# Patient Record
Sex: MALE | Race: WHITE | NOT HISPANIC OR LATINO | Employment: OTHER | URBAN - METROPOLITAN AREA
[De-identification: names, ages, dates, MRNs, and addresses within clinical notes are randomized per-mention and may not be internally consistent; named-entity substitution may affect disease eponyms.]

---

## 2018-06-19 LAB
ALBUMIN SERPL BCP-MCNC: 3.9 G/DL (ref 3.5–5.7)
ALP SERPL-CCNC: 72 IU/L (ref 55–165)
ALT SERPL W P-5'-P-CCNC: 22 IU/L (ref 10–35)
ANION GAP SERPL CALCULATED.3IONS-SCNC: 12.6 MM/L
AST SERPL W P-5'-P-CCNC: 21 U/L (ref 8–27)
BASOPHILS # BLD AUTO: 0.1 X3/UL (ref 0–0.3)
BASOPHILS # BLD AUTO: 0.8 % (ref 0–2)
BILIRUB SERPL-MCNC: 0.7 MG/DL (ref 0.3–1)
BUN SERPL-MCNC: 16 MG/DL (ref 7–25)
CALCIUM SERPL-MCNC: 9.4 MG/DL (ref 8.6–10.5)
CHLORIDE SERPL-SCNC: 101 MM/L (ref 98–107)
CHOLEST SERPL-MCNC: 199 MG/DL (ref 0–200)
CO2 SERPL-SCNC: 27 MM/L (ref 21–31)
CREAT SERPL-MCNC: 0.81 MG/DL (ref 0.7–1.3)
DEPRECATED RDW RBC AUTO: 14.8 % (ref 11.5–14.5)
EGFR (HISTORICAL): > 60 GFR
EGFR AFRICAN AMERICAN (HISTORICAL): > 60 GFR
EOSINOPHIL # BLD AUTO: 0.2 X3/UL (ref 0–0.5)
EOSINOPHIL NFR BLD AUTO: 2.2 % (ref 0–5)
EST. AVERAGE GLUCOSE BLD GHB EST-MCNC: 123 MG/DL
GLUCOSE (HISTORICAL): 112 MG/DL (ref 65–99)
HBA1C MFR BLD HPLC: 5.9 % (ref 4–6.2)
HCT VFR BLD AUTO: 43.6 % (ref 42–52)
HDLC SERPL-MCNC: 41 MG/DL (ref 40–60)
HGB BLD-MCNC: 14.7 G/DL (ref 14–18)
LDLC SERPL CALC-MCNC: 125.1 MG/DL (ref 75–193)
LYMPHOCYTES # BLD AUTO: 2 X3/UL (ref 1.2–4.2)
LYMPHOCYTES NFR BLD AUTO: 20.7 % (ref 20.5–51.1)
MCH RBC QN AUTO: 30.1 PG (ref 26–34)
MCHC RBC AUTO-ENTMCNC: 33.8 G/DL (ref 31–36)
MCV RBC AUTO: 89.1 FL (ref 81–99)
MONOCYTES # BLD AUTO: 0.5 X3/UL (ref 0–1)
MONOCYTES NFR BLD AUTO: 5.1 % (ref 1.7–12)
NEUTROPHILS # BLD AUTO: 6.7 X3/UL (ref 1.4–6.5)
NEUTS SEG NFR BLD AUTO: 71.2 % (ref 42.2–75.2)
OSMOLALITY, SERUM (HISTORICAL): 274 MOSM (ref 262–291)
PLATELET # BLD AUTO: 284 X3/UL (ref 130–400)
PMV BLD AUTO: 8.2 FL (ref 8.6–11.7)
POTASSIUM SERPL-SCNC: 4.6 MM/L (ref 3.5–5.5)
PSA (HISTORICAL): 1.41 NG/ML (ref 0–4)
RBC # BLD AUTO: 4.9 X6/UL (ref 4.3–5.9)
SODIUM SERPL-SCNC: 136 MM/L (ref 134–143)
TOTAL PROTEIN (HISTORICAL): 6.6 G/DL (ref 6.4–8.9)
TRIGL SERPL-MCNC: 166 MG/DL (ref 44–166)
TSH SERPL DL<=0.05 MIU/L-ACNC: 1.98 UIU/M (ref 0.45–5.33)
URIC ACID (HISTORICAL): 5.4 MG/DL (ref 2.3–7.6)
VLDL CHOLESTEROL (HISTORICAL): 33 MG/DL (ref 5–51)
WBC # BLD AUTO: 9.5 X3/UL (ref 4.8–10.8)

## 2018-12-13 ENCOUNTER — TRANSCRIBE ORDERS (OUTPATIENT)
Dept: LAB | Facility: MEDICAL CENTER | Age: 68
End: 2018-12-13

## 2018-12-13 ENCOUNTER — APPOINTMENT (OUTPATIENT)
Dept: LAB | Facility: MEDICAL CENTER | Age: 68
End: 2018-12-13
Payer: MEDICARE

## 2018-12-13 DIAGNOSIS — R73.03 PREDIABETES: Primary | ICD-10-CM

## 2018-12-13 DIAGNOSIS — E78.5 HYPERLIPIDEMIA, UNSPECIFIED HYPERLIPIDEMIA TYPE: ICD-10-CM

## 2018-12-13 DIAGNOSIS — R73.03 PREDIABETES: ICD-10-CM

## 2018-12-13 LAB
CHOLEST SERPL-MCNC: 181 MG/DL (ref 50–200)
EST. AVERAGE GLUCOSE BLD GHB EST-MCNC: 131 MG/DL
HBA1C MFR BLD: 6.2 % (ref 4.2–6.3)
HDLC SERPL-MCNC: 42 MG/DL (ref 40–60)
LDLC SERPL CALC-MCNC: 110 MG/DL (ref 0–100)
NONHDLC SERPL-MCNC: 139 MG/DL
TRIGL SERPL-MCNC: 143 MG/DL

## 2018-12-13 PROCEDURE — 83036 HEMOGLOBIN GLYCOSYLATED A1C: CPT

## 2018-12-13 PROCEDURE — 80061 LIPID PANEL: CPT

## 2018-12-13 PROCEDURE — 36415 COLL VENOUS BLD VENIPUNCTURE: CPT

## 2019-04-17 ENCOUNTER — TELEPHONE (OUTPATIENT)
Dept: NEUROLOGY | Facility: CLINIC | Age: 69
End: 2019-04-17

## 2019-06-12 ENCOUNTER — APPOINTMENT (EMERGENCY)
Dept: RADIOLOGY | Facility: HOSPITAL | Age: 69
End: 2019-06-12
Payer: MEDICARE

## 2019-06-12 ENCOUNTER — HOSPITAL ENCOUNTER (EMERGENCY)
Facility: HOSPITAL | Age: 69
Discharge: HOME | End: 2019-06-12
Attending: EMERGENCY MEDICINE | Admitting: EMERGENCY MEDICINE
Payer: MEDICARE

## 2019-06-12 VITALS
HEIGHT: 73 IN | BODY MASS INDEX: 36.45 KG/M2 | TEMPERATURE: 99.2 F | SYSTOLIC BLOOD PRESSURE: 132 MMHG | HEART RATE: 84 BPM | DIASTOLIC BLOOD PRESSURE: 70 MMHG | OXYGEN SATURATION: 96 % | RESPIRATION RATE: 18 BRPM | WEIGHT: 275 LBS

## 2019-06-12 DIAGNOSIS — J06.9 UPPER RESPIRATORY INFECTION, ACUTE: ICD-10-CM

## 2019-06-12 DIAGNOSIS — R31.9 HEMATURIA, UNSPECIFIED TYPE: Primary | ICD-10-CM

## 2019-06-12 LAB
BACTERIA UR QL AUTO: ABNORMAL /HPF
BILIRUB UR QL STRIP: NEGATIVE
CLARITY UR: ABNORMAL
COLOR UR: YELLOW
GLUCOSE UR STRIP-MCNC: NEGATIVE MG/DL
HGB UR QL STRIP.AUTO: ABNORMAL
KETONES UR STRIP-MCNC: ABNORMAL MG/DL
LEUKOCYTE ESTERASE UR QL STRIP: NEGATIVE
NITRITE UR QL STRIP: NEGATIVE
NON-SQ EPI CELLS URNS QL MICRO: ABNORMAL /HPF
PH UR STRIP.AUTO: 6 [PH]
PROT UR STRIP-MCNC: NEGATIVE MG/DL
RBC #/AREA URNS AUTO: ABNORMAL /HPF
S PYO AG THROAT QL: NEGATIVE
SP GR UR STRIP.AUTO: 1.02 (ref 1–1.03)
UROBILINOGEN UR QL STRIP.AUTO: 0.2 E.U./DL
WBC #/AREA URNS AUTO: ABNORMAL /HPF

## 2019-06-12 PROCEDURE — 81003 URINALYSIS AUTO W/O SCOPE: CPT | Performed by: EMERGENCY MEDICINE

## 2019-06-12 PROCEDURE — 99283 EMERGENCY DEPT VISIT LOW MDM: CPT

## 2019-06-12 PROCEDURE — 81001 URINALYSIS AUTO W/SCOPE: CPT | Performed by: EMERGENCY MEDICINE

## 2019-06-12 PROCEDURE — 87430 STREP A AG IA: CPT | Performed by: EMERGENCY MEDICINE

## 2019-06-12 PROCEDURE — 87070 CULTURE OTHR SPECIMN AEROBIC: CPT | Performed by: EMERGENCY MEDICINE

## 2019-06-12 PROCEDURE — 71046 X-RAY EXAM CHEST 2 VIEWS: CPT

## 2019-06-12 RX ORDER — FLUOXETINE 20 MG/1
20 TABLET, FILM COATED ORAL DAILY
COMMUNITY
End: 2021-09-20 | Stop reason: SDUPTHER

## 2019-06-12 RX ORDER — MELOXICAM 15 MG/1
15 TABLET ORAL DAILY
COMMUNITY

## 2019-06-12 RX ORDER — CIPROFLOXACIN 2 MG/ML
400 INJECTION, SOLUTION INTRAVENOUS ONCE
Status: DISCONTINUED | OUTPATIENT
Start: 2019-06-12 | End: 2019-06-12

## 2019-06-12 RX ORDER — CIPROFLOXACIN 250 MG/1
250 TABLET, FILM COATED ORAL EVERY 12 HOURS
Qty: 6 TABLET | Refills: 0 | Status: SHIPPED | OUTPATIENT
Start: 2019-06-12 | End: 2019-06-15

## 2019-06-12 RX ORDER — CIPROFLOXACIN 500 MG/1
500 TABLET, FILM COATED ORAL ONCE
Status: COMPLETED | OUTPATIENT
Start: 2019-06-12 | End: 2019-06-12

## 2019-06-12 RX ORDER — ALLOPURINOL 100 MG/1
100 TABLET ORAL 2 TIMES DAILY
COMMUNITY

## 2019-06-12 RX ADMIN — CIPROFLOXACIN HYDROCHLORIDE 500 MG: 500 TABLET, FILM COATED ORAL at 21:15

## 2019-06-15 LAB — BACTERIA THROAT CULT: NORMAL

## 2019-07-31 ENCOUNTER — TRANSCRIBE ORDERS (OUTPATIENT)
Dept: LAB | Facility: MEDICAL CENTER | Age: 69
End: 2019-07-31

## 2019-07-31 ENCOUNTER — APPOINTMENT (OUTPATIENT)
Dept: LAB | Facility: MEDICAL CENTER | Age: 69
End: 2019-07-31
Payer: MEDICARE

## 2019-07-31 DIAGNOSIS — R31.9 HEMATURIA, UNSPECIFIED TYPE: ICD-10-CM

## 2019-07-31 DIAGNOSIS — M10.9 GOUT, UNSPECIFIED CAUSE, UNSPECIFIED CHRONICITY, UNSPECIFIED SITE: ICD-10-CM

## 2019-07-31 DIAGNOSIS — E78.5 HYPERLIPIDEMIA, UNSPECIFIED HYPERLIPIDEMIA TYPE: ICD-10-CM

## 2019-07-31 DIAGNOSIS — R73.03 PREDIABETES: ICD-10-CM

## 2019-07-31 DIAGNOSIS — R31.9 HEMATURIA, UNSPECIFIED TYPE: Primary | ICD-10-CM

## 2019-07-31 LAB
ALBUMIN SERPL BCP-MCNC: 3.5 G/DL (ref 3.5–5)
ALP SERPL-CCNC: 92 U/L (ref 46–116)
ALT SERPL W P-5'-P-CCNC: 17 U/L (ref 12–78)
ANION GAP SERPL CALCULATED.3IONS-SCNC: 7 MMOL/L (ref 4–13)
AST SERPL W P-5'-P-CCNC: 16 U/L (ref 5–45)
BILIRUB SERPL-MCNC: 0.68 MG/DL (ref 0.2–1)
BILIRUB UR QL STRIP: NEGATIVE
BUN SERPL-MCNC: 16 MG/DL (ref 5–25)
CALCIUM SERPL-MCNC: 9.5 MG/DL (ref 8.3–10.1)
CHLORIDE SERPL-SCNC: 107 MMOL/L (ref 100–108)
CHOLEST SERPL-MCNC: 186 MG/DL (ref 50–200)
CLARITY UR: CLEAR
CO2 SERPL-SCNC: 25 MMOL/L (ref 21–32)
COLOR UR: YELLOW
CREAT SERPL-MCNC: 0.71 MG/DL (ref 0.6–1.3)
ERYTHROCYTE [DISTWIDTH] IN BLOOD BY AUTOMATED COUNT: 16.4 % (ref 11.6–15.1)
EST. AVERAGE GLUCOSE BLD GHB EST-MCNC: 120 MG/DL
GFR SERPL CREATININE-BSD FRML MDRD: 95 ML/MIN/1.73SQ M
GLUCOSE P FAST SERPL-MCNC: 109 MG/DL (ref 65–99)
GLUCOSE UR STRIP-MCNC: NEGATIVE MG/DL
HBA1C MFR BLD: 5.8 % (ref 4.2–6.3)
HCT VFR BLD AUTO: 41.8 % (ref 36.5–49.3)
HDLC SERPL-MCNC: 40 MG/DL (ref 40–60)
HGB BLD-MCNC: 13.2 G/DL (ref 12–17)
HGB UR QL STRIP.AUTO: NEGATIVE
KETONES UR STRIP-MCNC: NEGATIVE MG/DL
LDLC SERPL CALC-MCNC: 122 MG/DL (ref 0–100)
LEUKOCYTE ESTERASE UR QL STRIP: NEGATIVE
MCH RBC QN AUTO: 27.6 PG (ref 26.8–34.3)
MCHC RBC AUTO-ENTMCNC: 31.6 G/DL (ref 31.4–37.4)
MCV RBC AUTO: 87 FL (ref 82–98)
NITRITE UR QL STRIP: NEGATIVE
NONHDLC SERPL-MCNC: 146 MG/DL
PH UR STRIP.AUTO: 5.5 [PH]
PLATELET # BLD AUTO: 293 THOUSANDS/UL (ref 149–390)
PMV BLD AUTO: 10.1 FL (ref 8.9–12.7)
POTASSIUM SERPL-SCNC: 3.9 MMOL/L (ref 3.5–5.3)
PROT SERPL-MCNC: 7.4 G/DL (ref 6.4–8.2)
PROT UR STRIP-MCNC: NEGATIVE MG/DL
PSA SERPL-MCNC: 1.6 NG/ML (ref 0–4)
RBC # BLD AUTO: 4.79 MILLION/UL (ref 3.88–5.62)
SODIUM SERPL-SCNC: 139 MMOL/L (ref 136–145)
SP GR UR STRIP.AUTO: 1.02 (ref 1–1.03)
T4 FREE SERPL-MCNC: 1.07 NG/DL (ref 0.76–1.46)
TRIGL SERPL-MCNC: 120 MG/DL
TSH SERPL DL<=0.05 MIU/L-ACNC: 2.33 UIU/ML (ref 0.36–3.74)
URATE SERPL-MCNC: 5.3 MG/DL (ref 4.2–8)
UROBILINOGEN UR QL STRIP.AUTO: 0.2 E.U./DL
WBC # BLD AUTO: 12.1 THOUSAND/UL (ref 4.31–10.16)

## 2019-07-31 PROCEDURE — 84439 ASSAY OF FREE THYROXINE: CPT

## 2019-07-31 PROCEDURE — 36415 COLL VENOUS BLD VENIPUNCTURE: CPT

## 2019-07-31 PROCEDURE — 85027 COMPLETE CBC AUTOMATED: CPT

## 2019-07-31 PROCEDURE — 80061 LIPID PANEL: CPT

## 2019-07-31 PROCEDURE — 80053 COMPREHEN METABOLIC PANEL: CPT

## 2019-07-31 PROCEDURE — 84153 ASSAY OF PSA TOTAL: CPT

## 2019-07-31 PROCEDURE — 84550 ASSAY OF BLOOD/URIC ACID: CPT

## 2019-07-31 PROCEDURE — 83036 HEMOGLOBIN GLYCOSYLATED A1C: CPT

## 2019-07-31 PROCEDURE — 84443 ASSAY THYROID STIM HORMONE: CPT

## 2019-07-31 PROCEDURE — 81003 URINALYSIS AUTO W/O SCOPE: CPT

## 2019-07-31 PROCEDURE — 87086 URINE CULTURE/COLONY COUNT: CPT

## 2019-08-01 LAB — BACTERIA UR CULT: NORMAL

## 2019-08-05 ENCOUNTER — TRANSCRIBE ORDERS (OUTPATIENT)
Dept: ADMINISTRATIVE | Facility: HOSPITAL | Age: 69
End: 2019-08-05

## 2019-08-05 DIAGNOSIS — M54.5 LOW BACK PAIN, UNSPECIFIED BACK PAIN LATERALITY, UNSPECIFIED CHRONICITY, WITH SCIATICA PRESENCE UNSPECIFIED: Primary | ICD-10-CM

## 2019-08-05 DIAGNOSIS — M48.061 SPINAL STENOSIS OF LUMBAR REGION, UNSPECIFIED WHETHER NEUROGENIC CLAUDICATION PRESENT: ICD-10-CM

## 2019-08-14 ENCOUNTER — HOSPITAL ENCOUNTER (OUTPATIENT)
Dept: MRI IMAGING | Facility: HOSPITAL | Age: 69
Discharge: HOME | End: 2019-08-14
Attending: FAMILY MEDICINE
Payer: MEDICARE

## 2019-08-14 DIAGNOSIS — M54.5 LOW BACK PAIN, UNSPECIFIED BACK PAIN LATERALITY, UNSPECIFIED CHRONICITY, WITH SCIATICA PRESENCE UNSPECIFIED: ICD-10-CM

## 2019-08-14 DIAGNOSIS — M48.061 SPINAL STENOSIS OF LUMBAR REGION, UNSPECIFIED WHETHER NEUROGENIC CLAUDICATION PRESENT: ICD-10-CM

## 2019-08-14 PROCEDURE — A9585 GADOBUTROL INJECTION: HCPCS | Performed by: FAMILY MEDICINE

## 2019-08-14 PROCEDURE — 72158 MRI LUMBAR SPINE W/O & W/DYE: CPT

## 2019-08-14 RX ADMIN — GADOBUTROL 12 ML: 604.72 INJECTION INTRAVENOUS at 13:00

## 2019-08-19 ENCOUNTER — TRANSCRIBE ORDERS (OUTPATIENT)
Dept: ADMINISTRATIVE | Facility: HOSPITAL | Age: 69
End: 2019-08-19

## 2019-08-19 DIAGNOSIS — G89.29 CHRONIC THORACIC BACK PAIN, UNSPECIFIED BACK PAIN LATERALITY: Primary | ICD-10-CM

## 2019-08-19 DIAGNOSIS — M54.6 CHRONIC THORACIC BACK PAIN, UNSPECIFIED BACK PAIN LATERALITY: Primary | ICD-10-CM

## 2019-08-27 ENCOUNTER — HOSPITAL ENCOUNTER (OUTPATIENT)
Dept: MRI IMAGING | Facility: HOSPITAL | Age: 69
Discharge: HOME | End: 2019-08-27
Attending: FAMILY MEDICINE
Payer: MEDICARE

## 2019-08-27 DIAGNOSIS — M54.6 CHRONIC THORACIC BACK PAIN, UNSPECIFIED BACK PAIN LATERALITY: ICD-10-CM

## 2019-08-27 DIAGNOSIS — G89.29 CHRONIC THORACIC BACK PAIN, UNSPECIFIED BACK PAIN LATERALITY: ICD-10-CM

## 2019-08-27 PROCEDURE — 72157 MRI CHEST SPINE W/O & W/DYE: CPT

## 2019-08-27 PROCEDURE — A9585 GADOBUTROL INJECTION: HCPCS | Performed by: FAMILY MEDICINE

## 2019-08-27 RX ADMIN — GADOBUTROL 12 ML: 604.72 INJECTION INTRAVENOUS at 14:42

## 2019-08-28 ENCOUNTER — CONSULT (OUTPATIENT)
Dept: NEUROLOGY | Facility: CLINIC | Age: 69
End: 2019-08-28
Payer: MEDICARE

## 2019-08-28 VITALS — HEART RATE: 78 BPM | RESPIRATION RATE: 16 BRPM | DIASTOLIC BLOOD PRESSURE: 70 MMHG | SYSTOLIC BLOOD PRESSURE: 120 MMHG

## 2019-08-28 DIAGNOSIS — R26.9 UNSPECIFIED ABNORMALITIES OF GAIT AND MOBILITY: Primary | ICD-10-CM

## 2019-08-28 DIAGNOSIS — G62.9 POLYNEUROPATHY: ICD-10-CM

## 2019-08-28 DIAGNOSIS — R26.9 GAIT ABNORMALITY: ICD-10-CM

## 2019-08-28 DIAGNOSIS — M48.061 SPINAL STENOSIS OF LUMBAR REGION WITHOUT NEUROGENIC CLAUDICATION: ICD-10-CM

## 2019-08-28 DIAGNOSIS — G60.9 HEREDITARY AND IDIOPATHIC NEUROPATHY: ICD-10-CM

## 2019-08-28 PROCEDURE — 99205 OFFICE O/P NEW HI 60 MIN: CPT | Performed by: PSYCHIATRY & NEUROLOGY

## 2019-08-28 NOTE — ASSESSMENT & PLAN NOTE
This is a 17-year-old patient history one stenosis  He did undergo two prior lumbar fusion surgery but does describe persistent weakness of the right lower extremity  Physical therapy has provided some benefit however continues to lower extremity weakness right greater than the leg proximal and distal in the right leg  The examination also demonstrated diminution of modalities sensory modalities in the feet  He could have combination of both lumbar disc disease versus a underlying polyneuropathy  The there is asymmetric weakness right greater left which could also indicate a potential lumbar sacral plexopathy  Plan he is to continue with the exercises physical therapy has taught him 2  I have ordered a repeat EMG of the lower extremity specifically the right leg determine if has a right plexopathy versus determine the extent of his neuropathy  I have asked him to obtain a B12 folate and B6 to determine his any underlying causes of neuropathy  3   We did discuss his MRI findings in detail today  It did show chronic myelomalacia  changes in the thoracic spine which is the residual of his prior lumbar surgeries  However I did suggest a 2nd opinion by Neurosurgery determine if there is any other options  Today I have reviewed his images via PACS in detail at [facility] visit     He was agreeable to this  All however he is quite reluctant to pursue frequent doctors appointments

## 2019-08-28 NOTE — PROGRESS NOTES
Patient ID: Cassandra Braxton is a 77 y o  male  Assessment/Plan:    Gait abnormality  This is a 77year-old patient history one stenosis  He did undergo two prior lumbar fusion surgery but does describe persistent weakness of the right lower extremity  Physical therapy has provided some benefit however continues to lower extremity weakness right greater than the leg proximal and distal in the right leg  The examination also demonstrated diminution of modalities sensory modalities in the feet  He could have combination of both lumbar disc disease versus a underlying polyneuropathy  The there is asymmetric weakness right greater left which could also indicate a potential lumbar sacral plexopathy  Plan he is to continue with the exercises physical therapy has taught him 2  I have ordered a repeat EMG of the lower extremity specifically the right leg determine if has a right plexopathy versus determine the extent of his neuropathy  I have asked him to obtain a B12 folate and B6 to determine his any underlying causes of neuropathy  3   We did discuss his MRI findings in detail today  It did show chronic myelomalacia  changes in the thoracic spine which is the residual of his prior lumbar surgeries  However I did suggest a 2nd opinion by Neurosurgery determine if there is any other options  He was agreeable to this  All however he is quite reluctant to pursue frequent doctors appointments  Diagnoses and all orders for this visit:    Unspecified abnormalities of gait and mobility  -     EMG 1 Upper/1 Lower Neuropathy; Future  -     Ambulatory referral to Neurosurgery; Future    Polyneuropathy  -     Vitamin B12; Future  -     Folate; Future  -     Vitamin B6; Future  -     EMG 1 Upper/1 Lower Neuropathy; Future    Hereditary and idiopathic neuropathy   -     Vitamin B12; Future  -     Folate;  Future  -     Vitamin B6; Future    Gait abnormality    Spinal stenosis of lumbar region without neurogenic claudication    Other orders  -     Misc Natural Products (OSTEO BI-FLEX/5-LOXIN ADVANCED PO); Take 2 tablets by mouth daily  -     Discontinue: Multiple Vitamins-Minerals (CENTRUM SILVER 50+MEN PO); Take 1 tablet by mouth daily         Subjective:    76 y/o rh male presents for evaluation of weakness and numbness in his legs  He presents with his wife Beryl Polk   In 2003 while leaving work he had acute onset of numbness and fell to the ground  He was evaluated and was thought to an infectious etiology or a demyelinating polyneuropathy  He was placed on steroids and eventually was diagnosed with a lumbar sacral stenosis   He underwent initial surgery in 2003 fusion of the lumbar sacral spine by Dr Charles Tripathi at [facility] he did not have full recovery of his weakness he was initially using a cane but now utilizes a wheelchair  In 2015 his family was not and noted that he was experiencing more weakness and numbness in his legs this is more predominant on the right than the left  There is no symptoms in his arms he subsequently underwent a another surgery in 2015 by Dr Siri Jeff  He developed urinary incontinence he was eventually transferred to a SNF rehab and was diet was noted to have a sacral decubitus ulcer  This eventually was treated and he then underwent physical therapy in bed  Eventually he was transferred to home   He was evaluated by   [facility] graft in January of 2015 at [facility]  Prior EMG studies were reviewed from 2014 and it did demonstrate abnormalities consistent with a peripheral neuropathy  However he did not return for further workup  He has been following up at [facility] by Dr Lora Scot   He was referred to a also a for potential knee replacement surgery then eventually wound has been following up with Dr Kiel Lechuga for years   He did undergo extensive physical therapy by [facility] rehab in [place]  He is able to transfer independently     but he does not drive  For longer distances he will utilize a wheelchair  A referral to [facility] was suggested  However due to a lack of access a sooner appointment was made to our offices  In the last few months he does report continued weakness of the right leg  He also describes bilateral foot drops  He does have bilateral  AFO l braces which he does not utilize  He denies any problem with his bladder or bowels  He denies any numbness in his perineum  He underwent a MRI of the lumbar spine   on 08/14/19   Extensive myelomalacia at T11-12 level  Linear enhancement of the anterior aspect of the lower thoracic cord which may extend above the scope of this examination  Recommend dedicated imaging of the thoracic spine with and without contrast      Fusion from L2 to L5 involving the L2-3, L3-4 and L4-5 disc spaces  These disc spaces demonstrate no significant canal stenosis  Mild persistent foraminal narrowing at L4-5 due to grade 2 anterior spinal     At L1-2 there is a central disc protrusion superimposed upon an old or canal stenosis  He then underwent a MRI of the thoracic spine on 08/27/ 19     Thoracic degenerative change is seen most pronounced from T8-9 through T12-L1 where there is annular bulging, disc protrusions and endplate/facet hypertrophic change resulting in multilevel moderate canal stenosis and foraminal narrowing  There is mild   chronic myelomalacia of the lower thoracic cord at the level of T11-12      At the T3-4 level there is moderate bilateral foraminal narrowing as a result of endplate and posterior element hypertrophic change  SHX  he is   Has three children seven grandchildren  He retired from [address]  He denies any current use of tobacco he drinks coffee and caffeine on a regular basis he previously coached baseball and football  Medical played football as well as wrestled  history was reviewed today     He has gout for which he uses Allopurinal  Surgical history includes lumbar fusion in 2003 and 2015      Records from [facility] were reviewed today                                                                                                                                       The following portions of the patient's history were reviewed and updated as appropriate: He  has a past medical history of Depression and Gout  He  has a past surgical history that includes Back surgery; TONSILECTOMY AND ADNOIDECTOMY; and Cataract extraction, bilateral   His family history includes Diabetes in his brother and father  He  reports that he has quit smoking  He has never used smokeless tobacco  He reports that he drinks alcohol  He reports that he does not use drugs  Current Outpatient Medications   Medication Sig Dispense Refill    allopurinol (ZYLOPRIM) 100 mg tablet Take 100 mg by mouth 2 (two) times a day      FLUoxetine (PROzac) 20 MG tablet Take 20 mg by mouth daily      Magnesium 100 MG CAPS Take 1 capsule by mouth daily      meloxicam (MOBIC) 15 mg tablet Take 15 mg by mouth daily      Misc Natural Products (OSTEO BI-FLEX/5-LOXIN ADVANCED PO) Take 2 tablets by mouth daily      Multiple Vitamins-Minerals (CENTRUM SILVER 50+MEN PO) Take 1 tablet by mouth daily      SUPER B COMPLEX/C PO Take 1 tablet by mouth daily       No current facility-administered medications for this visit  He is allergic to penicillins            Objective:    Blood pressure 120/70, pulse 78, resp  rate 16  Physical Exam   Constitutional: He appears well-developed  HENT:   Head: Normocephalic  Eyes: Pupils are equal, round, and reactive to light  Lids are normal    Cardiovascular: Normal rate  Pulmonary/Chest: Effort normal    Neurological:   Reflex Scores:       Tricep reflexes are 1+ on the right side and 1+ on the left side  Bicep reflexes are 2+ on the right side and 2+ on the left side    Psychiatric: His speech is normal     He had no carotid bruit    Neurological Exam  Mental Status   Oriented to person, place, time and situation  Speech is normal  Language is fluent with no aphasia  Cranial Nerves  CN II: Visual acuity is normal  Visual fields full to confrontation  CN III, IV, VI: Extraocular movements intact bilaterally  Normal lids and orbits bilaterally  Pupils equal round and reactive to light bilaterally  CN V: Facial sensation is normal   CN VII: Full and symmetric facial movement  CN VIII: Hearing is normal   CN IX, X: Palate elevates symmetrically  Normal gag reflex  CN XI: Shoulder shrug strength is normal   CN XII: Tongue midline without atrophy or fasciculations  Motor    He had normal strength in the upper extremities without any evidence of a pronator drift    Df 0/5, pf 2, , kf/ ke 4/5 hp 5/5 on left, on right hip 3/5, kf/ ke 3/5 , df0,  p5 1/5   Sensory  Vibraiton absent in toes , knees  10 sec , left 3sec in knee, jps intact in ue ,  jps , intact in ankle , decrease  pp in the right medial  c/w lateral    He had decrease in temperature in the feet  , reflexes trace  kj, aj , otes dwogoing       Reflexes                                           Right                      Left  Biceps                                 2+                         2+  Triceps                                1+                         1+    Right pathological reflexes: Joseph's absent  Left pathological reflexes: Joseph's absent  , reflexes trace  kj, aj , toes  dwogoing       Coordination  Right: Finger-to-nose normal   Left: Finger-to-nose normal     Gait Unable to rise from chair without using arms  He is unable to ambulate independently he is unable to rise from a chair without using his arms       Review of systems obtained by the medical assistant as below was reviewed with the patient at today's visit    ROS:    Review of Systems   Constitutional: Negative  Negative for appetite change and fever  HENT: Negative    Negative for hearing loss, tinnitus, trouble swallowing and voice change  Eyes: Negative  Negative for photophobia and pain  Respiratory: Negative  Negative for shortness of breath  Cardiovascular: Negative  Negative for palpitations  Gastrointestinal: Negative  Negative for nausea and vomiting  Endocrine: Negative  Negative for cold intolerance and heat intolerance  Genitourinary: Negative  Negative for dysuria, frequency and urgency  Musculoskeletal: Negative  Negative for myalgias and neck pain  Immobility or loss of function     Skin: Negative  Negative for rash  Neurological: Negative  Negative for dizziness, tremors, seizures, syncope, facial asymmetry, speech difficulty, weakness, light-headedness, numbness and headaches  Hematological: Negative  Does not bruise/bleed easily  Psychiatric/Behavioral: Negative  Negative for confusion, hallucinations and sleep disturbance  Today I spent 65 minutes with the patient and his wife  Greater than 50% of the time was spent in reviewing his records obtaining history and formulating a plan  I was able to answer all the questions to their satisfaction

## 2019-09-30 ENCOUNTER — CONSULT (OUTPATIENT)
Dept: NEUROSURGERY | Facility: CLINIC | Age: 69
End: 2019-09-30
Payer: MEDICARE

## 2019-09-30 VITALS
RESPIRATION RATE: 16 BRPM | DIASTOLIC BLOOD PRESSURE: 79 MMHG | BODY MASS INDEX: 37.11 KG/M2 | SYSTOLIC BLOOD PRESSURE: 139 MMHG | HEART RATE: 60 BPM | TEMPERATURE: 97.4 F | WEIGHT: 280 LBS | HEIGHT: 73 IN

## 2019-09-30 DIAGNOSIS — R26.9 UNSPECIFIED ABNORMALITIES OF GAIT AND MOBILITY: ICD-10-CM

## 2019-09-30 DIAGNOSIS — G62.9 POLYNEUROPATHY: ICD-10-CM

## 2019-09-30 DIAGNOSIS — R29.898 WEAKNESS OF BOTH LOWER EXTREMITIES: Primary | ICD-10-CM

## 2019-09-30 DIAGNOSIS — G54.1 LUMBOSACRAL PLEXOPATHY: ICD-10-CM

## 2019-09-30 PROCEDURE — 99204 OFFICE O/P NEW MOD 45 MIN: CPT | Performed by: NEUROLOGICAL SURGERY

## 2019-09-30 RX ORDER — LORATADINE 10 MG/1
10 TABLET ORAL AS NEEDED
COMMUNITY

## 2019-09-30 NOTE — PROGRESS NOTES
Neurosurgery Office Note  Devin Blair 68 y o  male MRN: [id number]      Assessment/Plan      Diagnoses and all orders for this visit:    Weakness of both lower extremities  -     Ambulatory referral to Physical Therapy; Future    Unspecified abnormalities of gait and mobility      Lumbosacral plexopathy    Polyneuropathy      Discussion:     43-year-old male referred by Dr Savi Pena  For Thoracic and lumbar spine evaluation  patient underwent posterior lumbar surgery with Dr Serina Smith  In 2004 and again in 2015  Unclear the extent of the 2004 surgery, except that it involved a posterior approach  2015 surgery appeared to be a right approach XLIF for L2-3, L3-4,  An ultimately leaving posterior fixation fusion L2-5  Patient and his wife state that his decline has been stepwise in around the time of those 2 surgeries  He had surgery initially for when he stated was lower extremity numbness  However, with the 1st surgery went from using a cane to a walker, and since the 2nd surgery has been residing in a wheelchair, unable to walk  His perioperative course around the 2nd surgery involved an 11 day hospitalization, rather than the 3 days they were expecting, a sacral decubitus, a 50 lb weight loss, difficulty with urinary retention etc  He was discharged to nursing home, went to his 1st postoperative visit in a gurney, but ultimately returned home  He denies back pain or leg pain presently  He states he has some right lower extremity numbness, but specifically his main complaint is lower extremity weakness R>L, and his inability to walk  On physical exam, iliopsoas strength 2/5 on the right, 4/5 on the left, quadriceps 2/5 on the right, 4/5 on the left, hamstring strength 4+/ 5 on the left, dorsiflexion 1-2/5 bilaterally  He is essentially areflexic bilateral patella, and no clonus  MRI scans of the thoracic an  Lumbar spine were reviewed    Lumbar spine studies done 8/14/ 19  demonstrates his extensive post surgical history,   With XLIF  Implants L2-3, L3-4, likely interbody device posteriorly delivered between L4-5 with grade 1-2 spondylolisthesis, and posterior pedicle instrumentation L2-5  There is considerable adjacent level disease at L1-2, with disc desiccation, loss of height, and canal stenosis  Also mentions potential myelomalacia at  T11-12  MRI scan thoracic spine done 8/27/19 shows multilevel degenerative disease with spondylosis, worst at T11-12, mild myelomalacia  I reviewed the patient and his family his imaging studies  Clinically, his picture is not one of clear myelopathy  He has no hyperreflexia to suggest myelopathy alone, although this may be obscured by peripheral neuropathy  The history  provided suggests he may well have a right lumbosacral plexopathy status post lateral approach, a well described complication/ side effect to this procedure  His bilateral foot drop may be related to his earlier surgery at L4-5, causing difficulty with those nerve roots  The patient is scheduled for an EMG/NCS with Dr Lani Zavala in 12/2019  I will be curious to review that result  She is also checking B6 & B12 vitamin levels  However, I am not confident that surgery offers the patient a chance at improvement  He did not describe a  Progression or worsening, that would be suggestive of low thoracic degenerative disease causing progressive myelopathy  On the contrary, the history provided speaks more to stepwise postoperative decline failure to improve since  I have suggested he reconnect with physical therapy, and the patient is willing, particularly if it is close to home  We will connect him with physical therapy in the [facility] area      Furthermore, his wife and daughter asked about more sophisticated physical therapy/ physiatry opportunities such as exoskeleton, etc  I will look into opportunities/means by which to access such, and refer as appropriate  Otherwise, I feel his follow-up with us can be on an as needed basis  CHIEF COMPLAINT    Chief Complaint   Patient presents with    Consult     NP Consult for Thoracic and Lumbar spine evaluation; referred by Dr Ken Mehta    History of Present Illness     68y o  year old male     HPI    See Discussion    REVIEW OF SYSTEMS    Review of Systems   Constitutional: Negative  HENT: Positive for postnasal drip  Eyes: Negative  Respiratory: Negative  Cardiovascular: Negative  Gastrointestinal: Negative  Endocrine: Negative  Genitourinary: Negative  Musculoskeletal: Positive for gait problem (patient unable to walk, presents in wheelchair) and neck stiffness (in the morning)  Negative for back pain and neck pain  Skin: Positive for rash  Allergic/Immunologic: Positive for environmental allergies (seasonal)  Neurological: Positive for weakness (right leg )  Hematological: Negative  Psychiatric/Behavioral: Negative  Meds/Allergies     Current Outpatient Medications   Medication Sig Dispense Refill    allopurinol (ZYLOPRIM) 100 mg tablet Take 100 mg by mouth 2 (two) times a day      FLUoxetine (PROzac) 20 MG tablet Take 20 mg by mouth daily      loratadine (CLARITIN) 10 mg tablet Take 10 mg by mouth as needed for allergies      Magnesium 100 MG CAPS Take 1 capsule by mouth daily      meloxicam (MOBIC) 15 mg tablet Take 15 mg by mouth daily      Misc Natural Products (OSTEO BI-FLEX/5-LOXIN ADVANCED PO) Take 2 tablets by mouth daily      Multiple Vitamins-Minerals (CENTRUM SILVER 50+MEN PO) Take 1 tablet by mouth daily      SUPER B COMPLEX/C PO Take 1 tablet by mouth daily       No current facility-administered medications for this visit          Allergies   Allergen Reactions    Other      IVORY SOAP    Penicillins        PAST HISTORY    Past Medical History:   Diagnosis Date    Depression     Gout        Past Surgical History: Procedure Laterality Date    BACK SURGERY      CATARACT EXTRACTION, BILATERAL      TONSILECTOMY AND ADNOIDECTOMY         Social History     Tobacco Use    Smoking status: Former Smoker     Last attempt to quit:      Years since quittin 7    Smokeless tobacco: Never Used   Substance Use Topics    Alcohol use: Yes     Comment: occasional    Drug use: Never       Family History   Problem Relation Age of Onset    Diabetes Father     Diabetes Brother          Above history personally reviewed  EXAM    Vitals:Blood pressure 139/79, pulse 60, temperature (!) 97 4 °F (36 3 °C), temperature source Tympanic, resp  rate 16, height 6' 1" (1 854 m), weight 127 kg (280 lb)  ,Body mass index is 36 94 kg/m²  Physical Exam   Constitutional: He appears well-developed  HENT:   Head: Normocephalic and atraumatic  Eyes: No scleral icterus  Neck: Neck supple  Cardiovascular: Normal rate  Pulmonary/Chest: Effort normal    Abdominal: Normal appearance  Neurological: He is alert  No sensory deficit  Skin: Skin is warm and dry  Psychiatric: He has a normal mood and affect  His behavior is normal    Vitals reviewed  Neurologic Exam    See discussion     MEDICAL DECISION MAKING    Imaging Studies:     See discussion  I have personally reviewed pertinent reports     and I have personally reviewed pertinent films in PACS

## 2019-10-02 NOTE — PROGRESS NOTES
Neurosurgery Office Note  Valeriano Pastor 68 y o  male MRN: [id number]      Assessment/Plan      Diagnoses and all orders for this visit:    Weakness of both lower extremities  -     Ambulatory referral to Physical Therapy; Future    Unspecified abnormalities of gait and mobility      Lumbosacral plexopathy    Polyneuropathy      Discussion:     64-year-old male referred by Dr Chaz Gr  For Thoracic and lumbar spine evaluation  patient underwent posterior lumbar surgery with Dr Nba Conner  In 2004 and again in 2015  Unclear the extent of the 2004 surgery, except that it involved a posterior approach  2015 surgery appeared to be a right approach XLIF for L2-3, L3-4,  An ultimately leaving posterior fixation fusion L2-5  Patient and his wife state that his decline has been stepwise in around the time of those 2 surgeries  He had surgery initially for when he stated was lower extremity numbness  However, with the 1st surgery went from using a cane to a walker, and since the 2nd surgery has been residing in a wheelchair, unable to walk  His perioperative course around the 2nd surgery involved an 11 day hospitalization, rather than the 3 days they were expecting, a sacral decubitus, a 50 lb weight loss, difficulty with urinary retention etc  He was discharged to nursing home, went to his 1st postoperative visit in a gurney, but ultimately returned home  He denies back pain or leg pain presently  He states he has some right lower extremity numbness, but specifically his main complaint is lower extremity weakness R>L, and his inability to walk  On physical exam, iliopsoas strength 2/5 on the right, 4/5 on the left, quadriceps 2/5 on the right, 4/5 on the left, hamstring strength 4+/ 5 on the left, dorsiflexion 1-2/5 bilaterally  He is essentially areflexic bilateral patella, and no clonus  MRI scans of the thoracic an  Lumbar spine were reviewed    Lumbar spine studies done 8/14/ 19  demonstrates his extensive post surgical history,   With XLIF  Implants L2-3, L3-4, likely interbody device posteriorly delivered between L4-5 with grade 1-2 spondylolisthesis, and posterior pedicle instrumentation L2-5  There is considerable adjacent level disease at L1-2, with disc desiccation, loss of height, and canal stenosis  Also mentions potential myelomalacia at  T11-12  MRI scan thoracic spine done 8/27/19 shows multilevel degenerative disease with spondylosis, worst at T11-12, mild myelomalacia  I reviewed the patient and his family his imaging studies  Clinically, his picture is not one of clear myelopathy  He has no hyperreflexia to suggest myelopathy alone, although this may be obscured by peripheral neuropathy  The history  provided suggests he may well have a right lumbosacral plexopathy status post lateral approach, a well described complication/ side effect to this procedure  His bilateral foot drop may be related to his earlier surgery at L4-5, causing difficulty with those nerve roots  The patient is scheduled for an EMG/NCS with Dr Merlyn Eng in 12/2019  I will be curious to review that result  She is also checking B6 & B12 vitamin levels  However, I am not confident that surgery offers the patient a chance at improvement  He did not describe a  Progression or worsening, that would be suggestive of low thoracic degenerative disease causing progressive myelopathy  On the contrary, the history provided speaks more to stepwise postoperative decline failure to improve since  I have suggested he reconnect with physical therapy, and the patient is willing, particularly if it is close to home  We will connect him with physical therapy in the [facility] area      Furthermore, his wife and daughter asked about more sophisticated physical therapy/ physiatry opportunities such as exoskeleton, etc  I will look into opportunities/means by which to access such, and refer as appropriate  Otherwise, I feel his follow-up with us can be on an as needed basis  CHIEF COMPLAINT    Chief Complaint   Patient presents with    Consult     NP Consult for Thoracic and Lumbar spine evaluation; referred by Dr Emily Leal    History of Present Illness     68y o  year old male     HPI    See Discussion    REVIEW OF SYSTEMS    Review of Systems   Constitutional: Negative  HENT: Positive for postnasal drip  Eyes: Negative  Respiratory: Negative  Cardiovascular: Negative  Gastrointestinal: Negative  Endocrine: Negative  Genitourinary: Negative  Musculoskeletal: Positive for gait problem (patient unable to walk, presents in wheelchair) and neck stiffness (in the morning)  Negative for back pain and neck pain  Skin: Positive for rash  Allergic/Immunologic: Positive for environmental allergies (seasonal)  Neurological: Positive for weakness (right leg )  Hematological: Negative  Psychiatric/Behavioral: Negative  Meds/Allergies     Current Outpatient Medications   Medication Sig Dispense Refill    allopurinol (ZYLOPRIM) 100 mg tablet Take 100 mg by mouth 2 (two) times a day      FLUoxetine (PROzac) 20 MG tablet Take 20 mg by mouth daily      loratadine (CLARITIN) 10 mg tablet Take 10 mg by mouth as needed for allergies      Magnesium 100 MG CAPS Take 1 capsule by mouth daily      meloxicam (MOBIC) 15 mg tablet Take 15 mg by mouth daily      Misc Natural Products (OSTEO BI-FLEX/5-LOXIN ADVANCED PO) Take 2 tablets by mouth daily      Multiple Vitamins-Minerals (CENTRUM SILVER 50+MEN PO) Take 1 tablet by mouth daily      SUPER B COMPLEX/C PO Take 1 tablet by mouth daily       No current facility-administered medications for this visit          Allergies   Allergen Reactions    Other      IVORY SOAP    Penicillins        PAST HISTORY    Past Medical History:   Diagnosis Date    Depression     Gout        Past Surgical History: Procedure Laterality Date    BACK SURGERY      CATARACT EXTRACTION, BILATERAL      TONSILECTOMY AND ADNOIDECTOMY         Social History     Tobacco Use    Smoking status: Former Smoker     Last attempt to quit:      Years since quittin 7    Smokeless tobacco: Never Used   Substance Use Topics    Alcohol use: Yes     Comment: occasional    Drug use: Never       Family History   Problem Relation Age of Onset    Diabetes Father     Diabetes Brother          Above history personally reviewed  EXAM    Vitals:Blood pressure 139/79, pulse 60, temperature (!) 97 4 °F (36 3 °C), temperature source Tympanic, resp  rate 16, height 6' 1" (1 854 m), weight 127 kg (280 lb)  ,Body mass index is 36 94 kg/m²  Physical Exam   Constitutional: He appears well-developed  HENT:   Head: Normocephalic and atraumatic  Eyes: No scleral icterus  Neck: Neck supple  Cardiovascular: Normal rate  Pulmonary/Chest: Effort normal    Abdominal: Normal appearance  Neurological: He is alert  No sensory deficit  Skin: Skin is warm and dry  Psychiatric: He has a normal mood and affect  His behavior is normal    Vitals reviewed  Neurologic Exam    See discussion     MEDICAL DECISION MAKING    Imaging Studies:     See discussion  I have personally reviewed pertinent reports     and I have personally reviewed pertinent films in PACS

## 2019-10-04 ENCOUNTER — TELEPHONE (OUTPATIENT)
Dept: NEUROLOGY | Facility: CLINIC | Age: 69
End: 2019-10-04

## 2019-10-04 NOTE — TELEPHONE ENCOUNTER
Received call from pt's wife asking us to [place] her labs [place] [facility]  I called them and was told to fax the labs to [phone] Attn Dayanara  This was done

## 2019-10-07 ENCOUNTER — APPOINTMENT (OUTPATIENT)
Dept: LAB | Facility: MEDICAL CENTER | Age: 69
End: 2019-10-07
Payer: MEDICARE

## 2019-10-07 DIAGNOSIS — G62.9 POLYNEUROPATHY: ICD-10-CM

## 2019-10-07 DIAGNOSIS — G60.9 HEREDITARY AND IDIOPATHIC NEUROPATHY: ICD-10-CM

## 2019-10-07 LAB
FOLATE SERPL-MCNC: >20 NG/ML (ref 3.1–17.5)
VIT B12 SERPL-MCNC: 641 PG/ML (ref 100–900)

## 2019-10-07 PROCEDURE — 84207 ASSAY OF VITAMIN B-6: CPT

## 2019-10-07 PROCEDURE — 82607 VITAMIN B-12: CPT

## 2019-10-07 PROCEDURE — 36415 COLL VENOUS BLD VENIPUNCTURE: CPT

## 2019-10-07 PROCEDURE — 82746 ASSAY OF FOLIC ACID SERUM: CPT

## 2019-10-11 ENCOUNTER — HOSPITAL ENCOUNTER (OUTPATIENT)
Dept: NEUROLOGY | Facility: CLINIC | Age: 69
Discharge: HOME | End: 2019-10-11
Payer: MEDICARE

## 2019-10-11 ENCOUNTER — DOCUMENTATION (OUTPATIENT)
Dept: OTHER | Facility: HOSPITAL | Age: 69
End: 2019-10-11

## 2019-10-11 DIAGNOSIS — G62.9 POLYNEUROPATHY: ICD-10-CM

## 2019-10-11 DIAGNOSIS — R26.9 UNSPECIFIED ABNORMALITIES OF GAIT AND MOBILITY: ICD-10-CM

## 2019-10-11 DIAGNOSIS — G54.1 LUMBOSACRAL PLEXOPATHY: Primary | ICD-10-CM

## 2019-10-11 LAB — VIT B6 SERPL-MCNC: 30.8 UG/L (ref 5.3–46.7)

## 2019-10-11 PROCEDURE — 95886 MUSC TEST DONE W/N TEST COMP: CPT | Performed by: PSYCHIATRY & NEUROLOGY

## 2019-10-11 PROCEDURE — 95911 NRV CNDJ TEST 9-10 STUDIES: CPT | Performed by: PSYCHIATRY & NEUROLOGY

## 2019-10-11 NOTE — PROGRESS NOTES
69 y/o male presented for EMG     C/o numbness and tingling in feet  And difficulty walking  , right worse then left     B12/ folate normal , b6 pending     Exam weakness in right hip flexion  , df 1/5 on right  , left 2/5 decrease in sensation on the feet     emg showed     1  Severe mixed axonal / demyelinating neuropathy le worse then ue     2  chronic radiculopathy  At L5 /s1 bilaterally    3  A right chronic upper trunk Lumbar Sacral  plexopathy      his gait dysfunction is multi factorial but problems with transferring and walking more consistent with radiculopathy and plexopathy    No pain     Plan     1  Await B6 level  2  Pt has noted improvement with standing bike   3  Aggressive pt ,   Will place another order   His wife will check with facilities in [place]   4   Please make a f/u with me in march at [facility]

## 2019-10-25 ENCOUNTER — EVALUATION (OUTPATIENT)
Dept: PHYSICAL THERAPY | Facility: CLINIC | Age: 69
End: 2019-10-25
Payer: MEDICARE

## 2019-10-25 DIAGNOSIS — G54.1 LUMBOSACRAL PLEXOPATHY: Primary | ICD-10-CM

## 2019-10-25 PROCEDURE — 97163 PT EVAL HIGH COMPLEX 45 MIN: CPT

## 2019-10-25 NOTE — PROGRESS NOTES
PT Evaluation     Today's date: 10/25/2019  Patient name: Hernando Barrett  : 1950  MRN: [id number]  Referring provider: No ref  provider found  Dx:   Encounter Diagnosis     ICD-10-CM    1  Lumbosacral plexopathy G54 1                   Assessment  Assessment details: Hernando Barrett is a 68 y o  male presenting to outpatient physical therapy with diagnosis of Lumbosacral plexopathy  (primary encounter diagnosis)  Patient's current impairments include diff with transfers, inability to amb, reduced active  range of motion of BLEs , reduced strength of all extrem, reduced postural awareness, and reduced activity tolerance  Patient's present functional limitations include difficulty with ADLs with increased need for assistance and   poor tolerance for functional mobility and activity,   Patient to benefit from skilled outpatient physical therapy 2x/week for8 weeks in order to , maximize active  range of motion, increase strength and stability, and improve functional mobility/functional activity in order to maximize return to prior level of function with reduced limitations  Thank you for your referral       Impairments: abnormal gait, abnormal or restricted ROM, abnormal movement, activity intolerance, impaired balance, impaired physical strength and poor posture   Barriers to therapy: Nonambulatory, chronicity of impairments  Understanding of Dx/Px/POC: good   Prognosis: fair    Goals  STGs to be achieved in 4 weeks:    1  Pt to demonstrate increased AROM of BLEs in order to allow for greater ease and independence with ADLs and functional mobility  2  Pt to demonstrate full PROM of BLEs in order to maximize joint mobility and function and allow for progression of exercise program and achievement of goals  3  Pt to demonstrate increased MMT of BLEs and B shldrs  by at least 1/2-1 grade in order to improve safety and stability with ADLs and functional mobility     4  Pt will take 5-6 steps in parallel bars with mod asst of 1-2  LTGs to be achieved in 6-8 weeks:  1  Pt will be I with HEP in order to continue to improve quality of life and independence  2  Pt to demonstrate return to amb 25 ft with RW with min asst of 1    3  Pt to demonstrate improved function as noted by achieving or exceeding predicted score on FOTO outcomes assessment tool  Plan  Patient would benefit from: skilled physical therapy  Planned therapy interventions: manual therapy, stretching, strengthening, therapeutic exercise, therapeutic activities, home exercise program and balance  Frequency: 2x week  Duration in weeks: 8  Plan of Care beginning date: 10/25/2019  Plan of Care expiration date: 12/20/2019  Treatment plan discussed with: family and patient        Subjective Evaluation    History of Present Illness  Date of surgery: 2004,2015  Mechanism of injury: surgery  Mechanism of injury: Pt states leaving work one night his legs went numb and he fell to the ground  Pt underwent testing and end result was surgery with rods and screws and cleaned out arthritis  Over time pt had to use walker and legs got progressively weaker  Had a second surgery in 2015 and was in [facility] for rehab with a stage 4 pressure ulcer as a result of his hospitalization  Pt states he has been nonambulatory for 3 years, since the second surgery  States when he would attempt to stand his R shld would give out and he could not support himself  Pt has lift recliner and is able to perform indep transfers to tub, recliner and stationary bike  Had MAFOs after initial surgery  Stands at dining room table with wife stabilizing table  Wife states pt had PT from Oct 2018 to April 2019 at another facility  Wife states since DC from PT pt has lost what he gained at that time            Recurrent probem    Quality of life: good    Pain  No pain reported    Social Support  Lives with: spouse    Employment status: not working    Diagnostic Tests  MRI studies: abnormal (back)  Treatments  Previous treatment: physical therapy and occupational therapy  Current treatment: physical therapy  Patient Goals  Patient goals for therapy: improved balance, increased motion, increased strength, independence with ADLs/IADLs and return to sport/leisure activities  Patient goal: get up and walk, resume hunting, attend grandchildren's sporting events        Objective     Concurrent Complaints  Negative for night pain and disturbed sleep    Postural Observations  Seated posture: good  Standing posture: poor    Additional Postural Observation Details  Stands in parallel bars with forward lean and LEs rigid in extension  Unable to take any steps  Transfers sit to stand indep by pulling up on parallel bars and locking knees in ext      Neurological Testing     Sensation     Lumbar   Left   Diminished: light touch, sharp/dull discrimination and proprioception    Right   Diminished: light touch, sharp/dull discrimination and proprioception    Comments   Left light touch: lower leg  Right light touch: lower leg  Right sharp/dull discrimination: very little sensation    Reflexes   Left   Patellar (L4): absent (0)    Right   Patellar (L4): trace (1+)    Strength/Myotome Testing     Left Shoulder     Planes of Motion   Flexion: 4-   Abduction: 4+   External rotation at 0°: 4   Internal rotation at 0°: 4     Right Shoulder     Planes of Motion   Flexion: 4-   Abduction: 4+   External rotation at 0°: 4   Internal rotation at 0°: 4     Left Elbow   Flexion: 4+  Extension: 5    Right Elbow   Flexion: 4+  Extension: 5    Left Hip   Planes of Motion   Flexion: 3+  Abduction: 3  Adduction: 3+    Right Hip   Planes of Motion   Flexion: 3  Abduction: 3+  Adduction: 4-    Left Knee   Flexion: 3-  Extension: 3+    Right Knee   Flexion: 2+  Extension: 2-    Left Ankle/Foot   Dorsiflexion: 2-    Right Ankle/Foot   Dorsiflexion: 1    General Comments:      Hip Comments   Weakness R>L    Knee Comments  Weakness R>L    Ankle/Foot Comments   Weakness R>L             Precautions: nonambulatory, BLE weakness R>L    Re-eval Date:     Date 10/25/19       Visit Count 1       FOTO completed       Pain In 0       Pain Out 0               Manual  10/25            Ham stretch             ITB stretch                                                      Date 10/25           Nustep vs [facility]            Hip flexor/quad stretch            LTR            Heel slides            SAQs            AH            AH with hip abd/add            AH with Alt UE/LEs            Add squeezes            Bridges             hooklying abd            Evan            Tband Anti Trunk rotation              SLRs            Clamshells                                                 Standing in parallel bars            90/90 hip abd SL

## 2019-10-29 ENCOUNTER — OFFICE VISIT (OUTPATIENT)
Dept: PHYSICAL THERAPY | Facility: CLINIC | Age: 69
End: 2019-10-29
Payer: MEDICARE

## 2019-10-29 DIAGNOSIS — G54.1 LUMBOSACRAL PLEXOPATHY: Primary | ICD-10-CM

## 2019-10-29 PROCEDURE — 97530 THERAPEUTIC ACTIVITIES: CPT

## 2019-10-29 PROCEDURE — 97110 THERAPEUTIC EXERCISES: CPT

## 2019-10-29 NOTE — PROGRESS NOTES
Daily Note     Today's date: 10/29/2019  Patient name: Shemar Zamora  : 1950  MRN: [id number]  Referring provider: Izaiah Gomez DO  Dx:   Encounter Diagnosis     ICD-10-CM    1  Lumbosacral plexopathy G54 1                   Subjective: Pt reports no pain and is able to verbalize transfer technique and standing technique  Objective: See treatment diary below      Assessment: Tolerated treatment well  Patient demonstrated fatigue post treatment and would benefit from continued PT  Requires asst on RLE  for several ex including SAQs and ankle ex  Pt tends to hold his breath during ex even with verbal and visual cues  Plan: Continue per plan of care        Precautions: nonambulatory, BLE weakness R>L    Re-eval Date:     Date 10/25/19 10/29      Visit Count 1 2      FOTO completed       Pain In 0 0      Pain Out 0               Manual  10/25 10/29           Ham stretch             ITB stretch                                                      Date 10/25 10/29           UBE   100 rpm 6' alt           [place]  MTP/LTP  15#  20ea           LTR  10x5"           Heel slides B  20            SAQs  L 20  R 20w/ asst           AH             AH with hip abd/add             Ankle DF  20  R w/asst           Add squeezes  20x5"           Bridges   20           hooklying abd  Grn  30x                        Tband Anti Trunk rotation               SLRs             Clamshells   20            parallel bars  5x30"                                     Transfer w/c to mat  CG to min A           90/90 hip abd SL

## 2019-11-04 ENCOUNTER — OFFICE VISIT (OUTPATIENT)
Dept: PHYSICAL THERAPY | Facility: CLINIC | Age: 69
End: 2019-11-04
Payer: MEDICARE

## 2019-11-04 DIAGNOSIS — G54.1 LUMBOSACRAL PLEXOPATHY: Primary | ICD-10-CM

## 2019-11-04 PROCEDURE — 97110 THERAPEUTIC EXERCISES: CPT | Performed by: PHYSICAL THERAPIST

## 2019-11-04 NOTE — PROGRESS NOTES
Daily Note     Today's date: 2019  Patient name: Norris Ruiz  : 1950  MRN: [id number]  Referring provider: David Kerns DO  Dx:   Encounter Diagnosis     ICD-10-CM    1  Lumbosacral plexopathy G54 1                   Subjective: Patient reports he is doing okay with things  He reports that he finds himself hurting his shoulders to stand and walk  Objective: See treatment diary below      Assessment: Tolerated treatment fair  Patient demonstrated fatigue post treatment and would benefit from continued PT  Very poor LE strength  Poor dynamic core stability  Patient was unable to grade movements and often used momentum when transferring in an all or nothing manner to compensate for weakness as described above  Patient had demonstrated decreased safety awareness during transfers  Plan: Continue per plan of care        Precautions: nonambulatory, BLE weakness R>L    Re-eval Date: 19    Date 10/25/19 10/29 11/4     Visit Count 1 2 3     FOTO completed       Pain In 0 0 0     Pain Out 0  0             Manual  10/25 10/29 11/4     Ham stretch        ITB stretch        Quad stretch   2 mins total     Hip Flexor stretch   2 mins total               Date 10/25 10/29      UBE   100 rpm 6' alt      [place]  MTP/LTP  15#  20ea      LTR  10x5"      Heel slides B  20       SAQs  L 20  R 20w/ asst      AH   Quad with verbal cues 2x10     AH with hip abd/add        Ankle DF  20  R w/asst Prone with cues  2x10     Add squeezes  20x5" Prone with cues  2x10     Bridges   20      hooklying abd  Grn  30x Grn  30x             Tband Anti Trunk rotation          SLRs        Clamshells   20       parallel bars  5x30" 2x60"                     Transfer w/c to mat  CG to min A CG to min A     90/90 hip abd SL         Quad hip extn   2x10, AAROM,   min A     Quad UE elevations   2x10  Min A over yellow ball     Knee flexion   Prone   2 x 10 AAROM     Hip extension   Prone  2x10  AAROM     UE Elevations   Prone 2x10  AAROM     Quad -> Tall kneel   Attempted x 2 with mod-max assist

## 2019-11-08 ENCOUNTER — OFFICE VISIT (OUTPATIENT)
Dept: PHYSICAL THERAPY | Facility: CLINIC | Age: 69
End: 2019-11-08
Payer: MEDICARE

## 2019-11-08 DIAGNOSIS — G54.1 LUMBOSACRAL PLEXOPATHY: Primary | ICD-10-CM

## 2019-11-08 PROCEDURE — 97110 THERAPEUTIC EXERCISES: CPT | Performed by: PHYSICAL THERAPIST

## 2019-11-08 NOTE — PROGRESS NOTES
Daily Note     Today's date: 2019  Patient name: Edy Fitzpatrick  : 1950  MRN: [id number]  Referring provider: Dilip Cox DO  Dx:   Encounter Diagnosis     ICD-10-CM    1  Lumbosacral plexopathy G54 1                   Subjective: I was sore after the last sessoin  Objective: See treatment diary below      Assessment: Tolerated treatment well  Patient demonstrated fatigue post treatment and would benefit from continued PT  Ongoing decrease in core stability  Continues to use momentum for transfers, poor trunk and LE control  Patient will likely benefit from ongoing neuromuscular stimulation for reduced muscle atrophy  Patient has noteable quad, and TA muscle atrophy  Cont per POC  Plan: Continue per plan of care  Precautions: nonambulatory, BLE weakness R>L    Re-eval Date: 19    Date 10/25/19 10/29 11/4 11/8    Visit Count 1 2 3 4    FOTO completed       Pain In 0 0 0 0    Pain Out 0  0 0            Manual  10/25 10/29 11/4     Ham stretch        ITB stretch        Quad stretch   2 mins total     Hip Flexor stretch   2 mins total               Date 10/25 10/29      UBE   100 rpm 6' alt      [place]  MTP/LTP  15#  20ea      LTR  10x5"      Heel slides B  20       SAQs  L 20  R 20w/ asst  LAQ seated on red disc 2x10    AH   Quad with verbal cues 2x10 Seated red disc 2 mins, 5" holds    Close supervision -> CGA    AH with hip abd/add        Ankle DF  20  R w/asst Prone with cues  2x10 Seated on red disc  Red Tband PF  AAROM DF  2x10  Close supervision -> CGA    Add squeezes  20x5" Prone with cues  2x10 Seated on red disc  2x10  Close supervision -> CGA    Bridges   20      hooklying abd  Grn  30x Grn  30x             Tband Anti Trunk rotation          SLRs        Clamshells   20       parallel bars  5x30" 2x60"                     Transfer w/c to mat  CG to min A CG to min A CG to min A    90/90 hip abd SL         Quad hip extn   2x10, AAROM,   min A 2x10, AAROM,   min A    Quad UE elevations   2x10  Min A over yellow ball 2x10, AAROM,   min A    Knee flexion   Prone   2 x 10 AAROM Seated red disc  2x10  Close supervision -> CGA    Hip extension   Prone  2x10  AAROM Prone  2x10  AAROM    UE Elevations   Prone 2x10  AAROM     Quad -> Tall kneel   Attempted x 2 with mod-max assist             [place] Stim trial    10 mins total time for quad/knee extn and TA for DF 5 mins total ea 10on/20 off

## 2019-11-11 ENCOUNTER — OFFICE VISIT (OUTPATIENT)
Dept: PHYSICAL THERAPY | Facility: CLINIC | Age: 69
End: 2019-11-11
Payer: MEDICARE

## 2019-11-11 DIAGNOSIS — G54.1 LUMBOSACRAL PLEXOPATHY: Primary | ICD-10-CM

## 2019-11-11 PROCEDURE — 97110 THERAPEUTIC EXERCISES: CPT | Performed by: PHYSICAL THERAPIST

## 2019-11-11 NOTE — PROGRESS NOTES
Daily Note     Today's date: 2019  Patient name: Edy Fitzpatrick  : 1950  MRN: [id number]  Referring provider: Dilip Cox DO  Dx:   Encounter Diagnosis     ICD-10-CM    1  Lumbosacral plexopathy G54 1                   Subjective: Patient reports soreness after last session, but good sore, muscle sore  Objective: See treatment diary below      Assessment: Tolerated treatment well  Patient demonstrated fatigue post treatment and would benefit from continued PT  Patient progressing with core stability  Poor recovery from LOB fwd or to right > posteriorly  Plan: Continue per plan of care  Precautions: nonambulatory, BLE weakness R>L    Re-eval Date: 19    Date 10/25/19 10/29 11/4 11/8 11/11   Visit Count 1 2 3 4 5   FOTO completed       Pain In 0 0 0 0 0   Pain Out 0  0 0 0           Manual  10/25 10/29 11/4     Ham stretch        ITB stretch        Quad stretch   2 mins total  2 mins total   Hip Flexor stretch   2 mins total  2 mins total             Date 10/25 10/29      UBE   100 rpm 6' alt      [place]  MTP/LTP  15#  20ea      LTR  10x5"      Heel slides B  20       SAQs  L 20  R 20w/ asst  LAQ seated on red disc 2x10 LAQ seated on red disc 2x10   AH   Quad with verbal cues 2x10 Seated red disc 2 mins, 5" holds  Close supervision -> CGA Seated red disc 2 mins, 5" holds    Close supervision -> CGA   AH with hip abd/add        Ankle DF  20  R w/asst Prone with cues  2x10 Seated on red disc  Red Tband PF  AAROM DF  2x10  Close supervision -> CGA Seated on red disc  Red Tband PF  AAROM DF  2x10  Close supervision -> CGA   Add squeezes  20x5" Prone with cues  2x10 Seated on red disc  2x10  Close supervision -> CGA Seated on red disc  2x10  Close supervision -> CGA   Bridges   20      hooklying abd  Grn  30x Grn  30x             Tband Anti Trunk rotation          SLRs        Clamshells   20       parallel bars  5x30" 2x60"                     Transfer w/c to mat  CG to min A CG to min A CG to min A CG to min A   90/90 hip abd SL         Quad hip extn   2x10, AAROM,   min A 2x10, AAROM,   min A 2x10, AAROM,   min A   Quad UE elevations   2x10  Min A over yellow ball 2x10, AAROM,   min A 2x10, AAROM,   min A   Knee flexion   Prone   2 x 10 AAROM Seated red disc  2x10  Close supervision -> CGA Seated red disc  2x10  Close supervision -> CGA   Hip extension   Prone  2x10  AAROM Prone  2x10  AAROM Prone  2x10  AAROM   UE Elevations   Prone 2x10  AAROM  Quad  2x10  AAROM   Quad -> Tall kneel   Attempted x 2 with mod-max assist  Attempted with chair in front, unable to come to neutral hip alignment in tall kneel, heavily relies on UEs           [place] Stim trial    10 mins total time for quad/knee extn and TA for DF 5 mins total ea 10on/20 off 10 mins total time for quad/knee extn and TA for DF 5 mins total ea 10on/20 off

## 2019-11-15 ENCOUNTER — OFFICE VISIT (OUTPATIENT)
Dept: PHYSICAL THERAPY | Facility: CLINIC | Age: 69
End: 2019-11-15
Payer: MEDICARE

## 2019-11-15 DIAGNOSIS — G54.1 LUMBOSACRAL PLEXOPATHY: Primary | ICD-10-CM

## 2019-11-15 PROCEDURE — 97110 THERAPEUTIC EXERCISES: CPT | Performed by: PHYSICAL THERAPIST

## 2019-11-15 NOTE — PROGRESS NOTES
Daily Note     Today's date: 11/15/2019  Patient name: Sue Guerrero  : 1950  MRN: [id number]  Referring provider: Paul Wheeler DO  Dx:   Encounter Diagnosis     ICD-10-CM    1  Lumbosacral plexopathy G54 1                   Subjective: My right thigh seems to be tightening up  Objective: See treatment diary below      Assessment: Tolerated treatment fair  Patient demonstrated fatigue post treatment and would benefit from continued PT  Patient with increased fatigue this date, right > left quad fatigue this date  Poor core stabilization in sitting on disc this date, fatigues easily  Plan: Continue per plan of care  Precautions: nonambulatory, BLE weakness R>L    Re-eval Date: 19    Date 11/15       Visit Count 6       FOTO        Pain In 0       Pain Out 0               Manual         Ham stretch        ITB stretch        Quad stretch        Hip Flexor stretch                  Date        UBE         Evan  MTP/LTP        LTR        Heel slides B        SAQs LAQ seated on 2x10       AH Seated red disc 2 mins, 5" holds    Close supervision -> CGA       AH with hip abd/add        Ankle DF Seated on red disc  Red Tband PF  AAROM DF  2x10  Close supervision -> CGA       Add squeezes Seated on red disc  2x10  Close supervision -> CGA       Bridges  1x10 with AAROM       hooklying abd ABd with green T-band  2x10               Tband Anti Trunk rotation   Green tband, seated red disc 2x10 B/L       SLRs        Clamshells          parallel bars                        Transfer w/c to mat CG to min A       90/90 hip abd SL         Quad hip extn        Quad UE elevations        Knee flexion Seated red disc  2x10  Close supervision -> CGA       Hip extension Prone  2x10  AAROM       UE Elevations        Quad -> Tall kneel                [place] Stim trial 10 mins total time for quad/knee extn and TA for DF 5 mins total ea 10on/20 off

## 2019-11-18 ENCOUNTER — OFFICE VISIT (OUTPATIENT)
Dept: PHYSICAL THERAPY | Facility: CLINIC | Age: 69
End: 2019-11-18
Payer: MEDICARE

## 2019-11-18 DIAGNOSIS — G54.1 LUMBOSACRAL PLEXOPATHY: Primary | ICD-10-CM

## 2019-11-18 PROCEDURE — 97110 THERAPEUTIC EXERCISES: CPT | Performed by: PHYSICAL THERAPIST

## 2019-11-18 NOTE — PROGRESS NOTES
Daily Note     Today's date: 2019  Patient name: Patrick Howell  : 1950  MRN: [id number]  Referring provider: Charmaine Blair DO  Dx:   Encounter Diagnosis     ICD-10-CM    1  Lumbosacral plexopathy G54 1                   Subjective: Patient reports he was sore after last session  Objective: See treatment diary below      Assessment: Tolerated treatment well  Patient demonstrated fatigue post treatment and would benefit from continued PT  Patient continues to struggle with quad activity with both UEs and LE fatigue  Patient with ongoing HS and hip extension weakness  Beginning with improved knee extension strength on right, difficulty control IR/ER bilaterally  Plan: Continue per plan of care  Precautions: nonambulatory, BLE weakness R>L    Re-eval Date: 19    Date 11/15 11/18      Visit Count 6 7      FOTO        Pain In 0 0      Pain Out 0 0              Manual         Ham stretch        ITB stretch        Quad stretch        Hip Flexor stretch                  Date        UBE         Evan  MTP/LTP        LTR        Heel slides B        SAQs LAQ seated on 2x10 SAQ with [place] Stim 10" on, 20" off       AH Seated red disc 2 mins, 5" holds    Close supervision -> CGA       AH with hip abd/add  Kevin hip ADd with seated UE activity black disc, feet on red disc        Ankle DF Seated on red disc  Red Tband PF  AAROM DF  2x10  Close supervision -> CGA Seated on red disc  Red Tband PF  AAROM DF  2x10  Close supervision -> CGA      Add squeezes Seated on red disc  2x10  Close supervision -> CGA Seated on red disc  2x10  Close supervision -> CGA      Bridges  1x10 with AAROM 1x10 with AAROM      hooklying abd ABd with green T-band  2x10 ABd with green T-band  2x10              Tband Anti Trunk rotation   Green tband, seated red disc 2x10 B/L Black tband, seated red disc 2x10 B/L      SLRs        Clamshells          parallel bars                        Transfer w/c to mat CG to min A CG      90/90 hip abd SL         Quad hip extn  Quad  2x10  AAROM      Quad UE elevations  Quad  2x5  AAROM      Knee flexion Seated red disc  2x10  Close supervision -> CGA Seated red disc  2x10  Close supervision -> CGA      Hip extension Prone  2x10  AAROM       UE Elevations        Quad -> Tall kneel                [place] Stim trial 10 mins total time for quad/knee extn and TA for DF 5 mins total ea 10on/20 off

## 2019-11-22 ENCOUNTER — OFFICE VISIT (OUTPATIENT)
Dept: PHYSICAL THERAPY | Facility: CLINIC | Age: 69
End: 2019-11-22
Payer: MEDICARE

## 2019-11-22 DIAGNOSIS — G54.1 LUMBOSACRAL PLEXOPATHY: Primary | ICD-10-CM

## 2019-11-22 PROCEDURE — 97110 THERAPEUTIC EXERCISES: CPT

## 2019-11-22 NOTE — PROGRESS NOTES
Daily Note     Today's date: 2019  Patient name: Hernando Barrett  : 1950  MRN: [id number]  Referring provider: Miky Gregg DO  Dx:   Encounter Diagnosis     ICD-10-CM    1  Lumbosacral plexopathy G54 1                   Subjective: Pt voices no C/O  Objective: See treatment diary below      Assessment: Tolerated treatment well  Patient exhibited good technique with therapeutic exercises and would benefit from continued PT  Good motivation while in PT  Plan: Continue per plan of care  Precautions: nonambulatory, BLE weakness R>L    Re-eval Date: 19    Date 11/15 11/18 11/22     Visit Count 6 7 8     FOTO        Pain In 0 0 0     Pain Out 0 0 0             Manual         Ham stretch        ITB stretch        Quad stretch        Hip Flexor stretch                  Date        UBE         Evan  MTP/LTP        LTR        Heel slides B        SAQs LAQ seated on 2x10 SAQ with [place] Stim 10" on, 20" off  Active L   AA R  20x     AH Seated red disc 2 mins, 5" holds  Close supervision -> CGA  Seated red disc 2 mins, 5" holds    Close supervision     AH with hip abd/add  Kevin hip ADd with seated UE activity black disc, feet on red disc   Kevin hip ADd with seated UE activity black disc, feet on red disc     Ankle DF Seated on red disc  Red Tband PF  AAROM DF  2x10  Close supervision -> CGA Seated on red disc  Red Tband PF  AAROM DF  2x10  Close supervision -> CGA Supine  20 A/AA     Add squeezes Seated on red disc  2x10  Close supervision -> CGA Seated on red disc  2x10  Close supervision -> CGA Seated on red disc  2x10  Close supervision -> CGA     Bridges  1x10 with AAROM 1x10 with AAROM 1x10 with AAROM     hooklying abd ABd with green T-band  2x10 ABd with green T-band  2x10 ABd with green T-band  2x10             Tband Anti Trunk rotation   Green tband, seated red disc 2x10 B/L Black tband, seated red disc 2x10 B/L Black tband, seated red disc 2x10 B/L     SLRs        Clamshells 20 B      parallel bars   4x20-30 sec  CG                     Transfer w/c to mat CG to min A CG CG     90/90 hip abd SL         Quad hip extn  Quad  2x10  AAROM Quad  2x10  AAROM     Quad UE elevations        Knee flexion        Hip extension        UE Elevations        Quad -> Tall kneel                [place] Stim trial 10 mins total time for quad/knee extn and TA for DF 5 mins total ea 10on/20 off

## 2019-11-25 ENCOUNTER — APPOINTMENT (OUTPATIENT)
Dept: PHYSICAL THERAPY | Facility: CLINIC | Age: 69
End: 2019-11-25
Payer: MEDICARE

## 2019-12-02 ENCOUNTER — APPOINTMENT (OUTPATIENT)
Dept: PHYSICAL THERAPY | Facility: CLINIC | Age: 69
End: 2019-12-02
Payer: MEDICARE

## 2019-12-06 ENCOUNTER — OFFICE VISIT (OUTPATIENT)
Dept: PHYSICAL THERAPY | Facility: CLINIC | Age: 69
End: 2019-12-06
Payer: MEDICARE

## 2019-12-06 DIAGNOSIS — G54.1 LUMBOSACRAL PLEXOPATHY: Primary | ICD-10-CM

## 2019-12-06 PROCEDURE — 97112 NEUROMUSCULAR REEDUCATION: CPT | Performed by: PHYSICAL THERAPIST

## 2019-12-06 PROCEDURE — 97110 THERAPEUTIC EXERCISES: CPT | Performed by: PHYSICAL THERAPIST

## 2019-12-06 NOTE — LETTER
2019    Violet Ardon DO  [address] [place] [address], [address] [place] [address],[address]    Patient: Yoan Ellis   YOB: 1950   Date of Visit: 2019     Encounter Diagnosis     ICD-10-CM    1  Lumbosacral plexopathy G54 1        Dear Dr Kamran Amin:    Thank you for your recent referral of Yoan Ellis  Please review the attached evaluation summary from Ramos's recent visit  Please verify that you agree with the plan of care by signing the attached order  If you have any questions or concerns, please do not hesitate to call  I sincerely appreciate the opportunity to share in the care of one of your patients and hope to have another opportunity to work with you in the near future  Sincerely,    Deneen Simeon      Referring Provider:      I certify that I have read the below Plan of Care and certify the need for these services furnished under this plan of treatment while under my care  Violet Ardon DO  [address] [place] [address], [address] [place] [address],[address] [facility] [place] [address]  [address] [facility] [place]          PT Evaluation     Today's date: 2019  Patient name: Yoan Ellis  : 1950  MRN: [id number]  Referring provider: Mona Lott DO  Dx:   Encounter Diagnosis     ICD-10-CM    1  Lumbosacral plexopathy G54 1                   Assessment  Assessment details: Yoan Ellis is a 68 y o  male presenting to outpatient physical therapy with diagnosis of lumbosacral plexopathy  Patient's current impairments include diff with transfers, inability to amb, reduced active  range of motion of BLEs , reduced strength of all extrem, reduced postural awareness, and reduced activity tolerance  Patient has been making progress in all of these areas  He has significant hip extn weakness, poor proximal hip strength limiting standing and ambulation  Ongoing ankle weakness with decreased knee control right > left    Patient now able to sit at EOB on unstable surface without UE support, with dynamic reaching  Patient's present functional limitations include difficulty with ADLs with increased need for assistance and   poor tolerance for functional mobility and activity,   Patient to benefit from skilled outpatient physical therapy 2x/week for 4 weeks in order to , maximize active  range of motion, increase strength and stability, and improve functional mobility/functional activity in order to maximize return to prior level of function with reduced limitations  Thank you for your referral       Impairments: abnormal gait, abnormal or restricted ROM, abnormal movement, activity intolerance, impaired balance, impaired physical strength and poor posture   Barriers to therapy: Nonambulatory, chronicity of impairments  Understanding of Dx/Px/POC: good   Prognosis: fair    Goals  STGs to be achieved in 4 weeks: - ONGOING    1  Pt to demonstrate increased AROM of BLEs in order to allow for greater ease and independence with ADLs and functional mobility  2  Pt to demonstrate full PROM of BLEs in order to maximize joint mobility and function and allow for progression of exercise program and achievement of goals  3  Pt to demonstrate increased MMT of BLEs and B shldrs  by at least 1/2-1 grade in order to improve safety and stability with ADLs and functional mobility  4  Pt will take 5-6 steps in parallel bars with mod asst of 1-2    LTGs to be achieved in 6-8 weeks: - ONGOINGo   1  Pt will be I with HEP in order to continue to improve quality of life and independence  2  Pt to demonstrate return to amb 25 ft with RW with min asst of 1    3  Pt to demonstrate improved function as noted by achieving or exceeding predicted score on FOTO outcomes assessment tool         Plan  Patient would benefit from: skilled physical therapy  Planned therapy interventions: manual therapy, stretching, strengthening, therapeutic exercise, therapeutic activities, home exercise program and balance  Frequency: 2x week  Duration in visits: 8  Duration in weeks: 4  Plan of Care beginning date: 12/6/2019  Plan of Care expiration date: 1/5/2020  Treatment plan discussed with: family and patient        Subjective Evaluation    History of Present Illness  Date of surgery: 2004,2015  Mechanism of injury: surgery  Mechanism of injury: UPDATE:    Patient reports he has been trying to do more, has been getting to floor to play with grand kids, has been doing what he can  Standing is still difficult  Uses his arms a lot  Feels legs getting stronger  Pt states leaving work one night his legs went numb and he fell to the ground  Pt underwent testing and end result was surgery with rods and screws and cleaned out arthritis  Over time pt had to use walker and legs got progressively weaker  Had a second surgery in 2015 and was in [facility] for rehab with a stage 4 pressure ulcer as a result of his hospitalization  Pt states he has been nonambulatory for 3 years, since the second surgery  States when he would attempt to stand his R shld would give out and he could not support himself  Pt has lift recliner and is able to perform indep transfers to tub, recliner and stationary bike  Had MAFOs after initial surgery  Stands at dining room table with wife stabilizing table  Wife states pt had PT from Oct 2018 to April 2019 at another facility  Wife states since DC from PT pt has lost what he gained at that time            Recurrent probem    Quality of life: good    Pain  No pain reported    Social Support  Lives with: spouse    Employment status: not working    Diagnostic Tests  MRI studies: abnormal (back)  Treatments  Previous treatment: physical therapy and occupational therapy  Current treatment: physical therapy  Patient Goals  Patient goals for therapy: improved balance, increased motion, increased strength, independence with ADLs/IADLs and return to sport/leisure activities  Patient goal: get up and walk, resume hunting, attend grandchildren's sporting [employer]        Objective     Concurrent Complaints  Negative for night pain and disturbed sleep    Postural Observations  Seated posture: good  Standing posture: poor    Additional Postural Observation Details  At eval:  Stands in parallel bars with forward lean and LEs rigid in extension  Unable to take any steps  Transfers sit to stand indep by pulling up on parallel bars and locking knees in ext  At re-eval:  Sit to stand with significant forward weight shift, depends heavily on UEs for support  Poor knee extension on right and bilateral glutes  Demonstrating improved left knee extension, remains with bilateral ankle weakness into DF      Neurological Testing     Sensation     Lumbar   Left   Diminished: light touch, sharp/dull discrimination and proprioception    Right   Diminished: light touch, sharp/dull discrimination and proprioception    Comments   Left light touch: lower leg  Right light touch: lower leg  Right sharp/dull discrimination: very little sensation    Reflexes   Left   Patellar (L4): absent (0)    Right   Patellar (L4): trace (1+)    Strength/Myotome Testing     Left Shoulder     Planes of Motion   Flexion: 4-   Abduction: 4+   External rotation at 0°:  4   Internal rotation at 0°:  4     Right Shoulder     Planes of Motion   Flexion: 4-   Abduction: 4+   External rotation at 0°:  4   Internal rotation at 0°:  4     Left Elbow   Flexion: 4+  Extension: 5    Right Elbow   Flexion: 4+  Extension: 5    Left Hip   Planes of Motion   Flexion: 3+  Extension: 2+  Abduction: 3  Adduction: 3+    Right Hip   Planes of Motion   Flexion: 3  Extension: 3  Abduction: 3+  Adduction: 4-    Left Knee   Flexion: 3  Extension: 3+    Right Knee   Flexion: 2+  Extension: 2-    Left Ankle/Foot   Dorsiflexion: 2-    Right Ankle/Foot   Dorsiflexion: 1    General Comments:      Hip Comments   Weakness R>L    Knee Comments  Weakness R>L    Ankle/Foot Comments   Weakness R>L  Neuro Exam:     Transfers Sit to stand: maximum assist Wheelchair to mat: independent Mat to wheelchair: independent Sit to supine: independent Supine to sit: independent   Roll: minimum assist (Supine to/from prone)             Precautions: nonambulatory, BLE weakness R>L  Re-eval Date: 1/5/20    Date 11/15 11/18 11/22 12/6    Visit Count 6 7 8 9    FOTO        Pain In 0 0 0 0    Pain Out 0 0 0 0            Manual         Ham stretch        ITB stretch        Quad stretch        Hip Flexor stretch                  Date        UBE         Sit to stand trials from w/c    No // bars, attempt to stand with ACs  4 times with mod-max cues    [place]  MTP/LTP        LTR        Heel slides B        SAQs LAQ seated on 2x10 SAQ with [place] Stim 10" on, 20" off  Active L   AA R  20x     AH Seated red disc 2 mins, 5" holds  Close supervision -> CGA  Seated red disc 2 mins, 5" holds    Close supervision Seated orange ball  5 mins  Alt UE reaching  2x10 ea  Trunk rotation reaching on seated orange ball  10x B/L    AH with hip abd/add  Kevin hip ADd with seated UE activity black disc, feet on red disc   Kevin hip ADd with seated UE activity black disc, feet on red disc Seated orange ball  2x10 ea    Ankle DF Seated on red disc  Red Tband PF  AAROM DF  2x10  Close supervision -> CGA Seated on red disc  Red Tband PF  AAROM DF  2x10  Close supervision -> CGA Supine  20 A/AA     Add squeezes Seated on red disc  2x10  Close supervision -> CGA Seated on red disc  2x10  Close supervision -> CGA Seated on red disc  2x10  Close supervision -> CGA     Bridges  1x10 with AAROM 1x10 with AAROM 1x10 with AAROM     hooklying abd ABd with green T-band  2x10 ABd with green T-band  2x10 ABd with green T-band  2x10             Tband Anti Trunk rotation   Green tband, seated red disc 2x10 B/L Black tband, seated red disc 2x10 B/L Black tband, seated red disc 2x10 B/L Seated orange ball  1x10 B/L    SLRs        Clamshells    20 B      parallel bars   4x20-30 sec  CG                     Transfer w/c to mat CG to min A CG CG     90/90 hip abd SL         Quad hip extn  Quad  2x10  AAROM Quad  2x10  AAROM     Quad UE elevations        Knee flexion        Hip extension        UE Elevations    Seated orange ball  2x10 B/L    Quad -> Tall kneel                [place] Stim trial 10 mins total time for quad/knee extn and TA for DF 5 mins total ea 10on/20 off   5 min total time for right quad/knee extn as prior

## 2019-12-09 ENCOUNTER — OFFICE VISIT (OUTPATIENT)
Dept: PHYSICAL THERAPY | Facility: CLINIC | Age: 69
End: 2019-12-09
Payer: MEDICARE

## 2019-12-09 ENCOUNTER — TRANSCRIBE ORDERS (OUTPATIENT)
Dept: PHYSICAL THERAPY | Facility: CLINIC | Age: 69
End: 2019-12-09

## 2019-12-09 DIAGNOSIS — G54.1 LUMBOSACRAL PLEXOPATHY: Primary | ICD-10-CM

## 2019-12-09 PROCEDURE — 97110 THERAPEUTIC EXERCISES: CPT | Performed by: PHYSICAL THERAPIST

## 2019-12-09 NOTE — PROGRESS NOTES
Daily Note     Today's date: 2019  Patient name: Tanya Washington  : 1950  MRN: [id number]  Referring provider: Rickie Holden DO  Dx:   Encounter Diagnosis     ICD-10-CM    1  Lumbosacral plexopathy G54 1                   Subjective: Patient reports he is doing okay, was sore after PT last session, Saturday, quads were sore  Objective: See treatment diary below      Assessment: Tolerated treatment well  Patient demonstrated fatigue post treatment and would benefit from continued PT  Struggles for hip control and to activate glutes for hip extension in standing despite having knees blocked/immobilized  Depends heavily on UEs for standing tolerance and endurance  He demonstrates improved endurance for standing and able to attain 1 minute static standing  Plan: Continue per plan of care  Precautions: nonambulatory, BLE weakness R>L  Re-eval Date: 20    Date 11/15 11/18 11/22 12/6 12/9   Visit Count 6 7 8 9 10   FOTO        Pain In 0 0 0 0    Pain Out 0 0 0 0            Manual         Ham stretch        ITB stretch        Quad stretch        Hip Flexor stretch                  Date        UBE         Sit to stand trials from w/c    No // bars, attempt to stand with ACs  4 times with mod-max cues // Bars  Sit to stand   8 trials with max standing tolerance of one minute with min A   Evan  MTP/LTP        LTR        Heel slides B        SAQs LAQ seated on 2x10 SAQ with [place] Stim 10" on, 20" off  Active L   AA R  20x     AH Seated red disc 2 mins, 5" holds  Close supervision -> CGA  Seated red disc 2 mins, 5" holds    Close supervision Seated orange ball  5 mins  Alt UE reaching  2x10 ea  Trunk rotation reaching on seated orange ball  10x B/L    AH with hip abd/add  Kevin hip ADd with seated UE activity black disc, feet on red disc   Kevin hip ADd with seated UE activity black disc, feet on red disc Seated orange ball  2x10 ea    Ankle DF Seated on red disc  Red Tband PF  AAROM DF  2x10  Close supervision -> CGA Seated on red disc  Red Tband PF  AAROM DF  2x10  Close supervision -> CGA Supine  20 A/AA     Add squeezes Seated on red disc  2x10  Close supervision -> CGA Seated on red disc  2x10  Close supervision -> CGA Seated on red disc  2x10  Close supervision -> CGA  Seated on red disc  2x10  Close   Bridges  1x10 with AAROM 1x10 with AAROM 1x10 with AAROM     hooklying abd ABd with green T-band  2x10 ABd with green T-band  2x10 ABd with green T-band  2x10  ABd with green T-band  2x10  seated           Tband Anti Trunk rotation   Green tband, seated red disc 2x10 B/L Black tband, seated red disc 2x10 B/L Black tband, seated red disc 2x10 B/L Seated orange ball  1x10 B/L    SLRs        Clamshells    20 B      parallel bars   4x20-30 sec  CG                     Transfer w/c to mat CG to min A CG CG     90/90 hip abd SL         Quad hip extn  Quad  2x10  AAROM Quad  2x10  AAROM     Quad UE elevations        Knee flexion     Standing HS curls   AAROM-> AROM  2x5   Hip extension     2 x 5 reps bilaterally with WBing limb knee blocked   UE Elevations    Seated orange ball  2x10 B/L Attempted with LEs blocked, mod-max assist x 2 trials   Quad -> Tall kneel                [place] Stim trial 10 mins total time for quad/knee extn and TA for DF 5 mins total ea 10on/20 off   5 min total time for right quad/knee extn as prior

## 2019-12-13 ENCOUNTER — APPOINTMENT (OUTPATIENT)
Dept: PHYSICAL THERAPY | Facility: CLINIC | Age: 69
End: 2019-12-13
Payer: MEDICARE

## 2019-12-16 ENCOUNTER — APPOINTMENT (OUTPATIENT)
Dept: PHYSICAL THERAPY | Facility: CLINIC | Age: 69
End: 2019-12-16
Payer: MEDICARE

## 2019-12-18 ENCOUNTER — TRANSCRIBE ORDERS (OUTPATIENT)
Dept: PHYSICAL THERAPY | Facility: CLINIC | Age: 69
End: 2019-12-18

## 2019-12-18 DIAGNOSIS — G54.1 LUMBOSACRAL PLEXUS LESION: ICD-10-CM

## 2019-12-18 DIAGNOSIS — G54.1 LUMBOSACRAL PLEXOPATHY: Primary | ICD-10-CM

## 2019-12-20 ENCOUNTER — OFFICE VISIT (OUTPATIENT)
Dept: PHYSICAL THERAPY | Facility: CLINIC | Age: 69
End: 2019-12-20
Payer: MEDICARE

## 2019-12-20 DIAGNOSIS — G54.1 LUMBOSACRAL PLEXOPATHY: Primary | ICD-10-CM

## 2019-12-20 PROCEDURE — 97110 THERAPEUTIC EXERCISES: CPT | Performed by: PHYSICAL THERAPIST

## 2019-12-20 PROCEDURE — 97112 NEUROMUSCULAR REEDUCATION: CPT | Performed by: PHYSICAL THERAPIST

## 2019-12-20 NOTE — PROGRESS NOTES
Daily Note     Today's date: 2019  Patient name: Myra Aranda  : 1950  MRN: [id number]  Referring provider: Natasha Neff DO  Dx:   Encounter Diagnosis     ICD-10-CM    1  Lumbosacral plexopathy G54 1                   Subjective: I'm doing okay, no falls  Just getting over a cold  Objective: See treatment diary below      Assessment: Tolerated treatment well  Patient demonstrated fatigue post treatment and would benefit from continued PT  Continues hip weakness, significant ongoing substitution noted  He struggles with LE endurance, depends heavily on UEs/UB  Plan: Continue per plan of care  Precautions: nonambulatory, BLE weakness R>L  Re-eval Date: 20    Date        Visit Count 3       FOTO        Pain In 0       Pain Out 0               Manual         Ham stretch        ITB stretch        Quad stretch        Hip Flexor stretch                  Date        UBE         Sit to stand trials from w/c        [place]  MTP/LTP        LTR        Heel slides B        SAQs        AH        AH with hip abd/add        Ankle DF        Add squeezes        Bridges         hooklying abd                Tband Anti Trunk rotation          SLRs Prone extn  1x10 AAROM       Clamshells  AAROM x10  3x eccentric with cues and assist        parallel bars // Bars  Sit to stand    3 trials with max standing tolerance of one minute with min A                       Transfer w/c to mat supervision       90/90 hip abd SL  AAROM x10  3x eccentric with cues and assist       Quad hip extn Prone extn  1x10 AAROM B/L       Quad UE elevations Prone extn  1x10 AAROM B/L       Knee flexion        Hip extension Prone extn  1x10 AAROM B/L       UE Elevations        Quad -> Tall kneel                [place] Stim trial

## 2019-12-23 ENCOUNTER — OFFICE VISIT (OUTPATIENT)
Dept: PHYSICAL THERAPY | Facility: CLINIC | Age: 69
End: 2019-12-23
Payer: MEDICARE

## 2019-12-23 DIAGNOSIS — G54.1 LUMBOSACRAL PLEXOPATHY: Primary | ICD-10-CM

## 2019-12-23 PROCEDURE — 97112 NEUROMUSCULAR REEDUCATION: CPT

## 2019-12-23 PROCEDURE — 97110 THERAPEUTIC EXERCISES: CPT

## 2019-12-23 NOTE — PROGRESS NOTES
Daily Note     Today's date: 2019  Patient name: Janna Corbett  : 1950  MRN: [id number]  Referring provider: Ayse Montalvo DO  Dx:   Encounter Diagnosis     ICD-10-CM    1  Lumbosacral plexopathy G54 1                   Subjective: I feel I am starting to use my legs more as oppose to using my arms  Objective: See treatment diary below      Assessment: Tolerated treatment fairly well  Denied any increase pain/sx's with TE completed this date  Pt with BL hip weakness L>R  Pt requires AAROM with TE  Cues prn for proper form/mm facilitation   Pt provided CS with transfer WC to mat table   Patient would benefit from continued PT to improve overall core/lumbar / hip strength/stability      Plan: Continue per plan of care  Precautions: nonambulatory, BLE weakness R>L  Re-eval Date: 20    Date       Visit Count 3 4/10      FOTO        Pain In 0 0      Pain Out 0 0              Manual         Ham stretch        ITB stretch        Quad stretch        Hip Flexor stretch                  Date        UBE         Sit to stand trials from w/c        [place]  MTP/LTP        LTR        Heel slides B        SAQs        AH        AH with hip abd/add        Ankle DF        Add squeezes        Bridges         hooklying abd                Tband Anti Trunk rotation          SLRs Prone extn  1x10 AAROM       Clamshells  AAROM x10  3x eccentric with cues and assist hooklying   Blue  2x 15       parallel bars // Bars  Sit to stand    3 trials with max standing tolerance of one minute with min A                       Transfer w/c to mat supervision supervision      90/90 hip abd SL  AAROM x10  3x eccentric with cues and assist 2x10   AAROM R  AROM L          Quad hip extn Prone extn  1x10 AAROM B/L       Quad UE elevations Prone extn  1x10 AAROM B/L Prone extn  1x10 AAROM B/L      Knee flexion  Prone  R 5x AAROM  L 10x AAROM    Supine/iso/roll  L 10x 5"    Hip ADD/with HS iso supine  10x 5" Hip extension Prone extn  1x10 AAROM B/L Prone extn  2x10 AAROM B/L      UE Elevations        Quad -> Tall kneel  2x5   Alt R/L UE to wall with step progression   1 rest between reps 2* increase SOB/fatigue              [place] Stim trial

## 2019-12-27 ENCOUNTER — OFFICE VISIT (OUTPATIENT)
Dept: PHYSICAL THERAPY | Facility: CLINIC | Age: 69
End: 2019-12-27
Payer: MEDICARE

## 2019-12-27 DIAGNOSIS — G54.1 LUMBOSACRAL PLEXOPATHY: Primary | ICD-10-CM

## 2019-12-27 PROCEDURE — 97112 NEUROMUSCULAR REEDUCATION: CPT | Performed by: PHYSICAL THERAPIST

## 2019-12-27 PROCEDURE — 97110 THERAPEUTIC EXERCISES: CPT | Performed by: PHYSICAL THERAPIST

## 2019-12-27 NOTE — PROGRESS NOTES
Daily Note     Today's date: 2019  Patient name: Matt Bullard  : 1950  MRN: [id number]  Referring provider: Kaylin Bonner DO  Dx:   Encounter Diagnosis     ICD-10-CM    1  Lumbosacral plexopathy G54 1                   Subjective: Patient reports he is doing okay, no falls  Objective: See treatment diary below      Assessment: Tolerated treatment well  Patient demonstrated fatigue post treatment and would benefit from continued PT  Demonstrated activation of bilateral HS in prone  Unable to hold against gravity, but overall doing well  Plan: Continue per plan of care  Precautions: nonambulatory, BLE weakness R>L  Re-eval Date: 20    Date      Visit Count 3 10 5/10     FOTO        Pain In 0 0 0     Pain Out 0 0 0             Manual         Ham stretch        ITB stretch        Quad stretch        Hip Flexor stretch                  Date        UBE         Sit to stand trials from w/c        [place]  MTP/LTP        LTR   1x10     Heel slides B        SAQs        AH        AH with hip abd/add   Seated on red disc  Isometric hip ADd 2x10  Red t-band  ABd 2x10     Ankle DF   AAROM   2x10     Bridges    Attempted, unable to perform     Hip ABd   SL  AAROM  2x10 B/L             Tband Anti Trunk rotation          SLRs Prone extn  1x10 AAROM       Clamshells  AAROM x10  3x eccentric with cues and assist hooklying   Blue  2x 15 SL  AAROM->AROM  2x10 B/L      parallel bars // Bars  Sit to stand    3 trials with max standing tolerance of one minute with min A                       Transfer w/c to mat supervision supervision supervision     90/90 hip abd SL  AAROM x10  3x eccentric with cues and assist 2x10   AAROM R  AROM L     2x10   AAROM R  AROM L     Quad hip extn Prone extn  1x10 AAROM B/L  Quad  1x10  AAROM B/L     Quad UE elevations Prone extn  1x10 AAROM B/L Prone extn  1x10 AAROM B/L Quad  1 x 10  AROM B/L     Knee flexion  Prone  R 5x AAROM  L 10x AAROM    Supine/iso/roll  L 10x 5"    Hip ADD/with HS iso supine  10x 5" Seated  Red disc  Green t-band  2x10      Hip extension Prone extn  1x10 AAROM B/L Prone extn  2x10 AAROM B/L Prone extn  2x10 AAROM B/L        UE Elevations        Quad -> Tall kneel  2x5   Alt R/L UE to wall with step progression   1 rest between reps 2* increase SOB/fatigue      HS curls   Prone  2x10 B/L  AAROM with deep tendon tapping     [place] Stim trial

## 2019-12-30 ENCOUNTER — OFFICE VISIT (OUTPATIENT)
Dept: PHYSICAL THERAPY | Facility: CLINIC | Age: 69
End: 2019-12-30
Payer: MEDICARE

## 2019-12-30 DIAGNOSIS — G54.1 LUMBOSACRAL PLEXOPATHY: Primary | ICD-10-CM

## 2019-12-30 PROCEDURE — 97110 THERAPEUTIC EXERCISES: CPT

## 2019-12-30 PROCEDURE — 97112 NEUROMUSCULAR REEDUCATION: CPT

## 2019-12-30 NOTE — PROGRESS NOTES
Daily Note     Today's date: 2019  Patient name: Janna Corbett  : 1950  MRN: [id number]  Referring provider: Ayse Montalvo DO  Dx:   Encounter Diagnosis     ICD-10-CM    1  Lumbosacral plexopathy G54 1                   Subjective: I doing ok today  No LBP  Still a lot of weakness in my legs but getting better with my chair to chair transfers      Objective: See treatment diary below      Assessment: Tolerated treatment fairly well  Pt fatigues quickly with tall kneel activities  Required brief quad static rests between reps  Pt requires CS with chair to mat table transfers 2* LOB / WC safety   Patient would benefit from continued PT      Plan: Continue per plan of care  Precautions: nonambulatory, BLE weakness R>L  Re-eval Date: 20    Date     Visit Count 3 4/10 5/10 6/10    FOTO        Pain In 0 0 0 0    Pain Out 0 0 0 0            Manual         Ham stretch        ITB stretch        Quad stretch        Hip Flexor stretch                  Date        UBE     60 RPM  10 min  retro    Sit to stand trials from w/c        [place]  MTP/LTP        LTR   1x10 10x 5"  Ea dir    Heel slides B        SAQs        AH        AH with hip abd/add   Seated on red disc  Isometric hip ADd 2x10  Red t-band  ABd 2x10     Ankle DF   AAROM   2x10     Bridges    Attempted, unable to perform GS/AH  15x 5"    Hip ABd   SL  AAROM  2x10 B/L             Tband Anti Trunk rotation      Blue  10x 5" ea dir    SLRs Prone extn  1x10 AAROM       Clamshells  AAROM x10  3x eccentric with cues and assist hooklying   Blue  2x 15 SL  AAROM->AROM  2x10 B/L SL  AAROM->AROM  2x10 B/L     parallel bars // Bars  Sit to stand    3 trials with max standing tolerance of one minute with min A                       Transfer w/c to mat supervision supervision supervision supervision    90/90 hip abd SL  AAROM x10  3x eccentric with cues and assist 2x10   AAROM R  AROM L     2x10   AAROM R  AROM L     Quad hip extn Prone extn  1x10 AAROM B/L  Quad  1x10  AAROM B/L     Quad UE elevations Prone extn  1x10 AAROM B/L Prone extn  1x10 AAROM B/L Quad  1 x 10  AROM B/L Quad  1 x 10  AROM B/L    Knee flexion  Prone  R 5x AAROM  L 10x AAROM    Supine/iso/roll  L 10x 5"    Hip ADD/with HS iso supine  10x 5" Seated  Red disc  Green t-band  2x10  resume    Hip extension Prone extn  1x10 AAROM B/L Prone extn  2x10 AAROM B/L Prone extn  2x10 AAROM B/L    Prone extn  2x10 AAROM B/L       UE Elevations        Quad -> Tall kneel  2x5   Alt R/L UE to wall with step progression   1 rest between reps 2* increase SOB/fatigue  Tall kneel  3x to renee    F/b   Pushups 2x5 against wall    HS curls   Prone  2x10 B/L  AAROM with deep tendon tapping     [place] Stim trial

## 2020-01-03 ENCOUNTER — OFFICE VISIT (OUTPATIENT)
Dept: PHYSICAL THERAPY | Facility: CLINIC | Age: 70
End: 2020-01-03
Payer: MEDICARE

## 2020-01-03 DIAGNOSIS — G54.1 LUMBOSACRAL PLEXOPATHY: Primary | ICD-10-CM

## 2020-01-03 PROCEDURE — 97110 THERAPEUTIC EXERCISES: CPT | Performed by: PHYSICAL THERAPIST

## 2020-01-03 PROCEDURE — 97112 NEUROMUSCULAR REEDUCATION: CPT | Performed by: PHYSICAL THERAPIST

## 2020-01-03 NOTE — PROGRESS NOTES
Daily Note     Today's date: 1/3/2020  Patient name: Makenna Wright  : 1950  MRN: [id number]  Referring provider: Marcelina Mayers DO  Dx:   Encounter Diagnosis     ICD-10-CM    1  Lumbosacral plexopathy G54 1                   Subjective: Patient denies any falls or near misses since last appointment  Objective: See treatment diary below      Assessment: Tolerated treatment well  Patient demonstrated fatigue post treatment and would benefit from continued PT to continue with facilitation of appropriate muscle activation to strengthen BLEs for weightbearing  Patient needed maximum tactile and verbal cues for activation of targeted muscle groups during isolated LE exercises  Patient demonstrated increased voluntary contraction in R quad and was able to independently maintain isometric holds against light resistance  Plan: Continue per plan of care  Patient treated by SHRUTI Robles under direct PT supervision  Precautions: nonambulatory, BLE weakness R>L  Re-eval Date: 20    Date 12/20 12/23 12/27 12/30 1/3   Visit Count 3 4/10 5/10 6/10 7/10   FOTO        Pain In 0 0 0 0 0   Pain Out 0 0 0 0 0           Manual         Ham stretch        ITB stretch        Quad stretch        Hip Flexor stretch                  Date        UBE     60 RPM  10 min  retro    Sit to stand trials from w/c        [place]  MTP/LTP        LTR   1x10 10x 5"  Ea dir    Heel slides B        SAQs        AH        AH with hip abd/add   Seated on red disc  Isometric hip ADd 2x10  Red t-band  ABd 2x10     Ankle DF   AAROM   2x10     Bridges    Attempted, unable to perform GS/AH  15x 5"    Hip ABd   SL  AAROM  2x10 B/L             Tband Anti Trunk rotation      Blue  10x 5" ea dir    SLRs Prone extn  1x10 AAROM       Clamshells  AAROM x10  3x eccentric with cues and assist hooklying   Blue  2x 15 SL  AAROM->AROM  2x10 B/L SL  AAROM->AROM  2x10 B/L     parallel bars // Bars  Sit to stand    3 trials with max standing tolerance of one minute with min A                       Transfer w/c to mat supervision supervision supervision supervision supervision   90/90 hip abd SL  AAROM x10  3x eccentric with cues and assist 2x10   AAROM R  AROM L     2x10   AAROM R  AROM L     Quad hip extn Prone extn  1x10 AAROM B/L  Quad  1x10  AAROM B/L     Quad UE elevations Prone extn  1x10 AAROM B/L Prone extn  1x10 AAROM B/L Quad  1 x 10  AROM B/L Quad  1 x 10  AROM B/L    Knee flexion  Prone  R 5x AAROM  L 10x AAROM    Supine/iso/roll  L 10x 5"    Hip ADD/with HS iso supine  10x 5" Seated  Red disc  Green t-band  2x10  resume Prone AAROM  2x10 ea    Iso hold 3" AAROM   Hip extension Prone extn  1x10 AAROM B/L Prone extn  2x10 AAROM B/L Prone extn  2x10 AAROM B/L    Prone extn  2x10 AAROM B/L       UE Elevations        Quad -> Tall kneel  2x5   Alt R/L UE to wall with step progression   1 rest between reps 2* increase SOB/fatigue  Tall kneel  3x to renee    F/b   Pushups 2x5 against wall    HS curls   Prone  2x10 B/L  AAROM with deep tendon tapping  Prone  2x10 B/L  AAROM with deep tendon tapping   [place] Stim trial        Knee extension     Seated EOM  Deep tendon tapping   1x10 ea  AAROM L    Iso hold 5" R, 3" AAROM L   Dynamic seated balance     Seated   Red disc, feet on foam  TB and DB UE exercises overhead    Reaching outside ELIS to target    30 mins

## 2020-01-06 ENCOUNTER — OFFICE VISIT (OUTPATIENT)
Dept: PHYSICAL THERAPY | Facility: CLINIC | Age: 70
End: 2020-01-06
Payer: MEDICARE

## 2020-01-06 DIAGNOSIS — G54.1 LUMBOSACRAL PLEXOPATHY: Primary | ICD-10-CM

## 2020-01-06 PROCEDURE — 97110 THERAPEUTIC EXERCISES: CPT

## 2020-01-06 PROCEDURE — 97112 NEUROMUSCULAR REEDUCATION: CPT

## 2020-01-10 ENCOUNTER — EVALUATION (OUTPATIENT)
Dept: PHYSICAL THERAPY | Facility: CLINIC | Age: 70
End: 2020-01-10
Payer: MEDICARE

## 2020-01-10 DIAGNOSIS — G54.1 LUMBOSACRAL PLEXOPATHY: Primary | ICD-10-CM

## 2020-01-10 PROCEDURE — 97110 THERAPEUTIC EXERCISES: CPT

## 2020-01-10 PROCEDURE — 97112 NEUROMUSCULAR REEDUCATION: CPT

## 2020-01-10 NOTE — PROGRESS NOTES
Daily Note     Today's date: 1/10/2020  Patient name: Yaritza Mead  : 1950  MRN: [id number]  Referring provider: River Riggins DO  Dx:   Encounter Diagnosis     ICD-10-CM    1  Lumbosacral plexopathy G54 1                   Subjective: Since I have been coming to PT   My overall feeling has been improving  Endurance on UBE has improved      Objective: See treatment diary below      Assessment: Tolerated treatment well  Pt demonstrates L>R LE strength today with prone activities  Denies any increase pain but indicated increase mm fatigue BL LE 1* quad mm  Patient would benefit from continued PT      Plan: Continue per plan of care     See RA for additional findings     Precautions: nonambulatory, BLE weakness R>L  Re-eval Date: 20    Date 1/6 1/10      Visit Count 8/10 9/10      FOTO  RA      Pain In 0 0      Pain Out 0 0        Manual         Ham stretch        ITB stretch        Quad stretch        Hip Flexor stretch                  Date        UBE  60 RPM  10 min alt 60 RPM  10 min alt      Sit to stand trials from w/c        [place]  MTP/LTP        LTR        Heel slides B        SAQs        AH        AH with hip abd/add        Ankle DF        Bridges  GS 20x, 5" GS 20x, 5" w/ add      Hip ABd                Tband Anti Trunk rotation          SLRs        Clamshells  Blue  3x10,5" Red  2x10       parallel bars   35"x1  55"x1  45"x1 //  55" x1  45" x1  45" x1                      Transfer w/c to mat supervision Supervision      90/90 hip abd SL         Quad hip extn        Quad UE elevations  10x alt      Knee flexion Prone AAROM  2x10 ea    Iso hold 3" AAROM Prone AAROM  2x10 ea  Flex    Iso 5"   Knee ext prone  2x10 ea      Hip extension        UE Elevations        Quad -> Tall kneel 3 trials  5-15" to pt tolerance 3 trials  15-45" to pt tolerance      HS curls Prone  1x10 B/L  AAROM with deep tendon tapping       [place] Stim trial        Knee extension Seated EOM  Deep tendon tapping 1x10 ea  AAROM L    Iso hold 5" R, 3" AAROM L    Resume upcoming       Dynamic seated balance Seated   Red disc, feet on foam  TB and DB UE exercises overhead    Reaching outside ELIS to target    Resume Upcoming

## 2020-01-10 NOTE — PROGRESS NOTES
PT Re-Evaluation     Today's date: 2020  Patient name: Roselyn Quesada  : 1950  MRN: [id number]  Referring provider: Kaela Turcios DO  Dx:   Encounter Diagnosis     ICD-10-CM    1  Lumbosacral plexopathy G54 1                   Assessment  Assessment details: Roselyn Quesada is a 68 y o  male presenting to outpatient physical therapy with diagnosis of lumbosacral plexopathy  Patient is demonstrating improving proximal hip strength, does have ongoing hip extensor strength deficits  Struggles to maintain hip extension in standing, depends heavily on UEs and forward trunk flexion  Patient overall progressing steadily with activity  In need of ongoing interventions to maximize return to PLOF  Impairments: abnormal gait, abnormal or restricted ROM, abnormal movement, activity intolerance, impaired balance, impaired physical strength and poor posture   Barriers to therapy: Nonambulatory, chronicity of impairments  Understanding of Dx/Px/POC: good   Prognosis: fair    Goals  STGs to be achieved in 4 weeks:     1  Pt to demonstrate increased AROM of BLEs in order to allow for greater ease and independence with ADLs and functional mobility - MET  2  Pt to demonstrate full PROM of BLEs in order to maximize joint mobility and function and allow for progression of exercise program and achievement of goals - MET  3  Pt to demonstrate increased MMT of BLEs and B shldrs  by at least 1/2-1 grade in order to improve safety and stability with ADLs and functional mobility - MET  4  Pt will take 5-6 steps in parallel bars with mod asst of 1-2 - MET    LTGs to be achieved in 6-8 weeks: - ONGOING  1  Pt will be I with HEP in order to continue to improve quality of life and independence  2  Pt to demonstrate return to amb 25 ft with RW with min asst of 1    3  Pt to demonstrate improved function as noted by achieving or exceeding predicted score on FOTO outcomes assessment tool         Plan  Patient would benefit from: skilled physical therapy  Planned therapy interventions: manual therapy, stretching, strengthening, therapeutic exercise, therapeutic activities, home exercise program and balance  Frequency: 2x week  Duration in visits: 8  Duration in weeks: 4  Plan of Care beginning date: 1/6/2020  Plan of Care expiration date: 2/5/2020  Treatment plan discussed with: family and patient        Subjective Evaluation    History of Present Illness  Date of surgery: 2004,2015  Mechanism of injury: surgery  Mechanism of injury: UPDATE:    Overall patient reports he is feeling stronger, feeling like he can do more  Feeling like he is getting stronger  Feels like his legs are getting stronger  Better standing posture, not throwing himself for transfers as much  Pt states leaving work one night his legs went numb and he fell to the ground  Pt underwent testing and end result was surgery with rods and screws and cleaned out arthritis  Over time pt had to use walker and legs got progressively weaker  Had a second surgery in 2015 and was in [facility] for rehab with a stage 4 pressure ulcer as a result of his hospitalization  Pt states he has been nonambulatory for 3 years, since the second surgery  States when he would attempt to stand his R shld would give out and he could not support himself  Pt has lift recliner and is able to perform indep transfers to tub, recliner and stationary bike  Had MAFOs after initial surgery  Stands at dining room table with wife stabilizing table  Wife states pt had PT from Oct 2018 to April 2019 at another facility  Wife states since DC from PT pt has lost what he gained at that time            Recurrent probem    Quality of life: good    Pain  No pain reported    Social Support  Lives with: spouse    Employment status: not working    Diagnostic Tests  MRI studies: abnormal (back)  Treatments  Previous treatment: physical therapy and occupational therapy  Current treatment: physical therapy  Patient Goals  Patient goals for therapy: improved balance, increased motion, increased strength, independence with ADLs/IADLs and return to sport/leisure activities  Patient goal: get up and walk, resume hunting, attend grandchildren's sporting events        Objective     Concurrent Complaints  Negative for night pain and disturbed sleep    Postural Observations  Seated posture: good  Standing posture: poor    Additional Postural Observation Details  At eval:  Stands in parallel bars with forward lean and LEs rigid in extension  Unable to take any steps  Transfers sit to stand indep by pulling up on parallel bars and locking knees in ext  At re-eval:  Sit to stand from w/c with heavy use of UEs  Improved WBIng onto LEs with bilateral knee extension improved in static stand  Does require cues for anterior tibial translation over forefoot in standing  Cues for increased hip and trunk extension      Neurological Testing     Sensation     Lumbar   Left   Diminished: light touch, sharp/dull discrimination and proprioception    Right   Diminished: light touch, sharp/dull discrimination and proprioception    Comments   Left light touch: lower leg  Right light touch: lower leg  Right sharp/dull discrimination: very little sensation    Reflexes   Left   Patellar (L4): absent (0)    Right   Patellar (L4): trace (1+)    Strength/Myotome Testing     Left Shoulder     Planes of Motion   Flexion: 4   Abduction: 4+   External rotation at 0°: 4   Internal rotation at 0°: 4     Right Shoulder     Planes of Motion   Flexion: 4   Abduction: 4+   External rotation at 0°: 4   Internal rotation at 0°: 4     Left Elbow   Flexion: 4+  Extension: 5    Right Elbow   Flexion: 4+  Extension: 5    Left Hip   Planes of Motion   Flexion: 3+  Extension: 3-  Abduction: 3  Adduction: 3+    Right Hip   Planes of Motion   Flexion: 3  Extension: 3  Abduction: 3+  Adduction: 4-    Left Knee   Flexion: 3  Extension: 3+    Right Knee   Flexion: 3-  Extension: 3-    Left Ankle/Foot   Dorsiflexion: 2-    Right Ankle/Foot   Dorsiflexion: 1    Ambulation     Comments   Demonstrates ability to ambulate 6 feet in // bars with mod A and max verbal cues    General Comments:      Hip Comments   Weakness R>L    Knee Comments  Weakness R>L    Ankle/Foot Comments   Weakness R>L  Neuro Exam:     Transfers Sit to stand: moderate assist Wheelchair to mat: independent Mat to wheelchair: independent Sit to supine: independent Supine to sit: independent   Roll/turn: supervision

## 2020-01-13 ENCOUNTER — APPOINTMENT (OUTPATIENT)
Dept: PHYSICAL THERAPY | Facility: CLINIC | Age: 70
End: 2020-01-13
Payer: MEDICARE

## 2020-01-17 ENCOUNTER — OFFICE VISIT (OUTPATIENT)
Dept: PHYSICAL THERAPY | Facility: CLINIC | Age: 70
End: 2020-01-17
Payer: MEDICARE

## 2020-01-17 DIAGNOSIS — G54.1 LUMBOSACRAL PLEXOPATHY: Primary | ICD-10-CM

## 2020-01-17 PROCEDURE — 97110 THERAPEUTIC EXERCISES: CPT

## 2020-01-17 PROCEDURE — 97112 NEUROMUSCULAR REEDUCATION: CPT

## 2020-01-17 NOTE — PROGRESS NOTES
Daily Note     Today's date: 2020  Patient name: Yaritza Mead  : 1950  MRN: [id number]  Referring provider: River Riggins DO  Dx:   Encounter Diagnosis     ICD-10-CM    1  Lumbosacral plexopathy G54 1                   Subjective: I have been practicing standing at home      Objective: See treatment diary below      Assessment: Tolerated treatment well  Denied any increase pain/sx's  Patient demonstrated fatigue post treatment  Noted improved standing/kneel endurance/tolerance      Plan: Continue per plan of care        Precautions: nonambulatory, BLE weakness R>L  Re-eval Date: 20    Date 1/6 1/10 1/17     Visit Count 1/10 2/10 3/10     FOTO RA       Pain In 0 0 0     Pain Out 0 0 0       Manual         Ham stretch        ITB stretch        Quad stretch        Hip Flexor stretch                  Date        UBE  60 RPM  10 min alt 60 RPM  10 min alt 50 PRM  10 min alt     Sit to stand trials from w/c        [place]  MTP/LTP        LTR        Heel slides B        SAQs        AH        AH with hip abd/add        Ankle DF        Bridges  GS 20x, 5" GS 20x, 5" w/ add @ home     Hip ABd                Tband Anti Trunk rotation          SLRs        Clamshells  Blue  3x10,5" Red  2x10 W/o TB  2x10      parallel bars   35"x1  55"x1  45"x1 //  55" x1  45" x1  45" x1 //  58" x1  1 min x1  40" to pt fatigue  Supervision                     Transfer w/c to mat supervision Supervision      90/90 hip abd SL         Quad hip extn   R 10x  L 2x5     Quad UE elevations  10x alt 10x 5" w/pball     Knee flexion Prone AAROM  2x10 ea    Iso hold 3" AAROM Prone AAROM  2x10 ea  Flex    Iso 5"   Knee ext prone  2x10 ea      Hip extension        UE Elevations        Quad -> Tall kneel 3 trials  5-15" to pt tolerance 3 trials  15-45" to pt tolerance 3 trials  15-35" to pt tolerance     HS curls Prone  1x10 B/L  AAROM with deep tendon tapping       [place] Stim trial        Knee extension Seated EOM  Deep tendon tapping   1x10 ea  AAROM L    Iso hold 5" R, 3" AAROM L    Resume upcoming       Dynamic seated balance Seated   Red disc, feet on foam  TB and DB UE exercises overhead    Reaching outside ELIS to target    Resume Upcoming

## 2020-01-20 ENCOUNTER — OFFICE VISIT (OUTPATIENT)
Dept: PHYSICAL THERAPY | Facility: CLINIC | Age: 70
End: 2020-01-20
Payer: MEDICARE

## 2020-01-20 DIAGNOSIS — G54.1 LUMBOSACRAL PLEXOPATHY: Primary | ICD-10-CM

## 2020-01-20 PROCEDURE — 97112 NEUROMUSCULAR REEDUCATION: CPT

## 2020-01-20 PROCEDURE — 97110 THERAPEUTIC EXERCISES: CPT

## 2020-01-20 NOTE — PROGRESS NOTES
Daily Note     Today's date: 2020  Patient name: Anjum Wall  : 1950  MRN: [id number]  Referring provider: Dm Kumar DO  Dx:   Encounter Diagnosis     ICD-10-CM    1  Lumbosacral plexopathy G54 1                   Subjective: Pt states he has a cold and " it seems to be taking a lot out of me "      Objective: See treatment diary below      Assessment: Tolerated treatment fair  Patient demonstrated fatigue post treatment and would benefit from continued PT  Pt perspiring and having less tolerance to activity today  Fatiguing rapidly  Plan: Continue per plan of care        Precautions: nonambulatory, BLE weakness R>L  Re-eval Date: 20    Date 1/6 1/10 1/17 1/20    Visit Count 1/10 2/10 3/10 4/10    FOTO RA       Pain In 0 0 0     Pain Out 0 0 0       Manual         Ham stretch        ITB stretch        Quad stretch        Hip Flexor stretch                  Date        UBE  60 RPM  10 min alt 60 RPM  10 min alt 50 PRM  10 min alt 50 rpm  10 min    Sit to stand trials from w/c        [place]  MTP/LTP        LTR        Heel slides B        SAQs        AH        AH with hip abd/add        Ankle DF        [place]  GS 20x, 5" GS 20x, 5" w/ add @ home     Hip ABd                Tband Anti Trunk rotation          SLRs        Clamshells  Blue  3x10,5" Red  2x10 W/o TB  2x10 W/o TB   20 ea side     parallel bars   35"x1  55"x1  45"x1 //  55" x1  45" x1  45" x1 //  58" x1  1 min x1  40" to pt fatigue  Supervision //  50" x 1  38" x 1  39" x 1  To pt fatigue  Sup of 1                    Transfer w/c to mat supervision Supervision  Sup of 1    90/90 hip abd SL         Quad hip extn   R 10x  L 2x5 10x B    Quad UE elevations  10x alt 10x 5" w/pball 15 x5"  w/pball    Knee flexion Prone AAROM  2x10 ea    Iso hold 3" AAROM Prone AAROM  2x10 ea  Flex    Iso 5"   Knee ext prone  2x10 ea  Prone AAROM  2x10 ea  Flex    Iso 5"   Knee ext prone  2x10 ea    Hip extension        UE Elevations Quad -> Tall kneel 3 trials  5-15" to pt tolerance 3 trials  15-45" to pt tolerance 3 trials  15-35" to pt tolerance 4 trials  15-30"  To renee    HS curls Prone  1x10 B/L  AAROM with deep tendon tapping       [place] Stim trial        Knee extension Seated EOM  Deep tendon tapping   1x10 ea  AAROM L    Iso hold 5" R, 3" AAROM L    Resume upcoming       Dynamic seated balance Seated   Red disc, feet on foam  TB and DB UE exercises overhead    Reaching outside ELIS to target    Resume Upcoming

## 2020-01-24 ENCOUNTER — OFFICE VISIT (OUTPATIENT)
Dept: PHYSICAL THERAPY | Facility: CLINIC | Age: 70
End: 2020-01-24
Payer: MEDICARE

## 2020-01-24 DIAGNOSIS — G54.1 LUMBOSACRAL PLEXOPATHY: Primary | ICD-10-CM

## 2020-01-24 PROCEDURE — 97112 NEUROMUSCULAR REEDUCATION: CPT

## 2020-01-24 PROCEDURE — 97110 THERAPEUTIC EXERCISES: CPT

## 2020-01-27 ENCOUNTER — APPOINTMENT (OUTPATIENT)
Dept: PHYSICAL THERAPY | Facility: CLINIC | Age: 70
End: 2020-01-27
Payer: MEDICARE

## 2020-01-31 ENCOUNTER — OFFICE VISIT (OUTPATIENT)
Dept: PHYSICAL THERAPY | Facility: CLINIC | Age: 70
End: 2020-01-31
Payer: MEDICARE

## 2020-01-31 DIAGNOSIS — G54.1 LUMBOSACRAL PLEXOPATHY: Primary | ICD-10-CM

## 2020-01-31 PROCEDURE — 97112 NEUROMUSCULAR REEDUCATION: CPT

## 2020-01-31 PROCEDURE — 97110 THERAPEUTIC EXERCISES: CPT

## 2020-01-31 NOTE — PROGRESS NOTES
Daily Note     Today's date: 2020  Patient name: Waldo Summers  : 1950  MRN: [id number]  Referring provider: Jerzy Mendez DO  Dx:   Encounter Diagnosis     ICD-10-CM    1  Lumbosacral plexopathy G54 1        Start Time: 1100  Stop Time: 1200  Total time in clinic (min): 60 minutes    Subjective: "I am trying to get to a full minute standing  I fell in the bathroom on Monday  I had a hard time getting up"      Objective: See treatment diary below      Assessment: Tolerated treatment well  Pt with appropriate fatigue completing program   Pt continues to need assistance with LE exercises  Patient demonstrated fatigue post treatment and would benefit from continued PT      Plan: Continue per plan of care  Progress treatment as tolerated         Precautions: nonambulatory, BLE weakness R>L  Re-eval Date: 20    Date        Visit Count 6/10       FOTO        Pain In 0       Pain Out 0         Manual         Ham stretch        ITB stretch        Quad stretch        Hip Flexor stretch                  Date        UBE  60 RPM  10 min alt       Sit to stand trials from w/c        [place]  MTP/LTP        LTR        Heel slides B        SAQs        AH        AH with hip abd/add        Ankle DF        Bridges         Hip ABd                Tband Anti Trunk rotation          SLRs        Clamshells  W/o TB   20 ea side        parallel bars //  52"x  51"x1  42" x 1  To pt fatigue  Sup of 1                       Transfer w/c to mat supervision       90/90 hip abd SL         Quad hip extn 2x10 B       Quad UE elevations 2x10  w/pball       Knee flexion Prone AAROM  2x10 ea  Flex    Iso 5"   Knee ext prone  2x10 ea       Hip extension        UE Elevations        Quad -> Tall kneel 4 trials  15-30"  To to       HS curls Prone  1x10 B/L  AAROM with deep tendon tapping       [place] Stim trial        Knee extension Seated EOM  Deep tendon tapping   1x10 ea  AAROM L    Iso hold 5" R, 3" AAROM L    Resume upcoming       Dynamic seated balance Seated   Red disc, feet on foam  TB and DB UE exercises overhead    Reaching outside ELIS to target    Resume Upcoming

## 2020-02-03 ENCOUNTER — OFFICE VISIT (OUTPATIENT)
Dept: PHYSICAL THERAPY | Facility: CLINIC | Age: 70
End: 2020-02-03
Payer: MEDICARE

## 2020-02-03 DIAGNOSIS — G54.1 LUMBOSACRAL PLEXOPATHY: Primary | ICD-10-CM

## 2020-02-03 PROCEDURE — 97110 THERAPEUTIC EXERCISES: CPT

## 2020-02-03 PROCEDURE — 97112 NEUROMUSCULAR REEDUCATION: CPT

## 2020-02-03 NOTE — PROGRESS NOTES
Daily Note     Today's date: 2/3/2020  Patient name: Koffi Haddad  : 1950  MRN: [id number]  Referring provider: Sesar Abebe DO  Dx:   Encounter Diagnosis     ICD-10-CM    1  Lumbosacral plexopathy G54 1        Start Time:   Stop Time:   Total time in clinic (min): 60 minutes    Subjective: Pt offered no concerns at start of session  Objective: See treatment diary below      Assessment: Tolerated treatment well  Pt able to achieve 1 minute standing this session  LE strength deficits continue  Pt appropriately challenged with current program   Patient demonstrated fatigue post treatment and would benefit from continued PT      Plan: Continue per plan of care  Progress treatment as tolerated         Precautions: nonambulatory, BLE weakness R>L  Re-eval Date: 20    Date 1/31 2/3      Visit Count 6/10 7/10      FOTO        Pain In 0       Pain Out 0         Manual         Ham stretch        ITB stretch        Quad stretch        Hip Flexor stretch                  Date 1/31 2/3      UBE  60 RPM  10 min alt 60 RPM  10 min alt      Sit to stand trials from w/c        [place]  MTP/LTP        LTR        Heel slides B        SAQs        AH        AH with hip abd/add        Ankle DF        Bridges         Hip ABd                Tband Anti Trunk rotation          SLRs        Clamshells  W/o TB   20 ea side W/o TB   20 ea side       parallel bars //  52"x  51"x1  42" x 1  To pt fatigue  Sup of   53"x 2  1'03" x 1    To pt fatigue  Sup of 1                      Transfer w/c to mat supervision       90/90 hip abd SL         Quad hip extn 2x10 B 2x10 B      Quad UE elevations 2x10  w/pball 2x10  w/pball      Knee flexion Prone AAROM  2x10 ea  Flex    Iso 5"   Knee ext prone  2x10 ea Prone AAROM  2x10 ea  Flex    Iso 5"   Knee ext prone  2x10 ea      Hip extension        UE Elevations        Quad -> Tall kneel 4 trials  15-30"  To to 4 trials  15-30"  To to      HS curls Prone  1x10 B/L  AAROM with deep tendon tapping Prone  2x10 B/L  AAROM with deep tendon tapping      [place] Stim trial        Knee extension Seated EOM  Deep tendon tapping   1x10 ea  AAROM L    Iso hold 5" R, 3" AAROM L    Resume upcoming Seated EOM  Deep tendon tapping   1x10 ea  AAROM L    Iso hold 5" R, 3" AAROM L      Dynamic seated balance Seated   Red disc, feet on foam  TB and DB UE exercises overhead    Reaching outside ELIS to target    Resume Upcoming

## 2020-02-07 ENCOUNTER — OFFICE VISIT (OUTPATIENT)
Dept: PHYSICAL THERAPY | Facility: CLINIC | Age: 70
End: 2020-02-07
Payer: MEDICARE

## 2020-02-07 DIAGNOSIS — G54.1 LUMBOSACRAL PLEXOPATHY: Primary | ICD-10-CM

## 2020-02-07 PROCEDURE — 97112 NEUROMUSCULAR REEDUCATION: CPT | Performed by: PHYSICAL THERAPIST

## 2020-02-07 PROCEDURE — 97110 THERAPEUTIC EXERCISES: CPT | Performed by: PHYSICAL THERAPIST

## 2020-02-07 NOTE — PROGRESS NOTES
PT Re-Evaluation     Today's date: 2020  Patient name: Shemar Zamora  : 1950  MRN: [id number]  Referring provider: Izaiah Gomez DO  Dx:   Encounter Diagnosis     ICD-10-CM    1  Lumbosacral plexopathy G54 1                   Assessment  Assessment details: Shemar Zamora is a 68 y o  male presenting to outpatient physical therapy with diagnosis of lumbosacral plexopathy  He is demonstrating improving left > right quad control  Unable to fully extend right knee completely  Patient is demonstrating improved standing tolerance, has been able to progress to taking 3-5 steps with RW and assist of 2  Patient continues to depend heavily on bilateral UEs for all standing activities  Does report on episode of fall during this re-assessment period  He continues to make progress  Sensation noted to knee bilaterally  He continues to be motivated to ambulate and progress with mobility  Cont per POC and progress as able  Impairments: abnormal gait, abnormal or restricted ROM, abnormal movement, activity intolerance, impaired balance, impaired physical strength and poor posture   Barriers to therapy: Nonambulatory, chronicity of impairments  Understanding of Dx/Px/POC: good   Prognosis: fair    Goals  STGs to be achieved in 4 weeks:     1  Pt to demonstrate increased AROM of BLEs in order to allow for greater ease and independence with ADLs and functional mobility - MET  2  Pt to demonstrate full PROM of BLEs in order to maximize joint mobility and function and allow for progression of exercise program and achievement of goals - MET  3  Pt to demonstrate increased MMT of BLEs and B shldrs  by at least 1/2-1 grade in order to improve safety and stability with ADLs and functional mobility - MET  4  Pt will take 5-6 steps in parallel bars with mod asst of 1-2 - MET    LTGs to be achieved in 6-8 weeks:  1  Pt will be I with HEP in order to continue to improve quality of life and independence - MET  2   Pt to demonstrate return to amb 25 ft with RW with min asst of 1 - ONGOING  3  Pt to demonstrate improved function as noted by achieving or exceeding predicted score on FOTO outcomes assessment tool  Plan  Patient would benefit from: skilled physical therapy  Other planned modality interventions: Modalities prn for symptom management  Planned therapy interventions: manual therapy, therapeutic exercise, home exercise program and neuromuscular re-education  Frequency: 2x week  Duration in visits: 8  Duration in weeks: 4  Plan of Care beginning date: 2/7/2020  Plan of Care expiration date: 3/8/2020  Treatment plan discussed with: family and patient        Subjective Evaluation    History of Present Illness  Date of surgery: 2004,2015  Mechanism of injury: surgery  Mechanism of injury: UPDATE:    Patient reports one fall recently  Patient does note he is able to stand statically up to one minute  Patient does report ongoing numbness/tingling in his feet  Excited about being able to stand and walk a bit  Pt states leaving work one night his legs went numb and he fell to the ground  Pt underwent testing and end result was surgery with rods and screws and cleaned out arthritis  Over time pt had to use walker and legs got progressively weaker  Had a second surgery in 2015 and was in [facility] for rehab with a stage 4 pressure ulcer as a result of his hospitalization  Pt states he has been nonambulatory for 3 years, since the second surgery  States when he would attempt to stand his R shld would give out and he could not support himself  Pt has lift recliner and is able to perform indep transfers to tub, recliner and stationary bike  Had MAFOs after initial surgery  Stands at dining room table with wife stabilizing table  Wife states pt had PT from Oct 2018 to April 2019 at another facility  Wife states since DC from PT pt has lost what he gained at that time            Recurrent probem    Quality of life: good    Pain  No pain reported    Social Support  Lives with: spouse    Employment status: not working    Diagnostic Tests  MRI studies: abnormal (back)  Treatments  Previous treatment: physical therapy and occupational therapy  Current treatment: physical therapy  Patient Goals  Patient goals for therapy: improved balance, increased motion, increased strength, independence with ADLs/IADLs and return to sport/leisure activities  Patient goal: get up and walk, resume hunting, attend grandchildren's sporting events        Objective     Concurrent Complaints  Negative for night pain and disturbed sleep    Postural Observations  Seated posture: good  Standing posture: poor    Additional Postural Observation Details  At eval:  Stands in parallel bars with forward lean and LEs rigid in extension  Unable to take any steps  Transfers sit to stand indep by pulling up on parallel bars and locking knees in ext  At re-eval:  Sit to stand from w/c with heavy use of UEs  Improved WBIng onto LEs with bilateral knee extension improved in static stand  Does require cues for anterior tibial translation over forefoot in standing  Cues for increased hip and trunk extension      Tenderness     Additional Tenderness Details  Denies TTP    Neurological Testing     Sensation     Lumbar   Left   Diminished: light touch, sharp/dull discrimination and proprioception    Right   Diminished: light touch, sharp/dull discrimination and proprioception    Comments   Left light touch: lower leg  Right light touch: lower leg  Right sharp/dull discrimination: very little sensation    Reflexes   Left   Patellar (L4): absent (0)    Right   Patellar (L4): trace (1+)    Active Range of Motion     Additional Active Range of Motion Details  Bilateral LEs WFL PROM    Strength/Myotome Testing     Left Shoulder     Planes of Motion   Flexion: 4   Abduction: 4+   External rotation at 0°: 4   Internal rotation at 0°: 4     Right Shoulder     Planes of Motion Flexion: 4   Abduction: 4+   External rotation at 0°: 4   Internal rotation at 0°: 4     Left Elbow   Flexion: 4+  Extension: 5    Right Elbow   Flexion: 4+  Extension: 5    Muscle Activation     Additional Muscle Activation Details  Over-activation of abdominal muscles in an attempt to increase hip extension    General Comments:      Hip Comments   Hip flexion right 3-/5  Hip flexion left 3/5  Hip extension 1/5 right - prone  Hip extension 2+/5 left - prone  Hip ABd right 3-/5 - sidelying  Hip ABd left 2/5 - sideling    Knee Comments  Knee extension right 2/5  Knee extension left 3+/5  Neuro Exam:     Transfers Sit to stand: moderate assist Wheelchair to mat: independent Mat to wheelchair: independent Sit to supine: independent Supine to sit: independent   Roll/turn: supervision           Precautions: nonambulatory, BLE weakness R>L  Re-eval Date: 2/5/20    Date 1/31 2/3 2/7     Visit Count 6/10 7/10 1/10     FOTO        Pain In 0  0     Pain Out 0  0       Manual         Ham stretch        ITB stretch        Quad stretch        Hip Flexor stretch           NP       Date 1/31 2/3      UBE  60 RPM  10 min alt 60 RPM  10 min alt -150/80-82     Sit to stand trials from w/c   4 times in // bars with cl supervision     [place]  MTP/LTP        LTR        Heel slides B        SAQs        AH        AH with hip abd/add        Ankle DF        Bridges         Hip ABd                Tband Anti Trunk rotation          SLRs        Clamshells  W/o TB   20 ea side W/o TB   20 ea side       parallel bars //  52"x  51"x1  42" x 1  To pt fatigue  Sup of 1 //  53"x 2  1'03" x 1    To pt fatigue  Sup of 1 Max standing time  55 seconds  47 seconds  30 seconds  35 seconds                     Transfer w/c to mat supervision  supervision     90/90 hip abd SL         Quad hip extn 2x10 B 2x10 B      Quad UE elevations 2x10  w/pball 2x10  w/pball      Knee flexion Prone AAROM  2x10 ea  Flex    Iso 5"   Knee ext prone  2x10 ea Prone AAROM  2x10 ea  Flex    Iso 5"   Knee ext prone  2x10 ea      Hip extension   Attempted in standing with hip and trunk extension - poor quality, requires mod-max assist     UE Elevations        Quad -> Tall kneel 4 trials  15-30"  To to 4 trials  15-30"  To to      HS curls Prone  1x10 B/L  AAROM with deep tendon tapping Prone  2x10 B/L  AAROM with deep tendon tapping      [place] Stim trial        Knee extension Seated EOM  Deep tendon tapping   1x10 ea  AAROM L    Iso hold 5" R, 3" AAROM L    Resume upcoming Seated EOM  Deep tendon tapping   1x10 ea  AAROM L    Iso hold 5" R, 3" AAROM L      Dynamic seated balance Seated   Red disc, feet on foam  TB and DB UE exercises overhead    Reaching outside ELIS to target           Tilt table   30 mins  Stand to 80 degrees - 3 straps  Static stand  Bicep curls  Cross body punch   press  3 semi-reclined rest periods required for recovery of EDGAR  Vitals stable throughout as noted

## 2020-02-07 NOTE — LETTER
2020    Dione Cranker, DO  [address] [facility],[address]    Patient: Makenna Wright   YOB: 1950   Date of Visit: 2020     Encounter Diagnosis     ICD-10-CM    1  Lumbosacral plexopathy G54 1        Dear Dr Daniels Earing:    Thank you for your recent referral of Makenna Wright  Please review the attached evaluation summary from Ramos's recent visit  Please verify that you agree with the plan of care by signing the attached order  If you have any questions or concerns, please do not hesitate to call  I sincerely appreciate the opportunity to share in the care of one of your patients and hope to have another opportunity to work with you in the near future  Sincerely,    Vanna Brothers      Referring Provider:      I certify that I have read the below Plan of Care and certify the need for these services furnished under this plan of treatment while under my care  Dione Cranker, DO  [address] [facility],[address] [place] [address]  [facility] Re-Evaluation     Today's date: 2020  Patient name: Makenna Wright  : 1950  MRN: [id number]  Referring provider: Marcelina Mayers DO  Dx:   Encounter Diagnosis     ICD-10-CM    1  Lumbosacral plexopathy G54 1                   Assessment  Assessment details: Makenna Wright is a 68 y o  male presenting to outpatient physical therapy with diagnosis of lumbosacral plexopathy  He is demonstrating improving left > right quad control  Unable to fully extend right knee completely  Patient is demonstrating improved standing tolerance, has been able to progress to taking 3-5 steps with RW and assist of 2  Patient continues to depend heavily on bilateral UEs for all standing activities  Does report on episode of fall during this re-assessment period  He continues to make progress  Sensation noted to knee bilaterally    He continues to be motivated to ambulate and progress with mobility  Cont per POC and progress as able  Impairments: abnormal gait, abnormal or restricted ROM, abnormal movement, activity intolerance, impaired balance, impaired physical strength and poor posture   Barriers to therapy: Nonambulatory, chronicity of impairments  Understanding of Dx/Px/POC: good   Prognosis: fair    Goals  STGs to be achieved in 4 weeks:     1  Pt to demonstrate increased AROM of BLEs in order to allow for greater ease and independence with ADLs and functional mobility - MET  2  Pt to demonstrate full PROM of BLEs in order to maximize joint mobility and function and allow for progression of exercise program and achievement of goals - MET  3  Pt to demonstrate increased MMT of BLEs and B shldrs  by at least 1/2-1 grade in order to improve safety and stability with ADLs and functional mobility - MET  4  Pt will take 5-6 steps in parallel bars with mod asst of 1-2 - MET    LTGs to be achieved in 6-8 weeks:  1  Pt will be I with HEP in order to continue to improve quality of life and independence - MET  2  Pt to demonstrate return to amb 25 ft with RW with min asst of 1 - ONGOING  3  Pt to demonstrate improved function as noted by achieving or exceeding predicted score on FOTO outcomes assessment tool  Plan  Patient would benefit from: skilled physical therapy  Other planned modality interventions: Modalities prn for symptom management  Planned therapy interventions: manual therapy, therapeutic exercise, home exercise program and neuromuscular re-education  Frequency: 2x week  Duration in visits: 8  Duration in weeks: 4  Plan of Care beginning date: 2/7/2020  Plan of Care expiration date: 3/8/2020  Treatment plan discussed with: family and patient        Subjective Evaluation    History of Present Illness  Date of surgery: 2004,2015  Mechanism of injury: surgery  Mechanism of injury: UPDATE:    Patient reports one fall recently    Patient does note he is able to stand statically up to one minute  Patient does report ongoing numbness/tingling in his feet  Excited about being able to stand and walk a bit  Pt states leaving work one night his legs went numb and he fell to the ground  Pt underwent testing and end result was surgery with rods and screws and cleaned out arthritis  Over time pt had to use walker and legs got progressively weaker  Had a second surgery in 2015 and was in [facility] for rehab with a stage 4 pressure ulcer as a result of his hospitalization  Pt states he has been nonambulatory for 3 years, since the second surgery  States when he would attempt to stand his R shld would give out and he could not support himself  Pt has lift recliner and is able to perform indep transfers to tub, recliner and stationary bike  Had MAFOs after initial surgery  Stands at dining room table with wife stabilizing table  Wife states pt had PT from Oct 2018 to April 2019 at another facility  Wife states since DC from PT pt has lost what he gained at that time  Recurrent probem    Quality of life: good    Pain  No pain reported    Social Support  Lives with: spouse    Employment status: not working    Diagnostic Tests  MRI studies: abnormal (back)  Treatments  Previous treatment: physical therapy and occupational therapy  Current treatment: physical therapy  Patient Goals  Patient goals for therapy: improved balance, increased motion, increased strength, independence with ADLs/IADLs and return to sport/leisure activities  Patient goal: get up and walk, resume hunting, attend grandchildren's sporting events        Objective     Concurrent Complaints  Negative for night pain and disturbed sleep    Postural Observations  Seated posture: good  Standing posture: poor    Additional Postural Observation Details  At eval:  Stands in parallel bars with forward lean and LEs rigid in extension  Unable to take any steps   Transfers sit to stand indep by pulling up on parallel bars and locking knees in ext  At re-eval:  Sit to stand from w/c with heavy use of UEs  Improved WBIng onto LEs with bilateral knee extension improved in static stand  Does require cues for anterior tibial translation over forefoot in standing  Cues for increased hip and trunk extension  Tenderness     Additional Tenderness Details  Denies TTP    Neurological Testing     Sensation     Lumbar   Left   Diminished: light touch, sharp/dull discrimination and proprioception    Right   Diminished: light touch, sharp/dull discrimination and proprioception    Comments   Left light touch: lower leg  Right light touch: lower leg  Right sharp/dull discrimination: very little sensation    Reflexes   Left   Patellar (L4): absent (0)    Right   Patellar (L4): trace (1+)    Active Range of Motion     Additional Active Range of Motion Details  Bilateral LEs WFL PROM    Strength/Myotome Testing     Left Shoulder     Planes of Motion   Flexion: 4   Abduction: 4+   External rotation at 0°:  4   Internal rotation at 0°:  4     Right Shoulder     Planes of Motion   Flexion: 4   Abduction: 4+   External rotation at 0°:  4   Internal rotation at 0°:  4     Left Elbow   Flexion: 4+  Extension: 5    Right Elbow   Flexion: 4+  Extension: 5    Muscle Activation     Additional Muscle Activation Details  Over-activation of abdominal muscles in an attempt to increase hip extension    General Comments:      Hip Comments   Hip flexion right 3-/5  Hip flexion left 3/5  Hip extension 1/5 right - prone  Hip extension 2+/5 left - prone  Hip ABd right 3-/5 - sidelying  Hip ABd left 2/5 - sideling    Knee Comments  Knee extension right 2/5  Knee extension left 3+/5  Neuro Exam:     Transfers Sit to stand: moderate assist Wheelchair to mat: independent Mat to wheelchair: independent Sit to supine: independent Supine to sit: independent   Roll/turn: supervision           Precautions: nonambulatory, BLE weakness R>L  Re-eval Date: 2/5/20    Date 1/31 2/3 2/7     Visit Count 6/10 7/10 1/10     FOTO        Pain In 0  0     Pain Out 0  0       Manual         Ham stretch        ITB stretch        Quad stretch        Hip Flexor stretch           NP       Date 1/31 2/3      UBE  60 RPM  10 min alt 60 RPM  10 min alt -150/80-82     Sit to stand trials from w/c   4 times in // bars with cl supervision     [place]  MTP/LTP        LTR        Heel slides B        SAQs        AH        AH with hip abd/add        Ankle DF        Bridges         Hip ABd                Tband Anti Trunk rotation          SLRs        Clamshells  W/o TB   20 ea side W/o TB   20 ea side       parallel bars //  52"x  51"x1  42" x 1  To pt fatigue  Sup of 1 //  53"x 2  1'03" x 1    To pt fatigue  Sup of 1 Max standing time  55 seconds  47 seconds  30 seconds  35 seconds                     Transfer w/c to mat supervision  supervision     90/90 hip abd SL         Quad hip extn 2x10 B 2x10 B      Quad UE elevations 2x10  w/pball 2x10  w/pball      Knee flexion Prone AAROM  2x10 ea  Flex    Iso 5"   Knee ext prone  2x10 ea Prone AAROM  2x10 ea  Flex    Iso 5"   Knee ext prone  2x10 ea      Hip extension   Attempted in standing with hip and trunk extension - poor quality, requires mod-max assist     UE Elevations        Quad -> Tall kneel 4 trials  15-30"  To to 4 trials  15-30"  To to      HS curls Prone  1x10 B/L  AAROM with deep tendon tapping Prone  2x10 B/L  AAROM with deep tendon tapping      [place] Stim trial        Knee extension Seated EOM  Deep tendon tapping   1x10 ea  AAROM L    Iso hold 5" R, 3" AAROM L    Resume upcoming Seated EOM  Deep tendon tapping   1x10 ea  AAROM L    Iso hold 5" R, 3" AAROM L      Dynamic seated balance Seated   Red disc, feet on foam  TB and DB UE exercises overhead    Reaching outside ELIS to target           Tilt table   30 mins  Stand to 80 degrees - 3 straps  Static stand  Bicep curls  Cross body punch   press  3 semi-reclined rest periods required for recovery of EDGAR  Vitals stable throughout as noted

## 2020-02-10 ENCOUNTER — OFFICE VISIT (OUTPATIENT)
Dept: PHYSICAL THERAPY | Facility: CLINIC | Age: 70
End: 2020-02-10
Payer: MEDICARE

## 2020-02-10 ENCOUNTER — TRANSCRIBE ORDERS (OUTPATIENT)
Dept: PHYSICAL THERAPY | Facility: CLINIC | Age: 70
End: 2020-02-10

## 2020-02-10 DIAGNOSIS — G54.1 LUMBOSACRAL PLEXOPATHY: Primary | ICD-10-CM

## 2020-02-10 PROCEDURE — 97112 NEUROMUSCULAR REEDUCATION: CPT

## 2020-02-10 PROCEDURE — 97110 THERAPEUTIC EXERCISES: CPT

## 2020-02-10 NOTE — PROGRESS NOTES
Daily Note     Today's date: 2/10/2020  Patient name: Katlin Figueroa  : 1950  MRN: [id number]  Referring provider: Becki Morales DO  Dx:   Encounter Diagnosis     ICD-10-CM    1  Lumbosacral plexopathy G54 1                   Subjective: I felt good after last PT session with standing in tilt table  I feel I had more sensation in my legs just the feet were still feeling like they were numb/tingling      Objective: See treatment diary below      Assessment: Tolerated treatment well  Denied any increase LBP, monitor L shoulder with pt hx of pain   Adjusted wts to pt tolerance with UE strengthening  Improved standing tolerance on tilt table this date with 30 min without requesting recline rest   Patient would benefit from continued PT improve BL LE strength/stability/mobility, core/lumbar stability, and  endurance to max overall QOL         Plan: Continue per plan of care        Precautions: nonambulatory, BLE weakness R>L  Re-eval Date: 20    Date 1/31 2/3 2/7 2/10    Visit Count 6/10 7/10 1/10 2/10    FOTO        Pain In 0  0 0    Pain Out 0  0 0      Manual         Ham stretch        ITB stretch        Quad stretch        Hip Flexor stretch           NP       Date 1/31 2/3  -124/ 80-84    UBE  60 RPM  10 min alt 60 RPM  10 min alt -150/80-82 50 RPM  10 min    Sit to stand trials from w/c   4 times in // bars with cl supervision     Evan  MTP/LTP        LTR        Heel slides B        SAQs        AH        AH with hip abd/add        Ankle DF        Bridges         Hip ABd                Tband Anti Trunk rotation          SLRs        Clamshells  W/o TB   20 ea side W/o TB   20 ea side       parallel bars //  52"x  51"x1  42" x 1  To pt fatigue  Sup of   53"x 2  1'03" x 1    To pt fatigue  Sup of 1 Max standing time  55 seconds  47 seconds  30 seconds  35 seconds                     Transfer w/c to mat supervision  supervision     90/90 hip abd SL         Quad hip extn 2x10 B 2x10 B      Quad UE elevations 2x10  w/pball 2x10  w/pball      Knee flexion Prone AAROM  2x10 ea  Flex    Iso 5"   Knee ext prone  2x10 ea Prone AAROM  2x10 ea  Flex    Iso 5"   Knee ext prone  2x10 ea      Hip extension   Attempted in standing with hip and trunk extension - poor quality, requires mod-max assist     UE Elevations        Quad -> Tall kneel 4 trials  15-30"  To to 4 trials  15-30"  To to      HS curls Prone  1x10 B/L  AAROM with deep tendon tapping Prone  2x10 B/L  AAROM with deep tendon tapping      [place] Stim trial        Knee extension Seated EOM  Deep tendon tapping   1x10 ea  AAROM L    Iso hold 5" R, 3" AAROM L    Resume upcoming Seated EOM  Deep tendon tapping   1x10 ea  AAROM L    Iso hold 5" R, 3" AAROM L      Dynamic seated balance Seated   Red disc, feet on foam  TB and DB UE exercises overhead    Reaching outside ELIS to target           Tilt table semi recline    50 degrees  Chest press  7# 10x ea  R/L, BL, alt    pec stretch Bl UE 80*  x1 min    Alt Fwd raises  3x10 2#    Tilt table   30 mins  Stand to 80 degrees - 3 straps  Static stand  Bicep curls  Cross body punch   press  3 semi-reclined rest periods required for recovery of EDGAR  Vitals stable throughout as noted 30 mins  Stand to 85 degrees - 3 straps  Static stand  Bicep curls 5# 3x10  Cross body punch 5# 3x10   press  5# 2x10  LTP  Blue 20x  Horiz ABD blue 2x10  5# cross punch, upper cut 2x10     30 min total w/o reclined rest    Vitals stable throughout as noted

## 2020-02-14 ENCOUNTER — OFFICE VISIT (OUTPATIENT)
Dept: PHYSICAL THERAPY | Facility: CLINIC | Age: 70
End: 2020-02-14
Payer: MEDICARE

## 2020-02-14 DIAGNOSIS — G54.1 LUMBOSACRAL PLEXOPATHY: Primary | ICD-10-CM

## 2020-02-14 PROCEDURE — 97112 NEUROMUSCULAR REEDUCATION: CPT | Performed by: PHYSICAL THERAPIST

## 2020-02-14 PROCEDURE — 97110 THERAPEUTIC EXERCISES: CPT | Performed by: PHYSICAL THERAPIST

## 2020-02-14 NOTE — PROGRESS NOTES
Daily Note     Today's date: 2020  Patient name: Anjum Wall  : 1950  MRN: [id number]  Referring provider: Dm Kumar DO  Dx:   Encounter Diagnosis     ICD-10-CM    1  Lumbosacral plexopathy G54 1                   Subjective: Patient reports he is doing okay, denies recent falls  Objective: See treatment diary below      Assessment: Tolerated treatment well  Patient demonstrated fatigue post treatment and would benefit from continued PT  Ongoing quad weakness, improving right quad contraction in partial standing/reclined standing on tilt table  Plan: Continue per plan of care        Precautions: nonambulatory, BLE weakness R>L  Re-eval Date: 20    Date 1/31 2/3 2/7 2/10 2/14   Visit Count 6/10 7/10 1/10 2/10 3/10   FOTO        Pain In 0  0 0    Pain Out 0  0 0      Manual         Ham stretch        ITB stretch        Quad stretch        Hip Flexor stretch           NP       Date 1/31 2/3  -124/ 80-84    UBE  60 RPM  10 min alt 60 RPM  10 min alt -150/80-82 50 RPM  10 min    Sit to stand trials from w/c   4 times in // bars with cl supervision     Evan  MTP/LTP        LTR        Heel slides B        SAQs        AH        AH with hip abd/add        Ankle DF        Bridges         Hip ABd                Tband Anti Trunk rotation          SLRs        Clamshells  W/o TB   20 ea side W/o TB   20 ea side       parallel bars //  52"x  51"x1  42" x 1  To pt fatigue  Sup of /  53"x 2  1'03" x 1    To pt fatigue  Sup of 1 Max standing time  55 seconds  47 seconds  30 seconds  35 seconds                     Transfer w/c to mat supervision  supervision  supervision   90/90 hip abd SL         Quad hip extn 2x10 B 2x10 B      Quad UE elevations 2x10  w/pball 2x10  w/pball      Knee flexion Prone AAROM  2x10 ea  Flex    Iso 5"   Knee ext prone  2x10 ea Prone AAROM  2x10 ea  Flex    Iso 5"   Knee ext prone  2x10 ea      Hip extension   Attempted in standing with hip and trunk extension - poor quality, requires mod-max assist     UE Elevations        Quad -> Tall kneel 4 trials  15-30"  To to 4 trials  15-30"  To to      HS curls Prone  1x10 B/L  AAROM with deep tendon tapping Prone  2x10 B/L  AAROM with deep tendon tapping      [place] Stim trial        Knee extension Seated EOM  Deep tendon tapping   1x10 ea  AAROM L    Iso hold 5" R, 3" AAROM L    Resume upcoming Seated EOM  Deep tendon tapping   1x10 ea  AAROM L    Iso hold 5" R, 3" AAROM L      Dynamic seated balance Seated   Red disc, feet on foam  TB and DB UE exercises overhead    Reaching outside ELIS to target           Tilt table semi recline    50 degrees  Chest press  7# 10x ea  R/L, BL, alt    pec stretch Bl UE 80*  x1 min    Alt Fwd raises  3x10 2# 70 degrees  MTP/LTP  2x10 each  15 5#   [place]  Triceps  15#  Biceps  15#  Alt UE raises-cross body  2#  2x10  Partially reclined march with max cues and MCs for knee extension  2x10 b/l   Tilt table   30 mins  Stand to 80 degrees - 3 straps  Static stand  Bicep curls  Cross body punch   press  3 semi-reclined rest periods required for recovery of EDGAR  Vitals stable throughout as noted 30 mins  Stand to 85 degrees - 3 straps  Static stand  Bicep curls 5# 3x10  Cross body punch 5# 3x10   press  5# 2x10  LTP  Blue 20x  Horiz ABD blue 2x10  5# cross punch, upper cut 2x10     30 min total w/o reclined rest    Vitals stable throughout as noted 30 mins  Stand to 85 degrees - 3 straps below hip level   press  5# 2x10  PNF D1  Green t-band  2x10  Horiz ABD blue 2x10    30 min total w/o reclined rest    Vitals stable throughout

## 2020-02-17 ENCOUNTER — OFFICE VISIT (OUTPATIENT)
Dept: PHYSICAL THERAPY | Facility: CLINIC | Age: 70
End: 2020-02-17
Payer: MEDICARE

## 2020-02-17 DIAGNOSIS — G54.1 LUMBOSACRAL PLEXOPATHY: Primary | ICD-10-CM

## 2020-02-17 PROCEDURE — 97112 NEUROMUSCULAR REEDUCATION: CPT

## 2020-02-17 PROCEDURE — 97110 THERAPEUTIC EXERCISES: CPT

## 2020-02-17 NOTE — PROGRESS NOTES
Daily Note     Today's date: 2020  Patient name: Patricia Zuñiga  : 1950  MRN: [id number]  Referring provider: Godfrey Elam DO  Dx:   Encounter Diagnosis     ICD-10-CM    1  Lumbosacral plexopathy G54 1                   Subjective: I feel good  My legs are starting to feel more, a lot better than it was before doing tilt table activity    Objective: See treatment diary below    Assessment: Tolerated treatment well  Denied any increase pain but indicated increase mm fatigue LB region with tilt table  Cues for R quad contraction, conts with quad weakness   Patient would benefit from continued PT to improve BL LE / core /lumbar strength/stability to max overall QOL    Plan: Continue per plan of care        Precautions: nonambulatory, BLE weakness R>L  Re-eval Date: 20    Date        Visit Count 4/10       FOTO        Pain In 0       Pain Out 0         Manual         Ham stretch        ITB stretch        Quad stretch        Hip Flexor stretch                  Date        UBE  resume       Sit to stand trials from w/c        [place]  MTP/LTP        LTR        Heel slides B        SAQs        AH        AH with hip abd/add        Ankle DF        Bridges         Hip ABd                Tband Anti Trunk rotation          SLRs        Clamshells          parallel bars                        Transfer w/c to mat supervision       90/90 hip abd SL         Quad hip extn        Quad UE elevations        Knee flexion        Hip extension        UE Elevations        Quad -> Tall kneel        HS curls        [place] Stim trial        Knee extension        Dynamic seated balance        Tilt table semi recline 70-80 degrees    MTP/LTP  3x10 each  15 5# LTP  20# MTP    Evan  Triceps  20# 30x    Biceps  7#DB 30x    Alt UE raises-cross body  2#  2x10    Partially reclined 30 degrees  - ericka  [facility] for for knee extension  - hip ABD L LE with R WB 3x5  AAROM LE  - knee extension  2x10, 5" b/l       Tilt table 30 mins  Stand to 80-85 degrees - 3 straps below hip level   press  7# R /5# L 2x10    PNF D1  Blue  t-band  2x10 R  15x L    Horiz ABD blue 3x10,5"    30 min total w/o reclined rest    Vitals stable throughout

## 2020-02-19 ENCOUNTER — APPOINTMENT (OUTPATIENT)
Dept: PHYSICAL THERAPY | Facility: CLINIC | Age: 70
End: 2020-02-19
Payer: MEDICARE

## 2020-02-21 ENCOUNTER — OFFICE VISIT (OUTPATIENT)
Dept: PHYSICAL THERAPY | Facility: CLINIC | Age: 70
End: 2020-02-21
Payer: MEDICARE

## 2020-02-21 DIAGNOSIS — G54.1 LUMBOSACRAL PLEXOPATHY: Primary | ICD-10-CM

## 2020-02-21 PROCEDURE — 97112 NEUROMUSCULAR REEDUCATION: CPT

## 2020-02-21 PROCEDURE — 97110 THERAPEUTIC EXERCISES: CPT

## 2020-02-21 NOTE — PROGRESS NOTES
Daily Note     Today's date: 2020  Patient name: Tan Hinton  : 1950  MRN: [id number]  Referring provider: Kristen Canada DO  Dx:   Encounter Diagnosis     ICD-10-CM    1  Lumbosacral plexopathy G54 1                   Subjective: I saw massage therapist she worked a lot on my legs/ankles   Gave me exercise for ankle ROM    I have less tingling in my feet since massage session    Objective: See treatment diary below      Assessment: Tolerated treatment well  Denied any increase pain or sx's of tingling BL LE  Pt requested decline with tilt table today for improved core control with UE strengthening  Patient would benefit from continued PT      Plan: Continue per plan of care        Precautions: nonambulatory, BLE weakness R>L  Re-eval Date: 20    Date       Visit Count 4/10 5/10      FOTO  completed Retake     Pain In 0       Pain Out 0         Manual         Ham stretch        ITB stretch        Quad stretch        Hip Flexor stretch                  Date        UBE  resume       Sit to stand trials from w/c        [place]  MTP/LTP        LTR        Heel slides B        SAQs        AH        AH with hip abd/add        Ankle DF        Bridges         Hip ABd                Tband Anti Trunk rotation          SLRs        Clamshells          parallel bars                        Transfer w/c to mat supervision       90/90 hip abd SL         Quad hip extn        Quad UE elevations        Knee flexion        Hip extension        UE Elevations        Quad -> Tall kneel        HS curls        [place] Stim trial        Knee extension        Dynamic seated balance        Tilt table semi recline 70-80 degrees    MTP/LTP  3x10 each  15 5# LTP  20# MTP    Evan  Triceps  20# 30x    Biceps  7#DB 30x    Alt UE raises-cross body  2#  2x10    Partially reclined 30 degrees  - ericka  [facility] for for knee extension  - hip ABD L LE with R WB 3x5  AAROM LE  - knee extension  2x10, 5" b/l LTP  16# 30x  80 *    70-75*  Fwd raise /MTP   3# DB 2x10,5"  72*    7#   10x, R/L,Alt,BL ea    Tricep  20#  3x10    Horiz ABD  Blue TB  3x10    "X" w/ TB  Red TB  3x10    Alt R/L fwd raises w/ AH  R 3# L 2#  3x10 to pt fatigue        Tilt table 30 mins  Stand to 80-85 degrees - 3 straps below hip level   press  7# R /5# L 2x10    PNF D1  Blue  t-band  2x10 R  15x L    Horiz ABD blue 3x10,5"    30 min total w/o reclined rest    Vitals stable throughout

## 2020-02-24 ENCOUNTER — OFFICE VISIT (OUTPATIENT)
Dept: PHYSICAL THERAPY | Facility: CLINIC | Age: 70
End: 2020-02-24
Payer: MEDICARE

## 2020-02-24 DIAGNOSIS — G54.1 LUMBOSACRAL PLEXOPATHY: Primary | ICD-10-CM

## 2020-02-24 PROCEDURE — 97112 NEUROMUSCULAR REEDUCATION: CPT

## 2020-02-24 PROCEDURE — 97110 THERAPEUTIC EXERCISES: CPT

## 2020-02-24 NOTE — PROGRESS NOTES
Daily Note     Today's date: 2020  Patient name: Jacy Amaro  : 1950  MRN: [id number]  Referring provider: Omar Casanova DO  Dx:   Encounter Diagnosis     ICD-10-CM    1  Lumbosacral plexopathy G54 1                   Subjective: I am doing good today  Back and legs are feeling ok  I see neuro MD 2nd week sometime in March      Objective: See treatment diary below      Assessment: Tolerated treatment well  Pt indicated increase LB mm fatigue  Denied increase LBP  Pt able to progress with UE strengthening on Tilt table with decrease rest between reps  Patient would benefit from continued PT      Plan: Continue per plan of care        Precautions: nonambulatory, BLE weakness R>L  Re-eval Date: 20    Date      Visit Count 4/10 5/10 6/10     FOTO  completed Retake     Pain In 0       Pain Out 0         Manual         Ham stretch        ITB stretch        Quad stretch        Hip Flexor stretch                  Date        UBE  resume  50 RPM  10 min alt     Sit to stand trials from w/c        Evan  MTP/LTP        LTR        Heel slides B        SAQs        AH        AH with hip abd/add        Ankle DF        Bridges         Hip ABd                Tband Anti Trunk rotation          SLRs        Clamshells          parallel bars                        Transfer w/c to mat supervision       90/90 hip abd SL         Quad hip extn        Quad UE elevations        Knee flexion        Hip extension        UE Elevations        Quad -> Tall kneel        HS curls        [place] Stim trial        Knee extension        Dynamic seated balance        Tilt table semi recline 70-80 degrees    MTP/LTP  3x10 each  15 5# LTP  20# MTP    Evan  Triceps  20# 30x    Biceps  7#DB 30x    Alt UE raises-cross body  2#  2x10    Partially reclined 30 degrees  - ericka  [facility] for for knee extension  - hip ABD L LE with R WB 3x5  AAROM LE  - knee extension  2x10, 5" b/l LTP  16# 30x  80 *    70-75*  Fwd raise /MTP   3# DB 2x10,5"  72*    7#   10x, R/L,Alt,BL ea    Tricep  20#  3x10    Horiz ABD  Blue TB  3x10    "X" w/ TB  Red TB  3x10    Alt R/L fwd raises w/ AH  R 3# L 2#  3x10 to pt fatigue   LTP  16# 30x  80 *    70-75*  Fwd raise    3# DB 2x10,5"  65*    Bicep  10#   3x10 BL ea    Tricep  20#  3x10    Horiz ABD  Blue TB  15x  Grey TB  15x    "X" w/ TB  Green TB  3x10    Alt R/L fwd raises w/ AH  R 3# L 2#  3x10 to pt fatigue     Shoulder press  3# R/ 2# L  3 x 10    Chest Press w/bar 4#  3x10    BL ER   Green  3x10, 5"     Tilt table 30 mins  Stand to 80-85 degrees - 3 straps below hip level   press  7# R /5# L 2x10    PNF D1  Blue  t-band  2x10 R  15x L    Horiz ABD blue 3x10,5"    30 min total w/o reclined rest    Vitals stable throughout

## 2020-02-28 ENCOUNTER — APPOINTMENT (OUTPATIENT)
Dept: PHYSICAL THERAPY | Facility: CLINIC | Age: 70
End: 2020-02-28
Payer: MEDICARE

## 2020-03-02 ENCOUNTER — OFFICE VISIT (OUTPATIENT)
Dept: PHYSICAL THERAPY | Facility: CLINIC | Age: 70
End: 2020-03-02
Payer: MEDICARE

## 2020-03-02 DIAGNOSIS — G54.1 LUMBOSACRAL PLEXOPATHY: Primary | ICD-10-CM

## 2020-03-02 PROCEDURE — 97112 NEUROMUSCULAR REEDUCATION: CPT

## 2020-03-02 PROCEDURE — 97110 THERAPEUTIC EXERCISES: CPT

## 2020-03-02 NOTE — PROGRESS NOTES
Daily Note     Today's date: 3/2/2020  Patient name: Yoan Ellis  : 1950  MRN: [id number]  Referring provider: Mona Lott DO  Dx:   Encounter Diagnosis     ICD-10-CM    1  Lumbosacral plexopathy G54 1                   Subjective: I saw massage therapist again really dug out some in my LB/hip area  Standing 3x 1 min at home with the wife      Objective: See treatment diary below      Assessment: Tolerated treatment well  Denied any increase LBP but indicated increase  Mm fatigue  Progressed to standing frame with cues for GS 2x to pt fatigue   Patient would benefit from continued PT to improve BL LE strength/core/lumbar stability/mobility, and cardio endurance to max overall QOL         Plan: Continue per plan of care        Precautions: nonambulatory, BLE weakness R>L  Re-eval Date: 20    Date 2/17 2/21 2/24 3/2    Visit Count 4/10 5/10 6/10 7/10    FOTO  completed completed     Pain In 0       Pain Out 0         Manual         Ham stretch        ITB stretch        Quad stretch        Hip Flexor stretch                  Date        UBE  resume  50 RPM  10 min alt 50 RPM  10 min alt    Sit to stand trials from w/c        [place]  MTP/LTP        LTR        Heel slides B        SAQs        AH        AH with hip abd/add        Ankle DF        Bridges         Hip ABd                Tband Anti Trunk rotation          SLRs        Clamshells          parallel bars                        Transfer w/c to mat supervision       90/90 hip abd SL         Quad hip extn        Quad UE elevations        Knee flexion        Hip extension        UE Elevations        Quad -> Tall kneel        HS curls        [place] Stim trial        Knee extension        Dynamic seated balance        Tilt table semi recline 70-80 degrees    MTP/LTP  3x10 each  15 5# LTP  20# MTP    [place]  Triceps  20# 30x    Biceps  7#DB 30x    Alt UE raises-cross body  2#  2x10    Partially reclined 30 degrees  - ericka  [facility] for for knee extension  - hip ABD L LE with R WB 3x5  AAROM LE  - knee extension  2x10, 5" b/l LTP  16# 30x  80 *    70-75*  Fwd raise /MTP   3# DB 2x10,5"  72*    7#   10x, R/L,Alt,BL ea    Tricep  20#  3x10    Horiz ABD  Blue TB  3x10    "X" w/ TB  Red TB  3x10    Alt R/L fwd raises w/ AH  R 3# L 2#  3x10 to pt fatigue   LTP  16# 30x  80 *    70-75*  Fwd raise    3# DB 2x10,5"  65*    Bicep  10#   3x10 BL ea    Tricep  20#  3x10    Horiz ABD  Blue TB  15x  Grey TB  15x    "X" w/ TB  Green TB  3x10    Alt R/L fwd raises w/ AH  R 3# L 2#  3x10 to pt fatigue     Shoulder press  3# R/ 2# L  3 x 10    Chest Press w/bar 4#  3x10    BL ER   Green  3x10, 5" LTP  16# 30x  80 *    70-75*  Fwd raise    3# DB 2x10,5"  65*    Bicep  10#   3x10 BL ea    Tricep  20#  3x10    Horiz ABD  Grey TB  3x10    "X" w/ TB  Grey TB  3x10    Shoulder press  7#   3 x 10    Punches  R/L/Alt  7#  2x10 ea        Tilt table 30 mins  Stand to 80-85 degrees - 3 straps below hip level   press  7# R /5# L 2x10    PNF D1  Blue  t-band  2x10 R  15x L    Horiz ABD blue 3x10,5"    30 min total w/o reclined rest    Vitals stable throughout       Standing Frame    2x to pt fatigue  1x 2'  1x 1' 5"

## 2020-03-06 ENCOUNTER — OFFICE VISIT (OUTPATIENT)
Dept: PHYSICAL THERAPY | Facility: CLINIC | Age: 70
End: 2020-03-06
Payer: MEDICARE

## 2020-03-06 DIAGNOSIS — G54.1 LUMBOSACRAL PLEXOPATHY: Primary | ICD-10-CM

## 2020-03-06 PROCEDURE — 97110 THERAPEUTIC EXERCISES: CPT

## 2020-03-06 PROCEDURE — 97112 NEUROMUSCULAR REEDUCATION: CPT

## 2020-03-06 NOTE — PROGRESS NOTES
Daily Note     Today's date: 3/6/2020  Patient name: Patricia Zuñiga  : 1950  MRN: [id number]  Referring provider: Godfrey Elam DO  Dx:   Encounter Diagnosis     ICD-10-CM    1  Lumbosacral plexopathy G54 1        Start Time: 1100  Stop Time: 1155  Total time in clinic (min): 55 minutes    Subjective: "I'm here  I go to the neurologist on Monday "      Objective: See treatment diary below      Assessment: Tolerated treatment well  Pt appropriately challenged with current program   Able to increase degree of incline throughout session to encourage WB through LE  Patient demonstrated fatigue post treatment and would benefit from continued PT      Plan: Continue per plan of care  Progress treatment as tolerated         Precautions: nonambulatory, BLE weakness R>L  Re-eval Date: 20    Date 2/17 2/21 2/24 3/2 3/6   Visit Count 4/10 5/10 6/10 7/10 8/10   FOTO  completed completed     Pain In 0       Pain Out 0         Manual         Ham stretch        ITB stretch        Quad stretch        Hip Flexor stretch                  Date        UBE  resume  50 RPM  10 min alt 50 RPM  10 min alt 50 RPM  10 min alt   Sit to stand trials from w/c        [place]  MTP/LTP        LTR        Heel slides B        SAQs        AH        AH with hip abd/add        Ankle DF        Bridges         Hip ABd                Tband Anti Trunk rotation          SLRs        Clamshells          parallel bars                        Transfer w/c to mat supervision       90/90 hip abd SL         Quad hip extn        Quad UE elevations        Knee flexion        Hip extension        UE Elevations        Quad -> Tall kneel        HS curls        [place] Stim trial        Knee extension        Dynamic seated balance        Tilt table semi recline 70-80 degrees    MTP/LTP  3x10 each  15 5# LTP  20# MTP    Evan  Triceps  20# 30x    Biceps  7#DB 30x    Alt UE raises-cross body  2#  2x10    Partially reclined 30 degrees  - ericka  [facility] for for knee extension  - hip ABD L LE with R WB 3x5  AAROM LE  - knee extension  2x10, 5" b/l LTP  16# 30x  80 *    70-75*  Fwd raise /MTP   3# DB 2x10,5"  72*    7#   10x, R/L,Alt,BL ea    Tricep  20#  3x10    Horiz ABD  Blue TB  3x10    "X" w/ TB  Red TB  3x10    Alt R/L fwd raises w/ AH  R 3# L 2#  3x10 to pt fatigue   LTP  16# 30x  80 *    70-75*  Fwd raise    3# DB 2x10,5"  65*    Bicep  10#   3x10 BL ea    Tricep  20#  3x10    Horiz ABD  Blue TB  15x  Grey TB  15x    "X" w/ TB  Green TB  3x10    Alt R/L fwd raises w/ AH  R 3# L 2#  3x10 to pt fatigue     Shoulder press  3# R/ 2# L  3 x 10    Chest Press w/bar 4#  3x10    BL ER   Green  3x10, 5" LTP  16# 30x  80 *    70-75*  Fwd raise    3# DB 2x10,5"  65*    Bicep  10#   3x10 BL ea    Tricep  20#  3x10    Horiz ABD  Grey TB  3x10    "X" w/ TB  Grey TB  3x10    Shoulder press  7#   3 x 10    Punches  R/L/Alt  7#  2x10 ea     LTP  16# 30x  80 *    70-75*  Fwd raise    3# DB 2x10,5"  65*    Bicep  10#   3x10 BL ea    Tricep  20#  3x10    Horiz ABD  Grey TB  3x10    "X" w/ TB  Grey TB  3x10    Shoulder press  7#   3 x 10    Punches  R/L/Alt  7#  2x10 ea    Chest Press w/bar 4#  3x10    BL ER   Green  3x10, 5"     Tilt table 30 mins  Stand to 80-85 degrees - 3 straps below hip level   press  7# R /5# L 2x10    PNF D1  Blue  t-band  2x10 R  15x L    Horiz ABD blue 3x10,5"    30 min total w/o reclined rest    Vitals stable throughout       Standing Frame    2x to pt fatigue  1x 2'  1x 1' 5"

## 2020-03-09 ENCOUNTER — OFFICE VISIT (OUTPATIENT)
Dept: NEUROLOGY | Facility: CLINIC | Age: 70
End: 2020-03-09
Payer: MEDICARE

## 2020-03-09 VITALS
SYSTOLIC BLOOD PRESSURE: 144 MMHG | BODY MASS INDEX: 36.94 KG/M2 | HEART RATE: 61 BPM | RESPIRATION RATE: 14 BRPM | DIASTOLIC BLOOD PRESSURE: 80 MMHG | HEIGHT: 73 IN

## 2020-03-09 DIAGNOSIS — G62.9 POLYNEUROPATHY: Primary | ICD-10-CM

## 2020-03-09 PROCEDURE — 99214 OFFICE O/P EST MOD 30 MIN: CPT | Performed by: PSYCHIATRY & NEUROLOGY

## 2020-03-09 NOTE — ASSESSMENT & PLAN NOTE
This is a 7-year-old patient history with a longstanding chronic gait dysfunction  He did undergo two prior lumbar fusion surgery but does describe persistent weakness of the right lower extremity  his EMG study did demonstrate a neuropathy, bilateral radiculopathy and a right chronic plexopathy  He is undergoing physical therapy which has proven some benefit  Today's examination demonstrated improvement strength in the muscles compared to the last visit  He does have also have a diminution of sensory modalities   He could have combination of both lumbar disc disease versus a underlying polyneuropathy  The there is asymmetric weakness right greater left which could also indicate a potential lumbar sacral plexopathy         Plan 1   he is to continue with the exercises physical therapy   He should have testing for TT are amyloidosis  Given his lack of mobility he would prefer serum testing      I have also ordered an SPEP and a UPEP

## 2020-03-09 NOTE — PROGRESS NOTES
Please contact him and schedule him for serum test for TT r amyloidosis  He difficulty ambulating and is wheelchair-bound    Would like to pursue the serum testing rather than saliva testing    He is in history of a neuropathy with unknown etiology and a radiculopathy with spinal stenosis

## 2020-03-09 NOTE — PROGRESS NOTES
This is a 7-year-old patient history with a longstanding chronic gait dysfunction  He did undergo two prior lumbar fusion surgery but does describe persistent weakness of the right lower extremity  his EMG study did demonstrate a neuropathy, bilateral radiculopathy and a right chronic plexopathy  He is undergoing physical therapy which has proven some benefit        Plan he is to continue with the exercises physical therapy has taught him 2  I have emphasized the need him to continue to exercise  3   He should have an SPEP and a UPEP   3   I have ordered laboratory testing for TTr amyloidosis   He prefer serum testing                    HPI     78 y/o rh male who presents in a follow-up visit  He initially presented several months ago for weakness and numbness in his legs  He presents with his wife Ron Valdivia   In 2003 while leaving work he had acute onset of numbness and fell to the ground  He was evaluated and was thought to an infectious etiology or a demyelinating polyneuropathy  He was placed on steroids and eventually was diagnosed with a lumbar sacral stenosis   He underwent initial surgery in 2003 fusion of the lumbar sacral spine by Dr Radha Carrillo at [facility] he did not have full recovery of his weakness he was initially using a cane but now utilizes a wheelchair  In 2015 his family was not and noted that he was experiencing more weakness and numbness in his legs this is more predominant on the right than the left  There is no symptoms in his arms he subsequently underwent a another surgery in 2015 by Dr Hawa Roche  He developed urinary incontinence he was eventually transferred to a SNF rehab and was diet was noted to have a sacral decubitus ulcer  This eventually was treated and he then underwent physical therapy in bed  Eventually he was transferred to home   He was evaluated by Dr Emmanuel michaud in January of 2015 at [facility]     Prior EMG studies were reviewed from 2014 and it did demonstrate abnormalities consistent with a peripheral neuropathy  However he did not return for further workup      He has been following up at [facility] by Dr Juan Pablo Gupta   He was referred to a also a for potential knee replacement surgery then eventually wound has been following up with Dr Ayaan Pugh for years   He did undergo extensive physical therapy by [facility] in [facility] [facility]  He is able to transfer independently     but he does not drive  For longer distances he will utilize a wheelchair  he does report continued weakness of the right leg  He also describes bilateral foot drops  He does have bilateral  AFO l braces which he does not utilize  He denies any problem with his bladder or bowels  He denies any numbness in his perineum  After the initial visit he was evaluated by Neurosurgery felt that there is no need further surgery  He did undergo a repeat EMG study which demonstrated a polyneuropathy,1  Severe mixed axonal / demyelinating neuropathy le worse then ue , a chronic radiculopathy at L5-S1 and a  chronic right upper trunk lumbar sacral plexopathy    He had undergone laboratory studies including a B6, folate B12 levels that were normal     He has now undergone it aggressive physical therapy  Since starting physical therapy he does report some improvement in the strength he is now able to stand and hold on to the side of kitchen counter  In physical therapy he is to therapy with a harness while standing  He denies any change in numbness  He is now able to perform some of his own ADLs such as putting his socks and shoes on while in bed and transferring with minimal assist        He underwent a MRI of the lumbar spine   on 08/14/19   Extensive myelomalacia at T11-12 level  Linear enhancement of the anterior aspect of the lower thoracic cord which may extend above the scope of this examination    Recommend dedicated imaging of the thoracic spine with and without contrast      Fusion from L2 to L5 involving the L2-3, L3-4 and L4-5 disc spaces  These disc spaces demonstrate no significant canal stenosis  Mild persistent foraminal narrowing at L4-5 due to grade 2 anterior spinal     At L1-2 there is a central disc protrusion superimposed upon an old or canal stenosis      He then underwent a MRI of the thoracic spine on 08/27/ 19      Thoracic degenerative change is seen most pronounced from T8-9 through T12-L1 where there is annular bulging, disc protrusions and endplate/facet hypertrophic change resulting in multilevel moderate canal stenosis and foraminal narrowing  There is mild   chronic myelomalacia of the lower thoracic cord at the level of T11-12      At the T3-4 level there is moderate bilateral foraminal narrowing as a result of endplate and posterior element hypertrophic change         SHX  he is   Has three children seven grandchildren  He retired from [address]  He denies any current use of tobacco he drinks coffee and caffeine on a regular basis he previously coached baseball and football  Medical played football as well as wrestled            history was reviewed today  He has gout for which he uses Allopurinal  Surgical  history includes lumbar fusion in 2003 and 2015      Allergies include IV soap and penicillins, his medications were reviewed  Past surgical history and medical history reviewed      On physical exam he was alert and pleasant  He was able to provide an accurate history  Cranial nerves were normal   On motor examination      He had normal strength in the upper extremities without any evidence of a pronator drift     Df 0/5, pf 2, , kf/ ke 4/5  On left , rigth 3/5  hp 5/5 on left, on right hip 4/5, kf/ ke 4+/5 , df0,  p5 1/5    Toe movment 1/45, left ahb 2/5 , ahb 1/5      Sensory  Vibraiton absent in toes , knees  10 sec , ankle left 3sec in knee, jps intact in ue ,  jps , intact in ankle , decrease  pp in the right medial  c/w lateral          His reflexes were 0 at ankle jerks, trace at the knee jerks trace in the upper extremities there is no evidence Babinski or Joseph sign  He presented in a wheelchair    He was unable to ambulate independently

## 2020-03-10 ENCOUNTER — TELEPHONE (OUTPATIENT)
Dept: NEUROLOGY | Facility: CLINIC | Age: 70
End: 2020-03-10

## 2020-03-10 NOTE — TELEPHONE ENCOUNTER
[employer] forms filled out and awaiting Dr Rachael Mistry signature  Once signed will e-mail the [employer] rep to coordinate a home blood draw for the patient per Dr Rachael Mistry request       Peter Godinez,    Forms have been scanned to you to obtain Dr Bhargav Lentz  Once signed please send them back my way      Thank you,    Toro Carter

## 2020-03-10 NOTE — TELEPHONE ENCOUNTER
Ayla Sauceda DO at 3/9/2020  4:56 PM     Status: Signed      Please contact him and schedule him for serum test for TT r amyloidosis  He difficulty ambulating and is wheelchair-bound    Would like to pursue the serum testing rather than saliva testing     He is in history of a neuropathy with unknown etiology and a radiculopathy with spinal stenosis

## 2020-03-12 NOTE — TELEPHONE ENCOUNTER
Called and spoke with the patient- he is aware that I will be reaching out to our [facility] rep to coordinate a home blood draw  Patient is aware that the company they work with will contact him to set up an appointment to come to his home to do the blood draw  Patient is aware that I will obtain the name of the company from our rep and call him back with that information  An e-mail has been sent to Blanca Licea to coordinate home blood draw  [employer] has been attached in a secure e-mail and contact information was also provided  Will await a response back

## 2020-03-13 ENCOUNTER — OFFICE VISIT (OUTPATIENT)
Dept: PHYSICAL THERAPY | Facility: CLINIC | Age: 70
End: 2020-03-13
Payer: MEDICARE

## 2020-03-13 DIAGNOSIS — G54.1 LUMBOSACRAL PLEXOPATHY: Primary | ICD-10-CM

## 2020-03-13 PROCEDURE — 97110 THERAPEUTIC EXERCISES: CPT

## 2020-03-13 PROCEDURE — 97112 NEUROMUSCULAR REEDUCATION: CPT

## 2020-03-13 NOTE — PROGRESS NOTES
Daily Note     Today's date: 3/13/2020  Patient name: Lizz Brooks  : 1950  MRN: [id number]  Referring provider: Christoph Pnedleton DO  Dx:   Encounter Diagnosis     ICD-10-CM    1  Lumbosacral plexopathy G54 1                   Subjective: "I saw the Dr last week and she was pleased with my progress "      Objective: See treatment diary below      Assessment: Tolerated treatment well  Pt able to increase weight with various exercises without incident  Pt able to sit-stand with slow controled movements especially with lowering  Increased stand tolerance noted  Will benefit from continued skilled therapy to address deficits  Patient demonstrated fatigue post treatment and would benefit from continued PT      Plan: Continue per plan of care  Progress treatment as tolerated         Precautions: nonambulatory, BLE weakness R>L  Re-eval Date: 20    Date 3/13       Visit Count 9/10       FOTO        Pain In 0       Pain Out 0         Manual         Ham stretch        ITB stretch        Quad stretch        Hip Flexor stretch                  Date        UBE  50 RPM  10 min alt       Sit to stand trials from w/c 3x in //       Evan  MTP/LTP        LTR        Heel slides B        SAQs        AH        AH with hip abd/add        Ankle DF        Bridges         Hip ABd                Tband Anti Trunk rotation          SLRs        Clamshells          parallel bars                        Transfer w/c to mat supervision       90/90 hip abd SL         Quad hip extn        Quad UE elevations        Knee flexion        Hip extension        UE Elevations        Quad -> Tall kneel        HS curls        [place] Stim trial        Knee extension        Dynamic seated balance        Tilt table semi recline 70-80 degrees  16# 30x    [place]  Triceps  20# 30x    Biceps  10#DB 30x    70-75*  Fwd raise    4# DB 2x10,5"  65*    Shoulder press  7#   3 x 10    Punches  R/L/Alt  7#  2x10 ea    Horiz ABD  2 5#  3x10    BL ER   2 5#  3x10, 5"    "X" w/ TB  Grey TB  3x10    Chest press  4# 3x10                                             Tilt table        Standing Frame

## 2020-03-13 NOTE — TELEPHONE ENCOUNTER
I received the following e-mail from [employer]: Thank you for submitting an order for [id number] (GONZALES COBURN , 1950 ) as a part of [address] [place] [address] no-charge genetic testing program for hereditary ATTR amyloidosis  Mobile phlebotomy has been ordered  [employer], our third party phlebotomy vendor will be reaching out directly to the patient to schedule a convenient lab draw  The patient is also welcome to contact [employer] at [phone] if he has a blood kit on hand  Otherwise, we would be happy to ship a blood kit directly to the patient's address  Called and spoke with the patient he is aware that [employer] will be reaching out to him to schedule a home draw  I also provided the patient with the phone number to contact them in case he does not hear from them as well

## 2020-03-16 ENCOUNTER — OFFICE VISIT (OUTPATIENT)
Dept: PHYSICAL THERAPY | Facility: CLINIC | Age: 70
End: 2020-03-16
Payer: MEDICARE

## 2020-03-16 DIAGNOSIS — G54.1 LUMBOSACRAL PLEXOPATHY: Primary | ICD-10-CM

## 2020-03-16 PROCEDURE — 97110 THERAPEUTIC EXERCISES: CPT | Performed by: PHYSICAL THERAPIST

## 2020-03-16 PROCEDURE — 97112 NEUROMUSCULAR REEDUCATION: CPT | Performed by: PHYSICAL THERAPIST

## 2020-03-16 NOTE — PROGRESS NOTES
Daily Note     Today's date: 3/16/2020  Patient name: Waldo Summers  : 1950  MRN: [id number]  Referring provider: Jerzy Mendez DO  Dx:   Encounter Diagnosis     ICD-10-CM    1  Lumbosacral plexopathy G54 1                   Subjective: Patient reports good reports from the physician  LE strength improving  Objective: See treatment diary below      Assessment: Tolerated treatment well  Patient demonstrated fatigue post treatment and would benefit from continued PT  Patient with ongoing LE weakness, improved standing and stepping with knees blocked  Cont per POC  Plan: Continue per plan of care        Precautions: nonambulatory, BLE weakness R>L  Re-eval Date: 2020    Date 3/13 3/16      Visit Count 9/10 3/10      FOTO        Pain In 0 0      Pain Out 0 0        Manual         Ham stretch        ITB stretch        Quad stretch        Hip Flexor stretch                  Date        UBE  50 RPM  10 min alt 50 RPM  10 min alt      Sit to stand trials from w/c 3x in // 6 times in // bars      [employer]  MTP/LTP        LTR        Heel slides B        SAQs        AH        AH with hip abd/add        Ankle DF        Bridges         Hip ABd                Tband Anti Trunk rotation          SLRs        Clamshells          parallel bars  Static stand  40 secs                      Transfer w/c to mat supervision       90/90 hip abd SL         Quad hip extn        Quad UE elevations        Knee flexion        Hip extension        UE Elevations        Quad -> Tall kneel        HS curls        [place] Stim trial        Knee extension        Dynamic seated balance        Tilt table semi recline 70-80 degrees  16# 30x    Evan  Triceps  20# 30x    Biceps  10#DB 30x    70-75*  Fwd raise    4# DB 2x10,5"  65*    Shoulder press  7#   3 x 10    Punches  R/L/Alt  7#  2x10 ea    Horiz ABD  2 5#  3x10    BL ER   2 5#  3x10, 5"    "X" w/ TB  Grey TB  3x10    Chest press  4# 3x10 Gait training  // bars  6x bilateral UE support, mod A  3-8 steps per trial with right knee blocked      Standing Frame

## 2020-03-16 NOTE — PROGRESS NOTES
PT Re-Evaluation     Today's date: 3/6/2020  Patient name: Saintclair Hussar  : 1950  MRN: [id number]  Referring provider: Karma Erazo DO  Dx:   Encounter Diagnosis     ICD-10-CM    1  Lumbosacral plexopathy G54 1        Start Time: 1100  Stop Time: 1155  Total time in clinic (min): 55 minutes    Assessment  Assessment details: Saintclair Hussar is a 68 y o  male presenting to outpatient physical therapy with diagnosis of lumbosacral plexopathy  He has been progressing steadily with OPPT  Increased tone and control of right quad  Left remains stronger than right  He continues with increased forward flexion of trunk in standing, heavily relies on UEs, however, this is also improving  Remains motivated to improve  Cont per POC  Impairments: abnormal gait, abnormal or restricted ROM, abnormal movement, activity intolerance, impaired balance, impaired physical strength and poor posture   Barriers to therapy: Nonambulatory, chronicity of impairments  Understanding of Dx/Px/POC: good   Prognosis: fair    Goals  STGs to be achieved in 4 weeks:     1  Pt to demonstrate increased AROM of BLEs in order to allow for greater ease and independence with ADLs and functional mobility - MET  2  Pt to demonstrate full PROM of BLEs in order to maximize joint mobility and function and allow for progression of exercise program and achievement of goals - MET  3  Pt to demonstrate increased MMT of BLEs and B shldrs  by at least 1/2-1 grade in order to improve safety and stability with ADLs and functional mobility - MET  4  Pt will take 5-6 steps in parallel bars with mod asst of 1-2 - MET    LTGs to be achieved in 6-8 weeks:  1  Pt will be I with HEP in order to continue to improve quality of life and independence - MET  2  Pt to demonstrate return to amb 25 ft with RW with min asst of 1 - ONGOING, met to 6 feet  3   Pt to demonstrate improved function as noted by achieving or exceeding predicted score on FOTO outcomes assessment tool  Plan  Patient would benefit from: skilled physical therapy  Other planned modality interventions: Modalities prn for symptom management  Planned therapy interventions: manual therapy, therapeutic exercise, home exercise program and neuromuscular re-education  Frequency: 2x week  Duration in visits: 8  Duration in weeks: 4  Plan of Care beginning date: 3/6/2020  Plan of Care expiration date: 4/5/2020  Treatment plan discussed with: family and patient        Subjective Evaluation    History of Present Illness  Date of surgery: 2004,2015  Mechanism of injury: surgery  Mechanism of injury: UPDATE:    Patient reports another recent fall because his wife moved his chair and he didn't realize it  Notes he was not injured  Has an upcoming physician appointment  Has started seeing a massage therapist and feels this is helping "loosen up"  Pt states leaving work one night his legs went numb and he fell to the ground  Pt underwent testing and end result was surgery with rods and screws and cleaned out arthritis  Over time pt had to use walker and legs got progressively weaker  Had a second surgery in 2015 and was in [facility] for rehab with a stage 4 pressure ulcer as a result of his hospitalization  Pt states he has been nonambulatory for 3 years, since the second surgery  States when he would attempt to stand his R shld would give out and he could not support himself  Pt has lift recliner and is able to perform indep transfers to tub, recliner and stationary bike  Had MAFOs after initial surgery  Stands at dining room table with wife stabilizing table  Wife states pt had PT from Oct 2018 to April 2019 at another facility  Wife states since DC from PT pt has lost what he gained at that time            Recurrent probem    Quality of life: good    Pain  No pain reported    Social Support  Lives with: spouse    Employment status: not working    Diagnostic Tests  MRI studies: abnormal (back)  Treatments  Previous treatment: physical therapy  Current treatment: massage and physical therapy  Patient Goals  Patient goals for therapy: improved balance, increased motion, increased strength, independence with ADLs/IADLs and return to sport/leisure activities  Patient goal: get up and walk, resume hunting, attend grandchildren's sporting events        Objective     Concurrent Complaints  Negative for night pain and disturbed sleep    Postural Observations  Seated posture: good  Standing posture: poor    Additional Postural Observation Details  At eval:  Stands in parallel bars with forward lean and LEs rigid in extension  Unable to take any steps  Transfers sit to stand indep by pulling up on parallel bars and locking knees in ext  At re-eval:  Sit to stand from w/c with heavy use of UEs  Improved WBIng onto LEs with bilateral knee extension improved in static stand  Does require cues for anterior tibial translation over forefoot in standing  Cues for increased hip and trunk extension      Tenderness     Additional Tenderness Details  Denies TTP    Neurological Testing     Sensation     Lumbar   Left   Diminished: light touch, sharp/dull discrimination and proprioception    Right   Diminished: light touch, sharp/dull discrimination and proprioception    Comments   Left light touch: lower leg  Right light touch: lower leg  Right sharp/dull discrimination: very little sensation    Reflexes   Left   Patellar (L4): absent (0)    Right   Patellar (L4): trace (1+)    Additional Neurological Details  Intermittently "feel normal"    Active Range of Motion     Additional Active Range of Motion Details  Bilateral LEs WFL PROM    Strength/Myotome Testing     Left Shoulder     Planes of Motion   Flexion: 4+   Abduction: 4+   External rotation at 0°: 4   Internal rotation at 0°: 4     Right Shoulder     Planes of Motion   Flexion: 4+   Abduction: 4+   External rotation at 0°: 4   Internal rotation at 0°: 4 Left Elbow   Flexion: 4+  Extension: 5    Right Elbow   Flexion: 4+  Extension: 5    Left Hip   Planes of Motion   Flexion: 4-  Extension: 3-  Abduction: 3-    Right Hip   Planes of Motion   Flexion: 4-  Extension: 3-  Abduction: 3-    Left Knee   Flexion: 3+  Extension: 3+    Right Knee   Flexion: 3-  Extension: 2+    Left Ankle/Foot   Dorsiflexion: 3-  Plantar flexion: 3    Right Ankle/Foot   Dorsiflexion: 2  Plantar flexion: 3    Muscle Activation     Additional Muscle Activation Details  Over-activation of abdominal muscles in an attempt to increase hip extension    General Comments:      Hip Comments   Hip flexion right 3-/5  Hip flexion left 3/5  Hip extension 1/5 right - prone  Hip extension 2+/5 left - prone  Hip ABd right 3-/5 - sidelying  Hip ABd left 2/5 - sideling    Knee Comments  Knee extension right 2/5  Knee extension left 3+/5  Neuro Exam:     Sensation   Light touch LE: left impaired and right impaired  Proprioception LE: left impaired and right impaired    Coordination   Heel to shin: left dysmetric and right dysmetric  Rapid alternating movements: LE impaired    Transfers Sit to stand: minimum assist Wheelchair to mat: independent Mat to wheelchair: independent Sit to supine: independent Supine to sit: independent   Roll: independent    Functional outcomes   Functional outcome gait comment: Step to pattern with PT blocking right > left knee  Increased UE reliance to off weight LE to allow for step  Able to continue 6-8 steps without rest in //bars

## 2020-03-20 ENCOUNTER — OFFICE VISIT (OUTPATIENT)
Dept: PHYSICAL THERAPY | Facility: CLINIC | Age: 70
End: 2020-03-20
Payer: MEDICARE

## 2020-03-20 DIAGNOSIS — G54.1 LUMBOSACRAL PLEXOPATHY: Primary | ICD-10-CM

## 2020-03-20 PROCEDURE — 97110 THERAPEUTIC EXERCISES: CPT

## 2020-03-20 PROCEDURE — 97112 NEUROMUSCULAR REEDUCATION: CPT

## 2020-03-20 NOTE — PROGRESS NOTES
Daily Note     Today's date: 3/20/2020  Patient name: Clayton Anguiano  : 1950  MRN: [id number]  Referring provider: Radha Sarah DO  Dx:   Encounter Diagnosis     ICD-10-CM    1  Lumbosacral plexopathy G54 1                   Subjective: Legs are feeling a little bit more, leg tingling  Feeling pain in the R knee which I didn't have before      Objective: See treatment diary below      Assessment: Tolerated treatment well  Patient demonstrated fatigue post treatment with noted improve with additional steps in // bars, increase reps to pt tolerance  Cues prn for proper form/technique with TE  Pt to benefit with oppt to improve BL LE strength/core/lumbar stability and endurance to maximize overall QOL      Plan: Continue per plan of care        Precautions: nonambulatory, BLE weakness R>L  Re-eval Date: 2020    Date 3/13 3/16 3/20     Visit Count 9/10 3/10 4/10     FOTO        Pain In 0 0 0     Pain Out 0 0 0       Manual         Ham stretch        ITB stretch        Quad stretch        Hip Flexor stretch                  Date        UBE  50 RPM  10 min alt 50 RPM  10 min alt 50 RPM  10 min alt     Sit to stand trials from w/c 3x in // 6 times in // bars 3xx in // bars     [employer]  MTP/LTP        LTR        Heel slides B        SAQs        AH        AH with hip abd/add        Ankle DF        Bridges         Hip ABd                Tband Anti Trunk rotation          SLRs        Clamshells          parallel bars  Static stand  40 secs @ home                     Transfer w/c to mat supervision       90/90 hip abd SL         Quad hip extn        Quad UE elevations        Knee flexion        Hip extension        UE Elevations        Quad -> Tall kneel        HS curls        [place] Stim trial        Knee extension        Dynamic seated balance        Tilt table semi recline 70-80 degrees  16# 30x    [place]  Triceps  20# 30x    Biceps  10#DB 30x    70-75*  Fwd raise    4# DB 2x10,5"  65*    Shoulder press  7#   3 x 10    Punches  R/L/Alt  7#  2x10 ea    Horiz ABD  2 5#  3x10    BL ER   2 5#  3x10, 5"    "X" w/ TB  Grey TB  3x10    Chest press  4# 3x10        70-80 degrees  16# 30x    Evan    LTP  20# 30x    Triceps  20# 30x    Biceps  10#DB 30x    Fwd raise   90* BL   4# DB   2x15    Shoulder press  7#   2x15  Punches  R/L/Alt  7#  2x15 ea    Horiz ABD  2 5#  2x15    BL ER   2 5#  2x15, 5"                                           Gait training  // bars  6x bilateral UE support, mod A  3-8 steps per trial with right knee blocked // bars  3x bilateral UE support, CG/[place]mon  3-8 steps per trial without right knee blocked     Standing Frame

## 2020-03-23 ENCOUNTER — APPOINTMENT (OUTPATIENT)
Dept: PHYSICAL THERAPY | Facility: CLINIC | Age: 70
End: 2020-03-23
Payer: MEDICARE

## 2020-03-24 NOTE — PROGRESS NOTES
PT Discharge 2/2 covid-19    Today's date: 3/6/2020  Patient name: Patrick Howell  : 1950  MRN: [id number]  Referring provider: Charmaine Blair DO  Dx:   Encounter Diagnosis     ICD-10-CM    1  Lumbosacral plexopathy G54 1        Start Time: 1100  Stop Time: 1200  Total time in clinic (min): 60 minutes    Assessment  Assessment details: Patrick Howell is a 68 y o  male presenting to outpatient physical therapy with diagnosis of lumbosacral plexopathy  Self discharge related to social distancing for covid-19  Last re-eval completed two sessions prior  Please refer to this for full details regarding goals  Barriers to therapy: Nonambulatory, chronicity of impairments    Goals  STGs to be achieved in 4 weeks:     1  Pt to demonstrate increased AROM of BLEs in order to allow for greater ease and independence with ADLs and functional mobility - MET  2  Pt to demonstrate full PROM of BLEs in order to maximize joint mobility and function and allow for progression of exercise program and achievement of goals - MET  3  Pt to demonstrate increased MMT of BLEs and B shldrs  by at least 1/2-1 grade in order to improve safety and stability with ADLs and functional mobility - MET  4  Pt will take 5-6 steps in parallel bars with mod asst of 1-2 - MET    LTGs to be achieved in 6-8 weeks:  1  Pt will be I with HEP in order to continue to improve quality of life and independence - MET  2  Pt to demonstrate return to amb 25 ft with RW with min asst of 1 - ONGOING, met to 6 feet  3  Pt to demonstrate improved function as noted by achieving or exceeding predicted score on FOTO outcomes assessment tool  Discharge without formal assessment  Subjective Evaluation    History of Present Illness  Date of surgery: ,  Mechanism of injury: surgery  Mechanism of injury: UPDATE:    Patient reports another recent fall because his wife moved his chair and he didn't realize it  Notes he was not injured    Has an upcoming physician appointment  Has started seeing a massage therapist and feels this is helping "loosen up"  Pt states leaving work one night his legs went numb and he fell to the ground  Pt underwent testing and end result was surgery with rods and screws and cleaned out arthritis  Over time pt had to use walker and legs got progressively weaker  Had a second surgery in 2015 and was in [facility] for rehab with a stage 4 pressure ulcer as a result of his hospitalization  Pt states he has been nonambulatory for 3 years, since the second surgery  States when he would attempt to stand his R shld would give out and he could not support himself  Pt has lift recliner and is able to perform indep transfers to tub, recliner and stationary bike  Had MAFOs after initial surgery  Stands at dining room table with wife stabilizing table  Wife states pt had PT from Oct 2018 to April 2019 at another facility  Wife states since DC from PT pt has lost what he gained at that time  Recurrent probem    Quality of life: good    Pain  No pain reported    Social Support  Lives with: spouse    Employment status: not working    Diagnostic Tests  MRI studies: abnormal (back)  Treatments  Previous treatment: physical therapy  Current treatment: massage and physical therapy  Patient Goals  Patient goals for therapy: improved balance, increased motion, increased strength, independence with ADLs/IADLs and return to sport/leisure activities  Patient goal: get up and walk, resume hunting, attend grandchildren's sporting events        Objective     Concurrent Complaints  Negative for night pain and disturbed sleep    Postural Observations  Seated posture: good  Standing posture: poor    Additional Postural Observation Details  At eval:  Stands in parallel bars with forward lean and LEs rigid in extension  Unable to take any steps  Transfers sit to stand indep by pulling up on parallel bars and locking knees in ext      At re-eval: Sit to stand from w/c with heavy use of UEs  Improved WBIng onto LEs with bilateral knee extension improved in static stand  Does require cues for anterior tibial translation over forefoot in standing  Cues for increased hip and trunk extension      Tenderness     Additional Tenderness Details  Denies TTP    Neurological Testing     Sensation     Lumbar   Left   Diminished: light touch, sharp/dull discrimination and proprioception    Right   Diminished: light touch, sharp/dull discrimination and proprioception    Comments   Left light touch: lower leg  Right light touch: lower leg  Right sharp/dull discrimination: very little sensation    Reflexes   Left   Patellar (L4): absent (0)    Right   Patellar (L4): trace (1+)    Additional Neurological Details  Intermittently "feel normal"    Active Range of Motion     Additional Active Range of Motion Details  Bilateral LEs WFL PROM    Strength/Myotome Testing     Left Shoulder     Planes of Motion   Flexion: 4+   Abduction: 4+   External rotation at 0°: 4   Internal rotation at 0°: 4     Right Shoulder     Planes of Motion   Flexion: 4+   Abduction: 4+   External rotation at 0°: 4   Internal rotation at 0°: 4     Left Elbow   Flexion: 4+  Extension: 5    Right Elbow   Flexion: 4+  Extension: 5    Left Hip   Planes of Motion   Flexion: 4-  Extension: 3-  Abduction: 3-    Right Hip   Planes of Motion   Flexion: 4-  Extension: 3-  Abduction: 3-    Left Knee   Flexion: 3+  Extension: 3+    Right Knee   Flexion: 3-  Extension: 2+    Left Ankle/Foot   Dorsiflexion: 3-  Plantar flexion: 3    Right Ankle/Foot   Dorsiflexion: 2  Plantar flexion: 3    Muscle Activation     Additional Muscle Activation Details  Over-activation of abdominal muscles in an attempt to increase hip extension    General Comments:      Hip Comments   Hip flexion right 3-/5  Hip flexion left 3/5  Hip extension 1/5 right - prone  Hip extension 2+/5 left - prone  Hip ABd right 3-/5 - sidelying  Hip ABd left 2/5 - sideling    Knee Comments  Knee extension right 2/5  Knee extension left 3+/5  Neuro Exam:     Sensation   Light touch LE: left impaired and right impaired  Proprioception LE: left impaired and right impaired    Coordination   Heel to shin: left dysmetric and right dysmetric  Rapid alternating movements: LE impaired    Transfers Sit to stand: minimum assist Wheelchair to mat: independent Mat to wheelchair: independent Sit to supine: independent Supine to sit: independent   Roll: independent    Functional outcomes   Functional outcome gait comment: Step to pattern with PT blocking right > left knee  Increased UE reliance to off weight LE to allow for step  Able to continue 6-8 steps without rest in //bars

## 2020-03-26 ENCOUNTER — APPOINTMENT (OUTPATIENT)
Dept: PHYSICAL THERAPY | Facility: CLINIC | Age: 70
End: 2020-03-26
Payer: MEDICARE

## 2020-03-27 ENCOUNTER — APPOINTMENT (OUTPATIENT)
Dept: PHYSICAL THERAPY | Facility: CLINIC | Age: 70
End: 2020-03-27
Payer: MEDICARE

## 2020-03-30 ENCOUNTER — APPOINTMENT (OUTPATIENT)
Dept: PHYSICAL THERAPY | Facility: CLINIC | Age: 70
End: 2020-03-30
Payer: MEDICARE

## 2020-04-13 NOTE — TELEPHONE ENCOUNTER
Dr Vidya Morales-    I have received the patient's results for his TTR [place] collection from [facility]  Results will be scanned into the patient's chart under "media" for you to review      Please let me know if you need anything additional       Thank you,    Loulou Tejada

## 2020-05-01 ENCOUNTER — APPOINTMENT (OUTPATIENT)
Dept: PHYSICAL THERAPY | Facility: CLINIC | Age: 70
End: 2020-05-01
Payer: MEDICARE

## 2020-05-04 ENCOUNTER — EVALUATION (OUTPATIENT)
Dept: PHYSICAL THERAPY | Facility: CLINIC | Age: 70
End: 2020-05-04
Payer: MEDICARE

## 2020-05-04 DIAGNOSIS — G54.1 LUMBOSACRAL PLEXOPATHY: Primary | ICD-10-CM

## 2020-05-04 PROCEDURE — 97112 NEUROMUSCULAR REEDUCATION: CPT | Performed by: PHYSICAL THERAPIST

## 2020-05-04 PROCEDURE — 97164 PT RE-EVAL EST PLAN CARE: CPT | Performed by: PHYSICAL THERAPIST

## 2020-05-04 PROCEDURE — 97110 THERAPEUTIC EXERCISES: CPT | Performed by: PHYSICAL THERAPIST

## 2020-05-05 DIAGNOSIS — R29.898 LOWER EXTREMITY WEAKNESS: ICD-10-CM

## 2020-05-05 DIAGNOSIS — G54.1 LUMBOSACRAL PLEXOPATHY: Primary | ICD-10-CM

## 2020-05-05 DIAGNOSIS — R26.9 GAIT ABNORMALITY: ICD-10-CM

## 2020-05-07 ENCOUNTER — OFFICE VISIT (OUTPATIENT)
Dept: PHYSICAL THERAPY | Facility: CLINIC | Age: 70
End: 2020-05-07
Payer: MEDICARE

## 2020-05-07 DIAGNOSIS — G54.1 LUMBOSACRAL PLEXOPATHY: Primary | ICD-10-CM

## 2020-05-07 PROCEDURE — 97110 THERAPEUTIC EXERCISES: CPT | Performed by: PHYSICAL THERAPIST

## 2020-05-07 PROCEDURE — 97530 THERAPEUTIC ACTIVITIES: CPT | Performed by: PHYSICAL THERAPIST

## 2020-05-07 PROCEDURE — 97112 NEUROMUSCULAR REEDUCATION: CPT | Performed by: PHYSICAL THERAPIST

## 2020-05-11 ENCOUNTER — OFFICE VISIT (OUTPATIENT)
Dept: PHYSICAL THERAPY | Facility: CLINIC | Age: 70
End: 2020-05-11
Payer: MEDICARE

## 2020-05-11 DIAGNOSIS — G54.1 LUMBOSACRAL PLEXOPATHY: Primary | ICD-10-CM

## 2020-05-11 PROCEDURE — 97116 GAIT TRAINING THERAPY: CPT | Performed by: PHYSICAL THERAPIST

## 2020-05-11 PROCEDURE — 97112 NEUROMUSCULAR REEDUCATION: CPT | Performed by: PHYSICAL THERAPIST

## 2020-05-11 PROCEDURE — 97110 THERAPEUTIC EXERCISES: CPT | Performed by: PHYSICAL THERAPIST

## 2020-05-14 ENCOUNTER — OFFICE VISIT (OUTPATIENT)
Dept: PHYSICAL THERAPY | Facility: CLINIC | Age: 70
End: 2020-05-14
Payer: MEDICARE

## 2020-05-14 DIAGNOSIS — G54.1 LUMBOSACRAL PLEXOPATHY: Primary | ICD-10-CM

## 2020-05-14 PROCEDURE — 97116 GAIT TRAINING THERAPY: CPT

## 2020-05-14 PROCEDURE — 97110 THERAPEUTIC EXERCISES: CPT

## 2020-05-18 ENCOUNTER — OFFICE VISIT (OUTPATIENT)
Dept: PHYSICAL THERAPY | Facility: CLINIC | Age: 70
End: 2020-05-18
Payer: MEDICARE

## 2020-05-18 DIAGNOSIS — G54.1 LUMBOSACRAL PLEXOPATHY: Primary | ICD-10-CM

## 2020-05-18 PROCEDURE — 97110 THERAPEUTIC EXERCISES: CPT | Performed by: PHYSICAL THERAPIST

## 2020-05-18 PROCEDURE — 97112 NEUROMUSCULAR REEDUCATION: CPT | Performed by: PHYSICAL THERAPIST

## 2020-05-18 PROCEDURE — 97116 GAIT TRAINING THERAPY: CPT | Performed by: PHYSICAL THERAPIST

## 2020-05-21 ENCOUNTER — OFFICE VISIT (OUTPATIENT)
Dept: PHYSICAL THERAPY | Facility: CLINIC | Age: 70
End: 2020-05-21
Payer: MEDICARE

## 2020-05-21 DIAGNOSIS — G54.1 LUMBOSACRAL PLEXOPATHY: Primary | ICD-10-CM

## 2020-05-21 PROCEDURE — 97110 THERAPEUTIC EXERCISES: CPT | Performed by: PHYSICAL THERAPIST

## 2020-05-21 PROCEDURE — 97112 NEUROMUSCULAR REEDUCATION: CPT | Performed by: PHYSICAL THERAPIST

## 2020-05-25 ENCOUNTER — APPOINTMENT (OUTPATIENT)
Dept: PHYSICAL THERAPY | Facility: CLINIC | Age: 70
End: 2020-05-25
Payer: MEDICARE

## 2020-05-26 ENCOUNTER — OFFICE VISIT (OUTPATIENT)
Dept: PHYSICAL THERAPY | Facility: CLINIC | Age: 70
End: 2020-05-26
Payer: MEDICARE

## 2020-05-26 DIAGNOSIS — G54.1 LUMBOSACRAL PLEXOPATHY: Primary | ICD-10-CM

## 2020-05-26 PROCEDURE — 97116 GAIT TRAINING THERAPY: CPT

## 2020-05-26 PROCEDURE — 97110 THERAPEUTIC EXERCISES: CPT

## 2020-05-28 ENCOUNTER — OFFICE VISIT (OUTPATIENT)
Dept: PHYSICAL THERAPY | Facility: CLINIC | Age: 70
End: 2020-05-28
Payer: MEDICARE

## 2020-05-28 DIAGNOSIS — G54.1 LUMBOSACRAL PLEXOPATHY: Primary | ICD-10-CM

## 2020-05-28 PROCEDURE — 97116 GAIT TRAINING THERAPY: CPT

## 2020-05-28 PROCEDURE — 97110 THERAPEUTIC EXERCISES: CPT

## 2020-06-01 ENCOUNTER — APPOINTMENT (OUTPATIENT)
Dept: PHYSICAL THERAPY | Facility: CLINIC | Age: 70
End: 2020-06-01
Payer: MEDICARE

## 2020-06-01 NOTE — PROGRESS NOTES
Daily Note     Today's date: 2020  Patient name: Maxine Rosas  : 1950  MRN: [id number]  Referring provider: Mayra Sevilla DO  Dx: No diagnosis found  Subjective: ***      Objective: See treatment diary below      Assessment: Tolerated treatment {Tolerated treatment :}   Patient {assessment:[id number]}      Plan: {PLAN:}     Precautions: nonambulatory, BLE weakness R>L  Re-eval Date: 6/3/2020    Date      Visit Count 6 7 8     FOTO        Pain In 0       Pain Out 0         Manual         Ham stretch        ITB stretch        Quad stretch        Hip Flexor stretch                  Date        Aerobic 10 min NS  L4 10 min NS L4  10' NS L4 10'     UBE         Sit to stand trials from w/c Standing in solo step  30-60" per trial x 5 trials  Gait trials with 2 steps x 1, 3 steps x 1 min-mod A in // bars with solo step Standing in //  Bars 30-60"  3 trials    GT in // bars  8 trials  2-3 steps  Min-mod asst of 1 in // bars Standing in //  Bars 30-60"  3 trials    GT in // bars  6 trials  2-3 steps  Min-mod asst of 1 in // bars     [place]  MTP/LTP 15#  2x10 B/L 15#   2x15 15#   2x15     Triceps 15#  2x10 B/L 15#  2x15 B 15#  2x15 B     Upward rows 10#  2x10 B/L 10#   2x15 10#   2x15     AH with hip abd/add        Ankle DF 2x10       Tband Anti Trunk rotation   10#  2x10  Evan 15#  2x15  [place] 15#  2x15  Evan     SLRs        Clamshells                         Transfer w/c to mat        90/90 hip abd SL         Quad UE elevations        Hip extension        Quad -> Tall kneel        HS curls        Tilt table semi recline        Knee extension        Dynamic seated balance        Gait training Attempted x 2  3 steps each leg in // bars       Standing Frame

## 2020-06-04 ENCOUNTER — APPOINTMENT (OUTPATIENT)
Dept: PHYSICAL THERAPY | Facility: CLINIC | Age: 70
End: 2020-06-04
Payer: MEDICARE

## 2020-06-05 ENCOUNTER — OFFICE VISIT (OUTPATIENT)
Dept: PHYSICAL THERAPY | Facility: CLINIC | Age: 70
End: 2020-06-05
Payer: MEDICARE

## 2020-06-05 DIAGNOSIS — G54.1 LUMBOSACRAL PLEXOPATHY: Primary | ICD-10-CM

## 2020-06-05 PROCEDURE — 97112 NEUROMUSCULAR REEDUCATION: CPT | Performed by: PHYSICAL THERAPIST

## 2020-06-05 PROCEDURE — 97110 THERAPEUTIC EXERCISES: CPT | Performed by: PHYSICAL THERAPIST

## 2020-06-08 ENCOUNTER — OFFICE VISIT (OUTPATIENT)
Dept: PHYSICAL THERAPY | Facility: CLINIC | Age: 70
End: 2020-06-08
Payer: MEDICARE

## 2020-06-08 DIAGNOSIS — G54.1 LUMBOSACRAL PLEXOPATHY: Primary | ICD-10-CM

## 2020-06-08 PROCEDURE — 97110 THERAPEUTIC EXERCISES: CPT

## 2020-06-08 PROCEDURE — 97112 NEUROMUSCULAR REEDUCATION: CPT

## 2020-06-11 ENCOUNTER — APPOINTMENT (OUTPATIENT)
Dept: PHYSICAL THERAPY | Facility: CLINIC | Age: 70
End: 2020-06-11
Payer: MEDICARE

## 2020-06-12 ENCOUNTER — OFFICE VISIT (OUTPATIENT)
Dept: PHYSICAL THERAPY | Facility: CLINIC | Age: 70
End: 2020-06-12
Payer: MEDICARE

## 2020-06-12 DIAGNOSIS — G54.1 LUMBOSACRAL PLEXOPATHY: Primary | ICD-10-CM

## 2020-06-12 PROCEDURE — 97110 THERAPEUTIC EXERCISES: CPT

## 2020-06-12 PROCEDURE — 97112 NEUROMUSCULAR REEDUCATION: CPT

## 2020-06-15 ENCOUNTER — OFFICE VISIT (OUTPATIENT)
Dept: PHYSICAL THERAPY | Facility: CLINIC | Age: 70
End: 2020-06-15
Payer: MEDICARE

## 2020-06-15 DIAGNOSIS — G54.1 LUMBOSACRAL PLEXOPATHY: Primary | ICD-10-CM

## 2020-06-15 PROCEDURE — 97112 NEUROMUSCULAR REEDUCATION: CPT | Performed by: PHYSICAL THERAPIST

## 2020-06-15 PROCEDURE — 97110 THERAPEUTIC EXERCISES: CPT | Performed by: PHYSICAL THERAPIST

## 2020-06-18 ENCOUNTER — APPOINTMENT (OUTPATIENT)
Dept: PHYSICAL THERAPY | Facility: CLINIC | Age: 70
End: 2020-06-18
Payer: MEDICARE

## 2020-06-19 ENCOUNTER — OFFICE VISIT (OUTPATIENT)
Dept: PHYSICAL THERAPY | Facility: CLINIC | Age: 70
End: 2020-06-19
Payer: MEDICARE

## 2020-06-19 DIAGNOSIS — G54.1 LUMBOSACRAL PLEXOPATHY: Primary | ICD-10-CM

## 2020-06-19 PROCEDURE — 97112 NEUROMUSCULAR REEDUCATION: CPT | Performed by: PHYSICAL THERAPIST

## 2020-06-19 PROCEDURE — 97110 THERAPEUTIC EXERCISES: CPT | Performed by: PHYSICAL THERAPIST

## 2020-06-22 ENCOUNTER — OFFICE VISIT (OUTPATIENT)
Dept: PHYSICAL THERAPY | Facility: CLINIC | Age: 70
End: 2020-06-22
Payer: MEDICARE

## 2020-06-22 DIAGNOSIS — G54.1 LUMBOSACRAL PLEXOPATHY: Primary | ICD-10-CM

## 2020-06-22 PROCEDURE — 97110 THERAPEUTIC EXERCISES: CPT

## 2020-06-22 PROCEDURE — 97112 NEUROMUSCULAR REEDUCATION: CPT

## 2020-06-25 ENCOUNTER — APPOINTMENT (OUTPATIENT)
Dept: PHYSICAL THERAPY | Facility: CLINIC | Age: 70
End: 2020-06-25
Payer: MEDICARE

## 2020-06-26 ENCOUNTER — OFFICE VISIT (OUTPATIENT)
Dept: PHYSICAL THERAPY | Facility: CLINIC | Age: 70
End: 2020-06-26
Payer: MEDICARE

## 2020-06-26 DIAGNOSIS — G54.1 LUMBOSACRAL PLEXOPATHY: Primary | ICD-10-CM

## 2020-06-26 PROCEDURE — 97110 THERAPEUTIC EXERCISES: CPT | Performed by: PHYSICAL THERAPIST

## 2020-06-26 PROCEDURE — 97116 GAIT TRAINING THERAPY: CPT | Performed by: PHYSICAL THERAPIST

## 2020-06-26 PROCEDURE — 97112 NEUROMUSCULAR REEDUCATION: CPT | Performed by: PHYSICAL THERAPIST

## 2020-06-29 ENCOUNTER — OFFICE VISIT (OUTPATIENT)
Dept: PHYSICAL THERAPY | Facility: CLINIC | Age: 70
End: 2020-06-29
Payer: MEDICARE

## 2020-06-29 DIAGNOSIS — G54.1 LUMBOSACRAL PLEXOPATHY: Primary | ICD-10-CM

## 2020-06-29 PROCEDURE — 97110 THERAPEUTIC EXERCISES: CPT

## 2020-06-29 PROCEDURE — 97112 NEUROMUSCULAR REEDUCATION: CPT

## 2020-07-06 ENCOUNTER — EVALUATION (OUTPATIENT)
Dept: PHYSICAL THERAPY | Facility: CLINIC | Age: 70
End: 2020-07-06
Payer: MEDICARE

## 2020-07-06 DIAGNOSIS — G54.1 LUMBOSACRAL PLEXOPATHY: Primary | ICD-10-CM

## 2020-07-06 PROCEDURE — 97110 THERAPEUTIC EXERCISES: CPT | Performed by: PHYSICAL THERAPIST

## 2020-07-06 PROCEDURE — 97112 NEUROMUSCULAR REEDUCATION: CPT | Performed by: PHYSICAL THERAPIST

## 2020-07-06 PROCEDURE — 97530 THERAPEUTIC ACTIVITIES: CPT | Performed by: PHYSICAL THERAPIST

## 2020-07-06 NOTE — PROGRESS NOTES
PT Re-Evaluation    Today's date: 2020  Patient name: Fabio Ramachandran  : 1950  MRN: [id number]  Referring provider: Deep Underwood DO  Dx:   Encounter Diagnosis     ICD-10-CM    1  Lumbosacral plexopathy G54 1                   Assessment  Assessment details: Fabio Ramachandran is a 68 y o  male presenting to outpatient physical therapy with diagnosis of lumbosacral plexopathy  He continues to make slow progress with LE control  Struggles with hip and trunk extension  Difficulty transitioning from // bars to RW at this point, but will continue to attempt  Decreased LE control with MAFOs in place due to decreased quad control, will continue to address as able  Increasing step length of right LE, decreasing (slightly) use of UEs for ambulation  Pt estimates 80% vs  99% last month  Will continue to progress as able  Cont per POC  Impairments: abnormal coordination, abnormal gait, abnormal muscle firing, abnormal muscle tone, activity intolerance, impaired balance and impaired physical strength  Barriers to therapy: Nonambulatory, chronicity of impairments  Understanding of Dx/Px/POC: good   Prognosis: good    Goals  STGs to be achieved in 4 weeks:     1  Pt to demonstrate increased AROM of BLEs in order to allow for greater ease and independence with ADLs and functional mobility - MET  2  Pt to demonstrate full PROM of BLEs in order to maximize joint mobility and function and allow for progression of exercise program and achievement of goals - MET  3  Pt to demonstrate increased MMT of BLEs and B shldrs  by at least 1/2-1 grade in order to improve safety and stability with ADLs and functional mobility - ONGOING  4  Pt will take 5-6 steps in parallel bars with min A of 1 -  MET    LTGs to be achieved in 6-8 weeks:  1  Pt will be I with HEP in order to continue to improve quality of life and independence   2  Pt to demonstrate return to amb 25 ft with RW with min asst of 1 -  3   Pt to demonstrate improved function as noted by achieving or exceeding predicted score on FOTO outcomes assessment tool  Plan  Patient would benefit from: skilled physical therapy  Other planned modality interventions: Modalties prn for symptom management  Planned therapy interventions: neuromuscular re-education, manual therapy, therapeutic exercise and home exercise program  Frequency: 2x week  Duration in visits: 8  Duration in weeks: 4  Plan of Care beginning date: 7/6/2020  Plan of Care expiration date: 8/5/2020  Treatment plan discussed with: patient        Subjective Evaluation    History of Present Illness  Date of surgery: 2004,2015  Mechanism of injury: surgery  Mechanism of injury: UPDATE:    Patient reports no falls  Trying to get down from floor  Most recently had to get down to the floor to fix his lift chair  Getting back up from the floor was a little easier now that he is able to push with his left leg a little more  Noticing improved right quad contraction, but bilateral glute contraction remains limited  Pt states leaving work one night his legs went numb and he fell to the ground  Pt underwent testing and end result was surgery with rods and screws and cleaned out arthritis  Over time pt had to use walker and legs got progressively weaker  Had a second surgery in 2015 and was in [facility] for rehab with a stage 4 pressure ulcer as a result of his hospitalization  Pt states he has been nonambulatory for 3 years, since the second surgery  States when he would attempt to stand his R shld would give out and he could not support himself  Pt has lift recliner and is able to perform indep transfers to tub, recliner and stationary bike  Had MAFOs after initial surgery  Stands at dining room table with wife stabilizing table  Wife states pt had PT from Oct 2018 to April 2019 at another facility  Wife states since DC from PT pt has lost what he gained at that time            Recurrent probem    Quality of life: good    Pain  No pain reported    Social Support  Lives with: spouse    Employment status: not working    Diagnostic Tests  MRI studies: abnormal (back)  Treatments  Previous treatment: physical therapy  Current treatment: massage and physical therapy  Patient Goals  Patient goals for therapy: improved balance, increased motion, increased strength, independence with ADLs/IADLs and return to sport/leisure activities  Patient goal: get up and walk, resume hunting, attend grandchildren's sporting events        Objective     Concurrent Complaints  Negative for night pain and disturbed sleep    Postural Observations  Seated posture: good  Standing posture: poor    Additional Postural Observation Details  At eval:  Stands in parallel bars with forward lean and LEs rigid in extension  Unable to take any steps  Transfers sit to stand indep by pulling up on parallel bars and locking knees in ext  At re-eval:  Sit to stand from w/c with heavy use of UEs  Improved WBIng onto LEs with bilateral knee extension improved in static stand  Does require cues for anterior tibial translation over forefoot in standing  Cues for increased hip and trunk extension      Tenderness     Additional Tenderness Details  Denies TTP    Neurological Testing     Sensation     Lumbar   Left   Diminished: light touch, sharp/dull discrimination and proprioception    Right   Diminished: light touch, sharp/dull discrimination and proprioception    Comments   Left light touch: lower leg  Right light touch: lower leg  Right sharp/dull discrimination: very little sensation    Reflexes   Left   Patellar (L4): absent (0)    Right   Patellar (L4): trace (1+)    Additional Neurological Details  Intermittently "feel normal"    Active Range of Motion     Additional Active Range of Motion Details  Bilateral LEs WFL PROM    Strength/Myotome Testing     Left Shoulder     Planes of Motion   Flexion: 4+   Abduction: 4+   External rotation at 0°: 4   Internal rotation at 0°: 4     Right Shoulder     Planes of Motion   Flexion: 4+   Abduction: 4+   External rotation at 0°: 4   Internal rotation at 0°: 4     Left Elbow   Flexion: 4+  Extension: 5    Right Elbow   Flexion: 4+  Extension: 5    Left Hip   Planes of Motion   Flexion: 3+  Extension: 3-  Abduction: 3    Right Hip   Planes of Motion   Flexion: 3  Extension: 3-  Abduction: 3    Left Knee   Flexion: 3  Extension: 3+    Right Knee   Flexion: 2+  Extension: 3-    Left Ankle/Foot   Dorsiflexion: 3-  Plantar flexion: 3    Right Ankle/Foot   Dorsiflexion: 2  Plantar flexion: 3    Muscle Activation     Additional Muscle Activation Details  Over-activation of abdominal muscles in an attempt to increase hip extension    General Comments:      Hip Comments   Hip flexion right 3-/5  Hip flexion left 3/5  Hip extension 1/5 right - prone  Hip extension 2+/5 left - prone  Hip ABd right 3-/5 - sidelying  Hip ABd left 2/5 - sideling    Knee Comments  Knee extension right 2/5  Knee extension left 3+/5  Neuro Exam:     Sensation   Light touch LE: left impaired and right impaired  Proprioception LE: left impaired and right impaired    Coordination   Heel to shin: left dysmetric and right dysmetric  Rapid alternating movements: LE impaired    Transfers Sit to stand: independent Wheelchair to mat: independent Mat to wheelchair: independent Sit to supine: independent Supine to sit: independent   Roll: independent    Functional outcomes   Functional outcome gait comment: At eval:  Step to pattern with PT blocking right > left knee  Increased UE reliance to off weight LE to allow for step  Able to continue 4-5 steps in // bars  At re-eval:  Step to pattern with UEs WBing approx 80%, able to progress 6-8 steps  Attempted transition to RW, difficulty           Precautions: nonambulatory, BLE weakness R>L  Re-eval Date: 8/5/2020    Date 6/29 7/6      Visit Count 16 17      FOTO        Pain In 0 0      Pain Out 0 0        Manual Ham stretch        ITB stretch        Quad stretch        Hip Flexor stretch  2 mins at EOB                Date        Aerobic        UBE  UBE 10 mins @ 50       Sit to stand trials from w/c Standing in //  Bars 30-60"  3 trials with assist to maintain right knee extension attempt to decreased UE support    GT in // bars  trials  5 steps  CG-mod asst of 1 in // bars and WC follow Standing in //  Bars 30-60"  3 trials with assist to maintain right knee extension attempt to decreased UE support, 5 reps each of single UE support    GT in // bars  trials    Min A of 1 in // bars and WC follow      [place]  MTP/LTP LTP 25#  MTP 25#  5" 2x15 LTP 25#  MTP 25#  5" 2x15      Triceps 25#  5" 2x15 25#  5" 2x15      Upward rows Resume  Fwd/Lat raise  R 6# 20x  L 4# 20x Fwd/Lat raise  R 6# 20x  L 4# 20x      Biceps 25#  2x15 25#  2x15      AH with hip abd/add        Ankle DF        Tband Anti Trunk rotation   Resume  15#   2x15 15#   2x15      SLRs        Clamshells         Mini Squats        Trunk rotation        Transfer w/c to mat  2x with cl S from level surfaces      Dynamic seated balance        Gait training        Tilt table Tilt table 70* incline with [place] activities 80* incline with [place] activities - quad sets in standing, 5" hold x 10

## 2020-07-10 ENCOUNTER — OFFICE VISIT (OUTPATIENT)
Dept: PHYSICAL THERAPY | Facility: CLINIC | Age: 70
End: 2020-07-10
Payer: MEDICARE

## 2020-07-10 DIAGNOSIS — G54.1 LUMBOSACRAL PLEXOPATHY: Primary | ICD-10-CM

## 2020-07-10 PROCEDURE — 97116 GAIT TRAINING THERAPY: CPT

## 2020-07-10 PROCEDURE — 97110 THERAPEUTIC EXERCISES: CPT

## 2020-07-10 PROCEDURE — 97112 NEUROMUSCULAR REEDUCATION: CPT

## 2020-07-10 NOTE — PROGRESS NOTES
Daily Note     Today's date: 7/10/2020  Patient name: Mehul Turcios  : 1950  MRN: [id number]  Referring provider: Digna Nelson DO  Dx:   Encounter Diagnosis     ICD-10-CM    1  Lumbosacral plexopathy G54 1                   Subjective: I feel I am getting stronger   Getting up/down off the floor and play with grand kids      Objective: See treatment diary below      Assessment: Tolerated treatment well  Denied any increase pain/sx's this date  Pt conts with BL LE weakness and utilizes ~ 80% BL UE with ambulation in //bars  Cues for QS/GS and postural awareness with standing/gait   Patient would benefit from continued PT      Plan: Continue per plan of care        Precautions: nonambulatory, BLE weakness R>L  Re-eval Date: 2020    Date 6/29 7/6 7/10     Visit Count 16 17 18     FOTO        Pain In 0 0 0     Pain Out 0 0 0       Manual         Ham stretch        ITB stretch        Quad stretch        Hip Flexor stretch  2 mins at EOB                Date        Aerobic        UBE  UBE 10 mins @ 50  UBE 10 mins @ 50     Sit to stand trials from w/c Standing in //  Bars 30-60"  3 trials with assist to maintain right knee extension attempt to decreased UE support    GT in // bars  trials  5 steps  CG-mod asst of 1 in // bars and WC follow Standing in //  Bars 30-60"  3 trials with assist to maintain right knee extension attempt to decreased UE support, 5 reps each of single UE support    GT in // bars  trials    Min A of 1 in // bars and WC follow Standing in //  Bars 30-60"  3 trials with assist to maintain right knee extension attempt to decreased UE support, 5 reps each of single UE support    GT in // bars  trials    Min A of 1 in // bars and WC follow     [place]  MTP/LTP LTP 25#  MTP 25#  5" 2x15 LTP 25#  MTP 25#  5" 2x15 LTP 25#  MTP 25#  5" 2x15     Triceps 25#  5" 2x15 25#  5" 2x15 25#  5" 2x15     Upward rows Resume  Fwd/Lat raise  R 6# 20x  L 4# 20x Fwd/Lat raise  R 6# 20x  L 4# 20x Fwd/Lat raise  R 6# 20x  L 4# 20x     Biceps 25#  2x15 25#  2x15 25#  2x15     AH with hip abd/add        Ankle DF        Tband Anti Trunk rotation   Resume  15#   2x15 15#   2x15 18#   2x15 ea dir     SLRs        Clamshells         Mini Squats        Trunk rotation        Transfer w/c to mat  2x with cl S from level surfaces 2x with cl S from level surfaces     Dynamic seated balance        Gait training        Tilt table Tilt table 70* incline with [place] activities 80* incline with [place] activities - quad sets in standing, 5" hold x 10 80* incline with Evan activities - quad sets in standing, 5" hold x 10

## 2020-07-17 ENCOUNTER — OFFICE VISIT (OUTPATIENT)
Dept: PHYSICAL THERAPY | Facility: CLINIC | Age: 70
End: 2020-07-17
Payer: MEDICARE

## 2020-07-17 DIAGNOSIS — G54.1 LUMBOSACRAL PLEXOPATHY: Primary | ICD-10-CM

## 2020-07-17 PROCEDURE — 97116 GAIT TRAINING THERAPY: CPT | Performed by: PHYSICAL THERAPIST

## 2020-07-17 PROCEDURE — 97112 NEUROMUSCULAR REEDUCATION: CPT | Performed by: PHYSICAL THERAPIST

## 2020-07-17 PROCEDURE — 97110 THERAPEUTIC EXERCISES: CPT | Performed by: PHYSICAL THERAPIST

## 2020-07-17 NOTE — PROGRESS NOTES
Daily Note     Today's date: 2020  Patient name: Jay Canales  : 1950  MRN: [id number]  Referring provider: Dylon Posada DO  Dx:   Encounter Diagnosis     ICD-10-CM    1  Lumbosacral plexopathy G54 1                   Subjective: Patient reports he was able to lift/march knee up  Objective: See treatment diary below      Assessment: Tolerated treatment well  Patient demonstrated fatigue post treatment and would benefit from continued PT  Patient continues to require assist with hip extension in standing  Struggles to decrease dependence on UEs  Plan: Continue per plan of care        Precautions: nonambulatory, BLE weakness R>L  Re-eval Date: 2020    Date 6/29 7/6 7/10 7/17    Visit Count 16 17 18 19    FOTO        Pain In 0 0 0 0    Pain Out 0 0 0 0      Manual         Ham stretch        ITB stretch        Quad stretch        Hip Flexor stretch  2 mins at EOB                Date        Aerobic        UBE  UBE 10 mins @ 50  UBE 10 mins @ 50 UBE 10 mins at 50    Sit to stand trials from w/c Standing in //  Bars 30-60"  3 trials with assist to maintain right knee extension attempt to decreased UE support    GT in // bars  trials  5 steps  CG-mod asst of 1 in // bars and WC follow Standing in //  Bars 30-60"  3 trials with assist to maintain right knee extension attempt to decreased UE support, 5 reps each of single UE support    GT in // bars  trials    Min A of 1 in // bars and WC follow Standing in //  Bars 30-60"  3 trials with assist to maintain right knee extension attempt to decreased UE support, 5 reps each of single UE support    GT in // bars  trials    Min A of 1 in // bars and WC follow Seated on disc - black  MTP, LTP both 2x10 with 20#  Biceps, triceps 2x10 both with 10# unilaterally    Total time 20 mins    [place]  MTP/LTP LTP 25#  MTP 25#  5" 2x15 LTP 25#  MTP 25#  5" 2x15 LTP 25#  MTP 25#  5" 2x15     Triceps 25#  5" 2x15 25#  5" 2x15 25#  5" 2x15     Upward rows Resume  Fwd/Lat raise  R 6# 20x  L 4# 20x Fwd/Lat raise  R 6# 20x  L 4# 20x Fwd/Lat raise  R 6# 20x  L 4# 20x     Biceps 25#  2x15 25#  2x15 25#  2x15     AH with hip abd/add        Ankle DF        Tband Anti Trunk rotation   Resume  15#   2x15 15#   2x15 18#   2x15 ea dir     SLRs        Clamshells         Mini Squats        Trunk rotation        Transfer w/c to mat  2x with cl S from level surfaces 2x with cl S from level surfaces     Dynamic seated balance        Gait training    Gait training in // bars mod A to maintain knee extension and hip extension  Pre-gait training: static stand alt UE lifts with max A for knee and hip extn  25 mins total time    Tilt table Tilt table 70* incline with [place] activities 80* incline with [place] activities - quad sets in standing, 5" hold x 10 80* incline with [place] activities - quad sets in standing, 5" hold x 10

## 2020-07-20 ENCOUNTER — OFFICE VISIT (OUTPATIENT)
Dept: PHYSICAL THERAPY | Facility: CLINIC | Age: 70
End: 2020-07-20
Payer: MEDICARE

## 2020-07-20 DIAGNOSIS — G54.1 LUMBOSACRAL PLEXOPATHY: Primary | ICD-10-CM

## 2020-07-20 PROCEDURE — 97110 THERAPEUTIC EXERCISES: CPT | Performed by: PHYSICAL THERAPIST

## 2020-07-20 PROCEDURE — 97112 NEUROMUSCULAR REEDUCATION: CPT | Performed by: PHYSICAL THERAPIST

## 2020-07-20 PROCEDURE — 97116 GAIT TRAINING THERAPY: CPT | Performed by: PHYSICAL THERAPIST

## 2020-07-20 NOTE — PROGRESS NOTES
Daily Note     Today's date: 2020  Patient name: Simon Keller  : 1950  MRN: [id number]  Referring provider: Benja Schwab DO  Dx:   Encounter Diagnosis     ICD-10-CM    1  Lumbosacral plexopathy G54 1                   Subjective: Patient reports he stood for a minute on Saturday  Standing at sink with knees against the cabinets  I could feel the hamstrings working  Objective: See treatment diary below      Assessment: Tolerated treatment well  Patient demonstrated fatigue post treatment and would benefit from continued PT  Patient continues with decreased glute control in standing,  Decreased knee extension on right with increased fatigue  Improving dynamic sitting balance and recovery in unsupported, dynamic sitting on black disc  Plan: Continue per plan of care        Precautions: nonambulatory, BLE weakness R>L  Re-eval Date: 2020    Date 6/29 7/6 7/10 7/17 7/20   Visit Count 16 17 18 19 20   FOTO        Pain In 0 0 0 0 0   Pain Out 0 0 0 0 0     Manual         Ham stretch        ITB stretch        Quad stretch        Hip Flexor stretch  2 mins at EOB                Date        Aerobic        UBE  UBE 10 mins @ 50  UBE 10 mins @ 50 UBE 10 mins at 50 UBE 10 mins at 50   Sit to stand trials from w/c Standing in //  Bars 30-60"  3 trials with assist to maintain right knee extension attempt to decreased UE support    GT in // bars  trials  5 steps  CG-mod asst of 1 in // bars and WC follow Standing in //  Bars 30-60"  3 trials with assist to maintain right knee extension attempt to decreased UE support, 5 reps each of single UE support    GT in // bars  trials    Min A of 1 in // bars and WC follow Standing in //  Bars 30-60"  3 trials with assist to maintain right knee extension attempt to decreased UE support, 5 reps each of single UE support    GT in // bars  trials    Min A of 1 in // bars and WC follow Seated on disc - black  MTP, LTP both 2x10 with 20#  Biceps, triceps 2x10 both with 10# unilaterally    Total time 20 mins Seated on disc - black, unsupported  MTP, LTP both 2x10 with 20#  Biceps, triceps, upward rows - with 10# unilaterally anti-trunk and PNF with blue T-band   2x10     Total time 30 mins   Evan  MTP/LTP LTP 25#  MTP 25#  5" 2x15 LTP 25#  MTP 25#  5" 2x15 LTP 25#  MTP 25#  5" 2x15     Triceps 25#  5" 2x15 25#  5" 2x15 25#  5" 2x15     Upward rows Resume  Fwd/Lat raise  R 6# 20x  L 4# 20x Fwd/Lat raise  R 6# 20x  L 4# 20x Fwd/Lat raise  R 6# 20x  L 4# 20x     Biceps 25#  2x15 25#  2x15 25#  2x15     AH with hip abd/add        Ankle DF        Tband Anti Trunk rotation   Resume  15#   2x15 15#   2x15 18#   2x15 ea dir     SLRs        Clamshells         Mini Squats        Trunk rotation        Transfer w/c to mat  2x with cl S from level surfaces 2x with cl S from level surfaces  2x with cl S from level surfaces   Dynamic seated balance        Gait training    Gait training in // bars mod A to maintain knee extension and hip extension  Pre-gait training: static stand alt UE lifts with max A for knee and hip extn  25 mins total time Gait training in // bars mod A to maintain knee extension and hip extension  Pre-gait training: static stand alt UE lifts with max A for knee and hip extn  25 mins total time   Tilt table Tilt table 70* incline with [place] activities 80* incline with [place] activities - quad sets in standing, 5" hold x 10 80* incline with Evan activities - quad sets in standing, 5" hold x 10

## 2020-07-24 ENCOUNTER — OFFICE VISIT (OUTPATIENT)
Dept: PHYSICAL THERAPY | Facility: CLINIC | Age: 70
End: 2020-07-24
Payer: MEDICARE

## 2020-07-24 DIAGNOSIS — G54.1 LUMBOSACRAL PLEXOPATHY: Primary | ICD-10-CM

## 2020-07-24 PROCEDURE — 97116 GAIT TRAINING THERAPY: CPT

## 2020-07-24 PROCEDURE — 97110 THERAPEUTIC EXERCISES: CPT

## 2020-07-24 NOTE — PROGRESS NOTES
Daily Note     Today's date: 2020  Patient name: Ayaka Guerrero  : 1950  MRN: [id number]  Referring provider: Gato Colón DO  Dx:   Encounter Diagnosis     ICD-10-CM    1  Lumbosacral plexopathy G54 1                   Subjective: Pt states his LLE tends to buckle during amb  Objective: See treatment diary below      Assessment: Tolerated treatment well  Patient demonstrated fatigue post treatment, exhibited good technique with therapeutic exercises and would benefit from continued PT  Good effort during tx  Pt has diff lifting arms during static stand moreso on R>L  Pt able to take 4-5 steps in // bars without blocking knees  Plan: Continue per plan of care        Precautions: nonambulatory, BLE weakness R>L  Re-eval Date: 2020    Date        Visit Count 21       FOTO        Pain In 0       Pain Out 0         Manual         Ham stretch        ITB stretch        Quad stretch        Hip Flexor stretch  2 mins at EOB                Date        Aerobic        UBE  UBE 10 mins @ 50  UBE 10 mins @ 50 UBE 10 mins at 50 UBE 10 mins at 50   Sit to stand trials from w/c Seated on disc - black, unsupported  MTP, LTP both 3x10 with 20#  Biceps, triceps, upward rows - with 10# unilaterally anti-trunk and PNF with blue T-band   3x10     Total time 30 mins Standing in //  Bars 30-60"  3 trials with assist to maintain right knee extension attempt to decreased UE support, 5 reps each of single UE support    GT in // bars  trials    Min A of 1 in // bars and WC follow Standing in //  Bars 30-60"  3 trials with assist to maintain right knee extension attempt to decreased UE support, 5 reps each of single UE support    GT in // bars  trials    Min A of 1 in // bars and WC follow Seated on disc - black  MTP, LTP both 2x10 with 20#  Biceps, triceps 2x10 both with 10# unilaterally    Total time 20 mins Seated on disc - black, unsupported  MTP, LTP both 2x10 with 20#  Biceps, triceps, upward rows - with 10# unilaterally anti-trunk and PNF with blue T-band   2x10     Total time 30 mins   Evan  MTP/LTP  LTP 25#  MTP 25#  5" 2x15 LTP 25#  MTP 25#  5" 2x15     Triceps  25#  5" 2x15 25#  5" 2x15     Upward rows  Fwd/Lat raise  R 6# 20x  L 4# 20x Fwd/Lat raise  R 6# 20x  L 4# 20x     Biceps  25#  2x15 25#  2x15     AH with hip abd/add        Ankle DF        Tband Anti Trunk rotation    15#   2x15 18#   2x15 ea dir     SLRs        Clamshells         Mini Squats        Trunk rotation        Transfer w/c to mat 2x with cl S from level surfaces 2x with cl S from level surfaces 2x with cl S from level surfaces  2x with cl S from level surfaces   Dynamic seated balance        Gait training Gait training in // bars no  A to maintain knee extension and hip extension  Pre-gait training: static stand alt UE lifts with max A for knee and hip ext  20 min   Gait training in // bars mod A to maintain knee extension and hip extension  Pre-gait training: static stand alt UE lifts with max A for knee and hip extn  25 mins total time Gait training in // bars mod A to maintain knee extension and hip extension  Pre-gait training: static stand alt UE lifts with max A for knee and hip extn  25 mins total time   Tilt table Tilt table 70* incline with [place] activities 80* incline with [place] activities - quad sets in standing, 5" hold x 10 80* incline with Evan activities - quad sets in standing, 5" hold x 10

## 2020-07-27 ENCOUNTER — OFFICE VISIT (OUTPATIENT)
Dept: PHYSICAL THERAPY | Facility: CLINIC | Age: 70
End: 2020-07-27
Payer: MEDICARE

## 2020-07-27 DIAGNOSIS — G54.1 LUMBOSACRAL PLEXOPATHY: Primary | ICD-10-CM

## 2020-07-27 PROCEDURE — 97110 THERAPEUTIC EXERCISES: CPT | Performed by: PHYSICAL THERAPIST

## 2020-07-27 PROCEDURE — 97112 NEUROMUSCULAR REEDUCATION: CPT | Performed by: PHYSICAL THERAPIST

## 2020-07-27 PROCEDURE — 97116 GAIT TRAINING THERAPY: CPT | Performed by: PHYSICAL THERAPIST

## 2020-07-27 NOTE — PROGRESS NOTES
Daily Note     Today's date: 2020  Patient name: Mario Maloclm  : 1950  MRN: [id number]  Referring provider: Marilee Viera DO  Dx:   Encounter Diagnosis     ICD-10-CM    1  Lumbosacral plexopathy G54 1                   Subjective: Patient reports he was sore and tired after last session  Notes he is having improved quad control on right, gaining strength  Objective: See treatment diary below      Assessment: Tolerated treatment well  Patient demonstrated fatigue post treatment and would benefit from continued PT  Patient requires mod A to block right knee in stance  Assist to correct pelvic rotation  LOB in sitting with black disc, min A to recover  Plan: Continue per plan of care        Precautions: nonambulatory, BLE weakness R>L  Re-eval Date: 2020    Date       Visit Count 21 22      FOTO        Pain In 0 0      Pain Out 0 0        Manual         Ham stretch        ITB stretch        Quad stretch        Hip Flexor stretch                  Date        Aerobic        UBE  UBE 10 mins @ 50 UBE 10 mins at 50      Sit to stand trials from w/c Seated on disc - black, unsupported  MTP, LTP both 3x10 with 20#  Biceps, triceps, upward rows - with 10# unilaterally anti-trunk and PNF with blue T-band   3x10     Total time 30 mins Seated on disc - black, unsupported with feet on foam  MTP, LTP both 2x10 with 20#  Biceps, triceps, upward rows - with 10# unilaterally anti-trunk and PNF with blue T-band   2x10     Total time 30 mins      Evan  MTP/LTP        Triceps        Upward rows        Biceps        AH with hip abd/add        Ankle DF        Tband Anti Trunk rotation          SLRs        Clamshells         Mini Squats        Trunk rotation        Transfer w/c to mat 2x with cl S from level surfaces 2x with cl S from level surfaces      Dynamic seated balance        Gait training Gait training in // bars no  A to maintain knee extension and hip extension  Pre-gait training: static stand alt UE lifts with max A for knee and hip ext  20 min Pre-gait  Static stand, quad control, alt UE elevation    Mod-max assist   Max VCs for increased pelvic control to decrease posterior rotation in // bars      Tilt table Tilt table 70* incline with [place] activities

## 2020-07-31 ENCOUNTER — OFFICE VISIT (OUTPATIENT)
Dept: PHYSICAL THERAPY | Facility: CLINIC | Age: 70
End: 2020-07-31
Payer: MEDICARE

## 2020-07-31 DIAGNOSIS — G54.1 LUMBOSACRAL PLEXOPATHY: Primary | ICD-10-CM

## 2020-07-31 PROCEDURE — 97112 NEUROMUSCULAR REEDUCATION: CPT

## 2020-07-31 PROCEDURE — 97116 GAIT TRAINING THERAPY: CPT

## 2020-07-31 PROCEDURE — 97110 THERAPEUTIC EXERCISES: CPT

## 2020-07-31 NOTE — PROGRESS NOTES
Daily Note     Today's date: 2020  Patient name: Cruz Cameron  : 1950  MRN: [id number]  Referring provider: Yon Rodriguez DO  Dx:   Encounter Diagnosis     ICD-10-CM    1  Lumbosacral plexopathy G54 1                   Subjective: I was sore after last PT session good mm soreness  I am trialing static standing at home getting about 1 min a piece      Objective: See treatment diary below      Assessment: Tolerated treatment well  Pt requires mod A for blocking right knee with step up activity  Provide tactile cues for R Quad mm facilitation   Patient would benefit from continued PT      Plan: Continue per plan of care        Precautions: nonambulatory, BLE weakness R>L  Re-eval Date: 2020    Date      Visit Count 21 22 23     FOTO        Pain In 0 0 0     Pain Out 0 0 0       Manual         Ham stretch        ITB stretch        Quad stretch        Hip Flexor stretch                  Date        Aerobic        UBE  UBE 10 mins @ 50 UBE 10 mins at 50 UBE 5 min  50 RPM     Sit to stand trials from w/c Seated on disc - black, unsupported  MTP, LTP both 3x10 with 20#  Biceps, triceps, upward rows - with 10# unilaterally anti-trunk and PNF with blue T-band   3x10     Total time 30 mins Seated on disc - black, unsupported with feet on foam  MTP, LTP both 2x10 with 20#  Biceps, triceps, upward rows - with 10# unilaterally anti-trunk and PNF with blue T-band   2x10     Total time 30 mins Seated on disc - black, unsupported with feet on foam   Anti trunk rotation 10 5#  20x ea dir  PNF  R 7#, L 5#  2x10 ea    Total time 8 mins     Evan  MTP/LTP        Triceps        Upward rows        Biceps        AH with hip abd/add        Ankle DF        Tband Anti Trunk rotation          SLRs        Clamshells         Mini Squats        Trunk rotation        Transfer w/c to mat 2x with cl S from level surfaces 2x with cl S from level surfaces      Toe Tap   L LE  2" 5x  2" 6x  4# 6x   Mod-max assist  Max VCs for increased pelvic control to decrease posterior rotation in // bars       Dynamic seated balance        Gait training Gait training in // bars no  A to maintain knee extension and hip extension  Pre-gait training: static stand alt UE lifts with max A for knee and hip ext  20 min Pre-gait  Static stand, quad control, alt UE elevation  Mod-max assist   Max VCs for increased pelvic control to decrease posterior rotation in // bars Pre-gait  Static stand, quad control, alt UE elevation    Mod-max assist   Max VCs for increased pelvic control to decrease posterior rotation in // bars  2 trials <50"    Gait training in // bars no  A to maintain knee extension and hip extension  2 trials     Tilt table Tilt table 70* incline with [place] activities

## 2020-08-03 ENCOUNTER — OFFICE VISIT (OUTPATIENT)
Dept: PHYSICAL THERAPY | Facility: CLINIC | Age: 70
End: 2020-08-03
Payer: MEDICARE

## 2020-08-03 DIAGNOSIS — G54.1 LUMBOSACRAL PLEXOPATHY: Primary | ICD-10-CM

## 2020-08-03 PROCEDURE — 97112 NEUROMUSCULAR REEDUCATION: CPT | Performed by: PHYSICAL THERAPIST

## 2020-08-03 PROCEDURE — 97110 THERAPEUTIC EXERCISES: CPT | Performed by: PHYSICAL THERAPIST

## 2020-08-03 PROCEDURE — 97116 GAIT TRAINING THERAPY: CPT | Performed by: PHYSICAL THERAPIST

## 2020-08-03 NOTE — PROGRESS NOTES
Daily Note     Today's date: 8/3/2020  Patient name: Tiago Burnett  : 1950  MRN: [id number]  Referring provider: Gila Hanson DO  Dx:   Encounter Diagnosis     ICD-10-CM    1  Lumbosacral plexopathy  G54 1                   Subjective: Patient with ongoing stiffness, LBP  Objective: See treatment diary below      Assessment: Tolerated treatment well  Patient demonstrated fatigue post treatment and would benefit from continued PT  Stiffness in LB, near belt line  Responded well to weight shift assistance this date  Demonstrates improved trunk extension with cues for "belly up" with right step  Ongoing knee buckle noted on right > left  Plan: Continue per plan of care        Precautions: nonambulatory, BLE weakness R>L  Re-eval Date: 2020    Date 7/24 7/27 7/31 8/3    Visit Count 21 22 23 24    FOTO        Pain In 0 0 0 0    Pain Out 0 0 0 0      Manual         Ham stretch        ITB stretch        Quad stretch        Hip Flexor stretch                  Date        Aerobic        UBE  UBE 10 mins @ 50 UBE 10 mins at 50 UBE 5 min  50 RPM UBE 5 min  50 RPM    Sit to stand trials from w/c Seated on disc - black, unsupported  MTP, LTP both 3x10 with 20#  Biceps, triceps, upward rows - with 10# unilaterally anti-trunk and PNF with blue T-band   3x10     Total time 30 mins Seated on disc - black, unsupported with feet on foam  MTP, LTP both 2x10 with 20#  Biceps, triceps, upward rows - with 10# unilaterally anti-trunk and PNF with blue T-band   2x10     Total time 30 mins Seated on disc - black, unsupported with feet on foam   Anti trunk rotation 10 5#  20x ea dir  PNF  R 7#, L 5#  2x10 ea    Total time 8 mins     [place]  MTP/LTP        Triceps        Upward rows        Biceps        AH with hip abd/add        Ankle DF        Tband Anti Trunk rotation          SLRs        Clamshells         Mini Squats        Trunk rotation        Transfer w/c to mat 2x with cl S from level surfaces 2x with cl S from level surfaces      Toe Tap   L LE  2" 5x  2" 6x  4# 6x   Mod-max assist   Max VCs for increased pelvic control to decrease posterior rotation in // bars       Dynamic seated balance        Gait training Gait training in // bars no  A to maintain knee extension and hip extension  Pre-gait training: static stand alt UE lifts with max A for knee and hip ext  20 min Pre-gait  Static stand, quad control, alt UE elevation  Mod-max assist   Max VCs for increased pelvic control to decrease posterior rotation in // bars Pre-gait  Static stand, quad control, alt UE elevation  Mod-max assist   Max VCs for increased pelvic control to decrease posterior rotation in // bars  2 trials <50"    Gait training in // bars no  A to maintain knee extension and hip extension  2 trials Pre-gait  Static stand, quad control, alt UE elevation  Mod-max assist   Max VCs for increased pelvic control to decrease posterior rotation in // bars      Static stand:  29 sec   40 sec  42 sec  45 sec  31 sec    Gait training in // bars, knee blocked on right, MCs for hip extension, cues for increased trunk extension  3-4 steps x 3 trials        2-3 steps per 2 trials with RW and min A x 1, mod A x 1 and w/c follow    Tilt table Tilt table 70* incline with [place] activities

## 2020-08-07 ENCOUNTER — OFFICE VISIT (OUTPATIENT)
Dept: PHYSICAL THERAPY | Facility: CLINIC | Age: 70
End: 2020-08-07
Payer: MEDICARE

## 2020-08-07 DIAGNOSIS — G54.1 LUMBOSACRAL PLEXOPATHY: Primary | ICD-10-CM

## 2020-08-07 PROCEDURE — 97110 THERAPEUTIC EXERCISES: CPT | Performed by: PHYSICAL THERAPIST

## 2020-08-07 PROCEDURE — 97112 NEUROMUSCULAR REEDUCATION: CPT | Performed by: PHYSICAL THERAPIST

## 2020-08-07 PROCEDURE — 97116 GAIT TRAINING THERAPY: CPT | Performed by: PHYSICAL THERAPIST

## 2020-08-07 NOTE — PROGRESS NOTES
Daily Note     Today's date: 2020  Patient name: Tyrone Chung  : 1950  MRN: [id number]  Referring provider: Boston Brunson DO  Dx:   Encounter Diagnosis     ICD-10-CM    1  Lumbosacral plexopathy  G54 1                   Subjective: Reports having a bad start to the day  Objective: See treatment diary below      Assessment: Tolerated treatment fair  Patient demonstrated fatigue post treatment and would benefit from continued PT  He was able to progress with step count this date using RW  Did require mod-max A for right LE knee and hip extension, min A for left knee extn, w/c follow and assist to manage RW  Step count increased from 3-4 steps to 9-12 steps per trial       Plan: Continue per plan of care        Precautions: nonambulatory, BLE weakness R>L  Re-eval Date: 2020    Date 7/24 7/27 7/31 8/3 8/7   Visit Count 21 22 23 24 25   FOTO        Pain In 0 0 0 0 0   Pain Out 0 0 0 0 0     Manual         Ham stretch        ITB stretch        Quad stretch        Hip Flexor stretch                  Date        Aerobic        UBE  UBE 10 mins @ 50 UBE 10 mins at 50 UBE 5 min  50 RPM UBE 5 min  50 RPM UBE 10 min  50 RPM   Sit to stand trials from w/c Seated on disc - black, unsupported  MTP, LTP both 3x10 with 20#  Biceps, triceps, upward rows - with 10# unilaterally anti-trunk and PNF with blue T-band   3x10     Total time 30 mins Seated on disc - black, unsupported with feet on foam  MTP, LTP both 2x10 with 20#  Biceps, triceps, upward rows - with 10# unilaterally anti-trunk and PNF with blue T-band   2x10     Total time 30 mins Seated on disc - black, unsupported with feet on foam   Anti trunk rotation 10 5#  20x ea dir  PNF  R 7#, L 5#  2x10 ea    Total time 8 mins     Evan  MTP/LTP        Triceps        Upward rows        Biceps        AH with hip abd/add        Ankle DF        Tband Anti Trunk rotation          SLRs        Clamshells         Mini Squats        Trunk rotation Transfer w/c to mat 2x with cl S from level surfaces 2x with cl S from level surfaces      Toe Tap   L LE  2" 5x  2" 6x  4# 6x   Mod-max assist   Max VCs for increased pelvic control to decrease posterior rotation in // bars       Dynamic seated balance        Gait training Gait training in // bars no  A to maintain knee extension and hip extension  Pre-gait training: static stand alt UE lifts with max A for knee and hip ext  20 min Pre-gait  Static stand, quad control, alt UE elevation  Mod-max assist   Max VCs for increased pelvic control to decrease posterior rotation in // bars Pre-gait  Static stand, quad control, alt UE elevation  Mod-max assist   Max VCs for increased pelvic control to decrease posterior rotation in // bars  2 trials <50"    Gait training in // bars no  A to maintain knee extension and hip extension  2 trials Pre-gait  Static stand, quad control, alt UE elevation  Mod-max assist   Max VCs for increased pelvic control to decrease posterior rotation in // bars      Static stand:  29 sec   40 sec  42 sec  45 sec  31 sec    Gait training in // bars, knee blocked on right, MCs for hip extension, cues for increased trunk extension  3-4 steps x 3 trials  2-3 steps per 2 trials with RW and min A x 1, mod A x 1 and w/c follow Sit to stand  Focus on quad control bilaterally, right hip extension     // bars with transition to RW in standing/walking  Cues and assist to maintain knee extension  Assist x 2 to transition from // bars to RW   mod-max A for right LE knee and hip extension, min A for left knee extn, w/c follow and assist to manage RW    Step count increased from 3-4 steps to 9-12 steps per trial     Tilt table Tilt table 70* incline with [place] activities

## 2020-08-10 ENCOUNTER — OFFICE VISIT (OUTPATIENT)
Dept: NEUROLOGY | Facility: CLINIC | Age: 70
End: 2020-08-10
Payer: MEDICARE

## 2020-08-10 ENCOUNTER — APPOINTMENT (OUTPATIENT)
Dept: PHYSICAL THERAPY | Facility: CLINIC | Age: 70
End: 2020-08-10
Payer: MEDICARE

## 2020-08-10 VITALS — DIASTOLIC BLOOD PRESSURE: 69 MMHG | TEMPERATURE: 97.7 F | HEART RATE: 82 BPM | SYSTOLIC BLOOD PRESSURE: 138 MMHG

## 2020-08-10 DIAGNOSIS — G62.9 POLYNEUROPATHY: Primary | ICD-10-CM

## 2020-08-10 DIAGNOSIS — G54.1 LUMBOSACRAL PLEXOPATHY: ICD-10-CM

## 2020-08-10 PROCEDURE — 99213 OFFICE O/P EST LOW 20 MIN: CPT | Performed by: PSYCHIATRY & NEUROLOGY

## 2020-08-10 RX ORDER — FLUOXETINE HYDROCHLORIDE 20 MG/1
CAPSULE ORAL
COMMUNITY
Start: 2020-07-13 | End: 2020-08-10 | Stop reason: SDUPTHER

## 2020-08-10 NOTE — PROGRESS NOTES
Patient ID: Delia Borrego is a 68 y o  male  Assessment/Plan:    Polyneuropathy  This is a 7-year-old patient history with a longstanding chronic gait dysfunction    He did undergo two prior lumbar fusion surgery but does describe persistent weakness of the right lower extremity    his EMG study did demonstrate a neuropathy, bilateral radiculopathy and a right chronic plexopathy  physical therapy has proved to be beneficial and now he is able to walk few steps on his own        Plan 1   he is to continue with the exercises physical therapy has taught him 2  I have emphasized the need him to continue to exercise  I did reorder the SPEP and UPEP  We did discuss the saliva genetic testing  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  This is not the cause of the plexopathy or the radiculopathy  This may or  may not occur in his children          Lumbosacral plexopathy  He has a known right side  lumbar sacral plexopathy  This is slowly improving  I have informed him that this recovery may take years  Physical therapy is the best treatment at this he informs me that if he stops physical therapy he does noted note deterioration of his status  if this does occur he should inform us a    He is to return to our offices in several months but call us if any other new questions or problems     Diagnoses and all orders for this visit:    Polyneuropathy  -     Protein electrophoresis, serum; Future  -     Protein electrophoresis, urine    Lumbosacral plexopathy    Other orders  -     Discontinue: FLUoxetine (PROzac) 20 mg capsule         Subjective:    67 y/o rh male who presents in a follow-up visit    He initially presented ago for difficulty with ambulation      He presents with his wife Shawn Núñez Jon 2003 while leaving work he had acute onset of numbness and fell to the ground  Osvaldo Clarke was evaluated and was thought to an infectious etiology or a demyelinating polyneuropathy  Osvaldo Clarke was placed on steroids and eventually was diagnosed with a lumbar sacral stenosis   He underwent initial surgery in 2003 fusion of the lumbar sacral spine by Dr Matt Rodriguez at [place] he did not have full recovery of his weakness he was initially using a cane but now utilizes a wheelchair   In 2015 his family was not and noted that he was experiencing more weakness and numbness in his legs this is more predominant on the right than the left  Lowella Boeck is no symptoms in his arms he subsequently underwent a another surgery in 2015 by Dr Deirdre Ruiz developed urinary incontinence he was eventually transferred to a SNF rehab and was diet was noted to have a sacral decubitus ulcer   This eventually was treated and he then underwent physical therapy in bed   Eventually he was transferred to home   [facility] was evaluated by Dr Shani michaud in January of 2015 at [facility]  [facility] EMG studies were reviewed from 2014 and it did demonstrate abnormalities consistent with a peripheral neuropathy  Razia Carole he did not return for further workup      He has been following up at [facility] by Dr Romina Kumarr was referred to a also a for potential knee replacement surgery then eventually wound has been following up with Dr Slade Jain for years   Poncho Jo did undergo extensive physical therapy by [facility] rehab in [place] is able to transfer independently     but he does not drive   For longer distances he will utilize a wheelchair    he does report continued weakness of the right leg   He also describes bilateral foot drops  [facility] does have bilateral  AFO l braces which he does not utilize  Matthewjoy Jo denies any problem with his bladder or bowels   He denies any numbness in his perineum      After the initial visit he was evaluated by Neurosurgery felt that there is no need further surgery    He did undergo a repeat EMG study which demonstrated a polyneuropathy,1  Severe mixed axonal / demyelinating neuropathy le worse then ue , a chronic radiculopathy at L5-S1 and a  chronic right upper trunk lumbar sacral plexopathy     He had undergone laboratory studies including a B6, folate B12 levels that were normal   He also under went saliva testing for TT Ramyloidosis  It failed reveal any evidence at amyloidosis but 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     He has now undergone aggressive physical therapy  Since starting physical therapy he does report some improvement in the strength he is now able to stand and hold on to the side of kitchen counter  He is now able to transfer in the bathroom as well as in the kitchen  He was able to walk several steps with the physical therapy    His wife is worried about physical therapy coverage  [place]   He is now able to perform some of his own ADLs such as putting his socks and shoes on while in bed and transferring with minimal assist         He underwent a MRI of the lumbar spine   on 08/14/19   Extensive myelomalacia at T11-12 level   Linear enhancement of the anterior aspect of the lower thoracic cord which may extend above the scope of this examination   Recommend dedicated imaging of the thoracic spine with and without contrast      Fusion from L2 to L5 involving the L2-3, L3-4 and L4-5 disc spaces   These disc spaces demonstrate no significant canal stenosis   Mild persistent foraminal narrowing at L4-5 due to grade 2 anterior spinal     At L1-2 there is a central disc protrusion superimposed upon an old or canal stenosis      He then underwent a MRI of the thoracic spine on 08/27/ 19      Thoracic degenerative change is seen most pronounced from T8-9 through T12-L1 where there is annular bulging, disc protrusions and endplate/facet hypertrophic change resulting in multilevel moderate canal stenosis and foraminal narrowing   There is mild   chronic myelomalacia of the lower thoracic cord at the level of T11-12      At the T3-4 level there is moderate bilateral foraminal narrowing as a result of endplate and posterior element hypertrophic jeannine Pearson is    Has three children seven grandchildren  Linda Agent retired from [employer] denies any current use of tobacco he drinks coffee and caffeine on a regular basis he previously coached baseball and football  Medical played football as well as wrestled                    The following portions of the patient's history were reviewed and updated as appropriate:   He  has a past medical history of Depression and Gout  He  has a past surgical history that includes Back surgery; TONSILECTOMY AND ADNOIDECTOMY; Cataract extraction, bilateral; and Knee arthroscopy (Right)  His family history includes Diabetes in his brother, father, and sister  He  reports that he quit smoking about 35 years ago  He has never used smokeless tobacco  He reports current alcohol use  He reports that he does not use drugs  Current Outpatient Medications   Medication Sig Dispense Refill    allopurinol (ZYLOPRIM) 100 mg tablet Take 100 mg by mouth 2 (two) times a day      FLUoxetine (PROzac) 20 MG tablet Take 20 mg by mouth daily      loratadine (CLARITIN) 10 mg tablet Take 10 mg by mouth as needed for allergies      Magnesium 100 MG CAPS Take 1 capsule by mouth daily      meloxicam (MOBIC) 15 mg tablet Take 15 mg by mouth daily      Misc Natural Products (OSTEO BI-FLEX/5-LOXIN ADVANCED PO) Take 2 tablets by mouth daily      Multiple Vitamins-Minerals (CENTRUM SILVER 50+MEN PO) Take 1 tablet by mouth daily      SUPER B COMPLEX/C PO Take 1 tablet by mouth daily       No current facility-administered medications for this visit  He is allergic to other and penicillins            Objective:    Blood pressure 138/69, pulse 82, temperature 97 7 °F (36 5 °C)  Physical Exam  Eyes:      General: Lids are normal       Extraocular Movements: Extraocular movements intact  Pupils: Pupils are equal, round, and reactive to light     Neurological:      Deep Tendon Reflexes:      Reflex Scores:       Tricep reflexes are 1+ on the right side and 1+ on the left side  Bicep reflexes are 1+ on the right side and 1+ on the left side  Patellar reflexes are 1+ on the right side and 1+ on the left side  Achilles reflexes are 0 on the right side and 0 on the left side  Psychiatric:         Speech: Speech normal          Neurological Exam  Mental Status  Awake, alert and oriented to person, place and time  Speech is normal  Language is fluent with no aphasia  Cranial Nerves  CN II: Visual acuity is normal  Visual fields full to confrontation  CN III, IV, VI: Extraocular movements intact bilaterally  Normal lids and orbits bilaterally  Pupils equal round and reactive to light bilaterally  CN V: Facial sensation is normal   CN VII: Full and symmetric facial movement  CN VIII: Hearing is normal   CN IX, X: Palate elevates symmetrically  Normal gag reflex  CN XI: Shoulder shrug strength is normal   CN XII: Tongue midline without atrophy or fasciculations  Motor    Df 0/5, pf 2, , kf/ ke 4/5  On left , rigth  3+5  hp 5/5 on left, on right hip 4/5, kf/ ke 4+/5 , df0,  p5 1/5   Toe movment 1/45, left ahb 2/5 , ahb 1/5     Sensory  Vibraiton absent in toes , knees  10 sec , ankle left 3sec in knee, jps intact in ue ,               Reflexes                                           Right                      Left  Biceps                                 1+                         1+  Triceps                                1+                         1+  Patellar                                1+                         1+  Achilles                                0                         0  Plantar                           Downgoing                Downgoing  Trace reflexes   Coordination  Right: Finger-to-nose normal   Left: Finger-to-nose normal     Gait Unable to rise from chair without using arms  Presents in a wheelchair  He is able to transfer independently        Review of systems obtained from the medical assistant as below was reviewed with the patient at today's  visit    ROS:    Review of Systems   Constitutional: Negative  Negative for appetite change and fever  HENT: Negative  Negative for hearing loss, tinnitus, trouble swallowing and voice change  Eyes: Negative  Negative for photophobia and pain  Respiratory: Negative  Negative for shortness of breath  Cardiovascular: Negative  Negative for palpitations  Gastrointestinal: Negative  Negative for nausea and vomiting  Endocrine: Negative  Negative for cold intolerance  Genitourinary: Negative  Negative for dysuria, frequency and urgency  Musculoskeletal: Negative  Negative for myalgias and neck pain  Skin: Negative  Negative for rash  Neurological: Negative  Negative for dizziness, tremors, seizures, syncope, facial asymmetry, speech difficulty, weakness, light-headedness, numbness and headaches  Hematological: Negative  Does not bruise/bleed easily  Psychiatric/Behavioral: Negative  Negative for confusion, hallucinations and sleep disturbance

## 2020-08-10 NOTE — ASSESSMENT & PLAN NOTE
He has a known right side  lumbar sacral plexopathy  This is slowly improving  I have informed him that this recovery may take years  Physical therapy is the best treatment at this he informs me that if he stops physical therapy he does noted note deterioration of his status     if this does occur he should inform us a

## 2020-08-10 NOTE — ASSESSMENT & PLAN NOTE
This is a 7-year-old patient history with a longstanding chronic gait dysfunction    He did undergo two prior lumbar fusion surgery but does describe persistent weakness of the right lower extremity    his EMG study did demonstrate a neuropathy, bilateral radiculopathy and a right chronic plexopathy  physical therapy has proved to be beneficial and now he is able to walk few steps on his own        Plan 1   he is to continue with the exercises physical therapy has taught him 2  I have emphasized the need him to continue to exercise  I did reorder the SPEP and UPEP  We did discuss the saliva genetic testing  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

## 2020-08-14 ENCOUNTER — OFFICE VISIT (OUTPATIENT)
Dept: PHYSICAL THERAPY | Facility: CLINIC | Age: 70
End: 2020-08-14
Payer: MEDICARE

## 2020-08-14 DIAGNOSIS — G54.1 LUMBOSACRAL PLEXOPATHY: Primary | ICD-10-CM

## 2020-08-14 PROCEDURE — 97110 THERAPEUTIC EXERCISES: CPT

## 2020-08-14 PROCEDURE — 97116 GAIT TRAINING THERAPY: CPT

## 2020-08-14 PROCEDURE — 97112 NEUROMUSCULAR REEDUCATION: CPT

## 2020-08-14 NOTE — PROGRESS NOTES
PT Re-Evaluation    Today's date: 2020  Patient name: Alexandra Samson  : 1950  MRN: [id number]  Referring provider: Romy Gonzalez DO  Dx:   Encounter Diagnosis     ICD-10-CM    1  Lumbosacral plexopathy  G54 1        Start Time: 1100  Stop Time: 1200  Total time in clinic (min): 60 minutes    Assessment  Assessment details: Alexandra Samson is a 68 y o  male presenting to outpatient physical therapy with diagnosis of lumbosacral plexopathy  He continues to make slow progress with LE control  Struggles with hip and trunk extension, but this is also improving  Decreased LE control with MAFOs in place due to decreased quad control, will continue to address  Patient with improved quad control of left quad  Requires VCs and MCs for right knee and hip extension with ambulation  Patient remains motivated to progress  Pending upcoming physician appointment  Pt has improved with quad control in standing tolerance  Will continue to progress as able  Cont per POC  Impairments: abnormal coordination, abnormal gait, abnormal muscle firing, abnormal muscle tone, activity intolerance, impaired balance and impaired physical strength  Barriers to therapy: Nonambulatory, chronicity of impairments  Understanding of Dx/Px/POC: good   Prognosis: good    Goals  STGs to be achieved in 4 weeks:     1  Pt to demonstrate increased AROM of BLEs in order to allow for greater ease and independence with ADLs and functional mobility - MET  2  Pt to demonstrate full PROM of BLEs in order to maximize joint mobility and function and allow for progression of exercise program and achievement of goals - MET  3  Pt to demonstrate increased MMT of BLEs and B shldrs  by at least 1/2-1 grade in order to improve safety and stability with ADLs and functional mobility - ONGOING  4  Pt will take 5-6 steps in parallel bars with min A of 1 -  MET    LTGs to be achieved in 6-8 weeks: - ONGOING  1   Pt will be I with HEP in order to continue to improve quality of life and independence   2  Pt to demonstrate return to amb 25 ft with RW with min asst of 1 -  3  Pt to demonstrate improved function as noted by achieving or exceeding predicted score on FOTO outcomes assessment tool  Plan  Patient would benefit from: skilled physical therapy  Other planned modality interventions: Modalties prn for symptom management  Planned therapy interventions: neuromuscular re-education, manual therapy, therapeutic exercise and home exercise program  Frequency: 2x week  Duration in visits: 8  Duration in weeks: 4  Plan of Care beginning date: 8/7/2020  Plan of Care expiration date: 9/6/2020  Treatment plan discussed with: patient        Subjective Evaluation    History of Present Illness  Date of surgery: 2004,2015  Mechanism of injury: surgery  Mechanism of injury: UPDATE:    Patient reports doing well  Standing and transferring better at home  Still not able to walk as his wife is unable to help him  Does note he feels like he is stronger  Has an upcoming appointment to see if his condition is "reversible"  Pt states leaving work one night his legs went numb and he fell to the ground  Pt underwent testing and end result was surgery with rods and screws and cleaned out arthritis  Over time pt had to use walker and legs got progressively weaker  Had a second surgery in 2015 and was in [facility] for rehab with a stage 4 pressure ulcer as a result of his hospitalization  Pt states he has been nonambulatory for 3 years, since the second surgery  States when he would attempt to stand his R shld would give out and he could not support himself  Pt has lift recliner and is able to perform indep transfers to tub, recliner and stationary bike  Had MAFOs after initial surgery  Stands at dining room table with wife stabilizing table  Wife states pt had PT from Oct 2018 to April 2019 at another facility   Wife states since DC from PT pt has lost what he gained at that time           Recurrent probem    Quality of life: good    Pain  No pain reported    Social Support  Lives with: spouse    Employment status: not working    Diagnostic Tests  MRI studies: abnormal (back)  Treatments  Previous treatment: physical therapy  Current treatment: massage and physical therapy  Patient Goals  Patient goals for therapy: improved balance, increased motion, increased strength, independence with ADLs/IADLs and return to sport/leisure activities  Patient goal: get up and walk, resume hunting, attend grandchildren's sporting events        Objective     Concurrent Complaints  Negative for night pain and disturbed sleep    Postural Observations  Seated posture: good  Standing posture: poor    Additional Postural Observation Details  At eval:  Stands in parallel bars with forward lean and LEs rigid in extension  Unable to take any steps  Transfers sit to stand indep by pulling up on parallel bars and locking knees in ext  At re-eval:  Sit to stand from w/c with heavy use of UEs  Improved WBIng onto LEs with bilateral knee extension improved in static stand  Does require cues for anterior tibial translation over forefoot in standing  Cues for increased hip and trunk extension      Tenderness     Additional Tenderness Details  Denies TTP    Neurological Testing     Sensation     Lumbar   Left   Diminished: light touch, sharp/dull discrimination and proprioception    Right   Diminished: light touch, sharp/dull discrimination and proprioception    Comments   Left light touch: lower leg  Right light touch: lower leg  Right sharp/dull discrimination: very little sensation    Reflexes   Left   Patellar (L4): absent (0)    Right   Patellar (L4): trace (1+)    Additional Neurological Details  Intermittently "feel normal" - this has improved and continues improving by his subjective reports    Active Range of Motion     Additional Active Range of Motion Details  Bilateral LEs WFL PROM    Strength/Myotome Testing     Left Shoulder     Planes of Motion   Flexion: 4+   Abduction: 4+   External rotation at 0°: 4   Internal rotation at 0°: 4     Right Shoulder     Planes of Motion   Flexion: 4+   Abduction: 4+   External rotation at 0°: 4   Internal rotation at 0°: 4     Left Elbow   Flexion: 4+  Extension: 5    Right Elbow   Flexion: 4+  Extension: 5    Left Hip   Planes of Motion   Flexion: 3+  Extension: 3-  Abduction: 3    Right Hip   Planes of Motion   Flexion: 3  Extension: 3-  Abduction: 3    Left Knee   Flexion: 3  Extension: 3+    Right Knee   Flexion: 2+  Extension: 3-    Left Ankle/Foot   Dorsiflexion: 3-  Plantar flexion: 3    Right Ankle/Foot   Dorsiflexion: 2  Plantar flexion: 3    Muscle Activation     Additional Muscle Activation Details  Over-activation of abdominal muscles in an attempt to increase hip extension    General Comments:      Hip Comments   Hip flexion right 3-/5  Hip flexion left 3/5  Hip extension 1/5 right - prone  Hip extension 2+/5 left - prone  Hip ABd right 3-/5 - sidelying  Hip ABd left 2/5 - sideling    Knee Comments  Knee extension right 2/5  Knee extension left 3+/5  Neuro Exam:     Sensation   Light touch LE: left impaired and right impaired  Proprioception LE: left impaired and right impaired    Coordination   Heel to shin: left dysmetric and right dysmetric  Rapid alternating movements: LE impaired    Transfers Sit to stand: independent Wheelchair to mat: independent Mat to wheelchair: independent Sit to supine: independent Supine to sit: independent   Roll: independent    Functional outcomes   Functional outcome gait comment: At eval:  Step to pattern with PT blocking right > left knee  Increased UE reliance to off weight LE to allow for step  Able to continue 4-5 steps in // bars  At re-eval:  Step to pattern with UEs WBing approx 60%, able to progress 8-10 steps  Attempted transition to RW, difficulty from parallel bars    Able to advance bilateral LEs, improved knee control of left

## 2020-08-14 NOTE — PROGRESS NOTES
Daily Note     Today's date: 2020  Patient name: Baljit Do  : 1950  MRN: [id number]  Referring provider: Brien Wu DO  Dx:   Encounter Diagnosis     ICD-10-CM    1  Lumbosacral plexopathy  G54 1                   Subjective:  Saw Dr Franklyn Day said I had a rare gene of some sort that came from mother and father  1* focusing on the nerves No med changes  Did say to cont with PT   I feel overall improvement with strength being more independent with things around the house      Objective: See treatment diary below      Assessment: Tolerated treatment well  Pt reported good mm / endurance fatigue end rx session  Step increase today with trial ambulation with RW/Solo step   Patient would benefit from continued PT      Plan: Continue per plan of care  Precautions: nonambulatory, BLE weakness R>L  Re-eval Date: 2020    Date        Visit Count 26       FOTO        Pain In        Pain Out          Manual         Ham stretch        ITB stretch        Quad stretch        Hip Flexor stretch                  Date        Aerobic        UBE  UBE 10 min  50 RPM  NA       Sit to stand trials from w/c        [place]  MTP/LTP Seated/ disc  25#  30x, ea         Triceps        Upward rows        Biceps Seated/Disc  Tricep  18#  30x  Bicep  18#  30x       AH with hip abd/add        Ankle DF        Tband Anti Trunk rotation   resume       SLRs        Clamshells         Mini Squats        Trunk rotation        Transfer w/c to mat        Toe Tap        Dynamic seated balance        Gait training Sit to stand  Focus on quad control bilaterally, right hip extension     // bars with transition to RW in standing/walking  Cues and assist to maintain knee extension  Assist x 2 to transition from // bars to RW in SOLO step  mod-max A for right LE knee and hip extension, min A for left knee extn, w/c follow and assist to manage RW    Step count increased from 3-4 steps to 6-8 steps per trial         Tilt table

## 2020-08-17 ENCOUNTER — OFFICE VISIT (OUTPATIENT)
Dept: PHYSICAL THERAPY | Facility: CLINIC | Age: 70
End: 2020-08-17
Payer: MEDICARE

## 2020-08-17 DIAGNOSIS — G54.1 LUMBOSACRAL PLEXOPATHY: Primary | ICD-10-CM

## 2020-08-17 PROCEDURE — 97116 GAIT TRAINING THERAPY: CPT | Performed by: PHYSICAL THERAPIST

## 2020-08-17 PROCEDURE — 97110 THERAPEUTIC EXERCISES: CPT | Performed by: PHYSICAL THERAPIST

## 2020-08-17 PROCEDURE — 97112 NEUROMUSCULAR REEDUCATION: CPT | Performed by: PHYSICAL THERAPIST

## 2020-08-18 NOTE — PROGRESS NOTES
Daily Note     Today's date: 2020  Patient name: Harmony Vargas  : 1950  MRN: [id number]  Referring provider: Mani Mae DO  Dx:   Encounter Diagnosis     ICD-10-CM    1  Lumbosacral plexopathy  G54 1                   Subjective: Patient reports he stood well at his doctor appointment last week  Feels like he's getting stronger  Objective: See treatment diary below      Assessment: Tolerated treatment well  Patient demonstrated fatigue post treatment and would benefit from continued PT  Ongoing regression to depending on UEs for gait due to decreased confidence with knee and hip extension and control, noted particularly with increased posterior/heel WBing  Plan: Continue per plan of care  Precautions: nonambulatory, BLE weakness R>L  Re-eval Date: 2020    Date       Visit Count 26 27      FOTO        Pain In  0      Pain Out  0        Manual         Ham stretch        ITB stretch        Quad stretch        Hip Flexor stretch                  Date        Aerobic        UBE  UBE 10 min  50 RPM  NA UBE 10 min  50 RPM      Sit to stand trials from w/c        [place]  MTP/LTP Seated/ disc  25#  30x, ea         Triceps        Upward rows        Biceps Seated/Disc  Tricep  18#  30x  Bicep  18#  30x       AH with hip abd/add        Ankle DF        Tband Anti Trunk rotation   resume       SLRs        Clamshells         Mini Squats        Trunk rotation        Transfer w/c to mat        Toe Tap        Dynamic seated balance        Gait training Sit to stand  Focus on quad control bilaterally, right hip extension     // bars with transition to RW in standing/walking  Cues and assist to maintain knee extension  Assist x 2 to transition from // bars to RW in SOLO step  mod-max A for right LE knee and hip extension, min A for left knee extn, w/c follow and assist to manage RW    Step count increased from 3-4 steps to 6-8 steps per trial   Sit to stand  Focus on quad control bilaterally, right hip extension     // bars with transition to RW in standing/walking  Cues and assist to maintain knee extension  Assist x 2 to transition from // bars to RW   mod-max A for right LE knee and hip extension, min A for left knee extn, w/c follow and VCs to decrease WBing on RW with UEs    Step count 6-8 per trial       Tilt table

## 2020-08-21 ENCOUNTER — OFFICE VISIT (OUTPATIENT)
Dept: PHYSICAL THERAPY | Facility: CLINIC | Age: 70
End: 2020-08-21
Payer: MEDICARE

## 2020-08-21 DIAGNOSIS — G54.1 LUMBOSACRAL PLEXOPATHY: Primary | ICD-10-CM

## 2020-08-21 PROCEDURE — 97110 THERAPEUTIC EXERCISES: CPT | Performed by: PHYSICAL THERAPIST

## 2020-08-21 PROCEDURE — 97116 GAIT TRAINING THERAPY: CPT | Performed by: PHYSICAL THERAPIST

## 2020-08-21 NOTE — PROGRESS NOTES
Daily Note     Today's date: 2020  Patient name: Fabio Ramachandran  : 1950  MRN: [id number]  Referring provider: Deep Underwood DO  Dx:   Encounter Diagnosis     ICD-10-CM    1  Lumbosacral plexopathy  G54 1                   Subjective: Reports he is starting to feel his knees again  Were totally numb, now with increased feeling  Objective: See treatment diary below      Assessment: Tolerated treatment well  Patient demonstrated fatigue post treatment and would benefit from continued PT  Patient with increased ambulation endurance this date  Trial of carbon fiber MAFO with sneaker vs  Plastic MAFO with boot  Improved foot clearance and knee extension on left  Ongoing struggles with hip extension control  Plan: Continue per plan of care  Precautions: nonambulatory, BLE weakness R>L  Re-eval Date: 2020  /  Date      Visit Count 26 27 28     FOTO        Pain In  0 0     Pain Out  0 0       Manual         Ham stretch        ITB stretch        Quad stretch        Hip Flexor stretch                  Date        Aerobic        UBE  UBE 10 min  50 RPM  NA UBE 10 min  50 RPM Nustep  L4  10 mins     Sit to stand trials from w/c        [place]  MTP/LTP Seated/ disc  25#  30x, ea         Triceps        Upward rows        Biceps Seated/Disc  Tricep  18#  30x  Bicep  18#  30x       AH with hip abd/add        Ankle DF        Tband Anti Trunk rotation   resume       SLRs        Clamshells         Mini Squats        Trunk rotation        Transfer w/c to mat        Toe Tap        Dynamic seated balance        Gait training Sit to stand  Focus on quad control bilaterally, right hip extension     // bars with transition to RW in standing/walking  Cues and assist to maintain knee extension  Assist x 2 to transition from // bars to RW in SOLO step  mod-max A for right LE knee and hip extension, min A for left knee extn, w/c follow and assist to manage RW    Step count increased from 3-4 steps to 6-8 steps per trial   Sit to stand  Focus on quad control bilaterally, right hip extension     // bars with transition to RW in standing/walking  Cues and assist to maintain knee extension  Assist x 2 to transition from // bars to RW   mod-max A for right LE knee and hip extension, min A for left knee extn, w/c follow and VCs to decrease WBing on RW with UEs  Step count 6-8 per trial  Sit to stand, carbon fiber MAFO right LE  Sit to stand to RW with min A x 3 trials, mod assist x 2 trials, CGA x 2 trials  Gait with RW for 6 feet x 4, seated rest periods required  Max distance 6 feet x 2 trials, 3-4 feet for other trials  Requires mod A for right knee extension and mod A for right hip extension    Ongoing difficulty maintaining hip extension and decreasing UE WBing     Tilt table

## 2020-08-24 ENCOUNTER — OFFICE VISIT (OUTPATIENT)
Dept: PHYSICAL THERAPY | Facility: CLINIC | Age: 70
End: 2020-08-24
Payer: MEDICARE

## 2020-08-24 DIAGNOSIS — G54.1 LUMBOSACRAL PLEXOPATHY: Primary | ICD-10-CM

## 2020-08-24 PROCEDURE — 97116 GAIT TRAINING THERAPY: CPT | Performed by: PHYSICAL THERAPIST

## 2020-08-24 PROCEDURE — 97110 THERAPEUTIC EXERCISES: CPT | Performed by: PHYSICAL THERAPIST

## 2020-08-24 NOTE — PROGRESS NOTES
Daily Note     Today's date: 2020  Patient name: Mehul Turcios  : 1950  MRN: [id number]  Referring provider: Digna Nelson DO  Dx:   Encounter Diagnosis     ICD-10-CM    1  Lumbosacral plexopathy  G54 1                   Subjective: Patient continues with improved standing at home  More and more each day  Objective: See treatment diary below      Assessment: Tolerated treatment well  Patient demonstrated fatigue post treatment and would benefit from continued PT  He struggles with increased right knee control as fatigue increases  He continues to progress this date with ambulation endurance  Increased to 9 feet  Continues to require assist to support for knee extension, improving hip extension  Increased rest periods required between sets of gait and stairs / LE fatigue  Also trial of left ankle brace to decreased lateral foot WBing  Plan: Continue per plan of care        Precautions: nonambulatory, BLE weakness R>L  Re-eval Date: 2020  /  Date     Visit Count 26 27 28 29    FOTO        Pain In  0 0 0    Pain Out  0 0 0      Manual         Ham stretch        ITB stretch        Quad stretch        Hip Flexor stretch                  Date        Aerobic        UBE  UBE 10 min  50 RPM  NA UBE 10 min  50 RPM Nustep  L4  10 mins     Sit to stand trials from w/c        [place]  MTP/LTP Seated/ disc  25#  30x, ea         Triceps        Upward rows        Biceps Seated/Disc  Tricep  18#  30x  Bicep  18#  30x       AH with hip abd/add        Ankle DF        Tband Anti Trunk rotation   resume       SLRs        Clamshells         Mini Squats        Trunk rotation        Transfer w/c to mat        Toe Tap    Alt toe taps  4 reps with bilateral UEs, max assist to maintain right knee extension with single leg stance on right LE 4 trials    Dynamic seated balance        Gait training Sit to stand  Focus on quad control bilaterally, right hip extension     // bars with transition to RW in standing/walking  Cues and assist to maintain knee extension  Assist x 2 to transition from // bars to RW in SOLO step  mod-max A for right LE knee and hip extension, min A for left knee extn, w/c follow and assist to manage RW  Step count increased from 3-4 steps to 6-8 steps per trial   Sit to stand  Focus on quad control bilaterally, right hip extension     // bars with transition to RW in standing/walking  Cues and assist to maintain knee extension  Assist x 2 to transition from // bars to RW   mod-max A for right LE knee and hip extension, min A for left knee extn, w/c follow and VCs to decrease WBing on RW with UEs  Step count 6-8 per trial  Sit to stand, carbon fiber MAFO right LE  Sit to stand to RW with min A x 3 trials, mod assist x 2 trials, CGA x 2 trials  Gait with RW for 6 feet x 4, seated rest periods required  Max distance 6 feet x 2 trials, 3-4 feet for other trials  Requires mod A for right knee extension and mod A for right hip extension  Ongoing difficulty maintaining hip extension and decreasing UE WBing Sit to stand, carbon fiber MAFO right LE  Sit to stand to RW with min A x 3 trials, mod assist x 2 trials, CGA x 2 trials  Gait with RW for 6 feet x 4, seated rest periods required  Max distance 9 feet x 4 trials  Requires mod A for right knee extension and mod A for right hip extension  Ongoing difficulty maintaining hip extension and decreasing UE WBing      Tilt table

## 2020-08-28 ENCOUNTER — OFFICE VISIT (OUTPATIENT)
Dept: PHYSICAL THERAPY | Facility: CLINIC | Age: 70
End: 2020-08-28
Payer: MEDICARE

## 2020-08-28 DIAGNOSIS — G54.1 LUMBOSACRAL PLEXOPATHY: Primary | ICD-10-CM

## 2020-08-28 PROCEDURE — 97116 GAIT TRAINING THERAPY: CPT

## 2020-08-28 PROCEDURE — 97112 NEUROMUSCULAR REEDUCATION: CPT

## 2020-08-28 NOTE — PROGRESS NOTES
Daily Note     Today's date: 2020  Patient name: Saul England  : 1950  MRN: [id number]  Referring provider: Janis Whitaker DO  Dx:   Encounter Diagnosis     ICD-10-CM    1  Lumbosacral plexopathy  G54 1        Start Time: 1100  Stop Time: 1200  Total time in clinic (min): 60 minutes    Subjective: Pt offered no concerns  Objective: See treatment diary below      Assessment: Tolerated treatment well  Pt continued with weak quads and gluts, cueing to activate  Patient demonstrated fatigue post treatment and would benefit from continued PT      Plan: Continue per plan of care  Progress treatment as tolerated  Precautions: nonambulatory, BLE weakness R>L  Re-eval Date: 2020  /  Date    Visit Count 26 27 28 29 30   FOTO        Pain In  0 0 0    Pain Out  0 0 0      Manual         Ham stretch        ITB stretch        Quad stretch        Hip Flexor stretch                  Date        Aerobic        UBE  UBE 10 min  50 RPM  NA UBE 10 min  50 RPM Nustep  L4  10 mins     Sit to stand trials from w/c        [place]  MTP/LTP Seated/ disc  25#  30x, ea         Triceps        Upward rows        Biceps Seated/Disc  Tricep  18#  30x  Bicep  18#  30x       AH with hip abd/add        Ankle DF        Tband Anti Trunk rotation   resume       SLRs        Clamshells         Mini Squats        Trunk rotation        Transfer w/c to mat        Toe Tap    Alt toe taps  4 reps with bilateral UEs, max assist to maintain right knee extension with single leg stance on right LE 4 trials Alt toe taps  4 reps with bilateral UEs, max assist to maintain right knee extension with single leg stance on right LE 4 trials   Dynamic seated balance        Gait training Sit to stand  Focus on quad control bilaterally, right hip extension     // bars with transition to RW in standing/walking  Cues and assist to maintain knee extension    Assist x 2 to transition from // bars to RW in SOLO step mod-max A for right LE knee and hip extension, min A for left knee extn, w/c follow and assist to manage RW  Step count increased from 3-4 steps to 6-8 steps per trial   Sit to stand  Focus on quad control bilaterally, right hip extension     // bars with transition to RW in standing/walking  Cues and assist to maintain knee extension  Assist x 2 to transition from // bars to RW   mod-max A for right LE knee and hip extension, min A for left knee extn, w/c follow and VCs to decrease WBing on RW with UEs  Step count 6-8 per trial  Sit to stand, carbon fiber MAFO right LE  Sit to stand to RW with min A x 3 trials, mod assist x 2 trials, CGA x 2 trials  Gait with RW for 6 feet x 4, seated rest periods required  Max distance 6 feet x 2 trials, 3-4 feet for other trials  Requires mod A for right knee extension and mod A for right hip extension  Ongoing difficulty maintaining hip extension and decreasing UE WBing Sit to stand, carbon fiber MAFO right LE  Sit to stand to RW with min A x 3 trials, mod assist x 2 trials, CGA x 2 trials  Gait with RW for 6 feet x 4, seated rest periods required  Max distance 9 feet x 4 trials  Requires mod A for right knee extension and mod A for right hip extension  Ongoing difficulty maintaining hip extension and decreasing UE WBing  Sit to stand, carbon fiber MAFO right LE  Sit to stand to RW with min A x 3 trials, mod assist x 2 trials, CGA x 2 trials  Gait with RW for 6 feet x 4, seated rest periods required  Max distance 9 feet x 4 trials  Requires mod A for right knee extension and mod A for right hip extension  Ongoing difficulty maintaining hip extension and decreasing UE WBing     Tilt table

## 2020-08-31 ENCOUNTER — OFFICE VISIT (OUTPATIENT)
Dept: PHYSICAL THERAPY | Facility: CLINIC | Age: 70
End: 2020-08-31
Payer: MEDICARE

## 2020-08-31 DIAGNOSIS — G54.1 LUMBOSACRAL PLEXOPATHY: Primary | ICD-10-CM

## 2020-08-31 PROCEDURE — 97110 THERAPEUTIC EXERCISES: CPT

## 2020-08-31 PROCEDURE — 97112 NEUROMUSCULAR REEDUCATION: CPT

## 2020-08-31 PROCEDURE — 97116 GAIT TRAINING THERAPY: CPT

## 2020-08-31 NOTE — PROGRESS NOTES
Daily Note     Today's date: 2020  Patient name: Cruz Cameron  : 1950  MRN: [id number]  Referring provider: Yon Rodriguez DO  Dx:   Encounter Diagnosis     ICD-10-CM    1  Lumbosacral plexopathy  G54 1        Start Time: 1100  Stop Time: 1200  Total time in clinic (min): 60 minutes    Subjective: "I have more confidence when I am walking and I think I am taking better steps "      Objective: See treatment diary below      Assessment: Tolerated treatment well  Improved quad control during amb, blocking continued to be needed  Decreased steps, however better control noted  Better sit-stand with less rocking and attempts  Patient demonstrated fatigue post treatment and would benefit from continued PT      Plan: Continue per plan of care  Progress treatment as tolerated  Precautions: nonambulatory, BLE weakness R>L  Re-eval Date: 2020  /  Date        Visit Count 31       FOTO        Pain In        Pain Out          Manual         Ham stretch        ITB stretch        Quad stretch        Hip Flexor stretch                  Date        Aerobic        UBE         Sit to stand trials from w/c        [place]  MTP/LTP        Triceps        Upward rows        Biceps        AH with hip abd/add        Ankle DF        Tband Anti Trunk rotation          SLRs        Clamshells         Mini Squats        Trunk rotation        Transfer w/c to mat        Toe Tap Alt toe taps  4 reps with bilateral UEs, max assist to maintain right knee extension with single leg stance on right LE 4 trials       Dynamic seated balance        Gait training Sit to stand, carbon fiber MAFO right LE  Sit to stand to RW with min A x 3 trials, mod assist x 2 trials, CGA x 2 trials  Gait with RW for 6 feet x 4, seated rest periods required  Max distance 9 feet x 4 trials  Requires mod A for right knee extension and mod A for right hip extension  Ongoing difficulty maintaining hip extension and decreasing UE WBing  Tilt table

## 2020-09-04 ENCOUNTER — OFFICE VISIT (OUTPATIENT)
Dept: PHYSICAL THERAPY | Facility: CLINIC | Age: 70
End: 2020-09-04
Payer: MEDICARE

## 2020-09-04 DIAGNOSIS — G54.1 LUMBOSACRAL PLEXOPATHY: Primary | ICD-10-CM

## 2020-09-04 PROCEDURE — 97110 THERAPEUTIC EXERCISES: CPT | Performed by: PHYSICAL THERAPIST

## 2020-09-04 PROCEDURE — 97116 GAIT TRAINING THERAPY: CPT | Performed by: PHYSICAL THERAPIST

## 2020-09-04 NOTE — PROGRESS NOTES
Daily Note     Today's date: 2020  Patient name: Mario Malcolm  : 1950  MRN: [id number]  Referring provider: Marilee Viera DO  Dx:   Encounter Diagnosis     ICD-10-CM    1  Lumbosacral plexopathy  G54 1                   Subjective: Patient presents ongoing motivation  He voices ongoing improvements in standing at home  Objective: See treatment diary below      Assessment: Tolerated treatment well  Patient demonstrated fatigue post treatment and would benefit from continued PT  Improved step length bilaterally  Improved left knee extension control  Right and left hip control continue to be problematic  Patient with increased [address] on UEs with RW  Improved right hip IR  Plan: Continue per plan of care  Precautions: nonambulatory, BLE weakness R>L  Re-eval Date: 2020  /  Date       Visit Count 31 32      FOTO        Pain In  0      Pain Out  0        Manual         Ham stretch        ITB stretch        Quad stretch        Hip Flexor stretch                  Date        Aerobic        UBE   10 mins alt UE/LEs      Sit to stand trials from w/c        [place]  MTP/LTP        Triceps        Upward rows        Biceps        AH with hip abd/add        Ankle DF        Tband Anti Trunk rotation          SLRs        Clamshells         Mini Squats        Trunk rotation        Transfer w/c to mat        Toe Tap Alt toe taps  4 reps with bilateral UEs, max assist to maintain right knee extension with single leg stance on right LE 4 trials       Dynamic seated balance        Gait training Sit to stand, carbon fiber MAFO right LE  Sit to stand to RW with min A x 3 trials, mod assist x 2 trials, CGA x 2 trials  Gait with RW for 6 feet x 4, seated rest periods required  Max distance 9 feet x 4 trials  Requires mod A for right knee extension and mod A for right hip extension  Ongoing difficulty maintaining hip extension and decreasing UE WBing   Sit to stand, carbon fiber MAFO right LE   Sit to stand to RW with min A x 4 trials, mod A x 1, min A x 1 ea  Gait with RW for 6 x 2, 10 feet x 2, 4 feet x 1 seated rest periods required  Requires mod A for right knee extension and min A for right hip extension  Ongoing difficulty maintaining hip extension and decreasing UE WBing        Tilt table

## 2020-09-08 ENCOUNTER — APPOINTMENT (OUTPATIENT)
Dept: PHYSICAL THERAPY | Facility: CLINIC | Age: 70
End: 2020-09-08
Payer: MEDICARE

## 2020-09-11 ENCOUNTER — EVALUATION (OUTPATIENT)
Dept: PHYSICAL THERAPY | Facility: CLINIC | Age: 70
End: 2020-09-11
Payer: MEDICARE

## 2020-09-11 DIAGNOSIS — G54.1 LUMBOSACRAL PLEXOPATHY: Primary | ICD-10-CM

## 2020-09-11 PROCEDURE — 97110 THERAPEUTIC EXERCISES: CPT | Performed by: PHYSICAL THERAPIST

## 2020-09-11 PROCEDURE — 97116 GAIT TRAINING THERAPY: CPT | Performed by: PHYSICAL THERAPIST

## 2020-09-11 NOTE — PROGRESS NOTES
PT Re-Evaluation    Today's date: 2020  Patient name: Benoit Monteiro  : 1950  MRN: [id number]  Referring provider: Rachael Rosa DO  Dx:   Encounter Diagnosis     ICD-10-CM    1  Lumbosacral plexopathy  G54 1                   Assessment  Assessment details: Benoit Monteiro is a 68 y o  male presenting to outpatient physical therapy with diagnosis of lumbosacral plexopathy  He continues to make slow progress with LE control  Struggles with hip and trunk extension, but this is also improving  Decreased LE control with MAFOs in place due to decreased quad control, will continue to address  Patient with improved quad control of left quad  Requires VCs and MCs for right knee and hip extension with ambulation  Patient remains motivated to progress  Pending upcoming physician appointment  Pt has improved with quad control in standing tolerance  Will continue to progress as able  Cont per POC  Impairments: abnormal coordination, abnormal gait, abnormal muscle firing, abnormal muscle tone, activity intolerance, impaired balance and impaired physical strength  Barriers to therapy: Nonambulatory, chronicity of impairments  Understanding of Dx/Px/POC: good   Prognosis: good    Goals  STGs to be achieved in 4 weeks:     1  Pt to demonstrate increased AROM of BLEs in order to allow for greater ease and independence with ADLs and functional mobility - MET  2  Pt to demonstrate full PROM of BLEs in order to maximize joint mobility and function and allow for progression of exercise program and achievement of goals - MET  3  Pt to demonstrate increased MMT of BLEs and B shldrs  by at least 1/2-1 grade in order to improve safety and stability with ADLs and functional mobility - ONGOING  4  Pt will take 5-6 steps in parallel bars with min A of 1 -  MET    LTGs to be achieved in 6-8 weeks: - ONGOING  1  Pt will be I with HEP in order to continue to improve quality of life and independence   2   Pt to demonstrate return to amb 25 ft with RW with min asst of 1 -  3  Pt to demonstrate improved function as noted by achieving or exceeding predicted score on FOTO outcomes assessment tool  Plan  Patient would benefit from: skilled physical therapy  Other planned modality interventions: Modalties prn for symptom management  Planned therapy interventions: neuromuscular re-education, manual therapy, therapeutic exercise and home exercise program  Frequency: 2x week  Duration in visits: 8  Duration in weeks: 4  Plan of Care beginning date: 8/7/2020  Plan of Care expiration date: 9/6/2020  Treatment plan discussed with: patient        Subjective Evaluation    History of Present Illness  Date of surgery: 2004,2015  Mechanism of injury: surgery  Mechanism of injury: UPDATE:    Patient reports he is slowly feeling like he is making progress and getting feeling back in his legs  Notes that his legs are feeling more and getting "sore" at times  He has been trying to ride his stationary bike at home  Attempting to stand for longer periods of time at home  Has not been walking yet as he does not have the physical support at this time  Pt states leaving work one night his legs went numb and he fell to the ground  Pt underwent testing and end result was surgery with rods and screws and cleaned out arthritis  Over time pt had to use walker and legs got progressively weaker  Had a second surgery in 2015 and was in [facility] for rehab with a stage 4 pressure ulcer as a result of his hospitalization  Pt states he has been nonambulatory for 3 years, since the second surgery  States when he would attempt to stand his R shld would give out and he could not support himself  Pt has lift recliner and is able to perform indep transfers to tub, recliner and stationary bike  Had MAFOs after initial surgery  Stands at dining room table with wife stabilizing table  Wife states pt had PT from Oct 2018 to April 2019 at another facility  Wife states since DC from PT pt has lost what he gained at that time  Recurrent probem    Quality of life: good    Pain  No pain reported    Social Support  Lives with: spouse    Employment status: not working    Diagnostic Tests  MRI studies: abnormal (back)  Treatments  Previous treatment: physical therapy  Current treatment: massage and physical therapy  Patient Goals  Patient goals for therapy: improved balance, increased motion, increased strength, independence with ADLs/IADLs and return to sport/leisure activities  Patient goal: get up and walk, resume hunting, attend grandchildren's sporting events        Objective     Concurrent Complaints  Negative for night pain and disturbed sleep    Postural Observations  Seated posture: good  Standing posture: poor    Additional Postural Observation Details  At eval:  Stands in parallel bars with forward lean and LEs rigid in extension  Unable to take any steps  Transfers sit to stand indep by pulling up on parallel bars and locking knees in ext  At re-eval:  Sit to stand from w/c with heavy use of UEs  Improved hip extension and knee extension on left  80% left LE advancement without physical assist   Mod assist right hip 100%, mod assist to block right knee with MAFO in place  100% right LE advancement      Tenderness     Additional Tenderness Details  Denies TTP    Neurological Testing     Sensation     Lumbar   Left   Diminished: light touch, sharp/dull discrimination and proprioception    Right   Diminished: light touch, sharp/dull discrimination and proprioception    Comments   Left light touch: lower leg  Right light touch: lower leg  Right sharp/dull discrimination: very little sensation    Reflexes   Left   Patellar (L4): absent (0)    Right   Patellar (L4): trace (1+)    Additional Neurological Details  Intermittently "feel normal" - this has improved and continues improving by his subjective reports    Active Range of Motion     Additional Active Range of Motion Details  Bilateral LEs WFL PROM    Strength/Myotome Testing     Left Shoulder     Planes of Motion   Flexion: 4+   Abduction: 4+   External rotation at 0°: 4   Internal rotation at 0°: 4     Right Shoulder     Planes of Motion   Flexion: 4+   Abduction: 4+   External rotation at 0°: 4   Internal rotation at 0°: 4     Left Elbow   Flexion: 4+  Extension: 5    Right Elbow   Flexion: 4+  Extension: 5    Muscle Activation     Additional Muscle Activation Details  Over-activation of abdominal muscles in an attempt to increase hip extension    Ambulates 8 feet with min A on left; mod assist of right  RW for UE support  General Comments:      Hip Comments   Hip flexion right 3-/5  Hip flexion left 3+/5  Hip extension 2/5 right - prone  Hip extension 2+/5 left - prone  Hip ABd right 3-/5 - sidelying  Hip ABd left 3-/5 - sideling    Knee Comments  Knee extension right 2/5  Knee extension left 3+/5  Neuro Exam:     Sensation   Light touch LE: left impaired and right impaired  Proprioception LE: left impaired and right impaired    Coordination   Heel to shin: left dysmetric and right dysmetric  Rapid alternating movements: LE impaired    Transfers Sit to stand: independent Wheelchair to mat: independent Mat to wheelchair: independent Sit to supine: independent Supine to sit: independent   Roll: independent    Functional outcomes   Functional outcome gait comment: At eval:  Step to pattern with PT blocking right > left knee  Increased UE reliance to off weight LE to allow for step  Able to continue 4-5 steps in // bars  At re-eval:  Step to pattern with UEs WBing approx 60%, able to progress 8-10 steps  Attempted transition to RW, difficulty from parallel bars  Able to advance bilateral LEs, improved knee control of left           Precautions: nonambulatory, BLE weakness R>L  Re-eval Date: 10/10/2020    Date 8/31 9/4 9/11     Visit Count 31 32 33     FOTO        Pain In  0 See RE     Pain Out  0 See RE       Manual         Ham stretch        ITB stretch        Quad stretch        Hip Flexor stretch                  Date        Aerobic        UBE   10 mins alt UE/LEs      Sit to stand trials from w/c        Evan  MTP/LTP        Triceps        Upward rows        Biceps        AH with hip abd/add        Ankle DF        Tband Anti Trunk rotation          SLRs        Clamshells         Mini Squats        Trunk rotation        Transfer w/c to mat        Toe Tap Alt toe taps  4 reps with bilateral UEs, max assist to maintain right knee extension with single leg stance on right LE 4 trials  Alt toe taps 6" step  1 x 8 left LE with right MAFO in place, UE support  Max VCs  2 trials     Dynamic seated balance        Gait training Sit to stand, carbon fiber MAFO right LE  Sit to stand to RW with min A x 3 trials, mod assist x 2 trials, CGA x 2 trials  Gait with RW for 6 feet x 4, seated rest periods required  Max distance 9 feet x 4 trials  Requires mod A for right knee extension and mod A for right hip extension  Ongoing difficulty maintaining hip extension and decreasing UE WBing  Sit to stand, carbon fiber MAFO right LE  Sit to stand to RW with min A x 4 trials, mod A x 1, min A x 1 ea  Gait with RW for 6 x 2, 10 feet x 2, 4 feet x 1 seated rest periods required  Requires mod A for right knee extension and min A for right hip extension  Ongoing difficulty maintaining hip extension and decreasing UE WBing  Sit to stand, carbon fiber MAFO right LE  Left ankle splint  Gait training with RW, min A on left for RW advancement; mod A on right for right knee extension, intermittent right hip extension  Max distance 8-9 feet      Trials x 6     Tilt table

## 2020-09-14 ENCOUNTER — OFFICE VISIT (OUTPATIENT)
Dept: PHYSICAL THERAPY | Facility: CLINIC | Age: 70
End: 2020-09-14
Payer: MEDICARE

## 2020-09-14 DIAGNOSIS — G54.1 LUMBOSACRAL PLEXOPATHY: Primary | ICD-10-CM

## 2020-09-14 PROCEDURE — 97112 NEUROMUSCULAR REEDUCATION: CPT

## 2020-09-14 PROCEDURE — 97116 GAIT TRAINING THERAPY: CPT

## 2020-09-14 PROCEDURE — 97110 THERAPEUTIC EXERCISES: CPT

## 2020-09-14 NOTE — PROGRESS NOTES
Daily Note     Today's date: 2020  Patient name: Tony Sellers  : 1950  MRN: [id number]  Referring provider: Chinyere Stephenson DO  Dx:   Encounter Diagnosis     ICD-10-CM    1  Lumbosacral plexopathy  G54 1                   Subjective: Overall I am feeling stronger  Working on standing longer some days are better than others      Objective: See treatment diary below      Assessment: Tolerated treatment well  Denied any increase pain/discomfor  Pt benefits with L ankle support and R MAFO with ambulation  Noted improve step stride and upright posture with gait  Patient would benefit from continued PT      Plan: Continue per plan of care  Precautions: nonambulatory, BLE weakness R>L  Re-eval Date: 10/10/2020    Date     Visit Count 31 32 33 34    FOTO        Pain In  0 See RE     Pain Out  0 See RE       Manual         Ham stretch        ITB stretch        Quad stretch        Hip Flexor stretch                  Date        Aerobic        UBE   10 mins alt UE/LEs  UBE  50 RPM  10 min    Sit to stand trials from w/c        Evan  MTP/LTP        Triceps        Upward rows        Biceps        AH with hip abd/add        Ankle DF        Tband Anti Trunk rotation          SLRs        Clamshells         Mini Squats        Trunk rotation        Transfer w/c to mat        Toe Tap Alt toe taps  4 reps with bilateral UEs, max assist to maintain right knee extension with single leg stance on right LE 4 trials  Alt toe taps 6" step  1 x 8 left LE with right MAFO in place, UE support  Max VCs  2 trials Toe taps 6" step  2 trials  R 2x10  L 8x and 11x with right MAFO, L ankle brace  in place, UE support  Max VCs      Dynamic seated balance        Gait training Sit to stand, carbon fiber MAFO right LE  Sit to stand to RW with min A x 3 trials, mod assist x 2 trials, CGA x 2 trials  Gait with RW for 6 feet x 4, seated rest periods required  Max distance 9 feet x 4 trials   Requires mod A for right knee extension and mod A for right hip extension  Ongoing difficulty maintaining hip extension and decreasing UE WBing  Sit to stand, carbon fiber MAFO right LE  Sit to stand to RW with min A x 4 trials, mod A x 1, min A x 1 ea  Gait with RW for 6 x 2, 10 feet x 2, 4 feet x 1 seated rest periods required  Requires mod A for right knee extension and min A for right hip extension  Ongoing difficulty maintaining hip extension and decreasing UE WBing  Sit to stand, carbon fiber MAFO right LE  Left ankle splint  Gait training with RW, min A on left for RW advancement; mod A on right for right knee extension, intermittent right hip extension  Max distance 8-9 feet  Trials x 6 Sit to stand, carbon fiber MAFO right LE  Left ankle splint  Gait training with RW, min A on left for RW advancement; mod A on right for right knee extension, intermittent right hip extension  Max distance 8-9 feet      Trials x 5    Tilt table

## 2020-09-18 ENCOUNTER — OFFICE VISIT (OUTPATIENT)
Dept: PHYSICAL THERAPY | Facility: CLINIC | Age: 70
End: 2020-09-18
Payer: MEDICARE

## 2020-09-18 DIAGNOSIS — G54.1 LUMBOSACRAL PLEXOPATHY: Primary | ICD-10-CM

## 2020-09-18 PROCEDURE — 97116 GAIT TRAINING THERAPY: CPT | Performed by: PHYSICAL THERAPIST

## 2020-09-18 PROCEDURE — 97530 THERAPEUTIC ACTIVITIES: CPT | Performed by: PHYSICAL THERAPIST

## 2020-09-18 NOTE — PROGRESS NOTES
Daily Note     Today's date: 2020  Patient name: Shanae Reyes  : 1950  MRN: [id number]  Referring provider: Lilian Terrazas DO  Dx:   Encounter Diagnosis     ICD-10-CM    1  Lumbosacral plexopathy  G54 1                   Subjective: Patient reports with ongoing weakness, improving feeling in LEs  Objective: See treatment diary below      Assessment: Tolerated treatment well  Patient demonstrated fatigue post treatment and would benefit from continued PT  Improving gait endurance and standing this date  Requires min-mod A of 1 for sit to stand without knees blocked  Significant difficulty with turns, stuttered movements of RW  Unable to regain trunk extension in standing  Plan: Continue per plan of care  Precautions: nonambulatory, BLE weakness R>L  Re-eval Date: 10/10/2020    Date    Visit Count 31 32 33 34 35   FOTO        Pain In  0 See RE  0   Pain Out  0 See RE  0     Manual         Ham stretch        ITB stretch        Quad stretch        Hip Flexor stretch                  Date        Aerobic        UBE   10 mins alt UE/LEs  UBE  50 RPM  10 min    Sit to stand trials from w/c        Evan  MTP/LTP        Triceps        Upward rows        Biceps        AH with hip abd/add        Ankle DF        Tband Anti Trunk rotation          SLRs        Clamshells         Mini Squats        Trunk rotation        Transfer w/c to mat        Toe Tap Alt toe taps  4 reps with bilateral UEs, max assist to maintain right knee extension with single leg stance on right LE 4 trials  Alt toe taps 6" step  1 x 8 left LE with right MAFO in place, UE support  Max VCs  2 trials Toe taps 6" step  2 trials  R 2x10  L 8x and 11x with right MAFO, L ankle brace  in place, UE support  Max VCs      Dynamic seated balance        Gait training Sit to stand, carbon fiber MAFO right LE  Sit to stand to RW with min A x 3 trials, mod assist x 2 trials, CGA x 2 trials    Gait with RW for 6 feet x 4, seated rest periods required  Max distance 9 feet x 4 trials  Requires mod A for right knee extension and mod A for right hip extension  Ongoing difficulty maintaining hip extension and decreasing UE WBing  Sit to stand, carbon fiber MAFO right LE  Sit to stand to RW with min A x 4 trials, mod A x 1, min A x 1 ea  Gait with RW for 6 x 2, 10 feet x 2, 4 feet x 1 seated rest periods required  Requires mod A for right knee extension and min A for right hip extension  Ongoing difficulty maintaining hip extension and decreasing UE WBing  Sit to stand, carbon fiber MAFO right LE  Left ankle splint  Gait training with RW, min A on left for RW advancement; mod A on right for right knee extension, intermittent right hip extension  Max distance 8-9 feet  Trials x 6 Sit to stand, carbon fiber MAFO right LE  Left ankle splint  Gait training with RW, min A on left for RW advancement; mod A on right for right knee extension, intermittent right hip extension  Max distance 8-9 feet  Trials x 5 Sit to stand, carbon fiber MAFO right LE  Left ankle splint  12 feet x 1  8 feet x 3  6 feet x 1    Gait training with RW, CGA on left for RW advancement; mod A on right for right knee extension, intermittent right hip extension      Trial of turn to left x 1 and right x 1     Tilt table

## 2020-09-21 ENCOUNTER — APPOINTMENT (OUTPATIENT)
Dept: PHYSICAL THERAPY | Facility: CLINIC | Age: 70
End: 2020-09-21
Payer: MEDICARE

## 2020-09-25 ENCOUNTER — OFFICE VISIT (OUTPATIENT)
Dept: PHYSICAL THERAPY | Facility: CLINIC | Age: 70
End: 2020-09-25
Payer: MEDICARE

## 2020-09-25 DIAGNOSIS — G54.1 LUMBOSACRAL PLEXOPATHY: Primary | ICD-10-CM

## 2020-09-25 PROCEDURE — 97116 GAIT TRAINING THERAPY: CPT | Performed by: PHYSICAL THERAPIST

## 2020-09-25 PROCEDURE — 97530 THERAPEUTIC ACTIVITIES: CPT | Performed by: PHYSICAL THERAPIST

## 2020-09-25 NOTE — PROGRESS NOTES
Daily Note     Today's date: 2020  Patient name: Baljit Do  : 1950  MRN: [id number]  Referring provider: Brien Wu DO  Dx:   Encounter Diagnosis     ICD-10-CM    1  Lumbosacral plexopathy  G54 1                   Subjective: I'm doing okay, walking better here, I think  Still standing more at home and riding bike  Objective: See treatment diary below      Assessment: Tolerated treatment well  Patient demonstrated fatigue post treatment and would benefit from continued PT  Patient with ongoing deficits with right > left knee extension in standing  He was able to activate quad in long leg brace x 4 steps without physically blocking knee by PT  Continues with increased forward trunk flexion, difficulty with glute and lumbar extension activation  Plan: Continue per plan of care  Precautions: nonambulatory, BLE weakness R>L  Re-eval Date: 10/10/2020    Date        Visit Count 36       FOTO        Pain In        Pain Out          Manual         Ham stretch        ITB stretch        Quad stretch        Hip Flexor stretch                  Date        Aerobic        UBE         Sit to stand trials from w/c        Evan  MTP/LTP        Triceps        Upward rows        Biceps        AH with hip abd/add        Ankle DF        Tband Anti Trunk rotation          SLRs        Clamshells         Mini Squats        Trunk rotation        Transfer w/c to mat        Toe Tap        Dynamic seated balance        Gait training Sit to stand, carbon fiber MAFO right LE  Left ankle splint    Right long leg immobilizer    9 feet x 1  8 feet x 3  6 feet x 1    Gait training with RW, CGA on left for RW advancement; min A on right to assist iwht sit to stand  Intermittent assist to increase trunk extension and glute activation, unable to complete further distance with decreased assist provided    Trial of turn to right x 1       Tilt table

## 2020-09-28 ENCOUNTER — OFFICE VISIT (OUTPATIENT)
Dept: PHYSICAL THERAPY | Facility: CLINIC | Age: 70
End: 2020-09-28
Payer: MEDICARE

## 2020-09-28 DIAGNOSIS — G54.1 LUMBOSACRAL PLEXOPATHY: Primary | ICD-10-CM

## 2020-09-28 PROCEDURE — 97110 THERAPEUTIC EXERCISES: CPT

## 2020-09-28 PROCEDURE — 97116 GAIT TRAINING THERAPY: CPT

## 2020-09-28 NOTE — PROGRESS NOTES
Daily Note     Today's date: 2020  Patient name: Jay Canales  : 1950  MRN: [id number]  Referring provider: Dylon Posada DO  Dx:   Encounter Diagnosis     ICD-10-CM    1  Lumbosacral plexopathy  G54 1                   Subjective: I have been working on my standing at home  Felt good after PT  Legs feeling stronger      Objective: See treatment diary below      Assessment: Tolerated treatment well  Denied any increase pain/discomfort  Pt benefits with knee immobilizer RLE for long quad activation with MAFO R Ankle for improved ground clearance and L ankle brace for improved heel strike with ambulation Patient would benefit from continued PT  To improve BL LE /UE strengthening, gait quality/endurance    Plan: Continue per plan of care  Precautions: nonambulatory, BLE weakness R>L  Re-eval Date: 10/10/2020    Date        Visit Count 36       FOTO        Pain In        Pain Out          Manual         Ham stretch        ITB stretch        Quad stretch        Hip Flexor stretch                  Treatment Diary        Aerobic        UBE  50 RPM  10 min alt       Sit to stand trials from w/c        [place]  MTP/LTP        Triceps        Upward rows        Biceps        AH with hip abd/add        Ankle DF        Tband Anti Trunk rotation          SLRs        Clamshells         Mini Squats        Trunk rotation        Transfer w/c to mat        Toe Tap        Dynamic seated balance        Gait training Sit to stand, carbon fiber MAFO right LE  Left ankle splint    Right long leg immobilizer    10 feet x 1  12 feet x 2  16 feet x 1    Gait training with RW, CGA on left for RW advancement; min to mod A on right to assist with sit to stand  Intermittent assist to increase trunk extension and glute activation,     Trial of turn to right x 3       Tilt table

## 2020-10-02 ENCOUNTER — OFFICE VISIT (OUTPATIENT)
Dept: PHYSICAL THERAPY | Facility: CLINIC | Age: 70
End: 2020-10-02
Payer: MEDICARE

## 2020-10-02 DIAGNOSIS — G54.1 LUMBOSACRAL PLEXOPATHY: Primary | ICD-10-CM

## 2020-10-02 PROCEDURE — 97116 GAIT TRAINING THERAPY: CPT | Performed by: PHYSICAL THERAPIST

## 2020-10-02 PROCEDURE — 97530 THERAPEUTIC ACTIVITIES: CPT | Performed by: PHYSICAL THERAPIST

## 2020-10-05 ENCOUNTER — OFFICE VISIT (OUTPATIENT)
Dept: PHYSICAL THERAPY | Facility: CLINIC | Age: 70
End: 2020-10-05
Payer: MEDICARE

## 2020-10-05 DIAGNOSIS — G54.1 LUMBOSACRAL PLEXOPATHY: Primary | ICD-10-CM

## 2020-10-05 PROCEDURE — 97112 NEUROMUSCULAR REEDUCATION: CPT | Performed by: PHYSICAL THERAPIST

## 2020-10-05 PROCEDURE — 97116 GAIT TRAINING THERAPY: CPT | Performed by: PHYSICAL THERAPIST

## 2020-10-09 ENCOUNTER — OFFICE VISIT (OUTPATIENT)
Dept: PHYSICAL THERAPY | Facility: CLINIC | Age: 70
End: 2020-10-09
Payer: MEDICARE

## 2020-10-09 DIAGNOSIS — G54.1 LUMBOSACRAL PLEXOPATHY: Primary | ICD-10-CM

## 2020-10-09 PROCEDURE — 97110 THERAPEUTIC EXERCISES: CPT | Performed by: PHYSICAL THERAPIST

## 2020-10-09 PROCEDURE — 97116 GAIT TRAINING THERAPY: CPT | Performed by: PHYSICAL THERAPIST

## 2020-10-09 PROCEDURE — 97112 NEUROMUSCULAR REEDUCATION: CPT | Performed by: PHYSICAL THERAPIST

## 2020-10-12 ENCOUNTER — EVALUATION (OUTPATIENT)
Dept: PHYSICAL THERAPY | Facility: CLINIC | Age: 70
End: 2020-10-12
Payer: MEDICARE

## 2020-10-12 DIAGNOSIS — G54.1 LUMBOSACRAL PLEXOPATHY: Primary | ICD-10-CM

## 2020-10-12 PROCEDURE — 97116 GAIT TRAINING THERAPY: CPT

## 2020-10-12 PROCEDURE — 97112 NEUROMUSCULAR REEDUCATION: CPT

## 2020-10-16 ENCOUNTER — OFFICE VISIT (OUTPATIENT)
Dept: PHYSICAL THERAPY | Facility: CLINIC | Age: 70
End: 2020-10-16
Payer: MEDICARE

## 2020-10-16 DIAGNOSIS — G54.1 LUMBOSACRAL PLEXOPATHY: Primary | ICD-10-CM

## 2020-10-16 PROCEDURE — 97116 GAIT TRAINING THERAPY: CPT | Performed by: PHYSICAL THERAPIST

## 2020-10-16 PROCEDURE — 97112 NEUROMUSCULAR REEDUCATION: CPT | Performed by: PHYSICAL THERAPIST

## 2020-10-19 ENCOUNTER — OFFICE VISIT (OUTPATIENT)
Dept: PHYSICAL THERAPY | Facility: CLINIC | Age: 70
End: 2020-10-19
Payer: MEDICARE

## 2020-10-19 DIAGNOSIS — G54.1 LUMBOSACRAL PLEXOPATHY: Primary | ICD-10-CM

## 2020-10-19 PROCEDURE — 97112 NEUROMUSCULAR REEDUCATION: CPT | Performed by: PHYSICAL THERAPIST

## 2020-10-19 PROCEDURE — 97116 GAIT TRAINING THERAPY: CPT | Performed by: PHYSICAL THERAPIST

## 2020-10-23 ENCOUNTER — OFFICE VISIT (OUTPATIENT)
Dept: PHYSICAL THERAPY | Facility: CLINIC | Age: 70
End: 2020-10-23
Payer: MEDICARE

## 2020-10-23 DIAGNOSIS — G54.1 LUMBOSACRAL PLEXOPATHY: Primary | ICD-10-CM

## 2020-10-23 PROCEDURE — 97116 GAIT TRAINING THERAPY: CPT | Performed by: PHYSICAL THERAPIST

## 2020-10-26 ENCOUNTER — OFFICE VISIT (OUTPATIENT)
Dept: PHYSICAL THERAPY | Facility: CLINIC | Age: 70
End: 2020-10-26
Payer: MEDICARE

## 2020-10-26 DIAGNOSIS — G54.1 LUMBOSACRAL PLEXOPATHY: Primary | ICD-10-CM

## 2020-10-26 PROCEDURE — 97110 THERAPEUTIC EXERCISES: CPT | Performed by: PHYSICAL THERAPIST

## 2020-10-26 PROCEDURE — 97116 GAIT TRAINING THERAPY: CPT | Performed by: PHYSICAL THERAPIST

## 2020-10-30 ENCOUNTER — OFFICE VISIT (OUTPATIENT)
Dept: PHYSICAL THERAPY | Facility: CLINIC | Age: 70
End: 2020-10-30
Payer: MEDICARE

## 2020-10-30 DIAGNOSIS — G54.1 LUMBOSACRAL PLEXOPATHY: Primary | ICD-10-CM

## 2020-10-30 PROCEDURE — 97116 GAIT TRAINING THERAPY: CPT

## 2020-11-02 ENCOUNTER — OFFICE VISIT (OUTPATIENT)
Dept: PHYSICAL THERAPY | Facility: CLINIC | Age: 70
End: 2020-11-02
Payer: MEDICARE

## 2020-11-02 DIAGNOSIS — G54.1 LUMBOSACRAL PLEXOPATHY: Primary | ICD-10-CM

## 2020-11-02 PROCEDURE — 97112 NEUROMUSCULAR REEDUCATION: CPT | Performed by: PHYSICAL THERAPIST

## 2020-11-02 PROCEDURE — 97116 GAIT TRAINING THERAPY: CPT | Performed by: PHYSICAL THERAPIST

## 2020-11-06 ENCOUNTER — OFFICE VISIT (OUTPATIENT)
Dept: PHYSICAL THERAPY | Facility: CLINIC | Age: 70
End: 2020-11-06
Payer: MEDICARE

## 2020-11-06 DIAGNOSIS — G54.1 LUMBOSACRAL PLEXOPATHY: Primary | ICD-10-CM

## 2020-11-06 PROCEDURE — 97116 GAIT TRAINING THERAPY: CPT | Performed by: PHYSICAL THERAPIST

## 2020-11-06 PROCEDURE — 97530 THERAPEUTIC ACTIVITIES: CPT | Performed by: PHYSICAL THERAPIST

## 2020-11-09 ENCOUNTER — APPOINTMENT (OUTPATIENT)
Dept: PHYSICAL THERAPY | Facility: CLINIC | Age: 70
End: 2020-11-09
Payer: MEDICARE

## 2020-11-13 ENCOUNTER — OFFICE VISIT (OUTPATIENT)
Dept: PHYSICAL THERAPY | Facility: CLINIC | Age: 70
End: 2020-11-13
Payer: MEDICARE

## 2020-11-13 DIAGNOSIS — G54.1 LUMBOSACRAL PLEXOPATHY: Primary | ICD-10-CM

## 2020-11-13 PROCEDURE — 97112 NEUROMUSCULAR REEDUCATION: CPT | Performed by: PHYSICAL THERAPIST

## 2020-11-13 PROCEDURE — 97116 GAIT TRAINING THERAPY: CPT | Performed by: PHYSICAL THERAPIST

## 2020-11-16 ENCOUNTER — APPOINTMENT (OUTPATIENT)
Dept: PHYSICAL THERAPY | Facility: CLINIC | Age: 70
End: 2020-11-16
Payer: MEDICARE

## 2020-11-20 ENCOUNTER — OFFICE VISIT (OUTPATIENT)
Dept: PHYSICAL THERAPY | Facility: CLINIC | Age: 70
End: 2020-11-20
Payer: MEDICARE

## 2020-11-20 DIAGNOSIS — G54.1 LUMBOSACRAL PLEXOPATHY: Primary | ICD-10-CM

## 2020-11-20 PROCEDURE — 97116 GAIT TRAINING THERAPY: CPT | Performed by: PHYSICAL THERAPIST

## 2020-11-20 PROCEDURE — 97112 NEUROMUSCULAR REEDUCATION: CPT | Performed by: PHYSICAL THERAPIST

## 2020-11-23 ENCOUNTER — OFFICE VISIT (OUTPATIENT)
Dept: PHYSICAL THERAPY | Facility: CLINIC | Age: 70
End: 2020-11-23
Payer: MEDICARE

## 2020-11-23 DIAGNOSIS — G54.1 LUMBOSACRAL PLEXOPATHY: Primary | ICD-10-CM

## 2020-11-23 PROCEDURE — 97116 GAIT TRAINING THERAPY: CPT | Performed by: PHYSICAL THERAPIST

## 2020-11-23 PROCEDURE — 97112 NEUROMUSCULAR REEDUCATION: CPT | Performed by: PHYSICAL THERAPIST

## 2020-11-27 ENCOUNTER — APPOINTMENT (OUTPATIENT)
Dept: PHYSICAL THERAPY | Facility: CLINIC | Age: 70
End: 2020-11-27
Payer: MEDICARE

## 2020-11-30 ENCOUNTER — APPOINTMENT (OUTPATIENT)
Dept: PHYSICAL THERAPY | Facility: CLINIC | Age: 70
End: 2020-11-30
Payer: MEDICARE

## 2020-12-04 ENCOUNTER — OFFICE VISIT (OUTPATIENT)
Dept: PHYSICAL THERAPY | Facility: CLINIC | Age: 70
End: 2020-12-04
Payer: MEDICARE

## 2020-12-04 DIAGNOSIS — G54.1 LUMBOSACRAL PLEXOPATHY: Primary | ICD-10-CM

## 2020-12-04 PROCEDURE — 97116 GAIT TRAINING THERAPY: CPT | Performed by: PHYSICAL THERAPIST

## 2020-12-07 ENCOUNTER — OFFICE VISIT (OUTPATIENT)
Dept: PHYSICAL THERAPY | Facility: CLINIC | Age: 70
End: 2020-12-07
Payer: MEDICARE

## 2020-12-07 DIAGNOSIS — G54.1 LUMBOSACRAL PLEXOPATHY: Primary | ICD-10-CM

## 2020-12-07 PROCEDURE — 97116 GAIT TRAINING THERAPY: CPT | Performed by: PHYSICAL THERAPIST

## 2020-12-11 ENCOUNTER — OFFICE VISIT (OUTPATIENT)
Dept: PHYSICAL THERAPY | Facility: CLINIC | Age: 70
End: 2020-12-11
Payer: MEDICARE

## 2020-12-11 DIAGNOSIS — G54.1 LUMBOSACRAL PLEXOPATHY: Primary | ICD-10-CM

## 2020-12-11 PROCEDURE — 97112 NEUROMUSCULAR REEDUCATION: CPT

## 2020-12-14 ENCOUNTER — APPOINTMENT (OUTPATIENT)
Dept: PHYSICAL THERAPY | Facility: CLINIC | Age: 70
End: 2020-12-14
Payer: MEDICARE

## 2020-12-14 NOTE — PROGRESS NOTES
PT Re-Evaluation    Today's date: 10/12/2020  Patient name: Patricia Zuñiga  : 1950  MRN: [id number]  Referring provider: Godfrey Elam DO  Dx:   No diagnosis found  Assessment  Assessment details: Patricia Zuñiga is a 68 y o  male presenting to outpatient physical therapy with diagnosis of lumbosacral plexopathy  He continues to make progress with knee control  Able to ambulate without left knee blocking, does require assist on right knee  Presently wearing KAFO on right ankle, ankle air splint on left for support  Patient struggles with hip and trunk extension, depends heavily on UEs for support, however has shown recent improvements with upright posture control  Progressing with distance/endurance  Requiring less assist for LE advancement  He is making slow and steady progress given chronicity of symptoms  Improving left quad strength, right remains limited, but overall slowly improving control  He continues to be motivated to increase his standing/marching tolerance at home and has been improving in clinic  Noted with increased ambulation distances supported by PT for right knee extension and use of SoloStep along with RW  Able to ambulate with assist of 1 in SoloStep at this time  Cont per POC  Impairments: abnormal coordination, abnormal gait, abnormal muscle firing, abnormal muscle tone, activity intolerance, impaired balance and impaired physical strength  Barriers to therapy: Nonambulatory, chronicity of impairments  Understanding of Dx/Px/POC: good   Prognosis: good    Goals  STGs to be achieved in 4 weeks:     1  Pt to demonstrate increased AROM of BLEs in order to allow for greater ease and independence with ADLs and functional mobility - MET  2  Pt to demonstrate full PROM of BLEs in order to maximize joint mobility and function and allow for progression of exercise program and achievement of goals - MET  3   Pt to demonstrate increased MMT of BLEs and B shldrs  by at least 1/2-1 grade in order to improve safety and stability with ADLs and functional mobility - ONGOING  4  Pt will take 5-6 steps in parallel bars with min A of 1 -  MET    LTGs to be achieved in 6-8 weeks: - ONGOING  1  Pt will be I with HEP in order to continue to improve quality of life and independence   2  Pt to demonstrate return to amb 25 ft with RW with min asst of 1 - Partially MET for 18 feet x2 with assist of 1  3  Pt to demonstrate improved function as noted by achieving or exceeding predicted score on FOTO outcomes assessment tool  New Goal:   1  Pt will be able to stand with RW, KAFO and ankle brace for 2 minutes without seated rest break required  Plan  Patient would benefit from: skilled physical therapy  Other planned modality interventions: Modalties prn for symptom management  Planned therapy interventions: neuromuscular re-education, manual therapy, therapeutic exercise, home exercise program and therapeutic activities  Frequency: 2x week  Duration in visits: 8  Duration in weeks: 4  Plan of Care beginning date: 12/14/2020  Plan of Care expiration date: 1/11/2021  Treatment plan discussed with: patient        Subjective Evaluation    History of Present Illness  Date of surgery: 2004,2015  Mechanism of injury: surgery  Mechanism of injury: UPDATE:   Patient reports he has been standing for longer distances at home for ~1-2 minutes in duration  Pt has been using KAFO and continues to like it  UPDATE:    Patient reports he is doing better, standing and marching at home when his kids come to visit on the weekend  Still not walking, somewhat limited as his wife hurt her back recently  Pending KAFOs, DME rep contacted  Pt states leaving work one night his legs went numb and he fell to the ground  Pt underwent testing and end result was surgery with rods and screws and cleaned out arthritis  Over time pt had to use walker and legs got progressively weaker   Had a second surgery in 2015 and was in [facility] for rehab with a stage 4 pressure ulcer as a result of his hospitalization  Pt states he has been nonambulatory for 3 years, since the second surgery  States when he would attempt to stand his R shld would give out and he could not support himself  Pt has lift recliner and is able to perform indep transfers to tub, recliner and stationary bike  Had MAFOs after initial surgery  Stands at dining room table with wife stabilizing table  Wife states pt had PT from Oct 2018 to April 2019 at another facility  Wife states since DC from PT pt has lost what he gained at that time  Recurrent probem    Quality of life: good    Pain  No pain reported    Social Support  Lives with: spouse    Employment status: not working    Diagnostic Tests  MRI studies: abnormal (back)  Treatments  Previous treatment: physical therapy  Current treatment: massage and physical therapy  Patient Goals  Patient goals for therapy: improved balance, increased motion, increased strength, independence with ADLs/IADLs and return to sport/leisure activities  Patient goal: get up and walk, resume hunting, attend grandchildren's sporting events        Objective     Concurrent Complaints  Negative for night pain and disturbed sleep    Postural Observations  Seated posture: good  Standing posture: poor    Additional Postural Observation Details  At eval:  Stands in parallel bars with forward lean and LEs rigid in extension  Unable to take any steps  Transfers sit to stand indep by pulling up on parallel bars and locking knees in ext  At re-eval:  Sit to stand from w/c with heavy use of UEs  Improved hip extension and knee extension on left  80% left LE advancement without physical assist   Mod assist right hip 100%, mod assist to block right knee with MAFO in place  100% right LE advancement      Tenderness     Additional Tenderness Details  Denies TTP    Neurological Testing     Sensation     Lumbar   Left   Diminished: light touch, sharp/dull discrimination and proprioception    Right   Diminished: light touch, sharp/dull discrimination and proprioception    Comments   Left light touch: lower leg  Right light touch: lower leg  Right sharp/dull discrimination: very little sensation    Reflexes   Left   Patellar (L4): absent (0)    Right   Patellar (L4): trace (1+)    Additional Neurological Details  Intermittently "feel normal" - this has improved and continues improving by his subjective reports    Active Range of Motion     Additional Active Range of Motion Details  Bilateral LEs WFL PROM    Strength/Myotome Testing     Left Shoulder     Planes of Motion   Flexion: 4+   Abduction: 4+   External rotation at 0°: 4   Internal rotation at 0°: 4     Right Shoulder     Planes of Motion   Flexion: 4+   Abduction: 4+   External rotation at 0°: 4   Internal rotation at 0°: 4     Left Elbow   Flexion: 4+  Extension: 5    Right Elbow   Flexion: 4+  Extension: 5    Left Hip   Planes of Motion   Flexion: 3    Right Hip   Planes of Motion   Flexion: 3    Left Knee   Extension: 3+    Right Knee   Extension: 3-    Muscle Activation     Additional Muscle Activation Details  Over-activation of abdominal muscles in an attempt to increase hip extension    Ambulates 8 feet with min A on left; mod assist of right  RW for UE support  Neuro Exam:     Sensation   Light touch LE: left impaired and right impaired  Proprioception LE: left impaired and right impaired    Coordination   Heel to shin: left dysmetric and right dysmetric  Rapid alternating movements: LE impaired    Transfers   Sit to stand: independent with prosthesis: yesWheelchair to mat: independent Mat to wheelchair: independent Sit to supine: independent Supine to sit: independent   Roll: independent    Functional outcomes   Functional outcome gait comment: At eval:  Step to pattern with PT blocking right > left knee  Increased UE reliance to off weight LE to allow for step    Able to continue 4-5 steps in // bars  At re-eval:  Step through pattern with UEs WBing approx 40%, able to progress 18 feet x 1-2  Transitioned to RW  Able to advance bilateral LEs, improved knee control of left, requires assist to maintain right knee extension, max assist to increase trunk and hip extension  Solo step  Demonstrates ability to perform turns x 1-2  At [facility] 2: step through pattern with UEs WB through RW 40-80%, independent advancement of bilateral LE, requires less assistance to maintain right knee extension, max assist to increase trunk and hip extension  Independent with turns, solo step required for independent ambulation

## 2020-12-15 ENCOUNTER — EVALUATION (OUTPATIENT)
Dept: PHYSICAL THERAPY | Facility: CLINIC | Age: 70
End: 2020-12-15
Payer: MEDICARE

## 2020-12-15 DIAGNOSIS — G54.1 LUMBOSACRAL PLEXOPATHY: Primary | ICD-10-CM

## 2020-12-15 PROCEDURE — 97112 NEUROMUSCULAR REEDUCATION: CPT | Performed by: PHYSICAL THERAPIST

## 2020-12-18 ENCOUNTER — APPOINTMENT (OUTPATIENT)
Dept: PHYSICAL THERAPY | Facility: CLINIC | Age: 70
End: 2020-12-18
Payer: MEDICARE

## 2020-12-21 ENCOUNTER — OFFICE VISIT (OUTPATIENT)
Dept: PHYSICAL THERAPY | Facility: CLINIC | Age: 70
End: 2020-12-21
Payer: MEDICARE

## 2020-12-21 DIAGNOSIS — G54.1 LUMBOSACRAL PLEXOPATHY: Primary | ICD-10-CM

## 2020-12-21 PROCEDURE — 97112 NEUROMUSCULAR REEDUCATION: CPT | Performed by: PHYSICAL THERAPIST

## 2020-12-24 ENCOUNTER — APPOINTMENT (OUTPATIENT)
Dept: PHYSICAL THERAPY | Facility: CLINIC | Age: 70
End: 2020-12-24
Payer: MEDICARE

## 2020-12-28 ENCOUNTER — OFFICE VISIT (OUTPATIENT)
Dept: PHYSICAL THERAPY | Facility: CLINIC | Age: 70
End: 2020-12-28
Payer: MEDICARE

## 2020-12-28 DIAGNOSIS — G54.1 LUMBOSACRAL PLEXOPATHY: Primary | ICD-10-CM

## 2020-12-28 PROCEDURE — 97112 NEUROMUSCULAR REEDUCATION: CPT | Performed by: PHYSICAL THERAPIST

## 2020-12-28 PROCEDURE — 97116 GAIT TRAINING THERAPY: CPT | Performed by: PHYSICAL THERAPIST

## 2021-01-04 ENCOUNTER — OFFICE VISIT (OUTPATIENT)
Dept: PHYSICAL THERAPY | Facility: CLINIC | Age: 71
End: 2021-01-04
Payer: MEDICARE

## 2021-01-04 DIAGNOSIS — G54.1 LUMBOSACRAL PLEXOPATHY: Primary | ICD-10-CM

## 2021-01-04 PROCEDURE — 97116 GAIT TRAINING THERAPY: CPT | Performed by: PHYSICAL THERAPIST

## 2021-01-04 NOTE — PROGRESS NOTES
Daily Note     Today's date: 2021  Patient name: Malia Abdi  : 1950  MRN: [id number]  Referring provider: Miki Berger DO  Dx:   Encounter Diagnosis     ICD-10-CM    1  Lumbosacral plexopathy  G54 1                   Subjective: "I feel like this is going better today, I am trying to stand more upright at home too"       Objective: See treatment diary below      Assessment: Tolerated treatment well  Pt demonstrated increased step length bilaterally when ambulating  Pt required less assistance with retropulsion movements  Patient demonstrated fatigue post treatment and would benefit from continued PT      Plan: Continue per plan of care  Precautions: nonambulatory, BLE weakness R>L  Re-eval Date: 2021    Date       Visit Count 56 57      FOTO        Pain In 0  0      Pain Out 0  0        Manual         prn          Treatment Diary        Aerobic        UBE         Sit to stand trials from w/c        Toe Tap R and L onto 2" step  1x10 B/L  R forced use with left on step  Left UE lift x 7  L forced use with right on step x 1 min       Glute squeeze, hip ext         Long kneel on mat        Quadruped        Dynamic seated balance        Static standing endurance        Gait training Sit to stand, carbon fiber MAFO right LE  Left ankle splint   Gait training with RW, CGA on left for RW advancement; min to mod A on Right to assist with sit to stand   Intermittent assist to increase trunk extension and glute activation  Max assist for right hip and knee extension with retro stepping   Sit to stand, carbon fiber MAFO right LE  L ankle splint     RW (dec height closer) Solo step - about face     Static standing   5x1 min     Cues for glut activation, forward hip motion, WB through heels    1x 36ft solo step   RW     Mod A x1 on R for knee extension, hip extension    2x20ft w/ retropulsion     Mod A on R for knee extension/hip ext      Tilt table

## 2021-01-07 ENCOUNTER — OFFICE VISIT (OUTPATIENT)
Dept: PHYSICAL THERAPY | Facility: CLINIC | Age: 71
End: 2021-01-07
Payer: MEDICARE

## 2021-01-07 DIAGNOSIS — G54.1 LUMBOSACRAL PLEXOPATHY: Primary | ICD-10-CM

## 2021-01-07 PROCEDURE — 97116 GAIT TRAINING THERAPY: CPT | Performed by: PHYSICAL THERAPIST

## 2021-01-07 NOTE — PROGRESS NOTES
Daily Note     Today's date: 2021  Patient name: Gallo Martin  : 1950  MRN: [id number]  Referring provider: Shauna Cunningham DO  Dx:   Encounter Diagnosis     ICD-10-CM    1  Lumbosacral plexopathy  G54 1        Start Time: 1100  Stop Time: 1200  Total time in clinic (min): 60 minutes    Subjective: "I am riding the bike at home and am up to 3 miles  Next week I will up to 3 5 miles"       Objective: See treatment diary below      Assessment: Tolerated treatment well  Pt demonstrated increased ability to stand with neutral UE weight bearing, pt required mod Ax1 to maintain posture for short durations  Patient demonstrated fatigue post treatment and would benefit from continued PT      Plan: Continue per plan of care  Precautions: nonambulatory, BLE weakness R>L  Re-eval Date: 2021    Date      Visit Count 56 57 58     FOTO        Pain In 0  0      Pain Out 0  0        Manual         prn          Treatment Diary        Aerobic        UBE         Sit to stand trials from w/c        Toe Tap R and L onto 2" step  1x10 B/L  R forced use with left on step  Left UE lift x 7  L forced use with right on step x 1 min       Glute squeeze, hip ext         Long kneel on mat        Quadruped        Dynamic seated balance        Static standing endurance        Gait training Sit to stand, carbon fiber MAFO right LE  Left ankle splint   Gait training with RW, CGA on left for RW advancement; min to mod A on Right to assist with sit to stand   Intermittent assist to increase trunk extension and glute activation  Max assist for right hip and knee extension with retro stepping   Sit to stand, carbon fiber MAFO right LE  L ankle splint     RW (dec height closer) Solo step - about face     Static standing   5x1 min     Cues for glut activation, forward hip motion, WB through heels    1x 36ft solo step   RW     Mod A x1 on R for knee extension, hip extension    2x20ft w/ retropulsion     Mod A on R for knee extension/hip ext Sit to stand, carbon fiber MAFO right LE  L ankle splint     RW, solo step     static stance 20s x5     paralell bars   W/ neutral UE support     10x 20-40s     2x stepping forward R/L     Mod Ax1 R knee & hip extension    Cues for weight shifting hips and knee forward, dec trunk forward motion   Trial R knee immobilizer     Tilt table

## 2021-01-11 ENCOUNTER — OFFICE VISIT (OUTPATIENT)
Dept: PHYSICAL THERAPY | Facility: CLINIC | Age: 71
End: 2021-01-11
Payer: MEDICARE

## 2021-01-11 DIAGNOSIS — G54.1 LUMBOSACRAL PLEXOPATHY: Primary | ICD-10-CM

## 2021-01-11 PROCEDURE — 97116 GAIT TRAINING THERAPY: CPT | Performed by: PHYSICAL THERAPIST

## 2021-01-11 PROCEDURE — 97112 NEUROMUSCULAR REEDUCATION: CPT | Performed by: PHYSICAL THERAPIST

## 2021-01-11 NOTE — PROGRESS NOTES
Daily Note     Today's date: 2021  Patient name: Casie Nelson  : 1950  MRN: [id number]  Referring provider: Azucena Romero DO  Dx:   Encounter Diagnosis     ICD-10-CM    1  Lumbosacral plexopathy  G54 1                   Subjective: "I am continuing to work on standing at home"     Objective: See treatment diary below      Assessment: Patient tolerated session well, progressing with program   Re-evaluate next session  DME rep contacted regarding bracing for LEs  This has been a struggle with poor contact/follow up with O[employer] rep  Ongoing contact continues  Plan: Cont per POC, RE next date  Precautions: nonambulatory, BLE weakness R>L  Re-eval Date: 2021    Date     Visit Count 56 57 58 59    FOTO        Pain In 0  0  0    Pain Out 0  0        Manual         prn          Treatment Diary        Aerobic        UBE         Sit to stand trials from w/c        Toe Tap R and L onto 2" step  1x10 B/L  R forced use with left on step  Left UE lift x 7  L forced use with right on step x 1 min   L onto 2" step     R forced use   W/ sustained hold on step up     2x10 seated rest breaks throughout     Cues for glut cont and quad cont     R knee immobilizer     Glute squeeze, hip ext         Long kneel on mat        Quadruped        Dynamic seated balance    Solo Step   Blue Tball   Seated weight shifts Lateral     Static standing endurance        Gait training Sit to stand, carbon fiber MAFO right LE  Left ankle splint   Gait training with RW, CGA on left for RW advancement; min to mod A on Right to assist with sit to stand   Intermittent assist to increase trunk extension and glute activation  Max assist for right hip and knee extension with retro stepping   Sit to stand, carbon fiber MAFO right LE  L ankle splint     RW (dec height closer) Solo step - about face     Static standing   5x1 min     Cues for glut activation, forward hip motion, WB through heels    1x 36ft solo step   RW     Mod A x1 on R for knee extension, hip extension    2x20ft w/ retropulsion     Mod A on R for knee extension/hip ext Sit to stand, carbon fiber MAFO right LE  L ankle splint     RW, solo step     static stance 20s x5     paralell bars   W/ neutral UE support     10x 20-40s     2x stepping forward R/L     Mod Ax1 R knee & hip extension    Cues for weight shifting hips and knee forward, dec trunk forward motion   Sit to stand, carbon fiber MAFO right LE  L ankle splint     R knee immobilizer     Parallel bars     Forward/backward stepping x2     Step w/ weight shifts R/L     Tilt table

## 2021-01-14 ENCOUNTER — APPOINTMENT (OUTPATIENT)
Dept: PHYSICAL THERAPY | Facility: CLINIC | Age: 71
End: 2021-01-14
Payer: MEDICARE

## 2021-01-15 ENCOUNTER — EVALUATION (OUTPATIENT)
Dept: PHYSICAL THERAPY | Facility: CLINIC | Age: 71
End: 2021-01-15
Payer: MEDICARE

## 2021-01-15 DIAGNOSIS — G54.1 LUMBOSACRAL PLEXOPATHY: Primary | ICD-10-CM

## 2021-01-15 PROCEDURE — 97112 NEUROMUSCULAR REEDUCATION: CPT | Performed by: PHYSICAL THERAPIST

## 2021-01-15 PROCEDURE — 97110 THERAPEUTIC EXERCISES: CPT | Performed by: PHYSICAL THERAPIST

## 2021-01-15 NOTE — PROGRESS NOTES
Daily Note     Today's date: 1/15/2021  Patient name: Tan Hinton  : 1950  MRN: [id number]  Referring provider: Kristen Canada DO  Dx:   Encounter Diagnosis     ICD-10-CM    1  Lumbosacral plexopathy  G54 1                   Subjective: See RE      Objective: See treatment diary below      Assessment: See RE      Plan: See RE     Precautions: nonambulatory, BLE weakness R>L  Re-eval Date: 2021    Date 12/28 1/4 1/7 1/11 1/15   Visit Count 56 57 58 59 60   FOTO        Pain In 0  0  0 0   Pain Out 0  0   0     Manual         prn          Treatment Diary        Aerobic        UBE         Sit to stand trials from w/c        Toe Tap R and L onto 2" step  1x10 B/L  R forced use with left on step  Left UE lift x 7  L forced use with right on step x 1 min   L onto 2" step     R forced use   W/ sustained hold on step up     2x10 seated rest breaks throughout     Cues for glut cont and quad cont     R knee immobilizer  L onto 4" step  10x  B/L UEs    R onto 4" step  5x3  B/L UEs and mod A for knee extension    Alt tapping R/L with right knee extn  B/L UEs and mod A for left step ups, block right knee     Cues for glue contraction, right knee extn   Glute squeeze, hip ext         Long kneel on mat        Quadruped     AAROM with MCs for quad contraction right seated  3 sec x 5 with tendon tapping   Dynamic seated balance    Solo Step   Blue Tball   Seated weight shifts Lateral     Static standing endurance        Gait training Sit to stand, carbon fiber MAFO right LE  Left ankle splint   Gait training with RW, CGA on left for RW advancement; min to mod A on Right to assist with sit to stand   Intermittent assist to increase trunk extension and glute activation  Max assist for right hip and knee extension with retro stepping   Sit to stand, carbon fiber MAFO right LE  L ankle splint     RW (dec height closer) Solo step - about face     Static standing   5x1 min     Cues for glut activation, forward hip motion, WB through heels    1x 36ft solo step   RW     Mod A x1 on R for knee extension, hip extension    2x20ft w/ retropulsion     Mod A on R for knee extension/hip ext Sit to stand, carbon fiber MAFO right LE  L ankle splint     RW, solo step     static stance 20s x5     paralell bars   W/ neutral UE support     10x 20-40s     2x stepping forward R/L     Mod Ax1 R knee & hip extension    Cues for weight shifting hips and knee forward, dec trunk forward motion   Sit to stand, carbon fiber MAFO right LE  L ankle splint     R knee immobilizer     Parallel bars     Forward/backward stepping x2     Step w/ weight shifts R/L  Def, no 2nd assist for gait trials to be performed safely     Tilt table

## 2021-01-15 NOTE — PROGRESS NOTES
PT Re-Evaluation    Today's date: 1/15/2021  Patient name: Sharad Reyes  : 1950  MRN: [id number]  Referring provider: Klever Payne DO  Dx:   Encounter Diagnosis     ICD-10-CM    1  Lumbosacral plexopathy  G54 1                   Assessment  Assessment details: Sharad Reyes is a 68 y o  male presenting to outpatient physical therapy with diagnosis of lumbosacral plexopathy  He continues to make progress with knee control bilaterally with left > right  Able to ambulate without left knee blocking, does require assist on right knee, however, this continues to progress as well  Presently awaiting KAFO on right, continues with ankle air splint on left for support  Patient struggles with hip and trunk extension, depends heavily on UEs for support, however has shown recent improvements with upright posture control  Progressing with distance/endurance  Requiring no assist for LE advancement  He is making slow and steady progress given chronicity of symptoms  Improving left quad strength, right remains limited, but overall slowly improving control  He continues to be motivated to increase his standing/marching tolerance at home and has been improving in clinic  Noted with increased ambulation distances supported by PT for right knee extension and use of SoloStep along with RW  Able to ambulate with assist of 1 in SoloStep at this time  Cont per POC  Impairments: abnormal coordination, abnormal gait, abnormal muscle firing, abnormal muscle tone, activity intolerance, impaired balance and impaired physical strength  Barriers to therapy: Nonambulatory, chronicity of impairments  Understanding of Dx/Px/POC: good   Prognosis: good    Goals  STGs to be achieved in 4 weeks:     1  Pt to demonstrate increased AROM of BLEs in order to allow for greater ease and independence with ADLs and functional mobility - MET  2   Pt to demonstrate full PROM of BLEs in order to maximize joint mobility and function and allow for progression of exercise program and achievement of goals - MET  3  Pt to demonstrate increased MMT of BLEs and B shldrs  by at least 1/2-1 grade in order to improve safety and stability with ADLs and functional mobility - ONGOING  4  Pt will take 5-6 steps in parallel bars with min A of 1 -  MET    LTGs to be achieved in 6-8 weeks: - ONGOING  1  Pt will be I with HEP in order to continue to improve quality of life and independence - MET  2  Pt to demonstrate return to amb 25 ft with RW with min asst of 1 - Partially MET for 18 feet x2 with assist of 1  3  Pt to demonstrate improved function as noted by achieving or exceeding predicted score on FOTO outcomes assessment tool  4  Pt will be able to stand with RW, KAFO and ankle brace for 2 minutes without seated rest break required - MET    New Goal:  Demonstrate independent donning/doffing KAFO  Demonstrate ability to ambulate with RW min-mod A of 1 at least 50% right knee control without blocking    Plan  Patient would benefit from: skilled physical therapy  Other planned modality interventions: Modalties prn for symptom management  Planned therapy interventions: neuromuscular re-education, manual therapy, therapeutic exercise, home exercise program and therapeutic activities  Frequency: 2x week  Duration in visits: 8  Duration in weeks: 4  Plan of Care beginning date: 1/15/2020  Plan of Care expiration date: 2/14/2021  Treatment plan discussed with: patient        Subjective Evaluation    History of Present Illness  Date of surgery: 2004,2015  Mechanism of injury: surgery  Mechanism of injury: UPDATE:   Patient continues to demonstrate increased standing, left > right quad control  Continue to await rep for delivery of KAFO  Patient now pending appointment in Feb for follow up in clinic for delivery  Patient demonstrates ability to step up leading with left LE, bilateral UE support, mod right knee blocking        Pt states leaving work one night his legs went numb and he fell to the ground  Pt underwent testing and end result was surgery with rods and screws and cleaned out arthritis  Over time pt had to use walker and legs got progressively weaker  Had a second surgery in 2015 and was in [facility] for rehab with a stage 4 pressure ulcer as a result of his hospitalization  Pt states he has been nonambulatory for 3 years, since the second surgery  States when he would attempt to stand his R shld would give out and he could not support himself  Pt has lift recliner and is able to perform indep transfers to tub, recliner and stationary bike  Had MAFOs after initial surgery  Stands at dining room table with wife stabilizing table  Wife states pt had PT from Oct 2018 to April 2019 at another facility  Wife states since DC from PT pt has lost what he gained at that time  Recurrent probem    Quality of life: good    Pain  No pain reported    Social Support  Lives with: spouse    Employment status: not working    Diagnostic Tests  MRI studies: abnormal (back)  Treatments  Previous treatment: physical therapy  Current treatment: physical therapy  Patient Goals  Patient goals for therapy: improved balance, increased motion, increased strength, independence with ADLs/IADLs and return to sport/leisure activities  Patient goal: get up and walk, resume hunting, attend grandchildren's sporting events        Objective     Concurrent Complaints  Negative for night pain and disturbed sleep    Postural Observations  Seated posture: good  Standing posture: poor    Additional Postural Observation Details  At eval:  Stands in parallel bars with forward lean and LEs rigid in extension  Unable to take any steps  Transfers sit to stand indep by pulling up on parallel bars and locking knees in ext  At re-eval:  Sit to stand from w/c with heavy use of UEs  Improved hip extension and knee extension on left    80% left LE advancement without physical assist   Mod assist right hip 100%, mod assist to block right knee with MAFO in place  100% right LE advancement  Tenderness     Additional Tenderness Details  Denies TTP    Neurological Testing     Sensation     Lumbar   Left   Diminished: light touch, sharp/dull discrimination and proprioception    Right   Diminished: light touch, sharp/dull discrimination and proprioception    Comments   Left light touch: lower leg  Right light touch: lower leg  Right sharp/dull discrimination: very little sensation    Reflexes   Left   Patellar (L4): absent (0)    Right   Patellar (L4): trace (1+)    Additional Neurological Details  Intermittently "feel normal" - this has improved and continues improving by his subjective reports    Active Range of Motion     Additional Active Range of Motion Details  Bilateral LEs WFL PROM    Strength/Myotome Testing     Left Shoulder     Planes of Motion   Flexion: 4+   Abduction: 4+   External rotation at 0°: 4   Internal rotation at 0°: 4     Right Shoulder     Planes of Motion   Flexion: 4+   Abduction: 4+   External rotation at 0°: 4   Internal rotation at 0°: 4     Left Elbow   Flexion: 4+  Extension: 5    Right Elbow   Flexion: 4+  Extension: 5    Left Hip   Planes of Motion   Flexion: 3    Right Hip   Planes of Motion   Flexion: 3    Left Knee   Flexion: 3+  Extension: 3+    Right Knee   Extension: 3-    Left Ankle/Foot   Dorsiflexion: 4  Plantar flexion: 4    Muscle Activation     Additional Muscle Activation Details  Over-activation of abdominal muscles in an attempt to increase hip extension    Ambulates 8 feet with min A on left; mod assist of right  RW for UE support    Neuro Exam:     Sensation   Light touch LE: left impaired and right impaired  Proprioception LE: left impaired and right impaired    Coordination   Heel to shin: left dysmetric and right dysmetric  Rapid alternating movements: LE impaired    Transfers   Sit to stand: independent with prosthesis: yesWheelchair to mat: independent Mat to wheelchair: independent Sit to supine: independent Supine to sit: independent   Roll: independent    Functional outcomes   Functional outcome gait comment: At Re-eval: step through pattern with UEs WB through RW, independent advancement of bilateral LE, requires less assistance to maintain right knee extension, mod assist to increase trunk and hip extension  Independent with turns, solo step required for independent ambulation   Able to complete upright posture for ~30 seconds before fatigue

## 2021-01-18 ENCOUNTER — OFFICE VISIT (OUTPATIENT)
Dept: PHYSICAL THERAPY | Facility: CLINIC | Age: 71
End: 2021-01-18
Payer: MEDICARE

## 2021-01-18 DIAGNOSIS — G54.1 LUMBOSACRAL PLEXOPATHY: Primary | ICD-10-CM

## 2021-01-18 PROCEDURE — 97112 NEUROMUSCULAR REEDUCATION: CPT

## 2021-01-18 NOTE — PROGRESS NOTES
Daily Note     Today's date: 2021  Patient name: Sue Guerrero  : 1950  MRN: [id number]  Referring provider: Paul Wheeler DO  Dx:   Encounter Diagnosis     ICD-10-CM    1  Lumbosacral plexopathy  G54 1                   Subjective:   I am getting around a lot better      Objective: See treatment diary below      Assessment: Tolerated treatment well  Denied any increase LBP/ but indicated increase mm soreness  Pt with noted increase endurance fatigue bL LE  Provided prolong seated rest between standing TE/Neuro activities   Patient would benefit from continued PT      Plan: Continue per plan of care        Precautions: nonambulatory, BLE weakness R>L  Re-eval Date: 2021    Date        Visit Count 61       FOTO        Pain In 0       Pain Out          Manual         prn          Treatment Diary        Aerobic        UBE         Sit to stand trials from w/c        Toe Tap Alt R/L tapping  5 trials  Reps to pt tolerance/fatigue  w/B/L UEs and mod A for knee extension    Cues for glute contraction, right knee extn       Glute squeeze, hip ext         Long kneel on mat        Quadruped AAROM with MCs for quad contraction right seated  3 sec x 5 with tendon tapping       Dynamic seated balance Solo Step   Blue Tball   Seated weight shifts Lateral    Alt UE, BL UE reach  10x          Static standing endurance        Stand/Rocker Board 2 trials  To pt tolerance  BL UE support  Block R knee         Gait training Sit to stand, carbon fiber MAFO right LE  L ankle splint     Static stand to pt renee ~ 1 min  Parallel bars          Tilt table

## 2021-01-20 ENCOUNTER — TRANSCRIBE ORDERS (OUTPATIENT)
Dept: LAB | Facility: MEDICAL CENTER | Age: 71
End: 2021-01-20

## 2021-01-20 ENCOUNTER — LAB (OUTPATIENT)
Dept: LAB | Facility: MEDICAL CENTER | Age: 71
End: 2021-01-20
Payer: MEDICARE

## 2021-01-20 DIAGNOSIS — G62.9 NEUROPATHY: ICD-10-CM

## 2021-01-20 DIAGNOSIS — M10.9 GOUT, UNSPECIFIED CAUSE, UNSPECIFIED CHRONICITY, UNSPECIFIED SITE: Primary | ICD-10-CM

## 2021-01-20 DIAGNOSIS — E78.5 HYPERLIPIDEMIA, UNSPECIFIED HYPERLIPIDEMIA TYPE: ICD-10-CM

## 2021-01-20 DIAGNOSIS — Z12.5 SPECIAL SCREENING, PROSTATE CANCER: ICD-10-CM

## 2021-01-20 DIAGNOSIS — M10.9 GOUT, UNSPECIFIED CAUSE, UNSPECIFIED CHRONICITY, UNSPECIFIED SITE: ICD-10-CM

## 2021-01-20 DIAGNOSIS — G62.9 POLYNEUROPATHY: ICD-10-CM

## 2021-01-20 LAB
ALBUMIN SERPL BCP-MCNC: 3.8 G/DL (ref 3.5–5)
ALP SERPL-CCNC: 104 U/L (ref 46–116)
ALT SERPL W P-5'-P-CCNC: 26 U/L (ref 12–78)
ANION GAP SERPL CALCULATED.3IONS-SCNC: 5 MMOL/L (ref 4–13)
AST SERPL W P-5'-P-CCNC: 24 U/L (ref 5–45)
BILIRUB SERPL-MCNC: 0.65 MG/DL (ref 0.2–1)
BUN SERPL-MCNC: 16 MG/DL (ref 5–25)
CALCIUM SERPL-MCNC: 9.3 MG/DL (ref 8.3–10.1)
CHLORIDE SERPL-SCNC: 104 MMOL/L (ref 100–108)
CHOLEST SERPL-MCNC: 208 MG/DL (ref 50–200)
CO2 SERPL-SCNC: 30 MMOL/L (ref 21–32)
CREAT SERPL-MCNC: 0.81 MG/DL (ref 0.6–1.3)
ERYTHROCYTE [DISTWIDTH] IN BLOOD BY AUTOMATED COUNT: 13.7 % (ref 11.6–15.1)
FOLATE SERPL-MCNC: >20 NG/ML (ref 3.1–17.5)
GFR SERPL CREATININE-BSD FRML MDRD: 89 ML/MIN/1.73SQ M
GLUCOSE SERPL-MCNC: 107 MG/DL (ref 65–140)
HCT VFR BLD AUTO: 44.3 % (ref 36.5–49.3)
HDLC SERPL-MCNC: 49 MG/DL
HGB BLD-MCNC: 14.5 G/DL (ref 12–17)
LDLC SERPL CALC-MCNC: 135 MG/DL (ref 0–100)
MCH RBC QN AUTO: 30.1 PG (ref 26.8–34.3)
MCHC RBC AUTO-ENTMCNC: 32.7 G/DL (ref 31.4–37.4)
MCV RBC AUTO: 92 FL (ref 82–98)
NONHDLC SERPL-MCNC: 159 MG/DL
PLATELET # BLD AUTO: 265 THOUSANDS/UL (ref 149–390)
PMV BLD AUTO: 9.8 FL (ref 8.9–12.7)
POTASSIUM SERPL-SCNC: 4.1 MMOL/L (ref 3.5–5.3)
PROT SERPL-MCNC: 7.4 G/DL (ref 6.4–8.2)
PSA SERPL-MCNC: 1.2 NG/ML (ref 0–4)
RBC # BLD AUTO: 4.82 MILLION/UL (ref 3.88–5.62)
SODIUM SERPL-SCNC: 139 MMOL/L (ref 136–145)
T4 FREE SERPL-MCNC: 0.91 NG/DL (ref 0.76–1.46)
TRIGL SERPL-MCNC: 120 MG/DL
TSH SERPL DL<=0.05 MIU/L-ACNC: 2.34 UIU/ML (ref 0.36–3.74)
URATE SERPL-MCNC: 5.4 MG/DL (ref 4.2–8)
VIT B12 SERPL-MCNC: 833 PG/ML (ref 100–900)
WBC # BLD AUTO: 9.76 THOUSAND/UL (ref 4.31–10.16)

## 2021-01-20 PROCEDURE — 84550 ASSAY OF BLOOD/URIC ACID: CPT

## 2021-01-20 PROCEDURE — 36415 COLL VENOUS BLD VENIPUNCTURE: CPT

## 2021-01-20 PROCEDURE — 84165 PROTEIN E-PHORESIS SERUM: CPT | Performed by: PATHOLOGY

## 2021-01-20 PROCEDURE — 84207 ASSAY OF VITAMIN B-6: CPT

## 2021-01-20 PROCEDURE — 84165 PROTEIN E-PHORESIS SERUM: CPT

## 2021-01-20 PROCEDURE — 86334 IMMUNOFIX E-PHORESIS SERUM: CPT | Performed by: PATHOLOGY

## 2021-01-20 PROCEDURE — G0103 PSA SCREENING: HCPCS

## 2021-01-20 PROCEDURE — 80053 COMPREHEN METABOLIC PANEL: CPT

## 2021-01-20 PROCEDURE — 84439 ASSAY OF FREE THYROXINE: CPT

## 2021-01-20 PROCEDURE — 80061 LIPID PANEL: CPT

## 2021-01-20 PROCEDURE — 85027 COMPLETE CBC AUTOMATED: CPT

## 2021-01-20 PROCEDURE — 86334 IMMUNOFIX E-PHORESIS SERUM: CPT

## 2021-01-20 PROCEDURE — 84443 ASSAY THYROID STIM HORMONE: CPT

## 2021-01-20 PROCEDURE — 82607 VITAMIN B-12: CPT

## 2021-01-20 PROCEDURE — 82746 ASSAY OF FOLIC ACID SERUM: CPT

## 2021-01-21 ENCOUNTER — APPOINTMENT (OUTPATIENT)
Dept: PHYSICAL THERAPY | Facility: CLINIC | Age: 71
End: 2021-01-21
Payer: MEDICARE

## 2021-01-21 LAB
ALBUMIN SERPL ELPH-MCNC: 3.82 G/DL (ref 3.5–5)
ALBUMIN SERPL ELPH-MCNC: 56.2 % (ref 52–65)
ALPHA1 GLOB SERPL ELPH-MCNC: 0.36 G/DL (ref 0.1–0.4)
ALPHA1 GLOB SERPL ELPH-MCNC: 5.3 % (ref 2.5–5)
ALPHA2 GLOB SERPL ELPH-MCNC: 0.86 G/DL (ref 0.4–1.2)
ALPHA2 GLOB SERPL ELPH-MCNC: 12.7 % (ref 7–13)
BETA GLOB ABNORMAL SERPL ELPH-MCNC: 0.48 G/DL (ref 0.4–0.8)
BETA1 GLOB SERPL ELPH-MCNC: 7 % (ref 5–13)
BETA2 GLOB SERPL ELPH-MCNC: 5.9 % (ref 2–8)
BETA2+GAMMA GLOB SERPL ELPH-MCNC: 0.4 G/DL (ref 0.2–0.5)
GAMMA GLOB ABNORMAL SERPL ELPH-MCNC: 0.88 G/DL (ref 0.5–1.6)
GAMMA GLOB SERPL ELPH-MCNC: 12.9 % (ref 12–22)
IGG/ALB SER: 1.28 {RATIO} (ref 1.1–1.8)
INTERPRETATION UR IFE-IMP: NORMAL
PROT PATTERN SERPL ELPH-IMP: ABNORMAL
PROT SERPL-MCNC: 6.8 G/DL (ref 6.4–8.2)

## 2021-01-22 ENCOUNTER — OFFICE VISIT (OUTPATIENT)
Dept: PHYSICAL THERAPY | Facility: CLINIC | Age: 71
End: 2021-01-22
Payer: MEDICARE

## 2021-01-22 DIAGNOSIS — G54.1 LUMBOSACRAL PLEXOPATHY: Primary | ICD-10-CM

## 2021-01-22 PROCEDURE — 97112 NEUROMUSCULAR REEDUCATION: CPT

## 2021-01-22 PROCEDURE — 97110 THERAPEUTIC EXERCISES: CPT

## 2021-01-22 NOTE — PROGRESS NOTES
Daily Note     Today's date: 2021  Patient name: Stella Gómez  : 1950  MRN: [id number]  Referring provider: Vangie Ortiz DO  Dx:   Encounter Diagnosis     ICD-10-CM    1  Lumbosacral plexopathy  G54 1                   Subjective: I went to 's office and able to stand onto scale 1st time in 3 year      Objective: See treatment diary below      Assessment: Tolerated treatment well  Denied any increase pain  Cues 1* WS with ALT toe tap  Patient would benefit from continued PT      Plan: Continue per plan of care        Precautions: nonambulatory, BLE weakness R>L  Re-eval Date: 2021    Date       Visit Count 61 62      FOTO        Pain In 0 0      Pain Out          Manual         prn          Treatment Diary        Aerobic        UBE         Sit to stand trials from w/c        Toe Tap Alt R/L tapping  5 trials  Reps to pt tolerance/fatigue  w/B/L UEs and mod A for knee extension    Cues for glute contraction, right knee extn Alt R/L tapping  5 trials  Reps to pt tolerance/fatigue  w/B/L UEs and mod A for knee extension    Cues for glute contraction, right knee extn      Glute squeeze, hip ext         Long kneel on mat        Quadruped AAROM with MCs for quad contraction right seated  3 sec x 5 with tendon tapping       Dynamic seated balance Solo Step   Blue Tball   Seated weight shifts Lateral    Alt UE, BL UE reach  10x          Static standing endurance        Stand/Rocker Board 2 trials  To pt tolerance  BL UE support  Block R knee         Gait training Sit to stand, carbon fiber MAFO right LE  L ankle splint     Static stand to pt renee ~ 1 min  Parallel bars    Sit to stand, carbon fiber MAFO right LE  L ankle splint     Static stand to pt renee ~ 1 min x2  Parallel bars    Stand WS to pt tolerance  Cues prn      Tilt table

## 2021-01-23 LAB — VIT B6 SERPL-MCNC: 25.7 UG/L (ref 5.3–46.7)

## 2021-01-29 ENCOUNTER — OFFICE VISIT (OUTPATIENT)
Dept: PHYSICAL THERAPY | Facility: CLINIC | Age: 71
End: 2021-01-29
Payer: MEDICARE

## 2021-01-29 DIAGNOSIS — G54.1 LUMBOSACRAL PLEXOPATHY: Primary | ICD-10-CM

## 2021-01-29 PROCEDURE — 97112 NEUROMUSCULAR REEDUCATION: CPT | Performed by: PHYSICAL THERAPIST

## 2021-01-29 PROCEDURE — 97116 GAIT TRAINING THERAPY: CPT | Performed by: PHYSICAL THERAPIST

## 2021-01-29 NOTE — PROGRESS NOTES
Daily Note     Today's date: 2021  Patient name: Scott Newsome  : 1950  MRN: [id number]  Referring provider: Claude Vázquez DO  Dx:   Encounter Diagnosis     ICD-10-CM    1  Lumbosacral plexopathy  G54 1                   Subjective: Patient reports he is doing well  Some soreness, but doing well  Objective: See treatment diary below      Assessment: Tolerated treatment well  Patient demonstrated fatigue post treatment and would benefit from continued PT  Continues to progress  Plan: Continue per plan of care        Precautions: nonambulatory, BLE weakness R>L  Re-eval Date: 2021    Date      Visit Count 61 62 63     FOTO        Pain In 0 0      Pain Out          Manual         prn          Treatment Diary        Aerobic        UBE         Sit to stand trials from w/c        Toe Tap Alt R/L tapping  5 trials  Reps to pt tolerance/fatigue  w/B/L UEs and mod A for knee extension    Cues for glute contraction, right knee extn Alt R/L tapping  5 trials  Reps to pt tolerance/fatigue  w/B/L UEs and mod A for knee extension    Cues for glute contraction, right knee extn Alt R/L tapping  5 trials  Reps to pt tolerance/fatigue ~10reps  w/B/L UEs and mod A for knee extension    Cues for glute contraction, right knee extension          Glute squeeze, hip ext         Long kneel on mat        Quadruped AAROM with MCs for quad contraction right seated  3 sec x 5 with tendon tapping       Dynamic seated balance Solo Step   Blue Tball   Seated weight shifts Lateral    Alt UE, BL UE reach  10x          Static standing endurance        Stand/Rocker Board 2 trials  To pt tolerance  BL UE support  Block R knee         Gait training Sit to stand, carbon fiber MAFO right LE  L ankle splint     Static stand to pt renee ~ 1 min  Parallel bars    Sit to stand, carbon fiber MAFO right LE  L ankle splint     Static stand to pt renee ~ 1 min x2  Parallel bars    Stand WS to pt tolerance  Cues prn Sit to stand, carbon fiber MAFO right LE  L ankle splint     Static stand to pt renee ~ 1 min x4   Parallel bars    SoloStep   x13 ft x2  x36 ft x1   RW   W/ ModA R LE    Cues for hip ext, knee ext, quad contract, upright posture      Tilt table

## 2021-02-01 ENCOUNTER — APPOINTMENT (OUTPATIENT)
Dept: PHYSICAL THERAPY | Facility: CLINIC | Age: 71
End: 2021-02-01
Payer: MEDICARE

## 2021-02-05 ENCOUNTER — OFFICE VISIT (OUTPATIENT)
Dept: PHYSICAL THERAPY | Facility: CLINIC | Age: 71
End: 2021-02-05
Payer: MEDICARE

## 2021-02-05 DIAGNOSIS — G54.1 LUMBOSACRAL PLEXOPATHY: Primary | ICD-10-CM

## 2021-02-05 PROCEDURE — 97112 NEUROMUSCULAR REEDUCATION: CPT | Performed by: PHYSICAL THERAPIST

## 2021-02-05 PROCEDURE — 97110 THERAPEUTIC EXERCISES: CPT | Performed by: PHYSICAL THERAPIST

## 2021-02-05 PROCEDURE — 97116 GAIT TRAINING THERAPY: CPT | Performed by: PHYSICAL THERAPIST

## 2021-02-05 NOTE — PROGRESS NOTES
Daily Note     Today's date: 2021  Patient name: Anjum Wall  : 1950  MRN: [id number]  Referring provider: Dm Kumar DO  Dx:   Encounter Diagnosis     ICD-10-CM    1  Lumbosacral plexopathy  G54 1                   Subjective: Legs feeling a lot stronger Doing my exercises daily except Wednesday, took a day off      Objective: See treatment diary below      Assessment: Tolerated treatment well  Patient would benefit from continued PT  Progressing with quad control with anterior gait, poor quad control retro walking  Plan: Continue per plan of care        Precautions: nonambulatory, BLE weakness R>L  Re-eval Date: 2021    Date     Visit Count 61 62 63 64    FOTO        Pain In 0 0  0    Pain Out          Manual         prn          Treatment Diary        Aerobic        UBE         Sit to stand trials from w/c        Toe Tap Alt R/L tapping  5 trials  Reps to pt tolerance/fatigue  w/B/L UEs and mod A for knee extension    Cues for glute contraction, right knee extn Alt R/L tapping  5 trials  Reps to pt tolerance/fatigue  w/B/L UEs and mod A for knee extension    Cues for glute contraction, right knee extn Alt R/L tapping  5 trials  Reps to pt tolerance/fatigue ~10reps  w/B/L UEs and mod A for knee extension    Cues for glute contraction, right knee extension          Glute squeeze, hip ext         Long kneel on mat        Quadruped AAROM with MCs for quad contraction right seated  3 sec x 5 with tendon tapping       Dynamic seated balance Solo Step   Blue Tball   Seated weight shifts Lateral    Alt UE, BL UE reach  10x          Static standing endurance        Stand/Rocker Board 2 trials  To pt tolerance  BL UE support  Block R knee         Gait training Sit to stand, carbon fiber MAFO right LE  L ankle splint     Static stand to pt renee ~ 1 min  Parallel bars    Sit to stand, carbon fiber MAFO right LE  L ankle splint     Static stand to pt renee ~ 1 min x2  Parallel bars    Stand WS to pt tolerance  Cues prn Sit to stand, carbon fiber MAFO right LE  L ankle splint     Static stand to pt renee ~ 1 min x4   Parallel bars    SoloStep   x13 ft x2  x36 ft x1   RW   W/ ModA R LE    Cues for hip ext, knee ext, quad contract, upright posture  Sit to stand, carbon fiber MAFO right LE  L ankle splint     SoloStep   x13 ft x2  x36 ft x1   RW   W/ ModA R LE    Fwd and retro walking x 8 feet x 4    Cues for hip ext, knee ext, quad contract, upright posture     Tilt table

## 2021-02-06 NOTE — PROGRESS NOTES
Daily Note     Today's date: 2021  Patient name: Hernando Barrett  : 1950  MRN: [id number]  Referring provider: Miky Gregg DO  Dx:   Encounter Diagnosis     ICD-10-CM    1  Lumbosacral plexopathy  G54 1                   Subjective: No falls  I have no new co's        Objective: See treatment diary below      Assessment: Tolerated treatment well  Cues prn for postural awareness, GS with gait  Pt demonstrated improved QS with gait required decrease assistance to maintain  R  QS during advance phase L LE  Patient would benefit from continued PT      Plan: Continue per plan of care        Precautions: nonambulatory, BLE weakness R>L  Re-eval Date: 2021    Date    Visit Count 61 62 63 64 65   FOTO        Pain In 0 0  0 0   Pain Out          Manual         prn          Treatment Diary        Aerobic        UBE         Sit to stand trials from w/c        Toe Tap Alt R/L tapping  5 trials  Reps to pt tolerance/fatigue  w/B/L UEs and mod A for knee extension    Cues for glute contraction, right knee extn Alt R/L tapping  5 trials  Reps to pt tolerance/fatigue  w/B/L UEs and mod A for knee extension    Cues for glute contraction, right knee extn Alt R/L tapping  5 trials  Reps to pt tolerance/fatigue ~10reps  w/B/L UEs and mod A for knee extension    Cues for glute contraction, right knee extension          Glute squeeze, hip ext         Long kneel on mat        Quadruped AAROM with MCs for quad contraction right seated  3 sec x 5 with tendon tapping       Dynamic seated balance Solo Step   Blue Tball   Seated weight shifts Lateral    Alt UE, BL UE reach  10x          Static standing endurance        Stand/Rocker Board 2 trials  To pt tolerance  BL UE support  Block R knee         Gait training Sit to stand, carbon fiber MAFO right LE  L ankle splint     Static stand to pt renee ~ 1 min  Parallel bars    Sit to stand, carbon fiber MAFO right LE  L ankle splint     Static stand to pt renee ~ 1 min x2  Parallel bars    Stand WS to pt tolerance  Cues prn Sit to stand, carbon fiber MAFO right LE  L ankle splint     Static stand to pt renee ~ 1 min x4   Parallel bars    SoloStep   x13 ft x2  x36 ft x1   RW   W/ ModA R LE    Cues for hip ext, knee ext, quad contract, upright posture  Sit to stand, carbon fiber MAFO right LE  L ankle splint     SoloStep   x13 ft x2  x36 ft x1   RW   W/ ModA R LE    Fwd and retro walking x 8 feet x 4    Cues for hip ext, knee ext, quad contract, upright posture  Sit to stand, carbon fiber MAFO right LE  L ankle splint     SoloStep   x13 ft x2  x36 ft x1   RW   W/ Ammon R LE    W/o Solo step  28 ft x3  28 ft x 3      Cues for hip ext, knee ext, quad contract, upright posture    Tilt table

## 2021-02-08 ENCOUNTER — OFFICE VISIT (OUTPATIENT)
Dept: PHYSICAL THERAPY | Facility: CLINIC | Age: 71
End: 2021-02-08
Payer: MEDICARE

## 2021-02-08 DIAGNOSIS — G54.1 LUMBOSACRAL PLEXOPATHY: Primary | ICD-10-CM

## 2021-02-08 PROCEDURE — 97112 NEUROMUSCULAR REEDUCATION: CPT

## 2021-02-08 PROCEDURE — 97116 GAIT TRAINING THERAPY: CPT

## 2021-02-12 ENCOUNTER — OFFICE VISIT (OUTPATIENT)
Dept: PHYSICAL THERAPY | Facility: CLINIC | Age: 71
End: 2021-02-12
Payer: MEDICARE

## 2021-02-12 DIAGNOSIS — G54.1 LUMBOSACRAL PLEXOPATHY: Primary | ICD-10-CM

## 2021-02-12 PROCEDURE — 97110 THERAPEUTIC EXERCISES: CPT | Performed by: PHYSICAL THERAPIST

## 2021-02-12 PROCEDURE — 97116 GAIT TRAINING THERAPY: CPT | Performed by: PHYSICAL THERAPIST

## 2021-02-12 NOTE — PROGRESS NOTES
Daily Note     Today's date: 2021  Patient name: Sabrina Brownlee  : 1950  MRN: [id number]  Referring provider: Opal Carbone DO  Dx:   Encounter Diagnosis     ICD-10-CM    1  Lumbosacral plexopathy  G54 1                   Subjective: Reports he is standing more at home  Objective: See treatment diary below      Assessment: Tolerated treatment well  Patient demonstrated fatigue post treatment and would benefit from continued PT  All ambulation trials outside of solo step today, returned to assist of 2 with w/c follow for safety  Requires assist to maintain right knee extension 80%, able to control 20%  Plan: Continue per plan of care        Precautions: nonambulatory, BLE weakness R>L  Re-eval Date: 2021    Date        Visit Count 66       FOTO        Pain In 0       Pain Out 0         Manual         prn          Treatment Diary        Aerobic        UBE         Sit to stand trials from w/c        Toe Tap        Glute squeeze, hip ext  Standing with MCs to fatigue  x6       Long kneel on mat        Quadruped        Dynamic seated balance        Static standing endurance        Stand/Rocker Board        Gait training RW without Solo step  Mod A x 1, min A x 1 with w/c follow  34 feet x 6  Assist to maintain knee extension on right  Therapeutic rest between sessions 2/2 EDGAR       Tilt table

## 2021-02-19 ENCOUNTER — APPOINTMENT (OUTPATIENT)
Dept: PHYSICAL THERAPY | Facility: CLINIC | Age: 71
End: 2021-02-19
Payer: MEDICARE

## 2021-02-22 ENCOUNTER — APPOINTMENT (OUTPATIENT)
Dept: PHYSICAL THERAPY | Facility: CLINIC | Age: 71
End: 2021-02-22
Payer: MEDICARE

## 2021-02-23 ENCOUNTER — EVALUATION (OUTPATIENT)
Dept: PHYSICAL THERAPY | Facility: CLINIC | Age: 71
End: 2021-02-23
Payer: MEDICARE

## 2021-02-23 DIAGNOSIS — G54.1 LUMBOSACRAL PLEXOPATHY: Primary | ICD-10-CM

## 2021-02-23 PROCEDURE — 97116 GAIT TRAINING THERAPY: CPT | Performed by: PHYSICAL THERAPIST

## 2021-02-23 PROCEDURE — 97112 NEUROMUSCULAR REEDUCATION: CPT | Performed by: PHYSICAL THERAPIST

## 2021-02-23 NOTE — PROGRESS NOTES
PT Re-Evaluation    Today's date: 1/15/2021  Patient name: Tan Hinton  : 1950  MRN: [id number]  Referring provider: Kristen Canada DO  Dx:   Encounter Diagnosis     ICD-10-CM    1  Lumbosacral plexopathy  G54 1                   Assessment  Assessment details: Tan Hinton is a 68 y o  male presenting to outpatient physical therapy with diagnosis of lumbosacral plexopathy  He continues to make progress with knee control bilaterally with left > right  Able to ambulate without left knee blocking, does require assist on right knee, however, this continues to progress as well  Improving hip extension in standing and with ambulation, however, limited endurance  Remains motivated  MAFO delivered this date with good fit  Ongoing gait training required with new MAFO  Patient verbalizes improved confidence with ambulation and reports will trial at home  Cont per POC  Impairments: abnormal coordination, abnormal gait, abnormal muscle firing, abnormal muscle tone, activity intolerance, impaired balance and impaired physical strength  Barriers to therapy:     Understanding of Dx/Px/POC: good   Prognosis: good  Prognosis details: Positive prognostic indicators include positive attitude toward recovery and good understanding of diagnosis and treatment plan options  Negative prognostic indicators include chronicity of symptoms and co-morbidities  Goals  STGs to be achieved in 4 weeks:     1  Pt to demonstrate increased AROM of BLEs in order to allow for greater ease and independence with ADLs and functional mobility - MET  2  Pt to demonstrate full PROM of BLEs in order to maximize joint mobility and function and allow for progression of exercise program and achievement of goals - MET  3  Pt to demonstrate increased MMT of BLEs and B shldrs  by at least 1/2-1 grade in order to improve safety and stability with ADLs and functional mobility - ONGOING  4   Pt will take 5-6 steps in parallel bars with min A of 1 -  MET    LTGs to be achieved in 6-8 weeks: - ONGOING  1  Pt will be I with HEP in order to continue to improve quality of life and independence - MET  2  Pt to demonstrate return to amb 25 ft with RW with min asst of 1 - Partially MET for 24 feet, no Solo step, RW, min A of 1 on right with SBA x 1 for safety  3  Pt to demonstrate improved function as noted by achieving or exceeding predicted score on FOTO outcomes assessment tool  4  Pt will be able to stand with RW, KAFO and ankle brace for 2 minutes without seated rest break required - MET    New Goal:  Demonstrate independent donning/doffing KAFO - modified for MAFO - MET  Demonstrate ability to ambulate with RW min-mod A of 1 at least 50% right knee control without blocking - ONGOING    Plan  Patient would benefit from: skilled physical therapy  Other planned modality interventions: Modalties prn for symptom management  Planned therapy interventions: neuromuscular re-education, manual therapy, therapeutic exercise, home exercise program, therapeutic activities and gait training  Frequency: 2x week  Duration in visits: 8  Duration in weeks: 4  Plan of Care beginning date: 2/23/2021  Plan of Care expiration date: 3/25/2021  Treatment plan discussed with: patient        Subjective Evaluation    History of Present Illness  Date of surgery: 2004,2015  Mechanism of injury: surgery  Mechanism of injury: UPDATE:   Patient received MAFO this date  Plans to trial ambulation at home with wife's assist   Notes improved standing at home  Pt states leaving work one night his legs went numb and he fell to the ground  Pt underwent testing and end result was surgery with rods and screws and cleaned out arthritis  Over time pt had to use walker and legs got progressively weaker  Had a second surgery in 2015 and was in [facility] for rehab with a stage 4 pressure ulcer as a result of his hospitalization   Pt states he has been nonambulatory for 3 years, since the second surgery  States when he would attempt to stand his R shld would give out and he could not support himself  Pt has lift recliner and is able to perform indep transfers to tub, recliner and stationary bike  Had MAFOs after initial surgery  Stands at dining room table with wife stabilizing table  Wife states pt had PT from Oct 2018 to April 2019 at another facility  Wife states since DC from PT pt has lost what he gained at that time  Recurrent probem    Quality of life: good    Pain  No pain reported    Social Support  Lives with: spouse    Employment status: not working    Diagnostic Tests  MRI studies: abnormal (back)  Treatments  Previous treatment: physical therapy  Current treatment: physical therapy  Patient Goals  Patient goals for therapy: improved balance, increased motion, increased strength, independence with ADLs/IADLs and return to sport/leisure activities  Patient goal: get up and walk, resume hunting, attend grandchildren's sporting events        Objective     Concurrent Complaints  Negative for night pain and disturbed sleep    Postural Observations  Seated posture: good  Standing posture: poor    Additional Postural Observation Details  At eval:  Stands in parallel bars with forward lean and LEs rigid in extension  Unable to take any steps  Transfers sit to stand indep by pulling up on parallel bars and locking knees in ext  At re-eval:  Sit to stand from w/c with heavy use of UEs  Improved hip extension and knee extension on left  80% left LE advancement without physical assist   Mod assist right hip 100%, mod assist to block right knee with MAFO in place  100% right LE advancement      Tenderness     Additional Tenderness Details  Denies TTP    Neurological Testing     Sensation     Lumbar   Left   Diminished: light touch, sharp/dull discrimination and proprioception    Right   Diminished: light touch, sharp/dull discrimination and proprioception    Comments   Left light touch: lower leg  Right light touch: lower leg  Right sharp/dull discrimination: very little sensation    Reflexes   Left   Patellar (L4): absent (0)    Right   Patellar (L4): trace (1+)    Additional Neurological Details  Intermittently "feel normal" - this has improved and continues improving by his subjective reports    Active Range of Motion     Additional Active Range of Motion Details  Bilateral LEs WFL PROM    Strength/Myotome Testing     Left Shoulder     Planes of Motion   Flexion: 4+   Abduction: 4+   External rotation at 0°: 4   Internal rotation at 0°: 4     Right Shoulder     Planes of Motion   Flexion: 4+   Abduction: 4+   External rotation at 0°: 4   Internal rotation at 0°: 4     Left Elbow   Flexion: 4+  Extension: 5    Right Elbow   Flexion: 4+  Extension: 5    Left Hip   Planes of Motion   Flexion: 3    Right Hip   Planes of Motion   Flexion: 3    Left Knee   Flexion: 3+  Extension: 3+    Right Knee   Flexion: 3-  Extension: 3-    Left Ankle/Foot   Dorsiflexion: 4  Plantar flexion: 4    Muscle Activation     Additional Muscle Activation Details  Over-activation of abdominal muscles in an attempt to increase hip extension    Ambulates 8 feet with min A on left; mod assist of right  RW for UE support  Neuro Exam:     Sensation   Light touch LE: left impaired and right impaired  Proprioception LE: left impaired and right impaired    Coordination   Heel to shin: left dysmetric and right dysmetric  Rapid alternating movements: LE impaired    Transfers   Sit to stand: independent with prosthesis: yesWheelchair to mat: independent Mat to wheelchair: independent Sit to supine: independent Supine to sit: independent   Roll: independentInto the car: minimum assist Out of the car: minimum assist     Functional outcomes   Functional outcome gait comment: At Re-eval: Step through pattern with RW for US support, min A x 1 at right knee with MAFO in place  Left ankle brace in place    Improving hip/trunk extension endurance  Able to complete 5-6 steps in full erect posture at this time

## 2021-02-23 NOTE — PROGRESS NOTES
Daily Note     Today's date: 2021  Patient name: Helen Strauss  : 1950  MRN: [id number]  Referring provider: Tanesha Reeder DO  Dx:   Encounter Diagnosis     ICD-10-CM    1  Lumbosacral plexopathy  G54 1                   Subjective: See RE      Objective: See treatment diary below      Assessment: See RE      Plan: See RE     Precautions: nonambulatory, BLE weakness R>L  Re-eval Date: 3/22/2021    Date       Visit Count 66 67      FOTO        Pain In 0 0      Pain Out 0 0        Manual         prn          Treatment Diary        Aerobic        UBE         Sit to stand trials from w/c        Toe Tap        Glute squeeze, hip ext  Standing with MCs to fatigue  x6       Long kneel on mat        Quadruped        Dynamic seated balance        Static standing endurance        Stand/Rocker Board        Gait training RW without Solo step  Mod A x 1, min A x 1 with w/c follow  34 feet x 6  Assist to maintain knee extension on right  Therapeutic rest between sessions 2/2 EDGAR RW without Solo step  Min A x 1, SBA x 1 with w/c follow  24-34 feet x 6  Assist to maintain knee extension on right  Therapeutic rest between sessions 2/2 EDGAR  **MAFO delivered this date      Tilt table

## 2021-02-26 ENCOUNTER — APPOINTMENT (OUTPATIENT)
Dept: PHYSICAL THERAPY | Facility: CLINIC | Age: 71
End: 2021-02-26
Payer: MEDICARE

## 2021-02-26 ENCOUNTER — OFFICE VISIT (OUTPATIENT)
Dept: PHYSICAL THERAPY | Facility: CLINIC | Age: 71
End: 2021-02-26
Payer: MEDICARE

## 2021-02-26 DIAGNOSIS — G54.1 LUMBOSACRAL PLEXOPATHY: Primary | ICD-10-CM

## 2021-02-26 PROCEDURE — 97110 THERAPEUTIC EXERCISES: CPT

## 2021-02-26 PROCEDURE — 97116 GAIT TRAINING THERAPY: CPT

## 2021-02-26 PROCEDURE — 97112 NEUROMUSCULAR REEDUCATION: CPT

## 2021-02-26 NOTE — PROGRESS NOTES
Daily Note     Today's date: 2021  Patient name: Sue Guerrero  : 1950  MRN: [id number]  Referring provider: Paul Wheeler DO  Dx:   Encounter Diagnosis     ICD-10-CM    1  Lumbosacral plexopathy  G54 1                   Subjective: R Knee has been bugging me past few days  Wearing copper fit sleeve      Objective: See treatment diary below      Assessment: Tolerated treatment well  Trial Side step w/ WS / Hip ABD @ // bars  Pt with decrease static standing tolerance with pt indicating 2* R knee discomfort    Patient would benefit from continued PT      Plan: Continue per plan of care        Precautions: nonambulatory, BLE weakness R>L  Re-eval Date: 3/22/2021    Date      Visit Count 66 67 68     FOTO (NC)        Pain In 0 0 0 R knee     Pain Out 0 0        Manual         prn          Treatment Diary        Aerobic        UBE         Sit to stand trials from w/c        Toe Tap        Glute squeeze, hip ext  Standing with MCs to fatigue  x6       Long kneel on mat        Quadruped        Dynamic seated balance        Static standing endurance   2 trials to pt fatique  30"/ 42"  //bars  CGA     Hip ABD   Solo  3 x to pt fatigue  ABD 6x ea dir    Side step   With R Quad Asst x1 //             Stand/Rocker Board        Gait training RW without Solo step  Mod A x 1, min A x 1 with w/c follow  34 feet x 6  Assist to maintain knee extension on right  Therapeutic rest between sessions 2/2 EDGAR RW without Solo step  Min A x 1, SBA x 1 with w/c follow  24-34 feet x 6  Assist to maintain knee extension on right  Therapeutic rest between sessions 2/2 EDGAR  **MAFO delivered this date RW without Solo step  Min A x 1, SBA x 1 with w/c follow  24-34 feet x 2  W/ 1 turn negotiation to Right  Assist to maintain knee extension on right       Tilt table

## 2021-03-01 ENCOUNTER — APPOINTMENT (OUTPATIENT)
Dept: PHYSICAL THERAPY | Facility: CLINIC | Age: 71
End: 2021-03-01
Payer: MEDICARE

## 2021-03-01 ENCOUNTER — TELEPHONE (OUTPATIENT)
Dept: NEUROLOGY | Facility: CLINIC | Age: 71
End: 2021-03-01

## 2021-03-01 ENCOUNTER — OFFICE VISIT (OUTPATIENT)
Dept: NEUROLOGY | Facility: CLINIC | Age: 71
End: 2021-03-01
Payer: MEDICARE

## 2021-03-01 VITALS
HEIGHT: 73 IN | SYSTOLIC BLOOD PRESSURE: 142 MMHG | DIASTOLIC BLOOD PRESSURE: 84 MMHG | HEART RATE: 68 BPM | BODY MASS INDEX: 36.94 KG/M2

## 2021-03-01 DIAGNOSIS — G54.1 LUMBOSACRAL PLEXOPATHY: Primary | ICD-10-CM

## 2021-03-01 DIAGNOSIS — G62.9 POLYNEUROPATHY: ICD-10-CM

## 2021-03-01 PROCEDURE — 99214 OFFICE O/P EST MOD 30 MIN: CPT | Performed by: PSYCHIATRY & NEUROLOGY

## 2021-03-01 RX ORDER — FLUOXETINE HYDROCHLORIDE 20 MG/1
20 CAPSULE ORAL DAILY
COMMUNITY
Start: 2021-02-08

## 2021-03-01 NOTE — PROGRESS NOTES
Patient ID: Malia Abdi is a 65 y o  male  Assessment/Plan:    Polyneuropathy   This 26-year-old patient with the longstanding chronic gait disorder  He does have a known neuropathy bilateral radiculopathy and lumbar sacral plexopathy  Physical therapy has significantly helped he also has bilateral MAFO braces which is helping his gait  We did obtain genetic testing for TT are amyloidosis  Other laboratory test is have also been negative  Would like to proceed with Gene DX testing  We will check the cost for the patient and informed him accordingly  Lumbosacral plexopathy    He has a known right-sided lumbar sacral plexopathy  This has slowly improved but the recovery may take years  He does report a deterioration of his exam if he does not participate in physical therapy  he does report some improvement since the last visit  He is able to transfer more independently  He does have slight increase in weakness proximally but none distally    He is to return to our offices in six months     There are no diagnoses linked to this encounter  Subjective:    78 y/o rh male who presents in a follow-up visit  He initially presented several months ago for weakness and numbness in his legs  He presents with his wife Prakash Rad     his last visit he is doing well  He has undergone extensive physical therapy twice a week  He now reports he is able to ambulate with a walker for 50 ft at a time  Able to transfer independently    He recently received bilateral AFO  braces which has helped his balance        In 2003 while leaving work he had acute onset of numbness and fell to the ground  Janet Adkins was evaluated and was thought to an infectious etiology or a demyelinating polyneuropathy  Janet Adkins was placed on steroids and eventually was diagnosed with a lumbar sacral stenosis   He underwent initial surgery in 2003 fusion of the lumbar sacral spine by Dr Callie Hood at [place] he did not have full recovery of his weakness he was initially using a cane but now utilizes a wheelchair   In 2015 his family was not and noted that he was experiencing more weakness and numbness in his legs this is more predominant on the right than the left  Devere Lorraine is no symptoms in his arms he subsequently underwent a another surgery in 2015 by Dr Marilia Severino developed urinary incontinence he was eventually transferred to a SNF rehab and was diet was noted to have a sacral decubitus ulcer   This eventually was treated and he then underwent physical therapy in bed   Eventually he was transferred to home   [facility] was evaluated by Dr Julio Hoover graft in January of 2015 at [facility]  Meche Marks EMG studies were reviewed from 2014 and it did demonstrate abnormalities consistent with a peripheral neuropathy  [facility] he did not return for further workup      He has been following up at [place] [id number] by Dr Guerita Ramirez was referred to a also a for potential knee replacement surgery then eventually wound has been following up with Dr Hannah Contreras for years   Early Lela did undergo extensive physical therapy by [facility] rehab in [place] is able to transfer independently     but he does not drive   For longer distances he will utilize a wheelchair    he does report continued weakness of the right leg   He also describes bilateral foot drops  [facility] does have bilateral  AFO l braces which he does not utilize  Early Lela denies any problem with his bladder or bowels   He denies any numbness in his perineum      After the initial visit he was evaluated by Neurosurgery felt that there is no need further surgery  He did undergo a repeat EMG study which demonstrated a polyneuropathy,1  Severe mixed axonal / demyelinating neuropathy le worse then ue , a chronic radiculopathy at L5-S1 and a  chronic right upper trunk lumbar sacral plexopathy  He did undergo saliva testing for TT are amyloidosis which was negative but 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  The SPEP was slightly abnormal but the IPEP was normal      He had undergone laboratory studies including a B6, folate B12 levels that were normal      He has now undergone it aggressive physical therapy         He underwent a MRI of the lumbar spine   on 08/14/19   Extensive myelomalacia at T11-12 level   Linear enhancement of the anterior aspect of the lower thoracic cord which may extend above the scope of this examination   Recommend dedicated imaging of the thoracic spine with and without contrast      Fusion from L2 to L5 involving the L2-3, L3-4 and L4-5 disc spaces   These disc spaces demonstrate no significant canal stenosis   Mild persistent foraminal narrowing at L4-5 due to grade 2 anterior spinal     At L1-2 there is a central disc protrusion superimposed upon an old or canal stenosis      He then underwent a MRI of the thoracic spine on 08/27/ 19      Thoracic degenerative change is seen most pronounced from T8-9 through T12-L1 where there is annular bulging, disc protrusions and endplate/facet hypertrophic change resulting in multilevel moderate canal stenosis and foraminal narrowing   There is mild   chronic myelomalacia of the lower thoracic cord at the level of T11-12      At the T3-4 level there is moderate bilateral foraminal narrowing as a result of endplate and posterior element hypertrophic change         Narcisobraxton Antony is    Has three children seven grandchildren  Valentina Winters retired from [address] denies any current use of tobacco he drinks coffee and caffeine on a regular basis he previously coached baseball and football  Medical played football as well as wrestled            history was reviewed today  Lady Napoles has gout for which he uses Allopurinal  Surgical  history includes lumbar fusion in 2003 and 2015      Allergies include IV soap and penicillins, his medications were reviewed    Past surgical history and medical history reviewed                  The following portions of the patient's history were reviewed and updated as appropriate:   He  has a past medical history of Depression and Gout  He  has a past surgical history that includes Back surgery; TONSILECTOMY AND ADNOIDECTOMY; Cataract extraction, bilateral; and Knee arthroscopy (Right)  His family history includes Diabetes in his brother, father, and sister  He  reports that he quit smoking about 36 years ago  He has never used smokeless tobacco  He reports current alcohol use  He reports that he does not use drugs  Current Outpatient Medications   Medication Sig Dispense Refill    allopurinol (ZYLOPRIM) 100 mg tablet Take 100 mg by mouth 2 (two) times a day      FLUoxetine (PROzac) 20 mg capsule       loratadine (CLARITIN) 10 mg tablet Take 10 mg by mouth as needed for allergies      Magnesium 100 MG CAPS Take 1 capsule by mouth daily      meloxicam (MOBIC) 15 mg tablet Take 15 mg by mouth daily      Misc Natural Products (OSTEO BI-FLEX/5-LOXIN ADVANCED PO) Take 2 tablets by mouth daily      Multiple Vitamins-Minerals (CENTRUM SILVER 50+MEN PO) Take 1 tablet by mouth daily      SUPER B COMPLEX/C PO Take 1 tablet by mouth daily      FLUoxetine (PROzac) 20 MG tablet Take 20 mg by mouth daily       No current facility-administered medications for this visit  He is allergic to other and penicillins            Objective:    Blood pressure 142/84, pulse 68, height 6' 1" (1 854 m)  Physical Exam  Neurological:      Deep Tendon Reflexes:      Reflex Scores:       Tricep reflexes are 1+ on the right side and 1+ on the left side  Bicep reflexes are 1+ on the right side and 1+ on the left side  Patellar reflexes are 1+ on the right side and 1+ on the left side  Achilles reflexes are 0 on the right side and 0 on the left side  Neurological Exam    Motor    Df 0/5, pf 2, , kf/ ke  5/   On left , rigth  3+5  hp 5/5 on left, on right hip  5-/5 5, kf/ ke 4+/5 , df0,  p5 1/5    Toe movment 1/45, left ahb 2/5 , ahb 1/5     he had normal strength in the upper extremities without any evidence of a pronator drift               Sensory  Vibraiton absent in toes , knees  10 sec , ankle left 3sec in knee, jps intact in ue ,  15 sec     he had normal strength in the upper extremities without any evidence of a pronator drift     Df 0/5, pf 2, , kf/ ke 4/5 , 4=/5  On left , rigth 3 /5  hp 5/5 on left, on right hip 4/5, kf/ ke 4+/5 , df0,  p5 1/5   Toe movment 1/45, left ahb 2/5 , ahb 1/5          15         Reflexes                                           Right                      Left  Biceps                                 1+                         1+  Triceps                                1+                         1+  Patellar                                1+                         1+  Achilles                                0                         0  Plantar                           Downgoing                Downgoing    Coordination  Right: Finger-to-nose normal   Left: Finger-to-nose normal     Gait Unable to rise from chair without using arms  A he presented in a wheelchair  He was able to stand after leaning on the side table  He was unable to tandem gait  review of systems obtained from the medical assistant as below was reviewed with the patient at today's visit  ROS:    Review of Systems   Constitutional: Negative  Negative for appetite change and fever  HENT: Negative  Negative for hearing loss, tinnitus, trouble swallowing and voice change  Eyes: Negative  Negative for photophobia and pain  Respiratory: Negative  Negative for shortness of breath  Cardiovascular: Negative  Negative for palpitations  Gastrointestinal: Negative  Negative for nausea and vomiting  Endocrine: Negative  Negative for cold intolerance  Genitourinary: Negative  Negative for dysuria, frequency and urgency  Musculoskeletal: Negative  Negative for myalgias and neck pain  Skin: Negative  Negative for rash  Neurological: Negative  Negative for dizziness, tremors, seizures, syncope, facial asymmetry, speech difficulty, weakness, light-headedness, numbness and headaches  Hematological: Negative  Does not bruise/bleed easily  Psychiatric/Behavioral: Negative  Negative for confusion, hallucinations and sleep disturbance

## 2021-03-01 NOTE — ASSESSMENT & PLAN NOTE
He has a known right-sided lumbar sacral plexopathy  This has slowly improved but the recovery may take years  He does report a deterioration of his exam if he does not participate in physical therapy

## 2021-03-01 NOTE — ASSESSMENT & PLAN NOTE
This 64-year-old patient with the longstanding chronic gait disorder  He does have a known neuropathy bilateral radiculopathy and lumbar sacral plexopathy  Physical therapy has significantly helped he also has bilateral MAFO braces which is helping his gait  We did obtain genetic testing for TT are amyloidosis  Other laboratory test is have also been negative  Would like to proceed with Gene DX testing  We will check the cost for the patient and informed him accordingly

## 2021-03-03 NOTE — TELEPHONE ENCOUNTER
Gene Dx forms sent to [employer] for Dr Juan Manuel Valdez to fill out  Will await forms to be received back

## 2021-03-04 NOTE — PROGRESS NOTES
Daily Note     Today's date: 3/5/2021  Patient name: Makenna Wright  : 1950  MRN: [id number]  Referring provider: Marcelina Mayers DO  Dx:   Encounter Diagnosis     ICD-10-CM    1  Lumbosacral plexopathy  G54 1                   Subjective: Patient reports saw physician, progressing  Pending blood work, possible  Unsure if he will have it done  Objective: See treatment diary below      Assessment: Tolerated treatment well  Patient demonstrated fatigue post treatment and would benefit from continued PT  Cues for hip and trunk extension, cues for lower and mid trunk extension instead of abdominal activation  Plan: Continue per plan of care        Precautions: nonambulatory, BLE weakness R>L  Re-eval Date: 3/22/2021    Date 2/12 2/22 2/26 3/5    Visit Count 66 67 68 69    FOTO (NC)        Pain In 0 0 0 R knee 0    Pain Out 0 0  0      Manual         prn          Treatment Diary        Aerobic        UBE         Sit to stand trials from w/c        Toe Tap        Glute squeeze, hip ext  Standing with MCs to fatigue  x6   Standing with MCs to fatigue    Long kneel on mat        Quadruped        Dynamic seated balance        Static standing endurance   2 trials to pt fatique  30"/ 42"  //bars  CGA Static stand  RW  Focus on knee extn and hip extn on right  Feet hip width  R fwd  L fwd  2 UEs with decreased UE support as tolerated to fatigue x 2 each position    Hip ABD   Solo  3 x to pt fatigue  ABD 6x ea dir    Side step   With R Quad Asst x1 //             Stand/Rocker Board        Gait training RW without Solo step  Mod A x 1, min A x 1 with w/c follow  34 feet x 6  Assist to maintain knee extension on right  Therapeutic rest between sessions 2/2 EDGAR RW without Solo step  Min A x 1, SBA x 1 with w/c follow  24-34 feet x 6  Assist to maintain knee extension on right  Therapeutic rest between sessions 2/2 EDGAR  **MAFO delivered this date RW without Solo step  Min A x 1, SBA x 1 with w/c follow  24-34 feet x 2  W/ 1 turn negotiation to Right  Assist to maintain knee extension on right   RW with solo step, fwd/retro stepping min A x 1 for right hip extn    9 feet x 8    Tilt table

## 2021-03-05 ENCOUNTER — OFFICE VISIT (OUTPATIENT)
Dept: PHYSICAL THERAPY | Facility: CLINIC | Age: 71
End: 2021-03-05
Payer: MEDICARE

## 2021-03-05 DIAGNOSIS — G54.1 LUMBOSACRAL PLEXOPATHY: Primary | ICD-10-CM

## 2021-03-05 PROCEDURE — 97112 NEUROMUSCULAR REEDUCATION: CPT | Performed by: PHYSICAL THERAPIST

## 2021-03-05 PROCEDURE — 97116 GAIT TRAINING THERAPY: CPT | Performed by: PHYSICAL THERAPIST

## 2021-03-07 NOTE — PROGRESS NOTES
Daily Note     Today's date: 3/8/2021  Patient name: Ro Mcfarlane  : 1950  MRN: [id number]  Referring provider: Jayne Mcclure DO  Dx:   Encounter Diagnosis     ICD-10-CM    1  Lumbosacral plexopathy  G54 1                   Subjective: Patient reports this weekend went okay  A little tired today  Objective: See treatment diary below      Assessment: Tolerated treatment well  Patient demonstrated fatigue post treatment and would benefit from continued PT  Difficulty with maintaining knee and hip extension on right in single leg stance with step upon left  Plan: Continue per plan of care        Precautions: nonambulatory, BLE weakness R>L  Re-eval Date: 3/22/2021    Date 2/12 2/22 2/26 3/5 3/8   Visit Count 66 67 68 69 70   FOTO (NC)        Pain In 0 0 0 R knee 0 0   Pain Out 0 0  0 0     Manual         prn          Treatment Diary        Aerobic        UBE         Sit to stand trials from w/c        Toe Tap     Step ups onto 3" step  L step up 2x5  R step up 1x5  Step up and step down x 1     Glute squeeze, hip ext  Standing with MCs to fatigue  x6   Standing with MCs to fatigue    Long kneel on mat        Quadruped        Dynamic seated balance        Static standing endurance   2 trials to pt fatique  30"/ 42"  //bars  CGA Static stand  RW  Focus on knee extn and hip extn on right  Feet hip width  R fwd  L fwd  2 UEs with decreased UE support as tolerated to fatigue x 2 each position Static stand  RW  Focus on knee extn and hip extn on right  Feet hip width  R fwd  L fwd  2 UEs with decreased UE support as tolerated to fatigue x 2 each position   Hip ABD   Solo  3 x to pt fatigue  ABD 6x ea dir    Side step   With R Quad Asst x1 //             Stand/Rocker Board        Gait training RW without Solo step  Mod A x 1, min A x 1 with w/c follow  34 feet x 6  Assist to maintain knee extension on right  Therapeutic rest between sessions 2/2 EDGAR RW without Solo step  Min A x 1, SBA x 1 with w/c follow  24-34 feet x 6  Assist to maintain knee extension on right  Therapeutic rest between sessions 2/2 EDGAR  **MAFO delivered this date RW without Solo step  Min A x 1, SBA x 1 with w/c follow  24-34 feet x 2  W/ 1 turn negotiation to Right  Assist to maintain knee extension on right   RW with solo step, fwd/retro stepping min A x 1 for right hip extn  9 feet x 8 RW with solo step, fwd/retro stepping min A x 1 for right hip extn    9 feet x 6   Tilt table

## 2021-03-08 ENCOUNTER — OFFICE VISIT (OUTPATIENT)
Dept: PHYSICAL THERAPY | Facility: CLINIC | Age: 71
End: 2021-03-08
Payer: MEDICARE

## 2021-03-08 DIAGNOSIS — G54.1 LUMBOSACRAL PLEXOPATHY: Primary | ICD-10-CM

## 2021-03-08 PROCEDURE — 97110 THERAPEUTIC EXERCISES: CPT | Performed by: PHYSICAL THERAPIST

## 2021-03-08 PROCEDURE — 97116 GAIT TRAINING THERAPY: CPT | Performed by: PHYSICAL THERAPIST

## 2021-03-12 ENCOUNTER — OFFICE VISIT (OUTPATIENT)
Dept: PHYSICAL THERAPY | Facility: CLINIC | Age: 71
End: 2021-03-12
Payer: MEDICARE

## 2021-03-12 DIAGNOSIS — G54.1 LUMBOSACRAL PLEXOPATHY: Primary | ICD-10-CM

## 2021-03-12 PROCEDURE — 97116 GAIT TRAINING THERAPY: CPT | Performed by: PHYSICAL THERAPIST

## 2021-03-12 PROCEDURE — 97110 THERAPEUTIC EXERCISES: CPT | Performed by: PHYSICAL THERAPIST

## 2021-03-12 NOTE — PROGRESS NOTES
Daily Note     Today's date: 3/12/2021  Patient name: Yaritza Mead  : 1950  MRN: [id number]  Referring provider: River Riggins DO  Dx:   Encounter Diagnosis     ICD-10-CM    1  Lumbosacral plexopathy  G54 1                   Subjective: Patient with good response to last session  Motivated to walk  Objective: See treatment diary below      Assessment: Tolerated treatment well  Patient demonstrated fatigue post treatment and would benefit from continued PT  Ambulated outside of Solo step this date, assist of 1 with SBA of 2nd for safety  Blocking right knee  Plan: Continue per plan of care  Precautions: nonambulatory, BLE weakness R>L  Re-eval Date: 3/22/2021    Date 3/12       Visit Count 71       FOTO (NC)        Pain In 0       Pain Out 0         Manual         prn          Treatment Diary        Aerobic Nustep  L6  12 mins       UBE         Sit to stand trials from w/c        Toe Tap          Glute squeeze, hip ext         Long kneel on mat        Quadruped        Dynamic seated balance        Static standing endurance        Hip ABD        Stand/Rocker Board        Gait training RW no Solo Step assist of 1 for knee blocking, assist of 2nd  35 feet x 1; 37 feet x 1  24 feet x 1    Rest between trials       Tilt table

## 2021-03-14 NOTE — PROGRESS NOTES
Daily Note     Today's date: 3/15/2021  Patient name: Clover Montalvo  : 1950  MRN: [id number]  Referring provider: Drea Antony DO  Dx:   Encounter Diagnosis     ICD-10-CM    1  Lumbosacral plexopathy  G54 1                   Subjective: Patient reports he is doing okay, sore in his muscles  Objective: See treatment diary below      Assessment: Tolerated treatment well  Patient demonstrated fatigue post treatment and would benefit from continued PT  Patient with ongoing difficulty with right hip extension  Improving right knee extn control with verbal cues  Buckle x 2 requiring assist to regain  Trial of L/S support  Plan: Continue per plan of care  Precautions: nonambulatory, BLE weakness R>L  Re-eval Date: 3/22/2021    Date 3/12 3/15      Visit Count 71 72      FOTO (NC)        Pain In 0 0      Pain Out 0 0        Manual         prn          Treatment Diary        Aerobic Nustep  L6  12 mins       UBE         Sit to stand trials from w/c        Toe Tap          Glute squeeze, hip ext         Long kneel on mat        Quadruped        Dynamic seated balance        Static standing endurance        Hip ABD        Stand/Rocker Board        Gait training RW no Solo Step assist of 1 for knee blocking, assist of 2nd  35 feet x 1; 37 feet x 1  24 feet x 1    Rest between trials RW no Solo Step assist of 1 for knee blocking and w/c follow  30 feet x 1    Solo Step  8 feet per trial, CGA to min A of 1      Tilt table

## 2021-03-15 ENCOUNTER — OFFICE VISIT (OUTPATIENT)
Dept: PHYSICAL THERAPY | Facility: CLINIC | Age: 71
End: 2021-03-15
Payer: MEDICARE

## 2021-03-15 DIAGNOSIS — G54.1 LUMBOSACRAL PLEXOPATHY: Primary | ICD-10-CM

## 2021-03-15 PROCEDURE — 97116 GAIT TRAINING THERAPY: CPT | Performed by: PHYSICAL THERAPIST

## 2021-03-15 NOTE — TELEPHONE ENCOUNTER
Per John Leo patient will have to be self-pay since he has primary as medicare and will have to sign an ABN Form  All [facility] order information has been e-mailed to John Leo on 3/15/21 to reach out to the patient and go over benefit information  Will await an update from John Leo in regards to if the patient would like to proceed or not  Per  John Leo I do not need to order all the listed tests above- she advised me that I can order testing #: 100 and it will include all the panels listed above she is looking for  Will cancel current order and resubmit correctly  Orders placed earlier were canceled and new orders were entered on the [facility] online portal  New packet has been sent to John Leo on 3/15/2021  Will await an update

## 2021-03-15 NOTE — TELEPHONE ENCOUNTER
Forms received back from Dr Sammi Gong  Information entered on [facility] online portal  An e-mail was sent to Guerita Ptael to verify benefits since patient has Medicare as primary  Will await an update from her in regards to benefit information      The following tests were ordered:    -737- Hereditary Neuropathy Panel, 884- Core CMT Panel, 885- Axonal CMT Panel, 886- Demyelinating CMT Panel, J778- CMT Panel, T399 Hereditary Sensory and Autonomic Neuropathy Panel, 887- Rest of Hereditary Neuropathy Panel-

## 2021-03-17 NOTE — TELEPHONE ENCOUNTER
I received the following e-mail from Juan Chan in regards to the patient's genetic testing order:    Tyrone Kern is not going to proceed with testing  He does not want to pay anything and unfortunately the best option for him would be the $250 self pay since he has Medicare  He told me he will not proceed with testing  Dr Adela Mazariegos- JUANITA patient does not wish to proceed with testing due to cost  Please let me know if anything additional is needed      Thank you,    Nik Carbajal

## 2021-03-19 ENCOUNTER — OFFICE VISIT (OUTPATIENT)
Dept: PHYSICAL THERAPY | Facility: CLINIC | Age: 71
End: 2021-03-19
Payer: MEDICARE

## 2021-03-19 DIAGNOSIS — G54.1 LUMBOSACRAL PLEXOPATHY: Primary | ICD-10-CM

## 2021-03-19 PROCEDURE — 97116 GAIT TRAINING THERAPY: CPT | Performed by: PHYSICAL THERAPIST

## 2021-03-19 NOTE — PROGRESS NOTES
Daily Note     Today's date: 3/19/2021  Patient name: Shemar Zamora  : 1950  MRN: [id number]  Referring provider: Izaiah Gomez DO  Dx:   Encounter Diagnosis     ICD-10-CM    1  Lumbosacral plexopathy  G54 1                   Subjective: Patient reports feeling okay with brace  Objective: See treatment diary below      Assessment: Tolerated treatment well  Patient demonstrated fatigue post treatment and would benefit from continued PT   Patient progressing steadily with tolerance for activity  Decreased right knee extension, struggles with maintaining knee and hip extension with moving belt of TM  Plan: Continue per plan of care  Precautions: nonambulatory, BLE weakness R>L  Re-eval Date: 3/22/2021    Date 3/12 3/15 3/19     Visit Count 71 72 73     FOTO (NC)        Pain In 0 0 0     Pain Out 0 0 0       Manual         prn          Treatment Diary        Aerobic Nustep  L6  12 mins       UBE         Sit to stand trials from w/c        Toe Tap          Glute squeeze, hip ext         Long kneel on mat        Quadruped        Dynamic seated balance        Static standing endurance        Hip ABD        Stand/Rocker Board        Gait training RW no Solo Step assist of 1 for knee blocking, assist of 2nd  35 feet x 1; 37 feet x 1  24 feet x 1    Rest between trials RW no Solo Step assist of 1 for knee blocking and w/c follow  30 feet x 1    Solo Step  8 feet per trial, CGA to min A of 1 TM  Solo Step  Mod A x 1  Mod A of 2nd for knee and hip extension/blocking  15 sec per trial       Tilt table

## 2021-03-20 NOTE — PROGRESS NOTES
PT Re-Evaluation    Today's date: 3/22/2021  Patient name: Malia Abdi  : 1950  MRN: [id number]  Referring provider: Miki Berger DO  Dx:   Encounter Diagnosis     ICD-10-CM    1  Lumbosacral plexopathy  G54 1                   Assessment  Assessment details: Patient continues to make slow, but steady gains with ambulation  MAFO in place, left ankle brace in place  Patient demonstrated ability to trial supported ambulation on TM this assessment period  Patient remains motivated, struggles with hip extension bilaterally leading to forward flexed posture  Patient with improved knee control on level ground  Progressing out of Solo Step  Struggles significantly and requires assist to maintain right knee extension with dynamic conditions of TM  Overall in need of ongoing interventions to progress as tolerated and able with overground ambulation  Impairments: abnormal coordination, abnormal gait, abnormal muscle firing, abnormal muscle tone, activity intolerance, impaired balance and impaired physical strength  Barriers to therapy:     Understanding of Dx/Px/POC: good   Prognosis: good  Prognosis details: Positive prognostic indicators include positive attitude toward recovery and good understanding of diagnosis and treatment plan options  Negative prognostic indicators include chronicity of symptoms and co-morbidities  Goals  STGs to be achieved in 4 weeks:     1  Pt to demonstrate increased AROM of BLEs in order to allow for greater ease and independence with ADLs and functional mobility - MET  2  Pt to demonstrate full PROM of BLEs in order to maximize joint mobility and function and allow for progression of exercise program and achievement of goals - MET  3  Pt to demonstrate increased MMT of BLEs and B shldrs  by at least 1/2-1 grade in order to improve safety and stability with ADLs and functional mobility - ONGOING  4   Pt will take 5-6 steps in parallel bars with min A of 1 - MET    LTGs to be achieved in 6-8 weeks: - ONGOING  1  Pt will be I with HEP in order to continue to improve quality of life and independence - MET  2  Pt to demonstrate return to amb 25 ft with RW with min asst of 1 - Partially MET for 24 feet, no Solo step, RW, min A of 1 on right with SBA x 1 for safety  3  Pt to demonstrate improved function as noted by achieving or exceeding predicted score on FOTO outcomes assessment tool  4  Pt will be able to stand with RW, KAFO and ankle brace for 2 minutes without seated rest break required - MET    New Goal:  Demonstrate independent donning/doffing KAFO - modified for MAFO - MET  Demonstrate ability to ambulate with RW min-mod A of 1 at least 50% right knee control without blocking - ONGOING  Demonstrate ability to ambulate on TM 1-2 minutes with bilateral UE support and min A on right for knee blocking    Plan  Patient would benefit from: skilled physical therapy  Other planned modality interventions: Modalties prn for symptom management  Planned therapy interventions: neuromuscular re-education, manual therapy, therapeutic exercise, home exercise program, therapeutic activities and gait training  Frequency: 2x week  Duration in visits: 8  Duration in weeks: 4  Plan of Care beginning date: 3/22/2021  Plan of Care expiration date: 4/21/2021  Treatment plan discussed with: patient        Subjective Evaluation    History of Present Illness  Date of surgery: 2004,2015  Mechanism of injury: surgery  Mechanism of injury: UPDATE:   Patient voices increased standing time at home, marching  LEs continue to feel stronger by his report  More confidence in knees/quads  Pt states leaving work one night his legs went numb and he fell to the ground  Pt underwent testing and end result was surgery with rods and screws and cleaned out arthritis  Over time pt had to use walker and legs got progressively weaker   Had a second surgery in 2015 and was in [facility] for rehab with a stage 4 pressure ulcer as a result of his hospitalization  Pt states he has been nonambulatory for 3 years, since the second surgery  States when he would attempt to stand his R shld would give out and he could not support himself  Pt has lift recliner and is able to perform indep transfers to tub, recliner and stationary bike  Had MAFOs after initial surgery  Stands at dining room table with wife stabilizing table  Wife states pt had PT from Oct 2018 to April 2019 at another facility  Wife states since DC from PT pt has lost what he gained at that time  Recurrent probem    Quality of life: good    Pain  No pain reported    Social Support  Lives with: spouse    Employment status: not working    Diagnostic Tests  MRI studies: abnormal (back)  Treatments  Previous treatment: physical therapy  Current treatment: physical therapy  Patient Goals  Patient goals for therapy: improved balance, increased motion, increased strength, independence with ADLs/IADLs and return to sport/leisure activities  Patient goal: get up and walk, resume hunting, attend grandchildren's sporting events        Objective     Concurrent Complaints  Negative for night pain and disturbed sleep    Postural Observations  Seated posture: good  Standing posture: poor    Additional Postural Observation Details  At eval:  Stands in parallel bars with forward lean and LEs rigid in extension  Unable to take any steps  Transfers sit to stand indep by pulling up on parallel bars and locking knees in ext  At re-eval:  Sit to stand from w/c with heavy use of UEs  Improved hip extension and knee extension on left  80% left LE advancement without physical assist   Mod assist right hip 100%, mod assist to block right knee with MAFO in place  100% right LE advancement      Tenderness     Additional Tenderness Details  Denies TTP    Neurological Testing     Sensation     Lumbar   Left   Diminished: light touch, sharp/dull discrimination and proprioception    Right   Diminished: light touch, sharp/dull discrimination and proprioception    Comments   Left light touch: lower leg  Right light touch: lower leg  Right sharp/dull discrimination: very little sensation    Reflexes   Left   Patellar (L4): absent (0)    Right   Patellar (L4): trace (1+)    Additional Neurological Details  Intermittently "feel normal" - this has improved and continues improving by his subjective reports    Active Range of Motion     Additional Active Range of Motion Details  Bilateral LEs WFL PROM    Strength/Myotome Testing     Left Shoulder     Planes of Motion   Flexion: 4+   Abduction: 4+   External rotation at 0°: 4   Internal rotation at 0°: 4     Right Shoulder     Planes of Motion   Flexion: 4+   Abduction: 4+   External rotation at 0°: 4   Internal rotation at 0°: 4     Left Elbow   Flexion: 4+  Extension: 5    Right Elbow   Flexion: 4+  Extension: 5    Left Hip   Planes of Motion   Flexion: 3    Right Hip   Planes of Motion   Flexion: 3    Left Knee   Flexion: 3+  Extension: 3+    Right Knee   Flexion: 3-  Extension: 3-    Left Ankle/Foot   Dorsiflexion: 4  Plantar flexion: 4    Muscle Activation     Additional Muscle Activation Details  Over-activation of abdominal muscles in an attempt to increase hip extension    Ambulates 8 feet with min A on left; mod assist of right  RW for UE support    Neuro Exam:     Sensation   Light touch LE: left impaired and right impaired  Proprioception LE: left impaired and right impaired    Coordination   Heel to shin: left dysmetric and right dysmetric  Rapid alternating movements: LE impaired    Transfers   Sit to stand: independent with prosthesis: yesWheelchair to mat: independent Mat to wheelchair: independent Sit to supine: independent Supine to sit: independent   Roll: independentInto the car: minimum assist Out of the car: minimum assist     Functional outcomes   Functional outcome gait comment: At Re-eval: Step through pattern with RW for UB support, min A x 1 at right knee with MAFO in place  Left ankle brace in place  Improving hip/trunk extension endurance  Successful trial of lumbar support for stand/walking  Able to complete 8-10 steps in full erect posture at this time  Trial of TM gait training with assist of 2

## 2021-03-22 ENCOUNTER — EVALUATION (OUTPATIENT)
Dept: PHYSICAL THERAPY | Facility: CLINIC | Age: 71
End: 2021-03-22
Payer: MEDICARE

## 2021-03-22 DIAGNOSIS — G54.1 LUMBOSACRAL PLEXOPATHY: Primary | ICD-10-CM

## 2021-03-22 PROCEDURE — 97116 GAIT TRAINING THERAPY: CPT | Performed by: PHYSICAL THERAPIST

## 2021-03-23 NOTE — PROGRESS NOTES
Daily Note     Today's date: 3/22/2021  Patient name: Lizz Brooks  : 1950  MRN: [id number]  Referring provider: Christoph Pendleton DO  Dx:   Encounter Diagnosis     ICD-10-CM    1  Lumbosacral plexopathy  G54 1                   Subjective: See RE    Objective: See treatment diary below      Assessment: See RE      Plan: See RE     Precautions: nonambulatory, BLE weakness R>L  Re-eval Date: 2021    Date 3/12 3/15 3/19 3/22    Visit Count 62 55 84 34    FOTO (NC)        Pain In 0 0 0 0    Pain Out 0 0 0 0      Manual         prn          Treatment Diary        Aerobic Nustep  L6  12 mins       UBE         Sit to stand trials from w/c        Toe Tap          Glute squeeze, hip ext         Long kneel on mat        Quadruped        Dynamic seated balance        Static standing endurance        Hip ABD        Stand/Rocker Board        Gait training RW no Solo Step assist of 1 for knee blocking, assist of 2nd  35 feet x 1; 37 feet x 1  24 feet x 1    Rest between trials RW no Solo Step assist of 1 for knee blocking and w/c follow  30 feet x 1    Solo Step  8 feet per trial, CGA to min A of 1 TM  Solo Step  Mod A x 1  Mod A of 2nd for knee and hip extension/blocking  15 sec per trial   RW, no Solo Step  7 trials of 28-32 feet per trial overground, min A for knee blocking on R approx 50% with MAFO in place, LSO in place  Single Step up onto 6" step, min A ascending, max assist of 2 descending, B/L HRs    Tilt table

## 2021-03-25 NOTE — PROGRESS NOTES
Daily Note     Today's date: 3/26/2021  Patient name: Roselyn Quesada  : 1950  MRN: [id number]  Referring provider: Kaela Turcios DO  Dx:   Encounter Diagnosis     ICD-10-CM    1  Lumbosacral plexopathy  G54 1                   Subjective: Feeling good today  Want to be walking at home getting RW out of the basement      Objective: See treatment diary below      Assessment: Tolerated treatment well  Denied any increase pain /sx's   Noted increase R quad control with gait  Cues 1* focus glute squeezes with ambulation  Requires Min/ModA 1-2 with gait   Patient would benefit from continued PT      Plan: Continue per plan of care  Precautions: nonambulatory, BLE weakness R>L  Re-eval Date: 2021    Date 3/12 3/15 3/19 3/22 3/26   Visit Count 71 72 73 74 75   FOTO (NC)        Pain In 0 0 0 0 0   Pain Out 0 0 0 0 0     Manual         prn          Treatment Diary        Aerobic Nustep  L6  12 mins       UBE         Sit to stand trials from w/c        Toe Tap          Glute squeeze, hip ext         Long kneel on mat        Quadruped        Dynamic seated balance        Static standing endurance        Hip ABD        Stand/Rocker Board        Gait training RW no Solo Step assist of 1 for knee blocking, assist of 2nd  35 feet x 1; 37 feet x 1  24 feet x 1    Rest between trials RW no Solo Step assist of 1 for knee blocking and w/c follow  30 feet x 1    Solo Step  8 feet per trial, CGA to min A of 1 TM  Solo Step  Mod A x 1  Mod A of 2nd for knee and hip extension/blocking  15 sec per trial   RW, no Solo Step  7 trials of 28-32 feet per trial overground, min A for knee blocking on R approx 50% with MAFO in place, LSO in place  Single Step up onto 6" step, min A ascending, max assist of 2 descending, B/L HRs RW, solo and no Solo Step  6 trials of 12-34 feet per trial overground, min A for knee blocking on R approx 50% with MAFO in place, LSO in place    Single Step up onto 6" step, 2 trials to pt fatigue 4x and 6x   min A ascending, max assist of 2 descending, B/L HRs   Tilt table

## 2021-03-26 ENCOUNTER — OFFICE VISIT (OUTPATIENT)
Dept: PHYSICAL THERAPY | Facility: CLINIC | Age: 71
End: 2021-03-26
Payer: MEDICARE

## 2021-03-26 DIAGNOSIS — G54.1 LUMBOSACRAL PLEXOPATHY: Primary | ICD-10-CM

## 2021-03-26 PROCEDURE — 97116 GAIT TRAINING THERAPY: CPT

## 2021-03-26 PROCEDURE — 97110 THERAPEUTIC EXERCISES: CPT

## 2021-03-26 NOTE — PROGRESS NOTES
Daily Note     Today's date: 3/26/2021  Patient name: Francisco Roebrto  : 1950  MRN: [id number]  Referring provider: Benigno Sauceda DO  Dx: No diagnosis found  Subjective: ***      Objective: See treatment diary below      Assessment: Tolerated treatment {Tolerated treatment :}  Patient {assessment:[id number]}      Plan: {PLAN:}     Precautions: nonambulatory, BLE weakness R>L  Re-eval Date: 2021    Date 3/12 3/15 3/19 3/22 3/26   Visit Count 71 72 73 74 75   FOTO (NC)        Pain In 0 0 0 0 0   Pain Out 0 0 0 0 0     Manual         prn          Treatment Diary        Aerobic Nustep  L6  12 mins       UBE         Sit to stand trials from w/c        Toe Tap          Glute squeeze, hip ext         Long kneel on mat        Quadruped        Dynamic seated balance        Static standing endurance        Hip ABD        Stand/Rocker Board        Gait training RW no Solo Step assist of 1 for knee blocking, assist of 2nd  35 feet x 1; 37 feet x 1  24 feet x 1    Rest between trials RW no Solo Step assist of 1 for knee blocking and w/c follow  30 feet x 1    Solo Step  8 feet per trial, CGA to min A of 1 TM  Solo Step  Mod A x 1  Mod A of 2nd for knee and hip extension/blocking  15 sec per trial   RW, no Solo Step  7 trials of 28-32 feet per trial overground, min A for knee blocking on R approx 50% with MAFO in place, LSO in place  Single Step up onto 6" step, min A ascending, max assist of 2 descending, B/L HRs RW, solo and no Solo Step  6 trials of 12-34 feet per trial overground, min A for knee blocking on R approx 50% with MAFO in place, LSO in place    Single Step up onto 6" step, 2 trials to pt fatigue 4x and 6x   min A ascending, max assist of 2 descending, B/L HRs   Tilt table

## 2021-03-29 ENCOUNTER — APPOINTMENT (OUTPATIENT)
Dept: PHYSICAL THERAPY | Facility: CLINIC | Age: 71
End: 2021-03-29
Payer: MEDICARE

## 2021-04-01 NOTE — PROGRESS NOTES
Daily Note     Today's date: 2021  Patient name: Ayaka Guerrero  : 1950  MRN: [id number]  Referring provider: Gato Colón DO  Dx:   Encounter Diagnosis     ICD-10-CM    1  Lumbosacral plexopathy  G54 1                   Subjective: patient reports he is doing okay from his back strain over the weekend  Objective: See treatment diary below      Assessment: Tolerated treatment well  Patient demonstrated fatigue post treatment and would benefit from continued PT  Able to stand with right knee and bilateral hips blocked with unilateral UE support for 15 seconds this date  Plan: Continue per plan of care        Precautions: nonambulatory, BLE weakness R>L  Re-eval Date: 2021    Date        Visit Count 76       FOTO (NC)        Pain In 0       Pain Out 0         Manual         prn          Treatment Diary        Aerobic        UBE         Sit to stand trials from w/c        Toe Tap          Glute squeeze, hip ext         Long kneel on mat        Quadruped        Dynamic seated balance        Static standing endurance Standing 0-1 UE support on L with right knee and venu hips blocked into extn  15-18 seconds per trial with full unilateral standing support attained x 3        Hip ABD        Stand/Rocker Board        Gait training RW  Min A x 1 on right hip  49 feet  Cues for increased trunk extn       Tilt table

## 2021-04-02 ENCOUNTER — OFFICE VISIT (OUTPATIENT)
Dept: PHYSICAL THERAPY | Facility: CLINIC | Age: 71
End: 2021-04-02
Payer: MEDICARE

## 2021-04-02 DIAGNOSIS — G54.1 LUMBOSACRAL PLEXOPATHY: Primary | ICD-10-CM

## 2021-04-02 PROCEDURE — 97116 GAIT TRAINING THERAPY: CPT | Performed by: PHYSICAL THERAPIST

## 2021-04-02 PROCEDURE — 97110 THERAPEUTIC EXERCISES: CPT | Performed by: PHYSICAL THERAPIST

## 2021-04-03 NOTE — PROGRESS NOTES
Daily Note     Today's date: 2021  Patient name: Shanae Reyes  : 1950  MRN: [id number]  Referring provider: Lilian Terrazas DO  Dx:   Encounter Diagnosis     ICD-10-CM    1  Lumbosacral plexopathy  G54 1                   Subjective:  Feeling good today  LBP not too bad today        Objective: See treatment diary below      Assessment: Tolerated treatment well  Denied any increase LBP but reported increase NT BL LE with prolong static standing  Pt fatigue end rx session  Fair standing tolerance this date with gait performed prior to standing activ  Patient would benefit from continued PT      Plan: Continue per plan of care        Precautions: nonambulatory, BLE weakness R>L  Re-eval Date: 2021    Date       Visit Count 76 77      FOTO (NC)        Pain In 0 0      Pain Out 0 0        Manual         prn          Treatment Diary        Aerobic        UBE         Sit to stand trials from w/c        Toe Tap          Glute squeeze, hip ext         Long kneel on mat        Quadruped        Dynamic seated balance        Static standing endurance Standing 0-1 UE support on L with right knee and venu hips blocked into extn  15-18 seconds per trial with full unilateral standing support attained x 3  Standing 0-1 UE support on L with right knee and venu hips blocked into extn  15-20 seconds per trial with full unilateral standing support   3 trials to pt fatigue      Hip ABD        Stand/Rocker Board        Gait training RW  Min A x 1 on right hip  49 feet  Cues for increased trunk extn RW  Min A x 1 on right hip  W/ WC follow    3 x 44 feet    Cues for increased trunk extn      Tilt table

## 2021-04-05 ENCOUNTER — OFFICE VISIT (OUTPATIENT)
Dept: PHYSICAL THERAPY | Facility: CLINIC | Age: 71
End: 2021-04-05
Payer: MEDICARE

## 2021-04-05 DIAGNOSIS — G54.1 LUMBOSACRAL PLEXOPATHY: Primary | ICD-10-CM

## 2021-04-05 PROCEDURE — 97116 GAIT TRAINING THERAPY: CPT

## 2021-04-05 PROCEDURE — 97110 THERAPEUTIC EXERCISES: CPT

## 2021-04-08 NOTE — PROGRESS NOTES
Daily Note     Today's date: 2021  Patient name: Simon Keller  : 1950  MRN: [id number]  Referring provider: Benja Schwab DO  Dx:   Encounter Diagnosis     ICD-10-CM    1  Lumbosacral plexopathy  G54 1                   Subjective: LB feeling good today  Standing at home 60/40 60 to the good as far as I how I am doing      Objective: See treatment diary below      Assessment: Tolerated treatment well  Denied any increase LBP or radicular sx's BL LE  Increase mm fatigue end rx session  Patient would benefit from continued PT      Plan: Continue per plan of care        Precautions: nonambulatory, BLE weakness R>L  Re-eval Date: 2021    Date      Visit Count 76 77 78     FOTO (NC)        Pain In 0 0 0     Pain Out 0 0 0       Manual         prn          Treatment Diary        Aerobic        UBE         Sit to stand trials from w/c        Toe Tap          Glute squeeze, hip ext         Long kneel on mat        Quadruped        Dynamic seated balance        Static standing endurance Standing 0-1 UE support on L with right knee and venu hips blocked into extn  15-18 seconds per trial with full unilateral standing support attained x 3  Standing 0-1 UE support on L with right knee and venu hips blocked into extn  15-20 seconds per trial with full unilateral standing support   3 trials to pt fatigue Standing 0-1 UE support on L with right knee and venu hips blocked into extn  15-30 seconds per trial with full unilateral standing support   5 trials to pt fatigue     Step ups   R knee block  X 1  Mod A x 2  W/ WC block    2 trials to pt fatigue  2-4 x     Hip ABD        Stand/Rocker Board        Gait training RW  Min A x 1 on right hip  49 feet  Cues for increased trunk extn RW  Min A x 1 on right hip  W/ WC follow    3 x 44 feet    Cues for increased trunk extn RW  Min A x 1 on right hip  W/ WC follow    2 x 44 max feet    Cues for increased trunk extn     Tilt table

## 2021-04-09 ENCOUNTER — OFFICE VISIT (OUTPATIENT)
Dept: PHYSICAL THERAPY | Facility: CLINIC | Age: 71
End: 2021-04-09
Payer: MEDICARE

## 2021-04-09 DIAGNOSIS — G54.1 LUMBOSACRAL PLEXOPATHY: Primary | ICD-10-CM

## 2021-04-09 PROCEDURE — 97112 NEUROMUSCULAR REEDUCATION: CPT

## 2021-04-09 PROCEDURE — 97116 GAIT TRAINING THERAPY: CPT

## 2021-04-15 NOTE — PROGRESS NOTES
Daily Note     Today's date: 4/15/2021  Patient name: Mario Malcolm  : 1950  MRN: [id number]  Referring provider: Marilee Viera DO  Dx: No diagnosis found  Subjective: ***      Objective: See treatment diary below      Assessment: Tolerated treatment {Tolerated treatment :}   Patient {assessment:}      Plan: {PLAN:}     Precautions: nonambulatory, BLE weakness R>L  Re-eval Date: 2021    Date       Visit Count 76 77      FOTO (NC)        Pain In 0 0      Pain Out 0 0        Manual         prn          Treatment Diary        Aerobic        UBE         Sit to stand trials from w/c        Toe Tap          Glute squeeze, hip ext         Long kneel on mat        Quadruped        Dynamic seated balance        Static standing endurance Standing 0-1 UE support on L with right knee and venu hips blocked into extn  15-18 seconds per trial with full unilateral standing support attained x 3  Standing 0-1 UE support on L with right knee and venu hips blocked into extn  15-20 seconds per trial with full unilateral standing support   3 trials to pt fatigue      Hip ABD        Stand/Rocker Board        Gait training RW  Min A x 1 on right hip  49 feet  Cues for increased trunk extn RW  Min A x 1 on right hip  W/ WC follow    3 x 44 feet    Cues for increased trunk extn      Tilt table

## 2021-04-16 ENCOUNTER — APPOINTMENT (OUTPATIENT)
Dept: PHYSICAL THERAPY | Facility: CLINIC | Age: 71
End: 2021-04-16
Payer: MEDICARE

## 2021-04-16 NOTE — PROGRESS NOTES
Daily Note     Today's date: 2021  Patient name: Tony Sellers  : 1950  MRN: [id number]  Referring provider: Chinyere Stephenson DO  Dx:   Encounter Diagnosis     ICD-10-CM    1  Lumbosacral plexopathy  G54 1                   Subjective: Patient reports he didn't feel well last week  Sinus infection, HA  Objective: See treatment diary below      Assessment: Tolerated treatment well  Patient demonstrated fatigue post treatment and would benefit from continued PT  Patient with decreased endurance compared to last session  Plan: Continue per plan of care        Precautions: nonambulatory, BLE weakness R>L  Re-eval Date: 2021    Date     Visit Count 76 77 78 79    FOTO (NC)        Pain In 0 0 0 0    Pain Out 0 0 0 0      Manual         prn          Treatment Diary        Aerobic        UBE         Sit to stand trials from w/c        Toe Tap          Glute squeeze, hip ext         Long kneel on mat        Quadruped        Dynamic seated balance     **   Static standing endurance Standing 0-1 UE support on L with right knee and venu hips blocked into extn  15-18 seconds per trial with full unilateral standing support attained x 3  Standing 0-1 UE support on L with right knee and venu hips blocked into extn  15-20 seconds per trial with full unilateral standing support   3 trials to pt fatigue Standing 0-1 UE support on L with right knee and venu hips blocked into extn  15-30 seconds per trial with full unilateral standing support   5 trials to pt fatigue     Step ups   R knee block  X 1  Mod A x 2  W/ WC block    2 trials to pt fatigue  2-4 x Fatigued with additional walking distance    Hip ABD        Stand/Rocker Board        Gait training RW  Min A x 1 on right hip  49 feet  Cues for increased trunk extn RW  Min A x 1 on right hip  W/ WC follow    3 x 44 feet    Cues for increased trunk extn RW  Min A x 1 on right hip  W/ WC follow    2 x 44 max feet    Cues for increased trunk extn RW  Min A x 1 on right hip  W/ WC follow    3 x 44 max feet  2 x 25 feet, one right turn; one left turn    Cues for increased trunk extn    Tilt table

## 2021-04-19 ENCOUNTER — OFFICE VISIT (OUTPATIENT)
Dept: PHYSICAL THERAPY | Facility: CLINIC | Age: 71
End: 2021-04-19
Payer: MEDICARE

## 2021-04-19 DIAGNOSIS — G54.1 LUMBOSACRAL PLEXOPATHY: Primary | ICD-10-CM

## 2021-04-19 PROCEDURE — 97116 GAIT TRAINING THERAPY: CPT | Performed by: PHYSICAL THERAPIST

## 2021-04-23 ENCOUNTER — OFFICE VISIT (OUTPATIENT)
Dept: PHYSICAL THERAPY | Facility: CLINIC | Age: 71
End: 2021-04-23
Payer: MEDICARE

## 2021-04-23 DIAGNOSIS — G54.1 LUMBOSACRAL PLEXOPATHY: Primary | ICD-10-CM

## 2021-04-23 PROCEDURE — 97116 GAIT TRAINING THERAPY: CPT

## 2021-04-23 PROCEDURE — 97112 NEUROMUSCULAR REEDUCATION: CPT

## 2021-04-23 NOTE — PROGRESS NOTES
Daily Note     Today's date: 2021  Patient name: Saul England  : 1950  MRN: [id number]  Referring provider: Janis Whitaker DO  Dx:   Encounter Diagnosis     ICD-10-CM    1  Lumbosacral plexopathy  G54 1                   Subjective: Feeling good today no co's of any LBP  Numbness in my feet seems to be going away        Objective: See treatment diary below      Assessment: Tolerated treatment well  Patient demonstrated fatigue post treatment  Pt with good walking tolerances this date      Plan: Continue per plan of care        Precautions: nonambulatory, BLE weakness R>L  Re-eval Date: 2021    Date    Visit Count 76 77 78 79 80   FOTO (NC)        Pain In 0 0 0 0 0   Pain Out 0 0 0 0 0     Manual         prn          Treatment Diary        Aerobic        UBE         Sit to stand trials from w/c        Toe Tap          Glute squeeze, hip ext         Long kneel on mat        Quadruped        Dynamic seated balance     Evan  Sit/Disc  MTP/LTP  20# 30x ea    Biceps  15#DB       Triceps  20# 30x    Fwd Raises  5# 3x10    Lat Raises  5# 3x10       Static standing endurance Standing 0-1 UE support on L with right knee and evnu hips blocked into extn  15-18 seconds per trial with full unilateral standing support attained x 3  Standing 0-1 UE support on L with right knee and venu hips blocked into extn  15-20 seconds per trial with full unilateral standing support   3 trials to pt fatigue Standing 0-1 UE support on L with right knee and venu hips blocked into extn  15-30 seconds per trial with full unilateral standing support   5 trials to pt fatigue     Step ups   R knee block  X 1  Mod A x 2  W/ WC block    2 trials to pt fatigue  2-4 x Fatigued with additional walking distance    Hip ABD        Stand/Rocker Board        Gait training RW  Min A x 1 on right hip  49 feet  Cues for increased trunk extn RW  Min A x 1 on right hip  W/ WC follow    3 x 44 feet    Cues for increased trunk extn RW  Min A x 1 on right hip  W/ WC follow    2 x 44 max feet    Cues for increased trunk extn RW  Min A x 1 on right hip  W/ WC follow    3 x 44 max feet  2 x 25 feet, one right turn; one left turn    Cues for increased trunk extn RW  Min A x 1 on right hip  W/ WC follow    3 x 44 max feet    1x 24 feet  Cues for increased trunk extn   Tilt table

## 2021-04-23 NOTE — PROGRESS NOTES
Daily Note     Today's date: 2021  Patient name: Simon Keller  : 1950  MRN: [id number]  Referring provider: Benja Schwab DO  Dx:   Encounter Diagnosis     ICD-10-CM    1  Lumbosacral plexopathy  G54 1                   Subjective: When I went to go into the shower my right leg felt goofy and looked more swollen      Objective: See treatment diary below      Assessment: Tolerated treatment well  Demonstrates BL anterior tibia region pitting edema  BL LE bracing intact  Less distance with gait this date with pt reporting Legs feeling heavier   Patient demonstrated fatigue post treatment      Plan: Continue per plan of care        Precautions: nonambulatory, BLE weakness R>L  Re-eval Date: 2021    Date        Visit Count 81       FOTO (NC)        Pain In 0       Pain Out 0         Manual         prn          Treatment Diary        Aerobic        UBE         Sit to stand trials from w/c        Toe Tap        Glute squeeze, hip ext         Long kneel on mat        Quadruped        Dynamic seated balance Evan  Sit/Disc  MTP/LTP  20# 30x ea    Biceps  20#DB       Triceps  20# 30x    Fwd Raises  5# 3x10    Lat Raises  5# 3x10           Static standing endurance        Step ups        Hip ABD        Stand/Rocker Board        Gait training RW  Min A x 1 on right hip  W/ WC follow    4 x 34-40 max feet    1x 24 feet  Cues for increased trunk extn       Tilt table

## 2021-04-26 ENCOUNTER — OFFICE VISIT (OUTPATIENT)
Dept: PHYSICAL THERAPY | Facility: CLINIC | Age: 71
End: 2021-04-26
Payer: MEDICARE

## 2021-04-26 DIAGNOSIS — G54.1 LUMBOSACRAL PLEXOPATHY: Primary | ICD-10-CM

## 2021-04-26 PROCEDURE — 97110 THERAPEUTIC EXERCISES: CPT

## 2021-04-26 PROCEDURE — 97116 GAIT TRAINING THERAPY: CPT

## 2021-04-29 NOTE — PROGRESS NOTES
PT Re-Evaluation    Today's date: 2021  Patient name: Fabio Ramachandran  : 1950  MRN: [id number]  Referring provider: eDep Underwood DO  Dx:   Encounter Diagnosis     ICD-10-CM    1  Lumbosacral plexopathy  G54 1        Start Time: 1100  Stop Time: 1200  Total time in clinic (min): 60 minutes    Assessment  Assessment details: Patient continues to make gains with standing and ambulation  Ongoing slow improvements in right knee control  Does fatigue quickly, limited intermittently with LBP  Denies recent falls  Remains motivated to improve  Demonstrating ability to step up onto step with mod A of 2, close SBA of 3rd  Significant increase in knee hyperextension with step up onto steps  Cont per POC and progress as able  Impairments: abnormal coordination, abnormal gait, abnormal muscle firing, abnormal muscle tone, activity intolerance, impaired balance and impaired physical strength  Barriers to therapy:     Understanding of Dx/Px/POC: good   Prognosis: good  Prognosis details: Positive prognostic indicators include positive attitude toward recovery and good understanding of diagnosis and treatment plan options  Negative prognostic indicators include chronicity of symptoms and co-morbidities  Goals  STGs to be achieved in 4 weeks:     1  Pt to demonstrate increased AROM of BLEs in order to allow for greater ease and independence with ADLs and functional mobility - MET  2  Pt to demonstrate full PROM of BLEs in order to maximize joint mobility and function and allow for progression of exercise program and achievement of goals - MET  3  Pt to demonstrate increased MMT of BLEs and B shldrs  by at least 1/2-1 grade in order to improve safety and stability with ADLs and functional mobility - ONGOING  4  Pt will take 5-6 steps in parallel bars with min A of 1 -  MET    LTGs to be achieved in 6-8 weeks: - ONGOING  1   Pt will be I with HEP in order to continue to improve quality of life and independence - MET  2  Pt to demonstrate return to amb 25 ft with RW with min asst of 1 - MET for 40+ feet, no Solo step, RW, min A of 1 on right with SBA x 1 for safety  3  Pt to demonstrate improved function as noted by achieving or exceeding predicted score on FOTO outcomes assessment tool  4  Pt will be able to stand with RW, KAFO and ankle brace for 2 minutes without seated rest break required - MET    New Goal:  Demonstrate independent donning/doffing KAFO - modified for MAFO - MET  Demonstrate ability to ambulate with RW min-mod A of 1 at least 50% right knee control without blocking - ONGOING  Demonstrate ability to ambulate on TM 1-2 minutes with bilateral UE support and min A on right for knee blocking  Demonstrate ability to step up onto 6" step with no more than min A x 1 and SBA of 2nd for 4 steps    Plan  Patient would benefit from: skilled physical therapy  Other planned modality interventions: Modalties prn for symptom management  Planned therapy interventions: neuromuscular re-education, manual therapy, therapeutic exercise, home exercise program, therapeutic activities and gait training  Frequency: 2x week  Duration in visits: 8  Duration in weeks: 4  Plan of Care beginning date: 4/23/2021  Plan of Care expiration date: 5/22/2021  Treatment plan discussed with: patient        Subjective Evaluation    History of Present Illness  Date of surgery: 2004,2015  Mechanism of injury: surgery  Mechanism of injury: UPDATE:   Patient voices increased standing time at home, marching  LEs continue to feel stronger by his report  More confidence in knees/quads, still not confident enough to walk at home  Pt states leaving work one night his legs went numb and he fell to the ground  Pt underwent testing and end result was surgery with rods and screws and cleaned out arthritis  Over time pt had to use walker and legs got progressively weaker   Had a second surgery in 2015 and was in [facility] for rehab with a stage 4 pressure ulcer as a result of his hospitalization  Pt states he has been nonambulatory for 3 years, since the second surgery  States when he would attempt to stand his R shld would give out and he could not support himself  Pt has lift recliner and is able to perform indep transfers to tub, recliner and stationary bike  Had MAFOs after initial surgery  Stands at dining room table with wife stabilizing table  Wife states pt had PT from Oct 2018 to April 2019 at another facility  Wife states since DC from PT pt has lost what he gained at that time  Recurrent probem    Quality of life: good    Pain  No pain reported    Social Support  Lives with: spouse    Employment status: not working    Diagnostic Tests  MRI studies: abnormal (back)  Treatments  Previous treatment: physical therapy  Current treatment: physical therapy  Patient Goals  Patient goals for therapy: improved balance, increased motion, increased strength, independence with ADLs/IADLs and return to sport/leisure activities  Patient goal: get up and walk, resume hunting, attend grandchildren's sporting events        Objective     Concurrent Complaints  Negative for night pain and disturbed sleep    Postural Observations  Seated posture: good  Standing posture: poor    Additional Postural Observation Details  At eval:  Stands in parallel bars with forward lean and LEs rigid in extension  Unable to take any steps  Transfers sit to stand indep by pulling up on parallel bars and locking knees in ext  At re-eval:  Sit to stand from w/c with heavy use of UEs  Improved hip extension and knee extension on left  80% left LE advancement without physical assist   Mod assist right hip 100%, mod assist to block right knee with MAFO in place  100% right LE advancement      Tenderness     Additional Tenderness Details  Denies TTP    Neurological Testing     Sensation     Lumbar   Left   Diminished: light touch, sharp/dull discrimination and proprioception    Right   Diminished: light touch, sharp/dull discrimination and proprioception    Comments   Left light touch: lower leg  Right light touch: lower leg  Right sharp/dull discrimination: very little sensation    Reflexes   Left   Patellar (L4): absent (0)    Right   Patellar (L4): trace (1+)    Additional Neurological Details  Intermittently "feel normal" - this has improved and continues improving by his subjective reports    Active Range of Motion     Additional Active Range of Motion Details  Bilateral LEs WFL PROM    Strength/Myotome Testing     Left Shoulder     Planes of Motion   Flexion: 4+   Abduction: 4+   External rotation at 0°: 4   Internal rotation at 0°: 4     Right Shoulder     Planes of Motion   Flexion: 4+   Abduction: 4+   External rotation at 0°: 4   Internal rotation at 0°: 4     Left Elbow   Flexion: 4+  Extension: 5    Right Elbow   Flexion: 4+  Extension: 5    Left Hip   Planes of Motion   Flexion: 3    Right Hip   Planes of Motion   Flexion: 3    Left Knee   Flexion: 3+  Extension: 3+    Right Knee   Flexion: 3-  Extension: 3-    Left Ankle/Foot   Dorsiflexion: 4  Plantar flexion: 4    Muscle Activation     Additional Muscle Activation Details  Over-activation of abdominal muscles in an attempt to increase hip extension    Ambulates 8 feet with min A on left; mod assist of right  RW for UE support    Neuro Exam:     Sensation   Light touch LE: left impaired and right impaired  Proprioception LE: left impaired and right impaired    Coordination   Heel to shin: left dysmetric and right dysmetric  Rapid alternating movements: LE impaired    Transfers   Sit to stand: independent with prosthesis: yesWheelchair to mat: independent Mat to wheelchair: independent Sit to supine: independent Supine to sit: independent   Roll: independentInto the car: minimum assist Out of the car: minimum assist     Functional outcomes   Functional outcome gait comment: At Re-eval: Step through pattern with RW for UB support, min A x 1 at right knee with MAFO in place  Left ankle brace in place  Improving hip/trunk extension endurance  Successful trial of lumbar support for stand/walking  Able to complete 8-10 steps in full erect posture at this time  Trial of TM gait training with assist of 2

## 2021-04-29 NOTE — PROGRESS NOTES
Daily Note     Today's date: 2021  Patient name: Ghazala Oliva  : 1950  MRN: [id number]  Referring provider: Daphnie Amaya DO  Dx:   Encounter Diagnosis     ICD-10-CM    1  Lumbosacral plexopathy  G54 1                   Subjective: patient reports he was doing okay at home  Still not confident with walking at home  Objective: See treatment diary below      Assessment: Tolerated treatment well  Patient demonstrated fatigue post treatment and would benefit from continued PT  Requires cues for step length/quad and glute control on right, increased lateral weight shift noted this date  Plan: Continue per plan of care        Precautions: nonambulatory, BLE weakness R>L  Re-eval Date: 2021    Date       Visit Count 81 82      FOTO (NC)        Pain In 0 0      Pain Out 0 0        Manual         prn          Treatment Diary        Aerobic        UBE         Sit to stand trials from w/c        Toe Tap        Glute squeeze, hip ext         Long kneel on mat        Quadruped        Dynamic seated balance Evan  Sit/Disc  MTP/LTP  20# 30x ea    Biceps  20#DB       Triceps  20# 30x    Fwd Raises  5# 3x10    Lat Raises  5# 3x10           Static standing endurance        Step ups        Hip ABD        Stand/Rocker Board        Gait training RW  Min A x 1 on right hip  W/ WC follow    4 x 34-40 max feet    1x 24 feet  Cues for increased trunk extn RW  Min A x 1 on right hip  W/ WC follow    6 x 34-43 max feet    Cues for increased trunk extn      Tilt table

## 2021-04-30 ENCOUNTER — OFFICE VISIT (OUTPATIENT)
Dept: PHYSICAL THERAPY | Facility: CLINIC | Age: 71
End: 2021-04-30
Payer: MEDICARE

## 2021-04-30 DIAGNOSIS — G54.1 LUMBOSACRAL PLEXOPATHY: Primary | ICD-10-CM

## 2021-04-30 PROCEDURE — 97116 GAIT TRAINING THERAPY: CPT | Performed by: PHYSICAL THERAPIST

## 2021-05-06 NOTE — PROGRESS NOTES
Daily Note     Today's date: 2021  Patient name: Simon Keller  : 1950  MRN: [id number]  Referring provider: Benja Schwab DO  Dx:   Encounter Diagnosis     ICD-10-CM    1  Lumbosacral plexopathy  G54 1                   Subjective: Scraping top of shoes getting in and out of the vehicle  Objective: See treatment diary below      Assessment: Tolerated treatment well  Patient demonstrated fatigue post treatment and would benefit from continued PT  Cues for decreased right step and increased step left  Cues for right quad control  Plan: Continue per plan of care        Precautions: nonambulatory, BLE weakness R>L  Re-eval Date: 2021    Date       Visit Count 81 82      FOTO (NC)        Pain In 0 0      Pain Out 0 0        Manual         prn          Treatment Diary        Aerobic        UBE         Sit to stand trials from w/c        Toe Tap        Glute squeeze, hip ext         Long kneel on mat        Quadruped        Dynamic seated balance [place]  Sit/Disc  MTP/LTP  20# 30x ea    Biceps  20#DB       Triceps  20# 30x    Fwd Raises  5# 3x10    Lat Raises  5# 3x10     [place]  Sit/Disc    MTP/LTP  20# 30x ea    Biceps  20#      Triceps  20# 30x    Fwd Raises  5# 3x10    Lat Raises  5# 3x10      Static standing endurance        Step ups        Hip ABD        Stand/Rocker Board        Gait training RW  Min A x 1 on right hip  W/ WC follow    4 x 34-40 max feet    1x 24 feet  Cues for increased trunk extn RW  Min A x 1 on right hip  W/ WC follow    4 x 45 max feet          Tilt table

## 2021-05-07 ENCOUNTER — OFFICE VISIT (OUTPATIENT)
Dept: PHYSICAL THERAPY | Facility: CLINIC | Age: 71
End: 2021-05-07
Payer: MEDICARE

## 2021-05-07 DIAGNOSIS — G54.1 LUMBOSACRAL PLEXOPATHY: Primary | ICD-10-CM

## 2021-05-07 PROCEDURE — 97110 THERAPEUTIC EXERCISES: CPT | Performed by: PHYSICAL THERAPIST

## 2021-05-07 PROCEDURE — 97116 GAIT TRAINING THERAPY: CPT | Performed by: PHYSICAL THERAPIST

## 2021-05-07 NOTE — PROGRESS NOTES
Daily Note     Today's date: 2021  Patient name: Aniyah Martin  : 1950  MRN: [id number]  Referring provider: Drew Chavez DO  Dx: No diagnosis found  Subjective: ***      Objective: See treatment diary below      Assessment: Tolerated treatment {Tolerated treatment :}   Patient {assessment:}      Plan: {PLAN:}     Precautions: nonambulatory, BLE weakness R>L  Re-eval Date: 2021    Date        Visit Count 81       FOTO (NC)        Pain In 0       Pain Out 0         Manual         prn          Treatment Diary        Aerobic        UBE         Sit to stand trials from w/c        Toe Tap        Glute squeeze, hip ext         Long kneel on mat        Quadruped        Dynamic seated balance [place]  Sit/Disc  MTP/LTP  20# 30x ea    Biceps  20#DB       Triceps  20# 30x    Fwd Raises  5# 3x10    Lat Raises  5# 3x10           Static standing endurance        Step ups        Hip ABD        Stand/Rocker Board        Gait training RW  Min A x 1 on right hip  W/ WC follow    4 x 34-40 max feet    1x 24 feet  Cues for increased trunk extn       Tilt table

## 2021-05-10 ENCOUNTER — APPOINTMENT (OUTPATIENT)
Dept: PHYSICAL THERAPY | Facility: CLINIC | Age: 71
End: 2021-05-10
Payer: MEDICARE

## 2021-05-13 NOTE — PROGRESS NOTES
Daily Note     Today's date: 2021  Patient name: Raul Berger  : 1950  MRN: [id number]  Referring provider: Celeste Epley, DO  Dx:   Encounter Diagnosis     ICD-10-CM    1  Lumbosacral plexopathy  G54 1                   Subjective: Tried walking at home  Able to take 7-8 steps for three times  3YEX  Objective: See treatment diary below      Assessment: Tolerated treatment well  Patient demonstrated fatigue post treatment and would benefit from continued PT  Patient demonstrates increased ambulation endurance, demonstrates improved tolerance to turns, max assist for knee blocking  Plan: Continue per plan of care        Precautions: nonambulatory, BLE weakness R>L  Re-eval Date: 2021    Date      Visit Count 81 82 83     FOTO (NC)        Pain In 0 0 0     Pain Out 0 0 0       Manual         prn          Treatment Diary        Aerobic        UBE         Sit to stand trials from w/c        Toe Tap        Glute squeeze, hip ext         Long kneel on mat        Quadruped        Dynamic seated balance [place]  Sit/Disc  MTP/LTP  20# 30x ea    Biceps  20#DB       Triceps  20# 30x    Fwd Raises  5# 3x10    Lat Raises  5# 3x10     [place]  Sit/Disc    MTP/LTP  20# 30x ea    Biceps  20#      Triceps  20# 30x    Fwd Raises  5# 3x10    Lat Raises  5# 3x10 Resume NV     Static standing endurance        Step ups        Hip ABD        Stand/Rocker Board        Gait training RW  Min A x 1 on right hip  W/ WC follow    4 x 34-40 max feet    1x 24 feet  Cues for increased trunk extn RW  Min A x 1 on right hip  W/ WC follow    4 x 45 max feet     RW  Min A x 1 on right hip  W/ WC follow    2 x 45 max feet    48 feet x1 with two turns    30 feet x 1 with two turns    40 feet x 1 with one turn    Step taps x 9 with bilateral UEs         Tilt table

## 2021-05-14 ENCOUNTER — OFFICE VISIT (OUTPATIENT)
Dept: PHYSICAL THERAPY | Facility: CLINIC | Age: 71
End: 2021-05-14
Payer: MEDICARE

## 2021-05-14 DIAGNOSIS — G54.1 LUMBOSACRAL PLEXOPATHY: Primary | ICD-10-CM

## 2021-05-14 PROCEDURE — 97110 THERAPEUTIC EXERCISES: CPT | Performed by: PHYSICAL THERAPIST

## 2021-05-14 PROCEDURE — 97116 GAIT TRAINING THERAPY: CPT | Performed by: PHYSICAL THERAPIST

## 2021-05-14 NOTE — PROGRESS NOTES
Daily Note     Today's date: 2021  Patient name: Raul Berger  : 1950  MRN: [id number]  Referring provider: Celeste Epley, DO  Dx: No diagnosis found  Subjective: ***      Objective: See treatment diary below      Assessment: Tolerated treatment {Tolerated treatment :}   Patient {assessment:3069437782}      Plan: {PLAN:3085439209}     Precautions: nonambulatory, BLE weakness R>L  Re-eval Date: 2021    Date      Visit Count 81 82 83     FOTO (NC)        Pain In 0 0 0     Pain Out 0 0 0       Manual         prn          Treatment Diary        Aerobic        UBE         Sit to stand trials from w/c        Toe Tap        Glute squeeze, hip ext         Long kneel on mat        Quadruped        Dynamic seated balance [place]  Sit/Disc  MTP/LTP  20# 30x ea    Biceps  20#DB       Triceps  20# 30x    Fwd Raises  5# 3x10    Lat Raises  5# 3x10     [place]  Sit/Disc    MTP/LTP  20# 30x ea    Biceps  20#      Triceps  20# 30x    Fwd Raises  5# 3x10    Lat Raises  5# 3x10 Resume NV     Static standing endurance        Step ups        Hip ABD        Stand/Rocker Board        Gait training RW  Min A x 1 on right hip  W/ WC follow    4 x 34-40 max feet    1x 24 feet  Cues for increased trunk extn RW  Min A x 1 on right hip  W/ WC follow    4 x 45 max feet     RW  Min A x 1 on right hip  W/ WC follow    2 x 45 max feet    48 feet x1 with two turns    30 feet x 1 with two turns    40 feet x 1 with one turn    Step taps x 9 with bilateral UEs         Tilt table

## 2021-05-17 ENCOUNTER — APPOINTMENT (OUTPATIENT)
Dept: PHYSICAL THERAPY | Facility: CLINIC | Age: 71
End: 2021-05-17
Payer: MEDICARE

## 2021-05-20 NOTE — PROGRESS NOTES
Daily Note     Today's date: 2021  Patient name: Delia Borrego  : 1950  MRN: [id number]  Referring provider: Aisha Adkins DO  Dx:   Encounter Diagnosis     ICD-10-CM    1  Lumbosacral plexopathy  G54 1                   Subjective: Patient reports fell getting out of car at home, w/c moved, scraped knee  No other injuries at this point  Objective: See treatment diary below      Assessment: Tolerated treatment well  Patient demonstrated fatigue post treatment and would benefit from continued PT  Demonstrated ability to complete 4 turns during this treatment  Episode of knee buckling requiring assist to recover to chair  Ongoing struggles with hip extension and hip ABd control with fatigue  Plan: Continue per plan of care        Precautions: nonambulatory, BLE weakness R>L  Re-eval Date: 2021    Date     Visit Count 81 82 83 84    FOTO (NC)        Pain In 0 0 0 0    Pain Out 0 0 0 0      Manual         prn          Treatment Diary        Aerobic        UBE         Sit to stand trials from w/c    [employer]  Strapping for hip extension  RW for UE support  15 mins  Mod assist for 2 with single UE support    Toe Tap        Glute squeeze, hip ext         Long kneel on mat        Quadruped        Dynamic seated balance [place]  Sit/Disc  MTP/LTP  20# 30x ea    Biceps  20#DB       Triceps  20# 30x    Fwd Raises  5# 3x10    Lat Raises  5# 3x10     [place]  Sit/Disc    MTP/LTP  20# 30x ea    Biceps  20#      Triceps  20# 30x    Fwd Raises  5# 3x10    Lat Raises  5# 3x10 Resume NV     Static standing endurance        Step ups        Hip ABD        Stand/Rocker Board        Gait training RW  Min A x 1 on right hip  W/ WC follow    4 x 34-40 max feet    1x 24 feet  Cues for increased trunk extn RW  Min A x 1 on right hip  W/ WC follow    4 x 45 max feet     RW  Min A x 1 on right hip  W/ WC follow    2 x 45 max feet    48 feet x1 with two turns    30 feet x 1 with two turns    40 feet x 1 with one turn    Step taps x 9 with bilateral UEs     RW  Min A x 1 on right hip  W/ WC follow    4 x 45 max feet    35 feet x 1 with four turns and max VCs            Tilt table

## 2021-05-21 ENCOUNTER — OFFICE VISIT (OUTPATIENT)
Dept: PHYSICAL THERAPY | Facility: CLINIC | Age: 71
End: 2021-05-21
Payer: MEDICARE

## 2021-05-21 DIAGNOSIS — G54.1 LUMBOSACRAL PLEXOPATHY: Primary | ICD-10-CM

## 2021-05-21 PROCEDURE — 97110 THERAPEUTIC EXERCISES: CPT | Performed by: PHYSICAL THERAPIST

## 2021-05-21 PROCEDURE — 97116 GAIT TRAINING THERAPY: CPT | Performed by: PHYSICAL THERAPIST

## 2021-05-23 NOTE — PROGRESS NOTES
PT Re-Evaluation    Today's date: 2021  Patient name: Viki Starkey  : 1950  MRN: [id number]  Referring provider: Jim Kohli DO  Dx:   Encounter Diagnosis     ICD-10-CM    1  Lumbosacral plexopathy  G54 1                   Assessment  Assessment details: Patient continuing to make slow, but steady progress  He struggles with slow return of proximal hip control  Improving trunk extension tolerance, however, this remains inadequate as well  Improving symmetry of step length and slowly improving right knee control requiring less assist to maintain right knee extension in stance and with swing through of left LE  Has trialed ambulation with short distance at home, however, using 4WRW has difficulty controlling multi-directional movement of AD  Will continue to benefit from  PT interventions  Impairments: abnormal coordination, abnormal gait, abnormal muscle firing, abnormal muscle tone, activity intolerance, impaired balance and impaired physical strength  Barriers to therapy:     Understanding of Dx/Px/POC: good   Prognosis: good  Prognosis details: Positive prognostic indicators include positive attitude toward recovery and good understanding of diagnosis and treatment plan options  Negative prognostic indicators include chronicity of symptoms and co-morbidities  Goals  STGs to be achieved in 4 weeks:     1  Pt to demonstrate increased AROM of BLEs in order to allow for greater ease and independence with ADLs and functional mobility - MET  2  Pt to demonstrate full PROM of BLEs in order to maximize joint mobility and function and allow for progression of exercise program and achievement of goals - MET  3  Pt to demonstrate increased MMT of BLEs and B shldrs  by at least 1/2-1 grade in order to improve safety and stability with ADLs and functional mobility - ONGOING  4  Pt will take 5-6 steps in parallel bars with min A of 1 -  MET    LTGs to be achieved in 6-8 weeks: - ONGOING  1   Pt will be I with HEP in order to continue to improve quality of life and independence - MET  2  Pt to demonstrate return to amb 25 ft with RW with min asst of 1 - MET for 40+ feet, no Solo step, RW, min A of 1 on right with SBA x 1 for safety  3  Pt to demonstrate improved function as noted by achieving or exceeding predicted score on FOTO outcomes assessment tool  4  Pt will be able to stand with RW, KAFO and ankle brace for 2 minutes without seated rest break required - MET    New Goal:  Demonstrate independent donning/doffing KAFO - modified for MAFO - MET  Demonstrate ability to ambulate with RW min-mod A of 1 at least 50% right knee control without blocking - ONGOING  Demonstrate ability to ambulate on TM 1-2 minutes with bilateral UE support and min A on right for knee blocking  Demonstrate ability to step up onto 6" step with no more than min A x 1 and SBA of 2nd for 4 steps    Plan  Patient would benefit from: skilled physical therapy  Other planned modality interventions: Modalties prn for symptom management  Planned therapy interventions: neuromuscular re-education, manual therapy, therapeutic exercise, home exercise program, therapeutic activities and gait training  Frequency: 2x week  Duration in visits: 8  Duration in weeks: 4  Plan of Care beginning date: 5/24/2021  Plan of Care expiration date: 6/23/2021  Treatment plan discussed with: patient        Subjective Evaluation    History of Present Illness  Date of surgery: 2004,2015  Mechanism of injury: surgery  Mechanism of injury: UPDATE:   Patient voices increased standing time at home, marching  Did have one fall this assessment period, at home from car/wheelchair  Did trial walking, however, difficulty with managing 4WRW due to degrees of freedom  Pt states leaving work one night his legs went numb and he fell to the ground  Pt underwent testing and end result was surgery with rods and screws and cleaned out arthritis  Over time pt had to use walker and legs got progressively weaker  Had a second surgery in 2015 and was in [facility] for rehab with a stage 4 pressure ulcer as a result of his hospitalization  Pt states he has been nonambulatory for 3 years, since the second surgery  States when he would attempt to stand his R shld would give out and he could not support himself  Pt has lift recliner and is able to perform indep transfers to tub, recliner and stationary bike  Had MAFOs after initial surgery  Stands at dining room table with wife stabilizing table  Wife states pt had PT from Oct 2018 to April 2019 at another facility  Wife states since DC from PT pt has lost what he gained at that time  Recurrent probem    Quality of life: good    Pain  No pain reported    Social Support  Lives with: spouse    Employment status: not working    Diagnostic Tests  MRI studies: abnormal (back)  Treatments  Previous treatment: physical therapy  Current treatment: physical therapy  Patient Goals  Patient goals for therapy: improved balance, increased motion, increased strength, independence with ADLs/IADLs and return to sport/leisure activities  Patient goal: get up and walk, resume hunting, attend grandchildren's sporting events        Objective     Concurrent Complaints  Negative for night pain and disturbed sleep    Postural Observations  Seated posture: good  Standing posture: poor    Additional Postural Observation Details  At eval:  Stands in parallel bars with forward lean and LEs rigid in extension  Unable to take any steps  Transfers sit to stand indep by pulling up on parallel bars and locking knees in ext  At re-eval:  Sit to stand from w/c with heavy use of UEs  Improved hip extension and knee extension on left  80% left LE advancement without physical assist   Mod assist right hip 100%, mod assist to block right knee with MAFO in place  100% right LE advancement      Tenderness     Additional Tenderness Details  Denies TTP    Neurological Testing     Sensation     Lumbar   Left   Diminished: light touch, sharp/dull discrimination and proprioception    Right   Diminished: light touch, sharp/dull discrimination and proprioception    Comments   Left light touch: lower leg  Right light touch: lower leg  Right sharp/dull discrimination: very little sensation    Reflexes   Left   Patellar (L4): absent (0)    Right   Patellar (L4): trace (1+)    Additional Neurological Details  Intermittently "feel normal" - this has improved and continues improving by his subjective reports    Right LE with a "vibration" feeling    Active Range of Motion     Additional Active Range of Motion Details  Bilateral LEs WFL PROM    Strength/Myotome Testing     Left Shoulder     Planes of Motion   Flexion: 4+   Abduction: 4+   External rotation at 0°: 4   Internal rotation at 0°: 4     Right Shoulder     Planes of Motion   Flexion: 4+   Abduction: 4+   External rotation at 0°: 4   Internal rotation at 0°: 4     Left Elbow   Flexion: 4+  Extension: 5    Right Elbow   Flexion: 4+  Extension: 5    Left Hip   Planes of Motion   Flexion: 3    Right Hip   Planes of Motion   Flexion: 3    Left Knee   Flexion: 3+  Extension: 3+    Right Knee   Flexion: 3-  Extension: 3-    Left Ankle/Foot   Dorsiflexion: 4  Plantar flexion: 4    Muscle Activation     Additional Muscle Activation Details  Over-activation of abdominal muscles in an attempt to increase hip extension    Ambulates 8 feet with min A on left; mod assist of right  RW for UE support    Neuro Exam:     Sensation   Light touch LE: left impaired and right impaired  Proprioception LE: left impaired and right impaired    Coordination   Heel to shin: left dysmetric and right dysmetric  Rapid alternating movements: LE impaired    Transfers   Sit to stand: independent with prosthesis: yesWheelchair to mat: independent Mat to wheelchair: independent Sit to supine: independent Supine to sit: independent   Roll: independentInto the car: minimum assist Out of the car: minimum assist     Functional outcomes   Functional outcome gait comment: At Re-eval: Step through pattern with RW for UB support, min A x 1 at right knee with MAFO in place  Left ankle brace in place  Improving hip/trunk extension endurance  Able to maintain more erect posture for up to 8 feet at this poin  Successful trial of lumbar support for stand/walking  Trial of TM gait training with assist of 2

## 2021-05-23 NOTE — PROGRESS NOTES
Daily Note     Today's date: 2021  Patient name: Carley Nava  : 1950  MRN: [id number]  Referring provider: Janie Reynolds DO  Dx:   Encounter Diagnosis     ICD-10-CM    1  Lumbosacral plexopathy  G54 1                   Subjective: See RE      Objective: See treatment diary below      Assessment: See RE      Plan: See RE     Precautions: nonambulatory, BLE weakness R>L  Re-eval Date: 2021    Date    Visit Count 81 82 83 84 85   FOTO (NC)        Pain In 0 0 0 0    Pain Out 0 0 0 0      Manual         prn          Treatment Diary        Aerobic        UBE         Sit to stand trials from w/c    Dyn[employer]ision  Strapping for hip extension  RW for UE support  15 mins  Mod assist for 2 with single UE support    Toe Tap        Glute squeeze, hip ext         Long kneel on mat        Quadruped        Dynamic seated balance [place]  Sit/Disc  MTP/LTP  20# 30x ea    Biceps  20#DB       Triceps  20# 30x    Fwd Raises  5# 3x10    Lat Raises  5# 3x10     [place]  Sit/Disc    MTP/LTP  20# 30x ea    Biceps  20#      Triceps  20# 30x    Fwd Raises  5# 3x10    Lat Raises  5# 3x10 Resume NV  [place]  Sit on chair, no arm rests  Disc    MTP/LTP  20# 30x ea    Biceps  20#      Triceps  20# 30x    Fwd Raises  5# 3x10    Lat Raises  5# 3x10   Static standing endurance        Step ups        Hip ABD        Stand/Rocker Board        Gait training RW  Min A x 1 on right hip  W/ WC follow    4 x 34-40 max feet    1x 24 feet  Cues for increased trunk extn RW  Min A x 1 on right hip  W/ WC follow    4 x 45 max feet     RW  Min A x 1 on right hip  W/ WC follow    2 x 45 max feet    48 feet x1 with two turns    30 feet x 1 with two turns    40 feet x 1 with one turn    Step taps x 9 with bilateral UEs     RW  Min A x 1 on right hip  W/ WC follow    4 x 45 max feet    35 feet x 1 with four turns and max VCs         RW  Min A x 1 on right hip  W/ WC follow    4 x 45 max feet    35 feet x 2 with four turns and max VCs   Tilt table

## 2021-05-24 ENCOUNTER — EVALUATION (OUTPATIENT)
Dept: PHYSICAL THERAPY | Facility: CLINIC | Age: 71
End: 2021-05-24
Payer: MEDICARE

## 2021-05-24 DIAGNOSIS — G54.1 LUMBOSACRAL PLEXOPATHY: Primary | ICD-10-CM

## 2021-05-24 PROCEDURE — 97110 THERAPEUTIC EXERCISES: CPT | Performed by: PHYSICAL THERAPIST

## 2021-05-24 PROCEDURE — 97116 GAIT TRAINING THERAPY: CPT | Performed by: PHYSICAL THERAPIST

## 2021-05-27 NOTE — PROGRESS NOTES
Daily Note     Today's date: 2021  Patient name: Fabio Ramachandran  : 1950  MRN: [id number]  Referring provider: Deep Underwood DO  Dx:   Encounter Diagnosis     ICD-10-CM    1  Lumbosacral plexopathy  G54 1                   Subjective: patient reports he is doing okay, walked a little bit at home this week  Objective: See treatment diary below      Assessment: Tolerated treatment well  Patient demonstrated fatigue post treatment and would benefit from continued PT  Patient with improved hip control on left this date compared to last sessoin  Plan: Continue per plan of care        Precautions: nonambulatory, BLE weakness R>L  Re-eval Date: 2021    Date        Visit Count 86       FOTO (NC)        Pain In 0       Pain Out 0         Manual         prn          Treatment Diary        Aerobic        UBE         Sit to stand trials from w/c        Toe Tap        Glute squeeze, hip ext         Long kneel on mat        Quadruped        Dynamic seated balance   [place]  Sit on chair, no arm rests  Disc    MTP/LTP  20# 30x ea    Biceps  20#      Triceps  20# 30x    Fwd Raises  5# 3x10    Lat Raises  5# 3x10     Static standing endurance        Step ups 10x alt R/L onto 6" step  4 x full step ups onto 4" step with RW for UE support       Hip ABD        Stand/Rocker Board        Gait training RW  Min A x 1 on right hip  W/ WC follow    45 feet x 4    10 feet x 1 with RW and mod assist of 2 over red foam mat, difficulty with RW advancement       Tilt table

## 2021-05-28 ENCOUNTER — OFFICE VISIT (OUTPATIENT)
Dept: PHYSICAL THERAPY | Facility: CLINIC | Age: 71
End: 2021-05-28
Payer: MEDICARE

## 2021-05-28 DIAGNOSIS — G54.1 LUMBOSACRAL PLEXOPATHY: Primary | ICD-10-CM

## 2021-05-28 PROCEDURE — 97110 THERAPEUTIC EXERCISES: CPT | Performed by: PHYSICAL THERAPIST

## 2021-05-28 PROCEDURE — 97116 GAIT TRAINING THERAPY: CPT | Performed by: PHYSICAL THERAPIST

## 2021-06-03 NOTE — PROGRESS NOTES
Daily Note     Today's date: 6/3/2021  Patient name: Aakash Jo  : 1950  MRN: [id number]  Referring provider: Alexa Bailon DO  Dx: No diagnosis found  Subjective: ***      Objective: See treatment diary below      Assessment: Tolerated treatment {Tolerated treatment :}   Patient {assessment:6113321317}      Plan: {PLAN:}     Precautions: nonambulatory, BLE weakness R>L  Re-eval Date: 2021    Date        Visit Count 86       FOTO (NC)        Pain In 0       Pain Out 0         Manual         prn          Treatment Diary        Aerobic        UBE         Sit to stand trials from w/c        Toe Tap        Glute squeeze, hip ext         Long kneel on mat        Quadruped        Dynamic seated balance   [place]  Sit on chair, no arm rests  Disc    MTP/LTP  20# 30x ea    Biceps  20#      Triceps  20# 30x    Fwd Raises  5# 3x10    Lat Raises  5# 3x10     Static standing endurance        Step ups 10x alt R/L onto 6" step  4 x full step ups onto 4" step with RW for UE support       Hip ABD        Stand/Rocker Board        Gait training RW  Min A x 1 on right hip  W/ WC follow    45 feet x 4    10 feet x 1 with RW and mod assist of 2 over red foam mat, difficulty with RW advancement       Tilt table

## 2021-06-04 ENCOUNTER — APPOINTMENT (OUTPATIENT)
Dept: PHYSICAL THERAPY | Facility: CLINIC | Age: 71
End: 2021-06-04
Payer: MEDICARE

## 2021-06-07 ENCOUNTER — OFFICE VISIT (OUTPATIENT)
Dept: PHYSICAL THERAPY | Facility: CLINIC | Age: 71
End: 2021-06-07
Payer: MEDICARE

## 2021-06-07 DIAGNOSIS — G54.1 LUMBOSACRAL PLEXOPATHY: Primary | ICD-10-CM

## 2021-06-07 PROCEDURE — 97116 GAIT TRAINING THERAPY: CPT | Performed by: PHYSICAL THERAPIST

## 2021-06-07 PROCEDURE — 97110 THERAPEUTIC EXERCISES: CPT | Performed by: PHYSICAL THERAPIST

## 2021-06-07 NOTE — PROGRESS NOTES
Daily Note     Today's date: 2021  Patient name: Gloria Kwong  : 1950  MRN: [id number]  Referring provider: Demi Mullins DO  Dx:   Encounter Diagnosis     ICD-10-CM    1  Lumbosacral plexopathy  G54 1        Start Time: 1100  Stop Time: 1200  Total time in clinic (min): 60 minutes    Subjective: "same old"      Objective: See treatment diary below      Assessment: Tolerated treatment well  Difficulty with glute activation during gait training  Blocked at R proximal tibia during loading response and mid stance  Required seated rests after bouts of walking  Patient demonstrated fatigue post treatment and would benefit from continued PT      Plan: Continue per plan of care        Precautions: nonambulatory, BLE weakness R>L  Re-eval Date: 2021    Date       Visit Count 86 87      FOTO (NC)        Pain In 0 0      Pain Out 0 0        Manual         prn          Treatment Diary        Aerobic        UBE         Sit to stand trials from w/c        Toe Tap        Glute squeeze, hip ext         Long kneel on mat        Quadruped        Dynamic seated balance  [place]  Sitting on chair, disc    Low rows& Rows - 21#, 20x   [place]  Sit on chair, no arm rests  Disc    MTP/LTP  20# 30x ea    Biceps  20#      Triceps  20# 30x    Fwd Raises  5# 3x10    Lat Raises  5# 3x10     Static standing endurance        Step ups 10x alt R/L onto 6" step  4 x full step ups onto 4" step with RW for UE support       Hip ABD        Stand/Rocker Board        Gait training RW  Min A x 1 on right hip  W/ WC follow    45 feet x 4    10 feet x 1 with RW and mod assist of 2 over red foam mat, difficulty with RW advancement RW min a x1 on R hip and block at prox tibia    45ftx6      Tilt table

## 2021-06-11 ENCOUNTER — OFFICE VISIT (OUTPATIENT)
Dept: PHYSICAL THERAPY | Facility: CLINIC | Age: 71
End: 2021-06-11
Payer: MEDICARE

## 2021-06-11 DIAGNOSIS — G54.1 LUMBOSACRAL PLEXOPATHY: Primary | ICD-10-CM

## 2021-06-11 PROCEDURE — 97116 GAIT TRAINING THERAPY: CPT

## 2021-06-11 PROCEDURE — 97110 THERAPEUTIC EXERCISES: CPT

## 2021-06-11 NOTE — PROGRESS NOTES
Daily Note     Today's date: 2021  Patient name: Viki Starkey  : 1950  MRN: [id number]  Referring provider: Jim Kohli DO  Dx:   Encounter Diagnosis     ICD-10-CM    1  Lumbosacral plexopathy  G54 1                   Subjective: Hips sore, muscle sore after last session  Objective: See treatment diary below      Assessment: Tolerated treatment well  Patient demonstrated fatigue post treatment and would benefit from continued PT  Good family support, progressing well with activity overall  Plan: Continue per plan of care        Precautions: nonambulatory, BLE weakness R>L  Re-eval Date: 2021    Date      Visit Count 86 87 88     FOTO (NC)        Pain In 0 0 1     Pain Out 0 0 1       Manual         prn          Treatment Diary        Aerobic        UBE         Sit to stand trials from w/c        Toe Tap        Glute squeeze, hip ext         Long kneel on mat        Quadruped        Dynamic seated balance  [place]  Sitting on chair, disc    Low rows& Rows - 21#, 20x        Static standing endurance        Step ups 10x alt R/L onto 6" step  4 x full step ups onto 4" step with RW for UE support       Hip ABD        Stand/Rocker Board        Gait training RW  Min A x 1 on right hip  W/ WC follow    45 feet x 4    10 feet x 1 with RW and mod assist of 2 over red foam mat, difficulty with RW advancement RW min a x1 on R hip and block at prox tibia    45ftx6 RW min A on right with w/c follow  45 feet x 6    Step ups  4" step  Step tap x 10 with min A on right  STep up x 4 with min A on right  Bilateral UE support     Tilt table

## 2021-06-11 NOTE — PROGRESS NOTES
Daily Note     Today's date: 2021  Patient name: Elbert Alicia  : 1950  MRN: [id number]  Referring provider: Dolores Carter DO  Dx:   Encounter Diagnosis     ICD-10-CM    1  Lumbosacral plexopathy  G54 1                   Subjective: I have been walking at home a couple of times with my RW but not totally comfortable with just having wife for safety        Objective: See treatment diary below      Assessment: Tolerated treatment well  Patient denied any increase pain/discomfort with gait/TE Trial hip ext with gravity eliminated per discussion with PT/MW  Added standing UE strengthening on tilt table   Patient would benefit from continued PT      Plan: Continue per plan of care        Precautions: nonambulatory, BLE weakness R>L  Re-eval Date: 2021    Date      Visit Count 86 87 88     FOTO (NC)        Pain In 0 0 0     Pain Out 0 0 0       Manual         prn          Treatment Diary        Aerobic        UBE         Sit to stand trials from w/c        Toe Tap        Glute squeeze, hip ext         Long kneel on mat        Quadruped        Dynamic seated balance  [place]  Sitting on chair, disc    Low rows& Rows - 21#, 20x   Tilt table  Bicep  15# BL UE  3x10 alt  Cues core stability    Shoulder press  5#  2x10    Fwd/Lat Raises  4# 10 ea to pt fatigue     SL Hip ext / Tilt table Gravity eliminated   R/L LE  With LE support  2x10 ea     Static standing endurance        Step ups 10x alt R/L onto 6" step  4 x full step ups onto 4" step with RW for UE support       Hip ABD        Stand/Rocker Board        Gait training RW  Min A x 1 on right hip  W/ WC follow    45 feet x 4    10 feet x 1 with RW and mod assist of 2 over red foam mat, difficulty with RW advancement RW min a x1 on R hip and block at prox tibia    45ftx6 RW min a x1 on R hip and block at prox tibia  W/ WC follow  45ftx3       Tilt table

## 2021-06-14 ENCOUNTER — OFFICE VISIT (OUTPATIENT)
Dept: PHYSICAL THERAPY | Facility: CLINIC | Age: 71
End: 2021-06-14
Payer: MEDICARE

## 2021-06-14 DIAGNOSIS — G54.1 LUMBOSACRAL PLEXOPATHY: Primary | ICD-10-CM

## 2021-06-14 PROCEDURE — 97116 GAIT TRAINING THERAPY: CPT | Performed by: PHYSICAL THERAPIST

## 2021-06-17 NOTE — PROGRESS NOTES
Daily Note     Today's date: 2021  Patient name: Pravin Kaur  : 1950  MRN: [id number]  Referring provider: Francis Mccarthy DO  Dx: No diagnosis found  Subjective: ***      Objective: See treatment diary below      Assessment: Tolerated treatment {Tolerated treatment :}   Patient {assessment:1022990929}      Plan: {PLAN:}     Precautions: nonambulatory, BLE weakness R>L  Re-eval Date: 2021    Date      Visit Count 86 87 88     FOTO (NC)        Pain In 0 0 1     Pain Out 0 0 1       Manual         prn          Treatment Diary        Aerobic        UBE         Sit to stand trials from w/c        Toe Tap        Glute squeeze, hip ext         Long kneel on mat        Quadruped        Dynamic seated balance  [place]  Sitting on chair, disc    Low rows& Rows - 21#, 20x        Static standing endurance        Step ups 10x alt R/L onto 6" step  4 x full step ups onto 4" step with RW for UE support       Hip ABD        Stand/Rocker Board        Gait training RW  Min A x 1 on right hip  W/ WC follow    45 feet x 4    10 feet x 1 with RW and mod assist of 2 over red foam mat, difficulty with RW advancement RW min a x1 on R hip and block at prox tibia    45ftx6 RW min A on right with w/c follow  45 feet x 6    Step ups  4" step  Step tap x 10 with min A on right  STep up x 4 with min A on right  Bilateral UE support     Tilt table

## 2021-06-18 ENCOUNTER — APPOINTMENT (OUTPATIENT)
Dept: PHYSICAL THERAPY | Facility: CLINIC | Age: 71
End: 2021-06-18
Payer: MEDICARE

## 2021-06-18 NOTE — PROGRESS NOTES
Daily Note     Today's date: 2021  Patient name: Winnie Euceda  : 1950  MRN: [id number]  Referring provider: Savanah Tran DO  Dx: No diagnosis found  Subjective: ***      Objective: See treatment diary below      Assessment: Tolerated treatment {Tolerated treatment :}   Patient {assessment:2203880299}      Plan: {PLAN:8979582567}     Precautions: nonambulatory, BLE weakness R>L  Re-eval Date: 2021    Date      Visit Count 86 87 88     FOTO (NC)        Pain In 0 0 1     Pain Out 0 0 1       Manual         prn          Treatment Diary        Aerobic        UBE         Sit to stand trials from w/c        Toe Tap        Glute squeeze, hip ext         Long kneel on mat        Quadruped        Dynamic seated balance  [place]  Sitting on chair, disc    Low rows& Rows - 21#, 20x        Static standing endurance        Step ups 10x alt R/L onto 6" step  4 x full step ups onto 4" step with RW for UE support       Hip ABD        Stand/Rocker Board        Gait training RW  Min A x 1 on right hip  W/ WC follow    45 feet x 4    10 feet x 1 with RW and mod assist of 2 over red foam mat, difficulty with RW advancement RW min a x1 on R hip and block at prox tibia    45ftx6 RW min A on right with w/c follow  45 feet x 6    Step ups  4" step  Step tap x 10 with min A on right  STep up x 4 with min A on right  Bilateral UE support     Tilt table

## 2021-06-21 ENCOUNTER — APPOINTMENT (OUTPATIENT)
Dept: PHYSICAL THERAPY | Facility: CLINIC | Age: 71
End: 2021-06-21
Payer: MEDICARE

## 2021-06-25 ENCOUNTER — APPOINTMENT (OUTPATIENT)
Dept: PHYSICAL THERAPY | Facility: CLINIC | Age: 71
End: 2021-06-25
Payer: MEDICARE

## 2021-06-28 ENCOUNTER — APPOINTMENT (OUTPATIENT)
Dept: PHYSICAL THERAPY | Facility: CLINIC | Age: 71
End: 2021-06-28
Payer: MEDICARE

## 2021-07-16 NOTE — PROGRESS NOTES
Daily Note     Today's date: 2021  Patient name: Aakash Jo  : 1950  MRN: [id number]  Referring provider: Alexa Bailon DO  Dx: No diagnosis found  Subjective: ***      Objective: See treatment diary below      Assessment: Tolerated treatment {Tolerated treatment :}   Patient {assessment:[id number]}      Plan: {PLAN:}     Precautions: nonambulatory, BLE weakness R>L  Re-eval Date: 2021    Date      Visit Count 86 87 88     FOTO (NC)        Pain In 0 0 1     Pain Out 0 0 1       Manual         prn          Treatment Diary        Aerobic        UBE         Sit to stand trials from w/c        Toe Tap        Glute squeeze, hip ext         Long kneel on mat        Quadruped        Dynamic seated balance  [place]  Sitting on chair, disc    Low rows& Rows - 21#, 20x        Static standing endurance        Step ups 10x alt R/L onto 6" step  4 x full step ups onto 4" step with RW for UE support       Hip ABD        Stand/Rocker Board        Gait training RW  Min A x 1 on right hip  W/ WC follow    45 feet x 4    10 feet x 1 with RW and mod assist of 2 over red foam mat, difficulty with RW advancement RW min a x1 on R hip and block at prox tibia    45ftx6 RW min A on right with w/c follow  45 feet x 6    Step ups  4" step  Step tap x 10 with min A on right  STep up x 4 with min A on right  Bilateral UE support     Tilt table
External [place]/External [facility]

## 2021-07-19 ENCOUNTER — APPOINTMENT (OUTPATIENT)
Dept: PHYSICAL THERAPY | Facility: CLINIC | Age: 71
End: 2021-07-19
Payer: MEDICARE

## 2021-07-23 ENCOUNTER — APPOINTMENT (OUTPATIENT)
Dept: PHYSICAL THERAPY | Facility: CLINIC | Age: 71
End: 2021-07-23
Payer: MEDICARE

## 2021-07-23 NOTE — PROGRESS NOTES
PT Re-Evaluation    Today's date: 2021  Patient name: Damian Dewey  : 1950  MRN: [id number]  Referring provider: Paul Frank DO  Dx:   No diagnosis found  Assessment  Assessment details: Patient continuing to make slow, but steady progress  He struggles with slow return of proximal hip control  Improving trunk extension tolerance, however, this remains inadequate as well  Improving symmetry of step length and slowly improving right knee control requiring less assist to maintain right knee extension in stance and with swing through of left LE  Has trialed ambulation with short distance at home, however, using 4WRW has difficulty controlling multi-directional movement of AD  Will continue to benefit from  PT interventions  Impairments: abnormal coordination, abnormal gait, abnormal muscle firing, abnormal muscle tone, activity intolerance, impaired balance and impaired physical strength  Barriers to therapy:     Understanding of Dx/Px/POC: good   Prognosis: good  Prognosis details: Positive prognostic indicators include positive attitude toward recovery and good understanding of diagnosis and treatment plan options  Negative prognostic indicators include chronicity of symptoms and co-morbidities  Goals  STGs to be achieved in 4 weeks:     1  Pt to demonstrate increased AROM of BLEs in order to allow for greater ease and independence with ADLs and functional mobility - MET  2  Pt to demonstrate full PROM of BLEs in order to maximize joint mobility and function and allow for progression of exercise program and achievement of goals - MET  3  Pt to demonstrate increased MMT of BLEs and B shldrs  by at least 1/2-1 grade in order to improve safety and stability with ADLs and functional mobility - ONGOING  4  Pt will take 5-6 steps in parallel bars with min A of 1 -  MET    LTGs to be achieved in 6-8 weeks: - ONGOING  1   Pt will be I with HEP in order to continue to improve quality of life and independence - MET  2  Pt to demonstrate return to amb 25 ft with RW with min asst of 1 - MET for 40+ feet, no Solo step, RW, min A of 1 on right with SBA x 1 for safety  3  Pt to demonstrate improved function as noted by achieving or exceeding predicted score on FOTO outcomes assessment tool  4  Pt will be able to stand with RW, KAFO and ankle brace for 2 minutes without seated rest break required - MET    New Goal:  Demonstrate independent donning/doffing KAFO - modified for MAFO - MET  Demonstrate ability to ambulate with RW min-mod A of 1 at least 50% right knee control without blocking - ONGOING  Demonstrate ability to ambulate on TM 1-2 minutes with bilateral UE support and min A on right for knee blocking  Demonstrate ability to step up onto 6" step with no more than min A x 1 and SBA of 2nd for 4 steps    Plan  Patient would benefit from: skilled physical therapy  Other planned modality interventions: Modalties prn for symptom management  Planned therapy interventions: neuromuscular re-education, manual therapy, therapeutic exercise, home exercise program, therapeutic activities and gait training  Frequency: 2x week  Duration in visits: 8  Duration in weeks: 4  Plan of Care beginning date: 5/24/2021  Plan of Care expiration date: 6/23/2021  Treatment plan discussed with: patient        Subjective Evaluation    History of Present Illness  Date of surgery: 2004,2015  Mechanism of injury: surgery  Mechanism of injury: UPDATE:   Patient voices increased standing time at home, marching  Did have one fall this assessment period, at home from car/wheelchair  Did trial walking, however, difficulty with managing 4WRW due to degrees of freedom  Pt states leaving work one night his legs went numb and he fell to the ground  Pt underwent testing and end result was surgery with rods and screws and cleaned out arthritis  Over time pt had to use walker and legs got progressively weaker   Had a second surgery in 2015 and was in [facility] for rehab with a stage 4 pressure ulcer as a result of his hospitalization  Pt states he has been nonambulatory for 3 years, since the second surgery  States when he would attempt to stand his R shld would give out and he could not support himself  Pt has lift recliner and is able to perform indep transfers to tub, recliner and stationary bike  Had MAFOs after initial surgery  Stands at dining room table with wife stabilizing table  Wife states pt had PT from Oct 2018 to April 2019 at another facility  Wife states since DC from PT pt has lost what he gained at that time  Recurrent probem    Quality of life: good    Pain  No pain reported    Social Support  Lives with: spouse    Employment status: not working    Diagnostic Tests  MRI studies: abnormal (back)  Treatments  Previous treatment: physical therapy  Current treatment: physical therapy  Patient Goals  Patient goals for therapy: improved balance, increased motion, increased strength, independence with ADLs/IADLs and return to sport/leisure activities  Patient goal: get up and walk, resume hunting, attend grandchildren's sporting events        Objective     Concurrent Complaints  Negative for night pain and disturbed sleep    Postural Observations  Seated posture: good  Standing posture: poor    Additional Postural Observation Details  At eval:  Stands in parallel bars with forward lean and LEs rigid in extension  Unable to take any steps  Transfers sit to stand indep by pulling up on parallel bars and locking knees in ext  At re-eval:  Sit to stand from w/c with heavy use of UEs  Improved hip extension and knee extension on left  80% left LE advancement without physical assist   Mod assist right hip 100%, mod assist to block right knee with MAFO in place  100% right LE advancement      Tenderness     Additional Tenderness Details  Denies TTP    Neurological Testing     Sensation     Lumbar   Left Diminished: light touch, sharp/dull discrimination and proprioception    Right   Diminished: light touch, sharp/dull discrimination and proprioception    Comments   Left light touch: lower leg  Right light touch: lower leg  Right sharp/dull discrimination: very little sensation    Reflexes   Left   Patellar (L4): absent (0)    Right   Patellar (L4): trace (1+)    Additional Neurological Details  Intermittently "feel normal" - this has improved and continues improving by his subjective reports    Right LE with a "vibration" feeling    Active Range of Motion     Additional Active Range of Motion Details  Bilateral LEs WFL PROM    Strength/Myotome Testing     Left Shoulder     Planes of Motion   Flexion: 4+   Abduction: 4+   External rotation at 0°: 4   Internal rotation at 0°: 4     Right Shoulder     Planes of Motion   Flexion: 4+   Abduction: 4+   External rotation at 0°: 4   Internal rotation at 0°: 4     Left Elbow   Flexion: 4+  Extension: 5    Right Elbow   Flexion: 4+  Extension: 5    Left Hip   Planes of Motion   Flexion: 3    Right Hip   Planes of Motion   Flexion: 3    Left Knee   Flexion: 3+  Extension: 3+    Right Knee   Flexion: 3-  Extension: 3-    Left Ankle/Foot   Dorsiflexion: 4  Plantar flexion: 4    Muscle Activation     Additional Muscle Activation Details  Over-activation of abdominal muscles in an attempt to increase hip extension    Ambulates 8 feet with min A on left; mod assist of right  RW for UE support    Neuro Exam:     Sensation   Light touch LE: left impaired and right impaired  Proprioception LE: left impaired and right impaired    Coordination   Heel to shin: left dysmetric and right dysmetric  Rapid alternating movements: LE impaired    Transfers   Sit to stand: independent with prosthesis: yesWheelchair to mat: independent Mat to wheelchair: independent Sit to supine: independent Supine to sit: independent   Roll: independentInto the car: minimum assist Out of the car: minimum assist     Functional outcomes   Functional outcome gait comment: At Re-eval: Step through pattern with RW for UB support, min A x 1 at right knee with MAFO in place  Left ankle brace in place  Improving hip/trunk extension endurance  Able to maintain more erect posture for up to 8 feet at this poin  Successful trial of lumbar support for stand/walking  Trial of TM gait training with assist of 2

## 2021-07-23 NOTE — PROGRESS NOTES
Daily Note     Today's date: 2021  Patient name: Oli Watkins  : 1950  MRN: [id number]  Referring provider: Carlitos Castillo DO  Dx: No diagnosis found  Subjective: ***      Objective: See treatment diary below      Assessment: Tolerated treatment {Tolerated treatment :}   Patient {assessment:[id number]}      Plan: {PLAN:1663289967}     Precautions: nonambulatory, BLE weakness R>L  Re-eval Date: 2021    Date      Visit Count 86 87 88     FOTO (NC)        Pain In 0 0 1     Pain Out 0 0 1       Manual         prn          Treatment Diary        Aerobic        UBE         Sit to stand trials from w/c        Toe Tap        Glute squeeze, hip ext         Long kneel on mat        Quadruped        Dynamic seated balance  [place]  Sitting on chair, disc    Low rows& Rows - 21#, 20x        Static standing endurance        Step ups 10x alt R/L onto 6" step  4 x full step ups onto 4" step with RW for UE support       Hip ABD        Stand/Rocker Board        Gait training RW  Min A x 1 on right hip  W/ WC follow    45 feet x 4    10 feet x 1 with RW and mod assist of 2 over red foam mat, difficulty with RW advancement RW min a x1 on R hip and block at prox tibia    45ftx6 RW min A on right with w/c follow  45 feet x 6    Step ups  4" step  Step tap x 10 with min A on right  STep up x 4 with min A on right  Bilateral UE support     Tilt table

## 2021-07-26 ENCOUNTER — APPOINTMENT (OUTPATIENT)
Dept: PHYSICAL THERAPY | Facility: CLINIC | Age: 71
End: 2021-07-26
Payer: MEDICARE

## 2021-07-30 ENCOUNTER — EVALUATION (OUTPATIENT)
Dept: PHYSICAL THERAPY | Facility: CLINIC | Age: 71
End: 2021-07-30
Payer: MEDICARE

## 2021-07-30 DIAGNOSIS — G54.1 LUMBOSACRAL PLEXOPATHY: Primary | ICD-10-CM

## 2021-07-30 DIAGNOSIS — R26.9 GAIT ABNORMALITY: ICD-10-CM

## 2021-07-30 DIAGNOSIS — R29.898 WEAKNESS OF BOTH LOWER EXTREMITIES: ICD-10-CM

## 2021-07-30 PROCEDURE — 97164 PT RE-EVAL EST PLAN CARE: CPT | Performed by: PHYSICAL THERAPIST

## 2021-07-30 PROCEDURE — 97116 GAIT TRAINING THERAPY: CPT | Performed by: PHYSICAL THERAPIST

## 2021-07-30 NOTE — PROGRESS NOTES
PT Re-Evaluation    Today's date: 2021  Patient name: Diane Santiago  : 1950  MRN: [id number]  Referring provider: Rusty Curry DO  Dx:   Encounter Diagnosis     ICD-10-CM    1  Lumbosacral plexopathy  G54 1    2  Weakness of both lower extremities  R29 898    3  Gait abnormality  R26 9        Start Time: 1100  Stop Time: 1200  Total time in clinic (min): 60 minutes    Assessment  Assessment details: Patient has demonstrated a significant decline in status from last session  Decreased standing tolerance, decreased ambulation endurance, requires more assist for ambulation  Moved from assist of 1 with w/c follow to assist of 2 with w/c follow  Distance reduced by 50%, additionally, patient with decreased ability to perform turns 2/2 decreased stability with gait  He will benefit from return to PT at 2x per week  Cont per POC  Impairments: abnormal coordination, abnormal gait, abnormal muscle firing, abnormal muscle tone, abnormal movement, activity intolerance, impaired balance, impaired physical strength and safety issue  Barriers to therapy:     Understanding of Dx/Px/POC: good   Prognosis: good  Prognosis details: Positive prognostic indicators include positive attitude toward recovery and good understanding of diagnosis and treatment plan options  Negative prognostic indicators include chronicity of symptoms and co-morbidities  Goals  STGs to be achieved in 4 weeks:     1  Pt to demonstrate increased AROM of BLEs in order to allow for greater ease and independence with ADLs and functional mobility - MET  2  Pt to demonstrate full PROM of BLEs in order to maximize joint mobility and function and allow for progression of exercise program and achievement of goals - MET  3  Pt to demonstrate increased MMT of BLEs and B shldrs  by at least 1/2-1 grade in order to improve safety and stability with ADLs and functional mobility - ONGOING  4   Pt will take 5-6 steps in parallel bars with min A of 1 -  MET    LTGs to be achieved in 6-8 weeks: - ONGOING  1  Pt will be I with HEP in order to continue to improve quality of life and independence - MET  2  Pt to demonstrate return to amb 25 ft with RW with min asst of 1 RESUME  3  Pt to demonstrate improved function as noted by achieving or exceeding predicted score on FOTO outcomes assessment tool  4  Pt will be able to stand with RW, KAFO and ankle brace for 2 minutes without seated rest break required -RESUME    New Goal:  Demonstrate independent donning/doffing KAFO - modified for MAFO - MET  Demonstrate ability to ambulate with RW min-mod A of 1 at least 50% right knee control without blocking   Demonstrate ability to ambulate on TM 1-2 minutes with bilateral UE support and min A on right for knee blocking  Demonstrate ability to step up onto 6" step with no more than min A x 1 and SBA of 2nd for 4 steps    Plan  Patient would benefit from: skilled physical therapy  Other planned modality interventions: Modalties prn for symptom management  Planned therapy interventions: neuromuscular re-education, manual therapy, therapeutic exercise, home exercise program, therapeutic activities and gait training  Frequency: 2x week  Duration in visits: 8  Duration in weeks: 4  Plan of Care beginning date: 7/30/2021  Plan of Care expiration date: 8/30/2021  Treatment plan discussed with: patient and PTA        Subjective Evaluation    History of Present Illness  Date of surgery: 2004,2015  Mechanism of injury: surgery  Mechanism of injury: UPDATE:   Patient voices decreased standing, biking and walking time at home since he burned his foot in the shower  Then also endorses a fall getting out of the shower  Pt states leaving work one night his legs went numb and he fell to the ground  Pt underwent testing and end result was surgery with rods and screws and cleaned out arthritis  Over time pt had to use walker and legs got progressively weaker   Had a second surgery in 2015 and was in [facility] for rehab with a stage 4 pressure ulcer as a result of his hospitalization  Pt states he has been nonambulatory for 3 years, since the second surgery  States when he would attempt to stand his R shld would give out and he could not support himself  Pt has lift recliner and is able to perform indep transfers to tub, recliner and stationary bike  Had MAFOs after initial surgery  Stands at dining room table with wife stabilizing table  Wife states pt had PT from Oct 2018 to April 2019 at another facility  Wife states since DC from PT pt has lost what he gained at that time  Recurrent probem    Quality of life: good    Pain  No pain reported    Social Support  Lives with: spouse    Employment status: not working    Diagnostic Tests  MRI studies: abnormal (back)  Treatments  Previous treatment: physical therapy  Current treatment: physical therapy  Patient Goals  Patient goals for therapy: improved balance, increased motion, increased strength, independence with ADLs/IADLs and return to sport/leisure activities  Patient goal: get up and walk, resume hunting, attend grandchildren's sporting events        Objective     Concurrent Complaints  Negative for night pain and disturbed sleep    Postural Observations  Seated posture: good  Standing posture: poor    Additional Postural Observation Details  At eval:  Stands in parallel bars with forward lean and LEs rigid in extension  Unable to take any steps  Transfers sit to stand indep by pulling up on parallel bars and locking knees in ext  At re-eval:  Sit to stand from w/c with heavy use of UEs  Improved hip extension and knee extension on left  80% left LE advancement without physical assist   Mod assist right hip 100%, mod assist to block right knee with MAFO in place  100% right LE advancement      Tenderness     Additional Tenderness Details  Denies TTP    Neurological Testing     Sensation     Lumbar   Left   Diminished: light touch, sharp/dull discrimination and proprioception    Right   Diminished: light touch, sharp/dull discrimination and proprioception    Comments   Left light touch: lower leg  Right light touch: lower leg  Right sharp/dull discrimination: very little sensation    Reflexes   Left   Patellar (L4): absent (0)    Right   Patellar (L4): trace (1+)    Additional Neurological Details  Intermittently "feel normal" - this has improved and continues improving by his subjective reports, but legs feel heavier since he stopped walking as much with PT while foot was burned    Right LE with a "vibration" feeling    Active Range of Motion     Additional Active Range of Motion Details  Bilateral LEs WFL PROM    Strength/Myotome Testing     Left Shoulder     Planes of Motion   Flexion: 4+   Abduction: 4+   External rotation at 0°: 4   Internal rotation at 0°: 4     Right Shoulder     Planes of Motion   Flexion: 4+   Abduction: 4+   External rotation at 0°: 4   Internal rotation at 0°: 4     Left Elbow   Flexion: 4+  Extension: 5    Right Elbow   Flexion: 4+  Extension: 5    Left Hip   Planes of Motion   Flexion: 3    Right Hip   Planes of Motion   Flexion: 3    Left Knee   Flexion: 3  Extension: 3    Right Knee   Flexion: 3-  Extension: 3-    Left Ankle/Foot   Dorsiflexion: 4  Plantar flexion: 4    Muscle Activation     Additional Muscle Activation Details  Over-activation of abdominal muscles in an attempt to increase hip extension    Ambulates 8 feet with min A on left; mod assist of right  RW for UE support  Neuro Exam:     Sensation   Light touch LE: left impaired and right impaired  Proprioception LE: left impaired and right impaired    Coordination   Heel to shin: left dysmetric and right dysmetric  Rapid alternating movements: LE impaired    Transfers   Sit to stand: minimum assist with prosthesis: yes  Wheelchair to mat transfer: supervision  Mat to wheelchair transfer: supervision  Sit to supine: independent Supine to sit: independent   Roll: independentInto the car: moderate assist Out of the car: moderate assist     Functional outcomes   Functional outcome gait comment: At Re-eval: Step through pattern with RW for UB support, min A x 1 at right knee with MAFO in place  Left ankle brace in place  Improving hip/trunk extension endurance  Able to maintain more erect posture for up to 8 feet at this poin  Successful trial of lumbar support for stand/walking  Trial of TM gait training with assist of 2  At this re-eval:  Step to pattern with RW for UB support, mod assist of 1; min A of 1 and w/c follow with MAFO in place  Left ankle brace in place  Able to take 1-2 steps without hip and trunk flexion  Postural correction is more limited this date  Continues with lumbar support in place  Regression of gait

## 2021-08-02 ENCOUNTER — EVALUATION (OUTPATIENT)
Dept: PHYSICAL THERAPY | Facility: CLINIC | Age: 71
End: 2021-08-02
Payer: MEDICARE

## 2021-08-02 DIAGNOSIS — R29.898 WEAKNESS OF BOTH LOWER EXTREMITIES: ICD-10-CM

## 2021-08-02 DIAGNOSIS — G54.1 LUMBOSACRAL PLEXOPATHY: Primary | ICD-10-CM

## 2021-08-02 DIAGNOSIS — R26.9 GAIT ABNORMALITY: ICD-10-CM

## 2021-08-02 PROCEDURE — 97116 GAIT TRAINING THERAPY: CPT

## 2021-08-02 PROCEDURE — 97110 THERAPEUTIC EXERCISES: CPT

## 2021-08-02 NOTE — PROGRESS NOTES
Daily Note     Today's date: 2021  Patient name: Marilin Soler  : 1950  MRN: [id number]  Referring provider: Vipul Mcallister DO  Dx:   Encounter Diagnosis     ICD-10-CM    1  Lumbosacral plexopathy  G54 1    2  Weakness of both lower extremities  R29 898    3  Gait abnormality  R26 9                   Subjective:  I am ok  Foot is healing      Objective: See treatment diary below      Assessment: Tolerated treatment well  Patient denied any increase pain/discomfort  Pt encourage verbal feedback with any change sx's  Pt with improved gait quality /endurance  Patient would benefit from continued PT      Plan: Continue per plan of care        Precautions: nonambulatory, BLE weakness R>L  Re-eval Date: 2021    Date    Visit Count 86 87 88 89 90   FOTO (NC)        Pain In 0 0 1  0   Pain Out 0 0 1  0     Manual         prn          Treatment Diary        Aerobic        UBE         Sit to stand trials from w/c        Toe Tap        Glute squeeze, hip ext         Long kneel on mat        Quadruped        Dynamic seated balance  [place]  Sitting on chair, disc    Low rows& Rows - 21#, 20x     [place]    Seated w/o back support    MTP  20# 30x    LTP    20# 30x    Tricep  20# 30x    Bicep  15#  2x15   Static standing endurance        Step ups 10x alt R/L onto 6" step  4 x full step ups onto 4" step with RW for UE support   Unable    Hip ABD        Stand/Rocker Board        Gait training RW  Min A x 1 on right hip  W/ WC follow    45 feet x 4    10 feet x 1 with RW and mod assist of 2 over red foam mat, difficulty with RW advancement RW min a x1 on R hip and block at prox tibia    45ftx6 RW min A on right with w/c follow  45 feet x 6    Step ups  4" step  Step tap x 10 with min A on right  STep up x 4 with min A on right  Bilateral UE support RW  Min A on left to assist with decreased hip ABd  Mod A on right for knee blocking  10 ft x 1  20 ft x 1  25 ft x 1  10 ft x 1 RW  Min A on left to assist with decreased hip ABd  Mod A on right for knee blocking  31 ft x 1  27 ft x 1  24 ft x 1  20 ft x 1   Tilt table

## 2021-08-02 NOTE — PROGRESS NOTES
Daily Note     Today's date: 2021  Patient name: Damian Dewey  : 1950  MRN: [id number]  Referring provider: Paul Frank DO  Dx:   Encounter Diagnosis     ICD-10-CM    1  Lumbosacral plexopathy  G54 1    2  Weakness of both lower extremities  R29 898    3   Gait abnormality  R26 9        Start Time: 1100  Stop Time: 1200  Total time in clinic (min): 60 minutes    Subjective: SEe RE      Objective: See treatment diary below      Assessment: See RE      Plan: See RE     Precautions: nonambulatory, BLE weakness R>L  Re-eval Date: 2021    Date     Visit Count 86 87 88 89    FOTO (NC)        Pain In 0 0 1     Pain Out 0 0 1       Manual         prn          Treatment Diary        Aerobic        UBE         Sit to stand trials from w/c        Toe Tap        Glute squeeze, hip ext         Long kneel on mat        Quadruped        Dynamic seated balance  [place]  Sitting on chair, disc    Low rows& Rows - 21#, 20x        Static standing endurance        Step ups 10x alt R/L onto 6" step  4 x full step ups onto 4" step with RW for UE support   Unable    Hip ABD        Stand/Rocker Board        Gait training RW  Min A x 1 on right hip  W/ WC follow    45 feet x 4    10 feet x 1 with RW and mod assist of 2 over red foam mat, difficulty with RW advancement RW min a x1 on R hip and block at prox tibia    45ftx6 RW min A on right with w/c follow  45 feet x 6    Step ups  4" step  Step tap x 10 with min A on right  STep up x 4 with min A on right  Bilateral UE support RW  Min A on left to assist with decreased hip ABd  Mod A on right for knee blocking  10 ft x 1  20 ft x 1  25 ft x 1  10 ft x 1    Tilt table

## 2021-08-05 NOTE — PROGRESS NOTES
Daily Note     Today's date: 2021  Patient name: Radha Alvarado  : 1950  MRN: [id number]  Referring provider: Wendi Guido DO  Dx:   Encounter Diagnosis     ICD-10-CM    1  Lumbosacral plexopathy  G54 1    2  Weakness of both lower extremities  R29 898    3  Gait abnormality  R26 9                   Subjective: Pt with no  New sx/complaints  Objective: See treatment diary below      Assessment: Tolerated treatment well  Progressing fairly with gait training  Achieving longer distances more consistently than previous sessions with reduced fatigue  Continues to require assist on R for blocking of  R knee as well as cueing for step/stride length correction  Requires cues/feedback for glute and trunk extensor mm engagement  Continues to be limited by fatigue/reudced endurance  No complaints after tx  Patient demonstrated fatigue post treatment and would benefit from continued PT      Plan: Continue per plan of care  Progress treatment as tolerated         Precautions: nonambulatory, BLE weakness R>L  Re-eval Date: 2021    Date        Visit Count 91       FOTO (NC)        Pain In 0       Pain Out 0         Manual         prn          Treatment Diary        Aerobic        UBE         Sit to stand trials from w/c        Toe Tap        Glute squeeze, hip ext         Long kneel on mat        Quadruped        Dynamic seated balance [place]    Seated w/o back support    MTP  20# 2x20    LTP    20# 2x20     Tricep  20# 2x20     Bicep  15#  2x20       Static standing endurance        Step ups Resume        Hip ABD        Stand/Rocker Board        Gait training RW  Min A on left to assist with decreased hip ABd  Mod A on right for knee blocking  Cues for step/stride length    20 ft x 1  36 ft x 2  32 ft x 1         Tilt table

## 2021-08-06 ENCOUNTER — OFFICE VISIT (OUTPATIENT)
Dept: PHYSICAL THERAPY | Facility: CLINIC | Age: 71
End: 2021-08-06
Payer: MEDICARE

## 2021-08-06 DIAGNOSIS — G54.1 LUMBOSACRAL PLEXOPATHY: Primary | ICD-10-CM

## 2021-08-06 DIAGNOSIS — R26.9 GAIT ABNORMALITY: ICD-10-CM

## 2021-08-06 DIAGNOSIS — R29.898 WEAKNESS OF BOTH LOWER EXTREMITIES: ICD-10-CM

## 2021-08-06 PROCEDURE — 97116 GAIT TRAINING THERAPY: CPT | Performed by: PHYSICAL MEDICINE & REHABILITATION

## 2021-08-06 PROCEDURE — 97110 THERAPEUTIC EXERCISES: CPT | Performed by: PHYSICAL MEDICINE & REHABILITATION

## 2021-08-09 ENCOUNTER — OFFICE VISIT (OUTPATIENT)
Dept: PHYSICAL THERAPY | Facility: CLINIC | Age: 71
End: 2021-08-09
Payer: MEDICARE

## 2021-08-09 DIAGNOSIS — R29.898 WEAKNESS OF BOTH LOWER EXTREMITIES: ICD-10-CM

## 2021-08-09 DIAGNOSIS — R26.9 GAIT ABNORMALITY: ICD-10-CM

## 2021-08-09 DIAGNOSIS — G54.1 LUMBOSACRAL PLEXOPATHY: Primary | ICD-10-CM

## 2021-08-09 PROCEDURE — 97116 GAIT TRAINING THERAPY: CPT | Performed by: PHYSICAL THERAPIST

## 2021-08-09 PROCEDURE — 97112 NEUROMUSCULAR REEDUCATION: CPT | Performed by: PHYSICAL THERAPIST

## 2021-08-09 NOTE — PROGRESS NOTES
Daily Note     Today's date: 2021  Patient name: Shan Rajput  : 1950  MRN: [id number]  Referring provider: Paulie Valdes DO  Dx:   Encounter Diagnosis     ICD-10-CM    1  Lumbosacral plexopathy  G54 1    2  Weakness of both lower extremities  R29 898    3  Gait abnormality  R26 9        Start Time: 1100  Stop Time: 1200  Total time in clinic (min): 60 minutes    Subjective: "Still working on things"      Objective: See treatment diary below      Assessment: Tolerated treatment well  Focus on working on more upright posturing this session  Physical and verbal cues used in parallel bars  Standing tolerance in parallel bars maximum 30seconds at a time using 2UE assist  Physical cues for hip muscle contraction and R knee blocking  As session progressed improved hip/knee control doing step ups using 2UE  Patient would benefit from continued PT      Plan: Continue per plan of care        Precautions: nonambulatory, BLE weakness R>L  Re-eval Date: 2021    Date       Visit Count 91 92      FOTO (NC)        Pain In 0       Pain Out 0         Manual         prn          Treatment Diary        Aerobic        UBE         Sit to stand trials from w/c        Toe Tap  L stance 3x4  R stance 2x2, 1x3 //bars physical cues for R knee block      Glute squeeze, hip ext         Long kneel on mat        Quadruped        Dynamic seated balance [place]    Seated w/o back support    MTP  20# 2x20    LTP    20# 2x20     Tricep  20# 2x20     Bicep  15#  2x20 [place]    Seated w/o back support    MTP  20# 2x20    LTP    20# 2x20     Tricep  20# 2x20     Bicep  15#  2x20      Static standing endurance  2 UE //bars   [id number]  [id number]  Cues for upright posture      Step ups Resume        Hip ABD        Stand/Rocker Board        Gait training RW  Min A on left to assist with decreased hip ABd  Mod A on right for knee blocking  Cues for step/stride length    20 ft x 1  36 ft x 2  32 ft x 1   RW Min A on L for increased hip ABd, Mod A on R knee blocking cues for step/stride length    25ft x 1  30ft x 1      Tilt table

## 2021-08-13 ENCOUNTER — OFFICE VISIT (OUTPATIENT)
Dept: PHYSICAL THERAPY | Facility: CLINIC | Age: 71
End: 2021-08-13
Payer: MEDICARE

## 2021-08-13 DIAGNOSIS — R29.898 WEAKNESS OF BOTH LOWER EXTREMITIES: ICD-10-CM

## 2021-08-13 DIAGNOSIS — R26.9 GAIT ABNORMALITY: ICD-10-CM

## 2021-08-13 DIAGNOSIS — G54.1 LUMBOSACRAL PLEXOPATHY: Primary | ICD-10-CM

## 2021-08-13 PROCEDURE — 97116 GAIT TRAINING THERAPY: CPT | Performed by: PHYSICAL THERAPIST

## 2021-08-13 PROCEDURE — 97110 THERAPEUTIC EXERCISES: CPT | Performed by: PHYSICAL THERAPIST

## 2021-08-13 NOTE — PROGRESS NOTES
Daily Note     Today's date: 2021  Patient name: Liz Mead  : 1950  MRN: [id number]  Referring provider: Mitchel Chase DO  Dx:   Encounter Diagnosis     ICD-10-CM    1  Lumbosacral plexopathy  G54 1    2  Weakness of both lower extremities  R29 898    3  Gait abnormality  R26 9                   Subjective: Patient reports he has been having some stomach issues, no new falls  Not back to doing the walking at home  Objective: See treatment diary below      Assessment: Tolerated treatment well  Patient demonstrated fatigue post treatment and would benefit from continued PT   Cues for shortened right step, longer left step  Able to maintain more erect posture for 5-6 steps this date  Plan: Continue per plan of care        Precautions: nonambulatory, BLE weakness R>L  Re-eval Date: 2021    Date      Visit Count 91 92 93     FOTO (NC)        Pain In 0  0     Pain Out 0  0       Manual         prn          Treatment Diary        Aerobic        UBE         Sit to stand trials from w/c        Toe Tap  L stance 3x4  R stance 2x2, 1x3 //bars physical cues for R knee block      Glute squeeze, hip ext         Long kneel on mat        Quadruped        Dynamic seated balance [place]    Seated w/o back support    MTP  20# 2x20    LTP    20# 2x20     Tricep  20# 2x20     Bicep  15#  2x20 [place]    Seated w/o back support    MTP  20# 2x20    LTP    20# 2x20     Tricep  20# 2x20     Bicep  15#  2x20      Static standing endurance  2 UE //bars   [id number]  [id number]  Cues for upright posture 2 UE in // bars  Static standing  Focus on right quad contraction, trial of moving hips forward over ELIS, however, continues with decreased hip extn     Step ups Resume        Hip ABD        Stand/Rocker Board        Gait training RW  Min A on left to assist with decreased hip ABd  Mod A on right for knee blocking  Cues for step/stride length    20 ft x 1  36 ft x 2  32 ft x 1   RW Min A on L for increased hip ABd, Mod A on R knee blocking cues for step/stride length    25ft x 1  30ft x 1 RW Min A on L for increased hip ABd, Mod A on R knee blocking cues for step/stride length    25ft x 1  30ft x 1  12 ft x 1     Tilt table

## 2021-08-15 NOTE — PROGRESS NOTES
Daily Note     Today's date: 2021  Patient name: Martell Love  : 1950  MRN: [id number]  Referring provider: Fara Councilman, DO  Dx:   Encounter Diagnosis     ICD-10-CM    1  Lumbosacral plexopathy  G54 1    2  Weakness of both lower extremities  R29 898    3  Gait abnormality  R26 9                   Subjective: Reports tired after last session  Muscle soreness the following day  Objective: See treatment diary below      Assessment: Tolerated treatment well  Patient demonstrated fatigue post treatment and would benefit from continued PT   Continues to struggle with right hip and knee extn, left hip extn  Plan: Continue per plan of care        Precautions: nonambulatory, BLE weakness R>L  Re-eval Date: 2021    Date     Visit Count 91 92 93 94    FOTO (NC)        Pain In 0  0 0    Pain Out 0  0 0      Manual         prn          Treatment Diary        Aerobic        UBE         Sit to stand trials from w/c        Toe Tap  L stance 3x4  R stance 2x2, 1x3 //bars physical cues for R knee block      Glute squeeze, hip ext         Long kneel on mat        Quadruped        Dynamic seated balance [place]    Seated w/o back support    MTP  20# 2x20    LTP    20# 2x20     Tricep  20# 2x20     Bicep  15#  2x20 [place]    Seated w/o back support    MTP  20# 2x20    LTP    20# 2x20     Tricep  20# 2x20     Bicep  15#  2x20      Static standing endurance  2 UE //bars   [id number]  [id number]  Cues for upright posture 2 UE in // bars  Static standing  Focus on right quad contraction, trial of moving hips forward over ELIS, however, continues with decreased hip extn 2 UE in // bars  Static stand  Focus on right quad control   Hip extn position improved with trochanter in line with malleoli  15 mins    Step ups Resume        Hip ABD        Stand/Rocker Board        Gait training RW  Min A on left to assist with decreased hip ABd  Mod A on right for knee blocking  Cues for step/stride length    20 ft x 1  36 ft x 2  32 ft x 1   RW Min A on L for increased hip ABd, Mod A on R knee blocking cues for step/stride length    25ft x 1  30ft x 1 RW Min A on L for increased hip ABd, Mod A on R knee blocking cues for step/stride length    25ft x 1  30ft x 1  12 ft x 1 RW Min A on L for increased hip ABd, Mod A on R knee blocking cues for step/stride length    34 ft x 2  25 ft x 1  12 ft x 1    Step to whole practice with right LE fwd, right quad     Tilt table

## 2021-08-16 ENCOUNTER — OFFICE VISIT (OUTPATIENT)
Dept: PHYSICAL THERAPY | Facility: CLINIC | Age: 71
End: 2021-08-16
Payer: MEDICARE

## 2021-08-16 DIAGNOSIS — R29.898 WEAKNESS OF BOTH LOWER EXTREMITIES: ICD-10-CM

## 2021-08-16 DIAGNOSIS — R26.9 GAIT ABNORMALITY: ICD-10-CM

## 2021-08-16 DIAGNOSIS — G54.1 LUMBOSACRAL PLEXOPATHY: Primary | ICD-10-CM

## 2021-08-16 PROCEDURE — 97112 NEUROMUSCULAR REEDUCATION: CPT | Performed by: PHYSICAL THERAPIST

## 2021-08-16 PROCEDURE — 97116 GAIT TRAINING THERAPY: CPT | Performed by: PHYSICAL THERAPIST

## 2021-08-16 PROCEDURE — 97110 THERAPEUTIC EXERCISES: CPT | Performed by: PHYSICAL THERAPIST

## 2021-08-19 NOTE — PROGRESS NOTES
Daily Note     Today's date: 2021  Patient name: Robyn Figueroa  : 1950  MRN: [id number]  Referring provider: Jelly Gibson DO  Dx:   Encounter Diagnosis     ICD-10-CM    1  Lumbosacral plexopathy  G54 1    2  Weakness of both lower extremities  R29 898    3  Gait abnormality  R26 9                   Subjective: Reports doing okay, soreness in LEs, but okay, no falls  Objective: See treatment diary below      Assessment: Tolerated treatment well  Patient demonstrated fatigue post treatment and would benefit from continued PT  Ongoing difficulty with HS and glute activation  VCs and deep tendon tapping  Plan: Continue per plan of care        Precautions: nonambulatory, BLE weakness R>L  Re-eval Date: 2021  /  Date    Visit Count 91 92 93 94 95   FOTO (NC)        Pain In 0  0 0 0   Pain Out 0  0 0 0     Manual         prn          Treatment Diary        Aerobic        UBE         Sit to stand trials from w/c        Toe Tap  L stance 3x4  R stance 2x2, 1x3 //bars physical cues for R knee block      Glute squeeze, hip ext      Prone  AAROM  HS curls  Antigravity hip extn  SL AROM   Clamshells  Prone AAROM hip extn  2x10 ea B/L   Long kneel on mat        Quadruped        Dynamic seated balance [place]    Seated w/o back support    MTP  20# 2x20    LTP    20# 2x20     Tricep  20# 2x20     Bicep  15#  2x20 [place]    Seated w/o back support    MTP  20# 2x20    LTP    20# 2x20     Tricep  20# 2x20     Bicep  15#  2x20      Static standing endurance  2 UE //bars   [id number]  [id number]  Cues for upright posture 2 UE in // bars  Static standing  Focus on right quad contraction, trial of moving hips forward over ELIS, however, continues with decreased hip extn 2 UE in // bars  Static stand  Focus on right quad control   Hip extn position improved with trochanter in line with malleoli  15 mins    Step ups Resume        Hip ABD        Stand/Rocker Board        Gait training RW  Min A on left to assist with decreased hip ABd  Mod A on right for knee blocking  Cues for step/stride length    20 ft x 1  36 ft x 2  32 ft x 1   RW Min A on L for increased hip ABd, Mod A on R knee blocking cues for step/stride length    25ft x 1  30ft x 1 RW Min A on L for increased hip ABd, Mod A on R knee blocking cues for step/stride length    25ft x 1  30ft x 1  12 ft x 1 RW Min A on L for increased hip ABd, Mod A on R knee blocking cues for step/stride length    34 ft x 2  25 ft x 1  12 ft x 1    Step to whole practice with right LE fwd, right quad  RW Min A on L for increased hip ABd, Mod A on R knee blocking cues for step/stride length    34 ft x 2    Step to whole practice with right LE fwd, right quad    Tilt table

## 2021-08-20 ENCOUNTER — OFFICE VISIT (OUTPATIENT)
Dept: PHYSICAL THERAPY | Facility: CLINIC | Age: 71
End: 2021-08-20
Payer: MEDICARE

## 2021-08-20 DIAGNOSIS — R26.9 GAIT ABNORMALITY: ICD-10-CM

## 2021-08-20 DIAGNOSIS — R29.898 WEAKNESS OF BOTH LOWER EXTREMITIES: ICD-10-CM

## 2021-08-20 DIAGNOSIS — G54.1 LUMBOSACRAL PLEXOPATHY: Primary | ICD-10-CM

## 2021-08-20 PROCEDURE — 97116 GAIT TRAINING THERAPY: CPT | Performed by: PHYSICAL THERAPIST

## 2021-08-20 PROCEDURE — 97110 THERAPEUTIC EXERCISES: CPT | Performed by: PHYSICAL THERAPIST

## 2021-08-22 NOTE — PROGRESS NOTES
Daily Note     Today's date: 2021  Patient name: Sridevi Mitchell  : 1950  MRN: [id number]  Referring provider: Lane Guerrero DO  Dx:   Encounter Diagnosis     ICD-10-CM    1  Lumbosacral plexopathy  G54 1    2  Weakness of both lower extremities  R29 898    3  Gait abnormality  R26 9                   Subjective: Patient reports doing well after last session  Working on trying to shift weight forward  Objective: See treatment diary below      Assessment: Tolerated treatment well  Patient demonstrated fatigue post treatment and would benefit from continued PT  Decreased assist with first ambulation trial this date  Does have ongoing difficulty with forward movement of COG over ELIS with decreased UE support  Plan: Continue per plan of care        Precautions: nonambulatory, BLE weakness R>L  Re-eval Date: 2021  /  Date        Visit Count 96       FOTO (NC)        Pain In 0       Pain Out 0         Manual         prn          Treatment Diary        Aerobic        UBE         Sit to stand trials from w/c        Toe Tap        Glute squeeze, hip ext         Long kneel on mat        Quadruped        Dynamic seated balance        Static standing endurance Static  // bars with forward weight shift focus min-mod A  10 mins       Step ups        Hip ABD        Stand/Rocker Board        Gait training RW  Min A on left to assist with decreased hip ABd  Mod A on right for knee blocking  Cues for step/stride length    35 ft x 2    19 ft x 1 with one turn L    18 feet x 2 with one turn R         Tilt table

## 2021-08-23 ENCOUNTER — OFFICE VISIT (OUTPATIENT)
Dept: PHYSICAL THERAPY | Facility: CLINIC | Age: 71
End: 2021-08-23
Payer: MEDICARE

## 2021-08-23 DIAGNOSIS — G54.1 LUMBOSACRAL PLEXOPATHY: Primary | ICD-10-CM

## 2021-08-23 DIAGNOSIS — R26.9 GAIT ABNORMALITY: ICD-10-CM

## 2021-08-23 DIAGNOSIS — R29.898 WEAKNESS OF BOTH LOWER EXTREMITIES: ICD-10-CM

## 2021-08-23 PROCEDURE — 97110 THERAPEUTIC EXERCISES: CPT | Performed by: PHYSICAL THERAPIST

## 2021-08-23 PROCEDURE — 97116 GAIT TRAINING THERAPY: CPT | Performed by: PHYSICAL THERAPIST

## 2021-08-26 NOTE — PROGRESS NOTES
Daily Note     Today's date: 2021  Patient name: Nava Hernandez  : 1950  MRN: [id number]  Referring provider: Carla Ruggiero DO  Dx:   Encounter Diagnosis     ICD-10-CM    1  Lumbosacral plexopathy  G54 1    2  Weakness of both lower extremities  R29 898    3  Gait abnormality  R26 9                   Subjective: Patient reports some muscle soreness in his legs/quads after session  Objective: See treatment diary below      Assessment: Tolerated treatment well  Patient demonstrated fatigue post treatment and would benefit from continued PT  Improved LE knee extn on right in static stance  Plan: Continue per plan of care        Precautions: nonambulatory, BLE weakness R>L  Re-eval Date: 2021  /  Date       Visit Count 96 97      FOTO (NC)        Pain In 0 0      Pain Out 0 0        Manual         prn          Treatment Diary        Aerobic        UBE         Sit to stand trials from w/c        Toe Tap        Glute squeeze, hip ext         Long kneel on mat        Quadruped        Dynamic seated balance        Static standing endurance Static  // bars with forward weight shift focus min-mod A  10 mins Static  // bars with forward weight shift focus min-mod A  10 mins      Step ups        Hip ABD        Stand/Rocker Board        Gait training RW  Min A on left to assist with decreased hip ABd  Mod A on right for knee blocking  Cues for step/stride length    35 ft x 2    19 ft x 1 with one turn L    18 feet x 2 with one turn R   RW  Min A on left to assist with decreased hip ABd  Mod A on right for knee blocking  Cues for step/stride length    35 ft x 2    19 ft x 1 with one turn L    18 feet x 2 with two turn R        Tilt table

## 2021-08-27 ENCOUNTER — OFFICE VISIT (OUTPATIENT)
Dept: PHYSICAL THERAPY | Facility: CLINIC | Age: 71
End: 2021-08-27
Payer: MEDICARE

## 2021-08-27 DIAGNOSIS — G54.1 LUMBOSACRAL PLEXOPATHY: Primary | ICD-10-CM

## 2021-08-27 DIAGNOSIS — R29.898 WEAKNESS OF BOTH LOWER EXTREMITIES: ICD-10-CM

## 2021-08-27 DIAGNOSIS — R26.9 GAIT ABNORMALITY: ICD-10-CM

## 2021-08-27 PROCEDURE — 97112 NEUROMUSCULAR REEDUCATION: CPT | Performed by: PHYSICAL THERAPIST

## 2021-08-27 PROCEDURE — 97116 GAIT TRAINING THERAPY: CPT | Performed by: PHYSICAL THERAPIST

## 2021-08-27 PROCEDURE — 97110 THERAPEUTIC EXERCISES: CPT | Performed by: PHYSICAL THERAPIST

## 2021-08-27 NOTE — PROGRESS NOTES
PT Re-Evaluation    Today's date: 2021  Patient name: Marilin Soler  : 1950  MRN: [id number]  Referring provider: Vipul Mcallister DO  Dx:   Encounter Diagnosis     ICD-10-CM    1  Lumbosacral plexopathy  G54 1    2  Weakness of both lower extremities  R29 898    3  Gait abnormality  R26 9        Start Time: 1100  Stop Time: 1200  Total time in clinic (min): 60 minutes    Assessment  Assessment details: Patient has been making slow and steady progress from restarting therapy last month  He notes increased sensation of strength in standing  Has not returned to trials of standing or walking at home, but has returned to biking, moves outside in w/c  Patient continues to demonstrate significantly forward flexed posture with ambulation  He will benefit from ongoing interventions to maximize mobility and independence  Cont per POC  Impairments: abnormal coordination, abnormal gait, abnormal muscle firing, abnormal muscle tone, abnormal movement, activity intolerance, impaired balance, impaired physical strength and safety issue  Barriers to therapy:     Understanding of Dx/Px/POC: good   Prognosis: good  Prognosis details: Positive prognostic indicators include positive attitude toward recovery and good understanding of diagnosis and treatment plan options  Negative prognostic indicators include chronicity of symptoms and co-morbidities  Goals  STGs to be achieved in 4 weeks:     1  Pt to demonstrate increased AROM of BLEs in order to allow for greater ease and independence with ADLs and functional mobility - ONGOING  2  Pt to demonstrate full PROM of BLEs in order to maximize joint mobility and function and allow for progression of exercise program and achievement of goals - MET  3  Pt to demonstrate increased MMT of BLEs and B shldrs  by at least 1/2-1 grade in order to improve safety and stability with ADLs and functional mobility - ONGOING  4   Pt will take 5-6 steps in parallel bars with min A of 1 -  MET     LTGs to be achieved in 6-8 weeks: - ONGOING  1  Pt will be I with HEP in order to continue to improve quality of life and independence - MET  2  Pt to demonstrate return to amb 25 ft with RW with min asst of 1 partially met at mod A x 1, min A 1 x  3  Pt to demonstrate improved function as noted by achieving or exceeding predicted score on FOTO outcomes assessment tool  4  Pt will be able to stand with RW, KAFO and ankle brace for 2 minutes without seated rest break required - Partially MET at 1 min    New Goal: - ONGOING  Demonstrate independent donning/doffing KAFO - modified for MAFO - MET  Demonstrate ability to ambulate with RW min-mod A of 1 at least 50% right knee control without blocking   Demonstrate ability to ambulate on TM 1-2 minutes with bilateral UE support and min A on right for knee blocking  Demonstrate ability to step up onto 6" step with no more than min A x 1 and SBA of 2nd for 4 steps    Plan  Patient would benefit from: skilled physical therapy  Other planned modality interventions: Modalties prn for symptom management  Planned therapy interventions: neuromuscular re-education, manual therapy, therapeutic exercise, home exercise program, therapeutic activities and gait training  Frequency: 2x week  Duration in visits: 8  Duration in weeks: 4  Plan of Care beginning date: 8/27/2021  Plan of Care expiration date: 9/26/2021  Treatment plan discussed with: patient and PTA        Subjective Evaluation    History of Present Illness  Date of surgery: 2004,2015  Mechanism of injury: surgery  Mechanism of injury: UPDATE:   Patient voices decreased standing endurance  Has returned to biking, has not returned to walking at home  Decreased standing at home with insecurity/unsure of self  Better than before restarting therapy  Pt states leaving work one night his legs went numb and he fell to the ground   Pt underwent testing and end result was surgery with rods and screws and cleaned out arthritis  Over time pt had to use walker and legs got progressively weaker  Had a second surgery in 2015 and was in [facility] for rehab with a stage 4 pressure ulcer as a result of his hospitalization  Pt states he has been nonambulatory for 3 years, since the second surgery  States when he would attempt to stand his R shld would give out and he could not support himself  Pt has lift recliner and is able to perform indep transfers to tub, recliner and stationary bike  Had MAFOs after initial surgery  Stands at dining room table with wife stabilizing table  Wife states pt had PT from Oct 2018 to April 2019 at another facility  Wife states since DC from PT pt has lost what he gained at that time  Recurrent probem    Quality of life: good    Pain  No pain reported    Social Support  Lives with: spouse    Employment status: not working    Diagnostic Tests  MRI studies: abnormal (back)  Treatments  Previous treatment: physical therapy  Current treatment: physical therapy  Patient Goals  Patient goals for therapy: improved balance, increased motion, increased strength, independence with ADLs/IADLs and return to sport/leisure activities  Patient goal: get up and walk, resume hunting, attend grandchildren's sporting events        Objective     Concurrent Complaints  Negative for night pain and disturbed sleep    Postural Observations  Seated posture: good  Standing posture: poor    Additional Postural Observation Details  At eval:  Stands in parallel bars with forward lean and LEs rigid in extension  Unable to take any steps  Transfers sit to stand indep by pulling up on parallel bars and locking knees in ext  At re-eval:  Sit to stand from w/c with heavy use of UEs  Improved hip extension and knee extension on left  80% left LE advancement without physical assist   Mod assist right hip 100%, mod assist to block right knee with MAFO in place  100% right LE advancement      Tenderness     Additional Tenderness Details  Denies TTP    Neurological Testing     Sensation     Lumbar   Left   Diminished: light touch, sharp/dull discrimination and proprioception    Right   Diminished: light touch, sharp/dull discrimination and proprioception    Comments   Left light touch: lower leg  Right light touch: lower leg  Right sharp/dull discrimination: very little sensation    Reflexes   Left   Patellar (L4): absent (0)    Right   Patellar (L4): trace (1+)    Additional Neurological Details  Intermittently "feel normal" - this has improved and continues improving by his subjective reports, but legs feel heavier since he stopped walking as much with PT while foot was burned    Right LE with a "vibration" feeling    Active Range of Motion     Additional Active Range of Motion Details  Bilateral LEs WFL PROM    Strength/Myotome Testing     Left Shoulder     Planes of Motion   Flexion: 4+   Abduction: 4+   External rotation at 0°: 4   Internal rotation at 0°: 4     Right Shoulder     Planes of Motion   Flexion: 4+   Abduction: 4+   External rotation at 0°: 4   Internal rotation at 0°: 4     Left Elbow   Flexion: 4+  Extension: 5    Right Elbow   Flexion: 4+  Extension: 5    Left Hip   Planes of Motion   Flexion: 3    Right Hip   Planes of Motion   Flexion: 3    Left Knee   Flexion: 3  Extension: 3+    Right Knee   Flexion: 3-  Extension: 3-    Left Ankle/Foot   Dorsiflexion: 4  Plantar flexion: 4    Muscle Activation     Additional Muscle Activation Details  Over-activation of abdominal muscles in an attempt to increase hip extension    Ambulates 8 feet with min A on left; mod assist of right  RW for UE support    Neuro Exam:     Sensation   Light touch LE: left impaired and right impaired  Proprioception LE: left impaired and right impaired    Coordination   Heel to shin: left dysmetric and right dysmetric  Rapid alternating movements: LE impaired    Transfers   Sit to stand: with prosthesis: yesSit to stand: supervision  Wheelchair to mat transfer: supervision  Mat to wheelchair transfer: supervision  Sit to supine: independent Supine to sit: independent   Roll: independentInto the car: minimum assist Out of the car: minimum assist     Functional outcomes   Functional outcome gait comment: At this re-eval:  Step to pattern with RW for UB support, mod assist of 1; min A of 1 and w/c follow with MAFO in place  Left ankle brace in place  Able to take 1-2 steps without hip and trunk flexion  Postural correction is more limited this date  Continues with lumbar support in place  Regression of gait  At re-eval:  Patient progressing with ambulation endurance, mod A x 1; min A x 1  MAFO in place, left ankle brace in place  Able to take 4-5 steps without hip and trunk flexion

## 2021-08-30 ENCOUNTER — OFFICE VISIT (OUTPATIENT)
Dept: PHYSICAL THERAPY | Facility: CLINIC | Age: 71
End: 2021-08-30
Payer: MEDICARE

## 2021-08-30 DIAGNOSIS — R26.9 GAIT ABNORMALITY: ICD-10-CM

## 2021-08-30 DIAGNOSIS — G54.1 LUMBOSACRAL PLEXOPATHY: Primary | ICD-10-CM

## 2021-08-30 DIAGNOSIS — R29.898 WEAKNESS OF BOTH LOWER EXTREMITIES: ICD-10-CM

## 2021-08-30 PROCEDURE — 97112 NEUROMUSCULAR REEDUCATION: CPT | Performed by: PHYSICAL THERAPIST

## 2021-08-30 PROCEDURE — 97116 GAIT TRAINING THERAPY: CPT | Performed by: PHYSICAL THERAPIST

## 2021-08-30 PROCEDURE — 97110 THERAPEUTIC EXERCISES: CPT | Performed by: PHYSICAL THERAPIST

## 2021-08-30 NOTE — PROGRESS NOTES
Daily Note     Today's date: 2021  Patient name: Sridevi Mitchell  : 1950  MRN: [id number]  Referring provider: Lane Guerrero DO  Dx:   Encounter Diagnosis     ICD-10-CM    1  Lumbosacral plexopathy  G54 1    2  Weakness of both lower extremities  R29 898    3  Gait abnormality  R26 9                   Subjective: Patient reports he's feeling a little better and stronger  Objective: See treatment diary below      Assessment: Tolerated treatment well  Patient demonstrated fatigue post treatment and would benefit from continued PT  Patient demonstrates improved right knee control and trunk extension for 5-6 steps  Heavily depends on UEs on RW  Plan: Continue per plan of care        Precautions: nonambulatory, BLE weakness R>L  Re-eval Date: 2021  /  Date      Visit Count 96 97 98     FOTO (NC)        Pain In 0 0 0     Pain Out 0 0 0       Manual         prn          Treatment Diary        Aerobic        UBE         Sit to stand trials from w/c        Toe Tap        Glute squeeze, hip ext         Long kneel on mat        Quadruped        Dynamic seated balance        Static standing endurance Static  // bars with forward weight shift focus min-mod A  10 mins Static  // bars with forward weight shift focus min-mod A  10 mins Static  // bars with forward weight shift focus min-mod A  15 mins     Step ups        Hip ABD        Stand/Rocker Board        Gait training RW  Min A on left to assist with decreased hip ABd  Mod A on right for knee blocking  Cues for step/stride length    35 ft x 2    19 ft x 1 with one turn L    18 feet x 2 with one turn R   RW  Min A on left to assist with decreased hip ABd  Mod A on right for knee blocking  Cues for step/stride length    35 ft x 2    19 ft x 1 with one turn L    18 feet x 2 with two turn R   RW  Min A on left to assist with decreased hip ABd  Mod A on right for knee blocking  Cues for step/stride length    35 ft x 4     Tilt table

## 2021-09-02 NOTE — PROGRESS NOTES
Daily Note     Today's date: 9/3/2021  Patient name: Sundar Adamson  : 1950  MRN: [id number]  Referring provider: Agnes Carnes DO  Dx:   Encounter Diagnosis     ICD-10-CM    1  Lumbosacral plexopathy  G54 1    2  Weakness of both lower extremities  R29 898    3  Gait abnormality  R26 9                   Subjective: Patient reports trying to shift weight forward in hips, keep shoulders back  Objective: See treatment diary below      Assessment: Tolerated treatment well  Patient demonstrated fatigue post treatment and would benefit from continued PT  Patient progressing steadily with consistency of step, continues with forward flexed posture  Able to hold for 5-6 steps per trial this date  Plan: Continue per plan of care        Precautions: nonambulatory, BLE weakness R>L  Re-eval Date: 2021  /  Date 8/23 8/27 8/30 9/3    Visit Count 96 97 98 99    FOTO (NC)        Pain In 0 0 0 0    Pain Out 0 0 0 0      Manual         prn          Treatment Diary        Aerobic        UBE         Sit to stand trials from w/c        Toe Tap        Glute squeeze, hip ext         Long kneel on mat        Quadruped        Dynamic seated balance        Static standing endurance Static  // bars with forward weight shift focus min-mod A  10 mins Static  // bars with forward weight shift focus min-mod A  10 mins Static  // bars with forward weight shift focus min-mod A  15 mins Static  // bars with forward weight shift focus min-mod A  10 mins    Step ups        Hip ABD        Stand/Rocker Board        Gait training RW  Min A on left to assist with decreased hip ABd  Mod A on right for knee blocking  Cues for step/stride length    35 ft x 2    19 ft x 1 with one turn L    18 feet x 2 with one turn R   RW  Min A on left to assist with decreased hip ABd  Mod A on right for knee blocking  Cues for step/stride length    35 ft x 2    19 ft x 1 with one turn L    18 feet x 2 with two turn R   RW  Min A on left to assist with decreased hip ABd  Mod A on right for knee blocking  Cues for step/stride length    35 ft x 4 RW  Min A on left to assist with decreased hip ABd  Mod A on right for knee blocking  Cues for step/stride length    35 ft x 4  38 feet x 1    Tilt table

## 2021-09-03 ENCOUNTER — OFFICE VISIT (OUTPATIENT)
Dept: PHYSICAL THERAPY | Facility: CLINIC | Age: 71
End: 2021-09-03
Payer: MEDICARE

## 2021-09-03 DIAGNOSIS — R29.898 WEAKNESS OF BOTH LOWER EXTREMITIES: ICD-10-CM

## 2021-09-03 DIAGNOSIS — R26.9 GAIT ABNORMALITY: ICD-10-CM

## 2021-09-03 DIAGNOSIS — G54.1 LUMBOSACRAL PLEXOPATHY: Primary | ICD-10-CM

## 2021-09-03 PROCEDURE — 97110 THERAPEUTIC EXERCISES: CPT | Performed by: PHYSICAL THERAPIST

## 2021-09-03 PROCEDURE — 97116 GAIT TRAINING THERAPY: CPT | Performed by: PHYSICAL THERAPIST

## 2021-09-09 NOTE — PROGRESS NOTES
Daily Note     Today's date: 9/10/2021  Patient name: Jose Rafael Goldberg  : 1950  MRN: [id number]  Referring provider: Michaela Reyes DO  Dx:   Encounter Diagnosis     ICD-10-CM    1  Lumbosacral plexopathy  G54 1    2  Weakness of both lower extremities  R29 898    3  Gait abnormality  R26 9                   Subjective: Patient reports he hs been trying to stand more often at home in the kitchen at the counter  Objective: See treatment diary below      Assessment: Tolerated treatment well  Patient demonstrated fatigue post treatment and would benefit from continued PT  Patient able to contract right hamstring with max assist and max cues  Able to take two steps with no more than CGA for right knee extension, with fatigue returns to max assist       Plan: Continue per plan of care        Precautions: nonambulatory, BLE weakness R>L  Re-eval Date: 2021  /  Date 8/23 8/27 8/30 9/3 9/10   Visit Count 96 97 98 99 100   FOTO (NC)        Pain In 0 0 0 0 0   Pain Out 0 0 0 0 0     Manual         prn          Treatment Diary        Aerobic        UBE         Sit to stand trials from w/c        Toe Tap        Glute squeeze, hip ext      Prone AAROM for HS curls, hip extn, IR and ER  1x10, 5" holds   Long kneel on mat        Quadruped        Dynamic seated balance        Static standing endurance Static  // bars with forward weight shift focus min-mod A  10 mins Static  // bars with forward weight shift focus min-mod A  10 mins Static  // bars with forward weight shift focus min-mod A  15 mins Static  // bars with forward weight shift focus min-mod A  10 mins Static  // bars with forward weight shift focus min-mod A  10 mins   Step ups        Hip ABD        Stand/Rocker Board        Gait training RW  Min A on left to assist with decreased hip ABd  Mod A on right for knee blocking  Cues for step/stride length    35 ft x 2    19 ft x 1 with one turn L    18 feet x 2 with one turn R   RW  Min A on left to assist with decreased hip ABd  Mod A on right for knee blocking  Cues for step/stride length    35 ft x 2    19 ft x 1 with one turn L    18 feet x 2 with two turn R   RW  Min A on left to assist with decreased hip ABd  Mod A on right for knee blocking  Cues for step/stride length    35 ft x 4 RW  Min A on left to assist with decreased hip ABd  Mod A on right for knee blocking  Cues for step/stride length    35 ft x 4  38 feet x 1 RW  Min A on left to assist with decreased hip ABd  Mod A on right for knee blocking  Cues for step/stride length    35 ft x 3     Tilt table

## 2021-09-10 ENCOUNTER — OFFICE VISIT (OUTPATIENT)
Dept: PHYSICAL THERAPY | Facility: CLINIC | Age: 71
End: 2021-09-10
Payer: MEDICARE

## 2021-09-10 DIAGNOSIS — R26.9 GAIT ABNORMALITY: ICD-10-CM

## 2021-09-10 DIAGNOSIS — R29.898 WEAKNESS OF BOTH LOWER EXTREMITIES: ICD-10-CM

## 2021-09-10 DIAGNOSIS — G54.1 LUMBOSACRAL PLEXOPATHY: Primary | ICD-10-CM

## 2021-09-10 PROCEDURE — 97110 THERAPEUTIC EXERCISES: CPT | Performed by: PHYSICAL THERAPIST

## 2021-09-10 PROCEDURE — 97116 GAIT TRAINING THERAPY: CPT | Performed by: PHYSICAL THERAPIST

## 2021-09-10 NOTE — PROGRESS NOTES
Daily Note     Today's date: 2021  Patient name: Eli Vasques  : 1950  MRN: [id number]  Referring provider: Dania Martin DO  Dx:   Encounter Diagnosis     ICD-10-CM    1  Lumbosacral plexopathy  G54 1    2  Weakness of both lower extremities  R29 898    3  Gait abnormality  R26 9                   Subjective: standing, trying to practice sit to stands  Objective: See treatment diary below      Assessment: Tolerated treatment well  Patient demonstrated fatigue post treatment and would benefit from continued PT  Patient able to maintain upright gait for max of 7 steps using RW  HS curls with fair quality and max VCs (3-/5 on left)  Plan: Continue per plan of care        Precautions: nonambulatory, BLE weakness R>L  Re-eval Date: 2021  /  Date        Visit Count 101       FOTO (NC)        Pain In 0       Pain Out 0         Manual         prn          Treatment Diary        Aerobic        UBE         Sit to stand trials from w/c        Toe Tap        Glute squeeze, hip ext  Standing mini squats with right knee blocked, standing HS curls R and L, x10 reps, completed x 2  Mod-max assist  At RW       Long kneel on mat        Quadruped        Dynamic seated balance        Static standing endurance        Step ups        Hip ABD        Stand/Rocker Board        Gait training RW  Min A on left to assist with decreased hip ABd  Mod A on right for knee blocking  Cues for step/stride length    35 ft x 2  44 ft x 1         Tilt table

## 2021-09-13 ENCOUNTER — OFFICE VISIT (OUTPATIENT)
Dept: PHYSICAL THERAPY | Facility: CLINIC | Age: 71
End: 2021-09-13
Payer: MEDICARE

## 2021-09-13 ENCOUNTER — TELEPHONE (OUTPATIENT)
Dept: NEUROLOGY | Facility: CLINIC | Age: 71
End: 2021-09-13

## 2021-09-13 DIAGNOSIS — R26.9 GAIT ABNORMALITY: ICD-10-CM

## 2021-09-13 DIAGNOSIS — R29.898 WEAKNESS OF BOTH LOWER EXTREMITIES: ICD-10-CM

## 2021-09-13 DIAGNOSIS — G54.1 LUMBOSACRAL PLEXOPATHY: Primary | ICD-10-CM

## 2021-09-13 PROCEDURE — 97110 THERAPEUTIC EXERCISES: CPT | Performed by: PHYSICAL THERAPIST

## 2021-09-13 PROCEDURE — 97116 GAIT TRAINING THERAPY: CPT | Performed by: PHYSICAL THERAPIST

## 2021-09-13 PROCEDURE — 97530 THERAPEUTIC ACTIVITIES: CPT | Performed by: PHYSICAL THERAPIST

## 2021-09-13 NOTE — TELEPHONE ENCOUNTER
Called and spoke to patient  Patient confirmed upcoming apt with Dr Sherry Madden on 9/20/21 @ 11:30 am   Patient has no issues or concerns at this time

## 2021-09-16 NOTE — PROGRESS NOTES
Daily Note     Today's date: 2021  Patient name: Huong Streeter  : 1950  MRN: [id number]  Referring provider: Montrell Love DO  Dx:   Encounter Diagnosis     ICD-10-CM    1  Lumbosacral plexopathy  G54 1    2  Weakness of both lower extremities  R29 898    3  Gait abnormality  R26 9                   Subjective: working standing tolerance at home ~2-3x/week  ~ 1min or less  Left leg seems to be getting stronger but right leg is definitely weaker I have to go head first getting into car, getting out of car I am able to turn and stand up with spouse holding WC only      Objective: See treatment diary below      Assessment: Tolerated treatment well  Pt reported benefit with prone exercise with release of hip flex mm  Standing howie < 1 min with trial of divided attention   HS curls with fair quality and max VCs (3-/5 on left)    To benefit with OPPT to improve standing tolerance, BL LE strengthening to max over QOL    Plan: Cont with POC     Precautions: nonambulatory, BLE weakness R>L  Re-eval Date: 2021  /  Date       Visit Count 101 102      FOTO (NC)        Pain In 0 0      Pain Out 0 0        Manual         prn          Treatment Diary        Aerobic        UBE         Sit to stand trials from w/c  5x to renee  < 1 min  W/ divided attention/memory recall with       Toe Tap        Glute squeeze, hip ext  Standing mini squats with right knee blocked, standing HS curls R and L, x10 reps, completed x 2  Mod-max assist  At RW Standing mini squats with right knee blocked, standing HS curls R and L, x10 reps, completed x 2  Mod-max assist  At RW      Long kneel on mat        Quadruped Prone AAROM for HS curls, hip extn, IR and ER  1x10, 5" holds Prone 2 min  VAISHNAVI 1 min    Prone AAROM for HS curls, hip extn, IR and ER  1x10, 5" holds          Dynamic seated balance        Static standing endurance        Step ups        Hip ABD        Stand/Rocker Board        Gait training RW  Min A on left to assist with decreased hip ABd  Mod A on right for knee blocking  Cues for step/stride length    35 ft x 2  44 ft x 1   RW  Min A on left to assist with decreased hip ABd  Mod A on right for knee blocking  Cues for step/stride length    44 ft x 1      Tilt table

## 2021-09-17 ENCOUNTER — OFFICE VISIT (OUTPATIENT)
Dept: PHYSICAL THERAPY | Facility: CLINIC | Age: 71
End: 2021-09-17
Payer: MEDICARE

## 2021-09-17 DIAGNOSIS — G54.1 LUMBOSACRAL PLEXOPATHY: Primary | ICD-10-CM

## 2021-09-17 DIAGNOSIS — R26.9 GAIT ABNORMALITY: ICD-10-CM

## 2021-09-17 DIAGNOSIS — R29.898 WEAKNESS OF BOTH LOWER EXTREMITIES: ICD-10-CM

## 2021-09-17 PROCEDURE — 97116 GAIT TRAINING THERAPY: CPT

## 2021-09-17 PROCEDURE — 97110 THERAPEUTIC EXERCISES: CPT

## 2021-09-17 PROCEDURE — 97112 NEUROMUSCULAR REEDUCATION: CPT

## 2021-09-20 ENCOUNTER — OFFICE VISIT (OUTPATIENT)
Dept: NEUROLOGY | Facility: CLINIC | Age: 71
End: 2021-09-20
Payer: MEDICARE

## 2021-09-20 VITALS
HEART RATE: 82 BPM | TEMPERATURE: 97.3 F | DIASTOLIC BLOOD PRESSURE: 72 MMHG | SYSTOLIC BLOOD PRESSURE: 134 MMHG | BODY MASS INDEX: 36.45 KG/M2 | RESPIRATION RATE: 18 BRPM | WEIGHT: 275 LBS | HEIGHT: 73 IN

## 2021-09-20 DIAGNOSIS — R26.9 GAIT ABNORMALITY: ICD-10-CM

## 2021-09-20 DIAGNOSIS — G62.9 POLYNEUROPATHY: Primary | ICD-10-CM

## 2021-09-20 PROCEDURE — 99214 OFFICE O/P EST MOD 30 MIN: CPT | Performed by: PSYCHIATRY & NEUROLOGY
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
## 2021-09-20 NOTE — ASSESSMENT & PLAN NOTE
This is a 24-year-old patient with a multifactorial gait disturbance  He has had two prior lumbar fusion surgeries, bilateral radiculopathy , a chronic lumbar sacral plexopathy and polyneuropathy  he has had some improvement and is now able to transfer independently   When the bed and chair are next to each other  Ottoniel Diaz He is still unable to walk but able to stand  For brief period of time  He does have some increase in strength in the proximal muscle groups but he continues to have significant foot drops as well as reduced reflexes  I have informed him that with time and with exercise he the radicular symptoms do improve however the numbness in his feet from   Is probably irreversible  He does understand this         He has no dysesthesias and therefore no medications such as gabapentin or required  He does utilize a manual wheelchair at home  He does require this for his ADLs in his home  He is able to push the manual wheelchair  Previously he did work from [address] [place] [address]  Therefore I have ordered a heavy metal screening  he is to continue with aggressive physical therapy twice a week    This has helped his symptomatology

## 2021-09-24 ENCOUNTER — OFFICE VISIT (OUTPATIENT)
Dept: PHYSICAL THERAPY | Facility: CLINIC | Age: 71
End: 2021-09-24
Payer: MEDICARE

## 2021-09-24 DIAGNOSIS — R29.898 WEAKNESS OF BOTH LOWER EXTREMITIES: ICD-10-CM

## 2021-09-24 DIAGNOSIS — G54.1 LUMBOSACRAL PLEXOPATHY: Primary | ICD-10-CM

## 2021-09-24 DIAGNOSIS — R26.9 GAIT ABNORMALITY: ICD-10-CM

## 2021-09-24 PROCEDURE — 97110 THERAPEUTIC EXERCISES: CPT | Performed by: PHYSICAL THERAPIST

## 2021-09-24 PROCEDURE — 97112 NEUROMUSCULAR REEDUCATION: CPT | Performed by: PHYSICAL THERAPIST

## 2021-09-24 PROCEDURE — 97116 GAIT TRAINING THERAPY: CPT | Performed by: PHYSICAL THERAPIST

## 2021-09-24 NOTE — PROGRESS NOTES
Daily Note     Today's date: 2021  Patient name: Diane Santiago  : 1950  MRN: [id number]  Referring provider: Rusty Curry DO  Dx:   Encounter Diagnosis     ICD-10-CM    1  Lumbosacral plexopathy  G54 1    2  Weakness of both lower extremities  R29 898    3  Gait abnormality  R26 9                   Subjective: Patient offers no new complaints at start of session today  Objective: See treatment diary below      Assessment: Tolerated treatment fair  Patient demonstrated fatigue post treatment and would benefit from continued PT  He demonstrated fair quality with prone hamstring curls and max vc's for completion  Fatigue is noted during session and rest breaks needed between walks  Continued PT would be beneficial to improve function  Plan: Continue per plan of care  Progress as able in upcoming visits         Precautions: nonambulatory, BLE weakness R>L  Re-eval Date: 2021  /  Date      Visit Count 101 102 103     FOTO (NC)        Pain In 0 0 0     Pain Out 0 0 0       Manual         prn          Treatment Diary        Aerobic        UBE         Sit to stand trials from w/c  5x to renee  < 1 min  W/ divided attention/memory recall with       Toe Tap        Glute squeeze, hip ext  Standing mini squats with right knee blocked, standing HS curls R and L, x10 reps, completed x 2  Mod-max assist  At RW Standing mini squats with right knee blocked, standing HS curls R and L, x10 reps, completed x 2  Mod-max assist  At RW Standing mini squats with R knee blocked, standing HS curls R and L, 10x reps completed x 2 Mod-Max assist at RW     Long kneel on mat        Quadruped Prone AAROM for HS curls, hip extn, IR and ER  1x10, 5" holds Prone 2 min  VAISHNAVI 1 min    Prone AAROM for HS curls, hip extn, IR and ER  1x10, 5" holds     Prone 2 mins  VAISHNAVI 1 min    Prone AAROM for HS curls, hip ext, IR and ER   5" x 10 each     Dynamic seated balance        Static standing endurance Step ups        Hip ABD        Stand/Rocker Board        Gait training RW  Min A on left to assist with decreased hip ABd  Mod A on right for knee blocking  Cues for step/stride length    35 ft x 2  44 ft x 1   RW  Min A on left to assist with decreased hip ABd  Mod A on right for knee blocking  Cues for step/stride length    44 ft x 1 RW   Min A on L to assist with decreased hip Abd   Mod A on right for knee blocking   Cues for step/stride   Length    40 feet x 1  35 feet x 1      Tilt table

## 2021-09-25 NOTE — PROGRESS NOTES
PT Re-Evaluation    Today's date: 2021  Patient name: Howell Dubin  : 1950  MRN: [id number]  Referring provider: Matthew Sanchez DO  Dx:   Encounter Diagnosis     ICD-10-CM    1  Lumbosacral plexopathy  G54 1    2  Weakness of both lower extremities  R29 898    3  Gait abnormality  R26 9        Start Time: 1100  Stop Time: 1200  Total time in clinic (min): 60 minutes    Assessment  Assessment details: Patient has been making slow and steady progress from restarting therapy  He notes increased sensation of strength in standing  Does note that he feels like he is getting back to being able to maintain upright with his initial steps during gait  Has not returned to trials of walking at home, but has returned to biking, moves outside in w/c  Has follow up with physiatry upcoming  Patient continues to demonstrate significantly forward flexed posture with ambulation, dificulty with right knee extension  He will benefit from ongoing interventions to maximize mobility and independence  Cont per POC  Impairments: abnormal coordination, abnormal gait, abnormal muscle firing, abnormal muscle tone, abnormal movement, activity intolerance, impaired balance, impaired physical strength and safety issue  Barriers to therapy:     Understanding of Dx/Px/POC: good   Prognosis: good  Prognosis details: Positive prognostic indicators include positive attitude toward recovery and good understanding of diagnosis and treatment plan options  Negative prognostic indicators include chronicity of symptoms and co-morbidities  Goals  STGs to be achieved in 4 weeks:     1  Pt to demonstrate increased AROM of BLEs in order to allow for greater ease and independence with ADLs and functional mobility - ONGOING  2  Pt to demonstrate full PROM of BLEs in order to maximize joint mobility and function and allow for progression of exercise program and achievement of goals - MET  3   Pt to demonstrate increased MMT of BLEs and B shldrs  by at least 1/2-1 grade in order to improve safety and stability with ADLs and functional mobility - ONGOING  4  Pt will take 5-6 steps in parallel bars with min A of 1 -  MET     LTGs to be achieved in 6-8 weeks: - ONGOING  1  Pt will be I with HEP in order to continue to improve quality of life and independence - MET  2  Pt to demonstrate return to amb 25 ft with RW with min asst of 1 - MET  3  Pt to demonstrate improved function as noted by achieving or exceeding predicted score on FOTO outcomes assessment tool  4  Pt will be able to stand with RW, KAFO and ankle brace for 2 minutes without seated rest break required - Partially MET at 1 min    New Goal: - ONGOING  Demonstrate independent donning/doffing KAFO - modified for MAFO - MET  Demonstrate ability to ambulate with RW min-mod A of 1 at least 50% right knee control without blocking - PARTIALLY  MET  Demonstrate ability to ambulate on TM 1-2 minutes with bilateral UE support and min A on right for knee blocking  Demonstrate ability to step up onto 6" step with no more than min A x 1 and SBA of 2nd for 4 steps    Plan  Patient would benefit from: skilled physical therapy  Other planned modality interventions: Modalties prn for symptom management  Planned therapy interventions: neuromuscular re-education, manual therapy, therapeutic exercise, home exercise program, therapeutic activities and gait training  Frequency: 2x week  Duration in visits: 8  Duration in weeks: 4  Plan of Care beginning date: 9/27/2021  Plan of Care expiration date: 10/26/2021  Treatment plan discussed with: patient and PTA        Subjective Evaluation    History of Present Illness  Date of surgery: 2004,2015  Mechanism of injury: surgery  Mechanism of injury: UPDATE:   Patient voices slowly increasing activity at home  Has not been able to return to walking, but has started some standing in kitchen again with assist from family        Pt states leaving work one night his legs went numb and he fell to the ground  Pt underwent testing and end result was surgery with rods and screws and cleaned out arthritis  Over time pt had to use walker and legs got progressively weaker  Had a second surgery in 2015 and was in [facility] for rehab with a stage 4 pressure ulcer as a result of his hospitalization  Pt states he has been nonambulatory for 3 years, since the second surgery  States when he would attempt to stand his R shld would give out and he could not support himself  Pt has lift recliner and is able to perform indep transfers to tub, recliner and stationary bike  Had MAFOs after initial surgery  Stands at dining room table with wife stabilizing table  Wife states pt had PT from Oct 2018 to April 2019 at another facility  Wife states since DC from PT pt has lost what he gained at that time  Recurrent probem    Quality of life: good    Pain  No pain reported    Social Support  Lives with: spouse    Employment status: not working    Diagnostic Tests  MRI studies: abnormal (back)  Treatments  Previous treatment: physical therapy  Current treatment: physical therapy  Patient Goals  Patient goals for therapy: improved balance, increased motion, increased strength, independence with ADLs/IADLs and return to sport/leisure activities  Patient goal: get up and walk, resume hunting, attend grandchildren's sporting events        Objective     Concurrent Complaints  Negative for night pain and disturbed sleep    Postural Observations  Seated posture: good  Standing posture: poor    Additional Postural Observation Details  At eval:  Stands in parallel bars with forward lean and LEs rigid in extension  Unable to take any steps  Transfers sit to stand indep by pulling up on parallel bars and locking knees in ext  At re-eval:  Sit to stand from w/c with heavy use of UEs  Improved hip extension and knee extension on left    80% left LE advancement without physical assist   Mod assist right hip 100%, mod assist to block right knee with MAFO in place  100% right LE advancement  Tenderness     Additional Tenderness Details  Denies TTP    Neurological Testing     Sensation     Lumbar   Left   Diminished: light touch, sharp/dull discrimination and proprioception    Right   Diminished: light touch, sharp/dull discrimination and proprioception    Comments   Left light touch: lower leg  Right light touch: lower leg  Right sharp/dull discrimination: very little sensation    Reflexes   Left   Patellar (L4): absent (0)    Right   Patellar (L4): trace (1+)    Additional Neurological Details  Intermittently "feel normal" - this has improved and continues improving by his subjective reports, but legs feel heavier since he stopped walking as much with PT while foot was burned    Right LE with a "vibration" feeling    Active Range of Motion     Additional Active Range of Motion Details  Bilateral LEs WFL PROM    Strength/Myotome Testing     Left Shoulder     Planes of Motion   Flexion: 4+   Abduction: 4+   External rotation at 0°: 4   Internal rotation at 0°: 4     Right Shoulder     Planes of Motion   Flexion: 4+   Abduction: 4+   External rotation at 0°: 4   Internal rotation at 0°: 4     Left Elbow   Flexion: 4+  Extension: 5    Right Elbow   Flexion: 4+  Extension: 5    Left Hip   Planes of Motion   Flexion: 3    Right Hip   Planes of Motion   Flexion: 3    Left Knee   Flexion: 3  Extension: 3+    Right Knee   Flexion: 3-  Extension: 3-    Left Ankle/Foot   Dorsiflexion: 4  Plantar flexion: 4    Muscle Activation     Additional Muscle Activation Details  Over-activation of abdominal muscles in an attempt to increase hip extension    Ambulates 8 feet with min A on left; mod assist of right  RW for UE support    Neuro Exam:     Sensation   Light touch LE: left impaired and right impaired  Proprioception LE: left impaired and right impaired    Coordination   Heel to shin: left dysmetric and right dysmetric  Rapid alternating movements: LE impaired    Transfers   Sit to stand: with prosthesis: yesSit to stand: supervision  Wheelchair to mat transfer: supervision  Mat to wheelchair transfer: supervision  Sit to supine: independent Supine to sit: independent   Roll: independentInto the car: minimum assist Out of the car: minimum assist     Functional outcomes   Functional outcome gait comment: At this re-eval:  Step to pattern with RW for UB support, mod assist of 1; min A of 1 and w/c follow with MAFO in place  Left ankle brace in place  Able to take 1-2 steps without hip and trunk flexion  Postural correction is more limited this date  Continues with lumbar support in place  Regression of gait  At re-eval:  Patient progressing with ambulation endurance, mod A x 1  MAFO in place, left ankle brace in place  Able to take 4-5 steps without hip and trunk flexion

## 2021-09-27 ENCOUNTER — EVALUATION (OUTPATIENT)
Dept: PHYSICAL THERAPY | Facility: CLINIC | Age: 71
End: 2021-09-27
Payer: MEDICARE

## 2021-09-27 ENCOUNTER — APPOINTMENT (OUTPATIENT)
Dept: LAB | Facility: MEDICAL CENTER | Age: 71
End: 2021-09-27
Payer: MEDICARE

## 2021-09-27 DIAGNOSIS — R29.898 WEAKNESS OF BOTH LOWER EXTREMITIES: ICD-10-CM

## 2021-09-27 DIAGNOSIS — E55.9 VITAMIN D DEFICIENCY: ICD-10-CM

## 2021-09-27 DIAGNOSIS — G62.9 POLYNEUROPATHY: ICD-10-CM

## 2021-09-27 DIAGNOSIS — R26.9 GAIT ABNORMALITY: ICD-10-CM

## 2021-09-27 DIAGNOSIS — G54.1 LUMBOSACRAL PLEXOPATHY: Primary | ICD-10-CM

## 2021-09-27 LAB — 25(OH)D3 SERPL-MCNC: 45.6 NG/ML (ref 30–100)

## 2021-09-27 PROCEDURE — 97112 NEUROMUSCULAR REEDUCATION: CPT

## 2021-09-27 PROCEDURE — 82306 VITAMIN D 25 HYDROXY: CPT

## 2021-09-27 PROCEDURE — 83655 ASSAY OF LEAD: CPT

## 2021-09-27 PROCEDURE — 97116 GAIT TRAINING THERAPY: CPT

## 2021-09-27 PROCEDURE — 82175 ASSAY OF ARSENIC: CPT

## 2021-09-27 PROCEDURE — 83825 ASSAY OF MERCURY: CPT

## 2021-09-27 PROCEDURE — 82300 ASSAY OF CADMIUM: CPT

## 2021-09-27 PROCEDURE — 36415 COLL VENOUS BLD VENIPUNCTURE: CPT

## 2021-09-27 NOTE — PROGRESS NOTES
Daily Note     Today's date: 2021  Patient name: Ilene Knight  : 1950  MRN: [id number]  Referring provider: Tamara Estes DO  Dx:   Encounter Diagnosis     ICD-10-CM    1  Lumbosacral plexopathy  G54 1    2  Weakness of both lower extremities  R29 898    3  Gait abnormality  R26 9                   Subjective: Having more LB discomfort I feel the exercises on low mat table are helping      Objective: See treatment diary below      Assessment: Tolerated treatment well  Patient demonstrated fatigue post treatment      Plan: Continue per plan of care     See RA for details     Precautions: nonambulatory, BLE weakness R>L  Re-eval Date: 2021  /  Date     Visit Count 101 102 103 104    FOTO (NC)        Pain In 0 0 0 0    Pain Out 0 0 0 0      Manual         prn          Treatment Diary        Aerobic        UBE         Sit to stand trials from w/c  5x to renee  < 1 min  W/ divided attention/memory recall with   5x to  15"  27"  35"  41"  46"    Toe Tap        Glute squeeze, hip ext  Standing mini squats with right knee blocked, standing HS curls R and L, x10 reps, completed x 2  Mod-max assist  At RW Standing mini squats with right knee blocked, standing HS curls R and L, x10 reps, completed x 2  Mod-max assist  At RW Standing mini squats with R knee blocked, standing HS curls R and L, 10x reps completed x 2 Mod-Max assist at RW // bars  Standing mini squats with R knee blocked, standing HS curls R and L, 2x10  completed     Long kneel on mat        Quadruped Prone AAROM for HS curls, hip extn, IR and ER  1x10, 5" holds Prone 2 min  VAISHNAVI 1 min    Prone AAROM for HS curls, hip extn, IR and ER  1x10, 5" holds     Prone 2 mins  VAISHNAVI 1 min    Prone AAROM for HS curls, hip ext, IR and ER   5" x 10 each resume    Dynamic seated balance        Static standing endurance        Step ups        Hip ABD        Stand/Rocker Board        Gait training RW  Min A on left to assist with decreased hip ABd  Mod A on right for knee blocking  Cues for step/stride length    35 ft x 2  44 ft x 1   RW  Min A on left to assist with decreased hip ABd  Mod A on right for knee blocking  Cues for step/stride length    44 ft x 1 RW   Min A on L to assist with decreased hip Abd   Mod A on right for knee blocking   Cues for step/stride   Length    40 feet x 1  35 feet x 1  RW   Min A on L to assist with decreased hip Abd   Mod A on right for knee blocking   Cues for step/stride   Length    40 feet x 1  35 feet x 1     Tilt table

## 2021-09-30 LAB
ARSENIC BLD-MCNC: <1 UG/L (ref 2–23)
CADMIUM BLD-MCNC: <0.5 UG/L (ref 0–1.2)
LEAD BLD-MCNC: 2 UG/DL (ref 0–4)
MERCURY BLD-MCNC: <1 UG/L (ref 0–14.9)

## 2021-10-01 ENCOUNTER — APPOINTMENT (OUTPATIENT)
Dept: PHYSICAL THERAPY | Facility: CLINIC | Age: 71
End: 2021-10-01
Payer: MEDICARE

## 2021-10-04 ENCOUNTER — APPOINTMENT (OUTPATIENT)
Dept: PHYSICAL THERAPY | Facility: CLINIC | Age: 71
End: 2021-10-04
Payer: MEDICARE

## 2021-10-08 ENCOUNTER — OFFICE VISIT (OUTPATIENT)
Dept: PHYSICAL THERAPY | Facility: CLINIC | Age: 71
End: 2021-10-08
Payer: MEDICARE

## 2021-10-08 DIAGNOSIS — R29.898 WEAKNESS OF BOTH LOWER EXTREMITIES: ICD-10-CM

## 2021-10-08 DIAGNOSIS — R26.9 GAIT ABNORMALITY: ICD-10-CM

## 2021-10-08 DIAGNOSIS — G54.1 LUMBOSACRAL PLEXOPATHY: Primary | ICD-10-CM

## 2021-10-08 PROCEDURE — 97116 GAIT TRAINING THERAPY: CPT | Performed by: PHYSICAL THERAPIST

## 2021-10-08 PROCEDURE — 97110 THERAPEUTIC EXERCISES: CPT | Performed by: PHYSICAL THERAPIST

## 2021-10-11 ENCOUNTER — OFFICE VISIT (OUTPATIENT)
Dept: PHYSICAL THERAPY | Facility: CLINIC | Age: 71
End: 2021-10-11
Payer: MEDICARE

## 2021-10-11 DIAGNOSIS — R29.898 WEAKNESS OF BOTH LOWER EXTREMITIES: ICD-10-CM

## 2021-10-11 DIAGNOSIS — R26.9 GAIT ABNORMALITY: ICD-10-CM

## 2021-10-11 DIAGNOSIS — G54.1 LUMBOSACRAL PLEXOPATHY: Primary | ICD-10-CM

## 2021-10-11 PROCEDURE — 97530 THERAPEUTIC ACTIVITIES: CPT | Performed by: PHYSICAL THERAPIST

## 2021-10-11 PROCEDURE — 97116 GAIT TRAINING THERAPY: CPT | Performed by: PHYSICAL THERAPIST

## 2021-10-15 ENCOUNTER — APPOINTMENT (OUTPATIENT)
Dept: PHYSICAL THERAPY | Facility: CLINIC | Age: 71
End: 2021-10-15
Payer: MEDICARE

## 2021-10-15 ENCOUNTER — OFFICE VISIT (OUTPATIENT)
Dept: PHYSICAL THERAPY | Facility: CLINIC | Age: 71
End: 2021-10-15
Payer: MEDICARE

## 2021-10-15 DIAGNOSIS — R29.898 WEAKNESS OF BOTH LOWER EXTREMITIES: ICD-10-CM

## 2021-10-15 DIAGNOSIS — G54.1 LUMBOSACRAL PLEXOPATHY: Primary | ICD-10-CM

## 2021-10-15 DIAGNOSIS — R26.9 GAIT ABNORMALITY: ICD-10-CM

## 2021-10-15 PROCEDURE — 97116 GAIT TRAINING THERAPY: CPT

## 2021-10-18 ENCOUNTER — OFFICE VISIT (OUTPATIENT)
Dept: PHYSICAL THERAPY | Facility: CLINIC | Age: 71
End: 2021-10-18
Payer: MEDICARE

## 2021-10-18 DIAGNOSIS — G54.1 LUMBOSACRAL PLEXOPATHY: ICD-10-CM

## 2021-10-18 DIAGNOSIS — R26.9 GAIT ABNORMALITY: ICD-10-CM

## 2021-10-18 DIAGNOSIS — R29.898 WEAKNESS OF BOTH LOWER EXTREMITIES: Primary | ICD-10-CM

## 2021-10-18 PROCEDURE — 97530 THERAPEUTIC ACTIVITIES: CPT | Performed by: PHYSICAL THERAPIST

## 2021-10-18 PROCEDURE — 97116 GAIT TRAINING THERAPY: CPT | Performed by: PHYSICAL THERAPIST

## 2021-10-22 ENCOUNTER — OFFICE VISIT (OUTPATIENT)
Dept: PHYSICAL THERAPY | Facility: CLINIC | Age: 71
End: 2021-10-22
Payer: MEDICARE

## 2021-10-22 DIAGNOSIS — G54.1 LUMBOSACRAL PLEXOPATHY: ICD-10-CM

## 2021-10-22 DIAGNOSIS — R26.9 GAIT ABNORMALITY: ICD-10-CM

## 2021-10-22 DIAGNOSIS — R29.898 WEAKNESS OF BOTH LOWER EXTREMITIES: Primary | ICD-10-CM

## 2021-10-22 PROCEDURE — 97116 GAIT TRAINING THERAPY: CPT | Performed by: PHYSICAL THERAPIST

## 2021-10-25 ENCOUNTER — APPOINTMENT (OUTPATIENT)
Dept: PHYSICAL THERAPY | Facility: CLINIC | Age: 71
End: 2021-10-25
Payer: MEDICARE

## 2021-10-29 ENCOUNTER — APPOINTMENT (OUTPATIENT)
Dept: PHYSICAL THERAPY | Facility: CLINIC | Age: 71
End: 2021-10-29
Payer: MEDICARE

## 2021-11-01 ENCOUNTER — EVALUATION (OUTPATIENT)
Dept: PHYSICAL THERAPY | Facility: CLINIC | Age: 71
End: 2021-11-01
Payer: MEDICARE

## 2021-11-01 DIAGNOSIS — G54.1 LUMBOSACRAL PLEXOPATHY: ICD-10-CM

## 2021-11-01 DIAGNOSIS — R26.9 GAIT ABNORMALITY: ICD-10-CM

## 2021-11-01 DIAGNOSIS — R29.898 WEAKNESS OF BOTH LOWER EXTREMITIES: Primary | ICD-10-CM

## 2021-11-01 PROCEDURE — 97116 GAIT TRAINING THERAPY: CPT

## 2021-11-05 ENCOUNTER — OFFICE VISIT (OUTPATIENT)
Dept: PHYSICAL THERAPY | Facility: CLINIC | Age: 71
End: 2021-11-05
Payer: MEDICARE

## 2021-11-05 DIAGNOSIS — G54.1 LUMBOSACRAL PLEXOPATHY: ICD-10-CM

## 2021-11-05 DIAGNOSIS — R26.9 GAIT ABNORMALITY: ICD-10-CM

## 2021-11-05 DIAGNOSIS — R29.898 WEAKNESS OF BOTH LOWER EXTREMITIES: Primary | ICD-10-CM

## 2021-11-05 PROCEDURE — 97116 GAIT TRAINING THERAPY: CPT | Performed by: PHYSICAL THERAPIST

## 2021-11-05 PROCEDURE — 97530 THERAPEUTIC ACTIVITIES: CPT | Performed by: PHYSICAL THERAPIST

## 2021-11-08 ENCOUNTER — OFFICE VISIT (OUTPATIENT)
Dept: PHYSICAL THERAPY | Facility: CLINIC | Age: 71
End: 2021-11-08
Payer: MEDICARE

## 2021-11-08 DIAGNOSIS — G54.1 LUMBOSACRAL PLEXOPATHY: ICD-10-CM

## 2021-11-08 DIAGNOSIS — R29.898 WEAKNESS OF BOTH LOWER EXTREMITIES: Primary | ICD-10-CM

## 2021-11-08 DIAGNOSIS — R26.9 GAIT ABNORMALITY: ICD-10-CM

## 2021-11-08 PROCEDURE — 97116 GAIT TRAINING THERAPY: CPT | Performed by: PHYSICAL THERAPIST

## 2021-11-12 ENCOUNTER — OFFICE VISIT (OUTPATIENT)
Dept: PHYSICAL THERAPY | Facility: CLINIC | Age: 71
End: 2021-11-12
Payer: MEDICARE

## 2021-11-12 DIAGNOSIS — R29.898 WEAKNESS OF BOTH LOWER EXTREMITIES: Primary | ICD-10-CM

## 2021-11-12 DIAGNOSIS — R26.9 GAIT ABNORMALITY: ICD-10-CM

## 2021-11-12 DIAGNOSIS — G54.1 LUMBOSACRAL PLEXOPATHY: ICD-10-CM

## 2021-11-12 PROCEDURE — 97116 GAIT TRAINING THERAPY: CPT

## 2021-11-15 ENCOUNTER — OFFICE VISIT (OUTPATIENT)
Dept: PHYSICAL THERAPY | Facility: CLINIC | Age: 71
End: 2021-11-15
Payer: MEDICARE

## 2021-11-15 DIAGNOSIS — G54.1 LUMBOSACRAL PLEXOPATHY: Primary | ICD-10-CM

## 2021-11-15 DIAGNOSIS — R29.898 WEAKNESS OF BOTH LOWER EXTREMITIES: ICD-10-CM

## 2021-11-15 DIAGNOSIS — R26.9 GAIT ABNORMALITY: ICD-10-CM

## 2021-11-15 PROCEDURE — 97116 GAIT TRAINING THERAPY: CPT | Performed by: PHYSICAL THERAPIST

## 2021-11-19 ENCOUNTER — OFFICE VISIT (OUTPATIENT)
Dept: PHYSICAL THERAPY | Facility: CLINIC | Age: 71
End: 2021-11-19
Payer: MEDICARE

## 2021-11-19 DIAGNOSIS — G54.1 LUMBOSACRAL PLEXOPATHY: Primary | ICD-10-CM

## 2021-11-19 DIAGNOSIS — R29.898 WEAKNESS OF BOTH LOWER EXTREMITIES: ICD-10-CM

## 2021-11-19 DIAGNOSIS — R26.9 GAIT ABNORMALITY: ICD-10-CM

## 2021-11-19 PROCEDURE — 97116 GAIT TRAINING THERAPY: CPT

## 2021-11-22 ENCOUNTER — OFFICE VISIT (OUTPATIENT)
Dept: PHYSICAL THERAPY | Facility: CLINIC | Age: 71
End: 2021-11-22
Payer: MEDICARE

## 2021-11-22 DIAGNOSIS — R26.9 GAIT ABNORMALITY: ICD-10-CM

## 2021-11-22 DIAGNOSIS — G54.1 LUMBOSACRAL PLEXOPATHY: Primary | ICD-10-CM

## 2021-11-22 DIAGNOSIS — R29.898 WEAKNESS OF BOTH LOWER EXTREMITIES: ICD-10-CM

## 2021-11-22 PROCEDURE — 97116 GAIT TRAINING THERAPY: CPT | Performed by: PHYSICAL THERAPIST

## 2021-11-26 ENCOUNTER — APPOINTMENT (OUTPATIENT)
Dept: PHYSICAL THERAPY | Facility: CLINIC | Age: 71
End: 2021-11-26
Payer: MEDICARE

## 2021-11-29 ENCOUNTER — OFFICE VISIT (OUTPATIENT)
Dept: PHYSICAL THERAPY | Facility: CLINIC | Age: 71
End: 2021-11-29
Payer: MEDICARE

## 2021-11-29 DIAGNOSIS — G54.1 LUMBOSACRAL PLEXOPATHY: Primary | ICD-10-CM

## 2021-11-29 DIAGNOSIS — R29.898 WEAKNESS OF BOTH LOWER EXTREMITIES: ICD-10-CM

## 2021-11-29 DIAGNOSIS — R26.9 GAIT ABNORMALITY: ICD-10-CM

## 2021-11-29 PROCEDURE — 97116 GAIT TRAINING THERAPY: CPT | Performed by: PHYSICAL THERAPIST

## 2021-12-03 ENCOUNTER — APPOINTMENT (OUTPATIENT)
Dept: PHYSICAL THERAPY | Facility: CLINIC | Age: 71
End: 2021-12-03
Payer: MEDICARE

## 2021-12-06 ENCOUNTER — EVALUATION (OUTPATIENT)
Dept: PHYSICAL THERAPY | Facility: CLINIC | Age: 71
End: 2021-12-06
Payer: MEDICARE

## 2021-12-06 DIAGNOSIS — R26.9 GAIT ABNORMALITY: ICD-10-CM

## 2021-12-06 DIAGNOSIS — G54.1 LUMBOSACRAL PLEXOPATHY: Primary | ICD-10-CM

## 2021-12-06 DIAGNOSIS — R29.898 WEAKNESS OF BOTH LOWER EXTREMITIES: ICD-10-CM

## 2021-12-06 PROCEDURE — 97116 GAIT TRAINING THERAPY: CPT | Performed by: PHYSICAL THERAPIST

## 2021-12-10 ENCOUNTER — OFFICE VISIT (OUTPATIENT)
Dept: PHYSICAL THERAPY | Facility: CLINIC | Age: 71
End: 2021-12-10
Payer: MEDICARE

## 2021-12-10 DIAGNOSIS — G54.1 LUMBOSACRAL PLEXOPATHY: Primary | ICD-10-CM

## 2021-12-10 DIAGNOSIS — R29.898 WEAKNESS OF BOTH LOWER EXTREMITIES: ICD-10-CM

## 2021-12-10 DIAGNOSIS — R26.9 GAIT ABNORMALITY: ICD-10-CM

## 2021-12-10 PROCEDURE — 97116 GAIT TRAINING THERAPY: CPT | Performed by: PHYSICAL THERAPIST

## 2021-12-10 NOTE — PROGRESS NOTES
Daily Note     Today's date: 12/10/2021  Patient name: Josie Ornelas  : 1950  MRN: [id number]  Referring provider: Deep Underwood DO  Dx: No diagnosis found  Subjective: ***      Objective: See treatment diary below      Assessment: Tolerated treatment {Tolerated treatment :}   Patient {assessment:5192081093}      Plan: {PLAN:}     Precautions: nonambulatory, BLE weakness R>L  Re-eval Date: 2021    Date       Visit Count 116 117      FOTO (NC)        Pain In 0 0      Pain Out 0 0        Manual         prn          Treatment Diary        Aerobic        UBE         Sit to stand trials from w/c        Toe Tap        Glute squeeze, hip ext         Long kneel on mat        Quadruped        Dynamic seated balance        Static standing endurance Standing transfer to scale for weight with mod A       Step ups        Hip ABD        Stand/Rocker Board        Gait training TM  Solo Step in place    Gait training  4 trials  with assist of 2 (mod x 1; min x 1 for safety)    Solo Step in place    0:49"  1:03  1:05"  0:38"  0:52"  0:44"    right knee blocked    TM  Solo Step in place    Gait training  4 trials  with assist of 2 (mod x 1; min x 1 for safety)    Solo Step in place    1:05  0:42  1:03  0:37  1:05    right knee blocked      Tilt table

## 2021-12-13 ENCOUNTER — APPOINTMENT (OUTPATIENT)
Dept: PHYSICAL THERAPY | Facility: CLINIC | Age: 71
End: 2021-12-13
Payer: MEDICARE

## 2021-12-16 NOTE — PROGRESS NOTES
Daily Note     Today's date: 2021  Patient name: Luba Rojo  : 1950  MRN: [id number]  Referring provider: James Contreras DO  Dx: No diagnosis found  Subjective: ***      Objective: See treatment diary below      Assessment: Tolerated treatment {Tolerated treatment :}   Patient {assessment:9530530295}      Plan: {PLAN:}     Precautions: nonambulatory, BLE weakness R>L  Re-eval Date: 2021    Date       Visit Count 116 117      FOTO (NC)        Pain In 0 0      Pain Out 0 0        Manual         prn          Treatment Diary        Aerobic        UBE         Sit to stand trials from w/c        Toe Tap        Glute squeeze, hip ext         Long kneel on mat        Quadruped        Dynamic seated balance        Static standing endurance Standing transfer to scale for weight with mod A       Step ups        Hip ABD        Stand/Rocker Board        Gait training TM  Solo Step in place    Gait training  4 trials  with assist of 2 (mod x 1; min x 1 for safety)    Solo Step in place    0:49"  1:03  1:05"  0:38"  0:52"  0:44"    right knee blocked    TM  Solo Step in place    Gait training  4 trials  with assist of 2 (mod x 1; min x 1 for safety)    Solo Step in place    1:05  0:42  1:03  0:37  1:05    right knee blocked      Tilt table

## 2021-12-17 ENCOUNTER — APPOINTMENT (OUTPATIENT)
Dept: PHYSICAL THERAPY | Facility: CLINIC | Age: 71
End: 2021-12-17
Payer: MEDICARE

## 2021-12-20 ENCOUNTER — OFFICE VISIT (OUTPATIENT)
Dept: PHYSICAL THERAPY | Facility: CLINIC | Age: 71
End: 2021-12-20
Payer: MEDICARE

## 2021-12-20 DIAGNOSIS — R29.898 WEAKNESS OF BOTH LOWER EXTREMITIES: ICD-10-CM

## 2021-12-20 DIAGNOSIS — R26.9 GAIT ABNORMALITY: ICD-10-CM

## 2021-12-20 DIAGNOSIS — G54.1 LUMBOSACRAL PLEXOPATHY: Primary | ICD-10-CM

## 2021-12-20 PROCEDURE — 97116 GAIT TRAINING THERAPY: CPT | Performed by: PHYSICAL THERAPIST

## 2021-12-26 NOTE — PROGRESS NOTES
Daily Note     Today's date: 2021  Patient name: Lamberto Fregoso  : 1950  MRN: [id number]  Referring provider: Dolores Carter DO  Dx: No diagnosis found  Subjective: ***      Objective: See treatment diary below      Assessment: Tolerated treatment {Tolerated treatment :}   Patient {assessment:[id number]}      Plan: {PLAN:[id number]}     Precautions: nonambulatory, BLE weakness R>L  Re-eval Date: 2021    Date 11/22 11/29 12/6 12/10    Visit Count 116 117 118 119    FOTO (NC)        Pain In 0 0 0 0    Pain Out 0 0 0 0      Manual         prn          Treatment Diary        Aerobic        UBE         Sit to stand trials from w/c        Toe Tap        Glute squeeze, hip ext         Long kneel on mat        Quadruped        Dynamic seated balance        Static standing endurance Standing transfer to scale for weight with mod A       Step ups        Hip ABD        Stand/Rocker Board        Gait training TM  Solo Step in place    Gait training  4 trials  with assist of 2 (mod x 1; min x 1 for safety)    Solo Step in place    0:49"  1:03  1:05"  0:38"  0:52"  0:44"    right knee blocked    TM  Solo Step in place    Gait training  4 trials  with assist of 2 (mod x 1; min x 1 for safety)    Solo Step in place    1:05  0:42  1:03  0:37  1:05    right knee blocked Overground with RW  47 feet  43 feet  57 feet  54 feet    Min A    Right knee blocked Overground with RW  45 feet x 2  54 feet x 2  Min A x 1 with w/c follow    Right knee blocked      Step tap  5x - 8" step  5x R  5x L Overground with RW  51'11"  61'9"  48'  53'6"    Min A x 1 with w/c follow    Right knee blocked     Tilt table

## 2021-12-27 ENCOUNTER — APPOINTMENT (OUTPATIENT)
Dept: PHYSICAL THERAPY | Facility: CLINIC | Age: 71
End: 2021-12-27
Payer: MEDICARE

## 2022-03-31 ENCOUNTER — TELEPHONE (OUTPATIENT)
Dept: NEUROLOGY | Facility: CLINIC | Age: 72
End: 2022-03-31

## 2022-03-31 NOTE — TELEPHONE ENCOUNTER
Telephone call to patients home to confirm patients appointment and spoke with patients wife Vinny Saba  Vinny Camposradha stated that patient wants to stop therapy and he wants to cancel his appointment with Dr Donis Patten  I explained to Vinny Saba the next appointment would probably be in September or October, Vinny Saba stated it probably won't matter because the patient don't care he just doesn't care he isn't doing therapy and he is just giving up  I informed Vinny Saba that I would inform Dr Donis Patten of the situation

## 2022-08-19 ENCOUNTER — APPOINTMENT (OUTPATIENT)
Dept: RADIOLOGY | Facility: CLINIC | Age: 72
End: 2022-08-19
Payer: MEDICARE

## 2022-08-19 ENCOUNTER — OFFICE VISIT (OUTPATIENT)
Dept: URGENT CARE | Facility: CLINIC | Age: 72
End: 2022-08-19
Payer: MEDICARE

## 2022-08-19 VITALS
BODY MASS INDEX: 38.92 KG/M2 | TEMPERATURE: 98.1 F | SYSTOLIC BLOOD PRESSURE: 112 MMHG | DIASTOLIC BLOOD PRESSURE: 62 MMHG | OXYGEN SATURATION: 99 % | WEIGHT: 295 LBS | HEART RATE: 101 BPM | RESPIRATION RATE: 18 BRPM

## 2022-08-19 DIAGNOSIS — R05.1 ACUTE COUGH: ICD-10-CM

## 2022-08-19 DIAGNOSIS — R31.9 HEMATURIA, UNSPECIFIED TYPE: ICD-10-CM

## 2022-08-19 DIAGNOSIS — N30.01 ACUTE CYSTITIS WITH HEMATURIA: Primary | ICD-10-CM

## 2022-08-19 DIAGNOSIS — J06.9 ACUTE UPPER RESPIRATORY INFECTION: ICD-10-CM

## 2022-08-19 LAB
SL AMB  POCT GLUCOSE, UA: ABNORMAL
SL AMB LEUKOCYTE ESTERASE,UA: ABNORMAL
SL AMB POCT BILIRUBIN,UA: ABNORMAL
SL AMB POCT BLOOD,UA: ABNORMAL
SL AMB POCT CLARITY,UA: ABNORMAL
SL AMB POCT COLOR,UA: ABNORMAL
SL AMB POCT KETONES,UA: ABNORMAL
SL AMB POCT NITRITE,UA: ABNORMAL
SL AMB POCT PH,UA: 5
SL AMB POCT SPECIFIC GRAVITY,UA: 1010
SL AMB POCT URINE PROTEIN: ABNORMAL
SL AMB POCT UROBILINOGEN: 0.2

## 2022-08-19 PROCEDURE — 99213 OFFICE O/P EST LOW 20 MIN: CPT | Performed by: NURSE PRACTITIONER

## 2022-08-19 PROCEDURE — 71046 X-RAY EXAM CHEST 2 VIEWS: CPT

## 2022-08-19 PROCEDURE — 81002 URINALYSIS NONAUTO W/O SCOPE: CPT | Performed by: NURSE PRACTITIONER

## 2022-08-19 PROCEDURE — G0463 HOSPITAL OUTPT CLINIC VISIT: HCPCS | Performed by: NURSE PRACTITIONER

## 2022-08-19 PROCEDURE — 87086 URINE CULTURE/COLONY COUNT: CPT | Performed by: NURSE PRACTITIONER

## 2022-08-19 RX ORDER — CIPROFLOXACIN 500 MG/1
500 TABLET, FILM COATED ORAL EVERY 12 HOURS SCHEDULED
Qty: 10 TABLET | Refills: 0 | Status: SHIPPED | OUTPATIENT
Start: 2022-08-19 | End: 2022-08-24

## 2022-08-19 NOTE — PATIENT INSTRUCTIONS
Your xray was preliminarily read by your provider  A radiologist will read the xray and you will be notified if it is abnormal     You may continue the tylenol cold, but should take an expectorant such as robitussin or plain mucinex  Increase water intake with cipro  Do NOT take any steroids while taking cipro    If you develop pain in your legs or wrists - stop the cipro and call your doctor   Do not take your vitamins and cipro with in 2 hours of each other   You are to follow up with urology for the blood in your urine  Call your PCP for follow up next week  Go to the ED if symptoms worsen  Avoid caffeine and alcohol - they are bladder irritants

## 2022-08-19 NOTE — PROGRESS NOTES
[facility]        NAME: Mandy Mayfield is a 66 y o  male  : 1950    MRN: [id number]  DATE: 2022  TIME: 12:22 PM    Assessment and Plan   Acute cystitis with hematuria [N30 01]  1  Acute cystitis with hematuria  ciprofloxacin (CIPRO) 500 mg tablet   2  Hematuria, unspecified type  POCT urine dip    Urine culture    ciprofloxacin (CIPRO) 500 mg tablet   3  Acute upper respiratory infection     4  Acute cough  XR chest pa & lateral         Patient Instructions       Follow up with PCP in 3-5 days  Proceed to  ER if symptoms worsen  Your xray was preliminarily read by your provider  A radiologist will read the xray and you will be notified if it is abnormal     You may continue the tylenol cold, but should take an expectorant such as robitussin or plain mucinex  Increase water intake with cipro  Do NOT take any steroids while taking cipro  If you develop pain in your legs or wrists - stop the cipro and call your doctor   Do not take your vitamins and cipro with in 2 hours of each other   You are to follow up with urology for the blood in your urine  Call your PCP for follow up next week  Go to the ED if symptoms worsen  Avoid caffeine and alcohol - they are bladder irritants             Chief Complaint     Chief Complaint   Patient presents with    Blood in Urine     This am          History of Present Illness       This is a 66year old male who states developed a cough and nasal congestion on Monday and has been taking tylenol cold and sinus  He states that he has a dry cough  Denies fevers, chills, n/v/d  He states he is covid vaccinated and has not been anywhere that he would have been exposed  He states that this morning he noted burning on urination and saw blood  He states the blood is gross red  He denies prostate problems, bladder or kidney cancer    He states that last time he had this "I had a urinary infection"  He does not walk as he had back surgery and has neuro problems  He denies neurogenic bladder and states that he does void spontaneously in a urinal         Review of Systems   Review of Systems   Constitutional: Negative  HENT: Positive for congestion  Eyes: Negative  Respiratory: Positive for cough  Cardiovascular: Negative  Gastrointestinal: Negative  Endocrine: Negative  Genitourinary: Positive for dysuria and hematuria  Musculoskeletal: Negative  Skin: Negative  Allergic/Immunologic: Negative  Neurological: Negative  Hematological: Negative  Psychiatric/Behavioral: Negative            Current Medications       Current Outpatient Medications:     ciprofloxacin (CIPRO) 500 mg tablet, Take 1 tablet (500 mg total) by mouth every 12 (twelve) hours for 5 days, Disp: 10 tablet, Rfl: 0    allopurinol (ZYLOPRIM) 100 mg tablet, Take 100 mg by mouth 2 (two) times a day, Disp: , Rfl:     FLUoxetine (PROzac) 20 mg capsule, Take 20 mg by mouth daily , Disp: , Rfl:     loratadine (CLARITIN) 10 mg tablet, Take 10 mg by mouth as needed for allergies, Disp: , Rfl:     Magnesium 100 MG CAPS, Take 1 capsule by mouth daily, Disp: , Rfl:     meloxicam (MOBIC) 15 mg tablet, Take 15 mg by mouth daily, Disp: , Rfl:     Misc Natural Products (OSTEO BI-FLEX/5-LOXIN ADVANCED PO), Take 1 tablet by mouth daily , Disp: , Rfl:     Multiple Vitamins-Minerals (CENTRUM SILVER 50+MEN PO), Take 1 tablet by mouth daily, Disp: , Rfl:     SUPER B COMPLEX/C PO, Take 1 tablet by mouth daily, Disp: , Rfl:     Current Allergies     Allergies as of 08/19/2022 - Reviewed 08/19/2022   Allergen Reaction Noted    Other  09/30/2019    Penicillins Rash 06/12/2019            The following portions of the patient's history were reviewed and updated as appropriate: allergies, current medications, past family history, past medical history, past social history, past surgical history and problem list      Past Medical History:   Diagnosis Date    Depression     Gout Past Surgical History:   Procedure Laterality Date    BACK SURGERY      X2 LUMBAR INCLUDING FUSION  2004, 2015 WITH [facility], [facility] DR Silvestre Samano    CATARACT EXTRACTION, BILATERAL      KNEE ARTHROSCOPY Right     x2 , meniscus repair    TONSILECTOMY AND ADNOIDECTOMY         Family History   Problem Relation Age of Onset    Diabetes Father     Diabetes Brother     Diabetes Sister          Medications have been verified  Objective   /62   Pulse 101   Temp 98 1 °F (36 7 °C)   Resp 18   Wt 134 kg (295 lb)   SpO2 99%   BMI 38 92 kg/m²   No LMP for male patient  Physical Exam     Physical Exam  Vitals and nursing note reviewed  Constitutional:       General: He is not in acute distress  Appearance: Normal appearance  He is obese  He is not ill-appearing, toxic-appearing or diaphoretic  Comments: Wheel chair bound    HENT:      Head: Normocephalic and atraumatic  Right Ear: Tympanic membrane and ear canal normal       Left Ear: Tympanic membrane and ear canal normal       Nose: Congestion present  No rhinorrhea  Mouth/Throat:      Mouth: Mucous membranes are moist       Pharynx: Oropharynx is clear  No oropharyngeal exudate or posterior oropharyngeal erythema  Eyes:      Extraocular Movements: Extraocular movements intact  Cardiovascular:      Rate and Rhythm: Normal rate and regular rhythm  Pulses: Normal pulses  Heart sounds: Normal heart sounds  Pulmonary:      Effort: Pulmonary effort is normal       Breath sounds: Examination of the right-upper field reveals rhonchi  Examination of the left-upper field reveals rhonchi  Examination of the right-middle field reveals rhonchi  Examination of the left-middle field reveals rhonchi  Examination of the right-lower field reveals rhonchi  Examination of the left-lower field reveals rhonchi  Rhonchi present  No decreased breath sounds, wheezing or rales  Abdominal:      General: There is no distension  Palpations: Abdomen is soft  Tenderness: There is no abdominal tenderness  Musculoskeletal:         General: Normal range of motion  Cervical back: Normal range of motion and neck supple  Skin:     General: Skin is warm and dry  Capillary Refill: Capillary refill takes less than 2 seconds  Neurological:      General: No focal deficit present  Mental Status: He is alert and oriented to person, place, and time  Psychiatric:         Mood and Affect: Mood normal          Behavior: Behavior normal          Thought Content: Thought content normal          Judgment: Judgment normal              Pt unable to void when he arrived at [facility] as he states he had gone prior to coming to ED  Waiting on urine      Preliminary reading CXR  Pt unable to stand - images taken while in wheel chair  No acute process seen  Waiting on rad read         poct urine - cherry koolaid in color   + leuks  Neg nitrates    Will send for urine culture

## 2022-08-20 LAB — BACTERIA UR CULT: NORMAL

## 2022-09-13 ENCOUNTER — APPOINTMENT (OUTPATIENT)
Dept: LAB | Facility: MEDICAL CENTER | Age: 72
End: 2022-09-13
Payer: MEDICARE

## 2022-09-13 DIAGNOSIS — D69.2 PURPURA (HCC): ICD-10-CM

## 2022-09-13 DIAGNOSIS — E78.5 HYPERLIPIDEMIA, UNSPECIFIED HYPERLIPIDEMIA TYPE: ICD-10-CM

## 2022-09-13 DIAGNOSIS — N40.0 BENIGN PROSTATIC HYPERPLASIA, UNSPECIFIED WHETHER LOWER URINARY TRACT SYMPTOMS PRESENT: ICD-10-CM

## 2022-09-13 DIAGNOSIS — E55.9 VITAMIN D DEFICIENCY: ICD-10-CM

## 2022-09-13 DIAGNOSIS — M19.90 ARTHRITIS: ICD-10-CM

## 2022-09-13 DIAGNOSIS — R35.1 NOCTURIA: ICD-10-CM

## 2022-09-13 DIAGNOSIS — M25.50 ARTHRALGIA, UNSPECIFIED JOINT: ICD-10-CM

## 2022-09-13 DIAGNOSIS — M10.9 GOUT, UNSPECIFIED CAUSE, UNSPECIFIED CHRONICITY, UNSPECIFIED SITE: ICD-10-CM

## 2022-09-13 DIAGNOSIS — R73.9 ELEVATED BLOOD SUGAR: ICD-10-CM

## 2022-09-13 LAB
25(OH)D3 SERPL-MCNC: 50.4 NG/ML (ref 30–100)
ALBUMIN SERPL BCP-MCNC: 3.3 G/DL (ref 3.5–5)
ALP SERPL-CCNC: 76 U/L (ref 46–116)
ALT SERPL W P-5'-P-CCNC: 31 U/L (ref 12–78)
ANION GAP SERPL CALCULATED.3IONS-SCNC: 7 MMOL/L (ref 4–13)
APTT PPP: 27 SECONDS (ref 23–37)
AST SERPL W P-5'-P-CCNC: 20 U/L (ref 5–45)
BASOPHILS # BLD AUTO: 0.02 THOUSANDS/ΜL (ref 0–0.1)
BASOPHILS NFR BLD AUTO: 0 % (ref 0–1)
BILIRUB SERPL-MCNC: 0.57 MG/DL (ref 0.2–1)
BUN SERPL-MCNC: 23 MG/DL (ref 5–25)
CALCIUM ALBUM COR SERPL-MCNC: 9.7 MG/DL (ref 8.3–10.1)
CALCIUM SERPL-MCNC: 9.1 MG/DL (ref 8.3–10.1)
CHLORIDE SERPL-SCNC: 104 MMOL/L (ref 96–108)
CHOLEST SERPL-MCNC: 150 MG/DL
CO2 SERPL-SCNC: 27 MMOL/L (ref 21–32)
CREAT SERPL-MCNC: 0.96 MG/DL (ref 0.6–1.3)
CRP SERPL QL: <3 MG/L
EOSINOPHIL # BLD AUTO: 0.01 THOUSAND/ΜL (ref 0–0.61)
EOSINOPHIL NFR BLD AUTO: 0 % (ref 0–6)
ERYTHROCYTE [DISTWIDTH] IN BLOOD BY AUTOMATED COUNT: 14 % (ref 11.6–15.1)
EST. AVERAGE GLUCOSE BLD GHB EST-MCNC: 114 MG/DL
GFR SERPL CREATININE-BSD FRML MDRD: 77 ML/MIN/1.73SQ M
GLUCOSE P FAST SERPL-MCNC: 97 MG/DL (ref 65–99)
HBA1C MFR BLD: 5.6 %
HCT VFR BLD AUTO: 43.8 % (ref 36.5–49.3)
HDLC SERPL-MCNC: 54 MG/DL
HGB BLD-MCNC: 14.3 G/DL (ref 12–17)
IMM GRANULOCYTES # BLD AUTO: 0.05 THOUSAND/UL (ref 0–0.2)
IMM GRANULOCYTES NFR BLD AUTO: 0 % (ref 0–2)
INR PPP: 0.97 (ref 0.84–1.19)
LDLC SERPL CALC-MCNC: 75 MG/DL (ref 0–100)
LYMPHOCYTES # BLD AUTO: 2.05 THOUSANDS/ΜL (ref 0.6–4.47)
LYMPHOCYTES NFR BLD AUTO: 18 % (ref 14–44)
MCH RBC QN AUTO: 30.3 PG (ref 26.8–34.3)
MCHC RBC AUTO-ENTMCNC: 32.6 G/DL (ref 31.4–37.4)
MCV RBC AUTO: 93 FL (ref 82–98)
MONOCYTES # BLD AUTO: 0.73 THOUSAND/ΜL (ref 0.17–1.22)
MONOCYTES NFR BLD AUTO: 6 % (ref 4–12)
NEUTROPHILS # BLD AUTO: 8.8 THOUSANDS/ΜL (ref 1.85–7.62)
NEUTS SEG NFR BLD AUTO: 76 % (ref 43–75)
NONHDLC SERPL-MCNC: 96 MG/DL
NRBC BLD AUTO-RTO: 0 /100 WBCS
PLATELET # BLD AUTO: 294 THOUSANDS/UL (ref 149–390)
PMV BLD AUTO: 10.1 FL (ref 8.9–12.7)
POTASSIUM SERPL-SCNC: 4.3 MMOL/L (ref 3.5–5.3)
PROT SERPL-MCNC: 7 G/DL (ref 6.4–8.4)
PROTHROMBIN TIME: 13.1 SECONDS (ref 11.6–14.5)
PSA SERPL-MCNC: 1.1 NG/ML (ref 0–4)
RBC # BLD AUTO: 4.72 MILLION/UL (ref 3.88–5.62)
SODIUM SERPL-SCNC: 138 MMOL/L (ref 135–147)
TRIGL SERPL-MCNC: 107 MG/DL
URATE SERPL-MCNC: 5.5 MG/DL (ref 3.5–8.5)
WBC # BLD AUTO: 11.66 THOUSAND/UL (ref 4.31–10.16)

## 2022-09-13 PROCEDURE — 83036 HEMOGLOBIN GLYCOSYLATED A1C: CPT

## 2022-09-13 PROCEDURE — 85610 PROTHROMBIN TIME: CPT

## 2022-09-13 PROCEDURE — 80053 COMPREHEN METABOLIC PANEL: CPT

## 2022-09-13 PROCEDURE — 85025 COMPLETE CBC W/AUTO DIFF WBC: CPT

## 2022-09-13 PROCEDURE — 80061 LIPID PANEL: CPT

## 2022-09-13 PROCEDURE — 86140 C-REACTIVE PROTEIN: CPT

## 2022-09-13 PROCEDURE — 36415 COLL VENOUS BLD VENIPUNCTURE: CPT

## 2022-09-13 PROCEDURE — 85730 THROMBOPLASTIN TIME PARTIAL: CPT

## 2022-09-13 PROCEDURE — G0103 PSA SCREENING: HCPCS

## 2022-09-13 PROCEDURE — 84550 ASSAY OF BLOOD/URIC ACID: CPT

## 2022-09-13 PROCEDURE — 82306 VITAMIN D 25 HYDROXY: CPT

## 2022-09-24 ENCOUNTER — APPOINTMENT (OUTPATIENT)
Dept: RADIOLOGY | Facility: HOSPITAL | Age: 72
End: 2022-09-24
Payer: MEDICARE

## 2022-09-24 ENCOUNTER — HOSPITAL ENCOUNTER (EMERGENCY)
Facility: HOSPITAL | Age: 72
Discharge: HOME | End: 2022-09-24
Attending: FAMILY MEDICINE
Payer: MEDICARE

## 2022-09-24 VITALS
OXYGEN SATURATION: 95 % | DIASTOLIC BLOOD PRESSURE: 70 MMHG | BODY MASS INDEX: 38.92 KG/M2 | WEIGHT: 295 LBS | SYSTOLIC BLOOD PRESSURE: 130 MMHG | HEART RATE: 93 BPM | RESPIRATION RATE: 16 BRPM | TEMPERATURE: 97 F

## 2022-09-24 DIAGNOSIS — Z51.89 ENCOUNTER FOR WOUND RE-CHECK: Primary | ICD-10-CM

## 2022-09-24 DIAGNOSIS — L95.9 VASCULITIS OF SKIN: ICD-10-CM

## 2022-09-24 LAB
ALBUMIN SERPL BCP-MCNC: 3.3 G/DL (ref 3.5–5)
ALP SERPL-CCNC: 50 U/L (ref 34–104)
ALT SERPL W P-5'-P-CCNC: 23 U/L (ref 7–52)
ANION GAP SERPL CALCULATED.3IONS-SCNC: 9 MMOL/L (ref 4–13)
AST SERPL W P-5'-P-CCNC: 28 U/L (ref 13–39)
BACTERIA UR QL AUTO: ABNORMAL /HPF
BASOPHILS # BLD AUTO: 0.06 THOUSANDS/ΜL (ref 0–0.1)
BASOPHILS NFR BLD AUTO: 0 % (ref 0–1)
BILIRUB DIRECT SERPL-MCNC: 0.2 MG/DL (ref 0–0.2)
BILIRUB SERPL-MCNC: 0.81 MG/DL (ref 0.2–1)
BILIRUB UR QL STRIP: NEGATIVE
BUN SERPL-MCNC: 31 MG/DL (ref 5–25)
CALCIUM SERPL-MCNC: 8.7 MG/DL (ref 8.4–10.2)
CHLORIDE SERPL-SCNC: 102 MMOL/L (ref 96–108)
CLARITY UR: CLEAR
CO2 SERPL-SCNC: 24 MMOL/L (ref 21–32)
COLOR UR: YELLOW
CREAT SERPL-MCNC: 0.99 MG/DL (ref 0.6–1.3)
CRP SERPL QL: 78.5 MG/L
EOSINOPHIL # BLD AUTO: 0.14 THOUSAND/ΜL (ref 0–0.61)
EOSINOPHIL NFR BLD AUTO: 1 % (ref 0–6)
ERYTHROCYTE [DISTWIDTH] IN BLOOD BY AUTOMATED COUNT: 13.8 % (ref 11.6–15.1)
ERYTHROCYTE [SEDIMENTATION RATE] IN BLOOD: 55 MM/HOUR (ref 0–19)
GFR SERPL CREATININE-BSD FRML MDRD: 74 ML/MIN/1.73SQ M
GLUCOSE SERPL-MCNC: 191 MG/DL (ref 65–140)
GLUCOSE UR STRIP-MCNC: NEGATIVE MG/DL
HCT VFR BLD AUTO: 40.6 % (ref 36.5–49.3)
HGB BLD-MCNC: 13.4 G/DL (ref 12–17)
HGB UR QL STRIP.AUTO: ABNORMAL
IMM GRANULOCYTES # BLD AUTO: 0.06 THOUSAND/UL (ref 0–0.2)
IMM GRANULOCYTES NFR BLD AUTO: 0 % (ref 0–2)
KETONES UR STRIP-MCNC: NEGATIVE MG/DL
LEUKOCYTE ESTERASE UR QL STRIP: NEGATIVE
LYMPHOCYTES # BLD AUTO: 1.41 THOUSANDS/ΜL (ref 0.6–4.47)
LYMPHOCYTES NFR BLD AUTO: 9 % (ref 14–44)
MCH RBC QN AUTO: 31.2 PG (ref 26.8–34.3)
MCHC RBC AUTO-ENTMCNC: 33 G/DL (ref 31.4–37.4)
MCV RBC AUTO: 94 FL (ref 82–98)
MONOCYTES # BLD AUTO: 0.86 THOUSAND/ΜL (ref 0.17–1.22)
MONOCYTES NFR BLD AUTO: 6 % (ref 4–12)
NEUTROPHILS # BLD AUTO: 12.63 THOUSANDS/ΜL (ref 1.85–7.62)
NEUTS SEG NFR BLD AUTO: 84 % (ref 43–75)
NITRITE UR QL STRIP: NEGATIVE
NON-SQ EPI CELLS URNS QL MICRO: ABNORMAL /HPF
NRBC BLD AUTO-RTO: 0 /100 WBCS
PH UR STRIP.AUTO: 5.5 [PH]
PLATELET # BLD AUTO: 311 THOUSANDS/UL (ref 149–390)
PMV BLD AUTO: 8.4 FL (ref 8.9–12.7)
POTASSIUM SERPL-SCNC: 4.2 MMOL/L (ref 3.5–5.3)
PROT SERPL-MCNC: 6.1 G/DL (ref 6.4–8.4)
PROT UR STRIP-MCNC: ABNORMAL MG/DL
RBC # BLD AUTO: 4.3 MILLION/UL (ref 3.88–5.62)
RBC #/AREA URNS AUTO: ABNORMAL /HPF
SODIUM SERPL-SCNC: 135 MMOL/L (ref 135–147)
SP GR UR STRIP.AUTO: 1.02 (ref 1–1.03)
UROBILINOGEN UR QL STRIP.AUTO: 0.2 E.U./DL
WBC # BLD AUTO: 15.16 THOUSAND/UL (ref 4.31–10.16)
WBC #/AREA URNS AUTO: ABNORMAL /HPF

## 2022-09-24 PROCEDURE — 86162 COMPLEMENT TOTAL (CH50): CPT | Performed by: FAMILY MEDICINE

## 2022-09-24 PROCEDURE — 87186 SC STD MICRODIL/AGAR DIL: CPT | Performed by: FAMILY MEDICINE

## 2022-09-24 PROCEDURE — 81001 URINALYSIS AUTO W/SCOPE: CPT | Performed by: FAMILY MEDICINE

## 2022-09-24 PROCEDURE — 80048 BASIC METABOLIC PNL TOTAL CA: CPT | Performed by: FAMILY MEDICINE

## 2022-09-24 PROCEDURE — 87077 CULTURE AEROBIC IDENTIFY: CPT | Performed by: FAMILY MEDICINE

## 2022-09-24 PROCEDURE — 86430 RHEUMATOID FACTOR TEST QUAL: CPT | Performed by: FAMILY MEDICINE

## 2022-09-24 PROCEDURE — 86038 ANTINUCLEAR ANTIBODIES: CPT | Performed by: FAMILY MEDICINE

## 2022-09-24 PROCEDURE — 84432 ASSAY OF THYROGLOBULIN: CPT | Performed by: FAMILY MEDICINE

## 2022-09-24 PROCEDURE — 36415 COLL VENOUS BLD VENIPUNCTURE: CPT | Performed by: FAMILY MEDICINE

## 2022-09-24 PROCEDURE — 80076 HEPATIC FUNCTION PANEL: CPT | Performed by: FAMILY MEDICINE

## 2022-09-24 PROCEDURE — 71045 X-RAY EXAM CHEST 1 VIEW: CPT

## 2022-09-24 PROCEDURE — 87205 SMEAR GRAM STAIN: CPT | Performed by: FAMILY MEDICINE

## 2022-09-24 PROCEDURE — 99284 EMERGENCY DEPT VISIT MOD MDM: CPT

## 2022-09-24 PROCEDURE — 83520 IMMUNOASSAY QUANT NOS NONAB: CPT | Performed by: FAMILY MEDICINE

## 2022-09-24 PROCEDURE — 87070 CULTURE OTHR SPECIMN AEROBIC: CPT | Performed by: FAMILY MEDICINE

## 2022-09-24 PROCEDURE — 86140 C-REACTIVE PROTEIN: CPT | Performed by: FAMILY MEDICINE

## 2022-09-24 PROCEDURE — 99285 EMERGENCY DEPT VISIT HI MDM: CPT | Performed by: FAMILY MEDICINE

## 2022-09-24 PROCEDURE — 86800 THYROGLOBULIN ANTIBODY: CPT | Performed by: FAMILY MEDICINE

## 2022-09-24 PROCEDURE — 86037 ANCA TITER EACH ANTIBODY: CPT | Performed by: FAMILY MEDICINE

## 2022-09-24 PROCEDURE — 85025 COMPLETE CBC W/AUTO DIFF WBC: CPT | Performed by: FAMILY MEDICINE

## 2022-09-24 PROCEDURE — 85652 RBC SED RATE AUTOMATED: CPT | Performed by: FAMILY MEDICINE

## 2022-09-24 PROCEDURE — 80074 ACUTE HEPATITIS PANEL: CPT | Performed by: FAMILY MEDICINE

## 2022-09-24 PROCEDURE — 86160 COMPLEMENT ANTIGEN: CPT | Performed by: FAMILY MEDICINE

## 2022-09-24 NOTE — CONSULTS
E-Consult [facility]    HPI  59-year-old male with a history of gout presented to ED with the complain of chronic wound/sore on both of his legs and now extending to his thigh and upper extremity  Patient states the on August 19 he was diagnosed with viral URI and cystitis and was started on Cipro for 5 day course which he completed  Around Sep 5th, he was cleaning his backyard when noticed a blister on his leg  He states he went to sees PCP who is stated that he had a bug bite and rx'd a Medrol dose pack  States he finished Medrol dose pack however noticed the blister ruptured and redness spread up his leg  He states that he saw his PCP again on Wednesday 9/21 who diagnosed him with cellulitis and started him on Keflex, mupirocin, and prednisone 10 mg tablet twice a day for 5 days then 1 tablet for next 5 days  He did not note significant improvement  Rash spread to his arm as well  Patient states that his family noticed that it has been getting worse which prompted an ED visit today  He denies any fever, chills, nausea, vomiting, abdominal pain chest pain, shortness of breath, or any other systemic symptoms  No sick contacts or recent travel  He states he has been taking his medications as prescribed  Medications  Ciprofloxacin 8/19 5 day 500 mg BID (completed)  Meloxicam 15 mg tablet daily  Allopurinol 100 mg BID  Magnesium 100 mg daily  Vitamins and Super B complex  Loratidine 10 mg daily                      Labs: reviewed as below  Results Reviewed     Procedure Component Value Units Date/Time    Hepatitis panel, acute [[id number]] Collected: 09/24/22 1411    Lab Status:  In process Specimen: Blood from Arm, Left Updated: 09/24/22 1413    Hepatic function panel [[id number]]  (Abnormal) Collected: 09/24/22 1041    Lab Status: Final result Specimen: Blood from Arm, Right Updated: 09/24/22 1332     Total Bilirubin 0 81 mg/dL      Bilirubin, Direct 0 20 mg/dL      Alkaline Phosphatase 50 U/L AST 28 U/L      ALT 23 U/L      Total Protein 6 1 g/dL      Albumin 3 3 g/dL     Urine Microscopic [[id number]]  (Abnormal) Collected: 09/24/22 1313    Lab Status: Final result Specimen: Urine, Straight Cath Updated: 09/24/22 1332     RBC, UA 10-20 /hpf      WBC, UA 0-1 /hpf      Epithelial Cells Occasional /hpf      Bacteria, UA Occasional /hpf     UA w Reflex to Microscopic w Reflex to Culture [[id number]]  (Abnormal) Collected: 09/24/22 1313    Lab Status: Final result Specimen: Urine, Straight Cath Updated: 09/24/22 1319     Color, UA Yellow     Clarity, UA Clear     Specific Gravity, UA 1 025     pH, UA 5 5     Leukocytes, UA Negative     Nitrite, UA Negative     Protein, UA Trace mg/dl      Glucose, UA Negative mg/dl      Ketones, UA Negative mg/dl      Urobilinogen, UA 0 2 E U /dl      Bilirubin, UA Negative     Occult Blood, UA 2+    Thyroglobulin [[id number]] Collected: 09/24/22 1156    Lab Status: In process Specimen: Arm, Right Updated: 09/24/22 1200    MARIO ALBERTO Screen w/ Reflex to Titer/Pattern [[id number]] Collected: 09/24/22 1156    Lab Status: In process Specimen: Blood from Arm, Right Updated: 09/24/22 1200    Rheumatoid factor screen [[id number]] Collected: 09/24/22 1156    Lab Status: In process Specimen: Blood from Arm, Right Updated: 09/24/22 1200    C3 complement [[id number]] Collected: 09/24/22 1156    Lab Status: In process Specimen: Blood from Arm, Right Updated: 09/24/22 1159    C4 complement [[id number]] Collected: 09/24/22 1156    Lab Status: In process Specimen: Blood from Arm, Right Updated: 09/24/22 1159    Complement, total [[id number]] Collected: 09/24/22 1156    Lab Status: In process Specimen: Blood from Arm, Right Updated: 09/24/22 1159    Anti-neutrophilic cytoplasmic antibody [[id number]] Collected: 09/24/22 1156    Lab Status:  In process Specimen: Blood from Arm, Right Updated: 09/24/22 1159    Sedimentation rate, automated [[id number]]  (Abnormal) Collected: 09/24/22 1041    Lab Status: Final result Specimen: Blood from Arm, Right Updated: 09/24/22 1118     Sed Rate 55 mm/hour     Basic metabolic panel [[id number]]  (Abnormal) Collected: 09/24/22 1041    Lab Status: Final result Specimen: Blood from Arm, Right Updated: 09/24/22 1111     Sodium 135 mmol/L      Potassium 4 2 mmol/L      Chloride 102 mmol/L      CO2 24 mmol/L      ANION GAP 9 mmol/L      BUN 31 mg/dL      Creatinine 0 99 mg/dL      Glucose 191 mg/dL      Calcium 8 7 mg/dL      eGFR 74 ml/min/1 73sq m     Narrative:      [facility] guidelines for Chronic Kidney Disease (CKD):     Stage 1 with normal or high GFR (GFR > 90 mL/min/1 73 square meters)    Stage 2 Mild CKD (GFR = 60-89 mL/min/1 73 square meters)    Stage 3A Moderate CKD (GFR = 45-59 mL/min/1 73 square meters)    Stage 3B Moderate CKD (GFR = 30-44 mL/min/1 73 square meters)    Stage 4 Severe CKD (GFR = 15-29 mL/min/1 73 square meters)    Stage 5 End Stage CKD (GFR <15 mL/min/1 73 square meters)  Note: GFR calculation is accurate only with a steady state creatinine    C-reactive protein [[id number]]  (Abnormal) Collected: 09/24/22 1041    Lab Status: Final result Specimen: Blood from Arm, Right Updated: 09/24/22 1111     CRP 78 5 mg/L     CBC and differential [[id number]]  (Abnormal) Collected: 09/24/22 1041    Lab Status: Final result Specimen: Blood from Arm, Right Updated: 09/24/22 1045     WBC 15 16 Thousand/uL      RBC 4 30 Million/uL      Hemoglobin 13 4 g/dL      Hematocrit 40 6 %      MCV 94 fL      MCH 31 2 pg      MCHC 33 0 g/dL      RDW 13 8 %      MPV 8 4 fL      Platelets 114 Thousands/uL      nRBC 0 /100 WBCs      Neutrophils Relative 84 %      Immat GRANS % 0 %      Lymphocytes Relative 9 %      Monocytes Relative 6 %      Eosinophils Relative 1 %      Basophils Relative 0 %      Neutrophils Absolute 12 63 Thousands/µL      Immature Grans Absolute 0 06 Thousand/uL      Lymphocytes Absolute 1 41 Thousands/µL      Monocytes Absolute 0 86 Thousand/µL      Eosinophils Absolute 0 14 Thousand/µL      Basophils Absolute 0 06 Thousands/µL     Wound culture and Gram stain [[id number]] Collected: 09/24/22 1041    Lab Status: In process Specimen: Wound from Leg, Left Updated: 09/24/22 1043        Imaging  XR chest 1 view portable   Final Result by Perfecto Gaffney MD (09/24 1218)      No acute cardiopulmonary disease  Workstation performed: [id number]             Recommendations:  Morphology and disease course strongly suggests vasculitis  Suspect small to medium vessel involvement  Possible that the vasculitis is driven by drug (cipro 8/19 500 mg BID for 5 day course) or infection (viral URI and cystitis on 8/19) given time course of rash progression  Differential also includes a hypersensitivity reaction to arthropod bite although much less likely  Will work up as vasculitis and ensure close follow up for biopsy next week  - Please obtain CBC, CMP, hep panel, ANCAs, MARIO ALBERTO, RF, cryoglobulins, complement levels, CXR and UA  Please do not discharge without confirming CXR and UA and liver numbers are looking okay  - May require increased dose of prednisone if kidney involvement  Otherwise safe to discharge on keflex, prednisone, and mupirocin  Do not use neosporin     - We will have him follow up in Dermatology clinic at [facility] on Tuesday morning for biopsy  Our  will reach out Monday morning to arrange this  - If stable appearing and no red flags on labs, stable for discharge with close follow up with [facility]  If unstable or symptoms worsening, return to ER immediately  Recommendations as above are not finalized until signed by attending, Dr Betty Billingsley  PGY2 Dermatology Resident

## 2022-09-24 NOTE — ED NOTES
Patient unable to provide urine sample after glass of water was given  Dr Dolly Shultz made aware and at bedside at this time        Tasha Bull RN  09/24/22 [id number]

## 2022-09-24 NOTE — ED PROVIDER NOTES
History  Chief Complaint   Patient presents with    Wound Check     Patient reports sores on bilateral legs  Patient was being treat with antibiotics, steroids, and topical cream  Lesions are worsening     HPI  This 57-year-old male with a history of gout presented to ED with the complain of chronic wound/sore on both of his legs and now extending to his thigh and upper extremity  Patient states the on August 19 he was diagnosed with UTI started on Cipro and a week later he was cleaning his backyard when noticed these blister  He states he went to sees PCP who is stated that he had a bug bite in started him on prednisone at that time  States he has finished Medrol Dosepak however noticed the blister ruptured entered into chronic wound  Which is now spreading  He states that he saw his PCP again on Wednesday start him on Keflex mupirocin and prednisone 10 mg tablet twice a day for 5 days then 1 tablet for next 5 days  Patient states that his family noticed that it has been getting worse which prompted this ED visit  He denies any fever chills nausea vomiting denies any abdominal pain chest pain or shortness of breath  Sitting comfortably in bed answer questions appropriately  A denies any sick contact or recent travel  He states he has been taking his medication as prescribed  Prior to Admission Medications   Prescriptions Last Dose Informant Patient Reported? Taking?    FLUoxetine (PROzac) 20 mg capsule   Yes No   Sig: Take 20 mg by mouth daily    Magnesium 100 MG CAPS  Self Yes No   Sig: Take 1 capsule by mouth daily   Misc Natural Products (OSTEO BI-FLEX/5-LOXIN ADVANCED PO)   Yes No   Sig: Take 1 tablet by mouth daily    Multiple Vitamins-Minerals (CENTRUM SILVER 50+MEN PO)  Self Yes No   Sig: Take 1 tablet by mouth daily   SUPER B COMPLEX/C PO  Self Yes No   Sig: Take 1 tablet by mouth daily   allopurinol (ZYLOPRIM) 100 mg tablet  Self Yes No   Sig: Take 100 mg by mouth 2 (two) times a day loratadine (CLARITIN) 10 mg tablet   Yes No   Sig: Take 10 mg by mouth as needed for allergies   meloxicam (MOBIC) 15 mg tablet  Self Yes No   Sig: Take 15 mg by mouth daily      Facility-Administered Medications: None       Past Medical History:   Diagnosis Date    Depression     Gout        Past Surgical History:   Procedure Laterality Date    BACK SURGERY      X2 LUMBAR INCLUDING FUSION  ,  WITH OAA, LV[facility] DR Janes Patel    CATARACT EXTRACTION, BILATERAL      KNEE ARTHROSCOPY Right     x2 , meniscus repair    TONSILECTOMY AND ADNOIDECTOMY         Family History   Problem Relation Age of Onset    Diabetes Father     Diabetes Brother     Diabetes Sister      I have reviewed and agree with the history as documented  E-Cigarette/Vaping    E-Cigarette Use Never User      E-Cigarette/Vaping Substances    Nicotine No     THC No     CBD No     Flavoring No     Other No     Unknown No      Social History     Tobacco Use    Smoking status: Former Smoker     Quit date:      Years since quittin 7    Smokeless tobacco: Never Used   Vaping Use    Vaping Use: Never used   Substance Use Topics    Alcohol use: Yes     Comment: occasional    Drug use: Never       Review of Systems   Constitutional: Negative  HENT: Negative  Eyes: Negative  Respiratory: Negative  Cardiovascular: Negative  Gastrointestinal: Negative  Endocrine: Negative  Genitourinary: Negative  Musculoskeletal: Negative  Skin: Positive for wound  Allergic/Immunologic: Negative  Neurological: Negative  Hematological: Negative  Psychiatric/Behavioral: Negative  Physical Exam  Physical Exam  Vitals and nursing note reviewed  Constitutional:       Appearance: Normal appearance  He is not ill-appearing  HENT:      Head: Normocephalic and atraumatic        Right Ear: External ear normal       Left Ear: External ear normal       Nose: Nose normal       Mouth/Throat:      Mouth: Mucous membranes are moist    Eyes:      Extraocular Movements: Extraocular movements intact  Conjunctiva/sclera: Conjunctivae normal       Pupils: Pupils are equal, round, and reactive to light  Cardiovascular:      Rate and Rhythm: Normal rate and regular rhythm  Pulmonary:      Effort: Pulmonary effort is normal       Breath sounds: Normal breath sounds  Abdominal:      General: Bowel sounds are normal       Palpations: Abdomen is soft  Musculoskeletal:         General: No swelling  Normal range of motion  Cervical back: Normal range of motion and neck supple  Right lower leg: Edema present  Left lower leg: Edema present  Skin:     General: Skin is warm  Findings: Erythema and lesion present  Neurological:      General: No focal deficit present  Mental Status: He is alert and oriented to person, place, and time                        Vital Signs  ED Triage Vitals [09/24/22 1027]   Temperature Pulse Respirations Blood Pressure SpO2   (!) 97 °F (36 1 °C) 93 16 130/70 95 %      Temp Source Heart Rate Source Patient Position - Orthostatic VS BP Location FiO2 (%)   Oral Monitor Sitting Left arm --      Pain Score       No Pain           Vitals:    09/24/22 1027   BP: 130/70   Pulse: 93   Patient Position - Orthostatic VS: Sitting         Visual Acuity      ED Medications  Medications - No data to display    Diagnostic Studies  Results Reviewed     Procedure Component Value Units Date/Time    Hepatitis panel, acute [[id number]]     Lab Status: No result Specimen: Blood     Hepatic function panel [[id number]]  (Abnormal) Collected: 09/24/22 1041    Lab Status: Final result Specimen: Blood from Arm, Right Updated: 09/24/22 1332     Total Bilirubin 0 81 mg/dL      Bilirubin, Direct 0 20 mg/dL      Alkaline Phosphatase 50 U/L      AST 28 U/L      ALT 23 U/L      Total Protein 6 1 g/dL      Albumin 3 3 g/dL     Urine Microscopic [[id number]]  (Abnormal) Collected: 09/24/22 1313    Lab Status: Final result Specimen: Urine, Straight Cath Updated: 09/24/22 1332     RBC, UA 10-20 /hpf      WBC, UA 0-1 /hpf      Epithelial Cells Occasional /hpf      Bacteria, UA Occasional /hpf     UA w Reflex to Microscopic w Reflex to Culture [[id number]]  (Abnormal) Collected: 09/24/22 1313    Lab Status: Final result Specimen: Urine, Straight Cath Updated: 09/24/22 1319     Color, UA Yellow     Clarity, UA Clear     Specific Gravity, UA 1 025     pH, UA 5 5     Leukocytes, UA Negative     Nitrite, UA Negative     Protein, UA Trace mg/dl      Glucose, UA Negative mg/dl      Ketones, UA Negative mg/dl      Urobilinogen, UA 0 2 E U /dl      Bilirubin, UA Negative     Occult Blood, UA 2+    Thyroglobulin [[id number]] Collected: 09/24/22 1156    Lab Status: In process Specimen: Arm, Right Updated: 09/24/22 1200    MARIO ALBERTO Screen w/ Reflex to Titer/Pattern [[id number]] Collected: 09/24/22 1156    Lab Status: In process Specimen: Blood from Arm, Right Updated: 09/24/22 1200    Rheumatoid factor screen [[id number]] Collected: 09/24/22 1156    Lab Status: In process Specimen: Blood from Arm, Right Updated: 09/24/22 1200    C3 complement [[id number]] Collected: 09/24/22 1156    Lab Status: In process Specimen: Blood from Arm, Right Updated: 09/24/22 1159    C4 complement [[id number]] Collected: 09/24/22 1156    Lab Status: In process Specimen: Blood from Arm, Right Updated: 09/24/22 1159    Complement, total [[id number]] Collected: 09/24/22 1156    Lab Status: In process Specimen: Blood from Arm, Right Updated: 09/24/22 1159    Anti-neutrophilic cytoplasmic antibody [[id number]] Collected: 09/24/22 1156    Lab Status:  In process Specimen: Blood from Arm, Right Updated: 09/24/22 1159    Sedimentation rate, automated [[id number]]  (Abnormal) Collected: 09/24/22 1041    Lab Status: Final result Specimen: Blood from Arm, Right Updated: 09/24/22 1118     Sed Rate 55 mm/hour     Basic metabolic panel [[id number]]  (Abnormal) Collected: 09/24/22 1041 Lab Status: Final result Specimen: Blood from Arm, Right Updated: 09/24/22 1111     Sodium 135 mmol/L      Potassium 4 2 mmol/L      Chloride 102 mmol/L      CO2 24 mmol/L      ANION GAP 9 mmol/L      BUN 31 mg/dL      Creatinine 0 99 mg/dL      Glucose 191 mg/dL      Calcium 8 7 mg/dL      eGFR 74 ml/min/1 73sq m     Narrative:      [place] guidelines for Chronic Kidney Disease (CKD):     Stage 1 with normal or high GFR (GFR > 90 mL/min/1 73 square meters)    Stage 2 Mild CKD (GFR = 60-89 mL/min/1 73 square meters)    Stage 3A Moderate CKD (GFR = 45-59 mL/min/1 73 square meters)    Stage 3B Moderate CKD (GFR = 30-44 mL/min/1 73 square meters)    Stage 4 Severe CKD (GFR = 15-29 mL/min/1 73 square meters)    Stage 5 End Stage CKD (GFR <15 mL/min/1 73 square meters)  Note: GFR calculation is accurate only with a steady state creatinine    C-reactive protein [[id number]]  (Abnormal) Collected: 09/24/22 1041    Lab Status: Final result Specimen: Blood from Arm, Right Updated: 09/24/22 1111     CRP 78 5 mg/L     CBC and differential [[id number]]  (Abnormal) Collected: 09/24/22 1041    Lab Status: Final result Specimen: Blood from Arm, Right Updated: 09/24/22 1045     WBC 15 16 Thousand/uL      RBC 4 30 Million/uL      Hemoglobin 13 4 g/dL      Hematocrit 40 6 %      MCV 94 fL      MCH 31 2 pg      MCHC 33 0 g/dL      RDW 13 8 %      MPV 8 4 fL      Platelets 341 Thousands/uL      nRBC 0 /100 WBCs      Neutrophils Relative 84 %      Immat GRANS % 0 %      Lymphocytes Relative 9 %      Monocytes Relative 6 %      Eosinophils Relative 1 %      Basophils Relative 0 %      Neutrophils Absolute 12 63 Thousands/µL      Immature Grans Absolute 0 06 Thousand/uL      Lymphocytes Absolute 1 41 Thousands/µL      Monocytes Absolute 0 86 Thousand/µL      Eosinophils Absolute 0 14 Thousand/µL      Basophils Absolute 0 06 Thousands/µL     Wound culture and Gram stain [[id number]] Collected: 09/24/22 1041 Lab Status: In process Specimen: Wound from Leg, Left Updated: 09/24/22 1043                 XR chest 1 view portable   Final Result by Ofelia Lozada MD (09/24 1218)      No acute cardiopulmonary disease  Workstation performed: [id number]                    Procedures  Procedures         ED Course  ED Course as of 09/24/22 1401   Sat Sep 24, 2022   1100 [facility] will discuss with her attending and call me back    [phone] Discussed with dermatology resident Dr Reinoso states that she discussed the case with her attending who is recommending to obtain further testing including LFT hepatic panel Anca MARIO ALBERTO rheumatoid factor chest x-ray UA complement level and thyroglobulin  States that his LFT chest x-ray UA within normal limits patient can be safely discharged home and follow-up in their clinic on Tuesday for biopsy  1235 Sed Rate(!): 55   1236 C-REACTIVE PROTEIN(!): 78 5   1236 WBC(!): 15 16     WBC could be from using steroids  [facility] ([facility])-Consult/Call (Rosanna Reinoso [facility]))  Toni Pennington ran it past My attending she thinks hes safe to go home with return precautions  We will get him in first thing Tuesday for biopsy and his currrnt medications and dosages are fine  He can follow the prednisone dosing his PCP gave  Keflex and mupirocin good too  No neosporin as discussed  Let me know if any other questions! 1:59 PM       case discussed in detail with patient and family who is by bedside recommending continue taking medication at home precaution provided regarding return recommending if start having any fever chills nausea vomiting worsening wound infection return back to the ED  Patient verbalized understand the plan discharge home                SBIRT 22yo+    Flowsheet Row Most Recent Value   SBIRT (23 yo +)    In order to provide better care to our patients, we are screening all of our patients for alcohol and drug use  Would it be okay to ask you these screening questions? Yes Filed at: 2022 1046   Initial Alcohol Screen: US AUDIT-C     1  How often do you have a drink containing alcohol? 0 Filed at: 2022 1046   2  How many drinks containing alcohol do you have on a typical day you are drinking? 0 Filed at: 2022 1046   3a  Male UNDER 65: How often do you have five or more drinks on one occasion? 0 Filed at: 2022 1046   3b  FEMALE Any Age, or MALE 65+: How often do you have 4 or more drinks on one occassion? 0 Filed at: 2022 1046   Audit-C Score 0 Filed at: 2022 1046   ELSY: How many times in the past year have you    Used an illegal drug or used a prescription medication for non-medical reasons? Never Filed at: 2022 1046                    MDM    Disposition  Final diagnoses:   Encounter for wound re-check   Vasculitis of skin     Time reflects when diagnosis was documented in both MDM as applicable and the Disposition within this note     Time User Action Codes Description Comment    2022  1:57 PM Chloe Cordova 47 [Z51 89] Encounter for wound re-check     2022  1:58 PM Jayne Austin Add [L95 9] Vasculitis of skin       ED Disposition     ED Disposition   Discharge    Condition   Stable    Date/Time   Sat Sep 24, 2022  1:57 PM    Comment   Layla Enriquez discharge to home/self care                 Follow-up Information     Follow up With Specialties Details Why Contact Info Additional Information    Emily Rollins MD Family Medicine Schedule an appointment as soon as possible for a visit in 2 days If symptoms worsen [address] [facility] 2022 [facility]  [place] [address] [address] [phone] [facility]  [address], [place], [place], [address] [phone]          Patient's Medications Discharge Prescriptions    No medications on file           PDMP Review     None          ED Provider  Electronically Signed by           Scott Camargo MD  09/24/22 3770

## 2022-09-25 LAB
C3 SERPL-MCNC: 136 MG/DL (ref 90–180)
C4 SERPL-MCNC: 30 MG/DL (ref 10–40)
HAV IGM SER QL: NORMAL
HBV CORE IGM SER QL: NORMAL
HBV SURFACE AG SER QL: NORMAL
HCV AB SER QL: NORMAL
RHEUMATOID FACT SER QL LA: NEGATIVE

## 2022-09-26 ENCOUNTER — TELEPHONE (OUTPATIENT)
Dept: DERMATOLOGY | Facility: CLINIC | Age: 72
End: 2022-09-26

## 2022-09-26 LAB — RYE IGE QN: NEGATIVE

## 2022-09-26 RX ORDER — LEVOFLOXACIN 750 MG/1
750 TABLET ORAL DAILY
Qty: 7 TABLET | Refills: 0 | Status: SHIPPED | OUTPATIENT
Start: 2022-09-26 | End: 2022-10-05

## 2022-09-26 NOTE — TELEPHONE ENCOUNTER
Per Dr Jailene Zhu pt is to be added to [facility] schedule for tomorrow for a possible vasculitis follow up and biopsy so I scheduled him for tomorrow 9/27 at 11 as 9am was too early

## 2022-09-27 LAB
BACTERIA WND AEROBE CULT: ABNORMAL
CH50 SERPL-ACNC: >60 U/ML
GRAM STN SPEC: ABNORMAL
THYROGLOB AB SERPL-ACNC: <1 IU/ML (ref 0–0.9)
THYROGLOB SERPL-MCNC: 9.9 NG/ML (ref 1.4–29.2)

## 2022-09-28 LAB
C-ANCA TITR SER IF: NORMAL TITER
MYELOPEROXIDASE AB SER IA-ACNC: <0.2 UNITS (ref 0–0.9)
P-ANCA ATYPICAL TITR SER IF: NORMAL TITER
P-ANCA TITR SER IF: NORMAL TITER
PROTEINASE3 AB SER IA-ACNC: <0.2 UNITS (ref 0–0.9)

## 2022-09-29 PROCEDURE — 99285 EMERGENCY DEPT VISIT HI MDM: CPT

## 2022-09-30 ENCOUNTER — APPOINTMENT (OUTPATIENT)
Dept: RADIOLOGY | Facility: HOSPITAL | Age: 72
End: 2022-09-30
Payer: MEDICARE

## 2022-09-30 ENCOUNTER — HOSPITAL ENCOUNTER (EMERGENCY)
Facility: HOSPITAL | Age: 72
End: 2022-09-30
Attending: EMERGENCY MEDICINE
Payer: MEDICARE

## 2022-09-30 ENCOUNTER — APPOINTMENT (EMERGENCY)
Dept: NON INVASIVE DIAGNOSTICS | Facility: HOSPITAL | Age: 72
End: 2022-09-30
Payer: MEDICARE

## 2022-09-30 ENCOUNTER — HOSPITAL ENCOUNTER (INPATIENT)
Facility: HOSPITAL | Age: 72
LOS: 5 days | Discharge: HOME HEALTH | DRG: 300 | End: 2022-10-05
Attending: INTERNAL MEDICINE | Admitting: INTERNAL MEDICINE
Payer: MEDICARE

## 2022-09-30 VITALS
RESPIRATION RATE: 19 BRPM | TEMPERATURE: 98.6 F | SYSTOLIC BLOOD PRESSURE: 141 MMHG | BODY MASS INDEX: 39.1 KG/M2 | WEIGHT: 295 LBS | DIASTOLIC BLOOD PRESSURE: 67 MMHG | OXYGEN SATURATION: 100 % | HEIGHT: 73 IN | HEART RATE: 66 BPM

## 2022-09-30 DIAGNOSIS — R21 RASH: Primary | ICD-10-CM

## 2022-09-30 DIAGNOSIS — R19.7 DIARRHEA: ICD-10-CM

## 2022-09-30 DIAGNOSIS — R21 RASH: ICD-10-CM

## 2022-09-30 DIAGNOSIS — L03.90 CELLULITIS: ICD-10-CM

## 2022-09-30 DIAGNOSIS — I77.6 VASCULITIS (HCC): ICD-10-CM

## 2022-09-30 DIAGNOSIS — R74.8 ABNORMAL TRANSAMINASES: ICD-10-CM

## 2022-09-30 DIAGNOSIS — N17.9 AKI (ACUTE KIDNEY INJURY) (HCC): ICD-10-CM

## 2022-09-30 DIAGNOSIS — S81.809A MULTIPLE OPEN WOUNDS OF LOWER LEG, UNSPECIFIED LATERALITY, INITIAL ENCOUNTER: ICD-10-CM

## 2022-09-30 DIAGNOSIS — R77.8 ELEVATED TROPONIN: ICD-10-CM

## 2022-09-30 DIAGNOSIS — N17.9 ARF (ACUTE RENAL FAILURE) (HCC): ICD-10-CM

## 2022-09-30 DIAGNOSIS — R14.0 BLOATING: ICD-10-CM

## 2022-09-30 PROBLEM — R06.09 DOE (DYSPNEA ON EXERTION): Status: ACTIVE | Noted: 2022-09-30

## 2022-09-30 PROBLEM — R79.89 ELEVATED TROPONIN: Status: ACTIVE | Noted: 2022-09-30

## 2022-09-30 PROBLEM — F32.A DEPRESSION: Status: ACTIVE | Noted: 2022-09-30

## 2022-09-30 LAB
2HR DELTA HS TROPONIN: 69 NG/L
4HR DELTA HS TROPONIN: 262 NG/L
ALBUMIN SERPL BCP-MCNC: 3.1 G/DL (ref 3.5–5)
ALP SERPL-CCNC: 64 U/L (ref 34–104)
ALT SERPL W P-5'-P-CCNC: 73 U/L (ref 7–52)
ANION GAP SERPL CALCULATED.3IONS-SCNC: 10 MMOL/L (ref 4–13)
ANION GAP SERPL CALCULATED.3IONS-SCNC: 12 MMOL/L (ref 4–13)
AORTIC ROOT: 3.8 CM
AORTIC VALVE MEAN VELOCITY: 9.4 M/S
APTT PPP: 36 SECONDS (ref 23–37)
APTT PPP: 57 SECONDS (ref 23–37)
APTT PPP: 69 SECONDS (ref 23–37)
AST SERPL W P-5'-P-CCNC: 59 U/L (ref 13–39)
ATRIAL RATE: 68 BPM
ATRIAL RATE: 71 BPM
ATRIAL RATE: 86 BPM
AV AREA BY CONTINUOUS VTI: 2.6 CM2
AV AREA PEAK VELOCITY: 2.3 CM2
AV LVOT MEAN GRADIENT: 2 MMHG
AV LVOT PEAK GRADIENT: 3 MMHG
AV MEAN GRADIENT: 4 MMHG
AV PEAK GRADIENT: 7 MMHG
AV VALVE AREA: 2.59 CM2
AV VELOCITY RATIO: 0.61
BACTERIA UR QL AUTO: ABNORMAL /HPF
BASOPHILS # BLD AUTO: 0.08 THOUSANDS/ΜL (ref 0–0.1)
BASOPHILS NFR BLD AUTO: 1 % (ref 0–1)
BILIRUB SERPL-MCNC: 0.84 MG/DL (ref 0.2–1)
BILIRUB UR QL STRIP: NEGATIVE
BNP SERPL-MCNC: 96 PG/ML (ref 0–100)
BUN SERPL-MCNC: 45 MG/DL (ref 5–25)
BUN SERPL-MCNC: 47 MG/DL (ref 5–25)
CALCIUM ALBUM COR SERPL-MCNC: 10 MG/DL (ref 8.3–10.1)
CALCIUM SERPL-MCNC: 8.6 MG/DL (ref 8.4–10.2)
CALCIUM SERPL-MCNC: 9.3 MG/DL (ref 8.4–10.2)
CARDIAC TROPONIN I PNL SERPL HS: 199 NG/L
CARDIAC TROPONIN I PNL SERPL HS: 268 NG/L
CARDIAC TROPONIN I PNL SERPL HS: 461 NG/L
CHLORIDE SERPL-SCNC: 97 MMOL/L (ref 96–108)
CHLORIDE SERPL-SCNC: 98 MMOL/L (ref 96–108)
CLARITY UR: CLEAR
CO2 SERPL-SCNC: 21 MMOL/L (ref 21–32)
CO2 SERPL-SCNC: 23 MMOL/L (ref 21–32)
COLOR UR: YELLOW
CREAT SERPL-MCNC: 1.57 MG/DL (ref 0.6–1.3)
CREAT SERPL-MCNC: 1.87 MG/DL (ref 0.6–1.3)
CREAT UR-MCNC: 134 MG/DL
CRP SERPL QL: 156.5 MG/L
DOP CALC AO PEAK VEL: 1.31 M/S
DOP CALC AO VTI: 25.03 CM
DOP CALC LVOT AREA: 3.8 CM2
DOP CALC LVOT DIAMETER: 2.2 CM
DOP CALC LVOT PEAK VEL VTI: 17.04 CM
DOP CALC LVOT PEAK VEL: 0.8 M/S
DOP CALC LVOT STROKE INDEX: 25.2 ML/M2
DOP CALC LVOT STROKE VOLUME: 64.74
E WAVE DECELERATION TIME: 302 MS
EOSINOPHIL # BLD AUTO: 0.29 THOUSAND/ΜL (ref 0–0.61)
EOSINOPHIL NFR BLD AUTO: 2 % (ref 0–6)
ERYTHROCYTE [DISTWIDTH] IN BLOOD BY AUTOMATED COUNT: 13.5 % (ref 11.6–15.1)
ERYTHROCYTE [DISTWIDTH] IN BLOOD BY AUTOMATED COUNT: 13.5 % (ref 11.6–15.1)
ERYTHROCYTE [SEDIMENTATION RATE] IN BLOOD: 24 MM/HOUR (ref 0–19)
FINE GRAN CASTS URNS QL MICRO: ABNORMAL /LPF
FLUAV RNA RESP QL NAA+PROBE: NEGATIVE
FLUBV RNA RESP QL NAA+PROBE: NEGATIVE
FRACTIONAL SHORTENING: 32 (ref 28–44)
GFR SERPL CREATININE-BSD FRML MDRD: 34 ML/MIN/1.73SQ M
GFR SERPL CREATININE-BSD FRML MDRD: 42 ML/MIN/1.73SQ M
GLUCOSE SERPL-MCNC: 153 MG/DL (ref 65–140)
GLUCOSE SERPL-MCNC: 153 MG/DL (ref 65–140)
GLUCOSE UR STRIP-MCNC: NEGATIVE MG/DL
HCT VFR BLD AUTO: 37.1 % (ref 36.5–49.3)
HCT VFR BLD AUTO: 41.3 % (ref 36.5–49.3)
HGB BLD-MCNC: 12.2 G/DL (ref 12–17)
HGB BLD-MCNC: 13.8 G/DL (ref 12–17)
HGB UR QL STRIP.AUTO: ABNORMAL
HYALINE CASTS #/AREA URNS LPF: ABNORMAL /LPF
IMM GRANULOCYTES # BLD AUTO: 0.11 THOUSAND/UL (ref 0–0.2)
IMM GRANULOCYTES NFR BLD AUTO: 1 % (ref 0–2)
INR PPP: 1.29 (ref 0.84–1.19)
INTERVENTRICULAR SEPTUM IN DIASTOLE (PARASTERNAL SHORT AXIS VIEW): 1.1 CM
INTERVENTRICULAR SEPTUM: 1.1 CM (ref 0.6–1.1)
KETONES UR STRIP-MCNC: NEGATIVE MG/DL
LACTATE SERPL-SCNC: 1.6 MMOL/L (ref 0.5–2)
LEFT ATRIUM SIZE: 3.6 CM
LEFT INTERNAL DIMENSION IN SYSTOLE: 3.4 CM (ref 2.1–4)
LEFT VENTRICULAR INTERNAL DIMENSION IN DIASTOLE: 5 CM (ref 3.5–6)
LEFT VENTRICULAR POSTERIOR WALL IN END DIASTOLE: 1.1 CM
LEFT VENTRICULAR STROKE VOLUME: 71 ML
LEUKOCYTE ESTERASE UR QL STRIP: NEGATIVE
LVSV (TEICH): 71 ML
LYMPHOCYTES # BLD AUTO: 1.75 THOUSANDS/ΜL (ref 0.6–4.47)
LYMPHOCYTES NFR BLD AUTO: 10 % (ref 14–44)
MCH RBC QN AUTO: 30 PG (ref 26.8–34.3)
MCH RBC QN AUTO: 31.1 PG (ref 26.8–34.3)
MCHC RBC AUTO-ENTMCNC: 32.9 G/DL (ref 31.4–37.4)
MCHC RBC AUTO-ENTMCNC: 33.4 G/DL (ref 31.4–37.4)
MCV RBC AUTO: 91 FL (ref 82–98)
MCV RBC AUTO: 93 FL (ref 82–98)
MONOCYTES # BLD AUTO: 0.83 THOUSAND/ΜL (ref 0.17–1.22)
MONOCYTES NFR BLD AUTO: 5 % (ref 4–12)
MUCOUS THREADS UR QL AUTO: ABNORMAL
MV E'TISSUE VEL-SEP: 7 CM/S
MV PEAK A VEL: 0.71 M/S
MV PEAK E VEL: 53 CM/S
MV STENOSIS PRESSURE HALF TIME: 88 MS
MV VALVE AREA P 1/2 METHOD: 2.5
NEUTROPHILS # BLD AUTO: 13.77 THOUSANDS/ΜL (ref 1.85–7.62)
NEUTS SEG NFR BLD AUTO: 81 % (ref 43–75)
NITRITE UR QL STRIP: NEGATIVE
NON-SQ EPI CELLS URNS QL MICRO: ABNORMAL /HPF
NRBC BLD AUTO-RTO: 0 /100 WBCS
P AXIS: 27 DEGREES
P AXIS: 32 DEGREES
P AXIS: 47 DEGREES
PH UR STRIP.AUTO: 5.5 [PH]
PLATELET # BLD AUTO: 440 THOUSANDS/UL (ref 149–390)
PLATELET # BLD AUTO: 454 THOUSANDS/UL (ref 149–390)
PMV BLD AUTO: 8.4 FL (ref 8.9–12.7)
PMV BLD AUTO: 8.6 FL (ref 8.9–12.7)
POTASSIUM SERPL-SCNC: 4.7 MMOL/L (ref 3.5–5.3)
POTASSIUM SERPL-SCNC: 4.8 MMOL/L (ref 3.5–5.3)
PR INTERVAL: 186 MS
PR INTERVAL: 186 MS
PR INTERVAL: 190 MS
PROT SERPL-MCNC: 6.1 G/DL (ref 6.4–8.4)
PROT UR STRIP-MCNC: ABNORMAL MG/DL
PROT UR-MCNC: 54 MG/DL
PROT/CREAT UR: 0.4 MG/G{CREAT} (ref 0–0.1)
PROTHROMBIN TIME: 16.3 SECONDS (ref 11.6–14.5)
QRS AXIS: -43 DEGREES
QRS AXIS: -45 DEGREES
QRS AXIS: -48 DEGREES
QRSD INTERVAL: 104 MS
QRSD INTERVAL: 94 MS
QRSD INTERVAL: 98 MS
QT INTERVAL: 376 MS
QT INTERVAL: 402 MS
QT INTERVAL: 414 MS
QTC INTERVAL: 436 MS
QTC INTERVAL: 440 MS
QTC INTERVAL: 449 MS
RBC # BLD AUTO: 4.07 MILLION/UL (ref 3.88–5.62)
RBC # BLD AUTO: 4.44 MILLION/UL (ref 3.88–5.62)
RBC #/AREA URNS AUTO: ABNORMAL /HPF
RSV RNA RESP QL NAA+PROBE: NEGATIVE
SARS-COV-2 RNA RESP QL NAA+PROBE: NEGATIVE
SL CV LV EF: 55
SL CV PED ECHO LEFT VENTRICLE DIASTOLIC VOLUME (MOD BIPLANE) 2D: 119 ML
SL CV PED ECHO LEFT VENTRICLE SYSTOLIC VOLUME (MOD BIPLANE) 2D: 48 ML
SODIUM SERPL-SCNC: 130 MMOL/L (ref 135–147)
SODIUM SERPL-SCNC: 131 MMOL/L (ref 135–147)
SP GR UR STRIP.AUTO: 1.02 (ref 1–1.03)
T WAVE AXIS: 65 DEGREES
T WAVE AXIS: 67 DEGREES
T WAVE AXIS: 83 DEGREES
UROBILINOGEN UR QL STRIP.AUTO: 0.2 E.U./DL
VENTRICULAR RATE: 68 BPM
VENTRICULAR RATE: 71 BPM
VENTRICULAR RATE: 86 BPM
WBC # BLD AUTO: 16.42 THOUSAND/UL (ref 4.31–10.16)
WBC # BLD AUTO: 16.83 THOUSAND/UL (ref 4.31–10.16)
WBC #/AREA URNS AUTO: ABNORMAL /HPF

## 2022-09-30 PROCEDURE — 93306 TTE W/DOPPLER COMPLETE: CPT | Performed by: INTERNAL MEDICINE

## 2022-09-30 PROCEDURE — 85027 COMPLETE CBC AUTOMATED: CPT | Performed by: PHYSICIAN ASSISTANT

## 2022-09-30 PROCEDURE — 93010 ELECTROCARDIOGRAM REPORT: CPT | Performed by: INTERNAL MEDICINE

## 2022-09-30 PROCEDURE — 80048 BASIC METABOLIC PNL TOTAL CA: CPT | Performed by: NURSE PRACTITIONER

## 2022-09-30 PROCEDURE — 85730 THROMBOPLASTIN TIME PARTIAL: CPT | Performed by: EMERGENCY MEDICINE

## 2022-09-30 PROCEDURE — 83520 IMMUNOASSAY QUANT NOS NONAB: CPT | Performed by: NURSE PRACTITIONER

## 2022-09-30 PROCEDURE — 99284 EMERGENCY DEPT VISIT MOD MDM: CPT | Performed by: NURSE PRACTITIONER

## 2022-09-30 PROCEDURE — 99221 1ST HOSP IP/OBS SF/LOW 40: CPT | Performed by: INTERNAL MEDICINE

## 2022-09-30 PROCEDURE — 82595 ASSAY OF CRYOGLOBULIN: CPT | Performed by: EMERGENCY MEDICINE

## 2022-09-30 PROCEDURE — 85652 RBC SED RATE AUTOMATED: CPT | Performed by: EMERGENCY MEDICINE

## 2022-09-30 PROCEDURE — 87040 BLOOD CULTURE FOR BACTERIA: CPT | Performed by: EMERGENCY MEDICINE

## 2022-09-30 PROCEDURE — 99284 EMERGENCY DEPT VISIT MOD MDM: CPT | Performed by: INTERNAL MEDICINE

## 2022-09-30 PROCEDURE — 0241U HB NFCT DS VIR RESP RNA 4 TRGT: CPT | Performed by: EMERGENCY MEDICINE

## 2022-09-30 PROCEDURE — 82570 ASSAY OF URINE CREATININE: CPT | Performed by: NURSE PRACTITIONER

## 2022-09-30 PROCEDURE — 80053 COMPREHEN METABOLIC PANEL: CPT | Performed by: EMERGENCY MEDICINE

## 2022-09-30 PROCEDURE — 71045 X-RAY EXAM CHEST 1 VIEW: CPT

## 2022-09-30 PROCEDURE — 93005 ELECTROCARDIOGRAM TRACING: CPT

## 2022-09-30 PROCEDURE — 84484 ASSAY OF TROPONIN QUANT: CPT | Performed by: EMERGENCY MEDICINE

## 2022-09-30 PROCEDURE — 85730 THROMBOPLASTIN TIME PARTIAL: CPT | Performed by: PHYSICIAN ASSISTANT

## 2022-09-30 PROCEDURE — C8929 TTE W OR WO FOL WCON,DOPPLER: HCPCS

## 2022-09-30 PROCEDURE — 36415 COLL VENOUS BLD VENIPUNCTURE: CPT | Performed by: EMERGENCY MEDICINE

## 2022-09-30 PROCEDURE — 86140 C-REACTIVE PROTEIN: CPT | Performed by: EMERGENCY MEDICINE

## 2022-09-30 PROCEDURE — 96366 THER/PROPH/DIAG IV INF ADDON: CPT

## 2022-09-30 PROCEDURE — 85025 COMPLETE CBC W/AUTO DIFF WBC: CPT | Performed by: EMERGENCY MEDICINE

## 2022-09-30 PROCEDURE — 99291 CRITICAL CARE FIRST HOUR: CPT | Performed by: EMERGENCY MEDICINE

## 2022-09-30 PROCEDURE — 83605 ASSAY OF LACTIC ACID: CPT | Performed by: EMERGENCY MEDICINE

## 2022-09-30 PROCEDURE — 96374 THER/PROPH/DIAG INJ IV PUSH: CPT

## 2022-09-30 PROCEDURE — 96365 THER/PROPH/DIAG IV INF INIT: CPT

## 2022-09-30 PROCEDURE — 85610 PROTHROMBIN TIME: CPT | Performed by: PHYSICIAN ASSISTANT

## 2022-09-30 PROCEDURE — 96361 HYDRATE IV INFUSION ADD-ON: CPT

## 2022-09-30 PROCEDURE — 84156 ASSAY OF PROTEIN URINE: CPT | Performed by: NURSE PRACTITIONER

## 2022-09-30 PROCEDURE — 83880 ASSAY OF NATRIURETIC PEPTIDE: CPT | Performed by: EMERGENCY MEDICINE

## 2022-09-30 PROCEDURE — 81001 URINALYSIS AUTO W/SCOPE: CPT | Performed by: EMERGENCY MEDICINE

## 2022-09-30 PROCEDURE — 99223 1ST HOSP IP/OBS HIGH 75: CPT | Performed by: PHYSICIAN ASSISTANT

## 2022-09-30 PROCEDURE — 96367 TX/PROPH/DG ADDL SEQ IV INF: CPT

## 2022-09-30 PROCEDURE — 85730 THROMBOPLASTIN TIME PARTIAL: CPT | Performed by: INTERNAL MEDICINE

## 2022-09-30 RX ORDER — HEPARIN SODIUM 1000 [USP'U]/ML
4000 INJECTION, SOLUTION INTRAVENOUS; SUBCUTANEOUS ONCE
Status: COMPLETED | OUTPATIENT
Start: 2022-09-30 | End: 2022-09-30

## 2022-09-30 RX ORDER — CEFEPIME HYDROCHLORIDE 2 G/50ML
2000 INJECTION, SOLUTION INTRAVENOUS ONCE
Status: COMPLETED | OUTPATIENT
Start: 2022-09-30 | End: 2022-09-30

## 2022-09-30 RX ORDER — ASPIRIN 81 MG/1
324 TABLET, CHEWABLE ORAL ONCE
Status: COMPLETED | OUTPATIENT
Start: 2022-09-30 | End: 2022-09-30

## 2022-09-30 RX ORDER — HEPARIN SODIUM 10000 [USP'U]/100ML
3-20 INJECTION, SOLUTION INTRAVENOUS
Status: DISCONTINUED | OUTPATIENT
Start: 2022-09-30 | End: 2022-09-30 | Stop reason: HOSPADM

## 2022-09-30 RX ORDER — PREDNISONE 20 MG/1
60 TABLET ORAL DAILY
Status: DISCONTINUED | OUTPATIENT
Start: 2022-10-01 | End: 2022-09-30 | Stop reason: HOSPADM

## 2022-09-30 RX ORDER — PREDNISONE 20 MG/1
60 TABLET ORAL ONCE
Status: COMPLETED | OUTPATIENT
Start: 2022-09-30 | End: 2022-09-30

## 2022-09-30 RX ORDER — HEPARIN SODIUM 10000 [USP'U]/100ML
3-20 INJECTION, SOLUTION INTRAVENOUS
Status: DISCONTINUED | OUTPATIENT
Start: 2022-09-30 | End: 2022-10-01

## 2022-09-30 RX ORDER — PREDNISONE 10 MG/1
TABLET ORAL
COMMUNITY
Start: 2022-09-21 | End: 2022-10-05

## 2022-09-30 RX ORDER — MELATONIN
2000 DAILY
COMMUNITY

## 2022-09-30 RX ORDER — PREDNISONE 20 MG/1
60 TABLET ORAL DAILY
Status: DISCONTINUED | OUTPATIENT
Start: 2022-10-01 | End: 2022-10-05 | Stop reason: HOSPADM

## 2022-09-30 RX ADMIN — HEPARIN SODIUM 4000 UNITS: 1000 INJECTION INTRAVENOUS; SUBCUTANEOUS at 05:10

## 2022-09-30 RX ADMIN — PERFLUTREN 1 ML/MIN: 6.52 INJECTION, SUSPENSION INTRAVENOUS at 09:38

## 2022-09-30 RX ADMIN — HEPARIN SODIUM 17.1 UNITS/KG/HR: 10000 INJECTION, SOLUTION INTRAVENOUS at 19:43

## 2022-09-30 RX ADMIN — SODIUM CHLORIDE 1000 ML: 0.9 INJECTION, SOLUTION INTRAVENOUS at 01:17

## 2022-09-30 RX ADMIN — HEPARIN SODIUM 11.1 UNITS/KG/HR: 10000 INJECTION, SOLUTION INTRAVENOUS at 05:34

## 2022-09-30 RX ADMIN — ASPIRIN 81 MG CHEWABLE TABLET 324 MG: 81 TABLET CHEWABLE at 05:09

## 2022-09-30 RX ADMIN — CEFEPIME HYDROCHLORIDE 2000 MG: 2 INJECTION, SOLUTION INTRAVENOUS at 01:40

## 2022-09-30 RX ADMIN — PREDNISONE 60 MG: 20 TABLET ORAL at 05:08

## 2022-09-30 RX ADMIN — TRIAMCINOLONE ACETONIDE: 1 OINTMENT TOPICAL at 19:08

## 2022-09-30 NOTE — ED CARE HANDOFF
Emergency Department Sign Out Note        Sign out and transfer of care from Dr Andrey Pelletier  See Separate Emergency Department note  The patient, Swapna Ortiz, was evaluated by the previous provider for Vasculitis vs SJS along with endo organ dysfunction  Workup Completed:  Labs and initial evaluation    ED Course / Workup Pending (followup):  Further discussion with derm and other consultants and transfer to [facility]                                  ED Course as of 09/30/22 1505   Fri Sep 30, 2022   9709 Concern for SJS, with lyly and trop bump  Transfer to [facility]  5539 Discussed case with Dermatology attending  Will consult Cardiology, GI, Nephrology for LYLY, persistent diarrhea which may be secondary to vasculitis along with elevating troponin which also can be secondary to vasculitis  Unknown transfer time at this time will move for with consultations at this time       Procedures  MDM  Number of Diagnoses or Management Options          Disposition  Final diagnoses:   Rash   Cellulitis   ARF (acute renal failure) ([facility])   Elevated troponin   Abnormal transaminases   Vasculitis ([facility] )   Diarrhea     Time reflects when diagnosis was documented in both MDM as applicable and the Disposition within this note     Time User Action Codes Description Comment    9/30/2022  4:47 AM Hermelinda Hews Add [R21] Rash     9/30/2022  4:47 AM Hermelinda Hews Add [L03 90] Cellulitis     9/30/2022  4:47 AM Hermelinda Hews Add [N17 9] ARF (acute renal failure) ([facility] [address] )     9/30/2022  4:47 AM Hermelinda Hews Add [R77 8] Elevated troponin     9/30/2022  4:47 AM Hermelinda Hews Add [R74 8] Abnormal transaminases     9/30/2022  8:12 AM Dane Rife Add [I77 6] Vasculitis ([facility] [address] )     9/30/2022  8:20 AM Dane Rife Add [R19 7] Diarrhea       ED Disposition     ED Disposition   Transfer to Another Facility    Condition   --    Date/Time   Fri Sep 30, 2022  4:47 AM    Comment   Swapna Ortiz should be transferred out to [address] [place] [address] [facility]            MD Documentation    Flowsheet Row Most Recent Value   Patient Condition The patient has been stabilized such that within reasonable medical probability, no material deterioration of the patient condition or the condition of the unborn child(catrachito) is likely to result from the transfer   Reason for Transfer Level of Care needed not available at this facility   Benefits of Transfer Specialized equipment and/or services available at the receiving facility (Include comment)________________________   Risks of Transfer Potential for delay in receiving treatment, Potential deterioration of medical condition, Loss of IV   Accepting Physician Mercedes Montero   Sending MD Chito Saver   Provider Certification General risk, such as traffic hazards, adverse weather conditions, rough terrain or turbulence, possible failure of equipment (including vehicle or aircraft), or consequences of actions of persons outside the control of the transport personnel, Unanticipated needs of medical equipment and personnel during transport, Risk of worsening condition, The possibility of a transport vehicle being unavailable      Follow-up Information    None       Patient's Medications   Discharge Prescriptions    No medications on file     No discharge procedures on file         ED Provider  Electronically Signed by     Aydin Bullock DO  09/30/22 1526

## 2022-09-30 NOTE — CONSULTS
Consultation - 126 [facility]  Mauricio Ivette 74 y o  male MRN: [id number]  Unit/Bed#: ED 26 Encounter: [id number]    Inpatient consult to gastroenterology  Consult performed by: Sabra Velasquez PA-C  Consult ordered by: Mary Aldana DO        Reason for Consult / Principal Problem:     "vasculitis, diarrhea"     ASSESSMENT AND PLAN:      73 y/o M w/ pmhx sig for gout, polyneuropathy, and obesity, presented to ER for worsening rash on b/l UE and LE  His recent history is for antibiotic usage including ciprofloxacin, cephalosporin, and most recently levaquin  He developed 1 day of profuse watery diarrhea after antibiotic therapy, though this has since resolved  Admission labs are notable for negative hepatitis panel  At baseline, patient tends toward firm stool, denies any history of longstanding abdominal pain or chronic diarrhea  Patient found to have a leukocytosis on admission, acute kidney injury, modestly elevated transaminases in a hepatocellular pattern, and elevated inflammatory markers  Acute diarrhea, resolved   Rash of unknown etiology     · Patient had 1 day of self-limited watery diarrhea after initiation of several rounds of antibiotics  · He has since been passing flatus though has not yet had another bowel movement  · Etiology is most suspicious for an antibiotic associated diarrhea  However, if diarrhea recurs, would recommend checking infectious stool panel to include C difficile given recent antibiotic usage  · Given acute onset and no chronic GI issues, would have a low index of suspicion for new onset of IBD  · While possible to have GI manifestations as part of vasculitis presentation, patient's acuity and resolution of diarrhea is not typically in line with the clinical course  If diarrhea persists and infectious workup is negative, may be an indication for endoscopic evaluation in the future    · At this time, agree with prompt transfer to higher level of care with dermatology services available  Agree with checking cryoglobulins  · We did discuss outpatient followup with the patient for discussion of colon cancer screening options for the patient, as it appears he is due  Elevated transaminases     · A new modest elevation, which is in hepatocellular pattern  · Acute hepatitis panel is negative  · Differential diagnosis includes hepatic injury in the setting of vasculitis, DILI, other etiology  · Continue to trend at this time, and recommend avoidance of hepatotoxins  · Additional workup will be recommended if elevation does not resolve  Given patient is being prepared for transfer, GI service at [place] will sign off at this time  We will arrange for outpatient follow-up   ______________________________________________________________________    HPI: Patient is a 74 y o  male with pmhx sig for gout, polyneuropathy, who presented to ER for progressively worsening rash  Pt has had several course of antibiotics in the past couple of weeks, initially cipro for UTI on 08/2022  He started to developed a rash on his lower extremities around Labor Day, and saw his PCP who thought they may have been bug bites  He was subsequently started on keflex and steroids by his PCP  The rash continued to worsen and he presented to the ER on 09/24/22 during which time leg wound was cultured and was positive for pseudomonas, pt was discaharged on levaquin  Patient's wounds are progressively worsening, and he has plans to see Dermatology  He developed acute onset of diarrhea earlier this week and missed his dermatology appointment  Describes approximately 12-15 loose to liquid stools during the day  He denies any BRBPR or melenic stool  He denies any nocturnal BMs  He did not have any fevers, nausea, or emesis associated with this  He states that the diarrhea has since abated, and he has not had a bowel movement for several days    He is however passing flatus  He states this is not abnormal for him, as he does frequently go days between having bowel movements  At baseline, he denies any significant issues with chronic constipation or diarrhea  He denies any abdominal pain related to defecation  He denies any chronic heartburn  He has never had a colonoscopy, sugars he had a stool test for colon cancer screening purposes within the past 2 years which was negative  He denies any first-degree relatives with colon cancer or advanced adenomas  He has not lost any weight unintentionally over the past 6-12 months  Upon admission to hospital, pt had a leukocytosis at 16 83, Hb 13 8, Plt 440, BUN 47, Cr 1 87, AST 59, ALT 73, ALP 64, t bili 0 84,  5, ESR 24, lactic acid 1 6 a  Dermatology is concern for development of asthma colitis  Patient has been started on prednisone  He will ultimately be transferred to another campus with dermatology services available  Endoscopic history:   EGD: none  Colon: none     Review of Systems   Constitutional: Negative for appetite change, fever and unexpected weight change  Gastrointestinal: Negative for constipation, diarrhea, nausea and vomiting  Musculoskeletal: Positive for back pain and myalgias  Skin: Positive for rash  Neurological: Positive for weakness     Otherwise Per HPI    Historical Information   Past Medical History:   Diagnosis Date    Depression     Gout      Past Surgical History:   Procedure Laterality Date    BACK SURGERY      X2 LUMBAR INCLUDING FUSION  2004, 2015 WITH OAA, LVH DR Marrero Pat    CATARACT EXTRACTION, BILATERAL      KNEE ARTHROSCOPY Right     x2 , meniscus repair    TONSILECTOMY AND ADNOIDECTOMY       Social History   Social History     Substance and Sexual Activity   Alcohol Use Not Currently    Comment: occasional     Social History     Substance and Sexual Activity   Drug Use Never     Social History     Tobacco Use   Smoking Status Former Smoker    Quit date: 5    Years since quittin 7   Smokeless Tobacco Never Used     Family History   Problem Relation Age of Onset    Diabetes Father     Diabetes Brother     Diabetes Sister        Meds/Allergies     (Not in a hospital admission)    Current Facility-Administered Medications   Medication Dose Route Frequency    heparin (porcine) 25,000 units in 0 45% NaCl 250 mL infusion (premix)  3-20 Units/kg/hr (Order-Specific) Intravenous Titrated     Allergies   Allergen Reactions    Other      IVORY SOAP    Penicillins Rash     Objective     Blood pressure 162/71, pulse 66, temperature 98 6 °F (37 °C), resp  rate 17, weight 134 kg (295 lb), SpO2 98 %  Body mass index is 38 92 kg/m²  No intake or output data in the 24 hours ending 22 1061      Physical Exam  Vitals and nursing note reviewed  Constitutional:       Appearance: Normal appearance  He is obese  HENT:      Head: Normocephalic and atraumatic  Cardiovascular:      Rate and Rhythm: Normal rate  Pulmonary:      Effort: Pulmonary effort is normal    Abdominal:      General: Abdomen is protuberant  Bowel sounds are normal  There is no distension  Palpations: There is no mass  Tenderness: There is no abdominal tenderness  There is no guarding or rebound  Skin:     General: Skin is warm  Findings: Rash present  Comments: Diffuse petechial rash on bilateral upper and lower extremities; ulcerating and scabbing rash on bilateral anterior shins with eschar    Neurological:      General: No focal deficit present  Mental Status: He is alert and oriented to person, place, and time     Psychiatric:         Mood and Affect: Mood normal          Behavior: Behavior normal         Lab Results:   Admission on 2022   Component Date Value    WBC 2022 16 83 (A)    RBC 2022 4 44     Hemoglobin 2022 13 8     Hematocrit 2022 41 3     MCV 2022 93     MCH 2022 31 1     MCHC 2022 33 4     RDW 09/30/2022 13 5     MPV 09/30/2022 8 4 (A)    Platelets 71/91/7895 440 (A)    nRBC 09/30/2022 0     Neutrophils Relative 09/30/2022 81 (A)    Immat GRANS % 09/30/2022 1     Lymphocytes Relative 09/30/2022 10 (A)    Monocytes Relative 09/30/2022 5     Eosinophils Relative 09/30/2022 2     Basophils Relative 09/30/2022 1     Neutrophils Absolute 09/30/2022 13 77 (A)    Immature Grans Absolute 09/30/2022 0 11     Lymphocytes Absolute 09/30/2022 1 75     Monocytes Absolute 09/30/2022 0 83     Eosinophils Absolute 09/30/2022 0 29     Basophils Absolute 09/30/2022 0 08     Sodium 09/30/2022 130 (A)    Potassium 09/30/2022 4 7     Chloride 09/30/2022 97     CO2 09/30/2022 23     ANION GAP 09/30/2022 10     BUN 09/30/2022 47 (A)    Creatinine 09/30/2022 1 87 (A)    Glucose 09/30/2022 153 (A)    Calcium 09/30/2022 9 3     Corrected Calcium 09/30/2022 10 0     AST 09/30/2022 59 (A)    ALT 09/30/2022 73 (A)    Alkaline Phosphatase 09/30/2022 64     Total Protein 09/30/2022 6 1 (A)    Albumin 09/30/2022 3 1 (A)    Total Bilirubin 09/30/2022 0 84     eGFR 09/30/2022 34     Sed Rate 09/30/2022 24 (A)    CRP 09/30/2022 156 5 (A)    LACTIC ACID 09/30/2022 1 6     BNP 09/30/2022 96     hs TnI 0hr 09/30/2022 199 (A)    hs TnI 2hr 09/30/2022 268 (A)    Delta 2hr hsTnI 09/30/2022 69 (A)    hs TnI 4hr 09/30/2022 461 (A)    Delta 4hr hsTnI 09/30/2022 262 (A)    SARS-CoV-2 09/30/2022 Negative     INFLUENZA A PCR 09/30/2022 Negative     INFLUENZA B PCR 09/30/2022 Negative     RSV PCR 09/30/2022 Negative     Ventricular Rate 09/30/2022 86     Atrial Rate 09/30/2022 86     OH Interval 09/30/2022 186     QRSD Interval 09/30/2022 98     QT Interval 09/30/2022 376     QTC Interval 09/30/2022 449     P Axis 09/30/2022 47     QRS Axis 09/30/2022 -43     T Wave Axis 09/30/2022 83     Ventricular Rate 09/30/2022 71     Atrial Rate 09/30/2022 71     OH Interval 09/30/2022 190     QRSD Interval 09/30/2022 94     QT Interval 09/30/2022 402     QTC Interval 09/30/2022 436     P Axis 09/30/2022 27     QRS Axis 09/30/2022 -48     T Wave Axis 09/30/2022 67     Ventricular Rate 09/30/2022 68     Atrial Rate 09/30/2022 68     KS Interval 09/30/2022 186     QRSD Interval 09/30/2022 104     QT Interval 09/30/2022 414     QTC Interval 09/30/2022 440     P Axis 09/30/2022 32     QRS Axis 09/30/2022 -39     T Wave Axis 09/30/2022 65      Imaging Studies: I have personally reviewed pertinent imaging studies  Rene Martines    **Please note:  Dictation voice to text software may have been used in the creation of this record  Occasional wrong word or sound alike substitutions may have occurred due to the inherent limitations of voice recognition software  Read the chart carefully and recognize, using context, where substitutions have occurred  **

## 2022-09-30 NOTE — Clinical Note
Tiara Iniguez should be transferred out to [facility] AND [facility]

## 2022-09-30 NOTE — CONSULTS
Miriam MAYERS Ruma Cassidy 1950, 74 y o  male MRN: [id number]  Unit/Bed#: ED 26 Encounter: [id number]  Primary Care Provider: Enrique Hodgson MD   Date and time admitted to hospital: 9/30/2022 12:09 AM    Inpatient consult to Cardiology  Consult performed by: BRETT Pruitt  Consult ordered by: Zackary Rush DO          Rash  Assessment & Plan  Primary reason for presentation, suspected to be vasculitis/SJS  Awaiting transfer to [facility] for derm eval/management     EDGAR (dyspnea on exertion)  Assessment & Plan  Worsening EDGAR over the last month  Consider anginal equivalent given trop elevation; echo pending  BNP, CXR, CBC unremarkable; flu/covid/rsv negative    Elevated troponin  Assessment & Plan  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  EKG without ischemic changes  Echo pending to assess regional wall motion  Heparin gtt for now        Other summary comments: In the ER, troponin level was drawn due to the reported exertional dyspnea  Troponin levels noted to be elevated  EKG is without ischemic changes  An echo was pending to assess regional wall motion  Consider this an anginal equivalent as other testing has been unremarkable  A heparin drip has been initiated  Plan is for patient to transfer to the [address] [place] [address] for further evaluation by dermatology given his 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  Further recommendations pending review of echocardiogram     Outpatient Cardiologist: None    HPI: Donald Acevedo is a 74y o  year old male who presented with a worsening rash to his bilat arms and legs  Recent history notable for antibiotic treatment of UTI 8/19/2022  Since that time, has been on multiple antibiotics, steroids, and mupirocin cream for worsening rash  Evaluated in the ER at [facility] 9/25/2022 and cultures of the wound grew Pseudomonas  He was started on Levaquin   He unfortunately missed his dermatology follow up appt due to severe diarrhea from the antibiotics  He returned to the ER today for worsening rash  In the ER, he noted progressive EDGAR over the last month  A trop level was drawn and was noted to be elevated  It is for this reason that cardiology was consulted  Tanya Quijano denies any prior cardiac history  He has been primarily wheelchair bound for at least the last 5 years as a result of neuropathy and loss of sensation from the knee down in his bilat LE  This is resultant from prior back surgery  Up until the beginning of Sept when the rash was noted, he had been riding a stationary bike for several miles on a daily basis  He has not done so in the last month due to the rash and "things going downhill" since  He is able to stand and transfer and walk very short distances within his home  He notes that he is getting short of breath with minimal exertion in the last month  He denies any chest pain, pressure, tightness, burning  No palpitations  EKG: NSR, LAFB, NSSTW changes      MOST  RECENT CARDIAC IMAGING:   Echo 2022 pending      Review of Systems: a 10 point review of systems was conducted and is negative except for as mentioned in the HPI or as below          Historical Information   Past Medical History:   Diagnosis Date    Depression     Gout      Past Surgical History:   Procedure Laterality Date    BACK SURGERY      X2 LUMBAR INCLUDING FUSION  ,  WITH OAA, LVH DR Brianda Cota    CATARACT EXTRACTION, BILATERAL      KNEE ARTHROSCOPY Right     x2 , meniscus repair    TONSILECTOMY AND ADNOIDECTOMY       Social History     Substance and Sexual Activity   Alcohol Use Not Currently    Comment: occasional     Social History     Substance and Sexual Activity   Drug Use Never     Social History     Tobacco Use   Smoking Status Former Smoker    Quit date: 5    Years since quittin 7   Smokeless Tobacco Never Used       Family History: no family history of heart disease      Meds/Allergies   all current active meds have been reviewed  (Not in a hospital admission)      Allergies   Allergen Reactions    Other      IVORY SOAP    Penicillins Rash       Objective   Vitals: Blood pressure 162/71, pulse 66, temperature 98 6 °F (37 °C), resp  rate 17, weight 134 kg (295 lb), SpO2 98 %  , Body mass index is 38 92 kg/m² ,   Orthostatic Blood Pressures    Flowsheet Row Most Recent Value   Blood Pressure 162/71 filed at 09/30/2022 0800   Patient Position - Orthostatic VS Sitting filed at 09/30/2022 0756          Systolic (04SXD), OUU:586 , Min:107 , ITD:758     Diastolic (83RBS), ICK:17, Min:60, Max:71      Physical Exam:    General:  Normal appearance in no distress  Eyes:  Anicteric  Oral mucosa:  Moist   Neck:  No JVD  Carotid upstrokes are brisk without bruits  No masses  Chest:  Clear to auscultation  Cardiac:  No palpable PMI  Normal S1 and S2  No murmur gallop or rub  Abdomen:  Soft and nontender  No palpable organomegaly or aortic enlargement  Extremities:  +2-+3 bilat LE edema  Musculoskeletal:  rash noted to bilat arms, legs, low back  Vascular: Pedal pulses are intact  Neuro:  Grossly symmetric  Psych:  Alert and oriented x3          Lab Results:     Troponins:    Results from last 7 days   Lab Units 09/30/22  0719 09/30/22  0255 09/30/22  0113   HS TNI 0HR ng/L  --   --  199*   HS TNI 2HR ng/L  --  268*  --    HSTNI D2 ng/L  --  69*  --    HS TNI 4HR ng/L 461*  --   --    HSTNI D4 ng/L 262*  --   --      BNP:   Results from last 6 Months   Lab Units 09/30/22  0113   BNP pg/mL 96       CBC :   Results from last 7 days   Lab Units 09/30/22  0113 09/24/22  1041   WBC Thousand/uL 16 83* 15 16*   HEMOGLOBIN g/dL 13 8 13 4   HEMATOCRIT % 41 3 40 6   MCV fL 93 94   PLATELETS Thousands/uL 440* 311     TSH:     CMP:   Results from last 7 days   Lab Units 09/30/22  0113 09/24/22  1041   POTASSIUM mmol/L 4 7 4 2   CHLORIDE mmol/L 97 102   CO2 mmol/L 23 24 BUN mg/dL 47* 31*   CREATININE mg/dL 1 87* 0 99   AST U/L 59* 28   ALT U/L 73* 23   EGFR ml/min/1 73sq m 34 74     Lipid Profile:     Coags:

## 2022-09-30 NOTE — ASSESSMENT & PLAN NOTE
Worsening EDGAR over the last month  Consider anginal equivalent given trop elevation; echo pending  BNP, CXR, CBC unremarkable; flu/covid/rsv negative

## 2022-09-30 NOTE — PROGRESS NOTES
[facility] Service Attending Physician Supervision Note - Admission Note    I have seen and examined Swapna Ortiz personally and have reviewed the medical record independently  I have reviewed all components of the note performed and documented by the resident physician  I personally performed all the required components for patient evaluation and examined the patient  I was the supervising / collaborating physician on 09/30/2022 in the {timeofday:46137}   I agree with the resident's findings and plan of care with the following additions / exceptions:    Patient is a 75 yo male received as a transfer from the Emergency Department at [facility] today  He had presented there in the early morning hours of 9/30/2022 with complaint of a blister containing rash on his arms and legs which he first noticed when cleaning his backyard around August 26th  The lesions were first noted on the legs and it was felt that they might have been insect bites and was somewhat papular in appearance  He was placed on some steroids with a tapering dose  However, despite this, rash continued to worsen  He contacted his PCP once again and decision was made to trial keflex and mupirocin ointment and he was given additional prednisone dosing using 10 mg bid for 5 days and then once daily for the following 5 days  However, even with those treatments, rash continued to worsen and took on more of a blister appearance and he also developed a petechial rash on both arms and the lower back  He went to the emergency department at [facility] on 9/25/2022 and dermatology was contacted  The patient was scheduled for follow up with dermatology as outpatient for punch biopsy but he missed that office visit due to severe diarrhea after taking levaquin for positive wound cultures taken from leg wounds (present since Labor day) that grew Pseudomonas species    His dermatology appointment was rescheduled for next Tuesday but he presented to the [address] [place] [address] ER once again in the early morning hours of 9/30/2022 as the rash continued to spread  Request was made for transfer to our campus at [address] [place] [address] for inpatient dermatologist evaluation especially as there was some mention of a concern for potential Lawerance Gianotti Syndrome  In the meantime, the patient had been also complaining of some exertional dyspnea without any chest pain  When EKG was done it showed some nonspecific ST/T wave abnormalities of uncertain significance as there were no prior EKGs for comparison  Decision was made to check troponins and these came back elevated at 199, 268, and 461 sequentially with a 4 hour delta troponin of 262  BNP was only 96  Cardiology was consulted at the [address] [place] [address] and recommendation was made for patient to be placed on ACS protocol heparin drip and an echocardiogram was requested which has shown normal systolic function with a LVEF of 55% with no evident regional wall motion abnormalities but it was noted that the quality of the study prevented from complete exclusion of wall motion abnormalities  Grade 1 diastolic dysfunction with mild mitral and tricuspid regurgitation was also mentioned  *** At the time of this documentation no decision for whether cardiac catheterization is to be done has yet been made, but patient's creatinine on arrival to [address] [place] [address] was 1 87 and at 10 am today is down to 1 57 with a GFR of 42 compared to his baseline creatinine of 0 8 - 0 9  This would increase his risk of contrast nephropathy if a cardiac catheterization were to be done  Nephrology was consulted at [address] [place] [address] and was concerned for potential vasculitic etiology for this LYLY, but did also note patient's prehospital use of Mobic as well    Urinalysis showed microscopic hematuria and in combination with the rash and other systemic symptoms, decision made to initiate a rheumatologic / vasculitis assessment  So far it is known that ANCA, MARIO ALBERTO, RF, and complements are normal but cryoglobulin and anti-GBM antibodies are pending still  ESR and CRP was elevated and WBC is also elevated on labs done so far (but patient has been on steroid too)  He is afebrile with stable vitals and does not meet sepsis criteria  Nephrology at [facility] recommends Prednisone 60 mg PO daily  The suggest the possibility of a renal biopsy if pending serologies do not yield a clear cause  The patient has been noted to have peripheral edema and is felt to be intravascularly depleted in the setting of hypoalbuminemia and longer term inflammation from underlying condition which increase risk of third spacing  For the several episodes of diarrhea that have occurred over the past few weeks, [facility]  They recommended ***  Currently, on arrival to our facility, ***  On exam, ***    In terms of assessment and plan:  · Systemic Rash Illness -   · There is currently concern for vasculitis etiology and a need to rule out SJS  Not behaving like a bacterial rash / cellulitis  However, could still question other systemic infectious disease illnesses as potential factor as well  · Continue Prednisone 60 mg PO daily (increased today from 10-20 mg daily)  · Continue topical triamcinolone bid (just added today)  · Dermatology evaluation inpatient for punch biopsy  · Follow up cryoglobulins / pending serologies  · Could consider infectious disease evaluation after dermatology evaluation  · Consider also curbside consultation with rheumatology if dermatology evaluation unrevealing / inconclusive  · Elevated Troponin -   · Anticoagulation - Heparin drip  · Antiplatelet - Add low dose aspirin 81 mg PO daily  · Statin - None currently    · Beta Blocker - None  · Evaluation -   · Continued evaluation by cardiology team   Determine if any further testing such as cardiac catheterization *** needed  · Check lipid panel  · Exertional Dyspnea -   · Differential - anginal equivalent vs  Anxiety vs  Mild pulmonary edema (although BNP not significantly elevated and CXR negative, does have peripheral edema)  · If cardiac workup is negative, consider outpatient PFTs  · Monitor oxygen saturations  · Acute Kidney Injury -   · Present on Admission? - Yes  · Baseline Creatinine - 0 8 - 0 9  · Suspected Causes / Differential - intravascular depletion vs  unspecified Vasculitis vs  NSAID related  · IVF - ***  · Urinalysis with 1+ occult blood and trace protein with 0-1 Hyaline casts  Negative nitrites and leukocytes  Occasional bacteria ans squamous cells  · Monitor I/O   · BMP in am   · Avoid Hypotension -   · BP management -   · Not on any antihypertensives at baseline  · Use IV fluids judiciously (peripheral edema present) as needed for hydration of any intravascular depletion and for pressure support  · Monitor blood pressures  · Avoid Nephrotoxins and Adjust renally Excreted Medications -   · Hold meloxicam  · Nephrology Consultation - Yes   · Diarrheal Illness -   · C-diff toxin and enteric panel PCR have been requested  Follow up results  Has had recent antibiotic use  · ***  · Leg Wounds -   · ***  · Depression -   · Normally on fluoxetine  Will hold this medication for now until we can definitively rule out Green-nate syndrome  Although very rare (< 1%) incidence, it remains a possibility and risk outweighs benefit currently  · Monitor for any signs / symptoms of depression  Education and Discussions with Family / Patient: ***    Time Spent for Care: 1 hour  More than 50% of total time spent on counseling and coordination of care as described above  I attest that this information is true, accurate, and complete to the best of my knowledge      Adrianne Weeks

## 2022-09-30 NOTE — EMTALA/ACUTE CARE TRANSFER
[address] [facility] [address]  Dept: [phone]      EMTALA TRANSFER CONSENT    NAME Donald Acevedo                                         1950                              MRN [id number]    I have been informed of my rights regarding examination, treatment, and transfer   by Dr Alfredito Quinones MD    Benefits: Specialized equipment and/or services available at the receiving facility (Include comment)________________________    Risks: Potential for delay in receiving treatment, Potential deterioration of medical condition, Loss of IV      Consent for Transfer:  I acknowledge that my medical condition has been evaluated and explained to me by the emergency department physician or other qualified medical person and/or my attending physician, who has recommended that I be transferred to the service of  Accepting Physician: Luana Luevano at    The above potential benefits of such transfer, the potential risks associated with such transfer, and the probable risks of not being transferred have been explained to me, and I fully understand them  The doctor has explained that, in my case, the benefits of transfer outweigh the risks  I agree to be transferred  I authorize the performance of emergency medical procedures and treatments upon me in both transit and upon arrival at the receiving facility  Additionally, I authorize the release of any and all medical records to the receiving facility and request they be transported with me, if possible  I understand that the safest mode of transportation during a medical emergency is an ambulance and that the Hospital advocates the use of this mode of transport  Risks of traveling to the receiving facility by car, including absence of medical control, life sustaining equipment, such as oxygen, and medical personnel has been explained to me and I fully understand them      ([address] [place])  [  ]  I consent to the stated transfer and to be transported by ambulance/helicopter  [  ]  I consent to the stated transfer, but refuse transportation by ambulance and accept full responsibility for my transportation by car  I understand the risks of non-ambulance transfers and I exonerate the Hospital and its staff from any deterioration in my condition that results from this refusal     X___________________________________________    DATE  22  TIME________  Signature of patient or legally responsible individual signing on patient behalf           RELATIONSHIP TO PATIENT_________________________          Provider Certification    NAME Lexx Preciado                                         1950                              MRN [id number]    A medical screening exam was performed on the above named patient  Based on the examination:    Condition Necessitating Transfer The primary encounter diagnosis was Rash  Diagnoses of Cellulitis, ARF (acute renal failure) ([facility] [address] ), Elevated troponin, and Abnormal transaminases were also pertinent to this visit      Patient Condition: The patient has been stabilized such that within reasonable medical probability, no material deterioration of the patient condition or the condition of the unborn child(catrachito) is likely to result from the transfer    Reason for Transfer: Level of Care needed not available at this facility    Transfer Requirements: Facility     · Space available and qualified personnel available for treatment as acknowledged by    · Agreed to accept transfer and to provide appropriate medical treatment as acknowledged by       Sharad Sy  · Appropriate medical records of the examination and treatment of the patient are provided at the time of transfer   [address], [address] _______  · Transfer will be performed by qualified personnel from    and appropriate transfer equipment as required, including the use of necessary and appropriate life support measures  Provider Certification: I have examined the patient and explained the following risks and benefits of being transferred/refusing transfer to the patient/family:  General risk, such as traffic hazards, adverse weather conditions, rough terrain or turbulence, possible failure of equipment (including vehicle or aircraft), or consequences of actions of persons outside the control of the transport personnel, Unanticipated needs of medical equipment and personnel during transport, Risk of worsening condition, The possibility of a transport vehicle being unavailable      Based on these reasonable risks and benefits to the patient and/or the unborn child(catrachito), and based upon the information available at the time of the patients examination, I certify that the medical benefits reasonably to be expected from the provision of appropriate medical treatments at another medical facility outweigh the increasing risks, if any, to the individuals medical condition, and in the case of labor to the unborn child, from effecting the transfer      X____________________________________________ DATE 09/30/22        TIME_______      ORIGINAL - SEND TO MEDICAL RECORDS   COPY - SEND WITH PATIENT DURING TRANSFER

## 2022-09-30 NOTE — ED NOTES
Patient wife called requesting update  Update given  Per wife, she would like to be contacted at [phone] if he is transferred        Francesco Lobo RN  09/30/22 [id number]

## 2022-09-30 NOTE — ED NOTES
Patient continues to rest comfortably  Call bell within reach and all needs met  Will continue to monitor        Maki Scott RN  09/30/22 [id number]

## 2022-09-30 NOTE — ED NOTES
Patient is resting comfortably  Patient requesting glass of water  Call bell within reach  Will continue to monitor        Willie Sparks RN  09/30/22 7028

## 2022-09-30 NOTE — ED PROCEDURE NOTE
PROCEDURE  CriticalCare Time  Performed by: Donal Shannon MD  Authorized by: Donal Shannon MD     Critical care provider statement:     Critical care time (minutes):  65    Critical care start time:  9/30/2022 12:22 AM    Critical care end time:  9/30/2022 6:34 AM    Critical care was necessary to treat or prevent imminent or life-threatening deterioration of the following conditions:  Dehydration    Critical care was time spent personally by me on the following activities:  Examination of patient, evaluation of patient's response to treatment, discussions with primary provider, development of treatment plan with patient or surrogate, obtaining history from patient or surrogate, review of old charts, re-evaluation of patient's condition, ordering and review of radiographic studies and ordering and review of laboratory studies         Donal Shannon MD  09/30/22 8896

## 2022-09-30 NOTE — ED PROCEDURE NOTE
PROCEDURE  ECG 12 Lead Documentation Only    Date/Time: 9/30/2022 1:41 AM  Performed by: Jania Braun MD  Authorized by: Jania Braun MD     Indications / Diagnosis:  Sob/negro   ECG reviewed by me, the ED Provider: yes    Patient location:  ED  Interpretation:     Interpretation: non-specific    Rate:     ECG rate:  86    ECG rate assessment: normal    Rhythm:     Rhythm: sinus rhythm    Ectopy:     Ectopy: none    QRS:     QRS axis:  Left    QRS intervals:  Normal  Conduction:     Conduction: normal    ST segments:     ST segments:  Non-specific  T waves:     T waves: non-specific           Jania Braun MD  09/30/22 [id number]

## 2022-09-30 NOTE — ED PROVIDER NOTES
History  No chief complaint on file  74YEAR-OLD MALE      PMH:   Spinal Stenosis  Gout  Polyneuropathy      Chief Complaint:  Rash arms and legs, getting worse       MODE OF TRANSPORT  AMBULATORY, PATIENT WAS BROUGHT BY PRIVATE CAR  Brought by Wife Erasto Hammond and Daughter Sarah Lester       Pt presents to ED with the complaints of rash to bilateral arms and legs  Was seen here 6 days ago for same, now worse    Per prior chart "chronic wound/sore on both of his legs"  Per pt and family, wounds are chronic, but rather acute since Labor day, September 6th      Patient states the on August 19 he was diagnosed with UTI started on Cipro  A week later he was cleaning his backyard when noticed these blisters on the LEs, the lesions on the lower legs were small at that time and he was evaluated by PCP, who thought at that time the lesions could have been bug bites  He got a shot of Steroids and started oral steroid taper at that time  Rash continued to worsen, call made that Wednesday, PCP at that time started him on Keflex and mupirocin cream, and prednisone 10 mg tablet twice a day for 5 days then 1 tablet for next 5 days      Rash continued to worsen, Blisters began to form on lower legs and a petechial rash on both arms, lower back   Pt came here at that time, 9/25/22   A comprehensive evaluation done, including blood work and consult with Dermatology at [facility] and plan was to discharge at time and f/u in office for punch biopsy  Pt missed this visit, due to severe diarrhea, bc his wound cultures grew out Pseudomonas and pt was placed Levaquin    Rash continues to spread, which is the reason for visit tonight, bc he does not feel he can make it to the rescheduled dermatology appointment for Tuesday, next week, and the rash continues to look much worse         Pt has no oral lesions  No dysuria , but it did on his last ER visit and needed a catheter for UA (he has Never needed catheter to provide a urine sample before) INFECTIOUS SYMPTOMS: None  PATIENT DENIES FEVERS, REPORTS CHILLS  Occasional HEADACHE  Denies NECK PAIN or Stiffness, NO DIZZINESS    He reports generalized weakness and headache  Also states that "it takes a while for me to catch my breath"      OTHER ASSOCIATED SYMPTOMS:  NO ABDOMINAL PAIN  NO ACUTE BACK PAIN  REPORTS NAUSEA, DENIES VOMITING  No CP or pressure  Denies throat tightness or voice change or diffuicutly swallowing       NO SOURCES OF BLEEDING  NO BLACK OR BLOODY STOOLS  NO STOOL CHANGES  History provided by:  Patient  Rash  Location:  Leg, pelvis, torso, shoulder/arm and foot  Shoulder/arm rash location:  L arm and R arm  Pelvic rash location:  L buttock and R buttock  Leg rash location:  L upper leg, R upper leg, L leg, R leg, L lower leg, R lower leg, L ankle and R ankle  Foot rash location:  L foot and R foot  Quality: blistering, burning, redness and swelling    Severity:  Severe  Progression:  Worsening  Chronicity:  New  Relieved by:  Nothing  Worsened by:  Nothing  Ineffective treatments:  None tried  Associated symptoms: diarrhea    Associated symptoms: no abdominal pain, no fatigue, no fever, no headaches, no hoarse voice, no joint pain, no myalgias, no nausea, no periorbital edema, no shortness of breath, no sore throat, no throat swelling, no tongue swelling, no URI, not vomiting and not wheezing        Prior to Admission Medications   Prescriptions Last Dose Informant Patient Reported? Taking?    FLUoxetine (PROzac) 20 mg capsule   Yes Yes   Sig: Take 20 mg by mouth daily    Magnesium 100 MG CAPS  Self Yes Yes   Sig: Take 1 capsule by mouth daily   Misc Natural Products (OSTEO BI-FLEX/5-LOXIN ADVANCED PO)   Yes Yes   Sig: Take 1 tablet by mouth daily    Multiple Vitamins-Minerals (CENTRUM SILVER 50+MEN PO)  Self Yes Yes   Sig: Take 1 tablet by mouth daily   SUPER B COMPLEX/C PO  Self Yes Yes   Sig: Take 1 tablet by mouth daily   allopurinol (ZYLOPRIM) 100 mg tablet  Self Yes Yes   Sig: Take 100 mg by mouth 2 (two) times a day   cholecalciferol (VITAMIN D3) 1,000 units tablet   Yes Yes   Sig: Take 2,000 Units by mouth daily   levofloxacin (LEVAQUIN) 750 mg tablet   No Yes   Sig: Take 1 tablet (750 mg total) by mouth daily for 7 days   loratadine (CLARITIN) 10 mg tablet   Yes No   Sig: Take 10 mg by mouth as needed for allergies   meloxicam (MOBIC) 15 mg tablet  Self Yes Yes   Sig: Take 15 mg by mouth daily   mupirocin (BACTROBAN) 2 % ointment   Yes Yes   Sig: apply by topical route2- 3 times every day a small amount to the affected area   predniSONE 10 mg tablet   Yes Yes   Sig: take 2 tablets  by oral route  every day for 5 days     then 1 tab for 5 days then stop  Facility-Administered Medications: None       Past Medical History:   Diagnosis Date    Depression     Gout        Past Surgical History:   Procedure Laterality Date    BACK SURGERY      X2 LUMBAR INCLUDING FUSION  ,  WITH OAA, LVH DR Cintia Jim    CATARACT EXTRACTION, BILATERAL      KNEE ARTHROSCOPY Right     x2 , meniscus repair    TONSILECTOMY AND ADNOIDECTOMY         Family History   Problem Relation Age of Onset    Diabetes Father     Diabetes Brother     Diabetes Sister      I have reviewed and agree with the history as documented  E-Cigarette/Vaping    E-Cigarette Use Never User      E-Cigarette/Vaping Substances    Nicotine No     THC No     CBD No     Flavoring No     Other No     Unknown No      Social History     Tobacco Use    Smoking status: Former Smoker     Quit date:      Years since quittin 7    Smokeless tobacco: Never Used   Vaping Use    Vaping Use: Never used   Substance Use Topics    Alcohol use: Not Currently     Comment: occasional    Drug use: Never       Review of Systems   Constitutional: Negative for chills, diaphoresis, fatigue and fever     HENT: Negative for hoarse voice, postnasal drip, rhinorrhea, sinus pressure, sinus pain, sore throat, trouble swallowing and voice change  Respiratory: Negative for cough, shortness of breath, wheezing and stridor  Cardiovascular: Negative for chest pain, palpitations and leg swelling  Gastrointestinal: Positive for diarrhea  Negative for abdominal pain, blood in stool, nausea and vomiting  Genitourinary: Negative for difficulty urinating, dysuria, flank pain, frequency, hematuria, penile swelling, scrotal swelling, testicular pain and urgency  Musculoskeletal: Negative for arthralgias, back pain, gait problem, joint swelling, myalgias, neck pain and neck stiffness  Skin: Positive for rash  Negative for wound  Neurological: Negative for dizziness, light-headedness and headaches  All other systems reviewed and are negative  Physical Exam  Physical Exam  Constitutional:       General: He is not in acute distress  Appearance: He is well-developed  He is not ill-appearing, toxic-appearing or diaphoretic  HENT:      Head: Normocephalic and atraumatic  Right Ear: External ear normal       Left Ear: External ear normal       Nose: Nose normal       Mouth/Throat:      Mouth: Mucous membranes are moist       Pharynx: No oropharyngeal exudate or posterior oropharyngeal erythema  Eyes:      General: No scleral icterus  Right eye: No discharge  Left eye: No discharge  Extraocular Movements: Extraocular movements intact  Conjunctiva/sclera: Conjunctivae normal       Pupils: Pupils are equal, round, and reactive to light  Neck:      Vascular: No JVD  Trachea: No tracheal deviation  Cardiovascular:      Rate and Rhythm: Normal rate and regular rhythm  Pulses: Normal pulses  Heart sounds: Normal heart sounds  No murmur heard  No friction rub  No gallop  Pulmonary:      Effort: Pulmonary effort is normal  No respiratory distress  Breath sounds: Normal breath sounds  No stridor  No wheezing, rhonchi or rales     Chest:      Chest wall: No tenderness  Abdominal:      General: Bowel sounds are normal  There is no distension  Palpations: Abdomen is soft  There is no mass  Tenderness: There is no abdominal tenderness  There is no right CVA tenderness, left CVA tenderness, guarding or rebound  Hernia: No hernia is present  Musculoskeletal:         General: No swelling, tenderness, deformity or signs of injury  Normal range of motion  Cervical back: Normal range of motion and neck supple  No rigidity or tenderness  Right lower leg: No edema  Left lower leg: No edema  Lymphadenopathy:      Cervical: No cervical adenopathy  Skin:     General: Skin is warm  Capillary Refill: Capillary refill takes less than 2 seconds  Coloration: Skin is not jaundiced or pale  Findings: No bruising, erythema, lesion or rash  Neurological:      General: No focal deficit present  Mental Status: He is alert and oriented to person, place, and time  Mental status is at baseline  Cranial Nerves: No cranial nerve deficit  Sensory: No sensory deficit  Motor: No weakness or abnormal muscle tone  Coordination: Coordination normal    Psychiatric:         Mood and Affect: Mood normal          Behavior: Behavior normal          Thought Content:  Thought content normal          Judgment: Judgment normal          Vital Signs  ED Triage Vitals [09/30/22 0014]   Temperature Pulse Respirations Blood Pressure SpO2   98 6 °F (37 °C) (!) 107 18 112/66 98 %      Temp src Heart Rate Source Patient Position - Orthostatic VS BP Location FiO2 (%)   -- -- -- -- --      Pain Score       5           Vitals:    09/30/22 0014   BP: 112/66   Pulse: (!) 107         Visual Acuity      ED Medications  Medications   sodium chloride 0 9 % bolus 1,000 mL (has no administration in time range)       Diagnostic Studies  Results Reviewed     Procedure Component Value Units Date/Time    Blood culture #1 [[id number]]     Lab Status: No result Specimen: Blood     Blood culture #2 [[id number]]     Lab Status: No result Specimen: Blood     B-Type Natriuretic Peptide(BNP) AN, CA, EA Campuses Only [[id number]]     Lab Status: No result Specimen: Blood     HS Troponin 0hr (reflex protocol) [[id number]]     Lab Status: No result Specimen: Blood     CBC and differential [[id number]]     Lab Status: No result Specimen: Blood     CMP [[id number]]     Lab Status: No result Specimen: Blood     UA w Reflex to Microscopic w Reflex to Culture [[id number]]     Lab Status: No result Specimen: Urine     Sedimentation rate, automated [[id number]]     Lab Status: No result Specimen: Blood     C-reactive protein [[id number]]     Lab Status: No result Specimen: Blood     Lactic acid, plasma [[id number]]     Lab Status: No result Specimen: Blood                  No orders to display              Procedures  Procedures         ED Course  ED Course as of 09/30/22 0632   Fri Sep 30, 2022   0054 Pt seen and evaluated   Pt and family very concerned  They do not want pt sent home and want comprehensive evaluation   I agree, based on the exam and the worsening nature that SJ is a strong consideration, and with the comorbid issues, continued out-patient management does not at all appear adequate or safe   Also the patient's left leg does appear to be much more red and in light of the patient's known infection, IV antibiotics is indicated   0138 CBC and differential(!)   0139 CBC stable from last ED visit   0236 hs TnI 0hr(!): 199   0236 Sed Rate(!): 24   0237 LACTIC ACID: 1 6   0237 C-REACTIVE PROTEIN(!): 156 5   0237 Elevated troponin   Non ischemic ekg    Will await delta troponin    0339 Delta 2hr hsTnI(!): 69  Pt stable  I d/w pt and family the results of work up  Pt understands results, including troponin elevation    [address] Aster Day, Cardiology  Reviewed the case   He feels that tertiary care center would be wise in any case due to his dermatologic condition and he can be seen by cardiology where he transfers to  He will at least need an Echo and continued troponins   Dr Joel Enamorado recommends asa and if Derm ok, heparin drip    0409 Call out to Dr Rosa Jimenez  No answer  Jeff Baker texted   [address] in [facility]   [address] Ligia Sow w/ Derm again  She has reviewed her attending  Danay Singletary w/ admission   They feel this is not SJS and is likely worsening vasculitis  Triamcinalone bid   60 mg Prednisone daily   They did not see any contraindications from derm their standpoint for heparin use    [address] Penelope Baptise w/ [facility]  Accepts pt  Wants COVID screen for cohorting  Attending is Dr Racheal Colvin                                             MDM    Disposition  Final diagnoses:   None     ED Disposition     None      Follow-up Information    None         Patient's Medications   Discharge Prescriptions    No medications on file       No discharge procedures on file      PDMP Review     None          ED Provider  Electronically Signed by           Nicole Boss MD  09/30/22 8713

## 2022-09-30 NOTE — QUICK NOTE
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 with worsening renal function elevated white count concern for infectious etiology as well  EKGs reviewed showed sinus rhythm with nonspecific ST/T-wave abnormalities however there were no prior available for comparison  I was informed by emergency room physician that they were planning to transfer for dermatologic evaluation however patient also complained of some dyspnea on exertion without chest pain and troponins were checked which were elevated at 199->268  I was informed that the patient was chest pain-free  In the setting of above I recommended cardiac workup with transthoracic echocardiogram for further evaluation and if dermatology was agreeable and there were no significant contraindications to initiate ACS low heparin protocol patient  On review of laboratory studies it appeared that patient did meet sepsis criteria as he was tachycardic on admission with elevated white count  Patient also had significant worsening of renal dysfunction with creatinine in doubling from last laboratory studies  Will follow-up and continue to trend troponins to peak, counseled emergency room team to that me know if there are any EKG changes or patient had any worsening of symptoms or evidence of chest discomfort  Patient should undergo transthoracic echocardiogram will need cardiac evaluation

## 2022-09-30 NOTE — ASSESSMENT & PLAN NOTE
Primary reason for presentation, suspected to be vasculitis/SJS  Awaiting transfer to [facility] for derm eval/management

## 2022-09-30 NOTE — ED PROCEDURE NOTE
PROCEDURE  ECG 12 Lead Documentation Only    Date/Time: 9/30/2022 3:46 AM  Performed by: Donal Shannon MD  Authorized by: Donal Shannon MD     Indications / Diagnosis:  Elevated troponin   Patient location:  ED  Interpretation:     Interpretation: non-specific    Rate:     ECG rate:  71    ECG rate assessment: normal    Rhythm:     Rhythm: sinus rhythm    Ectopy:     Ectopy: none    QRS:     QRS axis:  Normal    QRS intervals:  Normal  Conduction:     Conduction: normal    ST segments:     ST segments:  Non-specific  T waves:     T waves: non-specific           Donal Shannon MD  09/30/22 0529

## 2022-09-30 NOTE — CONSULTS
Fabian Gr 74 y o  male MRN: [id number]  Unit/Bed#: ED 26 Encounter: [id number]        Assessment and Plan:    1  Acute kidney injury present on admission  · Baseline creatinine 0 8-0 9 mg/dL  Concern for vasculitides as mentioned the rest of note however LYLY possibly on the basis of volume depletion due to profound diarrhea earlier this week, poor appetite, oozing open wounds  · No BMP since volume expanded-repeat now  · See below regarding hematuria, rule out vasculitis  · Hold Mobic and other potential nephrotoxins  · Avoid hypotension, maintain mean arterial pressure greater than 65 mmHg  2  Possible stage 2 chronic kidney disease with baseline creatinine around 0 8-0 9 mg/dL  · No urine protein studies to review  Risk factors include obese and long-term NSAID use  3  Microscopic hematuria and cutaneous lesions  · Petechial rash on feet and hands with open wounds on legs concerning for SJS versus vasculitis  Was supposed to see Dermatology as an outpatient however missed appointment due to diarrhea  Recently treated with multiple antibiotics including ciprofloxacin, cephalexin, Levaquin  · Fortunately no pulmonary involvement-denies hemoptysis, respiratory symptoms  Chest x-ray clear  · Workup:  ANCA normal, MARIO ALBERTO normal, RF normal, complements normal, sed rate and CRP elevated, cryoglobulin and GBM antibodies pending  10-20 rbc's by UA 9/25-repeat UAM pending this a m  and alerted lab to look for casts  · Agree with 60 mg prednisone  Receiving nephrology may consider renal biopsy to determine further treatment- discussed with patient  · Pending D[facility] evaluation at [facility]  4  Edema  · Peripheral edema noted in all extremities  Received volume expansion overnight  Await repeat BMP  May be intravascularly volume depleted with peripheral edema on the basis of hypoalbuminemia, inflammation from underlying condition  5  Elevated troponin  · Evaluated by Cardiology    On heparin infusion      HPI:    Donald Steiner is a 74 y o  male with wounds/sores on both legs for about a month previously treated with prednisone and Keflex in the setting of recent ciprofloxacin use in August for UTI  He was evaluated in the ER on 09/25 for these complaints, some serological workup done and referred to dermatology for punch biopsy  Unfortunately he did not make appointment due to diarrhea  Wound cultures grew Pseudomonas and patient was treated with Levaquin  Rash worsened and he presented to the ER 9/30  Evaluated by Cardiology this morning due to elevated troponin  Nephrology consulted for assistance with management of possible vasculitis with possible renal involvement, LYLY, microscopic hematuria  Other pertinent medical problems include long-standing chronic gait disorder and polyneuropathy followed by Neurology, right-sided lumbar sacral plexopathy, recent UTI treated with ciprofloxacin, gout, chronic wounds initially treated with Keflex and prednisone    Upon discussion with the patient relates HPI as above in addition:  Patient states he had 12-13 episodes of diarrhea on Monday  He had episodes of nausea more recently  Not really eating or drinking well  Does endorse swelling and oozing of rash on legs and arms  From a Nephrology perspective, baseline creatinine around 0 8-0 9 mg/dL dating back to 2018  Does follow nephrologist   On Mobic  No other nephrotoxins noted  Other risk factors include obesity  Reason for Consult:  LYLY, possible vasculitis    Review of Systems:  A complete 10-point review of systems was performed  Aside from what was mentioned in the HPI, it is otherwise negative        Historical Information   Past Medical History:   Diagnosis Date    Depression     Gout      Past Surgical History:   Procedure Laterality Date    BACK SURGERY      X2 LUMBAR INCLUDING FUSION  2004, 2015 WITH OAA, LV[facility] DR Oscar Lerma    CATARACT EXTRACTION, BILATERAL      KNEE ARTHROSCOPY Right     x2 , meniscus repair    TONSILECTOMY AND ADNOIDECTOMY       Social History   Social History     Substance and Sexual Activity   Alcohol Use Not Currently    Comment: occasional     Social History     Substance and Sexual Activity   Drug Use Never     Social History     Tobacco Use   Smoking Status Former Smoker    Quit date: 5    Years since quittin 7   Smokeless Tobacco Never Used       Family History:   Family History   Problem Relation Age of Onset    Diabetes Father     Diabetes Brother     Diabetes Sister        Medications:  Pertinent medications were reviewed  Current Facility-Administered Medications   Medication Dose Route Frequency Provider Last Rate    heparin (porcine)  3-20 Units/kg/hr (Order-Specific) Intravenous Titrated Ebb MD Alfonso 11 1 Units/kg/hr (22 0534)         Allergies   Allergen Reactions    Other      IVORY SOAP    Penicillins Rash         Vitals:   /72   Pulse 70   Temp 98 6 °F (37 °C)   Resp 19   Wt 134 kg (295 lb)   SpO2 100%   BMI 38 92 kg/m²   Body mass index is 38 92 kg/m²  SpO2: 100 %,   SpO2 Activity: At Rest,   O2 Device: None (Room air)    No intake or output data in the 24 hours ending 22 0932  Invasive Devices  Report    Peripheral Intravenous Line  Duration           Peripheral IV 22 Left Hand <1 day    Peripheral IV 22 Right Antecubital <1 day                Physical Exam:  General: conscious, cooperative, in no acute distress  Eyes: conjunctivae pink, anicteric sclerae  ENT: lips and mucous membranes moist  Neck: supple, no JVD, no masses  Chest: clear breath sounds bilateral, no crackles, ronchus or wheezings  CVS: distinct S1 & S2, normal rate, regular rhythm  Abdomen: soft, non-tender, non-distended, normoactive bowel sounds  Extremities: no edema of both legs  Skin: no rash  Neuro: awake, alert, oriented      Diagnostic Data:  Lab: I have personally reviewed pertinent lab results  , CBC:  Results from last 7 days   Lab Units 09/30/22  0113   WBC Thousand/uL 16 83*   HEMOGLOBIN g/dL 13 8   HEMATOCRIT % 41 3   PLATELETS Thousands/uL 440*      CMP:   Lab Results   Component Value Date    SODIUM 130 (L) 09/30/2022    K 4 7 09/30/2022    CL 97 09/30/2022    CO2 23 09/30/2022    BUN 47 (H) 09/30/2022    CREATININE 1 87 (H) 09/30/2022    CALCIUM 9 3 09/30/2022    AST 59 (H) 09/30/2022    ALT 73 (H) 09/30/2022    ALKPHOS 64 09/30/2022    EGFR 34 09/30/2022   ,   PT/INR: No results found for: PT, INR,   Magnesium: No components found for: MAG,  Phosphorous: No results found for: PHOS    Microbiology:  @LABRCNT,(urinecx:7)@        Ary Schneider    Portions of the record may have been created with voice recognition software  Occasional wrong word or "sound a like" substitutions may have occurred due to the inherent limitations of voice recognition software  Read the chart carefully and recognize, using context, where substitutions have occurred

## 2022-09-30 NOTE — ED NOTES
RN went to check on patient and draw repeat aPTT  Patient reports to RN that hands are swelling however are not painful  Bilateral swelling of the hands noted  Dr Harsha Velázquez at bedside and aware        Arun Gutierrez RN  09/30/22 6020

## 2022-09-30 NOTE — ASSESSMENT & PLAN NOTE
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  EKG without ischemic changes  Echo pending to assess regional wall motion  Heparin gtt for now

## 2022-09-30 NOTE — PROGRESS NOTES
DERMATOLOGY:  CONSULTATION   Annalisa Dias 74 y o  male MRN: [id number]  Unit/Bed#: ED 26 Encounter: [id number]      Consults        Assessment/Recommendations   Based on a thorough discussion of this condition and the management approach to it (including a comprehensive discussion of the known risks, side effects and potential benefits of treatment), the patient (family) agrees to implement the following specific plan:    Feel this is likely a progression of his vasculitis  He has been taking 10-20 mg prednisone daily since discharge from the ED 9/24  Labs significant for new LYLY, elevated liver enzymes, troponin elevation, and cardiomegaly on CXR that is unchanged  LYLY could be related to dehydration from his diarrhea versus systemic involvement of his vasculitis in the kidneys  Would get GI, Nephrology, and ID on board to rule out systemic involvement of his vasculitis and to determine if infection (elevated inflammatory markers, pseudomonas on wound culture, mild leukocytosis while on pred but no fevers) may be driving some of his rash  Will follow up blood cultures  Requires close follow up for biopsy  In the interim, treat with prednisone and topical steroid while working up cause for worsening cutaneous symptoms  Recommendations:   -Would increase prednisone to 60 mg daily    -Topical triamcinolone BID  -Nephrology consult given c/f kidney involvement of his vasculitis  -GI consult to rule out vasculitis involvement of his GI tract vs levaquin side effect  -Stop levaquin  -ID consult given concern for infection driving cutaneous symptoms  Follow up blood cultures  Follow up ECHO to rule out endocarditis   -Recommend add on cryoglobiulins  Plan for patient to be admitted to [facility]  Will see him there today and plan for biopsy  Please set up discharge follow up by calling our office: [phone])     Thank you for involving me in the care of your patient   Please call with questions, change in clinical status or if tests recommended above are abnormal      Discussed with the primary service  History:     HISTORY OF PRESENT ILLNESS:    Reason for Consult: worsening rash, new systemic symptoms   HPI: Lashay Sánchez is a 74y o  year old male Called to consult on patient urgently due to concern for SJS    Familiar to this patient and was consulted on his prior visit to the ED on 9/24  [place] his symptoms at that time were consistent with 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 (RBC on UA was felt to be due to traumatic in and out cath) and patient was discharged home with close follow up on  9/27 in derm clinic  He had no systemic symptoms at that time  Wound culture grew 1+ pseudomonas and pt transitioned onto levaquin which caused him severe diarrhea and he was unable to make his derm follow up appt, rescheduled for Tuesday 10/4  However, this AM pt re presented to the ED due to worsening nausea and chills and worsening of rash  Rash s/f ulcerating purpura with worsening petechiae on bilateral legs arms and abdomen  No eye or oral involvement  No fevers  Today, labs show new LYLY with creatinine doubling since prior ED visit but no hematuria  Elevated liver enzymes and troponin elevation without ekg changes  No CP however endorses EDGAR  CxR without abnormalities  He received IV fluids and was given IV cefepime in the ED  Feel this is likely a progression of his vasculitis  He has been taking 10-20 mg prednisone daily since discharge from the ED 9/24         PAST MEDICAL HISTORY:  Past Medical History:   Diagnosis Date    Depression     Gout      Past Surgical History:   Procedure Laterality Date    BACK SURGERY      X2 LUMBAR INCLUDING FUSION  2004, 2015 WITH OAA, LV[facility] DR Biswas Degree    CATARACT EXTRACTION, BILATERAL      KNEE ARTHROSCOPY Right     x2 , meniscus repair    TONSILECTOMY AND ADNOIDECTOMY         FAMILY HISTORY:  Non-contributory    SOCIAL HISTORY:  Social History   /Civil Union  Social History     Substance and Sexual Activity   Alcohol Use Not Currently    Comment: occasional     Social History     Substance and Sexual Activity   Drug Use Never     Social History     Tobacco Use   Smoking Status Former Smoker    Quit date:     Years since quittin 7   Smokeless Tobacco Never Used       ALLERGIES:  Allergies   Allergen Reactions    Other      IVORY SOAP    Penicillins Rash       MEDICATIONS:  All current active medications have been reviewed  Physical exam:     Temp:  [98 6 °F (37 °C)] 98 6 °F (37 °C)  HR:  [] 59  Resp:  [16-21] 20  BP: (106-164)/(51-72) 114/55  SpO2:  [97 %-100 %] 98 %  Temp (24hrs), Av 6 °F (37 °C), Min:98 6 °F (37 °C), Max:98 6 °F (37 °C)  Current: Temperature: 98 6 °F (37 °C)                                          Labs, Imaging, & Other Studies     Lab Results:  I have personally reviewed pertinent labs  Results from last 7 days   Lab Units 22  0113 22  1041   WBC Thousand/uL 16 83* 15 16*   HEMOGLOBIN g/dL 13 8 13 4   PLATELETS Thousands/uL 440* 311     Results from last 7 days   Lab Units 22  1019 22  0113 22  1041   POTASSIUM mmol/L 4 8 4 7 4 2   CHLORIDE mmol/L 98 97 102   CO2 mmol/L 21 23 24   BUN mg/dL 45* 47* 31*   CREATININE mg/dL 1 57* 1 87* 0 99   EGFR ml/min/1 73sq m 42 34 74   CALCIUM mg/dL 8 6 9 3 8 7   AST U/L  --  59* 28   ALT U/L  --  73* 23   ALK PHOS U/L  --  64 50     Results from last 7 days   Lab Units 22  0113 22  1041   BLOOD CULTURE  Received in Microbiology Lab  Culture in Progress  Received in Microbiology Lab  Culture in Progress  --    GRAM STAIN RESULT   --  No Polys or Bacteria seen   WOUND CULTURE   --  1+ Growth of Pseudomonas aeruginosa*           Pathology:   I have personally reviewed pertinent reports       Recommendations are not finalized until signed by attending physician Honey Peaks  PGY2 Dermatology Resident

## 2022-10-01 PROBLEM — R26.2 AMBULATORY DYSFUNCTION: Status: ACTIVE | Noted: 2022-10-01

## 2022-10-01 PROBLEM — D72.829 LEUCOCYTOSIS: Status: ACTIVE | Noted: 2022-10-01

## 2022-10-01 LAB
ANION GAP SERPL CALCULATED.3IONS-SCNC: 9 MMOL/L (ref 4–13)
APTT PPP: 85 SECONDS (ref 23–37)
BUN SERPL-MCNC: 47 MG/DL (ref 5–25)
CALCIUM SERPL-MCNC: 8.6 MG/DL (ref 8.4–10.2)
CHLORIDE SERPL-SCNC: 103 MMOL/L (ref 96–108)
CO2 SERPL-SCNC: 22 MMOL/L (ref 21–32)
CREAT SERPL-MCNC: 1.46 MG/DL (ref 0.6–1.3)
ERYTHROCYTE [DISTWIDTH] IN BLOOD BY AUTOMATED COUNT: 13.6 % (ref 11.6–15.1)
GFR SERPL CREATININE-BSD FRML MDRD: 46 ML/MIN/1.73SQ M
GLUCOSE SERPL-MCNC: 127 MG/DL (ref 65–140)
HCT VFR BLD AUTO: 36.7 % (ref 36.5–49.3)
HGB BLD-MCNC: 12.2 G/DL (ref 12–17)
MCH RBC QN AUTO: 30 PG (ref 26.8–34.3)
MCHC RBC AUTO-ENTMCNC: 33.2 G/DL (ref 31.4–37.4)
MCV RBC AUTO: 90 FL (ref 82–98)
PLATELET # BLD AUTO: 433 THOUSANDS/UL (ref 149–390)
PMV BLD AUTO: 8.4 FL (ref 8.9–12.7)
POTASSIUM SERPL-SCNC: 4.6 MMOL/L (ref 3.5–5.3)
RBC # BLD AUTO: 4.06 MILLION/UL (ref 3.88–5.62)
SODIUM SERPL-SCNC: 134 MMOL/L (ref 135–147)
WBC # BLD AUTO: 16.81 THOUSAND/UL (ref 4.31–10.16)

## 2022-10-01 PROCEDURE — 85027 COMPLETE CBC AUTOMATED: CPT | Performed by: INTERNAL MEDICINE

## 2022-10-01 PROCEDURE — NC001 PR NO CHARGE: Performed by: STUDENT IN AN ORGANIZED HEALTH CARE EDUCATION/TRAINING PROGRAM

## 2022-10-01 PROCEDURE — 88312 SPECIAL STAINS GROUP 1: CPT | Performed by: PATHOLOGY

## 2022-10-01 PROCEDURE — 99222 1ST HOSP IP/OBS MODERATE 55: CPT | Performed by: INTERNAL MEDICINE

## 2022-10-01 PROCEDURE — 0HBEXZX EXCISION OF LEFT LOWER ARM SKIN, EXTERNAL APPROACH, DIAGNOSTIC: ICD-10-PCS | Performed by: DERMATOLOGY

## 2022-10-01 PROCEDURE — 99232 SBSQ HOSP IP/OBS MODERATE 35: CPT | Performed by: INTERNAL MEDICINE

## 2022-10-01 PROCEDURE — 88305 TISSUE EXAM BY PATHOLOGIST: CPT | Performed by: PATHOLOGY

## 2022-10-01 PROCEDURE — 11105 PUNCH BX SKIN EA SEP/ADDL: CPT | Performed by: DERMATOLOGY

## 2022-10-01 PROCEDURE — 11104 PUNCH BX SKIN SINGLE LESION: CPT | Performed by: DERMATOLOGY

## 2022-10-01 PROCEDURE — 85730 THROMBOPLASTIN TIME PARTIAL: CPT | Performed by: INTERNAL MEDICINE

## 2022-10-01 PROCEDURE — 99232 SBSQ HOSP IP/OBS MODERATE 35: CPT | Performed by: STUDENT IN AN ORGANIZED HEALTH CARE EDUCATION/TRAINING PROGRAM

## 2022-10-01 PROCEDURE — 80048 BASIC METABOLIC PNL TOTAL CA: CPT | Performed by: INTERNAL MEDICINE

## 2022-10-01 RX ORDER — TORSEMIDE 10 MG/1
10 TABLET ORAL DAILY
Status: DISCONTINUED | OUTPATIENT
Start: 2022-10-01 | End: 2022-10-05 | Stop reason: HOSPADM

## 2022-10-01 RX ORDER — FAMOTIDINE 20 MG/1
20 TABLET, FILM COATED ORAL DAILY
Status: DISCONTINUED | OUTPATIENT
Start: 2022-10-01 | End: 2022-10-05 | Stop reason: HOSPADM

## 2022-10-01 RX ORDER — HEPARIN SODIUM 5000 [USP'U]/ML
5000 INJECTION, SOLUTION INTRAVENOUS; SUBCUTANEOUS EVERY 8 HOURS SCHEDULED
Status: DISCONTINUED | OUTPATIENT
Start: 2022-10-01 | End: 2022-10-05 | Stop reason: HOSPADM

## 2022-10-01 RX ORDER — FAMOTIDINE 20 MG/1
20 TABLET, FILM COATED ORAL 2 TIMES DAILY
Status: DISCONTINUED | OUTPATIENT
Start: 2022-10-01 | End: 2022-10-01

## 2022-10-01 RX ADMIN — TRIAMCINOLONE ACETONIDE 1 APPLICATION: 1 OINTMENT TOPICAL at 08:19

## 2022-10-01 RX ADMIN — PREDNISONE 60 MG: 20 TABLET ORAL at 08:19

## 2022-10-01 RX ADMIN — HEPARIN SODIUM 5000 UNITS: 5000 INJECTION INTRAVENOUS; SUBCUTANEOUS at 21:30

## 2022-10-01 RX ADMIN — TORSEMIDE 10 MG: 10 TABLET ORAL at 11:47

## 2022-10-01 RX ADMIN — FAMOTIDINE 20 MG: 20 TABLET ORAL at 18:32

## 2022-10-01 RX ADMIN — TRIAMCINOLONE ACETONIDE 1 APPLICATION: 1 OINTMENT TOPICAL at 17:20

## 2022-10-01 RX ADMIN — HEPARIN SODIUM 17.1 UNITS/KG/HR: 10000 INJECTION, SOLUTION INTRAVENOUS at 14:50

## 2022-10-01 NOTE — PLAN OF CARE
Problem: SKIN/TISSUE INTEGRITY - ADULT  Goal: Skin Integrity remains intact(Skin Breakdown Prevention)  Description: Assess:  -Perform Devyn assessment every shift  -Clean and moisturize skin every 2 hours  -Inspect skin when repositioning, toileting, and assisting with ADLS  -Assess extremities for adequate circulation and sensation     Bed Management:  -Have minimal linens on bed & keep smooth, unwrinkled  -Change linens as needed when moist or perspiring  -Avoid sitting or lying in one position for more than 2 hours while in bed  -Keep HOB at 30 degrees     Toileting:  -Offer bedside commode  -Assess for incontinence every 2  -Use incontinent care products after each incontinent episode such as wipes and incontinent foam     Activity:  -Mobilize patient 3 times a day  -Encourage activity and walks on unit  -Encourage or provide ROM exercises   -Turn and reposition patient every 2 Hours  -Use appropriate equipment to lift or move patient in bed  -Instruct/ Assist with weight shifting every 2 hours when out of bed in chair  -Consider limitation of chair time 2 hour intervals    Skin Care:  -Avoid use of baby powder, tape, friction and shearing, hot water or constrictive clothing  -Relieve pressure over bony prominences using waffle cushion  -Do not massage red bony areas      Outcome: Progressing  Goal: Incision(s), wounds(s) or drain site(s) healing without S/S of infection  Description: INTERVENTIONS  - Assess and document dressing, incision, wound bed, drain sites and surrounding tissue  - Provide patient and family education  - Perform skin care/dressing changes every day  Outcome: Progressing

## 2022-10-01 NOTE — ASSESSMENT & PLAN NOTE
· Multifactorial gait dysfunction 2/2  multiple back surgeries,  Chronic lumbar sacral  plexopathy,  Polyneuropathy  ·  He uses manual  Wheelchairs   Able to pivot from bed to wheelchair

## 2022-10-01 NOTE — ASSESSMENT & PLAN NOTE
· [place] non MI trop  Echo EF of 55% with overall no wall motion abnormalities  No additional inpt cardiac workup per cardiology

## 2022-10-01 NOTE — CONSULTS
Patient is a transfer from [facility], not a new consult    Please see progress note enter at [phone]

## 2022-10-01 NOTE — PROGRESS NOTES
NEPHROLOGY PROGRESS NOTE   Curvin Labs 74 y o  male MRN: [id number]  Unit/Bed#: S -01 Encounter: [id number]  Reason for Consult: LYLY    ASSESSMENT AND PLAN:  73 yo man with PMH of obesity, UTI, depression, gout  Patient was admitted at [facility] with ulcerative rash, patient was transferred to [facility] for dermatology evaluation  On admission he  was found to have elevated creatinine  Nephrology is consulted for management of LYLY baki    #Non-Oliguric KDIGO LYLY stage 1    • Etiology:  Unknown etiology possible multifactorial secondary to systemic inflammatory response, prerenal secondary to poor fluid intake  Proliferative GN still on differential however patient is now getting better with only prednisone no other immunosuppressive therapy  • Baseline creatinine 0 9 mg/dL  • Current creatinine:  1 4 mg/dL, slowly trending down  • Peak creatinine:  1 89 mg/dL  • UA:  Hematuria resolved  • U PCR 0 4 grams/gram  • Renal imaging :  Ultrasound  • GN workup:   o MARIO ALBERTO negative  o Anca negative  o Anti GBM pending  o Normal complement  o Will repeat SPEP  • Treatment:  • No indication of urgent dialysis at this  • Will hold kidney biopsy for now creatinine is improving  • Maintain MAP:  Over 65 mmHg if possible/avoid hypoperfusion:  Hold parameters on blood pressure medications  • Avoid nephrotoxic agents such as NSAIDs, and IV contrast if possible   Avoid opioids   • Adjust medications to GFR    #Acid-base Disorder  • serum HCO3 22mmol/L  • At goal    #Volume status/hypertension:  • Volume:  Hypervolemic  • Blood pressure:  Normotensive /67  • Recommend:  • Low-sodium diet  • Start torsemide 10 mg      #Anemia:  • Current hemoglobin:  12 2 mg/dL  • Treatment:  • Transfuse for hemoglobin less than 7 0 per primary service      # Purpuric/necrotic rash  · In the settings of antibiotics  · With systemic responds  · Patient transfers to [facility] for dermatology evaluation  · Concern of Arsenio Suggs versus vasculitis  · On prednisone 60        SUBJECTIVE:  Patient seen and examined at bedside  No chest pain, shortness of breath, nausea, vomiting, abdominal pain or diarrhea       OBJECTIVE:  Current Weight:    Vitals:    10/01/22 0731   BP: 126/67   Pulse: 78   Resp: 18   Temp: (!) 97 4 °F (36 3 °C)   SpO2: 99%       Intake/Output Summary (Last 24 hours) at 10/1/2022 1055  Last data filed at 10/1/2022 0801  Gross per 24 hour   Intake --   Output 1150 ml   Net -1150 ml     Wt Readings from Last 3 Encounters:   09/30/22 134 kg (295 lb)   09/24/22 134 kg (295 lb)   08/19/22 134 kg (295 lb)     Temp Readings from Last 3 Encounters:   10/01/22 (!) 97 4 °F (36 3 °C) (Oral)   09/30/22 98 6 °F (37 °C)   09/24/22 (!) 97 °F (36 1 °C) (Oral)     BP Readings from Last 3 Encounters:   10/01/22 126/67   09/30/22 141/67   09/24/22 130/70     Pulse Readings from Last 3 Encounters:   10/01/22 78   09/30/22 66   09/24/22 93        General:  Obese, no acute distress at this time  Skin:  Purpuric, necrotic rash and 4 extremities  Eyes:  No scleral icterus and noninjected  ENT:  mucous membranes moist  Neck:  no carotid bruits  Chest:  Clear to auscultation percussion, good respiratory effort, no use of accessory respiratory muscles  CVS:  Regular rate and rhythm without a murmur rub  Abdomen:  soft and nontender   Extremities:  Lower extremity edema  Neuro:  No gross focality  Psych:  Alert , cooperative       Medications:    Current Facility-Administered Medications:   •  heparin (porcine) 25,000 units in 0 45% NaCl 250 mL infusion (premix), 3-20 Units/kg/hr (Order-Specific), Intravenous, Titrated, Maxine Freeman PA-C  •  predniSONE tablet 60 mg, 60 mg, Oral, Daily, Mary Freeman PA-C, 60 mg at 10/01/22 9112  •  torsemide (DEMADEX) tablet 10 mg, 10 mg, Oral, Daily, Leeroy Tate MD  •  triamcinolone (KENALOG) 0 1 % ointment, , Topical, BID, Mary Freeman PA-C, 1 application at 85/01/23 6446    Laboratory Results:  Results from last 7 days   Lab Units 10/01/22  0404 09/30/22  2200 09/30/22  1019 09/30/22  0113   WBC Thousand/uL 16 81* 16 42*  --  16 83*   HEMOGLOBIN g/dL 12 2 12 2  --  13 8   HEMATOCRIT % 36 7 37 1  --  41 3   PLATELETS Thousands/uL 433* 454*  --  440*   SODIUM mmol/L 134*  --  131* 130*   POTASSIUM mmol/L 4 6  --  4 8 4 7   CHLORIDE mmol/L 103  --  98 97   CO2 mmol/L 22  --  21 23   BUN mg/dL 47*  --  45* 47*   CREATININE mg/dL 1 46*  --  1 57* 1 87*   CALCIUM mg/dL 8 6  --  8 6 9 3       No orders to display       Portions of the record may have been created with voice recognition software  Occasional wrong word or "sound a like" substitutions may have occurred due to the inherent limitations of voice recognition software  Read the chart carefully and recognize, using context, where substitutions have occurred

## 2022-10-01 NOTE — H&P
[facility]  H&P- Beverly Zimmerman 1950, 74 y o  male MRN: [id number]  Unit/Bed#: S -01 Encounter: [id number]  Primary Care Provider: Andres Barraza MD   Date and time admitted to hospital: 9/30/2022  9:13 PM    * Rash  Assessment & Plan  · There is currently concern for vasculitis etiology and a need to rule out SJS  Not behaving like a bacterial rash / cellulitis  However, could still question other systemic infectious disease illnesses as potential factor as well  · Continue Prednisone 60 mg PO daily (increased today from 10-20 mg daily)  · Continue topical triamcinolone bid (just added today)  · Dermatology evaluation inpatient for punch biopsy  · Follow up cryoglobulins / pending serologies  · Could consider infectious disease evaluation after dermatology evaluation  · Consider also curbside consultation with rheumatology if dermatology evaluation unrevealing / inconclusive  Depression  Assessment & Plan  · Normally on fluoxetine  Will hold this medication for now until we can definitively rule out Green-nate syndrome  Although very rare (< 1%) incidence, it remains a possibility and risk outweighs benefit currently  · Monitor for any signs / symptoms of depression  Diarrhea  Assessment & Plan  · C-diff toxin and enteric panel PCR have been requested  Follow up results  Has had recent antibiotic use      LYLY (acute kidney injury) [facility])  Assessment & Plan  · Present on Admission? -  Yes  · Baseline Creatinine - 0 8 - 0 9  · Suspected Causes / Differential - intravascular depletion vs  unspecified Vasculitis vs  NSAID related  · IVF -  · Urinalysis with 1+ occult blood and trace protein with 0-1 Hyaline casts  Negative nitrites and leukocytes  Occasional bacteria ans squamous cells  · Monitor I/O   · BMP in am   · Avoid Hypotension -   · BP management -   · Not on any antihypertensives at baseline    · Use IV fluids judiciously (peripheral edema present) as needed for hydration of any intravascular depletion and for pressure support  · Monitor blood pressures  · Avoid Nephrotoxins and Adjust renally Excreted Medications -   · Hold meloxicam  · Nephrology Consultation -  Yes       EDGAR (dyspnea on exertion)  Assessment & Plan  · Differential - anginal equivalent vs  Anxiety vs  Mild pulmonary edema (although BNP not significantly elevated and CXR negative, does have peripheral edema)  · If cardiac workup is negative, consider outpatient PFTs  · Monitor oxygen saturations  Elevated troponin  Assessment & Plan  · Anticoagulation - Heparin drip  · Antiplatelet - Add low dose aspirin 81 mg PO daily  · Statin - None currently  · Beta Blocker - None  · Evaluation -   · Continued evaluation by cardiology team   At this time current recommendation is to continue w medical therapy and defer cardiac cath until work up for vasculitis/SJS is complete   · Check lipid panel  VTE Pharmacologic Prophylaxis: VTE Score: 4 Moderate Risk (Score 3-4) - Pharmacological DVT Prophylaxis Ordered: heparin drip  Code Status: Level 1 - Full Code   Discussion with family: Patient declined call to   Anticipated Length of Stay: Patient will be admitted on an inpatient basis with an anticipated length of stay of greater than 2 midnights secondary to continued evalaution of: petechia/blistering rash, LYLY  Total Time for Visit, including Counseling / Coordination of Care: 30 minutes Greater than 50% of this total time spent on direct patient counseling and coordination of care  Chief Complaint: rash    History of Present Illness:  Ana Chavez is a 74 y o  male patient received as a transfer from the Emergency Department at [facility] today    He had presented there in the early morning hours of 9/30/2022 with complaint of a blister containing rash on his arms and legs which he first noticed when cleaning his backyard around August 26th  The lesions were first noted on the legs and it was felt that they might have been insect bites and was somewhat papular in appearance  He was placed on some steroids with a tapering dose  However, despite this, rash continued to worsen  He contacted his PCP once again and decision was made to trial keflex and mupirocin ointment and he was given additional prednisone dosing using 10 mg bid for 5 days and then once daily for the following 5 days  However, even with those treatments, rash continued to worsen and took on more of a blister appearance and he also developed a petechial rash on both arms and the lower back  He went to the emergency department at [facility] [place] on 9/25/2022 and dermatology was contacted  The patient was scheduled for follow up with dermatology as outpatient for punch biopsy but he missed that office visit due to severe diarrhea after taking levaquin for positive wound cultures taken from leg wounds (present since Labor day) that grew Pseudomonas species  His dermatology appointment was rescheduled for next Tuesday but he presented to the [facility] [place] ER once again in the early morning hours of 9/30/2022 as the rash continued to spread  Request was made for transfer to our campus at [address] [place] [address] for inpatient dermatologist evaluation especially as there was some mention of a concern for potential Shameka Josseline Syndrome  In the meantime, the patient had been also complaining of some exertional dyspnea without any chest pain  Patient uses wheelchair for ambulation, and noted that EDGAR started after he experienced significant diarrhea earlier in the week  When EKG was done it showed some nonspecific ST/T wave abnormalities of uncertain significance as there were no prior EKGs for comparison  Decision was made to check troponins and these came back elevated at 199, 268, and 461 sequentially with a 4 hour delta troponin of 262  BNP was only 96  Cardiology was consulted at the [address] [place] [address] and recommendation was made for patient to be placed on ACS protocol heparin drip and an echocardiogram was requested which has shown normal systolic function with a LVEF of 55% with no evident regional wall motion abnormalities but it was noted that the quality of the study prevented from complete exclusion of wall motion abnormalities  Grade 1 diastolic dysfunction with mild mitral and tricuspid regurgitation was also mentioned  At time of transfer, plan is to continue medical therapy and pursue OP ischemic work up once vasculitis/SJS is further evaluated  Nephrology was consulted at [address] [place] [address] as patient was noted to have LYLY  There was concerned for potential vasculitic etiology for this LYLY, but did also note patient's prehospital use of Mobic as well  Urinalysis showed microscopic hematuria and in combination with the rash and other systemic symptoms, decision made to initiate a rheumatologic / vasculitis assessment  So far it is known that ANCA, MARIO ALBERTO, RF, and complements are normal but cryoglobulin and anti-GBM antibodies are pending still  ESR and CRP was elevated and WBC is also elevated on labs done so far (but patient has been on steroid too)  He is afebrile with stable vitals and does not meet sepsis criteria  Nephrology at [address] [place] [address] recommends Prednisone 60 mg PO daily  The suggest the possibility of a renal biopsy if pending serologies do not yield a clear cause  The patient has been noted to have peripheral edema and is felt to be intravascularly depleted in the setting of hypoalbuminemia and longer term inflammation from underlying condition which increase risk of third spacing  For the several episodes of diarrhea that have occurred over the past few weeks, gastroenterology consulted at [address] [place] [address]  He had negative C diff testing   His diarrhea has since resolved, and no further intervention is planned    Upon [facility] to [facility], patient offers no new complaints  He does not feel there has been much change in his rash over the last 24 hours  He notes that he no longer feels EDGAR, citing that he did not feel SOB when transferring from stretcher to hospital bed  He continues to deny chest pain  Review of Systems:  Review of Systems   Constitutional: Negative  HENT: Negative  Eyes: Negative  Respiratory: Negative  Cardiovascular: Negative  Gastrointestinal: Negative  Genitourinary: Negative  Musculoskeletal: Negative  Skin: Positive for color change, rash and wound  Neurological: Negative  Hematological: Negative  Psychiatric/Behavioral: Negative  Past Medical and Surgical History:   Past Medical History:   Diagnosis Date   • Depression    • Gout        Past Surgical History:   Procedure Laterality Date   • BACK SURGERY      X2 LUMBAR INCLUDING FUSION  2004, 2015 WITH [facility], [facility] DR Jarred Sneed   • CATARACT EXTRACTION, BILATERAL     • KNEE ARTHROSCOPY Right     x2 , meniscus repair   • TONSILECTOMY AND ADNOIDECTOMY         Meds/Allergies:  Prior to Admission medications    Medication Sig Start Date End Date Taking?  Authorizing Provider   allopurinol (ZYLOPRIM) 100 mg tablet Take 100 mg by mouth 2 (two) times a day    Historical Provider, MD   cholecalciferol (VITAMIN D3) 1,000 units tablet Take 2,000 Units by mouth daily    Historical Provider, MD   FLUoxetine (PROzac) 20 mg capsule Take 20 mg by mouth daily  2/8/21   Historical Provider, MD   levofloxacin (LEVAQUIN) 750 mg tablet Take 1 tablet (750 mg total) by mouth daily for 7 days 9/26/22 10/3/22  BRETT Grajeda   loratadine (CLARITIN) 10 mg tablet Take 10 mg by mouth as needed for allergies    Historical Provider, MD   Magnesium 100 MG CAPS Take 1 capsule by mouth daily    Historical Provider, MD   meloxicam (MOBIC) 15 mg tablet Take 15 mg by mouth daily    Historical Provider, MD Weller N Tanna Corbett (OSTEO BI-FLEX/5-LOXIN ADVANCED PO) Take 1 tablet by mouth daily     Historical Provider, MD   Multiple Vitamins-Minerals (CENTRUM SILVER 50+MEN PO) Take 1 tablet by mouth daily    Historical Provider, MD   mupirocin (BACTROBAN) 2 % ointment apply by topical route2- 3 times every day a small amount to the affected area 22   Historical Provider, MD   predniSONE 10 mg tablet take 2 tablets  by oral route  every day for 5 days     then 1 tab for 5 days then stop  22   Historical Provider, MD   SUPER B COMPLEX/C PO Take 1 tablet by mouth daily    Historical Provider, MD     I have reviewed home medications using recent Epic encounter  Allergies: Allergies   Allergen Reactions   • Other      IVORY SOAP   • Penicillins Rash       Social History:  Marital Status: /Civil Union   Occupation:   Patient Pre-hospital Living Situation: Home, With spouse  Patient Pre-hospital Level of Mobility: manual wheelchair  Patient Pre-hospital Diet Restrictions: none  Substance Use History:   Social History     Substance and Sexual Activity   Alcohol Use Not Currently    Comment: occasional     Social History     Tobacco Use   Smoking Status Former Smoker   • Quit date:    • Years since quittin 7   Smokeless Tobacco Never Used     Social History     Substance and Sexual Activity   Drug Use Never       Family History:  Family History   Problem Relation Age of Onset   • Diabetes Father    • Diabetes Brother    • Diabetes Sister        Physical Exam:     Vitals:   Blood Pressure: 130/66 (22)  Pulse: 66 (22)  Temperature: 98 1 °F (36 7 °C) (22)  Temp Source: Oral (22)  Respirations: 18 (22)  SpO2: 99 % (22)    Physical Exam  Constitutional:       General: He is not in acute distress  Appearance: Normal appearance  He is obese  He is not ill-appearing  HENT:      Head: Normocephalic and atraumatic  Mouth/Throat:      Pharynx: Oropharynx is clear  Comments: No mucosal lesions  Eyes:      Pupils: Pupils are equal, round, and reactive to light  Cardiovascular:      Rate and Rhythm: Normal rate and regular rhythm  Heart sounds: No murmur heard  No friction rub  No gallop  Pulmonary:      Effort: Pulmonary effort is normal       Breath sounds: Normal breath sounds  No wheezing or rales  Abdominal:      General: Abdomen is flat  Bowel sounds are normal       Palpations: Abdomen is soft  Tenderness: There is no abdominal tenderness  Musculoskeletal:      Right lower leg: Edema present  Left lower leg: Edema present  Comments: Edema of bilateral UE as well   Skin:     General: Skin is warm and dry  Findings: Lesion (patient w/ multiple wounds to bilateral LE and UE in various stages (some still blisters, some ruptured)) and rash (petechial rash-- plantar surface of bilateral feet, bilateral LE, bilateral dorsal surface UE, low back; anterior trunk is mostly spared w/ exception of small area periumbilical) present  Neurological:      General: No focal deficit present  Mental Status: He is alert and oriented to person, place, and time  Mental status is at baseline     Psychiatric:         Mood and Affect: Mood normal          Additional Data:     Lab Results:  Results from last 7 days   Lab Units 09/30/22  0113   WBC Thousand/uL 16 83*   HEMOGLOBIN g/dL 13 8   HEMATOCRIT % 41 3   PLATELETS Thousands/uL 440*   NEUTROS PCT % 81*   LYMPHS PCT % 10*   MONOS PCT % 5   EOS PCT % 2     Results from last 7 days   Lab Units 09/30/22  1019 09/30/22  0113   SODIUM mmol/L 131* 130*   POTASSIUM mmol/L 4 8 4 7   CHLORIDE mmol/L 98 97   CO2 mmol/L 21 23   BUN mg/dL 45* 47*   CREATININE mg/dL 1 57* 1 87*   ANION GAP mmol/L 12 10   CALCIUM mg/dL 8 6 9 3   ALBUMIN g/dL  --  3 1*   TOTAL BILIRUBIN mg/dL  --  0 84   ALK PHOS U/L  --  64   ALT U/L  --  73*   AST U/L  --  59*   GLUCOSE RANDOM mg/dL 153* 153*                 Results from last 7 days   Lab Units 09/30/22  0113   LACTIC ACID mmol/L 1 6       Imaging: Reviewed radiology reports from this admission including: chest xray and ECHO  No orders to display       EKG and Other Studies Reviewed on Admission:   · EKG: NSR  HR 68     ** Please Note: This note has been constructed using a voice recognition system   **

## 2022-10-01 NOTE — ASSESSMENT & PLAN NOTE
· Anticoagulation - Heparin drip  · Antiplatelet - Add low dose aspirin 81 mg PO daily  · Statin - None currently  · Beta Blocker - None  · Evaluation -   · Continued evaluation by cardiology team   At this time current recommendation is to continue w medical therapy and defer cardiac cath until work up for vasculitis/SJS is complete   · Check lipid panel

## 2022-10-01 NOTE — ASSESSMENT & PLAN NOTE
· Concern for vasculitis etiology especially given systemic involvement  Less likely SJS given renal involvement and normal oral mucosa  · Status post skin punch biopsy 10/1  Dermatology input appreciated  Discussed with dermatology about wound care and ID consult  · Continue Prednisone 60 mg PO daily   · Continue topical triamcinolone bid  · Follow up cryoglobulins / pending serologies

## 2022-10-01 NOTE — ASSESSMENT & PLAN NOTE
· There is currently concern for vasculitis etiology and a need to rule out SJS  Not behaving like a bacterial rash / cellulitis  However, could still question other systemic infectious disease illnesses as potential factor as well  · Continue Prednisone 60 mg PO daily (increased today from 10-20 mg daily)  · Continue topical triamcinolone bid (just added today)  · Dermatology evaluation inpatient for punch biopsy  · Follow up cryoglobulins / pending serologies  · Could consider infectious disease evaluation after dermatology evaluation  · Consider also curbside consultation with rheumatology if dermatology evaluation unrevealing / inconclusive

## 2022-10-01 NOTE — ASSESSMENT & PLAN NOTE
· Present on Admission  Baseline Creatinine - 0 8 - 0 9  · Suspected Causes / Differential - intravascular depletion vs  unspecified Vasculitis vs  NSAID related  · Urinalysis with 1+ occult blood and trace protein with 0-1 Hyaline casts  Negative nitrites and leukocytes  Occasional bacteria ans squamous cells  ANCA/MARIO ALBERTO Negative  Normal  complement  Pending anti GBM  and cryoglobulin  · Avoid Hypotension - Monitor blood pressures    · Avoid Nephrotoxins and Adjust renally Excreted Medications - Hold meloxicam  · Nephrology Consultation

## 2022-10-01 NOTE — CONSULTS
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    Physician Requesting Consult:  Tyrone Andrews MD    Cardiologist:  Dr Jasvir Cordoba MD      Assessment/Plan:  Elevated troponin the setting possible vasculitis or Green-Adelso syndrome  Has no cardiac symptoms except for dyspnea on exertion with this severe episode of diarrhea   Normal ejection fraction and wall motion  Discussed with Dr Karol Leo for need for further cardiac evaluation - do not see indication for cardiac catheterization or nuclear stress test this time  Rash - dermatology and vascular evaluation for rash  Dyspnea on exertion - SpO2 is 99% on room air  Patient denies any further dyspnea on exertion          HPI: Jermaine Chandler 74y o  year old male who presents with to [facility] [address] emergency room secondary to dyspnea on exertion  At that time he denied any chest pain, pressure tightness, burning, syncope or palpitations  His EKG showed no ischemic changes  His troponins were elevated first set 268/69 second set 461/262  Patient has no prior cardiac history  2D echocardiogram revealed ejection fraction of 55% no significant regional wall motion abnormality or valvular heart disease  There is no family history of heart disease  Patient has been treated for multiple medical problems in the past since early September  Patient had UTI in August 2022 and was placed on multiple antibiotics  He developed a rash on his hands, feet, groin, arms  He was placed on  steroids, and Bactroban cream for worsening rash  Cultures were done in the emergency room on 9/25/22 at [facility] which grew Pseudomonas  Patient was started on Levaquin    However he developed severe diarrhea on the Levaquin and at that time he developed dyspnea on exertion  Of the primarily wheelchair bound bound for the last 5 years to his lumbar plexopathy and has lost sensation from his knees down and both legs  Patient also had back surgery x2 ( and )  Prior to the development of the rash he was riding a stationary bike for for 1 5 miles/day  Patient is able to stand and transfer to his wheelchair  He sleeps in a hospital bed at home  Patient states that his shortness of breath developed during and after of the severe bout of diarrhea from the Levaquin  Prior to that event he had no difficulty breathing  Family History:   Family History   Problem Relation Age of Onset   • Diabetes Father    • Diabetes Brother    • Diabetes Sister      Historical Information   Past Medical History:   Diagnosis Date   • Depression    • Gout      Past Surgical History:   Procedure Laterality Date   • BACK SURGERY      X2 LUMBAR INCLUDING FUSION  ,  WITH OAA, LV[facility] DR Carolina Blakely   • CATARACT EXTRACTION, BILATERAL     • KNEE ARTHROSCOPY Right     x2 , meniscus repair   • TONSILECTOMY AND ADNOIDECTOMY       Social History   Social History     Substance and Sexual Activity   Alcohol Use Not Currently    Comment: occasional     Social History     Substance and Sexual Activity   Drug Use Never     Social History     Tobacco Use   Smoking Status Former Smoker   • Quit date:    • Years since quittin 7   Smokeless Tobacco Never Used     Family History:   Family History   Problem Relation Age of Onset   • Diabetes Father    • Diabetes Brother    • Diabetes Sister        Review of Systems:  Review of Systems   Constitutional: Positive for activity change  HENT: Negative  Eyes: Negative  Cardiovascular: Positive for leg swelling  Gastrointestinal: Negative  Genitourinary: Negative  Musculoskeletal: Positive for joint swelling  Neurological: Positive for weakness  Bilateral lower leg neuropathy    Unable to ambulate Psychiatric/Behavioral: Negative  Scheduled Meds:  Current Facility-Administered Medications   Medication Dose Route Frequency Provider Last Rate   • heparin (porcine)  3-20 Units/kg/hr (Order-Specific) Intravenous Titrated Nancy Freeman PA-C     • predniSONE  60 mg Oral Daily Maxine Freeman PA-C     • triamcinolone   Topical BID Maxine Freeman PA-C       Continuous Infusions:heparin (porcine), 3-20 Units/kg/hr (Order-Specific)      PRN Meds:  PTA meds:   Prior to Admission Medications   Prescriptions Last Dose Informant Patient Reported? Taking? FLUoxetine (PROzac) 20 mg capsule   Yes No   Sig: Take 20 mg by mouth daily    Magnesium 100 MG CAPS  Self Yes No   Sig: Take 1 capsule by mouth daily   Misc Natural Products (OSTEO BI-FLEX/5-LOXIN ADVANCED PO)   Yes No   Sig: Take 1 tablet by mouth daily    Multiple Vitamins-Minerals (CENTRUM SILVER 50+MEN PO)  Self Yes No   Sig: Take 1 tablet by mouth daily   SUPER B COMPLEX/C PO  Self Yes No   Sig: Take 1 tablet by mouth daily   allopurinol (ZYLOPRIM) 100 mg tablet  Self Yes No   Sig: Take 100 mg by mouth 2 (two) times a day   cholecalciferol (VITAMIN D3) 1,000 units tablet   Yes No   Sig: Take 2,000 Units by mouth daily   levofloxacin (LEVAQUIN) 750 mg tablet   No No   Sig: Take 1 tablet (750 mg total) by mouth daily for 7 days   loratadine (CLARITIN) 10 mg tablet   Yes No   Sig: Take 10 mg by mouth as needed for allergies   meloxicam (MOBIC) 15 mg tablet  Self Yes No   Sig: Take 15 mg by mouth daily   mupirocin (BACTROBAN) 2 % ointment   Yes No   Sig: apply by topical route2- 3 times every day a small amount to the affected area   predniSONE 10 mg tablet   Yes No   Sig: take 2 tablets  by oral route  every day for 5 days     then 1 tab for 5 days then stop        Facility-Administered Medications: None       Allergies   Allergen Reactions   • Other      IVORY SOAP   • Penicillins Rash       Objective   Vitals: Blood pressure 126/67, pulse 78, temperature (!) 97 4 °F (36 3 °C), temperature source Oral, resp  rate 18, SpO2 99 %  , There is no height or weight on file to calculate BMI ,   Orthostatic Blood Pressures    Flowsheet Row Most Recent Value   Blood Pressure 126/67 filed at 10/01/2022 0731   Patient Position - Orthostatic VS Lying filed at 10/01/2022 0731            Intake/Output Summary (Last 24 hours) at 10/1/2022 0831  Last data filed at 10/1/2022 0801  Gross per 24 hour   Intake --   Output 1150 ml   Net -1150 ml       Invasive Devices  Report    Peripheral Intravenous Line  Duration           Peripheral IV 09/30/22 Left Hand 1 day    Peripheral IV 09/30/22 Right Antecubital 1 day                Physical Exam:  Physical Exam  Vitals reviewed  Constitutional:       Appearance: Normal appearance  HENT:      Head: Normocephalic and atraumatic  Mouth/Throat:      Mouth: Mucous membranes are moist    Neck:      Vascular: No carotid bruit  Cardiovascular:      Rate and Rhythm: Normal rate and regular rhythm  Heart sounds: Normal heart sounds  Pulmonary:      Effort: Pulmonary effort is normal       Breath sounds: Normal breath sounds  Abdominal:      General: Bowel sounds are normal       Palpations: Abdomen is soft  Musculoskeletal:         General: Swelling present  Right lower leg: Edema present  Left lower leg: Edema present  Skin:     Capillary Refill: Capillary refill takes more than 3 seconds  Findings: Rash present  Comments: Red Rash and petechiae on hands,arms abdomen groin legs and feet   Neurological:      Mental Status: He is alert and oriented to person, place, and time     Psychiatric:         Behavior: Behavior normal          Lab Results:   Recent Results (from the past 24 hour(s))   Echo complete w/ contrast if indicated    Collection Time: 09/30/22  9:45 AM   Result Value Ref Range    LVOT stroke volume 64 74     LVOT stroke volume index 25 20 ml/m2    LVIDd 5 00 cm LVIDS 3 40 cm    IVSd 1 10 cm    LVPWd 1 10 cm    FS 32 28 - 44    MV E' Tissue Velocity Septal 7 cm/s    E wave deceleration time 302 ms    MV Peak E Rosendo 53 cm/s    MV Peak A Rosendo 0 71 m/s    AV LVOT peak gradient 3 mmHg    LVOT peak VTI 17 04 cm    LVOT peak rosendo 0 8 m/s    LA size 3 6 cm    LVOT diameter 2 2 cm    Aortic valve peak velocity 1 31 m/s    Ao VTI 25 03 cm    AV mean gradient 4 mmHg    LVOT mn grad 2 0 mmHg    AV peak gradient 7 mmHg    AV area by cont VTI 2 6 cm2    AV area peak rosendo 2 3 cm2    MV stenosis pressure 1/2 time 88 ms    MV valve area p 1/2 method 2 50     Ao root 3 80 cm    Aortic valve mean velocity 9 40 m/s    Left ventricular stroke volume (2D) 71 00 mL    IVS 1 1 cm    LEFT VENTRICLE SYSTOLIC VOLUME (MOD BIPLANE) 2D 48 mL    LV DIASTOLIC VOLUME (MOD BIPLANE) 2D 119 mL    LVSV, 2D 71 mL    LV EF 55     LVOT area 3 80 cm2    Dimensionless velociy index 0 61     AV valve area 2 59 cm2   UA w Reflex to Microscopic w Reflex to Culture    Collection Time: 09/30/22 10:17 AM    Specimen: Urine, Other   Result Value Ref Range    Color, UA Yellow Yellow, Straw    Clarity, UA Clear Hazy, Clear    Specific Gravity, UA 1 025 1 005 - 1 030    pH, UA 5 5 5 0, 5 5, 6 0, 6 5, 7 0, 7 5    Leukocytes, UA Negative Negative    Nitrite, UA Negative Negative    Protein, UA Trace (A) Negative, Interference- unable to analyze mg/dl    Glucose, UA Negative Negative mg/dl    Ketones, UA Negative Negative mg/dl    Urobilinogen, UA 0 2 0 2, 1 0 E U /dl E U /dl    Bilirubin, UA Negative Negative    Occult Blood, UA 1+ (A) Negative   Urine Microscopic    Collection Time: 09/30/22 10:17 AM   Result Value Ref Range    RBC, UA 1-2 None Seen, 0-1, 1-2, 2-4, 0-5 /hpf    WBC, UA 0-1 None Seen, 0-1, 1-2, 0-5, 2-4 /hpf    Epithelial Cells Occasional None Seen, Occasional /hpf    Bacteria, UA Occasional None Seen, Occasional /hpf    Hyaline Casts, UA 0-1 (A) (none) /lpf    Fine granular casts 0-1 /lpf    MUCUS THREADS Occasional (A) None Seen   Protein / creatinine ratio, urine    Collection Time: 09/30/22 10:19 AM   Result Value Ref Range    Creatinine, Ur 134 0 mg/dL    Protein Urine Random 54 mg/dL    Prot/Creat Ratio, Ur 0 40 (H) 0 00 - 6 78   Basic metabolic panel    Collection Time: 09/30/22 10:19 AM   Result Value Ref Range    Sodium 131 (L) 135 - 147 mmol/L    Potassium 4 8 3 5 - 5 3 mmol/L    Chloride 98 96 - 108 mmol/L    CO2 21 21 - 32 mmol/L    ANION GAP 12 4 - 13 mmol/L    BUN 45 (H) 5 - 25 mg/dL    Creatinine 1 57 (H) 0 60 - 1 30 mg/dL    Glucose 153 (H) 65 - 140 mg/dL    Calcium 8 6 8 4 - 10 2 mg/dL    eGFR 42 ml/min/1 73sq m   APTT six (6) hours after Heparin bolus/drip initiation or dosing change    Collection Time: 09/30/22 11:26 AM   Result Value Ref Range    PTT 36 23 - 37 seconds   APTT    Collection Time: 09/30/22  6:37 PM   Result Value Ref Range    PTT 57 (H) 23 - 37 seconds   CBC    Collection Time: 09/30/22 10:00 PM   Result Value Ref Range    WBC 16 42 (H) 4 31 - 10 16 Thousand/uL    RBC 4 07 3 88 - 5 62 Million/uL    Hemoglobin 12 2 12 0 - 17 0 g/dL    Hematocrit 37 1 36 5 - 49 3 %    MCV 91 82 - 98 fL    MCH 30 0 26 8 - 34 3 pg    MCHC 32 9 31 4 - 37 4 g/dL    RDW 13 5 11 6 - 15 1 %    Platelets 935 (H) 380 - 390 Thousands/uL    MPV 8 6 (L) 8 9 - 12 7 fL   APTT    Collection Time: 09/30/22 10:00 PM   Result Value Ref Range    PTT 69 (H) 23 - 37 seconds   Protime-INR    Collection Time: 09/30/22 10:00 PM   Result Value Ref Range    Protime 16 3 (H) 11 6 - 14 5 seconds    INR 1 29 (H) 0 84 - 1 19   APTT    Collection Time: 10/01/22  4:00 AM   Result Value Ref Range    PTT 85 (H) 23 - 37 seconds   Basic metabolic panel    Collection Time: 10/01/22  4:04 AM   Result Value Ref Range    Sodium 134 (L) 135 - 147 mmol/L    Potassium 4 6 3 5 - 5 3 mmol/L    Chloride 103 96 - 108 mmol/L    CO2 22 21 - 32 mmol/L    ANION GAP 9 4 - 13 mmol/L    BUN 47 (H) 5 - 25 mg/dL    Creatinine 1 46 (H) 0 60 - 1 30 mg/dL Glucose 127 65 - 140 mg/dL    Calcium 8 6 8 4 - 10 2 mg/dL    eGFR 46 ml/min/1 73sq m   CBC (With Platelets)    Collection Time: 10/01/22  4:04 AM   Result Value Ref Range    WBC 16 81 (H) 4 31 - 10 16 Thousand/uL    RBC 4 06 3 88 - 5 62 Million/uL    Hemoglobin 12 2 12 0 - 17 0 g/dL    Hematocrit 36 7 36 5 - 49 3 %    MCV 90 82 - 98 fL    MCH 30 0 26 8 - 34 3 pg    MCHC 33 2 31 4 - 37 4 g/dL    RDW 13 6 11 6 - 15 1 %    Platelets 070 (H) 193 - 390 Thousands/uL    MPV 8 4 (L) 8 9 - 12 7 fL   ]    Imaging: I have personally reviewed pertinent reports  EKG:  Normal sinus rhythm with left anterior fascicular hemiblock  CHEST X-RAY:  No active disease  ECHO:  Ejection fraction 65% no regional wall motion abnormalities    Code Status:  Level 1 full code    Epic/ Allscripts/Care Everywhere records reviewed:     * Please Note: Fluency DirectDictation voice to text software may have been used in the creation of this document   **

## 2022-10-01 NOTE — ASSESSMENT & PLAN NOTE
· Normally on fluoxetine  Will hold this medication for now until we can definitively rule out Green-nate syndrome  Although very rare (< 1%) incidence, it remains a possibility and risk outweighs benefit currently  · Monitor for any signs / symptoms of depression

## 2022-10-01 NOTE — ASSESSMENT & PLAN NOTE
· C-diff toxin and enteric panel PCR have been requested  Follow up results   Has had recent antibiotic use  · Now resolved

## 2022-10-01 NOTE — ASSESSMENT & PLAN NOTE
· Present on Admission? - Yes  · Baseline Creatinine - 0 8 - 0 9  · Suspected Causes / Differential - intravascular depletion vs  unspecified Vasculitis vs  NSAID related  · IVF - monitor off overnight  · Urinalysis with 1+ occult blood and trace protein with 0-1 Hyaline casts  Negative nitrites and leukocytes  Occasional bacteria ans squamous cells  · Monitor I/O   · BMP in am   · Avoid Hypotension -   · BP management -   · Not on any antihypertensives at baseline  · Use IV fluids judiciously (peripheral edema present) as needed for hydration of any intravascular depletion and for pressure support  · Monitor blood pressures    · Avoid Nephrotoxins and Adjust renally Excreted Medications -   · Hold meloxicam  · Nephrology Consultation - Yes

## 2022-10-01 NOTE — ASSESSMENT & PLAN NOTE
· Differential - anginal equivalent vs  Anxiety vs  Mild pulmonary edema (although BNP not significantly elevated and CXR negative, does have peripheral edema)  · At this time, patient reports EDGAR has improved-- he did not feel SOB transferring from stretcher to hospital bed   · If cardiac workup is negative, consider outpatient PFTs  · Monitor oxygen saturations

## 2022-10-01 NOTE — PROGRESS NOTES
[facility]  Progress Note - Nadia Paci 1950, 74 y o  male MRN: [id number]  Unit/Bed#: S -01 Encounter: [id number]  Primary Care Provider: Sarah Javed MD   Date and time admitted to hospital: 9/30/2022  9:13 PM    * Rash due to vaculitis   Assessment & Plan  · Concern for vasculitis etiology especially given systemic involvement  Less likely SJS given renal involvement and normal oral mucosa  · Status post skin punch biopsy 10/1  Dermatology input appreciated  Discussed with dermatology about wound care and ID consult  · Continue Prednisone 60 mg PO daily   · Continue topical triamcinolone bid  · Follow up cryoglobulins / pending serologies  LY[facility] (acute kidney injury) [facility])  Assessment & Plan  · Present on Admission  Baseline Creatinine - 0 8 - 0 9  · Suspected Causes / Differential - intravascular depletion vs  unspecified Vasculitis vs  NSAID related  · Urinalysis with 1+ occult blood and trace protein with 0-1 Hyaline casts  Negative nitrites and leukocytes  Occasional bacteria ans squamous cells  ANCA/MARIO ALBERTO Negative  Normal  complement  Pending anti GBM  and cryoglobulin  · Avoid Hypotension - Monitor blood pressures  · Avoid Nephrotoxins and Adjust renally Excreted Medications - Hold meloxicam  · Nephrology Consultation     Elevated troponin  Assessment & Plan  · [place] non MI trop  Echo EF of 55% with overall no wall motion abnormalities  No additional inpt cardiac workup per cardiology  Ambulatory dysfunction  Assessment & Plan  · Multifactorial gait dysfunction 2/2  multiple back surgeries,  Chronic lumbar sacral  plexopathy,  Polyneuropathy  ·  He uses manual  Wheelchairs   Able to pivot from bed to wheelchair     Leucocytosis  Assessment & Plan  Steroid induced vs rule out infection  Pending cultures    VTE Pharmacologic Prophylaxis:   Pharmacologic: Heparin  Mechanical VTE Prophylaxis in Place: Yes    Patient Centered Rounds:    Discussions with Specialists or Other Care Team Provider: Derm    Education and Discussions with Family / Patient: Pt    Time Spent for Care: 20 minutes  More than 50% of total time spent on counseling and coordination of care as described above  Current Length of Stay: 1 day(s)    Current Patient Status: Inpatient   Certification Statement: The patient will continue to require additional inpatient hospital stay due to above/    Discharge Plan: > 48 hours for close monitoring    Code Status: Level 1 - Full Code      Subjective:    Patient seen and examined  Reports that his generalized rash has not spreaded  since the initiation of steroid  Denies pain from his legs although he does have polyneuropathy  Afebrile overnight  Resting comfortable in bed and denies dyspnea or chest    Objective:     Vitals:   Temp (24hrs), Av 9 °F (36 6 °C), Min:97 4 °F (36 3 °C), Max:98 1 °F (36 7 °C)    Temp:  [97 4 °F (36 3 °C)-98 1 °F (36 7 °C)] 98 1 °F (36 7 °C)  HR:  [63-78] 71  Resp:  [18-19] 18  BP: (126-141)/(60-67) 130/66  SpO2:  [98 %-100 %] 98 %  There is no height or weight on file to calculate BMI  Input and Output Summary (last 24 hours): Intake/Output Summary (Last 24 hours) at 10/1/2022 1901  Last data filed at 10/1/2022 1701  Gross per 24 hour   Intake --   Output 1450 ml   Net -1450 ml       Physical Exam:     Physical Exam  Constitutional:       General: He is not in acute distress  Appearance: He is not toxic-appearing or diaphoretic  Eyes:      General:         Right eye: No discharge  Left eye: No discharge  Cardiovascular:      Rate and Rhythm: Normal rate and regular rhythm  Pulses: Normal pulses  Pulmonary:      Effort: Pulmonary effort is normal  No respiratory distress  Breath sounds: No wheezing  Abdominal:      General: Bowel sounds are normal       Palpations: Abdomen is soft  Musculoskeletal:         General: Swelling present     Skin:     Comments: Diffuse petechial rash in soles, palms, LE, lower back and anterior trunk  Necrotic ulcers with eschar scattered anterior shins   Neurological:      Mental Status: He is oriented to person, place, and time  Psychiatric:         Mood and Affect: Mood normal          Behavior: Behavior normal          Additional Data:     Labs:    Results from last 7 days   Lab Units 10/01/22  0404 09/30/22  2200 09/30/22  0113   WBC Thousand/uL 16 81*   < > 16 83*   HEMOGLOBIN g/dL 12 2   < > 13 8   HEMATOCRIT % 36 7   < > 41 3   PLATELETS Thousands/uL 433*   < > 440*   NEUTROS PCT %  --   --  81*   LYMPHS PCT %  --   --  10*   MONOS PCT %  --   --  5   EOS PCT %  --   --  2    < > = values in this interval not displayed  Results from last 7 days   Lab Units 10/01/22  0404 09/30/22  1019 09/30/22  0113   POTASSIUM mmol/L 4 6   < > 4 7   CHLORIDE mmol/L 103   < > 97   CO2 mmol/L 22   < > 23   BUN mg/dL 47*   < > 47*   CREATININE mg/dL 1 46*   < > 1 87*   CALCIUM mg/dL 8 6   < > 9 3   ALK PHOS U/L  --   --  64   ALT U/L  --   --  73*   AST U/L  --   --  59*    < > = values in this interval not displayed  Results from last 7 days   Lab Units 09/30/22  2200   INR  1 29*         Recent Cultures (last 7 days):     Results from last 7 days   Lab Units 09/30/22  0113   BLOOD CULTURE  No Growth at 24 hrs  No Growth at 24 hrs  Last 24 Hours Medication List:   Current Facility-Administered Medications   Medication Dose Route Frequency Provider Last Rate   • famotidine  20 mg Oral Daily Cora Chung MD     • heparin (porcine)  5,000 Units Subcutaneous [facility] Cora Chung MD     • predniSONE  60 mg Oral Daily Darwin Freeman PA-C     • torsemide  10 mg Oral Daily Shravan Perez MD     • triamcinolone   Topical BID Awa Shin PA-C          Today, Patient Was Seen By: Cora Chung    ** Please Note: Dictation voice to text software may have been used in the creation of this document   **

## 2022-10-02 LAB
ALBUMIN SERPL BCP-MCNC: 2.8 G/DL (ref 3.5–5)
ALP SERPL-CCNC: 55 U/L (ref 34–104)
ALT SERPL W P-5'-P-CCNC: 72 U/L (ref 7–52)
ANION GAP SERPL CALCULATED.3IONS-SCNC: 6 MMOL/L (ref 4–13)
APTT PPP: 30 SECONDS (ref 23–37)
AST SERPL W P-5'-P-CCNC: 47 U/L (ref 13–39)
BACTERIA BLD CULT: NORMAL
BACTERIA BLD CULT: NORMAL
BASOPHILS # BLD AUTO: 0.05 THOUSANDS/ÂΜL (ref 0–0.1)
BASOPHILS NFR BLD AUTO: 0 % (ref 0–1)
BILIRUB SERPL-MCNC: 0.44 MG/DL (ref 0.2–1)
BUN SERPL-MCNC: 44 MG/DL (ref 5–25)
CALCIUM ALBUM COR SERPL-MCNC: 9.2 MG/DL (ref 8.3–10.1)
CALCIUM SERPL-MCNC: 8.2 MG/DL (ref 8.4–10.2)
CHLORIDE SERPL-SCNC: 104 MMOL/L (ref 96–108)
CO2 SERPL-SCNC: 24 MMOL/L (ref 21–32)
CREAT SERPL-MCNC: 1.37 MG/DL (ref 0.6–1.3)
EOSINOPHIL # BLD AUTO: 0 THOUSAND/ÂΜL (ref 0–0.61)
EOSINOPHIL NFR BLD AUTO: 0 % (ref 0–6)
ERYTHROCYTE [DISTWIDTH] IN BLOOD BY AUTOMATED COUNT: 13.7 % (ref 11.6–15.1)
GFR SERPL CREATININE-BSD FRML MDRD: 50 ML/MIN/1.73SQ M
GLUCOSE SERPL-MCNC: 124 MG/DL (ref 65–140)
HCT VFR BLD AUTO: 34.8 % (ref 36.5–49.3)
HGB BLD-MCNC: 11.4 G/DL (ref 12–17)
IMM GRANULOCYTES # BLD AUTO: 0.16 THOUSAND/UL (ref 0–0.2)
IMM GRANULOCYTES NFR BLD AUTO: 1 % (ref 0–2)
LYMPHOCYTES # BLD AUTO: 1.25 THOUSANDS/ÂΜL (ref 0.6–4.47)
LYMPHOCYTES NFR BLD AUTO: 8 % (ref 14–44)
MCH RBC QN AUTO: 30.3 PG (ref 26.8–34.3)
MCHC RBC AUTO-ENTMCNC: 32.8 G/DL (ref 31.4–37.4)
MCV RBC AUTO: 93 FL (ref 82–98)
MONOCYTES # BLD AUTO: 0.83 THOUSAND/ÂΜL (ref 0.17–1.22)
MONOCYTES NFR BLD AUTO: 5 % (ref 4–12)
NEUTROPHILS # BLD AUTO: 13.91 THOUSANDS/ÂΜL (ref 1.85–7.62)
NEUTS SEG NFR BLD AUTO: 86 % (ref 43–75)
NRBC BLD AUTO-RTO: 0 /100 WBCS
PLATELET # BLD AUTO: 407 THOUSANDS/UL (ref 149–390)
PMV BLD AUTO: 8.4 FL (ref 8.9–12.7)
POTASSIUM SERPL-SCNC: 4.8 MMOL/L (ref 3.5–5.3)
PROT SERPL-MCNC: 5.4 G/DL (ref 6.4–8.4)
RBC # BLD AUTO: 3.76 MILLION/UL (ref 3.88–5.62)
SODIUM SERPL-SCNC: 134 MMOL/L (ref 135–147)
WBC # BLD AUTO: 16.2 THOUSAND/UL (ref 4.31–10.16)

## 2022-10-02 PROCEDURE — 80053 COMPREHEN METABOLIC PANEL: CPT | Performed by: INTERNAL MEDICINE

## 2022-10-02 PROCEDURE — 86334 IMMUNOFIX E-PHORESIS SERUM: CPT | Performed by: STUDENT IN AN ORGANIZED HEALTH CARE EDUCATION/TRAINING PROGRAM

## 2022-10-02 PROCEDURE — 99232 SBSQ HOSP IP/OBS MODERATE 35: CPT | Performed by: INTERNAL MEDICINE

## 2022-10-02 PROCEDURE — 85025 COMPLETE CBC W/AUTO DIFF WBC: CPT | Performed by: INTERNAL MEDICINE

## 2022-10-02 PROCEDURE — 83521 IG LIGHT CHAINS FREE EACH: CPT | Performed by: STUDENT IN AN ORGANIZED HEALTH CARE EDUCATION/TRAINING PROGRAM

## 2022-10-02 PROCEDURE — 99232 SBSQ HOSP IP/OBS MODERATE 35: CPT | Performed by: STUDENT IN AN ORGANIZED HEALTH CARE EDUCATION/TRAINING PROGRAM

## 2022-10-02 PROCEDURE — 85730 THROMBOPLASTIN TIME PARTIAL: CPT | Performed by: INTERNAL MEDICINE

## 2022-10-02 PROCEDURE — 84165 PROTEIN E-PHORESIS SERUM: CPT | Performed by: STUDENT IN AN ORGANIZED HEALTH CARE EDUCATION/TRAINING PROGRAM

## 2022-10-02 RX ADMIN — PREDNISONE 60 MG: 20 TABLET ORAL at 08:31

## 2022-10-02 RX ADMIN — TRIAMCINOLONE ACETONIDE 1 APPLICATION: 1 OINTMENT TOPICAL at 09:18

## 2022-10-02 RX ADMIN — HEPARIN SODIUM 5000 UNITS: 5000 INJECTION INTRAVENOUS; SUBCUTANEOUS at 15:47

## 2022-10-02 RX ADMIN — FAMOTIDINE 20 MG: 20 TABLET ORAL at 08:32

## 2022-10-02 RX ADMIN — HEPARIN SODIUM 5000 UNITS: 5000 INJECTION INTRAVENOUS; SUBCUTANEOUS at 04:58

## 2022-10-02 RX ADMIN — TORSEMIDE 10 MG: 10 TABLET ORAL at 08:32

## 2022-10-02 RX ADMIN — TRIAMCINOLONE ACETONIDE 1 APPLICATION: 1 OINTMENT TOPICAL at 17:06

## 2022-10-02 RX ADMIN — HEPARIN SODIUM 5000 UNITS: 5000 INJECTION INTRAVENOUS; SUBCUTANEOUS at 21:16

## 2022-10-02 NOTE — PROGRESS NOTES
[facility]  Progress Note - Lakhwinder Sengamish 1950, 74 y o  male MRN: [id number]  Unit/Bed#: S -01 Encounter: [id number]  Primary Care Provider: Wade Araya MD   Date and time admitted to hospital: 9/30/2022  9:13 PM    * Rash due to vaculitis   Assessment & Plan  · Concern for vasculitis etiology especially given systemic involvement  Less likely SJS given renal involvement and normal oral mucosa  · Status post skin punch biopsy 10/1  Dermatology input appreciated  Discussed with dermatology about wound care and ID consult    Plan   · Continue Prednisone 60 mg PO daily   · Continue topical triamcinolone bid  · Follow up cryoglobulins / pending serologies  · F/u skin biopsy results       Ambulatory dysfunction  Assessment & Plan  · Multifactorial gait dysfunction 2/2  multiple back surgeries,  Chronic lumbar sacral  plexopathy,  Polyneuropathy  ·  He uses manual  Wheelchairs  Able to pivot from bed to wheelchair     Depression  Assessment & Plan  · Normally on fluoxetine  Will hold this medication for now until we can definitively rule out Green-nate syndrome  Although very rare (< 1%) incidence, it remains a possibility and risk outweighs benefit currently  · Monitor for any signs / symptoms of depression  Diarrhea  Assessment & Plan  · C-diff toxin and enteric panel PCR have been requested  Follow up results  Has had recent antibiotic use  · Now resolved      LYLY (acute kidney injury) ([facility] [place] [facility] )  Assessment & Plan  · Present on Admission  Baseline Creatinine - 0 8 - 0 9  · Suspected Causes / Differential - intravascular depletion vs  unspecified Vasculitis vs  NSAID related  · Urinalysis with 1+ occult blood and trace protein with 0-1 Hyaline casts  Negative nitrites and leukocytes  Occasional bacteria ans squamous cells  ANCA/MARIO ALBERTO Negative  Normal  complement  Pending anti GBM  and cryoglobulin  · Avoid Hypotension - Monitor blood pressures    · Avoid Nephrotoxins and Adjust renally Excreted Medications - Hold meloxicam  · Nephrology Consultation     EDGAR (dyspnea on exertion)  Assessment & Plan  · Differential - anginal equivalent vs  Anxiety vs  Mild pulmonary edema (although BNP not significantly elevated and CXR negative, does have peripheral edema)  · At this time, patient reports EDGAR has improved-- he did not feel SOB transferring from stretcher to hospital bed   · If cardiac workup is negative, consider outpatient PFTs  · Monitor oxygen saturations  Elevated troponin  Assessment & Plan  · [place] non MI trop  Echo EF of 55% with overall no wall motion abnormalities  No additional inpt cardiac workup per cardiology  VTE Pharmacologic Prophylaxis: VTE Score: 4 Moderate Risk (Score 3-4) - Pharmacological DVT Prophylaxis Ordered: heparin  Patient Centered Rounds: I performed bedside rounds with nursing staff today  Discussions with Specialists or Other Care Team Provider: Infectious Disease    Education and Discussions with Family / Patient: will discuss with patient regarding who to call for updates  Current Length of Stay: 2 day(s)  Current Patient Status: Inpatient   Discharge Plan: Anticipate discharge in 48-72 hrs to home  Code Status: Level 1 - Full Code    Subjective:   No acute events overnight  Patient was resting in bed on examination  Patient had no complaints  Objective:     Vitals:   Temp (24hrs), Av °F (36 7 °C), Min:97 6 °F (36 4 °C), Max:98 1 °F (36 7 °C)    Temp:  [97 6 °F (36 4 °C)-98 1 °F (36 7 °C)] 97 6 °F (36 4 °C)  HR:  [58-78] 58  Resp:  [18] 18  BP: (107-130)/(48-66) 107/48  SpO2:  [97 %-98 %] 98 %  There is no height or weight on file to calculate BMI  Input and Output Summary (last 24 hours):      Intake/Output Summary (Last 24 hours) at 10/2/2022 1133  Last data filed at 10/2/2022 1130  Gross per 24 hour   Intake 180 ml   Output 1950 ml   Net -1770 ml       Physical Exam:   Physical Exam  Vitals and nursing note reviewed  Constitutional:       Appearance: He is well-developed  HENT:      Head: Normocephalic and atraumatic  Eyes:      Conjunctiva/sclera: Conjunctivae normal    Cardiovascular:      Rate and Rhythm: Normal rate and regular rhythm  Heart sounds: No murmur heard  Pulmonary:      Effort: Pulmonary effort is normal  No respiratory distress  Breath sounds: Normal breath sounds  Abdominal:      Palpations: Abdomen is soft  Tenderness: There is no abdominal tenderness  Musculoskeletal:      Cervical back: Neck supple  Skin:     General: Skin is warm and dry  Neurological:      General: No focal deficit present  Mental Status: He is alert and oriented to person, place, and time     Psychiatric:         Mood and Affect: Mood normal          Behavior: Behavior normal           Additional Data:     Labs:  Results from last 7 days   Lab Units 10/02/22  0458   WBC Thousand/uL 16 20*   HEMOGLOBIN g/dL 11 4*   HEMATOCRIT % 34 8*   PLATELETS Thousands/uL 407*   NEUTROS PCT % 86*   LYMPHS PCT % 8*   MONOS PCT % 5   EOS PCT % 0     Results from last 7 days   Lab Units 10/02/22  0458   SODIUM mmol/L 134*   POTASSIUM mmol/L 4 8   CHLORIDE mmol/L 104   CO2 mmol/L 24   BUN mg/dL 44*   CREATININE mg/dL 1 37*   ANION GAP mmol/L 6   CALCIUM mg/dL 8 2*   ALBUMIN g/dL 2 8*   TOTAL BILIRUBIN mg/dL 0 44   ALK PHOS U/L 55   ALT U/L 72*   AST U/L 47*   GLUCOSE RANDOM mg/dL 124     Results from last 7 days   Lab Units 09/30/22  2200   INR  1 29*             Results from last 7 days   Lab Units 09/30/22  0113   LACTIC ACID mmol/L 1 6       Lines/Drains:  Invasive Devices  Report    Peripheral Intravenous Line  Duration           Peripheral IV 09/30/22 Left Hand 2 days    Peripheral IV 09/30/22 Right Antecubital 2 days                  Telemetry:  Telemetry Orders (From admission, onward)             48 Hour Telemetry Monitoring  Continuous x 48 hours        Expiring   References:    Telemetry Guidelines   Question:  Reason for 48 Hour Telemetry  Answer:  Acute MI, chest pain - R/O MI, or unstable angina                 Telemetry Reviewed: Normal Sinus Rhythm  Indication for Continued Telemetry Use: No indication for continued use  Will discontinue  Imaging: No pertinent imaging reviewed  Recent Cultures (last 7 days):   Results from last 7 days   Lab Units 09/30/22  0113   BLOOD CULTURE  No Growth at 48 hrs  No Growth at 48 hrs  Last 24 Hours Medication List:   Current Facility-Administered Medications   Medication Dose Route Frequency Provider Last Rate   • famotidine  20 mg Oral Daily Ramsey Celaya MD     • heparin (porcine)  5,000 Units Subcutaneous [facility] Ramsey Celaya MD     • predniSONE  60 mg Oral Daily Buck Freeman PA-C     • torsemide  10 mg Oral Daily Smiley Dozier MD     • triamcinolone   Topical BID Cheli Munoz PA-C          Today, Patient Was Seen By: Cristian España    **Please Note: This note may have been constructed using a voice recognition system  **

## 2022-10-02 NOTE — ASSESSMENT & PLAN NOTE
· [place] non MI trop  Echo EF of 55% with overall no wall motion abnormalities  No additional inpt cardiac workup per cardiology

## 2022-10-02 NOTE — ASSESSMENT & PLAN NOTE
· Concern for vasculitis etiology especially given systemic involvement  Less likely SJS given renal involvement and normal oral mucosa  · Status post skin punch biopsy 10/1  Dermatology input appreciated  Discussed with dermatology about wound care and ID consult    Plan   · Continue Prednisone 60 mg PO daily   · Continue topical triamcinolone bid  · Follow up cryoglobulins / pending serologies     · F/u skin biopsy results

## 2022-10-02 NOTE — PROGRESS NOTES
NEPHROLOGY PROGRESS NOTE   Lakhwinder Arlyn 74 y o  male MRN: [id number]  Unit/Bed#: S -01 Encounter: [id number]  Reason for Consult: LYLY    ASSESSMENT AND PLAN:  75 yo man with PMH of obesity, UTI, depression, gout  Patient was admitted at [facility] with ulcerative rash, patient was transferred to [facility] for dermatology evaluation  On admission he  was found to have elevated creatinine  Nephrology is consulted for management of LYLY lars     #Non-Oliguric KDIGO LYLY stage 1  with evidence of kidney recovery  · Etiology:  Unknown etiology possible multifactorial secondary to systemic inflammatory response , proliferative GN still on differential however patient is now getting better with only prednisone no other immunosuppressive therapy  · Baseline creatinine 0 9 mg/dL  · Current creatinine:   1 3 mg/dL continue to improve  · Peak creatinine:  1 89 mg/dL  · UA:  Hematuria resolved  · U PCR 0 4 grams/gram  · GN workup:   ? MARIO ALBERTO negative  ? Anca negative  ? Anti GBM pending  ? Normal complement  ? Will repeat SPEP  · Treatment:  · No indication of urgent dialysis at this  · Will hold kidney biopsy for now creatinine is improving  · Maintain MAP:  Over 65 mmHg if possible/avoid hypoperfusion:  Hold parameters on blood pressure medications  · Avoid nephrotoxic agents such as NSAIDs, and IV contrast if possible   Avoid opioids   · Adjust medications to GFR     #Acid-base Disorder  · serum HCO3 24mmol/L  · At goal     #Volume status/hypertension:  · Volume:  Hypervolemic  · Blood pressure:  Normotensive /67  · Recommend:  · Low-sodium diet  · Start torsemide 10 mg        #Anemia:  · Current hemoglobin:  11 4 mg/dL  · Treatment:  · Transfuse for hemoglobin less than 7 0 per primary service       # Purpuric/necrotic rash  · In the settings of antibiotics  · With systemic responds  · Patient transferred to [facility] for dermatology evaluation  · Skin Biopsy results pending  · Concern of Maricruz Macario versus vasculitis  · On prednisone 60        SUBJECTIVE:    SUBJECTIVE:  Patient seen and examined at bedside  No chest pain, shortness of breath, nausea, vomiting, abdominal pain or diarrhea  No urinary complaints         OBJECTIVE:  Current Weight:    Vitals:    10/02/22 0705   BP: (!) 107/48   Pulse: 58   Resp: 18   Temp: 97 6 °F (36 4 °C)   SpO2: 98%       Intake/Output Summary (Last 24 hours) at 10/2/2022 1123  Last data filed at 10/2/2022 3238  Gross per 24 hour   Intake 180 ml   Output 1475 ml   Net -1295 ml     Wt Readings from Last 3 Encounters:   09/30/22 134 kg (295 lb)   09/24/22 134 kg (295 lb)   08/19/22 134 kg (295 lb)     Temp Readings from Last 3 Encounters:   10/02/22 97 6 °F (36 4 °C) (Oral)   09/30/22 98 6 °F (37 °C)   09/24/22 (!) 97 °F (36 1 °C) (Oral)     BP Readings from Last 3 Encounters:   10/02/22 (!) 107/48   09/30/22 141/67   09/24/22 130/70     Pulse Readings from Last 3 Encounters:   10/02/22 58   09/30/22 66   09/24/22 93      General:  Obese, no acute distress at this time  Skin: purpuric and necrotic lesions in 4 extremities  Eyes:  No scleral icterus and noninjected  ENT:  mucous membranes moist  Neck:  no carotid bruits  Chest:  Clear to auscultation percussion, good respiratory effort, no use of accessory respiratory muscles  CVS:  Regular rate and rhythm without rub ,  Abdomen:  soft and nontender   Extremities:  Lower extremity edema  Neuro:  No gross focality  Psych:  Alert , cooperative       Medications:    Current Facility-Administered Medications:   •  famotidine (PEPCID) tablet 20 mg, 20 mg, Oral, Daily, Jeremy Ziegler MD, 20 mg at 10/02/22 0339  •  heparin (porcine) subcutaneous injection 5,000 Units, 5,000 Units, Subcutaneous, Q8[facility], Jeremy Ziegler MD, 5,000 Units at 10/02/22 0458  •  predniSONE tablet 60 mg, 60 mg, Oral, Daily, Juan Pablo Freeman PA-C, 60 mg at 10/02/22 0831  •  torsemide (DEMADEX) tablet 10 mg, 10 mg, Oral, Daily, Sera Green MD, 10 mg at 10/02/22 1078  •  triamcinolone (KENALOG) 0 1 % ointment, , Topical, BID, Mary Freeman PA-C, 1 application at 29/90/66 4585    Laboratory Results:  Results from last 7 days   Lab Units 10/02/22  0458 10/01/22  0404 09/30/22  2200 09/30/22  1019 09/30/22  0113   WBC Thousand/uL 16 20* 16 81* 16 42*  --  16 83*   HEMOGLOBIN g/dL 11 4* 12 2 12 2  --  13 8   HEMATOCRIT % 34 8* 36 7 37 1  --  41 3   PLATELETS Thousands/uL 407* 433* 454*  --  440*   SODIUM mmol/L 134* 134*  --  131* 130*   POTASSIUM mmol/L 4 8 4 6  --  4 8 4 7   CHLORIDE mmol/L 104 103  --  98 97   CO2 mmol/L 24 22  --  21 23   BUN mg/dL 44* 47*  --  45* 47*   CREATININE mg/dL 1 37* 1 46*  --  1 57* 1 87*   CALCIUM mg/dL 8 2* 8 6  --  8 6 9 3       No orders to display       Portions of the record may have been created with voice recognition software  Occasional wrong word or "sound a like" substitutions may have occurred due to the inherent limitations of voice recognition software  Read the chart carefully and recognize, using context, where substitutions have occurred

## 2022-10-02 NOTE — PLAN OF CARE
Problem: Prexisting or High Potential for Compromised Skin Integrity  Goal: Skin integrity is maintained or improved  Description: INTERVENTIONS:  - Identify patients at risk for skin breakdown  - Assess and monitor skin integrity  - Assess and monitor nutrition and hydration status  - Monitor labs   - Assess for incontinence   - Turn and reposition patient  - Assist with mobility/ambulation  - Relieve pressure over bony prominences  - Avoid friction and shearing  - Provide appropriate hygiene as needed including keeping skin clean and dry  - Evaluate need for skin moisturizer/barrier cream  - Collaborate with interdisciplinary team   - Patient/family teaching  - Consider wound care consult   Outcome: Progressing     Problem: SKIN/TISSUE INTEGRITY - ADULT  Goal: Skin Integrity remains intact(Skin Breakdown Prevention)  Description: Assess:  -Perform Devyn assessment every shift  -Clean and moisturize skin every day  -Inspect skin when repositioning, toileting, and assisting with ADLS  -Assess extremities for adequate circulation and sensation     Bed Management:  -Have minimal linens on bed & keep smooth, unwrinkled  -Change linens as needed when moist or perspiring  -Avoid sitting or lying in one position for more than 2 hours while in bed  -Keep HOB at 30 degrees     Toileting:  -Offer bedside commode  -Assess for incontinence every 2  -Use incontinent care products after each incontinent episode such as wet wipes and incontinent foam     Activity:  -Mobilize patient 3 times a day  -Encourage activity and walks on unit  -Encourage or provide ROM exercises   -Turn and reposition patient every 2 Hours  -Use appropriate equipment to lift or move patient in bed  -Instruct/ Assist with weight shifting every 2 hours when out of bed in chair  -Consider limitation of chair time 2 hour intervals    Skin Care:  -Avoid use of baby powder, tape, friction and shearing, hot water or constrictive clothing  -Relieve pressure over bony prominences using foam wedges  -Do not massage red bony areas      Outcome: Progressing  Goal: Incision(s), wounds(s) or drain site(s) healing without S/S of infection  Description: INTERVENTIONS  - Assess and document dressing, incision, wound bed, drain sites and surrounding tissue  - Provide patient and family education  - Perform skin care/dressing changes every day  Outcome: Progressing

## 2022-10-03 PROBLEM — R19.7 DIARRHEA: Status: RESOLVED | Noted: 2022-09-30 | Resolved: 2022-10-03

## 2022-10-03 LAB
ALBUMIN SERPL ELPH-MCNC: 2.43 G/DL (ref 3.5–5)
ALBUMIN SERPL ELPH-MCNC: 45.9 % (ref 52–65)
ALPHA1 GLOB SERPL ELPH-MCNC: 0.62 G/DL (ref 0.1–0.4)
ALPHA1 GLOB SERPL ELPH-MCNC: 11.7 % (ref 2.5–5)
ALPHA2 GLOB SERPL ELPH-MCNC: 1.05 G/DL (ref 0.4–1.2)
ALPHA2 GLOB SERPL ELPH-MCNC: 19.8 % (ref 7–13)
ANION GAP SERPL CALCULATED.3IONS-SCNC: 8 MMOL/L (ref 4–13)
BASOPHILS # BLD AUTO: 0.05 THOUSANDS/ÂΜL (ref 0–0.1)
BASOPHILS NFR BLD AUTO: 0 % (ref 0–1)
BETA GLOB ABNORMAL SERPL ELPH-MCNC: 0.36 G/DL (ref 0.4–0.8)
BETA1 GLOB SERPL ELPH-MCNC: 6.7 % (ref 5–13)
BETA2 GLOB SERPL ELPH-MCNC: 6.8 % (ref 2–8)
BETA2+GAMMA GLOB SERPL ELPH-MCNC: 0.36 G/DL (ref 0.2–0.5)
BUN SERPL-MCNC: 47 MG/DL (ref 5–25)
CALCIUM SERPL-MCNC: 8.7 MG/DL (ref 8.4–10.2)
CHLORIDE SERPL-SCNC: 103 MMOL/L (ref 96–108)
CO2 SERPL-SCNC: 25 MMOL/L (ref 21–32)
CREAT SERPL-MCNC: 1.37 MG/DL (ref 0.6–1.3)
EOSINOPHIL # BLD AUTO: 0.08 THOUSAND/ÂΜL (ref 0–0.61)
EOSINOPHIL NFR BLD AUTO: 0 % (ref 0–6)
ERYTHROCYTE [DISTWIDTH] IN BLOOD BY AUTOMATED COUNT: 13.7 % (ref 11.6–15.1)
GAMMA GLOB ABNORMAL SERPL ELPH-MCNC: 0.48 G/DL (ref 0.5–1.6)
GAMMA GLOB SERPL ELPH-MCNC: 9.1 % (ref 12–22)
GBM AB SER IA-ACNC: <0.2 UNITS (ref 0–0.9)
GFR SERPL CREATININE-BSD FRML MDRD: 50 ML/MIN/1.73SQ M
GLUCOSE SERPL-MCNC: 117 MG/DL (ref 65–140)
HCT VFR BLD AUTO: 40.6 % (ref 36.5–49.3)
HGB BLD-MCNC: 13.3 G/DL (ref 12–17)
IGG/ALB SER: 0.85 {RATIO} (ref 1.1–1.8)
IMM GRANULOCYTES # BLD AUTO: 0.34 THOUSAND/UL (ref 0–0.2)
IMM GRANULOCYTES NFR BLD AUTO: 2 % (ref 0–2)
INTERPRETATION UR IFE-IMP: NORMAL
LYMPHOCYTES # BLD AUTO: 2.08 THOUSANDS/ÂΜL (ref 0.6–4.47)
LYMPHOCYTES NFR BLD AUTO: 11 % (ref 14–44)
MCH RBC QN AUTO: 30.3 PG (ref 26.8–34.3)
MCHC RBC AUTO-ENTMCNC: 32.8 G/DL (ref 31.4–37.4)
MCV RBC AUTO: 93 FL (ref 82–98)
MONOCYTES # BLD AUTO: 0.86 THOUSAND/ÂΜL (ref 0.17–1.22)
MONOCYTES NFR BLD AUTO: 5 % (ref 4–12)
NEUTROPHILS # BLD AUTO: 15.58 THOUSANDS/ÂΜL (ref 1.85–7.62)
NEUTS SEG NFR BLD AUTO: 82 % (ref 43–75)
NRBC BLD AUTO-RTO: 0 /100 WBCS
PLATELET # BLD AUTO: 470 THOUSANDS/UL (ref 149–390)
PMV BLD AUTO: 8.2 FL (ref 8.9–12.7)
POTASSIUM SERPL-SCNC: 4.6 MMOL/L (ref 3.5–5.3)
PROT PATTERN SERPL ELPH-IMP: ABNORMAL
PROT SERPL-MCNC: 5.3 G/DL (ref 6.4–8.2)
RBC # BLD AUTO: 4.39 MILLION/UL (ref 3.88–5.62)
SODIUM SERPL-SCNC: 136 MMOL/L (ref 135–147)
WBC # BLD AUTO: 18.99 THOUSAND/UL (ref 4.31–10.16)

## 2022-10-03 PROCEDURE — 84165 PROTEIN E-PHORESIS SERUM: CPT | Performed by: SPECIALIST

## 2022-10-03 PROCEDURE — 99232 SBSQ HOSP IP/OBS MODERATE 35: CPT | Performed by: INTERNAL MEDICINE

## 2022-10-03 PROCEDURE — 86334 IMMUNOFIX E-PHORESIS SERUM: CPT | Performed by: SPECIALIST

## 2022-10-03 PROCEDURE — 80048 BASIC METABOLIC PNL TOTAL CA: CPT

## 2022-10-03 PROCEDURE — 85025 COMPLETE CBC W/AUTO DIFF WBC: CPT

## 2022-10-03 RX ADMIN — HEPARIN SODIUM 5000 UNITS: 5000 INJECTION INTRAVENOUS; SUBCUTANEOUS at 06:02

## 2022-10-03 RX ADMIN — HEPARIN SODIUM 5000 UNITS: 5000 INJECTION INTRAVENOUS; SUBCUTANEOUS at 22:09

## 2022-10-03 RX ADMIN — FAMOTIDINE 20 MG: 20 TABLET ORAL at 08:01

## 2022-10-03 RX ADMIN — TORSEMIDE 10 MG: 10 TABLET ORAL at 08:01

## 2022-10-03 RX ADMIN — TRIAMCINOLONE ACETONIDE: 1 OINTMENT TOPICAL at 08:01

## 2022-10-03 RX ADMIN — TRIAMCINOLONE ACETONIDE: 1 OINTMENT TOPICAL at 18:23

## 2022-10-03 RX ADMIN — PREDNISONE 60 MG: 20 TABLET ORAL at 08:01

## 2022-10-03 RX ADMIN — HEPARIN SODIUM 5000 UNITS: 5000 INJECTION INTRAVENOUS; SUBCUTANEOUS at 14:59

## 2022-10-03 NOTE — ASSESSMENT & PLAN NOTE
· Present on Admission  Baseline Creatinine - 0 8 - 0 9  Creatinine currently stable at 1 37, Na, K stable  · Suspected Causes / Differential - intravascular depletion vs  unspecified Vasculitis vs  NSAID related  · Urinalysis with 1+ occult blood and trace protein with 0-1 Hyaline casts  Negative nitrites and leukocytes  Occasional bacteria ans squamous cells  ANCA/MARIO ALBERTO Negative  Normal  complement  Pending anti GBM , cryoglobulin, protein electrophoresis, light chains  · Avoid Hypotension - Monitor blood pressures  · Avoid Nephrotoxins and Adjust renally Excreted Medications - Hold meloxicam  · Nephrology Consultation  · Nephro states no kidney biopsy needed at this time  Will await skin biopsy results, cryoglobulins and antiGBM  · Continue 10mg torsemide for edema

## 2022-10-03 NOTE — ASSESSMENT & PLAN NOTE
· [place] non MI trop  Echo EF of 55% with overall no wall motion abnormalities  No additional inpt cardiac workup per cardiology

## 2022-10-03 NOTE — ASSESSMENT & PLAN NOTE
· Present on Admission  Baseline Creatinine - 0 8 - 0 9  Creatinine currently declining, 1 27 today  · Suspected Causes / Differential - intravascular depletion vs  unspecified Vasculitis vs  NSAID related  · Urinalysis with 1+ occult blood and trace protein with 0-1 Hyaline casts  Negative nitrites and leukocytes  Occasional bacteria ans squamous cells  ANCA/MARIO ALBERTO Negative  Normal  complement  Anti GBM negative, SPEP, light chain negative for gammopathy  Cryoglobulin pending  · Avoid Hypotension - Monitor blood pressures  · Avoid Nephrotoxins and Adjust renally Excreted Medications - Hold meloxicam  · Nephrology Consultation  · Nephro states no kidney biopsy needed at this time  Will await skin biopsy results, cryoglobulins and antiGBM  · Pt still experiencing significant LE edema  Encourage ambulation as tolerated, raising feet  Pt BP in 90s-100s/60s, uptitration of torsemide not appropriate at this time   Pt diuresing 2L fluid/day

## 2022-10-03 NOTE — PROGRESS NOTES
[facility]  Progress Note - Rebel Smith 1950, 74 y o  male MRN: [id number]  Unit/Bed#: S -01 Encounter: [id number]  Primary Care Provider: Hussein Hardin MD   Date and time admitted to hospital: 9/30/2022  9:13 PM    * Rash due to vaculitis   Assessment & Plan  · Concern for vasculitis etiology especially given systemic involvement  Less likely SJS given renal involvement and normal oral mucosa  · Status post skin punch biopsy 10/1  Dermatology input appreciated  Discussed with dermatology about wound care and ID consult  · Pt improving on prednisone 60mg daily  Plan   · Continue Prednisone 60 mg PO daily   · Continue topical triamcinolone bid  · Follow up cryoglobulins / pending serologies  · F/u skin biopsy results   · Awaiting further derm recs      [facility]  Assessment & Plan  · Present on Admission  Baseline Creatinine - 0 8 - 0 9  Creatinine currently stable at 1 37, Na, K stable  · Suspected Causes / Differential - intravascular depletion vs  unspecified Vasculitis vs  NSAID related  · Urinalysis with 1+ occult blood and trace protein with 0-1 Hyaline casts  Negative nitrites and leukocytes  Occasional bacteria ans squamous cells  ANCA/MARIO ALBERTO Negative  Normal  complement  Pending anti GBM  and cryoglobulin  · Avoid Hypotension - Monitor blood pressures  · Avoid Nephrotoxins and Adjust renally Excreted Medications - Hold meloxicam  · Nephrology Consultation  · Nephro states no kidney biopsy needed at this time  Will await skin biopsy results, cryoglobulins and antiGBM  · Continue 10mg torsemide for edema  Leucocytosis  Assessment & Plan  Bcx negative at this time  WBC 18 99 today, likely 2/2 prednisone    Depression  Assessment & Plan  · Normally on fluoxetine  Will hold this medication for now until we can definitively rule out Green-nate syndrome    Although very rare (< 1%) incidence, it remains a possibility and risk outweighs benefit currently  · Monitor for any signs / symptoms of depression  EDGAR (dyspnea on exertion)  Assessment & Plan  · Differential - anginal equivalent vs  Anxiety vs  Mild pulmonary edema (although BNP not significantly elevated and CXR negative, does have peripheral edema)  · At this time, patient reports EDGAR has improved-- he did not feel SOB transferring from stretcher to hospital bed   · If cardiac workup is negative, consider outpatient PFTs  · Monitor oxygen saturations  Elevated troponin  Assessment & Plan  · [place] non MI trop  Echo EF of 55% with overall no wall motion abnormalities  No additional inpt cardiac workup per cardiology  VTE Pharmacologic Prophylaxis: VTE Score: 4 Moderate Risk (Score 3-4) - Pharmacological DVT Prophylaxis Ordered: heparin  Patient Centered Rounds: I performed bedside rounds with nursing staff today  Discussions with Specialists or Other Care Team Provider: none    Education and Discussions with Family / Patient: discussed w/ pt  Current Length of Stay: 3 day(s)  Current Patient Status: Inpatient   Discharge Plan: Anticipate discharge in 24-48 hrs to home  Code Status: Level 1 - Full Code    Subjective:   No acute events overnight  Pt seen in bed  Pt has no complaints, states he believes rash is getting better  Objective:     Vitals:   Temp (24hrs), Av 9 °F (36 6 °C), Min:97 7 °F (36 5 °C), Max:98 1 °F (36 7 °C)    Temp:  [97 7 °F (36 5 °C)-98 1 °F (36 7 °C)] 97 7 °F (36 5 °C)  HR:  [59-68] 59  Resp:  [18] 18  BP: (107-122)/(63-66) 122/66  SpO2:  [97 %-100 %] 100 %  There is no height or weight on file to calculate BMI  Input and Output Summary (last 24 hours): Intake/Output Summary (Last 24 hours) at 10/3/2022 1314  Last data filed at 10/3/2022 1009  Gross per 24 hour   Intake 300 ml   Output 1300 ml   Net -1000 ml       Physical Exam:   Physical Exam  Constitutional:       General: He is not in acute distress  Appearance: He is obese  He is not ill-appearing, toxic-appearing or diaphoretic  HENT:      Head: Normocephalic and atraumatic  Nose: Nose normal       Mouth/Throat:      Mouth: Mucous membranes are moist       Pharynx: Oropharynx is clear  Eyes:      Conjunctiva/sclera: Conjunctivae normal    Cardiovascular:      Rate and Rhythm: Normal rate and regular rhythm  Pulses: Normal pulses  Heart sounds: Normal heart sounds  No murmur heard  No friction rub  No gallop  Pulmonary:      Effort: Pulmonary effort is normal       Breath sounds: Normal breath sounds  Musculoskeletal:      Right lower leg: Edema present  Left lower leg: Edema present  Skin:     General: Skin is warm and dry  Comments: Petechial and purpuric rash with areas concerning for necrosis on both feet/legs, hands, and groin  Per attending Dr Poncho Ortiz, rash improving  Neurological:      General: No focal deficit present  Mental Status: He is alert and oriented to person, place, and time  Psychiatric:         Mood and Affect: Mood normal          Behavior: Behavior normal          Thought Content:  Thought content normal          Judgment: Judgment normal           Additional Data:     Labs:  Results from last 7 days   Lab Units 10/03/22  0932   WBC Thousand/uL 18 99*   HEMOGLOBIN g/dL 13 3   HEMATOCRIT % 40 6   PLATELETS Thousands/uL 470*   NEUTROS PCT % 82*   LYMPHS PCT % 11*   MONOS PCT % 5   EOS PCT % 0     Results from last 7 days   Lab Units 10/03/22  0932 10/02/22  0458   SODIUM mmol/L 136 134*   POTASSIUM mmol/L 4 6 4 8   CHLORIDE mmol/L 103 104   CO2 mmol/L 25 24   BUN mg/dL 47* 44*   CREATININE mg/dL 1 37* 1 37*   ANION GAP mmol/L 8 6   CALCIUM mg/dL 8 7 8 2*   ALBUMIN g/dL  --  2 8*   TOTAL BILIRUBIN mg/dL  --  0 44   ALK PHOS U/L  --  55   ALT U/L  --  72*   AST U/L  --  47*   GLUCOSE RANDOM mg/dL 117 124     Results from last 7 days   Lab Units 09/30/22  2200   INR  1 29*             Results from last 7 days   Lab Units 09/30/22  0113   LACTIC ACID mmol/L 1 6       Lines/Drains:  Invasive Devices  Report    Peripheral Intravenous Line  Duration           Peripheral IV 09/30/22 Left Hand 3 days                      Imaging: No pertinent imaging reviewed  Recent Cultures (last 7 days):   Results from last 7 days   Lab Units 09/30/22  0113   BLOOD CULTURE  No Growth at 72 hrs  No Growth at 72 hrs  Last 24 Hours Medication List:   Current Facility-Administered Medications   Medication Dose Route Frequency Provider Last Rate   • famotidine  20 mg Oral Daily Lennox Samaniego MD     • heparin (porcine)  5,000 Units Subcutaneous [facility] Lennox Samaniego MD     • predniSONE  60 mg Oral Daily Mary Freeman PA-C     • torsemide  10 mg Oral Daily Leeroy Tate MD     • triamcinolone   Topical BID Estefania Madera PA-C          Today, Patient Was Seen By: Kwasi Castelan, MS3    **Please Note: This note may have been constructed using a voice recognition system  **

## 2022-10-03 NOTE — PLAN OF CARE
Problem: MOBILITY - ADULT  Goal: Maintain or return to baseline ADL function  Description: INTERVENTIONS:  -  Assess patient's ability to carry out ADLs; assess patient's baseline for ADL function and identify physical deficits which impact ability to perform ADLs (bathing, care of mouth/teeth, toileting, grooming, dressing, etc )  - Assess/evaluate cause of self-care deficits   - Assess range of motion  - Assess patient's mobility; develop plan if impaired  - Assess patient's need for assistive devices and provide as appropriate  - Encourage maximum independence but intervene and supervise when necessary  - Involve family in performance of ADLs  - Assess for home care needs following discharge   - Consider OT consult to assist with ADL evaluation and planning for discharge  - Provide patient education as appropriate  Outcome: Progressing  Goal: Maintains/Returns to pre admission functional level  Description: INTERVENTIONS:  - Perform BMAT or MOVE assessment daily    - Set and communicate daily mobility goal to care team and patient/family/caregiver     - Collaborate with rehabilitation services on mobility goals if consulted  - Out of bed for toileting  - Record patient progress and toleration of activity level   Outcome: Progressing     Problem: Potential for Falls  Goal: Patient will remain free of falls  Description: INTERVENTIONS:  - Educate patient/family on patient safety including physical limitations  - Instruct patient to call for assistance with activity   - Consult OT/PT to assist with strengthening/mobility   - Keep Call bell within reach  - Keep bed low and locked with side rails adjusted as appropriate  - Keep care items and personal belongings within reach  - Initiate and maintain comfort rounds  - Make Fall Risk Sign visible to staff  - Apply yellow socks and bracelet for high fall risk patients  - Consider moving patient to room near nurses station  Outcome: Progressing     Problem: Prexisting or High Potential for Compromised Skin Integrity  Goal: Skin integrity is maintained or improved  Description: INTERVENTIONS:  - Identify patients at risk for skin breakdown  - Assess and monitor skin integrity  - Assess and monitor nutrition and hydration status  - Monitor labs   - Assess for incontinence   - Turn and reposition patient  - Assist with mobility/ambulation  - Relieve pressure over bony prominences  - Avoid friction and shearing  - Provide appropriate hygiene as needed including keeping skin clean and dry  - Evaluate need for skin moisturizer/barrier cream  - Collaborate with interdisciplinary team   - Patient/family teaching  - Consider wound care consult   Outcome: Progressing     Problem: SKIN/TISSUE INTEGRITY - ADULT  Goal: Skin Integrity remains intact(Skin Breakdown Prevention)  Description: Assess:  -Assess extremities for adequate circulation and sensation     Bed Management:  -Have minimal linens on bed & keep smooth, unwrinkled  -Change linens as needed when moist or perspiring    Toileting:  -Offer bedside commode    Skin Care:  -Avoid use of baby powder, tape, friction and shearing, hot water or constrictive clothing  Outcome: Progressing

## 2022-10-03 NOTE — CONSULTS
Consult Note - Wound   Nadia Paci 74 y o  male MRN: [id number]  Unit/Bed#: S -01 Encounter: [id number]      Assessment:   Wound care nurse consult for possible vasculitis/wounds of unclear etiology  A biopsy has been performed by dermatology  Awaiting results  Honestly, I have never seen anything quite like this  I have seen SJS numerous times  This does not resemble any SJS/TEN that I have seen in the past    Patient reports that he did take oral antibiotics at the end of August for a UTI  The rash first appeared on Labor Day, per patient  Derm has recommended Kenalog cream and Prednisone high dose  The staff nurse told me she only applied the Kenalog cream to the necrotic tissue  The administration directions do not make it clear if Kenalog cream should be applied to rash also  That would be quite a large amount of steroid cream being used  Since these things have been started, the rash and necrotic tissue has not progressed  The itching also has stopped  The dry, necrotic covered wounds are on bilateral lower legs/feet  These wounds are circumferential  The wounds have irregular wound edges and have a cratered type appearance  I did not detect an odor  Small amount of serous/sang drainage  The patient is unable to lift his legs up  He is able to turn on his side  The raised, red rash is on bilateral arms and on trunk of body, in perineal area, on his back, and on legs and on feet  Essentially, everywhere  Patient said the necrotic wounds were at one time this type of red, raised rash  On the left upper buttock is a dermal layer wound that measures approximately 0 5 x 1 x 0 1 cm  This is circular  This wound is of unclear etiology, POA  On left medial heel, there is a dark filled blister, POA  Patient said this is how the necrotic wounds started out  I do not feel this etiology is pressure      Wound/Skin Care Plan:   1  Continue with Kenalog steroid cream twice a day  2  Until biopsy results come back, I am hesitant to place wound care recommendations  Once the etiology of the rash/wounds are determined, I can then determine a wound care plan  3  Recommend Vaseline twice a day to left buttock dermal layer wound  Do not cover with a bordered foam dressing         All images taken 10/3

## 2022-10-03 NOTE — PROGRESS NOTES
NEPHROLOGY HOSPITAL PROGRESS NOTE   Nadia Cross 74 y o  male MRN: [id number]  Unit/Bed#: S -01 Encounter: [id number]  Reason for Consult: LYLY    ASSESSMENT and PLAN:    [place] - Claudette Higgins - 78-year-old male with a past medical history of depression, gout, elevated BMI who initially presents at outside hospital with concern of rash  Patient was transferred for dermatology evaluation  Nephrology is on for LYLY  1) LYLY    - etiology - unknown but consideration of GN  - baseline 0 9 mg/dL  - peak Create 1 9 mg/dL  - baseline create 0 9 mg/dL  - UA - hematuria--> repeat urinalysis with improvement in hematuria  - UPCR 0 4 gm/gm  - MARIO ALBERTO - neg  - ANCA - neg  - anti GBM -  - C3, C4 - nl  - SPEP -  -10/3-creatinine stable 1 37 mg/dL  Sodium, potassium are appropriate  Plan:    - f/u SPEP  - F/u Anti GBM  -follow-up cryoglobulin  - for now not pursuing a biopsy as create improving and already on steroids and skin biopsy in progress  - awaiting skin biopsy  - cont torsemide for now  - BMP in AM    2) rash    - steroids per primary team  - Derm  - skin biopsy pending  - there was concern for SJS vs vasculitis    3) electrolytes-appropriate sodium and potassium    4)  acid/base-appropriate bicarbonate    5) volume-monitor with torsemide--> volume state is improving, but still with significant edema lower extremities bilaterally    SUBJECTIVE / 24H INTERVAL HISTORY:    Blood pressure is 255-302 systolic  Afebrile  On room air  Urine output 975 cc yesterday  Patient denies complaints  States rash is improving  No difficulty breathing      OBJECTIVE:  Current Weight:    Vitals:    10/02/22 0705 10/02/22 1459 10/02/22 2119 10/03/22 0713   BP: (!) 107/48 116/63 107/63 122/66   BP Location: Left arm  Left arm Left arm   Pulse: 58  68 59   Resp: 18 18 18 18   Temp: 97 6 °F (36 4 °C) 97 8 °F (36 6 °C) 98 1 °F (36 7 °C) 97 7 °F (36 5 °C)   TempSrc: Oral  Oral Oral   SpO2: 98%  97% 100%       Intake/Output Summary (Last 24 hours) at 10/3/2022 1141  Last data filed at 10/3/2022 1009  Gross per 24 hour   Intake 480 ml   Output 1300 ml   Net -820 ml     General: NAD  Skin: no rash  Eyes: anicteric sclera  ENT: moist mucous membrane  Neck: supple  Chest: CTA b/l, no ronchii, no wheeze, no rubs, no rales  CVS: s1s2, no murmur, no gallop, no rub  Abdomen: soft, nontender, nl sounds  Extremities:  2+ edema LE b/l with lower extremity rash petechial necrotic    : no medrano  Neuro: AAOX3  Psych: normal affect    Medications:    Current Facility-Administered Medications:   •  famotidine (PEPCID) tablet 20 mg, 20 mg, Oral, Daily, Ivone Call MD, 20 mg at 10/03/22 0801  •  heparin (porcine) subcutaneous injection 5,000 Units, 5,000 Units, Subcutaneous, Q8H [facility], Ivone Call MD, 5,000 Units at 10/03/22 0602  •  predniSONE tablet 60 mg, 60 mg, Oral, Daily, Latanya Freeman PA-C, 60 mg at 10/03/22 0801  •  torsemide (DEMADEX) tablet 10 mg, 10 mg, Oral, Daily, Eliana Brar MD, 10 mg at 10/03/22 0801  •  triamcinolone (KENALOG) 0 1 % ointment, , Topical, BID, Latanya Freeman PA-C, Given at 10/03/22 0801    Laboratory Results:  Results from last 7 days   Lab Units 10/03/22  0932 10/02/22  0458 10/01/22  0404 09/30/22  2200 09/30/22  1019 09/30/22  0113   WBC Thousand/uL 18 99* 16 20* 16 81* 16 42*  --  16 83*   HEMOGLOBIN g/dL 13 3 11 4* 12 2 12 2  --  13 8   HEMATOCRIT % 40 6 34 8* 36 7 37 1  --  41 3   PLATELETS Thousands/uL 470* 407* 433* 454*  --  440*   POTASSIUM mmol/L 4 6 4 8 4 6  --  4 8 4 7   CHLORIDE mmol/L 103 104 103  --  98 97   CO2 mmol/L 25 24 22  --  21 23   BUN mg/dL 47* 44* 47*  --  45* 47*   CREATININE mg/dL 1 37* 1 37* 1 46*  --  1 57* 1 87*   CALCIUM mg/dL 8 7 8 2* 8 6  --  8 6 9 3

## 2022-10-03 NOTE — ASSESSMENT & PLAN NOTE
· Concern for vasculitis etiology especially given systemic involvement  Less likely SJS given renal involvement and normal oral mucosa  · Status post skin punch biopsy 10/1  Dermatology input appreciated  Discussed with dermatology about wound care and ID consult  · Pt improving on prednisone 60mg daily  Plan   · Continue Prednisone 60 mg PO daily   · Continue topical triamcinolone bid  · Follow up cryoglobulins / pending serologies     · F/u skin biopsy results   · Awaiting further derm recs

## 2022-10-03 NOTE — ASSESSMENT & PLAN NOTE
· [place] non MI trop  Echo EF of 55% with overall no wall motion abnormalities  No additional inpt cardiac workup per cardiology

## 2022-10-04 LAB
ANION GAP SERPL CALCULATED.3IONS-SCNC: 9 MMOL/L (ref 4–13)
BASOPHILS # BLD AUTO: 0.06 THOUSANDS/ÂΜL (ref 0–0.1)
BASOPHILS NFR BLD AUTO: 0 % (ref 0–1)
BUN SERPL-MCNC: 54 MG/DL (ref 5–25)
CALCIUM SERPL-MCNC: 8.6 MG/DL (ref 8.4–10.2)
CHLORIDE SERPL-SCNC: 105 MMOL/L (ref 96–108)
CO2 SERPL-SCNC: 25 MMOL/L (ref 21–32)
CREAT SERPL-MCNC: 1.27 MG/DL (ref 0.6–1.3)
EOSINOPHIL # BLD AUTO: 0.07 THOUSAND/ÂΜL (ref 0–0.61)
EOSINOPHIL NFR BLD AUTO: 0 % (ref 0–6)
ERYTHROCYTE [DISTWIDTH] IN BLOOD BY AUTOMATED COUNT: 13.9 % (ref 11.6–15.1)
GFR SERPL CREATININE-BSD FRML MDRD: 55 ML/MIN/1.73SQ M
GLUCOSE SERPL-MCNC: 106 MG/DL (ref 65–140)
HCT VFR BLD AUTO: 40.5 % (ref 36.5–49.3)
HGB BLD-MCNC: 13.4 G/DL (ref 12–17)
IMM GRANULOCYTES # BLD AUTO: 0.46 THOUSAND/UL (ref 0–0.2)
IMM GRANULOCYTES NFR BLD AUTO: 2 % (ref 0–2)
KAPPA LC FREE SER-MCNC: 33.5 MG/L (ref 3.3–19.4)
KAPPA LC FREE/LAMBDA FREE SER: 2.01 {RATIO} (ref 0.26–1.65)
LAMBDA LC FREE SERPL-MCNC: 16.7 MG/L (ref 5.7–26.3)
LYMPHOCYTES # BLD AUTO: 3.36 THOUSANDS/ÂΜL (ref 0.6–4.47)
LYMPHOCYTES NFR BLD AUTO: 17 % (ref 14–44)
MCH RBC QN AUTO: 30.2 PG (ref 26.8–34.3)
MCHC RBC AUTO-ENTMCNC: 33.1 G/DL (ref 31.4–37.4)
MCV RBC AUTO: 91 FL (ref 82–98)
MONOCYTES # BLD AUTO: 1.14 THOUSAND/ÂΜL (ref 0.17–1.22)
MONOCYTES NFR BLD AUTO: 6 % (ref 4–12)
NEUTROPHILS # BLD AUTO: 14.9 THOUSANDS/ÂΜL (ref 1.85–7.62)
NEUTS SEG NFR BLD AUTO: 75 % (ref 43–75)
NRBC BLD AUTO-RTO: 0 /100 WBCS
PLATELET # BLD AUTO: 509 THOUSANDS/UL (ref 149–390)
PMV BLD AUTO: 8.6 FL (ref 8.9–12.7)
POTASSIUM SERPL-SCNC: 4.5 MMOL/L (ref 3.5–5.3)
RBC # BLD AUTO: 4.44 MILLION/UL (ref 3.88–5.62)
SODIUM SERPL-SCNC: 139 MMOL/L (ref 135–147)
WBC # BLD AUTO: 19.99 THOUSAND/UL (ref 4.31–10.16)

## 2022-10-04 PROCEDURE — 99232 SBSQ HOSP IP/OBS MODERATE 35: CPT | Performed by: HOSPITALIST

## 2022-10-04 PROCEDURE — 80048 BASIC METABOLIC PNL TOTAL CA: CPT | Performed by: INTERNAL MEDICINE

## 2022-10-04 PROCEDURE — 99232 SBSQ HOSP IP/OBS MODERATE 35: CPT | Performed by: INTERNAL MEDICINE

## 2022-10-04 PROCEDURE — 85025 COMPLETE CBC W/AUTO DIFF WBC: CPT

## 2022-10-04 RX ORDER — SIMETHICONE 80 MG
80 TABLET,CHEWABLE ORAL EVERY 6 HOURS PRN
Status: DISCONTINUED | OUTPATIENT
Start: 2022-10-04 | End: 2022-10-05 | Stop reason: HOSPADM

## 2022-10-04 RX ADMIN — FAMOTIDINE 20 MG: 20 TABLET ORAL at 09:21

## 2022-10-04 RX ADMIN — HEPARIN SODIUM 5000 UNITS: 5000 INJECTION INTRAVENOUS; SUBCUTANEOUS at 15:43

## 2022-10-04 RX ADMIN — HEPARIN SODIUM 5000 UNITS: 5000 INJECTION INTRAVENOUS; SUBCUTANEOUS at 04:37

## 2022-10-04 RX ADMIN — TRIAMCINOLONE ACETONIDE: 1 OINTMENT TOPICAL at 09:22

## 2022-10-04 RX ADMIN — TRIAMCINOLONE ACETONIDE: 1 OINTMENT TOPICAL at 17:54

## 2022-10-04 RX ADMIN — PREDNISONE 60 MG: 20 TABLET ORAL at 09:21

## 2022-10-04 RX ADMIN — SIMETHICONE 80 MG: 80 TABLET, CHEWABLE ORAL at 10:08

## 2022-10-04 RX ADMIN — TORSEMIDE 10 MG: 10 TABLET ORAL at 09:21

## 2022-10-04 RX ADMIN — HEPARIN SODIUM 5000 UNITS: 5000 INJECTION INTRAVENOUS; SUBCUTANEOUS at 23:12

## 2022-10-04 NOTE — PROGRESS NOTES
[facility]   Dede Anon 74 y o  male MRN: [id number]  Unit/Bed#: S -01 Encounter: [id number]  Reason for Consult:  Acute kidney injury    ASSESSMENT and PLAN:  64-year-old male who was transferred to [facility] for dermatology consult due to persistent rash which started on Labor Day and occurred following antibiotic treatment for UTI  Nephrology consulted for management of acute kidney injury    Acute kidney injury (POA):  · Etiology:  Unclear  · Concern for GN in the setting of vasculitic rash  · Also contributing volume depletion due to severe diarrhea/antibiotic use  Use of Mobic prior to hospitalization  · Creatinine 1 87 on admission improving can currently down 1 27   · Baseline creatinine 0 8-0 9 mg/dL  · Workup:  · MARIO ALBERTO negative  · ANCA negative  · GBM antibody negative  · SPEP and UPEP no monoclonal gammopathy  · Light chain ratio in progress  · Complements normal  · Cryoglobulin pending  · RF normal  · Urinalysis:    · Urinalysis 9/24:  10-20 RBCs, 2+ blood, trace protein, negative leukocytes  · Urinalysis 9/30:  1-2 RBCs, fine granular casts, hyaline cast, trace protein, 1+ blood, trace protein  Negative leukocytes  No pyuria  · Urine protein creatinine ratio 0 40  · Plan  · Improving on current therapy, continue prednisone  · Await results of skin biopsy, remainder of workup  · No Indication for renal biopsy at this time, renal function improving  · Low-dose torsemide  · Continue to hold Mobic/no NSAIDs indefinitely    Blood pressure/volume status:  · Volume status:  Appears to be adequate on low-dose torsemide 10 mg daily  · Pressure acceptable    Skin rash:  · Appears to be vasculitic in nature  Also in the differential- Concern for Green-Adelso syndrome  · Biopsy pending  · Began on Labor Day weekend    Initially treated with topical cream   Wound cultures positive for Pseudomonas therefore placed on Levaquin and developed severe diarrhea  · [facility]  · Improving on prednisone 60 mg daily    Diarrhea:  · Severe diarrhea after starting Levaquin for positive wound cultures taken from leg wounds  · Cultures negative    Depression:  · Fluoxetine on hold    Electrolytes:  · Acceptable  · Hyponatremia resolved    Other:  Polyneuropathy, lumbar sacral plexopathy, gout, chronic gait disorder, UTI treated with Cipro, chronic wounds previously treated with Keflex and prednisone  DISPOSITION:  No changes at this time    SUBJECTIVE / 24H INTERVAL HISTORY:  No complaints at this time  Feels that rash is improving overall  OBJECTIVE:  Current Weight:    Vitals:    10/03/22 1507 10/03/22 2053 10/03/22 2100 10/04/22 0704   BP: 102/67 97/66 97/66 140/65   BP Location:       Pulse: 91 85 88 64   Resp: 16   17   Temp: 97 9 °F (36 6 °C) 97 8 °F (36 6 °C)  97 9 °F (36 6 °C)   TempSrc:       SpO2: 99% 99% 99% 98%       Intake/Output Summary (Last 24 hours) at 10/4/2022 1324  Last data filed at 10/4/2022 0631  Gross per 24 hour   Intake --   Output 1475 ml   Net -1475 ml     General: NAD, comfortably lying in bed  Skin:  Vasculitic rash noted on extremities upper and lower, back  Somewhat clearing  Eyes: anicteric sclera  ENT: moist mucous membrane  Neck: supple  Chest: CTA b/l, no ronchii, no wheeze, no rubs, no rales  Normal effort  CVS: s1s2, no murmur, no gallop, no rub  Normal rate, regular rhythm  Abdomen: soft, nontender, nl sounds, protuberant  Extremities:  Mild edema LE b/l    Rash which appears to be covered with a black-eschar at this time  : no medrano  Neuro: AAOX3  Psych: normal affect  Medications:    Current Facility-Administered Medications:   •  famotidine (PEPCID) tablet 20 mg, 20 mg, Oral, Daily, Taylor Nava MD, 20 mg at 10/04/22 9716  •  heparin (porcine) subcutaneous injection 5,000 Units, 5,000 Units, Subcutaneous, Q8H [facility], Taylor Nava MD, 5,000 Units at 10/04/22 0437  •  predniSONE tablet 60 mg, 60 mg, Oral, Daily, Magdalena Freeman PA-C, 60 mg at 10/04/22 6075  •  simethicone (MYLICON) chewable tablet 80 mg, 80 mg, Oral, Q6H PRN, Lei De Dios MD, 80 mg at 10/04/22 1008  •  torsemide (DEMADEX) tablet 10 mg, 10 mg, Oral, Daily, Mirta Pillai MD, 10 mg at 10/04/22 5963  •  triamcinolone (KENALOG) 0 1 % ointment, , Topical, BID, Magdalena Freeman PA-C, Given at 10/04/22 3997    Laboratory Results:  Results from last 7 days   Lab Units 10/04/22  0639 10/04/22  0436 10/03/22  0932 10/02/22  0458 10/01/22  0404 09/30/22  2200 09/30/22  1019 09/30/22  0113   WBC Thousand/uL 19 99*  --  18 99* 16 20* 16 81* 16 42*  --  16 83*   HEMOGLOBIN g/dL 13 4  --  13 3 11 4* 12 2 12 2  --  13 8   HEMATOCRIT % 40 5  --  40 6 34 8* 36 7 37 1  --  41 3   PLATELETS Thousands/uL 509*  --  470* 407* 433* 454*  --  440*   POTASSIUM mmol/L  --  4 5 4 6 4 8 4 6  --  4 8 4 7   CHLORIDE mmol/L  --  105 103 104 103  --  98 97   CO2 mmol/L  --  25 25 24 22  --  21 23   BUN mg/dL  --  54* 47* 44* 47*  --  45* 47*   CREATININE mg/dL  --  1 27 1 37* 1 37* 1 46*  --  1 57* 1 87*   CALCIUM mg/dL  --  8 6 8 7 8 2* 8 6  --  8 6 9 3

## 2022-10-04 NOTE — PROGRESS NOTES
[facility]  Progress Note - Elvin Phillips 1950, 74 y o  male MRN: [id number]  Unit/Bed#: S -01 Encounter: [id number]  Primary Care Provider: Jaja Alfred MD   Date and time admitted to hospital: 9/30/2022  9:13 PM    * Rash due to vaculitis   Assessment & Plan  · Concern for vasculitis etiology especially given systemic involvement  Less likely SJS given renal involvement and normal oral mucosa  · Status post skin punch biopsy 10/1  Dermatology input appreciated  Discussed with dermatology about wound care and ID consult  · Pt continuing to improve on steroids    Plan   · Continue Prednisone 60 mg PO daily   · Continue topical triamcinolone bid  · Follow up cryoglobulins / pending serologies  · F/u skin biopsy results   · Awaiting further derm recs      [facility]  Assessment & Plan  · Present on Admission  Baseline Creatinine - 0 8 - 0 9  Creatinine currently declining, 1 27 today  · Suspected Causes / Differential - intravascular depletion vs  unspecified Vasculitis vs  NSAID related  · Urinalysis with 1+ occult blood and trace protein with 0-1 Hyaline casts  Negative nitrites and leukocytes  Occasional bacteria ans squamous cells  ANCA/MARIO ALBERTO Negative  Normal  complement  Anti GBM negative, SPEP, light chain negative for gammopathy  Cryoglobulin pending  · Avoid Hypotension - Monitor blood pressures  · Avoid Nephrotoxins and Adjust renally Excreted Medications - Hold meloxicam  · Nephrology Consultation  · Nephro states no kidney biopsy needed at this time  Will await skin biopsy results, cryoglobulins and antiGBM  · Pt still experiencing significant LE edema  Encourage ambulation as tolerated, raising feet  Pt BP in 90s-100s/60s, uptitration of torsemide not appropriate at this time   Pt diuresing 2L fluid/day    Ambulatory dysfunction  Assessment & Plan  · Multifactorial gait dysfunction 2/2  multiple back surgeries,  Chronic lumbar sacral plexopathy,  Polyneuropathy  ·  He uses manual  Wheelchairs  Able to pivot from bed to wheelchair     Depression  Assessment & Plan  · Normally on fluoxetine  Will hold this medication for now until we can definitively rule out Green-nate syndrome  Although very rare (< 1%) incidence, it remains a possibility and risk outweighs benefit currently  · Monitor for any signs / symptoms of depression  Elevated troponin  Assessment & Plan  · [place] non MI trop  Echo EF of 55% with overall no wall motion abnormalities  No additional inpt cardiac workup per cardiology  VTE Pharmacologic Prophylaxis: VTE Score: 4 Moderate Risk (Score 3-4) - Pharmacological DVT Prophylaxis Ordered: heparin  Patient Centered Rounds: I performed bedside rounds with nursing staff today  Discussions with Specialists or Other Care Team Provider: dermatologu    Education and Discussions with Family / Patient: Will contact wife for an update later   Current Length of Stay: 4 day(s)  Current Patient Status: Inpatient   Discharge Plan: Anticipate discharge in 24-48 hrs to home  Code Status: Level 1 - Full Code    Subjective:   Pt evaluated at bedside this morning  States he has some back pain, pt has not been ambulating out of bed  Pt has hx of lumbar plexopathy, pain likely to be more muscular in origin  Pt had a bowel movment yesterday  No straining, blood  Objective:     Vitals:   Temp (24hrs), Av 8 °F (36 6 °C), Min:97 7 °F (36 5 °C), Max:97 9 °F (36 6 °C)    Temp:  [97 7 °F (36 5 °C)-97 9 °F (36 6 °C)] 97 8 °F (36 6 °C)  HR:  [59-91] 88  Resp:  [16-18] 16  BP: ()/(66-67) 97/66  SpO2:  [99 %-100 %] 99 %  There is no height or weight on file to calculate BMI  Input and Output Summary (last 24 hours):      Intake/Output Summary (Last 24 hours) at 10/4/2022 0640  Last data filed at 10/4/2022 0631  Gross per 24 hour   Intake 600 ml   Output 3100 ml   Net -2500 ml       Physical Exam:   Physical Exam  Constitutional:       General: He is not in acute distress  Appearance: He is obese  He is not toxic-appearing  HENT:      Head: Normocephalic and atraumatic  Nose: Nose normal  No congestion or rhinorrhea  Mouth/Throat:      Mouth: Mucous membranes are moist       Pharynx: Oropharynx is clear  Eyes:      General:         Right eye: No discharge  Left eye: No discharge  Conjunctiva/sclera: Conjunctivae normal    Cardiovascular:      Rate and Rhythm: Normal rate and regular rhythm  Pulses: Normal pulses  Heart sounds: Normal heart sounds  No murmur heard  No friction rub  No gallop  Pulmonary:      Effort: Pulmonary effort is normal       Breath sounds: Normal breath sounds  Abdominal:      General: Abdomen is flat  Bowel sounds are normal       Palpations: Abdomen is soft  Musculoskeletal:      Right lower leg: Edema present  Left lower leg: Edema present  Skin:     General: Skin is warm and dry  Capillary Refill: Capillary refill takes less than 2 seconds  Findings: Rash present  Neurological:      General: No focal deficit present  Mental Status: He is alert and oriented to person, place, and time  Psychiatric:         Mood and Affect: Mood normal          Behavior: Behavior normal          Thought Content:  Thought content normal          Judgment: Judgment normal           Additional Data:     Labs:  Results from last 7 days   Lab Units 10/03/22  0932   WBC Thousand/uL 18 99*   HEMOGLOBIN g/dL 13 3   HEMATOCRIT % 40 6   PLATELETS Thousands/uL 470*   NEUTROS PCT % 82*   LYMPHS PCT % 11*   MONOS PCT % 5   EOS PCT % 0     Results from last 7 days   Lab Units 10/04/22  0436 10/03/22  0932 10/02/22  0458   SODIUM mmol/L 139   < > 134*   POTASSIUM mmol/L 4 5   < > 4 8   CHLORIDE mmol/L 105   < > 104   CO2 mmol/L 25   < > 24   BUN mg/dL 54*   < > 44*   CREATININE mg/dL 1 27   < > 1 37*   ANION GAP mmol/L 9   < > 6   CALCIUM mg/dL 8 6   < > 8 2*   ALBUMIN g/dL  --   --  2 8*   TOTAL BILIRUBIN mg/dL  --   --  0 44   ALK PHOS U/L  --   --  55   ALT U/L  --   --  72*   AST U/L  --   --  47*   GLUCOSE RANDOM mg/dL 106   < > 124    < > = values in this interval not displayed  Results from last 7 days   Lab Units 09/30/22  2200   INR  1 29*             Results from last 7 days   Lab Units 09/30/22  0113   LACTIC ACID mmol/L 1 6       Lines/Drains:  Invasive Devices  Report    Peripheral Intravenous Line  Duration           Peripheral IV 09/30/22 Left Hand 3 days    Peripheral IV 10/04/22 Proximal;Right;Ventral (anterior) Forearm <1 day                      Imaging: No pertinent imaging reviewed  Recent Cultures (last 7 days):   Results from last 7 days   Lab Units 09/30/22  0113   BLOOD CULTURE  No Growth at 72 hrs  No Growth at 72 hrs  Last 24 Hours Medication List:   Current Facility-Administered Medications   Medication Dose Route Frequency Provider Last Rate   • famotidine  20 mg Oral Daily Danii De La Paz MD     • heparin (porcine)  5,000 Units Subcutaneous [facility] Danii De La Paz MD     • predniSONE  60 mg Oral Daily Andrea Freeman PA-C     • torsemide  10 mg Oral Daily Amy Guthrie MD     • triamcinolone   Topical BID Jania Metcalf PA-C          Today, Patient Was Seen By: Amy Hernandez    **Please Note: This note may have been constructed using a voice recognition system  **

## 2022-10-04 NOTE — PLAN OF CARE
Problem: MOBILITY - ADULT  Goal: Maintain or return to baseline ADL function  Description: INTERVENTIONS:  -  Assess patient's ability to carry out ADLs; assess patient's baseline for ADL function and identify physical deficits which impact ability to perform ADLs (bathing, care of mouth/teeth, toileting, grooming, dressing, etc )  - Assess/evaluate cause of self-care deficits   - Assess range of motion  - Assess patient's mobility; develop plan if impaired  - Assess patient's need for assistive devices and provide as appropriate  - Encourage maximum independence but intervene and supervise when necessary  - Involve family in performance of ADLs  - Assess for home care needs following discharge   - Consider OT consult to assist with ADL evaluation and planning for discharge  - Provide patient education as appropriate  Outcome: Progressing  Goal: Maintains/Returns to pre admission functional level  Description: INTERVENTIONS:  - Perform BMAT or MOVE assessment daily    - Set and communicate daily mobility goal to care team and patient/family/caregiver  - Collaborate with rehabilitation services on mobility goals if consulted  - Perform Range of Motion  times a day  - Reposition patient every  hours    - Dangle patient  times a day  - Stand patient  times a day  - Ambulate patient  times a day  - Out of bed to chair  times a day   - Out of bed for meals  times a day  - Out of bed for toileting  - Record patient progress and toleration of activity level   Outcome: Progressing     Problem: Potential for Falls  Goal: Patient will remain free of falls  Description: INTERVENTIONS:  - Educate patient/family on patient safety including physical limitations  - Instruct patient to call for assistance with activity   - Consult OT/PT to assist with strengthening/mobility   - Keep Call bell within reach  - Keep bed low and locked with side rails adjusted as appropriate  - Keep care items and personal belongings within reach  - Initiate and maintain comfort rounds  - Make Fall Risk Sign visible to staff  - Offer Toileting every  Hours, in advance of need  - Initiate/Maintain alarm  - Obtain necessary fall risk management equipment:   - Apply yellow socks and bracelet for high fall risk patients  - Consider moving patient to room near nurses station  Outcome: Progressing     Problem: Prexisting or High Potential for Compromised Skin Integrity  Goal: Skin integrity is maintained or improved  Description: INTERVENTIONS:  - Identify patients at risk for skin breakdown  - Assess and monitor skin integrity  - Assess and monitor nutrition and hydration status  - Monitor labs   - Assess for incontinence   - Turn and reposition patient  - Assist with mobility/ambulation  - Relieve pressure over bony prominences  - Avoid friction and shearing  - Provide appropriate hygiene as needed including keeping skin clean and dry  - Evaluate need for skin moisturizer/barrier cream  - Collaborate with interdisciplinary team   - Patient/family teaching  - Consider wound care consult   Outcome: Progressing     Problem: SKIN/TISSUE INTEGRITY - ADULT  Goal: Skin Integrity remains intact(Skin Breakdown Prevention)  Description: Assess:  -Perform Devyn assessment every   -Clean and moisturize skin every   -Inspect skin when repositioning, toileting, and assisting with ADLS  -Assess under medical devices such as  every   -Assess extremities for adequate circulation and sensation     Bed Management:  -Have minimal linens on bed & keep smooth, unwrinkled  -Change linens as needed when moist or perspiring  -Avoid sitting or lying in one position for more than  hours while in bed  -Keep HOB at degrees     Toileting:  -Offer bedside commode  -Assess for incontinence every   -Use incontinent care products after each incontinent episode such as     Activity:  -Mobilize patient  times a day  -Encourage activity and walks on unit  -Encourage or provide ROM exercises -Turn and reposition patient every  Hours  -Use appropriate equipment to lift or move patient in bed  -Instruct/ Assist with weight shifting every  when out of bed in chair  -Consider limitation of chair time hour intervals    Skin Care:  -Avoid use of baby powder, tape, friction and shearing, hot water or constrictive clothing  -Relieve pressure over bony prominences using   -Do not massage red bony areas    Next Steps:  -Teach patient strategies to minimize risks such as    -Consider consults to  interdisciplinary teams such as   Outcome: Progressing  Goal: Incision(s), wounds(s) or drain site(s) healing without S/S of infection  Description: INTERVENTIONS  - Assess and document dressing, incision, wound bed, drain sites and surrounding tissue  - Provide patient and family education  - Perform skin care/dressing changes every   Outcome: Progressing

## 2022-10-04 NOTE — CASE MANAGEMENT
Case Management Assessment & Discharge Planning Note    Patient name Jermaine ANDREW /S -01 MRN [id number]  : 1950 Date 10/4/2022       Current Admission Date: 2022  Current Admission Diagnosis:Rash due to vaculitis    Patient Active Problem List    Diagnosis Date Noted   • Ambulatory dysfunction 10/01/2022   • Leucocytosis 10/01/2022   • Elevated troponin 2022   • EDGAR (dyspnea on exertion) 2022   • Rash due to vaculitis  2022   • LYLY ([facility]) ([facility] ) 2022   • Depression 2022   • Multiple open wounds of lower leg 2022   • Lower extremity weakness 2019   • Lumbosacral plexopathy 2019   • Gait abnormality 2019   • Lumbar stenosis 2019   • Polyneuropathy 2019   • Gout       LOS (days): 4  Geometric Mean LOS (GMLOS) (days): 3 40  Days to GMLOS:-0 2     OBJECTIVE:    Risk of Unplanned Readmission Score: 8 42         Current admission status: Inpatient       Preferred Pharmacy:    [facility] [address]  [address]  [place] [address]  Phone: [phone] Fax: [phone]    [facility]/pharmacy #[id number]- [address] [address]  Phone: [phone] Fax: [phone]    Primary Care Provider: Ten Dowell MD    Primary Insurance: MEDICARE  Secondary Insurance: [employer]IF    ASSESSMENT:  Active Health Care Proxies    There are no active Health Care Proxies on file  Readmission Root Cause  30 Day Readmission: No    Patient Information  Admitted from[de-identified] Home  Mental Status: Alert  During Assessment patient was accompanied by: Not accompanied during assessment  Assessment information provided by[de-identified] Patient  Primary Caregiver: Self  Support Systems: Spouse/significant other, Self  [place] of Residence: [address] do you live in?: [facility] entry access options   Select all that apply : Other access (Comment), Stairs (patient has a chairlift)  Number of steps to enter home : 4  Do the steps have railings?: Yes  Type of Current Residence: 2 story home  Upon entering residence, is there a bedroom on the main floor (no further steps)?: Yes  Upon entering residence, is there a bathroom on the main floor (no further steps)?: Yes  In the last 12 months, how many places have you lived?: 1  In the last 12 months, was there a time when you did not have a steady place to sleep or slept in a shelter (including now)?: No  Homeless/housing insecurity resource given?: N/A  Living Arrangements: Lives w/ Spouse/significant other    Activities of Daily Living Prior to Admission  Functional Status: Independent  Completes ADLs independently?: Yes  Ambulates independently?: No  Level of ambulatory dependence: Total Dependent  Does patient use assisted devices?: Yes  Assisted Devices (DME) used:  Wheelchair  Does patient currently own DME?: Yes  What DME does the patient currently own?: Wheelchair, Stair Chair/Glide  Does patient have a history of Outpatient Therapy (PT/OT)?: Yes (following back surgery)  Does the patient have a history of Short-Term Rehab?: Yes  Does patient have a history of HHC?: Yes  Does patient currently have [facility]?: No         Patient Information Continued  Within the past 12 months, you worried that your food would run out before you got the money to buy more : Never true  Within the past 12 months, the food you bought just didn't last and you didn't have money to get more : Never true  Food insecurity resource given?: N/A  Does patient receive dialysis treatments?: No         Means of Transportation  [facility][de-identified] Family transport (wife)  In the past 12 months, has lack of transportation kept you from medical appointments or from getting medications?: No  In the past 12 months, has lack of transportation kept you from meetings, work, or from getting things needed for daily living?: No  Was application for public transport provided?: N/A        DISCHARGE DETAILS:    Discharge planning discussed with[de-identified] patient at bedside        CM contacted family/caregiver?: No- see comments (patient declined)      Additional Comments: CM met with patient at bedside to complete assessment  Patient lives with his wife in a 2 story home, states he has a chair lift for all sets of steps  Patient is wheelchair bound at baseline  Patient states he is independent in terms of ADLs  Patient has a history of [address] and SNF following multiple back surgeries  Patient's wife is main form of transportation  CM to follow up if any needs arise

## 2022-10-04 NOTE — ASSESSMENT & PLAN NOTE
· Concern for vasculitis etiology especially given systemic involvement  Less likely SJS given renal involvement and normal oral mucosa  · Status post skin punch biopsy 10/1  Dermatology input appreciated  Discussed with dermatology about wound care and ID consult  · Pt continuing to improve on steroids    Plan   · Continue Prednisone 60 mg PO daily   · Continue topical triamcinolone bid  · Follow up cryoglobulins / pending serologies     · F/u skin biopsy results   · Awaiting further derm recs

## 2022-10-05 ENCOUNTER — HOME HEALTH ADMISSION (OUTPATIENT)
Dept: HOME HEALTH SERVICES | Facility: HOME HEALTHCARE | Age: 72
End: 2022-10-05
Payer: MEDICARE

## 2022-10-05 VITALS
SYSTOLIC BLOOD PRESSURE: 123 MMHG | HEART RATE: 63 BPM | RESPIRATION RATE: 17 BRPM | TEMPERATURE: 98 F | DIASTOLIC BLOOD PRESSURE: 67 MMHG | OXYGEN SATURATION: 99 %

## 2022-10-05 PROBLEM — R14.0 BLOATING: Status: ACTIVE | Noted: 2022-10-05

## 2022-10-05 LAB
ALBUMIN SERPL BCP-MCNC: 2.9 G/DL (ref 3.5–5)
ALP SERPL-CCNC: 54 U/L (ref 34–104)
ALT SERPL W P-5'-P-CCNC: 60 U/L (ref 7–52)
ANION GAP SERPL CALCULATED.3IONS-SCNC: 7 MMOL/L (ref 4–13)
AST SERPL W P-5'-P-CCNC: 25 U/L (ref 13–39)
BACTERIA BLD CULT: NORMAL
BACTERIA BLD CULT: NORMAL
BASOPHILS # BLD AUTO: 0.07 THOUSANDS/ÂΜL (ref 0–0.1)
BASOPHILS NFR BLD AUTO: 0 % (ref 0–1)
BILIRUB SERPL-MCNC: 0.44 MG/DL (ref 0.2–1)
BUN SERPL-MCNC: 51 MG/DL (ref 5–25)
CALCIUM ALBUM COR SERPL-MCNC: 9.5 MG/DL (ref 8.3–10.1)
CALCIUM SERPL-MCNC: 8.6 MG/DL (ref 8.4–10.2)
CHLORIDE SERPL-SCNC: 105 MMOL/L (ref 96–108)
CO2 SERPL-SCNC: 27 MMOL/L (ref 21–32)
CREAT SERPL-MCNC: 1.19 MG/DL (ref 0.6–1.3)
EOSINOPHIL # BLD AUTO: 0.05 THOUSAND/ÂΜL (ref 0–0.61)
EOSINOPHIL NFR BLD AUTO: 0 % (ref 0–6)
ERYTHROCYTE [DISTWIDTH] IN BLOOD BY AUTOMATED COUNT: 14 % (ref 11.6–15.1)
GFR SERPL CREATININE-BSD FRML MDRD: 59 ML/MIN/1.73SQ M
GLUCOSE SERPL-MCNC: 92 MG/DL (ref 65–140)
HCT VFR BLD AUTO: 37.2 % (ref 36.5–49.3)
HGB BLD-MCNC: 12.5 G/DL (ref 12–17)
IMM GRANULOCYTES # BLD AUTO: 0.49 THOUSAND/UL (ref 0–0.2)
IMM GRANULOCYTES NFR BLD AUTO: 2 % (ref 0–2)
LYMPHOCYTES # BLD AUTO: 2.99 THOUSANDS/ÂΜL (ref 0.6–4.47)
LYMPHOCYTES NFR BLD AUTO: 15 % (ref 14–44)
MCH RBC QN AUTO: 30.6 PG (ref 26.8–34.3)
MCHC RBC AUTO-ENTMCNC: 33.6 G/DL (ref 31.4–37.4)
MCV RBC AUTO: 91 FL (ref 82–98)
MONOCYTES # BLD AUTO: 1.08 THOUSAND/ÂΜL (ref 0.17–1.22)
MONOCYTES NFR BLD AUTO: 5 % (ref 4–12)
NEUTROPHILS # BLD AUTO: 15.39 THOUSANDS/ÂΜL (ref 1.85–7.62)
NEUTS SEG NFR BLD AUTO: 78 % (ref 43–75)
NRBC BLD AUTO-RTO: 0 /100 WBCS
PLATELET # BLD AUTO: 487 THOUSANDS/UL (ref 149–390)
PMV BLD AUTO: 8.3 FL (ref 8.9–12.7)
POTASSIUM SERPL-SCNC: 4.4 MMOL/L (ref 3.5–5.3)
PROT SERPL-MCNC: 5.6 G/DL (ref 6.4–8.4)
RBC # BLD AUTO: 4.08 MILLION/UL (ref 3.88–5.62)
SODIUM SERPL-SCNC: 139 MMOL/L (ref 135–147)
WBC # BLD AUTO: 20.07 THOUSAND/UL (ref 4.31–10.16)

## 2022-10-05 PROCEDURE — 80053 COMPREHEN METABOLIC PANEL: CPT

## 2022-10-05 PROCEDURE — 99239 HOSP IP/OBS DSCHRG MGMT >30: CPT | Performed by: HOSPITALIST

## 2022-10-05 PROCEDURE — 97163 PT EVAL HIGH COMPLEX 45 MIN: CPT

## 2022-10-05 PROCEDURE — 99232 SBSQ HOSP IP/OBS MODERATE 35: CPT | Performed by: DERMATOLOGY

## 2022-10-05 PROCEDURE — 85025 COMPLETE CBC W/AUTO DIFF WBC: CPT

## 2022-10-05 PROCEDURE — 97530 THERAPEUTIC ACTIVITIES: CPT

## 2022-10-05 PROCEDURE — 97167 OT EVAL HIGH COMPLEX 60 MIN: CPT

## 2022-10-05 PROCEDURE — 99232 SBSQ HOSP IP/OBS MODERATE 35: CPT | Performed by: INTERNAL MEDICINE

## 2022-10-05 RX ORDER — SIMETHICONE 80 MG
80 TABLET,CHEWABLE ORAL EVERY 6 HOURS PRN
Qty: 30 TABLET | Refills: 0 | Status: SHIPPED | OUTPATIENT
Start: 2022-10-05

## 2022-10-05 RX ORDER — FAMOTIDINE 20 MG/1
20 TABLET, FILM COATED ORAL DAILY
Qty: 30 TABLET | Refills: 0 | Status: SHIPPED | OUTPATIENT
Start: 2022-10-06 | End: 2022-11-05

## 2022-10-05 RX ORDER — PREDNISONE 20 MG/1
60 TABLET ORAL DAILY
Qty: 63 TABLET | Refills: 0 | Status: SHIPPED | OUTPATIENT
Start: 2022-10-06 | End: 2022-10-05 | Stop reason: SDUPTHER

## 2022-10-05 RX ORDER — ACETAMINOPHEN 325 MG/1
650 TABLET ORAL EVERY 6 HOURS PRN
Status: DISCONTINUED | OUTPATIENT
Start: 2022-10-05 | End: 2022-10-05 | Stop reason: HOSPADM

## 2022-10-05 RX ORDER — TORSEMIDE 10 MG/1
10 TABLET ORAL DAILY
Qty: 30 TABLET | Refills: 0 | Status: SHIPPED | OUTPATIENT
Start: 2022-10-06 | End: 2022-11-05

## 2022-10-05 RX ORDER — PREDNISONE 20 MG/1
60 TABLET ORAL DAILY
Qty: 90 TABLET | Refills: 0 | Status: SHIPPED | OUTPATIENT
Start: 2022-10-06 | End: 2022-11-05

## 2022-10-05 RX ADMIN — FAMOTIDINE 20 MG: 20 TABLET ORAL at 09:12

## 2022-10-05 RX ADMIN — TORSEMIDE 10 MG: 10 TABLET ORAL at 09:12

## 2022-10-05 RX ADMIN — TRIAMCINOLONE ACETONIDE: 1 OINTMENT TOPICAL at 09:15

## 2022-10-05 RX ADMIN — PREDNISONE 60 MG: 20 TABLET ORAL at 09:12

## 2022-10-05 RX ADMIN — ACETAMINOPHEN 650 MG: 325 TABLET ORAL at 16:32

## 2022-10-05 RX ADMIN — HEPARIN SODIUM 5000 UNITS: 5000 INJECTION INTRAVENOUS; SUBCUTANEOUS at 05:32

## 2022-10-05 NOTE — DISCHARGE SUMMARY
[facility]  Discharge- Zina Recio 1950, 74 y o  male MRN: [id number]  Unit/Bed#: S -01 Encounter: [id number]  Primary Care Provider: Yimi Slater MD   Date and time admitted to hospital: 9/30/2022  9:13 PM    * Rash due to vaculitis   Assessment & Plan  · Concern for vasculitis etiology especially given systemic involvement  Less likely SJS given renal involvement and normal oral mucosa  · Status post skin punch biopsy 10/1  Dermatology input appreciated  Discussed with dermatology about wound care and ID consult  · Pt continuing to improve on steroids    Plan   · Continue Prednisone 60 mg PO daily   · Continue topical triamcinolone bid  · Follow up cryoglobulins / pending serologies  · F/u skin biopsy results   · Awaiting further derm recs      Leucocytosis  Assessment & Plan  Bcx negative at this time  WBC 20 07 today, likely 2/2 prednisone    Ambulatory dysfunction  Assessment & Plan  · Multifactorial gait dysfunction 2/2  multiple back surgeries,  Chronic lumbar sacral  plexopathy,  Polyneuropathy  ·  He uses manual  Wheelchairs  Able to pivot from bed to wheelchair     Multiple open wounds of lower leg  Assessment & Plan  Wounds improving  Followup with derm as outpatient  Depression  Assessment & Plan  · Normally on fluoxetine  Will hold this medication for now until we can definitively rule out Green-nate syndrome  Although very rare (< 1%) incidence, it remains a possibility and risk outweighs benefit currently  · Monitor for any signs / symptoms of depression  · Will continue w/ fluoxetine after discharge as suspicion for SJS is low at this time  LYLY (acute kidney injury) [facility])  Assessment & Plan  · Present on Admission  Baseline Creatinine - 0 8 - 0 9  Creatinine currently declining, 1 19 today  · Suspected Causes / Differential - intravascular depletion vs  unspecified Vasculitis vs  NSAID related    · Urinalysis with 1+ occult blood and trace protein with 0-1 Hyaline casts  Negative nitrites and leukocytes  Occasional bacteria ans squamous cells  ANCA/MARIO ALBERTO Negative  Normal  complement  Anti GBM negative, SPEP, light chain negative for gammopathy  Cryoglobulin pending  · Avoid Hypotension - Monitor blood pressures  · Avoid Nephrotoxins and Adjust renally Excreted Medications - Hold meloxicam  · Nephrology Consultation  · Nephro states no kidney biopsy needed at this time  Will await skin biopsy results, cryoglobulins and antiGBM  · Pt still experiencing significant LE edema  Encourage ambulation as tolerated, raising feet  Pt continuing to diurese appropriately  EDGAR (dyspnea on exertion)  Assessment & Plan  · Differential - anginal equivalent vs  Anxiety vs  Mild pulmonary edema (although BNP not significantly elevated and CXR negative, does have peripheral edema)  · At this time, patient reports EDGAR has improved-- he did not feel SOB transferring from stretcher to hospital bed   · If cardiac workup is negative, consider outpatient PFTs  · Monitor oxygen saturations  Elevated troponin  Assessment & Plan  · Twining non MI trop  Echo EF of 55% with overall no wall motion abnormalities  No additional inpt cardiac workup per cardiology    · F/u with cardiology outpatient        Medical Problems             Resolved Problems  Date Reviewed: 10/3/2022          Resolved    Diarrhea 10/3/2022     Resolved by  Judy Claire              Discharging Resident: Lei De Dios  Discharging Attending: Eugenio Samano MD  PCP: Ashutosh Noel MD  Admission Date:   Admission Orders (From admission, onward)     Ordered        09/30/22 2115  Inpatient Admission  Once                      Discharge Date: 10/05/22    Consultations During Hospital Stay:  · Cardiology, , nephrology    Procedures Performed:   · Skin biopsy    Significant Findings / Test Results:   XR chest 1 view portable    Result Date: 9/30/2022  Impression: Persistent borderline cardiomegaly No acute cardiopulmonary disease  · No Chest XR results available for this patient  ·     Incidental Findings:   · none    Test Results Pending at Discharge (will require follow up):  · none     Outpatient Tests Requested:  · none    Complications:  none    Reason for Admission: rash    Hospital Course:   Elliott Smith is a 74 y o  male patient who originally presented to the hospital on 9/30/2022 as a transfer from [facility] to be evaluated by Dermatology for a skin rash and necrotic ulcers  Pt was started on prednisone 60mg PO daily and topical triamcinolone  His rash improved significantly over the course of his stay  SPEP, antiGBM, ANCA, complement were unremarkable  Cryoglobulins and biopsy (performed 10/1) are still pending  He will followup on results of the biopsy outpatient with dermatology  SJS was ruled out due to renal involvement and lack of muscosal involvement  Pt had significant edema and was placed on torsemide 10mg which was continued for the duration of the hospital course  Pt diuresed 5665mL of fluid over the course of his stay and experienced improvement in his kidney function  Pt had elevated troponins early in his hospital course, and he underwent an echocardiogram  Echocardiogram was of poor quality and thus pt will followup with cardiology as an outpatient  Patient will follow-up with Dermatology pending skin biopsy result at which time they will manage his prednisone  Patient has been discharged with wound care instructions in addition to home health care  Please see above list of diagnoses and related plan for additional information       Condition at Discharge: stable    Discharge Day Visit / Exam:   Subjective:  No acute eventd overnight  Vitals: Blood Pressure: 123/67 (10/05/22 0721)  Pulse: 63 (10/05/22 0721)  Temperature: 98 °F (36 7 °C) (10/05/22 0721)  Temp Source: Oral (10/03/22 0713)  Respirations: 17 (10/04/22 0704)  SpO2: 99 % (10/05/22 7058)  Exam:   Physical Exam  Vitals and nursing note reviewed  Constitutional:       Appearance: He is well-developed  HENT:      Head: Normocephalic and atraumatic  Eyes:      Conjunctiva/sclera: Conjunctivae normal    Cardiovascular:      Rate and Rhythm: Normal rate and regular rhythm  Heart sounds: No murmur heard  Pulmonary:      Effort: Pulmonary effort is normal  No respiratory distress  Breath sounds: Normal breath sounds  Abdominal:      Palpations: Abdomen is soft  Tenderness: There is no abdominal tenderness  Musculoskeletal:      Cervical back: Neck supple  Skin:     General: Skin is warm and dry  Neurological:      Mental Status: He is alert  Discussion with Family: Updated  (wife) at bedside  Discharge instructions/Information to patient and family:   See after visit summary for information provided to patient and family  Provisions for Follow-Up Care:  See after visit summary for information related to follow-up care and any pertinent home health orders  Disposition:   Home    Planned Readmission:     Discharge Medications:  See after visit summary for reconciled discharge medications provided to patient and/or family        **Please Note: This note may have been constructed using a voice recognition system**

## 2022-10-05 NOTE — PROGRESS NOTES
NEPHROLOGY HOSPITAL PROGRESS NOTE   Marixa Ambrose 74 y o  male MRN: [id number]  Unit/Bed#: S -01 Encounter: [id number]  Reason for Consult: LYLY    ASSESSMENT and PLAN:    18 -Levi Latif - 38-year-old male with a past medical history of depression, gout, elevated BMI who initially presents at outside hospital with concern of rash   Patient was transferred for dermatology evaluation   Nephrology is on for LYLY     1) LYLY     - etiology - unknown but consideration of GN  - baseline 0 9 mg/dL  - peak Create 1 9 mg/dL  - baseline create 0 9 mg/dL  - UA - hematuria--> repeat urinalysis with improvement in hematuria  - UPCR 0 4 gm/gm  - MARIO ALBERTO - neg  - ANCA - neg  - anti GBM -negative  - C3, C4 - nl  - SPEP - no monoclonal gammopathy  -10/3-creatinine stable 1 37 mg/dL   Sodium, potassium are appropriate  -10/4-creatinine stable 1 3 mg/dL  Edema is improving with current dose of torsemide   - 10/5 - creat improving 1 19 mg/dL       Plan:     -follow-up cryoglobulin  - for now not pursuing a biopsy as creat improving and already on steroids and skin biopsy in progress  - awaiting skin biopsy  - cont torsemide for now at current dose  - BMP in AM if remains inpt  - from renal standpoint, no objection to final disp per primary team  - I have messaged renal office at [facility] to assist in setting up local appt in 2-3 weeks  - trend WBC closely  -avoid nephrotoxic agents     2) rash     - steroids per primary team  - Derm  - skin biopsy pending  - there was concern for SJS vs vasculitis     3) electrolytes-appropriate sodium and potassium     4)  acid/base-appropriate bicarbonate     5) volume-monitor with torsemide--> volume state is improving    - consider duplex if edema does not cont to improve     6) leukocytosis-per primary team    SUBJECTIVE / 24H INTERVAL HISTORY:  Pt states edema improving  Rash stable       OBJECTIVE:  Current Weight:    Vitals:    10/04/22 1541 10/04/22 1541 10/04/22 2046 10/05/22 0721   BP: 129/78 129/78 125/69 123/67   Pulse: 86 85 79 63   Resp:       Temp: 98 °F (36 7 °C) 98 °F (36 7 °C) 97 8 °F (36 6 °C) 98 °F (36 7 °C)   TempSrc:       SpO2: 94% 97% 95% 99%       Intake/Output Summary (Last 24 hours) at 10/5/2022 1209  Last data filed at 10/4/2022 2100  Gross per 24 hour   Intake --   Output 200 ml   Net -200 ml     General: NAD  Skin: + rash LE b/l   Eyes: anicteric sclera  ENT: moist mucous membrane  Neck: supple  Chest: CTA b/l, no ronchii, no wheeze, no rubs, no rales  CVS: s1s2, no murmur, no gallop, no rub  Abdomen: soft, nontender, nl sounds  Extremities: 1-2+ edema LE b/l  : no medrano  Neuro: AAOX3  Psych: normal affect    Medications:    Current Facility-Administered Medications:   •  famotidine (PEPCID) tablet 20 mg, 20 mg, Oral, Daily, Prieto Seo MD, 20 mg at 10/05/22 0912  •  heparin (porcine) subcutaneous injection 5,000 Units, 5,000 Units, Subcutaneous, Q8H [facility], Prieto Seo MD, 5,000 Units at 10/05/22 0532  •  predniSONE tablet 60 mg, 60 mg, Oral, Daily, Gio Freeman PA-C, 60 mg at 10/05/22 0912  •  simethicone (MYLICON) chewable tablet 80 mg, 80 mg, Oral, Q6H PRN, Cynthia Rubio MD, 80 mg at 10/04/22 1008  •  torsemide (DEMADEX) tablet 10 mg, 10 mg, Oral, Daily, Corby Olson MD, 10 mg at 10/05/22 5029  •  triamcinolone (KENALOG) 0 1 % ointment, , Topical, BID, Gio Freeman PA-C, Given at 10/05/22 0915    Laboratory Results:  Results from last 7 days   Lab Units 10/05/22  0534 10/04/22  0639 10/04/22  0436 10/03/22  0932 10/02/22  0458 10/01/22  0404 09/30/22  2200 09/30/22  1019 09/30/22  0113   WBC Thousand/uL 20 07* 19 99*  --  18 99* 16 20* 16 81* 16 42*  --  16 83*   HEMOGLOBIN g/dL 12 5 13 4  --  13 3 11 4* 12 2 12 2  --  13 8   HEMATOCRIT % 37 2 40 5  --  40 6 34 8* 36 7 37 1  --  41 3   PLATELETS Thousands/uL 487* 509*  --  470* 407* 433* 454*  --  440*   POTASSIUM mmol/L 4 4  --  4 5 4 6 4 8 4 6  --  4 8 4 7 CHLORIDE mmol/L 105  --  105 103 104 103  --  98 97   CO2 mmol/L 27  --  25 25 24 22  --  21 23   BUN mg/dL 51*  --  54* 47* 44* 47*  --  45* 47*   CREATININE mg/dL 1 19  --  1 27 1 37* 1 37* 1 46*  --  1 57* 1 87*   CALCIUM mg/dL 8 6  --  8 6 8 7 8 2* 8 6  --  8 6 9 3

## 2022-10-05 NOTE — CONSULTS
DERMATOLOGY:  Maya Aguirre 74 y o  male MRN: [id number]  Unit/Bed#: S -01 Encounter: [id number]      Consults        Patient seen in person by resident Dr Marta Headley and attending Dr Anali Khoury      Assessment/Recommendations   Based on a thorough discussion of this condition and the management approach to it (including a comprehensive discussion of the known risks, side effects and potential benefits of treatment), the patient (family) agrees to implement the following specific plan:    58-year-old male who initially presented to [facility] on 09/30 with diffuse rash with necrotic ulcers, transferred for dermatology assessment  Biopsy and cryoglobulins pending  MARIO ALBERTO, Anti-GM, ANCA, complement negative  He was started on oral steroids as well as topical triamcinolone with improvement in rash during his stay  Nephrology consulted due to concern for possible underlying GN  Patient was started on oral steroids undermining the utility of kidney biopsy  Patient seen and examined at bedside  He reports improvement in lesions as well  Plan  --F/u biopsy - will call patient - will elucidate whether rash is of vasculitis origin  --f/u in [facility] clinic at [facility]  --continue with prednisone per Nephrology        Please set up discharge follow up by calling our office: [phone])     Thank you for involving me in the care of your patient  Please call with questions, change in clinical status or if tests recommended above are abnormal      Discussed with the primary service          PAST MEDICAL HISTORY:  Past Medical History:   Diagnosis Date   • Depression    • Gout      Past Surgical History:   Procedure Laterality Date   • BACK SURGERY      X2 LUMBAR INCLUDING FUSION  2004, 2015 WITH [facility], [facility] DR Socorro Miller   • CATARACT EXTRACTION, BILATERAL     • KNEE ARTHROSCOPY Right     x2 , meniscus repair   • TONSILECTOMY AND ADNOIDECTOMY         FAMILY HISTORY:  Non-contributory    SOCIAL HISTORY:  Social History   /Civil Union  Social History     Substance and Sexual Activity   Alcohol Use Not Currently    Comment: occasional     Social History     Substance and Sexual Activity   Drug Use Never     Social History     Tobacco Use   Smoking Status Former Smoker   • Quit date:    • Years since quittin 7   Smokeless Tobacco Never Used       ALLERGIES:  Allergies   Allergen Reactions   • Other      IVORY SOAP   • Penicillins Rash       MEDICATIONS:  All current active medications have been reviewed  Physical exam:     Temp:  [97 8 °F (36 6 °C)-98 °F (36 7 °C)] 98 °F (36 7 °C)  HR:  [63-79] 63  BP: (123-125)/(67-69) 123/67  SpO2:  [95 %-99 %] 99 %  Temp (24hrs), Av 9 °F (36 6 °C), Min:97 8 °F (36 6 °C), Max:98 °F (36 7 °C)  Current: Temperature: 98 °F (36 7 °C)    PSYCH: Normal mood and affect  EYES: Normal conjunctiva  ENT: Normal lips and oral mucosa  CARDIOVASCULAR: No edema  RESPIRATORY: Normal respirations  HEME/LYMPH/IMMUNO:  No regional lymphadenopathy except as noted below in ASSESSMENT AND PLAN BY DIAGNOSIS    FULL ORGAN SYSTEM SKIN EXAM (SKIN)  Exam: arms: scattered petechiae and purpura, improved from when biopsy was performed  Legs: scattered hemorrhagic ulcers, also less in number and severity from prior exam                            Labs, Imaging, & Other Studies     Lab Results:  I have personally reviewed pertinent labs       Results from last 7 days   Lab Units 10/05/22  0534 10/04/22  0639 10/03/22  0932   WBC Thousand/uL 20 07* 19 99* 18 99*   HEMOGLOBIN g/dL 12 5 13 4 13 3   PLATELETS Thousands/uL 487* 509* 470*     Results from last 7 days   Lab Units 10/05/22  0534 10/03/22  0932 10/02/22  0458 22  1019 22  0113   POTASSIUM mmol/L 4 4   < > 4 8   < > 4 7   CHLORIDE mmol/L 105   < > 104   < > 97   CO2 mmol/L 27   < > 24   < > 23   BUN mg/dL 51*   < > 44*   < > 47*   CREATININE mg/dL 1 19   < > 1 37*   < > 1 87*   EGFR ml/min/1 73sq m 59   < > 50   < > 34   CALCIUM mg/dL 8 6   < > 8 2*   < > 9 3   AST U/L 25  --  47*  --  59*   ALT U/L 60*  --  72*  --  73*   ALK PHOS U/L 54  --  55  --  64    < > = values in this interval not displayed  Results from last 7 days   Lab Units 09/30/22  0113   BLOOD CULTURE  No Growth After 5 Days  No Growth After 5 Days  Pathology:   I have personally reviewed pertinent reports

## 2022-10-05 NOTE — PLAN OF CARE
Problem: PHYSICAL THERAPY ADULT  Goal: Performs mobility at highest level of function for planned discharge setting  See evaluation for individualized goals  Description: Treatment/Interventions: Functional transfer training, LE strengthening/ROM, Endurance training, Therapeutic exercise, Cognitive reorientation, Patient/family training, Bed mobility, Equipment eval/education          See flowsheet documentation for full assessment, interventions and recommendations  Note:    Problem List: Decreased strength, Decreased range of motion, Decreased endurance, Impaired balance, Decreased mobility, Decreased safety awareness, Impaired sensation, Obesity, Decreased skin integrity, Pain (LE edema)  Assessment: Pt presented with complaint of a blister containing rash on his arms and legs which he first noticed when cleaning his backyard around August 26, 2022  Pt had been also complaining of some exertional dyspnea  Dx: rash, diarrhea, LYLY, EDGAR, and elevated troponin  order placed for PT eval and tx, w/ activity order of up w/ assist  pt presents w/ comorbidities of gout, back surgery, and right knee surgeries x2 and personal factors of advanced age, mobilizing w/ assistive device, depression and inability to perform IADLs  pt presents w/ pain, weakness, decreased ROM, decreased endurance, impaired balance, altered sensation, decreased safety awareness, fall risk, LE edema and impaired skin integrity  these impairments are evident in findings from physical examination (weakness, decreased ROM, altered sensation, impaired skin integrity and edema of extremities), mobility assessment (need for min assist w/ all phases of mobility when usually mobilizing independently and need for cueing for mobility technique), and Barthel Index: 50/100  pt needed input for mobility technique  pt is at risk for falls due to physical and safety awareness deficits   pt's clinical presentation is unstable/unpredictable (evident in need for assist w/ all phases of mobility when usually mobilizing independently, pain impacting overall mobility status and need for input for mobility technique/safety)  pt needs inpatient PT tx to improve mobility deficits and progress mobility training as appropriate  discharge recommendation is for home w/ family support and home PT to reduce fall risk and maximize level of functional independence  PT Discharge Recommendation: Home with home health rehabilitation    See flowsheet documentation for full assessment

## 2022-10-05 NOTE — DISCHARGE INSTR - AVS FIRST PAGE
Dear Ana Chavez,     It was our pleasure to care for you here at [facility]  It is our hope that we were always able to exceed the expected standards for your care during your stay  You were hospitalized due to Rash suspected due to Vasculitis  You were cared for on the Third floor by Gloria Skinner under the service of Janny Salazar MD with the [facility] who covers for your primary care physician (PCP), Karen Perkins MD, while you were hospitalized  If you have any questions or concerns related to this hospitalization, you may contact us at [phone]  For follow up as well as any medication refills, we recommend that you follow up with your primary care physician  A registered nurse will reach out to you by phone within a few days after your discharge to answer any additional questions that you may have after going home  However, at this time we provide for you here, the most important instructions / recommendations at discharge:     Notable Medication Adjustments -   Prednisone 20 mg has been  Please take 3 tablets (60 mg) daily - please follow-up with Dermatology regarding lowering dose and discontinuation  You can take Pepcid as needed for reflux as you are on steroids  Apply Kenalog to your rash, avoid the necrotic lesions  Continue torsemide 10 mg once daily for your lower limb edema  Discontinue Levaquin  Testing Required after Discharge -   None  Important follow up information -   Please follow-up with Dermatology in regards to the results of his skin biopsy and cryoglobulin level  Other Instructions -   Please continue taking home medication as prescribed  Please continue to take prednisone 60 mg daily until your dermatology appointment  Wound care instructions:    Vaseline to dermal layer wound on left buttock  Apply twice a day and as needed   4  For necrotic wounds on bilateral lower legs, cleanse with foam cleanser, normal saline, or soap and water  Then apply layer of Triad paste  Cover Triad paste with single layer of Adaptic or similar contact layer  Cover Adaptic with ABD pads and secure with gauze roll and tape  Change every 3 days and as needed  4  Apply Kenalog cream to rash areas only  Do not apply to necrotic areas  Frequency as dictated by prescription  5  Dr Graves See updated on discharge wound plans and that, if patient needs Kenalog RX, that the location of rash only needs to be prescribed  6  Large sized bariatric pressure redistribution cushions provided for patient at discharge  One for recliner and one for foot rest  Patient instructed to take these cushions home  Please review this entire after visit summary as additional general instructions including medication list, appointments, activity, diet, any pertinent wound care, and other additional recommendations from your care team that may be provided for you        Sincerely,     Natalie Díaz

## 2022-10-05 NOTE — ASSESSMENT & PLAN NOTE
· Normally on fluoxetine  Will hold this medication for now until we can definitively rule out Green-nate syndrome  Although very rare (< 1%) incidence, it remains a possibility and risk outweighs benefit currently  · Monitor for any signs / symptoms of depression  · Will continue w/ fluoxetine after discharge as suspicion for SJS is low at this time

## 2022-10-05 NOTE — PLAN OF CARE
Problem: MOBILITY - ADULT  Goal: Maintain or return to baseline ADL function  Description: INTERVENTIONS:  -  Assess patient's ability to carry out ADLs; assess patient's baseline for ADL function and identify physical deficits which impact ability to perform ADLs (bathing, care of mouth/teeth, toileting, grooming, dressing, etc )  - Assess/evaluate cause of self-care deficits   - Assess range of motion  - Assess patient's mobility; develop plan if impaired  - Assess patient's need for assistive devices and provide as appropriate  - Encourage maximum independence but intervene and supervise when necessary  - Involve family in performance of ADLs  - Assess for home care needs following discharge   - Consider OT consult to assist with ADL evaluation and planning for discharge  - Provide patient education as appropriate  Outcome: Progressing  Goal: Maintains/Returns to pre admission functional level  Description: INTERVENTIONS:  - Perform BMAT or MOVE assessment daily    - Set and communicate daily mobility goal to care team and patient/family/caregiver  - Collaborate with rehabilitation services on mobility goals if consulted  - Reposition patient every 2 hours    - Record patient progress and toleration of activity level   Outcome: Progressing     Problem: Prexisting or High Potential for Compromised Skin Integrity  Goal: Skin integrity is maintained or improved  Description: INTERVENTIONS:  - Identify patients at risk for skin breakdown  - Assess and monitor skin integrity  - Assess and monitor nutrition and hydration status  - Monitor labs   - Assess for incontinence   - Turn and reposition patient  - Assist with mobility/ambulation  - Relieve pressure over bony prominences  - Avoid friction and shearing  - Provide appropriate hygiene as needed including keeping skin clean and dry  - Evaluate need for skin moisturizer/barrier cream  - Collaborate with interdisciplinary team   - Patient/family teaching  - Consider wound care consult   Outcome: Progressing     Problem: SKIN/TISSUE INTEGRITY - ADULT  Goal: Skin Integrity remains intact(Skin Breakdown Prevention)  Description: Assess:  -Perform Devyn assessment every shift  -Clean and moisturize skin every day  -Inspect skin when repositioning, toileting, and assisting with ADLS  -Assess under medical devices   -Assess extremities for adequate circulation and sensation     Bed Management:  -Have minimal linens on bed & keep smooth, unwrinkled  -Change linens as needed when moist or perspiring  -Avoid sitting or lying in one position for more than 2 hours while in bed  -Keep HOB at 30 degrees     Toileting:  -Offer bedside commode  -Assess for incontinence every hour  -Use incontinent care products after each incontinent episode     Activity:  -Turn and reposition patient every 2 Hours  -Use appropriate equipment to lift or move patient in bed  -Instruct/ Assist with weight shifting every hour  when out of bed in chair  -Consider limitation of chair time  2  hour intervals    Skin Care:  -Avoid use of baby powder, tape, friction and shearing, hot water or constrictive clothing  -Relieve pressure over bony prominences using Allevyn  -Do not massage red bony areas    Next Steps:  -Teach patient strategies to minimize risks    -Consider consults to  interdisciplinary teams  Outcome: Progressing  Goal: Incision(s), wounds(s) or drain site(s) healing without S/S of infection  Description: INTERVENTIONS  - Assess and document dressing, incision, wound bed, drain sites and surrounding tissue  - Provide patient and family education  - Perform skin care/dressing changes every shift  Outcome: Progressing

## 2022-10-05 NOTE — PLAN OF CARE
Problem: OCCUPATIONAL THERAPY ADULT  Goal: Performs self-care activities at highest level of function for planned discharge setting  See evaluation for individualized goals  Description: Treatment Interventions: ADL retraining, Functional transfer training, UE strengthening/ROM, Endurance training, Equipment evaluation/education, Activityengagement          See flowsheet documentation for full assessment, interventions and recommendations  Note: Limitation: Decreased ADL status, Decreased UE strength, Decreased Safe judgement during ADL, Decreased endurance, Decreased self-care trans, Decreased high-level ADLs  Prognosis: Good  Assessment: Pt is a 74 y o  male seen for OT evaluation s/p admission to [address] [place] [address] on 9/30/2022 due to Rash, pt [facility] for derm consult + biopsy  Pt has a significant PMH impacting occupational performance including: EDGAR, depression, multiple open wounds of lower legs, polyneuropathy, lumbosacral plexopathy  Pt with 1 recent admissions in the last 2 months  Pt with active OT evaluation and treatment orders and activity orders for Up with assistance  PTA pt living with wife in [facility] with [address], pt (I) with ADLs and requires (A) with IADLs, (-)falls, (-)drives, use of w/c at baseline  Pt is motivated to return to home  Personal and environmental factors supporting pt at time of IE include social support, attitude towards recovery and accessible home environment  Personal and environmental factors inhibiting engagement in occupations include difficulty completing ADLs and difficulty completing IADLs  During evaluation pt performed as is outlined above in flowsheet  Pt required VC for safety and seated rest breaks  Standardized assessments used to assist in identifying performance deficits include AMPAC 6-Clicks and Barthel ADL Index   Performance deficits that affect the pt’s occupational performance during the initial evaluation include impaired balance, functional mobility, endurance, activity tolerance, functional standing tolerance and overall strength, safety awareness and insight into deficits  Based on pt’s functional performance and deficits the following occupations will be addressed in OT treatments in order to maximize pt’s independence and overall occupational performance: grooming, bathing/showering, toileting and toilet hygiene, dressing and functional mobility  Goals are listed below  Upon discharge from acute care setting recommend d/c to Home with family support + HHOT  This evaluation required an extensive review of medical and/or therapy records and additional review of physical, cognitive and psychosocial history related to functional performance  Based upon functional performance deficits and assessments, this evaluation has been identified as a high complexity evaluation       OT Discharge Recommendation: Home with home health rehabilitation

## 2022-10-05 NOTE — OCCUPATIONAL THERAPY NOTE
Occupational Therapy Evaluation     Patient Name: Zach Sauceda  VKGIX'W Date: 10/5/2022  Problem List  Principal Problem:    Rash due to vaculitis   Active Problems:    Elevated troponin    EDGAR (dyspnea on exertion)    LYLY (acute kidney injury) ([facility] [address] )    Depression    Multiple open wounds of lower leg    Ambulatory dysfunction    Leucocytosis    Past Medical History  Past Medical History:   Diagnosis Date    Depression     Gout      Past Surgical History  Past Surgical History:   Procedure Laterality Date    BACK SURGERY      X2 LUMBAR INCLUDING FUSION  2004, 2015 WITH [facility], [facility] DR Nile Cardozaoms    CATARACT EXTRACTION, BILATERAL      KNEE ARTHROSCOPY Right     x2 , meniscus repair    TONSILECTOMY AND ADNOIDECTOMY             10/05/22 0942   OT Last Visit   OT Visit Date 10/05/22   Note Type   Note type Evaluation   Restrictions/Precautions   Weight Bearing Precautions Per Order No   Other Precautions Chair Alarm; Bed Alarm; Fall Risk;Pain   Pain Assessment   Pain Assessment Tool 0-10   Pain Score 7   Pain Location/Orientation Orientation: Lower; Location: Back   Home Living   Type of [address] [place] [address] in basement; Two level; Able to live on main level with bedroom/bathroom; Performs ADLs on one level  (stair glide to enter house over 4 YUE)   Bathroom Shower/Tub Tub/shower unit   [employer]  (with raiser on it, able to side step across counter of sink to sit on toilet)   [employer] Grab bars in shower; Shower chair; Toilet raiser;Grab bars around toilet  (reports  sit pivot transfer, and pivoting to sit on small stool in tub)   [facility] bed;Walker  (lift chair)   Additional Comments wheelchair at baseline, self propelling with BUE and REG  Pt reports completing sit-pivot transfer in/out of w/c with overall mod I, intermittently daughter or wife will assist if needed     Prior Function   Lives With Spouse;Daughter  (+ granddaughter)   Receives Help From Family   ADL Assistance Independent  (reports rolling in bed for dressing, (I) in shower and (I) toileting)   IADLs Needs assistance   Falls in the last 6 months 0   Vocational Retired  (Macarena Beltran)   Comments (-)drives, wife drives   Lifestyle   Autonomy PTA pt living with wife in [facility] with [address], pt (I) with ADLs and requires (A) with IADLs, (-)falls, (-)drives, use of w/c at baseline   Reciprocal Relationships supportive wife and daughters   Service to Others retired, worked at [employer] as a    Intrinsic Gratification used to enjoy watching stationary bike a mile and a half every day, enjoys sitting on the front porch   Subjective   Subjective "I haven't been out of bed since thursday night, so I hope I can do this"   ADL   Eating Assistance [facility] 4  [address] 6  Modified independent   [address] 2  Maximal Assistance   LB Dressing Deficit Don/doff R sock; Don/doff L sock   Toileting Assistance  3  Moderate Assistance   Bed Mobility   Rolling R 6  Modified independent   Rolling L 6  Modified independent   Supine to Sit 5  Supervision   Additional items Increased time required   Transfers   Sit pivot 4  Minimal assistance  (sitting up on L side of bed)   Additional items Assist x 1; Increased time required;Verbal cues; Bedrails;Armrests  (Pt completing sit pivot to his R side into recliner chair with CGA x1)   Additional Comments no use of AD, use of grab bars and arm rest   Balance   Static Sitting Normal   Dynamic Sitting Good   RUE Assessment   RUE Assessment WFL   LUE Assessment   LUE Assessment WFL   Hand Function   Gross Motor Coordination Functional   Fine Motor Coordination Functional   Cognition   Overall Cognitive Status WFL   Arousal/Participation Alert; Cooperative   Attention Within functional limits   Orientation Level Oriented X4   Memory Within functional limits   Following Commands Follows one step commands without difficulty   Comments pleasant and cooperative   Assessment   Limitation Decreased ADL status; Decreased UE strength;Decreased Safe judgement during ADL;Decreased endurance;Decreased self-care trans;Decreased high-level ADLs   Prognosis Good   Assessment Pt is a 74 y o  male seen for OT evaluation s/p admission to [address] [place] [address] on 9/30/2022 due to Rash, pt transferred from [facility] to [facility] for derm consult + biopsy  Pt has a significant PMH impacting occupational performance including: EDGAR, depression, multiple open wounds of lower legs, polyneuropathy, lumbosacral plexopathy  Pt with 1 recent admissions in the last 2 months  Pt with active OT evaluation and treatment orders and activity orders for Up with assistance  PTA pt living with wife in [facility] with [address], pt (I) with ADLs and requires (A) with IADLs, (-)falls, (-)drives, use of w/c at baseline  Pt is motivated to return to home  Personal and environmental factors supporting pt at time of IE include social support, attitude towards recovery and accessible home environment  Personal and environmental factors inhibiting engagement in occupations include difficulty completing ADLs and difficulty completing IADLs  During evaluation pt performed as is outlined above in flowsheet  Pt required VC for safety and seated rest breaks  Standardized assessments used to assist in identifying performance deficits include AMPAC 6-Clicks and Barthel ADL Index  Performance deficits that affect the pt’s occupational performance during the initial evaluation include impaired balance, functional mobility, endurance, activity tolerance, functional standing tolerance and overall strength, safety awareness and insight into deficits   Based on pt’s functional performance and deficits the following occupations will be addressed in OT treatments in order to maximize pt’s independence and overall occupational performance: grooming, bathing/showering, toileting and toilet hygiene, dressing and functional mobility  Goals are listed below  Upon discharge from acute care setting recommend d/c to Home with family support + HHOT  This evaluation required an extensive review of medical and/or therapy records and additional review of physical, cognitive and psychosocial history related to functional performance  Based upon functional performance deficits and assessments, this evaluation has been identified as a high complexity evaluation  Goals   Patient Goals to feel better and go home   LTG Time Frame 10-14   Long Term Goal see goals listed below   Plan   Treatment Interventions ADL retraining;Functional transfer training;UE strengthening/ROM; Endurance training;Equipment evaluation/education; Activityengagement   Goal Expiration Date 10/15/22   OT Treatment Day 0   OT Frequency 2-3x/wk   Recommendation   OT Discharge Recommendation Home with home health rehabilitation   AM-PAC Daily Activity Inpatient   Lower Body Dressing 2   Bathing 2   Toileting 2   Upper Body Dressing 3   Grooming 4   Eating 4   Daily Activity Raw Score 17   Daily Activity Standardized Score (Calc for Raw Score >=11) 37 26   AM-PAC Applied Cognition Inpatient   Following a Speech/Presentation 4   Understanding Ordinary Conversation 4   Taking Medications 4   Remembering Where Things Are Placed or Put Away 4   Remembering List of 4-5 Errands 4   Taking Care of Complicated Tasks 4   Applied Cognition Raw Score 24   Applied Cognition Standardized Score 62 21       GOALS:   Goals established in order to promote pt's established goal of going home    -Patient will be Mod I with LB ADLs with use of AE and AD as needed in order to increase (I) with ADLs    -Patient will complete toileting w/ Mod I w/ G hygiene/thoroughness in order to reduce caregiver burden    -Patient will demonstrate Mod I with bed mobility for ability to manage own comfort and initiate OOB tasks      -Patient will perform functional transfers with Mod I to/from all surfaces using DME as needed in order to increase (I) with functional tasks    -Patient will increase OOB/sitting tolerance to 2-4 hours per day to increase participation in self-care and leisure tasks with no s/s of exertion  The patient's raw score on the AM-PAC Daily Activity inpatient short form is 17, standardized score is 37 26, less than 39 4  Patients at this level are likely to benefit from discharge to post-acute rehabilitation services  Please refer to the recommendation of the Occupational Therapist for safe discharge planning        At the end of the session, all needs met and pt seated in bedside chair, chair alarm activated, LEs elevated , and call bell within reach    [facility], OTR/L

## 2022-10-05 NOTE — ASSESSMENT & PLAN NOTE
· Present on Admission  Baseline Creatinine - 0 8 - 0 9  Creatinine currently declining, 1 19 today  · Suspected Causes / Differential - intravascular depletion vs  unspecified Vasculitis vs  NSAID related  · Urinalysis with 1+ occult blood and trace protein with 0-1 Hyaline casts  Negative nitrites and leukocytes  Occasional bacteria ans squamous cells  ANCA/MARIO ALBERTO Negative  Normal  complement  Anti GBM negative, SPEP, light chain negative for gammopathy  Cryoglobulin pending  · Avoid Hypotension - Monitor blood pressures  · Avoid Nephrotoxins and Adjust renally Excreted Medications - Hold meloxicam  · Nephrology Consultation  · Nephro states no kidney biopsy needed at this time  Will await skin biopsy results, cryoglobulins and antiGBM  · Pt still experiencing significant LE edema  Encourage ambulation as tolerated, raising feet  Pt continuing to diurese appropriately

## 2022-10-05 NOTE — DISCHARGE INSTR - OTHER ORDERS
Discharge wound care instructions:  Vaseline to dermal layer wound on left buttock  Apply twice a day and as needed  For necrotic wounds on bilateral lower legs, cleanse with foam cleanser, normal saline, or soap and water  Then apply layer of Triad paste  Cover Triad paste with single layer of Adaptic or similar contact layer  Cover Adaptic with ABD pads and secure with gauze roll and tape  Change every 3 days and as needed  Apply Kenalog cream to rash areas only  Do not apply to necrotic areas  Send home with pressure redistribution cushion

## 2022-10-05 NOTE — PHYSICAL THERAPY NOTE
PHYSICAL THERAPY EVALUATION NOTE    Patient Name: Zach Sauceda  RHDWR'V Date: 10/5/2022  AGE:   74 y o  Mrn:   [id number]  ADMIT DX:  Rash [R21]  Cellulitis [L03 90]  Acute renal failure (ARF) (HCC) [N17 9]  Elevated troponin [R77 8]  Abnormal transaminases [R74 8]    Past Medical History:   Diagnosis Date    Depression     Gout      Length Of Stay: 5  PHYSICAL THERAPY EVALUATION :   10/05/22 1203   PT Last Visit   PT Visit Date 10/05/22   Pain Assessment   Pain Assessment Tool 0-10   Pain Score 5   Pain Location/Orientation Orientation: Lower; Location: Back   Hospital Pain Intervention(s) Repositioned; Ambulation/increased activity; Other (Comment)  (notified Deepti Kline)   Restrictions/Precautions   Other Precautions Chair Alarm; Bed Alarm; Fall Risk;Pain  ([place])   Home Living   Type of [address] [place] [address] Two level; Able to live on main level with bedroom/bathroom; Other (Comment)  (stairglide to enter home)   Additional Comments lives w/ spouse and family  uses wheelchair to mobilize (utilizing UEs and LEs)  independent w/ ADLs  receives assist w/ IADLs  no falls in last 6 months  General   Additional Pertinent History room air resting pulse ox 95% and 83 BPM    Family/Caregiver Present No   Cognition   Arousal/Participation Alert   Attention Within functional limits   Orientation Level Oriented to person; Other (Comment)  (pt was identified w/ full name, birth date)   Following Commands Follows one step commands with increased time or repetition   Comments 3+ edema LEs   Subjective   Subjective pt seen sitting in chair  agreed to PT eval  pt states having low back pain     RUE Assessment   RUE Assessment WFL   LUE Assessment   LUE Assessment WFL   RLE Assessment   RLE Assessment X  (hip flexion 3-/5 extension 3/5, knee flexion 2+/5 extension 3/5, ankle 2/5)   LLE Assessment   LLE Assessment X  (hip flexion 3-/5 extension 3/5, knee flexion 2+/5 extension 3/5, ankle 2/5)   Coordination   Sensation X  (light touch impaired B LEs below the knees)   Bed Mobility   Rolling R 6  Modified independent   Additional items Bedrails; Increased time required   Rolling L 6  Modified independent   Additional items Bedrails; Increased time required   Supine to Sit 4  Minimal assistance   Additional items Assist x 1;HOB elevated; Bedrails; Increased time required;Verbal cues   Sit to Supine 4  Minimal assistance   Additional items Assist x 1;HOB elevated; Increased time required; Bedrails;Verbal cues;LE management   Additional Comments pt was noted to have bowel movement and had to be cleaned  pt was dependent for cleaning and changing underpads  Transfers   Sit to Stand 4  Minimal assistance   Additional items Assist x 1; Increased time required;Verbal cues  (for LE positioning)   Stand to Sit 4  Minimal assistance   Additional items Assist x 1; Increased time required;Verbal cues  (for body positioning)   Stand pivot 4  Minimal assistance   Additional items Assist x 1; Increased time required;Verbal cues  (for LE positioning)   Ambulation/Elevation   Gait pattern Not appropriate   Balance   Static Sitting Good   Static Standing Poor +   Dynamic Standing Poor +   Ambulatory Zero   Activity Tolerance   Activity Tolerance Patient limited by fatigue;Patient limited by pain   Nurse Made Aware [facility]   Assessment   Problem List Decreased strength;Decreased range of motion;Decreased endurance; Impaired balance;Decreased mobility; Decreased safety awareness; Impaired sensation;Obesity; Decreased skin integrity;Pain  (LE edema)   Assessment Pt presented with complaint of a blister containing rash on his arms and legs which he first noticed when cleaning his backyard around August 26, 2022  Pt had been also complaining of some exertional dyspnea  Dx: rash, diarrhea, LYLY, EDGAR, and elevated troponin   order placed for PT eval and tx, w/ activity order of up w/ assist  pt presents w/ comorbidities of gout, back surgery, and right knee surgeries x2 and personal factors of advanced age, mobilizing w/ assistive device, depression and inability to perform IADLs  pt presents w/ pain, weakness, decreased ROM, decreased endurance, impaired balance, altered sensation, decreased safety awareness, fall risk, LE edema and impaired skin integrity  these impairments are evident in findings from physical examination (weakness, decreased ROM, altered sensation, impaired skin integrity and edema of extremities), mobility assessment (need for min assist w/ all phases of mobility when usually mobilizing independently and need for cueing for mobility technique), and Barthel Index: 50/100  pt needed input for mobility technique  pt is at risk for falls due to physical and safety awareness deficits  pt's clinical presentation is unstable/unpredictable (evident in need for assist w/ all phases of mobility when usually mobilizing independently, pain impacting overall mobility status and need for input for mobility technique/safety)  pt needs inpatient PT tx to improve mobility deficits and progress mobility training as appropriate  discharge recommendation is for home w/ family support and home PT to reduce fall risk and maximize level of functional independence  Goals   Patient Goals go home   STG Expiration Date 10/15/22   Short Term Goal #1 pt will:  Increase bilateral LE strength 1/2 grade to facilitate independent mobility, Perform rolling and repositioning in bed independently to decrease caregiver burden, Perform supine <--> sitting edge of bed transition modified independent to improve level of function, Perform <---> stand transfers modified independent to improve independence, Complete stand pivot transfers with least restrictive assistive device modified independent w/o LOB to improve functional independence, Complete exercise program independently to increase strength and endurance, Tolerate 3 hr OOB to faciliate upright tolerance, Tolerate standing 1 minute w/ supervision to facilitate functional task performance and Improve Barthel Index score to 70 or greater to facilitate independence   PT Treatment Day 1   Plan   Treatment/Interventions Functional transfer training;LE strengthening/ROM; Endurance training; Therapeutic exercise;Cognitive reorientation;Patient/family training;Bed mobility; Equipment eval/education   PT Frequency 3-5x/wk   Recommendation   PT Discharge Recommendation Home with home health rehabilitation   AM-PAC Basic Mobility Inpatient   Turning in Bed Without Bedrails 4   Lying on Back to Sitting on Edge of Flat Bed 3   Moving Bed to Chair 3   Standing Up From Chair 3   Walk in Room 1   Climb 3-5 Stairs 1   Basic Mobility Inpatient Raw Score 15   Basic Mobility Standardized Score 36 97   Highest Level Of Mobility   -[facility] Goal 4: Move to chair/commode   -HLM Achieved 4: Move to chair/commode   Barthel Index   Feeding 10   Bathing 0   Grooming Score 5   Dressing Score 5   Bladder Score 10   Bowels Score 5   Toilet Use Score 5   Transfers (Bed/Chair) Score 10   Mobility (Level Surface) Score 0   Stairs Score 0   Barthel Index Score 50   Additional Treatment Session   Start Time 1203   End Time 1213   Treatment Assessment Pt agreed to participate in additional mobility training to address physical and mobility deficits noted during eval  Sit < - > stand transfer w/ supervision  Stand pivot transfer w/o assistive device w/ supervision  Pivot transfer was completed twice  Therapist provided education to pt including transfer technique (hand placement, LE positioning, safety)  Pt shows improvement w/ decreased level of assistance  Further inpatient PT intervention is necessary to decrease fall risk and maximize functional independence  Additional Treatment Day 1   End of Consult   Patient Position at End of Consult Bedside chair;Bed/Chair alarm activated; All needs within reach     The patient's AM-PAC Basic Mobility Inpatient Short Form Raw Score is 15  A Raw score of less than or equal to 16 suggests the patient may benefit from discharge to post-acute rehabilitation services  Please also refer to the recommendation of the Physical Therapist for safe discharge planning  Skilled PT recommended while in hospital and upon DC to progress pt toward treatment goals       Riley Mullins

## 2022-10-05 NOTE — CASE MANAGEMENT
Case Management Progress Note    Patient name Beverly ANDREW /S -01 MRN [id number]  : 1950 Date 10/5/2022       LOS (days): 5  Geometric Mean LOS (GMLOS) (days): 3 40  Days to GMLOS:-1 4        OBJECTIVE:        Current admission status: Inpatient  [address]:    [address]  [place] [facility] [address]  Phone: [phone] Fax: [phone]    [id number]  [address] [place] [id number]  Phone: [phone] Fax: [phone]    Primary Care Provider: Andres Barraza MD    Primary Insurance: MEDICARE  Secondary Insurance: COL[employer]IAL ASIF    PROGRESS NOTE:  WCV requested via Roundtrip for 4:45 PM

## 2022-10-05 NOTE — ASSESSMENT & PLAN NOTE
· [place] non MI trop  Echo EF of 55% with overall no wall motion abnormalities  No additional inpt cardiac workup per cardiology    · F/u with cardiology outpatient

## 2022-10-05 NOTE — WOUND OSTOMY CARE
Progress Note - Wound   Elvin Jacqueline 74 y o  male MRN: [id number]  Unit/Bed#: S -01 Encounter: [id number]      Assessment:   Wound care nurse followup  I was asked to see the patient to right discharge wound care recommendations  The patient will be going home with home health care  I reached out to Dr Zina Ruvalcaba the dermatologist to see what her recommendations would be for wound care  The biopsies are still not back yet, so I am not quite sure the best way to treat the eschar  Doctor Zina Ruvalcaba recommended that we use some sort of Hydrogel  or an equivalent to keep the eschar moist to promote autolytic debridement  Doctor Znia Ruvalcaba also clarified that the ketamine cream should only be applied to the rash-not the eschar  Wound/Skin Care Plan:  1  I reviewed these discharge instructions with the patient  I demonstrated the wound care to the patient and packed him a bag of discharge wound care supplies  The patient will have home health nursing to assist with wound care  I reiterated to the patient that the Kenalog steroid cream should only go on the rash areas-not the eschar  He knows that the Triad paste goes on the necrotic areas  2  I reviewed the wound care instructions with Montserrat Fung RN  She will perform the wound care today  3  Vaseline to dermal layer wound on left buttock  Apply twice a day and as needed  4  For necrotic wounds on bilateral lower legs, cleanse with foam cleanser, normal saline, or soap and water  Then apply layer of Triad paste  Cover Triad paste with single layer of Adaptic or similar contact layer  Cover Adaptic with ABD pads and secure with gauze roll and tape  Change every 3 days and as needed  4  Apply Kenalog cream to rash areas only  Do not apply to necrotic areas  Frequency as dictated by prescription  5  Dr Nick Haskins updated on discharge wound plans and that, if patient needs Kenalog RX, that the location of rash only needs to be prescribed     6  Large sized bariatric pressure redistribution cushions provided for patient at discharge  One for recliner and one for foot rest  Patient instructed to take these cushions home  Discussed with Janessa Rothman RN; Dr Nikunj Harrison; Dr Mary Ann Pena; and patient

## 2022-10-05 NOTE — CASE MANAGEMENT
Case Management Discharge Planning Note    Patient name Lakhwinder Stoddard  Location S /S -51 MRN [id number]  : 1950 Date 10/5/2022       Current Admission Date: 2022  Current Admission Diagnosis:Rash due to vaculitis    Patient Active Problem List    Diagnosis Date Noted   • Ambulatory dysfunction 10/01/2022   • Leucocytosis 10/01/2022   • Elevated troponin 2022   • EDGAR (dyspnea on exertion) 2022   • Rash due to vaculitis  2022   • LYLY (acute kidney injury) ([facility] [address] ) 2022   • Depression 2022   • Multiple open wounds of lower leg 2022   • Lower extremity weakness 2019   • Lumbosacral plexopathy 2019   • Gait abnormality 2019   • Lumbar stenosis 2019   • Polyneuropathy 2019   • Gout       LOS (days): 5  Geometric Mean LOS (GMLOS) (days): 3 40  Days to GMLOS:-1 3     OBJECTIVE:  Risk of Unplanned Readmission Score: 8 78         Current admission status: Inpatient   Preferred Pharmacy:    [facility] [address]  [address]  [place] [address]  Phone: [phone] Fax: [phone]    [facility] [place] [address]  Phone: [phone] Fax: [phone]    Primary Care Provider: Wade Araya MD    Primary Insurance: MEDICARE  Secondary Insurance: [employer]    DISCHARGE DETAILS:    Discharge planning discussed with[de-identified] patient at bedside  Freedom of Choice: Yes  Comments - Freedom of Choice: patient agreeable to [address]     Were Treatment Team discharge recommendations reviewed with patient/caregiver?: Yes  Did patient/caregiver verbalize understanding of patient care needs?: Yes  Were patient/caregiver advised of the risks associated with not following Treatment Team discharge recommendations?: Yes    Contacts  Reason/Outcome: Continuity of Care, Referral, Discharge [address] [place] [address]         Is the patient interested in [address] at discharge?: Yes  [address] requested[de-identified] Nursing, Occupational Therapy, Physical Therapy (wound care)  [facility] Name[de-identified] [facility] Provider[de-identified] PCP  Home Health Services Needed[de-identified] Wound/Ostomy Care, Strengthening/Theraputic Exercises to Improve Function, Restore Joint Function Post Joint Replacement, Evaluate Functional Status and Safety, Gait/ADL Training  Homebound Criteria Met[de-identified] Uses an Assist Device (i e  cane, walker, etc)  Supporting Clincal Findings[de-identified] Bed Bound or Wheelchair Bound    DME Referral Provided  Referral made for DME?: No    Other Referral/Resources/Interventions Provided:  Interventions: [facility]  Referral Comments: CM met with patient at bedside to discuss [address] recommendation  Patient agreeable and would like [facility]- reserved in [address]  Per [facility] patient may need transport home- CM to follow up if needed  Would you like to participate in our [facility] service program?  : No - Declined    Treatment Team [facility]  Discharge Destination Plan[de-identified] Home with [place] at Discharge : [address] Given (Date):: 10/05/22   IMM reviewed with patient, patient agrees with discharge determination

## 2022-10-05 NOTE — DISCHARGE SUMMARY
[facility]  Discharge- Carly Speed 1950, 74 y o  male MRN: [id number]  Unit/Bed#: S -01 Encounter: [id number]  Primary Care Provider: Albert Vargas MD   Date and time admitted to hospital: 9/30/2022  9:13 PM    * Rash due to vaculitis   Assessment & Plan  · Concern for vasculitis etiology especially given systemic involvement  Less likely SJS given renal involvement and normal oral mucosa  · Status post skin punch biopsy 10/1  Dermatology input appreciated  Discussed with dermatology about wound care and ID consult  · Pt continuing to improve on steroids    Plan   · Continue Prednisone 60 mg PO daily   · Continue topical triamcinolone bid  · Follow up cryoglobulins / pending serologies  · F/u skin biopsy results   · Awaiting further derm recs      [facility]  Assessment & Plan  · Present on Admission  Baseline Creatinine - 0 8 - 0 9  Creatinine currently declining, 1 19 today  · Suspected Causes / Differential - intravascular depletion vs  unspecified Vasculitis vs  NSAID related  · Urinalysis with 1+ occult blood and trace protein with 0-1 Hyaline casts  Negative nitrites and leukocytes  Occasional bacteria ans squamous cells  ANCA/MARIO ALBERTO Negative  Normal  complement  Anti GBM negative, SPEP, light chain negative for gammopathy  Cryoglobulin pending  · Avoid Hypotension - Monitor blood pressures  · Avoid Nephrotoxins and Adjust renally Excreted Medications - Hold meloxicam  · Nephrology Consultation  · Nephro states no kidney biopsy needed at this time  Will await skin biopsy results, cryoglobulins and antiGBM  · Pt still experiencing significant LE edema  Encourage ambulation as tolerated, raising feet  Pt continuing to diurese appropriately  Multiple open wounds of lower leg  Assessment & Plan  Wounds improving  Followup with derm as outpatient      Leucocytosis  Assessment & Plan  Bcx negative at this time  WBC 20 07 today, likely 2/2 prednisone    Ambulatory dysfunction  Assessment & Plan  · Multifactorial gait dysfunction 2/2  multiple back surgeries,  Chronic lumbar sacral  plexopathy,  Polyneuropathy  ·  He uses manual  Wheelchairs  Able to pivot from bed to wheelchair     Depression  Assessment & Plan  · Normally on fluoxetine  Will hold this medication for now until we can definitively rule out Green-nate syndrome  Although very rare (< 1%) incidence, it remains a possibility and risk outweighs benefit currently  · Monitor for any signs / symptoms of depression  · Will continue w/ fluoxetine after discharge as suspicion for SJS is low at this time  EDGAR (dyspnea on exertion)  Assessment & Plan  · Differential - anginal equivalent vs  Anxiety vs  Mild pulmonary edema (although BNP not significantly elevated and CXR negative, does have peripheral edema)  · At this time, patient reports EDGAR has improved-- he did not feel SOB transferring from stretcher to hospital bed   · If cardiac workup is negative, consider outpatient PFTs  · Monitor oxygen saturations  Elevated troponin  Assessment & Plan  · Clare non MI trop  Echo EF of 55% with overall no wall motion abnormalities  No additional inpt cardiac workup per cardiology    · F/u with cardiology outpatient              Medical Problems             Resolved Problems  Date Reviewed: 10/3/2022          Resolved    Diarrhea 10/3/2022     Resolved by  Papo Lopez                Admission Date:   Admission Orders (From admission, onward)     Ordered        09/30/22 2115  Inpatient Admission  Once                        Admitting Diagnosis: Rash [R21]  Cellulitis [L03 90]  Acute renal failure (ARF) ([facility] [address] ) [N17 9]  Elevated troponin [R77 8]  Abnormal transaminases [R74 8]    HPI: Per Trevin Diaz PA-C 9/30    "Lazarus Daubs is a 74 y o  male patient received as a transfer from the Emergency Department at [facility] today  Santana Obregon had presented there in the early morning hours of 9/30/2022 with complaint of a blister containing rash on his arms and legs which he first noticed when cleaning his backyard around August 26th   The lesions were first noted on the legs and it was felt that they might have been insect bites and was somewhat papular in appearance   He was placed on some steroids with a tapering dose   However, despite this, rash continued to worsen   He contacted his PCP once again and decision was made to trial keflex and mupirocin ointment and he was given additional prednisone dosing using 10 mg bid for 5 days and then once daily for the following 5 days   However, even with those treatments, rash continued to worsen and took on more of a blister appearance and he also developed a petechial rash on both arms and the lower back  Sasha Hook went to the emergency department at [facility] on 9/25/2022 and dermatology was contacted   The patient was scheduled for follow up with dermatology as outpatient for punch biopsy but he missed that office visit due to severe diarrhea after taking levaquin for positive wound cultures taken from leg wounds (present since Labor day) that grew Pseudomonas species  [facility] dermatology appointment was rescheduled for next Tuesday but he presented to the [facility] ER once again in the early morning hours of 9/30/2022 as the rash continued to spread   Request was made for transfer to our campus at [address] [place] [address] for inpatient dermatologist evaluation especially as there was some mention of a concern for potential Mary Maycol Syndrome       In the meantime, the patient had been also complaining of some exertional dyspnea without any chest pain  Patient uses wheelchair for ambulation, and noted that EDGAR started after he experienced significant diarrhea earlier in the week   When EKG was done it showed some nonspecific ST/T wave abnormalities of uncertain significance as there were no prior EKGs for comparison  Decision was made to check troponins and these came back elevated at 199, 268, and 461 sequentially with a 4 hour delta troponin of 262   BNP was only 96   Cardiology was consulted at the [address] [place] [address] and recommendation was made for patient to be placed on ACS protocol heparin drip and an echocardiogram was requested which has shown normal systolic function with a LVEF of 55% with no evident regional wall motion abnormalities but it was noted that the quality of the study prevented from complete exclusion of wall motion abnormalities   Grade 1 diastolic dysfunction with mild mitral and tricuspid regurgitation was also mentioned  At time of transfer, plan is to continue medical therapy and pursue OP ischemic work up once vasculitis/SJS is further evaluated         Nephrology was consulted at [address] [place] [address] as patient was noted to have LYLY   There was concerned for potential vasculitic etiology for this LYLY, but did also note patient's prehospital use of Mobic as well   Urinalysis showed microscopic hematuria and in combination with the rash and other systemic symptoms, decision made to initiate a rheumatologic / vasculitis assessment   So far it is known that ANCA, MARIO ALBERTO, RF, and complements are normal but cryoglobulin and anti-GBM antibodies are pending still   ESR and CRP was elevated and WBC is also elevated on labs done so far (but patient has been on steroid too)   He is afebrile with stable vitals and does not meet sepsis criteria   Nephrology at [address] [place] [address] recommends Prednisone 60 mg PO daily   The suggest the possibility of a renal biopsy if pending serologies do not yield a clear cause   The patient has been noted to have peripheral edema and is felt to be intravascularly depleted in the setting of hypoalbuminemia and longer term inflammation from underlying condition which increase risk of third spacing        For the several episodes of diarrhea that have occurred over the past few weeks, gastroenterology consulted at [facility]  He had negative C diff testing  His diarrhea has since resolved, and no further intervention is planned     Upon arrival to [facility] [place], patient offers no new complaints  He does not feel there has been much change in his rash over the last 24 hours  He notes that he no longer feels EDGAR, citing that he did not feel SOB when transferring from stretcher to hospital bed  He continues to deny chest pain "    Procedures Performed: No orders of the defined types were placed in this encounter  Summary of Hospital Course:   Pt was started on prednisone 60mg PO daily and topical triamcinolone  His rash improved significantly over the course of his stay  SPEP, antiGBM, ANCA, complement were unremarkable  Cryoglobulins and biopsy (performed 10/1) are still pending  He will followup on results of the biopsy outpatient with derm  SJS was ruled out due to renal involvement and lack of muscosal involvement  Pt had significant edema and was placed on torsemide 10mg which was continued for the duration of the hospital course  Pt diuresed 5665mL of fluid over the course of his stay and experienced improvement in his kidney function  Pt had elevated troponins early in his hospital course, and he underwent an echocardiogram  Echocardiogram was of poor quality and thus pt will followup with cardiology as an outpatient  Significant Findings, Care, Treatment and Services Provided: ***    Complications: none    Condition at Discharge: good         Discharge instructions/Information to patient and family:   See after visit summary for information provided to patient and family  Provisions for Follow-Up Care:  See after visit summary for information related to follow-up care and any pertinent home health orders  PCP: Anabell Kern MD    Disposition: Home    Planned Readmission: No    Discharge Statement   I spent *** minutes discharging the patient   This time was spent on the day of discharge  I had direct contact with the patient on the day of discharge  Additional documentation is required if more than 30 minutes were spent on discharge  Discharge Medications:  See after visit summary for reconciled discharge medications provided to patient and family

## 2022-10-05 NOTE — CASE MANAGEMENT
Case Management Progress Note    Patient name Orpha Crumble  Location S /S -01 MRN [id number]  : 1950 Date 10/5/2022       LOS (days): 5  Geometric Mean LOS (GMLOS) (days): 3 40  Days to GMLOS:-1 2        OBJECTIVE:        Current admission status: Inpatient  Preferred Pharmacy:    [place] [address]  Phone: [phone] Fax: [phone]    [id number] [place] [address], [place] [address]  Phone: [phone] Fax: [phone]    Primary Care Provider: Erich Agarwal MD    Primary Insurance: MEDICARE  Secondary Insurance: [facility]    PROGRESS NOTE:    Weekly Care Management Length of Stay Review     Current LOS: 5 Days    Most Recent Labs:     Lab Results   Component Value Date/Time    WBC 20 07 (H) 10/05/2022 05:34 AM    HGB 12 5 10/05/2022 05:34 AM    HCT 37 2 10/05/2022 05:34 AM     (H) 10/05/2022 05:34 AM    SODIUM 139 10/05/2022 05:34 AM    K 4 4 10/05/2022 05:34 AM     10/05/2022 05:34 AM    CO2 27 10/05/2022 05:34 AM    BUN 51 (H) 10/05/2022 05:34 AM    CREATININE 1 19 10/05/2022 05:34 AM    GLUC 92 10/05/2022 05:34 AM    ALKPHOS 54 10/05/2022 05:34 AM    ALT 60 (H) 10/05/2022 05:34 AM    AST 25 10/05/2022 05:34 AM    ALB 2 9 (L) 10/05/2022 05:34 AM    TBILI 0 44 10/05/2022 05:34 AM       Most Recent Vitals:   Vitals:    10/05/22 0721   BP: 123/67   Pulse: 63   Resp:    Temp: 98 °F (36 7 °C)   SpO2: 99%        Identified Barriers to Discharge/Discharge Goals/Care Management Interventions: vasculitis, rash, derm consult, cultures, PO steroids    Intended Discharge Disposition: [facility]    Expected Discharge Date:

## 2022-10-06 ENCOUNTER — TELEPHONE (OUTPATIENT)
Dept: DERMATOLOGY | Facility: CLINIC | Age: 72
End: 2022-10-06

## 2022-10-06 ENCOUNTER — TRANSCRIBE ORDERS (OUTPATIENT)
Dept: HOME HEALTH SERVICES | Facility: HOME HEALTHCARE | Age: 72
End: 2022-10-06

## 2022-10-06 DIAGNOSIS — S81.809D MULTIPLE OPEN WOUNDS OF LOWER LEG, UNSPECIFIED LATERALITY, SUBSEQUENT ENCOUNTER: Primary | ICD-10-CM

## 2022-10-06 LAB — CRYOGLOB SER QL 1D COLD INC: NORMAL

## 2022-10-06 NOTE — TELEPHONE ENCOUNTER
----- Message from Marlen Johnson MD sent at 10/5/2022  4:41 PM EDT -----  Regarding: Tiffany Mcfarlane,    This patient is in the hospital but should be discharged today  Can we schedule him into [facility] for next week?     Sincerely,  Rozina Brar

## 2022-10-07 ENCOUNTER — HOME CARE VISIT (OUTPATIENT)
Dept: HOME HEALTH SERVICES | Facility: HOME HEALTHCARE | Age: 72
End: 2022-10-07

## 2022-10-09 ENCOUNTER — HOME CARE VISIT (OUTPATIENT)
Dept: HOME HEALTH SERVICES | Facility: HOME HEALTHCARE | Age: 72
End: 2022-10-09
Payer: MEDICARE

## 2022-10-09 ENCOUNTER — HOME CARE VISIT (OUTPATIENT)
Dept: HOME HEALTH SERVICES | Facility: HOME HEALTHCARE | Age: 72
End: 2022-10-09

## 2022-10-09 PROCEDURE — G0299 HHS/HOSPICE OF RN EA 15 MIN: HCPCS

## 2022-10-09 PROCEDURE — 10330081 VN NO-PAY CLAIM PROCEDURE

## 2022-10-09 PROCEDURE — 400013 VN SOC

## 2022-10-10 ENCOUNTER — HOME CARE VISIT (OUTPATIENT)
Dept: HOME HEALTH SERVICES | Facility: HOME HEALTHCARE | Age: 72
End: 2022-10-10
Payer: MEDICARE

## 2022-10-10 VITALS
RESPIRATION RATE: 18 BRPM | HEART RATE: 79 BPM | OXYGEN SATURATION: 97 % | SYSTOLIC BLOOD PRESSURE: 128 MMHG | DIASTOLIC BLOOD PRESSURE: 72 MMHG | TEMPERATURE: 97.9 F

## 2022-10-10 VITALS — HEART RATE: 66 BPM | OXYGEN SATURATION: 97 % | DIASTOLIC BLOOD PRESSURE: 78 MMHG | SYSTOLIC BLOOD PRESSURE: 142 MMHG

## 2022-10-10 PROCEDURE — G0151 HHCP-SERV OF PT,EA 15 MIN: HCPCS

## 2022-10-11 PROCEDURE — 10330064 FOAM, ADH SIL W/BORDER 4"X4" (10/BX 20BX

## 2022-10-11 PROCEDURE — 10330064 BANDAGE, CNFRM 4"X4.1YDS N/S LF (12RL/BG

## 2022-10-11 PROCEDURE — 10330064 PAD, ABD 5X9" STR LF (1/PK 20PK/BX) MGM1

## 2022-10-11 PROCEDURE — 10330064 CLEANSER, WND SEA-CLEANS 6OZ  COLPLT

## 2022-10-11 PROCEDURE — 10330064 DRESSING, OIL EMULSION 3"X3" STR (50/BX

## 2022-10-11 NOTE — CASE COMMUNICATION
Ship to xx Pt  Home     Wound 1 BLE, buttock, right thigh    Cleansers  [facility] [address] [id number]     1      Gauze Josse stretch roll 4inch n/s 12 rolls per unit  [id number]    1    ABD 5x9 [id number]    30  Gauze oil emulsion [id number]     30  Silicone Foam with border 4x4 NOT STOCKED [id number]     10

## 2022-10-12 ENCOUNTER — HOME CARE VISIT (OUTPATIENT)
Dept: HOME HEALTH SERVICES | Facility: HOME HEALTHCARE | Age: 72
End: 2022-10-12
Payer: MEDICARE

## 2022-10-12 PROCEDURE — G0152 HHCP-SERV OF OT,EA 15 MIN: HCPCS

## 2022-10-12 PROCEDURE — G0299 HHS/HOSPICE OF RN EA 15 MIN: HCPCS

## 2022-10-12 NOTE — CASE COMMUNICATION
PT assessment with plans to follow 2W4 for LE strengthening, bed mobility and transfers  Assess for accessibility to bathroom safey   Educate for safety and pressure relief

## 2022-10-12 NOTE — CASE COMMUNICATION
OT evaluation completed 10/12/22  No further visits planned at this time as patient is functioning at max potential for ADL's

## 2022-10-13 ENCOUNTER — CONSULT (OUTPATIENT)
Dept: DERMATOLOGY | Facility: CLINIC | Age: 72
End: 2022-10-13
Payer: MEDICARE

## 2022-10-13 ENCOUNTER — TELEPHONE (OUTPATIENT)
Dept: NEPHROLOGY | Facility: CLINIC | Age: 72
End: 2022-10-13

## 2022-10-13 ENCOUNTER — OFFICE VISIT (OUTPATIENT)
Dept: DERMATOLOGY | Facility: CLINIC | Age: 72
End: 2022-10-13

## 2022-10-13 VITALS
SYSTOLIC BLOOD PRESSURE: 128 MMHG | RESPIRATION RATE: 20 BRPM | DIASTOLIC BLOOD PRESSURE: 76 MMHG | TEMPERATURE: 97.1 F | OXYGEN SATURATION: 97 % | HEART RATE: 64 BPM

## 2022-10-13 VITALS — HEIGHT: 73 IN | TEMPERATURE: 96.2 F | WEIGHT: 295 LBS | BODY MASS INDEX: 39.1 KG/M2

## 2022-10-13 DIAGNOSIS — L95.9 VASCULITIS OF SKIN: ICD-10-CM

## 2022-10-13 DIAGNOSIS — R21 RASH: Primary | ICD-10-CM

## 2022-10-13 DIAGNOSIS — Z51.89 ENCOUNTER FOR WOUND RE-CHECK: ICD-10-CM

## 2022-10-13 DIAGNOSIS — L95.9 VASCULITIS OF SKIN: Primary | ICD-10-CM

## 2022-10-13 PROCEDURE — 99214 OFFICE O/P EST MOD 30 MIN: CPT | Performed by: STUDENT IN AN ORGANIZED HEALTH CARE EDUCATION/TRAINING PROGRAM

## 2022-10-13 PROCEDURE — RECHECK: Performed by: STUDENT IN AN ORGANIZED HEALTH CARE EDUCATION/TRAINING PROGRAM

## 2022-10-13 NOTE — PROGRESS NOTES
[facility] Note     Patient Name: Osmar John  Encounter Date: 10/13/2022    • Have you been cared for by a [facility] in the last 3 years and, if so, which one? No    · Have you traveled outside of the [address] [place] [address] in the past 3 months or outside of the [facility] area in the last 2 weeks? No    • May we call your Preferred Phone number to discuss your specific medical information? Yes    • May we leave a detailed message that includes your specific medical information? Yes      Today's Chief Concerns:  • Concern #1:  Rash follow up   • Concern #2:      Past Medical History:  Have you personally ever had or currently have any of the following? · Skin cancer (such as Melanoma, Basal Cell Carcinoma, Squamous Cell Carcinoma? (If Yes, please provide more detail)- No  · Eczema: No  · Psoriasis: No  · HIV/AIDS: No  · Hepatitis B or C: No  · Tuberculosis: No  · Systemic Immunosuppression such as Diabetes, Biologic or Immunotherapy, Chemotherapy, Organ Transplantation, Bone Marrow Transplantation (If YES, please provide more detail): No  · Radiation Treatment (If YES, please provide more detail): No  · Any other major medical conditions/concerns? (If Yes, which types)- No    Social History:    • What is/was your primary occupation? Retired     • What are your hobbies/past-times? N/a     Family History:  Have any of your "first degree relatives" (parent, brother, sister, or child) had any of the following       · Skin cancer such as Melanoma or Merkel Cell Carcinoma or Pancreatic Cancer? No  · Eczema, Asthma, Hay Fever or Seasonal Allergies: No  · Psoriasis or Psoriatic Arthritis: No  · Do any other medical conditions seem to run in your family? If Yes, what condition and which relatives?   No    Current Medications:   (please update all dermatological medications before printing patient's AVS!)      Current Outpatient Medications:   •  allopurinol (ZYLOPRIM) 100 mg tablet, Take 100 mg by mouth 2 (two) times a day, Disp: , Rfl:   •  cholecalciferol (VITAMIN D3) 1,000 units tablet, Take 2,000 Units by mouth daily, Disp: , Rfl:   •  famotidine (PEPCID) 20 mg tablet, Take 1 tablet (20 mg total) by mouth daily Do not start before October 6, 2022 , Disp: 30 tablet, Rfl: 0  •  FLUoxetine (PROzac) 20 mg capsule, Take 20 mg by mouth daily , Disp: , Rfl:   •  loratadine (CLARITIN) 10 mg tablet, Take 10 mg by mouth as needed for allergies, Disp: , Rfl:   •  Magnesium 100 MG CAPS, Take 1 capsule by mouth daily, Disp: , Rfl:   •  meloxicam (MOBIC) 15 mg tablet, Take 15 mg by mouth daily, Disp: , Rfl:   •  Misc Natural Products (OSTEO BI-FLEX/5-LOXIN ADVANCED PO), Take 1 tablet by mouth daily , Disp: , Rfl:   •  Multiple Vitamins-Minerals (CENTRUM SILVER 50+MEN PO), Take 1 tablet by mouth daily, Disp: , Rfl:   •  mupirocin (BACTROBAN) 2 % ointment, apply by topical route2- 3 times every day a small amount to the affected area, Disp: , Rfl:   •  predniSONE 20 mg tablet, Take 3 tablets (60 mg total) by mouth daily for 30 doses Do not start before October 6, 2022 , Disp: 90 tablet, Rfl: 0  •  simethicone (MYLICON) 80 mg chewable tablet, Chew 1 tablet (80 mg total) every 6 (six) hours as needed for flatulence, Disp: 30 tablet, Rfl: 0  •  SUPER B COMPLEX/C PO, Take 1 tablet by mouth daily, Disp: , Rfl:   •  torsemide (DEMADEX) 10 mg tablet, Take 1 tablet (10 mg total) by mouth daily Do not start before October 6, 2022 , Disp: 30 tablet, Rfl: 0  •  triamcinolone (KENALOG) 0 1 % ointment, Apply topically 2 (two) times a day Only apply to rash, do not apply to necrotic tissue, Disp: 30 g, Rfl: 0      Review of Systems:  Have you recently had or currently have any of the following? If YES, what are you doing for the problem?     · Fever, chills or unintended weight loss: No  · Sudden loss or change in your vision: No  · Nausea, vomiting or blood in your stool: No  · Painful or swollen joints: No  · Wheezing or cough: No  · Changing mole or non-healing wound: No  · Nosebleeds: No  · Excessive sweating: No  · Easy or prolonged bleeding? No  · Over the last 2 weeks, how often have you been bothered by the following problems? · Taking little interest or pleasure in doing things: 1 - Not at All  · Feeling down, depressed, or hopeless: 1 - Not at All  · Rapid heartbeat with epinephrine:  No      · Any known allergies? Allergies   Allergen Reactions   • Other      IVORY SOAP   • Penicillins Rash         Physical Exam:    • Was a chaperone (Derm Clinical Assistant) present throughout the entire Physical Exam? Yes      CONSTITUTIONAL:   Vitals:    10/13/22 1308   Temp: (!) 96 2 °F (35 7 °C)   TempSrc: Temporal   Weight: 134 kg (295 lb)   Height: 6' 1" (1 854 m)       PSYCH: Normal mood and affect  EYES: Normal conjunctiva  ENT: Normal lips and oral mucosa  CARDIOVASCULAR: No edema  RESPIRATORY: Normal respirations  HEME/LYMPH/IMMUNO:  No ostensible subQ swelling except as noted below in "ASSESSMENT AND PLAN BY DIAGNOSIS"    SKIN:  FULL ORGAN SYSTEM EXAM   Hair, Face Normal except as noted below in Assessment       Right Arm Normal except as noted below in Assessment   Left Arm Normal except as noted below in Assessment                   Right Leg Normal except as noted below in Assessment   Left Leg Normal except as noted below in Assessment        Assessment and Plan by Diagnosis:    History of Present Condition:    • Duration:  How long has this been an issue for you? o  several weeks   • Location Affected:  Where on the body is this affecting you?    o  lower extremities and arms   • Quality:  Is there any bleeding, pain, itch, burning/irritation, or redness associated with the skin lesion? o  deneis   • Severity:  Describe any bleeding, pain, itch, burning/irritation, or redness on a scale of 1 to 10 (with 10 being the worst)    o    • Timing:  Does this condition seem to be there pretty constantly or do you notice it more at specific times throughout the day? o  constant   • Context:  Have you ever noticed that this condition seems to be associated with specific activities you do?    o  denies   • Modifying Factors:    o Anything that seems to make the condition worse?    -  possibly from oral medication   o What have you tried to do to make the condition better?    -  triamcinolone ointment and prednisone   • Associated Signs and Symptoms:  Does this skin lesion seem to be associated with any of the following:  o  SL AMB DERM SIGNS AND SYMPTOMS: Skin color changes     RASH: multiple lesion on legs and ulcers, some ulcerated---> Vasculopathy vs vasculitis    Physical Exam:  • (Anatomic Location); (Size and Morphological Description); (Differential Diagnosis):  o Crusted red papules on arms and legs (MUCH IMPROVED)  • Pertinent Positives:  • Pertinent Negatives: Additional History of Present Condition:  Pt noticed skin lesion on labor day  Never painful and itchy  Work up negative so far: ANCAs negative, MARIO ALBERTO negative, cryoglobulin negative, C3/C4/total complement wnl, SPEP wnl Pt on prednisone 60mg daily (started 10/6 and for 1 month) patient improving  Late august went to ER for UTI  Given the same medication he has taken  In the past (not sure of name)  Few days later he noticed lesions on the legs  He was told the areas look like bug bites  He is not sure of any fevers at the time of rash  He did have SOB and fatigue   May have been reaction to Levaquin     Assessment and Plan:  Based on a thorough discussion of this condition and the management approach to it (including a comprehensive discussion of the known risks, side effects and potential benefits of treatment), the patient (family) agrees to implement the following specific plan:  • Will call to discuss results and next stps once results are received     • Continue prednisone as directed (prescribed previously, not by me)  • Start mupirocin 2% ointment topically to lower legs (on sores) after every dressing change       Scribe Attestation    I,:  Rasheeda Vazquez am acting as a scribe while in the presence of the attending physician :       I,:  Annalee Azevedo MD personally performed the services described in this documentation    as scribed in my presence :

## 2022-10-13 NOTE — PATIENT INSTRUCTIONS
RASH: multiple lesion on legs and ulcers, some ulcerated---> Vasculopathy vs vasculitis    Assessment and Plan:  Based on a thorough discussion of this condition and the management approach to it (including a comprehensive discussion of the known risks, side effects and potential benefits of treatment), the patient (family) agrees to implement the following specific plan:   Will call to discuss results and next steps once results are received    Continue triamcinolone oint twice a day as directed   Continue prednisone as directed   Start mupirocin 2% ointment topically to lower legs (on sores) after every dressing change

## 2022-10-13 NOTE — TELEPHONE ENCOUNTER
----- Message from Singh Rutledge MD sent at 10/12/2022  8:59 AM EDT -----    ----- Message -----  From: Jed Kumar MD  Sent: 10/5/2022  12:11 PM EDT  To: Rowena Vila MD, #    Hello [facility] team    [facility] you are well    MA team - can pt have appt with AP or Physician in 2-3 weeks after d/c and BMP prior to appt    Pt was admitted as a transfer for rash eval  Unclear etiology but had biopsy and started on steroids  Improving    LYLY improving  Had hematuria but repeat UA improved  Unclear if related to rash or not, so until biopsy is back and since we are managing diuretics inpt, thought it would be safer for pt to see renal as outpt     Creat down to 1 2 from peak of 1 9  on steroids per Dermatology  Biopsy of skin pending       On torsemide 10 mg daily and improving vol state    Thank you    np

## 2022-10-13 NOTE — PROGRESS NOTES
Suture removal    Date/Time: 10/13/2022 2:29 PM  Performed by: Schuyler Saunders  Authorized by: Sumeet Garner MD   Universal Protocol:  Timeout called at: 10/13/2022 2:29 PM   Patient understanding: patient states understanding of the procedure being performed  Patient consent: the patient's understanding of the procedure matches consent given        Location:     Laterality:  Left    Location:  Upper extremity    Upper extremity location:  Arm  Procedure details: Tools used:  Suture removal kit    Wound appearance:  No sign(s) of infection, good wound healing and clean    Number of sutures removed:  4  Post-procedure details:     Patient tolerance of procedure:   Tolerated well, no immediate complications      Scribe Attestation    I,:  Schuyler Saunders am acting as a scribe while in the presence of the attending physician :       I,:  Sumeet Garner MD personally performed the services described in this documentation    as scribed in my presence :

## 2022-10-14 ENCOUNTER — HOME CARE VISIT (OUTPATIENT)
Dept: HOME HEALTH SERVICES | Facility: HOME HEALTHCARE | Age: 72
End: 2022-10-14
Payer: MEDICARE

## 2022-10-14 VITALS — OXYGEN SATURATION: 96 % | SYSTOLIC BLOOD PRESSURE: 132 MMHG | DIASTOLIC BLOOD PRESSURE: 78 MMHG | HEART RATE: 60 BPM

## 2022-10-14 PROCEDURE — 88312 SPECIAL STAINS GROUP 1: CPT | Performed by: PATHOLOGY

## 2022-10-14 PROCEDURE — 88305 TISSUE EXAM BY PATHOLOGIST: CPT | Performed by: PATHOLOGY

## 2022-10-14 PROCEDURE — G0151 HHCP-SERV OF PT,EA 15 MIN: HCPCS

## 2022-10-15 ENCOUNTER — HOME CARE VISIT (OUTPATIENT)
Dept: HOME HEALTH SERVICES | Facility: HOME HEALTHCARE | Age: 72
End: 2022-10-15
Payer: MEDICARE

## 2022-10-16 ENCOUNTER — HOME CARE VISIT (OUTPATIENT)
Dept: HOME HEALTH SERVICES | Facility: HOME HEALTHCARE | Age: 72
End: 2022-10-16
Payer: MEDICARE

## 2022-10-16 VITALS
SYSTOLIC BLOOD PRESSURE: 122 MMHG | TEMPERATURE: 97.6 F | RESPIRATION RATE: 20 BRPM | OXYGEN SATURATION: 98 % | HEART RATE: 60 BPM | DIASTOLIC BLOOD PRESSURE: 70 MMHG

## 2022-10-16 PROCEDURE — G0299 HHS/HOSPICE OF RN EA 15 MIN: HCPCS

## 2022-10-18 ENCOUNTER — HOME CARE VISIT (OUTPATIENT)
Dept: HOME HEALTH SERVICES | Facility: HOME HEALTHCARE | Age: 72
End: 2022-10-18
Payer: MEDICARE

## 2022-10-18 VITALS — HEART RATE: 72 BPM | DIASTOLIC BLOOD PRESSURE: 80 MMHG | SYSTOLIC BLOOD PRESSURE: 164 MMHG | OXYGEN SATURATION: 97 %

## 2022-10-18 DIAGNOSIS — S81.809D MULTIPLE OPEN WOUNDS OF LOWER LEG, UNSPECIFIED LATERALITY, SUBSEQUENT ENCOUNTER: ICD-10-CM

## 2022-10-18 PROCEDURE — G0180 MD CERTIFICATION HHA PATIENT: HCPCS | Performed by: HOSPITALIST

## 2022-10-18 PROCEDURE — G0151 HHCP-SERV OF PT,EA 15 MIN: HCPCS

## 2022-10-19 ENCOUNTER — HOME CARE VISIT (OUTPATIENT)
Dept: HOME HEALTH SERVICES | Facility: HOME HEALTHCARE | Age: 72
End: 2022-10-19
Payer: MEDICARE

## 2022-10-19 VITALS
OXYGEN SATURATION: 98 % | TEMPERATURE: 97.1 F | DIASTOLIC BLOOD PRESSURE: 80 MMHG | HEART RATE: 73 BPM | SYSTOLIC BLOOD PRESSURE: 120 MMHG

## 2022-10-19 DIAGNOSIS — M31.0 LEUKOCYTOCLASTIC VASCULITIS (HCC): Primary | ICD-10-CM

## 2022-10-19 PROCEDURE — G0299 HHS/HOSPICE OF RN EA 15 MIN: HCPCS

## 2022-10-19 NOTE — RESULT ENCOUNTER NOTE
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  He is currently on prednisone, and the rash has been improving  I counseled on the increased risk of nephritis due to this vasculitis, and the need for serial UA's and BMPs every month for 6 months  He will also need to follow up with us every 2-3 months  Instructions for Clinical Derm Team:   (remember to route Result Note to appropriate staff):    Call patient and schedule for 2 month follow up  Result & Plan by Specimen:    Specimen A: leukocytoclastic vasculitis  Plan: monitor every 2-3 months, finish prednisone, get labwork every month      Tissue Exam: [id number]  Order: [id number]  Visible to patient: Yes (not seen)    1 Result Note    Component   Case Report  Surgical Pathology Report                         Case: [id number]                                   Authorizing Provider: Lalita Mustafa MD            Collected:           10/01/2022 1121              Ordering Location:     [facility]        Received:            10/01/2022 [address] [place] [address] 3rd  Floor Med                                                                             Surg Unit                                                                    Pathologist:           Guanaco Call MD                                                             Specimens:   A) - Skin, Other, A: left forearm medial                                                            B) - Skin, Other, B: left forearm lateral                                                Final Diagnosis  A  Skin, left forearm medial, punch biopsy:  Leukocytoclastic vasculitis  See note         B  Skin, left forearm lateral, punch biopsy:  Leukocytoclastic vasculitis  See note          Note:  A-B   Correlation with clinical findings and possible immunofluorescence study to rule out immune etiologies is recommended  The clinical notes and photos were reviewed  Of note, the vasculitic process involves the small vessels in the superficial to mid dermis, with unremarkable subcutaneous vessels  If the lesions continue to be clinically suspicious for medium vessel vasculitis, additional sampling of different areas of the rash may be considered if clinically indicated       Deeper levels were examined  PAS and Gram special stains do not reveal any infectious pathogens        The findings were discussed with Jennifer Ochoa MD on 10/14/22  Electronically signed by Emeli Brown MD on 10/14/2022 at 12:24 PM  Additional Information   All reported additional testing was performed with appropriately reactive controls   These tests were developed and their performance characteristics determined by [facility] or appropriate performing facility, though some tests may be performed on tissues which have not been validated for performance characteristics (such as staining performed on alcohol exposed cell blocks and decalcified tissues)   Results should be interpreted with caution and in the context of the patients' clinical condition  These tests may not be cleared or approved by the [place] S  Food and Drug Administration, though the FDA has determined that such clearance or approval is not necessary  These tests are used for clinical purposes and they should not be regarded as investigational or for research  This laboratory has been approved by CLIA 88, designated as a high-complexity laboratory and is qualified to perform these tests  [facility] Description     A  The specimen is received in formalin, labeled with the patient's name and hospital number, and is designated "left forearm medial"    The specimen consists of a tan-brown skin punch measuring 0 4 cm in diameter with attached underlying soft tissue to a depth of up to 0 3 cm   Bisected and entirely submitted between sponges in 1 cassette          B  The specimen is received in formalin, labeled with the patient's name and hospital number, and is designated "left forearm lateral"  The specimen consists of a tan-pink skin punch measuring 0 4 cm in diameter  The epithelial surface exhibits a purple area of tattoo verses macule measuring 0 3 x 0 2 cm which abuts the nearest peripheral margin  The apparent margin of resection is inked green  Bisected and entirely submitted between sponges in 1 cassette      Note: The estimated total formalin fixation time based upon information provided by the submitting clinician and the standard processing schedule is over 72 hours      [facility]   Specimen A: for H and E 4 mm punch biopsy in formalin; location: left forearm medial; 74year old with petechiae and nonblanching purpura on upper extremities and necrotic hemorrhagic ulcers on lower extremities ddx: small to medium cell vasculitis     Specimen B: for DIF 4 mm punch biopsy in formalin; location: left forearm lateral;74year old with petechiae and nonblanching purpura on upper extremities and necrotic hemorrhagic ulcers on lower extremities; ddx: small to medium cell vasculitis  Resulting Agency [facility]          Specimen Collected: 10/01/22 11:21 AM Last Resulted: 10/14/22 12:24 PM     Order Details     View Encounter     Lab and Collection Details     Routing     Result History          Scans on Order [id number]    Lab Result Document - Document on 10/14/2022 12:24 PM

## 2022-10-19 NOTE — PROGRESS NOTES
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

## 2022-10-21 ENCOUNTER — TELEPHONE (OUTPATIENT)
Dept: NEPHROLOGY | Facility: CLINIC | Age: 72
End: 2022-10-21

## 2022-10-21 ENCOUNTER — HOME CARE VISIT (OUTPATIENT)
Dept: HOME HEALTH SERVICES | Facility: HOME HEALTHCARE | Age: 72
End: 2022-10-21
Payer: MEDICARE

## 2022-10-21 VITALS — DIASTOLIC BLOOD PRESSURE: 70 MMHG | SYSTOLIC BLOOD PRESSURE: 124 MMHG | OXYGEN SATURATION: 96 % | HEART RATE: 76 BPM

## 2022-10-21 DIAGNOSIS — N17.9 AKI (ACUTE KIDNEY INJURY) (HCC): Primary | ICD-10-CM

## 2022-10-21 PROCEDURE — G0151 HHCP-SERV OF PT,EA 15 MIN: HCPCS

## 2022-10-22 ENCOUNTER — HOME CARE VISIT (OUTPATIENT)
Dept: HOME HEALTH SERVICES | Facility: HOME HEALTHCARE | Age: 72
End: 2022-10-22
Payer: MEDICARE

## 2022-10-22 VITALS
HEART RATE: 84 BPM | SYSTOLIC BLOOD PRESSURE: 142 MMHG | TEMPERATURE: 96.9 F | RESPIRATION RATE: 20 BRPM | DIASTOLIC BLOOD PRESSURE: 76 MMHG | OXYGEN SATURATION: 97 %

## 2022-10-22 PROCEDURE — G0300 HHS/HOSPICE OF LPN EA 15 MIN: HCPCS

## 2022-10-25 ENCOUNTER — HOME CARE VISIT (OUTPATIENT)
Dept: HOME HEALTH SERVICES | Facility: HOME HEALTHCARE | Age: 72
End: 2022-10-25
Payer: MEDICARE

## 2022-10-25 ENCOUNTER — TELEPHONE (OUTPATIENT)
Dept: DERMATOLOGY | Facility: CLINIC | Age: 72
End: 2022-10-25

## 2022-10-25 VITALS
DIASTOLIC BLOOD PRESSURE: 78 MMHG | RESPIRATION RATE: 20 BRPM | OXYGEN SATURATION: 98 % | SYSTOLIC BLOOD PRESSURE: 122 MMHG | TEMPERATURE: 96.7 F | HEART RATE: 80 BPM

## 2022-10-25 DIAGNOSIS — M31.0 LEUKOCYTOCLASTIC VASCULITIS (HCC): Primary | ICD-10-CM

## 2022-10-25 PROCEDURE — G0299 HHS/HOSPICE OF RN EA 15 MIN: HCPCS

## 2022-10-25 NOTE — TELEPHONE ENCOUNTER
----- Message from Yaz Miller sent at 10/21/2022 10:01 AM EDT -----  Please advise where to schedule patient as there are no openings at [facility]? 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  He is currently on prednisone, and the rash has been improving  I counseled on the increased risk of nephritis due to this vasculitis, and the need for serial UA's and BMPs every month for 6 months  He will also need to follow up with us every 2-3 months  Instructions for Clinical Derm Team:   (remember to route Result Note to appropriate staff):    Call patient and schedule for 2 month follow up  Result & Plan by Specimen:    Specimen A: leukocytoclastic vasculitis    Plan: monitor every 2-3 months, finish prednisone, get labwork every month      Tissue Exam: [id number]  Order: [id number]  Visible to patient: Yes (not seen)    1 Result Note    Component   Case Report  Surgical Pathology Report                         Case: [id number]                                   Authorizing Provider: Ericka Howard MD            Collected:           10/01/2022 1121              Ordering Location:     [facility]        Received:            10/01/2022 1320                                     [place] 3rd  Floor Med                                                                             Surg Unit                                                                    Pathologist:           Jessica Alfredo MD                                                             Specimens:   A) - Skin, Other, A: left forearm medial                                                            B) - Skin, Other, B: left forearm lateral                                                Final Diagnosis  A  Skin, left forearm medial, punch biopsy:  Leukocytoclastic vasculitis  See note          B  Skin, left forearm lateral, punch biopsy:  Leukocytoclastic vasculitis  See note          Note:  A-B  Correlation with clinical findings and possible immunofluorescence study to rule out immune etiologies is recommended  The clinical notes and photos were reviewed  Of note, the vasculitic process involves the small vessels in the superficial to mid dermis, with unremarkable subcutaneous vessels  If the lesions continue to be clinically suspicious for medium vessel vasculitis, additional sampling of different areas of the rash may be considered if clinically indicated       Deeper levels were examined  PAS and Gram special stains do not reveal any infectious pathogens        The findings were discussed with Keshia Staley MD on 10/14/22  Electronically signed by Emani Smith MD on 10/14/2022 at 12:24 PM  Additional Information   All reported additional testing was performed with appropriately reactive controls   These tests were developed and their performance characteristics determined by [facility] or appropriate performing facility, though some tests may be performed on tissues which have not been validated for performance characteristics (such as staining performed on alcohol exposed cell blocks and decalcified tissues)   Results should be interpreted with caution and in the context of the patients' clinical condition  These tests may not be cleared or approved by the [place]  Food and Drug Administration, though the FDA has determined that such clearance or approval is not necessary  These tests are used for clinical purposes and they should not be regarded as investigational or for research  This laboratory has been approved by CLIA 88, designated as a high-complexity laboratory and is qualified to perform these tests  [facility] Description      A   The specimen is received in formalin, labeled with the patient's name and hospital number, and is designated "left forearm medial"  The specimen consists of a tan-brown skin punch measuring 0 4 cm in diameter with attached underlying soft tissue to a depth of up to 0 3 cm  Bisected and entirely submitted between sponges in 1 cassette           B  The specimen is received in formalin, labeled with the patient's name and hospital number, and is designated "left forearm lateral"  The specimen consists of a tan-pink skin punch measuring 0 4 cm in diameter  The epithelial surface exhibits a purple area of tattoo verses macule measuring 0 3 x 0 2 cm which abuts the nearest peripheral margin  The apparent margin of resection is inked green  Bisected and entirely submitted between sponges in 1 cassette      Note: The estimated total formalin fixation time based upon information provided by the submitting clinician and the standard processing schedule is over 72 hours      [facility]   Specimen A: for H and E 4 mm punch biopsy in formalin; location: left forearm medial; 74year old with petechiae and nonblanching purpura on upper extremities and necrotic hemorrhagic ulcers on lower extremities ddx: small to medium cell vasculitis     Specimen B: for DIF 4 mm punch biopsy in formalin; location: left forearm lateral;74year old with petechiae and nonblanching purpura on upper extremities and necrotic hemorrhagic ulcers on lower extremities; ddx: small to medium cell vasculitis  Resulting Agency [facility]          Specimen Collected: 10/01/22 11:21 AM Last Resulted: 10/14/22 12:24 PM      Order Details     View Encounter     Lab and Collection Details     Routing     Result History          Scans on Order [id number]    Lab Result Document - Document on 10/14/2022 12:24 PM

## 2022-10-26 ENCOUNTER — TELEPHONE (OUTPATIENT)
Dept: DERMATOLOGY | Facility: CLINIC | Age: 72
End: 2022-10-26

## 2022-10-26 ENCOUNTER — HOME CARE VISIT (OUTPATIENT)
Dept: HOME HEALTH SERVICES | Facility: HOME HEALTHCARE | Age: 72
End: 2022-10-26
Payer: MEDICARE

## 2022-10-26 VITALS — SYSTOLIC BLOOD PRESSURE: 154 MMHG | HEART RATE: 60 BPM | OXYGEN SATURATION: 97 % | DIASTOLIC BLOOD PRESSURE: 80 MMHG

## 2022-10-26 PROCEDURE — G0151 HHCP-SERV OF PT,EA 15 MIN: HCPCS

## 2022-10-26 NOTE — TELEPHONE ENCOUNTER
[facility] within the next 2 weeks per Dr Maylin Buenrostro, pt did not have calendar on him so he will be calling back with what days work for him

## 2022-10-28 ENCOUNTER — HOME CARE VISIT (OUTPATIENT)
Dept: HOME HEALTH SERVICES | Facility: HOME HEALTHCARE | Age: 72
End: 2022-10-28
Payer: MEDICARE

## 2022-10-28 ENCOUNTER — HOME CARE VISIT (OUTPATIENT)
Dept: HOME HEALTH SERVICES | Facility: HOME HEALTHCARE | Age: 72
End: 2022-10-28

## 2022-10-28 PROCEDURE — G0299 HHS/HOSPICE OF RN EA 15 MIN: HCPCS

## 2022-10-29 VITALS
TEMPERATURE: 97 F | HEART RATE: 60 BPM | DIASTOLIC BLOOD PRESSURE: 72 MMHG | RESPIRATION RATE: 18 BRPM | SYSTOLIC BLOOD PRESSURE: 112 MMHG | OXYGEN SATURATION: 97 %

## 2022-10-30 VITALS — SYSTOLIC BLOOD PRESSURE: 122 MMHG | OXYGEN SATURATION: 97 % | HEART RATE: 56 BPM | DIASTOLIC BLOOD PRESSURE: 70 MMHG

## 2022-10-31 ENCOUNTER — HOME CARE VISIT (OUTPATIENT)
Dept: HOME HEALTH SERVICES | Facility: HOME HEALTHCARE | Age: 72
End: 2022-10-31

## 2022-10-31 ENCOUNTER — LAB REQUISITION (OUTPATIENT)
Dept: LAB | Facility: HOSPITAL | Age: 72
End: 2022-10-31

## 2022-10-31 DIAGNOSIS — N17.9 ACUTE KIDNEY FAILURE, UNSPECIFIED (HCC): ICD-10-CM

## 2022-10-31 DIAGNOSIS — M31.0 HYPERSENSITIVITY ANGIITIS (HCC): ICD-10-CM

## 2022-10-31 LAB
ANION GAP SERPL CALCULATED.3IONS-SCNC: 8 MMOL/L (ref 4–13)
BACTERIA UR QL AUTO: ABNORMAL /HPF
BILIRUB UR QL STRIP: NEGATIVE
BUN SERPL-MCNC: 40 MG/DL (ref 5–25)
CALCIUM SERPL-MCNC: 8.9 MG/DL (ref 8.3–10.1)
CHLORIDE SERPL-SCNC: 98 MMOL/L (ref 96–108)
CLARITY UR: CLEAR
CO2 SERPL-SCNC: 26 MMOL/L (ref 21–32)
COLOR UR: YELLOW
CREAT SERPL-MCNC: 0.95 MG/DL (ref 0.6–1.3)
GFR SERPL CREATININE-BSD FRML MDRD: 78 ML/MIN/1.73SQ M
GLUCOSE SERPL-MCNC: 109 MG/DL (ref 65–140)
GLUCOSE UR STRIP-MCNC: ABNORMAL MG/DL
HGB UR QL STRIP.AUTO: ABNORMAL
HYALINE CASTS #/AREA URNS LPF: ABNORMAL /LPF
KETONES UR STRIP-MCNC: NEGATIVE MG/DL
LEUKOCYTE ESTERASE UR QL STRIP: ABNORMAL
MAGNESIUM SERPL-MCNC: 2.4 MG/DL (ref 1.6–2.6)
MUCOUS THREADS UR QL AUTO: ABNORMAL
NITRITE UR QL STRIP: NEGATIVE
NON-SQ EPI CELLS URNS QL MICRO: ABNORMAL /HPF
PH UR STRIP.AUTO: 5.5 [PH]
POTASSIUM SERPL-SCNC: 5.1 MMOL/L (ref 3.5–5.3)
PROT UR STRIP-MCNC: ABNORMAL MG/DL
RBC #/AREA URNS AUTO: ABNORMAL /HPF
SODIUM SERPL-SCNC: 132 MMOL/L (ref 135–147)
SP GR UR STRIP.AUTO: 1.02 (ref 1–1.03)
UROBILINOGEN UR STRIP-ACNC: <2 MG/DL
WBC #/AREA URNS AUTO: ABNORMAL /HPF

## 2022-10-31 PROCEDURE — 10330064 CLEANSER, WND SEA-CLEANS 6OZ  COLPLT

## 2022-10-31 PROCEDURE — 10330064 PAD, ABD 5X9" STR LF (1/PK 20PK/BX) MGM1

## 2022-10-31 PROCEDURE — 10330064 BANDAGE, CNFRM 4"X4.1YDS N/S LF (12RL/BG

## 2022-10-31 PROCEDURE — 10330064 SPONGE, GAUZE 8PLY N/S 4"X4" (200/PK 20P

## 2022-10-31 PROCEDURE — 10330064 TAPE, ADHSV TRANSPORE WHT 2" (6RL/BX 10B

## 2022-11-01 ENCOUNTER — HOME CARE VISIT (OUTPATIENT)
Dept: HOME HEALTH SERVICES | Facility: HOME HEALTHCARE | Age: 72
End: 2022-11-01

## 2022-11-01 ENCOUNTER — OFFICE VISIT (OUTPATIENT)
Dept: WOUND CARE | Facility: CLINIC | Age: 72
End: 2022-11-01

## 2022-11-01 VITALS — HEART RATE: 72 BPM | OXYGEN SATURATION: 97 % | DIASTOLIC BLOOD PRESSURE: 84 MMHG | SYSTOLIC BLOOD PRESSURE: 132 MMHG

## 2022-11-01 VITALS
BODY MASS INDEX: 39.1 KG/M2 | TEMPERATURE: 97.3 F | HEIGHT: 73 IN | SYSTOLIC BLOOD PRESSURE: 137 MMHG | RESPIRATION RATE: 20 BRPM | WEIGHT: 295 LBS | HEART RATE: 120 BPM | DIASTOLIC BLOOD PRESSURE: 82 MMHG

## 2022-11-01 DIAGNOSIS — I87.2 VENOUS INSUFFICIENCY OF BOTH LOWER EXTREMITIES: ICD-10-CM

## 2022-11-01 DIAGNOSIS — L97.519 SKIN ULCERS OF FOOT, BILATERAL (HCC): ICD-10-CM

## 2022-11-01 DIAGNOSIS — L97.529 SKIN ULCERS OF FOOT, BILATERAL (HCC): ICD-10-CM

## 2022-11-01 DIAGNOSIS — L97.912 SKIN ULCER OF LOWER LEG WITH FAT LAYER EXPOSED, RIGHT (HCC): ICD-10-CM

## 2022-11-01 DIAGNOSIS — L97.922 SKIN ULCER OF LEFT LOWER LEG WITH FAT LAYER EXPOSED (HCC): Primary | ICD-10-CM

## 2022-11-01 RX ORDER — CEPHALEXIN 500 MG/1
500 CAPSULE ORAL EVERY 8 HOURS
COMMUNITY
Start: 2022-09-26

## 2022-11-01 RX ORDER — PREDNISONE 10 MG/1
TABLET ORAL
COMMUNITY
Start: 2022-10-25 | End: 2022-11-01

## 2022-11-01 RX ORDER — LIDOCAINE 40 MG/G
CREAM TOPICAL ONCE
Status: COMPLETED | OUTPATIENT
Start: 2022-11-01 | End: 2022-11-01

## 2022-11-01 RX ADMIN — LIDOCAINE: 40 CREAM TOPICAL at 15:14

## 2022-11-01 NOTE — PROGRESS NOTES
Patient ID: Nadia Cross is a 74 y o  male Date of Birth 1950     Diagnosis:  1  Skin ulcer of left lower leg with fat layer exposed ([facility] [address] )  -     lidocaine (LMX) 4 % cream  -     Wound cleansing and dressings; Future    2  Skin ulcer of lower leg with fat layer exposed, right ([facility])  -     lidocaine (LMX) 4 % cream  -     Wound cleansing and dressings; Future    3  Venous insufficiency of both lower extremities    4  Skin ulcers of foot, bilateral ([facility] [address] )        Diagnosis ICD-10-CM Associated Orders   1  Skin ulcer of left lower leg with fat layer exposed ([facility] [address] )  L97 922 lidocaine (LMX) 4 % cream     Wound cleansing and dressings   2  Skin ulcer of lower leg with fat layer exposed, right ([facility])  L97 912 lidocaine (LMX) 4 % cream     Wound cleansing and dressings   3  Venous insufficiency of both lower extremities  I87 2    4  Skin ulcers of foot, bilateral ([facility] )  L97 519     L97 529           Assessment & Plan:  1  Bilateral lower extremity ulcerations  2  Bilateral lower extremity venous insufficiency    Plan:     - patient presents with bilateral lower extremity multiple ulcerations complicated by vasculitis likely drug induced as well as venous insufficiency  Patient has a significant history for recent hospitalization secondary to possibly drug induced vasculitis proven by skin biopsy  Patient was placed on systemic prednisone and since on prednisone he has significant improvement to the ulcerations  Currently the ulcers to bilateral lower extremity does not look infectious, the appearance is combination of healing vasculitis and venous insufficiency  - Skin biopsy reviewed: Leukocytoclastic vasculitis  - Dermatologist notes reviewed   - I recommend local wound care with enzymatic debridement with Santyl and dry sterile dressing  Once the wounds are well granulated we will transition to Maxorb Ag and possible Unna boot    - I discussed importance of protein intake for wound healing  - return in 1 week for follow-up        Chief Complaint   Patient presents with   • New Patient Visit     Lower leg ulcers         Subjective:   70-year-old male with past medical history as below presents with complaint of multiple ulcerations to bilateral lower extremity  Patient is accompanied by his wife  Patient reports last month he was hospitalized at [facility]  Patient reports he had a skin biopsy done by Dermatology which was positive for vasculitis  He was treated with systemic steroid which Dr Winfred Riedel his PCP is weaning him off  Patient reports since discharge he is doing much better and there is significant improvement in his lower leg ulcers  Patient has wound care nursing at home who has been looking after the ulcers  Wife reports patient was placed on ceftriaxone for UTI and since on that drug he developed such ulcers and complications for which patient had hospitalization  Otherwise currently patient has been doing better without any complaints  These are nonpainful ulcer  No other complaints at this time        The following portions of the patient's history were reviewed and updated as appropriate:   Patient Active Problem List   Diagnosis   • Gout   • Gait abnormality   • Lumbar stenosis   • Polyneuropathy   • Lower extremity weakness   • Lumbosacral plexopathy   • Elevated troponin   • EDGAR (dyspnea on exertion)   • Rash due to vaculitis    • LYLY (acute kidney injury) ([facility] [address] )   • Depression   • Multiple open wounds of lower leg   • Ambulatory dysfunction   • Leucocytosis   • Bloating     Past Medical History:   Diagnosis Date   • Depression    • Gout      Past Surgical History:   Procedure Laterality Date   • BACK SURGERY      X2 LUMBAR INCLUDING FUSION  2004, 2015 WITH [facility], [facility] DR Natalio Pereira   • CATARACT EXTRACTION, BILATERAL     • KNEE ARTHROSCOPY Right     x2 , meniscus repair   • TONSILECTOMY AND ADNOIDECTOMY       Social History     Socioeconomic History   • Marital status: /Civil Union Spouse name: Patricio Robledo   • Number of children: 3   • Years of education: 15   • Highest education level: None   Occupational History   • None   Tobacco Use   • Smoking status: Former Smoker     Quit date:      Years since quittin 8   • Smokeless tobacco: Never Used   Vaping Use   • Vaping Use: Never used   Substance and Sexual Activity   • Alcohol use: Not Currently     Comment: occasional   • Drug use: Never   • Sexual activity: None   Other Topics Concern   • None   Social History Narrative   • None     Social Determinants of Health     Financial Resource Strain: Not on file   Food Insecurity: No Food Insecurity   • Worried About Running Out of Food in the Last Year: Never true   • Ran Out of Food in the Last Year: Never true   Transportation Needs: No Transportation Needs   • Lack of Transportation (Medical): No   • Lack of Transportation (Non-Medical):  No   Physical Activity: Not on file   Stress: Not on file   Social Connections: Not on file   Intimate Partner Violence: Not on file   Housing Stability: Unknown   • Unable to Pay for Housing in the Last Year: Not on file   • Number of Places Lived in the Last Year: 1   • Unstable Housing in the Last Year: No        Current Outpatient Medications:   •  allopurinol (ZYLOPRIM) 100 mg tablet, Take 100 mg by mouth 2 (two) times a day, Disp: , Rfl:   •  cephalexin (KEFLEX) 500 mg capsule, Take 500 mg by mouth every 8 (eight) hours, Disp: , Rfl:   •  cholecalciferol (VITAMIN D3) 1,000 units tablet, Take 2,000 Units by mouth daily, Disp: , Rfl:   •  famotidine (PEPCID) 20 mg tablet, Take 1 tablet (20 mg total) by mouth daily Do not start before 2022 , Disp: 30 tablet, Rfl: 0  •  FLUoxetine (PROzac) 20 mg capsule, Take 20 mg by mouth daily , Disp: , Rfl:   •  loratadine (CLARITIN) 10 mg tablet, Take 10 mg by mouth as needed for allergies, Disp: , Rfl:   •  Magnesium 100 MG CAPS, Take 1 capsule by mouth daily, Disp: , Rfl:   •  meloxicam (MOBIC) 15 mg tablet, Take 15 mg by mouth daily, Disp: , Rfl:   •  Misc Natural Products (OSTEO BI-FLEX/5-LOXIN ADVANCED PO), Take 1 tablet by mouth daily , Disp: , Rfl:   •  Multiple Vitamins-Minerals (CENTRUM SILVER 50+MEN PO), Take 1 tablet by mouth daily, Disp: , Rfl:   •  predniSONE 20 mg tablet, Take 3 tablets (60 mg total) by mouth daily for 30 doses Do not start before October 6, 2022 , Disp: 90 tablet, Rfl: 0  •  simethicone (MYLICON) 80 mg chewable tablet, Chew 1 tablet (80 mg total) every 6 (six) hours as needed for flatulence, Disp: 30 tablet, Rfl: 0  •  SUPER B COMPLEX/C PO, Take 1 tablet by mouth daily, Disp: , Rfl:   •  torsemide (DEMADEX) 10 mg tablet, Take 1 tablet (10 mg total) by mouth daily Do not start before October 6, 2022 , Disp: 30 tablet, Rfl: 0  •  triamcinolone (KENALOG) 0 1 % ointment, Apply topically 2 (two) times a day Only apply to rash, do not apply to necrotic tissue, Disp: 30 g, Rfl: 0  •  mupirocin (BACTROBAN) 2 % ointment, Apply topically 2 (two) times a day Apply topically twice a day or as needed after every dressing change on lower legs (Patient not taking: Reported on 11/1/2022), Disp: 30 g, Rfl: 11  No current facility-administered medications for this visit  Family History   Problem Relation Age of Onset   • Diabetes Father    • Diabetes Brother    • Diabetes Sister       Review of Systems   Constitutional: Negative for chills  Respiratory: Negative for shortness of breath  Gastrointestinal: Negative for vomiting  Musculoskeletal: Negative for arthralgias  Skin: Positive for color change and wound  Neurological: Negative for dizziness  Psychiatric/Behavioral: Negative for agitation  Allergies  Other and Penicillins    Objective:  /82   Pulse (!) 120   Temp (!) 97 3 °F (36 3 °C)   Resp 20   Ht 6' 1" (1 854 m)   Wt 134 kg (295 lb)   BMI 38 92 kg/m²     Physical Exam  Vitals reviewed  Cardiovascular:      Rate and Rhythm: Normal rate     Musculoskeletal: General: Swelling and tenderness present  Right lower le+ Edema present  Left lower le+ Edema present  Comments: Bilateral lower leg and feet multiple small ulcerations probable to subcutaneous tissue with mixture of granular and fibrotic wound base  The ulcers appeared to be healing  Current delay no clinical signs of acute infection present  Skin:     General: Skin is warm  Neurological:      General: No focal deficit present  Mental Status: He is alert  Psychiatric:         Mood and Affect: Mood normal              Wound 10/03/22 Thigh Anterior;Right (Active)       Wound 10/03/22 Coccyx (Active)       Wound 22 Other (comment) Vasulitic Leg Right; Lower (Active)   Wound Image    22 141   Wound Description Epithelialization;Eschar;Pink;Yellow 22 141   Antonette-wound Assessment Purple;Scaly 22 141   Wound Length (cm) 23 cm 22 141   Wound Width (cm) 46 cm 22 141   Wound Depth (cm) 0 1 cm 22 141   Wound Surface Area (cm^2) 1058 cm^2 22 1416   Wound Volume (cm^3) 105 8 cm^3 22 1416   Calculated Wound Volume (cm^3) 105 8 cm^3 22 1416   Drainage Amount Moderate 22 141   Drainage Description Serosanguineous 22 141   Non-staged Wound Description Full thickness 22 141   Treatments Cleansed 22 141   Patient Tolerance Tolerated well 22 1416       Wound 22 Other (comment) Vasulitic Leg Left; Lower (Active)   Wound Image     22 141   Wound Description Epithelialization;Eschar;Pink;Yellow 22 141   Antonette-wound Assessment Purple;Dry;Scaly 22 1417   Wound Length (cm) 14 cm 22 141   Wound Width (cm) 41 5 cm 22 141   Wound Depth (cm) 0 3 cm 22 141   Wound Surface Area (cm^2) 581 cm^2 22 1417   Wound Volume (cm^3) 174 3 cm^3 22 1417   Calculated Wound Volume (cm^3) 174 3 cm^3 22 1417   Drainage Amount Moderate 22 1417   Drainage Description Serous 11/01/22 1417   Non-staged Wound Description Full thickness 11/01/22 1417   Treatments Cleansed 11/01/22 1417   Patient Tolerance Tolerated well 11/01/22 1417       Wound 11/01/22 Other (comment) Vasulitic Foot Anterior;Right (Active)   Wound Image   11/01/22 1420   Wound Description Epithelialization;Eschar 11/01/22 1420   Antonette-wound Assessment Purple;Scaly 11/01/22 1420   Wound Length (cm) 7 5 cm 11/01/22 1420   Wound Width (cm) 9 5 cm 11/01/22 1420   Wound Depth (cm) 0 1 cm 11/01/22 1420   Wound Surface Area (cm^2) 71 25 cm^2 11/01/22 1420   Wound Volume (cm^3) 7 125 cm^3 11/01/22 1420   Calculated Wound Volume (cm^3) 7 13 cm^3 11/01/22 1420   Drainage Amount None 11/01/22 1420   Treatments Cleansed 11/01/22 1420   Patient Tolerance Tolerated well 11/01/22 1420       Wound 11/01/22 Other (comment) Vasulitic Foot Anterior; Left (Active)   Wound Image   11/01/22 1421   Wound Description Epithelialization;Eschar 11/01/22 1421   Antonette-wound Assessment Purple;Scaly 11/01/22 1421   Wound Length (cm) 3 5 cm 11/01/22 1421   Wound Width (cm) 4 cm 11/01/22 1421   Wound Depth (cm) 0 1 cm 11/01/22 1421   Wound Surface Area (cm^2) 14 cm^2 11/01/22 1421   Wound Volume (cm^3) 1 4 cm^3 11/01/22 1421   Calculated Wound Volume (cm^3) 1 4 cm^3 11/01/22 1421   Drainage Amount None 11/01/22 1421   Treatments Cleansed 11/01/22 1421   Patient Tolerance Tolerated well 11/01/22 1421       Wound 11/01/22 Pressure Injury Heel Left (Active)   Wound Image   11/01/22 1419   Wound Description Eschar;Pink;Yellow 11/01/22 1419   Antonette-wound Assessment Dry;Pink 11/01/22 1419   Wound Length (cm) 2 6 cm 11/01/22 1419   Wound Width (cm) 2 cm 11/01/22 1419   Wound Depth (cm) 0 3 cm 11/01/22 1419   Wound Surface Area (cm^2) 5 2 cm^2 11/01/22 1419   Wound Volume (cm^3) 1 56 cm^3 11/01/22 1419   Calculated Wound Volume (cm^3) 1 56 cm^3 11/01/22 1419   Undermining is depth extending from 12-12 11/01/22 1419   Drainage Amount Moderate 11/01/22 1419   Drainage Description Serous 11/01/22 1419   Non-staged Wound Description Full thickness 11/01/22 1419   Treatments Cleansed 11/01/22 1419   Patient Tolerance Tolerated well 11/01/22 1419                             Debridement   Wound 11/01/22 Other (comment) Vasulitic Leg Left; Lower    Universal Protocol:  Consent: Verbal consent obtained  Risks and benefits: risks, benefits and alternatives were discussed  Consent given by: patient  Patient understanding: patient states understanding of the procedure being performed  Patient identity confirmed: verbally with patient      Performed by: physician  Debridement type: selective  Pain control: lidocaine 4%  Post-debridement measurements  Length (cm): 14  Width (cm): 41 5  Depth (cm): 0 3  Percent debrided: 15%  Surface Area (cm^2): 881  Area debrided (cm^2): 87 15  Volume (cm^3): 174 3  Devitalized tissue debrided: biofilm, fibrin, necrotic debris, slough and eschar  Instrument(s) utilized: blade  Bleeding: small  Response to treatment: procedure was tolerated well           Results from last 6 Months   Lab Units 09/24/22  1041   WOUND CULTURE  1+ Growth of Pseudomonas aeruginosa*       Wound Instructions:  Orders Placed This Encounter   Procedures   • Wound cleansing and dressings     Wash your hands with soap and water  Remove old dressing, discard into plastic bag and place in trash  Cleanse the wound with normal saline prior to applying a clean dressing  Do not use tissue or cotton balls  Do not scrub the wound  Pat dry using gauze  Apply nickel thick layer of santyl to all wounds on bilateral legs, feet and left heel  Cover with gauze  Secure with kerlix  Change dressing daily  Shower: no    Dressed as above with treasure for today's treatment      prescription will be sent to pharmacy for santyl    Follow up in 1 week     Standing Status:   Future     Standing Expiration Date:   11/1/2023         Martina Harris    Portions of the record may have been created with voice recognition software  Occasional wrong word or "sound a like" substitutions may have occurred due to the inherent limitations of voice recognition software  Read the chart carefully and recognize, using context, where substitutions have occurred

## 2022-11-01 NOTE — PATIENT INSTRUCTIONS
Orders Placed This Encounter   Procedures    Wound cleansing and dressings     Wash your hands with soap and water  Remove old dressing, discard into plastic bag and place in trash  Cleanse the wound with normal saline prior to applying a clean dressing  Do not use tissue or cotton balls  Do not scrub the wound  Pat dry using gauze  Apply nickel thick layer of santyl to all wounds on bilateral legs, feet and left heel  Cover with gauze  Secure with kerlix  Change dressing daily  Shower: no    Dressed as above with treasure for today's treatment      prescription will be sent to pharmacy for santyl    Follow up in 1 week     Standing Status:   Future     Standing Expiration Date:   11/1/2023

## 2022-11-01 NOTE — PROGRESS NOTES
Debridement   Wound 11/01/22 Other (comment) Vasulitic Leg Right; Lower    Universal Protocol:  Consent: Verbal consent obtained    Risks and benefits: risks, benefits and alternatives were discussed  Consent given by: patient  Patient understanding: patient states understanding of the procedure being performed  Patient identity confirmed: verbally with patient      Performed by: physician  Debridement type: selective  Pain control: lidocaine 4%  Post-debridement measurements  Length (cm): 7 5  Width (cm): 9 5  Depth (cm): 0 1  Percent debrided: 10%  Surface Area (cm^2): 71 25  Area debrided (cm^2): 7 13  Volume (cm^3): 7 13  Devitalized tissue debrided: biofilm, fibrin and slough  Instrument(s) utilized: curette  Bleeding: small  Hemostasis obtained with: pressure  Response to treatment: procedure was tolerated well

## 2022-11-02 ENCOUNTER — HOME CARE VISIT (OUTPATIENT)
Dept: HOME HEALTH SERVICES | Facility: HOME HEALTHCARE | Age: 72
End: 2022-11-02

## 2022-11-02 VITALS
DIASTOLIC BLOOD PRESSURE: 94 MMHG | RESPIRATION RATE: 20 BRPM | OXYGEN SATURATION: 97 % | HEART RATE: 102 BPM | TEMPERATURE: 96.6 F | SYSTOLIC BLOOD PRESSURE: 140 MMHG

## 2022-11-02 VITALS
HEART RATE: 72 BPM | DIASTOLIC BLOOD PRESSURE: 90 MMHG | RESPIRATION RATE: 18 BRPM | TEMPERATURE: 96.6 F | OXYGEN SATURATION: 98 % | SYSTOLIC BLOOD PRESSURE: 124 MMHG

## 2022-11-02 LAB — BACTERIA UR CULT: ABNORMAL

## 2022-11-03 ENCOUNTER — HOME CARE VISIT (OUTPATIENT)
Dept: HOME HEALTH SERVICES | Facility: HOME HEALTHCARE | Age: 72
End: 2022-11-03

## 2022-11-04 ENCOUNTER — HOME CARE VISIT (OUTPATIENT)
Dept: HOME HEALTH SERVICES | Facility: HOME HEALTHCARE | Age: 72
End: 2022-11-04

## 2022-11-04 VITALS
OXYGEN SATURATION: 98 % | RESPIRATION RATE: 18 BRPM | TEMPERATURE: 97.2 F | DIASTOLIC BLOOD PRESSURE: 72 MMHG | HEART RATE: 58 BPM | SYSTOLIC BLOOD PRESSURE: 118 MMHG

## 2022-11-04 VITALS
SYSTOLIC BLOOD PRESSURE: 122 MMHG | HEART RATE: 64 BPM | RESPIRATION RATE: 20 BRPM | DIASTOLIC BLOOD PRESSURE: 70 MMHG | TEMPERATURE: 97.2 F | OXYGEN SATURATION: 98 %

## 2022-11-04 VITALS — DIASTOLIC BLOOD PRESSURE: 80 MMHG | HEART RATE: 64 BPM | SYSTOLIC BLOOD PRESSURE: 136 MMHG | OXYGEN SATURATION: 96 %

## 2022-11-04 NOTE — CASE COMMUNICATION
PT discharge with patient goals achieved at this time  Potential for further therapy after LEs healed

## 2022-11-05 ENCOUNTER — HOME CARE VISIT (OUTPATIENT)
Dept: HOME HEALTH SERVICES | Facility: HOME HEALTHCARE | Age: 72
End: 2022-11-05

## 2022-11-06 ENCOUNTER — HOME CARE VISIT (OUTPATIENT)
Dept: HOME HEALTH SERVICES | Facility: HOME HEALTHCARE | Age: 72
End: 2022-11-06

## 2022-11-06 VITALS
HEART RATE: 94 BPM | DIASTOLIC BLOOD PRESSURE: 80 MMHG | SYSTOLIC BLOOD PRESSURE: 138 MMHG | TEMPERATURE: 98.1 F | RESPIRATION RATE: 18 BRPM | OXYGEN SATURATION: 96 %

## 2022-11-07 ENCOUNTER — HOME CARE VISIT (OUTPATIENT)
Dept: HOME HEALTH SERVICES | Facility: HOME HEALTHCARE | Age: 72
End: 2022-11-07

## 2022-11-07 VITALS
HEART RATE: 78 BPM | RESPIRATION RATE: 18 BRPM | TEMPERATURE: 97.2 F | SYSTOLIC BLOOD PRESSURE: 138 MMHG | OXYGEN SATURATION: 97 % | DIASTOLIC BLOOD PRESSURE: 78 MMHG

## 2022-11-07 VITALS
SYSTOLIC BLOOD PRESSURE: 118 MMHG | TEMPERATURE: 97.3 F | RESPIRATION RATE: 18 BRPM | OXYGEN SATURATION: 95 % | DIASTOLIC BLOOD PRESSURE: 72 MMHG | HEART RATE: 106 BPM

## 2022-11-07 PROCEDURE — 10330064 DRESSING, HYDROGEL SKINTEGRITY1OZ (30/CS

## 2022-11-07 PROCEDURE — 10330064 DRESSING, OIL EMULSION 3"X3" STR (50/BX

## 2022-11-07 PROCEDURE — 10330064 BANDAGE, CNFRM 4"X4.1YDS N/S LF (12RL/BG

## 2022-11-08 ENCOUNTER — HOME CARE VISIT (OUTPATIENT)
Dept: HOME HEALTH SERVICES | Facility: HOME HEALTHCARE | Age: 72
End: 2022-11-08

## 2022-11-08 ENCOUNTER — OFFICE VISIT (OUTPATIENT)
Dept: WOUND CARE | Facility: CLINIC | Age: 72
End: 2022-11-08

## 2022-11-08 VITALS
TEMPERATURE: 97.8 F | RESPIRATION RATE: 18 BRPM | DIASTOLIC BLOOD PRESSURE: 98 MMHG | SYSTOLIC BLOOD PRESSURE: 147 MMHG | HEART RATE: 105 BPM

## 2022-11-08 DIAGNOSIS — I87.2 VENOUS INSUFFICIENCY OF BOTH LOWER EXTREMITIES: ICD-10-CM

## 2022-11-08 DIAGNOSIS — L97.912 SKIN ULCER OF LOWER LEG WITH FAT LAYER EXPOSED, RIGHT (HCC): ICD-10-CM

## 2022-11-08 DIAGNOSIS — L97.922 SKIN ULCER OF LEFT LOWER LEG WITH FAT LAYER EXPOSED (HCC): Primary | ICD-10-CM

## 2022-11-08 DIAGNOSIS — L97.529 SKIN ULCERS OF FOOT, BILATERAL (HCC): ICD-10-CM

## 2022-11-08 DIAGNOSIS — L97.519 SKIN ULCERS OF FOOT, BILATERAL (HCC): ICD-10-CM

## 2022-11-08 PROCEDURE — 10330064 BANDAGE, CNFRM 4"X4.1YDS N/S LF (12RL/BG

## 2022-11-08 PROCEDURE — 10330064

## 2022-11-08 PROCEDURE — 10330064 DRESSING, OIL EMULSION 3"X3" STR (50/BX

## 2022-11-08 PROCEDURE — 10330064 TAPE, ADHSV TRANSPORE WHT 2" (6RL/BX 10B

## 2022-11-08 NOTE — PATIENT INSTRUCTIONS
Orders Placed This Encounter   Procedures    Wound cleansing and dressings     Wash your hands with soap and water  Remove old dressing, discard into plastic bag and place in trash  Cleanse the wound with normal saline prior to applying a clean dressing  Do not use tissue or cotton balls  Do not scrub the wound  Pat dry using gauze  Apply nickel thick layer of santyl to all wounds on bilateral legs, feet and left heel  Cover with gauze  Secure with kerlix  Change dressing daily  Shower: no     Dressed as above with treasure for today's treatment       prescription will be sent to pharmacy for santyl     Follow up in 1 week     Standing Status:   Future     Standing Expiration Date:   11/8/2023

## 2022-11-09 ENCOUNTER — HOME CARE VISIT (OUTPATIENT)
Dept: HOME HEALTH SERVICES | Facility: HOME HEALTHCARE | Age: 72
End: 2022-11-09

## 2022-11-09 VITALS
OXYGEN SATURATION: 97 % | SYSTOLIC BLOOD PRESSURE: 110 MMHG | RESPIRATION RATE: 18 BRPM | HEART RATE: 96 BPM | TEMPERATURE: 96.4 F | DIASTOLIC BLOOD PRESSURE: 68 MMHG

## 2022-11-09 NOTE — PROGRESS NOTES
Patient ID: Waylon Kathleen is a 74 y o  male Date of Birth 1950     Diagnosis:  1  Skin ulcer of left lower leg with fat layer exposed ([address] )  -     Wound cleansing and dressings; Future  -     collagenase (SANTYL) ointment; Apply topically daily Wound 1: 10cm x15cm Wound 2: 10cm x10cm    2  Skin ulcer of lower leg with fat layer exposed, right (H[facility])  -     Wound cleansing and dressings; Future  -     collagenase (SANTYL) ointment; Apply topically daily Wound 1: 10cm x15cm Wound 2: 10cm x10cm    3  Venous insufficiency of both lower extremities  -     Wound cleansing and dressings; Future  -     collagenase (SANTYL) ointment; Apply topically daily Wound 1: 10cm x15cm Wound 2: 10cm x10cm    4  Skin ulcers of foot, bilateral ([address] )  -     Wound cleansing and dressings; Future  -     collagenase (SANTYL) ointment; Apply topically daily Wound 1: 10cm x15cm Wound 2: 10cm x10cm       Diagnosis ICD-10-CM Associated Orders   1  Skin ulcer of left lower leg with fat layer exposed ([address] )  L97 922 Wound cleansing and dressings     collagenase (SANTYL) ointment   2  Skin ulcer of lower leg with fat layer exposed, right (HCC)  L97 912 Wound cleansing and dressings     collagenase (SANTYL) ointment   3  Venous insufficiency of both lower extremities  I87 2 Wound cleansing and dressings     collagenase (SANTYL) ointment   4  Skin ulcers of foot, bilateral ([facility])  L97 519 Wound cleansing and dressings    L97 529 collagenase (SANTYL) ointment        Assessment & Plan:  1  Bilateral lower extremity ulcerations  2  Bilateral lower extremity venous insufficiency       - Wounds debrided as below   - Continue local wound care with enzymatic debridement with Santyl and dry sterile dressing  Once the wounds are well granulated we will transition to Maxorb Ag and possible Unna boot  - Return in 1 week     Chief Complaint   Patient presents with   • Follow Up Wound Care Visit     Did not hear from anyone about santyl    Call placed on his behalf awaiting call back           Subjective:   Patient presents with his wife and daughter for continued evaluation of bilateral lower extremity ulcers  Unfortunately patient did not receive Santyl last week and therefore they were applying Medihoney with dry sterile dressings  No other complaints at this time        The following portions of the patient's history were reviewed and updated as appropriate:   Patient Active Problem List   Diagnosis   • Gout   • Gait abnormality   • Lumbar stenosis   • Polyneuropathy   • Lower extremity weakness   • Lumbosacral plexopathy   • Elevated troponin   • EDGAR (dyspnea on exertion)   • Rash due to vaculitis    • LYLY (acute kidney injury) ([facility])   • Depression   • Multiple open wounds of lower leg   • Ambulatory dysfunction   • Leucocytosis   • Bloating     Past Medical History:   Diagnosis Date   • Depression    • Gout      Past Surgical History:   Procedure Laterality Date   • BACK SURGERY      X2 LUMBAR INCLUDING FUSION  ,  WITH OAA, [facility] DR Melissa Hines   • CATARACT EXTRACTION, BILATERAL     • KNEE ARTHROSCOPY Right     x2 , meniscus repair   • TONSILECTOMY AND ADNOIDECTOMY       Social History     Socioeconomic History   • Marital status: /Civil Union     Spouse name: Louise   • Number of children: 3   • Years of education: 15   • Highest education level: Not on file   Occupational History   • Not on file   Tobacco Use   • Smoking status: Former Smoker     Quit date:      Years since quittin 8   • Smokeless tobacco: Never Used   Vaping Use   • Vaping Use: Never used   Substance and Sexual Activity   • Alcohol use: Not Currently     Comment: occasional   • Drug use: Never   • Sexual activity: Not on file   Other Topics Concern   • Not on file   Social History Narrative   • Not on file     Social Determinants of Health     Financial Resource Strain: Not on file   Food Insecurity: No Food Insecurity   • Worried About Running Out of Food in the Last Year: Never true   • Ran Out of Food in the Last Year: Never true   Transportation Needs: No Transportation Needs   • Lack of Transportation (Medical): No   • Lack of Transportation (Non-Medical):  No   Physical Activity: Not on file   Stress: Not on file   Social Connections: Not on file   Intimate Partner Violence: Not on file   Housing Stability: Unknown   • Unable to Pay for Housing in the Last Year: Not on file   • Number of Places Lived in the Last Year: 1   • Unstable Housing in the Last Year: No        Current Outpatient Medications:   •  collagenase (SANTYL) ointment, Apply topically daily Wound 1: 10cm x15cm Wound 2: 10cm x10cm, Disp: 15 g, Rfl: 0  •  allopurinol (ZYLOPRIM) 100 mg tablet, Take 100 mg by mouth 2 (two) times a day, Disp: , Rfl:   •  cephalexin (KEFLEX) 500 mg capsule, Take 500 mg by mouth every 8 (eight) hours, Disp: , Rfl:   •  cholecalciferol (VITAMIN D3) 1,000 units tablet, Take 2,000 Units by mouth daily, Disp: , Rfl:   •  famotidine (PEPCID) 20 mg tablet, Take 1 tablet (20 mg total) by mouth daily Do not start before October 6, 2022 , Disp: 30 tablet, Rfl: 0  •  FLUoxetine (PROzac) 20 mg capsule, Take 20 mg by mouth daily , Disp: , Rfl:   •  loratadine (CLARITIN) 10 mg tablet, Take 10 mg by mouth as needed for allergies, Disp: , Rfl:   •  Magnesium 100 MG CAPS, Take 1 capsule by mouth daily, Disp: , Rfl:   •  meloxicam (MOBIC) 15 mg tablet, Take 15 mg by mouth daily, Disp: , Rfl:   •  Misc Natural Products (OSTEO BI-FLEX/5-LOXIN ADVANCED PO), Take 1 tablet by mouth daily , Disp: , Rfl:   •  Multiple Vitamins-Minerals (CENTRUM SILVER 50+MEN PO), Take 1 tablet by mouth daily, Disp: , Rfl:   •  mupirocin (BACTROBAN) 2 % ointment, Apply topically 2 (two) times a day Apply topically twice a day or as needed after every dressing change on lower legs (Patient not taking: Reported on 11/1/2022), Disp: 30 g, Rfl: 11  •  simethicone (MYLICON) 80 mg chewable tablet, Chew 1 tablet (80 mg total) every 6 (six) hours as needed for flatulence, Disp: 30 tablet, Rfl: 0  •  SUPER B COMPLEX/C PO, Take 1 tablet by mouth daily, Disp: , Rfl:   •  torsemide (DEMADEX) 10 mg tablet, Take 1 tablet (10 mg total) by mouth daily Do not start before 2022 , Disp: 30 tablet, Rfl: 0  •  triamcinolone (KENALOG) 0 1 % ointment, Apply topically 2 (two) times a day Only apply to rash, do not apply to necrotic tissue, Disp: 30 g, Rfl: 0  Family History   Problem Relation Age of Onset   • Diabetes Father    • Diabetes Brother    • Diabetes Sister       Review of Systems   Constitutional: Negative for chills  Respiratory: Negative for shortness of breath  Gastrointestinal: Negative for vomiting  Skin: Positive for color change and wound  Allergies: Other and Penicillins      Objective:  /98   Pulse 105   Temp 97 8 °F (36 6 °C)   Resp 18     Physical Exam  Vitals reviewed  Cardiovascular:      Rate and Rhythm: Normal rate  Pulses: Normal pulses  Pulmonary:      Effort: Pulmonary effort is normal    Musculoskeletal:         General: Swelling and tenderness present  Right lower le+ Edema present  Left lower le+ Edema present  Comments:  Bilateral lower leg and feet multiple small ulcerations probable to subcutaneous tissue with mixture of granular and fibrotic wound base  The ulcers appeared to be healing  Overall appears to be more granular since last evaluated   Skin:     General: Skin is warm and dry  Neurological:      General: No focal deficit present  Mental Status: He is alert  Psychiatric:         Mood and Affect: Mood normal              Wound 10/03/22 Coccyx (Active)       Wound 22 Other (comment) Vasulitic Leg Right; Lower (Active)   Wound Image   22 1529   Wound Description Epithelialization;Eschar;Pink;Yellow 22 1529   Antonette-wound Assessment Purple;Scaly 22 1529   Wound Length (cm) 30 cm 22 1529   Wound Width (cm) 18 cm 11/08/22 1529   Wound Depth (cm) 0 2 cm 11/08/22 1529   Wound Surface Area (cm^2) 540 cm^2 11/08/22 1529   Wound Volume (cm^3) 108 cm^3 11/08/22 1529   Calculated Wound Volume (cm^3) 108 cm^3 11/08/22 1529   Change in Wound Size % -2 08 11/08/22 1529   Drainage Amount Small 11/08/22 1529   Drainage Description Serosanguineous 11/08/22 1529   Non-staged Wound Description Full thickness 11/08/22 1529   Treatments Cleansed 11/01/22 1416   Patient Tolerance Tolerated well 11/01/22 1416       Wound 11/01/22 Other (comment) Vasulitic Leg Left; Lower (Active)   Wound Image   11/08/22 1530   Wound Description Epithelialization;Eschar;Pink;Yellow 11/08/22 1530   Antonette-wound Assessment Purple;Dry;Scaly 11/08/22 1530   Wound Length (cm) 29 cm 11/08/22 1530   Wound Width (cm) 10 cm 11/08/22 1530   Wound Depth (cm) 0 2 cm 11/08/22 1530   Wound Surface Area (cm^2) 290 cm^2 11/08/22 1530   Wound Volume (cm^3) 58 cm^3 11/08/22 1530   Calculated Wound Volume (cm^3) 58 cm^3 11/08/22 1530   Change in Wound Size % 66 72 11/08/22 1530   Drainage Amount Moderate 11/08/22 1530   Drainage Description Serous 11/08/22 1530   Non-staged Wound Description Full thickness 11/08/22 1530   Treatments Irrigation with NSS 11/08/22 1530   Patient Tolerance Tolerated well 11/01/22 1417       Wound 11/01/22 Other (comment) Vasulitic Foot Anterior;Right (Active)   Wound Image   11/08/22 1530   Wound Description Epithelialization;Eschar 11/08/22 1530   Antonette-wound Assessment Purple;Scaly 11/08/22 1530   Wound Length (cm) 1 2 cm 11/08/22 1530   Wound Width (cm) 1 cm 11/08/22 1530   Wound Depth (cm) 0 1 cm 11/08/22 1530   Wound Surface Area (cm^2) 1 2 cm^2 11/08/22 1530   Wound Volume (cm^3) 0 12 cm^3 11/08/22 1530   Calculated Wound Volume (cm^3) 0 12 cm^3 11/08/22 1530   Change in Wound Size % 98 32 11/08/22 1530   Drainage Amount None 11/08/22 1530   Treatments Cleansed 11/01/22 1420   Patient Tolerance Tolerated well 11/01/22 1420       Wound 11/01/22 Other (comment) Vasulitic Foot Anterior; Left (Active)   Wound Image   11/08/22 1530   Wound Description Epithelialization;Eschar 11/08/22 1530   Antonette-wound Assessment Purple;Scaly 11/08/22 1530   Wound Length (cm) 1 5 cm 11/08/22 1530   Wound Width (cm) 1 cm 11/08/22 1530   Wound Depth (cm) 0 1 cm 11/08/22 1530   Wound Surface Area (cm^2) 1 5 cm^2 11/08/22 1530   Wound Volume (cm^3) 0 15 cm^3 11/08/22 1530   Calculated Wound Volume (cm^3) 0 15 cm^3 11/08/22 1530   Change in Wound Size % 89 29 11/08/22 1530   Drainage Amount None 11/08/22 1530   Treatments Cleansed 11/01/22 1421   Patient Tolerance Tolerated well 11/01/22 1421       Wound 11/01/22 Pressure Injury Heel Left (Active)   Wound Image   11/08/22 1531   Wound Description Pink;Yellow 11/08/22 1531   Antonette-wound Assessment Dry;Pink 11/08/22 1531   Wound Length (cm) 1 5 cm 11/08/22 1531   Wound Width (cm) 1 3 cm 11/08/22 1531   Wound Depth (cm) 0 2 cm 11/08/22 1531   Wound Surface Area (cm^2) 1 95 cm^2 11/08/22 1531   Wound Volume (cm^3) 0 39 cm^3 11/08/22 1531   Calculated Wound Volume (cm^3) 0 39 cm^3 11/08/22 1531   Change in Wound Size % 75 11/08/22 1531   Undermining is depth extending from 12-12 11/01/22 1419   Drainage Amount Moderate 11/08/22 1531   Drainage Description Serous 11/08/22 1531   Non-staged Wound Description Full thickness 11/08/22 1531   Treatments Irrigation with NSS 11/08/22 1531   Patient Tolerance Tolerated well 11/01/22 1419                         Debridement   Wound 11/01/22 Other (comment) Vasulitic Leg Left; Lower    Universal Protocol:  Consent: Verbal consent obtained    Risks and benefits: risks, benefits and alternatives were discussed  Consent given by: patient  Patient understanding: patient states understanding of the procedure being performed  Patient identity confirmed: verbally with patient      Performed by: physician  Debridement type: surgical  Level of debridement: subcutaneous tissue    Post-debridement measurements  Length (cm): 29  Width (cm): 10  Depth (cm): 0 3  Percent debrided: 20%  Surface Area (cm^2): 290  Area debrided (cm^2): 58  Volume (cm^3): 87  Tissue and other material debrided: subcutaneous tissue  Devitalized tissue debrided: biofilm, exudate, fibrin, necrotic debris and slough  Instrument(s) utilized: blade  Bleeding: small  Hemostasis obtained with: pressure  Response to treatment: procedure was tolerated well           Results from last 6 Months   Lab Units 09/24/22  1041   WOUND CULTURE  1+ Growth of Pseudomonas aeruginosa*         Wound Instructions:  Orders Placed This Encounter   Procedures   • Wound cleansing and dressings     Wash your hands with soap and water        Remove old dressing, discard into plastic bag and place in trash   Cleanse the wound with normal saline prior to applying a clean dressing  Do not use tissue or cotton balls  Do not scrub the wound  Pat dry using gauze  Apply nickel thick layer of santyl to all wounds on bilateral legs, feet and left heel   Cover with gauze  Secure with kerlix  Change dressing daily  Shower: no     Dressed as above with treasure for today's treatment  prescription will be sent to pharmacy for santyl     Follow up in 1 week     Standing Status:   Future     Standing Expiration Date:   11/8/2023         Amadou Chance      Portions of the record may have been created with voice recognition software  Occasional wrong word or "sound a like" substitutions may have occurred due to the inherent limitations of voice recognition software  Read the chart carefully and recognize, using context, where substitutions have occurred

## 2022-11-09 NOTE — PROGRESS NOTES
Debridement   Wound 11/01/22 Other (comment) Vasulitic Leg Right; Lower    Universal Protocol:  Consent: Verbal consent obtained    Risks and benefits: risks, benefits and alternatives were discussed  Consent given by: patient  Patient understanding: patient states understanding of the procedure being performed  Patient identity confirmed: verbally with patient      Performed by: physician  Debridement type: selective    Post-debridement measurements  Length (cm): 30  Width (cm): 18  Depth (cm): 0 2  Percent debrided: 20%  Surface Area (cm^2): 540  Area debrided (cm^2): 108  Volume (cm^3): 108  Devitalized tissue debrided: biofilm, exudate, fibrin, slough and eschar  Instrument(s) utilized: blade  Bleeding: small  Hemostasis obtained with: pressure  Response to treatment: procedure was tolerated well

## 2022-11-10 ENCOUNTER — CONSULT (OUTPATIENT)
Dept: NEPHROLOGY | Facility: CLINIC | Age: 72
End: 2022-11-10

## 2022-11-10 ENCOUNTER — HOME CARE VISIT (OUTPATIENT)
Dept: HOME HEALTH SERVICES | Facility: HOME HEALTHCARE | Age: 72
End: 2022-11-10

## 2022-11-10 VITALS
WEIGHT: 295 LBS | DIASTOLIC BLOOD PRESSURE: 70 MMHG | OXYGEN SATURATION: 98 % | BODY MASS INDEX: 38.92 KG/M2 | HEART RATE: 105 BPM | SYSTOLIC BLOOD PRESSURE: 100 MMHG

## 2022-11-10 VITALS
HEART RATE: 106 BPM | TEMPERATURE: 96.4 F | OXYGEN SATURATION: 97 % | RESPIRATION RATE: 18 BRPM | SYSTOLIC BLOOD PRESSURE: 138 MMHG | DIASTOLIC BLOOD PRESSURE: 78 MMHG

## 2022-11-10 DIAGNOSIS — E87.1 HYPONATREMIA: ICD-10-CM

## 2022-11-10 DIAGNOSIS — M31.0 LEUKOCYTOCLASTIC VASCULITIS (HCC): ICD-10-CM

## 2022-11-10 DIAGNOSIS — N18.2 STAGE 2 CHRONIC KIDNEY DISEASE: ICD-10-CM

## 2022-11-10 DIAGNOSIS — N17.9 ACUTE KIDNEY INJURY (HCC): Primary | ICD-10-CM

## 2022-11-10 RX ORDER — PREDNISONE 20 MG/1
20 TABLET ORAL DAILY
COMMUNITY

## 2022-11-10 NOTE — PATIENT INSTRUCTIONS
Your most recent kidney function is back to your baseline at 78%  You can take the torsemide medication as needed for swelling, this is a diuretic  Recommend blood work in the next 1-2 months to check kidney function  Your family doctor can call nephrology office with questions

## 2022-11-10 NOTE — PROGRESS NOTES
[facility]  [address]  Progress note - Nephrology    Michael Berry 74 y o  male MRN: [id number]      A/P:  1      1  Acute kidney injury ([facility] )    2  Stage 2 chronic kidney disease    3  Leukocytoclastic vasculitis ([facility] )    4  Hyponatremia       1  Acute kidney injury, resolved creatinine baseline 0 9  Most recent creatinine  0 95 with a GFR 78 mL/min on 10/31  He had an extensive glomerular workup with MARIO ALBERTO, anti-GBM, SPEP, complements, cryoglobulins that were all ultimately negative  A renal biopsy was discussed, however, he had already had tissue diagnosis with skin biopsy  For his vasculitis tx,   Initially started on prednisone 60 mg per day for a period of 1 month  He is down to 40 mg per day  Plans to decrease by 10 mg every 2 weeks  He is not on any prophylactic nystatin for fungal ppx or trimethoprim for PCP ppx  He is only on Pepcid for GI protection  Repeat UT showed 1+ protein and moderate blood  Occasional bacteria noted  10-20 WBC, 2-4 RBC  I recommend to repeat chemistry and urine studies in the next 1-2 months  His creatinine has improved back to baseline, however, he did have proteinuria and hematuria  Hematuria and proteinuria may be related to recent acute kidney injury or potential contamination versus urinary tract infection  Altogether, should be repeated  If he does have persistent hematuria then he should proceed with further imaging and a urology evaluation  If proteinuria persists then he could see a nephrologist again  His family was resistant to follow-up and I did state that his current renal function a primary could follow  If there is any persistent proteinuria or hematuria Nephrology would be happy to see patient again  Follow up blood work and appointment were not made at patient preference  I did reach out to Dr Satnam Cardozo regarding patient visit    I had recommended the patient to see a dermatologist but patient was set on his primary managing the condition  2  CKD2 baseline 0 9mg/dL  I reviewed previous Cr trend with family  Extensive serologic workup already performed  Urine protien to cr ratio 0 4 gram, should have repeated in the next several months to ensure improvement  Family wishes to follow again with primary, can see PRN if needed  3  Leukocytoclastic vasculitis    Maintained on a steroid taper  Transitioning from 60 mg 40 mg most recently  He will reduce his prednisone intake by 10 mg every 2 weeks  He is on Pepcid for GI protection  He is not on any nystatin for thrush prevention or Bactrim for PCP prophylaxis  4   Edema, resolved  Can take torsemide as needed  Repeat chemistry in 1-2 months  5  Hyponatremia, mild Na 132, asymptomatic  Recommend fu repeat chem 1-2 month with primary  Can take torsemide as needed  May be slightly hypovolemic with torsemide use and borderline low BP  I have reviewed pertinent labs and imaging with patient  The patient and any family members present were counseled today on risks benefits and alternatives of any medications, and were agreeable to the treatment plan  They were counseled on diagnostic testing if necessary, and projected outcomes as are available and medically indicated  All questions were asked and answered during the encounter  If more questions are to arise the patient will call the office  Alarm and return precautions discussed and accepted  Thank you for allowing us to participate in the care of your patient  History of Present Illness   Physician Requesting Consult: No att  providers found    HPI: Prabha Purcell is a 74y o  year old male who presents for follow up in the office  He was hospitalized from 09/30 to [address] [place] [address]  He was seen by the  Nephrology service for acute kidney injury and potential glomerular nephritis  His baseline creatinine was 0 9 mg/dL    His peak creatinine was was 1 9 mg/dL  His creatinine had improved to 1 19 on 10/05  He had an extensive glomerular workup that was performed with a negative MARIO ALBERTO, Anca, anti-GBM, complements were normal   Immunofixation showed no monoclonal gammopathy  He cryoglobulin had been ordered and needed to follow-up  His cryoglobulin was negative  His skin biopsy was pursued instead of her renal biopsy  He has a renal biopsy was not indicated because he was already on steroids and had another tissue biopsy  He was maintained on low-dose torsemide 10 mg per day for volume management  He is recommended to follow-up in the Nephrology office  Additionally he was recommended to see Dermatology in the office for continued management  His primary has been managing his steroids  Initially placed on 60 mg per day  He has declined to follow-up with Dermatology until his wounds improved  He is feet seeing a wound care doctor at this time and is reporting improvement in his legs  He went from 16 wounds in his legs to 8  He is using a cream and hopeful for improvement  His ulcers in his arms have improved as well  He was on 60mg/day of prednisone for 4 weeks, currently on 40mg/day  Every 2 weeks will decrease by 10mg/day  Taking famotidine 10mg/day for PPI protection  Newly on torsemide 10 mg/day  He was on meloxicam > 5-10 years for joint pain  This has been stopped since the prednisone  Reports longstanding gout in right elbow and BL feet  Pt on allopurinol 100mg BID  Drinks 1 beer a day longstanding  Wound care RN comes in daily and takes BP  Typically 100/70  Denies syncope/falls  Can have symptomatic hypotension with movement  Denies pyelo/nephrolithiasis/hematuria  Denies seeing urologist in the past    He did have UTI in August and developed a rash after that on Labor day  Received ciprofloxacin caused diffuse sores   Cephalexin started after for UTI which caused severe diarrhea  Denies family hx of kidney disease  Of note he was rather agitated upon my entrance into the office visit  He was upset that he had to wait 30 minutes prior to seeing me  I apologized for the delay taking care of another prior patient  He was threatening to leave prior to being seen  Constitutional ROS- + fatigue   HEENT ROS- Denies headaches or history of trauma, blurred vision     Endocrine ROS- No history diabetes mellitus or thyroid disease  Cardiovascular ROS- Denies chest pain, palpitation, dyspnea exertion, orthopnea, claudication  Pulmonary ROS-  Denies cough, hemoptysis, shortness of breath  GI ROS- Denies abdominal pain, diarrhea, nausea, swallowing problems, vomiting, constipation, blood in stools, fecal incontinence  Hematological ROS- Denies history of easy bruising, blood clots, bleeding or blood transfusions  Genitourinary ROS- Denies recent hematuria, pyuria, flank pain, change in urinary stream, decreased urinary output, increased urinary frequency, nocturia, foamy urine, or urinary incontinence  Lymphatic ROS- Denies lymphadenopathy  Musculoskeletal ROS- + weakness, in wheelchair  Dermatological ROS- + rashes , sores   Psychiatric ROS- Denies hallucinations, disorientation  Neurological ROS- No stroke or TIA symptoms       Historical Information   Past Medical History:   Diagnosis Date   • Depression    • Gout      Past Surgical History:   Procedure Laterality Date   • BACK SURGERY      X2 LUMBAR INCLUDING FUSION  ,  WITH OAA, [facility] DR Sana Steele   • CATARACT EXTRACTION, BILATERAL     • KNEE ARTHROSCOPY Right     x2 , meniscus repair   • TONSILECTOMY AND ADNOIDECTOMY       Social History   Social History     Substance and Sexual Activity   Alcohol Use Not Currently    Comment: occasional     Social History     Substance and Sexual Activity   Drug Use Never     Social History     Tobacco Use   Smoking Status Former Smoker   • Quit date:    • Years since quittin 8   Smokeless Tobacco Never Used     Family History   Problem Relation Age of Onset   • Diabetes Father    • Diabetes Brother    • Diabetes Sister        Meds/Allergies   all current active meds have been reviewed, current meds:     Current Outpatient Medications:   •  allopurinol (ZYLOPRIM) 100 mg tablet, Take 100 mg by mouth 2 (two) times a day, Disp: , Rfl:   •  cholecalciferol (VITAMIN D3) 1,000 units tablet, Take 2,000 Units by mouth daily, Disp: , Rfl:   •  collagenase (SANTYL) ointment, Apply topically daily Wound 1: 10cm x15cm Wound 2: 10cm x10cm, Disp: 15 g, Rfl: 0  •  FLUoxetine (PROzac) 20 mg capsule, Take 20 mg by mouth daily , Disp: , Rfl:   •  loratadine (CLARITIN) 10 mg tablet, Take 10 mg by mouth as needed for allergies, Disp: , Rfl:   •  Magnesium 100 MG CAPS, Take 1 capsule by mouth daily, Disp: , Rfl:   •  meloxicam (MOBIC) 15 mg tablet, Take 15 mg by mouth daily, Disp: , Rfl:   •  Misc Natural Products (OSTEO BI-FLEX/5-LOXIN ADVANCED PO), Take 1 tablet by mouth daily , Disp: , Rfl:   •  Multiple Vitamins-Minerals (CENTRUM SILVER 50+MEN PO), Take 1 tablet by mouth daily, Disp: , Rfl:   •  predniSONE 20 mg tablet, Take 20 mg by mouth daily, Disp: , Rfl:   •  simethicone (MYLICON) 80 mg chewable tablet, Chew 1 tablet (80 mg total) every 6 (six) hours as needed for flatulence, Disp: 30 tablet, Rfl: 0  •  SUPER B COMPLEX/C PO, Take 1 tablet by mouth daily, Disp: , Rfl:   •  triamcinolone (KENALOG) 0 1 % ointment, Apply topically 2 (two) times a day Only apply to rash, do not apply to necrotic tissue, Disp: 30 g, Rfl: 0  •  cephalexin (KEFLEX) 500 mg capsule, Take 500 mg by mouth every 8 (eight) hours (Patient not taking: Reported on 11/10/2022), Disp: , Rfl:   •  famotidine (PEPCID) 20 mg tablet, Take 1 tablet (20 mg total) by mouth daily Do not start before October 6, 2022 , Disp: 30 tablet, Rfl: 0  •  mupirocin (BACTROBAN) 2 % ointment, Apply topically 2 (two) times a day Apply topically twice a day or as needed after every dressing change on lower legs (Patient not taking: No sig reported), Disp: 30 g, Rfl: 11  •  torsemide (DEMADEX) 10 mg tablet, Take 1 tablet (10 mg total) by mouth daily Do not start before October 6, 2022 , Disp: 30 tablet, Rfl: 0     Allergies   Allergen Reactions   • Other      IVORY SOAP   • Penicillins Rash       Objective     Vitals:    11/10/22 1129   BP: 100/70   Pulse: 105   SpO2: 98%       General Appearance:    No acute distress  Cooperative  Appears stated age  Head:    Normocephalic  Atraumatic  Eyes:    Lids, conjunctiva normal  No scleral icterus  Ears:    Normal external ears  Nose:   Nares normal  No drainage  Mouth:      Neck:   Supple  Symmetrical    Back:     Symmetric  No CVA tenderness  Lungs:     Normal respiratory effort  Clear to auscultation bilaterally  Chest wall:    No tenderness or deformity  Heart:    Regular rate and rhythm  Normal S1 and S2  No murmur  No JVD  No edema  Abdomen:     Soft  Non-tender  Genitourinary:   No Stroud catheter present  Extremities:   NO EDEMA, ext wrapped   Skin:   BL LE wrapped   Neurologic:   Alert and oriented to person, place, time  In wheelchair          Current Weight: Weight - Scale: 134 kg (295 lb)    First Weight:    Wt Readings from Last 3 Encounters:   11/10/22 134 kg (295 lb)   11/01/22 134 kg (295 lb)   10/13/22 134 kg (295 lb)        Lab Results:  I have personally reviewed pertinent labs      CBC: No results found for: WBC, HGB, HCT, MCV, PLT, ADJUSTEDWBC, MCH, MCHC, RDW, MPV, NRBC  CMP: No results found for: NA, K, CL, CO2, ANIONGAP, BUN, CREATININE, GLUCOSE, CALCIUM, AST, ALT, ALKPHOS, PROT, BILITOT, EGFR  Phosphorus: No results found for: PHOS  Magnesium: No results found for: MG  Urinalysis: No results found for: COLORU, CLARITYU, SPECGRAV, PHUR, LEUKOCYTESUR, NITRITE, PROTEINUA, GLUCOSEU, KETONESU, BILIRUBINUR, BLOODU  Ionized Calcium: No results found for: [address] [facility]        Radiology review:  No results found           Verna Solorio,       This consultation note was produced in part using a dictation device which may document imprecise wording from author's original intent

## 2022-11-11 ENCOUNTER — HOME CARE VISIT (OUTPATIENT)
Dept: HOME HEALTH SERVICES | Facility: HOME HEALTHCARE | Age: 72
End: 2022-11-11

## 2022-11-11 VITALS
SYSTOLIC BLOOD PRESSURE: 118 MMHG | HEART RATE: 75 BPM | TEMPERATURE: 96.8 F | DIASTOLIC BLOOD PRESSURE: 78 MMHG | OXYGEN SATURATION: 98 % | RESPIRATION RATE: 18 BRPM

## 2022-11-11 PROCEDURE — 10330064 SPONGE, GAUZE 8PLY N/S 4"X4" (200/PK 20P

## 2022-11-12 ENCOUNTER — HOME CARE VISIT (OUTPATIENT)
Dept: HOME HEALTH SERVICES | Facility: HOME HEALTHCARE | Age: 72
End: 2022-11-12

## 2022-11-13 ENCOUNTER — HOME CARE VISIT (OUTPATIENT)
Dept: HOME HEALTH SERVICES | Facility: HOME HEALTHCARE | Age: 72
End: 2022-11-13

## 2022-11-13 VITALS
HEART RATE: 72 BPM | TEMPERATURE: 97.9 F | SYSTOLIC BLOOD PRESSURE: 130 MMHG | OXYGEN SATURATION: 97 % | DIASTOLIC BLOOD PRESSURE: 78 MMHG | RESPIRATION RATE: 18 BRPM

## 2022-11-14 ENCOUNTER — HOME CARE VISIT (OUTPATIENT)
Dept: HOME HEALTH SERVICES | Facility: HOME HEALTHCARE | Age: 72
End: 2022-11-14

## 2022-11-14 VITALS
SYSTOLIC BLOOD PRESSURE: 132 MMHG | RESPIRATION RATE: 18 BRPM | TEMPERATURE: 97.2 F | DIASTOLIC BLOOD PRESSURE: 78 MMHG | HEART RATE: 76 BPM | OXYGEN SATURATION: 97 %

## 2022-11-15 ENCOUNTER — OFFICE VISIT (OUTPATIENT)
Dept: WOUND CARE | Facility: CLINIC | Age: 72
End: 2022-11-15

## 2022-11-15 ENCOUNTER — HOME CARE VISIT (OUTPATIENT)
Dept: HOME HEALTH SERVICES | Facility: HOME HEALTHCARE | Age: 72
End: 2022-11-15

## 2022-11-15 VITALS
OXYGEN SATURATION: 98 % | HEART RATE: 94 BPM | RESPIRATION RATE: 18 BRPM | DIASTOLIC BLOOD PRESSURE: 90 MMHG | TEMPERATURE: 96.1 F | SYSTOLIC BLOOD PRESSURE: 128 MMHG

## 2022-11-15 VITALS
DIASTOLIC BLOOD PRESSURE: 90 MMHG | RESPIRATION RATE: 20 BRPM | SYSTOLIC BLOOD PRESSURE: 134 MMHG | HEART RATE: 93 BPM | TEMPERATURE: 97.7 F

## 2022-11-15 DIAGNOSIS — L97.929 VENOUS ULCER OF LEFT LEG (HCC): Primary | ICD-10-CM

## 2022-11-15 DIAGNOSIS — I83.029 VENOUS ULCER OF LEFT LEG (HCC): Primary | ICD-10-CM

## 2022-11-15 DIAGNOSIS — L97.919 VENOUS ULCER OF RIGHT LEG (HCC): ICD-10-CM

## 2022-11-15 DIAGNOSIS — I83.019 VENOUS ULCER OF RIGHT LEG (HCC): ICD-10-CM

## 2022-11-15 DIAGNOSIS — L97.422 HEEL ULCER, LEFT, WITH FAT LAYER EXPOSED (HCC): ICD-10-CM

## 2022-11-15 RX ORDER — LIDOCAINE 40 MG/G
CREAM TOPICAL ONCE
Status: COMPLETED | OUTPATIENT
Start: 2022-11-15 | End: 2022-11-15

## 2022-11-15 RX ADMIN — LIDOCAINE: 40 CREAM TOPICAL at 15:31

## 2022-11-15 NOTE — LETTER
[facility] [address] [address]  Phone#  [phone]  Fax#  [phone]    Patient:  Shon Pascal  YOB: 1950  Phone:  [phone]  Date of Visit:  11/15/2022    Orders Placed This Encounter   Procedures   • Wound cleansing and dressings     Remove dressings from all open ulcers  Wash lower legs and ulcers with soap and water  Then apply bacitracin (only today at [address] [facility]) then apply silver alginate followed by dry dressing, unna boot and coban  Change dressing 2 times per week and as needed for excessive drainage or leakage  This was done today  [facility] to change dressing/unna boot on Friday's       Follow up one week     Standing Status:   Future     Standing Expiration Date:   11/15/2023         Electronically signed by Oli Dominguez DPM

## 2022-11-15 NOTE — PATIENT INSTRUCTIONS
Orders Placed This Encounter   Procedures    Wound cleansing and dressings     Remove dressings from all open ulcers  Wash lower legs and ulcers with soap and water  Then apply bacitracin (only today at [address] [facility]) then apply silver alginate followed by dry dressing, unna boot and coban  Change dressing 2 times per week and as needed for excessive drainage or leakage  This was done today  [facility] to change dressing/unna boot on Friday's       Follow up one week     Standing Status:   Future     Standing Expiration Date:   11/15/2023

## 2022-11-15 NOTE — PROGRESS NOTES
Cast Application    Date/Time: 11/15/2022 3:30 PM  Performed by: Lora Willams RN  Authorized by: Rosemary Burns DPM   Universal Protocol:  Consent given by: patient  Timeout called at: 11/15/2022 3:00 PM   Patient identity confirmed: verbally with patient      Pre-procedure details:     Sensation:  Normal  Procedure details:     Laterality:  Bilateral    Location:  Leg    Leg:  L lower leg and R lower legCast type:  Unna boot    Post-procedure details:     Pain:  Improved    Sensation:  Normal    Patient tolerance of procedure: Tolerated well, no immediate complications  Comments:      UNNA BOOT wrap procedure     Before application, JAIME and/or TBI determined to be adequate for healing and application of compression  Lower extremity washed prior to application of compression wrap  With the foot in dorsiflexed position, Unna boot was applied as per physician orders without complications or complaint of pain   The procedure was tolerated well  Toes warm & pink post application   Patient provided education & reinforced to observe toes for any discoloration, swelling or tingling and instructed when to report to the [address] [facility],[address] or to remove compression  Wound care as per providers orders, patient tolerated well

## 2022-11-15 NOTE — CASE COMMUNICATION
Ship to    Home  Insurance mc           Alginate with Silver [id number]   10        Izzy Dietrich 4in [id number]   5    Faith 4 in  [id number]  5

## 2022-11-16 PROCEDURE — 10330064 DRESSING, CALCIUM ALGINATE AG SHEET 4"X4

## 2022-11-16 PROCEDURE — 10330064

## 2022-11-16 PROCEDURE — 10330064 UNNA BOOT, 4" (1/BX 12BX/CS)  MGM16

## 2022-11-16 NOTE — PROGRESS NOTES
Patient ID: Swapna Ortiz is a 74 y o  male Date of Birth 1950     Diagnosis:  1  Venous ulcer of left leg (HCC)  -     Wound cleansing and dressings; Future  -     lidocaine (LMX) 4 % cream  -     Debridement    2  Venous ulcer of right leg (HCC)  -     Wound cleansing and dressings; Future  -     lidocaine (LMX) 4 % cream    3  Heel ulcer, left, with fat layer exposed ([address] )  -     Wound cleansing and dressings; Future  -     lidocaine (LMX) 4 % cream       Diagnosis ICD-10-CM Associated Orders   1  Venous ulcer of left leg (HCC)  I83 029 Wound cleansing and dressings    L97 929 lidocaine (LMX) 4 % cream     Debridement      2  Venous ulcer of right leg (HCC)  I83 019 Wound cleansing and dressings    L97 919 lidocaine (LMX) 4 % cream      3  Heel ulcer, left, with fat layer exposed ([address] )  L97 422 Wound cleansing and dressings     lidocaine (LMX) 4 % cream           Assessment & Plan:  1  Bilateral lower extremity venous ulcerations  2  Bilateral lower extremity venous insufficiency        - Wounds debrided as below   - Wound are now granular will start dressings with Maxorb Ag and UNNA boot b/l lower extremity  - Continue leg elevation to B/l LE   - Return in 1 week     Chief Complaint   Patient presents with   • Follow Up Wound Care Visit           Subjective:   Presents patient reports he is doing well without any pain at the ulcers  He has VNA at home who is coming for dressing changes  No other complaints at this time        The following portions of the patient's history were reviewed and updated as appropriate:   Patient Active Problem List   Diagnosis   • Gout   • Gait abnormality   • Lumbar stenosis   • Polyneuropathy   • Lower extremity weakness   • Lumbosacral plexopathy   • Elevated troponin   • EDGAR (dyspnea on exertion)   • Rash due to vaculitis    • LYLY (acute kidney injury) ([address] )   • Depression   • Multiple open wounds of lower leg   • Ambulatory dysfunction   • Leucocytosis   • Bloating Past Medical History:   Diagnosis Date   • Depression    • Gout      Past Surgical History:   Procedure Laterality Date   • BACK SURGERY      X2 LUMBAR INCLUDING FUSION  ,  WITH OAA, LVH DR Phan Remedies   • CATARACT EXTRACTION, BILATERAL     • KNEE ARTHROSCOPY Right     x2 , meniscus repair   • TONSILECTOMY AND ADNOIDECTOMY       Social History     Socioeconomic History   • Marital status: /Civil Union     Spouse name: Louise   • Number of children: 3   • Years of education: 14   • Highest education level: Not on file   Occupational History   • Not on file   Tobacco Use   • Smoking status: Former     Types: Cigarettes     Quit date:      Years since quittin 8   • Smokeless tobacco: Never   Vaping Use   • Vaping Use: Never used   Substance and Sexual Activity   • Alcohol use: Not Currently     Comment: occasional   • Drug use: Never   • Sexual activity: Not on file   Other Topics Concern   • Not on file   Social History Narrative   • Not on file     Social Determinants of Health     Financial Resource Strain: Not on file   Food Insecurity: No Food Insecurity   • Worried About Running Out of Food in the Last Year: Never true   • Ran Out of Food in the Last Year: Never true   Transportation Needs: No Transportation Needs   • Lack of Transportation (Medical): No   • Lack of Transportation (Non-Medical):  No   Physical Activity: Not on file   Stress: Not on file   Social Connections: Not on file   Intimate Partner Violence: Not on file   Housing Stability: Unknown   • Unable to Pay for Housing in the Last Year: Not on file   • Number of Places Lived in the Last Year: 1   • Unstable Housing in the Last Year: No        Current Outpatient Medications:   •  allopurinol (ZYLOPRIM) 100 mg tablet, Take 100 mg by mouth 2 (two) times a day, Disp: , Rfl:   •  cephalexin (KEFLEX) 500 mg capsule, Take 500 mg by mouth every 8 (eight) hours (Patient not taking: Reported on 11/10/2022), Disp: , Rfl:   • cholecalciferol (VITAMIN D3) 1,000 units tablet, Take 2,000 Units by mouth daily, Disp: , Rfl:   •  collagenase (SANTYL) ointment, Apply topically daily Wound 1: 10cm x15cm Wound 2: 10cm x10cm, Disp: 15 g, Rfl: 0  •  famotidine (PEPCID) 20 mg tablet, Take 1 tablet (20 mg total) by mouth daily Do not start before October 6, 2022 , Disp: 30 tablet, Rfl: 0  •  FLUoxetine (PROzac) 20 mg capsule, Take 20 mg by mouth daily , Disp: , Rfl:   •  loratadine (CLARITIN) 10 mg tablet, Take 10 mg by mouth as needed for allergies, Disp: , Rfl:   •  Magnesium 100 MG CAPS, Take 1 capsule by mouth daily, Disp: , Rfl:   •  meloxicam (MOBIC) 15 mg tablet, Take 15 mg by mouth daily, Disp: , Rfl:   •  Misc Natural Products (OSTEO BI-FLEX/5-LOXIN ADVANCED PO), Take 1 tablet by mouth daily , Disp: , Rfl:   •  Multiple Vitamins-Minerals (CENTRUM SILVER 50+MEN PO), Take 1 tablet by mouth daily, Disp: , Rfl:   •  mupirocin (BACTROBAN) 2 % ointment, Apply topically 2 (two) times a day Apply topically twice a day or as needed after every dressing change on lower legs (Patient not taking: No sig reported), Disp: 30 g, Rfl: 11  •  predniSONE 20 mg tablet, Take 20 mg by mouth daily, Disp: , Rfl:   •  simethicone (MYLICON) 80 mg chewable tablet, Chew 1 tablet (80 mg total) every 6 (six) hours as needed for flatulence, Disp: 30 tablet, Rfl: 0  •  SUPER B COMPLEX/C PO, Take 1 tablet by mouth daily, Disp: , Rfl:   •  torsemide (DEMADEX) 10 mg tablet, Take 1 tablet (10 mg total) by mouth daily Do not start before October 6, 2022 , Disp: 30 tablet, Rfl: 0  •  triamcinolone (KENALOG) 0 1 % ointment, Apply topically 2 (two) times a day Only apply to rash, do not apply to necrotic tissue, Disp: 30 g, Rfl: 0  No current facility-administered medications for this visit  Family History   Problem Relation Age of Onset   • Diabetes Father    • Diabetes Brother    • Diabetes Sister       Review of Systems   Skin: Positive for color change and wound     All other systems reviewed and are negative  Allergies: Other and Penicillins      Objective:  /90   Pulse 93   Temp 97 7 °F (36 5 °C)   Resp 20     Physical Exam  Vitals reviewed  Musculoskeletal:      Right lower le+ Edema present  Left lower le+ Edema present  Comments:  Bilateral lower leg and feet multiple small ulcerations probable to subcutaneous tissue with mixture of granular and fibrotic wound base  The ulcers appeared to be healing  Overall appears to be more granular since last evaluated  Wound 10/03/22 Heel Posterior; Left (Active)       Wound 10/03/22 Pretibial Right (Active)       Wound 10/03/22 Pretibial Left (Active)       Wound 10/03/22 Coccyx (Active)       Wound 10/03/22 Tibial (Active)       Wound 10/03/22 Tibial Left;Posterior (Active)       Wound 10/03/22 Foot Anterior; Left (Active)       Wound 22 Other (comment) Vasulitic Leg Right; Lower (Active)   Wound Image   11/15/22 1447   Wound Description Epithelialization;Pink;Yellow 11/15/22 1414   Antonette-wound Assessment Purple;Scaly 11/15/22 1414   Wound Length (cm) 13 cm 11/15/22 1414   Wound Width (cm) 13 6 cm 11/15/22 141   Wound Depth (cm) 0 1 cm 11/15/22 1414   Wound Surface Area (cm^2) 176 8 cm^2 11/15/22 1414   Wound Volume (cm^3) 17 68 cm^3 11/15/22 141   Calculated Wound Volume (cm^3) 17 68 cm^3 11/15/22 141   Change in Wound Size % 83 29 11/15/22 141   Drainage Amount Moderate 11/15/22 141   Drainage Description Serosanguineous 11/15/22 1414   Non-staged Wound Description Full thickness 11/15/22 141   Treatments Cleansed 11/15/22 1414   Patient Tolerance Tolerated well 11/15/22 141       Wound 22 Other (comment) Vasulitic Leg Left; Lower (Active)   Wound Image   11/15/22 1446   Wound Description Epithelialization;Pink;Yellow 11/15/22 1415   Antonette-wound Assessment Purple;Dry;Scaly 11/15/22 1415   Wound Length (cm) 15 cm 11/15/22 1415   Wound Width (cm) 12 5 cm 11/15/22 1415   Wound Depth (cm) 0 3 cm 11/15/22 1415   Wound Surface Area (cm^2) 187 5 cm^2 11/15/22 1415   Wound Volume (cm^3) 56 25 cm^3 11/15/22 1415   Calculated Wound Volume (cm^3) 56 25 cm^3 11/15/22 1415   Change in Wound Size % 67 73 11/15/22 1415   Drainage Amount Moderate 11/15/22 1415   Drainage Description Serous 11/15/22 1415   Non-staged Wound Description Full thickness 11/15/22 1415   Treatments Cleansed 11/15/22 1415   Patient Tolerance Tolerated well 11/15/22 1415       Wound 11/01/22 Other (comment) Vasulitic Foot Anterior; Left (Active)   Wound Image   11/15/22 1416   Wound Description Epithelialization 11/15/22 1416   Antonette-wound Assessment Purple;Scaly 11/15/22 1416   Wound Length (cm) 0 cm 11/15/22 1416   Wound Width (cm) 0 cm 11/15/22 1416   Wound Depth (cm) 0 cm 11/15/22 1416   Wound Surface Area (cm^2) 0 cm^2 11/15/22 1416   Wound Volume (cm^3) 0 cm^3 11/15/22 1416   Calculated Wound Volume (cm^3) 0 cm^3 11/15/22 1416   Change in Wound Size % 100 11/15/22 1416   Drainage Amount None 11/15/22 1416   Treatments Cleansed 11/01/22 1421   Patient Tolerance Tolerated well 11/15/22 1416       Wound 11/01/22 Pressure Injury Heel Left (Active)   Wound Image   11/15/22 1447   Wound Description Epithelialization;Pink;Yellow 11/15/22 1417   Antonette-wound Assessment Dry;Pink 11/15/22 1417   Wound Length (cm) 2 cm 11/15/22 1417   Wound Width (cm) 0 7 cm 11/15/22 1417   Wound Depth (cm) 0 2 cm 11/15/22 1417   Wound Surface Area (cm^2) 1 4 cm^2 11/15/22 1417   Wound Volume (cm^3) 0 28 cm^3 11/15/22 1417   Calculated Wound Volume (cm^3) 0 28 cm^3 11/15/22 1417   Change in Wound Size % 82 05 11/15/22 1417   Undermining is depth extending from 12-12 11/01/22 1419   Drainage Amount Moderate 11/15/22 1417   Drainage Description Serous 11/15/22 1417   Non-staged Wound Description Full thickness 11/15/22 1417   Treatments Cleansed 11/15/22 1417   Patient Tolerance Tolerated well 11/15/22 1417                         Debridement   Wound 11/01/22 Venous Ulcer Other (Comment) Leg Left; Lower    Universal Protocol:  Consent: Verbal consent obtained  Risks and benefits: risks, benefits and alternatives were discussed  Consent given by: patient  Patient understanding: patient states understanding of the procedure being performed  Patient identity confirmed: verbally with patient      Performed by: physician  Debridement type: selective  Pain control: lidocaine 4%  Post-debridement measurements  Length (cm): 15  Width (cm): 12 5  Depth (cm): 0 4  Percent debrided: 40%  Surface Area (cm^2): 187 5  Area debrided (cm^2): 75  Volume (cm^3): 75  Devitalized tissue debrided: biofilm, exudate, fibrin, necrotic debris and slough  Instrument(s) utilized: curette  Bleeding: medium  Hemostasis obtained with: pressure  Response to treatment: procedure was tolerated well           Results from last 6 Months   Lab Units 09/24/22  1041   WOUND CULTURE  1+ Growth of Pseudomonas aeruginosa*         Wound Instructions:  Orders Placed This Encounter   Procedures   • Wound cleansing and dressings     Remove dressings from all open ulcers  Wash lower legs and ulcers with soap and water  Then apply bacitracin (only today at [address] [facility],[address]) then apply silver alginate followed by dry dressing, unna boot and coban  Change dressing 2 times per week and as needed for excessive drainage or leakage  This was done today  [facility] to change dressing/unna boot on Friday's  Follow up one week     Standing Status:   Future     Standing Expiration Date:   11/15/2023   • Debridement     This order was created via procedure documentation         Madhav Claros DPM      Portions of the record may have been created with voice recognition software  Occasional wrong word or "sound a like" substitutions may have occurred due to the inherent limitations of voice recognition software  Read the chart carefully and recognize, using context, where substitutions have occurred

## 2022-11-16 NOTE — PROGRESS NOTES
Debridement   Wound 11/01/22 Venous Ulcer Leg Right; Lower    Universal Protocol:  Consent: Verbal consent obtained    Risks and benefits: risks, benefits and alternatives were discussed  Consent given by: patient  Patient understanding: patient states understanding of the procedure being performed  Patient identity confirmed: verbally with patient      Performed by: physician  Debridement type: selective  Pain control: lidocaine 4%  Post-debridement measurements  Length (cm): 13  Width (cm): 13 6  Depth (cm): 0 1  Percent debrided: 50%  Surface Area (cm^2): 176 8  Area debrided (cm^2): 88 4  Volume (cm^3): 17 68  Devitalized tissue debrided: biofilm and slough  Instrument(s) utilized: curette  Bleeding: small  Hemostasis obtained with: pressure  Response to treatment: procedure was tolerated well

## 2022-11-16 NOTE — PROGRESS NOTES
Debridement   Wound 11/01/22 Venous Ulcer Heel Left    Universal Protocol:  Consent: Verbal consent obtained    Risks and benefits: risks, benefits and alternatives were discussed  Consent given by: patient  Patient understanding: patient states understanding of the procedure being performed  Patient identity confirmed: verbally with patient      Performed by: physician  Debridement type: surgical  Level of debridement: subcutaneous tissue  Pain control: lidocaine 4%  Post-debridement measurements  Length (cm): 2  Width (cm): 0 7  Depth (cm): 0 3  Percent debrided: 100%  Surface Area (cm^2): 1 4  Area debrided (cm^2): 1 4  Volume (cm^3): 0 42  Tissue and other material debrided: subcutaneous tissue  Devitalized tissue debrided: biofilm, fibrin and slough  Instrument(s) utilized: curette  Bleeding: small  Hemostasis obtained with: pressure  Procedural pain (0-10): insensate  Post-procedural pain: insensate   Response to treatment: procedure was tolerated well

## 2022-11-18 ENCOUNTER — HOME CARE VISIT (OUTPATIENT)
Dept: HOME HEALTH SERVICES | Facility: HOME HEALTHCARE | Age: 72
End: 2022-11-18

## 2022-11-18 VITALS
DIASTOLIC BLOOD PRESSURE: 80 MMHG | TEMPERATURE: 97.1 F | OXYGEN SATURATION: 96 % | SYSTOLIC BLOOD PRESSURE: 128 MMHG | RESPIRATION RATE: 18 BRPM | HEART RATE: 102 BPM

## 2022-11-21 PROCEDURE — 10330064 SPONGE, GAUZE 8PLY N/S 4"X4" (200/PK 20P

## 2022-11-21 PROCEDURE — 10330064 UNNA BOOT, 4" (1/BX 12BX/CS)  MGM16

## 2022-11-21 PROCEDURE — 10330064

## 2022-11-21 PROCEDURE — 10330064 CLEANSER, WND SEA-CLEANS 6OZ  COLPLT

## 2022-11-21 PROCEDURE — 10330064 BANDAGE, CNFRM 4"X4.1YDS N/S LF (12RL/BG

## 2022-11-22 ENCOUNTER — OFFICE VISIT (OUTPATIENT)
Dept: WOUND CARE | Facility: CLINIC | Age: 72
End: 2022-11-22

## 2022-11-22 VITALS
DIASTOLIC BLOOD PRESSURE: 73 MMHG | SYSTOLIC BLOOD PRESSURE: 125 MMHG | RESPIRATION RATE: 20 BRPM | HEART RATE: 103 BPM | TEMPERATURE: 99.4 F

## 2022-11-22 DIAGNOSIS — L97.929 VENOUS ULCER OF LEFT LEG (HCC): Primary | ICD-10-CM

## 2022-11-22 DIAGNOSIS — L97.422 HEEL ULCER, LEFT, WITH FAT LAYER EXPOSED (HCC): ICD-10-CM

## 2022-11-22 DIAGNOSIS — I77.6 VASCULITIS (HCC): ICD-10-CM

## 2022-11-22 DIAGNOSIS — I87.2 VENOUS INSUFFICIENCY OF BOTH LOWER EXTREMITIES: ICD-10-CM

## 2022-11-22 DIAGNOSIS — I83.029 VENOUS ULCER OF LEFT LEG (HCC): Primary | ICD-10-CM

## 2022-11-22 DIAGNOSIS — I83.019 VENOUS ULCER OF RIGHT LEG (HCC): ICD-10-CM

## 2022-11-22 DIAGNOSIS — L97.919 VENOUS ULCER OF RIGHT LEG (HCC): ICD-10-CM

## 2022-11-22 RX ORDER — LIDOCAINE 40 MG/G
CREAM TOPICAL ONCE
Status: COMPLETED | OUTPATIENT
Start: 2022-11-22 | End: 2022-11-22

## 2022-11-22 RX ADMIN — LIDOCAINE: 40 CREAM TOPICAL at 15:53

## 2022-11-22 NOTE — PROGRESS NOTES
Cast Application    Date/Time: 11/22/2022 3:43 PM  Performed by: Tami Saeed RN  Authorized by: LESTER Conti Protocol:  Consent given by: patient  Timeout called at: 11/22/2022 3:43 PM   Patient identity confirmed: verbally with patient      Pre-procedure details:     Sensation:  Normal  Procedure details:     Laterality:  Bilateral    Location:  Leg    Leg:  L lower leg and R lower legCast type:  Unna boot    Post-procedure details:     Pain:  Improved    Sensation:  Normal    Patient tolerance of procedure: Tolerated well, no immediate complications  Comments:      UNNA BOOT wrap procedure     Before application, JAIME and/or TBI determined to be adequate for healing and application of compression  Lower extremity washed prior to application of compression wrap  With the foot in dorsiflexed position, Unna boot was applied as per physician orders without complications or complaint of pain   The procedure was tolerated well  Toes warm & pink post application   Patient provided education & reinforced to observe toes for any discoloration, swelling or tingling and instructed when to report to the [address] [facility],[address] or to remove compression  Wound care as per providers orders, patient tolerated well

## 2022-11-22 NOTE — PATIENT INSTRUCTIONS
Orders Placed This Encounter   Procedures    Wound cleansing and dressings     Standing Status:   Future     Standing Expiration Date:   11/22/2023     Scheduling Instructions:      Remove dressings from all open ulcers  Wash lower legs and ulcers with soap and water  Then apply silver alginate to lower leg ulcers and left heel ulcer then apply dry dressing, unna boot and coban  Change dressing 2 times per week and as needed for excessive drainage or leakage  This was done today  [facility] to change dressing/unna boot on Friday's               Follow up one week

## 2022-11-22 NOTE — PROGRESS NOTES
[facility]: [facility] Masc[facility] is a 74 y o  male Date of Birth 1950       Chief Complaint   Patient presents with   • Follow Up Wound Care Visit       Allergies: Other and Penicillins    Diagnosis:   Diagnosis ICD-10-CM Associated Orders   1  Venous ulcer of left leg ([facility])  I83 029 lidocaine (LMX) 4 % cream    L97 929 Wound cleansing and dressings     Debridement      2  Venous ulcer of right leg ([facility])  I83 019 lidocaine (LMX) 4 % cream    L97 919 Wound cleansing and dressings     Debridement      3  Venous insufficiency of both lower extremities  I87 2 lidocaine (LMX) 4 % cream     Wound cleansing and dressings      4  Vasculitis ([facility])  I77 6       5  Heel ulcer, left, with fat layer exposed ([facility] [address] )  L97 422 Debridement           Assessment  & Plan:    • F/u bilateral wounds of LE related to vasculitis in setting of venous insufficiency  There is reduction in size of right and left lower leg ulcers  L heel wound with slight increase in size  Wound beds are fibrogranular without signs of acute infection  No malodor  No lymphangitic streaking or periwound maceration present    o Surgical debridement, as below  o Will continue with same management of silver alginate and Unna boot for compression changed twice weekly  o Continue with leg elevation  Attempt healthy balanced diet    o F/u in one week with Dr Bernice Cowart  Instructed to call if any questions or concerns arise in meantime  Subjective:   601/22: 49-year-old male with past medical history as below presents with complaint of multiple ulcerations to bilateral lower extremity  Patient is accompanied by his wife  Patient reports last month he was hospitalized at [facility]  Patient reports he had a skin biopsy done by Dermatology which was positive for vasculitis  He was treated with systemic steroid which Dr Daniela Vickers his PCP is weaning him off    Patient reports since discharge he is doing much better and there is significant improvement in his lower leg ulcers  Patient has wound care nursing at home who has been looking after the ulcers  Wife reports patient was placed on ceftriaxone for UTI and since on that drug he developed such ulcers and complications for which patient had hospitalization  Otherwise currently patient has been doing better without any complaints  These are nonpainful ulcer  No other complaints at this time  11/08/22: Patient presents with his wife and daughter for continued evaluation of bilateral lower extremity ulcers  Unfortunately patient did not receive Santyl last week and therefore they were applying Medihoney with dry sterile dressings  No other complaints at this time  11/15/22: Presents patient reports he is doing well without any pain at the ulcers  He has VNA at home who is coming for dressing changes  No other complaints at this time  11/22/22: Pt presents for f/u of wounds of LLE related to vasculitis  Wounds are currently being dressed with silver alginate and Unna boot is being used for compression  Pt has no complaints today and is overall feeling well           The following portions of the patient's history were reviewed and updated as appropriate:   Patient Active Problem List   Diagnosis   • Gout   • Gait abnormality   • Lumbar stenosis   • Polyneuropathy   • Lower extremity weakness   • Lumbosacral plexopathy   • Elevated troponin   • EDGAR (dyspnea on exertion)   • Rash due to vaculitis    • LYLY (acute kidney injury) ([facility] [address] )   • Depression   • Multiple open wounds of lower leg   • Ambulatory dysfunction   • Leucocytosis   • Bloating     Past Medical History:   Diagnosis Date   • Depression    • Gout      Past Surgical History:   Procedure Laterality Date   • BACK SURGERY      X2 LUMBAR INCLUDING FUSION  2004, 2015 WITH [facility], [facility] DR Luís Constantino   • CATARACT EXTRACTION, BILATERAL     • KNEE ARTHROSCOPY Right     x2 , meniscus repair   • TONSILECTOMY AND ADNOIDECTOMY       Family History   Problem Relation Age of Onset   • Diabetes Father    • Diabetes Brother    • Diabetes Sister      Social History     Socioeconomic History   • Marital status: /Civil Union     Spouse name: Ricardo Champion   • Number of children: 3   • Years of education: 15   • Highest education level: None   Occupational History   • None   Tobacco Use   • Smoking status: Former     Types: Cigarettes     Quit date:      Years since quittin 9   • Smokeless tobacco: Never   Vaping Use   • Vaping Use: Never used   Substance and Sexual Activity   • Alcohol use: Not Currently     Comment: occasional   • Drug use: Never   • Sexual activity: None   Other Topics Concern   • None   Social History Narrative   • None     Social Determinants of Health     Financial Resource Strain: Not on file   Food Insecurity: No Food Insecurity   • Worried About Running Out of Food in the Last Year: Never true   • Ran Out of Food in the Last Year: Never true   Transportation Needs: No Transportation Needs   • Lack of Transportation (Medical): No   • Lack of Transportation (Non-Medical):  No   Physical Activity: Not on file   Stress: Not on file   Social Connections: Not on file   Intimate Partner Violence: Not on file   Housing Stability: Unknown   • Unable to Pay for Housing in the Last Year: Not on file   • Number of Places Lived in the Last Year: 1   • Unstable Housing in the Last Year: No       Current Outpatient Medications:   •  allopurinol (ZYLOPRIM) 100 mg tablet, Take 100 mg by mouth 2 (two) times a day, Disp: , Rfl:   •  cephalexin (KEFLEX) 500 mg capsule, Take 500 mg by mouth every 8 (eight) hours (Patient not taking: Reported on 11/10/2022), Disp: , Rfl:   •  cholecalciferol (VITAMIN D3) 1,000 units tablet, Take 2,000 Units by mouth daily, Disp: , Rfl:   •  collagenase (SANTYL) ointment, Apply topically daily Wound 1: 10cm x15cm Wound 2: 10cm x10cm, Disp: 15 g, Rfl: 0  •  famotidine (PEPCID) 20 mg tablet, Take 1 tablet (20 mg total) by mouth daily Do not start before 2022 , Disp: 30 tablet, Rfl: 0  •  FLUoxetine (PROzac) 20 mg capsule, Take 20 mg by mouth daily , Disp: , Rfl:   •  loratadine (CLARITIN) 10 mg tablet, Take 10 mg by mouth as needed for allergies, Disp: , Rfl:   •  Magnesium 100 MG CAPS, Take 1 capsule by mouth daily, Disp: , Rfl:   •  meloxicam (MOBIC) 15 mg tablet, Take 15 mg by mouth daily, Disp: , Rfl:   •  Misc Natural Products (OSTEO BI-FLEX/5-LOXIN ADVANCED PO), Take 1 tablet by mouth daily , Disp: , Rfl:   •  Multiple Vitamins-Minerals (CENTRUM SILVER 50+MEN PO), Take 1 tablet by mouth daily, Disp: , Rfl:   •  mupirocin (BACTROBAN) 2 % ointment, Apply topically 2 (two) times a day Apply topically twice a day or as needed after every dressing change on lower legs (Patient not taking: No sig reported), Disp: 30 g, Rfl: 11  •  predniSONE 20 mg tablet, Take 20 mg by mouth daily, Disp: , Rfl:   •  simethicone (MYLICON) 80 mg chewable tablet, Chew 1 tablet (80 mg total) every 6 (six) hours as needed for flatulence, Disp: 30 tablet, Rfl: 0  •  SUPER B COMPLEX/C PO, Take 1 tablet by mouth daily, Disp: , Rfl:   •  torsemide (DEMADEX) 10 mg tablet, Take 1 tablet (10 mg total) by mouth daily Do not start before 2022 , Disp: 30 tablet, Rfl: 0  •  triamcinolone (KENALOG) 0 1 % ointment, Apply topically 2 (two) times a day Only apply to rash, do not apply to necrotic tissue, Disp: 30 g, Rfl: 0    Review of Systems   Constitutional: Positive for fatigue  Negative for chills and fever  Cardiovascular: Positive for leg swelling  Skin: Positive for wound  All other systems reviewed and are negative  Objective:  /73   Pulse 103   Temp 99 4 °F (37 4 °C)   Resp 20   Pain Score: 0-No pain     Physical Exam  Vitals reviewed  Musculoskeletal:      Right lower le+ Edema present  Left lower le+ Edema present        Comments:  Bilateral lower leg and feet multiple small ulcerations probable to subcutaneous tissue with mixture of granular and fibrotic wound base  The ulcers appeared to be healing  Overall appears to be more granular since last evaluated  Skin:     Findings: Wound present  Comments: See full wound assessment  No photos  [place]man not available  Wound 11/01/22 Venous Ulcer Leg Right; Lower (Active)        Wound Description Epithelialization;Pink;Yellow 11/22/22 1500   Antonette-wound Assessment Purple;Scaly 11/22/22 1500   Wound Length (cm) 6 5 cm 11/22/22 1500   Wound Width (cm) 17 cm 11/22/22 1500   Wound Depth (cm) 0 1 cm 11/22/22 1500   Wound Surface Area (cm^2) 110 5 cm^2 11/22/22 1500   Wound Volume (cm^3) 11 05 cm^3 11/22/22 1500   Calculated Wound Volume (cm^3) 11 05 cm^3 11/22/22 1500   Change in Wound Size % 89 56 11/22/22 1500   Drainage Amount Moderate 11/22/22 1500   Drainage Description Serosanguineous 11/22/22 1500   Non-staged Wound Description Full thickness 11/22/22 1500   Treatments Cleansed 11/22/22 1500   Patient Tolerance Tolerated well 11/22/22 1500       Wound 11/01/22 Venous Ulcer Other (Comment) Leg Left; Lower (Active)        Wound Description Epithelialization;Pink;Yellow 11/22/22 1500   Antonette-wound Assessment Purple;Dry;Scaly 11/22/22 1500   Wound Length (cm) 13 4 cm 11/22/22 1500   Wound Width (cm) 11 5 cm 11/22/22 1500   Wound Depth (cm) 0 4 cm 11/22/22 1500   Wound Surface Area (cm^2) 154 1 cm^2 11/22/22 1500   Wound Volume (cm^3) 61 64 cm^3 11/22/22 1500   Calculated Wound Volume (cm^3) 61 64 cm^3 11/22/22 1500   Change in Wound Size % 64 64 11/22/22 1500   Drainage Amount Moderate 11/22/22 1500   Drainage Description Serous 11/22/22 1500   Non-staged Wound Description Full thickness 11/22/22 1500   Treatments Cleansed 11/22/22 1500   Patient Tolerance Tolerated well 11/22/22 1500       Wound 11/01/22 Pressure Injury Heel Left (Active)        Wound Description Slough;Pink 11/22/22 1500   Pressure Injury Stage 3 11/22/22 1500   Antonette-wound Assessment Clean;Dry 11/22/22 1500   Wound Length (cm) 1 9 cm 11/22/22 1500   Wound Width (cm) 1 cm 11/22/22 1500   Wound Depth (cm) 0 1 cm 11/22/22 1500   Wound Surface Area (cm^2) 1 9 cm^2 11/22/22 1500   Wound Volume (cm^3) 0 19 cm^3 11/22/22 1500   Calculated Wound Volume (cm^3) 0 19 cm^3 11/22/22 1500   Change in Wound Size % 87 82 11/22/22 1500   Undermining is depth extending from 12-12 11/01/22 1419   Drainage Amount Moderate 11/15/22 1417   Drainage Description Serous 11/15/22 1417   Non-staged Wound Description Full thickness 11/15/22 1417   Treatments Cleansed 11/15/22 1417   Patient Tolerance Tolerated well 11/15/22 1417                 Debridement   Wound 10/03/22 Heel Posterior; Left    Universal Protocol:  Consent: Verbal consent obtained  Consent given by: patient  Time out: Immediately prior to procedure a "time out" was called to verify the correct patient, procedure, equipment, support staff and site/side marked as required  Patient understanding: patient states understanding of the procedure being performed  Patient identity confirmed: verbally with patient      Performed by: PA  Debridement type: surgical  Level of debridement: subcutaneous tissue  Pain control: lidocaine 4%  Post-debridement measurements  Length (cm): 1 9  Width (cm): 1  Depth (cm): 0 2  Percent debrided: 100%  Surface Area (cm^2): 1 9  Area debrided (cm^2): 1 9  Volume (cm^3): 0 38  Tissue and other material debrided: dermis and subcutaneous tissue  Devitalized tissue debrided: fibrin  Instrument(s) utilized: curette  Bleeding: medium  Hemostasis obtained with: pressure  Procedural pain (0-10): 0  Post-procedural pain: 0   Response to treatment: procedure was tolerated well  Debridement Comments: Bleeds easily  Debridement   Universal Protocol:  Consent: Verbal consent obtained    Consent given by: patient  Time out: Immediately prior to procedure a "time out" was called to verify the correct patient, procedure, equipment, support staff and site/side marked as required  Patient understanding: patient states understanding of the procedure being performed  Patient identity confirmed: verbally with patient      Performed by: PA  Debridement type: surgical  Level of debridement: subcutaneous tissue  Pain control: lidocaine 4%  Post-debridement measurements  Length (cm): 13 4  Width (cm): 11 5  Depth (cm): 0 5  Percent debrided: 40%  Surface Area (cm^2): 154 1  Area debrided (cm^2): 61 64  Volume (cm^3): 77 05  Tissue and other material debrided: dermis and subcutaneous tissue  Devitalized tissue debrided: fibrin  Instrument(s) utilized: curette  Bleeding: medium  Hemostasis obtained with: pressure  Procedural pain (0-10): 0  Post-procedural pain: 0   Response to treatment: procedure was tolerated well  Debridement Comments: Bleeds easily  Debridement   Wound 11/01/22 Venous Ulcer Leg Right; Lower    Universal Protocol:  Consent: Verbal consent obtained  Consent given by: patient  Time out: Immediately prior to procedure a "time out" was called to verify the correct patient, procedure, equipment, support staff and site/side marked as required  Patient understanding: patient states understanding of the procedure being performed  Patient identity confirmed: verbally with patient      Performed by: PA  Debridement type: surgical  Level of debridement: subcutaneous tissue  Pain control: lidocaine 4%  Post-debridement measurements  Length (cm): 6 5  Width (cm): 17  Depth (cm): 0 2  Percent debrided: 30%  Surface Area (cm^2): 110 5  Area debrided (cm^2): 33 15  Volume (cm^3): 22 1  Tissue and other material debrided: dermis and subcutaneous tissue  Devitalized tissue debrided: fibrin  Instrument(s) utilized: curette  Bleeding: medium  Hemostasis obtained with: pressure  Procedural pain (0-10): 0  Post-procedural pain: 0   Response to treatment: procedure was tolerated well  Debridement Comments: Bleeds easily            Results from last 6 Months Lab Units 09/24/22  1041   WOUND CULTURE  1+ Growth of Pseudomonas aeruginosa*           Wound Instructions:  Orders Placed This Encounter   Procedures   • Wound cleansing and dressings     Standing Status:   Future     Standing Expiration Date:   11/22/2023     Scheduling Instructions:      Remove dressings from all open ulcers  Wash lower legs and ulcers with soap and water  Then apply silver alginate to lower leg ulcers and left heel ulcer then apply dry dressing, unna boot and coban  Change dressing 2 times per week and as needed for excessive drainage or leakage  This was done today  [facility] to change dressing/unna boot on Friday's  Follow up one week   • Debridement     This order was created via procedure documentation   • Debridement     This order was created via procedure documentation   • Debridement     This order was created via procedure documentation       Cally Dotson, PA-C        Portions of the record may have been created with voice recognition software  Occasional wrong word or "sound alike" substitutions may have occurred due to the inherent limitations of voice recognition software  Read the chart carefully and recognize, using context, where substitutions have occurred

## 2022-11-25 ENCOUNTER — HOME CARE VISIT (OUTPATIENT)
Dept: HOME HEALTH SERVICES | Facility: HOME HEALTHCARE | Age: 72
End: 2022-11-25

## 2022-11-25 VITALS
SYSTOLIC BLOOD PRESSURE: 114 MMHG | HEART RATE: 94 BPM | OXYGEN SATURATION: 96 % | DIASTOLIC BLOOD PRESSURE: 68 MMHG | RESPIRATION RATE: 18 BRPM | TEMPERATURE: 98.9 F

## 2022-11-29 ENCOUNTER — OFFICE VISIT (OUTPATIENT)
Dept: WOUND CARE | Facility: CLINIC | Age: 72
End: 2022-11-29

## 2022-11-29 VITALS
RESPIRATION RATE: 20 BRPM | DIASTOLIC BLOOD PRESSURE: 82 MMHG | SYSTOLIC BLOOD PRESSURE: 131 MMHG | TEMPERATURE: 98.2 F | HEART RATE: 66 BPM

## 2022-11-29 DIAGNOSIS — I87.2 VENOUS STASIS ULCER OF OTHER PART OF RIGHT LOWER LEG WITH FAT LAYER EXPOSED WITHOUT VARICOSE VEINS (HCC): ICD-10-CM

## 2022-11-29 DIAGNOSIS — I87.2 VENOUS STASIS ULCER OF OTHER PART OF LEFT LOWER LEG WITH FAT LAYER EXPOSED WITHOUT VARICOSE VEINS (HCC): ICD-10-CM

## 2022-11-29 DIAGNOSIS — L97.812 VENOUS STASIS ULCER OF OTHER PART OF RIGHT LOWER LEG WITH FAT LAYER EXPOSED WITHOUT VARICOSE VEINS (HCC): ICD-10-CM

## 2022-11-29 DIAGNOSIS — L97.422 HEEL ULCER, LEFT, WITH FAT LAYER EXPOSED (HCC): ICD-10-CM

## 2022-11-29 DIAGNOSIS — I87.2 VENOUS INSUFFICIENCY OF BOTH LOWER EXTREMITIES: Primary | ICD-10-CM

## 2022-11-29 DIAGNOSIS — L97.822 VENOUS STASIS ULCER OF OTHER PART OF LEFT LOWER LEG WITH FAT LAYER EXPOSED WITHOUT VARICOSE VEINS (HCC): ICD-10-CM

## 2022-11-29 RX ORDER — LIDOCAINE 40 MG/G
CREAM TOPICAL ONCE
Status: COMPLETED | OUTPATIENT
Start: 2022-11-29 | End: 2022-11-29

## 2022-11-29 RX ADMIN — LIDOCAINE: 40 CREAM TOPICAL at 15:17

## 2022-11-29 NOTE — PROGRESS NOTES
Debridement   Wound 11/01/22 Venous Ulcer Leg Right; Lower    Universal Protocol:  Consent: Verbal consent obtained    Risks and benefits: risks, benefits and alternatives were discussed  Consent given by: patient  Patient understanding: patient states understanding of the procedure being performed  Patient identity confirmed: verbally with patient      Performed by: physician  Debridement type: selective  Pain control: lidocaine 4%  Post-debridement measurements  Length (cm): 1 8  Width (cm): 0 5  Depth (cm): 0 1  Percent debrided: 100%  Surface Area (cm^2): 0 9  Area debrided (cm^2): 0 9  Volume (cm^3): 0 09  Devitalized tissue debrided: biofilm, fibrin and slough  Instrument(s) utilized: curette  Bleeding: small  Hemostasis obtained with: pressure  Procedural pain (0-10): insensate  Post-procedural pain: insensate   Response to treatment: procedure was tolerated well

## 2022-11-29 NOTE — PROGRESS NOTES
Debridement   Wound 10/03/22 Heel Posterior; Left    Universal Protocol:  Consent: Verbal consent obtained    Risks and benefits: risks, benefits and alternatives were discussed  Consent given by: patient  Patient understanding: patient states understanding of the procedure being performed  Patient identity confirmed: verbally with patient      Performed by: physician  Debridement type: surgical  Level of debridement: subcutaneous tissue  Pain control: lidocaine 4%  Post-debridement measurements  Length (cm): 1 4  Width (cm): 0 6  Depth (cm): 0 3  Percent debrided: 100%  Surface Area (cm^2): 0 84  Area debrided (cm^2): 0 84  Volume (cm^3): 0 25  Tissue and other material debrided: subcutaneous tissue  Devitalized tissue debrided: biofilm, exudate, fibrin and slough  Instrument(s) utilized: curette  Bleeding: small  Hemostasis obtained with: pressure  Procedural pain (0-10): insensate  Post-procedural pain: insensate   Response to treatment: procedure was tolerated well

## 2022-11-29 NOTE — PATIENT INSTRUCTIONS
Orders Placed This Encounter   Procedures    Wound cleansing and dressings     Bilateral lower leg wounds and left heel wound     Remove dressings from all open ulcers  Wash lower legs and ulcers with soap and water  Pat dry  Apply adaptic to bilateral lower leg ulcers and left heel ulcer   Then apply silver alginate to lower leg ulcers and left heel ulcer then apply dry dressing, unna boot and coban  Cover with tubifast yellow  Change dressing 2 times per week and as needed for excessive drainage or leakage  This was done today  [facility] to change dressing/unna boot on Friday's  Follow up one week on Tuesday at the [address] [facility]       Standing Status:   Future     Standing Expiration Date:   11/29/2023

## 2022-11-29 NOTE — PROGRESS NOTES
Patient ID: Ignacio Wren is a 74 y o  male Date of Birth 1950     Diagnosis:  1  Venous insufficiency of both lower extremities  -     Wound cleansing and dressings; Future  -     lidocaine (LMX) 4 % cream    2  Venous stasis ulcer of other part of left lower leg with fat layer exposed without varicose veins (HCC)  -     Wound cleansing and dressings; Future  -     lidocaine (LMX) 4 % cream  -     Debridement    3  Venous stasis ulcer of other part of right lower leg with fat layer exposed without varicose veins (HCC)  -     Wound cleansing and dressings; Future  -     lidocaine (LMX) 4 % cream    4  Heel ulcer, left, with fat layer exposed ([address] )  -     Wound cleansing and dressings; Future  -     lidocaine (LMX) 4 % cream       Diagnosis ICD-10-CM Associated Orders   1  Venous insufficiency of both lower extremities  I87 2 Wound cleansing and dressings     lidocaine (LMX) 4 % cream      2  Venous stasis ulcer of other part of left lower leg with fat layer exposed without varicose veins (HCC)  I87 2 Wound cleansing and dressings    L97 822 lidocaine (LMX) 4 % cream     Debridement      3  Venous stasis ulcer of other part of right lower leg with fat layer exposed without varicose veins (HCC)  I87 2 Wound cleansing and dressings    L97 812 lidocaine (LMX) 4 % cream      4  Heel ulcer, left, with fat layer exposed ([address] )  L97 422 Wound cleansing and dressings     lidocaine (LMX) 4 % cream           Assessment & Plan:    1  Bilateral lower extremity venous ulcerations  2  Bilateral lower extremity venous insufficiency        - Wounds debrided as below   - Wound are now granular will start dressings with Maxorb Ag and UNNA boot b/l lower extremity     - Continue leg elevation to B/l LE   - Return in 1 week        Chief Complaint   Patient presents with   • Follow Up Wound Care Visit     Bilateral leg wounds and left heel wound            Subjective:   Patient presents with wife for continued wound treatment to the lower leg and foot ulcers  Patient reports no other complaints at this visit  The following portions of the patient's history were reviewed and updated as appropriate:   Patient Active Problem List   Diagnosis   • Gout   • Gait abnormality   • Lumbar stenosis   • Polyneuropathy   • Lower extremity weakness   • Lumbosacral plexopathy   • Elevated troponin   • EDGAR (dyspnea on exertion)   • Rash due to vaculitis    • LYLY (acute kidney injury) ([facility] [address] )   • Depression   • Multiple open wounds of lower leg   • Ambulatory dysfunction   • Leucocytosis   • Bloating     Past Medical History:   Diagnosis Date   • Depression    • Gout      Past Surgical History:   Procedure Laterality Date   • BACK SURGERY      X2 LUMBAR INCLUDING FUSION  ,  WITH OAA, [facility] DR Jones Meraz   • CATARACT EXTRACTION, BILATERAL     • KNEE ARTHROSCOPY Right     x2 , meniscus repair   • TONSILECTOMY AND ADNOIDECTOMY       Social History     Socioeconomic History   • Marital status: /Civil Union     Spouse name: Louise   • Number of children: 3   • Years of education: 15   • Highest education level: Not on file   Occupational History   • Not on file   Tobacco Use   • Smoking status: Former     Types: Cigarettes     Quit date:      Years since quittin 9   • Smokeless tobacco: Never   Vaping Use   • Vaping Use: Never used   Substance and Sexual Activity   • Alcohol use: Not Currently     Comment: occasional   • Drug use: Never   • Sexual activity: Not on file   Other Topics Concern   • Not on file   Social History Narrative   • Not on file     Social Determinants of Health     Financial Resource Strain: Not on file   Food Insecurity: No Food Insecurity   • Worried About Running Out of Food in the Last Year: Never true   • Ran Out of Food in the Last Year: Never true   Transportation Needs: No Transportation Needs   • Lack of Transportation (Medical): No   • Lack of Transportation (Non-Medical):  No   Physical Activity: Not on file   Stress: Not on file   Social Connections: Not on file   Intimate Partner Violence: Not on file   Housing Stability: Unknown   • Unable to Pay for Housing in the Last Year: Not on file   • Number of Places Lived in the Last Year: 1   • Unstable Housing in the Last Year: No        Current Outpatient Medications:   •  allopurinol (ZYLOPRIM) 100 mg tablet, Take 100 mg by mouth 2 (two) times a day, Disp: , Rfl:   •  cephalexin (KEFLEX) 500 mg capsule, Take 500 mg by mouth every 8 (eight) hours (Patient not taking: Reported on 11/10/2022), Disp: , Rfl:   •  cholecalciferol (VITAMIN D3) 1,000 units tablet, Take 2,000 Units by mouth daily, Disp: , Rfl:   •  collagenase (SANTYL) ointment, Apply topically daily Wound 1: 10cm x15cm Wound 2: 10cm x10cm, Disp: 15 g, Rfl: 0  •  famotidine (PEPCID) 20 mg tablet, Take 1 tablet (20 mg total) by mouth daily Do not start before October 6, 2022 , Disp: 30 tablet, Rfl: 0  •  FLUoxetine (PROzac) 20 mg capsule, Take 20 mg by mouth daily , Disp: , Rfl:   •  loratadine (CLARITIN) 10 mg tablet, Take 10 mg by mouth as needed for allergies, Disp: , Rfl:   •  Magnesium 100 MG CAPS, Take 1 capsule by mouth daily, Disp: , Rfl:   •  meloxicam (MOBIC) 15 mg tablet, Take 15 mg by mouth daily, Disp: , Rfl:   •  Misc Natural Products (OSTEO BI-FLEX/5-LOXIN ADVANCED PO), Take 1 tablet by mouth daily , Disp: , Rfl:   •  Multiple Vitamins-Minerals (CENTRUM SILVER 50+MEN PO), Take 1 tablet by mouth daily, Disp: , Rfl:   •  mupirocin (BACTROBAN) 2 % ointment, Apply topically 2 (two) times a day Apply topically twice a day or as needed after every dressing change on lower legs (Patient not taking: No sig reported), Disp: 30 g, Rfl: 11  •  predniSONE 20 mg tablet, Take 20 mg by mouth daily, Disp: , Rfl:   •  simethicone (MYLICON) 80 mg chewable tablet, Chew 1 tablet (80 mg total) every 6 (six) hours as needed for flatulence, Disp: 30 tablet, Rfl: 0  •  SUPER B COMPLEX/C PO, Take 1 tablet by mouth daily, Disp: , Rfl:   •  torsemide (DEMADEX) 10 mg tablet, Take 1 tablet (10 mg total) by mouth daily Do not start before 2022 , Disp: 30 tablet, Rfl: 0  •  triamcinolone (KENALOG) 0 1 % ointment, Apply topically 2 (two) times a day Only apply to rash, do not apply to necrotic tissue, Disp: 30 g, Rfl: 0  No current facility-administered medications for this visit  Family History   Problem Relation Age of Onset   • Diabetes Father    • Diabetes Brother    • Diabetes Sister       Review of Systems   Skin: Positive for color change and wound  All other systems reviewed and are negative  Allergies: Other and Penicillins      Objective:  /82   Pulse 66   Temp 98 2 °F (36 8 °C)   Resp 20     Physical Exam  Vitals reviewed  Musculoskeletal:      Right lower le+ Edema present  Left lower le+ Edema present  Comments: Bilateral lower leg and feet multiple small ulcerations probable to subcutaneous tissue with mixture of granular and fibrotic wound base  The ulcers appeared to be healing  well  Overall significant improvement since last visit  Wound 10/03/22 Heel Posterior; Left (Active)       Wound 10/03/22 Pretibial Right (Active)       Wound 10/03/22 Pretibial Left (Active)       Wound 10/03/22 Coccyx (Active)       Wound 10/03/22 Tibial (Active)       Wound 10/03/22 Tibial Left;Posterior (Active)       Wound 22 Venous Ulcer Leg Right; Lower (Active)   Wound Image   11/15/22 1447   Wound Description Epithelialization;Pink;Yellow;Slough 22   Antonette-wound Assessment Purple;Scaly 22   Wound Length (cm) 1 8 cm 22   Wound Width (cm) 0 4 cm 22   Wound Depth (cm) 0 1 cm 22 145   Wound Surface Area (cm^2) 0 72 cm^2 22 145   Wound Volume (cm^3) 0 072 cm^3 22   Calculated Wound Volume (cm^3) 0 07 cm^3 22   Change in Wound Size % 99 93 22 145   Drainage Amount Small 22 Drainage Description Serosanguineous 11/29/22 1456   Non-staged Wound Description Full thickness 11/29/22 1456   Treatments Cleansed 11/29/22 1456   Patient Tolerance Tolerated well 11/29/22 1456       Wound 11/01/22 Venous Ulcer Other (Comment) Leg Left; Lower (Active)   Wound Image   11/15/22 1446   Wound Description Epithelialization;Pink;Yellow;Slough 11/29/22 1457   Antonette-wound Assessment Purple;Dry 11/29/22 1457   Wound Length (cm) 10 5 cm 11/29/22 1457   Wound Width (cm) 10 5 cm 11/29/22 1457   Wound Depth (cm) 0 2 cm 11/29/22 1457   Wound Surface Area (cm^2) 110 25 cm^2 11/29/22 1457   Wound Volume (cm^3) 22 05 cm^3 11/29/22 1457   Calculated Wound Volume (cm^3) 22 05 cm^3 11/29/22 1457   Change in Wound Size % 87 35 11/29/22 1457   Drainage Amount Small 11/29/22 1457   Drainage Description Serosanguineous 11/29/22 1457   Non-staged Wound Description Full thickness 11/29/22 1457   Treatments Cleansed 11/29/22 1457   Patient Tolerance Tolerated well 11/29/22 1457       Wound 11/01/22 Pressure Injury Heel Left (Active)   Wound Image   11/15/22 1447   Wound Description Slough;Granulation tissue 11/29/22 1458   Pressure Injury Stage 3 11/22/22 1500   Antonette-wound Assessment Clean;Dry;Brown 11/29/22 1458   Wound Length (cm) 1 4 cm 11/29/22 1458   Wound Width (cm) 0 6 cm 11/29/22 1458   Wound Depth (cm) 0 2 cm 11/29/22 1458   Wound Surface Area (cm^2) 0 84 cm^2 11/29/22 1458   Wound Volume (cm^3) 0 168 cm^3 11/29/22 1458   Calculated Wound Volume (cm^3) 0 17 cm^3 11/29/22 1458   Change in Wound Size % 89 1 11/29/22 1458   Undermining is depth extending from 12-12 11/01/22 1419   Drainage Amount Small 11/29/22 1458   Drainage Description Serosanguineous 11/29/22 1458   Non-staged Wound Description Full thickness 11/29/22 1458   Treatments Cleansed 11/29/22 1458   Patient Tolerance Tolerated well 11/29/22 1458                         Debridement   Wound 11/01/22 Venous Ulcer Other (Comment) Leg Left; Lower    Universal Protocol:  Consent: Verbal consent obtained  Risks and benefits: risks, benefits and alternatives were discussed  Consent given by: patient  Patient understanding: patient states understanding of the procedure being performed  Patient identity confirmed: verbally with patient      Performed by: physician  Debridement type: surgical  Level of debridement: subcutaneous tissue  Pain control: lidocaine 4%  Post-debridement measurements  Length (cm): 10 5  Width (cm): 10 5  Depth (cm): 0 3  Percent debrided: 50%  Surface Area (cm^2): 110 25  Area debrided (cm^2): 55 13  Volume (cm^3): 33 08  Tissue and other material debrided: subcutaneous tissue  Devitalized tissue debrided: biofilm, fibrin, slough and eschar  Instrument(s) utilized: curette  Bleeding: small  Hemostasis obtained with: pressure  Procedural pain (0-10): insensate  Post-procedural pain: insensate   Response to treatment: procedure was tolerated well           Results from last 6 Months   Lab Units 09/24/22  1041   WOUND CULTURE  1+ Growth of Pseudomonas aeruginosa*         Wound Instructions:  Orders Placed This Encounter   Procedures   • Wound cleansing and dressings     Bilateral lower leg wounds and left heel wound     Remove dressings from all open ulcers  Wash lower legs and ulcers with soap and water  Pat dry  Apply adaptic to bilateral lower leg ulcers and left heel ulcer   Then apply silver alginate to lower leg ulcers and left heel ulcer then apply dry dressing, unna boot and coban  Cover with tubifast yellow  Change dressing 2 times per week and as needed for excessive drainage or leakage  This was done today  [facility] to change dressing/unna boot on Friday's  Follow up one week on Tuesday at the [address] [facility],[address]       Standing Status:   Future     Standing Expiration Date:   11/29/2023   • Debridement     This order was created via procedure documentation         Mildred Quinn DPM      Portions of the record may have been created with voice recognition software  Occasional wrong word or "sound a like" substitutions may have occurred due to the inherent limitations of voice recognition software  Read the chart carefully and recognize, using context, where substitutions have occurred

## 2022-11-29 NOTE — PROGRESS NOTES
Cast Application    Date/Time: 11/29/2022 3:47 PM  Performed by: Danni Lezama RN  Authorized by: Maryuri Pham DPM   Universal Protocol:  Consent given by: patient  Patient identity confirmed: verbally with patient      Pre-procedure details:     Sensation:  Normal  Procedure details:     Laterality:  Bilateral    Location:  Leg    Leg:  L lower leg and R lower legCast type:  Unna boot    Post-procedure details:     Pain:  Improved    Sensation:  Normal    Patient tolerance of procedure: Tolerated well, no immediate complications  Comments:      UNNA BOOT wrap procedure     Before application, JAIME and/or TBI determined to be adequate for healing and application of compression  Lower extremity washed prior to application of compression wrap  With the foot in dorsiflexed position, Unna boot was applied as per physician orders without complications or complaint of pain   The procedure was tolerated well  Toes warm & pink post application   Patient provided education & reinforced to observe toes for any discoloration, swelling or tingling and instructed when to report to the [address] [facility],[address] or to remove compression  Wound care as per providers orders, patient tolerated well

## 2022-12-02 ENCOUNTER — HOME CARE VISIT (OUTPATIENT)
Dept: HOME HEALTH SERVICES | Facility: HOME HEALTHCARE | Age: 72
End: 2022-12-02

## 2022-12-04 VITALS
TEMPERATURE: 96.9 F | RESPIRATION RATE: 18 BRPM | DIASTOLIC BLOOD PRESSURE: 82 MMHG | SYSTOLIC BLOOD PRESSURE: 128 MMHG | OXYGEN SATURATION: 96 % | HEART RATE: 98 BPM

## 2022-12-06 ENCOUNTER — OFFICE VISIT (OUTPATIENT)
Dept: WOUND CARE | Facility: CLINIC | Age: 72
End: 2022-12-06

## 2022-12-06 ENCOUNTER — HOME CARE VISIT (OUTPATIENT)
Dept: HOME HEALTH SERVICES | Facility: HOME HEALTHCARE | Age: 72
End: 2022-12-06

## 2022-12-06 VITALS
TEMPERATURE: 98.4 F | DIASTOLIC BLOOD PRESSURE: 70 MMHG | RESPIRATION RATE: 20 BRPM | HEART RATE: 110 BPM | SYSTOLIC BLOOD PRESSURE: 130 MMHG

## 2022-12-06 DIAGNOSIS — L97.812 VENOUS STASIS ULCER OF OTHER PART OF RIGHT LOWER LEG WITH FAT LAYER EXPOSED WITHOUT VARICOSE VEINS (HCC): ICD-10-CM

## 2022-12-06 DIAGNOSIS — I87.2 VENOUS STASIS ULCER OF OTHER PART OF LEFT LOWER LEG WITH FAT LAYER EXPOSED WITHOUT VARICOSE VEINS (HCC): Primary | ICD-10-CM

## 2022-12-06 DIAGNOSIS — I87.2 VENOUS STASIS ULCER OF OTHER PART OF RIGHT LOWER LEG WITH FAT LAYER EXPOSED WITHOUT VARICOSE VEINS (HCC): ICD-10-CM

## 2022-12-06 DIAGNOSIS — L97.822 VENOUS STASIS ULCER OF OTHER PART OF LEFT LOWER LEG WITH FAT LAYER EXPOSED WITHOUT VARICOSE VEINS (HCC): Primary | ICD-10-CM

## 2022-12-06 DIAGNOSIS — L97.422 HEEL ULCER, LEFT, WITH FAT LAYER EXPOSED (HCC): ICD-10-CM

## 2022-12-06 RX ORDER — LIDOCAINE 40 MG/G
CREAM TOPICAL ONCE
Status: COMPLETED | OUTPATIENT
Start: 2022-12-06 | End: 2022-12-06

## 2022-12-06 RX ADMIN — LIDOCAINE: 40 CREAM TOPICAL at 15:35

## 2022-12-06 NOTE — PROGRESS NOTES
Cast Application    Date/Time: 12/6/2022 3:43 PM  Performed by: Vidya Lieberman RN  Authorized by: Herman Hoover DPM   Universal Protocol:  Consent: Verbal consent obtained  Consent given by: patient  Timeout called at: 12/6/2022 3:43 PM   Patient identity confirmed: verbally with patient      Pre-procedure details:     Sensation:  Normal  Procedure details:     Laterality:  Right    Location:  Leg    Leg:  R lower legCast type:  Unna boot    Post-procedure details:     Pain:  Improved    Sensation:  Normal    Patient tolerance of procedure: Tolerated well, no immediate complications  Comments:      UNNA BOOT wrap procedure     Before application, JAIME and/or TBI determined to be adequate for healing and application of compression  Lower extremity washed prior to application of compression wrap  With the foot in dorsiflexed position, unna boot was applied as per physician orders without complications or complaint of pain  The procedure was tolerated well  Toes warm & pink post application  Patient provided education & reinforced to observe toes for any discoloration, swelling or tingling and instructed when to report to the [address] [facility],[address] or to remove compression  Wound care as per providers orders, patient tolerated well

## 2022-12-06 NOTE — PATIENT INSTRUCTIONS
Orders Placed This Encounter   Procedures    Wound cleansing and dressings     Remove dressings from lower legs and heel ulcer  Wash lower legs and ulcers with soap and water  Pat dry  Apply adaptic to bilateral lower leg ulcers and left heel ulcer     Then apply silver alginate to lower leg ulcers and left heel ulcer then apply dry dressing, unna boot and coban  Cover with tubifast yellow  Change dressing 2 times per week and as needed for excessive drainage or leakage  This was done today  [facility] to change dressing/unna boot on Friday's  Follow up one week on Tuesday at the [address] [facility],[address]       Standing Status:   Future     Standing Expiration Date:   12/6/2023

## 2022-12-06 NOTE — PROGRESS NOTES
Patient ID: Luba Rojo is a 74 y o  male Date of Birth 1950     Diagnosis:  1  Venous stasis ulcer of other part of left lower leg with fat layer exposed without varicose veins (HCC)  -     lidocaine (LMX) 4 % cream  -     Wound cleansing and dressings; Future    2  Venous stasis ulcer of other part of right lower leg with fat layer exposed without varicose veins (HCC)  -     lidocaine (LMX) 4 % cream  -     Wound cleansing and dressings; Future    3  Heel ulcer, left, with fat layer exposed ([address] )       Diagnosis ICD-10-CM Associated Orders   1  Venous stasis ulcer of other part of left lower leg with fat layer exposed without varicose veins (HCC)  I87 2 lidocaine (LMX) 4 % cream    L97 822 Wound cleansing and dressings      2  Venous stasis ulcer of other part of right lower leg with fat layer exposed without varicose veins (HCC)  I87 2 lidocaine (LMX) 4 % cream    L97 812 Wound cleansing and dressings      3  Heel ulcer, left, with fat layer exposed ([facility] [address] )  L97 598            Assessment & Plan:  1  Bilateral lower extremity venous ulcerations  2  Bilateral lower extremity venous insufficiency        - Wounds debrided as below   - Wound are now granular will start dressings with Maxorb Ag and UNNA boot b/l lower extremity  - Continue leg elevation to B/l LE   - Return in 1 week     Chief Complaint   Patient presents with   • Follow Up Wound Care Visit     Bilateral lower leg wounds            Subjective:   68-year-old presents with wife for continued evaluation of bilateral lower extremity ulcerations  Reports no other complaint at this visit        The following portions of the patient's history were reviewed and updated as appropriate:   Patient Active Problem List   Diagnosis   • Gout   • Gait abnormality   • Lumbar stenosis   • Polyneuropathy   • Lower extremity weakness   • Lumbosacral plexopathy   • Elevated troponin   • EDGAR (dyspnea on exertion)   • Rash due to vaculitis    • LYLY (acute kidney injury) ([facility] )   • Depression   • Multiple open wounds of lower leg   • Ambulatory dysfunction   • Leucocytosis   • Bloating     Past Medical History:   Diagnosis Date   • Depression    • Gout      Past Surgical History:   Procedure Laterality Date   • BACK SURGERY      X2 LUMBAR INCLUDING FUSION  ,  WITH OAA, [facility] DR Terrence Thaeyr   • CATARACT EXTRACTION, BILATERAL     • KNEE ARTHROSCOPY Right     x2 , meniscus repair   • TONSILECTOMY AND ADNOIDECTOMY       Social History     Socioeconomic History   • Marital status: /Civil Union     Spouse name: Louise   • Number of children: 3   • Years of education: 15   • Highest education level: None   Occupational History   • None   Tobacco Use   • Smoking status: Former     Types: Cigarettes     Quit date:      Years since quittin 9   • Smokeless tobacco: Never   Vaping Use   • Vaping Use: Never used   Substance and Sexual Activity   • Alcohol use: Not Currently     Comment: occasional   • Drug use: Never   • Sexual activity: None   Other Topics Concern   • None   Social History Narrative   • None     Social Determinants of Health     Financial Resource Strain: Not on file   Food Insecurity: No Food Insecurity   • Worried About Running Out of Food in the Last Year: Never true   • Ran Out of Food in the Last Year: Never true   Transportation Needs: No Transportation Needs   • Lack of Transportation (Medical): No   • Lack of Transportation (Non-Medical):  No   Physical Activity: Not on file   Stress: Not on file   Social Connections: Not on file   Intimate Partner Violence: Not on file   Housing Stability: Unknown   • Unable to Pay for Housing in the Last Year: Not on file   • Number of Places Lived in the Last Year: 1   • Unstable Housing in the Last Year: No        Current Outpatient Medications:   •  allopurinol (ZYLOPRIM) 100 mg tablet, Take 100 mg by mouth 2 (two) times a day, Disp: , Rfl:   •  cephalexin (KEFLEX) 500 mg capsule, Take 500 mg by mouth every 8 (eight) hours (Patient not taking: Reported on 11/10/2022), Disp: , Rfl:   •  cholecalciferol (VITAMIN D3) 1,000 units tablet, Take 2,000 Units by mouth daily, Disp: , Rfl:   •  collagenase (SANTYL) ointment, Apply topically daily Wound 1: 10cm x15cm Wound 2: 10cm x10cm, Disp: 15 g, Rfl: 0  •  famotidine (PEPCID) 20 mg tablet, Take 1 tablet (20 mg total) by mouth daily Do not start before October 6, 2022 , Disp: 30 tablet, Rfl: 0  •  FLUoxetine (PROzac) 20 mg capsule, Take 20 mg by mouth daily , Disp: , Rfl:   •  loratadine (CLARITIN) 10 mg tablet, Take 10 mg by mouth as needed for allergies, Disp: , Rfl:   •  Magnesium 100 MG CAPS, Take 1 capsule by mouth daily, Disp: , Rfl:   •  meloxicam (MOBIC) 15 mg tablet, Take 15 mg by mouth daily, Disp: , Rfl:   •  Misc Natural Products (OSTEO BI-FLEX/5-LOXIN ADVANCED PO), Take 1 tablet by mouth daily , Disp: , Rfl:   •  Multiple Vitamins-Minerals (CENTRUM SILVER 50+MEN PO), Take 1 tablet by mouth daily, Disp: , Rfl:   •  mupirocin (BACTROBAN) 2 % ointment, Apply topically 2 (two) times a day Apply topically twice a day or as needed after every dressing change on lower legs (Patient not taking: No sig reported), Disp: 30 g, Rfl: 11  •  predniSONE 20 mg tablet, Take 20 mg by mouth daily, Disp: , Rfl:   •  simethicone (MYLICON) 80 mg chewable tablet, Chew 1 tablet (80 mg total) every 6 (six) hours as needed for flatulence, Disp: 30 tablet, Rfl: 0  •  SUPER B COMPLEX/C PO, Take 1 tablet by mouth daily, Disp: , Rfl:   •  torsemide (DEMADEX) 10 mg tablet, Take 1 tablet (10 mg total) by mouth daily Do not start before October 6, 2022 , Disp: 30 tablet, Rfl: 0  •  triamcinolone (KENALOG) 0 1 % ointment, Apply topically 2 (two) times a day Only apply to rash, do not apply to necrotic tissue, Disp: 30 g, Rfl: 0  No current facility-administered medications for this visit    Family History   Problem Relation Age of Onset   • Diabetes Father    • Diabetes Brother    • Diabetes Sister       Review of Systems   All other systems reviewed and are negative  Allergies: Other and Penicillins      Objective:  /70   Pulse (!) 110   Temp 98 4 °F (36 9 °C)   Resp 20     Physical Exam  Vitals reviewed  Musculoskeletal:      Right lower le+ Edema present  Left lower le+ Edema present  Comments: Left lower leg and feet multiple small ulcerations probable to subcutaneous tissue with mixture of granular and fibrotic wound base  The ulcers appeared to be healing  well  Overall significant improvement since last visit  Right lower extremity ulcer is superficial and granular  Wound 10/03/22 Heel Posterior; Left (Active)       Wound 10/03/22 Pretibial Right (Active)       Wound 10/03/22 Coccyx (Active)       Wound 10/03/22 Tibial Left;Posterior (Active)       Wound 22 Venous Ulcer Leg Right; Lower (Active)   Wound Image   11/15/22 1447   Wound Description Epithelialization;Pink;Yellow;Slough 22 1500   Antonette-wound Assessment Purple;Scaly 22 1500   Wound Length (cm) 0 1 cm 22 1500   Wound Width (cm) 0 1 cm 22 1500   Wound Depth (cm) 0 1 cm 22 1500   Wound Surface Area (cm^2) 0 01 cm^2 22 1500   Wound Volume (cm^3) 0 001 cm^3 22 1500   Calculated Wound Volume (cm^3) 0 cm^3 22 1500   Change in Wound Size % 100 22 1500   Drainage Amount Small 22 1500   Drainage Description Serosanguineous 22 1500   Non-staged Wound Description Full thickness 22 1456   Treatments Cleansed 22 1500   Patient Tolerance Tolerated well 22 1500       Wound 22 Venous Ulcer Other (Comment) Leg Left; Lower (Active)   Wound Image   11/15/22 1446   Wound Description Epithelialization;Pink;Yellow;Slough 22 1500   Antonette-wound Assessment Purple;Dry 22 1500   Wound Length (cm) 20 cm 22 1500   Wound Width (cm) 9 3 cm 22 1500   Wound Depth (cm) 0 3 cm 22 1500   Wound Surface Area (cm^2) 186 cm^2 12/06/22 1500   Wound Volume (cm^3) 111 6 cm^3 12/06/22 1500   Calculated Wound Volume (cm^3) 111 6 cm^3 12/06/22 1500   Change in Wound Size % 35 97 12/06/22 1500   Drainage Amount Moderate 12/06/22 1500   Drainage Description Yellow;Serosanguineous 12/06/22 1500   Non-staged Wound Description Full thickness 12/06/22 1500   Treatments Cleansed 12/06/22 1500   Patient Tolerance Tolerated well 12/06/22 1500       Wound 11/01/22 Pressure Injury Heel Left (Active)   Wound Image   11/15/22 1447   Wound Description Slough;Granulation tissue 12/06/22 1500   Pressure Injury Stage 3 12/06/22 1500   Antonette-wound Assessment Clean;Dry;Brown 12/06/22 1500   Wound Length (cm) 1 5 cm 12/06/22 1500   Wound Width (cm) 0 7 cm 12/06/22 1500   Wound Depth (cm) 0 4 cm 12/06/22 1500   Wound Surface Area (cm^2) 1 05 cm^2 12/06/22 1500   Wound Volume (cm^3) 0 42 cm^3 12/06/22 1500   Calculated Wound Volume (cm^3) 0 42 cm^3 12/06/22 1500   Change in Wound Size % 73 08 12/06/22 1500   Undermining is depth extending from 12-12 11/01/22 1419   Drainage Amount Moderate 12/06/22 1500   Drainage Description Serosanguineous 12/06/22 1500   Non-staged Wound Description Full thickness 12/06/22 1500   Treatments Cleansed 12/06/22 1500   Patient Tolerance Tolerated well 12/06/22 1500                         Debridement   Wound 11/01/22 Venous Ulcer Other (Comment) Leg Left; Lower    Universal Protocol:  Consent: Verbal consent obtained    Risks and benefits: risks, benefits and alternatives were discussed  Consent given by: patient  Patient understanding: patient states understanding of the procedure being performed  Patient identity confirmed: verbally with patient      Performed by: physician  Debridement type: surgical  Level of debridement: subcutaneous tissue  Pain control: lidocaine 4%  Post-debridement measurements  Length (cm): 20  Width (cm): 9 6  Depth (cm): 0 4  Percent debrided: 50%  Surface Area (cm^2): 192  Area debrided (cm^2): 96  Volume (cm^3): 76 8  Tissue and other material debrided: subcutaneous tissue  Devitalized tissue debrided: biofilm, exudate, fibrin, slough and eschar  Instrument(s) utilized: curette  Bleeding: small  Hemostasis obtained with: pressure  Procedural pain (0-10): insensate  Post-procedural pain: insensate   Response to treatment: procedure was tolerated well           Results from last 6 Months   Lab Units 09/24/22  1041   WOUND CULTURE  1+ Growth of Pseudomonas aeruginosa*         Wound Instructions:  Orders Placed This Encounter   Procedures   • Wound cleansing and dressings     Remove dressings from lower legs and heel ulcer  Wash lower legs and ulcers with soap and water  Pat dry  Apply adaptic to bilateral lower leg ulcers and left heel ulcer     Then apply silver alginate to lower leg ulcers and left heel ulcer then apply dry dressing, unna boot and coban  Cover with tubifast yellow  Change dressing 2 times per week and as needed for excessive drainage or leakage  This was done today  [facility] to change dressing/unna boot on Friday's  Follow up one week on Tuesday at the [address] [facility],[address]  Standing Status:   Future     Standing Expiration Date:   12/6/2023         Torrie Khan DPM      Portions of the record may have been created with voice recognition software  Occasional wrong word or "sound a like" substitutions may have occurred due to the inherent limitations of voice recognition software  Read the chart carefully and recognize, using context, where substitutions have occurred

## 2022-12-06 NOTE — PROGRESS NOTES
Debridement   Wound 10/03/22 Heel Posterior; Left    Universal Protocol:  Consent: Verbal consent obtained  Risks and benefits: risks, benefits and alternatives were discussed  Consent given by: patient  Patient understanding: patient states understanding of the procedure being performed  Patient identity confirmed: verbally with patient      Performed by: physician  Debridement type: selective    Post-debridement measurements  Length (cm): 1 5  Width (cm): 0 7  Depth (cm): 0 4  Percent debrided: 100%  Surface Area (cm^2): 1 05  Area debrided (cm^2):  1 05  Volume (cm^3): 0 42  Devitalized tissue debrided: biofilm, fibrin and slough  Instrument(s) utilized: curette  Bleeding: small  Hemostasis obtained with: pressure  Procedural pain (0-10): insensate  Post-procedural pain: insensate   Response to treatment: procedure was tolerated well

## 2022-12-06 NOTE — PROGRESS NOTES
Cast Application    Date/Time: 12/6/2022 3:45 PM  Performed by: Rafael Berman RN  Authorized by: Oli Dominguez DPM   Universal Protocol:  Consent given by: patient  Timeout called at: 12/6/2022 3:45 PM   Patient identity confirmed: verbally with patient      Pre-procedure details:     Sensation:  Normal  Procedure details:     Laterality:  Bilateral    Location:  Leg    Leg:  L lower leg and R lower legCast type:  Unna boot    Post-procedure details:     Pain:  Improved    Sensation:  Normal    Patient tolerance of procedure: Tolerated well, no immediate complications  Comments:      UNNA BOOT wrap procedure     Before application, JAIME and/or TBI determined to be adequate for healing and application of compression  Lower extremity washed prior to application of compression wrap  With the foot in dorsiflexed position, Unna boot was applied as per physician orders without complications or complaint of pain   The procedure was tolerated well  Toes warm & pink post application   Patient provided education & reinforced to observe toes for any discoloration, swelling or tingling and instructed when to report to the [address] [facility],[address] or to remove compression  Wound care as per providers orders, patient tolerated well

## 2022-12-06 NOTE — PROGRESS NOTES
Debridement   Wound 11/01/22 Venous Ulcer Leg Right; Lower    Universal Protocol:  Consent: Verbal consent obtained    Risks and benefits: risks, benefits and alternatives were discussed  Consent given by: patient  Patient understanding: patient states understanding of the procedure being performed  Patient identity confirmed: verbally with patient      Performed by: physician  Debridement type: selective  Pain control: lidocaine 4%  Post-debridement measurements  Length (cm): 0 3  Width (cm): 0 3  Depth (cm): 0 1  Percent debrided: 100%  Surface Area (cm^2): 0 09  Area debrided (cm^2): 0 09  Volume (cm^3): 0 01  Devitalized tissue debrided: biofilm and slough  Instrument(s) utilized: curette  Bleeding: small  Hemostasis obtained with: pressure  Response to treatment: procedure was tolerated well

## 2022-12-07 ENCOUNTER — APPOINTMENT (OUTPATIENT)
Dept: LAB | Facility: HOSPITAL | Age: 72
End: 2022-12-07

## 2022-12-07 ENCOUNTER — HOME CARE VISIT (OUTPATIENT)
Dept: HOME HEALTH SERVICES | Facility: HOME HEALTHCARE | Age: 72
End: 2022-12-07

## 2022-12-07 VITALS
SYSTOLIC BLOOD PRESSURE: 128 MMHG | TEMPERATURE: 97.6 F | DIASTOLIC BLOOD PRESSURE: 90 MMHG | HEART RATE: 98 BPM | OXYGEN SATURATION: 98 % | RESPIRATION RATE: 20 BRPM

## 2022-12-09 ENCOUNTER — HOME CARE VISIT (OUTPATIENT)
Dept: HOME HEALTH SERVICES | Facility: HOME HEALTHCARE | Age: 72
End: 2022-12-09

## 2022-12-11 ENCOUNTER — HOME CARE VISIT (OUTPATIENT)
Dept: HOME HEALTH SERVICES | Facility: HOME HEALTHCARE | Age: 72
End: 2022-12-11

## 2022-12-11 VITALS
HEART RATE: 106 BPM | OXYGEN SATURATION: 97 % | SYSTOLIC BLOOD PRESSURE: 120 MMHG | DIASTOLIC BLOOD PRESSURE: 82 MMHG | TEMPERATURE: 97.9 F | RESPIRATION RATE: 18 BRPM

## 2022-12-12 PROCEDURE — 10330064

## 2022-12-13 ENCOUNTER — OFFICE VISIT (OUTPATIENT)
Dept: WOUND CARE | Facility: CLINIC | Age: 72
End: 2022-12-13

## 2022-12-13 VITALS
SYSTOLIC BLOOD PRESSURE: 122 MMHG | RESPIRATION RATE: 22 BRPM | TEMPERATURE: 98.2 F | HEART RATE: 124 BPM | DIASTOLIC BLOOD PRESSURE: 77 MMHG

## 2022-12-13 DIAGNOSIS — L97.822 VENOUS STASIS ULCER OF OTHER PART OF LEFT LOWER LEG WITH FAT LAYER EXPOSED WITHOUT VARICOSE VEINS (HCC): Primary | ICD-10-CM

## 2022-12-13 DIAGNOSIS — I87.2 VENOUS STASIS ULCER OF OTHER PART OF LEFT LOWER LEG WITH FAT LAYER EXPOSED WITHOUT VARICOSE VEINS (HCC): Primary | ICD-10-CM

## 2022-12-13 DIAGNOSIS — L97.422 HEEL ULCER, LEFT, WITH FAT LAYER EXPOSED (HCC): ICD-10-CM

## 2022-12-13 RX ORDER — LIDOCAINE 40 MG/G
CREAM TOPICAL ONCE
Status: COMPLETED | OUTPATIENT
Start: 2022-12-13 | End: 2022-12-13

## 2022-12-13 RX ADMIN — LIDOCAINE: 40 CREAM TOPICAL at 15:38

## 2022-12-13 NOTE — PROGRESS NOTES
Patient ID: Luba Rojo is a 74 y o  male Date of Birth 1950     Diagnosis:  1  Venous stasis ulcer of other part of left lower leg with fat layer exposed without varicose veins (HCC)  -     lidocaine (LMX) 4 % cream  -     Wound cleansing and dressings; Future  -     Cast Application    2  Heel ulcer, left, with fat layer exposed ([facility] [address] )       Diagnosis ICD-10-CM Associated Orders   1  Venous stasis ulcer of other part of left lower leg with fat layer exposed without varicose veins (HCC)  I87 2 lidocaine (LMX) 4 % cream    L97 822 Wound cleansing and dressings     Cast Application      2  Heel ulcer, left, with fat layer exposed ([facility] [address] )  L97 422            Assessment & Plan:    1   Left lower extremity venous ulcerations  2  Bilateral lower extremity venous insufficiency        - Wounds debrided as below   - Wound are now granular will change dressing to Puracol Ag and UNNA boot left lower extremity  Lower extremity ulcer healed I recommend Coflex light compression  - Continue leg elevation to B/l LE   - Return in 1 week     Chief Complaint   Patient presents with   • Follow Up Wound Care Visit           Subjective:   47-year-old presents with wife for continued evaluation of bilateral lower extremity ulcerations  Reports no other complaint at this visit        The following portions of the patient's history were reviewed and updated as appropriate:   Patient Active Problem List   Diagnosis   • Gout   • Gait abnormality   • Lumbar stenosis   • Polyneuropathy   • Lower extremity weakness   • Lumbosacral plexopathy   • Elevated troponin   • EDGAR (dyspnea on exertion)   • Rash due to vaculitis    • LYLY (acute kidney injury) ([facility] [address] )   • Depression   • Multiple open wounds of lower leg   • Ambulatory dysfunction   • Leucocytosis   • Bloating     Past Medical History:   Diagnosis Date   • Depression    • Gout      Past Surgical History:   Procedure Laterality Date   • BACK SURGERY      X2 LUMBAR INCLUDING FUSION 2004,  WITH OAA, [facility] DR Pablo Bean   • CATARACT EXTRACTION, BILATERAL     • KNEE ARTHROSCOPY Right     x2 , meniscus repair   • TONSILECTOMY AND ADNOIDECTOMY       Social History     Socioeconomic History   • Marital status: /Civil Union     Spouse name: Louise   • Number of children: 3   • Years of education: 15   • Highest education level: None   Occupational History   • None   Tobacco Use   • Smoking status: Former     Types: Cigarettes     Quit date:      Years since quittin 9   • Smokeless tobacco: Never   Vaping Use   • Vaping Use: Never used   Substance and Sexual Activity   • Alcohol use: Not Currently     Comment: occasional   • Drug use: Never   • Sexual activity: None   Other Topics Concern   • None   Social History Narrative   • None     Social Determinants of Health     Financial Resource Strain: Not on file   Food Insecurity: No Food Insecurity   • Worried About Running Out of Food in the Last Year: Never true   • Ran Out of Food in the Last Year: Never true   Transportation Needs: No Transportation Needs   • Lack of Transportation (Medical): No   • Lack of Transportation (Non-Medical):  No   Physical Activity: Not on file   Stress: Not on file   Social Connections: Not on file   Intimate Partner Violence: Not on file   Housing Stability: Unknown   • Unable to Pay for Housing in the Last Year: Not on file   • Number of Places Lived in the Last Year: 1   • Unstable Housing in the Last Year: No        Current Outpatient Medications:   •  allopurinol (ZYLOPRIM) 100 mg tablet, Take 100 mg by mouth 2 (two) times a day, Disp: , Rfl:   •  cephalexin (KEFLEX) 500 mg capsule, Take 500 mg by mouth every 8 (eight) hours (Patient not taking: Reported on 11/10/2022), Disp: , Rfl:   •  cholecalciferol (VITAMIN D3) 1,000 units tablet, Take 2,000 Units by mouth daily, Disp: , Rfl:   •  collagenase (SANTYL) ointment, Apply topically daily Wound 1: 10cm x15cm Wound 2: 10cm x10cm, Disp: 15 g, Rfl: 0  •  famotidine (PEPCID) 20 mg tablet, Take 1 tablet (20 mg total) by mouth daily Do not start before 2022 , Disp: 30 tablet, Rfl: 0  •  FLUoxetine (PROzac) 20 mg capsule, Take 20 mg by mouth daily , Disp: , Rfl:   •  loratadine (CLARITIN) 10 mg tablet, Take 10 mg by mouth as needed for allergies, Disp: , Rfl:   •  Magnesium 100 MG CAPS, Take 1 capsule by mouth daily, Disp: , Rfl:   •  meloxicam (MOBIC) 15 mg tablet, Take 15 mg by mouth daily, Disp: , Rfl:   •  Misc Natural Products (OSTEO BI-FLEX/5-LOXIN ADVANCED PO), Take 1 tablet by mouth daily , Disp: , Rfl:   •  Multiple Vitamins-Minerals (CENTRUM SILVER 50+MEN PO), Take 1 tablet by mouth daily, Disp: , Rfl:   •  mupirocin (BACTROBAN) 2 % ointment, Apply topically 2 (two) times a day Apply topically twice a day or as needed after every dressing change on lower legs (Patient not taking: No sig reported), Disp: 30 g, Rfl: 11  •  predniSONE 20 mg tablet, Take 20 mg by mouth daily, Disp: , Rfl:   •  simethicone (MYLICON) 80 mg chewable tablet, Chew 1 tablet (80 mg total) every 6 (six) hours as needed for flatulence, Disp: 30 tablet, Rfl: 0  •  SUPER B COMPLEX/C PO, Take 1 tablet by mouth daily, Disp: , Rfl:   •  torsemide (DEMADEX) 10 mg tablet, Take 1 tablet (10 mg total) by mouth daily Do not start before 2022 , Disp: 30 tablet, Rfl: 0  •  triamcinolone (KENALOG) 0 1 % ointment, Apply topically 2 (two) times a day Only apply to rash, do not apply to necrotic tissue, Disp: 30 g, Rfl: 0  No current facility-administered medications for this visit  Family History   Problem Relation Age of Onset   • Diabetes Father    • Diabetes Brother    • Diabetes Sister       Review of Systems   All other systems reviewed and are negative  Allergies: Other and Penicillins      Objective:  /77   Pulse (!) 124   Temp 98 2 °F (36 8 °C)   Resp 22     Physical Exam  Vitals reviewed  Musculoskeletal:      Right lower le+ Edema present  Left lower le+ Edema present  Comments:  Left lower leg and feet multiple small ulcerations probable to subcutaneous tissue with mixture of granular and fibrotic wound base  The ulcers appeared to be healing  well  Overall significant improvement since last visit  Right lower extremity ulcer healed  Wound 10/03/22 Heel Posterior; Left (Active)       Wound 10/03/22 Pretibial Right (Active)       Wound 10/03/22 Coccyx (Active)       Wound 10/03/22 Tibial Left;Posterior (Active)       Wound 22 Venous Ulcer Other (Comment) Leg Left; Lower (Active)   Wound Image   22 1540   Wound Description Epithelialization;Pink;Yellow;Slough 22 1500   Antonette-wound Assessment Purple;Dry 22 1500   Wound Length (cm) 10 cm 22 1500   Wound Width (cm) 5 6 cm 22 1500   Wound Depth (cm) 0 4 cm 22 1500   Wound Surface Area (cm^2) 56 cm^2 22 1500   Wound Volume (cm^3) 22 4 cm^3 22 1500   Calculated Wound Volume (cm^3) 22 4 cm^3 22 1500   Change in Wound Size % 87 15 22 1500   Drainage Amount Moderate 22 1500   Drainage Description Serosanguineous 22 1500   Non-staged Wound Description Full thickness 22 1500   Treatments Cleansed 22 1500   Patient Tolerance Tolerated well 22 1500       Wound 22 ulcer injury Heel Left (Active)   Wound Image   22 1520   Wound Description Slough;Granulation tissue 22 1500   Pressure Injury Stage 3 22 1500   Antonette-wound Assessment Clean;Dry;Brown 22 1500   Wound Length (cm) 1 4 cm 22 1500   Wound Width (cm) 0 6 cm 22 1500   Wound Depth (cm) 0 2 cm 22 1500   Wound Surface Area (cm^2) 0 84 cm^2 22 1500   Wound Volume (cm^3) 0 168 cm^3 22 1500   Calculated Wound Volume (cm^3) 0 17 cm^3 22 1500   Change in Wound Size % 89 1 22 1500   Undermining is depth extending from -22 1419   Drainage Amount Moderate 22 1500 Drainage Description Bloody; Serous 12/13/22 1500   Non-staged Wound Description Full thickness 12/13/22 1500   Treatments Cleansed 12/13/22 1500   Patient Tolerance Tolerated well 12/13/22 1500                         Debridement   Wound 11/01/22 Venous Ulcer Other (Comment) Leg Left; Lower    Universal Protocol:  Consent: Verbal consent obtained  Risks and benefits: risks, benefits and alternatives were discussed  Consent given by: patient  Patient understanding: patient states understanding of the procedure being performed  Patient identity confirmed: verbally with patient      Performed by: physician  Debridement type: surgical  Level of debridement: subcutaneous tissue  Pain control: lidocaine 4%  Post-debridement measurements  Length (cm): 10  Width (cm): 5 6  Depth (cm): 0 5  Percent debrided: 50%  Surface Area (cm^2): 56  Area debrided (cm^2): 28  Volume (cm^3): 28  Tissue and other material debrided: subcutaneous tissue  Devitalized tissue debrided: biofilm, fibrin, slough and eschar  Instrument(s) utilized: curette and blade  Bleeding: small  Hemostasis obtained with: pressure  Response to treatment: procedure was tolerated well           Results from last 6 Months   Lab Units 09/24/22  1041   WOUND CULTURE  1+ Growth of Pseudomonas aeruginosa*         Wound Instructions:  Orders Placed This Encounter   Procedures   • Wound cleansing and dressings     Remove dressings from lower legs and heel ulcer  Wash lower legs and ulcers with soap and water  Pat dry  Right leg  Wrap with Coflex XL,  Apply ABD to shin  Change dressing weekly    Then apply Purachol AG to left lower leg ulcers and left heel ulcer then apply dry dressing, unna boot and coban  Cover with tubifast yellow  Change dressing 2 times per week and as needed for excessive drainage or leakage  This was done today  [facility] to change dressing/unna boot on Friday's  Follow up one week on Tuesday at the [address] [facility],[address]       Standing Status: Future     Standing Expiration Date:   04/47/2668   • Cast Application     This order was created via procedure documentation         Raiza Vargas DPM      Portions of the record may have been created with voice recognition software  Occasional wrong word or "sound a like" substitutions may have occurred due to the inherent limitations of voice recognition software  Read the chart carefully and recognize, using context, where substitutions have occurred

## 2022-12-13 NOTE — PROGRESS NOTES
Cast Application    Date/Time: 12/13/2022 3:45 PM  Performed by: Joslyn Yanez LPN  Authorized by: Shannon Villela DPM   Universal Protocol:  Consent given by: patient  Timeout called at: 12/13/2022 3:46 PM     Pre-procedure details:     Sensation:  Normal  Procedure details:     Laterality:  Right    Location:  Leg    Leg:  R lower legCast type:  Multi-layer compression short leg    Post-procedure details:     Pain:  Improved    Sensation:  Normal    Patient tolerance of procedure: Tolerated well, no immediate complications  Comments:      Multiplayer wrap procedure     Before application, JAIME and/or TBI determined to be adequate for healing and application of compression  Lower extremity washed prior to application of compression wrap  With the foot in dorsiflexed position, Multi layer was applied as per physician orders without complications or complaint of pain  The procedure was tolerated well  Toes warm & pink post application  Patient provided education & reinforced to observe toes for any discoloration, swelling or tingling and instructed when to report to the [address] [facility],[address] or to remove compression

## 2022-12-13 NOTE — PROGRESS NOTES
Cast Application    Date/Time: 12/13/2022 3:46 PM  Performed by: Claudetta Bible, LPN  Authorized by: Shawna Hampton DPM   Universal Protocol:  Consent given by: patient  Timeout called at: 12/13/2022 3:46 PM     Pre-procedure details:     Sensation:  Normal  Procedure details:     Laterality:  Left    Location:  Leg    Leg:  L lower leg    Strapping: no  Cast type:  Unna boot    Post-procedure details:     Pain:  Improved    Sensation:  Normal    Patient tolerance of procedure: Tolerated well, no immediate complications  Comments:      UNNA BOOT procedure     Before application, JAIME and/or TBI determined to be adequate for healing and application of compression  Lower extremity washed prior to application of compression wrap  With the foot in dorsiflexed position, Ashley Latch was applied as per physician orders without complications or complaint of pain  The procedure was tolerated well  Toes warm & pink post application    Patient provided education & reinforced to observe toes for any discoloration, swelling or tingling and instructed when to report to the [address] [facility],[address] or to remove compression

## 2022-12-13 NOTE — PROGRESS NOTES
Debridement   Wound 10/03/22 Heel Posterior; Left    Universal Protocol:  Consent: Verbal consent obtained    Risks and benefits: risks, benefits and alternatives were discussed  Consent given by: patient  Patient understanding: patient states understanding of the procedure being performed  Patient identity confirmed: verbally with patient      Performed by: physician  Debridement type: selective  Pain control: lidocaine 4%  Post-debridement measurements  Length (cm): 1 4  Width (cm): 0 6  Depth (cm): 0 2  Percent debrided: 100%  Surface Area (cm^2): 0 84  Area debrided (cm^2): 0 84  Volume (cm^3): 0 17  Devitalized tissue debrided: fibrin and slough  Instrument(s) utilized: curette  Bleeding: small  Hemostasis obtained with: pressure  Response to treatment: procedure was tolerated well

## 2022-12-13 NOTE — PATIENT INSTRUCTIONS
Orders Placed This Encounter   Procedures    Wound cleansing and dressings     Remove dressings from lower legs and heel ulcer  Wash lower legs and ulcers with soap and water  Pat dry  Right leg  Wrap with Coflex XL,  Apply ABD to shin  Change dressing weekly    Then apply Purachol AG to left lower leg ulcers and left heel ulcer then apply dry dressing, unna boot and coban  Cover with tubifast yellow  Change dressing 2 times per week and as needed for excessive drainage or leakage  This was done today  [facility] to change dressing/unna boot on Friday's  Follow up one week on Tuesday at the [address] [facility],[address]       Standing Status:   Future     Standing Expiration Date:   12/13/2023

## 2022-12-14 ENCOUNTER — HOME CARE VISIT (OUTPATIENT)
Dept: HOME HEALTH SERVICES | Facility: HOME HEALTHCARE | Age: 72
End: 2022-12-14

## 2022-12-14 PROCEDURE — 10330064 PAD, ABD 5X9" STR LF (1/PK 20PK/BX) MGM1

## 2022-12-14 PROCEDURE — 10330064 BANDAGE, COMPRSN COFLEX TLC 2LYR XLNG (2

## 2022-12-14 PROCEDURE — 10330064 DRESSING, WND DERMACOL AG W/COLLAGEN 4X4

## 2022-12-14 NOTE — CASE COMMUNICATION
Ship to  Pt  Home       ABD 5x9 [id number]     2    Dermacol Ag  NOT STOCKED  [id number]     2    COFLEX TLC XL NOT STOCKED [id number]      2

## 2022-12-16 ENCOUNTER — HOME CARE VISIT (OUTPATIENT)
Dept: HOME HEALTH SERVICES | Facility: HOME HEALTHCARE | Age: 72
End: 2022-12-16

## 2022-12-18 VITALS
RESPIRATION RATE: 18 BRPM | SYSTOLIC BLOOD PRESSURE: 110 MMHG | HEART RATE: 100 BPM | DIASTOLIC BLOOD PRESSURE: 76 MMHG | TEMPERATURE: 97.3 F | OXYGEN SATURATION: 100 %

## 2022-12-20 ENCOUNTER — OFFICE VISIT (OUTPATIENT)
Dept: WOUND CARE | Facility: CLINIC | Age: 72
End: 2022-12-20

## 2022-12-20 VITALS
RESPIRATION RATE: 20 BRPM | HEART RATE: 106 BPM | DIASTOLIC BLOOD PRESSURE: 79 MMHG | TEMPERATURE: 95.7 F | SYSTOLIC BLOOD PRESSURE: 124 MMHG

## 2022-12-20 DIAGNOSIS — I87.2 VENOUS STASIS ULCER OF OTHER PART OF LEFT LOWER LEG WITH FAT LAYER EXPOSED WITHOUT VARICOSE VEINS (HCC): Primary | ICD-10-CM

## 2022-12-20 DIAGNOSIS — L97.422 HEEL ULCER, LEFT, WITH FAT LAYER EXPOSED (HCC): ICD-10-CM

## 2022-12-20 DIAGNOSIS — L97.822 VENOUS STASIS ULCER OF OTHER PART OF LEFT LOWER LEG WITH FAT LAYER EXPOSED WITHOUT VARICOSE VEINS (HCC): Primary | ICD-10-CM

## 2022-12-20 RX ORDER — LIDOCAINE 40 MG/G
CREAM TOPICAL ONCE
Status: DISCONTINUED | OUTPATIENT
Start: 2022-12-20 | End: 2022-12-20

## 2022-12-20 NOTE — PATIENT INSTRUCTIONS
Orders Placed This Encounter   Procedures    Wound cleansing and dressings     Wound cleansing and dressings   Remove dressings from lower legs and heel ulcer  Wash lower legs and ulcers with soap and water  Pat dry  Right leg  Wrap with Coflex XL,  Apply ABD to shin  Change dressing weekly     Then apply Purachol AG to left lower leg ulcers and left heel ulcer then apply dry dressing, unna boot and coban  Cover with tubifast yellow  Change dressing 2 times per week and as needed for excessive drainage or leakage  This was done today  [facility] to change dressing/unna boot on Friday's  Follow up one week on Tuesday at the [address] [facility],[address]       Standing Status:   Future     Standing Expiration Date:   12/20/2023

## 2022-12-20 NOTE — PROGRESS NOTES
Patient ID: Brenton Madera is a 74 y o  male Date of Birth 1950     Diagnosis:  1  Venous stasis ulcer of other part of left lower leg with fat layer exposed without varicose veins (HCC)  -     Wound cleansing and dressings; Future  -     lidocaine (LMX) 4 % cream  -     Debridement    2  Heel ulcer, left, with fat layer exposed ([address] )  -     Wound cleansing and dressings; Future  -     lidocaine (LMX) 4 % cream       Diagnosis ICD-10-CM Associated Orders   1  Venous stasis ulcer of other part of left lower leg with fat layer exposed without varicose veins (HCC)  I87 2 Wound cleansing and dressings    L97 822 lidocaine (LMX) 4 % cream     Debridement      2  Heel ulcer, left, with fat layer exposed ([address] )  L97 422 Wound cleansing and dressings     lidocaine (LMX) 4 % cream           Assessment & Plan:  1   Left lower extremity venous ulcerations  2  Bilateral lower extremity venous insufficiency        - Wounds debrided as below   - Wound are now granular will change dressing to Puracol Ag and UNNA boot left lower extremity  Lower extremity ulcer healed I recommend Coflex light compression  - Continue leg elevation to B/l LE   - Return in 1 week     Chief Complaint   Patient presents with   • Follow Up Wound Care Visit     BLLE wounds           Subjective:   Patient presents for continued wound treatment to the left lower extremity  Denies any other complaints at this time        The following portions of the patient's history were reviewed and updated as appropriate:   Patient Active Problem List   Diagnosis   • Gout   • Gait abnormality   • Lumbar stenosis   • Polyneuropathy   • Lower extremity weakness   • Lumbosacral plexopathy   • Elevated troponin   • EDGAR (dyspnea on exertion)   • Rash due to vaculitis    • LYLY (acute kidney injury) ([address] )   • Depression   • Multiple open wounds of lower leg   • Ambulatory dysfunction   • Leucocytosis   • Bloating     Past Medical History:   Diagnosis Date   • Depression • Gout      Past Surgical History:   Procedure Laterality Date   • BACK SURGERY      X2 LUMBAR INCLUDING FUSION  , 2015 WITH OAA, [facility] DR Leanne Valdez   • CATARACT EXTRACTION, BILATERAL     • KNEE ARTHROSCOPY Right     x2 , meniscus repair   • TONSILECTOMY AND ADNOIDECTOMY       Social History     Socioeconomic History   • Marital status: /Civil Union     Spouse name: Louise   • Number of children: 3   • Years of education: 14   • Highest education level: Not on file   Occupational History   • Not on file   Tobacco Use   • Smoking status: Former     Types: Cigarettes     Quit date:      Years since quittin 9   • Smokeless tobacco: Never   Vaping Use   • Vaping Use: Never used   Substance and Sexual Activity   • Alcohol use: Not Currently     Comment: occasional   • Drug use: Never   • Sexual activity: Not on file   Other Topics Concern   • Not on file   Social History Narrative   • Not on file     Social Determinants of Health     Financial Resource Strain: Not on file   Food Insecurity: No Food Insecurity   • Worried About Running Out of Food in the Last Year: Never true   • Ran Out of Food in the Last Year: Never true   Transportation Needs: No Transportation Needs   • Lack of Transportation (Medical): No   • Lack of Transportation (Non-Medical):  No   Physical Activity: Not on file   Stress: Not on file   Social Connections: Not on file   Intimate Partner Violence: Not on file   Housing Stability: Unknown   • Unable to Pay for Housing in the Last Year: Not on file   • Number of Places Lived in the Last Year: 1   • Unstable Housing in the Last Year: No        Current Outpatient Medications:   •  allopurinol (ZYLOPRIM) 100 mg tablet, Take 100 mg by mouth 2 (two) times a day, Disp: , Rfl:   •  cephalexin (KEFLEX) 500 mg capsule, Take 500 mg by mouth every 8 (eight) hours (Patient not taking: Reported on 11/10/2022), Disp: , Rfl:   •  cholecalciferol (VITAMIN D3) 1,000 units tablet, Take 2,000 Units by mouth daily, Disp: , Rfl:   •  collagenase (SANTYL) ointment, Apply topically daily Wound 1: 10cm x15cm Wound 2: 10cm x10cm, Disp: 15 g, Rfl: 0  •  famotidine (PEPCID) 20 mg tablet, Take 1 tablet (20 mg total) by mouth daily Do not start before October 6, 2022 , Disp: 30 tablet, Rfl: 0  •  FLUoxetine (PROzac) 20 mg capsule, Take 20 mg by mouth daily , Disp: , Rfl:   •  loratadine (CLARITIN) 10 mg tablet, Take 10 mg by mouth as needed for allergies, Disp: , Rfl:   •  Magnesium 100 MG CAPS, Take 1 capsule by mouth daily, Disp: , Rfl:   •  meloxicam (MOBIC) 15 mg tablet, Take 15 mg by mouth daily, Disp: , Rfl:   •  Misc Natural Products (OSTEO BI-FLEX/5-LOXIN ADVANCED PO), Take 1 tablet by mouth daily , Disp: , Rfl:   •  Multiple Vitamins-Minerals (CENTRUM SILVER 50+MEN PO), Take 1 tablet by mouth daily, Disp: , Rfl:   •  mupirocin (BACTROBAN) 2 % ointment, Apply topically 2 (two) times a day Apply topically twice a day or as needed after every dressing change on lower legs (Patient not taking: No sig reported), Disp: 30 g, Rfl: 11  •  predniSONE 20 mg tablet, Take 20 mg by mouth daily, Disp: , Rfl:   •  simethicone (MYLICON) 80 mg chewable tablet, Chew 1 tablet (80 mg total) every 6 (six) hours as needed for flatulence, Disp: 30 tablet, Rfl: 0  •  SUPER B COMPLEX/C PO, Take 1 tablet by mouth daily, Disp: , Rfl:   •  torsemide (DEMADEX) 10 mg tablet, Take 1 tablet (10 mg total) by mouth daily Do not start before October 6, 2022 , Disp: 30 tablet, Rfl: 0  •  triamcinolone (KENALOG) 0 1 % ointment, Apply topically 2 (two) times a day Only apply to rash, do not apply to necrotic tissue, Disp: 30 g, Rfl: 0    Current Facility-Administered Medications:   •  lidocaine (LMX) 4 % cream, , Topical, Once, Rodger Martinez DPM  Family History   Problem Relation Age of Onset   • Diabetes Father    • Diabetes Brother    • Diabetes Sister       Review of Systems   Constitutional: Negative for chills     Respiratory: Negative for shortness of breath  Gastrointestinal: Negative for vomiting  Musculoskeletal: Negative for arthralgias  Skin: Positive for color change and wound  Neurological: Negative for dizziness  Psychiatric/Behavioral: Negative for agitation  Allergies: Other and Penicillins      Objective:  /79   Pulse (!) 106   Temp (!) 95 7 °F (35 4 °C)   Resp 20     Physical Exam  Vitals reviewed  Musculoskeletal:      Right lower le+ Edema present  Left lower le+ Edema present  Comments: Left lower leg and feet multiple small ulcerations probable to subcutaneous tissue with mixture of granular and fibrotic wound base  The ulcers appeared to be healing well  Right lower extremity ulcer healed  Wound 10/03/22 Heel Posterior; Left (Active)   Wound Image   22 1505   Wound Description Pink;Yellow;Granulation tissue; Epithelialization;Slough 22 1506   Antonette-wound Assessment Intact;Dry 22 1506   Wound Length (cm) 1 cm 22 1506   Wound Width (cm) 0 2 cm 22 1506   Wound Depth (cm) 0 2 cm 22 1506   Wound Surface Area (cm^2) 0 2 cm^2 22 1506   Wound Volume (cm^3) 0 04 cm^3 22 1506   Calculated Wound Volume (cm^3) 0 04 cm^3 22 1506   Drainage Amount Small 22 1506   Drainage Description Sanguineous 22 1506   Non-staged Wound Description Full thickness 22 1506   Treatments Irrigation with NSS 22 1506       Wound 10/03/22 Pretibial Right (Active)       Wound 10/03/22 Coccyx (Active)       Wound 10/03/22 Tibial Left;Posterior (Active)   Wound Image   22 1504   Wound Description Epithelialization;Pink;Yellow;Slough;Granulation tissue 22 1504   Antonette-wound Assessment Hyperpigmented;Scaly 22 1504   Wound Length (cm) 10 5 cm 22 1504   Wound Width (cm) 5 5 cm 22 1504   Wound Depth (cm) 0 2 cm 22 1504   Wound Surface Area (cm^2) 57 75 cm^2 22 1504   Wound Volume (cm^3) 11 55 cm^3 12/20/22 1504   Calculated Wound Volume (cm^3) 11 55 cm^3 12/20/22 1504   Drainage Amount Small 12/20/22 1504   Drainage Description Serosanguineous 12/20/22 1504   Non-staged Wound Description Full thickness 12/20/22 1504   Treatments Irrigation with NSS 12/20/22 1504       Wound 11/01/22 Venous Ulcer Other (Comment) Leg Left; Lower (Active)   Wound Image   12/20/22 1516   Wound Description Epithelialization;Pink;Yellow;Slough 12/13/22 1500   Antonette-wound Assessment Purple;Dry 12/13/22 1500   Wound Length (cm) 10 cm 12/13/22 1500   Wound Width (cm) 5 6 cm 12/13/22 1500   Wound Depth (cm) 0 4 cm 12/13/22 1500   Wound Surface Area (cm^2) 56 cm^2 12/13/22 1500   Wound Volume (cm^3) 22 4 cm^3 12/13/22 1500   Calculated Wound Volume (cm^3) 22 4 cm^3 12/13/22 1500   Change in Wound Size % 87 15 12/13/22 1500   Drainage Amount Moderate 12/13/22 1500   Drainage Description Serosanguineous 12/13/22 1500   Non-staged Wound Description Full thickness 12/13/22 1500   Treatments Cleansed 12/13/22 1500   Patient Tolerance Tolerated well 12/13/22 1500       Wound 11/01/22 Pressure Injury Heel Left (Active)   Wound Image   12/13/22 1520   Wound Description Slough;Granulation tissue 12/13/22 1500   Pressure Injury Stage 3 12/13/22 1500   Antonette-wound Assessment Clean;Dry;Brown 12/13/22 1500   Wound Length (cm) 1 4 cm 12/13/22 1500   Wound Width (cm) 0 6 cm 12/13/22 1500   Wound Depth (cm) 0 2 cm 12/13/22 1500   Wound Surface Area (cm^2) 0 84 cm^2 12/13/22 1500   Wound Volume (cm^3) 0 168 cm^3 12/13/22 1500   Calculated Wound Volume (cm^3) 0 17 cm^3 12/13/22 1500   Change in Wound Size % 89 1 12/13/22 1500   Undermining is depth extending from 12-12 11/01/22 1419   Drainage Amount Moderate 12/13/22 1500   Drainage Description Bloody; Serous 12/13/22 1500   Non-staged Wound Description Full thickness 12/13/22 1500   Treatments Cleansed 12/13/22 1500   Patient Tolerance Tolerated well 12/13/22 1500                         Debridement Universal Protocol:  Consent: Verbal consent obtained  Risks and benefits: risks, benefits and alternatives were discussed  Consent given by: patient  Patient understanding: patient states understanding of the procedure being performed  Patient identity confirmed: verbally with patient      Performed by: physician  Debridement type: surgical  Level of debridement: subcutaneous tissue  Pain control: lidocaine 4%  Post-debridement measurements  Length (cm): 10  Width (cm): 5 6  Depth (cm): 0 4  Percent debrided: 40%  Surface Area (cm^2): 56  Area debrided (cm^2): 22 4  Volume (cm^3): 22 4  Tissue and other material debrided: subcutaneous tissue  Devitalized tissue debrided: biofilm, fibrin and slough  Instrument(s) utilized: curette  Bleeding: small  Hemostasis obtained with: pressure  Response to treatment: procedure was tolerated well           Results from last 6 Months   Lab Units 09/24/22  1041   WOUND CULTURE  1+ Growth of Pseudomonas aeruginosa*         Wound Instructions:  Orders Placed This Encounter   Procedures   • Wound cleansing and dressings     Wound cleansing and dressings   Remove dressings from lower legs and heel ulcer  Wash lower legs and ulcers with soap and water  Pat dry       Right leg  Wrap with Coflex XL,  Apply ABD to shin  Change dressing weekly     Then apply Purachol AG to left lower leg ulcers and left heel ulcer then apply dry dressing, unna boot and coban  Cover with tubifast yellow  Change dressing 2 times per week and as needed for excessive drainage or leakage  This was done today       [facility] to change dressing/unna boot on Friday's       Follow up one week on Tuesday at the [address] [facility],[address]  Standing Status:   Future     Standing Expiration Date:   12/20/2023   • Debridement     This order was created via procedure documentation         Leif Valdes DPM      Portions of the record may have been created with voice recognition software   Occasional wrong word or "sound a like" substitutions may have occurred due to the inherent limitations of voice recognition software  Read the chart carefully and recognize, using context, where substitutions have occurred

## 2022-12-20 NOTE — PROGRESS NOTES
Debridement   Wound 10/03/22 Heel Posterior; Left    Universal Protocol:  Consent: Verbal consent obtained    Risks and benefits: risks, benefits and alternatives were discussed  Consent given by: patient  Patient understanding: patient states understanding of the procedure being performed  Patient identity confirmed: verbally with patient      Performed by: physician  Debridement type: selective  Pain control: lidocaine 4%  Post-debridement measurements  Length (cm): 1 4  Width (cm): 0 6  Depth (cm): 0 2  Percent debrided: 100%  Surface Area (cm^2): 0 84  Area debrided (cm^2): 0 84  Volume (cm^3): 0 17  Devitalized tissue debrided: biofilm, callus, fibrin and slough  Instrument(s) utilized: blade  Bleeding: small  Hemostasis obtained with: pressure  Procedural pain (0-10): insensate  Post-procedural pain: insensate   Response to treatment: procedure was tolerated well

## 2022-12-23 ENCOUNTER — HOME CARE VISIT (OUTPATIENT)
Dept: HOME HEALTH SERVICES | Facility: HOME HEALTHCARE | Age: 72
End: 2022-12-23

## 2022-12-26 VITALS
OXYGEN SATURATION: 97 % | HEART RATE: 102 BPM | RESPIRATION RATE: 18 BRPM | TEMPERATURE: 97.3 F | SYSTOLIC BLOOD PRESSURE: 108 MMHG | DIASTOLIC BLOOD PRESSURE: 68 MMHG

## 2022-12-27 ENCOUNTER — OFFICE VISIT (OUTPATIENT)
Dept: WOUND CARE | Facility: CLINIC | Age: 72
End: 2022-12-27

## 2022-12-27 VITALS
RESPIRATION RATE: 20 BRPM | DIASTOLIC BLOOD PRESSURE: 73 MMHG | HEART RATE: 119 BPM | TEMPERATURE: 97.8 F | SYSTOLIC BLOOD PRESSURE: 152 MMHG

## 2022-12-27 DIAGNOSIS — L97.422 HEEL ULCER, LEFT, WITH FAT LAYER EXPOSED (HCC): ICD-10-CM

## 2022-12-27 DIAGNOSIS — L97.822 VENOUS STASIS ULCER OF OTHER PART OF LEFT LOWER LEG WITH FAT LAYER EXPOSED WITHOUT VARICOSE VEINS (HCC): Primary | ICD-10-CM

## 2022-12-27 DIAGNOSIS — I77.6 VASCULITIS (HCC): ICD-10-CM

## 2022-12-27 DIAGNOSIS — I87.2 VENOUS STASIS ULCER OF OTHER PART OF LEFT LOWER LEG WITH FAT LAYER EXPOSED WITHOUT VARICOSE VEINS (HCC): Primary | ICD-10-CM

## 2022-12-28 ENCOUNTER — HOME CARE VISIT (OUTPATIENT)
Dept: HOME HEALTH SERVICES | Facility: HOME HEALTHCARE | Age: 72
End: 2022-12-28

## 2022-12-28 PROCEDURE — 10330064

## 2022-12-28 NOTE — PROGRESS NOTES
Patient ID: Leyda Cardozo is a 74 y o  male Date of Birth 1950       Chief Complaint   Patient presents with   • Follow Up Wound Care Visit       Allergies: Other and Penicillins    Diagnosis:   Diagnosis ICD-10-CM Associated Orders   1  Venous stasis ulcer of other part of left lower leg with fat layer exposed without varicose veins ([facility])  I87 2 Wound cleansing and dressings    L97 822 Debridement      2  Vasculitis ([facility])  I77 6       3  Heel ulcer, left, with fat layer exposed ([facility] )  L97 422 Wound cleansing and dressings           Assessment  & Plan:    • L heel wound is now healed  There is no drainage from area or signs of acute infection present  No dressing needed  Avoid excess pressure to the area  • LLE wounds related to vasculitis  Multiple scattered areas remain open  Wound beds with exudate and fibrogranular tissue  No signs of acute infection  o Selective debridement, as below  o Hydrofera blue to open wounds  Continue with Unna boot for compression of LLE  2+ palpable pulses of LE    o Elevate leg when resting  Avoid prolonged standing  • Previous RLE wounds remain healed  Spandagrip for maintenance compression  • Call wound center if any concerns in meantime  F/u with Dr Mahsa Simon in one week  Subjective:   12/27/22: Pt presents for f/u of wounds on LLE related to vasculitis  Coflex lite being used on RLE for maintenance compression  Puracol with Unna boot ordered to be used on wounds of the LLE  Pt arrived with piece of masxorb to wound of LLE that was stuck on           The following portions of the patient's history were reviewed and updated as appropriate:   Patient Active Problem List   Diagnosis   • Gout   • Gait abnormality   • Lumbar stenosis   • Polyneuropathy   • Lower extremity weakness   • Lumbosacral plexopathy   • Elevated troponin   • EDGAR (dyspnea on exertion)   • Rash due to vaculitis    • LYLY (acute kidney injury) ([facility] )   • Depression   • Multiple open wounds of lower leg   • Ambulatory dysfunction   • Leucocytosis   • Bloating     Past Medical History:   Diagnosis Date   • Depression    • Gout      Past Surgical History:   Procedure Laterality Date   • BACK SURGERY      X2 LUMBAR INCLUDING FUSION  ,  WITH OAA, [facility] DR Lara Maurer   • CATARACT EXTRACTION, BILATERAL     • KNEE ARTHROSCOPY Right     x2 , meniscus repair   • TONSILECTOMY AND ADNOIDECTOMY       Family History   Problem Relation Age of Onset   • Diabetes Father    • Diabetes Brother    • Diabetes Sister      Social History     Socioeconomic History   • Marital status: /Civil Union     Spouse name: Flaquito Hammond   • Number of children: 3   • Years of education: 14   • Highest education level: Not on file   Occupational History   • Not on file   Tobacco Use   • Smoking status: Former     Types: Cigarettes     Quit date:      Years since quittin 0   • Smokeless tobacco: Never   Vaping Use   • Vaping Use: Never used   Substance and Sexual Activity   • Alcohol use: Not Currently     Comment: occasional   • Drug use: Never   • Sexual activity: Not on file   Other Topics Concern   • Not on file   Social History Narrative   • Not on file     Social Determinants of Health     Financial Resource Strain: Not on file   Food Insecurity: No Food Insecurity   • Worried About Running Out of Food in the Last Year: Never true   • Ran Out of Food in the Last Year: Never true   Transportation Needs: No Transportation Needs   • Lack of Transportation (Medical): No   • Lack of Transportation (Non-Medical):  No   Physical Activity: Not on file   Stress: Not on file   Social Connections: Not on file   Intimate Partner Violence: Not on file   Housing Stability: Unknown   • Unable to Pay for Housing in the Last Year: Not on file   • Number of Places Lived in the Last Year: 1   • Unstable Housing in the Last Year: No       Current Outpatient Medications:   •  allopurinol (ZYLOPRIM) 100 mg tablet, Take 100 mg by mouth 2 (two) times a day, Disp: , Rfl:   •  cephalexin (KEFLEX) 500 mg capsule, Take 500 mg by mouth every 8 (eight) hours (Patient not taking: Reported on 11/10/2022), Disp: , Rfl:   •  cholecalciferol (VITAMIN D3) 1,000 units tablet, Take 2,000 Units by mouth daily, Disp: , Rfl:   •  collagenase (SANTYL) ointment, Apply topically daily Wound 1: 10cm x15cm Wound 2: 10cm x10cm, Disp: 15 g, Rfl: 0  •  famotidine (PEPCID) 20 mg tablet, Take 1 tablet (20 mg total) by mouth daily Do not start before October 6, 2022 , Disp: 30 tablet, Rfl: 0  •  FLUoxetine (PROzac) 20 mg capsule, Take 20 mg by mouth daily , Disp: , Rfl:   •  loratadine (CLARITIN) 10 mg tablet, Take 10 mg by mouth as needed for allergies, Disp: , Rfl:   •  Magnesium 100 MG CAPS, Take 1 capsule by mouth daily, Disp: , Rfl:   •  meloxicam (MOBIC) 15 mg tablet, Take 15 mg by mouth daily, Disp: , Rfl:   •  Misc Natural Products (OSTEO BI-FLEX/5-LOXIN ADVANCED PO), Take 1 tablet by mouth daily , Disp: , Rfl:   •  Multiple Vitamins-Minerals (CENTRUM SILVER 50+MEN PO), Take 1 tablet by mouth daily, Disp: , Rfl:   •  mupirocin (BACTROBAN) 2 % ointment, Apply topically 2 (two) times a day Apply topically twice a day or as needed after every dressing change on lower legs (Patient not taking: No sig reported), Disp: 30 g, Rfl: 11  •  predniSONE 20 mg tablet, Take 20 mg by mouth daily, Disp: , Rfl:   •  simethicone (MYLICON) 80 mg chewable tablet, Chew 1 tablet (80 mg total) every 6 (six) hours as needed for flatulence, Disp: 30 tablet, Rfl: 0  •  SUPER B COMPLEX/C PO, Take 1 tablet by mouth daily, Disp: , Rfl:   •  torsemide (DEMADEX) 10 mg tablet, Take 1 tablet (10 mg total) by mouth daily Do not start before October 6, 2022 , Disp: 30 tablet, Rfl: 0  •  triamcinolone (KENALOG) 0 1 % ointment, Apply topically 2 (two) times a day Only apply to rash, do not apply to necrotic tissue, Disp: 30 g, Rfl: 0    Review of Systems   Constitutional: Negative for chills and fever  Musculoskeletal: Negative for arthralgias  Skin: Positive for wound  Psychiatric/Behavioral: Negative for agitation  Objective:  /73   Pulse (!) 119   Temp 97 8 °F (36 6 °C)   Resp 20   Pain Score: 0-No pain     Physical Exam  Vitals reviewed  Constitutional:       General: He is not in acute distress  Appearance: He is obese  He is ill-appearing  Cardiovascular:      Rate and Rhythm: Tachycardia present  Pulses:           Dorsalis pedis pulses are 2+ on the left side  Posterior tibial pulses are 2+ on the left side  Pulmonary:      Effort: Pulmonary effort is normal  No respiratory distress  Musculoskeletal:      Right lower le+ Edema present  Left lower le+ Edema present  Skin:     General: Skin is warm and dry  Findings: Wound (LLE ) present  Comments: L heel wound now healed  No drainage from site  No signs of acute infection  Left lower leg and feet multiple small ulcerations probable to subcutaneous tissue one with exudate, others with fibrogranular tissue  No signs of acute infection see full assessment  Right lower extremity remains healed  Neurological:      Mental Status: He is alert  Wound 22 Venous Ulcer Other (Comment) Leg Left; Lower (Active)   Wound Image   22 151   Wound Description Epithelialization;Pink;Yellow;Slough 22 151   Antonette-wound Assessment Purple;Dry 22 1512   Wound Length (cm) 10 cm 22 1512   Wound Width (cm) 5 6 cm 22 151   Wound Depth (cm) 0 2 cm 22 151   Wound Surface Area (cm^2) 56 cm^2 22 1512   Wound Volume (cm^3) 11 2 cm^3 22 1512   Calculated Wound Volume (cm^3) 11 2 cm^3 22 1512   Change in Wound Size % 93 57 22 1512   Drainage Amount Moderate 22 1512   Drainage Description Serosanguineous 22 1512   Non-staged Wound Description Full thickness 22 1512   Treatments Cleansed 22 151   Patient Tolerance Tolerated well 12/27/22 1512       Wound 11/01/22 Pressure Injury Heel Left (Active)   Wound Image   12/27/22 1513   Wound Description Epithelialization 12/27/22 1513   Pressure Injury Stage 3 12/13/22 1500   Antonette-wound Assessment Clean;Dry;Brown 12/27/22 1513   Wound Length (cm) 0 cm 12/27/22 1513   Wound Width (cm) 0 cm 12/27/22 1513   Wound Depth (cm) 0 cm 12/27/22 1513   Wound Surface Area (cm^2) 0 cm^2 12/27/22 1513   Wound Volume (cm^3) 0 cm^3 12/27/22 1513   Calculated Wound Volume (cm^3) 0 cm^3 12/27/22 1513   Change in Wound Size % 100 12/27/22 1513   Undermining is depth extending from 12-12 11/01/22 1419   Drainage Amount None 12/27/22 1513   Drainage Description Bloody; Serous 12/13/22 1500   Non-staged Wound Description Full thickness 12/13/22 1500   Treatments Cleansed 12/27/22 1513   Patient Tolerance Tolerated well 12/27/22 1513            Debridement   Wound 11/01/22 Venous Ulcer Other (Comment) Leg Left; Lower    Universal Protocol:  Consent: Verbal consent obtained  Consent given by: patient  Time out: Immediately prior to procedure a "time out" was called to verify the correct patient, procedure, equipment, support staff and site/side marked as required    Patient understanding: patient states understanding of the procedure being performed  Patient identity confirmed: verbally with patient      Performed by: PA  Debridement type: selective  Pain control: lidocaine 4%  Post-debridement measurements  Length (cm): 10  Width (cm): 5 6  Depth (cm): 0 2  Percent debrided: 10%  Surface Area (cm^2): 56  Area debrided (cm^2): 5 6  Volume (cm^3): 11 2  Tissue and other material debrided: portions of epidermis removed with dried maxsorb  Devitalized tissue debrided: exudate and portions of epidermis removed with dried maxsorb  Instrument(s) utilized: curette  Bleeding: small  Hemostasis obtained with: pressure  Procedural pain (0-10): 0  Post-procedural pain: 0   Response to treatment: procedure was tolerated well           Results from last 6 Months   Lab Units 09/24/22  1041   WOUND CULTURE  1+ Growth of Pseudomonas aeruginosa*           Wound Instructions:  Orders Placed This Encounter   Procedures   • Wound cleansing and dressings     Remove dressings from lower legs and heel ulcer  Wash lower legs and ulcers with soap and water  Pat dry        Elastic Tubular Stocking-F    Tubular elastic bandage: Apply to right lower leg from base of toes to behind the knee  Apply every morining, may remove for sleep  Avoid prolonged standing in one place  Elevate leg(s) above the level of the heart when sitting or as much as possible  Apply HydroFera Blue to left lower leg ulcers, then apply dry dressing, unna boot and coban  Cover with tubifast yellow  Change dressing 2 times per week and as needed for excessive drainage or leakage  This was done today        [facility] to change dressing/unna boot on Friday's        Follow up one week on Tuesday at the [address] [facility],[address]  Standing Status:   Future     Standing Expiration Date:   12/27/2023   • Debridement     This order was created via procedure documentation       Cally Nga Laws, PA-C        Portions of the record may have been created with voice recognition software  Occasional wrong word or "sound alike" substitutions may have occurred due to the inherent limitations of voice recognition software  Read the chart carefully and recognize, using context, where substitutions have occurred

## 2022-12-30 ENCOUNTER — HOME CARE VISIT (OUTPATIENT)
Dept: HOME HEALTH SERVICES | Facility: HOME HEALTHCARE | Age: 72
End: 2022-12-30

## 2023-01-02 VITALS
OXYGEN SATURATION: 96 % | DIASTOLIC BLOOD PRESSURE: 62 MMHG | HEART RATE: 100 BPM | TEMPERATURE: 97.7 F | SYSTOLIC BLOOD PRESSURE: 110 MMHG | RESPIRATION RATE: 18 BRPM

## 2023-01-03 ENCOUNTER — HOME CARE VISIT (OUTPATIENT)
Dept: HOME HEALTH SERVICES | Facility: HOME HEALTHCARE | Age: 73
End: 2023-01-03

## 2023-01-03 ENCOUNTER — OFFICE VISIT (OUTPATIENT)
Dept: WOUND CARE | Facility: CLINIC | Age: 73
End: 2023-01-03

## 2023-01-03 VITALS
DIASTOLIC BLOOD PRESSURE: 83 MMHG | SYSTOLIC BLOOD PRESSURE: 135 MMHG | TEMPERATURE: 97.5 F | HEART RATE: 104 BPM | RESPIRATION RATE: 18 BRPM

## 2023-01-03 DIAGNOSIS — I87.2 VENOUS STASIS ULCER OF OTHER PART OF LEFT LOWER LEG WITH FAT LAYER EXPOSED WITHOUT VARICOSE VEINS (HCC): Primary | ICD-10-CM

## 2023-01-03 DIAGNOSIS — L97.822 VENOUS STASIS ULCER OF OTHER PART OF LEFT LOWER LEG WITH FAT LAYER EXPOSED WITHOUT VARICOSE VEINS (HCC): Primary | ICD-10-CM

## 2023-01-03 PROCEDURE — 10330064

## 2023-01-03 PROCEDURE — 10330064 UNNA BOOT, 4" (1/BX 12BX/CS)  MGM16

## 2023-01-03 NOTE — PROGRESS NOTES
Patient ID: Diamond Candelario is a 74 y o  male Date of Birth 1950     Diagnosis:  1  Venous stasis ulcer of other part of left lower leg with fat layer exposed without varicose veins (HCC)  -     Wound cleansing and dressings; Future       Diagnosis ICD-10-CM Associated Orders   1  Venous stasis ulcer of other part of left lower leg with fat layer exposed without varicose veins (HCC)  I87 2 Wound cleansing and dressings    L97 822            Assessment & Plan:  1   Left lower extremity venous ulcerations  2   Bilateral lower extremity venous insufficiency        - Wounds debrided as below   - Wound are now granular will change dressing to hydrofera blue and UNNA boot left lower extremity     - Continue leg elevation to B/l LE   -Discussed importance of proper protein intake for wound healing   - Return in 1 week        Chief Complaint   Patient presents with   • Follow Up Wound Care Visit           Subjective:   Patient presents for continued wound treatment to the left lower extremity complicated by venous insufficiency and vasculitis  Denies any other complaints at this time        The following portions of the patient's history were reviewed and updated as appropriate:   Patient Active Problem List   Diagnosis   • Gout   • Gait abnormality   • Lumbar stenosis   • Polyneuropathy   • Lower extremity weakness   • Lumbosacral plexopathy   • Elevated troponin   • EDGAR (dyspnea on exertion)   • Rash due to vaculitis    • LYLY (acute kidney injury) ([facility] [address] )   • Depression   • Multiple open wounds of lower leg   • Ambulatory dysfunction   • Leucocytosis   • Bloating     Past Medical History:   Diagnosis Date   • Depression    • Gout      Past Surgical History:   Procedure Laterality Date   • BACK SURGERY      X2 LUMBAR INCLUDING FUSION  2004, 2015 WITH [facility], [facility] DR Dasha Menjivar   • CATARACT EXTRACTION, BILATERAL     • KNEE ARTHROSCOPY Right     x2 , meniscus repair   • TONSILECTOMY AND ADNOIDECTOMY       Social History Socioeconomic History   • Marital status: /Civil Union     Spouse name: Jolie Soares   • Number of children: 3   • Years of education: 14   • Highest education level: Not on file   Occupational History   • Not on file   Tobacco Use   • Smoking status: Former     Types: Cigarettes     Quit date:      Years since quittin 0   • Smokeless tobacco: Never   Vaping Use   • Vaping Use: Never used   Substance and Sexual Activity   • Alcohol use: Not Currently     Comment: occasional   • Drug use: Never   • Sexual activity: Not on file   Other Topics Concern   • Not on file   Social History Narrative   • Not on file     Social Determinants of Health     Financial Resource Strain: Not on file   Food Insecurity: No Food Insecurity   • Worried About Running Out of Food in the Last Year: Never true   • Ran Out of Food in the Last Year: Never true   Transportation Needs: No Transportation Needs   • Lack of Transportation (Medical): No   • Lack of Transportation (Non-Medical):  No   Physical Activity: Not on file   Stress: Not on file   Social Connections: Not on file   Intimate Partner Violence: Not on file   Housing Stability: Unknown   • Unable to Pay for Housing in the Last Year: Not on file   • Number of Places Lived in the Last Year: 1   • Unstable Housing in the Last Year: No        Current Outpatient Medications:   •  allopurinol (ZYLOPRIM) 100 mg tablet, Take 100 mg by mouth 2 (two) times a day, Disp: , Rfl:   •  cephalexin (KEFLEX) 500 mg capsule, Take 500 mg by mouth every 8 (eight) hours (Patient not taking: Reported on 11/10/2022), Disp: , Rfl:   •  cholecalciferol (VITAMIN D3) 1,000 units tablet, Take 2,000 Units by mouth daily, Disp: , Rfl:   •  collagenase (SANTYL) ointment, Apply topically daily Wound 1: 10cm x15cm Wound 2: 10cm x10cm, Disp: 15 g, Rfl: 0  •  famotidine (PEPCID) 20 mg tablet, Take 1 tablet (20 mg total) by mouth daily Do not start before 2022 , Disp: 30 tablet, Rfl: 0  • FLUoxetine (PROzac) 20 mg capsule, Take 20 mg by mouth daily , Disp: , Rfl:   •  loratadine (CLARITIN) 10 mg tablet, Take 10 mg by mouth as needed for allergies, Disp: , Rfl:   •  Magnesium 100 MG CAPS, Take 1 capsule by mouth daily, Disp: , Rfl:   •  meloxicam (MOBIC) 15 mg tablet, Take 15 mg by mouth daily, Disp: , Rfl:   •  Misc Natural Products (OSTEO BI-FLEX/5-LOXIN ADVANCED PO), Take 1 tablet by mouth daily , Disp: , Rfl:   •  Multiple Vitamins-Minerals (CENTRUM SILVER 50+MEN PO), Take 1 tablet by mouth daily, Disp: , Rfl:   •  mupirocin (BACTROBAN) 2 % ointment, Apply topically 2 (two) times a day Apply topically twice a day or as needed after every dressing change on lower legs (Patient not taking: No sig reported), Disp: 30 g, Rfl: 11  •  predniSONE 20 mg tablet, Take 20 mg by mouth daily, Disp: , Rfl:   •  simethicone (MYLICON) 80 mg chewable tablet, Chew 1 tablet (80 mg total) every 6 (six) hours as needed for flatulence, Disp: 30 tablet, Rfl: 0  •  SUPER B COMPLEX/C PO, Take 1 tablet by mouth daily, Disp: , Rfl:   •  torsemide (DEMADEX) 10 mg tablet, Take 1 tablet (10 mg total) by mouth daily Do not start before 2022 , Disp: 30 tablet, Rfl: 0  •  triamcinolone (KENALOG) 0 1 % ointment, Apply topically 2 (two) times a day Only apply to rash, do not apply to necrotic tissue, Disp: 30 g, Rfl: 0  Family History   Problem Relation Age of Onset   • Diabetes Father    • Diabetes Brother    • Diabetes Sister       Review of Systems   All other systems reviewed and are negative  Allergies: Other and Penicillins      Objective:  /83   Pulse 104   Temp 97 5 °F (36 4 °C)   Resp 18     Physical Exam  Vitals reviewed  Musculoskeletal:      Right lower le+ Edema present  Left lower le+ Edema present  Comments: Left lower leg small ulcerations probable to subcutaneous tissue with mixture of granular and fibrotic wound base  The ulcers appeared to be healing well    Left heel ulcer healed  Right lower extremity ulcer healed  Wound 10/03/22 Heel Posterior; Left (Active)   Wound Image   12/20/22 1505   Wound Description Pink;Yellow;Granulation tissue; Epithelialization;Slough 12/20/22 1506   Antonette-wound Assessment Intact;Dry 12/20/22 1506   Wound Length (cm) 1 cm 12/20/22 1506   Wound Width (cm) 0 2 cm 12/20/22 1506   Wound Depth (cm) 0 2 cm 12/20/22 1506   Wound Surface Area (cm^2) 0 2 cm^2 12/20/22 1506   Wound Volume (cm^3) 0 04 cm^3 12/20/22 1506   Calculated Wound Volume (cm^3) 0 04 cm^3 12/20/22 1506   Drainage Amount Small 12/20/22 1506   Drainage Description Sanguineous 12/20/22 1506   Non-staged Wound Description Full thickness 12/20/22 1506   Treatments Irrigation with NSS 12/20/22 1506       Wound 10/03/22 Coccyx (Active)       Wound 10/03/22 Tibial Left;Posterior (Active)   Wound Image   01/03/23 1528   Wound Description Epithelialization;Pink;Yellow;Slough;Granulation tissue 01/03/23 1528   Antonette-wound Assessment Hyperpigmented;Scaly 01/03/23 1528   Wound Length (cm) 7 6 cm 01/03/23 1528   Wound Width (cm) 4 cm 01/03/23 1528   Wound Depth (cm) 0 1 cm 01/03/23 1528   Wound Surface Area (cm^2) 30 4 cm^2 01/03/23 1528   Wound Volume (cm^3) 3 04 cm^3 01/03/23 1528   Calculated Wound Volume (cm^3) 3 04 cm^3 01/03/23 1528   Drainage Amount Small 01/03/23 1528   Drainage Description Serosanguineous 01/03/23 1528   Non-staged Wound Description Full thickness 01/03/23 1528   Treatments Irrigation with NSS;Cleansed 01/03/23 1528       Wound 11/01/22 Venous Ulcer Other (Comment) Leg Left; Lower (Active)   Wound Image   01/03/23 1537   Wound Description Epithelialization 01/03/23 1528   Antonette-wound Assessment Purple;Dry 01/03/23 1528   Wound Length (cm) 0 cm 01/03/23 1528   Wound Width (cm) 0 cm 01/03/23 1528   Wound Depth (cm) 0 cm 01/03/23 1528   Wound Surface Area (cm^2) 0 cm^2 01/03/23 1528   Wound Volume (cm^3) 0 cm^3 01/03/23 1528   Calculated Wound Volume (cm^3) 0 cm^3 01/03/23 1528   Change in Wound Size % 100 01/03/23 1528   Drainage Amount None 01/03/23 1528   Drainage Description Serosanguineous 12/27/22 1512   Non-staged Wound Description Full thickness 12/27/22 1512   Treatments Cleansed 12/27/22 1512   Patient Tolerance Tolerated well 12/27/22 1512                         Debridement   Wound 11/01/22 Venous Ulcer Other (Comment) Leg Left; Lower    Universal Protocol:  Consent: Verbal consent obtained  Risks and benefits: risks, benefits and alternatives were discussed  Consent given by: patient  Patient understanding: patient states understanding of the procedure being performed  Patient identity confirmed: verbally with patient      Performed by: physician  Debridement type: selective  Pain control: lidocaine 4%  Post-debridement measurements  Length (cm): 7 6  Width (cm): 4  Depth (cm): 0 2  Percent debrided: 40%  Surface Area (cm^2): 30 4  Area debrided (cm^2): 12 16  Volume (cm^3): 6 08  Devitalized tissue debrided: biofilm, fibrin and slough  Instrument(s) utilized: curette  Bleeding: small  Hemostasis obtained with: pressure  Procedural pain (0-10): insensate  Post-procedural pain: insensate   Response to treatment: procedure was tolerated well           Results from last 6 Months   Lab Units 09/24/22  1041   WOUND CULTURE  1+ Growth of Pseudomonas aeruginosa*         Wound Instructions:  Orders Placed This Encounter   Procedures   • Wound cleansing and dressings     Remove dressings from lower legs and heel ulcer  Wash lower legs and ulcers with soap and water  Pat dry        Elastic Tubular Stocking-F     Tubular elastic bandage: Apply to right lower leg from base of toes to behind the knee  Apply every morining, may remove for sleep  Avoid prolonged standing in one place  Elevate leg(s) above the level of the heart when sitting or as much as possible        Apply HydroFera Blue to left lower leg ulcers, then apply dry dressing, unna boot and coban   Cover with tubifast yellow  Change dressing 2 times per week and as needed for excessive drainage or leakage  This was done today        [facility] to change dressing/unna boot on Friday's        Follow up one week on Tuesday at the [address] [facility],[address]  Standing Status:   Future     Standing Expiration Date:   1/3/2024         [facility], DPM      Portions of the record may have been created with voice recognition software  Occasional wrong word or "sound a like" substitutions may have occurred due to the inherent limitations of voice recognition software  Read the chart carefully and recognize, using context, where substitutions have occurred

## 2023-01-03 NOTE — PATIENT INSTRUCTIONS
Orders Placed This Encounter   Procedures    Wound cleansing and dressings     Remove dressings from lower legs and heel ulcer  Wash lower legs and ulcers with soap and water  Pat dry  Elastic Tubular Stocking-F     Tubular elastic bandage: Apply to right lower leg from base of toes to behind the knee  Apply every morining, may remove for sleep  Avoid prolonged standing in one place  Elevate leg(s) above the level of the heart when sitting or as much as possible  Apply HydroFera Blue to left lower leg ulcers, then apply dry dressing, unna boot and coban  Cover with tubifast yellow  Change dressing 2 times per week and as needed for excessive drainage or leakage  This was done today  [facility] to change dressing/unna boot on Friday's  Follow up one week on Tuesday at the [address] [facility],[address]       Standing Status:   Future     Standing Expiration Date:   1/3/2024

## 2023-01-06 ENCOUNTER — HOME CARE VISIT (OUTPATIENT)
Dept: HOME HEALTH SERVICES | Facility: HOME HEALTHCARE | Age: 73
End: 2023-01-06

## 2023-01-08 VITALS
SYSTOLIC BLOOD PRESSURE: 100 MMHG | HEART RATE: 98 BPM | DIASTOLIC BLOOD PRESSURE: 66 MMHG | OXYGEN SATURATION: 97 % | TEMPERATURE: 97.1 F | RESPIRATION RATE: 20 BRPM

## 2023-01-10 ENCOUNTER — OFFICE VISIT (OUTPATIENT)
Dept: WOUND CARE | Facility: CLINIC | Age: 73
End: 2023-01-10

## 2023-01-10 VITALS
HEART RATE: 104 BPM | SYSTOLIC BLOOD PRESSURE: 125 MMHG | TEMPERATURE: 98.2 F | RESPIRATION RATE: 20 BRPM | DIASTOLIC BLOOD PRESSURE: 80 MMHG

## 2023-01-10 DIAGNOSIS — I87.2 VENOUS STASIS ULCER OF OTHER PART OF RIGHT LOWER LEG LIMITED TO BREAKDOWN OF SKIN WITHOUT VARICOSE VEINS (HCC): ICD-10-CM

## 2023-01-10 DIAGNOSIS — L97.822 VENOUS STASIS ULCER OF OTHER PART OF LEFT LOWER LEG WITH FAT LAYER EXPOSED WITHOUT VARICOSE VEINS (HCC): Primary | ICD-10-CM

## 2023-01-10 DIAGNOSIS — I87.2 VENOUS STASIS ULCER OF OTHER PART OF LEFT LOWER LEG WITH FAT LAYER EXPOSED WITHOUT VARICOSE VEINS (HCC): Primary | ICD-10-CM

## 2023-01-10 DIAGNOSIS — L97.811 VENOUS STASIS ULCER OF OTHER PART OF RIGHT LOWER LEG LIMITED TO BREAKDOWN OF SKIN WITHOUT VARICOSE VEINS (HCC): ICD-10-CM

## 2023-01-10 RX ORDER — LIDOCAINE 40 MG/G
CREAM TOPICAL ONCE
Status: COMPLETED | OUTPATIENT
Start: 2023-01-10 | End: 2023-01-10

## 2023-01-10 RX ADMIN — LIDOCAINE: 40 CREAM TOPICAL at 16:17

## 2023-01-10 NOTE — PROGRESS NOTES
Cast Application    Date/Time: 1/10/2023 4:25 PM  Performed by: Esperanza William RN  Authorized by: Joycelyn Marin DPM   Universal Protocol:  Consent: Verbal consent obtained  Consent given by: patient  Patient identity confirmed: verbally with patient      Pre-procedure details:     Sensation:  Normal  Procedure details:     Laterality:  Left    Location:  Leg    Leg:  L lower legCast type:  Unna boot    Post-procedure details:     Pain:  Improved    Sensation:  Normal    Patient tolerance of procedure: Tolerated well, no immediate complications  Comments:      UNNA BOOT wrap procedure     Before application, JAIME and/or TBI determined to be adequate for healing and application of compression  Lower extremity washed prior to application of compression wrap  With the foot in dorsiflexed position, Unna boot was applied as per physician orders without complications or complaint of pain  The procedure was tolerated well  Toes warm & pink post application  Patient provided education & reinforced to observe toes for any discoloration, swelling or tingling and instructed when to report to the [address] [facility],[address] or to remove compression  Wound care as per providers orders, patient tolerated well

## 2023-01-10 NOTE — PROGRESS NOTES
Debridement   Wound 01/10/23 Venous Ulcer Leg Right; Lower    Universal Protocol:  Consent: Verbal consent obtained  Risks and benefits: risks, benefits and alternatives were discussed  Consent given by: patient  Patient understanding: patient states understanding of the procedure being performed  Patient identity confirmed: verbally with patient      Performed by: physician  Debridement type: selective  Pain control: lidocaine 4%  Post-debridement measurements  Length (cm): 1  Width (cm): 1  Depth (cm): 0 1  Percent debrided: 100%  Surface Area (cm^2): 1  Area debrided (cm^2):  1  Volume (cm^3): 0 1  Devitalized tissue debrided: biofilm and slough  Instrument(s) utilized: curette  Bleeding: small  Hemostasis obtained with: pressure  Procedural pain (0-10): insensate  Post-procedural pain: insensate   Response to treatment: procedure was tolerated well

## 2023-01-10 NOTE — PROGRESS NOTES
Patient ID: Annalisa Dias is a 74 y o  male Date of Birth 1950     Diagnosis:  1  Venous stasis ulcer of other part of left lower leg with fat layer exposed without varicose veins (HCC)  -     Wound cleansing and dressings; Future  -     lidocaine (LMX) 4 % cream    2  Venous stasis ulcer of other part of right lower leg limited to breakdown of skin without varicose veins (HCC)       Diagnosis ICD-10-CM Associated Orders   1  Venous stasis ulcer of other part of left lower leg with fat layer exposed without varicose veins (HCC)  I87 2 Wound cleansing and dressings    L97 822 lidocaine (LMX) 4 % cream      2  Venous stasis ulcer of other part of right lower leg limited to breakdown of skin without varicose veins (HCC)  I87 2     L97 811            Assessment & Plan:  1   Left lower extremity venous ulcerations  2  Right lower extremity venous ulcer  2   Bilateral lower extremity venous insufficiency        -Continue hydrofera blue and UNNA boot left lower extremity     - Continue leg elevation to B/l LE   -Discussed importance of proper protein intake for wound healing   - Return in 1 week     Chief Complaint   Patient presents with   • Follow Up Wound Care Visit     Bilateral  lower leg ulcers             Subjective:   78-year-old male presents for continued treatment of bilateral lower extremity ulcerations  No other complaints        The following portions of the patient's history were reviewed and updated as appropriate:   Patient Active Problem List   Diagnosis   • Gout   • Gait abnormality   • Lumbar stenosis   • Polyneuropathy   • Lower extremity weakness   • Lumbosacral plexopathy   • Elevated troponin   • EDGAR (dyspnea on exertion)   • Rash due to vaculitis    • LYLY (acute kidney injury) ([facility] [address] )   • Depression   • Multiple open wounds of lower leg   • Ambulatory dysfunction   • Leucocytosis   • Bloating     Past Medical History:   Diagnosis Date   • Depression    • Gout      Past Surgical History: Procedure Laterality Date   • BACK SURGERY      X2 LUMBAR INCLUDING FUSION  ,  WITH OAA, LV[facility] DR Lao Persons   • CATARACT EXTRACTION, BILATERAL     • KNEE ARTHROSCOPY Right     x2 , meniscus repair   • TONSILECTOMY AND ADNOIDECTOMY       Social History     Socioeconomic History   • Marital status: /Civil Union     Spouse name: Louise   • Number of children: 3   • Years of education: 14   • Highest education level: None   Occupational History   • None   Tobacco Use   • Smoking status: Former     Types: Cigarettes     Quit date:      Years since quittin 0   • Smokeless tobacco: Never   Vaping Use   • Vaping Use: Never used   Substance and Sexual Activity   • Alcohol use: Not Currently     Comment: occasional   • Drug use: Never   • Sexual activity: None   Other Topics Concern   • None   Social History Narrative   • None     Social Determinants of Health     Financial Resource Strain: Not on file   Food Insecurity: No Food Insecurity   • Worried About Running Out of Food in the Last Year: Never true   • Ran Out of Food in the Last Year: Never true   Transportation Needs: No Transportation Needs   • Lack of Transportation (Medical): No   • Lack of Transportation (Non-Medical):  No   Physical Activity: Not on file   Stress: Not on file   Social Connections: Not on file   Intimate Partner Violence: Not on file   Housing Stability: Unknown   • Unable to Pay for Housing in the Last Year: Not on file   • Number of Places Lived in the Last Year: 1   • Unstable Housing in the Last Year: No        Current Outpatient Medications:   •  allopurinol (ZYLOPRIM) 100 mg tablet, Take 100 mg by mouth 2 (two) times a day, Disp: , Rfl:   •  cholecalciferol (VITAMIN D3) 1,000 units tablet, Take 2,000 Units by mouth daily, Disp: , Rfl:   •  loratadine (CLARITIN) 10 mg tablet, Take 10 mg by mouth as needed for allergies, Disp: , Rfl:   •  Magnesium 100 MG CAPS, Take 1 capsule by mouth daily, Disp: , Rfl:   •  Misc Natural Products (OSTEO BI-FLEX/5-LOXIN ADVANCED PO), Take 1 tablet by mouth daily , Disp: , Rfl:   •  Multiple Vitamins-Minerals (CENTRUM SILVER 50+MEN PO), Take 1 tablet by mouth daily, Disp: , Rfl:   •  simethicone (MYLICON) 80 mg chewable tablet, Chew 1 tablet (80 mg total) every 6 (six) hours as needed for flatulence, Disp: 30 tablet, Rfl: 0  •  SUPER B COMPLEX/C PO, Take 1 tablet by mouth daily, Disp: , Rfl:   •  cephalexin (KEFLEX) 500 mg capsule, Take 500 mg by mouth every 8 (eight) hours (Patient not taking: Reported on 1/10/2023), Disp: , Rfl:   •  collagenase (SANTYL) ointment, Apply topically daily Wound 1: 10cm x15cm Wound 2: 10cm x10cm (Patient not taking: Reported on 1/10/2023), Disp: 15 g, Rfl: 0  •  famotidine (PEPCID) 20 mg tablet, Take 1 tablet (20 mg total) by mouth daily Do not start before October 6, 2022 , Disp: 30 tablet, Rfl: 0  •  FLUoxetine (PROzac) 20 mg capsule, Take 20 mg by mouth daily  (Patient not taking: Reported on 1/10/2023), Disp: , Rfl:   •  meloxicam (MOBIC) 15 mg tablet, Take 15 mg by mouth daily (Patient not taking: Reported on 1/10/2023), Disp: , Rfl:   •  mupirocin (BACTROBAN) 2 % ointment, Apply topically 2 (two) times a day Apply topically twice a day or as needed after every dressing change on lower legs (Patient not taking: No sig reported), Disp: 30 g, Rfl: 11  •  predniSONE 20 mg tablet, Take 20 mg by mouth daily (Patient not taking: Reported on 1/10/2023), Disp: , Rfl:   •  torsemide (DEMADEX) 10 mg tablet, Take 1 tablet (10 mg total) by mouth daily Do not start before October 6, 2022 , Disp: 30 tablet, Rfl: 0  •  triamcinolone (KENALOG) 0 1 % ointment, Apply topically 2 (two) times a day Only apply to rash, do not apply to necrotic tissue (Patient not taking: Reported on 1/10/2023), Disp: 30 g, Rfl: 0  No current facility-administered medications for this visit    Family History   Problem Relation Age of Onset   • Diabetes Father    • Diabetes Brother    • Diabetes Sister       Review of Systems   All other systems reviewed and are negative  Allergies: Other and Penicillins      Objective:  /80   Pulse 104   Temp 98 2 °F (36 8 °C)   Resp 20     Physical Exam  Vitals reviewed  Feet:      Comments: Left lower leg small ulcerations probable to subcutaneous tissue with mixture of granular and fibrotic wound base  The ulcers appeared to be healing well  Left heel ulcer healed  New onset of right anterior lower leg ulcer limited to skin breakdown  Right lower extremity ulcer healed  Wound 10/03/22 Heel Posterior; Left (Active)   Wound Image   01/10/23 1545   Wound Description Epithelialization 01/10/23 1550   Antonette-wound Assessment Dry;Clean 01/10/23 1550   Wound Length (cm) 0 cm 01/10/23 1550   Wound Width (cm) 0 cm 01/10/23 1550   Wound Depth (cm) 0 cm 01/10/23 1550   Wound Surface Area (cm^2) 0 cm^2 01/10/23 1550   Wound Volume (cm^3) 0 cm^3 01/10/23 1550   Calculated Wound Volume (cm^3) 0 cm^3 01/10/23 1550   Drainage Amount None 01/10/23 1550   Drainage Description Sanguineous 12/20/22 1506   Non-staged Wound Description Full thickness 12/20/22 1506   Treatments Irrigation with NSS 12/20/22 1506       Wound 10/03/22 Tibial Left;Posterior (Active)   Wound Image   01/03/23 1528   Wound Description Epithelialization;Pink;Yellow;Slough;Granulation tissue 01/03/23 1528   Antonette-wound Assessment Hyperpigmented;Scaly 01/03/23 1528   Wound Length (cm) 7 6 cm 01/03/23 1528   Wound Width (cm) 4 cm 01/03/23 1528   Wound Depth (cm) 0 1 cm 01/03/23 1528   Wound Surface Area (cm^2) 30 4 cm^2 01/03/23 1528   Wound Volume (cm^3) 3 04 cm^3 01/03/23 1528   Calculated Wound Volume (cm^3) 3 04 cm^3 01/03/23 1528   Drainage Amount Small 01/03/23 1528   Drainage Description Serosanguineous 01/03/23 1528   Non-staged Wound Description Full thickness 01/03/23 1528   Treatments Irrigation with NSS;Cleansed 01/03/23 1528       Wound 11/01/22 Venous Ulcer Other (Comment) Leg Left; Lower (Active)   Wound Image   01/10/23 1544   Wound Description Epithelialization;Pink;Beefy red 01/10/23 1552   Antonette-wound Assessment Purple;Dry;Edema 01/10/23 1552   Wound Length (cm) 11 cm 01/10/23 1552   Wound Width (cm) 4 cm 01/10/23 1552   Wound Depth (cm) 0 1 cm 01/10/23 1552   Wound Surface Area (cm^2) 44 cm^2 01/10/23 1552   Wound Volume (cm^3) 4 4 cm^3 01/10/23 1552   Calculated Wound Volume (cm^3) 4 4 cm^3 01/10/23 1552   Change in Wound Size % 97 48 01/10/23 1552   Drainage Amount Small 01/10/23 1552   Drainage Description Bloody 01/10/23 1552   Non-staged Wound Description Full thickness 01/10/23 1552   Treatments Cleansed 01/10/23 1552   Patient Tolerance Tolerated well 01/10/23 1552       Wound 01/10/23 Traumatic Leg Right; Lower (Active)   Wound Image   01/10/23 1545   Wound Description Beefy red;Epithelialization 01/10/23 1601   Antonette-wound Assessment Dry;Hyperpigmented;Edema;Purple 01/10/23 1601   Wound Length (cm) 1 cm 01/10/23 1601   Wound Width (cm) 1 cm 01/10/23 1601   Wound Depth (cm) 0 1 cm 01/10/23 1601   Wound Surface Area (cm^2) 1 cm^2 01/10/23 1601   Wound Volume (cm^3) 0 1 cm^3 01/10/23 1601   Calculated Wound Volume (cm^3) 0 1 cm^3 01/10/23 1601   Drainage Amount Scant 01/10/23 1601   Drainage Description Serous 01/10/23 1601   Non-staged Wound Description Partial thickness 01/10/23 1601   Treatments Cleansed 01/10/23 1601   Patient Tolerance Tolerated well 01/10/23 1601                         Debridement   Wound 11/01/22 Venous Ulcer Other (Comment) Leg Left; Lower    Universal Protocol:  Consent: Verbal consent obtained    Risks and benefits: risks, benefits and alternatives were discussed  Consent given by: patient  Patient understanding: patient states understanding of the procedure being performed  Patient identity confirmed: verbally with patient      Performed by: physician  Debridement type: surgical  Level of debridement: subcutaneous tissue  Pain control: lidocaine 4%  Post-debridement measurements  Length (cm): 11  Width (cm): 4  Depth (cm): 0 2  Percent debrided: 20%  Surface Area (cm^2): 44  Area debrided (cm^2): 8 8  Volume (cm^3): 8 8  Tissue and other material debrided: subcutaneous tissue  Devitalized tissue debrided: biofilm and fibrin  Instrument(s) utilized: curette  Bleeding: small  Hemostasis obtained with: pressure  Procedural pain (0-10): insensate  Post-procedural pain: insensate   Response to treatment: procedure was tolerated well           Results from last 6 Months   Lab Units 09/24/22  1041   WOUND CULTURE  1+ Growth of Pseudomonas aeruginosa*         Wound Instructions:  Orders Placed This Encounter   Procedures   • Wound cleansing and dressings     Remove dressings from bilateral lower legs  Wash lower legs and ulcers with soap and water  Pat dry        Left lower leg   Apply HydroFera Blue to left lower leg ulcers, then apply abd pad to wound area and shin area, apply unna boot and coban  Cover with tubifast yellow  Change dressing 2 times per week and as needed for excessive drainage or leakage  This was done today  Right lower leg   Apply Hydrofera Blue to right lower leg ulcer  Cover with gauze  Secure with Josse and tape  Change dressing 2 times per week and as needed for excessive drainage or leakage  This was done today  Apply Spandagrip F to the right lower leg  Elastic Tubular Stocking-F   Tubular elastic bandage: Apply to right lower leg from base of toes to behind the knee  Apply every morining, may remove for sleep  Avoid prolonged standing in one place  Elevate leg(s) above the level of the heart when sitting or as much as possible        [facility] to change dressing/unna boot on Friday's        [facility] 2  [facility] [address]  Standing Status:   Future     Standing Expiration Date:   1/10/2024         Torrie Khan DPM      Portions of the record may have been created with voice recognition software  Occasional wrong word or "sound a like" substitutions may have occurred due to the inherent limitations of voice recognition software  Read the chart carefully and recognize, using context, where substitutions have occurred

## 2023-01-10 NOTE — LETTER
[facility] WOUND CARE [address] [address]  Phone#  [phone]  Fax#  [phone]    Patient:  Enmanuel Becerra  YOB: 1950  Phone:  [phone]  Date of Visit:  1/10/2023    Orders Placed This Encounter   Procedures   • Wound cleansing and dressings     Remove dressings from bilateral lower legs  Wash lower legs and ulcers with soap and water  Pat dry        Left lower leg   Apply HydroFera Blue to left lower leg ulcers, then apply abd pad to wound area and shin area, apply unna boot and coban  Cover with tubifast yellow  Change dressing 2 times per week and as needed for excessive drainage or leakage  This was done today  Right lower leg   Apply Hydrofera Blue to right lower leg ulcer  Cover with gauze  Secure with Josse and tape  Change dressing 2 times per week and as needed for excessive drainage or leakage  This was done today  Apply Spandagrip F to the right lower leg  Elastic Tubular Stocking-F   Tubular elastic bandage: Apply to right lower leg from base of toes to behind the knee  Apply every morining, may remove for sleep  Avoid prolonged standing in one place  Elevate leg(s) above the level of the heart when sitting or as much as possible        [facility] to change dressing/unna boot on Friday's        [facility] [address] [facility]       Standing Status:   Future     Standing Expiration Date:   1/10/2024         Electronically signed by Dhaval Iglesias DPM

## 2023-01-10 NOTE — PATIENT INSTRUCTIONS
Orders Placed This Encounter   Procedures    Wound cleansing and dressings     Remove dressings from bilateral lower legs  Wash lower legs and ulcers with soap and water  Pat dry  Left lower leg   Apply HydroFera Blue to left lower leg ulcers, then apply abd pad to wound area and shin area, apply unna boot and coban  Cover with tubifast yellow  Change dressing 2 times per week and as needed for excessive drainage or leakage  This was done today  Right lower leg   Apply Hydrofera Blue to right lower leg ulcer  Cover with gauze  Secure with Josse and tape  Change dressing 2 times per week and as needed for excessive drainage or leakage  This was done today  Apply Spandagrip F to the right lower leg  Elastic Tubular Stocking-F   Tubular elastic bandage: Apply to right lower leg from base of toes to behind the knee  Apply every morining, may remove for sleep  Avoid prolonged standing in one place  Elevate leg(s) above the level of the heart when sitting or as much as possible  [facility] to change dressing/unna boot on Friday's  Follow up in 2  weeks on Tuesday at the [address] [facility]       Standing Status:   Future     Standing Expiration Date:   1/10/2024

## 2023-01-11 ENCOUNTER — HOME CARE VISIT (OUTPATIENT)
Dept: HOME HEALTH SERVICES | Facility: HOME HEALTHCARE | Age: 73
End: 2023-01-11

## 2023-01-13 ENCOUNTER — LAB REQUISITION (OUTPATIENT)
Dept: LAB | Facility: HOSPITAL | Age: 73
End: 2023-01-13

## 2023-01-13 ENCOUNTER — HOME CARE VISIT (OUTPATIENT)
Dept: HOME HEALTH SERVICES | Facility: HOME HEALTHCARE | Age: 73
End: 2023-01-13

## 2023-01-13 DIAGNOSIS — E55.9 VITAMIN D DEFICIENCY, UNSPECIFIED: ICD-10-CM

## 2023-01-13 DIAGNOSIS — R45.5 HOSTILITY: ICD-10-CM

## 2023-01-13 DIAGNOSIS — R73.01 IMPAIRED FASTING GLUCOSE: ICD-10-CM

## 2023-01-13 DIAGNOSIS — M10.9 GOUT, UNSPECIFIED: ICD-10-CM

## 2023-01-13 LAB
25(OH)D3 SERPL-MCNC: 47.6 NG/ML (ref 30–100)
ANION GAP SERPL CALCULATED.3IONS-SCNC: 8 MMOL/L (ref 4–13)
BACTERIA UR QL AUTO: NORMAL /HPF
BASOPHILS # BLD AUTO: 0.1 THOUSANDS/ÂΜL (ref 0–0.1)
BASOPHILS NFR BLD AUTO: 1 % (ref 0–1)
BILIRUB UR QL STRIP: NEGATIVE
BUN SERPL-MCNC: 19 MG/DL (ref 5–25)
CALCIUM SERPL-MCNC: 9.3 MG/DL (ref 8.3–10.1)
CHLORIDE SERPL-SCNC: 99 MMOL/L (ref 96–108)
CLARITY UR: CLEAR
CO2 SERPL-SCNC: 29 MMOL/L (ref 21–32)
COLOR UR: YELLOW
CREAT SERPL-MCNC: 0.98 MG/DL (ref 0.6–1.3)
CREAT UR-MCNC: 98.8 MG/DL
CRP SERPL QL: 75.2 MG/L
EOSINOPHIL # BLD AUTO: 0.14 THOUSAND/ÂΜL (ref 0–0.61)
EOSINOPHIL NFR BLD AUTO: 1 % (ref 0–6)
ERYTHROCYTE [DISTWIDTH] IN BLOOD BY AUTOMATED COUNT: 15.1 % (ref 11.6–15.1)
ERYTHROCYTE [SEDIMENTATION RATE] IN BLOOD: 80 MM/HOUR (ref 0–19)
FOLATE SERPL-MCNC: >20 NG/ML (ref 3.1–17.5)
GFR SERPL CREATININE-BSD FRML MDRD: 75 ML/MIN/1.73SQ M
GLUCOSE SERPL-MCNC: 169 MG/DL (ref 65–140)
GLUCOSE UR STRIP-MCNC: NEGATIVE MG/DL
HCT VFR BLD AUTO: 36.1 % (ref 36.5–49.3)
HGB BLD-MCNC: 11.4 G/DL (ref 12–17)
HGB UR QL STRIP.AUTO: ABNORMAL
IMM GRANULOCYTES # BLD AUTO: 0.07 THOUSAND/UL (ref 0–0.2)
IMM GRANULOCYTES NFR BLD AUTO: 1 % (ref 0–2)
KETONES UR STRIP-MCNC: NEGATIVE MG/DL
LEUKOCYTE ESTERASE UR QL STRIP: NEGATIVE
LYMPHOCYTES # BLD AUTO: 1.91 THOUSANDS/ÂΜL (ref 0.6–4.47)
LYMPHOCYTES NFR BLD AUTO: 15 % (ref 14–44)
MAGNESIUM SERPL-MCNC: 1.9 MG/DL (ref 1.6–2.6)
MCH RBC QN AUTO: 30.6 PG (ref 26.8–34.3)
MCHC RBC AUTO-ENTMCNC: 31.6 G/DL (ref 31.4–37.4)
MCV RBC AUTO: 97 FL (ref 82–98)
MICROALBUMIN UR-MCNC: 76.6 MG/L (ref 0–20)
MICROALBUMIN/CREAT 24H UR: 78 MG/G CREATININE (ref 0–30)
MONOCYTES # BLD AUTO: 0.77 THOUSAND/ÂΜL (ref 0.17–1.22)
MONOCYTES NFR BLD AUTO: 6 % (ref 4–12)
NEUTROPHILS # BLD AUTO: 10 THOUSANDS/ÂΜL (ref 1.85–7.62)
NEUTS SEG NFR BLD AUTO: 76 % (ref 43–75)
NITRITE UR QL STRIP: NEGATIVE
NON-SQ EPI CELLS URNS QL MICRO: NORMAL /HPF
NRBC BLD AUTO-RTO: 0 /100 WBCS
PH UR STRIP.AUTO: 5.5 [PH]
PLATELET # BLD AUTO: 455 THOUSANDS/UL (ref 149–390)
PMV BLD AUTO: 9 FL (ref 8.9–12.7)
POTASSIUM SERPL-SCNC: 3.8 MMOL/L (ref 3.5–5.3)
PROT UR STRIP-MCNC: NEGATIVE MG/DL
RBC # BLD AUTO: 3.73 MILLION/UL (ref 3.88–5.62)
RBC #/AREA URNS AUTO: NORMAL /HPF
SODIUM SERPL-SCNC: 136 MMOL/L (ref 135–147)
SP GR UR STRIP.AUTO: 1.02 (ref 1–1.03)
T4 FREE SERPL-MCNC: 1.16 NG/DL (ref 0.76–1.46)
TSH SERPL DL<=0.05 MIU/L-ACNC: 1.63 UIU/ML (ref 0.45–4.5)
UROBILINOGEN UR QL STRIP.AUTO: 0.2 E.U./DL
VIT B12 SERPL-MCNC: 969 PG/ML (ref 100–900)
WBC # BLD AUTO: 12.99 THOUSAND/UL (ref 4.31–10.16)
WBC #/AREA URNS AUTO: NORMAL /HPF

## 2023-01-14 LAB
EST. AVERAGE GLUCOSE BLD GHB EST-MCNC: 120 MG/DL
HBA1C MFR BLD: 5.8 %

## 2023-01-15 ENCOUNTER — HOME CARE VISIT (OUTPATIENT)
Dept: HOME HEALTH SERVICES | Facility: HOME HEALTHCARE | Age: 73
End: 2023-01-15

## 2023-01-15 VITALS
DIASTOLIC BLOOD PRESSURE: 60 MMHG | RESPIRATION RATE: 20 BRPM | TEMPERATURE: 97.6 F | SYSTOLIC BLOOD PRESSURE: 100 MMHG | HEART RATE: 101 BPM | OXYGEN SATURATION: 95 %

## 2023-01-17 ENCOUNTER — HOME CARE VISIT (OUTPATIENT)
Dept: HOME HEALTH SERVICES | Facility: HOME HEALTHCARE | Age: 73
End: 2023-01-17

## 2023-01-17 VITALS
RESPIRATION RATE: 18 BRPM | HEART RATE: 84 BPM | OXYGEN SATURATION: 93 % | DIASTOLIC BLOOD PRESSURE: 68 MMHG | SYSTOLIC BLOOD PRESSURE: 122 MMHG | TEMPERATURE: 97.6 F

## 2023-01-17 PROCEDURE — 10330064 UNNA BOOT, 4" (1/BX 12BX/CS)  MGM16

## 2023-01-17 PROCEDURE — 10330064

## 2023-01-20 ENCOUNTER — HOME CARE VISIT (OUTPATIENT)
Dept: HOME HEALTH SERVICES | Facility: HOME HEALTHCARE | Age: 73
End: 2023-01-20

## 2023-01-20 VITALS
HEART RATE: 72 BPM | TEMPERATURE: 96.9 F | DIASTOLIC BLOOD PRESSURE: 72 MMHG | OXYGEN SATURATION: 98 % | RESPIRATION RATE: 18 BRPM | SYSTOLIC BLOOD PRESSURE: 108 MMHG

## 2023-01-24 ENCOUNTER — HOME CARE VISIT (OUTPATIENT)
Dept: HOME HEALTH SERVICES | Facility: HOME HEALTHCARE | Age: 73
End: 2023-01-24

## 2023-01-27 ENCOUNTER — HOME CARE VISIT (OUTPATIENT)
Dept: HOME HEALTH SERVICES | Facility: HOME HEALTHCARE | Age: 73
End: 2023-01-27

## 2023-01-27 VITALS
SYSTOLIC BLOOD PRESSURE: 124 MMHG | OXYGEN SATURATION: 97 % | RESPIRATION RATE: 18 BRPM | DIASTOLIC BLOOD PRESSURE: 76 MMHG | HEART RATE: 76 BPM | TEMPERATURE: 98.3 F

## 2023-01-31 ENCOUNTER — OFFICE VISIT (OUTPATIENT)
Dept: WOUND CARE | Facility: CLINIC | Age: 73
End: 2023-01-31

## 2023-01-31 VITALS
HEART RATE: 82 BPM | TEMPERATURE: 97.8 F | DIASTOLIC BLOOD PRESSURE: 64 MMHG | RESPIRATION RATE: 20 BRPM | SYSTOLIC BLOOD PRESSURE: 111 MMHG

## 2023-01-31 DIAGNOSIS — L97.811 VENOUS STASIS ULCER OF OTHER PART OF RIGHT LOWER LEG LIMITED TO BREAKDOWN OF SKIN WITHOUT VARICOSE VEINS (HCC): ICD-10-CM

## 2023-01-31 DIAGNOSIS — L97.822 VENOUS STASIS ULCER OF OTHER PART OF LEFT LOWER LEG WITH FAT LAYER EXPOSED WITHOUT VARICOSE VEINS (HCC): Primary | ICD-10-CM

## 2023-01-31 DIAGNOSIS — I87.2 VENOUS STASIS ULCER OF OTHER PART OF RIGHT LOWER LEG LIMITED TO BREAKDOWN OF SKIN WITHOUT VARICOSE VEINS (HCC): ICD-10-CM

## 2023-01-31 DIAGNOSIS — I87.2 VENOUS STASIS ULCER OF OTHER PART OF LEFT LOWER LEG WITH FAT LAYER EXPOSED WITHOUT VARICOSE VEINS (HCC): Primary | ICD-10-CM

## 2023-01-31 NOTE — PATIENT INSTRUCTIONS
Orders Placed This Encounter   Procedures    Wound cleansing and dressings     B/L Leg wounds:  Wash your hands with soap and water  Cleanse the wound with mild soap and water, dove, prior to applying a clean dressing  Shower no ---do not get the dressings wet    Apply Adaptic to the wounds  Cover with gauze/ABD pad  Secure with jacob and tape  Change dressing 3 x weekly and as needed for excessive draiange    Treatments above were completed [facility] at the [facility]        Elastic Tubular Stocking---Spandagrip Size G to BLE    Tubular elastic bandage: Apply from base of toes to behind the knee  Apply in AM, may remove for sleep  Avoid prolonged standing in one place  Elevate leg(s) above the level of the heart when sitting or as much as possible       Standing Status:   Future     Standing Expiration Date:   1/31/2024    Wound home care     Continue with [facility] for wound care     Standing Status:   Future     Standing Expiration Date:   1/31/2024

## 2023-02-01 NOTE — PROGRESS NOTES
Patient ID: Garrick Fang is a 74 y o  male Date of Birth 1950     Diagnosis:  1  Venous stasis ulcer of other part of left lower leg with fat layer exposed without varicose veins (HCC)  -     Wound cleansing and dressings; Future  -     Wound home care; Future    2  Venous stasis ulcer of other part of right lower leg limited to breakdown of skin without varicose veins (HCC)  -     Wound cleansing and dressings; Future  -     Wound home care; Future       Diagnosis ICD-10-CM Associated Orders   1  Venous stasis ulcer of other part of left lower leg with fat layer exposed without varicose veins (HCC)  I87 2 Wound cleansing and dressings    L97 822 Wound home care      2  Venous stasis ulcer of other part of right lower leg limited to breakdown of skin without varicose veins (HCC)  I87 2 Wound cleansing and dressings    L97 811 Wound home care           Assessment & Plan:  1   Left lower extremity venous ulcerations  2  Right lower extremity venous ulcer  2   Bilateral lower extremity venous insufficiency        -Change dressings to acticoat, DSD and tubigrip      - Continue leg elevation to B/l LE   -Discussed importance of proper protein intake for wound healing   - Return in 1 week        Chief Complaint   Patient presents with   • Follow Up Wound Care Visit     B/L LE wounds           Subjective:   64-year-old male presents for continued treatment of bilateral lower extremity ulcerations  No other complaints        The following portions of the patient's history were reviewed and updated as appropriate:   Patient Active Problem List   Diagnosis   • Gout   • Gait abnormality   • Lumbar stenosis   • Polyneuropathy   • Lower extremity weakness   • Lumbosacral plexopathy   • Elevated troponin   • EDGAR (dyspnea on exertion)   • Rash due to vaculitis    • LYLY (acute kidney injury) ([facility] )   • Depression   • Multiple open wounds of lower leg   • Ambulatory dysfunction   • Leucocytosis   • Bloating     Past Medical History:   Diagnosis Date   • Depression    • Gout      Past Surgical History:   Procedure Laterality Date   • BACK SURGERY      X2 LUMBAR INCLUDING FUSION  ,  WITH OAA, [facility] DR Sabra Cooper   • CATARACT EXTRACTION, BILATERAL     • KNEE ARTHROSCOPY Right     x2 , meniscus repair   • TONSILECTOMY AND ADNOIDECTOMY       Social History     Socioeconomic History   • Marital status: /Civil Union     Spouse name: Louise   • Number of children: 3   • Years of education: 14   • Highest education level: Not on file   Occupational History   • Not on file   Tobacco Use   • Smoking status: Former     Types: Cigarettes     Quit date:      Years since quittin 1   • Smokeless tobacco: Never   Vaping Use   • Vaping Use: Never used   Substance and Sexual Activity   • Alcohol use: Not Currently     Comment: occasional   • Drug use: Never   • Sexual activity: Not on file   Other Topics Concern   • Not on file   Social History Narrative   • Not on file     Social Determinants of Health     Financial Resource Strain: Not on file   Food Insecurity: No Food Insecurity   • Worried About Running Out of Food in the Last Year: Never true   • Ran Out of Food in the Last Year: Never true   Transportation Needs: No Transportation Needs   • Lack of Transportation (Medical): No   • Lack of Transportation (Non-Medical):  No   Physical Activity: Not on file   Stress: Not on file   Social Connections: Not on file   Intimate Partner Violence: Not on file   Housing Stability: Unknown   • Unable to Pay for Housing in the Last Year: Not on file   • Number of Places Lived in the Last Year: 1   • Unstable Housing in the Last Year: No        Current Outpatient Medications:   •  allopurinol (ZYLOPRIM) 100 mg tablet, Take 100 mg by mouth 2 (two) times a day, Disp: , Rfl:   •  cephalexin (KEFLEX) 500 mg capsule, Take 500 mg by mouth every 8 (eight) hours (Patient not taking: Reported on 1/10/2023), Disp: , Rfl:   •  cholecalciferol (VITAMIN D3) 1,000 units tablet, Take 2,000 Units by mouth daily, Disp: , Rfl:   •  collagenase (SANTYL) ointment, Apply topically daily Wound 1: 10cm x15cm Wound 2: 10cm x10cm (Patient not taking: Reported on 1/10/2023), Disp: 15 g, Rfl: 0  •  famotidine (PEPCID) 20 mg tablet, Take 1 tablet (20 mg total) by mouth daily Do not start before October 6, 2022 , Disp: 30 tablet, Rfl: 0  •  FLUoxetine (PROzac) 20 mg capsule, Take 20 mg by mouth daily  (Patient not taking: Reported on 1/10/2023), Disp: , Rfl:   •  loratadine (CLARITIN) 10 mg tablet, Take 10 mg by mouth as needed for allergies, Disp: , Rfl:   •  Magnesium 100 MG CAPS, Take 1 capsule by mouth daily, Disp: , Rfl:   •  meloxicam (MOBIC) 15 mg tablet, Take 15 mg by mouth daily (Patient not taking: Reported on 1/10/2023), Disp: , Rfl:   •  Misc Natural Products (OSTEO BI-FLEX/5-LOXIN ADVANCED PO), Take 1 tablet by mouth daily , Disp: , Rfl:   •  Multiple Vitamins-Minerals (CENTRUM SILVER 50+MEN PO), Take 1 tablet by mouth daily, Disp: , Rfl:   •  mupirocin (BACTROBAN) 2 % ointment, Apply topically 2 (two) times a day Apply topically twice a day or as needed after every dressing change on lower legs (Patient not taking: No sig reported), Disp: 30 g, Rfl: 11  •  predniSONE 20 mg tablet, Take 20 mg by mouth daily (Patient not taking: Reported on 1/10/2023), Disp: , Rfl:   •  simethicone (MYLICON) 80 mg chewable tablet, Chew 1 tablet (80 mg total) every 6 (six) hours as needed for flatulence, Disp: 30 tablet, Rfl: 0  •  SUPER B COMPLEX/C PO, Take 1 tablet by mouth daily, Disp: , Rfl:   •  torsemide (DEMADEX) 10 mg tablet, Take 1 tablet (10 mg total) by mouth daily Do not start before October 6, 2022 , Disp: 30 tablet, Rfl: 0  •  triamcinolone (KENALOG) 0 1 % ointment, Apply topically 2 (two) times a day Only apply to rash, do not apply to necrotic tissue (Patient not taking: Reported on 1/10/2023), Disp: 30 g, Rfl: 0  Family History   Problem Relation Age of Onset • Diabetes Father    • Diabetes Brother    • Diabetes Sister       Review of Systems   All other systems reviewed and are negative  Allergies: Other and Penicillins      Objective:  /64   Pulse 82   Temp 97 8 °F (36 6 °C)   Resp 20     Physical Exam  Feet:      Comments: B/l leg superficial skin abrasion from taking dressings off due to thin healing skin  +2 pitting edema lower leg                 Wound 10/03/22 Tibial Left;Posterior (Active)   Wound Image   01/31/23 1637   Wound Description Epithelialization; Beefy red 01/31/23 1642   Antonette-wound Assessment Purple;Edema 01/31/23 1642   Wound Length (cm) 6 5 cm 01/31/23 1642   Wound Width (cm) 4 cm 01/31/23 1642   Wound Depth (cm) 0 1 cm 01/31/23 1642   Wound Surface Area (cm^2) 26 cm^2 01/31/23 1642   Wound Volume (cm^3) 2 6 cm^3 01/31/23 1642   Calculated Wound Volume (cm^3) 2 6 cm^3 01/31/23 1642   Drainage Amount Small 01/31/23 1642   Drainage Description Bloody 01/31/23 1642   Non-staged Wound Description Full thickness 01/31/23 1642   Treatments Irrigation with NSS;Cleansed 01/03/23 1528       Wound 11/01/22 Venous Ulcer Other (Comment) Leg Left; Lower (Active)   Wound Image    01/31/23 1635   Wound Description Epithelialization;Pink;Beefy red 01/31/23 1642   Antonette-wound Assessment Purple;Dry;Edema 01/31/23 1642   Wound Length (cm) 1 5 cm 01/31/23 1642   Wound Width (cm) 6 cm 01/31/23 1642   Wound Depth (cm) 0 1 cm 01/31/23 1642   Wound Surface Area (cm^2) 9 cm^2 01/31/23 1642   Wound Volume (cm^3) 0 9 cm^3 01/31/23 1642   Calculated Wound Volume (cm^3) 0 9 cm^3 01/31/23 1642   Change in Wound Size % 99 48 01/31/23 1642   Drainage Amount Small 01/31/23 1642   Drainage Description Bloody 01/31/23 1642   Non-staged Wound Description Full thickness 01/31/23 1642   Treatments Cleansed 01/10/23 1552   Patient Tolerance Tolerated well 01/10/23 1552   Dressing Status Intact 01/31/23 1642       Wound 01/10/23 Venous Ulcer Leg Right; Lower (Active)   Wound Image   01/31/23 1636   Wound Description Epithelialization; Beefy red 01/31/23 1641   Antonette-wound Assessment Dry;Hyperpigmented;Edema;Purple 01/31/23 1641   Wound Length (cm) 6 5 cm 01/31/23 1641   Wound Width (cm) 1 cm 01/31/23 1641   Wound Depth (cm) 0 1 cm 01/31/23 1641   Wound Surface Area (cm^2) 6 5 cm^2 01/31/23 1641   Wound Volume (cm^3) 0 65 cm^3 01/31/23 1641   Calculated Wound Volume (cm^3) 0 65 cm^3 01/31/23 1641   Change in Wound Size % -550 01/31/23 1641   Drainage Amount Small 01/31/23 1641   Drainage Description Bloody 01/31/23 1641   Non-staged Wound Description Full thickness 01/31/23 1641   Treatments Cleansed 01/10/23 1601   Patient Tolerance Tolerated well 01/10/23 1601                         Procedures     Results from last 6 Months   Lab Units 09/24/22  1041   WOUND CULTURE  1+ Growth of Pseudomonas aeruginosa*         Wound Instructions:  Orders Placed This Encounter   Procedures   • Wound cleansing and dressings     B/L Leg wounds:  Wash your hands with soap and water  Cleanse the wound with mild soap and water, dove, prior to applying a clean dressing  Shower no ---do not get the dressings wet    Apply Adaptic to the wounds  Cover with gauze/ABD pad  Secure with jacob and tape  Change dressing 3 x weekly and as needed for excessive draiange    Treatments above were completed today at the [facility]        Elastic Tubular Stocking---Spandagrip Size G to BLE    Tubular elastic bandage: Apply from base of toes to behind the knee  Apply in AM, may remove for sleep  Avoid prolonged standing in one place  Elevate leg(s) above the level of the heart when sitting or as much as possible  Standing Status:   Future     Standing Expiration Date:   1/31/2024   • Wound home care     Continue with [facility] for wound care     Standing Status:   Future     Standing Expiration Date:   1/31/2024         Jie Kelley DPM      Portions of the record may have been created with voice recognition software  Occasional wrong word or "sound a like" substitutions may have occurred due to the inherent limitations of voice recognition software  Read the chart carefully and recognize, using context, where substitutions have occurred

## 2023-02-03 ENCOUNTER — HOME CARE VISIT (OUTPATIENT)
Dept: HOME HEALTH SERVICES | Facility: HOME HEALTHCARE | Age: 73
End: 2023-02-03

## 2023-02-04 VITALS
OXYGEN SATURATION: 94 % | RESPIRATION RATE: 18 BRPM | DIASTOLIC BLOOD PRESSURE: 78 MMHG | HEART RATE: 100 BPM | SYSTOLIC BLOOD PRESSURE: 112 MMHG | TEMPERATURE: 97.3 F

## 2023-02-06 ENCOUNTER — HOME CARE VISIT (OUTPATIENT)
Dept: HOME HEALTH SERVICES | Facility: HOME HEALTHCARE | Age: 73
End: 2023-02-06

## 2023-02-06 VITALS
HEART RATE: 72 BPM | TEMPERATURE: 97.8 F | RESPIRATION RATE: 18 BRPM | OXYGEN SATURATION: 96 % | DIASTOLIC BLOOD PRESSURE: 70 MMHG | SYSTOLIC BLOOD PRESSURE: 112 MMHG

## 2023-02-08 ENCOUNTER — HOME CARE VISIT (OUTPATIENT)
Dept: HOME HEALTH SERVICES | Facility: HOME HEALTHCARE | Age: 73
End: 2023-02-08

## 2023-02-09 VITALS
OXYGEN SATURATION: 95 % | HEART RATE: 64 BPM | RESPIRATION RATE: 20 BRPM | DIASTOLIC BLOOD PRESSURE: 68 MMHG | SYSTOLIC BLOOD PRESSURE: 108 MMHG | TEMPERATURE: 96.8 F

## 2023-02-10 ENCOUNTER — HOME CARE VISIT (OUTPATIENT)
Dept: HOME HEALTH SERVICES | Facility: HOME HEALTHCARE | Age: 73
End: 2023-02-10

## 2023-02-10 ENCOUNTER — LAB REQUISITION (OUTPATIENT)
Dept: LAB | Facility: HOSPITAL | Age: 73
End: 2023-02-10

## 2023-02-10 VITALS
OXYGEN SATURATION: 98 % | SYSTOLIC BLOOD PRESSURE: 104 MMHG | HEART RATE: 72 BPM | TEMPERATURE: 96.9 F | RESPIRATION RATE: 18 BRPM | DIASTOLIC BLOOD PRESSURE: 68 MMHG

## 2023-02-10 DIAGNOSIS — M10.9 GOUT, UNSPECIFIED: ICD-10-CM

## 2023-02-10 DIAGNOSIS — R70.0 ELEVATED ERYTHROCYTE SEDIMENTATION RATE: ICD-10-CM

## 2023-02-10 LAB
ALBUMIN SERPL BCP-MCNC: 2.9 G/DL (ref 3.5–5)
ALP SERPL-CCNC: 89 U/L (ref 46–116)
ALT SERPL W P-5'-P-CCNC: 13 U/L (ref 12–78)
AST SERPL W P-5'-P-CCNC: 18 U/L (ref 5–45)
BILIRUB DIRECT SERPL-MCNC: 0.06 MG/DL (ref 0–0.2)
BILIRUB SERPL-MCNC: 0.37 MG/DL (ref 0.2–1)
CK SERPL-CCNC: 32 U/L (ref 39–308)
PROT SERPL-MCNC: 6.3 G/DL (ref 6.4–8.4)
URATE SERPL-MCNC: 5.9 MG/DL (ref 3.5–8.5)

## 2023-02-11 LAB — B BURGDOR IGG+IGM SER-ACNC: <0.2 AI

## 2023-02-13 LAB — ALDOLASE SERPL-CCNC: 5.1 U/L (ref 3.3–10.3)

## 2023-02-14 ENCOUNTER — OFFICE VISIT (OUTPATIENT)
Dept: WOUND CARE | Facility: CLINIC | Age: 73
End: 2023-02-14

## 2023-02-14 VITALS
SYSTOLIC BLOOD PRESSURE: 117 MMHG | TEMPERATURE: 97.8 F | HEART RATE: 76 BPM | DIASTOLIC BLOOD PRESSURE: 68 MMHG | RESPIRATION RATE: 20 BRPM

## 2023-02-14 DIAGNOSIS — L97.811 VENOUS STASIS ULCER OF OTHER PART OF RIGHT LOWER LEG LIMITED TO BREAKDOWN OF SKIN WITHOUT VARICOSE VEINS (HCC): ICD-10-CM

## 2023-02-14 DIAGNOSIS — I87.2 VENOUS STASIS ULCER OF OTHER PART OF LEFT LOWER LEG WITH FAT LAYER EXPOSED WITHOUT VARICOSE VEINS (HCC): Primary | ICD-10-CM

## 2023-02-14 DIAGNOSIS — L97.822 VENOUS STASIS ULCER OF OTHER PART OF LEFT LOWER LEG WITH FAT LAYER EXPOSED WITHOUT VARICOSE VEINS (HCC): Primary | ICD-10-CM

## 2023-02-14 DIAGNOSIS — I87.2 VENOUS STASIS ULCER OF OTHER PART OF RIGHT LOWER LEG LIMITED TO BREAKDOWN OF SKIN WITHOUT VARICOSE VEINS (HCC): ICD-10-CM

## 2023-02-14 RX ORDER — LIDOCAINE 40 MG/G
CREAM TOPICAL ONCE
Status: COMPLETED | OUTPATIENT
Start: 2023-02-14 | End: 2023-02-14

## 2023-02-14 RX ADMIN — LIDOCAINE 1 APPLICATION: 40 CREAM TOPICAL at 16:36

## 2023-02-14 NOTE — PATIENT INSTRUCTIONS
Orders Placed This Encounter   Procedures    Wound cleansing and dressings     B/L Leg wounds:  Wash your hands with soap and water  Cleanse the wound with mild soap and water, dove, prior to applying a clean dressing  Shower no ---do not get the dressings wet     Apply Adaptic then Silver alginate to the wounds  Cover with gauze/ABD pad  Secure with jacob and tape  Secure with Coflex Lite to BLE  Change dressing 3 x weekly and as needed for excessive draiange     Treatments above were completed today at the [facility]           Multi-layer compression wrap Instructions---Coflex Lite to BLE    Keep compression wrap/wraps clean and dry  If wraps are too tight and you experience numbness/tingling, call the wound center  If after hours, remove wraps or proceed to nearest E R  and call wound center in AM     [place] will be changed 3x weekly    Avoid prolonged standing in one place  Elevate leg(s) above the level of the heart when sitting or as much as possible       Standing Status:   Future     Standing Expiration Date:   2/14/2024

## 2023-02-15 ENCOUNTER — HOME CARE VISIT (OUTPATIENT)
Dept: HOME HEALTH SERVICES | Facility: HOME HEALTHCARE | Age: 73
End: 2023-02-15

## 2023-02-15 NOTE — PROGRESS NOTES
Patient ID: Rosas Walker is a 74 y o  male Date of Birth 1950     Diagnosis:  1  Venous stasis ulcer of other part of left lower leg with fat layer exposed without varicose veins (HCC)  -     lidocaine (LMX) 4 % cream  -     Wound cleansing and dressings; Future    2  Venous stasis ulcer of other part of right lower leg limited to breakdown of skin without varicose veins (HCC)  -     lidocaine (LMX) 4 % cream  -     Wound cleansing and dressings; Future  -     Debridement       Diagnosis ICD-10-CM Associated Orders   1  Venous stasis ulcer of other part of left lower leg with fat layer exposed without varicose veins (HCC)  I87 2 lidocaine (LMX) 4 % cream    L97 822 Wound cleansing and dressings      2  Venous stasis ulcer of other part of right lower leg limited to breakdown of skin without varicose veins (HCC)  I87 2 lidocaine (LMX) 4 % cream    L97 811 Wound cleansing and dressings     Debridement           Assessment & Plan:  1   Left lower extremity venous ulcerations  2  Right lower extremity venous ulcer  2   Bilateral lower extremity venous insufficiency        - Change dressings to adaptic, maxorb, DSD and coflex lite      - Continue leg elevation to B/l LE   -Discussed importance of proper protein intake for wound healing   - Return in 2 week     Chief Complaint   Patient presents with   • Follow Up Wound Care Visit     BLE wounds           Subjective:   Patient presents with wife for continued treatments of b/l lower leg ulcers  Reports no other complaints         The following portions of the patient's history were reviewed and updated as appropriate:   Patient Active Problem List   Diagnosis   • Gout   • Gait abnormality   • Lumbar stenosis   • Polyneuropathy   • Lower extremity weakness   • Lumbosacral plexopathy   • Elevated troponin   • EDGAR (dyspnea on exertion)   • Rash due to vaculitis    • LYLY (acute kidney injury) ([facility] )   • Depression   • Multiple open wounds of lower leg   • Ambulatory dysfunction   • Leucocytosis   • Bloating     Past Medical History:   Diagnosis Date   • Depression    • Gout      Past Surgical History:   Procedure Laterality Date   • BACK SURGERY      X2 LUMBAR INCLUDING FUSION  ,  WITH OAA, [facility] DR Yari Garcia   • CATARACT EXTRACTION, BILATERAL     • KNEE ARTHROSCOPY Right     x2 , meniscus repair   • TONSILECTOMY AND ADNOIDECTOMY       Social History     Socioeconomic History   • Marital status: /Civil Union     Spouse name: Louise   • Number of children: 3   • Years of education: 14   • Highest education level: Not on file   Occupational History   • Not on file   Tobacco Use   • Smoking status: Former     Types: Cigarettes     Quit date:      Years since quittin 1   • Smokeless tobacco: Never   Vaping Use   • Vaping Use: Never used   Substance and Sexual Activity   • Alcohol use: Not Currently     Comment: occasional   • Drug use: Never   • Sexual activity: Not on file   Other Topics Concern   • Not on file   Social History Narrative   • Not on file     Social Determinants of Health     Financial Resource Strain: Not on file   Food Insecurity: No Food Insecurity   • Worried About Running Out of Food in the Last Year: Never true   • Ran Out of Food in the Last Year: Never true   Transportation Needs: No Transportation Needs   • Lack of Transportation (Medical): No   • Lack of Transportation (Non-Medical):  No   Physical Activity: Not on file   Stress: Not on file   Social Connections: Not on file   Intimate Partner Violence: Not on file   Housing Stability: Unknown   • Unable to Pay for Housing in the Last Year: Not on file   • Number of Places Lived in the Last Year: 1   • Unstable Housing in the Last Year: No        Current Outpatient Medications:   •  allopurinol (ZYLOPRIM) 100 mg tablet, Take 100 mg by mouth 2 (two) times a day, Disp: , Rfl:   •  cephalexin (KEFLEX) 500 mg capsule, Take 500 mg by mouth every 8 (eight) hours (Patient not taking: Reported on 1/10/2023), Disp: , Rfl:   •  cholecalciferol (VITAMIN D3) 1,000 units tablet, Take 2,000 Units by mouth daily, Disp: , Rfl:   •  collagenase (SANTYL) ointment, Apply topically daily Wound 1: 10cm x15cm Wound 2: 10cm x10cm (Patient not taking: Reported on 1/10/2023), Disp: 15 g, Rfl: 0  •  famotidine (PEPCID) 20 mg tablet, Take 1 tablet (20 mg total) by mouth daily Do not start before October 6, 2022 , Disp: 30 tablet, Rfl: 0  •  FLUoxetine (PROzac) 20 mg capsule, Take 20 mg by mouth daily  (Patient not taking: Reported on 1/10/2023), Disp: , Rfl:   •  loratadine (CLARITIN) 10 mg tablet, Take 10 mg by mouth as needed for allergies, Disp: , Rfl:   •  Magnesium 100 MG CAPS, Take 1 capsule by mouth daily, Disp: , Rfl:   •  meloxicam (MOBIC) 15 mg tablet, Take 15 mg by mouth daily (Patient not taking: Reported on 1/10/2023), Disp: , Rfl:   •  Misc Natural Products (OSTEO BI-FLEX/5-LOXIN ADVANCED PO), Take 1 tablet by mouth daily , Disp: , Rfl:   •  Multiple Vitamins-Minerals (CENTRUM SILVER 50+MEN PO), Take 1 tablet by mouth daily, Disp: , Rfl:   •  mupirocin (BACTROBAN) 2 % ointment, Apply topically 2 (two) times a day Apply topically twice a day or as needed after every dressing change on lower legs (Patient not taking: No sig reported), Disp: 30 g, Rfl: 11  •  predniSONE 20 mg tablet, Take 20 mg by mouth daily (Patient not taking: Reported on 1/10/2023), Disp: , Rfl:   •  simethicone (MYLICON) 80 mg chewable tablet, Chew 1 tablet (80 mg total) every 6 (six) hours as needed for flatulence, Disp: 30 tablet, Rfl: 0  •  SUPER B COMPLEX/C PO, Take 1 tablet by mouth daily, Disp: , Rfl:   •  torsemide (DEMADEX) 10 mg tablet, Take 1 tablet (10 mg total) by mouth daily Do not start before October 6, 2022 , Disp: 30 tablet, Rfl: 0  •  triamcinolone (KENALOG) 0 1 % ointment, Apply topically 2 (two) times a day Only apply to rash, do not apply to necrotic tissue (Patient not taking: Reported on 1/10/2023), Disp: 30 g, Rfl: 0  No current facility-administered medications for this visit  Family History   Problem Relation Age of Onset   • Diabetes Father    • Diabetes Brother    • Diabetes Sister       Review of Systems   All other systems reviewed and are negative  Allergies: Other and Penicillins      Objective:  /68   Pulse 76   Temp 97 8 °F (36 6 °C)   Resp 20     Physical Exam  Vitals reviewed  Musculoskeletal:      Right lower le+ Edema present  Left lower le+ Edema present  Comments: B/l leg lower leg ulcers probes to subcutaneous tissue with granular and bleeding wound base  +2 pitting edema lower leg  Wound 10/03/22 Tibial Left;Posterior (Active)   Wound Image   23 1631   Wound Description Epithelialization; Hypergranulation;Yellow;Pink 23 1635   Antonette-wound Assessment Purple;Edema 23 1635   Wound Length (cm) 4 5 cm 23 1635   Wound Width (cm) 4 5 cm 23 1635   Wound Depth (cm) 0 1 cm 23 1635   Wound Surface Area (cm^2) 20 25 cm^2 23 1635   Wound Volume (cm^3) 2 025 cm^3 23 1635   Calculated Wound Volume (cm^3) 2 03 cm^3 23 1635   Drainage Amount Small 23 1635   Drainage Description Serosanguineous; Bloody 23 1635   Non-staged Wound Description Full thickness 23 1635   Treatments Irrigation with NSS;Cleansed 23 1528       Wound 01/10/23 Venous Ulcer Leg Right; Lower (Active)   Wound Image   23 1630   Wound Description Epithelialization 23 1635   Antonette-wound Assessment Dry;Hyperpigmented;Edema;Purple 23 1635   Wound Length (cm) 0 cm 23 1635   Wound Width (cm) 0 cm 23 1635   Wound Depth (cm) 0 cm 23 1635   Wound Surface Area (cm^2) 0 cm^2 23 1635   Wound Volume (cm^3) 0 cm^3 23 1635   Calculated Wound Volume (cm^3) 0 cm^3 23 1635   Change in Wound Size % 100 23 1635   Drainage Amount None 23 1635   Drainage Description Bloody 23 1645 Non-staged Wound Description Not applicable 07/21/24 2075   Treatments Cleansed 01/10/23 1601   Patient Tolerance Tolerated well 01/10/23 1601       Wound 02/14/23 Leg Distal;Right; Lower (Active)   Wound Image   02/14/23 1651   Wound Description Yellow;Pink;Slough 02/14/23 1635   Antonette-wound Assessment Edema;Purple 02/14/23 1635   Wound Length (cm) 2 5 cm 02/14/23 1635   Wound Width (cm) 3 cm 02/14/23 1635   Wound Depth (cm) 0 1 cm 02/14/23 1635   Wound Surface Area (cm^2) 7 5 cm^2 02/14/23 1635   Wound Volume (cm^3) 0 75 cm^3 02/14/23 1635   Calculated Wound Volume (cm^3) 0 75 cm^3 02/14/23 1635   Drainage Amount Moderate 02/14/23 1635   Drainage Description Serosanguineous 02/14/23 1635   Non-staged Wound Description Full thickness 02/14/23 1635                         Debridement   Wound 01/10/23 Venous Ulcer Leg Right; Lower    Universal Protocol:  Consent: Verbal consent obtained    Risks and benefits: risks, benefits and alternatives were discussed  Consent given by: patient  Patient understanding: patient states understanding of the procedure being performed  Patient identity confirmed: verbally with patient      Performed by: physician  Debridement type: surgical  Level of debridement: subcutaneous tissue  Pain control: lidocaine 4%  Post-debridement measurements  Length (cm): 2 5  Width (cm): 3  Depth (cm): 0 2  Percent debrided: 100%  Surface Area (cm^2): 7 5  Area debrided (cm^2): 7 5  Volume (cm^3): 1 5  Tissue and other material debrided: subcutaneous tissue  Devitalized tissue debrided: biofilm, fibrin, necrotic debris and slough  Instrument(s) utilized: curette  Bleeding: small  Hemostasis obtained with: pressure  Response to treatment: procedure was tolerated well           Results from last 6 Months   Lab Units 09/24/22  1041   WOUND CULTURE  1+ Growth of Pseudomonas aeruginosa*         Wound Instructions:  Orders Placed This Encounter   Procedures   • Wound cleansing and dressings     B/L Leg wounds:  Wash your hands with soap and water  Cleanse the wound with mild soap and water, dove, prior to applying a clean dressing  Shower no ---do not get the dressings wet     Apply Adaptic then Silver alginate to the wounds  Cover with gauze/ABD pad  Secure with jacob and tape  Secure with Coflex Lite to BLE  Change dressing 3 x weekly and as needed for excessive draiange     Treatments above were completed today at the [facility]           Multi-layer compression wrap Instructions---Coflex Lite to BLE    Keep compression wrap/wraps clean and dry  If wraps are too tight and you experience numbness/tingling, call the wound center  If after hours, remove wraps or proceed to nearest E R  and call wound center in [facility] [place] will be changed 3x weekly    Avoid prolonged standing in one place  Elevate leg(s) above the level of the heart when sitting or as much as possible  Standing Status:   Future     Standing Expiration Date:   2/14/2024   • Debridement     This order was created via procedure documentation         Jie Kelley DPM      Portions of the record may have been created with voice recognition software  Occasional wrong word or "sound a like" substitutions may have occurred due to the inherent limitations of voice recognition software  Read the chart carefully and recognize, using context, where substitutions have occurred

## 2023-02-15 NOTE — PROGRESS NOTES
Debridement   Wound 10/03/22 Venous Ulcer Tibial Left;Posterior    Universal Protocol:  Consent: Verbal consent obtained    Risks and benefits: risks, benefits and alternatives were discussed  Consent given by: patient  Patient understanding: patient states understanding of the procedure being performed  Patient identity confirmed: verbally with patient      Performed by: physician  Debridement type: selective  Pain control: lidocaine 4%  Post-debridement measurements  Length (cm): 4 5  Width (cm): 4 5  Depth (cm): 0 1  Percent debrided: 100%  Surface Area (cm^2): 20 25  Area debrided (cm^2): 20 25  Volume (cm^3): 2 03  Devitalized tissue debrided: biofilm, fibrin and slough  Instrument(s) utilized: curette  Bleeding: small  Hemostasis obtained with: pressure  Procedural pain (0-10): insensate  Post-procedural pain: insensate   Response to treatment: procedure was tolerated well

## 2023-02-16 ENCOUNTER — HOME CARE VISIT (OUTPATIENT)
Dept: HOME HEALTH SERVICES | Facility: HOME HEALTHCARE | Age: 73
End: 2023-02-16

## 2023-02-16 VITALS
SYSTOLIC BLOOD PRESSURE: 112 MMHG | RESPIRATION RATE: 20 BRPM | HEART RATE: 84 BPM | TEMPERATURE: 97.2 F | OXYGEN SATURATION: 97 % | DIASTOLIC BLOOD PRESSURE: 74 MMHG

## 2023-02-16 LAB — BACTERIA BLD CULT: NORMAL

## 2023-02-17 PROCEDURE — 10330064

## 2023-02-17 NOTE — CASE COMMUNICATION
Ship to  Pt  Home   Insurance [facility]     Wound 1 X   Full  Wound 2 X    Full  All items are ordered by the each unless otherwise noted    PLEASE DO NOT ORDER BY THE BOX  Request specific quantity needed  For Private Insurances order should be for a 2 week period    COFLEX TLC LITE TWO LAYER COMPRESSION KIT 4" 20-30mmHg -   8 kits

## 2023-02-18 ENCOUNTER — HOME CARE VISIT (OUTPATIENT)
Dept: HOME HEALTH SERVICES | Facility: HOME HEALTHCARE | Age: 73
End: 2023-02-18

## 2023-02-18 VITALS
RESPIRATION RATE: 20 BRPM | TEMPERATURE: 97.9 F | HEART RATE: 69 BPM | SYSTOLIC BLOOD PRESSURE: 110 MMHG | OXYGEN SATURATION: 98 % | DIASTOLIC BLOOD PRESSURE: 68 MMHG

## 2023-02-20 ENCOUNTER — HOME CARE VISIT (OUTPATIENT)
Dept: HOME HEALTH SERVICES | Facility: HOME HEALTHCARE | Age: 73
End: 2023-02-20

## 2023-02-20 VITALS
RESPIRATION RATE: 20 BRPM | HEART RATE: 72 BPM | OXYGEN SATURATION: 96 % | DIASTOLIC BLOOD PRESSURE: 66 MMHG | TEMPERATURE: 97.3 F | SYSTOLIC BLOOD PRESSURE: 104 MMHG

## 2023-02-21 DIAGNOSIS — I77.6 VASCULITIS (HCC): Primary | ICD-10-CM

## 2023-02-21 PROCEDURE — 10330064 TAPE, ADHSV TRANSPORE WHT 2" (6RL/BX 10B

## 2023-02-21 PROCEDURE — 10330064 PAD, ABD 5X9" STR LF (1/PK 20PK/BX) MGM1

## 2023-02-22 ENCOUNTER — HOME CARE VISIT (OUTPATIENT)
Dept: HOME HEALTH SERVICES | Facility: HOME HEALTHCARE | Age: 73
End: 2023-02-22

## 2023-02-22 ENCOUNTER — TELEPHONE (OUTPATIENT)
Dept: DERMATOLOGY | Facility: CLINIC | Age: 73
End: 2023-02-22

## 2023-02-22 VITALS
DIASTOLIC BLOOD PRESSURE: 66 MMHG | TEMPERATURE: 97.6 F | RESPIRATION RATE: 20 BRPM | SYSTOLIC BLOOD PRESSURE: 112 MMHG | HEART RATE: 76 BPM | OXYGEN SATURATION: 95 %

## 2023-02-22 NOTE — TELEPHONE ENCOUNTER
Called pt to schedule appt in [facility] in February Per Dr Veronika Velazquez, patient stated they would not come to [place]

## 2023-02-24 ENCOUNTER — HOME CARE VISIT (OUTPATIENT)
Dept: HOME HEALTH SERVICES | Facility: HOME HEALTHCARE | Age: 73
End: 2023-02-24

## 2023-02-24 ENCOUNTER — LAB REQUISITION (OUTPATIENT)
Dept: LAB | Facility: HOSPITAL | Age: 73
End: 2023-02-24

## 2023-02-24 VITALS
DIASTOLIC BLOOD PRESSURE: 6 MMHG | TEMPERATURE: 96.3 F | RESPIRATION RATE: 16 BRPM | HEART RATE: 88 BPM | OXYGEN SATURATION: 97 % | SYSTOLIC BLOOD PRESSURE: 114 MMHG

## 2023-02-24 DIAGNOSIS — I77.6 ARTERITIS, UNSPECIFIED (HCC): ICD-10-CM

## 2023-02-24 LAB
BACTERIA UR QL AUTO: ABNORMAL /HPF
BILIRUB UR QL STRIP: NEGATIVE
CLARITY UR: ABNORMAL
COLOR UR: YELLOW
GLUCOSE UR STRIP-MCNC: NEGATIVE MG/DL
HGB UR QL STRIP.AUTO: ABNORMAL
HYALINE CASTS #/AREA URNS LPF: ABNORMAL /LPF
KETONES UR STRIP-MCNC: NEGATIVE MG/DL
LEUKOCYTE ESTERASE UR QL STRIP: NEGATIVE
MUCOUS THREADS UR QL AUTO: ABNORMAL
NITRITE UR QL STRIP: NEGATIVE
NON-SQ EPI CELLS URNS QL MICRO: ABNORMAL /HPF
OTHER STN SPEC: ABNORMAL
PH UR STRIP.AUTO: 5.5 [PH]
PROT UR STRIP-MCNC: NEGATIVE MG/DL
RBC #/AREA URNS AUTO: ABNORMAL /HPF
SP GR UR STRIP.AUTO: 1.02 (ref 1–1.03)
UROBILINOGEN UR STRIP-ACNC: <2 MG/DL
WBC #/AREA URNS AUTO: ABNORMAL /HPF

## 2023-02-27 PROCEDURE — 10330064 BANDAGE, COMPRSN COFLEX TLC 2LYR XLNG (2

## 2023-02-28 ENCOUNTER — HOME CARE VISIT (OUTPATIENT)
Dept: HOME HEALTH SERVICES | Facility: HOME HEALTHCARE | Age: 73
End: 2023-02-28

## 2023-02-28 VITALS
RESPIRATION RATE: 18 BRPM | OXYGEN SATURATION: 98 % | TEMPERATURE: 97.7 F | DIASTOLIC BLOOD PRESSURE: 78 MMHG | SYSTOLIC BLOOD PRESSURE: 130 MMHG | HEART RATE: 78 BPM

## 2023-03-02 ENCOUNTER — HOME CARE VISIT (OUTPATIENT)
Dept: HOME HEALTH SERVICES | Facility: HOME HEALTHCARE | Age: 73
End: 2023-03-02

## 2023-03-04 ENCOUNTER — HOME CARE VISIT (OUTPATIENT)
Dept: HOME HEALTH SERVICES | Facility: HOME HEALTHCARE | Age: 73
End: 2023-03-04

## 2023-03-04 VITALS
TEMPERATURE: 98.1 F | OXYGEN SATURATION: 98 % | HEART RATE: 60 BPM | RESPIRATION RATE: 18 BRPM | DIASTOLIC BLOOD PRESSURE: 62 MMHG | SYSTOLIC BLOOD PRESSURE: 108 MMHG

## 2023-03-06 VITALS
TEMPERATURE: 98.1 F | RESPIRATION RATE: 20 BRPM | OXYGEN SATURATION: 96 % | DIASTOLIC BLOOD PRESSURE: 70 MMHG | SYSTOLIC BLOOD PRESSURE: 112 MMHG | HEART RATE: 88 BPM

## 2023-03-07 ENCOUNTER — HOME CARE VISIT (OUTPATIENT)
Dept: HOME HEALTH SERVICES | Facility: HOME HEALTHCARE | Age: 73
End: 2023-03-07

## 2023-03-07 ENCOUNTER — OFFICE VISIT (OUTPATIENT)
Dept: WOUND CARE | Facility: CLINIC | Age: 73
End: 2023-03-07

## 2023-03-07 VITALS
RESPIRATION RATE: 20 BRPM | HEART RATE: 90 BPM | TEMPERATURE: 98.3 F | DIASTOLIC BLOOD PRESSURE: 74 MMHG | SYSTOLIC BLOOD PRESSURE: 130 MMHG

## 2023-03-07 DIAGNOSIS — L97.822 VENOUS STASIS ULCER OF OTHER PART OF LEFT LOWER LEG WITH FAT LAYER EXPOSED WITHOUT VARICOSE VEINS (HCC): ICD-10-CM

## 2023-03-07 DIAGNOSIS — I87.2 VENOUS STASIS ULCER OF OTHER PART OF RIGHT LOWER LEG LIMITED TO BREAKDOWN OF SKIN WITHOUT VARICOSE VEINS (HCC): Primary | ICD-10-CM

## 2023-03-07 DIAGNOSIS — L97.811 VENOUS STASIS ULCER OF OTHER PART OF RIGHT LOWER LEG LIMITED TO BREAKDOWN OF SKIN WITHOUT VARICOSE VEINS (HCC): Primary | ICD-10-CM

## 2023-03-07 DIAGNOSIS — I87.2 VENOUS STASIS ULCER OF OTHER PART OF LEFT LOWER LEG WITH FAT LAYER EXPOSED WITHOUT VARICOSE VEINS (HCC): ICD-10-CM

## 2023-03-07 RX ORDER — LIDOCAINE 40 MG/G
CREAM TOPICAL ONCE
Status: COMPLETED | OUTPATIENT
Start: 2023-03-07 | End: 2023-03-07

## 2023-03-07 RX ADMIN — LIDOCAINE: 40 CREAM TOPICAL at 16:11

## 2023-03-07 NOTE — PROGRESS NOTES
Cast Application    Date/Time: 3/7/2023 4:14 PM  Performed by: Doris Monroe RN  Authorized by: Varghese Jhaveri DPM   Universal Protocol:  Consent: Verbal consent obtained  Consent given by: patient  Patient identity confirmed: verbally with patient      Pre-procedure details:     Sensation:  Normal  Procedure details:     Laterality:  Right    Location:  Leg    Leg:  R lower leg    Strapping: no  Cast type:  Multi-layer compression short leg    Post-procedure details:     Pain:  Improved    Sensation:  Normal    Patient tolerance of procedure: Tolerated well, no immediate complications  Comments:      Multilayer wrap procedure     Before application, JAIME and/or TBI determined to be adequate for healing and application of compression  Lower extremity washed prior to application of compression wrap  With the foot in dorsiflexed position, coflex was applied as per physician orders without complications or complaint of pain  The procedure was tolerated well  Toes warm & pink post application  Patient provided education & reinforced to observe toes for any discoloration, swelling or tingling and instructed when to report to the [address] [facility],[address] or to remove compression  Wound care as per providers orders, patient tolerated well

## 2023-03-07 NOTE — PATIENT INSTRUCTIONS
Orders Placed This Encounter   Procedures    Wound cleansing and dressings     B/L Leg wounds:   Wash your hands with soap and water  Cleanse the wound with mild soap and water, dove, prior to applying a clean dressing  Shower no ---do not get the dressings wet       Apply Adaptic then Silver alginate to the wounds  Cover with gauze/ABD pad   Secure with jacob and tape   Secure with Coflex Lite to BLE   Change dressing 3 x weekly and as needed for excessive draiange       Treatments above were completed today at the [facility]               Multi-layer compression wrap Instructions---Coflex Lite to BLE     Keep compression wrap/wraps clean and dry  If wraps are too tight and you experience numbness/tingling, call the wound center  If after hours, remove wraps or proceed to nearest E R  and call wound center in AM      [place] will be changed 3x weekly     Avoid prolonged standing in one place  Elevate leg(s) above the level of the heart when sitting or as much as possible       Standing Status:   Future     Standing Expiration Date:   3/7/2024

## 2023-03-07 NOTE — PROGRESS NOTES
Patient ID: Kassandra Romero is a 74 y o  male Date of Birth 1950     Diagnosis:  1  Venous stasis ulcer of other part of right lower leg limited to breakdown of skin without varicose veins (HCC)  -     Wound cleansing and dressings; Future  -     lidocaine (LMX) 4 % cream  -     Cast Application    2  Venous stasis ulcer of other part of left lower leg with fat layer exposed without varicose veins (HCC)  -     Wound cleansing and dressings; Future  -     lidocaine (LMX) 4 % cream  -     Cast Application       Diagnosis ICD-10-CM Associated Orders   1  Venous stasis ulcer of other part of right lower leg limited to breakdown of skin without varicose veins (HCC)  I87 2 Wound cleansing and dressings    L97 811 lidocaine (LMX) 4 % cream     Cast Application      2  Venous stasis ulcer of other part of left lower leg with fat layer exposed without varicose veins (HCC)  I87 2 Wound cleansing and dressings    L97 822 lidocaine (LMX) 4 % cream     Cast Application           Assessment & Plan:  1  Left lower extremity venous ulcerations  2  Right lower extremity venous ulcer - healed   2   Bilateral lower extremity venous insufficiency        - Change dressings to adaptic, maxorb, DSD and coflex lite      - Continue leg elevation to B/l LE   - Discussed importance of proper protein intake for wound healing   - Return in 2 week     Chief Complaint   Patient presents with   • Follow Up Wound Care Visit     Bilateral leg wounds           Subjective:   Patient presents for continued wound treatments to bilateral lower legs  Reports no other complaints at this visit        The following portions of the patient's history were reviewed and updated as appropriate:   Patient Active Problem List   Diagnosis   • Gout   • Gait abnormality   • Lumbar stenosis   • Polyneuropathy   • Lower extremity weakness   • Lumbosacral plexopathy   • Elevated troponin   • EDGAR (dyspnea on exertion)   • Rash due to vaculitis    • [facility]   • Depression   • Multiple open wounds of lower leg   • Ambulatory dysfunction   • Leucocytosis   • Bloating     Past Medical History:   Diagnosis Date   • Depression    • Gout      Past Surgical History:   Procedure Laterality Date   • BACK SURGERY      X2 LUMBAR INCLUDING FUSION  ,  WITH OAA, LV[facility] DR Isela Rosa   • CATARACT EXTRACTION, BILATERAL     • KNEE ARTHROSCOPY Right     x2 , meniscus repair   • TONSILECTOMY AND ADNOIDECTOMY       Social History     Socioeconomic History   • Marital status: /Civil Union     Spouse name: Louise   • Number of children: 3   • Years of education: 15   • Highest education level: None   Occupational History   • None   Tobacco Use   • Smoking status: Former     Types: Cigarettes     Quit date:      Years since quittin 2   • Smokeless tobacco: Never   Vaping Use   • Vaping Use: Never used   Substance and Sexual Activity   • Alcohol use: Not Currently     Comment: occasional   • Drug use: Never   • Sexual activity: None   Other Topics Concern   • None   Social History Narrative   • None     Social Determinants of Health     Financial Resource Strain: Not on file   Food Insecurity: No Food Insecurity   • Worried About Running Out of Food in the Last Year: Never true   • Ran Out of Food in the Last Year: Never true   Transportation Needs: No Transportation Needs   • Lack of Transportation (Medical): No   • Lack of Transportation (Non-Medical):  No   Physical Activity: Not on file   Stress: Not on file   Social Connections: Not on file   Intimate Partner Violence: Not on file   Housing Stability: Unknown   • Unable to Pay for Housing in the Last Year: Not on file   • Number of Places Lived in the Last Year: 1   • Unstable Housing in the Last Year: No        Current Outpatient Medications:   •  allopurinol (ZYLOPRIM) 100 mg tablet, Take 100 mg by mouth 2 (two) times a day, Disp: , Rfl:   •  cephalexin (KEFLEX) 500 mg capsule, Take 500 mg by mouth every 8 (eight) hours (Patient not taking: Reported on 1/10/2023), Disp: , Rfl:   •  cholecalciferol (VITAMIN D3) 1,000 units tablet, Take 2,000 Units by mouth daily, Disp: , Rfl:   •  collagenase (SANTYL) ointment, Apply topically daily Wound 1: 10cm x15cm Wound 2: 10cm x10cm (Patient not taking: Reported on 1/10/2023), Disp: 15 g, Rfl: 0  •  famotidine (PEPCID) 20 mg tablet, Take 1 tablet (20 mg total) by mouth daily Do not start before October 6, 2022 , Disp: 30 tablet, Rfl: 0  •  FLUoxetine (PROzac) 20 mg capsule, Take 20 mg by mouth daily  (Patient not taking: Reported on 1/10/2023), Disp: , Rfl:   •  loratadine (CLARITIN) 10 mg tablet, Take 10 mg by mouth as needed for allergies, Disp: , Rfl:   •  Magnesium 100 MG CAPS, Take 1 capsule by mouth daily, Disp: , Rfl:   •  meloxicam (MOBIC) 15 mg tablet, Take 15 mg by mouth daily (Patient not taking: Reported on 1/10/2023), Disp: , Rfl:   •  Misc Natural Products (OSTEO BI-FLEX/5-LOXIN ADVANCED PO), Take 1 tablet by mouth daily , Disp: , Rfl:   •  Multiple Vitamins-Minerals (CENTRUM SILVER 50+MEN PO), Take 1 tablet by mouth daily, Disp: , Rfl:   •  mupirocin (BACTROBAN) 2 % ointment, Apply topically 2 (two) times a day Apply topically twice a day or as needed after every dressing change on lower legs (Patient not taking: No sig reported), Disp: 30 g, Rfl: 11  •  predniSONE 20 mg tablet, Take 20 mg by mouth daily (Patient not taking: Reported on 1/10/2023), Disp: , Rfl:   •  simethicone (MYLICON) 80 mg chewable tablet, Chew 1 tablet (80 mg total) every 6 (six) hours as needed for flatulence, Disp: 30 tablet, Rfl: 0  •  SUPER B COMPLEX/C PO, Take 1 tablet by mouth daily, Disp: , Rfl:   •  torsemide (DEMADEX) 10 mg tablet, Take 1 tablet (10 mg total) by mouth daily Do not start before October 6, 2022 , Disp: 30 tablet, Rfl: 0  •  triamcinolone (KENALOG) 0 1 % ointment, Apply topically 2 (two) times a day Only apply to rash, do not apply to necrotic tissue (Patient not taking: Reported on 1/10/2023), Disp: 30 g, Rfl: 0  No current facility-administered medications for this visit  Family History   Problem Relation Age of Onset   • Diabetes Father    • Diabetes Brother    • Diabetes Sister       Review of Systems   All other systems reviewed and are negative  Allergies: Other and Penicillins      Objective:  /74   Pulse 90   Temp 98 3 °F (36 8 °C)   Resp 20     Physical Exam  Vitals reviewed  Feet:      Comments: Left lower leg superficial ulceration  No signs of infection            Wound 10/03/22 Venous Ulcer Tibial Left;Posterior (Active)   Wound Image   03/07/23 1545   Wound Description Epithelialization;Pink 03/07/23 1544   Antonette-wound Assessment Purple;Edema 03/07/23 1544   Wound Length (cm) 0 1 cm 03/07/23 1544   Wound Width (cm) 0 1 cm 03/07/23 1544   Wound Depth (cm) 0 1 cm 03/07/23 1544   Wound Surface Area (cm^2) 0 01 cm^2 03/07/23 1544   Wound Volume (cm^3) 0 001 cm^3 03/07/23 1544   Calculated Wound Volume (cm^3) 0 cm^3 03/07/23 1544   Drainage Amount Small 03/07/23 1544   Drainage Description Sanguineous 03/07/23 1544   Non-staged Wound Description Full thickness 03/07/23 1544   Treatments Irrigation with NSS;Cleansed 01/03/23 1528   Dressing Status Intact 03/07/23 1544       Wound 02/14/23 Leg Distal;Right; Lower (Active)   Wound Image   03/07/23 1547   Wound Description Epithelialization;Pink 03/07/23 1548   Antonette-wound Assessment Edema;Purple 03/07/23 1548   Wound Length (cm) 0 1 cm 03/07/23 1548   Wound Width (cm) 0 1 cm 03/07/23 1548   Wound Depth (cm) 0 1 cm 03/07/23 1548   Wound Surface Area (cm^2) 0 01 cm^2 03/07/23 1548   Wound Volume (cm^3) 0 001 cm^3 03/07/23 1548   Calculated Wound Volume (cm^3) 0 cm^3 03/07/23 1548   Change in Wound Size % 100 03/07/23 1548   Drainage Amount Scant 03/07/23 1548   Drainage Description Serous 03/07/23 1548   Non-staged Wound Description Full thickness 03/07/23 1548   Dressing Status Intact 03/07/23 1548 Debridement   Wound 10/03/22 Venous Ulcer Tibial Left;Posterior    Universal Protocol:  Consent: Verbal consent obtained  Risks and benefits: risks, benefits and alternatives were discussed  Consent given by: patient  Patient understanding: patient states understanding of the procedure being performed  Patient identity confirmed: verbally with patient      Performed by: physician  Debridement type: selective  Pain control: lidocaine 4%  Post-debridement measurements  Length (cm): 1  Width (cm): 2  Depth (cm): 0 1  Percent debrided: 100%  Surface Area (cm^2): 2  Area debrided (cm^2): 2  Volume (cm^3): 0 2  Devitalized tissue debrided: biofilm and slough  Instrument(s) utilized: curette  Bleeding: small  Hemostasis obtained with: pressure  Procedural pain (0-10): insensate  Post-procedural pain: insensate   Response to treatment: procedure was tolerated well           Results from last 6 Months   Lab Units 09/24/22  1041   WOUND CULTURE  1+ Growth of Pseudomonas aeruginosa*         Wound Instructions:  Orders Placed This Encounter   Procedures   • Wound cleansing and dressings     B/L Leg wounds:   Wash your hands with soap and water      Cleanse the wound with mild soap and water, dove, prior to applying a clean dressing  Shower no ---do not get the dressings wet       Apply Adaptic then Silver alginate to the wounds   Cover with gauze/ABD pad   Secure with jacob and tape   Secure with Coflex Lite to BLE   Change dressing 3 x weekly and as needed for excessive draiange       Treatments above were completed today at the [facility]               Multi-layer compression wrap Instructions---Coflex Lite to BLE     Keep compression wrap/wraps clean and dry  If wraps are too tight and you experience numbness/tingling, call the wound center  If after hours, remove wraps or proceed to nearest E R  and call wound center in [facility] [place] will be changed 3x weekly     Avoid prolonged standing in one place  Elevate leg(s) above the level of the heart when sitting or as much as possible  Standing Status:   Future     Standing Expiration Date:   0/1/3357   • Cast Application     This order was created via procedure documentation         Maryam Terrazas DPM      Portions of the record may have been created with voice recognition software  Occasional wrong word or "sound a like" substitutions may have occurred due to the inherent limitations of voice recognition software  Read the chart carefully and recognize, using context, where substitutions have occurred

## 2023-03-08 ENCOUNTER — HOME CARE VISIT (OUTPATIENT)
Dept: HOME HEALTH SERVICES | Facility: HOME HEALTHCARE | Age: 73
End: 2023-03-08

## 2023-03-09 ENCOUNTER — HOME CARE VISIT (OUTPATIENT)
Dept: HOME HEALTH SERVICES | Facility: HOME HEALTHCARE | Age: 73
End: 2023-03-09

## 2023-03-09 NOTE — CASE COMMUNICATION
Called and spoke with pt to inquire if he or a caregiver was able to perform wound care  He reports he is unable and no caregiver is available to perform today  Pt agreeable to snv being tomorrow for wound care  Will adjust every other day visit schedule accordingly

## 2023-03-10 ENCOUNTER — HOME CARE VISIT (OUTPATIENT)
Dept: HOME HEALTH SERVICES | Facility: HOME HEALTHCARE | Age: 73
End: 2023-03-10

## 2023-03-12 ENCOUNTER — HOME CARE VISIT (OUTPATIENT)
Dept: HOME HEALTH SERVICES | Facility: HOME HEALTHCARE | Age: 73
End: 2023-03-12

## 2023-03-12 VITALS
HEART RATE: 77 BPM | OXYGEN SATURATION: 96 % | TEMPERATURE: 96.1 F | SYSTOLIC BLOOD PRESSURE: 118 MMHG | DIASTOLIC BLOOD PRESSURE: 60 MMHG | RESPIRATION RATE: 20 BRPM

## 2023-03-13 ENCOUNTER — HOME CARE VISIT (OUTPATIENT)
Dept: HOME HEALTH SERVICES | Facility: HOME HEALTHCARE | Age: 73
End: 2023-03-13

## 2023-03-13 VITALS
OXYGEN SATURATION: 97 % | DIASTOLIC BLOOD PRESSURE: 70 MMHG | HEART RATE: 96 BPM | TEMPERATURE: 97.7 F | SYSTOLIC BLOOD PRESSURE: 126 MMHG | RESPIRATION RATE: 20 BRPM

## 2023-03-13 NOTE — CASE COMMUNICATION
Ship to Pt  Home   Insurance [facility]   Wound 1 X Full   Wound 2 X Full   All items are ordered by the each unless otherwise noted     PLEASE DO NOT ORDER BY THE BOX   For Private Insurances order should be for a 2 week period   COFLEX TLC LITE TWO LAYER COMPRESSION KIT 4" 20-30mmHg - 8 kits  Cleansers  Cyril Houston [id number]    1    Dry Dressings   Gauze 4x4 N/S 200 4x4s per unit  [id number]     1   ABD 5x9 [id number]   10    Moist Wound Products  Gauze oi l emulsion [id number]    4

## 2023-03-14 PROCEDURE — 10330064 CLEANSER, WND SEA-CLEANS 6OZ  COLPLT

## 2023-03-14 PROCEDURE — 10330064

## 2023-03-14 PROCEDURE — 10330064 SPONGE, GAUZE 8PLY N/S 4"X4" (200/PK 20P

## 2023-03-14 PROCEDURE — 10330064 PAD, ABD 5X9" STR LF (1/PK 20PK/BX) MGM1

## 2023-03-16 ENCOUNTER — HOME CARE VISIT (OUTPATIENT)
Dept: HOME HEALTH SERVICES | Facility: HOME HEALTHCARE | Age: 73
End: 2023-03-16

## 2023-03-16 VITALS
TEMPERATURE: 97.7 F | OXYGEN SATURATION: 97 % | RESPIRATION RATE: 20 BRPM | HEART RATE: 68 BPM | DIASTOLIC BLOOD PRESSURE: 74 MMHG | SYSTOLIC BLOOD PRESSURE: 112 MMHG

## 2023-03-16 PROCEDURE — 10330064 BANDAGE, COMPRSN COFLEX TLC 2LYR XLNG (2

## 2023-03-18 ENCOUNTER — HOME CARE VISIT (OUTPATIENT)
Dept: HOME HEALTH SERVICES | Facility: HOME HEALTHCARE | Age: 73
End: 2023-03-18

## 2023-03-19 VITALS
HEART RATE: 70 BPM | OXYGEN SATURATION: 97 % | RESPIRATION RATE: 18 BRPM | TEMPERATURE: 98.1 F | SYSTOLIC BLOOD PRESSURE: 118 MMHG | DIASTOLIC BLOOD PRESSURE: 70 MMHG

## 2023-03-20 ENCOUNTER — HOME CARE VISIT (OUTPATIENT)
Dept: HOME HEALTH SERVICES | Facility: HOME HEALTHCARE | Age: 73
End: 2023-03-20

## 2023-03-20 VITALS
DIASTOLIC BLOOD PRESSURE: 72 MMHG | OXYGEN SATURATION: 98 % | TEMPERATURE: 98.3 F | RESPIRATION RATE: 20 BRPM | SYSTOLIC BLOOD PRESSURE: 124 MMHG | HEART RATE: 82 BPM

## 2023-03-21 PROCEDURE — 10330064 PAD, ABD 5X9" STR LF (1/PK 20PK/BX) MGM1

## 2023-03-21 PROCEDURE — 10330064 DRESSING, ADAPTIC 3"X3" (50/BX)

## 2023-03-21 PROCEDURE — 10330064 TAPE, ADHSV TRANSPORE WHT 2" (6RL/BX 10B

## 2023-03-21 PROCEDURE — 10330064 BANDAGE, CNFRM 4"X4.1YDS N/S LF (12RL/BG

## 2023-03-22 ENCOUNTER — HOME CARE VISIT (OUTPATIENT)
Dept: HOME HEALTH SERVICES | Facility: HOME HEALTHCARE | Age: 73
End: 2023-03-22

## 2023-03-22 VITALS
DIASTOLIC BLOOD PRESSURE: 80 MMHG | TEMPERATURE: 97.4 F | SYSTOLIC BLOOD PRESSURE: 122 MMHG | RESPIRATION RATE: 20 BRPM | OXYGEN SATURATION: 96 % | HEART RATE: 76 BPM

## 2023-03-22 NOTE — CASE COMMUNICATION
Ship to Pt  Home   Clinician to take to pt **  Insurance mc    Wound 1 X     Full         Wound 2  X    Full   All items are ordered by the each unless otherwise noted    PLEASE DO NOT ORDER BY THE BOX  Request specific quantity needed  For Private Insurances order should be for a 2 week period    COFLEX TLC XL NOT STOCKED [id number]     3

## 2023-03-23 PROCEDURE — 10330064 BANDAGE, COMPRSN COFLEX TLC 2LYR XLNG (2

## 2023-03-25 ENCOUNTER — HOME CARE VISIT (OUTPATIENT)
Dept: HOME HEALTH SERVICES | Facility: HOME HEALTHCARE | Age: 73
End: 2023-03-25

## 2023-03-25 VITALS
OXYGEN SATURATION: 96 % | SYSTOLIC BLOOD PRESSURE: 110 MMHG | DIASTOLIC BLOOD PRESSURE: 62 MMHG | RESPIRATION RATE: 20 BRPM | HEART RATE: 81 BPM | TEMPERATURE: 97.5 F

## 2023-03-28 ENCOUNTER — OFFICE VISIT (OUTPATIENT)
Dept: WOUND CARE | Facility: CLINIC | Age: 73
End: 2023-03-28

## 2023-03-28 ENCOUNTER — HOME CARE VISIT (OUTPATIENT)
Dept: HOME HEALTH SERVICES | Facility: HOME HEALTHCARE | Age: 73
End: 2023-03-28

## 2023-03-28 VITALS
RESPIRATION RATE: 20 BRPM | DIASTOLIC BLOOD PRESSURE: 76 MMHG | TEMPERATURE: 97.1 F | HEART RATE: 94 BPM | SYSTOLIC BLOOD PRESSURE: 139 MMHG

## 2023-03-28 DIAGNOSIS — I87.2 VENOUS STASIS ULCER OF OTHER PART OF LEFT LOWER LEG WITH FAT LAYER EXPOSED WITHOUT VARICOSE VEINS (HCC): ICD-10-CM

## 2023-03-28 DIAGNOSIS — L97.811 VENOUS STASIS ULCER OF OTHER PART OF RIGHT LOWER LEG LIMITED TO BREAKDOWN OF SKIN WITHOUT VARICOSE VEINS (HCC): Primary | ICD-10-CM

## 2023-03-28 DIAGNOSIS — I87.2 VENOUS STASIS ULCER OF OTHER PART OF RIGHT LOWER LEG LIMITED TO BREAKDOWN OF SKIN WITHOUT VARICOSE VEINS (HCC): Primary | ICD-10-CM

## 2023-03-28 DIAGNOSIS — L97.822 VENOUS STASIS ULCER OF OTHER PART OF LEFT LOWER LEG WITH FAT LAYER EXPOSED WITHOUT VARICOSE VEINS (HCC): ICD-10-CM

## 2023-03-28 NOTE — PROGRESS NOTES
Patient ID: Fatimah Avery is a 74 y o  male Date of Birth 1950       Chief Complaint   Patient presents with   • Follow Up Wound Care Visit     Bilateral lower leg ulcers, arrived with bilateral lower leg wraps in place        Allergies: Other and Penicillins    Diagnosis:   Diagnosis ICD-10-CM Associated Orders   1  Venous stasis ulcer of other part of right lower leg limited to breakdown of skin without varicose veins (HCC)  I87 2 Wound cleansing and dressings    L97 811       2  Venous stasis ulcer of other part of left lower leg with fat layer exposed without varicose veins (HCC)  I87 2 Wound cleansing and dressings    L97 822            Assessment  & Plan:    • F/u bilateral LE venous ulcerations  RLE was healed at previous visit  Today with new very small area of partial thickness skin breakdown with granulation tissue in wound bed  LLE with small partial thickness areas of breakdown with granular wound beds as well  There is no surrounding erythema, malodor, increased drainage to indicate infection  o Continue with current regimen of adaptic, silver alginate, DSD and Coflex lite for compression  o Elevate legs when resting    o Obtain 3-4 servings of protein daily for wound healing  o If fevers, chills, pain despite neuropathy, changes to indicate infection with wrap changes, contact office sooner  Otherwise, f/u with Dr Robert Acuña in three weeks  Subjective:   03/28/23: Pt presents for continued care of bilateral LE ulcerations  Has been doing well with current regimen of Adaptic, silver alginate, DSD and Coflex lite for compression  Has home health  Currently has a cold, but no fevers, chills  Offers no complaints today  Is tolerating wraps well           The following portions of the patient's history were reviewed and updated as appropriate:   Patient Active Problem List   Diagnosis   • Gout   • Gait abnormality   • Lumbar stenosis   • Polyneuropathy   • Lower extremity weakness   • Lumbosacral plexopathy   • Elevated troponin   • EDGAR (dyspnea on exertion)   • Rash due to vaculitis    • LYLY (acute kidney injury) ([facility] )   • Depression   • Multiple open wounds of lower leg   • Ambulatory dysfunction   • Leucocytosis   • Bloating     Past Medical History:   Diagnosis Date   • Depression    • Gout      Past Surgical History:   Procedure Laterality Date   • BACK SURGERY      X2 LUMBAR INCLUDING FUSION  ,  WITH OAA, LV[facility] DR Rama Anderson   • CATARACT EXTRACTION, BILATERAL     • KNEE ARTHROSCOPY Right     x2 , meniscus repair   • TONSILECTOMY AND ADNOIDECTOMY       Family History   Problem Relation Age of Onset   • Diabetes Father    • Diabetes Brother    • Diabetes Sister      Social History     Socioeconomic History   • Marital status: /Civil Union     Spouse name: Torey Caruso   • Number of children: 3   • Years of education: 15   • Highest education level: None   Occupational History   • None   Tobacco Use   • Smoking status: Former     Types: Cigarettes     Quit date:      Years since quittin 2   • Smokeless tobacco: Never   Vaping Use   • Vaping Use: Never used   Substance and Sexual Activity   • Alcohol use: Not Currently     Comment: occasional   • Drug use: Never   • Sexual activity: None   Other Topics Concern   • None   Social History Narrative   • None     Social Determinants of Health     Financial Resource Strain: Not on file   Food Insecurity: No Food Insecurity   • Worried About Running Out of Food in the Last Year: Never true   • Ran Out of Food in the Last Year: Never true   Transportation Needs: No Transportation Needs   • Lack of Transportation (Medical): No   • Lack of Transportation (Non-Medical):  No   Physical Activity: Not on file   Stress: Not on file   Social Connections: Not on file   Intimate Partner Violence: Not on file   Housing Stability: Unknown   • Unable to Pay for Housing in the Last Year: Not on file   • Number of Places Lived in the Last Year: 1   • Unstable Housing in the Last Year: No       Current Outpatient Medications:   •  allopurinol (ZYLOPRIM) 100 mg tablet, Take 100 mg by mouth 2 (two) times a day, Disp: , Rfl:   •  cholecalciferol (VITAMIN D3) 1,000 units tablet, Take 2,000 Units by mouth daily, Disp: , Rfl:   •  collagenase (SANTYL) ointment, Apply topically daily Wound 1: 10cm x15cm Wound 2: 10cm x10cm (Patient not taking: Reported on 1/10/2023), Disp: 15 g, Rfl: 0  •  famotidine (PEPCID) 20 mg tablet, Take 1 tablet (20 mg total) by mouth daily Do not start before October 6, 2022 , Disp: 30 tablet, Rfl: 0  •  FLUoxetine (PROzac) 20 mg capsule, Take 20 mg by mouth daily  (Patient not taking: Reported on 1/10/2023), Disp: , Rfl:   •  loratadine (CLARITIN) 10 mg tablet, Take 10 mg by mouth as needed for allergies, Disp: , Rfl:   •  Magnesium 100 MG CAPS, Take 1 capsule by mouth daily, Disp: , Rfl:   •  meloxicam (MOBIC) 15 mg tablet, Take 15 mg by mouth daily Patient not taking, Disp: , Rfl:   •  Misc Natural Products (OSTEO BI-FLEX/5-LOXIN ADVANCED PO), Take 1 tablet by mouth daily , Disp: , Rfl:   •  Multiple Vitamins-Minerals (CENTRUM SILVER 50+MEN PO), Take 1 tablet by mouth daily, Disp: , Rfl:   •  mupirocin (BACTROBAN) 2 % ointment, Apply topically 2 (two) times a day Apply topically twice a day or as needed after every dressing change on lower legs (Patient not taking: No sig reported), Disp: 30 g, Rfl: 11  •  simethicone (MYLICON) 80 mg chewable tablet, Chew 1 tablet (80 mg total) every 6 (six) hours as needed for flatulence, Disp: 30 tablet, Rfl: 0  •  SUPER B COMPLEX/C PO, Take 1 tablet by mouth daily, Disp: , Rfl:   •  torsemide (DEMADEX) 10 mg tablet, Take 1 tablet (10 mg total) by mouth daily Do not start before October 6, 2022 , Disp: 30 tablet, Rfl: 0  •  triamcinolone (KENALOG) 0 1 % ointment, Apply topically 2 (two) times a day Only apply to rash, do not apply to necrotic tissue (Patient not taking: Reported on 1/10/2023), Disp: 30 g, Rfl: 0    Review of Systems   Constitutional: Negative for chills and fever  HENT: Positive for congestion (cold)  Cardiovascular: Positive for leg swelling  Skin: Positive for wound (bilateral LE)  Psychiatric/Behavioral: Negative for agitation  Objective:  /76   Pulse 94   Temp (!) 97 1 °F (36 2 °C)   Resp 20   Pain Score: 0-No pain     Physical Exam  Vitals reviewed  Constitutional:       Appearance: He is obese  Cardiovascular:      Rate and Rhythm: Normal rate  Pulmonary:      Effort: Pulmonary effort is normal  No respiratory distress  Skin:     Findings: Wound (bilateral LE) present  Comments: Bilateral LE with small superficial ulcerations  Wound beds granular witthout surrounding erythema  There is residual hyperpigmentation in areas of previous vasculitis  Skin is fragile  See full wound assessment  Neurological:      Mental Status: He is alert  Psychiatric:         Mood and Affect: Mood normal            Wound 10/03/22 Venous Ulcer Tibial Left;Posterior (Active)   Wound Image   03/28/23 1529   Wound Description Epithelialization;Pink 03/28/23 1527   Antonette-wound Assessment Purple;Edema;Dry 03/28/23 1527   Wound Length (cm) 0 5 cm 03/28/23 1527   Wound Width (cm) 0 5 cm 03/28/23 1527   Wound Depth (cm) 0 1 cm 03/28/23 1527   Wound Surface Area (cm^2) 0 25 cm^2 03/28/23 1527   Wound Volume (cm^3) 0 025 cm^3 03/28/23 1527   Calculated Wound Volume (cm^3) 0 03 cm^3 03/28/23 1527   Drainage Amount Small 03/28/23 1527   Drainage Description Serosanguineous 03/28/23 1527   Non-staged Wound Description Full thickness 03/28/23 1527   Treatments Cleansed 03/28/23 1527   Patient Tolerance Tolerated well 03/28/23 1527   Dressing Status Intact 03/07/23 1544       Wound 02/14/23 Leg Distal;Right; Lower (Active)   Wound Image   03/28/23 1528   Wound Description Pink 03/28/23 1525   Antonette-wound Assessment Purple;Dry 03/28/23 1525   Wound Length (cm) 0 5 cm 03/28/23 1525   Wound Width (cm) 0 3 cm 03/28/23 1525   Wound Depth (cm) 0 1 cm 03/28/23 1525   Wound Surface Area (cm^2) 0 15 cm^2 03/28/23 1525   Wound Volume (cm^3) 0 015 cm^3 03/28/23 1525   Calculated Wound Volume (cm^3) 0 02 cm^3 03/28/23 1525   Change in Wound Size % 97 33 03/28/23 1525   Drainage Amount Scant 03/28/23 1525   Drainage Description Serosanguineous 03/28/23 1525   Non-staged Wound Description Full thickness 03/28/23 1525   Treatments Cleansed 03/28/23 1525   Patient Tolerance Tolerated well 03/28/23 1525   Dressing Status Intact 03/07/23 1548                   Wound Instructions:  Orders Placed This Encounter   Procedures   • Wound cleansing and dressings     B/L Leg wounds:   Wash your hands with soap and water      Cleanse the wound with mild soap and water, dove, prior to applying a clean dressing  Shower no ---do not get the dressings wet       Apply Adaptic then Silver Alginate to the wounds   Cover with gauze/ABD pad  Secure with Coflex Lite to BLE  Change dressing 3 x weekly and as needed for excessive draiange       Treatments above were completed today at the [facility]     Multi-layer compression wrap Instructions---Coflex Lite to BLE   Keep compression wrap/wraps clean and dry  If wraps are too tight and you experience numbness/tingling, call the wound center  If after hours, remove wraps or proceed to nearest E R  and call wound center in AM      [place] will be changed 3x weekly     Avoid prolonged standing in one place  Elevate leg(s) above the level of the heart when sitting or as much as possible      Continue VNA  Follow up in 3 weeks at wound center     Standing Status:   Future     Standing Expiration Date:   3/28/2024       Adan Parrish PA-C

## 2023-03-28 NOTE — PATIENT INSTRUCTIONS
Orders Placed This Encounter   Procedures    Wound cleansing and dressings     B/L Leg wounds:   Wash your hands with soap and water  Cleanse the wound with mild soap and water, dove, prior to applying a clean dressing  Shower no ---do not get the dressings wet       Apply Adaptic then Silver Alginate to the wounds  Cover with gauze/ABD pad  Secure with Coflex Lite to BLE  Change dressing 3 x weekly and as needed for excessive draiange       Treatments above were completed today at the [facility]     Multi-layer compression wrap Instructions---Coflex Lite to BLE   Keep compression wrap/wraps clean and dry  If wraps are too tight and you experience numbness/tingling, call the wound center  If after hours, remove wraps or proceed to nearest E R  and call wound center in AM      Felisha Farrow will be changed 3x weekly     Avoid prolonged standing in one place  Elevate leg(s) above the level of the heart when sitting or as much as possible      Continue VNA  Follow up in 3 weeks at wound center     Standing Status:   Future     Standing Expiration Date:   3/28/2024

## 2023-03-28 NOTE — PROGRESS NOTES
Cast Application    Date/Time: 3/28/2023 3:52 PM  Performed by: Kim Macario RN  Authorized by: Stella Middleton PA-C   Universal Protocol:  Consent: Verbal consent obtained  Consent given by: patient  Patient understanding: patient states understanding of the procedure being performed  Patient identity confirmed: verbally with patient      Pre-procedure details:     Sensation:  Normal  Procedure details:     Laterality:  Bilateral    Location:  Leg    Leg:  L lower leg and R lower legCast type:  Multi-layer compression short leg    Post-procedure details:     Pain:  Improved    Sensation:  Normal    Patient tolerance of procedure: Tolerated well, no immediate complications  Comments:      Multilayer wrap procedure     Before application, JAIME and/or TBI determined to be adequate for healing and application of compression  Lower extremity washed prior to application of compression wrap  With the foot in dorsiflexed position, Coflex lite was applied as per physician orders without complications or complaint of pain  The procedure was tolerated well  Toes warm & pink post application  Patient provided education & reinforced to observe toes for any discoloration, swelling or tingling and instructed when to report to the [address] [facility],[address] or to remove compression  Wound care as per providers orders, patient tolerated well

## 2023-03-29 ENCOUNTER — HOME CARE VISIT (OUTPATIENT)
Dept: HOME HEALTH SERVICES | Facility: HOME HEALTHCARE | Age: 73
End: 2023-03-29

## 2023-03-30 ENCOUNTER — HOME CARE VISIT (OUTPATIENT)
Dept: HOME HEALTH SERVICES | Facility: HOME HEALTHCARE | Age: 73
End: 2023-03-30

## 2023-03-31 VITALS
HEART RATE: 64 BPM | DIASTOLIC BLOOD PRESSURE: 66 MMHG | SYSTOLIC BLOOD PRESSURE: 136 MMHG | TEMPERATURE: 97.4 F | RESPIRATION RATE: 20 BRPM | OXYGEN SATURATION: 96 %

## 2023-03-31 PROCEDURE — 10330064 BANDAGE, COMPRSN COFLEX TLC 2LYR XLNG (2

## 2023-04-01 ENCOUNTER — HOME CARE VISIT (OUTPATIENT)
Dept: HOME HEALTH SERVICES | Facility: HOME HEALTHCARE | Age: 73
End: 2023-04-01

## 2023-04-01 VITALS
TEMPERATURE: 97.8 F | HEART RATE: 80 BPM | RESPIRATION RATE: 16 BRPM | OXYGEN SATURATION: 98 % | DIASTOLIC BLOOD PRESSURE: 66 MMHG | SYSTOLIC BLOOD PRESSURE: 110 MMHG

## 2023-04-03 ENCOUNTER — HOME CARE VISIT (OUTPATIENT)
Dept: HOME HEALTH SERVICES | Facility: HOME HEALTHCARE | Age: 73
End: 2023-04-03

## 2023-04-03 VITALS
HEART RATE: 76 BPM | DIASTOLIC BLOOD PRESSURE: 68 MMHG | RESPIRATION RATE: 18 BRPM | SYSTOLIC BLOOD PRESSURE: 118 MMHG | TEMPERATURE: 97.4 F | OXYGEN SATURATION: 98 %

## 2023-04-05 ENCOUNTER — HOME CARE VISIT (OUTPATIENT)
Dept: HOME HEALTH SERVICES | Facility: HOME HEALTHCARE | Age: 73
End: 2023-04-05

## 2023-04-05 VITALS
RESPIRATION RATE: 20 BRPM | TEMPERATURE: 97.4 F | DIASTOLIC BLOOD PRESSURE: 76 MMHG | OXYGEN SATURATION: 95 % | HEART RATE: 92 BPM | SYSTOLIC BLOOD PRESSURE: 120 MMHG

## 2023-04-07 ENCOUNTER — HOME CARE VISIT (OUTPATIENT)
Dept: HOME HEALTH SERVICES | Facility: HOME HEALTHCARE | Age: 73
End: 2023-04-07

## 2023-04-07 VITALS
DIASTOLIC BLOOD PRESSURE: 70 MMHG | HEART RATE: 80 BPM | SYSTOLIC BLOOD PRESSURE: 126 MMHG | TEMPERATURE: 97.8 F | RESPIRATION RATE: 18 BRPM | OXYGEN SATURATION: 98 %

## 2023-04-08 NOTE — CASE COMMUNICATION
60 day summary of care for DOS 4/7/23 - 6/5/23     Patient has been seen in the past 60 days for wound care management and instruction  Vital sign ranges have been :   blood pressure:   100's to 130's over 60's to 70's   pulse: 60's to 90's   respirations: 18 to 20   temp: 96 1 to 98 1   No changes in patient condition in the last 60 days   No additional disciplines included  The current wound care order is Apply Adaptic then Silver  Alginate to the wounds  Cover with gauze/ABD pad  Secure with Coflex Lite to BLE   Change dressing 3 x weekly and as needed for excessive draiange   Multi-layer compression wrap Instructions---Coflex Lite to BLE   Keep compression wrap/wraps clean and dry  Wound measurements have improved  Current description of wound and surrounding tissue wounds are granulation tissue with small amount serosang draiange  surrounding tissue to  wounds intact, plus 1 pitting edema R calf  The patient remains homebound due to: wheelchairbound, limited endurance   The availability of a willing and able caregiver: wife supportive   Patient will require continued care for wound care  All home health orders including treatments and visit order frequencies were reviewed and deemed appropriate at the time of this summary

## 2023-04-13 PROCEDURE — 10330064 PAD, ABD 5X9" STR LF (1/PK 20PK/BX) MGM1

## 2023-04-13 PROCEDURE — 10330064 SPONGE, GAUZE 8PLY N/S 4"X4" (200/PK 20P

## 2023-04-13 PROCEDURE — 10330064 BANDAGE, CNFRM 4"X4.1YDS N/S LF (12RL/BG

## 2023-04-18 ENCOUNTER — HOME CARE VISIT (OUTPATIENT)
Dept: HOME HEALTH SERVICES | Facility: HOME HEALTHCARE | Age: 73
End: 2023-04-18

## 2023-04-22 PROCEDURE — 10330064 DRESSING, OIL EMULSION 3"X3" STR (50/BX

## 2023-04-22 PROCEDURE — 10330064 BANDAGE, COMPRSN COFLEX TLC 2LYR XLNG (2

## 2023-04-22 PROCEDURE — 10330064 DRESSING, CALCIUM ALGINATE AG SHEET 4"X4

## 2023-04-22 PROCEDURE — 10330064 PAD, ABD 5X9" STR LF (1/PK 20PK/BX) MGM1

## 2023-04-24 ENCOUNTER — HOME CARE VISIT (OUTPATIENT)
Dept: HOME HEALTH SERVICES | Facility: HOME HEALTHCARE | Age: 73
End: 2023-04-24

## 2023-04-24 VITALS
HEART RATE: 68 BPM | TEMPERATURE: 97.7 F | SYSTOLIC BLOOD PRESSURE: 118 MMHG | RESPIRATION RATE: 20 BRPM | OXYGEN SATURATION: 96 % | DIASTOLIC BLOOD PRESSURE: 76 MMHG

## 2023-04-24 DIAGNOSIS — M31.0 LEUKOCYTOCLASTIC VASCULITIS (HCC): Primary | ICD-10-CM

## 2023-04-26 ENCOUNTER — HOME CARE VISIT (OUTPATIENT)
Dept: HOME HEALTH SERVICES | Facility: HOME HEALTHCARE | Age: 73
End: 2023-04-26

## 2023-04-26 VITALS
TEMPERATURE: 97.7 F | DIASTOLIC BLOOD PRESSURE: 70 MMHG | SYSTOLIC BLOOD PRESSURE: 134 MMHG | RESPIRATION RATE: 20 BRPM | HEART RATE: 72 BPM | OXYGEN SATURATION: 97 %

## 2023-04-28 ENCOUNTER — HOME CARE VISIT (OUTPATIENT)
Dept: HOME HEALTH SERVICES | Facility: HOME HEALTHCARE | Age: 73
End: 2023-04-28

## 2023-04-28 ENCOUNTER — APPOINTMENT (OUTPATIENT)
Dept: LAB | Facility: CLINIC | Age: 73
End: 2023-04-28

## 2023-04-28 VITALS
OXYGEN SATURATION: 96 % | DIASTOLIC BLOOD PRESSURE: 76 MMHG | TEMPERATURE: 97.1 F | RESPIRATION RATE: 16 BRPM | SYSTOLIC BLOOD PRESSURE: 114 MMHG | HEART RATE: 72 BPM

## 2023-04-28 LAB
ALBUMIN SERPL BCP-MCNC: 3.2 G/DL (ref 3.5–5)
ALP SERPL-CCNC: 95 U/L (ref 46–116)
ALT SERPL W P-5'-P-CCNC: 14 U/L (ref 12–78)
ANION GAP SERPL CALCULATED.3IONS-SCNC: 4 MMOL/L (ref 4–13)
AST SERPL W P-5'-P-CCNC: 16 U/L (ref 5–45)
BACTERIA UR QL AUTO: ABNORMAL /HPF
BILIRUB SERPL-MCNC: 0.47 MG/DL (ref 0.2–1)
BILIRUB UR QL STRIP: NEGATIVE
BUN SERPL-MCNC: 21 MG/DL (ref 5–25)
CALCIUM ALBUM COR SERPL-MCNC: 9.9 MG/DL (ref 8.3–10.1)
CALCIUM SERPL-MCNC: 9.3 MG/DL (ref 8.3–10.1)
CHLORIDE SERPL-SCNC: 106 MMOL/L (ref 96–108)
CLARITY UR: CLEAR
CO2 SERPL-SCNC: 25 MMOL/L (ref 21–32)
COLOR UR: ABNORMAL
CREAT SERPL-MCNC: 0.82 MG/DL (ref 0.6–1.3)
GFR SERPL CREATININE-BSD FRML MDRD: 87 ML/MIN/1.73SQ M
GLUCOSE SERPL-MCNC: 100 MG/DL (ref 65–140)
GLUCOSE UR STRIP-MCNC: NEGATIVE MG/DL
HGB UR QL STRIP.AUTO: NEGATIVE
KETONES UR STRIP-MCNC: NEGATIVE MG/DL
LEUKOCYTE ESTERASE UR QL STRIP: ABNORMAL
MUCOUS THREADS UR QL AUTO: ABNORMAL
NITRITE UR QL STRIP: NEGATIVE
NON-SQ EPI CELLS URNS QL MICRO: ABNORMAL /HPF
PH UR STRIP.AUTO: 5.5 [PH]
POTASSIUM SERPL-SCNC: 4.2 MMOL/L (ref 3.5–5.3)
PROT SERPL-MCNC: 6.6 G/DL (ref 6.4–8.4)
PROT UR STRIP-MCNC: NEGATIVE MG/DL
RBC #/AREA URNS AUTO: ABNORMAL /HPF
SODIUM SERPL-SCNC: 135 MMOL/L (ref 135–147)
SP GR UR STRIP.AUTO: 1.02 (ref 1–1.03)
UROBILINOGEN UR STRIP-ACNC: <2 MG/DL
WBC #/AREA URNS AUTO: ABNORMAL /HPF

## 2023-05-01 ENCOUNTER — HOME CARE VISIT (OUTPATIENT)
Dept: HOME HEALTH SERVICES | Facility: HOME HEALTHCARE | Age: 73
End: 2023-05-01

## 2023-05-01 VITALS
DIASTOLIC BLOOD PRESSURE: 72 MMHG | OXYGEN SATURATION: 98 % | SYSTOLIC BLOOD PRESSURE: 136 MMHG | RESPIRATION RATE: 18 BRPM | HEART RATE: 86 BPM | TEMPERATURE: 97.4 F

## 2023-05-03 ENCOUNTER — HOME CARE VISIT (OUTPATIENT)
Dept: HOME HEALTH SERVICES | Facility: HOME HEALTHCARE | Age: 73
End: 2023-05-03

## 2023-05-03 VITALS
HEART RATE: 76 BPM | OXYGEN SATURATION: 95 % | DIASTOLIC BLOOD PRESSURE: 70 MMHG | RESPIRATION RATE: 20 BRPM | SYSTOLIC BLOOD PRESSURE: 134 MMHG | TEMPERATURE: 97.7 F

## 2023-05-05 ENCOUNTER — HOME CARE VISIT (OUTPATIENT)
Dept: HOME HEALTH SERVICES | Facility: HOME HEALTHCARE | Age: 73
End: 2023-05-05

## 2023-05-05 VITALS
TEMPERATURE: 98.3 F | OXYGEN SATURATION: 98 % | DIASTOLIC BLOOD PRESSURE: 68 MMHG | RESPIRATION RATE: 18 BRPM | SYSTOLIC BLOOD PRESSURE: 112 MMHG | HEART RATE: 88 BPM

## 2023-05-08 ENCOUNTER — HOME CARE VISIT (OUTPATIENT)
Dept: HOME HEALTH SERVICES | Facility: HOME HEALTHCARE | Age: 73
End: 2023-05-08

## 2023-05-09 VITALS
SYSTOLIC BLOOD PRESSURE: 110 MMHG | DIASTOLIC BLOOD PRESSURE: 72 MMHG | TEMPERATURE: 97.4 F | OXYGEN SATURATION: 96 % | HEART RATE: 88 BPM | RESPIRATION RATE: 20 BRPM

## 2023-05-09 PROCEDURE — 10330064 PAD, ABD 5X9" STR LF (1/PK 20PK/BX) MGM1

## 2023-05-09 PROCEDURE — 10330064

## 2023-05-09 PROCEDURE — 10330064 CLEANSER, WND SEA-CLEANS 6OZ  COLPLT

## 2023-05-09 PROCEDURE — 10330064 DRESSING, CALCIUM ALGINATE AG SHEET 4"X4

## 2023-05-09 PROCEDURE — 10330064 BANDAGE, COMPRSN COFLEX TLC 2LYR XLNG (2

## 2023-05-09 PROCEDURE — 10330064 DRESSING, OIL EMULSION 3"X3" STR (50/BX

## 2023-05-09 NOTE — CASE COMMUNICATION
Ship to Pt  Home   Insurance [facility]   Wound 1 X Full   Wound 2 X Full   All items are ordered by the each unless otherwise noted     PLEASE DO NOT ORDER BY THE BOX   Request specific quantity needed   For Private Insurances order should be for a 2 week period   2 inch tape  1  ABD 5x9 330855    12   Seaclens  1  Alginate with Silver [id number]   Gauze oil emulsion 120518   6   COFLEX TLC XL NOT STOCKED [id number]    03

## 2023-05-10 ENCOUNTER — HOME CARE VISIT (OUTPATIENT)
Dept: HOME HEALTH SERVICES | Facility: HOME HEALTHCARE | Age: 73
End: 2023-05-10

## 2023-05-10 VITALS
SYSTOLIC BLOOD PRESSURE: 118 MMHG | RESPIRATION RATE: 20 BRPM | HEART RATE: 66 BPM | TEMPERATURE: 97.2 F | OXYGEN SATURATION: 97 % | DIASTOLIC BLOOD PRESSURE: 76 MMHG

## 2023-05-10 NOTE — CASE COMMUNICATION
Ship to [employer]    Wound 1 RLE  Full   Wound 2 LLE     Full   All items are ordered by the each unless otherwise noted    PLEASE DO NOT ORDER BY THE BOX  Request specific quantity needed  For Private Insurances order should be for a 2 week period    ABD 5x9 [id number]   3  Calcium Alginates  (In place of Aquacel or Maxsorb)  Alginate with Silver [id number]  1

## 2023-05-11 PROCEDURE — 10330064 DRESSING, CALCIUM ALGINATE AG SHEET 4"X4

## 2023-05-11 PROCEDURE — 10330064 PAD, ABD 5X9" STR LF (1/PK 20PK/BX) MGM1

## 2023-05-12 ENCOUNTER — HOME CARE VISIT (OUTPATIENT)
Dept: HOME HEALTH SERVICES | Facility: HOME HEALTHCARE | Age: 73
End: 2023-05-12

## 2023-05-12 VITALS
RESPIRATION RATE: 16 BRPM | HEART RATE: 76 BPM | DIASTOLIC BLOOD PRESSURE: 66 MMHG | SYSTOLIC BLOOD PRESSURE: 108 MMHG | OXYGEN SATURATION: 95 % | TEMPERATURE: 98.3 F

## 2023-05-15 PROCEDURE — 10330064 DRESSING, CALCIUM ALGINATE AG SHEET 4"X4

## 2023-05-15 PROCEDURE — 10330064 DRESSING, OIL EMULSION 3"X3" STR (50/BX

## 2023-05-15 PROCEDURE — 10330064 TAPE, ADHSV TRANSPORE WHT 2" (6RL/BX 10B

## 2023-05-15 PROCEDURE — 10330064 PAD, ABD 5X9" STR LF (1/PK 20PK/BX) MGM1

## 2023-05-16 ENCOUNTER — HOME CARE VISIT (OUTPATIENT)
Dept: HOME HEALTH SERVICES | Facility: HOME HEALTHCARE | Age: 73
End: 2023-05-16

## 2023-05-16 ENCOUNTER — OFFICE VISIT (OUTPATIENT)
Dept: WOUND CARE | Facility: CLINIC | Age: 73
End: 2023-05-16

## 2023-05-16 VITALS
DIASTOLIC BLOOD PRESSURE: 74 MMHG | RESPIRATION RATE: 20 BRPM | TEMPERATURE: 98.6 F | SYSTOLIC BLOOD PRESSURE: 116 MMHG | HEART RATE: 88 BPM

## 2023-05-16 DIAGNOSIS — L97.822 VENOUS STASIS ULCER OF OTHER PART OF LEFT LOWER LEG WITH FAT LAYER EXPOSED WITHOUT VARICOSE VEINS (HCC): Primary | ICD-10-CM

## 2023-05-16 DIAGNOSIS — L97.811 VENOUS STASIS ULCER OF OTHER PART OF RIGHT LOWER LEG LIMITED TO BREAKDOWN OF SKIN WITHOUT VARICOSE VEINS (HCC): ICD-10-CM

## 2023-05-16 DIAGNOSIS — I87.2 VENOUS STASIS ULCER OF OTHER PART OF LEFT LOWER LEG WITH FAT LAYER EXPOSED WITHOUT VARICOSE VEINS (HCC): Primary | ICD-10-CM

## 2023-05-16 DIAGNOSIS — I87.2 VENOUS STASIS ULCER OF OTHER PART OF RIGHT LOWER LEG LIMITED TO BREAKDOWN OF SKIN WITHOUT VARICOSE VEINS (HCC): ICD-10-CM

## 2023-05-16 NOTE — PATIENT INSTRUCTIONS
Orders Placed This Encounter   Procedures    Wound cleansing and dressings     B/L Leg wounds:   Wash your hands with soap and water  Cleanse the wound with mild soap and water, dove, prior to applying a clean dressing  Shower no ---do not get the dressings wet       Apply Dermagran gauze cut to fit the wound--(if noted with increased drainage then may return to using the Adaptic and then silver alginate)    Cover with gauze/ABD pad  Secure with Coflex Lite to BLE  Change dressing 3 x weekly and as needed for excessive draiange       Treatments above were completed today at the [facility]               Multi-layer compression wrap Instructions---Coflex Lite to BLE   Keep compression wrap/wraps clean and dry  If wraps are too tight and you experience numbness/tingling, call the wound center  If after hours, remove wraps or proceed to nearest E R  and call wound center in AM       Amirah Feather will be changed 3x weekly      Avoid prolonged standing in one place  Elevate leg(s) above the level of the heart when sitting or as much as possible             Continue SLVNA        Follow up in 4 weeks     Standing Status:   Future     Standing Expiration Date:   5/16/2024

## 2023-05-16 NOTE — PROGRESS NOTES
Patient ID: Lynsey Robins is a 74 y o  male Date of Birth 1950     Diagnosis:  1  Venous stasis ulcer of other part of left lower leg with fat layer exposed without varicose veins (HCC)  -     Wound cleansing and dressings; Future    2  Venous stasis ulcer of other part of right lower leg limited to breakdown of skin without varicose veins (HCC)  -     Wound cleansing and dressings; Future       Diagnosis ICD-10-CM Associated Orders   1  Venous stasis ulcer of other part of left lower leg with fat layer exposed without varicose veins (HCC)  I87 2 Wound cleansing and dressings    L97 822       2  Venous stasis ulcer of other part of right lower leg limited to breakdown of skin without varicose veins (HCC)  I87 2 Wound cleansing and dressings    L97 811            Assessment & Plan:  1  Left lower extremity venous ulcerations  2  Right lower extremity venous ulcer - healed   2  Bilateral lower extremity venous insufficiency        - Change dressings to dermagrand DSD and coflex lite      - Continue leg elevation to B/l LE   - Discussed importance of proper protein intake for wound healing   - Return in 4 weeks, sooner if any concerns of increase in wound size of infection      No chief complaint on file  Subjective:   26-year-old male presents with wife for continued wound treatments to bilateral lower legs  He denies any other complaints at this time        The following portions of the patient's history were reviewed and updated as appropriate:   Patient Active Problem List   Diagnosis   • Gout   • Gait abnormality   • Lumbar stenosis   • Polyneuropathy   • Lower extremity weakness   • Lumbosacral plexopathy   • Elevated troponin   • EDGAR (dyspnea on exertion)   • Rash due to vaculitis    • LYLY (acute kidney injury) ([facility] [address] )   • Depression   • Multiple open wounds of lower leg   • Ambulatory dysfunction   • Leucocytosis   • Bloating     Past Medical History:   Diagnosis Date   • Depression    • Gout Past Surgical History:   Procedure Laterality Date   • BACK SURGERY      X2 LUMBAR INCLUDING FUSION  , 2015 WITH OAA, [facility] DR Jerome James   • CATARACT EXTRACTION, BILATERAL     • KNEE ARTHROSCOPY Right     x2 , meniscus repair   • TONSILECTOMY AND ADNOIDECTOMY       Social History     Socioeconomic History   • Marital status: /Civil Union     Spouse name: Louise   • Number of children: 3   • Years of education: 14   • Highest education level: None   Occupational History   • None   Tobacco Use   • Smoking status: Former     Types: Cigarettes     Quit date:      Years since quittin 3   • Smokeless tobacco: Never   Vaping Use   • Vaping Use: Never used   Substance and Sexual Activity   • Alcohol use: Not Currently     Comment: occasional   • Drug use: Never   • Sexual activity: None   Other Topics Concern   • None   Social History Narrative   • None     Social Determinants of Health     Financial Resource Strain: Not on file   Food Insecurity: No Food Insecurity   • Worried About Running Out of Food in the Last Year: Never true   • Ran Out of Food in the Last Year: Never true   Transportation Needs: No Transportation Needs   • Lack of Transportation (Medical): No   • Lack of Transportation (Non-Medical):  No   Physical Activity: Not on file   Stress: Not on file   Social Connections: Not on file   Intimate Partner Violence: Not on file   Housing Stability: Unknown   • Unable to Pay for Housing in the Last Year: Not on file   • Number of Places Lived in the Last Year: 1   • Unstable Housing in the Last Year: No        Current Outpatient Medications:   •  allopurinol (ZYLOPRIM) 100 mg tablet, Take 100 mg by mouth 2 (two) times a day, Disp: , Rfl:   •  cholecalciferol (VITAMIN D3) 1,000 units tablet, Take 2,000 Units by mouth daily, Disp: , Rfl:   •  collagenase (SANTYL) ointment, Apply topically daily Wound 1: 10cm x15cm Wound 2: 10cm x10cm (Patient not taking: Reported on 1/10/2023), Disp: 15 g, Rfl: 0  •  famotidine (PEPCID) 20 mg tablet, Take 1 tablet (20 mg total) by mouth daily Do not start before 2022 , Disp: 30 tablet, Rfl: 0  •  FLUoxetine (PROzac) 20 mg capsule, Take 20 mg by mouth daily  (Patient not taking: Reported on 1/10/2023), Disp: , Rfl:   •  loratadine (CLARITIN) 10 mg tablet, Take 10 mg by mouth as needed for allergies, Disp: , Rfl:   •  Magnesium 100 MG CAPS, Take 1 capsule by mouth daily, Disp: , Rfl:   •  meloxicam (MOBIC) 15 mg tablet, Take 15 mg by mouth daily Patient not taking, Disp: , Rfl:   •  Misc Natural Products (OSTEO BI-FLEX/5-LOXIN ADVANCED PO), Take 1 tablet by mouth daily , Disp: , Rfl:   •  Multiple Vitamins-Minerals (CENTRUM SILVER 50+MEN PO), Take 1 tablet by mouth daily, Disp: , Rfl:   •  mupirocin (BACTROBAN) 2 % ointment, Apply topically 2 (two) times a day Apply topically twice a day or as needed after every dressing change on lower legs (Patient not taking: No sig reported), Disp: 30 g, Rfl: 11  •  simethicone (MYLICON) 80 mg chewable tablet, Chew 1 tablet (80 mg total) every 6 (six) hours as needed for flatulence, Disp: 30 tablet, Rfl: 0  •  SUPER B COMPLEX/C PO, Take 1 tablet by mouth daily, Disp: , Rfl:   •  torsemide (DEMADEX) 10 mg tablet, Take 1 tablet (10 mg total) by mouth daily Do not start before 2022 , Disp: 30 tablet, Rfl: 0  •  triamcinolone (KENALOG) 0 1 % ointment, Apply topically 2 (two) times a day Only apply to rash, do not apply to necrotic tissue (Patient not taking: Reported on 1/10/2023), Disp: 30 g, Rfl: 0  Family History   Problem Relation Age of Onset   • Diabetes Father    • Diabetes Brother    • Diabetes Sister       Review of Systems   All other systems reviewed and are negative  Allergies: Other and Penicillins      Objective:  /74   Pulse 88   Temp 98 6 °F (37 °C)   Resp 20     Physical Exam  Vitals reviewed  Musculoskeletal:      Right lower le+ Edema present        Left lower le+ Edema present  Comments: Bilateral lower extremity superficial ulceration with granular wound base  Sloughing of necrotic skin noted with underlying ulcer  No pain with palpation  No clinical signs of infection present                  Wound 10/03/22 Venous Ulcer Tibial Left;Posterior (Active)   Wound Image    05/16/23 1527   Wound Description Epithelialization;Pink;Granulation tissue 05/16/23 1530   Antonette-wound Assessment Purple;Edema;Dry;Fragile 05/16/23 1530   Wound Length (cm) 8 5 cm 05/16/23 1530   Wound Width (cm) 21 cm 05/16/23 1530   Wound Depth (cm) 0 1 cm 05/16/23 1530   Wound Surface Area (cm^2) 178 5 cm^2 05/16/23 1530   Wound Volume (cm^3) 17 85 cm^3 05/16/23 1530   Calculated Wound Volume (cm^3) 17 85 cm^3 05/16/23 1530   Drainage Amount Moderate 05/16/23 1530   Drainage Description Bloody; Serosanguineous 05/16/23 1530   Non-staged Wound Description Full thickness 05/16/23 1530   Treatments Cleansed 03/28/23 1527   Patient Tolerance Tolerated well 03/28/23 1527   Dressing Status Intact 05/16/23 1530       Wound 02/14/23 Leg Distal;Right; Lower (Active)   Wound Image    05/16/23 1527   Wound Description Pink;Epithelialization 05/16/23 1529   Antonette-wound Assessment Dry;Purple;Fragile;Edema 05/16/23 1529   Wound Length (cm) 7 cm 05/16/23 1529   Wound Width (cm) 13 cm 05/16/23 1529   Wound Depth (cm) 0 1 cm 05/16/23 1529   Wound Surface Area (cm^2) 91 cm^2 05/16/23 1529   Wound Volume (cm^3) 9 1 cm^3 05/16/23 1529   Calculated Wound Volume (cm^3) 9 1 cm^3 05/16/23 1529   Change in Wound Size % -1113 33 05/16/23 1529   Drainage Amount Small 05/16/23 1529   Drainage Description Bloody 05/16/23 1529   Non-staged Wound Description Full thickness 05/16/23 1529   Treatments Cleansed 03/28/23 1525   Patient Tolerance Tolerated well 03/28/23 1525   Dressing Status Intact 05/16/23 1529                         Debridement   Wound 10/03/22 Venous Ulcer Tibial Left;Posterior    Universal Protocol:  Consent: Verbal "consent obtained  Risks and benefits: risks, benefits and alternatives were discussed  Consent given by: patient  Time out: Immediately prior to procedure a \"time out\" was called to verify the correct patient, procedure, equipment, support staff and site/side marked as required  Patient understanding: patient states understanding of the procedure being performed  Patient identity confirmed: verbally with patient      Performed by: physician  Debridement type: selective  Pain control: lidocaine 4%  Post-debridement measurements  Length (cm): 8 5  Width (cm): 21  Depth (cm): 0 1  Percent debrided: 50%  Surface Area (cm^2): 178 5  Area debrided (cm^2): 89 25  Volume (cm^3): 17 85  Devitalized tissue debrided: biofilm, fibrin, necrotic debris and slough  Instrument(s) utilized: curette  Bleeding: small  Hemostasis obtained with: pressure  Procedural pain (0-10): insensate  Post-procedural pain: insensate   Response to treatment: procedure was tolerated well                   Wound Instructions:  Orders Placed This Encounter   Procedures   • Wound cleansing and dressings     B/L Leg wounds:   Wash your hands with soap and water      Cleanse the wound with mild soap and water, dove, prior to applying a clean dressing  Shower no ---do not get the dressings wet       Apply Dermagran gauze cut to fit the wound--(if noted with increased drainage then may return to using the Adaptic and then silver alginate)    Cover with gauze/ABD pad  Secure with Coflex Lite to BLE  Change dressing 3 x weekly and as needed for excessive draiange       Treatments above were completed today at the [facility]               Multi-layer compression wrap Instructions---Coflex Lite to BLE   Keep compression wrap/wraps clean and dry  If wraps are too tight and you experience numbness/tingling, call the wound center   If after hours, remove wraps or proceed to nearest E R  and call wound center in AM       Wrap will be changed 3x weekly      Avoid " "prolonged standing in one place       Elevate leg(s) above the level of the heart when sitting or as much as possible            Continue SLVNA        Follow up in 4 weeks     Standing Status:   Future     Standing Expiration Date:   7/11/5219   • Cast Application     This order was created via procedure documentation         Nadia Green DPM      Portions of the record may have been created with voice recognition software  Occasional wrong word or \"sound a like\" substitutions may have occurred due to the inherent limitations of voice recognition software  Read the chart carefully and recognize, using context, where substitutions have occurred        "

## 2023-05-16 NOTE — PROGRESS NOTES
Cast Application    Date/Time: 5/16/2023 3:15 PM  Performed by: Leticia Gordillo RN  Authorized by: Mike Webster DPM   [place] Protocol:  Consent: Verbal consent obtained  Risks and benefits: risks, benefits and alternatives were discussed  Consent given by: patient  Patient understanding: patient states understanding of the procedure being performed  Patient identity confirmed: verbally with patient      Pre-procedure details:     Sensation:  Normal  Procedure details:     Laterality:  Right    Location:  Leg    Leg:  R lower leg    Strapping: no  Cast type:  Multi-layer compression short leg      Supplies:  2 layer wrap  Post-procedure details:     Pain:  Improved    Sensation:  Normal    Patient tolerance of procedure: Tolerated well, no immediate complications  Comments:      Multilayer wrap procedure     Before application, JAIME and/or TBI determined to be adequate for healing and application of compression  Lower extremity washed prior to application of compression wrap  With the foot in dorsiflexed position, Coflex Lite was applied as per physician orders without complications or complaint of pain  The procedure was tolerated well  Toes warm & pink post application  Patient provided education & reinforced to observe toes for any discoloration, swelling or tingling and instructed when to report to the [address] [facility],[address] or to remove compression  Wound care as per providers orders, patient tolerated well

## 2023-05-18 ENCOUNTER — HOME CARE VISIT (OUTPATIENT)
Dept: HOME HEALTH SERVICES | Facility: HOME HEALTHCARE | Age: 73
End: 2023-05-18

## 2023-05-19 VITALS
RESPIRATION RATE: 20 BRPM | TEMPERATURE: 97.5 F | DIASTOLIC BLOOD PRESSURE: 74 MMHG | OXYGEN SATURATION: 96 % | HEART RATE: 76 BPM | SYSTOLIC BLOOD PRESSURE: 120 MMHG

## 2023-05-19 NOTE — CASE COMMUNICATION
Ship to  Pt  Home   Insurance [employer]    Wound 1 X    Full   Wound 2 X  Full   All items are ordered by the each unless otherwise noted    PLEASE DO NOT ORDER BY THE BOX  Request specific quantity needed  For Private Insurances order should be for a 2 week period  Dry Dressings   (Nonsterile gauze not covered by private insurance)  ABD 5x9 [id number]   74  Judie Staton [id number]    42

## 2023-05-20 ENCOUNTER — HOME CARE VISIT (OUTPATIENT)
Dept: HOME HEALTH SERVICES | Facility: HOME HEALTHCARE | Age: 73
End: 2023-05-20

## 2023-05-20 VITALS
HEART RATE: 62 BPM | TEMPERATURE: 98.2 F | RESPIRATION RATE: 18 BRPM | DIASTOLIC BLOOD PRESSURE: 84 MMHG | OXYGEN SATURATION: 97 % | SYSTOLIC BLOOD PRESSURE: 132 MMHG

## 2023-05-20 PROCEDURE — 10330064 DRESSING, DERMAGAUZE HYDROGEL 4X4 (15/BX

## 2023-05-20 PROCEDURE — 10330064 PAD, ABD 5X9" STR LF (1/PK 20PK/BX) MGM1

## 2023-05-22 ENCOUNTER — HOME CARE VISIT (OUTPATIENT)
Dept: HOME HEALTH SERVICES | Facility: HOME HEALTHCARE | Age: 73
End: 2023-05-22

## 2023-05-22 VITALS
DIASTOLIC BLOOD PRESSURE: 64 MMHG | HEART RATE: 76 BPM | TEMPERATURE: 98.4 F | RESPIRATION RATE: 18 BRPM | SYSTOLIC BLOOD PRESSURE: 122 MMHG | OXYGEN SATURATION: 97 %

## 2023-05-22 PROCEDURE — 10330064 DRESSING, ADAPTIC 3"X3" (50/BX)

## 2023-05-22 PROCEDURE — 10330064 TAPE, ADHSV TRANSPORE WHT 2" (6RL/BX 10B

## 2023-05-24 ENCOUNTER — HOME CARE VISIT (OUTPATIENT)
Dept: HOME HEALTH SERVICES | Facility: HOME HEALTHCARE | Age: 73
End: 2023-05-24

## 2023-05-25 VITALS
SYSTOLIC BLOOD PRESSURE: 132 MMHG | TEMPERATURE: 97.4 F | RESPIRATION RATE: 20 BRPM | DIASTOLIC BLOOD PRESSURE: 80 MMHG | OXYGEN SATURATION: 97 % | HEART RATE: 76 BPM

## 2023-05-25 PROCEDURE — 10330064 DRESSING, CALCIUM ALGINATE AG SHEET 4"X4

## 2023-05-25 PROCEDURE — 10330064 BANDAGE, COMPRSN COFLEX TLC 2LYR XLNG (2

## 2023-05-25 PROCEDURE — 10330064 SPONGE, GAUZE 8PLY N/S 4"X4" (200/PK 20P

## 2023-05-25 NOTE — CASE COMMUNICATION
Ship to Pt  Home   Insurance [facility]   Wound 1 X Full   Wound 2 X Full   All items are ordered by the each unless otherwise noted     PLEASE DO NOT ORDER BY THE BOX   Request specific quantity needed   For Private Insurances order should be for a 2 week period   Alginate with Silver [id number]  4x4 nonsterile pack of 200  1     COFLEX TLC XL NOT STOCKED [id number]

## 2023-05-26 ENCOUNTER — HOME CARE VISIT (OUTPATIENT)
Dept: HOME HEALTH SERVICES | Facility: HOME HEALTHCARE | Age: 73
End: 2023-05-26

## 2023-05-26 VITALS
OXYGEN SATURATION: 98 % | SYSTOLIC BLOOD PRESSURE: 120 MMHG | RESPIRATION RATE: 20 BRPM | TEMPERATURE: 98.2 F | HEART RATE: 72 BPM | DIASTOLIC BLOOD PRESSURE: 62 MMHG

## 2023-05-29 ENCOUNTER — HOME CARE VISIT (OUTPATIENT)
Dept: HOME HEALTH SERVICES | Facility: HOME HEALTHCARE | Age: 73
End: 2023-05-29

## 2023-05-30 ENCOUNTER — HOME CARE VISIT (OUTPATIENT)
Dept: HOME HEALTH SERVICES | Facility: HOME HEALTHCARE | Age: 73
End: 2023-05-30

## 2023-05-30 NOTE — CASE COMMUNICATION
PT eval on 5/30/23  PT POC for 2wk1 til end of cert  Plan to continue in new cert period starting 6/6/23 for 2wk3  Transfer training, BLE ther ex, edu, HEP  assess for DME      Dayanara Freeman PT
rollator walker and b/l quad canes with narrow base/rolling walker

## 2023-05-31 ENCOUNTER — HOME CARE VISIT (OUTPATIENT)
Dept: HOME HEALTH SERVICES | Facility: HOME HEALTHCARE | Age: 73
End: 2023-05-31

## 2023-05-31 VITALS
TEMPERATURE: 97.8 F | RESPIRATION RATE: 18 BRPM | HEART RATE: 70 BPM | DIASTOLIC BLOOD PRESSURE: 58 MMHG | SYSTOLIC BLOOD PRESSURE: 110 MMHG | OXYGEN SATURATION: 98 %

## 2023-05-31 VITALS — HEART RATE: 69 BPM | OXYGEN SATURATION: 96 % | DIASTOLIC BLOOD PRESSURE: 70 MMHG | SYSTOLIC BLOOD PRESSURE: 110 MMHG

## 2023-06-01 VITALS
DIASTOLIC BLOOD PRESSURE: 60 MMHG | TEMPERATURE: 96.5 F | RESPIRATION RATE: 20 BRPM | OXYGEN SATURATION: 96 % | HEART RATE: 54 BPM | SYSTOLIC BLOOD PRESSURE: 110 MMHG

## 2023-06-02 ENCOUNTER — HOME CARE VISIT (OUTPATIENT)
Dept: HOME HEALTH SERVICES | Facility: HOME HEALTHCARE | Age: 73
End: 2023-06-02

## 2023-06-02 VITALS — DIASTOLIC BLOOD PRESSURE: 64 MMHG | HEART RATE: 71 BPM | OXYGEN SATURATION: 94 % | SYSTOLIC BLOOD PRESSURE: 110 MMHG

## 2023-06-02 VITALS
RESPIRATION RATE: 18 BRPM | OXYGEN SATURATION: 96 % | DIASTOLIC BLOOD PRESSURE: 72 MMHG | HEART RATE: 72 BPM | SYSTOLIC BLOOD PRESSURE: 120 MMHG | TEMPERATURE: 98.2 F

## 2023-06-03 PROCEDURE — 10330064 DRESSING, OIL EMULSION 3"X3" STR (50/BX

## 2023-06-05 ENCOUNTER — HOME CARE VISIT (OUTPATIENT)
Dept: HOME HEALTH SERVICES | Facility: HOME HEALTHCARE | Age: 73
End: 2023-06-05
Payer: MEDICARE

## 2023-06-05 VITALS
TEMPERATURE: 97.8 F | OXYGEN SATURATION: 95 % | RESPIRATION RATE: 20 BRPM | SYSTOLIC BLOOD PRESSURE: 108 MMHG | DIASTOLIC BLOOD PRESSURE: 62 MMHG | HEART RATE: 60 BPM

## 2023-06-05 PROCEDURE — G0299 HHS/HOSPICE OF RN EA 15 MIN: HCPCS

## 2023-06-06 ENCOUNTER — HOME CARE VISIT (OUTPATIENT)
Dept: HOME HEALTH SERVICES | Facility: HOME HEALTHCARE | Age: 73
End: 2023-06-06
Payer: MEDICARE

## 2023-06-06 PROCEDURE — G0151 HHCP-SERV OF PT,EA 15 MIN: HCPCS

## 2023-06-06 PROCEDURE — 10330064 DRESSING, OIL EMULSION 3"X3" STR (50/BX

## 2023-06-06 PROCEDURE — 10330064 PAD, ABD 5X9" STR LF (1/PK 20PK/BX) MGM1

## 2023-06-06 PROCEDURE — 400014 VN F/U

## 2023-06-07 ENCOUNTER — HOME CARE VISIT (OUTPATIENT)
Dept: HOME HEALTH SERVICES | Facility: HOME HEALTHCARE | Age: 73
End: 2023-06-07
Payer: MEDICARE

## 2023-06-07 VITALS
RESPIRATION RATE: 18 BRPM | TEMPERATURE: 98.8 F | OXYGEN SATURATION: 95 % | DIASTOLIC BLOOD PRESSURE: 62 MMHG | HEART RATE: 66 BPM | SYSTOLIC BLOOD PRESSURE: 118 MMHG

## 2023-06-07 VITALS — HEART RATE: 77 BPM | OXYGEN SATURATION: 95 % | SYSTOLIC BLOOD PRESSURE: 130 MMHG | DIASTOLIC BLOOD PRESSURE: 60 MMHG

## 2023-06-07 PROCEDURE — G0299 HHS/HOSPICE OF RN EA 15 MIN: HCPCS

## 2023-06-07 PROCEDURE — G0151 HHCP-SERV OF PT,EA 15 MIN: HCPCS

## 2023-06-09 ENCOUNTER — HOME CARE VISIT (OUTPATIENT)
Dept: HOME HEALTH SERVICES | Facility: HOME HEALTHCARE | Age: 73
End: 2023-06-09
Payer: MEDICARE

## 2023-06-09 VITALS
RESPIRATION RATE: 16 BRPM | TEMPERATURE: 97.6 F | OXYGEN SATURATION: 97 % | HEART RATE: 72 BPM | SYSTOLIC BLOOD PRESSURE: 112 MMHG | DIASTOLIC BLOOD PRESSURE: 68 MMHG

## 2023-06-09 PROCEDURE — G0299 HHS/HOSPICE OF RN EA 15 MIN: HCPCS

## 2023-06-12 ENCOUNTER — HOME CARE VISIT (OUTPATIENT)
Dept: HOME HEALTH SERVICES | Facility: HOME HEALTHCARE | Age: 73
End: 2023-06-12
Payer: MEDICARE

## 2023-06-12 VITALS — DIASTOLIC BLOOD PRESSURE: 60 MMHG | HEART RATE: 71 BPM | OXYGEN SATURATION: 94 % | SYSTOLIC BLOOD PRESSURE: 118 MMHG

## 2023-06-12 PROCEDURE — G0179 MD RECERTIFICATION HHA PT: HCPCS | Performed by: FAMILY MEDICINE

## 2023-06-12 PROCEDURE — G0151 HHCP-SERV OF PT,EA 15 MIN: HCPCS

## 2023-06-13 ENCOUNTER — HOME CARE VISIT (OUTPATIENT)
Dept: HOME HEALTH SERVICES | Facility: HOME HEALTHCARE | Age: 73
End: 2023-06-13
Payer: MEDICARE

## 2023-06-13 ENCOUNTER — OFFICE VISIT (OUTPATIENT)
Dept: WOUND CARE | Facility: CLINIC | Age: 73
End: 2023-06-13
Payer: MEDICARE

## 2023-06-13 VITALS
HEART RATE: 64 BPM | TEMPERATURE: 98.5 F | SYSTOLIC BLOOD PRESSURE: 99 MMHG | RESPIRATION RATE: 18 BRPM | DIASTOLIC BLOOD PRESSURE: 59 MMHG

## 2023-06-13 DIAGNOSIS — L97.811 VENOUS STASIS ULCER OF OTHER PART OF RIGHT LOWER LEG LIMITED TO BREAKDOWN OF SKIN WITHOUT VARICOSE VEINS (HCC): ICD-10-CM

## 2023-06-13 DIAGNOSIS — L97.822 VENOUS STASIS ULCER OF OTHER PART OF LEFT LOWER LEG WITH FAT LAYER EXPOSED WITHOUT VARICOSE VEINS (HCC): Primary | ICD-10-CM

## 2023-06-13 DIAGNOSIS — I87.2 VENOUS STASIS ULCER OF OTHER PART OF LEFT LOWER LEG WITH FAT LAYER EXPOSED WITHOUT VARICOSE VEINS (HCC): Primary | ICD-10-CM

## 2023-06-13 DIAGNOSIS — I87.2 VENOUS STASIS ULCER OF OTHER PART OF RIGHT LOWER LEG LIMITED TO BREAKDOWN OF SKIN WITHOUT VARICOSE VEINS (HCC): ICD-10-CM

## 2023-06-13 PROCEDURE — 97597 DBRDMT OPN WND 1ST 20 CM/<: CPT | Performed by: STUDENT IN AN ORGANIZED HEALTH CARE EDUCATION/TRAINING PROGRAM

## 2023-06-13 PROCEDURE — 97598 DBRDMT OPN WND ADDL 20CM/<: CPT | Performed by: STUDENT IN AN ORGANIZED HEALTH CARE EDUCATION/TRAINING PROGRAM

## 2023-06-13 PROCEDURE — 99213 OFFICE O/P EST LOW 20 MIN: CPT | Performed by: STUDENT IN AN ORGANIZED HEALTH CARE EDUCATION/TRAINING PROGRAM

## 2023-06-13 NOTE — PATIENT INSTRUCTIONS
Orders Placed This Encounter   Procedures    Wound cleansing and dressings     Wound cleansing and dressings       B/L Leg wounds:   Wash your hands with soap and water  Cleanse the wound with mild soap and water, dove, prior to applying a clean dressing  Shower no ---do not get the dressings wet       Apply Adaptic and then silver alginate to open areas   Cover with gauze/ABD pad  Secure with Coflex Lite to BLE  Change dressing 3 x weekly and as needed for excessive draiange       Treatments above were completed today at the [facility]               Multi-layer compression wrap Instructions---Coflex Lite to BLE     Keep compression wrap/wraps clean and dry  If wraps are too tight and you experience numbness/tingling, call the wound center  If after hours, remove wraps or proceed to nearest E R  and call wound center in AM    Vemanda Goody will be changed 3x weekly    Avoid prolonged standing in one place  Elevate leg(s) above the level of the heart when sitting or as much as possible  Continue SLVNA        Follow up in 4 weeks     Standing Status:   Future     Standing Expiration Date:   6/13/2024    Wound cleansing and dressings     Spandagrip size F applied today only in the wound center  Coflex Lite unavailable in wound center  Home care to apply Coflex Lite       Standing Status:   Future     Standing Expiration Date:   6/13/2024

## 2023-06-13 NOTE — PROGRESS NOTES
Patient ID: Lalit Beach is a 74 y o  male Date of Birth 1950     Diagnosis:  1  Venous stasis ulcer of other part of left lower leg with fat layer exposed without varicose veins (HCC)  -     Wound cleansing and dressings; Future  -     Wound cleansing and dressings; Future    2  Venous stasis ulcer of other part of right lower leg limited to breakdown of skin without varicose veins (HCC)  -     Wound cleansing and dressings; Future  -     Wound cleansing and dressings; Future       Diagnosis ICD-10-CM Associated Orders   1  Venous stasis ulcer of other part of left lower leg with fat layer exposed without varicose veins (HCC)  I87 2 Wound cleansing and dressings    L97 822 Wound cleansing and dressings      2  Venous stasis ulcer of other part of right lower leg limited to breakdown of skin without varicose veins (HCC)  I87 2 Wound cleansing and dressings    L97 811 Wound cleansing and dressings           Assessment & Plan:  1  Left lower extremity venous ulcerations  2  Right lower extremity venous ulcer   2  Bilateral lower extremity venous insufficiency        - Change dressings to adaptic Maxorb DSD and coflex lite      - Continue leg elevation to B/l LE   - Discussed importance of proper protein intake for wound healing   - Return in 4 weeks, sooner if any concerns of increase in wound size of infection      Chief Complaint   Patient presents with   • Follow Up Wound Care Visit     Bilateral leg wounds           Subjective:   52-year-old male presents with wife for continued wound treatments to bilateral lower legs  He denies any other complaints at this time        The following portions of the patient's history were reviewed and updated as appropriate:   Patient Active Problem List   Diagnosis   • Gout   • Gait abnormality   • Lumbar stenosis   • Polyneuropathy   • Lower extremity weakness   • Lumbosacral plexopathy   • Elevated troponin   • EDGAR (dyspnea on exertion)   • Rash due to vaculitis    • LYLY (acute kidney injury) ([facility] [address] )   • Depression   • Multiple open wounds of lower leg   • Ambulatory dysfunction   • Leucocytosis   • Bloating     Past Medical History:   Diagnosis Date   • Depression    • Gout      Past Surgical History:   Procedure Laterality Date   • BACK SURGERY      X2 LUMBAR INCLUDING FUSION  ,  WITH OAA, [facility] DR Manish Walker   • CATARACT EXTRACTION, BILATERAL     • KNEE ARTHROSCOPY Right     x2 , meniscus repair   • TONSILECTOMY AND ADNOIDECTOMY       Social History     Socioeconomic History   • Marital status: /Civil Union     Spouse name: Louise   • Number of children: 3   • Years of education: 15   • Highest education level: None   Occupational History   • None   Tobacco Use   • Smoking status: Former     Types: Cigarettes     Quit date:      Years since quittin 4   • Smokeless tobacco: Never   Vaping Use   • Vaping Use: Never used   Substance and Sexual Activity   • Alcohol use: Not Currently     Comment: occasional   • Drug use: Never   • Sexual activity: None   Other Topics Concern   • None   Social History Narrative   • None     Social Determinants of Health     Financial Resource Strain: Not on file   Food Insecurity: No Food Insecurity (10/4/2022)    Hunger Vital Sign    • Worried About Running Out of Food in the Last Year: Never true    • Ran Out of Food in the Last Year: Never true   Transportation Needs: No Transportation Needs (10/4/2022)    PRAPARE - Transportation    • Lack of Transportation (Medical): No    • Lack of Transportation (Non-Medical):  No   Physical Activity: Not on file   Stress: Not on file   Social Connections: Not on file   Intimate Partner Violence: Not on file   Housing Stability: Unknown (10/4/2022)    Housing Stability Vital Sign    • Unable to Pay for Housing in the Last Year: Not on file    • Number of Places Lived in the Last Year: 1    • Unstable Housing in the Last Year: No        Current Outpatient Medications:   •  allopurinol (ZYLOPRIM) 100 mg tablet, Take 100 mg by mouth 2 (two) times a day, Disp: , Rfl:   •  cholecalciferol (VITAMIN D3) 1,000 units tablet, Take 2,000 Units by mouth daily, Disp: , Rfl:   •  collagenase (SANTYL) ointment, Apply topically daily Wound 1: 10cm x15cm Wound 2: 10cm x10cm (Patient not taking: Reported on 1/10/2023), Disp: 15 g, Rfl: 0  •  famotidine (PEPCID) 20 mg tablet, Take 1 tablet (20 mg total) by mouth daily Do not start before October 6, 2022 , Disp: 30 tablet, Rfl: 0  •  FLUoxetine (PROzac) 20 mg capsule, Take 20 mg by mouth daily  (Patient not taking: Reported on 1/10/2023), Disp: , Rfl:   •  loratadine (CLARITIN) 10 mg tablet, Take 10 mg by mouth as needed for allergies, Disp: , Rfl:   •  Magnesium 100 MG CAPS, Take 1 capsule by mouth daily, Disp: , Rfl:   •  meloxicam (MOBIC) 15 mg tablet, Take 15 mg by mouth daily Patient not taking, Disp: , Rfl:   •  Misc Natural Products (OSTEO BI-FLEX/5-LOXIN ADVANCED PO), Take 1 tablet by mouth daily , Disp: , Rfl:   •  Multiple Vitamins-Minerals (CENTRUM SILVER 50+MEN PO), Take 1 tablet by mouth daily, Disp: , Rfl:   •  mupirocin (BACTROBAN) 2 % ointment, Apply topically 2 (two) times a day Apply topically twice a day or as needed after every dressing change on lower legs (Patient not taking: No sig reported), Disp: 30 g, Rfl: 11  •  simethicone (MYLICON) 80 mg chewable tablet, Chew 1 tablet (80 mg total) every 6 (six) hours as needed for flatulence (Patient not taking: Reported on 6/5/2023), Disp: 30 tablet, Rfl: 0  •  SUPER B COMPLEX/C PO, Take 1 tablet by mouth daily, Disp: , Rfl:   •  torsemide (DEMADEX) 10 mg tablet, Take 1 tablet (10 mg total) by mouth daily Do not start before October 6, 2022 , Disp: 30 tablet, Rfl: 0  •  triamcinolone (KENALOG) 0 1 % ointment, Apply topically 2 (two) times a day Only apply to rash, do not apply to necrotic tissue (Patient not taking: Reported on 1/10/2023), Disp: 30 g, Rfl: 0  Family History   Problem Relation Age of Onset   • Diabetes Father    • Diabetes Brother    • Diabetes Sister       Review of Systems   All other systems reviewed and are negative  Allergies:  Ciprofloxacin, Other, and Penicillins      Objective:  BP 99/59   Pulse 64   Temp 98 5 °F (36 9 °C) (Temporal)   Resp 18     Physical Exam  Vitals reviewed  Musculoskeletal:      Right lower le+ Edema present  Left lower le+ Edema present  Comments: Bilateral lower extremity superficial ulceration with granular wound base  Sloughing of necrotic skin noted with underlying ulcer  No pain with palpation  No clinical signs of infection present  Much improved wound since last evaluated  Wound 23 Venous Ulcer Leg Anterior; Left (Active)   Wound Image   23 1513   Wound Description Dry 23 151   Antonette-wound Assessment Brown;Black;Fragile 23 151   Wound Length (cm) 0 1 cm 23 151   Wound Width (cm) 0 1 cm 23 151   Wound Depth (cm) 0 1 cm 23 151   Wound Surface Area (cm^2) 0 01 cm^2 23 151   Wound Volume (cm^3) 0 001 cm^3 23 151   Calculated Wound Volume (cm^3) 0 cm^3 23 151   Change in Wound Size % 100 23 151   Drainage Amount None 23   Non-staged Wound Description Not applicable 35//45 4125       Wound 23 Venous Ulcer Leg Anterior;Right;Upper (Active)   Wound Image   23 151   Wound Description Pink;Slough 23   Antonette-wound Assessment Dry;Fragile;Black; Brown 23   Wound Length (cm) 0 8 cm 23 151   Wound Width (cm) 0 7 cm 23   Wound Depth (cm) 0 1 cm 23   Wound Surface Area (cm^2) 0 56 cm^2 23 151   Wound Volume (cm^3) 0 056 cm^3 23 151   Calculated Wound Volume (cm^3) 0 06 cm^3 23 151   Change in Wound Size % -200 23 151   Drainage Amount Small 23 151   Drainage Description Serous 23 151   Non-staged Wound Description Full thickness 23 2647 "                        Debridement   Wound 05/29/23 Venous Ulcer Leg Anterior;Right;Upper    Universal Protocol:  Consent: Verbal consent obtained  Risks and benefits: risks, benefits and alternatives were discussed  Consent given by: patient  Time out: Immediately prior to procedure a \"time out\" was called to verify the correct patient, procedure, equipment, support staff and site/side marked as required  Patient understanding: patient states understanding of the procedure being performed  Patient identity confirmed: verbally with patient      Performed by: physician  Debridement type: selective    Post-debridement measurements  Length (cm): 0 8  Width (cm): 0 7  Depth (cm): 0 1  Percent debrided: 100%  Surface Area (cm^2): 0 56  Area debrided (cm^2): 0 56  Volume (cm^3): 0 06  Devitalized tissue debrided: biofilm, slough and eschar  Instrument(s) utilized: curette  Bleeding: small  Hemostasis obtained with: pressure  Procedural pain (0-10): insensate  Post-procedural pain: insensate   Response to treatment: procedure was tolerated well                   Wound Instructions:  Orders Placed This Encounter   Procedures   • Wound cleansing and dressings     Wound cleansing and dressings       B/L Leg wounds:   Wash your hands with soap and water      Cleanse the wound with mild soap and water, dove, prior to applying a clean dressing  Shower no ---do not get the dressings wet       Apply Adaptic and then silver alginate to open areas   Cover with gauze/ABD pad  Secure with Coflex Lite to BLE  Change dressing 3 x weekly and as needed for excessive draiange       Treatments above were completed today at the [facility]               Multi-layer compression wrap Instructions---Coflex Lite to BLE     Keep compression wrap/wraps clean and dry  If wraps are too tight and you experience numbness/tingling, call the wound center   If after hours, remove wraps or proceed to nearest E R  and call wound center in AM    Wrap will be " "changed 3x weekly    Avoid prolonged standing in one place     Elevate leg(s) above the level of the heart when sitting or as much as possible            Continue SLVNA        Follow up in 4 weeks     Standing Status:   Future     Standing Expiration Date:   6/13/2024   • Wound cleansing and dressings     Spandagrip size F applied today only in the wound center  Coflex Lite unavailable in wound center  Home care to apply Coflex Lite  Standing Status:   Future     Standing Expiration Date:   6/13/2024         Adriana Raines DPM      Portions of the record may have been created with voice recognition software  Occasional wrong word or \"sound a like\" substitutions may have occurred due to the inherent limitations of voice recognition software  Read the chart carefully and recognize, using context, where substitutions have occurred        "

## 2023-06-14 ENCOUNTER — HOME CARE VISIT (OUTPATIENT)
Dept: HOME HEALTH SERVICES | Facility: HOME HEALTHCARE | Age: 73
End: 2023-06-14
Payer: MEDICARE

## 2023-06-15 ENCOUNTER — HOME CARE VISIT (OUTPATIENT)
Dept: HOME HEALTH SERVICES | Facility: HOME HEALTHCARE | Age: 73
End: 2023-06-15
Payer: MEDICARE

## 2023-06-15 VITALS
TEMPERATURE: 97.2 F | DIASTOLIC BLOOD PRESSURE: 62 MMHG | HEART RATE: 74 BPM | SYSTOLIC BLOOD PRESSURE: 112 MMHG | OXYGEN SATURATION: 96 % | RESPIRATION RATE: 18 BRPM

## 2023-06-15 PROCEDURE — 10330064 TAPE, ADHSV TRANSPORE WHT 2" (6RL/BX 10B

## 2023-06-15 PROCEDURE — 10330064 PAD, ABD 5X9" STR LF (1/PK 20PK/BX) MGM1

## 2023-06-15 PROCEDURE — G0299 HHS/HOSPICE OF RN EA 15 MIN: HCPCS

## 2023-06-15 PROCEDURE — 10330064 DRESSING, OIL EMULSION 3"X3" STR (50/BX

## 2023-06-15 PROCEDURE — 10330064 BANDAGE, COMPRSN COFLEX TLC 2LYR XLNG (2

## 2023-06-16 ENCOUNTER — HOME CARE VISIT (OUTPATIENT)
Dept: HOME HEALTH SERVICES | Facility: HOME HEALTHCARE | Age: 73
End: 2023-06-16
Payer: MEDICARE

## 2023-06-16 VITALS — DIASTOLIC BLOOD PRESSURE: 70 MMHG | OXYGEN SATURATION: 98 % | HEART RATE: 88 BPM | SYSTOLIC BLOOD PRESSURE: 160 MMHG

## 2023-06-16 PROCEDURE — G0151 HHCP-SERV OF PT,EA 15 MIN: HCPCS

## 2023-06-17 ENCOUNTER — HOME CARE VISIT (OUTPATIENT)
Dept: HOME HEALTH SERVICES | Facility: HOME HEALTHCARE | Age: 73
End: 2023-06-17
Payer: MEDICARE

## 2023-06-17 VITALS
TEMPERATURE: 98.2 F | DIASTOLIC BLOOD PRESSURE: 60 MMHG | SYSTOLIC BLOOD PRESSURE: 112 MMHG | HEART RATE: 59 BPM | OXYGEN SATURATION: 98 % | RESPIRATION RATE: 20 BRPM

## 2023-06-17 PROCEDURE — G0299 HHS/HOSPICE OF RN EA 15 MIN: HCPCS

## 2023-06-19 ENCOUNTER — HOME CARE VISIT (OUTPATIENT)
Dept: HOME HEALTH SERVICES | Facility: HOME HEALTHCARE | Age: 73
End: 2023-06-19
Payer: MEDICARE

## 2023-06-19 VITALS
OXYGEN SATURATION: 97 % | TEMPERATURE: 96.9 F | HEART RATE: 88 BPM | DIASTOLIC BLOOD PRESSURE: 66 MMHG | RESPIRATION RATE: 20 BRPM | SYSTOLIC BLOOD PRESSURE: 116 MMHG

## 2023-06-19 PROCEDURE — G0300 HHS/HOSPICE OF LPN EA 15 MIN: HCPCS

## 2023-06-21 ENCOUNTER — HOME CARE VISIT (OUTPATIENT)
Dept: HOME HEALTH SERVICES | Facility: HOME HEALTHCARE | Age: 73
End: 2023-06-21
Payer: MEDICARE

## 2023-06-21 VITALS
SYSTOLIC BLOOD PRESSURE: 126 MMHG | OXYGEN SATURATION: 95 % | RESPIRATION RATE: 20 BRPM | DIASTOLIC BLOOD PRESSURE: 64 MMHG | TEMPERATURE: 98.5 F | HEART RATE: 64 BPM

## 2023-06-21 VITALS — DIASTOLIC BLOOD PRESSURE: 88 MMHG | SYSTOLIC BLOOD PRESSURE: 140 MMHG | OXYGEN SATURATION: 94 % | HEART RATE: 73 BPM

## 2023-06-21 PROCEDURE — G0300 HHS/HOSPICE OF LPN EA 15 MIN: HCPCS

## 2023-06-21 PROCEDURE — G0151 HHCP-SERV OF PT,EA 15 MIN: HCPCS

## 2023-06-21 NOTE — CASE COMMUNICATION
Dr Gabriela Berry,   I ordered him a transfer board via [address] [place] [address] for you to sign     I will be reassessing pt next visit , will continue for further transfer training  as well as for BLE strength  I will send you orders to sign      Thank you, Shanique Richardson PT

## 2023-06-22 ENCOUNTER — HOME CARE VISIT (OUTPATIENT)
Dept: HOME HEALTH SERVICES | Facility: HOME HEALTHCARE | Age: 73
End: 2023-06-22
Payer: MEDICARE

## 2023-06-22 VITALS — DIASTOLIC BLOOD PRESSURE: 80 MMHG | OXYGEN SATURATION: 98 % | SYSTOLIC BLOOD PRESSURE: 130 MMHG | HEART RATE: 85 BPM

## 2023-06-22 PROCEDURE — 10330064 PAD, ABD 5X9" STR LF (1/PK 20PK/BX) MGM1

## 2023-06-22 PROCEDURE — 10330064 BANDAGE, COMPRSN COFLEX TLC 2LYR XLNG (2

## 2023-06-22 PROCEDURE — 10330064 DRESSING, CALCIUM ALGINATE AG SHEET 4"X4

## 2023-06-22 PROCEDURE — G0151 HHCP-SERV OF PT,EA 15 MIN: HCPCS

## 2023-06-22 PROCEDURE — 10330064 TAPE, ADHSV TRANSPORE WHT 2" (6RL/BX 10B

## 2023-06-23 DIAGNOSIS — F34.1 PERSISTENT DEPRESSIVE DISORDER: ICD-10-CM

## 2023-06-23 DIAGNOSIS — M1A.0790 CHRONIC IDIOPATHIC GOUT INVOLVING TOE WITHOUT TOPHUS, UNSPECIFIED LATERALITY: Primary | ICD-10-CM

## 2023-06-23 RX ORDER — ALLOPURINOL 100 MG/1
100 TABLET ORAL 2 TIMES DAILY
Qty: 180 TABLET | Refills: 1 | Status: SHIPPED | OUTPATIENT
Start: 2023-06-23

## 2023-06-23 RX ORDER — FLUOXETINE HYDROCHLORIDE 20 MG/1
20 CAPSULE ORAL DAILY
Qty: 90 CAPSULE | Refills: 0 | Status: SHIPPED | OUTPATIENT
Start: 2023-06-23

## 2023-06-24 ENCOUNTER — HOME CARE VISIT (OUTPATIENT)
Dept: HOME HEALTH SERVICES | Facility: HOME HEALTHCARE | Age: 73
End: 2023-06-24
Payer: MEDICARE

## 2023-06-24 VITALS
OXYGEN SATURATION: 98 % | RESPIRATION RATE: 18 BRPM | SYSTOLIC BLOOD PRESSURE: 110 MMHG | HEART RATE: 70 BPM | DIASTOLIC BLOOD PRESSURE: 74 MMHG | TEMPERATURE: 97.6 F

## 2023-06-24 PROCEDURE — G0300 HHS/HOSPICE OF LPN EA 15 MIN: HCPCS

## 2023-06-26 ENCOUNTER — HOME CARE VISIT (OUTPATIENT)
Dept: HOME HEALTH SERVICES | Facility: HOME HEALTHCARE | Age: 73
End: 2023-06-26
Payer: MEDICARE

## 2023-06-26 VITALS
TEMPERATURE: 97.3 F | RESPIRATION RATE: 20 BRPM | HEART RATE: 56 BPM | OXYGEN SATURATION: 97 % | DIASTOLIC BLOOD PRESSURE: 64 MMHG | SYSTOLIC BLOOD PRESSURE: 112 MMHG

## 2023-06-26 PROCEDURE — G0299 HHS/HOSPICE OF RN EA 15 MIN: HCPCS

## 2023-06-27 ENCOUNTER — HOME CARE VISIT (OUTPATIENT)
Dept: HOME HEALTH SERVICES | Facility: HOME HEALTHCARE | Age: 73
End: 2023-06-27
Payer: MEDICARE

## 2023-06-27 PROCEDURE — G0151 HHCP-SERV OF PT,EA 15 MIN: HCPCS

## 2023-06-28 ENCOUNTER — HOME CARE VISIT (OUTPATIENT)
Dept: HOME HEALTH SERVICES | Facility: HOME HEALTHCARE | Age: 73
End: 2023-06-28
Payer: MEDICARE

## 2023-06-28 VITALS — HEART RATE: 76 BPM | SYSTOLIC BLOOD PRESSURE: 140 MMHG | DIASTOLIC BLOOD PRESSURE: 80 MMHG | OXYGEN SATURATION: 97 %

## 2023-06-28 VITALS
DIASTOLIC BLOOD PRESSURE: 60 MMHG | HEART RATE: 68 BPM | TEMPERATURE: 98 F | RESPIRATION RATE: 18 BRPM | OXYGEN SATURATION: 96 % | SYSTOLIC BLOOD PRESSURE: 110 MMHG

## 2023-06-28 PROCEDURE — G0299 HHS/HOSPICE OF RN EA 15 MIN: HCPCS

## 2023-06-29 ENCOUNTER — HOME CARE VISIT (OUTPATIENT)
Dept: HOME HEALTH SERVICES | Facility: HOME HEALTHCARE | Age: 73
End: 2023-06-29
Payer: MEDICARE

## 2023-06-29 VITALS — OXYGEN SATURATION: 98 % | HEART RATE: 58 BPM | SYSTOLIC BLOOD PRESSURE: 130 MMHG | DIASTOLIC BLOOD PRESSURE: 80 MMHG

## 2023-06-29 PROCEDURE — G0151 HHCP-SERV OF PT,EA 15 MIN: HCPCS

## 2023-07-01 ENCOUNTER — HOME CARE VISIT (OUTPATIENT)
Dept: HOME HEALTH SERVICES | Facility: HOME HEALTHCARE | Age: 73
End: 2023-07-01
Payer: MEDICARE

## 2023-07-01 VITALS
RESPIRATION RATE: 18 BRPM | DIASTOLIC BLOOD PRESSURE: 64 MMHG | HEART RATE: 62 BPM | OXYGEN SATURATION: 97 % | TEMPERATURE: 97.7 F | SYSTOLIC BLOOD PRESSURE: 128 MMHG

## 2023-07-01 PROCEDURE — G0300 HHS/HOSPICE OF LPN EA 15 MIN: HCPCS

## 2023-07-04 ENCOUNTER — HOME CARE VISIT (OUTPATIENT)
Dept: HOME HEALTH SERVICES | Facility: HOME HEALTHCARE | Age: 73
End: 2023-07-04
Payer: MEDICARE

## 2023-07-04 VITALS
OXYGEN SATURATION: 98 % | TEMPERATURE: 98 F | DIASTOLIC BLOOD PRESSURE: 62 MMHG | SYSTOLIC BLOOD PRESSURE: 108 MMHG | RESPIRATION RATE: 22 BRPM | HEART RATE: 68 BPM

## 2023-07-04 PROCEDURE — G0299 HHS/HOSPICE OF RN EA 15 MIN: HCPCS

## 2023-07-05 ENCOUNTER — HOME CARE VISIT (OUTPATIENT)
Dept: HOME HEALTH SERVICES | Facility: HOME HEALTHCARE | Age: 73
End: 2023-07-05
Payer: MEDICARE

## 2023-07-05 VITALS — DIASTOLIC BLOOD PRESSURE: 80 MMHG | SYSTOLIC BLOOD PRESSURE: 124 MMHG | OXYGEN SATURATION: 97 % | HEART RATE: 66 BPM

## 2023-07-05 PROCEDURE — G0151 HHCP-SERV OF PT,EA 15 MIN: HCPCS

## 2023-07-06 ENCOUNTER — HOME CARE VISIT (OUTPATIENT)
Dept: HOME HEALTH SERVICES | Facility: HOME HEALTHCARE | Age: 73
End: 2023-07-06
Payer: MEDICARE

## 2023-07-06 PROCEDURE — G0299 HHS/HOSPICE OF RN EA 15 MIN: HCPCS

## 2023-07-07 ENCOUNTER — HOME CARE VISIT (OUTPATIENT)
Dept: HOME HEALTH SERVICES | Facility: HOME HEALTHCARE | Age: 73
End: 2023-07-07
Payer: MEDICARE

## 2023-07-07 VITALS
HEART RATE: 68 BPM | RESPIRATION RATE: 18 BRPM | OXYGEN SATURATION: 96 % | SYSTOLIC BLOOD PRESSURE: 122 MMHG | DIASTOLIC BLOOD PRESSURE: 66 MMHG | TEMPERATURE: 97.8 F

## 2023-07-07 VITALS — HEART RATE: 80 BPM | DIASTOLIC BLOOD PRESSURE: 90 MMHG | SYSTOLIC BLOOD PRESSURE: 120 MMHG | OXYGEN SATURATION: 97 %

## 2023-07-07 PROCEDURE — G0151 HHCP-SERV OF PT,EA 15 MIN: HCPCS

## 2023-07-07 NOTE — CASE COMMUNICATION
Possible d/c from [facility] next wk pending wound care appt outcome. If pt d/c from [facility] , he is going to progress to OPT at [facility]. Please send script for OPT as tenative eval appt set. Neuropathy, gait dysfunction, BLE weakness.      Thank you, Elkin Anna PT

## 2023-07-08 ENCOUNTER — HOME CARE VISIT (OUTPATIENT)
Dept: HOME HEALTH SERVICES | Facility: HOME HEALTHCARE | Age: 73
End: 2023-07-08
Payer: MEDICARE

## 2023-07-08 VITALS
TEMPERATURE: 97.8 F | RESPIRATION RATE: 20 BRPM | SYSTOLIC BLOOD PRESSURE: 128 MMHG | DIASTOLIC BLOOD PRESSURE: 82 MMHG | OXYGEN SATURATION: 98 % | HEART RATE: 70 BPM

## 2023-07-08 PROCEDURE — G0300 HHS/HOSPICE OF LPN EA 15 MIN: HCPCS

## 2023-07-09 DIAGNOSIS — R26.2 AMBULATORY DYSFUNCTION: ICD-10-CM

## 2023-07-09 DIAGNOSIS — R29.898 LEG WEAKNESS, BILATERAL: Primary | ICD-10-CM

## 2023-07-09 DIAGNOSIS — G57.93 NEUROPATHY INVOLVING BOTH LOWER EXTREMITIES: ICD-10-CM

## 2023-07-10 ENCOUNTER — HOME CARE VISIT (OUTPATIENT)
Dept: HOME HEALTH SERVICES | Facility: HOME HEALTHCARE | Age: 73
End: 2023-07-10
Payer: MEDICARE

## 2023-07-10 VITALS — SYSTOLIC BLOOD PRESSURE: 138 MMHG | DIASTOLIC BLOOD PRESSURE: 70 MMHG | HEART RATE: 76 BPM | OXYGEN SATURATION: 96 %

## 2023-07-10 PROCEDURE — G0151 HHCP-SERV OF PT,EA 15 MIN: HCPCS

## 2023-07-11 ENCOUNTER — HOME CARE VISIT (OUTPATIENT)
Dept: HOME HEALTH SERVICES | Facility: HOME HEALTHCARE | Age: 73
End: 2023-07-11
Payer: MEDICARE

## 2023-07-11 ENCOUNTER — OFFICE VISIT (OUTPATIENT)
Dept: WOUND CARE | Facility: CLINIC | Age: 73
End: 2023-07-11
Payer: MEDICARE

## 2023-07-11 VITALS
HEART RATE: 68 BPM | SYSTOLIC BLOOD PRESSURE: 126 MMHG | DIASTOLIC BLOOD PRESSURE: 69 MMHG | TEMPERATURE: 97.7 F | RESPIRATION RATE: 20 BRPM

## 2023-07-11 DIAGNOSIS — L97.822 VENOUS STASIS ULCER OF OTHER PART OF LEFT LOWER LEG WITH FAT LAYER EXPOSED WITHOUT VARICOSE VEINS (HCC): ICD-10-CM

## 2023-07-11 DIAGNOSIS — I87.2 VENOUS STASIS ULCER OF OTHER PART OF RIGHT LOWER LEG LIMITED TO BREAKDOWN OF SKIN WITHOUT VARICOSE VEINS (HCC): Primary | ICD-10-CM

## 2023-07-11 DIAGNOSIS — L97.811 VENOUS STASIS ULCER OF OTHER PART OF RIGHT LOWER LEG LIMITED TO BREAKDOWN OF SKIN WITHOUT VARICOSE VEINS (HCC): Primary | ICD-10-CM

## 2023-07-11 DIAGNOSIS — I87.2 VENOUS STASIS ULCER OF OTHER PART OF LEFT LOWER LEG WITH FAT LAYER EXPOSED WITHOUT VARICOSE VEINS (HCC): ICD-10-CM

## 2023-07-11 PROCEDURE — 97598 DBRDMT OPN WND ADDL 20CM/<: CPT | Performed by: STUDENT IN AN ORGANIZED HEALTH CARE EDUCATION/TRAINING PROGRAM

## 2023-07-11 PROCEDURE — 97597 DBRDMT OPN WND 1ST 20 CM/<: CPT | Performed by: STUDENT IN AN ORGANIZED HEALTH CARE EDUCATION/TRAINING PROGRAM

## 2023-07-11 NOTE — PATIENT INSTRUCTIONS
Orders Placed This Encounter   Procedures    Wound cleansing and dressings     Wound cleansing and dressings       B/L Leg wounds:   Wash your hands with soap and water. Cleanse the wound with mild soap and water, dove, prior to applying a clean dressing. Shower no ---do not get the dressings wet       Apply Adaptic and then silver alginate to open areas    Cover with gauze/ABD pad. Secure with Coflex Lite to BLE   Change dressing 3 x weekly and as needed for excessive draiange       Treatments above were completed today at the [facility]                   Multi-layer compression wrap Instructions---Coflex Lite to BLE       Keep compression wrap/wraps clean and dry. If wraps are too tight and you experience numbness/tingling, call the wound center. If after hours, remove wraps or proceed to nearest E.R. and call wound center in AM.   Valarie Delta will be changed 3x weekly    Avoid prolonged standing in one place. Elevate leg(s) above the level of the heart when sitting or as much as possible.                Continue SLVNA           Follow up in 4 weeks     Standing Status:   Future     Standing Expiration Date:   7/11/2024

## 2023-07-12 ENCOUNTER — HOME CARE VISIT (OUTPATIENT)
Dept: HOME HEALTH SERVICES | Facility: HOME HEALTHCARE | Age: 73
End: 2023-07-12
Payer: MEDICARE

## 2023-07-12 NOTE — PROGRESS NOTES
Patient ID: Margarita Corrales is a 74 y.o. male Date of Birth 1950     Diagnosis:  1. Venous stasis ulcer of other part of right lower leg limited to breakdown of skin without varicose veins (HCC)  -     Wound cleansing and dressings; Future    2. Venous stasis ulcer of other part of left lower leg with fat layer exposed without varicose veins (HCC)  -     Wound cleansing and dressings; Future  -     Debridement       Diagnosis ICD-10-CM Associated Orders   1. Venous stasis ulcer of other part of right lower leg limited to breakdown of skin without varicose veins (HCC)  I87.2 Wound cleansing and dressings    L97.811       2. Venous stasis ulcer of other part of left lower leg with fat layer exposed without varicose veins (HCC)  I87.2 Wound cleansing and dressings    L97.822 Debridement           Assessment & Plan:  1. Left lower extremity venous ulcerations  2. Right lower extremity venous ulcer   2. Bilateral lower extremity venous insufficiency        - Continue dressings to adaptic Maxorb DSD and coflex lite.     - Continue leg elevation to B/l LE   - Discussed importance of proper protein intake for wound healing.  - Return in 4 weeks, sooner if any concerns of increase in wound size of infection      Chief Complaint   Patient presents with   • Follow Up Wound Care Visit     Bilateral lower leg ulcers            Subjective:   68-year-old male with past medical history as below presents for continued treatment of bilateral lower extremity ulcerations. Patient denies any other complaints at this time.       The following portions of the patient's history were reviewed and updated as appropriate:   Patient Active Problem List   Diagnosis   • Gout   • Gait abnormality   • Lumbar stenosis   • Polyneuropathy   • Lower extremity weakness   • Lumbosacral plexopathy   • Elevated troponin   • EDGAR (dyspnea on exertion)   • Rash due to vaculitis    • LYLY (acute kidney injury) ([address])   • Depression   • Multiple open wounds of lower leg   • Ambulatory dysfunction   • Leucocytosis   • Bloating     Past Medical History:   Diagnosis Date   • Depression    • Gout      Past Surgical History:   Procedure Laterality Date   • BACK SURGERY      X2 LUMBAR INCLUDING FUSION  ,  WITH OAA, [facility] DR. Josette Galo   • CATARACT EXTRACTION, BILATERAL     • KNEE ARTHROSCOPY Right     x2 , meniscus repair   • TONSILECTOMY AND ADNOIDECTOMY       Social History     Socioeconomic History   • Marital status: /Civil Union     Spouse name: Louise   • Number of children: 3   • Years of education: 15   • Highest education level: None   Occupational History   • None   Tobacco Use   • Smoking status: Former     Types: Cigarettes     Quit date:      Years since quittin.5   • Smokeless tobacco: Never   Vaping Use   • Vaping Use: Never used   Substance and Sexual Activity   • Alcohol use: Not Currently     Comment: occasional   • Drug use: Never   • Sexual activity: None   Other Topics Concern   • None   Social History Narrative   • None     Social Determinants of Health     Financial Resource Strain: Not on file   Food Insecurity: No Food Insecurity (10/4/2022)    Hunger Vital Sign    • Worried About Running Out of Food in the Last Year: Never true    • Ran Out of Food in the Last Year: Never true   Transportation Needs: No Transportation Needs (10/4/2022)    PRAPARE - Transportation    • Lack of Transportation (Medical): No    • Lack of Transportation (Non-Medical):  No   Physical Activity: Not on file   Stress: Not on file   Social Connections: Not on file   Intimate Partner Violence: Not on file   Housing Stability: Unknown (10/4/2022)    Housing Stability Vital Sign    • Unable to Pay for Housing in the Last Year: Not on file    • Number of Places Lived in the Last Year: 1    • Unstable Housing in the Last Year: No        Current Outpatient Medications:   •  allopurinol (ZYLOPRIM) 100 mg tablet, Take 1 tablet (100 mg total) by mouth 2 (two) times a day, Disp: 180 tablet, Rfl: 1  •  cholecalciferol (VITAMIN D3) 1,000 units tablet, Take 2,000 Units by mouth daily, Disp: , Rfl:   •  collagenase (SANTYL) ointment, Apply topically daily Wound 1: 10cm x15cm Wound 2: 10cm x10cm (Patient not taking: Reported on 1/10/2023), Disp: 15 g, Rfl: 0  •  famotidine (PEPCID) 20 mg tablet, Take 1 tablet (20 mg total) by mouth daily Do not start before October 6, 2022., Disp: 30 tablet, Rfl: 0  •  FLUoxetine (PROzac) 20 mg capsule, Take 1 capsule (20 mg total) by mouth daily, Disp: 90 capsule, Rfl: 0  •  loratadine (CLARITIN) 10 mg tablet, Take 10 mg by mouth as needed for allergies, Disp: , Rfl:   •  Magnesium 100 MG CAPS, Take 1 capsule by mouth daily, Disp: , Rfl:   •  meloxicam (MOBIC) 15 mg tablet, Take 15 mg by mouth daily Patient not taking, Disp: , Rfl:   •  Misc Natural Products (OSTEO BI-FLEX/5-LOXIN ADVANCED PO), Take 1 tablet by mouth daily , Disp: , Rfl:   •  Multiple Vitamins-Minerals (CENTRUM SILVER 50+MEN PO), Take 1 tablet by mouth daily, Disp: , Rfl:   •  mupirocin (BACTROBAN) 2 % ointment, Apply topically 2 (two) times a day Apply topically twice a day or as needed after every dressing change on lower legs (Patient not taking: No sig reported), Disp: 30 g, Rfl: 11  •  simethicone (MYLICON) 80 mg chewable tablet, Chew 1 tablet (80 mg total) every 6 (six) hours as needed for flatulence (Patient not taking: Reported on 6/5/2023), Disp: 30 tablet, Rfl: 0  •  SUPER B COMPLEX/C PO, Take 1 tablet by mouth daily, Disp: , Rfl:   •  torsemide (DEMADEX) 10 mg tablet, Take 1 tablet (10 mg total) by mouth daily Do not start before October 6, 2022., Disp: 30 tablet, Rfl: 0  •  triamcinolone (KENALOG) 0.1 % ointment, Apply topically 2 (two) times a day Only apply to rash, do not apply to necrotic tissue (Patient not taking: Reported on 1/10/2023), Disp: 30 g, Rfl: 0  Family History   Problem Relation Age of Onset   • Diabetes Father    • Diabetes Brother    • Diabetes Sister       Review of Systems   All other systems reviewed and are negative. Allergies:  Ciprofloxacin, Other, and Penicillins      Objective:  /69   Pulse 68   Temp 97.7 °F (36.5 °C)   Resp 20     Physical Exam  Vitals reviewed. Musculoskeletal:      Right lower le+ Edema present. Left lower le+ Edema present. Comments: Bilateral lower extremity superficial ulceration with granular wound base. Sloughing of necrotic skin noted with underlying ulcer. No pain with palpation. No clinical signs of infection present. Much improved wound since last evaluated. Wound 23 Venous Ulcer Leg Anterior; Left (Active)   Wound Image    23 1531   Wound Description Granulation tissue;Pink 23 1543   Antonette-wound Assessment Brown;Fragile 23 1543   Wound Length (cm) 4 cm 23 1543   Wound Width (cm) 4 cm 23 1543   Wound Depth (cm) 0.1 cm 23 1543   Wound Surface Area (cm^2) 16 cm^2 23 1543   Wound Volume (cm^3) 1.6 cm^3 23 1543   Calculated Wound Volume (cm^3) 1.6 cm^3 23 1543   Change in Wound Size % -300 23 1543   Drainage Amount Scant 23 1543   Drainage Description Serosanguineous 23 1543   Non-staged Wound Description Not applicable 84/10/59 8277   Treatments Cleansed 23 1543   Patient Tolerance Tolerated well 23 1543   Dressing Status Intact 23 1543       Wound 23 Venous Ulcer Leg Anterior;Right;Upper (Active)   Wound Image    23 1533   Wound Description Granulation tissue 23 1545   Antonette-wound Assessment Dry;Fragile;Brown 23 1545   Wound Length (cm) 0.2 cm 23 1545   Wound Width (cm) 0.2 cm 23 1545   Wound Depth (cm) 0.1 cm 23 1545   Wound Surface Area (cm^2) 0.04 cm^2 23 1545   Wound Volume (cm^3) 0.004 cm^3 23 1545   Calculated Wound Volume (cm^3) 0 cm^3 23 1545   Change in Wound Size % 100 23 1545   Drainage Amount Scant 07/11/23 1545   Drainage Description Serous 07/11/23 1545   Non-staged Wound Description Full thickness 07/11/23 1545   Treatments Cleansed 07/11/23 1545   Patient Tolerance Tolerated well 07/11/23 1545   Dressing Status Intact 07/11/23 1545                         Debridement   Wound 05/29/23 Venous Ulcer Leg Anterior; Left    Universal Protocol:  Consent: Verbal consent obtained. Risks and benefits: risks, benefits and alternatives were discussed  Consent given by: patient  Time out: Immediately prior to procedure a "time out" was called to verify the correct patient, procedure, equipment, support staff and site/side marked as required. Patient understanding: patient states understanding of the procedure being performed      Performed by: physician  Debridement type: selective    Post-debridement measurements  Length (cm): 4  Width (cm): 4  Depth (cm): 0.1  Percent debrided: 100%  Surface Area (cm^2): 16  Area debrided (cm^2): 16  Volume (cm^3): 1.6  Devitalized tissue debrided: biofilm, fibrin and slough  Instrument(s) utilized: curette  Bleeding: small  Hemostasis obtained with: pressure  Procedural pain (0-10): insensate  Post-procedural pain: insensate   Response to treatment: procedure was tolerated well                   Wound Instructions:  Orders Placed This Encounter   Procedures   • Wound cleansing and dressings     Wound cleansing and dressings       B/L Leg wounds:   Wash your hands with soap and water.     Cleanse the wound with mild soap and water, dove, prior to applying a clean dressing. Shower no ---do not get the dressings wet       Apply Adaptic and then silver alginate to open areas    Cover with gauze/ABD pad. Secure with Coflex Lite to BLE   Change dressing 3 x weekly and as needed for excessive draiange       Treatments above were completed today at the [facility]                   Multi-layer compression wrap Instructions---Coflex Lite to BLE       Keep compression wrap/wraps clean and dry. If wraps are too tight and you experience numbness/tingling, call the wound center. If after hours, remove wraps or proceed to nearest E.R. and call wound center in AM.   Amisha [place] will be changed 3x weekly    Avoid prolonged standing in one place.    Elevate leg(s) above the level of the heart when sitting or as much as possible.               Continue SLVNA           Follow up in 4 weeks     Standing Status:   Future     Standing Expiration Date:   7/11/2024   • Debridement     This order was created via procedure documentation         Yadi Bagley DPM      Portions of the record may have been created with voice recognition software. Occasional wrong word or "sound a like" substitutions may have occurred due to the inherent limitations of voice recognition software. Read the chart carefully and recognize, using context, where substitutions have occurred.

## 2023-07-12 NOTE — PROGRESS NOTES
Debridement   Wound 05/29/23 Venous Ulcer Leg Anterior;Right;Upper    Universal Protocol:  Consent: Verbal consent obtained. Risks and benefits: risks, benefits and alternatives were discussed  Consent given by: patient  Time out: Immediately prior to procedure a "time out" was called to verify the correct patient, procedure, equipment, support staff and site/side marked as required.   Patient understanding: patient states understanding of the procedure being performed  Patient identity confirmed: verbally with patient      Performed by: physician  Debridement type: selective    Post-debridement measurements  Length (cm): 0.2  Width (cm): 0.3  Depth (cm): 0.1  Percent debrided: 100%  Surface Area (cm^2): 0.06  Area debrided (cm^2): 0.06  Volume (cm^3): 0.01  Devitalized tissue debrided: biofilm, fibrin and slough  Instrument(s) utilized: curette  Bleeding: small  Hemostasis obtained with: pressure  Procedural pain (0-10): insensate  Post-procedural pain: insensate   Response to treatment: procedure was tolerated well

## 2023-07-13 ENCOUNTER — HOME CARE VISIT (OUTPATIENT)
Dept: HOME HEALTH SERVICES | Facility: HOME HEALTHCARE | Age: 73
End: 2023-07-13
Payer: MEDICARE

## 2023-07-13 VITALS
RESPIRATION RATE: 18 BRPM | OXYGEN SATURATION: 97 % | TEMPERATURE: 97.2 F | HEART RATE: 70 BPM | SYSTOLIC BLOOD PRESSURE: 124 MMHG | DIASTOLIC BLOOD PRESSURE: 68 MMHG

## 2023-07-13 PROCEDURE — 10330064 CLEANSER, WND SEA-CLEANS 6OZ  COLPLT

## 2023-07-13 PROCEDURE — 10330064 DRESSING, OIL EMULSION 3"X3" STR (50/BX

## 2023-07-13 PROCEDURE — 10330064 PAD, ABD 5X9" STR LF (1/PK 20PK/BX) MGM1

## 2023-07-13 PROCEDURE — 10330064 SPONGE, GAUZE 8PLY N/S 4"X4" (200/PK 20P

## 2023-07-13 PROCEDURE — 10330064 BANDAGE, COMPRSN COFLEX TLC 2LYR XLNG (2

## 2023-07-13 PROCEDURE — 10330064 DRESSING, CALCIUM ALGINATE AG SHEET 4"X4

## 2023-07-13 PROCEDURE — 10330064 TAPE, ADHSV TRANSPORE WHT 2" (6RL/BX 10B

## 2023-07-13 PROCEDURE — G0299 HHS/HOSPICE OF RN EA 15 MIN: HCPCS

## 2023-07-14 ENCOUNTER — HOME CARE VISIT (OUTPATIENT)
Dept: HOME HEALTH SERVICES | Facility: HOME HEALTHCARE | Age: 73
End: 2023-07-14
Payer: MEDICARE

## 2023-07-14 ENCOUNTER — TELEPHONE (OUTPATIENT)
Dept: FAMILY MEDICINE CLINIC | Facility: CLINIC | Age: 73
End: 2023-07-14

## 2023-07-14 VITALS — SYSTOLIC BLOOD PRESSURE: 130 MMHG | DIASTOLIC BLOOD PRESSURE: 70 MMHG | OXYGEN SATURATION: 94 % | HEART RATE: 79 BPM

## 2023-07-14 PROCEDURE — G0151 HHCP-SERV OF PT,EA 15 MIN: HCPCS

## 2023-07-14 NOTE — CASE COMMUNICATION
Dr. [facility] did not receive order for hospital bed (with electric bed riser for up/down). Please submit necessary paperwork/script to them. If any problems, let me know. I will extend home PT for 1wk2 to address transfers on/off new hospital bed. Carol/Dr. Nalini Gomez- pt unable to go to [facility] at this time - BLE wounds continue to be open with receiving home nursing for wound care.      Thank you, Gab Herman PT

## 2023-07-14 NOTE — TELEPHONE ENCOUNTER
Phone call from Dayanara at [address] Dr. Nadia Staley was supposed to order a hospital bed for him at [facility] and there is no order for it. It is for the bed with electric remote for it to go up and down. Fax order to [facility]. Any questions or if you want her to put an order through [address] [place] [address] call her at [phone].

## 2023-07-15 ENCOUNTER — HOME CARE VISIT (OUTPATIENT)
Dept: HOME HEALTH SERVICES | Facility: HOME HEALTHCARE | Age: 73
End: 2023-07-15
Payer: MEDICARE

## 2023-07-15 VITALS
HEART RATE: 68 BPM | SYSTOLIC BLOOD PRESSURE: 102 MMHG | DIASTOLIC BLOOD PRESSURE: 60 MMHG | TEMPERATURE: 97.4 F | OXYGEN SATURATION: 98 % | RESPIRATION RATE: 20 BRPM

## 2023-07-15 PROCEDURE — G0299 HHS/HOSPICE OF RN EA 15 MIN: HCPCS

## 2023-07-17 ENCOUNTER — HOME CARE VISIT (OUTPATIENT)
Dept: HOME HEALTH SERVICES | Facility: HOME HEALTHCARE | Age: 73
End: 2023-07-17
Payer: MEDICARE

## 2023-07-17 VITALS
TEMPERATURE: 97.7 F | HEART RATE: 69 BPM | OXYGEN SATURATION: 96 % | DIASTOLIC BLOOD PRESSURE: 70 MMHG | SYSTOLIC BLOOD PRESSURE: 120 MMHG | RESPIRATION RATE: 18 BRPM

## 2023-07-17 PROCEDURE — G0299 HHS/HOSPICE OF RN EA 15 MIN: HCPCS

## 2023-07-19 ENCOUNTER — HOME CARE VISIT (OUTPATIENT)
Dept: HOME HEALTH SERVICES | Facility: HOME HEALTHCARE | Age: 73
End: 2023-07-19
Payer: MEDICARE

## 2023-07-19 VITALS
DIASTOLIC BLOOD PRESSURE: 64 MMHG | HEART RATE: 68 BPM | TEMPERATURE: 97.7 F | RESPIRATION RATE: 16 BRPM | SYSTOLIC BLOOD PRESSURE: 118 MMHG | OXYGEN SATURATION: 95 %

## 2023-07-19 PROCEDURE — G0299 HHS/HOSPICE OF RN EA 15 MIN: HCPCS

## 2023-07-20 ENCOUNTER — HOME CARE VISIT (OUTPATIENT)
Dept: HOME HEALTH SERVICES | Facility: HOME HEALTHCARE | Age: 73
End: 2023-07-20
Payer: MEDICARE

## 2023-07-20 PROCEDURE — G0151 HHCP-SERV OF PT,EA 15 MIN: HCPCS

## 2023-07-20 NOTE — CASE COMMUNICATION
I contacted [employer] [facility]- they did not receive order for fully electric hospital bed. I just completed [place]; please sign once get faxed to you.     Thank you, Arnie Miguel PT

## 2023-07-21 VITALS — DIASTOLIC BLOOD PRESSURE: 80 MMHG | SYSTOLIC BLOOD PRESSURE: 120 MMHG | OXYGEN SATURATION: 96 % | HEART RATE: 69 BPM

## 2023-07-22 ENCOUNTER — HOME CARE VISIT (OUTPATIENT)
Dept: HOME HEALTH SERVICES | Facility: HOME HEALTHCARE | Age: 73
End: 2023-07-22
Payer: MEDICARE

## 2023-07-22 VITALS
RESPIRATION RATE: 18 BRPM | TEMPERATURE: 98.6 F | DIASTOLIC BLOOD PRESSURE: 66 MMHG | HEART RATE: 70 BPM | OXYGEN SATURATION: 98 % | SYSTOLIC BLOOD PRESSURE: 110 MMHG

## 2023-07-22 PROCEDURE — G0300 HHS/HOSPICE OF LPN EA 15 MIN: HCPCS

## 2023-07-24 ENCOUNTER — HOME CARE VISIT (OUTPATIENT)
Dept: HOME HEALTH SERVICES | Facility: HOME HEALTHCARE | Age: 73
End: 2023-07-24
Payer: MEDICARE

## 2023-07-24 VITALS
SYSTOLIC BLOOD PRESSURE: 110 MMHG | OXYGEN SATURATION: 97 % | RESPIRATION RATE: 18 BRPM | TEMPERATURE: 97.9 F | HEART RATE: 68 BPM | DIASTOLIC BLOOD PRESSURE: 66 MMHG

## 2023-07-24 PROCEDURE — G0299 HHS/HOSPICE OF RN EA 15 MIN: HCPCS

## 2023-07-25 ENCOUNTER — HOME CARE VISIT (OUTPATIENT)
Dept: HOME HEALTH SERVICES | Facility: HOME HEALTHCARE | Age: 73
End: 2023-07-25
Payer: MEDICARE

## 2023-07-25 NOTE — Clinical Note
I called [facility] last wk, they had no order so not sure what is going on.     ----- Message -----  From: Roselia Chavez MD  Sent: 7/25/2023  12:13 PM EDT  To: Roddy Choudhary PT      Did not get order yet    I sent order several weeks ago and then faxed supporting evidence with face-to-face and rheum eval but without an answer? ??  ----- Message -----  From: Roddy Choudhary PT  Sent: 7/25/2023  11:57 AM EDT  To: Hermelinda House RN; Roselia Chavez MD; *    I filled out [place] for hospital bed for pt last wk ([place]). It is still pending MD signature. I selected fax to your office. Please sign as soon as possible to determine coverage/pt receiving it. Any questions/problems, contact me via in[place]chiet or [phone].     Thank you, Gab Herman PT

## 2023-07-25 NOTE — CASE COMMUNICATION
I filled out [place] for hospital bed for pt last wk ([facility]). It is still pending MD signature. I selected fax to your office. Please sign as soon as possible to determine coverage/pt receiving it. Any questions/problems, contact me via inbasket or [phone].     Thank you, Fabienne Chandra PT

## 2023-07-26 ENCOUNTER — HOME CARE VISIT (OUTPATIENT)
Dept: HOME HEALTH SERVICES | Facility: HOME HEALTHCARE | Age: 73
End: 2023-07-26
Payer: MEDICARE

## 2023-07-26 VITALS — OXYGEN SATURATION: 96 % | HEART RATE: 76 BPM

## 2023-07-26 PROCEDURE — 10330064 PAD, ABD 5X9" STR LF (1/PK 20PK/BX) MGM1

## 2023-07-26 PROCEDURE — 10330064 BANDAGE, COMPRSN COFLEX TLC 2LYR XLNG (2

## 2023-07-26 PROCEDURE — G0299 HHS/HOSPICE OF RN EA 15 MIN: HCPCS

## 2023-07-27 ENCOUNTER — HOME CARE VISIT (OUTPATIENT)
Dept: HOME HEALTH SERVICES | Facility: HOME HEALTHCARE | Age: 73
End: 2023-07-27
Payer: MEDICARE

## 2023-07-27 NOTE — CASE COMMUNICATION
change in visit orders as pt did not receive bed. No visits this wk (wk of 7/24) and will extend this visit until wk of 7/30/23 with plan for d/c home phys ther after perform bed transfers with new hospital bed. will send orders for MD signature.     Thank you, Viv Snell PT

## 2023-07-28 ENCOUNTER — HOME CARE VISIT (OUTPATIENT)
Dept: HOME HEALTH SERVICES | Facility: HOME HEALTHCARE | Age: 73
End: 2023-07-28
Payer: MEDICARE

## 2023-07-28 VITALS
TEMPERATURE: 97.9 F | HEART RATE: 56 BPM | RESPIRATION RATE: 18 BRPM | SYSTOLIC BLOOD PRESSURE: 116 MMHG | DIASTOLIC BLOOD PRESSURE: 64 MMHG | OXYGEN SATURATION: 95 %

## 2023-07-28 PROCEDURE — G0299 HHS/HOSPICE OF RN EA 15 MIN: HCPCS

## 2023-07-31 ENCOUNTER — HOME CARE VISIT (OUTPATIENT)
Dept: HOME HEALTH SERVICES | Facility: HOME HEALTHCARE | Age: 73
End: 2023-07-31
Payer: MEDICARE

## 2023-07-31 PROCEDURE — G0299 HHS/HOSPICE OF RN EA 15 MIN: HCPCS

## 2023-08-01 VITALS
RESPIRATION RATE: 18 BRPM | DIASTOLIC BLOOD PRESSURE: 66 MMHG | HEART RATE: 69 BPM | TEMPERATURE: 97.1 F | SYSTOLIC BLOOD PRESSURE: 110 MMHG | OXYGEN SATURATION: 96 %

## 2023-08-02 ENCOUNTER — HOME CARE VISIT (OUTPATIENT)
Dept: HOME HEALTH SERVICES | Facility: HOME HEALTHCARE | Age: 73
End: 2023-08-02
Payer: MEDICARE

## 2023-08-02 VITALS
HEART RATE: 64 BPM | DIASTOLIC BLOOD PRESSURE: 58 MMHG | RESPIRATION RATE: 18 BRPM | OXYGEN SATURATION: 97 % | SYSTOLIC BLOOD PRESSURE: 112 MMHG | TEMPERATURE: 97.5 F

## 2023-08-02 PROCEDURE — G0299 HHS/HOSPICE OF RN EA 15 MIN: HCPCS

## 2023-08-03 ENCOUNTER — HOME CARE VISIT (OUTPATIENT)
Dept: HOME HEALTH SERVICES | Facility: HOME HEALTHCARE | Age: 73
End: 2023-08-03
Payer: MEDICARE

## 2023-08-03 PROCEDURE — G0151 HHCP-SERV OF PT,EA 15 MIN: HCPCS

## 2023-08-04 ENCOUNTER — HOME CARE VISIT (OUTPATIENT)
Dept: HOME HEALTH SERVICES | Facility: HOME HEALTHCARE | Age: 73
End: 2023-08-04
Payer: MEDICARE

## 2023-08-04 VITALS — HEART RATE: 83 BPM | SYSTOLIC BLOOD PRESSURE: 120 MMHG | OXYGEN SATURATION: 97 % | DIASTOLIC BLOOD PRESSURE: 64 MMHG

## 2023-08-04 PROCEDURE — G0299 HHS/HOSPICE OF RN EA 15 MIN: HCPCS

## 2023-08-04 NOTE — CASE COMMUNICATION
d/c home PT on 8/3/23.      Once pt's LE's heal- then pt to go to [facility]    Thank you, Dayanara Sheffield PT

## 2023-08-08 ENCOUNTER — OFFICE VISIT (OUTPATIENT)
Dept: WOUND CARE | Facility: CLINIC | Age: 73
End: 2023-08-08
Payer: MEDICARE

## 2023-08-08 VITALS
SYSTOLIC BLOOD PRESSURE: 96 MMHG | OXYGEN SATURATION: 96 % | DIASTOLIC BLOOD PRESSURE: 58 MMHG | RESPIRATION RATE: 20 BRPM | TEMPERATURE: 96.9 F | HEART RATE: 72 BPM

## 2023-08-08 VITALS
RESPIRATION RATE: 20 BRPM | TEMPERATURE: 97.6 F | SYSTOLIC BLOOD PRESSURE: 123 MMHG | DIASTOLIC BLOOD PRESSURE: 66 MMHG | HEART RATE: 67 BPM

## 2023-08-08 DIAGNOSIS — Z87.2 HISTORY OF VASCULITIS OF SKIN: ICD-10-CM

## 2023-08-08 DIAGNOSIS — I87.2 VENOUS STASIS ULCER OF OTHER PART OF RIGHT LOWER LEG LIMITED TO BREAKDOWN OF SKIN WITHOUT VARICOSE VEINS (HCC): Primary | ICD-10-CM

## 2023-08-08 DIAGNOSIS — L97.811 VENOUS STASIS ULCER OF OTHER PART OF RIGHT LOWER LEG LIMITED TO BREAKDOWN OF SKIN WITHOUT VARICOSE VEINS (HCC): Primary | ICD-10-CM

## 2023-08-08 PROCEDURE — 99213 OFFICE O/P EST LOW 20 MIN: CPT | Performed by: STUDENT IN AN ORGANIZED HEALTH CARE EDUCATION/TRAINING PROGRAM

## 2023-08-08 RX ORDER — BETAMETHASONE DIPROPIONATE 0.5 MG/G
OINTMENT TOPICAL 2 TIMES DAILY
Qty: 45 G | Refills: 1 | Status: SHIPPED | OUTPATIENT
Start: 2023-08-08 | End: 2023-08-22

## 2023-08-08 NOTE — PROGRESS NOTES
Patient ID: Gabriela Britt is a 74 y.o. male Date of Birth 1950       Chief Complaint   Patient presents with   • Follow Up Wound Care Visit     Bilateral lower leg ulcer        Allergies:  Ciprofloxacin, Other, and Penicillins    Diagnosis:   Diagnosis ICD-10-CM Associated Orders   1. Venous stasis ulcer of other part of right lower leg limited to breakdown of skin without varicose veins (HCC)  I87.2 Wound cleansing and dressings    L97.811 VAS reflux lower limb venous duplex study with reflux assessment, complete bilateral      2. History of vasculitis of skin  Z87.2 betamethasone, augmented, (DIPROLENE) 0.05 % ointment           Assessment  & Plan:    • F/u ulcerations related to vasculitis versus venous insufficiency. LLE ulcerations are healed. One ulceration of RLE with serous drainage without surrounding erythema. There is persistent purpuric rash of bilateral lower extremities. o Adaptic and alginate to remaining open area.   o Spandagrip for compression. o Trial of strong topical steroids to improve vasculitis rash. Do not use for longer than 2 weeks. o Venous testing ordered. If significant reflux will refer to vascular surgery to reduce possibility of recurrent ulcerations.   o F/u in one week. Instructed to call if any questions or concerns arise in meantime. 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 Currently adaptic, maxsorb and Coflex lite are being utilized. Pt states he was discharged from home health because there was no longer anything open or draining on his legs. He recently saw rheumatology on 07/03/23 in regards to persistent generalized weakness and wounds of legs.  Given pt's previously elevated sedimentation rate a plethora of labs were ordered to assess for an underlying condition all of which came back within normal limits aside from +MARIO ALBERTO, CRP, and sed rate. Deemed inconclusive. Denies fevers, chills.                The following portions of the patient's history were reviewed and updated as appropriate:   Patient Active Problem List   Diagnosis   • Gout   • Gait abnormality   • Lumbar stenosis   • Polyneuropathy   • Lower extremity weakness   • Lumbosacral plexopathy   • Elevated troponin   • EDGAR (dyspnea on exertion)   • Rash due to vaculitis    • LYLY (acute kidney injury) ([address])   • Depression   • Multiple open wounds of lower leg   • Ambulatory dysfunction   • Leucocytosis   • Bloating     Past Medical History:   Diagnosis Date   • Depression    • Gout      Past Surgical History:   Procedure Laterality Date   • BACK SURGERY      X2 LUMBAR INCLUDING FUSION  ,  WITH OAA, [facility] DR. Gaby Hassan   • CATARACT EXTRACTION, BILATERAL     • KNEE ARTHROSCOPY Right     x2 , meniscus repair   • TONSILECTOMY AND ADNOIDECTOMY       Family History   Problem Relation Age of Onset   • Diabetes Father    • Diabetes Brother    • Diabetes Sister      Social History     Socioeconomic History   • Marital status: /Civil Union     Spouse name: Louise   • Number of children: 3   • Years of education: 15   • Highest education level: None   Occupational History   • None   Tobacco Use   • Smoking status: Former     Types: Cigarettes     Quit date:      Years since quittin.6   • Smokeless tobacco: Never   Vaping Use   • Vaping Use: Never used   Substance and Sexual Activity   • Alcohol use: Not Currently     Comment: occasional   • Drug use: Never   • Sexual activity: None   Other Topics Concern   • None   Social History Narrative   • None     Social Determinants of Health     Financial Resource Strain: Not on file   Food Insecurity: No Food Insecurity (10/4/2022)    Hunger Vital Sign    • Worried About Running Out of Food in the Last Year: Never true    • Ran Out of Food in the Last Year: Never true   Transportation Needs: No Transportation Needs (10/4/2022)    PRAPARE - Transportation    • Lack of Transportation (Medical): No    • Lack of Transportation (Non-Medical):  No   Physical Activity: Not on file   Stress: Not on file   Social Connections: Not on file   Intimate Partner Violence: Not on file   Housing Stability: Unknown (10/4/2022)    Housing Stability Vital Sign    • Unable to Pay for Housing in the Last Year: Not on file    • Number of Places Lived in the Last Year: 1    • Unstable Housing in the Last Year: No       Current Outpatient Medications:   •  betamethasone, augmented, (DIPROLENE) 0.05 % ointment, Apply topically 2 (two) times a day for 14 days To hyperpigmented areas of lower legs, Disp: 45 g, Rfl: 1  •  allopurinol (ZYLOPRIM) 100 mg tablet, Take 1 tablet (100 mg total) by mouth 2 (two) times a day, Disp: 180 tablet, Rfl: 1  •  cholecalciferol (VITAMIN D3) 1,000 units tablet, Take 2,000 Units by mouth daily, Disp: , Rfl:   •  collagenase (SANTYL) ointment, Apply topically daily Wound 1: 10cm x15cm Wound 2: 10cm x10cm (Patient not taking: Reported on 1/10/2023), Disp: 15 g, Rfl: 0  •  famotidine (PEPCID) 20 mg tablet, Take 1 tablet (20 mg total) by mouth daily Do not start before October 6, 2022., Disp: 30 tablet, Rfl: 0  •  FLUoxetine (PROzac) 20 mg capsule, Take 1 capsule (20 mg total) by mouth daily, Disp: 90 capsule, Rfl: 0  •  loratadine (CLARITIN) 10 mg tablet, Take 10 mg by mouth as needed for allergies, Disp: , Rfl:   •  Magnesium 100 MG CAPS, Take 1 capsule by mouth daily, Disp: , Rfl:   •  meloxicam (MOBIC) 15 mg tablet, Take 15 mg by mouth daily Patient not taking, Disp: , Rfl:   •  Misc Natural Products (OSTEO BI-FLEX/5-LOXIN ADVANCED PO), Take 1 tablet by mouth daily , Disp: , Rfl:   •  Multiple Vitamins-Minerals (CENTRUM SILVER 50+MEN PO), Take 1 tablet by mouth daily, Disp: , Rfl:   •  mupirocin (BACTROBAN) 2 % ointment, Apply topically 2 (two) times a day Apply topically twice a day or as needed after every dressing change on lower legs (Patient not taking: No sig reported), Disp: 30 g, Rfl: 11  •  simethicone (MYLICON) 80 mg chewable tablet, Chew 1 tablet (80 mg total) every 6 (six) hours as needed for flatulence (Patient not taking: Reported on 2023), Disp: 30 tablet, Rfl: 0  •  SUPER B COMPLEX/C PO, Take 1 tablet by mouth daily, Disp: , Rfl:   •  torsemide (DEMADEX) 10 mg tablet, Take 1 tablet (10 mg total) by mouth daily Do not start before 2022., Disp: 30 tablet, Rfl: 0  •  triamcinolone (KENALOG) 0.1 % ointment, Apply topically 2 (two) times a day Only apply to rash, do not apply to necrotic tissue (Patient not taking: Reported on 1/10/2023), Disp: 30 g, Rfl: 0    Review of Systems   Constitutional: Negative for chills and fever. Cardiovascular: Positive for leg swelling. Skin: Positive for rash (lower legs) and wound (RLE). Objective:  /66   Pulse 67   Temp 97.6 °F (36.4 °C)   Resp 20   Pain Score: 0-No pain     Physical Exam  Vitals reviewed. Constitutional:       Appearance: He is morbidly obese. Pulmonary:      Effort: Pulmonary effort is normal. No respiratory distress. Musculoskeletal:      Right lower le+ Edema present. Left lower le+ Edema present. Skin:     Findings: Rash and wound (RLE) present. Rash is purpuric. Comments: Small ulceration of RLE. Partial thickness and draining serous fluid. Neurological:      Mental Status: He is alert. Wound 23 Venous Ulcer Leg Anterior; Left (Active)   Wound Image    23 1315   Wound Description Pink;Epithelialization 23 1310   Antonette-wound Assessment Fragile; Purple;Dry 23 1310   Wound Length (cm) 0 cm 23 1310   Wound Width (cm) 0 cm 23 1310   Wound Depth (cm) 0 cm 23 1310   Wound Surface Area (cm^2) 0 cm^2 23 1310   Wound Volume (cm^3) 0 cm^3 23 1310   Calculated Wound Volume (cm^3) 0 cm^3 23 1310   Change in Wound Size % 100 08/08/23 1310   Drainage Amount None 08/08/23 1310   Drainage Description Serosanguineous 07/11/23 1543   Non-staged Wound Description Not applicable 22/22/40 4316   Treatments Cleansed 07/11/23 1543   Patient Tolerance Tolerated well 07/11/23 1543   Dressing Status Intact 08/08/23 1310       Wound 05/29/23 Venous Ulcer Leg Anterior;Right;Upper (Active)   Wound Image   08/08/23 1315   Wound Description Hypergranulation;Pink 08/08/23 1312   Antonette-wound Assessment Edema;Dry;Scaly; Purple;Fragile 08/08/23 1312   Wound Length (cm) 0.4 cm 08/08/23 1312   Wound Width (cm) 0.2 cm 08/08/23 1312   Wound Depth (cm) 0.1 cm 08/08/23 1312   Wound Surface Area (cm^2) 0.08 cm^2 08/08/23 1312   Wound Volume (cm^3) 0.008 cm^3 08/08/23 1312   Calculated Wound Volume (cm^3) 0.01 cm^3 08/08/23 1312   Change in Wound Size % 50 08/08/23 1312   Drainage Amount Moderate 08/08/23 1312   Drainage Description Serous; Yellow 08/08/23 1312   Non-staged Wound Description Full thickness 08/08/23 1312   Treatments Cleansed 08/08/23 1312   Patient Tolerance Tolerated well 08/08/23 1312   Dressing Status Intact 08/08/23 1312                        Wound Instructions:  Orders Placed This Encounter   Procedures   • Wound cleansing and dressings     B/L Leg wounds:   Wash your hands with soap and water.     Cleanse the wound with mild soap and water, dove, prior to applying a clean dressing. Shower no ---do not get the dressings wet       Apply Betamethasone to bilateral lower legs. Apply with gloved hands. Do not use for more than 2 weeks. Right lower leg  Apply Adaptic and then silver alginate to open area of right lower leg    Cover with gauze. Secure with jacob and tape. Change dressing Daily  and as needed for excessive draiange     Apply Spandagrip F to Bilateral lower legs  Elastic Tubular Stocking  Tubular elastic bandage: Apply from base of toes to behind the knee. Apply in AM, may remove for sleep.   Avoid prolonged standing in one place.  Elevate leg(s) above the level of the heart when sitting or as much as possible.       Treatments above were completed today      Obtain Vascular study as ordered      Prescription for topical Steroid from your pharmacy and use as directed       Follow up in  1  week    Please call the [id number] Monday through Friday between the hours of 8:00 AM and 4:30 PM at [phone] if you have any questions, if you experience any major changes in your wound(s) or for any signs or symptoms of infection such as fever; changes in the redness, swelling, drainage, or odor of your wound.  After hours, weekends or holidays please contact your primary care physician or go to the hospital emergency room.       Standing Status:   Future     Standing Expiration Date:   8/8/2024       Gila Caceres PA-C

## 2023-08-08 NOTE — PATIENT INSTRUCTIONS
Orders Placed This Encounter   Procedures    Wound cleansing and dressings     B/L Leg wounds:   Wash your hands with soap and water. Cleanse the wound with mild soap and water, dove, prior to applying a clean dressing. Shower no ---do not get the dressings wet       Apply Betamethasone to bilateral lower legs. Apply with gloved hands. Do not use for more than 2 weeks. Right lower leg  Apply Adaptic and then silver alginate to open area of right lower leg    Cover with gauze. Secure with jacob and tape. Change dressing Daily  and as needed for excessive draiange     Apply Spandagrip F to Bilateral lower legs  Elastic Tubular Stocking  Tubular elastic bandage: Apply from base of toes to behind the knee. Apply in AM, may remove for sleep. Avoid prolonged standing in one place. Elevate leg(s) above the level of the heart when sitting or as much as possible. Treatments above were completed today      Obtain Vascular study as ordered      Prescription for topical Steroid from your pharmacy and use as directed       Follow up in  1  week    Please call the [id number] Monday through Friday between the hours of 8:00 AM and 4:30 PM at [phone] if you have any questions, if you experience any major changes in your wound(s) or for any signs or symptoms of infection such as fever; changes in the redness, swelling, drainage, or odor of your wound. After hours, weekends or holidays please contact your primary care physician or go to the hospital emergency room.        Standing Status:   Future     Standing Expiration Date:   8/8/2024

## 2023-08-09 ENCOUNTER — HOME CARE VISIT (OUTPATIENT)
Dept: HOME HEALTH SERVICES | Facility: HOME HEALTHCARE | Age: 73
End: 2023-08-09
Payer: MEDICARE

## 2023-08-09 VITALS
OXYGEN SATURATION: 98 % | DIASTOLIC BLOOD PRESSURE: 58 MMHG | SYSTOLIC BLOOD PRESSURE: 122 MMHG | RESPIRATION RATE: 18 BRPM | TEMPERATURE: 98.2 F | HEART RATE: 78 BPM

## 2023-08-09 PROCEDURE — 400014 VN F/U

## 2023-08-09 PROCEDURE — G0299 HHS/HOSPICE OF RN EA 15 MIN: HCPCS

## 2023-08-09 NOTE — CASE COMMUNICATION
Home Health need continues for: wound care, assessment of skin integrity, edema. medication monitoring. ,  Primary diagnoses/co-morbidities/recent procedures in past 60 days that impact current episode:   Current level of functional ability: wheel chair bound. . Pt requires assistance with adls, medications . Pt dependent on wound care  Homebound status and living arrangements: It is a taxing effort for patient to leave the home due to w heelchair bound . Pt requires assist of 1 person to leave home safely. Pt leaves home infrequently  Goals accomplished and/or measurable progress toward unmet goals in past 60 days: Wounds improved/closed; Pt has remained without s.s of infection. Pt has remained out of the hospital.   Focus of care for next 60 days for each discipline ordered: wound care, assessment of s.s of infection, edema,  medications Pt unable to perform own wo undcare due to location. Wife has been fearful to perform woundcare due to complexity of wounds and fragile skin integrity. SN to work with wife and instruct on wound care and skin integrity maintenance. Skin integrity/wound status: No visible wounds today, however, scant amount of crusted serous drainage noted to the L leg. Dressing was stuck to leg and had to be dampened before removing. Surrounding skin remains very fragile. Mild  edema to the RLE. Code status: full  Most recent fall risk:  5  Plan of Care confirmed with Dr Lawyer Weeks on 8/4/23.

## 2023-08-09 NOTE — CASE COMMUNICATION
Home Health need continues for: wound care, assessment of skin integrity, edema. medication monitoring. ,   Primary diagnoses/co-morbidities/recent procedures in past 60 days that impact current episode:   Current level of functional ability: wheel chair bound. . Pt requires assistance with adls, medications . Pt dependent on wound care   Homebound status and living arrangements: It is a taxing effort for patient to leave the home due to  wheelchair bound . Pt requires assist of 1 person to leave home safely. Pt leaves home infrequently   Goals accomplished and/or measurable progress toward unmet goals in past 60 days: Wounds improved and dry; Pt has remained without s.s of infection. Pt has remained out of the hospital.   Focus of care for next 60 days for each discipline ordered: wound care, assessment of s.s of infection, edema, medications Pt unable to perform own w oundcare due to location. Wife has been fearful to perform woundcare due to complexity of wounds and fragile skin integrity. SN to work with wife and instruct on wound care and skin integrity maintenance. Skin integrity/wound status: Wounds appear dry, however, scant amount of crusted serous drainage noted to the L leg. Dressing was stuck to leg and had to be dampened before removing. Surrounding skin remains very fragile. Mild edema  to the RLE.    Code status: full   Most recent fall risk: 5   Plan of Care confirmed with Dr Bhavin Hanks on 8/4/23

## 2023-08-14 ENCOUNTER — HOME CARE VISIT (OUTPATIENT)
Dept: HOME HEALTH SERVICES | Facility: HOME HEALTHCARE | Age: 73
End: 2023-08-14
Payer: MEDICARE

## 2023-08-14 VITALS
OXYGEN SATURATION: 96 % | TEMPERATURE: 97.8 F | HEART RATE: 78 BPM | RESPIRATION RATE: 16 BRPM | DIASTOLIC BLOOD PRESSURE: 58 MMHG | SYSTOLIC BLOOD PRESSURE: 106 MMHG

## 2023-08-14 PROCEDURE — G0179 MD RECERTIFICATION HHA PT: HCPCS | Performed by: FAMILY MEDICINE

## 2023-08-14 PROCEDURE — G0299 HHS/HOSPICE OF RN EA 15 MIN: HCPCS

## 2023-08-16 ENCOUNTER — OFFICE VISIT (OUTPATIENT)
Dept: WOUND CARE | Facility: CLINIC | Age: 73
End: 2023-08-16
Payer: MEDICARE

## 2023-08-16 ENCOUNTER — HOME CARE VISIT (OUTPATIENT)
Dept: HOME HEALTH SERVICES | Facility: HOME HEALTHCARE | Age: 73
End: 2023-08-16
Payer: MEDICARE

## 2023-08-16 VITALS
DIASTOLIC BLOOD PRESSURE: 66 MMHG | RESPIRATION RATE: 20 BRPM | HEART RATE: 83 BPM | SYSTOLIC BLOOD PRESSURE: 114 MMHG | TEMPERATURE: 98.5 F

## 2023-08-16 DIAGNOSIS — I87.2 VENOUS STASIS ULCER OF OTHER PART OF LEFT LOWER LEG LIMITED TO BREAKDOWN OF SKIN WITHOUT VARICOSE VEINS (HCC): ICD-10-CM

## 2023-08-16 DIAGNOSIS — I87.2 VENOUS STASIS ULCER OF OTHER PART OF RIGHT LOWER LEG LIMITED TO BREAKDOWN OF SKIN WITHOUT VARICOSE VEINS (HCC): Primary | ICD-10-CM

## 2023-08-16 DIAGNOSIS — L97.821 VENOUS STASIS ULCER OF OTHER PART OF LEFT LOWER LEG LIMITED TO BREAKDOWN OF SKIN WITHOUT VARICOSE VEINS (HCC): ICD-10-CM

## 2023-08-16 DIAGNOSIS — R60.9 PERIPHERAL EDEMA: ICD-10-CM

## 2023-08-16 DIAGNOSIS — L97.811 VENOUS STASIS ULCER OF OTHER PART OF RIGHT LOWER LEG LIMITED TO BREAKDOWN OF SKIN WITHOUT VARICOSE VEINS (HCC): Primary | ICD-10-CM

## 2023-08-16 DIAGNOSIS — Z87.2 HISTORY OF VASCULITIS OF SKIN: ICD-10-CM

## 2023-08-16 DIAGNOSIS — S80.211A ABRASION, RIGHT KNEE, INITIAL ENCOUNTER: ICD-10-CM

## 2023-08-16 PROCEDURE — 99214 OFFICE O/P EST MOD 30 MIN: CPT | Performed by: STUDENT IN AN ORGANIZED HEALTH CARE EDUCATION/TRAINING PROGRAM

## 2023-08-16 PROCEDURE — 99213 OFFICE O/P EST LOW 20 MIN: CPT | Performed by: STUDENT IN AN ORGANIZED HEALTH CARE EDUCATION/TRAINING PROGRAM

## 2023-08-16 RX ORDER — LIDOCAINE 40 MG/G
CREAM TOPICAL ONCE
Status: COMPLETED | OUTPATIENT
Start: 2023-08-16 | End: 2023-08-16

## 2023-08-16 RX ADMIN — LIDOCAINE: 40 CREAM TOPICAL at 13:55
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
## 2023-08-16 NOTE — PATIENT INSTRUCTIONS
Orders Placed This Encounter   Procedures    Wound cleansing and dressings     Wash your hands with soap and water. Cleanse the right and left lower leg and left leg ulcer with mild soap and water, dove, prior to applying a clean dressing. Shower no ---do not get the dressings wet       Apply Betamethasone to right and left lower leg twice a day or as needed. Apply with gloved hands. Do not use for more than 2 weeks. B/L Leg wounds:     Left lower leg ulcer    Apply Adaptic and then silver alginate to open area of right lower leg     Cover with gauze. Secure with jacob and tape. Change dressing Daily and as needed for excessive drainage. Clean right knee with soap and water then apply antibiotic ointment followed by dry dressing. Change dressing daily and as needed for excessive drainage or leakage. This was done today. Elastic Tubular Stocking-F     Tubular elastic bandage: Apply from base of toes to behind the knee on both lower legs. Apply in AM, may remove for sleep. Avoid prolonged standing in one place. Elevate leg(s) above the level of the heart when sitting or as much as possible. Treatments above were completed today       Obtain Vascular study as ordered     Follow up in  2  weeks     Please call the [id number] Monday through Friday between the hours of 8:00 AM and 4:30 PM at [phone] if you have any questions, if you experience any major changes in your wound(s) or for any signs or symptoms of infection such as fever; changes in the redness, swelling, drainage, or odor of your wound. After hours, weekends or holidays please contact your primary care physician or go to the hospital emergency room.      Standing Status:   Future     Standing Expiration Date:   8/16/2024

## 2023-08-17 PROCEDURE — 10330064 PAD, ABD 5X9" STR LF (1/PK 20PK/BX) MGM1

## 2023-08-17 PROCEDURE — 10330064 SPONGE, GAUZE 8PLY N/S 4"X4" (200/PK 20P

## 2023-08-17 PROCEDURE — 10330064 BANDAGE, CNFRM 4"X4.1YDS N/S LF (12RL/BG

## 2023-08-18 ENCOUNTER — OFFICE VISIT (OUTPATIENT)
Dept: FAMILY MEDICINE CLINIC | Facility: CLINIC | Age: 73
End: 2023-08-18
Payer: MEDICARE

## 2023-08-18 VITALS — HEART RATE: 72 BPM | DIASTOLIC BLOOD PRESSURE: 66 MMHG | SYSTOLIC BLOOD PRESSURE: 116 MMHG

## 2023-08-18 DIAGNOSIS — S81.809D MULTIPLE OPEN WOUNDS OF LOWER LEG, UNSPECIFIED LATERALITY, SUBSEQUENT ENCOUNTER: ICD-10-CM

## 2023-08-18 DIAGNOSIS — R60.0 LOCALIZED EDEMA: Primary | ICD-10-CM

## 2023-08-18 DIAGNOSIS — E78.2 MIXED HYPERLIPIDEMIA: ICD-10-CM

## 2023-08-18 DIAGNOSIS — G62.9 POLYNEUROPATHY: ICD-10-CM

## 2023-08-18 DIAGNOSIS — R26.9 GAIT ABNORMALITY: ICD-10-CM

## 2023-08-18 DIAGNOSIS — F32.A DEPRESSION, UNSPECIFIED DEPRESSION TYPE: ICD-10-CM

## 2023-08-18 DIAGNOSIS — R21 RASH: ICD-10-CM

## 2023-08-18 DIAGNOSIS — R73.03 PREDIABETES: ICD-10-CM

## 2023-08-18 PROBLEM — R29.818 NEUROGENIC CLAUDICATION: Status: ACTIVE | Noted: 2023-08-18

## 2023-08-18 PROCEDURE — 99214 OFFICE O/P EST MOD 30 MIN: CPT | Performed by: FAMILY MEDICINE

## 2023-08-18 NOTE — PROGRESS NOTES
Name: Tg Morrow      : 1950      MRN: [id number]  Encounter Provider: Raisa López MD  Encounter Date: 2023   Encounter department: [address] PRIMARY CARE    Assessment & Plan     1. Localized edema  Comments:  Due to prior back problem with venous insiff   tight wraps x 1 hour daily  Will notify wound care   no need for diuretic    2. Multiple open wounds of lower leg, unspecified laterality, subsequent encounter  Comments:  almost completely resolved    3. Rash due to vaculitis   Comments:  markedly improved    4. Polyneuropathy    5. Gait abnormality  Comments:  encouraged cont therapy and exercises    6. Depression, unspecified depression type  Comments:  fe ? what prozac was and if needed   after discussion pt and wife will cont though advised not to stop abruptly    7. Prediabetes  Comments:  ontrolled per  ;labs  encouraged cont diet    8.  Mixed hyperlipidemia  Comments:  without recent labs           Subjective      Home visit due to wound care Doc concern re: swollen legs hindering wound healing   Pt states right leg and foot have been chtronically swollen since his back problem yeats ago   States swelling was very improved when using tight wraps due to wounds but now just with stocking ovr leg and foot and has noticed swelling lewis over upper aspect of foot without pain   Denies any swelling of left leg or foot   Does try to keep legs elevated   Has been off demadex for quite some time - not sure why he was on it and why stopped   He denies SOB or Chest pain   Feels good and leg wounds markedly improved   Wife nowcaring for legs - still small open area on left leg she keepsbandaged  Other wise steroid cream to legs   Open areas over Rt leg completely closed   He sees wound care again in 1 week   Also has art dopplers scheduled for Sept    Doing great and getting around now that he has stairglide; sarah; electric bed; and van - wheelchair compatible- for transportation Also doing his daily exercises and able to ride stationary bike    Review of Systems   Constitutional: Negative for fever. Respiratory: Negative for cough, chest tightness, shortness of breath and wheezing. Cardiovascular: Positive for leg swelling. Negative for chest pain and palpitations. Gastrointestinal: Negative for abdominal pain. Musculoskeletal: Positive for back pain and gait problem. Skin: Positive for wound. Neurological: Positive for weakness and numbness. Current Outpatient Medications on File Prior to Visit   Medication Sig   • allopurinol (ZYLOPRIM) 100 mg tablet Take 1 tablet (100 mg total) by mouth 2 (two) times a day   • betamethasone, augmented, (DIPROLENE) 0.05 % ointment Apply topically 2 (two) times a day for 14 days To hyperpigmented areas of lower legs   • cholecalciferol (VITAMIN D3) 1,000 units tablet Take 2,000 Units by mouth daily   • famotidine (PEPCID) 20 mg tablet Take 1 tablet (20 mg total) by mouth daily Do not start before October 6, 2022.    • FLUoxetine (PROzac) 20 mg capsule Take 1 capsule (20 mg total) by mouth daily   • loratadine (CLARITIN) 10 mg tablet Take 10 mg by mouth as needed for allergies   • Magnesium 100 MG CAPS Take 1 capsule by mouth daily   • meloxicam (MOBIC) 15 mg tablet Take 15 mg by mouth daily Patient not taking   • Misc Natural Products (OSTEO BI-FLEX/5-LOXIN ADVANCED PO) Take 1 tablet by mouth daily    • Multiple Vitamins-Minerals (CENTRUM SILVER 50+MEN PO) Take 1 tablet by mouth daily   • SUPER B COMPLEX/C PO Take 1 tablet by mouth daily   • [DISCONTINUED] collagenase (SANTYL) ointment Apply topically daily Wound 1: 10cm x15cm Wound 2: 10cm x10cm (Patient not taking: Reported on 1/10/2023)   • [DISCONTINUED] mupirocin (BACTROBAN) 2 % ointment Apply topically 2 (two) times a day Apply topically twice a day or as needed after every dressing change on lower legs (Patient not taking: No sig reported)   • [DISCONTINUED] simethicone (MYLICON) 80 mg chewable tablet Chew 1 tablet (80 mg total) every 6 (six) hours as needed for flatulence (Patient not taking: Reported on 6/5/2023)   • [DISCONTINUED] triamcinolone (KENALOG) 0.1 % ointment Apply topically 2 (two) times a day Only apply to rash, do not apply to necrotic tissue (Patient not taking: Reported on 1/10/2023)       Objective     /66   Pulse 72     Physical Exam  Constitutional:       Appearance: Normal appearance. He is obese. Neck:      Vascular: No carotid bruit. Cardiovascular:      Rate and Rhythm: Normal rate and regular rhythm. Pulses:           Popliteal pulses are 1+ on the right side and 1+ on the left side. Dorsalis pedis pulses are 1+ on the left side. Posterior tibial pulses are 2+ on the right side and 2+ on the left side. Heart sounds: Normal heart sounds. Comments: Rt LE with +3 pitting edema over lower leg and dorsal foot without TTP,warmth, erhtyema  Pulmonary:      Comments: BS's decreased diffusely  Musculoskeletal:      Right lower leg: 3+ Pitting Edema present. Left lower leg: No edema. Lymphadenopathy:      Cervical: No cervical adenopathy. Skin:     Comments: Rt LE with several discrete areas of erythematous blotchy skin without ulceration  Left LE with small 1-2 cm area of superficial ulceration without drainage   Other areas with "patches" similar to Rt LE   Neurological:      Mental Status: He is oriented to person, place, and time. Mental status is at baseline. Motor: Weakness present.       Gait: Gait abnormal.   Psychiatric:         Mood and Affect: Mood normal.         Behavior: Behavior normal.       Delta Anderson MD

## 2023-08-22 ENCOUNTER — HOME CARE VISIT (OUTPATIENT)
Dept: HOME HEALTH SERVICES | Facility: HOME HEALTHCARE | Age: 73
End: 2023-08-22
Payer: MEDICARE

## 2023-08-22 VITALS
HEART RATE: 72 BPM | TEMPERATURE: 96.9 F | DIASTOLIC BLOOD PRESSURE: 60 MMHG | RESPIRATION RATE: 18 BRPM | SYSTOLIC BLOOD PRESSURE: 118 MMHG | OXYGEN SATURATION: 96 %

## 2023-08-22 PROCEDURE — G0299 HHS/HOSPICE OF RN EA 15 MIN: HCPCS

## 2023-08-28 ENCOUNTER — OFFICE VISIT (OUTPATIENT)
Dept: WOUND CARE | Facility: CLINIC | Age: 73
End: 2023-08-28
Payer: MEDICARE

## 2023-08-28 ENCOUNTER — HOME CARE VISIT (OUTPATIENT)
Dept: HOME HEALTH SERVICES | Facility: HOME HEALTHCARE | Age: 73
End: 2023-08-28
Payer: MEDICARE

## 2023-08-28 VITALS
TEMPERATURE: 98 F | RESPIRATION RATE: 18 BRPM | SYSTOLIC BLOOD PRESSURE: 109 MMHG | HEART RATE: 82 BPM | DIASTOLIC BLOOD PRESSURE: 69 MMHG

## 2023-08-28 DIAGNOSIS — L97.811 VENOUS STASIS ULCER OF OTHER PART OF RIGHT LOWER LEG LIMITED TO BREAKDOWN OF SKIN WITHOUT VARICOSE VEINS (HCC): Primary | ICD-10-CM

## 2023-08-28 DIAGNOSIS — R60.9 PERIPHERAL EDEMA: ICD-10-CM

## 2023-08-28 DIAGNOSIS — I87.2 VENOUS STASIS ULCER OF OTHER PART OF RIGHT LOWER LEG LIMITED TO BREAKDOWN OF SKIN WITHOUT VARICOSE VEINS (HCC): Primary | ICD-10-CM

## 2023-08-28 DIAGNOSIS — S80.211A ABRASION, RIGHT KNEE, INITIAL ENCOUNTER: ICD-10-CM

## 2023-08-28 DIAGNOSIS — L97.821 VENOUS STASIS ULCER OF OTHER PART OF LEFT LOWER LEG LIMITED TO BREAKDOWN OF SKIN WITHOUT VARICOSE VEINS (HCC): ICD-10-CM

## 2023-08-28 DIAGNOSIS — Z87.2 HISTORY OF VASCULITIS OF SKIN: ICD-10-CM

## 2023-08-28 DIAGNOSIS — I87.2 VENOUS STASIS ULCER OF OTHER PART OF LEFT LOWER LEG LIMITED TO BREAKDOWN OF SKIN WITHOUT VARICOSE VEINS (HCC): ICD-10-CM

## 2023-08-28 PROCEDURE — 99212 OFFICE O/P EST SF 10 MIN: CPT | Performed by: STUDENT IN AN ORGANIZED HEALTH CARE EDUCATION/TRAINING PROGRAM
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
## 2023-08-28 NOTE — PATIENT INSTRUCTIONS
Orders Placed This Encounter   Procedures    Wound cleansing and dressings     Wounds are healed today to the Right and Left Legs and the Right Knee    DC today    Continue to wear Compression to Both legs each day to assist with decreasing the swelling    Spandagrip Size F applied to Both legs today    Apply in the am and remove at bedtime    Elevate the legs often throughout the day    Thank you for choosing [facility]     Standing Status:   Future     Standing Expiration Date:   8/28/2024

## 2023-09-07 ENCOUNTER — HOSPITAL ENCOUNTER (OUTPATIENT)
Dept: NON INVASIVE DIAGNOSTICS | Facility: HOSPITAL | Age: 73
Discharge: HOME | End: 2023-09-07
Payer: MEDICARE

## 2023-09-07 DIAGNOSIS — I87.2 VENOUS STASIS ULCER OF OTHER PART OF RIGHT LOWER LEG LIMITED TO BREAKDOWN OF SKIN WITHOUT VARICOSE VEINS (HCC): ICD-10-CM

## 2023-09-07 DIAGNOSIS — L97.811 VENOUS STASIS ULCER OF OTHER PART OF RIGHT LOWER LEG LIMITED TO BREAKDOWN OF SKIN WITHOUT VARICOSE VEINS (HCC): ICD-10-CM

## 2023-09-07 PROCEDURE — 93970 EXTREMITY STUDY: CPT | Performed by: SURGERY

## 2023-09-07 PROCEDURE — 93970 EXTREMITY STUDY: CPT

## 2023-09-11 ENCOUNTER — TELEPHONE (OUTPATIENT)
Dept: WOUND CARE | Facility: CLINIC | Age: 73
End: 2023-09-11

## 2023-09-11 PROCEDURE — NC001 PR NO CHARGE: Performed by: STUDENT IN AN ORGANIZED HEALTH CARE EDUCATION/TRAINING PROGRAM

## 2023-09-11 NOTE — TELEPHONE ENCOUNTER
Called patient and spoke to wife to relay results of venous reflux studies which did demonstrate multiple incompetent veins. Patient's wife states that he is not interested in pursuing an appointment with vascular surgery at this time. She states there are no further areas on his legs that have been open or draining.

## 2023-11-18 DIAGNOSIS — F34.1 PERSISTENT DEPRESSIVE DISORDER: ICD-10-CM

## 2023-11-20 RX ORDER — FLUOXETINE HYDROCHLORIDE 20 MG/1
20 CAPSULE ORAL DAILY
Qty: 90 CAPSULE | Refills: 3 | Status: SHIPPED | OUTPATIENT
Start: 2023-11-20

## 2023-12-01 ENCOUNTER — TELEPHONE (OUTPATIENT)
Dept: FAMILY MEDICINE CLINIC | Facility: CLINIC | Age: 73
End: 2023-12-01

## 2023-12-01 NOTE — TELEPHONE ENCOUNTER
Phone call from Natalie Yeh (wife). Ryan got a letter for jury duty for [employer]. He can't go because he is disabled. Can he get a jury duty excuse?

## 2023-12-15 ENCOUNTER — DOCUMENTATION (OUTPATIENT)
Dept: FAMILY MEDICINE CLINIC | Facility: CLINIC | Age: 73
End: 2023-12-15

## 2023-12-15 ENCOUNTER — TELEPHONE (OUTPATIENT)
Dept: FAMILY MEDICINE CLINIC | Facility: CLINIC | Age: 73
End: 2023-12-15

## 2023-12-27 ENCOUNTER — OFFICE VISIT (OUTPATIENT)
Dept: FAMILY MEDICINE CLINIC | Facility: CLINIC | Age: 73
End: 2023-12-27
Payer: MEDICARE

## 2023-12-27 VITALS
DIASTOLIC BLOOD PRESSURE: 80 MMHG | HEART RATE: 79 BPM | TEMPERATURE: 98.6 F | SYSTOLIC BLOOD PRESSURE: 128 MMHG | OXYGEN SATURATION: 98 %

## 2023-12-27 DIAGNOSIS — Z00.00 MEDICARE ANNUAL WELLNESS VISIT, SUBSEQUENT: Primary | ICD-10-CM

## 2023-12-27 DIAGNOSIS — Z23 ENCOUNTER FOR IMMUNIZATION: ICD-10-CM

## 2023-12-27 PROCEDURE — G0439 PPPS, SUBSEQ VISIT: HCPCS | Performed by: NURSE PRACTITIONER

## 2023-12-27 NOTE — PATIENT INSTRUCTIONS
Medicare Preventive Visit Patient Instructions  Thank you for completing your Welcome to Medicare Visit or Medicare Annual Wellness Visit today. Your next wellness visit will be due in one year (12/27/2024).  The screening/preventive services that you may require over the next 5-10 years are detailed below. Some tests may not apply to you based off risk factors and/or age. Screening tests ordered at today's visit but not completed yet may show as past due. Also, please note that scanned in results may not display below.  Preventive Screenings:  Service Recommendations Previous Testing/Comments   Colorectal Cancer Screening  Colonoscopy    Fecal Occult Blood Test (FOBT)/Fecal Immunochemical Test (FIT)  Fecal DNA/Cologuard Test  Flexible Sigmoidoscopy Age: 45-75 years old   Colonoscopy: every 10 years (May be performed more frequently if at higher risk)  OR  FOBT/FIT: every 1 year  OR  Cologuard: every 3 years  OR  Sigmoidoscopy: every 5 years  Screening may be recommended earlier than age 45 if at higher risk for colorectal cancer. Also, an individualized decision between you and your healthcare provider will decide whether screening between the ages of 76-85 would be appropriate. Colonoscopy: Not on file  FOBT/FIT: Not on file  Cologuard: Not on file  Sigmoidoscopy: Not on file          Prostate Cancer Screening Individualized decision between patient and health care provider in men between ages of 55-69   Medicare will cover every 12 months beginning on the day after your 50th birthday PSA: 1.1 ng/mL           Hepatitis C Screening Once for adults born between 1945 and 1965  More frequently in patients at high risk for Hepatitis C Hep C Antibody: 09/24/2022        Diabetes Screening 1-2 times per year if you're at risk for diabetes or have pre-diabetes Fasting glucose: 97 mg/dL (9/13/2022)  A1C: 5.8 % (1/13/2023)      Cholesterol Screening Once every 5 years if you don't have a lipid disorder. May order more often  based on risk factors. Lipid panel: 09/13/2022         Other Preventive Screenings Covered by Medicare:  Abdominal Aortic Aneurysm (AAA) Screening: covered once if your at risk. You're considered to be at risk if you have a family history of AAA or a male between the age of 65-75 who smoking at least 100 cigarettes in your lifetime.  Lung Cancer Screening: covers low dose CT scan once per year if you meet all of the following conditions: (1) Age 55-77; (2) No signs or symptoms of lung cancer; (3) Current smoker or have quit smoking within the last 15 years; (4) You have a tobacco smoking history of at least 20 pack years (packs per day x number of years you smoked); (5) You get a written order from a healthcare provider.  Glaucoma Screening: covered annually if you're considered high risk: (1) You have diabetes OR (2) Family history of glaucoma OR (3)  aged 50 and older OR (4)  American aged 65 and older  Osteoporosis Screening: covered every 2 years if you meet one of the following conditions: (1) Have a vertebral abnormality; (2) On glucocorticoid therapy for more than 3 months; (3) Have primary hyperparathyroidism; (4) On osteoporosis medications and need to assess response to drug therapy.  HIV Screening: covered annually if you're between the age of 15-65. Also covered annually if you are younger than 15 and older than 65 with risk factors for HIV infection. For pregnant patients, it is covered up to 3 times per pregnancy.    Immunizations:  Immunization Recommendations   Influenza Vaccine Annual influenza vaccination during flu season is recommended for all persons aged >= 6 months who do not have contraindications   Pneumococcal Vaccine   * Pneumococcal conjugate vaccine = PCV13 (Prevnar 13), PCV15 (Vaxneuvance), PCV20 (Prevnar 20)  * Pneumococcal polysaccharide vaccine = PPSV23 (Pneumovax) Adults 19-65 yo with certain risk factors or if 65+ yo  If never received any pneumonia vaccine:  recommend Prevnar 20 (PCV20)  Give PCV20 if previously received 1 dose of PCV13 or PPSV23   Hepatitis B Vaccine 3 dose series if at intermediate or high risk (ex: diabetes, end stage renal disease, liver disease)   Respiratory syncytial virus (RSV) Vaccine - COVERED BY MEDICARE PART D  * RSVPreF3 (Arexvy) CDC recommends that adults 60 years of age and older may receive a single dose of RSV vaccine using shared clinical decision-making (SCDM)   Tetanus (Td) Vaccine - COST NOT COVERED BY MEDICARE PART B Following completion of primary series, a booster dose should be given every 10 years to maintain immunity against tetanus. Td may also be given as tetanus wound prophylaxis.   Tdap Vaccine - COST NOT COVERED BY MEDICARE PART B Recommended at least once for all adults. For pregnant patients, recommended with each pregnancy.   Shingles Vaccine (Shingrix) - COST NOT COVERED BY MEDICARE PART B  2 shot series recommended in those 19 years and older who have or will have weakened immune systems or those 50 years and older     Health Maintenance Due:      Topic Date Due   • Colorectal Cancer Screening  Never done   • Hepatitis C Screening  Completed     Immunizations Due:      Topic Date Due   • Pneumococcal Vaccine: 65+ Years (2 - PCV) 07/31/2020   • Influenza Vaccine (1) Never done   • COVID-19 Vaccine (4 - 2023-24 season) 09/01/2023     Advance Directives   What are advance directives?  Advance directives are legal documents that state your wishes and plans for medical care. These plans are made ahead of time in case you lose your ability to make decisions for yourself. Advance directives can apply to any medical decision, such as the treatments you want, and if you want to donate organs.   What are the types of advance directives?  There are many types of advance directives, and each state has rules about how to use them. You may choose a combination of any of the following:  Living will:  This is a written record of the  treatment you want. You can also choose which treatments you do not want, which to limit, and which to stop at a certain time. This includes surgery, medicine, IV fluid, and tube feedings.   Durable power of  for healthcare (DPAHC):  This is a written record that states who you want to make healthcare choices for you when you are unable to make them for yourself. This person, called a proxy, is usually a family member or a friend. You may choose more than 1 proxy.  Do not resuscitate (DNR) order:  A DNR order is used in case your heart stops beating or you stop breathing. It is a request not to have certain forms of treatment, such as CPR. A DNR order may be included in other types of advance directives.  Medical directive:  This covers the care that you want if you are in a coma, near death, or unable to make decisions for yourself. You can list the treatments you want for each condition. Treatment may include pain medicine, surgery, blood transfusions, dialysis, IV or tube feedings, and a ventilator (breathing machine).  Values history:  This document has questions about your views, beliefs, and how you feel and think about life. This information can help others choose the care that you would choose.  Why are advance directives important?  An advance directive helps you control your care. Although spoken wishes may be used, it is better to have your wishes written down. Spoken wishes can be misunderstood, or not followed. Treatments may be given even if you do not want them. An advance directive may make it easier for your family to make difficult choices about your care.       © Copyright [employer] 2018 Information is for End User's use only and may not be sold, redistributed or otherwise used for commercial purposes. All illustrations and images included in CareNotes® are the copyrighted property of [email or web address] or [employer]

## 2023-12-27 NOTE — PROGRESS NOTES
Assessment and Plan:     Problem List Items Addressed This Visit    None  Visit Diagnoses     Medicare annual wellness visit, subsequent    -  Primary    Encounter for immunization            BMI Counseling: BMI was not able to be calculated due to patient refusing height and/or weight. There is no height or weight on file to calculate BMI. Follow-up plan was not completed due to elderly patient (65+ years old) where weight reduction/weight gain would complicate underlying health condition such as: illness or physical disability.       Preventive health issues were discussed with patient, and age appropriate screening tests were ordered as noted in patient's After Visit Summary.  Personalized health advice and appropriate referrals for health education or preventive services given if needed, as noted in patient's After Visit Summary.     History of Present Illness:     Patient presents for a Medicare Wellness Visit    Patient is here with wife for his medical well.  No current complaints.  Has not had routine labs since last April.  He did have some labs to workup for possible Rheum/Vasculitis issue.  States he did see a specialist but I cannot find the note from them.  He has not been having any kind of new issue with joint pain or rash.  Eats vegetables some days not much of a fruit eater.  Wheelchair-bound although he tries to do some exercises at home.  No problem with bowels or bladder elimination states mood is good.  Denies cognitive issue.       Patient Care Team:  Jessika Carrillo MD as PCP - General (Family Medicine)  Lisa Moore DO (Nephrology)     Review of Systems:     Review of Systems     Problem List:     Patient Active Problem List   Diagnosis   • Gout   • Gait abnormality   • Lumbar stenosis   • Polyneuropathy   • Lower extremity weakness   • Lumbosacral plexopathy   • Elevated troponin   • EDGAR (dyspnea on exertion)   • Rash due to vaculitis    • LYLY (acute kidney injury) ([facility])   •  Depression   • Multiple open wounds of lower leg   • Ambulatory dysfunction   • Leucocytosis   • Bloating   • Hyperlipidemia   • Neurogenic claudication   • Prediabetes   • Vitamin D deficiency      Past Medical and Surgical History:     Past Medical History:   Diagnosis Date   • Depression    • Gout      Past Surgical History:   Procedure Laterality Date   • BACK SURGERY      X2 LUMBAR INCLUDING FUSION  ,  WITH OAA, [facility] DR. HERZOG   • CATARACT EXTRACTION, BILATERAL     • KNEE ARTHROSCOPY Right     x2 , meniscus repair   • TONSILECTOMY AND ADNOIDECTOMY        Family History:     Family History   Problem Relation Age of Onset   • Diabetes Father    • Diabetes Brother    • Diabetes Sister       Social History:     Social History     Socioeconomic History   • Marital status: /Civil Union     Spouse name: Louise   • Number of children: 3   • Years of education: 14   • Highest education level: None   Occupational History   • None   Tobacco Use   • Smoking status: Former     Current packs/day: 0.00     Types: Cigarettes     Quit date:      Years since quittin.0   • Smokeless tobacco: Never   Vaping Use   • Vaping status: Never Used   Substance and Sexual Activity   • Alcohol use: Not Currently     Comment: occasional   • Drug use: Never   • Sexual activity: None   Other Topics Concern   • None   Social History Narrative   • None     Social Determinants of Health     Financial Resource Strain: Low Risk  (2023)    Overall Financial Resource Strain (CARDIA)    • Difficulty of Paying Living Expenses: Not very hard   Food Insecurity: No Food Insecurity (10/4/2022)    Hunger Vital Sign    • Worried About Running Out of Food in the Last Year: Never true    • Ran Out of Food in the Last Year: Never true   Transportation Needs: No Transportation Needs (2023)    PRAPARE - Transportation    • Lack of Transportation (Medical): No    • Lack of Transportation (Non-Medical): No   Physical  Activity: Not on file   Stress: Not on file   Social Connections: Not on file   Intimate Partner Violence: Not on file   Housing Stability: Unknown (10/4/2022)    Housing Stability Vital Sign    • Unable to Pay for Housing in the Last Year: Not on file    • Number of Places Lived in the Last Year: 1    • Unstable Housing in the Last Year: No      Medications and Allergies:     Current Outpatient Medications   Medication Sig Dispense Refill   • allopurinol (ZYLOPRIM) 100 mg tablet Take 1 tablet (100 mg total) by mouth 2 (two) times a day 180 tablet 1   • cholecalciferol (VITAMIN D3) 1,000 units tablet Take 2,000 Units by mouth daily     • FLUoxetine (PROzac) 20 mg capsule TAKE 1 CAPSULE BY MOUTH EVERY DAY 90 capsule 3   • loratadine (CLARITIN) 10 mg tablet Take 10 mg by mouth as needed for allergies     • Magnesium 100 MG CAPS Take 1 capsule by mouth daily     • Misc Natural Products (OSTEO BI-FLEX/5-LOXIN ADVANCED PO) Take 1 tablet by mouth daily      • Multiple Vitamins-Minerals (CENTRUM SILVER 50+MEN PO) Take 1 tablet by mouth daily     • SUPER B COMPLEX/C PO Take 1 tablet by mouth daily     • betamethasone, augmented, (DIPROLENE) 0.05 % ointment Apply topically 2 (two) times a day for 14 days To hyperpigmented areas of lower legs 45 g 1   • famotidine (PEPCID) 20 mg tablet Take 1 tablet (20 mg total) by mouth daily Do not start before October 6, 2022. 30 tablet 0   • meloxicam (MOBIC) 15 mg tablet Take 15 mg by mouth daily Patient not taking (Patient not taking: Reported on 12/27/2023)       No current facility-administered medications for this visit.     Allergies   Allergen Reactions   • Ciprofloxacin Blisters   • Other      IVORY SOAP   • Penicillins Rash      Immunizations:     Immunization History   Administered Date(s) Administered   • COVID-19 MODERNA VACC 0.5 ML IM 02/05/2021, 03/05/2021, 12/07/2021   • Pneumococcal Polysaccharide PPV23 07/31/2019      Health Maintenance:         Topic Date Due   •  Colorectal Cancer Screening  Never done   • Hepatitis C Screening  Completed         Topic Date Due   • Pneumococcal Vaccine: 65+ Years (2 - PCV) 07/31/2020   • Influenza Vaccine (1) Never done   • COVID-19 Vaccine (4 - 2023-24 season) 09/01/2023      Medicare Screening Tests and Risk Assessments:         Health Risk Assessment:   Patient rates overall health as good. Patient feels that their physical health rating is same. Patient is satisfied with their life. Eyesight was rated as same. Hearing was rated as same. Patient feels that their emotional and mental health rating is same. Patients states they are never, rarely angry. Patient states they are sometimes unusually tired/fatigued. Pain experienced in the last 7 days has been none. Patient states that he has experienced no weight loss or gain in last 6 months.     Fall Risk Screening:   In the past year, patient has experienced: no history of falling in past year      Home Safety:  Patient does not have trouble with stairs inside or outside of their home. Patient has working smoke alarms and has working carbon monoxide detector. Home safety hazards include: none.     Nutrition:   Current diet is Regular.     Medications:   Patient is currently taking over-the-counter supplements. OTC medications include: see medication list. Patient is able to manage medications.     Activities of Daily Living (ADLs)/Instrumental Activities of Daily Living (IADLs):   Walk and transfer into and out of bed and chair?: Yes  Dress and groom yourself?: Yes    Bathe or shower yourself?: Yes    Feed yourself? Yes  Do your laundry/housekeeping?: No  Manage your money, pay your bills and track your expenses?: No  Make your own meals?: No    Do your own shopping?: No    Previous Hospitalizations:   Any hospitalizations or ED visits within the last 12 months?: No      Advance Care Planning:   Living will: Yes    Durable POA for healthcare: Yes    Advanced directive: Yes      Cognitive  Screening:   Provider or family/friend/caregiver concerned regarding cognition?: No    PREVENTIVE SCREENINGS      Cardiovascular Screening:    General: Screening Not Indicated and History Lipid Disorder      Diabetes Screening:     General: Screening Current      Prostate Cancer Screening:    General: Screening Not Indicated      Abdominal Aortic Aneurysm (AAA) Screening:    Risk factors include: age between 65-74 yo and tobacco use        Lung Cancer Screening:     General: Screening Not Indicated      Hepatitis C Screening:    General: Screening Current    Screening, Brief Intervention, and Referral to Treatment (SBIRT)    Screening  Typical number of drinks in a day: 0  Typical number of drinks in a week: 0  Interpretation: Low risk drinking behavior.    Single Item Drug Screening:  How often have you used an illegal drug (including marijuana) or a prescription medication for non-medical reasons in the past year? never    Single Item Drug Screen Score: 0  Interpretation: Negative screen for possible drug use disorder    No results found.     Physical Exam:     /80 (BP Location: Left leg, Patient Position: Sitting, Cuff Size: Large)   Pulse 79   Temp 98.6 °F (37 °C) (Tympanic)   SpO2 98%     Physical Exam  Vitals and nursing note reviewed.   Constitutional:       General: He is not in acute distress.     Appearance: He is well-developed.   HENT:      Head: Normocephalic and atraumatic.   Eyes:      Conjunctiva/sclera: Conjunctivae normal.   Cardiovascular:      Rate and Rhythm: Normal rate and regular rhythm.      Heart sounds: No murmur heard.  Pulmonary:      Effort: Pulmonary effort is normal. No respiratory distress.      Breath sounds: Normal breath sounds.   Abdominal:      Palpations: Abdomen is soft.      Tenderness: There is no abdominal tenderness.   Musculoskeletal:         General: No swelling.      Cervical back: Neck supple.   Skin:     General: Skin is warm and dry.      Capillary Refill:  Capillary refill takes less than 2 seconds.   Neurological:      Mental Status: He is alert.   Psychiatric:         Mood and Affect: Mood normal.          BRETT Garsia

## 2024-01-13 DIAGNOSIS — M1A.0790 CHRONIC IDIOPATHIC GOUT INVOLVING TOE WITHOUT TOPHUS, UNSPECIFIED LATERALITY: ICD-10-CM

## 2024-01-15 RX ORDER — ALLOPURINOL 100 MG/1
100 TABLET ORAL 2 TIMES DAILY
Qty: 180 TABLET | Refills: 1 | Status: SHIPPED | OUTPATIENT
Start: 2024-01-15

## 2024-08-23 DIAGNOSIS — M1A.0790 CHRONIC IDIOPATHIC GOUT INVOLVING TOE WITHOUT TOPHUS, UNSPECIFIED LATERALITY: ICD-10-CM

## 2024-08-23 RX ORDER — ALLOPURINOL 100 MG/1
100 TABLET ORAL 2 TIMES DAILY
Qty: 180 TABLET | Refills: 1 | Status: SHIPPED | OUTPATIENT
Start: 2024-08-23

## 2024-08-23 NOTE — TELEPHONE ENCOUNTER
Reason for call:   [x] Refill   [] Prior Auth  [] Other:     Office:   [x] PCP/Provider - Jessika Carrillo/[facility]  [] Specialty/Provider -     Medication: Zyloprim    Dose/Frequency: 100 mg     Quantity: #180    Pharmacy: [address]    Does the patient have enough for 3 days?   [] Yes   [x] No - Send as HP to POD

## 2024-09-09 ENCOUNTER — APPOINTMENT (OUTPATIENT)
Age: 74
End: 2024-09-09
Payer: MEDICARE

## 2024-09-09 ENCOUNTER — TELEPHONE (OUTPATIENT)
Age: 74
End: 2024-09-09

## 2024-09-09 DIAGNOSIS — R82.998 DARK URINE: Primary | ICD-10-CM

## 2024-09-09 DIAGNOSIS — R82.998 DARK URINE: ICD-10-CM

## 2024-09-09 LAB
BACTERIA UR QL AUTO: ABNORMAL /HPF
BILIRUB UR QL STRIP: NEGATIVE
CLARITY UR: ABNORMAL
COLOR UR: ABNORMAL
GLUCOSE UR STRIP-MCNC: NEGATIVE MG/DL
GRAN CASTS #/AREA URNS LPF: ABNORMAL /[LPF]
HGB UR QL STRIP.AUTO: ABNORMAL
KETONES UR STRIP-MCNC: NEGATIVE MG/DL
LEUKOCYTE ESTERASE UR QL STRIP: ABNORMAL
MUCOUS THREADS UR QL AUTO: ABNORMAL
NITRITE UR QL STRIP: NEGATIVE
NON-SQ EPI CELLS URNS QL MICRO: ABNORMAL /HPF
PH UR STRIP.AUTO: 6 [PH]
PROT UR STRIP-MCNC: ABNORMAL MG/DL
RBC #/AREA URNS AUTO: ABNORMAL /HPF
SP GR UR STRIP.AUTO: 1.01 (ref 1–1.03)
UROBILINOGEN UR STRIP-ACNC: <2 MG/DL
WBC #/AREA URNS AUTO: ABNORMAL /HPF

## 2024-09-09 PROCEDURE — 81001 URINALYSIS AUTO W/SCOPE: CPT

## 2024-09-09 PROCEDURE — 87086 URINE CULTURE/COLONY COUNT: CPT

## 2024-09-09 NOTE — TELEPHONE ENCOUNTER
Order placed  She will need to  a specimen container from lab   Any other sx's??  Fever; dysuria; frequency; fever; abd/back pain???

## 2024-09-10 DIAGNOSIS — N30.01 ACUTE CYSTITIS WITH HEMATURIA: Primary | ICD-10-CM

## 2024-09-10 LAB — BACTERIA UR CULT: NORMAL

## 2024-09-10 RX ORDER — SULFAMETHOXAZOLE/TRIMETHOPRIM 800-160 MG
1 TABLET ORAL 2 TIMES DAILY
Qty: 20 TABLET | Refills: 0 | Status: SHIPPED | OUTPATIENT
Start: 2024-09-10 | End: 2024-09-11 | Stop reason: SDUPTHER

## 2024-09-11 DIAGNOSIS — N30.01 ACUTE CYSTITIS WITH HEMATURIA: ICD-10-CM

## 2024-09-11 RX ORDER — SULFAMETHOXAZOLE/TRIMETHOPRIM 800-160 MG
1 TABLET ORAL 2 TIMES DAILY
Qty: 20 TABLET | Refills: 0 | Status: ON HOLD | OUTPATIENT
Start: 2024-09-11 | End: 2024-09-21

## 2024-09-11 NOTE — TELEPHONE ENCOUNTER
Sent to wrong pharmacy  Reason for call:   [x] Refill   [] Prior Auth  [x] Other: resend to [facility]    Office:   [x] PCP/Provider -   [] Specialty/Provider -     Medication: Bactrim DS    Dose/Frequency: 1 tab bid x10 days    Quantity: 20    Pharmacy: [facility]/pharmacy #[id number] - [place] - [address] [phone]       Does the patient have enough for 3 days?   [] Yes   [x] No - Send as HP to POD

## 2024-09-12 NOTE — TELEPHONE ENCOUNTER
Patient called requesting refill the status of refill for sulfamethoxazole-trimethoprim (BACTRIM DS) 800-160 mg per tablet . Patient made aware medication was refilled on 09/11/24 for 20 with 0 refills to [facility] pharmacy. Patient instructed to contact the pharmacy to obtain refills of medication. Patient verbalized understanding.

## 2024-09-14 ENCOUNTER — APPOINTMENT (EMERGENCY)
Dept: ULTRASOUND IMAGING | Facility: HOSPITAL | Age: 74
DRG: 871 | End: 2024-09-14
Payer: MEDICARE

## 2024-09-14 ENCOUNTER — HOSPITAL ENCOUNTER (INPATIENT)
Facility: HOSPITAL | Age: 74
LOS: 28 days | Discharge: HOME HEALTH | DRG: 871 | End: 2024-10-12
Attending: EMERGENCY MEDICINE | Admitting: INTERNAL MEDICINE
Payer: MEDICARE

## 2024-09-14 ENCOUNTER — APPOINTMENT (EMERGENCY)
Dept: CT IMAGING | Facility: HOSPITAL | Age: 74
DRG: 871 | End: 2024-09-14
Payer: MEDICARE

## 2024-09-14 ENCOUNTER — NURSE TRIAGE (OUTPATIENT)
Dept: OTHER | Facility: OTHER | Age: 74
End: 2024-09-14

## 2024-09-14 DIAGNOSIS — D64.9 ANEMIA, UNSPECIFIED TYPE: ICD-10-CM

## 2024-09-14 DIAGNOSIS — E83.51 HYPOCALCEMIA: ICD-10-CM

## 2024-09-14 DIAGNOSIS — N01.9 RAPIDLY PROGRESSIVE GLOMERULONEPHRITIS WITH ANTI-GBM ANTIBODIES: ICD-10-CM

## 2024-09-14 DIAGNOSIS — I10 ESSENTIAL HYPERTENSION: ICD-10-CM

## 2024-09-14 DIAGNOSIS — N17.9 AKI (ACUTE KIDNEY INJURY) (HCC): Primary | ICD-10-CM

## 2024-09-14 DIAGNOSIS — R65.10 SIRS (SYSTEMIC INFLAMMATORY RESPONSE SYNDROME) (HCC): ICD-10-CM

## 2024-09-14 DIAGNOSIS — N19 UREMIA: ICD-10-CM

## 2024-09-14 PROBLEM — E87.1 HYPONATREMIA: Status: ACTIVE | Noted: 2024-09-14

## 2024-09-14 PROBLEM — R60.0 BILATERAL LOWER EXTREMITY EDEMA: Status: ACTIVE | Noted: 2024-09-14

## 2024-09-14 PROBLEM — R20.2 PARESTHESIA OF BOTH LOWER EXTREMITIES: Status: ACTIVE | Noted: 2022-10-01

## 2024-09-14 PROBLEM — E87.8 ELECTROLYTE IMBALANCE: Status: ACTIVE | Noted: 2024-09-14

## 2024-09-14 LAB
AMORPH URATE CRY URNS QL MICRO: ABNORMAL /HPF
ANION GAP SERPL CALCULATED.3IONS-SCNC: 13 MMOL/L (ref 4–13)
ANION GAP SERPL CALCULATED.3IONS-SCNC: 15 MMOL/L (ref 4–13)
ANION GAP SERPL CALCULATED.3IONS-SCNC: 17 MMOL/L (ref 4–13)
APTT PPP: 41 SECONDS (ref 23–34)
ATRIAL RATE: 98 BPM
BACTERIA UR QL AUTO: ABNORMAL /HPF
BASOPHILS # BLD MANUAL: 0 THOUSAND/UL (ref 0–0.1)
BASOPHILS NFR MAR MANUAL: 0 % (ref 0–1)
BILIRUB UR QL STRIP: NEGATIVE
BUN SERPL-MCNC: 59 MG/DL (ref 5–25)
BUN SERPL-MCNC: 61 MG/DL (ref 5–25)
BUN SERPL-MCNC: 61 MG/DL (ref 5–25)
CALCIUM SERPL-MCNC: 7.4 MG/DL (ref 8.4–10.2)
CALCIUM SERPL-MCNC: 7.6 MG/DL (ref 8.4–10.2)
CALCIUM SERPL-MCNC: 8.6 MG/DL (ref 8.4–10.2)
CHLORIDE SERPL-SCNC: 94 MMOL/L (ref 96–108)
CHLORIDE SERPL-SCNC: 97 MMOL/L (ref 96–108)
CHLORIDE SERPL-SCNC: 98 MMOL/L (ref 96–108)
CLARITY UR: ABNORMAL
CO2 SERPL-SCNC: 19 MMOL/L (ref 21–32)
CO2 SERPL-SCNC: 20 MMOL/L (ref 21–32)
CO2 SERPL-SCNC: 20 MMOL/L (ref 21–32)
COARSE GRAN CASTS URNS QL MICRO: ABNORMAL /LPF
COLOR UR: ABNORMAL
CREAT SERPL-MCNC: 7.51 MG/DL (ref 0.6–1.3)
CREAT SERPL-MCNC: 7.58 MG/DL (ref 0.6–1.3)
CREAT SERPL-MCNC: 7.95 MG/DL (ref 0.6–1.3)
CREAT UR-MCNC: 57.8 MG/DL
CREAT UR-MCNC: 58.4 MG/DL
EOSINOPHIL # BLD MANUAL: 0 THOUSAND/UL (ref 0–0.4)
EOSINOPHIL NFR BLD MANUAL: 0 % (ref 0–6)
ERYTHROCYTE [DISTWIDTH] IN BLOOD BY AUTOMATED COUNT: 15 % (ref 11.6–15.1)
FLUAV AG UPPER RESP QL IA.RAPID: NEGATIVE
FLUBV AG UPPER RESP QL IA.RAPID: NEGATIVE
GFR SERPL CREATININE-BSD FRML MDRD: 5 ML/MIN/1.73SQ M
GFR SERPL CREATININE-BSD FRML MDRD: 6 ML/MIN/1.73SQ M
GFR SERPL CREATININE-BSD FRML MDRD: 6 ML/MIN/1.73SQ M
GLUCOSE SERPL-MCNC: 100 MG/DL (ref 65–140)
GLUCOSE SERPL-MCNC: 84 MG/DL (ref 65–140)
GLUCOSE SERPL-MCNC: 90 MG/DL (ref 65–140)
GLUCOSE UR STRIP-MCNC: NEGATIVE MG/DL
HCT VFR BLD AUTO: 32 % (ref 36.5–49.3)
HGB BLD-MCNC: 10.5 G/DL (ref 12–17)
HGB UR QL STRIP.AUTO: ABNORMAL
INR PPP: 1.4 (ref 0.85–1.19)
KETONES UR STRIP-MCNC: NEGATIVE MG/DL
LACTATE SERPL-SCNC: 1.1 MMOL/L (ref 0.5–2)
LEUKOCYTE ESTERASE UR QL STRIP: ABNORMAL
LYMPHOCYTES # BLD AUTO: 0.49 THOUSAND/UL (ref 0.6–4.47)
LYMPHOCYTES # BLD AUTO: 4 % (ref 14–44)
MAGNESIUM SERPL-MCNC: 2.9 MG/DL (ref 1.9–2.7)
MCH RBC QN AUTO: 28.3 PG (ref 26.8–34.3)
MCHC RBC AUTO-ENTMCNC: 32.8 G/DL (ref 31.4–37.4)
MCV RBC AUTO: 86 FL (ref 82–98)
MONOCYTES # BLD AUTO: 0.97 THOUSAND/UL (ref 0–1.22)
MONOCYTES NFR BLD: 8 % (ref 4–12)
NEUTROPHILS # BLD MANUAL: 10.68 THOUSAND/UL (ref 1.85–7.62)
NEUTS SEG NFR BLD AUTO: 88 % (ref 43–75)
NITRITE UR QL STRIP: NEGATIVE
NON-SQ EPI CELLS URNS QL MICRO: ABNORMAL /HPF
PH UR STRIP.AUTO: 5.5 [PH]
PLATELET # BLD AUTO: 435 THOUSANDS/UL (ref 149–390)
PLATELET BLD QL SMEAR: ABNORMAL
PMV BLD AUTO: 8.8 FL (ref 8.9–12.7)
POTASSIUM SERPL-SCNC: 4.9 MMOL/L (ref 3.5–5.3)
POTASSIUM SERPL-SCNC: 5 MMOL/L (ref 3.5–5.3)
POTASSIUM SERPL-SCNC: 5.3 MMOL/L (ref 3.5–5.3)
PR INTERVAL: 142 MS
PROCALCITONIN SERPL-MCNC: 1.48 NG/ML
PROT UR STRIP-MCNC: ABNORMAL MG/DL
PROT UR-MCNC: 257.7 MG/DL
PROT/CREAT UR: 4.4 MG/G{CREAT} (ref 0–0.1)
PROTHROMBIN TIME: 17.7 SECONDS (ref 12.3–15)
QRS AXIS: -47 DEGREES
QRSD INTERVAL: 108 MS
QT INTERVAL: 370 MS
QTC INTERVAL: 472 MS
RBC # BLD AUTO: 3.71 MILLION/UL (ref 3.88–5.62)
RBC #/AREA URNS AUTO: ABNORMAL /HPF
RBC MORPH BLD: NORMAL
SARS-COV+SARS-COV-2 AG RESP QL IA.RAPID: NEGATIVE
SODIUM 24H UR-SCNC: 52 MOL/L
SODIUM SERPL-SCNC: 130 MMOL/L (ref 135–147)
SODIUM SERPL-SCNC: 131 MMOL/L (ref 135–147)
SODIUM SERPL-SCNC: 132 MMOL/L (ref 135–147)
SP GR UR STRIP.AUTO: 1.02
T WAVE AXIS: -19 DEGREES
UROBILINOGEN UR QL STRIP.AUTO: 0.2 E.U./DL
VENTRICULAR RATE: 98 BPM
WBC # BLD AUTO: 12.14 THOUSAND/UL (ref 4.31–10.16)
WBC #/AREA URNS AUTO: ABNORMAL /HPF

## 2024-09-14 PROCEDURE — 83735 ASSAY OF MAGNESIUM: CPT | Performed by: EMERGENCY MEDICINE

## 2024-09-14 PROCEDURE — 84156 ASSAY OF PROTEIN URINE: CPT

## 2024-09-14 PROCEDURE — 99223 1ST HOSP IP/OBS HIGH 75: CPT

## 2024-09-14 PROCEDURE — 84300 ASSAY OF URINE SODIUM: CPT

## 2024-09-14 PROCEDURE — 84166 PROTEIN E-PHORESIS/URINE/CSF: CPT

## 2024-09-14 PROCEDURE — 82570 ASSAY OF URINE CREATININE: CPT

## 2024-09-14 PROCEDURE — 86335 IMMUNFIX E-PHORSIS/URINE/CSF: CPT

## 2024-09-14 PROCEDURE — 84165 PROTEIN E-PHORESIS SERUM: CPT

## 2024-09-14 PROCEDURE — 36415 COLL VENOUS BLD VENIPUNCTURE: CPT | Performed by: EMERGENCY MEDICINE

## 2024-09-14 PROCEDURE — 86334 IMMUNOFIX E-PHORESIS SERUM: CPT

## 2024-09-14 PROCEDURE — 99284 EMERGENCY DEPT VISIT MOD MDM: CPT

## 2024-09-14 PROCEDURE — 84145 PROCALCITONIN (PCT): CPT | Performed by: EMERGENCY MEDICINE

## 2024-09-14 PROCEDURE — 99223 1ST HOSP IP/OBS HIGH 75: CPT | Performed by: HOSPITALIST

## 2024-09-14 PROCEDURE — 87804 INFLUENZA ASSAY W/OPTIC: CPT | Performed by: EMERGENCY MEDICINE

## 2024-09-14 PROCEDURE — 93010 ELECTROCARDIOGRAM REPORT: CPT | Performed by: INTERNAL MEDICINE

## 2024-09-14 PROCEDURE — 83521 IG LIGHT CHAINS FREE EACH: CPT

## 2024-09-14 PROCEDURE — 87040 BLOOD CULTURE FOR BACTERIA: CPT | Performed by: EMERGENCY MEDICINE

## 2024-09-14 PROCEDURE — 99285 EMERGENCY DEPT VISIT HI MDM: CPT | Performed by: EMERGENCY MEDICINE

## 2024-09-14 PROCEDURE — 80048 BASIC METABOLIC PNL TOTAL CA: CPT

## 2024-09-14 PROCEDURE — 96365 THER/PROPH/DIAG IV INF INIT: CPT

## 2024-09-14 PROCEDURE — 81003 URINALYSIS AUTO W/O SCOPE: CPT | Performed by: EMERGENCY MEDICINE

## 2024-09-14 PROCEDURE — 96360 HYDRATION IV INFUSION INIT: CPT

## 2024-09-14 PROCEDURE — 85027 COMPLETE CBC AUTOMATED: CPT | Performed by: EMERGENCY MEDICINE

## 2024-09-14 PROCEDURE — 87205 SMEAR GRAM STAIN: CPT

## 2024-09-14 PROCEDURE — 74176 CT ABD & PELVIS W/O CONTRAST: CPT

## 2024-09-14 PROCEDURE — 76775 US EXAM ABDO BACK WALL LIM: CPT

## 2024-09-14 PROCEDURE — 81001 URINALYSIS AUTO W/SCOPE: CPT | Performed by: EMERGENCY MEDICINE

## 2024-09-14 PROCEDURE — 93005 ELECTROCARDIOGRAM TRACING: CPT

## 2024-09-14 PROCEDURE — 84540 ASSAY OF URINE/UREA-N: CPT

## 2024-09-14 PROCEDURE — 85007 BL SMEAR W/DIFF WBC COUNT: CPT | Performed by: EMERGENCY MEDICINE

## 2024-09-14 PROCEDURE — 83605 ASSAY OF LACTIC ACID: CPT | Performed by: EMERGENCY MEDICINE

## 2024-09-14 PROCEDURE — 80048 BASIC METABOLIC PNL TOTAL CA: CPT | Performed by: EMERGENCY MEDICINE

## 2024-09-14 PROCEDURE — 85730 THROMBOPLASTIN TIME PARTIAL: CPT | Performed by: EMERGENCY MEDICINE

## 2024-09-14 PROCEDURE — 87811 SARS-COV-2 COVID19 W/OPTIC: CPT | Performed by: EMERGENCY MEDICINE

## 2024-09-14 PROCEDURE — 85610 PROTHROMBIN TIME: CPT | Performed by: EMERGENCY MEDICINE

## 2024-09-14 RX ORDER — HEPARIN SODIUM 5000 [USP'U]/ML
5000 INJECTION, SOLUTION INTRAVENOUS; SUBCUTANEOUS EVERY 8 HOURS SCHEDULED
Status: DISCONTINUED | OUTPATIENT
Start: 2024-09-14 | End: 2024-10-12 | Stop reason: HOSPADM

## 2024-09-14 RX ORDER — SODIUM CHLORIDE, SODIUM GLUCONATE, SODIUM ACETATE, POTASSIUM CHLORIDE, MAGNESIUM CHLORIDE, SODIUM PHOSPHATE, DIBASIC, AND POTASSIUM PHOSPHATE .53; .5; .37; .037; .03; .012; .00082 G/100ML; G/100ML; G/100ML; G/100ML; G/100ML; G/100ML; G/100ML
125 INJECTION, SOLUTION INTRAVENOUS CONTINUOUS
Status: DISCONTINUED | OUTPATIENT
Start: 2024-09-14 | End: 2024-09-14

## 2024-09-14 RX ORDER — ONDANSETRON 2 MG/ML
4 INJECTION INTRAMUSCULAR; INTRAVENOUS EVERY 6 HOURS PRN
Status: DISCONTINUED | OUTPATIENT
Start: 2024-09-14 | End: 2024-10-12 | Stop reason: HOSPADM

## 2024-09-14 RX ORDER — FUROSEMIDE 10 MG/ML
40 INJECTION INTRAMUSCULAR; INTRAVENOUS ONCE
Status: COMPLETED | OUTPATIENT
Start: 2024-09-15 | End: 2024-09-15

## 2024-09-14 RX ORDER — CALCIUM CARBONATE 500 MG/1
1000 TABLET, CHEWABLE ORAL DAILY PRN
Status: DISCONTINUED | OUTPATIENT
Start: 2024-09-14 | End: 2024-10-12 | Stop reason: HOSPADM

## 2024-09-14 RX ORDER — ACETAMINOPHEN 325 MG/1
650 TABLET ORAL ONCE
Status: COMPLETED | OUTPATIENT
Start: 2024-09-14 | End: 2024-09-14

## 2024-09-14 RX ORDER — CEFTRIAXONE 1 G/50ML
1000 INJECTION, SOLUTION INTRAVENOUS ONCE
Status: COMPLETED | OUTPATIENT
Start: 2024-09-14 | End: 2024-09-14

## 2024-09-14 RX ADMIN — HEPARIN SODIUM 5000 UNITS: 5000 INJECTION, SOLUTION INTRAVENOUS; SUBCUTANEOUS at 21:23

## 2024-09-14 RX ADMIN — ACETAMINOPHEN 650 MG: 325 TABLET ORAL at 13:13

## 2024-09-14 RX ADMIN — HEPARIN SODIUM 5000 UNITS: 5000 INJECTION, SOLUTION INTRAVENOUS; SUBCUTANEOUS at 16:40

## 2024-09-14 RX ADMIN — SODIUM CHLORIDE, SODIUM GLUCONATE, SODIUM ACETATE, POTASSIUM CHLORIDE, MAGNESIUM CHLORIDE, SODIUM PHOSPHATE, DIBASIC, AND POTASSIUM PHOSPHATE 125 ML/HR: .53; .5; .37; .037; .03; .012; .00082 INJECTION, SOLUTION INTRAVENOUS at 15:56

## 2024-09-14 RX ADMIN — SODIUM CHLORIDE 1000 ML: 0.9 INJECTION, SOLUTION INTRAVENOUS at 13:46

## 2024-09-14 RX ADMIN — SODIUM CHLORIDE, SODIUM GLUCONATE, SODIUM ACETATE, POTASSIUM CHLORIDE, MAGNESIUM CHLORIDE, SODIUM PHOSPHATE, DIBASIC, AND POTASSIUM PHOSPHATE 125 ML/HR: .53; .5; .37; .037; .03; .012; .00082 INJECTION, SOLUTION INTRAVENOUS at 23:18

## 2024-09-14 RX ADMIN — SODIUM CHLORIDE 1000 ML: 0.9 INJECTION, SOLUTION INTRAVENOUS at 13:14

## 2024-09-14 RX ADMIN — CEFTRIAXONE 1000 MG: 1 INJECTION, SOLUTION INTRAVENOUS at 13:44

## 2024-09-14 NOTE — ASSESSMENT & PLAN NOTE
Creatinine 7.95 mg/dL on admission. GFR 5 ml/min. Urine sodium 52, UPCR 4.4, urine creatinine 58.4, urine protein 257.7. FeNa: 5.5% indicating intrinsic renal etiology.  Urine studies pending. BMP at 1800 with improving creatinine 7.58. UA chastity color, cloudy, 3+ blood, 1+ leukocytes, 2+ protein, innumerable RBC, 4-10 WBC, moderate bacteria, 1-2 coarse granular casts. CT AP and bladder scan with no hydronephrosis or urinary obstruction noted. Patient and wife state patient has had dark urine with usual volume of output x 2 weeks then started having decreased UOP the past 2-3  days. Had 3 doses of bactrim for presumed UTI. Denies fevers, fatigue, confusion, metallic taste, abdominal pain, back or flank pain, dysuria, difficulty with urinary stream, and prior history of kidney stones. Endorses nausea, dry heaves, and diarrhea which he attributes to bactrim side effect. Patient notes he had a similar episode with dark colored urine and acute kidney injury approximately 2 years ago. Discussed with Dr. Ortega, ordering full work up due unclear etiology, suspected ATN possibly due to intrarenal effects of bactrim; however, dark colored urine preceded decrease in UOP.  ml uopn medrano insertion. Medrano in place with approximately 20 ml dark tea-colored purulent urine.

## 2024-09-14 NOTE — ASSESSMENT & PLAN NOTE
CM spoke w/ patient/spouse at bedside. Demographics verified. Patient lives w/ spouse. Independent as baseline; drives. No DME. Patient is insured and has PCP. No discharge needs identified at this time. Case management will remain available. 08/25/23 3604   Service Assessment   Patient Orientation Alert and Oriented   Cognition Alert   History Provided By Patient   Primary Caregiver Self   Support Systems Spouse/Significant Other   PCP Verified by CM Yes   Prior Functional Level Independent in ADLs/IADLs   Current Functional Level Independent in ADLs/IADLs   Can patient return to prior living arrangement Yes   Ability to make needs known: Good   Family able to assist with home care needs: Yes   Would you like for me to discuss the discharge plan with any other family members/significant others, and if so, who?  Yes  (spouse) Likely secondary to ARF  Monitor BMP Q4hr BMP  IV Isolyte   Management per Nephrology

## 2024-09-14 NOTE — PLAN OF CARE
Problem: PAIN - ADULT  Goal: Verbalizes/displays adequate comfort level or baseline comfort level  Description: Interventions:  - Encourage patient to monitor pain and request assistance  - Assess pain using appropriate pain scale  - Administer analgesics based on type and severity of pain and evaluate response  - Implement non-pharmacological measures as appropriate and evaluate response  - Consider cultural and social influences on pain and pain management  - Notify physician/advanced practitioner if interventions unsuccessful or patient reports new pain  Outcome: Progressing     Problem: INFECTION - ADULT  Goal: Absence or prevention of progression during hospitalization  Description: INTERVENTIONS:  - Assess and monitor for signs and symptoms of infection  - Monitor lab/diagnostic results  - Monitor all insertion sites, i.e. indwelling lines, tubes, and drains  - Monitor endotracheal if appropriate and nasal secretions for changes in amount and color  - [place] appropriate cooling/warming therapies per order  - Administer medications as ordered  - Instruct and encourage patient and family to use good hand hygiene technique  - Identify and instruct in appropriate isolation precautions for identified infection/condition  Outcome: Progressing  Goal: Absence of fever/infection during neutropenic period  Description: INTERVENTIONS:  - Monitor WBC    Outcome: Progressing     Problem: DISCHARGE PLANNING  Goal: Discharge to home or other facility with appropriate resources  Description: INTERVENTIONS:  - Identify barriers to discharge w/patient and caregiver  - Arrange for needed discharge resources and transportation as appropriate  - Identify discharge learning needs (meds, wound care, etc.)  - Arrange for interpretive services to assist at discharge as needed  - Refer to Case Management Department for coordinating discharge planning if the patient needs post-hospital services based on physician/advanced  practitioner order or complex needs related to functional status, cognitive ability, or social support system  Outcome: Progressing     Problem: Nutrition/Hydration-ADULT  Goal: Nutrient/Hydration intake appropriate for improving, restoring or maintaining nutritional needs  Description: Monitor and assess patient's nutrition/hydration status for malnutrition. Collaborate with interdisciplinary team and initiate plan and interventions as ordered.  Monitor patient's weight and dietary intake as ordered or per policy. Utilize nutrition screening tool and intervene as necessary. Determine patient's food preferences and provide high-protein, high-caloric foods as appropriate.     INTERVENTIONS:  - Monitor oral intake, urinary output, labs, and treatment plans  - Assess nutrition and hydration status and recommend course of action  - Evaluate amount of meals eaten  - Assist patient with eating if necessary   - Allow adequate time for meals  - Recommend/ encourage appropriate diets, oral nutritional supplements, and vitamin/mineral supplements  - Order, calculate, and assess calorie counts as needed  - Recommend, monitor, and adjust tube feedings and TPN/PPN based on assessed needs  - Assess need for intravenous fluids  - Provide specific nutrition/hydration education as appropriate  - Include patient/family/caregiver in decisions related to nutrition  Outcome: Progressing

## 2024-09-14 NOTE — ASSESSMENT & PLAN NOTE
Lab Results   Component Value Date    CREATININE 8.41 (H) 09/15/2024    CREATININE 7.51 (H) 09/14/2024    CREATININE 7.58 (H) 09/14/2024    EGFR 5 09/15/2024    EGFR 6 09/14/2024    EGFR 6 09/14/2024   Unspecified exact etiology  Patient has not had blood work since April 2023 at which time his kidney function was normal  Patient was recently prescribed Bactrim, however since there has been approximately  a year and a half in between BMP testing unsure if this is more of an acute process versus an acute on chronic process or just a progression of chronic kidney disease  Arrival creatinine trend as follows-7.95, 7.58, 7.51, 8.41  Luckily potassium has remained stable  Patient was initially treated with IV fluid, however due to concerns for volume overload state, IV fluids were discontinued overnight, and the patient was given a dose of IV Lasix  Nephrology to decide regarding the need for a renal biopsy, and/ or dialysis initiation  Continue stepdown level 2 care  Await formal nephrology input

## 2024-09-14 NOTE — ED PROVIDER NOTES
1. LYLY (acute kidney injury) (HCC)      ED Disposition       ED Disposition   Admit    Condition   Stable    Date/Time   Sat Sep 14, 2024  1:47 PM    Comment   Case was discussed with lester and the patient's admission status was agreed to be Admission Status: inpatient status to the service of Dr. batsita .               Assessment & Plan       Medical Decision Making  74-year-old male presents with hematuria malaise vomiting over the last several days.  Patient is febrile to 100.4 and slightly tachycardic on arrival.  Possibly UTI however chart review indicates negative cultures no bacteria on urine microscopy although did have positive blood and leukocytes.  Chart review indicates patient frequently has blood in his urine.    He also has a cough.    Will get a CT to look for possible stone.  Will get a COVID test, CBC with leukocytosis, BMP to look for kidney failure    IV Rocephin ordered for possible UTI source of his infection.  Unclear source of kidney injury as his urine did show granular casts of suspect this may have been the start of his renal failure, ATN consider.  He denies Motrin or NSAID use.    Just with nephrology will admit patient to stepdown unit 2.  Discussed the diagnosis with patient and his wife, may require dialysis if renal function does not improve.    Problems Addressed:  LYLY (acute kidney injury) (HCC): acute illness or injury    Amount and/or Complexity of Data Reviewed  Labs: ordered. Decision-making details documented in ED Course.  Radiology: ordered and independent interpretation performed. Decision-making details documented in ED Course.    Risk  OTC drugs.  Prescription drug management.  Decision regarding hospitalization.                ED Course as of 09/14/24 1910   Sat Sep 14, 2024   1347 Creatinine(!): 7.95  Will discuss with nephrology. Does not appear to need emergent dialysis at this time.   1352 Possibly AIN from the Bactrim   1650 Discussed diagnosis with patient.  Significant  kidney injury.  He is at risk to need dialysis.       Medications   multi-electrolyte (PLASMALYTE-A/ISOLYTE-S PH 7.4) IV solution (125 mL/hr Intravenous New Bag 9/14/24 1556)   FLUoxetine (PROzac) capsule 20 mg (has no administration in time range)   ondansetron (ZOFRAN) injection 4 mg (has no administration in time range)   calcium carbonate (TUMS) chewable tablet 1,000 mg (has no administration in time range)   heparin (porcine) subcutaneous injection 5,000 Units (has no administration in time range)   sodium chloride 0.9 % bolus 1,000 mL (0 mL Intravenous Stopped 9/14/24 1444)   acetaminophen (TYLENOL) tablet 650 mg (650 mg Oral Given 9/14/24 1313)   cefTRIAXone (ROCEPHIN) IVPB (premix in dextrose) 1,000 mg 50 mL (0 mg Intravenous Stopped 9/14/24 1436)   sodium chloride 0.9 % bolus 1,000 mL (0 mL Intravenous Stopped 9/14/24 1555)       History of Present Illness       74-year-old male presents with hematuria, generalized weakness nausea and vomiting over the last several days.  Patient discussed hematuria with his outpatient physician and was started on Bactrim for presumed UTI.  Patient was administered approximately 3 doses of this and then developed nausea and vomiting.  He currently denies abdominal pain.  He did have a fever on exam of 100.4 orally      Blood in Urine            Review of Systems   Genitourinary:  Positive for hematuria.           Objective     ED Triage Vitals   Temperature Pulse Blood Pressure Respirations SpO2 Patient Position - Orthostatic VS   09/14/24 1238 09/14/24 1238 09/14/24 1238 09/14/24 1238 09/14/24 1238 09/14/24 1624   99.4 °F (37.4 °C) 101 147/65 18 96 % Lying      Temp Source Heart Rate Source BP Location FiO2 (%) Pain Score    09/14/24 1238 -- 09/14/24 1624 -- 09/14/24 1238    Tympanic  Left arm  No Pain        Physical Exam  Vitals and nursing note reviewed.   Constitutional:       Appearance: Normal appearance. He is well-developed.   HENT:      Head: Normocephalic and  atraumatic.      Mouth/Throat:      Mouth: Mucous membranes are dry.   Eyes:      Conjunctiva/sclera: Conjunctivae normal.      Pupils: Pupils are equal, round, and reactive to light.   Neck:      Trachea: No tracheal deviation.   Cardiovascular:      Rate and Rhythm: Normal rate and regular rhythm.      Heart sounds: Normal heart sounds. No murmur heard.  Pulmonary:      Effort: Pulmonary effort is normal. No respiratory distress.      Breath sounds: Normal breath sounds. No wheezing or rales.   Abdominal:      General: Bowel sounds are normal. There is no distension.      Palpations: Abdomen is soft.      Tenderness: There is no abdominal tenderness.   Musculoskeletal:         General: No deformity.      Cervical back: Normal range of motion and neck supple.   Skin:     General: Skin is warm and dry.      Capillary Refill: Capillary refill takes less than 2 seconds.   Neurological:      General: No focal deficit present.      Mental Status: He is alert and oriented to person, place, and time.      Sensory: No sensory deficit.   Psychiatric:         Mood and Affect: Mood normal.         Judgment: Judgment normal.         Labs Reviewed   UA W REFLEX TO MICROSCOPIC WITH REFLEX TO CULTURE - Abnormal       Result Value    Color, UA Lalita (*)     Clarity, UA Cloudy (*)     Specific Gravity, UA 1.020      pH, UA 5.5      Leukocytes, UA 1+ (*)     Nitrite, UA Negative      Protein, UA 2+ (*)     Glucose, UA Negative      Ketones, UA Negative      Urobilinogen, UA 0.2      Bilirubin, UA Negative      Occult Blood, UA 3+ (*)    CBC AND DIFFERENTIAL - Abnormal    WBC 12.14 (*)     RBC 3.71 (*)     Hemoglobin 10.5 (*)     Hematocrit 32.0 (*)     MCV 86      MCH 28.3      MCHC 32.8      RDW 15.0      MPV 8.8 (*)     Platelets 435 (*)     Narrative:     This is an appended report.  These results have been appended to a previously verified report.   BASIC METABOLIC PANEL - Abnormal    Sodium 130 (*)     Potassium 4.9       Chloride 94 (*)     CO2 19 (*)     ANION GAP 17 (*)     BUN 61 (*)     Creatinine 7.95 (*)     Glucose 100      Calcium 8.6      eGFR 5      Narrative:     National Kidney Disease Foundation guidelines for Chronic Kidney Disease (CKD):     Stage 1 with normal or high GFR (GFR > 90 mL/min/1.73 square meters)    Stage 2 Mild CKD (GFR = 60-89 mL/min/1.73 square meters)    Stage 3A Moderate CKD (GFR = 45-59 mL/min/1.73 square meters)    Stage 3B Moderate CKD (GFR = 30-44 mL/min/1.73 square meters)    Stage 4 Severe CKD (GFR = 15-29 mL/min/1.73 square meters)    Stage 5 End Stage CKD (GFR <15 mL/min/1.73 square meters)  Note: GFR calculation is accurate only with a steady state creatinine   MAGNESIUM - Abnormal    Magnesium 2.9 (*)    PROCALCITONIN TEST - Abnormal    Procalcitonin 1.48 (*)    PROTIME-INR - Abnormal    Protime 17.7 (*)     INR 1.40 (*)     Narrative:     INR Therapeutic Range    Indication                                             INR Range      Atrial Fibrillation                                               2.0-3.0  Hypercoagulable State                                    2.0.2.3  Left Ventricular Asist Device                            2.0-3.0  Mechanical Heart Valve                                  -    Aortic(with afib, MI, embolism, HF, LA enlargement,    and/or coagulopathy)                                     2.0-3.0 (2.5-3.5)     Mitral                                                             2.5-3.5  Prosthetic/Bioprosthetic Heart Valve               2.0-3.0  Venous thromboembolism (VTE: VT, PE        2.0-3.0   APTT - Abnormal    PTT 41 (*)    URINE MICROSCOPIC - Abnormal    RBC, UA Innumerable (*)     WBC, UA 4-10 (*)     Epithelial Cells Occasional      Bacteria, UA Moderate (*)     COARSE GRANULAR CASTS 1-2      AMORPH URATES Occasional     PROTEIN / CREATININE RATIO, URINE - Abnormal    Creatinine, Ur 58.4      Protein Urine Random 257.7      Prot/Creat Ratio, Ur 4.4 (*)    BASIC  METABOLIC PANEL - Abnormal    Sodium 131 (*)     Potassium 5.0      Chloride 98      CO2 20 (*)     ANION GAP 13      BUN 61 (*)     Creatinine 7.58 (*)     Glucose 90      Calcium 7.6 (*)     eGFR 6      Narrative:     National Kidney Disease Foundation guidelines for Chronic Kidney Disease (CKD):     Stage 1 with normal or high GFR (GFR > 90 mL/min/1.73 square meters)    Stage 2 Mild CKD (GFR = 60-89 mL/min/1.73 square meters)    Stage 3A Moderate CKD (GFR = 45-59 mL/min/1.73 square meters)    Stage 3B Moderate CKD (GFR = 30-44 mL/min/1.73 square meters)    Stage 4 Severe CKD (GFR = 15-29 mL/min/1.73 square meters)    Stage 5 End Stage CKD (GFR <15 mL/min/1.73 square meters)  Note: GFR calculation is accurate only with a steady state creatinine   MANUAL DIFFERENTIAL(PHLEBS DO NOT ORDER) - Abnormal    Segmented % 88 (*)     Lymphocytes % 4 (*)     Monocytes % 8      Eosinophils % 0      Basophils % 0      Absolute Neutrophils 10.68 (*)     Absolute Lymphocytes 0.49 (*)     Absolute Monocytes 0.97      Absolute Eosinophils 0.00      Absolute Basophils 0.00      Total Counted        RBC Morphology Normal      Platelet Estimate Increased (*)    COVID-19/INFLUENZA A/B RAPID ANTIGEN (30 MIN.TAT) - Normal    SARS COV Rapid Antigen Negative      Influenza A Rapid Antigen Negative      Influenza B Rapid Antigen Negative      Narrative:     This test has been performed using the [employer] 2 FLU+SARS Antigen test under the Emergency Use Authorization (EUA). This test has been validated by the  and verified by the performing laboratory. The Abeba uses lateral flow immunofluorescent sandwich assay to detect SARS-COV, Influenza A and Influenza B Antigen.     The [employer] Abeba 2 SARS Antigen test does not differentiate between SARS-CoV and SARS-CoV-2.     Negative results are presumptive and may be confirmed with a molecular assay, if necessary, for patient management. Negative results do not rule out SARS-CoV-2 or  influenza infection and should not be used as the sole basis for treatment or patient management decisions. A negative test result may occur if the level of antigen in a sample is below the limit of detection of this test.     Positive results are indicative of the presence of viral antigens, but do not rule out bacterial infection or co-infection with other viruses.     All test results should be used as an adjunct to clinical observations and other information available to the provider.    FOR PEDIATRIC PATIENTS - copy/paste COVID Guidelines URL to browser: [email or web address]   LACTIC ACID, PLASMA (W/REFLEX IF RESULT > 2.0) - Normal    LACTIC ACID 1.1      Narrative:     Result may be elevated if tourniquet was used during collection.   BLOOD CULTURE   BLOOD CULTURE   RBC MORPHOLOGY REFLEX TEST   SODIUM, URINE, RANDOM    Sodium, Ur 52     CREATININE, URINE, RANDOM    Creatinine, Ur 57.8     UREA NITROGEN, URINE   PROTEIN ELECTROPHORESIS, SERUM   PROTEIN ELECTROPHORESIS, URINE   IMMUNOGLOBULIN FREE LT CHAINS BLOOD   URINE,EOSINOPHILS   BASIC METABOLIC PANEL     US kidney and bladder   Final Interpretation by Frandy Bright MD (09/14 1520)      Unremarkable sonographic appearance of the bilateral kidneys without hydronephrosis. Contracted urinary bladder limiting evaluation.            Workstation performed: [id number]         CT renal stone study abdomen pelvis wo contrast   Final Interpretation by Alonso Ledesma MD (09/14 1440)      No urinary calculi. Mild bladder wall thickening and perivesicular inflammatory change. Correlate clinically for urinalysis.         Workstation performed: [id number]             Procedures       Catarino Greenwood DO  09/14/24 [id number]

## 2024-09-14 NOTE — ASSESSMENT & PLAN NOTE
Sodium 130 mmol/L today. Etiology suspected hypervolemic hyponatremia due to hypervolemia associated with decreased urine output r/t LYLY.

## 2024-09-14 NOTE — TELEPHONE ENCOUNTER
"Regarding: Bactrim, diarrhea, very dark urine, decreased urine output  ----- Message from Jennifer REYES sent at 9/14/2024 11:21 AM EDT -----  \" My  was prescribed an antibiotic (Bactrim) and got so sick, he lost his appetite and had diarrhea. His very urine is very dark and he can't urinate as much as usual. I stopped giving him the antibiotic on Friday so he only had three pills. \"    "

## 2024-09-14 NOTE — CONSULTS
CONSULTATION-NEPHROLOGY   Ramos MAYERS Galo 74 y.o. male MRN: [id number]  Unit/Bed#: ICU 04-01 Encounter: [id number]        Assessment and Plan:    Assessment & Plan  Acute renal failure (ARF) ([facility])  Creatinine 7.95 mg/dL on admission. GFR 5 ml/min. Urine sodium 52, UPCR 4.4, urine creatinine 58.4, urine protein 257.7. FeNa: 5.5% indicating intrinsic renal etiology.  Urine studies pending. BMP at 1800 with improving creatinine 7.58. UA chastity color, cloudy, 3+ blood, 1+ leukocytes, 2+ protein, innumerable RBC, 4-10 WBC, moderate bacteria, 1-2 coarse granular casts. CT AP and bladder scan with no hydronephrosis or urinary obstruction noted. Patient and wife state patient has had dark urine with usual volume of output x 2 weeks then started having decreased UOP the past 2-3  days. Had 3 doses of bactrim for presumed UTI. Denies fevers, fatigue, confusion, metallic taste, abdominal pain, back or flank pain, dysuria, difficulty with urinary stream, and prior history of kidney stones. Endorses nausea, dry heaves, and diarrhea which he attributes to bactrim side effect. Patient notes he had a similar episode with dark colored urine and acute kidney injury approximately 2 years ago. Discussed with Dr. Ortega, ordering full work up due unclear etiology, suspected ATN possibly due to intrarenal effects of bactrim; however, dark colored urine preceded decrease in UOP.  ml uopn medrano insertion. Medrano in place with approximately 20 ml dark tea-colored purulent urine.    Gout  Management as per primary team. Avoid NSAIDs and nephrotoxins.   Depression  Continue management as per primary team.   Paresthesia of both lower extremities    Hyponatremia  Sodium 130 mmol/L today. Etiology suspected hypervolemic hyponatremia due to hypervolemia associated with decreased urine output r/t LYLY.  SIRS (systemic inflammatory response syndrome) ([facility])  Procalcitonin 1.48 ng/mL, lactic acid 1.1  mmol/L. WBC 12.14 thousand uIU/L.   "  Bilateral lower extremity edema  Patient states chronic edema present for about one year, R > L. Right 4+, left 2+. Previously followed with wound care one year ago for chronic foot wounds with edema improved at that time, however, he has not sought care for edema since that time stating \"my father had the same thing\".      Care has been discussed with hospitalist team, in agreement with treatment plan.     HPI:    Ramos Rosenthal is a 74 y.o. male admitted to [facility] 9/14 with generalized weakness, fatigue, and dark-colored urine/hematuria. Reports symptoms began a few days ago and he was recently prescribed bactrim for a suspected UTI on 9/9.      Reason for Consult: acute kidney injury    Review of Systems:    Review of Systems   Constitutional:  Negative for activity change, chills, fatigue and fever.   HENT:  Negative for ear pain and sore throat.    Eyes:  Negative for pain and visual disturbance.   Respiratory:  Negative for cough and shortness of breath.    Cardiovascular:  Positive for leg swelling. Negative for chest pain and palpitations.        BLE edema, R>L   Gastrointestinal:  Positive for diarrhea, nausea and vomiting. Negative for abdominal pain and constipation.        Diarrhea, nausea and dry heaves patient believes are side effects from bactrim   Endocrine: Negative.    Genitourinary:  Positive for decreased urine volume and hematuria. Negative for difficulty urinating, dysuria and flank pain.        Dark brown cloudy urine x 2 weeks, decreased UOP past 2 days   Musculoskeletal:  Negative for arthralgias and back pain.   Skin:  Negative for color change and rash.   Allergic/Immunologic: Negative.    Neurological:  Negative for dizziness, seizures, syncope, weakness, light-headedness and headaches.   Hematological: Negative.    Psychiatric/Behavioral: Negative.     All other systems reviewed and are negative.      Historical Information   Past Medical History:   Diagnosis Date    Depression     " Gout      Past Surgical History:   Procedure Laterality Date    BACK SURGERY      X2 LUMBAR INCLUDING FUSION  , 2015 WITH [facility], [facility] DR. HERZOG    CATARACT EXTRACTION, BILATERAL      KNEE ARTHROSCOPY Right     x2 , meniscus repair    TONSILECTOMY AND ADNOIDECTOMY       Social History   Social History     Substance and Sexual Activity   Alcohol Use Not Currently    Comment: occasional     Social History     Substance and Sexual Activity   Drug Use Never     Social History     Tobacco Use   Smoking Status Former    Current packs/day: 0.00    Types: Cigarettes    Quit date:     Years since quittin.7   Smokeless Tobacco Never       Family History:   Family History   Problem Relation Age of Onset    Diabetes Father     Diabetes Brother     Diabetes Sister        Medications:  Pertinent medications were reviewed  Current Facility-Administered Medications   Medication Dose Route Frequency Provider Last Rate    calcium carbonate  1,000 mg Oral Daily PRN Virginia Whitfield-Casper, CRNP      FLUoxetine  20 mg Oral Daily Virginia Kandace-Casper, CRNP      heparin (porcine)  5,000 Units Subcutaneous Q8H [facility] Virginia Whitfield-Casper, CRNP      multi-electrolyte  125 mL/hr Intravenous Continuous Virginia Whitfield-Casper, CRNP 125 mL/hr (24 1556)    ondansetron  4 mg Intravenous Q6H PRN Virginia Whitfield-Casper, CRNP           Allergies   Allergen Reactions    Ciprofloxacin Blisters    Other      IVORY SOAP    Penicillins Rash         Vitals:   /65 (BP Location: Left arm)   Pulse 60   Temp 98.3 °F (36.8 °C) (Temporal)   Resp (!) 26   Wt (!) 140 kg (309 lb 8.4 oz)   SpO2 96%   BMI 40.84 kg/m²   Body mass index is 40.84 kg/m².  SpO2: 96 %,   SpO2 Activity: At Rest,   O2 Device: None (Room air)      Intake/Output Summary (Last 24 hours) at 2024 1804  Last data filed at 2024 1720  Gross per 24 hour   Intake 120 ml   Output 215 ml   Net -95 ml     Invasive Devices       Peripheral Intravenous  Line  Duration             Peripheral IV 09/14/24 Right Antecubital <1 day    Peripheral IV 09/14/24 Right;Ventral (anterior) Hand <1 day              Drain  Duration             Urethral Catheter Double-lumen 16 Fr. <1 day                    Physical Exam:    Physical Exam  Constitutional:       General: He is not in acute distress.     Appearance: Normal appearance. He is normal weight. He is ill-appearing.   HENT:      Head: Normocephalic and atraumatic.      Nose: Nose normal.      Mouth/Throat:      Mouth: Mucous membranes are moist.      Pharynx: Oropharynx is clear.   Eyes:      Extraocular Movements: Extraocular movements intact.      Conjunctiva/sclera: Conjunctivae normal.      Pupils: Pupils are equal, round, and reactive to light.   Cardiovascular:      Rate and Rhythm: Normal rate and regular rhythm.      Pulses: Normal pulses.      Heart sounds: Normal heart sounds.   Pulmonary:      Effort: Pulmonary effort is normal.      Breath sounds: Normal breath sounds.      Comments: Decreased posterior lung sounds  Abdominal:      General: Abdomen is flat. Bowel sounds are normal.      Palpations: Abdomen is soft.   Musculoskeletal:         General: Normal range of motion.      Cervical back: Normal range of motion and neck supple.      Right lower leg: Edema present.      Left lower leg: Edema present.      Comments: RLE with 4 +, LLE 2+ pitting edema. Patent states chronic and stable x 1 year   Skin:     General: Skin is warm and dry.      Coloration: Skin is pale.   Neurological:      General: No focal deficit present.      Mental Status: He is alert and oriented to person, place, and time.   Psychiatric:         Mood and Affect: Mood normal.         Behavior: Behavior normal.         Diagnostic Data:  Lab: I have personally reviewed pertinent lab results.,   CBC:  Results from last 7 days   Lab Units 09/14/24  1314   WBC Thousand/uL 12.14*   HEMOGLOBIN g/dL 10.5*   HEMATOCRIT % 32.0*   PLATELETS  "Thousands/uL 435*      CMP:   Lab Results   Component Value Date    SODIUM 130 (L) 09/14/2024    K 4.9 09/14/2024    CL 94 (L) 09/14/2024    CO2 19 (L) 09/14/2024    BUN 61 (H) 09/14/2024    CREATININE 7.95 (H) 09/14/2024    CALCIUM 8.6 09/14/2024    EGFR 5 09/14/2024   ,   PT/INR:   Lab Results   Component Value Date    INR 1.40 (H) 09/14/2024   ,   Magnesium: No components found for: \"MAG\",  Phosphorous: No results found for: \"PHOS\"    Microbiology:  @[facility],(urinecx:7)@        BRETT Victor    Portions of the record may have been created with voice recognition software. Occasional wrong word or \"sound a like\" substitutions may have occurred due to the inherent limitations of voice recognition software. Read the chart carefully and recognize, using context, where substitutions have occurred.       "

## 2024-09-14 NOTE — QUICK NOTE
Received epic message from Dr. Catarino Greenwood at [place] ED regarding patient with LYLY with creatinine 7.95 mg/dL.  GFR 5. Slight uremic symptoms, small amount of UOP.  Sodium 130 mmol/L, potassium 4.9 mmol/L, chloride 94 mmol/L, CO2 19 mmol/L, anion gap 17.  Current magnesium 2.9.  Patient started on Bactrim by PCP for presumed UTI this week.  CT pending.  UA from 9/9 with light brown urine, turbid, large blood, large leukocytes, 2+ protein.  Recommend check bladder scan while CT pending, insert Stroud catheter, check UA, urine creatinine and sodium, SPEP/UPEP, free light chains.  Stop Bactrim and Mobic.  BMP every 4 hours.  Start Isolyte 125/hour when 2 L NS bolus from ED completed.

## 2024-09-14 NOTE — ASSESSMENT & PLAN NOTE
Patient meeting SIRS criteria on admission evident by leukocytosis, tachycardia, fever  Source possibly UTI  Received 2L fluid bolus in ED  Lactic 1.1  Procal 1.48  Received IV Rocephin in ED, will continue for now  Blood culture, Urine cultures pending

## 2024-09-14 NOTE — ASSESSMENT & PLAN NOTE
Hx of back surgery in 2004,2014 and has since bilateral LE paresthesia  He is primarily wheelchair bound  Venous statis ulcers noted to Bilateral LE  B/L extremity edema R>L

## 2024-09-14 NOTE — ASSESSMENT & PLAN NOTE
Lab Results   Component Value Date    CREATININE 7.95 (H) 09/14/2024    CREATININE 0.82 04/28/2023    CREATININE 0.98 01/13/2023    EGFR 5 09/14/2024    EGFR 87 04/28/2023    EGFR 75 01/13/2023     Presents to ED with complaints of malaise, diarrhea, hematuria and dark urine. Was recently treated outpatient for suspected UTI with Bactrim on 9/12. Within 2 days of starting dose symptoms began  Creatinine 7.95 on admission, baseline appears to 0.8-1.0  CT Scan reveals: No urinary calculi. Mild bladder wall thickening and perivesicular inflammatory change. Correlate clinically for urinalysis.   Renal U/S: Unremarkable sonographic appearance of the bilateral kidneys without hydronephrosis. Contracted urinary bladder limiting evaluation   S/P 2L fluid bolus in ED  Nephrology consult  Recommending IV Hydration Isolyte @125ml/hr  Q4hr BMP  Urine studies  Stroud Catheter  Strict I&O

## 2024-09-14 NOTE — TELEPHONE ENCOUNTER
"Advised ED per protocol, however pt's wife unable to transport. They do not want to go and want Dr Carrillo's advice. Dr Carrillo on call, advised patient go to ER via 911. Relayed to patient and wife. Patient is agreeable to plan.     Reason for Disposition   Patient sounds very sick or weak to the triager    Answer Assessment - Initial Assessment Questions  1. MAIN SYMPTOM: \"What is the main symptom you are concerned about?\" (e.g., painful urination, urine frequency)      vomiting    2. BETTER-SAME-WORSE: \"Are you getting better, staying the same, or getting worse compared to how you felt at your last visit to the doctor (most recent medical visit)?\"      worse    3. PAIN: \"How bad is the pain?\"  (e.g., Scale 1-10; mild, moderate, or severe)      Abd pain, moderate, attributes to vomiting    4. FEVER: \"Do you have a fever?\" If Yes, ask: \"What is it, how was it measured, and when did it start?\"      no    5. OTHER SYMPTOMS: \"Do you have any other symptoms?\" (e.g., blood in the urine, flank pain, scrotal pain or swelling)      Still has dark urine, urinating very little    6. DIAGNOSIS: \"When was the UTI diagnosed?\" \"By whom?\" \"Was it a kidney infection, bladder infection or both?\"      Dr Carrillo PCP    7. ANTIBIOTIC: \"What antibiotic(s) are you taking?\" \"How many times per day?\"      Bactrim, but last dose yesterday morning because of nausea vomiting    8. ANTIBIOTIC - START DATE: \"When did you start taking the antibiotic?\"      9/12    Protocols used: Urinary Tract Infection on Antibiotic Follow-up Call - Male-Adult-    "

## 2024-09-14 NOTE — H&P
H&P - Hospitalist   Name: Ramos Rosenthal 74 y.o. male I MRN: [id number]  Unit/Bed#: ICU 04-01 I Date of Admission: 9/14/2024   Date of Service: 9/14/2024 I Hospital Day: 0     Assessment & Plan  Acute renal failure (ARF) ([facility])      Lab Results   Component Value Date    CREATININE 7.95 (H) 09/14/2024    CREATININE 0.82 04/28/2023    CREATININE 0.98 01/13/2023    EGFR 5 09/14/2024    EGFR 87 04/28/2023    EGFR 75 01/13/2023     Presents to ED with complaints of malaise, diarrhea, hematuria and dark urine. Was recently treated outpatient for suspected UTI with Bactrim on 9/12. Within 2 days of starting dose symptoms began  Creatinine 7.95 on admission, baseline appears to 0.8-1.0  CT Scan reveals: No urinary calculi. Mild bladder wall thickening and perivesicular inflammatory change. Correlate clinically for urinalysis.   Renal U/S: Unremarkable sonographic appearance of the bilateral kidneys without hydronephrosis. Contracted urinary bladder limiting evaluation   S/P 2L fluid bolus in ED  Nephrology consult  Recommending IV Hydration Isolyte @125ml/hr  Q4hr BMP  Urine studies  Stroud Catheter  Strict I&O  Gout  Hold home dose allopurinol secondary to ARF  Depression  Continue home dose fluoxetine  Paresthesia of both lower extremities  Hx of back surgery in 2004,2014 and has since bilateral LE paresthesia  He is primarily wheelchair bound  Venous statis ulcers noted to Bilateral LE  B/L extremity edema R>L  Hyponatremia  Likely secondary to ARF  Monitor BMP Q4hr BMP  IV Isolyte   Management per Nephrology  SIRS (systemic inflammatory response syndrome) ([facility])  Patient meeting SIRS criteria on admission evident by leukocytosis, tachycardia, fever  Source possibly UTI  Received 2L fluid bolus in ED  Lactic 1.1  Procal 1.48  Received IV Rocephin in ED, will continue for now  Blood culture, Urine cultures pending    VTE Pharmacologic Prophylaxis: VTE Score: 8 High Risk (Score >/= 5) - Pharmacological DVT Prophylaxis  Ordered: heparin. Sequential Compression Devices Ordered.  Code Status: Level 1 - Full Code   Discussion with family: Updated  (wife and daughter) at bedside.    Anticipated Length of Stay: Patient will be admitted on an inpatient basis with an anticipated length of stay of greater than 2 midnights secondary to acute renal failure.    History of Present Illness   Chief Complaint:     Chief Complaint   Patient presents with    Blood in Urine     Patient states he has had hematuria x 1 week.          Ramos Rosenthal is a 74 y.o. male with a PMH of depression, B/L LE paresthesia, and gout who presents with hematuria.  Patient here today for complaints of malaise, loss of appetite, diarrhea and reported dark-colored urine.  States he was experiencing dark color urine for some time and was evaluated by his PCP for possible UTI.  UA at that time concerning for infection.  Was started on p.o. Bactrim.  After a few days of treatment he reports onset of symptoms.  On arrival to ED creatinine noted to be markedly elevated at 7.95 and hyponatremia.  Concern for acute renal failure.  No prior history of kidney disease.  CT scan and renal ultrasound negative for obstructive source.  Will admit inpatient for further evaluation by nephrology.  All questions answered to patient and family satisfaction.    Review of Systems   Constitutional:  Positive for appetite change and fatigue.   Gastrointestinal:  Positive for diarrhea.   Genitourinary:  Positive for hematuria.        Dark urine       I have reviewed the patient's PMH, PSH, Social History, Family History, Meds, and Allergies  Social History:  Marital Status: /Civil Union   Patient Pre-hospital Living Situation: Home, With spouse  Patient Pre-hospital Level of Mobility:  primarily wheelchair bound  Patient Pre-hospital Diet Restrictions:     Objective     Vitals:   Blood Pressure: 119/65 (09/14/24 1624)  Pulse: 60 (09/14/24 1624)  Temperature: 100 °F  (37.8 °C) (09/14/24 1430)  Temp Source: Oral (09/14/24 1252)  Respirations: (!) 26 (09/14/24 1624)  SpO2: 96 % (09/14/24 1624)    Physical Exam  Constitutional:       General: He is not in acute distress.     Appearance: He is obese.   Eyes:      General: No scleral icterus.  Cardiovascular:      Rate and Rhythm: Normal rate and regular rhythm.      Pulses: Normal pulses.      Heart sounds: Normal heart sounds.   Pulmonary:      Effort: Pulmonary effort is normal.      Breath sounds: Normal breath sounds.   Abdominal:      Palpations: Abdomen is soft.   Musculoskeletal:      Right lower leg: Edema present.      Left lower leg: Edema present.   Skin:     Coloration: Skin is pale.      Comments: LE pale, cool   Neurological:      Mental Status: He is alert and oriented to person, place, and time.      Motor: No weakness.   Psychiatric:         Mood and Affect: Mood normal.         Behavior: Behavior normal.         Lines/Drains:  Lines/Drains/Airways       Active Status       Name Placement date Placement time Site Days    Urethral Catheter Double-lumen 16 Fr. 09/14/24  1421  Double-lumen  less than 1                  Urinary Catheter:  Goal for removal: Remove after 48 hrs of I/O monitoring             Additional Data:   Lab Results: I have reviewed the following results: CBC/BMP:   .     09/14/24  1314   WBC 12.14*   HGB 10.5*   HCT 32.0*   *   SODIUM 130*   K 4.9   CL 94*   CO2 19*   BUN 61*   CREATININE 7.95*   GLUC 100   MG 2.9*      Results from last 7 days   Lab Units 09/14/24  1314   WBC Thousand/uL 12.14*   HEMOGLOBIN g/dL 10.5*   HEMATOCRIT % 32.0*   PLATELETS Thousands/uL 435*   LYMPHO PCT % 4*   MONO PCT % 8   EOS PCT % 0     Results from last 7 days   Lab Units 09/14/24  1314   SODIUM mmol/L 130*   POTASSIUM mmol/L 4.9   CHLORIDE mmol/L 94*   CO2 mmol/L 19*   BUN mg/dL 61*   CREATININE mg/dL 7.95*   ANION GAP mmol/L 17*   CALCIUM mg/dL 8.6   GLUCOSE RANDOM mg/dL 100     Results from last 7 days    Lab Units 09/14/24  1314   INR  1.40*         Lab Results   Component Value Date    HGBA1C 5.8 (H) 01/13/2023    HGBA1C 5.6 09/13/2022    HGBA1C 5.8 07/31/2019     Results from last 7 days   Lab Units 09/14/24  1314   LACTIC ACID mmol/L 1.1   PROCALCITONIN ng/ml 1.48*       Imaging Review: Reviewed radiology reports from this admission including: CT abdomen/pelvis and Ultrasound(s).  Other Studies: No additional pertinent studies reviewed.    Administrative Statements   I have spent a total time of 76 minutes in caring for this patient on the day of the visit/encounter including Diagnostic results, Risks and benefits of tx options, Documenting in the medical record, Reviewing / ordering tests, medicine, procedures  , Obtaining or reviewing history  , and Communicating with other healthcare professionals .    ** Please Note: This note has been constructed using a voice recognition system. **

## 2024-09-15 LAB
25(OH)D3 SERPL-MCNC: 40.8 NG/ML (ref 30–100)
2HR DELTA HS TROPONIN: 8 NG/L
4HR DELTA HS TROPONIN: 7 NG/L
ANION GAP SERPL CALCULATED.3IONS-SCNC: 16 MMOL/L (ref 4–13)
BNP SERPL-MCNC: 1176 PG/ML (ref 0–100)
BUN SERPL-MCNC: 69 MG/DL (ref 5–25)
C3 SERPL-MCNC: 154 MG/DL (ref 87–200)
C4 SERPL-MCNC: 41 MG/DL (ref 19–52)
CALCIUM SERPL-MCNC: 8 MG/DL (ref 8.4–10.2)
CARDIAC TROPONIN I PNL SERPL HS: 81 NG/L
CARDIAC TROPONIN I PNL SERPL HS: 88 NG/L
CARDIAC TROPONIN I PNL SERPL HS: 89 NG/L
CHLORIDE SERPL-SCNC: 98 MMOL/L (ref 96–108)
CHOLEST SERPL-MCNC: 106 MG/DL
CO2 SERPL-SCNC: 17 MMOL/L (ref 21–32)
CREAT SERPL-MCNC: 8.41 MG/DL (ref 0.6–1.3)
EOSINOPHIL NFR URNS MANUAL: 2 %
ERYTHROCYTE [DISTWIDTH] IN BLOOD BY AUTOMATED COUNT: 15.2 % (ref 11.6–15.1)
ERYTHROCYTE [SEDIMENTATION RATE] IN BLOOD: 88 MM/HOUR (ref 0–19)
GFR SERPL CREATININE-BSD FRML MDRD: 5 ML/MIN/1.73SQ M
GLUCOSE SERPL-MCNC: 75 MG/DL (ref 65–140)
HCT VFR BLD AUTO: 30 % (ref 36.5–49.3)
HDLC SERPL-MCNC: 23 MG/DL
HGB BLD-MCNC: 9.4 G/DL (ref 12–17)
HIV 1+2 AB+HIV1 P24 AG SERPL QL IA: NORMAL
HIV1 P24 AG SER QL: NORMAL
LDLC SERPL CALC-MCNC: 64 MG/DL (ref 0–100)
MCH RBC QN AUTO: 27.6 PG (ref 26.8–34.3)
MCHC RBC AUTO-ENTMCNC: 31.3 G/DL (ref 31.4–37.4)
MCV RBC AUTO: 88 FL (ref 82–98)
NONHDLC SERPL-MCNC: 83 MG/DL
PLATELET # BLD AUTO: 429 THOUSANDS/UL (ref 149–390)
PMV BLD AUTO: 8.8 FL (ref 8.9–12.7)
POTASSIUM SERPL-SCNC: 5.2 MMOL/L (ref 3.5–5.3)
PTH-INTACT SERPL-MCNC: 182.2 PG/ML (ref 12–88)
RBC # BLD AUTO: 3.4 MILLION/UL (ref 3.88–5.62)
SODIUM SERPL-SCNC: 131 MMOL/L (ref 135–147)
TRIGL SERPL-MCNC: 96 MG/DL
UUN 24H UR-MCNC: 250 MG/DL
WBC # BLD AUTO: 14.09 THOUSAND/UL (ref 4.31–10.16)

## 2024-09-15 PROCEDURE — 80048 BASIC METABOLIC PNL TOTAL CA: CPT

## 2024-09-15 PROCEDURE — 80074 ACUTE HEPATITIS PANEL: CPT

## 2024-09-15 PROCEDURE — 85027 COMPLETE CBC AUTOMATED: CPT

## 2024-09-15 PROCEDURE — 99233 SBSQ HOSP IP/OBS HIGH 50: CPT | Performed by: INTERNAL MEDICINE

## 2024-09-15 PROCEDURE — 84484 ASSAY OF TROPONIN QUANT: CPT

## 2024-09-15 PROCEDURE — 80061 LIPID PANEL: CPT

## 2024-09-15 PROCEDURE — 83520 IMMUNOASSAY QUANT NOS NONAB: CPT

## 2024-09-15 PROCEDURE — 86063 ANTISTREPTOLYSIN O SCREEN: CPT

## 2024-09-15 PROCEDURE — 82595 ASSAY OF CRYOGLOBULIN: CPT

## 2024-09-15 PROCEDURE — 86038 ANTINUCLEAR ANTIBODIES: CPT

## 2024-09-15 PROCEDURE — 82306 VITAMIN D 25 HYDROXY: CPT

## 2024-09-15 PROCEDURE — 86160 COMPLEMENT ANTIGEN: CPT

## 2024-09-15 PROCEDURE — 85652 RBC SED RATE AUTOMATED: CPT

## 2024-09-15 PROCEDURE — 83970 ASSAY OF PARATHORMONE: CPT

## 2024-09-15 PROCEDURE — 86162 COMPLEMENT TOTAL (CH50): CPT

## 2024-09-15 PROCEDURE — 83880 ASSAY OF NATRIURETIC PEPTIDE: CPT

## 2024-09-15 PROCEDURE — 83516 IMMUNOASSAY NONANTIBODY: CPT

## 2024-09-15 PROCEDURE — 86037 ANCA TITER EACH ANTIBODY: CPT

## 2024-09-15 PROCEDURE — 86060 ANTISTREPTOLYSIN O TITER: CPT

## 2024-09-15 PROCEDURE — 99232 SBSQ HOSP IP/OBS MODERATE 35: CPT | Performed by: HOSPITALIST

## 2024-09-15 PROCEDURE — 87806 HIV AG W/HIV1&2 ANTB W/OPTIC: CPT

## 2024-09-15 RX ORDER — CEFTRIAXONE 1 G/50ML
1000 INJECTION, SOLUTION INTRAVENOUS EVERY 24 HOURS
Status: DISCONTINUED | OUTPATIENT
Start: 2024-09-15 | End: 2024-09-16

## 2024-09-15 RX ADMIN — FUROSEMIDE 40 MG: 10 INJECTION, SOLUTION INTRAMUSCULAR; INTRAVENOUS at 00:01

## 2024-09-15 RX ADMIN — FLUOXETINE HYDROCHLORIDE 20 MG: 20 CAPSULE ORAL at 08:35

## 2024-09-15 RX ADMIN — HEPARIN SODIUM 5000 UNITS: 5000 INJECTION, SOLUTION INTRAVENOUS; SUBCUTANEOUS at 21:24

## 2024-09-15 RX ADMIN — HEPARIN SODIUM 5000 UNITS: 5000 INJECTION, SOLUTION INTRAVENOUS; SUBCUTANEOUS at 14:14

## 2024-09-15 RX ADMIN — CEFTRIAXONE 1000 MG: 1 INJECTION, SOLUTION INTRAVENOUS at 12:27

## 2024-09-15 NOTE — ASSESSMENT & PLAN NOTE
Hx of back surgery in 2004,2014 and has since bilateral LE paresthesia  He is primarily wheelchair bound  Venous statis ulcers noted to Bilateral LE  B/L extremity edema R>L  Continue supportive therapy

## 2024-09-15 NOTE — PROGRESS NOTES
Progress Note - Nephrology   Name: Ramos Rosenthal 74 y.o. male I MRN: [id number]  Unit/Bed#: ICU 04-01 I Date of Admission: 9/14/2024   Date of Service: 9/15/2024 I Hospital Day: 1     Assessment & Plan  LYLY (acute kidney injury) (HCC)  Creatiine 8.41, trending up. Creatinine 7.95 mg/dL on admission 9/14.  Etiology unclear, orders for full workup to include suspected ATN or GN.  Urine sodium 52, UPCR 4.4, urine creatinine 58.4, urine protein 257.7. FeNa: 5.5% indicating intrinsic renal etiology.  HIV neg, ESR 88, BNP 1176, Trop 81. Lipid panel with low HDL, otherwise appropriate.  Additional urine and serological studies pending. UA chastity color, cloudy, 3+ blood, 1+ leukocytes, 2+ protein, innumerable RBC, 4-10 WBC, moderate bacteria, 1-2 coarse granular casts. CT AP and bladder scan with no hydronephrosis or urinary obstruction, milde bladder wall thickening and perivesicular inflammatory changes concerning for cystitis. States has had dark urine with usual volume of output x 2 weeks then started having decreased UOP the past 2-3  days. Had 3 doses of bactrim for presumed UTI. Denies NSAID use, any medications not on med list including herbal supplements,  fevers, fatigue, confusion, metallic taste, abdominal pain, back or flank pain, dysuria, difficulty with urinary stream, and prior history of kidney stones.     Had 2 L NS in ED yesterday then isolyte 125 ml/hr x approx 10 hrs with essentially no UOP after medrano insertion with initial 215 ml UOP.. Patient examines hypervolemic. D/C IVF and trialed lasix 40 mg IV x 1. RN advised output of 45 ml bright red urine after about 2.5 hrs, then another 60 ml overnight. Discussed with Dr. Ortega. Ordered ECHO, BMP and troponins. Discussed with patient and daughters the likely need to initiate dialysis and possible renal biopsy. Benefits and risks associated with ICHD and CVVH explained, all questions answered with consent signed for both CVVH and HD depending on  "patient need. Possible need for renal biopsy also explained. Discussed with critical care staff, patient is under the SLIM management, will enter consult for IR for temporary line placement tomorrow. No urgent need for dialysis at this time, denies uremic symptoms including n/v, fatigue, confusion, metallic taste. HCO3 17, K+ 5.2.  Will continue to monitor with plan for temporary line placement and first HD tomorrow if no improvement in renal function over the next 24 hours.     Gout  Management as per primary team. Avoid NSAIDs and nephrotoxins.   Depression  Continue management as per primary team.   Paresthesia of both lower extremities    Hyponatremia  Sodium 131 mmol/L today. Has been 130-132 since admission yesterday. Etiology suspected hypervolemic hyponatremia due to decreased urine output r/t LYLY.  SIRS (systemic inflammatory response syndrome) (HCC)  Procalcitonin 1.48 ng/mL, lactic acid 1.1  mmol/L. WBC 14.09 thousand uIU/L.    Bilateral lower extremity edema  Patient states chronic edema present for about one year, R > L. Right 4+, left 2+. Right left edema slightly increased compared to yesterday. Previously followed with wound care one year ago for chronic foot wounds with edema improved at that time, however, he has not sought care for edema since that time stating \"my father had the same thing\".          Objective      Temp:  [98.3 °F (36.8 °C)-100.4 °F (38 °C)] 98.9 °F (37.2 °C)  HR:  [] 76  Resp:  [18-39] 29  BP: (104-162)/(59-75) 153/75  O2 Device: None (Room air)         Vitals:    09/14/24 1624 09/15/24 0736   Weight: (!) 140 kg (309 lb 8.4 oz) (!) 140 kg (309 lb 8.4 oz)     I/O last 24 hours:  In: 1162.9 [P.O.:240; I.V.:922.9]  Out: 383 [Urine:383]  Lines/Drains/Airways       Active Status       Name Placement date Placement time Site Days    Urethral Catheter Double-lumen 16 Fr. 09/14/24  1421  Double-lumen  less than 1                  Physical Exam  Vitals and nursing note reviewed. "   Constitutional:       General: He is not in acute distress.     Appearance: He is well-developed. He is obese. He is ill-appearing.   HENT:      Head: Normocephalic and atraumatic.      Right Ear: External ear normal.      Left Ear: External ear normal.      Nose: Nose normal.      Mouth/Throat:      Mouth: Mucous membranes are moist.      Pharynx: Oropharynx is clear.   Eyes:      Conjunctiva/sclera: Conjunctivae normal.   Cardiovascular:      Rate and Rhythm: Normal rate and regular rhythm.      Heart sounds: No murmur heard.  Pulmonary:      Effort: Pulmonary effort is normal. No respiratory distress.      Breath sounds: Wheezing present.      Comments: Slight wheezing in posterior lung fields  Abdominal:      Palpations: Abdomen is soft.      Tenderness: There is no abdominal tenderness.   Musculoskeletal:         General: No swelling.      Cervical back: Neck supple.      Right lower leg: Edema present.      Left lower leg: Edema present.      Comments: RLE 4+, LLE 2+ chronic edema. RLE edema appears slightly increased compared to yesterday.    Skin:     General: Skin is warm and dry.      Capillary Refill: Capillary refill takes less than 2 seconds.   Neurological:      General: No focal deficit present.      Mental Status: He is alert and oriented to person, place, and time. Mental status is at baseline.      Comments: No asterixis noted   Psychiatric:         Mood and Affect: Mood normal.        Medications:    Current Facility-Administered Medications:     calcium carbonate (TUMS) chewable tablet 1,000 mg, 1,000 mg, Oral, Daily PRN, BRETT Xie    cefTRIAXone (ROCEPHIN) IVPB (premix in dextrose) 1,000 mg 50 mL, 1,000 mg, Intravenous, Q24H, Agnes Rust MD    FLUoxetine (PROzac) capsule 20 mg, 20 mg, Oral, Daily, BRETT Xie    heparin (porcine) subcutaneous injection 5,000 Units, 5,000 Units, Subcutaneous, Q8H LORENZO, BRETT Xie, 5,000 Units  "at 09/14/24 2123    ondansetron (ZOFRAN) injection 4 mg, 4 mg, Intravenous, Q6H PRN, BRETT Xie      Lab Results: I have reviewed the following results:   Results from last 7 days   Lab Units 09/15/24  0631 09/14/24  1947 09/14/24  1744 09/14/24  1314   WBC Thousand/uL 14.09*  --   --  12.14*   HEMOGLOBIN g/dL 9.4*  --   --  10.5*   HEMATOCRIT % 30.0*  --   --  32.0*   PLATELETS Thousands/uL 429*  --   --  435*   POTASSIUM mmol/L 5.2 5.3 5.0 4.9   CHLORIDE mmol/L 98 97 98 94*   CO2 mmol/L 17* 20* 20* 19*   BUN mg/dL 69* 59* 61* 61*   CREATININE mg/dL 8.41* 7.51* 7.58* 7.95*   CALCIUM mg/dL 8.0* 7.4* 7.6* 8.6   MAGNESIUM mg/dL  --   --   --  2.9*       Administrative Statements     Portions of the record may have been created with voice recognition software. Occasional wrong word or \"sound a like\" substitutions may have occurred due to the inherent limitations of voice recognition software. Read the chart carefully and recognize, using context, where substitutions have occurred.If you have any questions, please contact the dictating provider.  "

## 2024-09-15 NOTE — PLAN OF CARE
Problem: GENITOURINARY - ADULT  Goal: Urinary catheter remains patent  Description: INTERVENTIONS:  - Assess patency of urinary catheter  - If patient has a chronic medrano, consider changing catheter if non-functioning  - Follow guidelines for intermittent irrigation of non-functioning urinary catheter  Outcome: Progressing     Problem: METABOLIC, FLUID AND ELECTROLYTES - ADULT  Goal: Electrolytes maintained within normal limits  Description: INTERVENTIONS:  - Monitor labs and assess patient for signs and symptoms of electrolyte imbalances  - Administer electrolyte replacement as ordered  - Monitor response to electrolyte replacements, including repeat lab results as appropriate  - Instruct patient on fluid and nutrition as appropriate  Outcome: Progressing  Goal: Fluid balance maintained  Description: INTERVENTIONS:  - Monitor labs   - Monitor I/O and WT  - Instruct patient on fluid and nutrition as appropriate  - Assess for signs & symptoms of volume excess or deficit  Outcome: Progressing

## 2024-09-15 NOTE — ASSESSMENT & PLAN NOTE
Creatiine 8.41, trending up. Creatinine 7.95 mg/dL on admission 9/14.  Etiology unclear, orders for full workup to include suspected ATN or GN.  Urine sodium 52, UPCR 4.4, urine creatinine 58.4, urine protein 257.7. FeNa: 5.5% indicating intrinsic renal etiology.  HIV neg, ESR 88, BNP 1176, Trop 81. Lipid panel with low HDL, otherwise appropriate.  Additional urine and serological studies pending. UA chastity color, cloudy, 3+ blood, 1+ leukocytes, 2+ protein, innumerable RBC, 4-10 WBC, moderate bacteria, 1-2 coarse granular casts. CT AP and bladder scan with no hydronephrosis or urinary obstruction, milde bladder wall thickening and perivesicular inflammatory changes concerning for cystitis. States has had dark urine with usual volume of output x 2 weeks then started having decreased UOP the past 2-3  days. Had 3 doses of bactrim for presumed UTI. Denies NSAID use, any medications not on med list including herbal supplements,  fevers, fatigue, confusion, metallic taste, abdominal pain, back or flank pain, dysuria, difficulty with urinary stream, and prior history of kidney stones.     Had 2 L NS in ED yesterday then isolyte 125 ml/hr x approx 10 hrs with essentially no UOP after medrano insertion with initial 215 ml UOP.. Patient examines hypervolemic. D/C IVF and trialed lasix 40 mg IV x 1. RN advised output of 45 ml bright red urine after about 2.5 hrs, then another 60 ml overnight. Discussed with Dr. Ortega. Ordered ECHO, BMP and troponins. Discussed with patient and daughters the likely need to initiate dialysis and possible renal biopsy. Benefits and risks associated with ICHD and CVVH explained, all questions answered with consent signed for both CVVH and HD depending on patient need. Possible need for renal biopsy also explained. Discussed with critical care staff, patient is under the SLIM management, will enter consult for IR for temporary line placement tomorrow. No urgent need for dialysis at this time,  denies uremic symptoms including n/v, fatigue, confusion, metallic taste. HCO3 17, K+ 5.2.  Will continue to monitor with plan for temporary line placement and first HD tomorrow if no improvement in renal function over the next 24 hours.

## 2024-09-15 NOTE — ASSESSMENT & PLAN NOTE
Sodium 131 mmol/L today. Has been 130-132 since admission yesterday. Etiology suspected hypervolemic hyponatremia due to decreased urine output r/t LYLY.

## 2024-09-15 NOTE — PROGRESS NOTES
Progress Note - Hospitalist   Name: Ramos Rosenthal 74 y.o. male I MRN: [id number]  Unit/Bed#: ICU 04-01 I Date of Admission: 9/14/2024   Date of Service: 9/15/2024 I Hospital Day: 1    Assessment & Plan  LYLY (acute kidney injury) ([facility])      Lab Results   Component Value Date    CREATININE 8.41 (H) 09/15/2024    CREATININE 7.51 (H) 09/14/2024    CREATININE 7.58 (H) 09/14/2024    EGFR 5 09/15/2024    EGFR 6 09/14/2024    EGFR 6 09/14/2024   Unspecified exact etiology  Patient has not had blood work since April 2023 at which time his kidney function was normal  Patient was recently prescribed Bactrim, however since there has been approximately  a year and a half in between BMP testing unsure if this is more of an acute process versus an acute on chronic process or just a progression of chronic kidney disease  Arrival creatinine trend as follows-7.95, 7.58, 7.51, 8.41  Luckily potassium has remained stable  Patient was initially treated with IV fluid, however due to concerns for volume overload state, IV fluids were discontinued overnight, and the patient was given a dose of IV Lasix  Nephrology to decide regarding the need for a renal biopsy, and/ or dialysis initiation  Continue stepdown level 2 care  Await formal nephrology input    Gout  Allopurinol to remain on hold in view of the acute kidney injury  Depression  Continue home dose fluoxetine  Paresthesia of both lower extremities  Hx of back surgery in 2004,2014 and has since bilateral LE paresthesia  He is primarily wheelchair bound  Venous statis ulcers noted to Bilateral LE  B/L extremity edema R>L  Continue supportive therapy  Hyponatremia  Most likely a hypervolemic hyponatremia in the setting of acute renal failure, and volume overload  Continue to monitor BMPs  SIRS (systemic inflammatory response syndrome) ([facility])  Patient met sepsis criteria, present on admission as evident by leukocytosis, tachypnea and tachycardia, arrival temp was 100.4 °F  Secondary  to an acute cystitis without hematuria  SIRS criteria have significantly improved, patient is no longer tachycardic, and/or febrile.  White blood cell count is 14,000  Continue IV ceftriaxone day 2  Bilateral lower extremity edema  Chronic condition  Patient with a history of lower back/spinal surgery  Patient does not ambulate  Patient is wheelchair-bound at baseline    VTE Pharmacologic Prophylaxis: VTE Score: 8 High Risk (Score >/= 5) - Pharmacological DVT Prophylaxis Ordered: heparin. Sequential Compression Devices Ordered.    Mobility:   Basic Mobility Inpatient Raw Score: 11  -[facility] Goal: 4: Move to chair/commode  JH-HLM Achieved: 2: Bed activities/Dependent transfer  JH-HLM Goal achieved. Continue to encourage appropriate mobility.    Patient Centered Rounds: I performed bedside rounds with nursing staff today.   Discussions with Specialists or Other Care Team Provider: Nephrology, case management    Education and Discussions with Family / Patient:  Patient's wife, and daughter were brought up to par last afternoon, will attempt to update them again once they come by to visit with the patient later today.     Current Length of Stay: 1 day(s)  Current Patient Status: Inpatient   Certification Statement: The patient will continue to require additional inpatient hospital stay due to management of an acute renal failure  Discharge Plan: Anticipate discharge in >72 hrs to home with home services.    Code Status: Level 1 - Full Code    Subjective   Patient seen, resting in bed, appears comfortable, denies any shortness of breath, numbness, tingling, palpitations, and really has no new complaints    Objective     Vitals:   Temp (24hrs), Av.2 °F (37.3 °C), Min:98.3 °F (36.8 °C), Max:100.4 °F (38 °C)    Temp:  [98.3 °F (36.8 °C)-100.4 °F (38 °C)] 98.9 °F (37.2 °C)  HR:  [] 76  Resp:  [18-39] 29  BP: (104-162)/(59-75) 153/75  SpO2:  [95 %-98 %] 96 %  Body mass index is 39.74 kg/m².     Input and Output  Summary (last 24 hours):     Intake/Output Summary (Last 24 hours) at 9/15/2024 0812  Last data filed at 9/15/2024 0801  Gross per 24 hour   Intake 1162.91 ml   Output 383 ml   Net 779.91 ml       Physical Exam  Vitals reviewed.   Constitutional:       Appearance: Normal appearance. He is well-developed.   HENT:      Head: Normocephalic and atraumatic.      Nose: Nose normal.      Mouth/Throat:      Mouth: Oropharynx is clear and moist.   Eyes:      Extraocular Movements: EOM normal.      Conjunctiva/sclera: Conjunctivae normal.      Pupils: Pupils are equal, round, and reactive to light.   Neck:      Thyroid: No thyromegaly.      Vascular: No JVD.   Cardiovascular:      Rate and Rhythm: Normal rate and regular rhythm.      Heart sounds: No murmur heard.     No friction rub. No gallop.   Pulmonary:      Effort: Pulmonary effort is normal. No respiratory distress.      Breath sounds: Normal breath sounds.   Abdominal:      General: Bowel sounds are normal. There is no distension.      Palpations: Abdomen is soft. There is no mass.      Tenderness: There is no abdominal tenderness. There is no guarding.   Musculoskeletal:         General: No edema. Normal range of motion.      Cervical back: Normal range of motion and neck supple.      Comments: 2+ bilateral lower extremity pitting edema, with chronic venous stasis changes   Lymphadenopathy:      Cervical: No cervical adenopathy.   Skin:     General: Skin is warm.      Findings: No erythema or rash.   Neurological:      Mental Status: He is alert and oriented to person, place, and time. Mental status is at baseline.      Cranial Nerves: No cranial nerve deficit.      Comments: Right lower extremity motor oh out of 5, left lower extremity 1 out of 5   Psychiatric:         Mood and Affect: Mood and affect normal.         Behavior: Behavior normal.          Lines/Drains:  Lines/Drains/Airways       Active Status       Name Placement date Placement time Site Days     Urethral Catheter Double-lumen 16 Fr. 09/14/24  1421  Double-lumen  less than 1                  Urinary Catheter:  Goal for removal: N/A - Chronic Stroud           Telemetry:  Telemetry Orders (From admission, onward)               24 Hour Telemetry Monitoring  Continuous x 24 Hours (Telem)        Question:  Reason for 24 Hour Telemetry  Answer:  Metabolic/electrolyte disturbance with high probability of dysrhythmia. K level <3 or >6 OR KCL infusion >10mEq/hr                     Telemetry Reviewed: Normal Sinus Rhythm  Indication for Continued Telemetry Use: Metabolic/electrolyte disturbance with high probability of dysrhythmia               Lab Results: I have reviewed the following results:    Results from last 7 days   Lab Units 09/15/24  0631 09/14/24  1314   WBC Thousand/uL 14.09* 12.14*   HEMOGLOBIN g/dL 9.4* 10.5*   HEMATOCRIT % 30.0* 32.0*   PLATELETS Thousands/uL 429* 435*   LYMPHO PCT %  --  4*   MONO PCT %  --  8   EOS PCT %  --  0     Results from last 7 days   Lab Units 09/15/24  0631   SODIUM mmol/L 131*   POTASSIUM mmol/L 5.2   CHLORIDE mmol/L 98   CO2 mmol/L 17*   BUN mg/dL 69*   CREATININE mg/dL 8.41*   ANION GAP mmol/L 16*   CALCIUM mg/dL 8.0*   GLUCOSE RANDOM mg/dL 75     Results from last 7 days   Lab Units 09/14/24  1314   INR  1.40*             Results from last 7 days   Lab Units 09/14/24  1314   LACTIC ACID mmol/L 1.1   PROCALCITONIN ng/ml 1.48*       Recent Cultures (last 7 days):   Results from last 7 days   Lab Units 09/14/24  1314 09/09/24  1324   BLOOD CULTURE  Received in Microbiology Lab. Culture in Progress.  Received in Microbiology Lab. Culture in Progress.  --    URINE CULTURE   --  No Growth <1000 cfu/mL       Imaging Review: Reviewed radiology reports from this admission including: Ultrasound(s).  Other Studies: No additional pertinent studies reviewed.    Last 24 Hours Medication List:     Current Facility-Administered Medications:     calcium carbonate (TUMS) chewable tablet  1,000 mg, Daily PRN    cefTRIAXone (ROCEPHIN) IVPB (premix in dextrose) 1,000 mg 50 mL, Q24H    FLUoxetine (PROzac) capsule 20 mg, Daily    heparin (porcine) subcutaneous injection 5,000 Units, Q8H LORENZO    ondansetron (ZOFRAN) injection 4 mg, Q6H PRN    Administrative Statements   Today, Patient Was Seen By: Agnes Rust MD  I have spent a total time of 40 minutes in caring for this patient on the day of the visit/encounter including Diagnostic results, Prognosis, Risks and benefits of tx options, Instructions for management, Patient and family education, Impressions, Counseling / Coordination of care, Reviewing / ordering tests, medicine, procedures  , and Communicating with other healthcare professionals .    **Please Note: This note may have been constructed using a voice recognition system.**

## 2024-09-15 NOTE — ASSESSMENT & PLAN NOTE
Patient met sepsis criteria, present on admission as evident by leukocytosis, tachypnea and tachycardia, arrival temp was 100.4 °F  Secondary to an acute cystitis without hematuria  SIRS criteria have significantly improved, patient is no longer tachycardic, and/or febrile.  White blood cell count is 14,000  Continue IV ceftriaxone day 2

## 2024-09-15 NOTE — ASSESSMENT & PLAN NOTE
Most likely a hypervolemic hyponatremia in the setting of acute renal failure, and volume overload  Continue to monitor BMPs

## 2024-09-15 NOTE — ASSESSMENT & PLAN NOTE
Chronic condition  Patient with a history of lower back/spinal surgery  Patient does not ambulate  Patient is wheelchair-bound at baseline

## 2024-09-15 NOTE — ASSESSMENT & PLAN NOTE
"Patient states chronic edema present for about one year, R > L. Right 4+, left 2+. Previously followed with wound care one year ago for chronic foot wounds with edema improved at that time, however, he has not sought care for edema since that time stating \"my father had the same thing\".    "

## 2024-09-15 NOTE — NURSING NOTE
2345: Contacted nephrology about patient's UOP being 23 ml after having received 2 L NS bolus in ED and continuous isolyte 125ml/hr. Pt also bladder scanned which showed 0 ml. Received order via nephro to administer 40 mg IV lasix with possible additional 40 mg and to stop isolyte fluids.    0001: 40 mg IV lasix administered (see MAR)    0005: Continuous IV isolyte stopped (see MAR)    0140: UOP assessed- 45 ml of bright red, bloody urine noted.     0151: Nephrology contacted regarding finding    0451: Nephrology contacted primary RN to discuss patient condition, advised to hold heparin. Patient remaining on renal diet. ECHO order placed by nephro, IV fluids to remain off.

## 2024-09-15 NOTE — QUICK NOTE
Received epic chat message from SHARONA Lea patient UOP has been 23 mL since 1845 despite 2 L normal saline bolus in the ED and Isolyte at 125 mL/hr.   mL with Stroud catheter insertion earlier today.  Patient examined hypervolemic with 2+ L le&4+rleadl\spelled, patient states chronic in nature.  Lung sounds clear but diminished on exam earlier.  Discontinued IVF at this time and ordered Lasix 40 mg IV x 1 to promote renal function to increase UOP and decreased creatinine.  Patient is diuretic naïve therefore starting with lower dose, however, anticipate he may require additional 40 mg IV if inadequate response.

## 2024-09-15 NOTE — QUICK NOTE
Received epic chat message from SHARONA Lea at 01:50 AM patient had 45 mL of bright red urine, RN flushed Stroud with no indication of blockage.  RN confirms patient is in no extremis, VSS. Will hold off on further diuretics at this time.  Patient also confirmed he has not been taking any other medications not on med list such as NSAIDs or herbal medications..  CT indicating no hydronephrosis, hydroureter or calculi, diffuse bladder wall thickening and mild perivesicular inflammatory changes concerning for cystitis.  Ordered BNP and troponins, and ECHO.  Discussed with Dr. Ortega, will also discuss possible start of dialysis and potential need for renal biopsy. Continue to hold off on diuretics and IVF at this time.

## 2024-09-15 NOTE — ASSESSMENT & PLAN NOTE
"Patient states chronic edema present for about one year, R > L. Right 4+, left 2+. Right left edema slightly increased compared to yesterday. Previously followed with wound care one year ago for chronic foot wounds with edema improved at that time, however, he has not sought care for edema since that time stating \"my father had the same thing\".    "

## 2024-09-16 ENCOUNTER — APPOINTMENT (INPATIENT)
Dept: DIALYSIS | Facility: HOSPITAL | Age: 74
DRG: 871 | End: 2024-09-16
Payer: MEDICARE

## 2024-09-16 ENCOUNTER — APPOINTMENT (INPATIENT)
Dept: NON INVASIVE DIAGNOSTICS | Facility: HOSPITAL | Age: 74
DRG: 871 | End: 2024-09-16
Payer: MEDICARE

## 2024-09-16 ENCOUNTER — APPOINTMENT (INPATIENT)
Dept: RADIOLOGY | Facility: HOSPITAL | Age: 74
DRG: 871 | End: 2024-09-16
Payer: MEDICARE

## 2024-09-16 ENCOUNTER — APPOINTMENT (INPATIENT)
Dept: INTERVENTIONAL RADIOLOGY/VASCULAR | Facility: HOSPITAL | Age: 74
DRG: 871 | End: 2024-09-16
Payer: MEDICARE

## 2024-09-16 PROBLEM — E87.6 HYPOKALEMIA: Status: ACTIVE | Noted: 2024-09-16

## 2024-09-16 PROBLEM — N25.81 SECONDARY HYPERPARATHYROIDISM OF RENAL ORIGIN (HCC): Status: ACTIVE | Noted: 2024-09-16

## 2024-09-16 PROBLEM — E87.5 HYPERKALEMIA: Status: ACTIVE | Noted: 2024-09-16

## 2024-09-16 PROBLEM — N19 UREMIA: Status: ACTIVE | Noted: 2024-09-16

## 2024-09-16 LAB
ALBUMIN SERPL BCG-MCNC: 2.9 G/DL (ref 3.5–5)
ALP SERPL-CCNC: 79 U/L (ref 34–104)
ALT SERPL W P-5'-P-CCNC: 82 U/L (ref 7–52)
ANA SER QL IA: NEGATIVE
ANION GAP SERPL CALCULATED.3IONS-SCNC: 15 MMOL/L (ref 4–13)
AORTIC ROOT: 3.8 CM
APICAL FOUR CHAMBER EJECTION FRACTION: 56 %
ASO AB SERPL-ACNC: ABNORMAL IU/ML
ASO AB TITR SER LA: NORMAL {TITER}
AST SERPL W P-5'-P-CCNC: 79 U/L (ref 13–39)
BASOPHILS # BLD AUTO: 0.08 THOUSANDS/ΜL (ref 0–0.1)
BASOPHILS NFR BLD AUTO: 1 % (ref 0–1)
BILIRUB SERPL-MCNC: 0.31 MG/DL (ref 0.2–1)
BSA FOR ECHO PROCEDURE: 2.62 M2
BUN SERPL-MCNC: 79 MG/DL (ref 5–25)
CALCIUM ALBUM COR SERPL-MCNC: 9.1 MG/DL (ref 8.3–10.1)
CALCIUM SERPL-MCNC: 8.2 MG/DL (ref 8.4–10.2)
CHLORIDE SERPL-SCNC: 98 MMOL/L (ref 96–108)
CO2 SERPL-SCNC: 17 MMOL/L (ref 21–32)
CREAT SERPL-MCNC: 9.42 MG/DL (ref 0.6–1.3)
EOSINOPHIL # BLD AUTO: 0.1 THOUSAND/ΜL (ref 0–0.61)
EOSINOPHIL NFR BLD AUTO: 1 % (ref 0–6)
ERYTHROCYTE [DISTWIDTH] IN BLOOD BY AUTOMATED COUNT: 15.3 % (ref 11.6–15.1)
FRACTIONAL SHORTENING: 19 (ref 28–44)
GFR SERPL CREATININE-BSD FRML MDRD: 4 ML/MIN/1.73SQ M
GLUCOSE SERPL-MCNC: 97 MG/DL (ref 65–140)
HAV IGM SER QL: NORMAL
HBV CORE AB SER QL: NORMAL
HBV CORE IGM SER QL: NORMAL
HBV CORE IGM SER QL: NORMAL
HBV SURFACE AB SER-ACNC: 8.07 MIU/ML
HBV SURFACE AG SER QL: NORMAL
HBV SURFACE AG SER QL: NORMAL
HCT VFR BLD AUTO: 30.7 % (ref 36.5–49.3)
HCV AB SER QL: NORMAL
HCV AB SER QL: NORMAL
HGB BLD-MCNC: 10 G/DL (ref 12–17)
IMM GRANULOCYTES # BLD AUTO: 0.09 THOUSAND/UL (ref 0–0.2)
IMM GRANULOCYTES NFR BLD AUTO: 1 % (ref 0–2)
INTERVENTRICULAR SEPTUM IN DIASTOLE (PARASTERNAL SHORT AXIS VIEW): 1.1 CM
INTERVENTRICULAR SEPTUM: 1.1 CM (ref 0.6–1.1)
LAAS-AP2: 15.2 CM2
LAAS-AP4: 19.8 CM2
LEFT ATRIUM SIZE: 4.2 CM
LEFT ATRIUM VOLUME (MOD BIPLANE): 50 ML
LEFT ATRIUM VOLUME INDEX (MOD BIPLANE): 19.1 ML/M2
LEFT INTERNAL DIMENSION IN SYSTOLE: 4.4 CM (ref 2.1–4)
LEFT VENTRICULAR INTERNAL DIMENSION IN DIASTOLE: 5.4 CM (ref 3.5–6)
LEFT VENTRICULAR POSTERIOR WALL IN END DIASTOLE: 1.1 CM
LEFT VENTRICULAR STROKE VOLUME: 51 ML
LVSV (TEICH): 51 ML
LYMPHOCYTES # BLD AUTO: 0.69 THOUSANDS/ΜL (ref 0.6–4.47)
LYMPHOCYTES NFR BLD AUTO: 4 % (ref 14–44)
MAGNESIUM SERPL-MCNC: 2.7 MG/DL (ref 1.9–2.7)
MCH RBC QN AUTO: 28.2 PG (ref 26.8–34.3)
MCHC RBC AUTO-ENTMCNC: 32.6 G/DL (ref 31.4–37.4)
MCV RBC AUTO: 87 FL (ref 82–98)
MONOCYTES # BLD AUTO: 1.36 THOUSAND/ΜL (ref 0.17–1.22)
MONOCYTES NFR BLD AUTO: 8 % (ref 4–12)
NEUTROPHILS # BLD AUTO: 14.37 THOUSANDS/ΜL (ref 1.85–7.62)
NEUTS SEG NFR BLD AUTO: 85 % (ref 43–75)
NRBC BLD AUTO-RTO: 0 /100 WBCS
PLATELET # BLD AUTO: 419 THOUSANDS/UL (ref 149–390)
PMV BLD AUTO: 8.3 FL (ref 8.9–12.7)
POTASSIUM SERPL-SCNC: 5 MMOL/L (ref 3.5–5.3)
POTASSIUM SERPL-SCNC: 5.4 MMOL/L (ref 3.5–5.3)
PROT SERPL-MCNC: 6.3 G/DL (ref 6.4–8.4)
RBC # BLD AUTO: 3.54 MILLION/UL (ref 3.88–5.62)
RIGHT ATRIUM AREA SYSTOLE A4C: 17.1 CM2
RIGHT VENTRICLE ID DIMENSION: 2.7 CM
SL CV LEFT ATRIUM LENGTH A2C: 4.7 CM
SL CV LV EF: 50
SL CV PED ECHO LEFT VENTRICLE DIASTOLIC VOLUME (MOD BIPLANE) 2D: 139 ML
SL CV PED ECHO LEFT VENTRICLE SYSTOLIC VOLUME (MOD BIPLANE) 2D: 88 ML
SODIUM SERPL-SCNC: 130 MMOL/L (ref 135–147)
TRICUSPID ANNULAR PLANE SYSTOLIC EXCURSION: 2.7 CM
WBC # BLD AUTO: 16.69 THOUSAND/UL (ref 4.31–10.16)

## 2024-09-16 PROCEDURE — 77001 FLUOROGUIDE FOR VEIN DEVICE: CPT

## 2024-09-16 PROCEDURE — C1894 INTRO/SHEATH, NON-LASER: HCPCS | Performed by: PHYSICIAN ASSISTANT

## 2024-09-16 PROCEDURE — 93306 TTE W/DOPPLER COMPLETE: CPT

## 2024-09-16 PROCEDURE — 83735 ASSAY OF MAGNESIUM: CPT | Performed by: PHYSICIAN ASSISTANT

## 2024-09-16 PROCEDURE — 5A1D70Z PERFORMANCE OF URINARY FILTRATION, INTERMITTENT, LESS THAN 6 HOURS PER DAY: ICD-10-PCS | Performed by: INTERNAL MEDICINE

## 2024-09-16 PROCEDURE — 36556 INSERT NON-TUNNEL CV CATH: CPT

## 2024-09-16 PROCEDURE — NC001 PR NO CHARGE: Performed by: RADIOLOGY

## 2024-09-16 PROCEDURE — 86803 HEPATITIS C AB TEST: CPT | Performed by: INTERNAL MEDICINE

## 2024-09-16 PROCEDURE — 77001 FLUOROGUIDE FOR VEIN DEVICE: CPT | Performed by: RADIOLOGY

## 2024-09-16 PROCEDURE — 99222 1ST HOSP IP/OBS MODERATE 55: CPT | Performed by: PHYSICIAN ASSISTANT

## 2024-09-16 PROCEDURE — 75827 VEIN X-RAY CHEST: CPT

## 2024-09-16 PROCEDURE — 02HV33Z INSERTION OF INFUSION DEVICE INTO SUPERIOR VENA CAVA, PERCUTANEOUS APPROACH: ICD-10-PCS | Performed by: RADIOLOGY

## 2024-09-16 PROCEDURE — 80053 COMPREHEN METABOLIC PANEL: CPT | Performed by: HOSPITALIST

## 2024-09-16 PROCEDURE — NC001 PR NO CHARGE: Performed by: PHYSICIAN ASSISTANT

## 2024-09-16 PROCEDURE — 71046 X-RAY EXAM CHEST 2 VIEWS: CPT

## 2024-09-16 PROCEDURE — 93005 ELECTROCARDIOGRAM TRACING: CPT

## 2024-09-16 PROCEDURE — 76937 US GUIDE VASCULAR ACCESS: CPT

## 2024-09-16 PROCEDURE — 85025 COMPLETE CBC W/AUTO DIFF WBC: CPT | Performed by: HOSPITALIST

## 2024-09-16 PROCEDURE — 86705 HEP B CORE ANTIBODY IGM: CPT | Performed by: INTERNAL MEDICINE

## 2024-09-16 PROCEDURE — 99232 SBSQ HOSP IP/OBS MODERATE 35: CPT | Performed by: HOSPITALIST

## 2024-09-16 PROCEDURE — C1769 GUIDE WIRE: HCPCS | Performed by: PHYSICIAN ASSISTANT

## 2024-09-16 PROCEDURE — C1752 CATH,HEMODIALYSIS,SHORT-TERM: HCPCS | Performed by: PHYSICIAN ASSISTANT

## 2024-09-16 PROCEDURE — 86704 HEP B CORE ANTIBODY TOTAL: CPT | Performed by: INTERNAL MEDICINE

## 2024-09-16 PROCEDURE — 76937 US GUIDE VASCULAR ACCESS: CPT | Performed by: RADIOLOGY

## 2024-09-16 PROCEDURE — 93306 TTE W/DOPPLER COMPLETE: CPT | Performed by: INTERNAL MEDICINE

## 2024-09-16 PROCEDURE — 36556 INSERT NON-TUNNEL CV CATH: CPT | Performed by: RADIOLOGY

## 2024-09-16 PROCEDURE — 87340 HEPATITIS B SURFACE AG IA: CPT | Performed by: INTERNAL MEDICINE

## 2024-09-16 PROCEDURE — 86706 HEP B SURFACE ANTIBODY: CPT | Performed by: INTERNAL MEDICINE

## 2024-09-16 PROCEDURE — 87081 CULTURE SCREEN ONLY: CPT | Performed by: HOSPITALIST

## 2024-09-16 PROCEDURE — 99233 SBSQ HOSP IP/OBS HIGH 50: CPT | Performed by: INTERNAL MEDICINE

## 2024-09-16 PROCEDURE — 84132 ASSAY OF SERUM POTASSIUM: CPT | Performed by: PHYSICIAN ASSISTANT

## 2024-09-16 RX ORDER — LIDOCAINE WITH 8.4% SOD BICARB 0.9%(10ML)
SYRINGE (ML) INJECTION AS NEEDED
Status: COMPLETED | OUTPATIENT
Start: 2024-09-16 | End: 2024-09-16

## 2024-09-16 RX ADMIN — FLUOXETINE HYDROCHLORIDE 20 MG: 20 CAPSULE ORAL at 10:13

## 2024-09-16 RX ADMIN — HEPARIN SODIUM 5000 UNITS: 5000 INJECTION, SOLUTION INTRAVENOUS; SUBCUTANEOUS at 13:56

## 2024-09-16 RX ADMIN — IOHEXOL 5 ML: 350 INJECTION, SOLUTION INTRAVENOUS at 08:52

## 2024-09-16 RX ADMIN — Medication 10 ML: at 08:42

## 2024-09-16 RX ADMIN — HEPARIN SODIUM 5000 UNITS: 5000 INJECTION, SOLUTION INTRAVENOUS; SUBCUTANEOUS at 21:43

## 2024-09-16 NOTE — CONSULTS
Consultation - Infectious Disease   Ramos MAYERS Galo 74 y.o. male MRN: [id number]  Unit/Bed#: ICU 04-01 Encounter: [id number]      IMPRESSION & RECOMMENDATIONS:   1. SIRS with tachycardia, tachycardia and leukocytosis. In setting of #2. Likely reactive.  No evidence of active, acute infection on ID work up.  -discontinue Ceftriaxone  -monitor off additional antibiotics at present  -volume management per Nephrology  -HD being initiated today  -monitor serial am labs for treatment response and any developing toxicities    2. LYLY. Patient presenting with Cr 7.96 < Peak 10 with anuria. 4/2023 Baseline Cr was 0.8  CT scan and renal ultrasound negative for obstructive source.  UA had innumerable RBCs, 4-10 WBCs and moderate bacteria. Urine cx negative ASO titer 400, concern post strep glomerulonephritis in differential of etiology  -discontinue Ceftriaxone  -monitor off additional antibiotics at present  -volume management per Nephrology  -HD being initiated today  -recheck BMP for treatment response and any developing toxicities     3. Hematuria on presentation. In setting of #1. No urinary symptoms. CT scan and renal ultrasound negative for obstructive source.  UA had innumerable RBCs, 4-10 WBCs and moderate bacteria. Urine cx negative  -discontinue Ceftriaxone  -monitor off additional antibiotics at present  -volume management per Nephrology  -HD being initiated today  -monitor urine output    4. Volume Overload, generalized edema. In setting of #2   -volume management per Nephrology  -HD being initiated today  -serial exam      5.. Listed PCN rash and Cipro blister remote reactions. Possible Bactrim adverse reactions in setting of #2. Tolerated Ceftriaxone this admission  -monitor antibiotic tolerance    Antibiotics:  Ceftriaxone D3    I have discussed the above management plan in detail with patient, family at bedside, RN, HD RN, and Dr. Rust of the primary service. They concur with ID treatment plan and ongoing  follow up.    Extensive review of the medical records in epic including review of the notes, radiographs, and laboratory results     HISTORY OF PRESENT ILLNESS:  Reason for Consult: 1. SIRS    HPI: Ramos Rosenthal is a 74 y.o. year old male with depression, B/L LE paresthesia, and gout without basic labs drawn since 4/28/23 and No prior history of kidney disease who presented to [facility] ER 9/14/24 with hematuria accompanied by complaints of malaise, loss of appetite, diarrhea and reported dark-colored urine starting over a week ago.  Outpatient UA was suspicious for UTI. Patient was started on PO Bactrim Thursday. Patient was administered approximately 3 doses of Bactrim and then developed nausea and vomiting. On arrival to ED creatinine noted to be markedly elevated at 7.95 with hyponatremia and WBC 12 K. CT scan and renal ultrasound negative for obstructive source.  UA had innumerable RBCs, 4-10 WBCs and moderate bacteria. Patient was admitted on IV Ceftriaxone. Infectious Disease is now being consulted regarding evaluation and management of SIRS with rising WBC.   Patient has not responded to diuretic/volume resuscitation. Nephrology is initiating HD today. Patient denies fever, chills, sweats here or home. No CP, SOB, cough. No N/V/D now. No dysuria here or home. No abdominal pain. No recent sore throat, upper respiratory symptoms other than possible sneezing/seasonal allergies. No known recent sick contacts with strep throat. ASO titer 400    REVIEW OF SYSTEMS:  A complete review of systems is negative other than that noted in the HPI.    PAST MEDICAL HISTORY:  Past Medical History:   Diagnosis Date    Depression     Gout      Past Surgical History:   Procedure Laterality Date    BACK SURGERY      X2 LUMBAR INCLUDING FUSION  2004, 2015 WITH [facility], [facility] DR. HERZOG    CATARACT EXTRACTION, BILATERAL      KNEE ARTHROSCOPY Right     x2 , meniscus repair    TONSILECTOMY AND ADNOIDECTOMY         FAMILY  HISTORY:  Non-contributory    SOCIAL HISTORY:  Social History   Social History     Substance and Sexual Activity   Alcohol Use Not Currently    Comment: occasional     Social History     Substance and Sexual Activity   Drug Use Never     Social History     Tobacco Use   Smoking Status Former    Current packs/day: 0.00    Types: Cigarettes    Quit date:     Years since quittin.7   Smokeless Tobacco Never       ALLERGIES:  Allergies   Allergen Reactions    Ciprofloxacin Blisters    Other      IVORY SOAP    Penicillins Rash       MEDICATIONS:  All current active medications have been reviewed.    PHYSICAL EXAM:  Temp:  [98.1 °F (36.7 °C)-99.1 °F (37.3 °C)] 98.1 °F (36.7 °C)  HR:  [] 68  Resp:  [16-28] 16  BP: (130-169)/(67-86) 141/71  SpO2:  [94 %-98 %] 96 %  Temp (24hrs), Av.8 °F (37.1 °C), Min:98.1 °F (36.7 °C), Max:99.1 °F (37.3 °C)  Current: Temperature: 98.1 °F (36.7 °C)    Intake/Output Summary (Last 24 hours) at 2024 1625  Last data filed at 2024 1500  Gross per 24 hour   Intake 1090 ml   Output 20 ml   Net 1070 ml       General Appearance:  74 year old male propped in bed appearing fairly comfortably, awaiting HD initiation, nontoxic appearance, and in no acute distress   Head:  Normocephalic, without obvious abnormality, atraumatic   Eyes:  Conjunctiva pink and sclera anicteric, both eyes   Nose: Nares normal, mucosa normal, no drainage   Throat: Oropharynx moist without lesions   Neck: Supple, symmetrical, no adenopathy, no tenderness/mass/nodules. New R IJ HD catheter site capped   Back:   Symmetric, no curvature, ROM normal, no CVA tenderness   Lungs:   Clear to auscultation bilaterally, respirations unlabored   Chest Wall:  No tenderness or deformity   Heart:  RRR; no murmur, rub or gallop   Abdomen:   Soft, non-tender, protuberant pannus, positive bowel sounds    Extremities: No cyanosis, clubbing, + LE pitting edema, + venodynes   : Stroud placed with scant bloody mucoid  drainage in tubing and bag, no significant output since medrano placed, no SPT, no flank tenderness.   Skin: No rashes or lesions. No draining wounds noted. IV site nontender   Lymph nodes: Cervical, supraclavicular nodes normal   Neurologic: Alert and oriented times 3, extremity strength 5/5 and symmetric       LABS, IMAGING, & OTHER STUDIES:  Lab Results:  I have personally reviewed pertinent labs.  Results from last 7 days   Lab Units 09/16/24  0608 09/15/24  0631 09/14/24  1314   WBC Thousand/uL 16.69* 14.09* 12.14*   HEMOGLOBIN g/dL 10.0* 9.4* 10.5*   PLATELETS Thousands/uL 419* 429* 435*     Results from last 7 days   Lab Units 09/16/24  0608 09/15/24  0631 09/14/24  1947   SODIUM mmol/L 130* 131* 132*   POTASSIUM mmol/L 5.4* 5.2 5.3   CHLORIDE mmol/L 98 98 97   CO2 mmol/L 17* 17* 20*   BUN mg/dL 79* 69* 59*   CREATININE mg/dL 9.42* 8.41* 7.51*   EGFR ml/min/1.73sq m 4 5 6   CALCIUM mg/dL 8.2* 8.0* 7.4*   AST U/L 79*  --   --    ALT U/L 82*  --   --    ALK PHOS U/L 79  --   --      Results from last 7 days   Lab Units 09/14/24  1314   BLOOD CULTURE  No Growth at 24 hrs.  No Growth at 24 hrs.     Results from last 7 days   Lab Units 09/14/24  1314   PROCALCITONIN ng/ml 1.48*                   Imaging Studies:   Imaging study reports and images in PACS reviewed personally.  9/16/24 CXR lungs clear, RIJ Cath tip at CVJ  9/14/24 Renal US: no hydronephrosis, calculi. contracted bladder.  9/14/24 CT A/P stone study: no urinary calculi. mild bladder wall thickening and perivesicular inflammatory change.    Other Studies:   I have personally reviewed pertinent reports.

## 2024-09-16 NOTE — QUICK NOTE
Notified by [facility] for sepsis alert secondary to tachycardia with a heart rate of 107 and leukocytosis of 16.69.  Patient is currently being treated for SIRS no new orders at this time

## 2024-09-16 NOTE — ASSESSMENT & PLAN NOTE
PTH level elevated but okay at this time, no activated vitamin D agent indicated.  Patient's vitamin D level was appropriate.

## 2024-09-16 NOTE — TELEMEDICINE
e-Consult ([facility])  - Interventional Radiology  Ramos Rosenthal 74 y.o. male MRN: [id number]  Unit/Bed#: ICU 04-01 Encounter: [id number]          Interventional Radiology has been consulted to evaluate Ramos Rosenthal    We were consulted by nephrology concerning this patient with LYLY.  Patient admitted to the hospital for LYLY.  Is reported to be an uric with worsening creatinine, BUN and potassium despite hydration and management by nephrology.  Patient now requiring dialysis.    Inpatient Consult to IR  Consult performed by: Gigi Pearl PA-C  Consult ordered by: BRETT Victor        09/16/24    Assessment/Recommendation:     LYLY requiring Dialysis  - plan for temp HD catheter placement today, pending IR scheduling      11-20 minutes, >50% of the total time devoted to medical consultative verbal/EMR discussion between providers. Written report will be generated in the EMR.     Thank you for allowing Interventional Radiology to participate in the care of Ramos Rosenthal. Please don't hesitate to call or TigerText us with any questions.     Gigi Pearl PA-C

## 2024-09-16 NOTE — ASSESSMENT & PLAN NOTE
Creatinine continues to increase, now up to 9.42 mg/dL.  Will plan to initiate hemodialysis later today, PermCath to be placed this morning by interventional radiology.  Although a schedule time is not been selected, anticipate prior to dialysis which is scheduled to start around 1:30 PM.  Will provide the patient with a 2-hour treatment today, followed by 3-hour treatment tomorrow, Tuesday, September 17 and a 4-hour treatment on Wednesday, September 18.    As noted previously, etiology is unclear, many serologies remain pending, complements are within normal limit, although total complement remains pending, HIV is negative, sed rate is elevated in the 80s, PTH minimally elevated at 150, ASO screen is positive at 400 IU/mL, serologies of note they remain pending include ANCA's,    With respect to hepatitis studies, the patient had an acute hepatitis panel ordered, will place an order for the typical inpatient hemodialysis hepatitis screen.  The acute hepatitis studies were negative.

## 2024-09-16 NOTE — ASSESSMENT & PLAN NOTE
Serum sodium level stable at 130 mmol/L, continue with fluid restriction of 1.8 L, will be improved with the initiation of hemodialysis later today.

## 2024-09-16 NOTE — ASSESSMENT & PLAN NOTE
Patient met sepsis criteria, present on admission as evident by leukocytosis, tachypnea and tachycardia, arrival temp was 100.4 °F  Secondary to an acute cystitis without hematuria  SIRS criteria have significantly improved, patient is no longer tachycardic, and/or febrile.  White blood cell count i however is uptrending  Continue IV ceftriaxone day 3  We will consult ID for antibiotic assistance in the setting of his white blood cell count worsening

## 2024-09-16 NOTE — PLAN OF CARE
1st Hemodialysis TX. OKAY to use CVC ordered signed off before the start of HDTX. TX plan reviewed with Dr Ortega and he is agreeable to the following: Goal is to UF 1 L on a 2 K+ bath for a morning serum K+ of 5.4, 2 HR TX. Pt tolerated TX well.     Problem: METABOLIC, FLUID AND ELECTROLYTES - ADULT  Goal: Electrolytes maintained within normal limits  Description: INTERVENTIONS:  - Monitor labs and assess patient for signs and symptoms of electrolyte imbalances  - Administer electrolyte replacement as ordered  - Monitor response to electrolyte replacements, including repeat lab results as appropriate  - Instruct patient on fluid and nutrition as appropriate  Outcome: Progressing  Goal: Fluid balance maintained  Description: INTERVENTIONS:  - Monitor labs   - Monitor I/O and WT  - Instruct patient on fluid and nutrition as appropriate  - Assess for signs & symptoms of volume excess or deficit  Outcome: Progressing     Post-Dialysis RN Treatment Note    Blood Pressure:  Pre 156/81 mm/Hg  Post 149/82 mmHg   EDW  TBD kg    Weight:  Pre 140.6 kg   Post 139.6 kg   Mode of weight measurement: Bed Scale, pt unable to stand   Volume Removed  1000 ml net    Treatment duration 120 minutes    NS given  No    Treatment shortened? No   Medications given during Rx None Reported   Estimated Kt/V  0.45   Access type: Temporary HD catheter   Access Issues: No    Report called to primary nurse   Yes, Antoine Abdi RN

## 2024-09-16 NOTE — PROGRESS NOTES
Progress Note - Hospitalist   Name: Ramos Rosenthal 74 y.o. male I MRN: [id number]  Unit/Bed#: ICU 04-01 I Date of Admission: 9/14/2024   Date of Service: 9/16/2024 I Hospital Day: 2    Assessment & Plan  LYLY (acute kidney injury) (HCC)      Lab Results   Component Value Date    CREATININE 9.42 (H) 09/16/2024    CREATININE 8.41 (H) 09/15/2024    CREATININE 7.51 (H) 09/14/2024    EGFR 4 09/16/2024    EGFR 5 09/15/2024    EGFR 6 09/14/2024   Unspecified exact etiology  Patient has not had blood work since April 2023 at which time his kidney function was normal  Patient was recently prescribed Bactrim, however since there has been approximately  a year and a half in between BMP testing unsure if this is more of an acute process versus an acute on chronic process or just a progression of chronic kidney disease  Arrival creatinine was 7.95, creatinine which morning is 9.42  Patient also has an anion gap metabolic acidosis secondary to his renal failure  Mild hyperkalemia noted today at 5.4-anticipate this will get better with dialysis  Patient was initially treated with IV fluid, however due to concerns for volume overload state, IV fluids were discontinued   Patient has been subsequently treated with Lasix also  Patient to go to IR today for a dialysis line placement  Dialysis to be initiated later today  Continue level 2 stepdown care    Gout  Allopurinol to remain on hold in view of the acute kidney injury  Depression  Continue home dose fluoxetine  Paresthesia of both lower extremities  Hx of back surgery in 2004,2014 and has since bilateral LE paresthesia  He is primarily wheelchair bound  Venous statis ulcers noted to Bilateral LE  B/L extremity edema R>L  Continue supportive therapy  Hyponatremia  Most likely a hypervolemic hyponatremia in the setting of acute renal failure, and volume overload  Continue to monitor BMPs  Anticipate sodium level should get better with dialysis  SIRS (systemic inflammatory response  syndrome) (HCC)  Patient met sepsis criteria, present on admission as evident by leukocytosis, tachypnea and tachycardia, arrival temp was 100.4 °F  Secondary to an acute cystitis without hematuria  SIRS criteria have significantly improved, patient is no longer tachycardic, and/or febrile.  White blood cell count i however is uptrending  Continue IV ceftriaxone day 3  We will consult ID for antibiotic assistance in the setting of his white blood cell count worsening  Bilateral lower extremity edema  Chronic condition  Patient with a history of lower back/spinal surgery  Patient does not ambulate  Patient is wheelchair-bound at baseline    VTE Pharmacologic Prophylaxis: VTE Score: 8 High Risk (Score >/= 5) - Pharmacological DVT Prophylaxis Ordered: heparin. Sequential Compression Devices Ordered.    Mobility:   Basic Mobility Inpatient Raw Score: 11  JH-HLM Goal: 4: Move to chair/commode  JH-HLM Achieved: 1: Laying in bed  JH-HLM Goal NOT achieved. Continue with multidisciplinary rounding and encourage appropriate mobility to improve upon JH-HLM goals.    Patient Centered Rounds: I performed bedside rounds with nursing staff today.   Discussions with Specialists or Other Care Team Provider: Nephrology, interventional radiology, infectious disease    Education and Discussions with Family / Patient:  Patient's family members were brought up to par last afternoon, will update them again once they present to the hospital to visit with the patient.     Current Length of Stay: 2 day(s)  Current Patient Status: Inpatient   Certification Statement: The patient will continue to require additional inpatient hospital stay due to need for dialysis initiation  Discharge Plan: Anticipate discharge in >72 hrs to discharge location to be determined pending rehab evaluations.    Code Status: Level 1 - Full Code    Subjective   Patient seen, resting in bed, reports feeling okay, but reports also feeling very thirsty    Objective      Vitals:   Temp (24hrs), Av.7 °F (37.1 °C), Min:97.8 °F (36.6 °C), Max:99.1 °F (37.3 °C)    Temp:  [97.8 °F (36.6 °C)-99.1 °F (37.3 °C)] 98.9 °F (37.2 °C)  HR:  [] 81  Resp:  [16-37] 21  BP: (116-169)/(64-80) 130/79  SpO2:  [94 %-98 %] 94 %  Body mass index is 39.8 kg/m².     Input and Output Summary (last 24 hours):     Intake/Output Summary (Last 24 hours) at 2024 0816  Last data filed at 2024 0552  Gross per 24 hour   Intake 410 ml   Output 50 ml   Net 360 ml       Physical Exam  Vitals reviewed.   Constitutional:       Appearance: Normal appearance. He is well-developed.   HENT:      Head: Normocephalic and atraumatic.      Nose: Nose normal.      Mouth/Throat:      Mouth: Oropharynx is clear and moist.   Eyes:      Extraocular Movements: EOM normal.      Conjunctiva/sclera: Conjunctivae normal.      Pupils: Pupils are equal, round, and reactive to light.   Neck:      Thyroid: No thyromegaly.      Vascular: No JVD.   Cardiovascular:      Rate and Rhythm: Normal rate and regular rhythm.      Heart sounds: No murmur heard.     No friction rub. No gallop.   Pulmonary:      Effort: Pulmonary effort is normal. No respiratory distress.      Breath sounds: Normal breath sounds.      Comments: Decreased breath sounds bilaterally at the bases  Abdominal:      General: Bowel sounds are normal. There is no distension.      Palpations: Abdomen is soft. There is no mass.      Tenderness: There is no abdominal tenderness. There is no guarding.   Musculoskeletal:         General: No edema. Normal range of motion.      Cervical back: Normal range of motion and neck supple.      Comments: Patient has 1-2+ bilateral lower extremity edema   Lymphadenopathy:      Cervical: No cervical adenopathy.   Skin:     General: Skin is warm.      Findings: No erythema or rash.   Neurological:      Mental Status: He is alert and oriented to person, place, and time.      Cranial Nerves: No cranial nerve deficit.       Comments: Motor 0 out of 5 in the right lower extremity, 1 out of 5 in the left lower extremity   Psychiatric:         Mood and Affect: Mood and affect and mood normal.         Behavior: Behavior normal.          Lines/Drains:  Lines/Drains/Airways       Active Status       Name Placement date Placement time Site Days    Urethral Catheter Double-lumen 16 Fr. 09/14/24  1421  Double-lumen  1                  Urinary Catheter:  Goal for removal: N/A - Chronic [place]           Telemetry:  Telemetry Orders (From admission, onward)               24 Hour Telemetry Monitoring  Continuous x 24 Hours (Telem)        Expiring   Question:  Reason for 24 Hour Telemetry  Answer:  Metabolic/electrolyte disturbance with high probability of dysrhythmia. K level <3 or >6 OR KCL infusion >10mEq/hr                     Telemetry Reviewed: Normal Sinus Rhythm  Indication for Continued Telemetry Use: Arrthymias requiring medical therapy               Lab Results: I have reviewed the following results:    Results from last 7 days   Lab Units 09/16/24  0608   WBC Thousand/uL 16.69*   HEMOGLOBIN g/dL 10.0*   HEMATOCRIT % 30.7*   PLATELETS Thousands/uL 419*   SEGS PCT % 85*   LYMPHO PCT % 4*   MONO PCT % 8   EOS PCT % 1     Results from last 7 days   Lab Units 09/16/24  0608   SODIUM mmol/L 130*   POTASSIUM mmol/L 5.4*   CHLORIDE mmol/L 98   CO2 mmol/L 17*   BUN mg/dL 79*   CREATININE mg/dL 9.42*   ANION GAP mmol/L 15*   CALCIUM mg/dL 8.2*   ALBUMIN g/dL 2.9*   TOTAL BILIRUBIN mg/dL 0.31   ALK PHOS U/L 79   ALT U/L 82*   AST U/L 79*   GLUCOSE RANDOM mg/dL 97     Results from last 7 days   Lab Units 09/14/24  1314   INR  1.40*             Results from last 7 days   Lab Units 09/14/24  1314   LACTIC ACID mmol/L 1.1   PROCALCITONIN ng/ml 1.48*       Recent Cultures (last 7 days):   Results from last 7 days   Lab Units 09/14/24  1314 09/09/24  1324   BLOOD CULTURE  No Growth at 24 hrs.  No Growth at 24 hrs.  --    URINE CULTURE   --  No Growth  <1000 cfu/mL       Imaging Review: No pertinent imaging studies reviewed.  Other Studies: No additional pertinent studies reviewed.    Last 24 Hours Medication List:     Current Facility-Administered Medications:     calcium carbonate (TUMS) chewable tablet 1,000 mg, Daily PRN    cefTRIAXone (ROCEPHIN) IVPB (premix in dextrose) 1,000 mg 50 mL, Q24H, Last Rate: 1,000 mg (09/15/24 1227)    FLUoxetine (PROzac) capsule 20 mg, Daily    heparin (porcine) subcutaneous injection 5,000 Units, Q8H LORENZO    ondansetron (ZOFRAN) injection 4 mg, Q6H PRN    Administrative Statements   Today, Patient Was Seen By: Agnes Rust MD  I have spent a total time of 40 minutes in caring for this patient on the day of the visit/encounter including Diagnostic results, Prognosis, Risks and benefits of tx options, Instructions for management, Patient and family education, Impressions, Counseling / Coordination of care, Documenting in the medical record, Reviewing / ordering tests, medicine, procedures  , and Communicating with other healthcare professionals .    **Please Note: This note may have been constructed using a voice recognition system.**

## 2024-09-16 NOTE — PLAN OF CARE
Problem: PAIN - ADULT  Goal: Verbalizes/displays adequate comfort level or baseline comfort level  Description: Interventions:  - Encourage patient to monitor pain and request assistance  - Assess pain using appropriate pain scale  - Administer analgesics based on type and severity of pain and evaluate response  - Implement non-pharmacological measures as appropriate and evaluate response  - Consider cultural and social influences on pain and pain management  - Notify physician/advanced practitioner if interventions unsuccessful or patient reports new pain  Outcome: Progressing     Problem: INFECTION - ADULT  Goal: Absence or prevention of progression during hospitalization  Description: INTERVENTIONS:  - Assess and monitor for signs and symptoms of infection  - Monitor lab/diagnostic results  - Monitor all insertion sites, i.e. indwelling lines, tubes, and drains  - Monitor endotracheal if appropriate and nasal secretions for changes in amount and color  - [place] appropriate cooling/warming therapies per order  - Administer medications as ordered  - Instruct and encourage patient and family to use good hand hygiene technique  - Identify and instruct in appropriate isolation precautions for identified infection/condition  Outcome: Progressing  Goal: Absence of fever/infection during neutropenic period  Description: INTERVENTIONS:  - Monitor WBC    Outcome: Progressing     Problem: SAFETY ADULT  Goal: Patient will remain free of falls  Description: INTERVENTIONS:  - Educate patient/family on patient safety including physical limitations  - Instruct patient to call for assistance with activity   - Consult OT/PT to assist with strengthening/mobility   - Keep Call bell within reach  - Keep bed low and locked with side rails adjusted as appropriate  - Keep care items and personal belongings within reach  - Initiate and maintain comfort rounds  - Make Fall Risk Sign visible to staff  - Apply yellow socks and bracelet  for high fall risk patients  - Consider moving patient to room near nurses station  Outcome: Progressing  Goal: Maintain or return to baseline ADL function  Description: INTERVENTIONS:  -  Assess patient's ability to carry out ADLs; assess patient's baseline for ADL function and identify physical deficits which impact ability to perform ADLs (bathing, care of mouth/teeth, toileting, grooming, dressing, etc.)  - Assess/evaluate cause of self-care deficits   - Assess range of motion  - Assess patient's mobility; develop plan if impaired  - Assess patient's need for assistive devices and provide as appropriate  - Encourage maximum independence but intervene and supervise when necessary  - Involve family in performance of ADLs  - Assess for home care needs following discharge   - Consider OT consult to assist with ADL evaluation and planning for discharge  - Provide patient education as appropriate  Outcome: Progressing    Problem: DISCHARGE PLANNING  Goal: Discharge to home or other facility with appropriate resources  Description: INTERVENTIONS:  - Identify barriers to discharge w/patient and caregiver  - Arrange for needed discharge resources and transportation as appropriate  - Identify discharge learning needs (meds, wound care, etc.)  - Arrange for interpretive services to assist at discharge as needed  - Refer to Case Management Department for coordinating discharge planning if the patient needs post-hospital services based on physician/advanced practitioner order or complex needs related to functional status, cognitive ability, or social support system  Outcome: Progressing     Problem: Knowledge Deficit  Goal: Patient/family/caregiver demonstrates understanding of disease process, treatment plan, medications, and discharge instructions  Description: Complete learning assessment and assess knowledge base.  Interventions:  - Provide teaching at level of understanding  - Provide teaching via preferred learning  methods  Outcome: Progressing     Problem: Nutrition/Hydration-ADULT  Goal: Nutrient/Hydration intake appropriate for improving, restoring or maintaining nutritional needs  Description: Monitor and assess patient's nutrition/hydration status for malnutrition. Collaborate with interdisciplinary team and initiate plan and interventions as ordered.  Monitor patient's weight and dietary intake as ordered or per policy. Utilize nutrition screening tool and intervene as necessary. Determine patient's food preferences and provide high-protein, high-caloric foods as appropriate.     INTERVENTIONS:  - Monitor oral intake, urinary output, labs, and treatment plans  - Assess nutrition and hydration status and recommend course of action  - Evaluate amount of meals eaten  - Assist patient with eating if necessary   - Allow adequate time for meals  - Recommend/ encourage appropriate diets, oral nutritional supplements, and vitamin/mineral supplements  - Order, calculate, and assess calorie counts as needed  - Recommend, monitor, and adjust tube feedings and TPN/PPN based on assessed needs  - Assess need for intravenous fluids  - Provide specific nutrition/hydration education as appropriate  - Include patient/family/caregiver in decisions related to nutrition  Outcome: Progressing     Problem: Prexisting or High Potential for Compromised Skin Integrity  Goal: Skin integrity is maintained or improved  Description: INTERVENTIONS:  - Identify patients at risk for skin breakdown  - Assess and monitor skin integrity  - Assess and monitor nutrition and hydration status  - Monitor labs   - Assess for incontinence   - Turn and reposition patient  - Assist with mobility/ambulation  - Relieve pressure over bony prominences  - Avoid friction and shearing  - Provide appropriate hygiene as needed including keeping skin clean and dry  - Evaluate need for skin moisturizer/barrier cream  - Collaborate with interdisciplinary team   -  Patient/family teaching  - Consider wound care consult   Outcome: Progressing     Problem: GENITOURINARY - ADULT  Goal: Urinary catheter remains patent  Description: INTERVENTIONS:  - Assess patency of urinary catheter  - If patient has a chronic medrano, consider changing catheter if non-functioning  - Follow guidelines for intermittent irrigation of non-functioning urinary catheter  Outcome: Progressing     Problem: METABOLIC, FLUID AND ELECTROLYTES - ADULT  Goal: Electrolytes maintained within normal limits  Description: INTERVENTIONS:  - Monitor labs and assess patient for signs and symptoms of electrolyte imbalances  - Administer electrolyte replacement as ordered  - Monitor response to electrolyte replacements, including repeat lab results as appropriate  - Instruct patient on fluid and nutrition as appropriate  Outcome: Progressing  Goal: Fluid balance maintained  Description: INTERVENTIONS:  - Monitor labs   - Monitor I/O and WT  - Instruct patient on fluid and nutrition as appropriate  - Assess for signs & symptoms of volume excess or deficit  Outcome: Progressing

## 2024-09-16 NOTE — PLAN OF CARE
Problem: SAFETY ADULT  Goal: Patient will remain free of falls  Description: INTERVENTIONS:  - Educate patient/family on patient safety including physical limitations  - Instruct patient to call for assistance with activity   - Consult OT/PT to assist with strengthening/mobility   - Keep Call bell within reach  - Keep bed low and locked with side rails adjusted as appropriate  - Keep care items and personal belongings within reach  - Initiate and maintain comfort rounds  - Make Fall Risk Sign visible to staff  - Offer Toileting every 2 Hours, in advance of need  - Initiate/Maintain bed alarm  - Apply yellow socks and bracelet for high fall risk patients  - Consider moving patient to room near nurses station  Outcome: Progressing     Problem: Prexisting or High Potential for Compromised Skin Integrity  Goal: Skin integrity is maintained or improved  Description: INTERVENTIONS:  - Identify patients at risk for skin breakdown  - Assess and monitor skin integrity  - Assess and monitor nutrition and hydration status  - Monitor labs   - Assess for incontinence   - Turn and reposition patient  - Assist with mobility/ambulation  - Relieve pressure over bony prominences  - Avoid friction and shearing  - Provide appropriate hygiene as needed including keeping skin clean and dry  - Evaluate need for skin moisturizer/barrier cream  - Collaborate with interdisciplinary team   - Patient/family teaching  - Consider wound care consult   Outcome: Progressing

## 2024-09-16 NOTE — PROGRESS NOTES
Progress Note - Nephrology   Name: Ramos Rosenthal 74 y.o. male I MRN: [id number]  Unit/Bed#: ICU 04-01 I Date of Admission: 9/14/2024   Date of Service: 9/16/2024 I Hospital Day: 2     Assessment & Plan  LYLY (acute kidney injury) (HCC)  Creatinine continues to increase, now up to 9.42 mg/dL.  Will plan to initiate hemodialysis later today, PermCath to be placed this morning by interventional radiology.  Although a schedule time is not been selected, anticipate prior to dialysis which is scheduled to start around 1:30 PM.  Will provide the patient with a 2-hour treatment today, followed by 3-hour treatment tomorrow, Tuesday, September 17 and a 4-hour treatment on Wednesday, September 18.    As noted previously, etiology is unclear, many serologies remain pending, complements are within normal limit, although total complement remains pending, HIV is negative, sed rate is elevated in the 80s, PTH minimally elevated at 150, ASO screen is positive at 400 IU/mL, serologies of note they remain pending include ANCA's,    With respect to hepatitis studies, the patient had an acute hepatitis panel ordered, will place an order for the typical inpatient hemodialysis hepatitis screen.  The acute hepatitis studies were negative.    Hyperkalemia  Mild hyperkalemia this morning, serum potassium level is 5.4 mmol/L, will place on 2K potassium bath with hemodialysis later today.  Hyponatremia  Serum sodium level stable at 130 mmol/L, continue with fluid restriction of 1.8 L, will be improved with the initiation of hemodialysis later today.  Secondary hyperparathyroidism of renal origin (HCC)  PTH level elevated but okay at this time, no activated vitamin D agent indicated.  Patient's vitamin D level was appropriate.  Uremia  Patient actively symptomatic due to renal toxins, this should start to improve as we initiate hemodialysis later today and through this week.  Bilateral lower extremity edema  Patient apparently has chronic  "bilateral lower extremity edema, continue with low-sodium diet.  Gout  Patient remains asymptomatic, continue to monitor for now.  Depression  Continue care according to primary  SIRS (systemic inflammatory response syndrome) ([facility])  Patient remains on ceftriaxone, continue care according to hospice recommendations.    Case discussed with hospitalist.  Patient initiate hemodialysis later today, for PermCath this morning, and 2-hour treatments from the hemodialysis standpoint.  This will be followed with a 3-hour treatment tomorrow and then a 4-hour treatment on Wednesday, September 18.    Follow up reason for today's visit: Acute kidney injury/electrolyte disorders    LYLY (acute kidney injury) ([facility])    Patient Active Problem List   Diagnosis    Gout    Gait abnormality    Lumbar stenosis    Polyneuropathy    Lower extremity weakness    Lumbosacral plexopathy    Elevated troponin    EDGAR (dyspnea on exertion)    Rash due to vaculitis     LYLY (acute kidney injury) ([facility])    Depression    Multiple open wounds of lower leg    Paresthesia of both lower extremities    Leucocytosis    Bloating    Hyperlipidemia    Neurogenic claudication    Prediabetes    Vitamin D deficiency    Hyponatremia    SIRS (systemic inflammatory response syndrome) ([facility])    Bilateral lower extremity edema         Subjective:   Patient continues to have mild anorexia, associate with early satiety, positive for dysgeusia    Objective:     Vitals: Blood pressure 160/86, pulse 105, temperature 98.9 °F (37.2 °C), temperature source Temporal, resp. rate 18, height 6' 2\" (1.88 m), weight (!) 141 kg (310 lb), SpO2 97%.,Body mass index is 39.8 kg/m².    Weight (last 2 days)       Date/Time Weight    09/16/24 0742 141 (310)    09/16/24 0552 141 (310.19)    09/15/24 0736 140 (309.53)    09/15/24 0600 140 (309.53)    09/14/24 1624 140 (309.53)    09/14/24 1551 140 (309.53)              Intake/Output Summary (Last 24 hours) at 9/16/2024 0841  Last data filed at " "9/16/2024 0552  Gross per 24 hour   Intake 410 ml   Output 50 ml   Net 360 ml     I/O last 3 completed shifts:  In: 1452.9 [P.O.:480; I.V.:922.9; IV Piggyback:50]  Out: 203 [Urine:203]    Urethral Catheter Double-lumen 16 Fr. (Active)   Amt returned on insertion(mL) 100 mL 09/14/24 1437   Reasons to continue Urinary Catheter  Acute urinary retention/obstruction failing urinary retention protocol 09/16/24 0000   Goal for Removal Remove after 48 hrs of I/O monitoring 09/16/24 0000   Site Assessment Clean;Skin intact 09/16/24 0000   [place] Care Done 09/15/24 2035   Collection Container Standard drainage bag 09/16/24 0000   Securement Method Securing device (Describe) 09/16/24 0000   Securing Device Change Date 09/18/24 09/16/24 0000   Output (mL) 20 mL 09/16/24 0552       Physical Exam: /86   Pulse 105   Temp 98.9 °F (37.2 °C) (Temporal)   Resp 18   Ht 6' 2\" (1.88 m)   Wt (!) 141 kg (310 lb)   SpO2 97%   BMI 39.80 kg/m²     General Appearance:    Alert, cooperative, no distress, appears stated age   Head:    Normocephalic, without obvious abnormality, atraumatic   Eyes:    Conjunctiva/corneas clear   Ears:    Normal external ears   Nose:   Nares normal, septum midline, mucosa normal, no drainage    or sinus tenderness   Throat:   Lips, mucosa, and tongue normal; teeth and gums normal   Neck:   Supple   Back:     Symmetric, no curvature, ROM normal, no CVA tenderness   Lungs:     Clear to auscultation bilaterally, respirations unlabored   Chest wall:    No tenderness or deformity   Heart:    Regular rate and rhythm, S1 and S2 normal, no murmur, rub   or gallop   Abdomen:     Soft, non-tender, bowel sounds active   Extremities:   Extremities normal, atraumatic, no cyanosis or edema   Skin:   Skin color, texture, turgor normal, no rashes or lesions   Lymph nodes:   Cervical normal   Neurologic:   CNII-XII intact            Lab, Imaging and other studies: I have personally reviewed pertinent labs.  CBC:   Lab " "Results   Component Value Date    WBC 16.69 (H) 09/16/2024    HGB 10.0 (L) 09/16/2024    HCT 30.7 (L) 09/16/2024    MCV 87 09/16/2024     (H) 09/16/2024    RBC 3.54 (L) 09/16/2024    MCH 28.2 09/16/2024    MCHC 32.6 09/16/2024    RDW 15.3 (H) 09/16/2024    MPV 8.3 (L) 09/16/2024    NRBC 0 09/16/2024     CMP:   Lab Results   Component Value Date    K 5.4 (H) 09/16/2024    CL 98 09/16/2024    CO2 17 (L) 09/16/2024    BUN 79 (H) 09/16/2024    CREATININE 9.42 (H) 09/16/2024    CALCIUM 8.2 (L) 09/16/2024    AST 79 (H) 09/16/2024    ALT 82 (H) 09/16/2024    ALKPHOS 79 09/16/2024    EGFR 4 09/16/2024       .  Results from last 7 days   Lab Units 09/16/24  0608 09/15/24  0631 09/14/24  1947   POTASSIUM mmol/L 5.4* 5.2 5.3   CHLORIDE mmol/L 98 98 97   CO2 mmol/L 17* 17* 20*   BUN mg/dL 79* 69* 59*   CREATININE mg/dL 9.42* 8.41* 7.51*   CALCIUM mg/dL 8.2* 8.0* 7.4*   ALK PHOS U/L 79  --   --    ALT U/L 82*  --   --    AST U/L 79*  --   --          Phosphorus: No results found for: \"PHOS\"  Magnesium: No results found for: \"MG\"  Urinalysis: No results found for: \"COLORU\", \"CLARITYU\", \"SPECGRAV\", \"PHUR\", \"LEUKOCYTESUR\", \"NITRITE\", \"PROTEINUA\", \"GLUCOSEU\", \"KETONESU\", \"BILIRUBINUR\", \"BLOODU\"  Ionized Calcium: No results found for: \"CAION\"  Coagulation: No results found for: \"PT\", \"INR\", \"APTT\"  Troponin: No results found for: \"TROPONINI\"  ABG: No results found for: \"PHART\", \"YOU6ANV\", \"PO2ART\", \"MSN1DOH\", \"I2EERKJR\", \"BEART\", \"SOURCE\"  Radiology review:     IMAGING  Procedure: US kidney and bladder    Result Date: 9/14/2024  Narrative: RENAL ULTRASOUND INDICATION: severe rito. COMPARISON: CT abdomen pelvis on 9/14/2024 TECHNIQUE: Ultrasound of the retroperitoneum was performed with a curvilinear transducer utilizing volumetric sweeps and still imaging techniques. FINDINGS: KIDNEYS: Symmetric and normal size. Right kidney: 11.3 x 6.0 x 5.1 cm. Volume 181.3 mL Left kidney: 12.4 x 6.0 x 5.4 cm. Volume 210.9 mL Right kidney " Normal echogenicity and contour. No mass is identified. No hydronephrosis. No shadowing calculi. No perinephric fluid collections. Left kidney Normal echogenicity and contour. No mass is identified. No hydronephrosis. No shadowing calculi. No perinephric fluid collections. URETERS: Nonvisualized. BLADDER: Contracted limiting evaluation. Bilateral ureteral jets not detected during exam.     Impression: Unremarkable sonographic appearance of the bilateral kidneys without hydronephrosis. Contracted urinary bladder limiting evaluation. Workstation performed: [id number]     Procedure: CT renal stone study abdomen pelvis wo contrast    Result Date: 9/14/2024  Narrative: CT ABDOMEN AND PELVIS WITHOUT IV CONTRAST - LOW DOSE RENAL STONE INDICATION: hematuria. COMPARISON: None 12/7/2016. TECHNIQUE: Low radiation dose thin section CT examination of the abdomen and pelvis was performed without intravenous or oral contrast according to a protocol specifically designed to evaluate for urinary tract calculus. Axial, sagittal, and coronal 2D reformatted images were created from the source data and submitted for interpretation. Evaluation for pathology in the abdomen and pelvis that is unrelated to urinary tract calculi is limited. Radiation dose length product (DLP) for this visit: 921.88 mGy-cm . This examination, like all CT scans performed in the [facility], was performed utilizing techniques to minimize radiation dose exposure, including the use of iterative reconstruction and automated exposure control. URINARY TRACT FINDINGS: RIGHT KIDNEY AND URETER: No urinary tract calculi. No hydronephrosis or hydroureter. LEFT KIDNEY AND URETER: No urinary tract calculi. No hydronephrosis or hydroureter. URINARY BLADDER: Diffuse bladder wall thickening and mild perivesicular inflammatory changes concerning for cystitis. ADDITIONAL FINDINGS: LOWER CHEST: Trace left basilar effusion and mild dependent changes noted. SOLID  VISCERA: Limited low radiation dose noncontrast CT evaluation demonstrates no clinically significant abnormality of the imaged portions of the liver, spleen, pancreas, or adrenal glands. GALLBLADDER/BILIARY TREE: No calcified gallstones. No pericholecystic inflammatory change. No biliary dilation. STOMACH AND BOWEL: Unremarkable. APPENDIX: No findings to suggest appendicitis. ABDOMINOPELVIC CAVITY: No ascites. No pneumoperitoneum. No lymphadenopathy. VESSELS: Unremarkable for patient's age. REPRODUCTIVE ORGANS: Unremarkable for patient's age. ABDOMINAL WALL/INGUINAL REGIONS: Unremarkable. BONES: L1-L5 posterior fixation and discectomy. Advanced degenerative changes noted.     Impression: No urinary calculi. Mild bladder wall thickening and perivesicular inflammatory change. Correlate clinically for urinalysis. Workstation performed: [id number]         Current Facility-Administered Medications:     calcium carbonate (TUMS) chewable tablet 1,000 mg, Daily PRN    cefTRIAXone (ROCEPHIN) IVPB (premix in dextrose) 1,000 mg 50 mL, Q24H, Last Rate: 1,000 mg (09/15/24 1227)    FLUoxetine (PROzac) capsule 20 mg, Daily    heparin (porcine) subcutaneous injection 5,000 Units, Q8H LORENZO    ondansetron (ZOFRAN) injection 4 mg, Q6H PRN  Medications Discontinued During This Encounter   Medication Reason    meloxicam (MOBIC) 15 mg tablet Therapy completed    multi-electrolyte (PLASMALYTE-A/ISOLYTE-S PH 7.4) IV solution        Gigi Ortega,       This progress note was produced in part using a dictation device which may document imprecise wording from author's original intent.

## 2024-09-16 NOTE — ASSESSMENT & PLAN NOTE
Lab Results   Component Value Date    CREATININE 9.42 (H) 09/16/2024    CREATININE 8.41 (H) 09/15/2024    CREATININE 7.51 (H) 09/14/2024    EGFR 4 09/16/2024    EGFR 5 09/15/2024    EGFR 6 09/14/2024   Unspecified exact etiology  Patient has not had blood work since April 2023 at which time his kidney function was normal  Patient was recently prescribed Bactrim, however since there has been approximately  a year and a half in between BMP testing unsure if this is more of an acute process versus an acute on chronic process or just a progression of chronic kidney disease  Arrival creatinine was 7.95, creatinine which morning is 9.42  Patient also has an anion gap metabolic acidosis secondary to his renal failure  Mild hyperkalemia noted today at 5.4-anticipate this will get better with dialysis  Patient was initially treated with IV fluid, however due to concerns for volume overload state, IV fluids were discontinued   Patient has been subsequently treated with Lasix also  Patient to go to IR today for a dialysis line placement  Dialysis to be initiated later today  Continue level 2 stepdown care

## 2024-09-16 NOTE — ASSESSMENT & PLAN NOTE
Patient actively symptomatic due to renal toxins, this should start to improve as we initiate hemodialysis later today and through this week.

## 2024-09-16 NOTE — ASSESSMENT & PLAN NOTE
Mild hyperkalemia this morning, serum potassium level is 5.4 mmol/L, will place on 2K potassium bath with hemodialysis later today.

## 2024-09-16 NOTE — ASSESSMENT & PLAN NOTE
Most likely a hypervolemic hyponatremia in the setting of acute renal failure, and volume overload  Continue to monitor BMPs  Anticipate sodium level should get better with dialysis

## 2024-09-16 NOTE — DISCHARGE INSTRUCTIONS
[facility]  Interventional Radiology  [phone]        Procedural Sedation   WHAT YOU NEED TO KNOW:   Procedural sedation is medicine used during procedures to help you feel relaxed and calm. You will remember little to none of the procedure. After sedation you may feel tired, weak, or unsteady on your feet. You may also have trouble concentrating or short-term memory loss. These symptoms should go away in 24 hours or less.   DISCHARGE INSTRUCTIONS:   Call 911 or have someone else call for any of the following:   You have sudden trouble breathing.     You cannot be woken.     Contact Interventional Radiology at [phone]   SALDIVAR PATIENTS: Contact Interventional Radiology at [phone] YUNIOR PATIENTS: Contact Interventional Radiology at [phone]) if any of the following occur:      You have a severe headache or dizziness.     Your heart is beating faster than usual.    You have a fever or chills.     Your skin is itchy, swollen, or you have a rash.     You have nausea or are vomiting for more than 8 hours after the procedure.      You have questions or concerns about your condition or care.  Self-care:   Have someone stay with you for 24 hours. This person can drive you to errands and help you do things around the house. This person can also watch for problems.      Rest and do quiet activities for 24 hours. Do not exercise, ride a bike, or play sports. Stand up slowly to prevent dizziness and falls. Take short walks around the house with another person. Slowly return to your usual activities the next day.      Do not drive or use dangerous machines or tools for 24 hours. You may injure yourself or others. Examples include a lawnmower, saw, or drill. Do not return to work for 24 hours if you use dangerous machines or tools for work.      Do not make important decisions for 24 hours. For example, do not sign important papers or invest money.      Drink liquids as directed.  Liquids help flush the sedation medicine out of your body. Ask how much liquid to drink each day and which liquids are best for you.      Eat small, frequent meals to prevent nausea and vomiting. Start with clear liquids such as juice or broth. If you do not vomit after clear liquids, you can eat your usual foods.      Do not drink alcohol or take medicines that make you drowsy. This includes medicines that help you sleep and anxiety medicines. Ask your healthcare provider if it is safe for you to take pain medicine.  Follow up with your healthcare provider as directed: Write down your questions so you remember to ask them during your visits.         Percutaneous Kidney Biopsy   WHAT YOU NEED TO KNOW:   A percutaneous kidney biopsy is a procedure to remove a small sample of kidney tissue. It may also be done to check for kidney disease or cancer.     DISCHARGE INSTRUCTIONS:   Follow up with your healthcare provider as directed:  Write down your questions so you remember to ask them during your visits.   Wound care:  The Band-Aid may be removed in 24 hours.                                                                                                For more information:   National Kidney and Urologic Diseases Information Clearinghouse  [address] [place] [address]  Phone: 3- 590 - [phone]  Web Address: http://kidney.niddk.nih.gov/   Care after your procedure:    1. Limit your activities for 36 hours after your biopsy.    2. No driving day of biopsy.    3. Return to your normal diet. Flat sips of flat soda helps with mild nausea.    4. Remove band-aid or dressing 24 hours after procedure.       Contact Interventional Radiology at [phone]    (JANNA PATIENTS: Contact Interventional Radiology at [phone]) (YUNIOR PATIENTS: Contact Interventional Radiology at [phone]) if:    1. Difficulty breathing, nausea or vomiting.    2  You feel weak or dizzy.    3. Chills or fever above 101 degrees  F.     You have persistent nausea or vomiting    4. You have severe pain in your abdomen or where You feel weak or dizzy.your           procedure was done.    5  You have blood in your urine. You urinate small amounts or not at all.    4. Develop any redness, swelling, heat, unusual drainage, heavy bruising or bleeding     from biopsy site.                 Perma-cath Placement   WHAT YOU NEED TO KNOW:   A perma-cath is a catheter placed through a vein into or near your right atrium. Your right atrium is the right upper chamber of your heart. A perma-cath is used for dialysis in an emergency or until a long-term device is ready to use. After your procedure, you will have some pain and swelling on your chest and neck. You may have some bruises on your chest and neck. You may also have 2 dressings, one on your chest and one on your neck.   DISCHARGE INSTRUCTIONS:   Call 911 for any of the following:   You feel lightheaded, short of breath, and have chest pain.    Your catheter comes out   Contact Interventional Radiology at [phone] (SALDIVAR PATIENTS: Contact Interventional Radiology at [phone]) (YUNIOR PATIENTS: Contact Interventional Radiology at [phone]) if:  Blood soaks through your bandage.   You have new swelling in your arm, neck, face, or chest on your right side.  Your catheter gets wet.    Your bruises or pain get worse.   You have a fever or chills.  Persistent nausea or vomiting.   Your incision is red, swollen, or draining pus.   You have questions or concerns about your condition or care.  Self-care:       Resume your normal diet.  Keep your dressings dry. Do not take a shower or swim. You may take a tub bath, but do not get your dressings wet. Water in your wound can cause bacteria to grow and cause an infection. If your dressing gets wet, dry it off and cover it with dry sterile gauze. Call your healthcare provider. Do not use soaps or ointments.  Do not change your dressings. Your  healthcare provider or dialysis nurse will change your dressings. Your dressings should stay in place until your healthcare provider removes them. The dressing on your chest will stay as long as you have the catheter in place. The dressing prevents infection.    Do not remove the red and blue caps from the end of your catheter. The caps prevent air from getting into your catheter.  Follow up with your healthcare provider as directed: Write down your questions so you remember to ask them during your visits.             Perma-cath Placement   WHAT YOU NEED TO KNOW:   A perma-cath is a catheter placed through a vein into or near your right atrium. Your right atrium is the right upper chamber of your heart. A perma-cath is used for dialysis in an emergency or until a long-term device is ready to use. After your procedure, you will have some pain and swelling on your chest and neck. You may have some bruises on your chest and neck. You may also have 2 dressings, one on your chest and one on your neck.   DISCHARGE INSTRUCTIONS:   Call 911 for any of the following:   You feel lightheaded, short of breath, and have chest pain.    Your catheter comes out   Contact Interventional Radiology at [phone] (JANNA PATIENTS: Contact Interventional Radiology at [phone]) (YUNIOR PATIENTS: Contact Interventional Radiology at [phone]) if:  Blood soaks through your bandage.   You have new swelling in your arm, neck, face, or chest on your right side.  Your catheter gets wet.    Your bruises or pain get worse.   You have a fever or chills.  Persistent nausea or vomiting.   Your incision is red, swollen, or draining pus.   You have questions or concerns about your condition or care.  Self-care:       Resume your normal diet.  Keep your dressings dry. Do not take a shower or swim. You may take a tub bath, but do not get your dressings wet. Water in your wound can cause bacteria to grow and cause an infection. If your dressing  gets wet, dry it off and cover it with dry sterile gauze. Call your healthcare provider. Do not use soaps or ointments.  Do not change your dressings. Your healthcare provider or dialysis nurse will change your dressings. Your dressings should stay in place until your healthcare provider removes them. The dressing on your chest will stay as long as you have the catheter in place. The dressing prevents infection.    Do not remove the red and blue caps from the end of your catheter. The caps prevent air from getting into your catheter.  Follow up with your healthcare provider as directed: Write down your questions so you remember to ask them during your visits.

## 2024-09-16 NOTE — CONSULTS
Interventional Radiology  Consultation 2024     Consults  Ramos Rosenthal   1950   [id number]      Assessment/Plan:  LYLY for temp cath    Medical Problems       Problem List       * (Principal) LYLY (acute kidney injury) (HCC)    Gout    Gait abnormality    Lumbar stenosis    Polyneuropathy    Lower extremity weakness    Lumbosacral plexopathy    Elevated troponin    EDGAR (dyspnea on exertion)    Rash due to vaculitis     Depression    Multiple open wounds of lower leg    Paresthesia of both lower extremities    Leucocytosis    Bloating    Hyperlipidemia    Neurogenic claudication    Prediabetes    Vitamin D deficiency    Hyponatremia    SIRS (systemic inflammatory response syndrome) (HCC)    Bilateral lower extremity edema    Hyperkalemia    Secondary hyperparathyroidism of renal origin (HCC)    Uremia    Primary hypertension            Subjective:     Patient ID: Ramos Rosenthal is a 74 y.o. male.    History of Present Illness  Renal failure. For RRT    Review of Systems      Past Medical History:   Diagnosis Date    Depression     Gout         Past Surgical History:   Procedure Laterality Date    BACK SURGERY      X2 LUMBAR INCLUDING FUSION  ,  WITH OAA, [facility] DR. HERZOG    CATARACT EXTRACTION, BILATERAL      KNEE ARTHROSCOPY Right     x2 , meniscus repair    TONSILECTOMY AND ADNOIDECTOMY          Social History     Tobacco Use   Smoking Status Former    Current packs/day: 0.00    Types: Cigarettes    Quit date:     Years since quittin.7   Smokeless Tobacco Never        Social History     Substance and Sexual Activity   Alcohol Use Not Currently    Comment: occasional        Social History     Substance and Sexual Activity   Drug Use Never        Allergies   Allergen Reactions    Ciprofloxacin Blisters    Other      IVORY SOAP    Penicillins Rash       Current Facility-Administered Medications   Medication Dose Route Frequency Provider Last Rate Last Admin    calcium carbonate (TUMS)  chewable tablet 1,000 mg  1,000 mg Oral Daily PRN BRETT Xie        carvedilol (COREG) tablet 6.25 mg  6.25 mg Oral BID With Meals Gigi Ortega DO   6.25 mg at 09/18/24 0739    FLUoxetine (PROzac) capsule 20 mg  20 mg Oral Daily BRETT Xie   20 mg at 09/18/24 0830    heparin (porcine) subcutaneous injection 5,000 Units  5,000 Units Subcutaneous Q8H [facility] BRETT Xie   5,000 Units at 09/18/24 0554    ondansetron (ZOFRAN) injection 4 mg  4 mg Intravenous Q6H PRN BRETT Xie              Objective:    Vitals:    09/18/24 1130 09/18/24 1200 09/18/24 1230 09/18/24 1300   BP: 122/60 122/59 125/59 111/57   Pulse: 61 63 66 62   Resp: 20 20 20 20   Temp:       TempSrc:       SpO2:       Weight:       Height:            Physical Exam      Lab Results   Component Value Date    BNP 1,176 (H) 09/15/2024      Lab Results   Component Value Date    WBC 13.41 (H) 09/18/2024    HGB 8.8 (L) 09/18/2024    HCT 27.1 (L) 09/18/2024    MCV 87 09/18/2024     09/18/2024     Lab Results   Component Value Date    INR 1.40 (H) 09/14/2024    INR 1.29 (H) 09/30/2022    INR 0.97 09/13/2022    PROTIME 17.7 (H) 09/14/2024    PROTIME 16.3 (H) 09/30/2022    PROTIME 13.1 09/13/2022     Lab Results   Component Value Date    PTT 41 (H) 09/14/2024         I have personally reviewed pertinent imaging and laboratory results.     Code Status: Level 1 - Full Code  Advance Directive and Living Will:      Power of :    POLST:      IR has been consulted to evaluate the patient, determine the appropriate procedure, and whether or not a procedure can and should be performed.    Thank you for allowing me to participate in the care of Ramos Rosenthal. Please don't hesitate to call, text, email, or TigerText with any questions.     This text is generated with voice recognition software. There may be translation, syntax,  or grammatical errors. If you have any  questions, please contact the dictating provider.

## 2024-09-16 NOTE — PROCEDURES
Central Line Insertion    Date/Time: 9/16/2024 9:09 AM    Performed by: Antoine Case MD  Authorized by: Antoine Case MD    Patient location:  IR  Other Assisting Provider: Yes (comment)    Consent:     Consent obtained:  Written    Consent given by:  Patient    Risks discussed:  Bleeding, infection and pneumothorax    Alternatives discussed:  No treatment and delayed treatment  Universal protocol:     Procedure explained and questions answered to patient or proxy's satisfaction: yes      Relevant documents present and verified: yes      Test results available and properly labeled: yes      Radiology Images displayed and confirmed.  If images not available, report reviewed: yes      Required blood products, implants, devices, and special equipment available: yes      Site/side marked: yes      Immediately prior to procedure, a time out was called: yes      Patient identity confirmed:  Verbally with patient and arm band  Pre-procedure details:     Hand hygiene: Hand hygiene performed prior to insertion      Sterile barrier technique: All elements of maximal sterile technique followed      Skin preparation:  2% chlorhexidine    Skin preparation agent: Skin preparation agent completely dried prior to procedure    Indications:     Central line indications: dialysis    Anesthesia (see MAR for exact dosages):     Anesthesia method:  Local infiltration    Local anesthetic:  Lidocaine 1% w/o epi  Procedure details:     Vessel type: vein      Laterality:  Right    Approach: percutaneous technique used      Patient position:  Flat    Catheter type:  Double lumen    Catheter size:  14 Fr    Landmarks identified: yes      Ultrasound guidance: yes      Ultrasound image availability:  Images available in PACS    Sterile ultrasound techniques: Sterile gel and sterile probe covers were used      Number of attempts:  2    Successful placement: yes      Catheter tip vessel location: atriocaval junction    Post-procedure  details:     Post-procedure:  Dressing applied    Assessment:  Blood return through all ports, no pneumothorax on x-ray and placement verified by x-ray    Post-procedure complications: none      Patient tolerance of procedure:  Tolerated well, no immediate complications  Comments:      Radu FAIRCHILD and myself.  Initial IJ puncture. Wire went straight below diaphragm. Catheter would not advance.   Stuck EJ. Wire also went below diaphragm. Catheter placed without issue.  Will check CXR.

## 2024-09-16 NOTE — CASE MANAGEMENT
Case Management Assessment & Discharge Planning Note    Patient name Ramos Rosenthal  Location ICU 04/ICU  MRN [id number]  : 1950 Date 2024       Current Admission Date: 2024  Current Admission Diagnosis:LYLY (acute kidney injury) ([facility])   Patient Active Problem List    Diagnosis Date Noted Date Diagnosed    Hyperkalemia 2024     Secondary hyperparathyroidism of renal origin ([facility]) 2024     Uremia 2024     Hyponatremia 2024     SIRS (systemic inflammatory response syndrome) ([facility]) 2024     Bilateral lower extremity edema 2024     Neurogenic claudication 2023    Bloating 10/05/2022     Paresthesia of both lower extremities 10/01/2022     Leucocytosis 10/01/2022     Elevated troponin 2022     EDGAR (dyspnea on exertion) 2022     Rash due to vaculitis  2022     LYLY (acute kidney injury) ([facility]) 2022     Depression 2022     Multiple open wounds of lower leg 2022     Lower extremity weakness 2019     Lumbosacral plexopathy 2019     Gait abnormality 2019     Lumbar stenosis 2019     Polyneuropathy 2019     Gout      Prediabetes 2018    Hyperlipidemia 2014    Vitamin D deficiency 2014      LOS (days): 2  Geometric Mean LOS (GMLOS) (days): 5.1  Days to GMLOS:3.1     OBJECTIVE:    Risk of Unplanned Readmission Score: 18.21         Current admission status: Inpatient       Preferred Pharmacy:   [id number] [place] - [address]  [address] [address]  Phone: [phone] Fax: [phone]    Primary Care Provider: Jessika Carrillo MD    Primary Insurance: MEDICARE  Secondary Insurance: [employer]IF    ASSESSMENT:  Active Health Care Proxies       Louise Rosenthal Health Care Representative - Spouse   Primary Phone: [phone] (Home)                 Advance Directives  Does patient  have a Health Care POA?: Yes  Was patient offered paperwork?: No (Per dtr. her Mother is the 1st agent and she is the second agent.)  Does patient currently have a Health Care decision maker?: Yes, please see Health Care Proxy section  Does patient have Advance Directives?: Yes  Advance Directives: Living will, Power of  for health care, Power of  for finance  Primary Contact: Louise Rosenthal  Spouse  [phone]  Address linked to patient  Health Care Representative         Readmission Root Cause  30 Day Readmission: No    Patient Information  Admitted from:: Home  Mental Status: Alert  During Assessment patient was accompanied by: Daughter, Spouse  Assessment information provided by:: Patient, Spouse, Daughter  Primary Caregiver: Family  Caregiver's Name:: Louise Rosenthal  Spouse  [phone]  Caregiver's Telephone Number:: [phone]  Support Systems: Spouse/significant other  County of Residence: [place]  What city do you live in?: [place]  Home entry access options. Select all that apply.: Stairs (Has a chair lift to porch)  Number of steps to enter home.: 4  Do the steps have railings?: Yes  Type of Current Residence: [address] [place] [address] home  Upon entering residence, is there a bedroom on the main floor (no further steps)?: Yes  Upon entering residence, is there a bathroom on the main floor (no further steps)?: Yes  Living Arrangements: Lives w/ Spouse/significant other  Is patient a ?: No    Activities of Daily Living Prior to Admission  Functional Status: Assistance  Completes ADLs independently?: Yes  Level of ADL dependence: Assistance  Ambulates independently?: No (W/C bound at baseline)  Level of ambulatory dependence: Total Dependent  Does patient use assisted devices?: Yes  Assisted Devices (DME) used:  (KAFO, recliner)  Does patient currently own DME?: Yes  What DME does the patient currently own?: Wheelchair, Stair Chair/Glide  Does patient have a history of Outpatient Therapy  (PT/OT)?: Yes  Does the patient have a history of Short-Term Rehab?:  (Several- [facility] was one)  Does patient have a history of HHC?: Yes ([facility])  Does patient currently have HHC?: Yes         Patient Information Continued  Income Source: Pension/assisted  Does patient receive dialysis treatments?:  (Getting first one today which will determine future plan)  Does patient have a history of Mental Health Diagnosis?: No    PHQ 2/9 Screening   Reviewed PHQ 2/9 Depression Screening Score?: No    Means of Transportation  Means of Transport to Appts:: Family transport      Social Determinants of Health (SDOH)      Flowsheet Row Most Recent Value   Housing Stability    In the last 12 months, was there a time when you were not able to pay the mortgage or rent on time? N   In the past 12 months, how many times have you moved where you were living? 0   At any time in the past 12 months, were you homeless or living in a shelter (including now)? N   Transportation Needs    In the past 12 months, has lack of transportation kept you from medical appointments or from getting medications? no   In the past 12 months, has lack of transportation kept you from meetings, work, or from getting things needed for daily living? No   Food Insecurity    Within the past 12 months, you worried that your food would run out before you got the money to buy more. Never true   Within the past 12 months, the food you bought just didn't last and you didn't have money to get more. Never true   Utilities    In the past 12 months has the electric, gas, oil, or water company threatened to shut off services in your home? No            DISCHARGE DETAILS:    Discharge planning discussed with:: Judy , spouse and daughter     Comments - Freedom of Choice: CM introduced self and role.  FOC was maintained throughout the conversation of d/c planning resources.  CM broached the topic of OP Dialysis, should this hospital stay outcome make that recommendation.   CM discussed the local [facility] unit in [place] and the family stated they may  also want to explore [facility], if the time slots were more to their liking for treatment.  The patient would need transportation coordinated per spouse.  The patient states he has had several STR stays in the past and his discharge plan fronm the hospital would be to return home with his wife and [facility].  The patient requested CM to folowup with the conversation once todays Dialysis treatment completed and Physician's determination is made if it will need to be on-going.  CM contacted family/caregiver?: Yes  Were Treatment Team discharge recommendations reviewed with patient/caregiver?:  (Still in workup stage)  Did patient/caregiver verbalize understanding of patient care needs?: Yes       Contacts  Patient Contacts: Louise Rosenthal  Spouse  [phone]  Relationship to Patient:: Family  Contact Method: In Person  Reason/Outcome: Continuity of Care, Referral, Discharge Planning    Requested Home Health Care         Is the patient interested in HHC at discharge?: Yes  Home Health Discipline requested:: Nursing  Home Health Agency Name:: [facility]  HHA External Referral Reason (only applicable if external HHA name selected): Patient has established relationship with provider  Home Health Follow-Up Provider:: PCP  Home Health Services Needed:: Evaluate Functional Status and Safety, Strengthening/Theraputic Exercises to Improve Function  Homebound Criteria Met:: Requires the Assistance of Another Person for Safe Ambulation or to Leave the Home, Uses an Assist Device (i.e. cane, walker, etc)  Supporting Clincal Findings:: Bed Bound or Wheelchair Bound    DME Referral Provided  Referral made for DME?: No    Other Referral/Resources/Interventions Provided:  Interventions: HHC  Referral Comments: Referral made in [facility] for [facility].  OP Dialysis is coming into consideration and will need to be coordinated if todays treatment indicates an on-  going need.  Per wife then transportation will need to be coordinated.  The patient does not want STR.    Would you like to participate in our [facility] service program?  : No - Declined    Treatment Team Recommendation:  (TBD)  Discharge Destination Plan:: Home with Home Health Care  Transport at Discharge :  (TBD)

## 2024-09-17 ENCOUNTER — APPOINTMENT (INPATIENT)
Dept: DIALYSIS | Facility: HOSPITAL | Age: 74
DRG: 871 | End: 2024-09-17
Attending: INTERNAL MEDICINE
Payer: MEDICARE

## 2024-09-17 PROBLEM — I10 PRIMARY HYPERTENSION: Status: ACTIVE | Noted: 2024-09-17

## 2024-09-17 LAB
ALBUMIN SERPL ELPH-MCNC: 1.99 G/DL (ref 3.2–5.1)
ALBUMIN SERPL ELPH-MCNC: 39 % (ref 48–70)
ALBUMIN UR ELPH-MCNC: 48.1 %
ALPHA1 GLOB MFR UR ELPH: 4.6 %
ALPHA1 GLOB SERPL ELPH-MCNC: 0.56 G/DL (ref 0.15–0.47)
ALPHA1 GLOB SERPL ELPH-MCNC: 10.9 % (ref 1.8–7)
ALPHA2 GLOB MFR UR ELPH: 20.7 %
ALPHA2 GLOB SERPL ELPH-MCNC: 1.18 G/DL (ref 0.42–1.04)
ALPHA2 GLOB SERPL ELPH-MCNC: 23.1 % (ref 5.9–14.9)
ANION GAP SERPL CALCULATED.3IONS-SCNC: 14 MMOL/L (ref 4–13)
ATRIAL RATE: 133 BPM
ATRIAL RATE: 68 BPM
B-GLOBULIN MFR UR ELPH: 9.1 %
BASOPHILS # BLD AUTO: 0.06 THOUSANDS/ΜL (ref 0–0.1)
BASOPHILS NFR BLD AUTO: 0 % (ref 0–1)
BETA GLOB ABNORMAL SERPL ELPH-MCNC: 0.33 G/DL (ref 0.31–0.57)
BETA1 GLOB SERPL ELPH-MCNC: 6.4 % (ref 4.7–7.7)
BETA2 GLOB SERPL ELPH-MCNC: 7.6 % (ref 3.1–7.9)
BETA2+GAMMA GLOB SERPL ELPH-MCNC: 0.39 G/DL (ref 0.2–0.58)
BUN SERPL-MCNC: 66 MG/DL (ref 5–25)
CALCIUM SERPL-MCNC: 8.1 MG/DL (ref 8.4–10.2)
CH50 SERPL-ACNC: >60 U/ML
CHLORIDE SERPL-SCNC: 97 MMOL/L (ref 96–108)
CO2 SERPL-SCNC: 21 MMOL/L (ref 21–32)
CREAT SERPL-MCNC: 8.38 MG/DL (ref 0.6–1.3)
EOSINOPHIL # BLD AUTO: 0.15 THOUSAND/ΜL (ref 0–0.61)
EOSINOPHIL NFR BLD AUTO: 1 % (ref 0–6)
ERYTHROCYTE [DISTWIDTH] IN BLOOD BY AUTOMATED COUNT: 15.4 % (ref 11.6–15.1)
GAMMA GLOB ABNORMAL SERPL ELPH-MCNC: 0.66 G/DL (ref 0.4–1.66)
GAMMA GLOB MFR UR ELPH: 17.5 %
GAMMA GLOB SERPL ELPH-MCNC: 13 % (ref 6.9–22.3)
GFR SERPL CREATININE-BSD FRML MDRD: 5 ML/MIN/1.73SQ M
GLUCOSE SERPL-MCNC: 101 MG/DL (ref 65–140)
HCT VFR BLD AUTO: 30.4 % (ref 36.5–49.3)
HGB BLD-MCNC: 9.9 G/DL (ref 12–17)
IGG/ALB SER: 0.64 {RATIO} (ref 1.1–1.8)
IMM GRANULOCYTES # BLD AUTO: 0.07 THOUSAND/UL (ref 0–0.2)
IMM GRANULOCYTES NFR BLD AUTO: 1 % (ref 0–2)
INTERPRETATION UR IFE-IMP: NORMAL
INTERPRETATION UR IFE-IMP: NORMAL
KAPPA LC FREE SER-MCNC: 120.4 MG/L (ref 3.3–19.4)
KAPPA LC FREE/LAMBDA FREE SER: 2 {RATIO} (ref 0.26–1.65)
LAMBDA LC FREE SERPL-MCNC: 60.1 MG/L (ref 5.7–26.3)
LYMPHOCYTES # BLD AUTO: 0.75 THOUSANDS/ΜL (ref 0.6–4.47)
LYMPHOCYTES NFR BLD AUTO: 5 % (ref 14–44)
MCH RBC QN AUTO: 28 PG (ref 26.8–34.3)
MCHC RBC AUTO-ENTMCNC: 32.6 G/DL (ref 31.4–37.4)
MCV RBC AUTO: 86 FL (ref 82–98)
MONOCYTES # BLD AUTO: 1.16 THOUSAND/ΜL (ref 0.17–1.22)
MONOCYTES NFR BLD AUTO: 8 % (ref 4–12)
MRSA NOSE QL CULT: NORMAL
NEUTROPHILS # BLD AUTO: 13.26 THOUSANDS/ΜL (ref 1.85–7.62)
NEUTS SEG NFR BLD AUTO: 85 % (ref 43–75)
NRBC BLD AUTO-RTO: 0 /100 WBCS
PLATELET # BLD AUTO: 399 THOUSANDS/UL (ref 149–390)
PMV BLD AUTO: 8.4 FL (ref 8.9–12.7)
POTASSIUM SERPL-SCNC: 5.3 MMOL/L (ref 3.5–5.3)
PROT PATTERN SERPL ELPH-IMP: ABNORMAL
PROT PATTERN UR ELPH-IMP: NORMAL
PROT SERPL-MCNC: 5.1 G/DL (ref 6.4–8.2)
PROT UR-MCNC: 206.9 MG/DL
QRS AXIS: -50 DEGREES
QRS AXIS: -59 DEGREES
QRSD INTERVAL: 104 MS
QRSD INTERVAL: 96 MS
QT INTERVAL: 332 MS
QT INTERVAL: 338 MS
QTC INTERVAL: 486 MS
QTC INTERVAL: 499 MS
RBC # BLD AUTO: 3.53 MILLION/UL (ref 3.88–5.62)
SODIUM SERPL-SCNC: 132 MMOL/L (ref 135–147)
T WAVE AXIS: -9 DEGREES
T WAVE AXIS: 49 DEGREES
VENTRICULAR RATE: 129 BPM
VENTRICULAR RATE: 131 BPM
WBC # BLD AUTO: 15.45 THOUSAND/UL (ref 4.31–10.16)

## 2024-09-17 PROCEDURE — NC001 PR NO CHARGE: Performed by: PHYSICIAN ASSISTANT

## 2024-09-17 PROCEDURE — 85025 COMPLETE CBC W/AUTO DIFF WBC: CPT | Performed by: HOSPITALIST

## 2024-09-17 PROCEDURE — 84165 PROTEIN E-PHORESIS SERUM: CPT | Performed by: STUDENT IN AN ORGANIZED HEALTH CARE EDUCATION/TRAINING PROGRAM

## 2024-09-17 PROCEDURE — 99233 SBSQ HOSP IP/OBS HIGH 50: CPT | Performed by: INTERNAL MEDICINE

## 2024-09-17 PROCEDURE — 93010 ELECTROCARDIOGRAM REPORT: CPT | Performed by: INTERNAL MEDICINE

## 2024-09-17 PROCEDURE — 86334 IMMUNOFIX E-PHORESIS SERUM: CPT | Performed by: STUDENT IN AN ORGANIZED HEALTH CARE EDUCATION/TRAINING PROGRAM

## 2024-09-17 PROCEDURE — 99232 SBSQ HOSP IP/OBS MODERATE 35: CPT | Performed by: HOSPITALIST

## 2024-09-17 PROCEDURE — 80048 BASIC METABOLIC PNL TOTAL CA: CPT | Performed by: HOSPITALIST

## 2024-09-17 PROCEDURE — 84166 PROTEIN E-PHORESIS/URINE/CSF: CPT | Performed by: STUDENT IN AN ORGANIZED HEALTH CARE EDUCATION/TRAINING PROGRAM

## 2024-09-17 PROCEDURE — 86335 IMMUNFIX E-PHORSIS/URINE/CSF: CPT | Performed by: STUDENT IN AN ORGANIZED HEALTH CARE EDUCATION/TRAINING PROGRAM

## 2024-09-17 PROCEDURE — 5A1D70Z PERFORMANCE OF URINARY FILTRATION, INTERMITTENT, LESS THAN 6 HOURS PER DAY: ICD-10-PCS | Performed by: INTERNAL MEDICINE

## 2024-09-17 PROCEDURE — 99232 SBSQ HOSP IP/OBS MODERATE 35: CPT | Performed by: PHYSICIAN ASSISTANT

## 2024-09-17 PROCEDURE — 93005 ELECTROCARDIOGRAM TRACING: CPT

## 2024-09-17 RX ORDER — CARVEDILOL 3.12 MG/1
6.25 TABLET ORAL 2 TIMES DAILY WITH MEALS
Status: DISCONTINUED | OUTPATIENT
Start: 2024-09-17 | End: 2024-10-10

## 2024-09-17 RX ADMIN — HEPARIN SODIUM 5000 UNITS: 5000 INJECTION, SOLUTION INTRAVENOUS; SUBCUTANEOUS at 22:39

## 2024-09-17 RX ADMIN — HEPARIN SODIUM 5000 UNITS: 5000 INJECTION, SOLUTION INTRAVENOUS; SUBCUTANEOUS at 05:45

## 2024-09-17 RX ADMIN — FLUOXETINE HYDROCHLORIDE 20 MG: 20 CAPSULE ORAL at 09:05

## 2024-09-17 RX ADMIN — CARVEDILOL 6.25 MG: 3.12 TABLET, FILM COATED ORAL at 16:46

## 2024-09-17 RX ADMIN — HEPARIN SODIUM 5000 UNITS: 5000 INJECTION, SOLUTION INTRAVENOUS; SUBCUTANEOUS at 14:57

## 2024-09-17 RX ADMIN — CARVEDILOL 6.25 MG: 3.12 TABLET, FILM COATED ORAL at 09:05

## 2024-09-17 NOTE — TELEMEDICINE
e-Consult (IPC)  - Interventional Radiology  Ramos Rosenthal 74 y.o. male MRN: [id number]  Unit/Bed#: ICU 04-01 Encounter: [id number]          Interventional Radiology has been consulted to evaluate Ramos Rosenthal    We were consulted by internal medicine/nephrology concerning this patient with ESRD now requiring dialysis.  IR was previously consulted during hospital admission for temporary dialysis catheter placement.  Patient has been receiving hemodialysis and it is now anticipated the patient will require hemodialysis in the outpatient setting.  The etiology of his renal disease is also unclear and a random kidney biopsy has been requested as well.    Inpatient Consult to IR  Consult performed by: Gigi Pearl PA-C  Consult ordered by: Agnes Rust MD        09/17/24    Assessment/Recommendation:     End-stage renal disease requiring dialysis  -Plan for renal biopsy, pending IR scheduling  -Plan for conversion of temp HD catheter to tunneled HD catheter, pending IR scheduling  -N.p.o. midnight prior procedures  -Hold long-acting anticoagulation and antiplatelet medications    11-20 minutes, >50% of the total time devoted to medical consultative verbal/EMR discussion between providers. Written report will be generated in the EMR.     Thank you for allowing Interventional Radiology to participate in the care of Ramos Rosenthal. Please don't hesitate to call or TigerText us with any questions.     Gigi Pearl PA-C

## 2024-09-17 NOTE — ASSESSMENT & PLAN NOTE
Patient currently off of antibiotics, continue care according to our infectious ease colleagues recommendations.

## 2024-09-17 NOTE — ASSESSMENT & PLAN NOTE
Patient has been experiencing blood pressures above goal, and has been associated with elevated heart rates.  Will initiate low-dose carvedilol 6.25 mg twice daily.  Continue to closely monitor clinical parameters.

## 2024-09-17 NOTE — ASSESSMENT & PLAN NOTE
Patient met sepsis criteria, present on admission as evident by leukocytosis, tachypnea and tachycardia, arrival temp was 100.4 °F  Secondary to an acute cystitis without hematuria  SIRS criteria have significantly improved, patient is no longer tachycardic, and/or febrile.  White blood cell count i however is uptrending  Status post 3 days worth of IV ceftriaxone-treatment was in place for suspected UTI  Appreciate ID input  Plan is to monitor off of antibiotics

## 2024-09-17 NOTE — CASE MANAGEMENT
Case Management Discharge Planning Note    Patient name Ramos Rosenthal  Location ICU 04/ICU  MRN [id number]  : 1950 Date 2024       Current Admission Date: 2024  Current Admission Diagnosis:LYLY (acute kidney injury) ([facility])   Patient Active Problem List    Diagnosis Date Noted Date Diagnosed    Primary hypertension 2024     Hyperkalemia 2024     Secondary hyperparathyroidism of renal origin ([facility]) 2024     Uremia 2024     Hyponatremia 2024     SIRS (systemic inflammatory response syndrome) ([facility]) 2024     Bilateral lower extremity edema 2024     Neurogenic claudication 2023    Bloating 10/05/2022     Paresthesia of both lower extremities 10/01/2022     Leucocytosis 10/01/2022     Elevated troponin 2022     EDGAR (dyspnea on exertion) 2022     Rash due to vaculitis  2022     LYLY (acute kidney injury) ([facility]) 2022     Depression 2022     Multiple open wounds of lower leg 2022     Lower extremity weakness 2019     Lumbosacral plexopathy 2019     Gait abnormality 2019     Lumbar stenosis 2019     Polyneuropathy 2019     Gout      Prediabetes 2018    Hyperlipidemia 2014    Vitamin D deficiency 2014      LOS (days): 3  Geometric Mean LOS (GMLOS) (days): 5.1  Days to GMLOS:2.2     OBJECTIVE:  Risk of Unplanned Readmission Score: 18.1     Current admission status: Inpatient   Preferred Pharmacy:   [id number] [place] - [address]  [address] [facility] [address]  Phone: [phone] Fax: [phone]    Primary Care Provider: Jessika Carrillo MD    Primary Insurance: MEDICARE  Secondary Insurance: [employer]IF    DISCHARGE DETAILS:  AS per SLIM the pt will be getting a permacath on Thursday and a kidney bx.  Spoke to the pt at the bedside who prefers [facility]-- later in the  day.  Sent referral to [facility] for OP chair.

## 2024-09-17 NOTE — ASSESSMENT & PLAN NOTE
Patient now hemodialysis dependent, today's day 2 of hemodialysis treatment.  He will be on a 3-hour session, will attempt to ultrafilter 2 L today.  Patient to have a 4-hour treatment tomorrow, Wednesday, September 18.    With respect etiology, serologies remain pending, please refer to my note from yesterday regarding those that have already come back.  Thus far all major labs have been either negative or appropriate.  Will try to have a kidney biopsy this Thursday, September 19.  The [place] kit form to be filled out and available in the patient's chart in the near future.    Will also request for a PermCath to be placed on Thursday as the patient will most likely need outpatient hemodialysis given current clinical circumstances.    Will discontinue [place] catheter.    Addendum:   Patient's urine immunofixation shows a faint positive protein, increase in kappa chains noted.  Will work this up further.

## 2024-09-17 NOTE — PLAN OF CARE
Post-Dialysis RN Treatment Note    Blood Pressure:  Pre-166/79 mm/Hg  Post-102/55 mmHg   EDW-TBD   Weight:  Pre-139.8 kg   Post-138.3 kg   Mode of weight measurement: Bed Scale   Volume Removed-1500 ml    Treatment duration-180 minutes    NS given-  Yes, 200 ml due to hypotension-pt states feeling dizzy.    Treatment shortened? No   Medications given during Rx-Carvedilol 6.25 mg   Estimated Kt/V-  Not Applicable   Access type: Temporary HD catheter   Access Issues: No    Report called to primary nurse   Yes-Bernard Hopkins RN      Goal- remove 2 L as tolerated using 2 K+ bath over 3 hr hd tx.    Problem: METABOLIC, FLUID AND ELECTROLYTES - ADULT  Goal: Electrolytes maintained within normal limits  Description: INTERVENTIONS:  - Monitor labs and assess patient for signs and symptoms of electrolyte imbalances  - Administer electrolyte replacement as ordered  - Monitor response to electrolyte replacements, including repeat lab results as appropriate  - Instruct patient on fluid and nutrition as appropriate  Outcome: Progressing

## 2024-09-17 NOTE — ASSESSMENT & PLAN NOTE
Most likely a hypervolemic hyponatremia in the setting of acute renal failure, and volume overload  Sodium is improving, it is up to 132  Continue to monitor BMPs  Anticipate sodium level should get further better with dialysis

## 2024-09-17 NOTE — PROGRESS NOTES
Progress Note - Nephrology   Name: Ramos Rosenthal 74 y.o. male I MRN: [id number]  Unit/Bed#: ICU 04-01 I Date of Admission: 9/14/2024   Date of Service: 9/17/2024 I Hospital Day: 3     Assessment & Plan  LYLY (acute kidney injury) (HCC)  Patient now hemodialysis dependent, today's day 2 of hemodialysis treatment.  He will be on a 3-hour session, will attempt to ultrafilter 2 L today.  Patient to have a 4-hour treatment tomorrow, Wednesday, September 18.    With respect etiology, serologies remain pending, please refer to my note from yesterday regarding those that have already come back.  Thus far all major labs have been either negative or appropriate.  Will try to have a kidney biopsy this Thursday, September 19.  The [place] kit form to be filled out and available in the patient's chart in the near future.    Will also request for a PermCath to be placed on Thursday as the patient will most likely need outpatient hemodialysis given current clinical circumstances.    Will discontinue Stroud catheter.    Addendum:   Patient's urine immunofixation shows a faint positive protein, increase in kappa chains noted.  Will work this up further.    Hyperkalemia  Serum potassium level at the high end of normal at 5.3 mmol/liter, patient to be placed on a 2K bath today.  Hyponatremia  Serum sodium level continues to improve, now 132 mmol/L, continue with mild fluid restriction.  Secondary hyperparathyroidism of renal origin (HCC)  Follow PTH in the outpatient setting, no activated vitamin D agent indicated.  Primary hypertension  Patient has been experiencing blood pressures above goal, and has been associated with elevated heart rates.  Will initiate low-dose carvedilol 6.25 mg twice daily.  Continue to closely monitor clinical parameters.  Uremia  Somewhat better, anticipate continued improvement with the patient's 3-hour treatment today.  Bilateral lower extremity edema  Continue low-sodium diet, as noted above, will try  "to ultrafilter 2 L today with dialysis.  Gout  Asymptomatic, continue to monitor  Depression  Care according to hospitalist  SIRS (systemic inflammatory response syndrome) (HCC)  Patient currently off of antibiotics, continue care according to our infectious ease colleagues recommendations.        Case discussed with hospitalist.  Will plan for dialysis session today and tomorrow with his first full treatment tomorrow, Wednesday, September 18.  Will look for PermCath and kidney biopsy to potentially be done this coming Thursday, September 19.    Follow up reason for today's visit: Hemodialysis dependent acute kidney injury/multiple electrolyte abnormality/volume management    LYLY (acute kidney injury) (formerly [facility])    Patient Active Problem List   Diagnosis    Gout    Gait abnormality    Lumbar stenosis    Polyneuropathy    Lower extremity weakness    Lumbosacral plexopathy    Elevated troponin    EDGAR (dyspnea on exertion)    Rash due to vaculitis     LYLY (acute kidney injury) (formerly [facility])    Depression    Multiple open wounds of lower leg    Paresthesia of both lower extremities    Leucocytosis    Bloating    Hyperlipidemia    Neurogenic claudication    Prediabetes    Vitamin D deficiency    Hyponatremia    SIRS (systemic inflammatory response syndrome) (formerly [facility])    Bilateral lower extremity edema    Hyperkalemia    Secondary hyperparathyroidism of renal origin (formerly [facility])    Uremia         Subjective:   No acute events, patient feels improved in general.    Objective:     Vitals: Blood pressure 166/79, pulse (!) 121, temperature 99.3 °F (37.4 °C), temperature source Tympanic, resp. rate (!) 25, height 6' 2\" (1.88 m), weight (!) 140 kg (308 lb 3.3 oz), SpO2 96%.,Body mass index is 39.57 kg/m².    Weight (last 2 days)       Date/Time Weight    09/17/24 0600 140 (308.2)    09/16/24 1445 141 (309.97)    09/16/24 0742 141 (310)    09/16/24 0552 141 (310.19)    09/15/24 0736 140 (309.53)    09/15/24 0600 140 (309.53)              Intake/Output " "Summary (Last 24 hours) at 9/17/2024 0824  Last data filed at 9/17/2024 0800  Gross per 24 hour   Intake 1657 ml   Output 1520 ml   Net 137 ml     I/O last 3 completed shifts:  In: 1457 [P.O.:957; I.V.:450; IV Piggyback:50]  Out: 1540 [Urine:40; Other:1500]    HD Temporary Double Catheter (Active)   Reasons to continue HD Cath Treatment Therapy 09/17/24 0800   Goal for Removal N/A- chronic HD catheter 09/16/24 2000   Line Necessity Reviewed Yes, reviewed with provider 09/17/24 0800   Site Assessment WDL;Clean;Dry;Intact 09/17/24 0800   Proximal Lumen Status Blood return noted;Flushed;Infusing 09/17/24 0800   Distal Lumen Status Blood return noted;Flushed;Infusing 09/17/24 0800   Catheter Out on Skin (cm) 0 cm 09/16/24 1500   Line Care Connections checked and tightened 09/17/24 0800   Dressing Type Chlorhexidine dressing 09/17/24 0800   Dressing Status Clean;Dry;Intact 09/17/24 0800   Dressing Intervention 7 day central line dressing changed 09/16/24 2000   Dressing Change Due 09/23/24 09/17/24 0800       Urethral Catheter Double-lumen 16 Fr. (Active)   Amt returned on insertion(mL) 100 mL 09/14/24 1437   Reasons to continue Urinary Catheter  Accurate I&O assessment in critically ill patients (48 hr. max) 09/16/24 2000   Goal for Removal Remove after 48 hrs of I/O monitoring 09/16/24 2000   Site Assessment Clean;Skin intact 09/16/24 2000   Stroud Care Done 09/16/24 2000   Collection Container Standard drainage bag 09/16/24 2000   Securement Method Securing device (Describe) 09/16/24 2000   Securing Device Change Date 09/18/24 09/16/24 2000   Output (mL) 20 mL 09/16/24 1744       Physical Exam: /79 (BP Location: Left arm)   Pulse (!) 121   Temp 99.3 °F (37.4 °C) (Tympanic)   Resp (!) 25   Ht 6' 2\" (1.88 m)   Wt (!) 140 kg (308 lb 3.3 oz)   SpO2 96%   BMI 39.57 kg/m²     General Appearance:    Alert, cooperative, no distress, appears stated age   Head:    Normocephalic, without obvious abnormality, " "atraumatic   Eyes:    Conjunctiva/corneas clear   Ears:    Normal external ears   Nose:   Nares normal, septum midline, mucosa normal, no drainage    or sinus tenderness   Throat:   Lips, mucosa, and tongue normal; teeth and gums normal   Neck:   Supple   Back:     Symmetric, no curvature, ROM normal, no CVA tenderness   Lungs:     Clear to auscultation bilaterally, respirations unlabored   Chest wall:    No tenderness or deformity   Heart:    Regular rate and rhythm, S1 and S2 normal, no murmur, rub   or gallop   Abdomen:     Soft, non-tender, bowel sounds active   Extremities:   Extremities normal, atraumatic, no cyanosis, mild bilateral lower extremity edema   Skin:   Skin color, texture, turgor normal, no rashes or lesions   Lymph nodes:   Cervical normal   Neurologic:   CNII-XII intact            Lab, Imaging and other studies: I have personally reviewed pertinent labs.  CBC:   Lab Results   Component Value Date    WBC 15.45 (H) 09/17/2024    HGB 9.9 (L) 09/17/2024    HCT 30.4 (L) 09/17/2024    MCV 86 09/17/2024     (H) 09/17/2024    RBC 3.53 (L) 09/17/2024    MCH 28.0 09/17/2024    MCHC 32.6 09/17/2024    RDW 15.4 (H) 09/17/2024    MPV 8.4 (L) 09/17/2024    NRBC 0 09/17/2024     CMP:   Lab Results   Component Value Date    K 5.3 09/17/2024    CL 97 09/17/2024    CO2 21 09/17/2024    BUN 66 (H) 09/17/2024    CREATININE 8.38 (H) 09/17/2024    CALCIUM 8.1 (L) 09/17/2024    EGFR 5 09/17/2024       .  Results from last 7 days   Lab Units 09/17/24  0545 09/16/24  2142 09/16/24  0608 09/15/24  0631   POTASSIUM mmol/L 5.3 5.0 5.4* 5.2   CHLORIDE mmol/L 97  --  98 98   CO2 mmol/L 21  --  17* 17*   BUN mg/dL 66*  --  79* 69*   CREATININE mg/dL 8.38*  --  9.42* 8.41*   CALCIUM mg/dL 8.1*  --  8.2* 8.0*   ALK PHOS U/L  --   --  79  --    ALT U/L  --   --  82*  --    AST U/L  --   --  79*  --          Phosphorus: No results found for: \"PHOS\"  Magnesium:   Lab Results   Component Value Date    MG 2.7 09/16/2024 " "    Urinalysis: No results found for: \"COLORU\", \"CLARITYU\", \"SPECGRAV\", \"PHUR\", \"LEUKOCYTESUR\", \"NITRITE\", \"PROTEINUA\", \"GLUCOSEU\", \"KETONESU\", \"BILIRUBINUR\", \"BLOODU\"  Ionized Calcium: No results found for: \"CAION\"  Coagulation: No results found for: \"PT\", \"INR\", \"APTT\"  Troponin: No results found for: \"TROPONINI\"  ABG: No results found for: \"PHART\", \"IVM9MSB\", \"PO2ART\", \"GQN5MHD\", \"S1TQXBLV\", \"BEART\", \"SOURCE\"  Radiology review:     IMAGING  Procedure: XR chest pa and lateral    Result Date: 9/16/2024  Narrative: XR CHEST PA AND LATERAL INDICATION: Check catheter position. COMPARISON: 9/30/2022 FINDINGS: Right internal jugular catheter tip projects at the caval atrial junction. Clear lungs. No pneumothorax or pleural effusion. Unchanged cardiomediastinal silhouette. Bones are unremarkable for age. Normal upper abdomen.     Impression: Right internal jugular dialysis catheter tip projects at the cavoatrial junction. Workstation performed: [id number]     Procedure: Echo complete w/ contrast if indicated    Result Date: 9/16/2024  Narrative:   Left Ventricle: Left ventricular cavity size is normal. There is mild concentric hypertrophy. The left ventricular ejection fraction is 50%. Systolic function is low normal. Wall motion is low  normal.   Left Atrium: The atrium is mildly dilated.   Mitral Valve: There is mild regurgitation.   Prior TTE study available for comparison. Prior study date: 9/30/2022. No significant changes noted compared to the prior study.     Procedure: US kidney and bladder    Result Date: 9/14/2024  Narrative: RENAL ULTRASOUND INDICATION: severe rito. COMPARISON: CT abdomen pelvis on 9/14/2024 TECHNIQUE: Ultrasound of the retroperitoneum was performed with a curvilinear transducer utilizing volumetric sweeps and still imaging techniques. FINDINGS: KIDNEYS: Symmetric and normal size. Right kidney: 11.3 x 6.0 x 5.1 cm. Volume 181.3 mL Left kidney: 12.4 x 6.0 x 5.4 cm. Volume 210.9 mL Right kidney " Normal echogenicity and contour. No mass is identified. No hydronephrosis. No shadowing calculi. No perinephric fluid collections. Left kidney Normal echogenicity and contour. No mass is identified. No hydronephrosis. No shadowing calculi. No perinephric fluid collections. URETERS: Nonvisualized. BLADDER: Contracted limiting evaluation. Bilateral ureteral jets not detected during exam.     Impression: Unremarkable sonographic appearance of the bilateral kidneys without hydronephrosis. Contracted urinary bladder limiting evaluation. Workstation performed: [id number]     Procedure: CT renal stone study abdomen pelvis wo contrast    Result Date: 9/14/2024  Narrative: CT ABDOMEN AND PELVIS WITHOUT IV CONTRAST - LOW DOSE RENAL STONE INDICATION: hematuria. COMPARISON: None 12/7/2016. TECHNIQUE: Low radiation dose thin section CT examination of the abdomen and pelvis was performed without intravenous or oral contrast according to a protocol specifically designed to evaluate for urinary tract calculus. Axial, sagittal, and coronal 2D reformatted images were created from the source data and submitted for interpretation. Evaluation for pathology in the abdomen and pelvis that is unrelated to urinary tract calculi is limited. Radiation dose length product (DLP) for this visit: 921.88 mGy-cm . This examination, like all CT scans performed in the [facility], was performed utilizing techniques to minimize radiation dose exposure, including the use of iterative reconstruction and automated exposure control. URINARY TRACT FINDINGS: RIGHT KIDNEY AND URETER: No urinary tract calculi. No hydronephrosis or hydroureter. LEFT KIDNEY AND URETER: No urinary tract calculi. No hydronephrosis or hydroureter. URINARY BLADDER: Diffuse bladder wall thickening and mild perivesicular inflammatory changes concerning for cystitis. ADDITIONAL FINDINGS: LOWER CHEST: Trace left basilar effusion and mild dependent changes noted. SOLID  VISCERA: Limited low radiation dose noncontrast CT evaluation demonstrates no clinically significant abnormality of the imaged portions of the liver, spleen, pancreas, or adrenal glands. GALLBLADDER/BILIARY TREE: No calcified gallstones. No pericholecystic inflammatory change. No biliary dilation. STOMACH AND BOWEL: Unremarkable. APPENDIX: No findings to suggest appendicitis. ABDOMINOPELVIC CAVITY: No ascites. No pneumoperitoneum. No lymphadenopathy. VESSELS: Unremarkable for patient's age. REPRODUCTIVE ORGANS: Unremarkable for patient's age. ABDOMINAL WALL/INGUINAL REGIONS: Unremarkable. BONES: L1-L5 posterior fixation and discectomy. Advanced degenerative changes noted.     Impression: No urinary calculi. Mild bladder wall thickening and perivesicular inflammatory change. Correlate clinically for urinalysis. Workstation performed: [id number]         Current Facility-Administered Medications:     calcium carbonate (TUMS) chewable tablet 1,000 mg, Daily PRN    FLUoxetine (PROzac) capsule 20 mg, Daily    heparin (porcine) subcutaneous injection 5,000 Units, Q8H LORENZO    ondansetron (ZOFRAN) injection 4 mg, Q6H PRN  Medications Discontinued During This Encounter   Medication Reason    meloxicam (MOBIC) 15 mg tablet Therapy completed    multi-electrolyte (PLASMALYTE-A/ISOLYTE-S PH 7.4) IV solution     cefTRIAXone (ROCEPHIN) IVPB (premix in dextrose) 1,000 mg 50 mL        Gigi Ortega DO      This progress note was produced in part using a dictation device which may document imprecise wording from author's original intent.

## 2024-09-17 NOTE — ASSESSMENT & PLAN NOTE
Serum potassium level at the high end of normal at 5.3 mmol/liter, patient to be placed on a 2K bath today.

## 2024-09-17 NOTE — ASSESSMENT & PLAN NOTE
Somewhat better, anticipate continued improvement with the patient's 3-hour treatment today.   Ketoconazole Pregnancy And Lactation Text: This medication is Pregnancy Category C and it isn't know if it is safe during pregnancy. It is also excreted in breast milk and breast feeding isn't recommended.

## 2024-09-17 NOTE — ASSESSMENT & PLAN NOTE
Lab Results   Component Value Date    CREATININE 8.38 (H) 09/17/2024    CREATININE 9.42 (H) 09/16/2024    CREATININE 8.41 (H) 09/15/2024    EGFR 5 09/17/2024    EGFR 4 09/16/2024    EGFR 5 09/15/2024   Unspecified exact etiology  Patient has not had blood work since April 2023 at which time his kidney function was normal  Patient was recently prescribed Bactrim, however since there has been approximately  a year and a half in between BMP testing unsure if this is more of an acute process versus an acute on chronic process or just a progression of chronic kidney disease  Arrival creatinine was 7.95, creatinine peak on this admission was 9.42  Patient also has an anion gap metabolic acidosis secondary to his renal failure  Patient was initially treated with IV fluid, however due to concerns for volume overload state, IV fluids were discontinued   Patient was subsequently treated with Lasix also however no improvement in creatinine  Status post an IR intervention on Monday, 9/16/2024 at which time a dialysis line was placed  Patient was successfully dialyzed on 9/16/2024  Follow-up creatinine this morning is 8.38  Patient scheduled for a second session of dialysis today  Will consult IR for both a renal biopsy, and a permacath placement for 9/19/2024  DC planning once cleared by nephrology  Case management has also been made aware of the potential need to set up an outpatient dialysis chair if this indeed is to become a chronic process

## 2024-09-17 NOTE — PROGRESS NOTES
Telemed Progress Note - Infectious Disease   Ramos Rosenthal 74 y.o. male MRN: [id number]  Unit/Bed#: ICU 04-01 Encounter: [id number]    VIRTUAL CARE DOCUMENTATION:   1. This service was provided via Telemedicine using [employer] TV Kit   2. Parties in the room with patient during teleconsult: patient  3. Confidentiality: My office door was closed   4. Participants: patient, CRNP  5. Patient acknowledged consent and understanding of privacy and security of the  Telemedicine consult. I informed the patient that I have reviewed their record in Epic and presented the opportunity for them to ask any questions regarding the visit today.  The patient agreed to participate.  6. Time spent: 30 minutes      Assessment/Recommendations   1. SIRS with tachycardia, tachycardia and leukocytosis. In setting of #2. Likely reactive. WBC has down-trended today. Patient remains asymptomatic, afebrile, and hemodynamically stable. No evidence of active, acute infection on ID work up.  -continue to monitor closely off additional antibiotics at present  -volume management per Nephrology  -follow-up blood cultures until finalized. NGTD x 72h today  -monitor serial am labs for treatment response and any developing toxicities     2. LYLY. Patient presenting with Cr 7.96 < Peak 10 with anuria. 4/2023 Baseline Cr was 0.8. CT scan and renal ultrasound negative for obstructive source. UA had innumerable RBCs, 4-10 WBCs and moderate bacteria. Urine cx negative. ASO titer 400, concern post strep glomerulonephritis in differential of etiology  -continue to monitor off additional antibiotics at present  -volume management per Nephrology  -HD management per nephrology  -Ok for perm cath placement from ID standpoint  -Follow-up renal biopsy  -recheck BMP for treatment response and any developing toxicities     3. Hematuria on presentation. No urinary symptoms. CT scan and renal ultrasound negative for obstructive source.  UA had innumerable RBCs, 4-10  WBCs and moderate bacteria. Urine cx negative. Currently denies having any urinary symptoms.   -monitor off additional antibiotics at present  -volume management per Nephrology  -monitor urine output     4. Volume Overload, generalized edema. Favored in setting of #2   -volume management per Nephrology  -serial exams     5.. Listed PCN rash and Cipro blister remote reactions. Possible Bactrim adverse reactions in setting of #2. Tolerated Ceftriaxone this admission  -monitor antibiotic tolerance    Above plan was discussed in detail with patient over the TV kit.  Above plan was discussed in detail with primary service.  ID consult service will continue to follow    History   Subjective:  Reports feeling fatigued after his dialysis session, but otherwise has no complaints currently. No fever, chills, sweats, shakes; no nausea, vomiting, abdominal pain, diarrhea, or dysuria; no cough, shortness of breath, or chest pain. No new symptoms. IR planning for permcath and renal biopsy likely Thursday, .     Antibiotics:  None currently      Physical Exam     Temp:  [97.8 °F (36.6 °C)-99.3 °F (37.4 °C)] 99.3 °F (37.4 °C)  HR:  [] 72  Resp:  [13-32] 31  BP: (101-166)/(55-85) 140/60  SpO2:  [94 %-98 %] 96 %  Temp (24hrs), Av.4 °F (36.9 °C), Min:97.8 °F (36.6 °C), Max:99.3 °F (37.4 °C)  Current: Temperature: 99.3 °F (37.4 °C)    Intake/Output Summary (Last 24 hours) at 2024 1256  Last data filed at 2024 1030  Gross per 24 hour   Intake 1087 ml   Output 1560 ml   Net -473 ml       Physical exam findings reported by bedside and primary medical team staff, also observed on TV kit:  General Appearance:  Alert, interactive, nontoxic, no acute distress. Sitting comfortably in bed.    Throat: Oropharynx moist without lesions.    Neck: RIJ temp cath CDI. No surrounding erythema noted.    Lungs:   Clear to auscultation bilaterally; no wheezes, rhonchi or rales; respirations unlabored. On room air.    Heart:  RRR;  no murmur, rub or gallop.   Abdomen:   Soft, obese, non-tender, non-distended    Extremities: No clubbing or cyanosis. +LE pitting edema bilaterally.   Skin: No new rashes, lesions, or draining wounds noted on exposed skin.     Invasive Devices:   CVC Central Lines 09/16/24 Double (Active)       Peripheral IV 09/14/24 Right;Ventral (anterior) Hand (Active)   Site Assessment WDL 09/17/24 1000   Dressing Type Transparent 09/17/24 1000   Line Status Flushed 09/17/24 1000   Dressing Status Clean;Dry;Intact 09/17/24 1000   Dressing Change Due 09/18/24 09/16/24 2000   Reason Not Rotated Not due 09/16/24 2000       Peripheral IV 09/14/24 Right Antecubital (Active)   Site Assessment WDL;Clean;Dry 09/17/24 1000   Dressing Type Transparent 09/17/24 1000   Line Status Flushed 09/17/24 1000   Dressing Status Clean;Dry;Intact 09/17/24 1000   Dressing Change Due 09/18/24 09/16/24 2000   Reason Not Rotated Not due 09/16/24 2000       Urethral Catheter Double-lumen 16 Fr. (Active)   Amt returned on insertion(mL) 100 mL 09/14/24 1437   Reasons to continue Urinary Catheter  Accurate I&O assessment in critically ill patients (48 hr. max) 09/17/24 0900   Goal for Removal Remove after 48 hrs of I/O monitoring 09/17/24 0900   Site Assessment Clean;Skin intact 09/17/24 0900   Stroud Care Other (Comment) 09/17/24 0900   Collection Container Standard drainage bag 09/17/24 0900   Securement Method Securing device (Describe) 09/17/24 0900   Securing Device Change Date 09/18/24 09/16/24 2000   Output (mL) 40 mL 09/17/24 0900       HD Temporary Double Catheter (Active)   Reasons to continue HD Cath Treatment Therapy 09/17/24 0800   Goal for Removal N/A- chronic HD catheter 09/16/24 2000   Line Necessity Reviewed Yes, reviewed with provider 09/17/24 0800   Site Assessment WDL;Clean;Dry;Intact 09/17/24 0800   Proximal Lumen Status Blood return noted;Flushed;Infusing 09/17/24 0800   Distal Lumen Status Blood return noted;Flushed;Infusing 09/17/24  0800   Catheter Out on Skin (cm) 0 cm 09/16/24 1500   Line Care Connections checked and tightened 09/17/24 0800   Dressing Type Chlorhexidine dressing 09/17/24 0800   Dressing Status Clean;Dry;Intact 09/17/24 0800   Dressing Intervention 7 day central line dressing changed 09/16/24 2000   Dressing Change Due 09/23/24 09/17/24 0800       Labs, Imaging, & Other Studies   I have personally reviewed pertinent labs.    Results from last 7 days   Lab Units 09/17/24  0545 09/16/24  0608 09/15/24  0631   WBC Thousand/uL 15.45* 16.69* 14.09*   HEMOGLOBIN g/dL 9.9* 10.0* 9.4*   PLATELETS Thousands/uL 399* 419* 429*     Results from last 7 days   Lab Units 09/17/24  0545 09/16/24  2142 09/16/24  0608   POTASSIUM mmol/L 5.3   < > 5.4*   CHLORIDE mmol/L 97  --  98   CO2 mmol/L 21  --  17*   BUN mg/dL 66*  --  79*   CREATININE mg/dL 8.38*  --  9.42*   EGFR ml/min/1.73sq m 5  --  4   CALCIUM mg/dL 8.1*  --  8.2*   AST U/L  --   --  79*   ALT U/L  --   --  82*   ALK PHOS U/L  --   --  79    < > = values in this interval not displayed.     Results from last 7 days   Lab Units 09/14/24  1314   BLOOD CULTURE  No Growth at 48 hrs.  No Growth at 48 hrs.     Imaging Studies:  Pertinent imaging studies have personally be reviewed by myself.     Sherron Buenrostro PA-C  [facility]

## 2024-09-17 NOTE — PROGRESS NOTES
Progress Note - Hospitalist   Name: Ramos Rosenthal 74 y.o. male I MRN: [id number]  Unit/Bed#: ICU 04-01 I Date of Admission: 9/14/2024   Date of Service: 9/17/2024 I Hospital Day: 3    Assessment & Plan  LYLY (acute kidney injury) (HCC)      Lab Results   Component Value Date    CREATININE 8.38 (H) 09/17/2024    CREATININE 9.42 (H) 09/16/2024    CREATININE 8.41 (H) 09/15/2024    EGFR 5 09/17/2024    EGFR 4 09/16/2024    EGFR 5 09/15/2024   Unspecified exact etiology  Patient has not had blood work since April 2023 at which time his kidney function was normal  Patient was recently prescribed Bactrim, however since there has been approximately  a year and a half in between BMP testing unsure if this is more of an acute process versus an acute on chronic process or just a progression of chronic kidney disease  Arrival creatinine was 7.95, creatinine peak on this admission was 9.42  Patient also has an anion gap metabolic acidosis secondary to his renal failure  Patient was initially treated with IV fluid, however due to concerns for volume overload state, IV fluids were discontinued   Patient was subsequently treated with Lasix also however no improvement in creatinine  Status post an IR intervention on Monday, 9/16/2024 at which time a dialysis line was placed  Patient was successfully dialyzed on 9/16/2024  Follow-up creatinine this morning is 8.38  Patient scheduled for a second session of dialysis today  Will consult IR for both a renal biopsy, and a permacath placement for 9/19/2024  DC planning once cleared by nephrology  Case management has also been made aware of the potential need to set up an outpatient dialysis chair if this indeed is to become a chronic process  Gout  Allopurinol to remain on hold in view of the acute kidney injury  Depression  Continue home dose fluoxetine  Paresthesia of both lower extremities  Hx of back surgery in 2004,2014 and has since bilateral LE paresthesia  He is primarily  wheelchair bound  Venous statis ulcers noted to Bilateral LE  B/L extremity edema R>L  Continue supportive therapy  Hyponatremia  Most likely a hypervolemic hyponatremia in the setting of acute renal failure, and volume overload  Sodium is improving, it is up to 132  Continue to monitor BMPs  Anticipate sodium level should get further better with dialysis  SIRS (systemic inflammatory response syndrome) (HCC)  Patient met sepsis criteria, present on admission as evident by leukocytosis, tachypnea and tachycardia, arrival temp was 100.4 °F  Secondary to an acute cystitis without hematuria  SIRS criteria have significantly improved, patient is no longer tachycardic, and/or febrile.  White blood cell count i however is uptrending  Status post 3 days worth of IV ceftriaxone-treatment was in place for suspected UTI  Appreciate ID input  Plan is to monitor off of antibiotics  Bilateral lower extremity edema  Chronic condition  Patient with a history of lower back/spinal surgery  Patient does not ambulate  Patient is wheelchair-bound at baseline  Hyperkalemia  Resolved with dialysis  Secondary hyperparathyroidism of renal origin (HCC)    Uremia      VTE Pharmacologic Prophylaxis: VTE Score: 8 High Risk (Score >/= 5) - Pharmacological DVT Prophylaxis Ordered: heparin. Sequential Compression Devices Ordered.    Mobility:   Basic Mobility Inpatient Raw Score: 11  JH-HLM Goal: 4: Move to chair/commode  JH-HLM Achieved: 4: Move to chair/commode  JH-HLM Goal NOT achieved. Continue with multidisciplinary rounding and encourage appropriate mobility to improve upon JH-HLM goals.    Patient Centered Rounds: I performed bedside rounds with nursing staff today.   Discussions with Specialists or Other Care Team Provider: Infectious disease, nephrology    Education and Discussions with Family / Patient:  Patient's family brought up to par last evening, will update again as the day progresses once they come into visit with the patient.      Current Length of Stay: 3 day(s)  Current Patient Status: Inpatient   Certification Statement: The patient will continue to require additional inpatient hospital stay due to need for continued management of an acute kidney injury  Discharge Plan: Anticipate discharge in >72 hrs to home with home services.    Code Status: Level 1 - Full Code    Subjective   Patient seen, resting in bed, no new medical complaints.    Objective     Vitals:   Temp (24hrs), Av.2 °F (36.8 °C), Min:97.8 °F (36.6 °C), Max:98.5 °F (36.9 °C)    Temp:  [97.8 °F (36.6 °C)-98.5 °F (36.9 °C)] 98.5 °F (36.9 °C)  HR:  [] 116  Resp:  [16-32] 32  BP: (132-160)/(67-86) 136/76  SpO2:  [95 %-98 %] 96 %  Body mass index is 39.57 kg/m².     Input and Output Summary (last 24 hours):     Intake/Output Summary (Last 24 hours) at 2024 0742  Last data filed at 2024 2000  Gross per 24 hour   Intake 1457 ml   Output 1520 ml   Net -63 ml       Physical Exam  Vitals reviewed.   Constitutional:       Appearance: Normal appearance. He is well-developed.   HENT:      Head: Normocephalic and atraumatic.      Nose: Nose normal.      Mouth/Throat:      Mouth: Oropharynx is clear and moist.   Eyes:      Extraocular Movements: EOM normal.      Conjunctiva/sclera: Conjunctivae normal.      Pupils: Pupils are equal, round, and reactive to light.   Neck:      Thyroid: No thyromegaly.      Vascular: No JVD.   Cardiovascular:      Rate and Rhythm: Normal rate and regular rhythm.      Heart sounds: No murmur heard.     No friction rub. No gallop.   Pulmonary:      Effort: Pulmonary effort is normal. No respiratory distress.      Breath sounds: Normal breath sounds.   Abdominal:      General: Bowel sounds are normal. There is no distension.      Palpations: Abdomen is soft. There is no mass.      Tenderness: There is no abdominal tenderness. There is no guarding.   Musculoskeletal:         General: No edema. Normal range of motion.      Cervical back:  Normal range of motion and neck supple.      Comments: 3+ bilateral lower extremity chronic pitting/lymphedema   Lymphadenopathy:      Cervical: No cervical adenopathy.   Skin:     General: Skin is warm.      Findings: No erythema or rash.   Neurological:      General: No focal deficit present.      Mental Status: He is alert and oriented to person, place, and time.      Cranial Nerves: No cranial nerve deficit.   Psychiatric:         Mood and Affect: Mood and affect and mood normal.         Behavior: Behavior normal.          Lines/Drains:  Lines/Drains/Airways       Active Status       Name Placement date Placement time Site Days    CVC Central Lines 09/16/24 Double 09/16/24  0909  --  less than 1    HD Temporary Double Catheter 09/16/24  0909  Other (Comment)  less than 1    Urethral Catheter Double-lumen 16 Fr. 09/14/24  1421  Double-lumen  2                  Urinary Catheter:  Goal for removal: N/A - Chronic [place]         Central Line:  Goal for removal: N/A - Chronic PICC         Telemetry:  Telemetry Orders (From admission, onward)               24 Hour Telemetry Monitoring  Continuous x 24 Hours (Telem)        Expiring   Question:  Reason for 24 Hour Telemetry  Answer:  Metabolic/electrolyte disturbance with high probability of dysrhythmia. K level <3 or >6 OR KCL infusion >10mEq/hr                     Telemetry Reviewed: Sinus Tachycardia  Indication for Continued Telemetry Use: Metabolic/electrolyte disturbance with high probability of dysrhythmia               Lab Results: I have reviewed the following results:    Results from last 7 days   Lab Units 09/17/24  0545   WBC Thousand/uL 15.45*   HEMOGLOBIN g/dL 9.9*   HEMATOCRIT % 30.4*   PLATELETS Thousands/uL 399*   SEGS PCT % 85*   LYMPHO PCT % 5*   MONO PCT % 8   EOS PCT % 1     Results from last 7 days   Lab Units 09/17/24  0545 09/16/24  2142 09/16/24  0608   SODIUM mmol/L 132*  --  130*   POTASSIUM mmol/L 5.3   < > 5.4*   CHLORIDE mmol/L 97  --  98    CO2 mmol/L 21  --  17*   BUN mg/dL 66*  --  79*   CREATININE mg/dL 8.38*  --  9.42*   ANION GAP mmol/L 14*  --  15*   CALCIUM mg/dL 8.1*  --  8.2*   ALBUMIN g/dL  --   --  2.9*   TOTAL BILIRUBIN mg/dL  --   --  0.31   ALK PHOS U/L  --   --  79   ALT U/L  --   --  82*   AST U/L  --   --  79*   GLUCOSE RANDOM mg/dL 101  --  97    < > = values in this interval not displayed.     Results from last 7 days   Lab Units 09/14/24  1314   INR  1.40*             Results from last 7 days   Lab Units 09/14/24  1314   LACTIC ACID mmol/L 1.1   PROCALCITONIN ng/ml 1.48*       Recent Cultures (last 7 days):   Results from last 7 days   Lab Units 09/14/24  1314   BLOOD CULTURE  No Growth at 48 hrs.  No Growth at 48 hrs.       Imaging Review: No pertinent imaging studies reviewed.  Other Studies: No additional pertinent studies reviewed.    Last 24 Hours Medication List:     Current Facility-Administered Medications:     calcium carbonate (TUMS) chewable tablet 1,000 mg, Daily PRN    FLUoxetine (PROzac) capsule 20 mg, Daily    heparin (porcine) subcutaneous injection 5,000 Units, Q8H LORENZO    ondansetron (ZOFRAN) injection 4 mg, Q6H PRN    Administrative Statements   Today, Patient Was Seen By: Agnes Rust MD  I have spent a total time of 35 minutes in caring for this patient on the day of the visit/encounter including Diagnostic results, Prognosis, Patient and family education, Impressions, Counseling / Coordination of care, Documenting in the medical record, Reviewing / ordering tests, medicine, procedures  , and Communicating with other healthcare professionals .    **Please Note: This note may have been constructed using a voice recognition system.**

## 2024-09-18 ENCOUNTER — TELEPHONE (OUTPATIENT)
Age: 74
End: 2024-09-18

## 2024-09-18 ENCOUNTER — HOME HEALTH ADMISSION (OUTPATIENT)
Dept: HOME HEALTH SERVICES | Facility: HOME HEALTHCARE | Age: 74
End: 2024-09-18
Payer: MEDICARE

## 2024-09-18 ENCOUNTER — APPOINTMENT (INPATIENT)
Dept: DIALYSIS | Facility: HOSPITAL | Age: 74
DRG: 871 | End: 2024-09-18
Attending: INTERNAL MEDICINE
Payer: MEDICARE

## 2024-09-18 LAB
ANION GAP SERPL CALCULATED.3IONS-SCNC: 10 MMOL/L (ref 4–13)
BASOPHILS # BLD AUTO: 0.08 THOUSANDS/ΜL (ref 0–0.1)
BASOPHILS NFR BLD AUTO: 1 % (ref 0–1)
BUN SERPL-MCNC: 50 MG/DL (ref 5–25)
CALCIUM SERPL-MCNC: 7.8 MG/DL (ref 8.4–10.2)
CHLORIDE SERPL-SCNC: 97 MMOL/L (ref 96–108)
CO2 SERPL-SCNC: 24 MMOL/L (ref 21–32)
CREAT SERPL-MCNC: 6.86 MG/DL (ref 0.6–1.3)
EOSINOPHIL # BLD AUTO: 0.23 THOUSAND/ΜL (ref 0–0.61)
EOSINOPHIL NFR BLD AUTO: 2 % (ref 0–6)
ERYTHROCYTE [DISTWIDTH] IN BLOOD BY AUTOMATED COUNT: 15.4 % (ref 11.6–15.1)
GBM AB SER IA-ACNC: >8 UNITS (ref 0–0.9)
GFR SERPL CREATININE-BSD FRML MDRD: 7 ML/MIN/1.73SQ M
GLUCOSE SERPL-MCNC: 102 MG/DL (ref 65–140)
HCT VFR BLD AUTO: 27.1 % (ref 36.5–49.3)
HGB BLD-MCNC: 8.8 G/DL (ref 12–17)
IMM GRANULOCYTES # BLD AUTO: 0.11 THOUSAND/UL (ref 0–0.2)
IMM GRANULOCYTES NFR BLD AUTO: 1 % (ref 0–2)
LYMPHOCYTES # BLD AUTO: 0.83 THOUSANDS/ΜL (ref 0.6–4.47)
LYMPHOCYTES NFR BLD AUTO: 6 % (ref 14–44)
MCH RBC QN AUTO: 28.4 PG (ref 26.8–34.3)
MCHC RBC AUTO-ENTMCNC: 32.5 G/DL (ref 31.4–37.4)
MCV RBC AUTO: 87 FL (ref 82–98)
MONOCYTES # BLD AUTO: 1.09 THOUSAND/ΜL (ref 0.17–1.22)
MONOCYTES NFR BLD AUTO: 8 % (ref 4–12)
MYELOPEROXIDASE AB SER IA-ACNC: <0.2 UNITS (ref 0–0.9)
NEUTROPHILS # BLD AUTO: 11.07 THOUSANDS/ΜL (ref 1.85–7.62)
NEUTS SEG NFR BLD AUTO: 82 % (ref 43–75)
NRBC BLD AUTO-RTO: 0 /100 WBCS
PLATELET # BLD AUTO: 350 THOUSANDS/UL (ref 149–390)
PMV BLD AUTO: 8.5 FL (ref 8.9–12.7)
POTASSIUM SERPL-SCNC: 4.5 MMOL/L (ref 3.5–5.3)
PROTEINASE3 AB SER IA-ACNC: <0.2 UNITS (ref 0–0.9)
RBC # BLD AUTO: 3.1 MILLION/UL (ref 3.88–5.62)
SODIUM SERPL-SCNC: 131 MMOL/L (ref 135–147)
WBC # BLD AUTO: 13.41 THOUSAND/UL (ref 4.31–10.16)

## 2024-09-18 PROCEDURE — 80048 BASIC METABOLIC PNL TOTAL CA: CPT | Performed by: HOSPITALIST

## 2024-09-18 PROCEDURE — 83521 IG LIGHT CHAINS FREE EACH: CPT | Performed by: INTERNAL MEDICINE

## 2024-09-18 PROCEDURE — 99232 SBSQ HOSP IP/OBS MODERATE 35: CPT | Performed by: PHYSICIAN ASSISTANT

## 2024-09-18 PROCEDURE — 85025 COMPLETE CBC W/AUTO DIFF WBC: CPT | Performed by: HOSPITALIST

## 2024-09-18 PROCEDURE — NC001 PR NO CHARGE: Performed by: RADIOLOGY

## 2024-09-18 PROCEDURE — 99232 SBSQ HOSP IP/OBS MODERATE 35: CPT | Performed by: INTERNAL MEDICINE

## 2024-09-18 RX ADMIN — HEPARIN SODIUM 5000 UNITS: 5000 INJECTION, SOLUTION INTRAVENOUS; SUBCUTANEOUS at 22:04

## 2024-09-18 RX ADMIN — HEPARIN SODIUM 5000 UNITS: 5000 INJECTION, SOLUTION INTRAVENOUS; SUBCUTANEOUS at 14:23

## 2024-09-18 RX ADMIN — HEPARIN SODIUM 5000 UNITS: 5000 INJECTION, SOLUTION INTRAVENOUS; SUBCUTANEOUS at 05:54

## 2024-09-18 RX ADMIN — CARVEDILOL 6.25 MG: 3.12 TABLET, FILM COATED ORAL at 07:39

## 2024-09-18 RX ADMIN — CARVEDILOL 6.25 MG: 3.12 TABLET, FILM COATED ORAL at 16:29

## 2024-09-18 RX ADMIN — FLUOXETINE HYDROCHLORIDE 20 MG: 20 CAPSULE ORAL at 08:30

## 2024-09-18 NOTE — PROGRESS NOTES
Progress Note - Nephrology   Name: Ramos Rosenthal 74 y.o. male I MRN: [id number]  Unit/Bed#: ICU 04-01 I Date of Admission: 9/14/2024   Date of Service: 9/18/2024 I Hospital Day: 4     Assessment & Plan  LYLY (acute kidney injury) (HCC)  HD dependent, day 3 HD 9/18.   Unclear etiology. No obstruction on CT scan. Pursuing kidney biopsy. Urine protein/cr ratio nephrotic range on spot sample at 4.4 gram.   Outpatient arrangements made at [facility] MWF schedule upon DC.     Arrangements made for Renal biopsy and PC placement on 9/19 with IR. Current HD access nontunneled EJ. Currently off antibiotics for SIRS, felt to be reactive in nature. Blood cultures Negative to date.    Will try to have a kidney biopsy this Thursday, September 19.  The [place] kit form will need to be filled out.   Preference by this nephrologist is to monitor patient post biopsy on 9/20 and have HD while admitted. If stable, can be DC post HD on Friday.     Current serologic workup: negative MARIO ALBERTO, ASO> 20. Spep hypoalbuminemia and abnormal distribution in gamma region. Urine immunofixation faint monoclonal band, free-kappa light chains. Kappa/lambda ratio 2.0 but in keeping with CKD.    Complement WNL. HIV negative.      PermCath to be placed on Thursday as the patient will most likely need outpatient hemodialysis given current clinical circumstances.    Hyperkalemia  Potassium WNL-4.5.   Hyponatremia  Serum sodium stable at 131.   Bilateral lower extremity edema  Continue low-sodium diet, as noted above, will try to ultrafilter 2 L today with dialysis.  Gout  Asymptomatic, continue to monitor  Depression  Care according to hospitalist  SIRS (systemic inflammatory response syndrome) (HCC)  Patient currently off of antibiotics, continue care according to our infectious ease colleagues recommendations.    I have reviewed the nephrology recommendations dialysis, renal biopsy and PC plan with Dr Perez and HD RN, and we are in agreement  with renal plan including the information outlined above.     History of Present Illness   Brief History of Admission -  75 yo male with hx depression, gout, paresthesias presented to ED on 9/14 with co hematuria, decreased appetite and diarrhea. Recently seen by PCP for UTI and placed on bactrim. Found to have severe LYLY and started on HD. Etiology of LYLY in question.     Subjective   Patient seen and examined today. Denies chest pain,shortness of breath,nausea,vomiting, diarrhea.      Objective      Temp:  [99 °F (37.2 °C)] 99 °F (37.2 °C)  HR:  [] 61  Resp:  [20-31] 20  BP: ()/() 122/60  O2 Device: None (Room air)         Vitals:    09/17/24 0600 09/18/24 0600   Weight: (!) 140 kg (308 lb 3.3 oz) (!) 140 kg (309 lb 4.9 oz)     I/O last 24 hours:  In: 1300 [P.O.:400; I.V.:700; Other:200]  Out: 2049 [Urine:40; Other:2009]  Lines/Drains/Airways       Active Status       Name Placement date Placement time Site Days    CVC Central Lines 09/16/24 Double 09/16/24 0909  --  2    HD Temporary Double Catheter 09/16/24 0909  Other (Comment)  2                  Physical Exam  Vitals reviewed.   Constitutional:       General: He is not in acute distress.     Appearance: He is obese. He is not ill-appearing.   HENT:      Head: Normocephalic and atraumatic.      Nose: Nose normal.      Mouth/Throat:      Mouth: Mucous membranes are moist.      Pharynx: Oropharynx is clear.   Cardiovascular:      Rate and Rhythm: Normal rate and regular rhythm.   Pulmonary:      Breath sounds: No wheezing, rhonchi or rales.   Abdominal:      Palpations: Abdomen is soft.      Tenderness: There is no abdominal tenderness. There is no guarding.   Musculoskeletal:      Right lower leg: Edema present.      Left lower leg: Edema present.   Skin:     General: Skin is warm.      Coloration: Skin is not jaundiced.      Findings: No rash.   Neurological:      Mental Status: He is alert and oriented to person, place, and time. Mental  "status is at baseline.      Cranial Nerves: No cranial nerve deficit.     Medications:    Current Facility-Administered Medications:     calcium carbonate (TUMS) chewable tablet 1,000 mg, 1,000 mg, Oral, Daily PRN, BRETT Xie    carvedilol (COREG) tablet 6.25 mg, 6.25 mg, Oral, BID With Meals, Gigi Varvarelis, DO, 6.25 mg at 09/18/24 0739    FLUoxetine (PROzac) capsule 20 mg, 20 mg, Oral, Daily, BRETT Xie, 20 mg at 09/18/24 0830    heparin (porcine) subcutaneous injection 5,000 Units, 5,000 Units, Subcutaneous, Q8H LORENZO, BRETT Xie, 5,000 Units at 09/18/24 0554    ondansetron (ZOFRAN) injection 4 mg, 4 mg, Intravenous, Q6H PRN, BRETT Xie      Lab Results: I have reviewed the following results: CBC/BMP:   .     09/18/24  0559   WBC 13.41*   HGB 8.8*   HCT 27.1*      SODIUM 131*   K 4.5   CL 97   CO2 24   BUN 50*   CREATININE 6.86*   GLUC 102      Results from last 7 days   Lab Units 09/18/24  0559 09/17/24  0545 09/16/24  2142 09/16/24  0608 09/15/24  0631 09/14/24  1947 09/14/24  1744 09/14/24  1314   WBC Thousand/uL 13.41* 15.45*  --  16.69* 14.09*  --   --  12.14*   HEMOGLOBIN g/dL 8.8* 9.9*  --  10.0* 9.4*  --   --  10.5*   HEMATOCRIT % 27.1* 30.4*  --  30.7* 30.0*  --   --  32.0*   PLATELETS Thousands/uL 350 399*  --  419* 429*  --   --  435*   POTASSIUM mmol/L 4.5 5.3 5.0 5.4* 5.2 5.3 5.0 4.9   CHLORIDE mmol/L 97 97  --  98 98 97 98 94*   CO2 mmol/L 24 21  --  17* 17* 20* 20* 19*   BUN mg/dL 50* 66*  --  79* 69* 59* 61* 61*   CREATININE mg/dL 6.86* 8.38*  --  9.42* 8.41* 7.51* 7.58* 7.95*   CALCIUM mg/dL 7.8* 8.1*  --  8.2* 8.0* 7.4* 7.6* 8.6   MAGNESIUM mg/dL  --   --  2.7  --   --   --   --  2.9*   ALBUMIN g/dL  --   --   --  2.9*  --   --   --   --        Administrative Statements     Portions of the record may have been created with voice recognition software. Occasional wrong word or \"sound a like\" substitutions may " have occurred due to the inherent limitations of voice recognition software. Read the chart carefully and recognize, using context, where substitutions have occurred.If you have any questions, please contact the dictating provider.

## 2024-09-18 NOTE — PLAN OF CARE
Hemodialysis treatment planned for 240 minutes using a 3 K+ bath for potassium 4.5 this morning.  Fluid goal 2500 ml/2000 ml net as discussed with Dr. Moore at bedside.          Post-Dialysis RN Treatment Note    Blood Pressure:  Pre 117/56 mm/Hg  Post 105/54 mmHg   EDW:  TBD    Weight:  Pre 139.2 kg   Post 137.4 kg   Mode of weight measurement: Bed Scale   Volume Removed:  2369 ml/1900 ml net   Treatment duration:  240 minutes    NS given:  No    Treatment shortened:   No   Medications given during Rx:  None Reported   Estimated Kt/V:  1.03   Access type: Temporary HD catheter   Access Issues: Yes, describe: lines reversed to maintain arterial blood flow     Report called to primary nurse:   Yes             Problem: METABOLIC, FLUID AND ELECTROLYTES - ADULT  Goal: Electrolytes maintained within normal limits  Description: INTERVENTIONS:  - Monitor labs and assess patient for signs and symptoms of electrolyte imbalances  - Administer electrolyte replacement as ordered  - Monitor response to electrolyte replacements, including repeat lab results as appropriate  - Instruct patient on fluid and nutrition as appropriate  Outcome: Progressing  Goal: Fluid balance maintained  Description: INTERVENTIONS:  - Monitor labs   - Monitor I/O and WT  - Instruct patient on fluid and nutrition as appropriate  - Assess for signs & symptoms of volume excess or deficit  Outcome: Progressing

## 2024-09-18 NOTE — ASSESSMENT & PLAN NOTE
Lab Results   Component Value Date    CREATININE 6.86 (H) 09/18/2024    CREATININE 8.38 (H) 09/17/2024    CREATININE 9.42 (H) 09/16/2024    EGFR 7 09/18/2024    EGFR 5 09/17/2024    EGFR 4 09/16/2024   Unspecified exact etiology  Patient has not had blood work since April 2023 at which time his kidney function was normal  Patient was recently prescribed Bactrim, however since there has been approximately a year and a half in between BMP testing unsure if this is more of an acute process versus an acute on chronic process or just a progression of chronic kidney disease  Arrival creatinine was 7.95, creatinine peak on this admission was 9.42  Patient also has an anion gap metabolic acidosis secondary to his renal failure  Patient was initially treated with IV fluid, however due to concerns for volume overload state, IV fluids were discontinued   Patient was subsequently treated with Lasix also however no improvement in creatinine  Status post an IR intervention on Monday, 9/16/2024 at which time a dialysis line was placed  Patient was successfully dialyzed on 9/16/2024  Follow-up creatinine this morning is 8.38  Will consult IR for both a renal biopsy and a permacath placement for 9/19/2024  DC planning once cleared by nephrology  Case Management has also been made aware of the potential need to set up an outpatient dialysis chair if this indeed is to become a chronic process

## 2024-09-18 NOTE — TELEPHONE ENCOUNTER
[facility] Renal forms completed and faxed to IR for Biopsy  09-18-24  Forms uploaded to chart in media.

## 2024-09-18 NOTE — ASSESSMENT & PLAN NOTE
Patient met sepsis criteria, present on admission as evident by leukocytosis, tachypnea and tachycardia, arrival temp was 100.4 °F  Secondary to an acute cystitis without hematuria  SIRS criteria have significantly improved, patient is no longer tachycardic, and/or febrile  Status post 3 days worth of IV ceftriaxone-treatment was in place for suspected UTI  Appreciate ID input  Plan is to monitor off of antibiotics

## 2024-09-18 NOTE — ASSESSMENT & PLAN NOTE
HD dependent, day 3 HD 9/18.   Unclear etiology. No obstruction on CT scan. Pursuing kidney biopsy. Urine protein/cr ratio nephrotic range on spot sample at 4.4 gram.   Outpatient arrangements made at [facility] schedule upon DC.     Arrangements made for Renal biopsy and PC placement on 9/19 with IR. Current HD access nontunneled EJ. Currently off antibiotics for SIRS, felt to be reactive in nature. Blood cultures Negative to date.    Will try to have a kidney biopsy this Thursday, September 19.  The [place] kit form will need to be filled out.   Preference by this nephrologist is to monitor patient post biopsy on 9/20 and have HD while admitted. If stable, can be DC post HD on Friday.     Current serologic workup: negative MARIO ALBERTO, ASO> 20. Spep hypoalbuminemia and abnormal distribution in gamma region. Urine immunofixation faint monoclonal band, free-kappa light chains. Kappa/lambda ratio 2.0 but in keeping with CKD.    Complement WNL. HIV negative.      PermCath to be placed on Thursday as the patient will most likely need outpatient hemodialysis given current clinical circumstances.

## 2024-09-18 NOTE — ASSESSMENT & PLAN NOTE
Most likely a hypervolemic hyponatremia in the setting of acute renal failure, and volume overload  Sodium is improving  Continue to monitor BMPs  Anticipate sodium level should get further better with dialysis

## 2024-09-18 NOTE — CASE MANAGEMENT
Case Management Discharge Planning Note    Patient name Ramos Rosenthal  Location ICU 04/ICU  MRN [id number]  : 1950 Date 2024       Current Admission Date: 2024  Current Admission Diagnosis:LYLY (acute kidney injury) ([facility])   Patient Active Problem List    Diagnosis Date Noted Date Diagnosed    Primary hypertension 2024     Hyperkalemia 2024     Secondary hyperparathyroidism of renal origin ([facility]) 2024     Uremia 2024     Hyponatremia 2024     SIRS (systemic inflammatory response syndrome) ([facility]) 2024     Bilateral lower extremity edema 2024     Neurogenic claudication 2023    Bloating 10/05/2022     Paresthesia of both lower extremities 10/01/2022     Leucocytosis 10/01/2022     Elevated troponin 2022     EDGAR (dyspnea on exertion) 2022     Rash due to vaculitis  2022     LYLY (acute kidney injury) ([facility]) 2022     Depression 2022     Multiple open wounds of lower leg 2022     Lower extremity weakness 2019     Lumbosacral plexopathy 2019     Gait abnormality 2019     Lumbar stenosis 2019     Polyneuropathy 2019     Gout      Prediabetes 2018    Hyperlipidemia 2014    Vitamin D deficiency 2014      LOS (days): 4  Geometric Mean LOS (GMLOS) (days): 5.1  Days to GMLOS:1.2     OBJECTIVE:  Risk of Unplanned Readmission Score: 16.95      Current admission status: Inpatient   Preferred Pharmacy:   [id number] - [place] - [address]  [address] [address]  Phone: [phone] Fax: [phone]    Primary Care Provider: Jessika Carrillo MD    Primary Insurance: MEDICARE  Secondary Insurance: [employer]IF    DISCHARGE DETAILS:    Discharge planning discussed with:: Pt and wife Louise  Freedom of Choice: Yes  Comments - Freedom of Choice: Pt chose [facility],  OP dialysis  chair set up for W-F- at 1130.  Provided letter from [facility].  REferrals via AIDIN for HHC.  Pt chose [facility] from the accepting providers.  Reserved in AIDIN     Requested Home Health Care         Is the patient interested in HHC at discharge?: Yes  Home Health Discipline requested:: Nursing  Home Health Agency Name:: [facility]  HHA External Referral Reason (only applicable if external HHA name selected): Patient has established relationship with provider  Home Health Follow-Up Provider:: PCP  Home Health Services Needed:: Evaluate Functional Status and Safety, Gait/ADL Training, Post-Op Care and Assessment  Homebound Criteria Met:: Uses an Assist Device (i.e. cane, walker, etc), Requires the Assistance of Another Person for Safe Ambulation or to Leave the Home  Supporting Clincal Findings:: Bed Bound or Wheelchair Bound    Received letter from [facility] dialysis pt is scheduled for Friday at 1100.  Provided the pt the letter.  Pt chose [facility] from the accepting providers.  Reserved in AIDIN.  Spoke to pt  mati Hernandez who would like more information on peritoneal dialysis.  Spoke to the [facility] RN at the pt bedside who called daughter Mary to provide her with peritoneal dialysis information.  Reviewed the IMM with the pt who is in agreement with same.  As per SLIM pt may be able to be DC after procedures tomorrow.  VELASQUEZ will consult nephrology on same.  Spoke to pt and will set up transport for home tomorrow if able.      IMM Given (Date):: 09/18/24  IMM Given to:: Patient (Reviewed the IMM with the pt who is in agreement with same)

## 2024-09-18 NOTE — PROGRESS NOTES
Telemed Progress Note - Infectious Disease   Ramos Rosenthal 74 y.o. male MRN: [id number]  Unit/Bed#: ICU 04-01 Encounter: [id number]    VIRTUAL CARE DOCUMENTATION:   1. This service was provided via Telemedicine using [employer] TV Kit   2. Parties in the room with patient during teleconsult: patient  3. Confidentiality: My office door was closed   4. Participants: patient, LESTER  5. Patient acknowledged consent and understanding of privacy and security of the  Telemedicine consult. I informed the patient that I have reviewed their record in Epic and presented the opportunity for them to ask any questions regarding the visit today.  The patient agreed to participate.  6. Time spent: 30 minutes      Assessment/Recommendations   1. SIRS with tachycardia, tachycardia and leukocytosis. In setting of #2. Likely reactive. WBC has down-trended today. Patient remains asymptomatic, afebrile, and hemodynamically stable. No evidence of active, acute infection on ID work up.  -continue to monitor closely off additional antibiotics at present  -volume management per Nephrology  -follow-up blood cultures until finalized  -monitor serial am labs for treatment response and any developing toxicities     2. LYLY. Patient presenting with Cr 7.96 < Peak 10 with anuria. 4/2023 Baseline Cr was 0.8. CT scan and renal ultrasound negative for obstructive source. UA had innumerable RBCs, 4-10 WBCs and moderate bacteria. Urine cx negative. ASO titer 400, concern post strep glomerulonephritis in differential of etiology  -continue to monitor off additional antibiotics at present  -volume management per Nephrology  -HD management per nephrology  -Ok for perm cath placement from ID standpoint  -Follow-up renal biopsy  -recheck BMP for treatment response and any developing toxicities     3. Hematuria on presentation. No urinary symptoms. CT scan and renal ultrasound negative for obstructive source.  UA had innumerable RBCs, 4-10 WBCs and moderate  bacteria. Urine cx negative. Currently denies having any urinary symptoms.   -monitor off additional antibiotics at present  -volume management per Nephrology  -monitor urine output     4. Volume Overload, generalized edema. Favored in setting of #2   -volume management per Nephrology  -serial exams     5.. Listed PCN rash and Cipro blister remote reactions. Possible Bactrim adverse reactions in setting of #2. Tolerated Ceftriaxone this admission  -monitor antibiotic tolerance    Above plan was discussed in detail with patient over the TV kit.  Above plan was discussed in detail with primary service.  ID consult service will sign off at this time. Please call with any additional questions or concerns.     History   Subjective:  Currently undergoing dialysis. Has no complaints currently. Denies having any pain or localizing symptoms. No fever, chills, sweats, shakes; no nausea, vomiting, abdominal pain, diarrhea, or dysuria; no cough, shortness of breath, or chest pain. IR planning for permcath and renal biopsy tomorrow. WBC continues to down-trend off antibiotics. Patient remains afebrile and hemodynamically stable.     Antibiotics:  None currently      Physical Exam     Temp:  [99 °F (37.2 °C)] 99 °F (37.2 °C)  HR:  [] 80  Resp:  [19-31] 21  BP: ()/() 108/60  SpO2:  [94 %-98 %] 98 %  Temp (24hrs), Av °F (37.2 °C), Min:99 °F (37.2 °C), Max:99 °F (37.2 °C)  Current: Temperature: 99 °F (37.2 °C)    Intake/Output Summary (Last 24 hours) at 2024 0908  Last data filed at 2024 1200  Gross per 24 hour   Intake 700 ml   Output 2009 ml   Net -1309 ml       Physical exam findings reported by bedside and primary medical team staff, also observed on TV kit:  General Appearance:  Alert, interactive, nontoxic, no acute distress. Laying comfortably in bed, undergoing dialysis   Throat: Oropharynx moist without lesions.    Neck: RIJ temp cath CDI. No surrounding erythema noted.    Lungs:   Clear to  auscultation bilaterally; no wheezes, rhonchi or rales; respirations unlabored. On room air.    Heart:  RRR; no murmur, rub or gallop.   Abdomen:   Soft, obese, non-tender, non-distended    Extremities: No clubbing or cyanosis. +LE pitting edema bilaterally.   Skin: No new rashes, lesions, or draining wounds noted on exposed skin.     Invasive Devices:   CVC Central Lines 09/16/24 Double (Active)       Peripheral IV 09/14/24 Right;Ventral (anterior) Hand (Active)   Site Assessment WDL 09/17/24 1000   Dressing Type Transparent 09/17/24 1000   Line Status Flushed 09/17/24 1000   Dressing Status Clean;Dry;Intact 09/17/24 1000   Dressing Change Due 09/18/24 09/16/24 2000   Reason Not Rotated Not due 09/16/24 2000       Peripheral IV 09/14/24 Right Antecubital (Active)   Site Assessment WDL;Clean;Dry 09/17/24 1000   Dressing Type Transparent 09/17/24 1000   Line Status Flushed 09/17/24 1000   Dressing Status Clean;Dry;Intact 09/17/24 1000   Dressing Change Due 09/18/24 09/16/24 2000   Reason Not Rotated Not due 09/16/24 2000       Urethral Catheter Double-lumen 16 Fr. (Active)   Amt returned on insertion(mL) 100 mL 09/14/24 1437   Reasons to continue Urinary Catheter  Accurate I&O assessment in critically ill patients (48 hr. max) 09/17/24 0900   Goal for Removal Remove after 48 hrs of I/O monitoring 09/17/24 0900   Site Assessment Clean;Skin intact 09/17/24 0900   [place] Care Other (Comment) 09/17/24 0900   Collection Container Standard drainage bag 09/17/24 0900   Securement Method Securing device (Describe) 09/17/24 0900   Securing Device Change Date 09/18/24 09/16/24 2000   Output (mL) 40 mL 09/17/24 0900       HD Temporary Double Catheter (Active)   Reasons to continue HD Cath Treatment Therapy 09/17/24 0800   Goal for Removal N/A- chronic HD catheter 09/16/24 2000   Line Necessity Reviewed Yes, reviewed with provider 09/17/24 0800   Site Assessment WDL;Clean;Dry;Intact 09/17/24 0800   Proximal Lumen Status Blood  return noted;Flushed;Infusing 09/17/24 0800   Distal Lumen Status Blood return noted;Flushed;Infusing 09/17/24 0800   Catheter Out on Skin (cm) 0 cm 09/16/24 1500   Line Care Connections checked and tightened 09/17/24 0800   Dressing Type Chlorhexidine dressing 09/17/24 0800   Dressing Status Clean;Dry;Intact 09/17/24 0800   Dressing Intervention 7 day central line dressing changed 09/16/24 2000   Dressing Change Due 09/23/24 09/17/24 0800       Labs, Imaging, & Other Studies   I have personally reviewed pertinent labs.    Results from last 7 days   Lab Units 09/18/24  0559 09/17/24  0545 09/16/24  0608   WBC Thousand/uL 13.41* 15.45* 16.69*   HEMOGLOBIN g/dL 8.8* 9.9* 10.0*   PLATELETS Thousands/uL 350 399* 419*     Results from last 7 days   Lab Units 09/18/24  0559 09/16/24  2142 09/16/24  0608   POTASSIUM mmol/L 4.5   < > 5.4*   CHLORIDE mmol/L 97   < > 98   CO2 mmol/L 24   < > 17*   BUN mg/dL 50*   < > 79*   CREATININE mg/dL 6.86*   < > 9.42*   EGFR ml/min/1.73sq m 7   < > 4   CALCIUM mg/dL 7.8*   < > 8.2*   AST U/L  --   --  79*   ALT U/L  --   --  82*   ALK PHOS U/L  --   --  79    < > = values in this interval not displayed.     Results from last 7 days   Lab Units 09/16/24  1249 09/14/24  1314   BLOOD CULTURE   --  No Growth at 72 hrs.  No Growth at 72 hrs.   MRSA CULTURE ONLY  No Methicillin Resistant Staphlyococcus aureus (MRSA) isolated  --      Imaging Studies:  Pertinent imaging studies have personally be reviewed by myself.     Sherron Buenrostro PA-C  [facility]

## 2024-09-18 NOTE — CONSULTS
Interventional Radiology  Consultation 2024     Inpatient Consult to IR  Consult performed by: Antoine Case MD  Consult ordered by: Agnes Rust MD        Ramos Rosenthal   1950   [id number]      Assessment/Plan:  LYLY. Etiology not established.  Plan Core biopsy of kidney. Longer needle. CT guidance.    Medical Problems       Problem List       * (Principal) LYLY (acute kidney injury) (HCC)    Gout    Gait abnormality    Lumbar stenosis    Polyneuropathy    Lower extremity weakness    Lumbosacral plexopathy    Elevated troponin    EDGAR (dyspnea on exertion)    Rash due to vaculitis     Depression    Multiple open wounds of lower leg    Paresthesia of both lower extremities    Leucocytosis    Bloating    Hyperlipidemia    Neurogenic claudication    Prediabetes    Vitamin D deficiency    Hyponatremia    SIRS (systemic inflammatory response syndrome) (HCC)    Bilateral lower extremity edema    Hyperkalemia    Secondary hyperparathyroidism of renal origin (HCC)    Uremia    Primary hypertension            Subjective:     Patient ID: Ramos Rosenthal is a 74 y.o. male.    History of Present Illness  Adm w/ LYLY. Now on HD.    Review of Systems      Past Medical History:   Diagnosis Date    Depression     Gout         Past Surgical History:   Procedure Laterality Date    BACK SURGERY      X2 LUMBAR INCLUDING FUSION  ,  WITH OAA, [facility] DR. HERZOG    CATARACT EXTRACTION, BILATERAL      KNEE ARTHROSCOPY Right     x2 , meniscus repair    TONSILECTOMY AND ADNOIDECTOMY          Social History     Tobacco Use   Smoking Status Former    Current packs/day: 0.00    Types: Cigarettes    Quit date:     Years since quittin.7   Smokeless Tobacco Never        Social History     Substance and Sexual Activity   Alcohol Use Not Currently    Comment: occasional        Social History     Substance and Sexual Activity   Drug Use Never        Allergies   Allergen Reactions    Ciprofloxacin Blisters     Other      IVORY SOAP    Penicillins Rash       Current Facility-Administered Medications   Medication Dose Route Frequency Provider Last Rate Last Admin    calcium carbonate (TUMS) chewable tablet 1,000 mg  1,000 mg Oral Daily PRN BRETT Xie        carvedilol (COREG) tablet 6.25 mg  6.25 mg Oral BID With Meals Gigi Ortega DO   6.25 mg at 09/18/24 0739    FLUoxetine (PROzac) capsule 20 mg  20 mg Oral Daily BRETT Xie   20 mg at 09/18/24 0830    heparin (porcine) subcutaneous injection 5,000 Units  5,000 Units Subcutaneous Q8H LORENZO BRETT Xie   5,000 Units at 09/18/24 0554    ondansetron (ZOFRAN) injection 4 mg  4 mg Intravenous Q6H PRN BRETT Xie              Objective:    Vitals:    09/18/24 0739 09/18/24 0800 09/18/24 0935 09/18/24 1000   BP: 128/60 108/60 117/56 118/59   Pulse: 72 80 62 59   Resp:   20 20   Temp:       TempSrc:       SpO2: 98% 98%     Weight:       Height:            Physical Exam      Lab Results   Component Value Date    BNP 1,176 (H) 09/15/2024      Lab Results   Component Value Date    WBC 13.41 (H) 09/18/2024    HGB 8.8 (L) 09/18/2024    HCT 27.1 (L) 09/18/2024    MCV 87 09/18/2024     09/18/2024     Lab Results   Component Value Date    INR 1.40 (H) 09/14/2024    INR 1.29 (H) 09/30/2022    INR 0.97 09/13/2022    PROTIME 17.7 (H) 09/14/2024    PROTIME 16.3 (H) 09/30/2022    PROTIME 13.1 09/13/2022     Lab Results   Component Value Date    PTT 41 (H) 09/14/2024         I have personally reviewed pertinent imaging and laboratory results.     Code Status: Level 1 - Full Code  Advance Directive and Living Will:      Power of :    POLST:      IR has been consulted to evaluate the patient, determine the appropriate procedure, and whether or not a procedure can and should be performed.    Thank you for allowing me to participate in the care of Ramos Rosenthal. Please don't hesitate to call, text,  email, or Quik[employer]t with any questions.     This text is generated with voice recognition software. There may be translation, syntax,  or grammatical errors. If you have any questions, please contact the dictating provider.

## 2024-09-18 NOTE — PLAN OF CARE
Problem: PAIN - ADULT  Goal: Verbalizes/displays adequate comfort level or baseline comfort level  Description: Interventions:  - Encourage patient to monitor pain and request assistance  - Assess pain using appropriate pain scale  - Administer analgesics based on type and severity of pain and evaluate response  - Implement non-pharmacological measures as appropriate and evaluate response  - Consider cultural and social influences on pain and pain management  - Notify physician/advanced practitioner if interventions unsuccessful or patient reports new pain  Outcome: Progressing     Problem: INFECTION - ADULT  Goal: Absence or prevention of progression during hospitalization  Description: INTERVENTIONS:  - Assess and monitor for signs and symptoms of infection  - Monitor lab/diagnostic results  - Monitor all insertion sites, i.e. indwelling lines, tubes, and drains  - Monitor endotracheal if appropriate and nasal secretions for changes in amount and color  - [place] appropriate cooling/warming therapies per order  - Administer medications as ordered  - Instruct and encourage patient and family to use good hand hygiene technique  - Identify and instruct in appropriate isolation precautions for identified infection/condition  Outcome: Progressing  Goal: Absence of fever/infection during neutropenic period  Description: INTERVENTIONS:  - Monitor WBC    Outcome: Progressing     Problem: SAFETY ADULT  Goal: Patient will remain free of falls  Description: INTERVENTIONS:  - Educate patient/family on patient safety including physical limitations  - Instruct patient to call for assistance with activity   - Consult OT/PT to assist with strengthening/mobility   - Keep Call bell within reach  - Keep bed low and locked with side rails adjusted as appropriate  - Keep care items and personal belongings within reach  - Initiate and maintain comfort rounds  - Make Fall Risk Sign visible to staff  - Offer Toileting every Hours, in  advance of need  - Initiate/Maintain alarm  - Obtain necessary fall risk management equipment:   - Apply yellow socks and bracelet for high fall risk patients  - Consider moving patient to room near nurses station  Outcome: Progressing  Goal: Maintain or return to baseline ADL function  Description: INTERVENTIONS:  -  Assess patient's ability to carry out ADLs; assess patient's baseline for ADL function and identify physical deficits which impact ability to perform ADLs (bathing, care of mouth/teeth, toileting, grooming, dressing, etc.)  - Assess/evaluate cause of self-care deficits   - Assess range of motion  - Assess patient's mobility; develop plan if impaired  - Assess patient's need for assistive devices and provide as appropriate  - Encourage maximum independence but intervene and supervise when necessary  - Involve family in performance of ADLs  - Assess for home care needs following discharge   - Consider OT consult to assist with ADL evaluation and planning for discharge  - Provide patient education as appropriate  Outcome: Progressing  Goal: Maintains/Returns to pre admission functional level  Description: INTERVENTIONS:  - Perform AM-PAC 6 Click Basic Mobility/ Daily Activity assessment daily.  - Set and communicate daily mobility goal to care team and patient/family/caregiver.   - Collaborate with rehabilitation services on mobility goals if consulted  - Perform Range of Motion  times a day.  - Reposition patient every  hours.  - Dangle patient  times a day  - Stand patient  times a day  - Ambulate patient  times a day  - Out of bed to chair  times a day   - Out of bed for meals times a day  - Out of bed for toileting  - Record patient progress and toleration of activity level   Outcome: Progressing     Problem: Knowledge Deficit  Goal: Patient/family/caregiver demonstrates understanding of disease process, treatment plan, medications, and discharge instructions  Description: Complete learning assessment  and assess knowledge base.  Interventions:  - Provide teaching at level of understanding  - Provide teaching via preferred learning methods  Outcome: Progressing     Problem: Nutrition/Hydration-ADULT  Goal: Nutrient/Hydration intake appropriate for improving, restoring or maintaining nutritional needs  Description: Monitor and assess patient's nutrition/hydration status for malnutrition. Collaborate with interdisciplinary team and initiate plan and interventions as ordered.  Monitor patient's weight and dietary intake as ordered or per policy. Utilize nutrition screening tool and intervene as necessary. Determine patient's food preferences and provide high-protein, high-caloric foods as appropriate.     INTERVENTIONS:  - Monitor oral intake, urinary output, labs, and treatment plans  - Assess nutrition and hydration status and recommend course of action  - Evaluate amount of meals eaten  - Assist patient with eating if necessary   - Allow adequate time for meals  - Recommend/ encourage appropriate diets, oral nutritional supplements, and vitamin/mineral supplements  - Order, calculate, and assess calorie counts as needed  - Recommend, monitor, and adjust tube feedings and TPN/PPN based on assessed needs  - Assess need for intravenous fluids  - Provide specific nutrition/hydration education as appropriate  - Include patient/family/caregiver in decisions related to nutrition  Outcome: Progressing     Problem: Prexisting or High Potential for Compromised Skin Integrity  Goal: Skin integrity is maintained or improved  Description: INTERVENTIONS:  - Identify patients at risk for skin breakdown  - Assess and monitor skin integrity  - Assess and monitor nutrition and hydration status  - Monitor labs   - Assess for incontinence   - Turn and reposition patient  - Assist with mobility/ambulation  - Relieve pressure over bony prominences  - Avoid friction and shearing  - Provide appropriate hygiene as needed including keeping  skin clean and dry  - Evaluate need for skin moisturizer/barrier cream  - Collaborate with interdisciplinary team   - Patient/family teaching  - Consider wound care consult   Outcome: Progressing     Problem: METABOLIC, FLUID AND ELECTROLYTES - ADULT  Goal: Electrolytes maintained within normal limits  Description: INTERVENTIONS:  - Monitor labs and assess patient for signs and symptoms of electrolyte imbalances  - Administer electrolyte replacement as ordered  - Monitor response to electrolyte replacements, including repeat lab results as appropriate  - Instruct patient on fluid and nutrition as appropriate  Outcome: Progressing  Goal: Fluid balance maintained  Description: INTERVENTIONS:  - Monitor labs   - Monitor I/O and WT  - Instruct patient on fluid and nutrition as appropriate  - Assess for signs & symptoms of volume excess or deficit  Outcome: Progressing

## 2024-09-18 NOTE — ASSESSMENT & PLAN NOTE
Hx of back surgery in 2004 and 2014 and has since bilateral LE paresthesia  He is primarily wheelchair bound  Venous statis ulcers noted to Bilateral LE  B/L extremity edema R>L  Continue supportive therapy

## 2024-09-18 NOTE — PROGRESS NOTES
Progress Note - Hospitalist   Name: Ramos Rosenthal 74 y.o. male I MRN: [id number]  Unit/Bed#: ICU 04-01 I Date of Admission: 9/14/2024   Date of Service: 9/18/2024 I Hospital Day: 4    Assessment & Plan  LYLY (acute kidney injury) ([facility])      Lab Results   Component Value Date    CREATININE 6.86 (H) 09/18/2024    CREATININE 8.38 (H) 09/17/2024    CREATININE 9.42 (H) 09/16/2024    EGFR 7 09/18/2024    EGFR 5 09/17/2024    EGFR 4 09/16/2024   Unspecified exact etiology  Patient has not had blood work since April 2023 at which time his kidney function was normal  Patient was recently prescribed Bactrim, however since there has been approximately a year and a half in between BMP testing unsure if this is more of an acute process versus an acute on chronic process or just a progression of chronic kidney disease  Arrival creatinine was 7.95, creatinine peak on this admission was 9.42  Patient also has an anion gap metabolic acidosis secondary to his renal failure  Patient was initially treated with IV fluid, however due to concerns for volume overload state, IV fluids were discontinued   Patient was subsequently treated with Lasix also however no improvement in creatinine  Status post an IR intervention on Monday, 9/16/2024 at which time a dialysis line was placed  Patient was successfully dialyzed on 9/16/2024  Follow-up creatinine this morning is 8.38  Will consult IR for both a renal biopsy and a permacath placement for 9/19/2024  DC planning once cleared by nephrology  Case Management has also been made aware of the potential need to set up an outpatient dialysis chair if this indeed is to become a chronic process  SIRS (systemic inflammatory response syndrome) ([facility])  Patient met sepsis criteria, present on admission as evident by leukocytosis, tachypnea and tachycardia, arrival temp was 100.4 °F  Secondary to an acute cystitis without hematuria  SIRS criteria have significantly improved, patient is no longer  tachycardic, and/or febrile  Status post 3 days worth of IV ceftriaxone-treatment was in place for suspected UTI  Appreciate ID input  Plan is to monitor off of antibiotics  Gout  Allopurinol to remain on hold in view of the acute kidney injury  Depression  Continue home dose fluoxetine  Paresthesia of both lower extremities  Hx of back surgery in 2004 and 2014 and has since bilateral LE paresthesia  He is primarily wheelchair bound  Venous statis ulcers noted to Bilateral LE  B/L extremity edema R>L  Continue supportive therapy  Hyponatremia  Most likely a hypervolemic hyponatremia in the setting of acute renal failure, and volume overload  Sodium is improving  Continue to monitor BMPs  Anticipate sodium level should get further better with dialysis  Bilateral lower extremity edema  Chronic condition  Patient with a history of lower back/spinal surgery  Patient does not ambulate  Patient is wheelchair-bound at baseline  Hyperkalemia  Resolved with dialysis    VTE Pharmacologic Prophylaxis: VTE Score: 8 High Risk (Score >/= 5) - Pharmacological DVT Prophylaxis Ordered: heparin. Sequential Compression Devices Ordered.    Mobility:   Basic Mobility Inpatient Raw Score: 11  JH-HLM Goal: 4: Move to chair/commode  JH-HLM Achieved: 3: Sit at edge of bed  JH-HLM Goal NOT achieved. Continue with multidisciplinary rounding and encourage appropriate mobility to improve upon JH-HLM goals.    Patient Centered Rounds: I performed bedside rounds with nursing staff today.     Discussions with Specialists or Other Care Team Provider: Nephrology    Education and Discussions with Family / Patient: Updated  (wife) at bedside.    Current Length of Stay: 4 day(s)  Current Patient Status: Inpatient   Certification Statement: The patient will continue to require additional inpatient hospital stay due to ESRD on HD  Discharge Plan: Anticipate discharge tomorrow to home.    Code Status: Level 1 - Full Code    Subjective  "  Patient seen and examined at bedside. No acute events overnight. Denies chest pain, SOB, diaphoresis, nausea/vomiting/diarrhea, fevers/chills.     Objective     Vitals:   Temp (24hrs), Av.2 °F (37.3 °C), Min:99 °F (37.2 °C), Max:99.3 °F (37.4 °C)    Temp:  [99 °F (37.2 °C)-99.3 °F (37.4 °C)] 99 °F (37.2 °C)  HR:  [] 72  Resp:  [13-31] 21  BP: ()/() 128/60  SpO2:  [94 %-98 %] 98 %  Body mass index is 39.71 kg/m².     Input and Output Summary (last 24 hours):     Intake/Output Summary (Last 24 hours) at 2024 0754  Last data filed at 2024 1200  Gross per 24 hour   Intake 1100 ml   Output 2049 ml   Net -949 ml       /60   Pulse 72   Temp 99 °F (37.2 °C) (Tympanic)   Resp 21   Ht 6' 2\" (1.88 m)   Wt (!) 140 kg (309 lb 4.9 oz)   SpO2 98%   BMI 39.71 kg/m²     General Appearance:    Alert, cooperative, no distress, appears stated age   Head:    Normocephalic, without obvious abnormality, atraumatic   Eyes:    PERRL, conjunctiva/corneas clear, EOM's intact, fundi     benign, both eyes        Ears:    Normal TM's and external ear canals, both ears   Nose:   Nares normal, septum midline, mucosa normal, no drainage    or sinus tenderness   Throat:   Lips, mucosa, and tongue normal; teeth and gums normal   Neck:   Supple, symmetrical, trachea midline, no adenopathy;        thyroid:  No enlargement/tenderness/nodules; no carotid    bruit or JVD   Back:     Symmetric, no curvature, ROM normal, no CVA tenderness   Lungs:     Clear to auscultation bilaterally, respirations unlabored   Chest wall:    No tenderness or deformity   Heart:    Regular rate and rhythm, S1 and S2 normal, no murmur, rub    or gallop   Abdomen:     Soft, non-tender, bowel sounds active all four quadrants,     no masses, no organomegaly   Genitalia:    Normal male without lesion, discharge or tenderness   Rectal:    Normal tone, normal prostate, no masses or tenderness;    guaiac negative stool   Extremities:   " Extremities normal, atraumatic, no cyanosis or edema   Pulses:   2+ and symmetric all extremities   Skin:   Skin color, texture, turgor normal, no rashes or lesions   Lymph nodes:   Cervical, supraclavicular, and axillary nodes normal   Neurologic:   CNII-XII intact. Normal strength, sensation and reflexes       throughout        Lines/Drains:  Lines/Drains/Airways       Active Status       Name Placement date Placement time Site Days    CVC Central Lines 09/16/24 Double 09/16/24  0909  --  1    HD Temporary Double Catheter 09/16/24  0909  Other (Comment)  1                    Central Line:  Goal for removal: N/A - Chronic PICC               Lab Results: I have reviewed the following results: CBC/BMP:   .     09/18/24  0559   WBC 13.41*   HGB 8.8*   HCT 27.1*      SODIUM 131*   K 4.5   CL 97   CO2 24   BUN 50*   CREATININE 6.86*   GLUC 102       Results from last 7 days   Lab Units 09/18/24  0559   WBC Thousand/uL 13.41*   HEMOGLOBIN g/dL 8.8*   HEMATOCRIT % 27.1*   PLATELETS Thousands/uL 350   SEGS PCT % 82*   LYMPHO PCT % 6*   MONO PCT % 8   EOS PCT % 2     Results from last 7 days   Lab Units 09/18/24  0559 09/16/24  2142 09/16/24  0608   SODIUM mmol/L 131*   < > 130*   POTASSIUM mmol/L 4.5   < > 5.4*   CHLORIDE mmol/L 97   < > 98   CO2 mmol/L 24   < > 17*   BUN mg/dL 50*   < > 79*   CREATININE mg/dL 6.86*   < > 9.42*   ANION GAP mmol/L 10   < > 15*   CALCIUM mg/dL 7.8*   < > 8.2*   ALBUMIN g/dL  --   --  2.9*   TOTAL BILIRUBIN mg/dL  --   --  0.31   ALK PHOS U/L  --   --  79   ALT U/L  --   --  82*   AST U/L  --   --  79*   GLUCOSE RANDOM mg/dL 102   < > 97    < > = values in this interval not displayed.     Results from last 7 days   Lab Units 09/14/24  1314   INR  1.40*             Results from last 7 days   Lab Units 09/14/24  1314   LACTIC ACID mmol/L 1.1   PROCALCITONIN ng/ml 1.48*       Recent Cultures (last 7 days):   Results from last 7 days   Lab Units 09/14/24  1314   BLOOD CULTURE  No Growth at  72 hrs.  No Growth at 72 hrs.       Imaging Review: No pertinent imaging studies reviewed.  Other Studies: No additional pertinent studies reviewed.    Last 24 Hours Medication List:     Current Facility-Administered Medications:     calcium carbonate (TUMS) chewable tablet 1,000 mg, Daily PRN    carvedilol (COREG) tablet 6.25 mg, BID With Meals    FLUoxetine (PROzac) capsule 20 mg, Daily    heparin (porcine) subcutaneous injection 5,000 Units, Q8H LORENZO    ondansetron (ZOFRAN) injection 4 mg, Q6H PRN    Administrative Statements   Today, Patient Was Seen By: Mike Perez, DO  I have spent a total time of 35 minutes in caring for this patient on the day of the visit/encounter including Diagnostic results, Prognosis, Risks and benefits of tx options, Instructions for management, Patient and family education, Importance of tx compliance, Risk factor reductions, Impressions, Counseling / Coordination of care, Documenting in the medical record, Reviewing / ordering tests, medicine, procedures  , Obtaining or reviewing history  , and Communicating with other healthcare professionals .    **Please Note: This note may have been constructed using a voice recognition system.**

## 2024-09-19 ENCOUNTER — APPOINTMENT (INPATIENT)
Dept: CT IMAGING | Facility: HOSPITAL | Age: 74
DRG: 871 | End: 2024-09-19
Payer: MEDICARE

## 2024-09-19 LAB
ANION GAP SERPL CALCULATED.3IONS-SCNC: 11 MMOL/L (ref 4–13)
BACTERIA BLD CULT: NORMAL
BACTERIA BLD CULT: NORMAL
BASOPHILS # BLD AUTO: 0.06 THOUSANDS/ΜL (ref 0–0.1)
BASOPHILS NFR BLD AUTO: 0 % (ref 0–1)
BUN SERPL-MCNC: 38 MG/DL (ref 5–25)
C-ANCA TITR SER IF: NORMAL TITER
C-ANCA TITR SER IF: NORMAL {TITER}
CALCIUM SERPL-MCNC: 8 MG/DL (ref 8.4–10.2)
CHLORIDE SERPL-SCNC: 98 MMOL/L (ref 96–108)
CO2 SERPL-SCNC: 22 MMOL/L (ref 21–32)
CREAT SERPL-MCNC: 5.7 MG/DL (ref 0.6–1.3)
EOSINOPHIL # BLD AUTO: 0.22 THOUSAND/ΜL (ref 0–0.61)
EOSINOPHIL NFR BLD AUTO: 1 % (ref 0–6)
ERYTHROCYTE [DISTWIDTH] IN BLOOD BY AUTOMATED COUNT: 15.5 % (ref 11.6–15.1)
GFR SERPL CREATININE-BSD FRML MDRD: 8 ML/MIN/1.73SQ M
GLUCOSE SERPL-MCNC: 109 MG/DL (ref 65–140)
HCT VFR BLD AUTO: 27.3 % (ref 36.5–49.3)
HGB BLD-MCNC: 8.7 G/DL (ref 12–17)
IMM GRANULOCYTES # BLD AUTO: 0.16 THOUSAND/UL (ref 0–0.2)
IMM GRANULOCYTES NFR BLD AUTO: 1 % (ref 0–2)
INR PPP: 1.28 (ref 0.85–1.19)
KAPPA LC FREE SER-MCNC: 113.3 MG/L (ref 3.3–19.4)
KAPPA LC FREE/LAMBDA FREE SER: 1.49 {RATIO} (ref 0.26–1.65)
LAMBDA LC FREE SERPL-MCNC: 76.1 MG/L (ref 5.7–26.3)
LYMPHOCYTES # BLD AUTO: 0.82 THOUSANDS/ΜL (ref 0.6–4.47)
LYMPHOCYTES NFR BLD AUTO: 5 % (ref 14–44)
MAGNESIUM SERPL-MCNC: 2.2 MG/DL (ref 1.9–2.7)
MCH RBC QN AUTO: 28.2 PG (ref 26.8–34.3)
MCHC RBC AUTO-ENTMCNC: 31.9 G/DL (ref 31.4–37.4)
MCV RBC AUTO: 88 FL (ref 82–98)
MONOCYTES # BLD AUTO: 1.29 THOUSAND/ΜL (ref 0.17–1.22)
MONOCYTES NFR BLD AUTO: 8 % (ref 4–12)
MYELOPEROXIDASE AB SER IA-ACNC: <0.2 UNITS (ref 0–0.9)
NEUTROPHILS # BLD AUTO: 13.11 THOUSANDS/ΜL (ref 1.85–7.62)
NEUTS SEG NFR BLD AUTO: 85 % (ref 43–75)
NRBC BLD AUTO-RTO: 0 /100 WBCS
P-ANCA ATYPICAL TITR SER IF: NORMAL TITER
P-ANCA ATYPICAL TITR SER IF: NORMAL {TITER}
P-ANCA TITR SER IF: NORMAL TITER
P-ANCA TITR SER IF: NORMAL {TITER}
PHOSPHATE SERPL-MCNC: 3.6 MG/DL (ref 2.3–4.1)
PLATELET # BLD AUTO: 344 THOUSANDS/UL (ref 149–390)
PMV BLD AUTO: 8.5 FL (ref 8.9–12.7)
POTASSIUM SERPL-SCNC: 4.2 MMOL/L (ref 3.5–5.3)
PROTEINASE3 AB SER IA-ACNC: <0.2 UNITS (ref 0–0.9)
PROTHROMBIN TIME: 16.5 SECONDS (ref 12.3–15)
RBC # BLD AUTO: 3.09 MILLION/UL (ref 3.88–5.62)
SODIUM SERPL-SCNC: 131 MMOL/L (ref 135–147)
WBC # BLD AUTO: 15.66 THOUSAND/UL (ref 4.31–10.16)

## 2024-09-19 PROCEDURE — 88305 TISSUE EXAM BY PATHOLOGIST: CPT | Performed by: INTERNAL MEDICINE

## 2024-09-19 PROCEDURE — 99152 MOD SED SAME PHYS/QHP 5/>YRS: CPT | Performed by: RADIOLOGY

## 2024-09-19 PROCEDURE — 77012 CT SCAN FOR NEEDLE BIOPSY: CPT

## 2024-09-19 PROCEDURE — 99232 SBSQ HOSP IP/OBS MODERATE 35: CPT | Performed by: INTERNAL MEDICINE

## 2024-09-19 PROCEDURE — 83735 ASSAY OF MAGNESIUM: CPT | Performed by: INTERNAL MEDICINE

## 2024-09-19 PROCEDURE — 77012 CT SCAN FOR NEEDLE BIOPSY: CPT | Performed by: RADIOLOGY

## 2024-09-19 PROCEDURE — 88346 IMFLUOR 1ST 1ANTB STAIN PX: CPT | Performed by: INTERNAL MEDICINE

## 2024-09-19 PROCEDURE — 99153 MOD SED SAME PHYS/QHP EA: CPT

## 2024-09-19 PROCEDURE — 50200 RENAL BIOPSY PERQ: CPT

## 2024-09-19 PROCEDURE — 85025 COMPLETE CBC W/AUTO DIFF WBC: CPT | Performed by: INTERNAL MEDICINE

## 2024-09-19 PROCEDURE — 99152 MOD SED SAME PHYS/QHP 5/>YRS: CPT

## 2024-09-19 PROCEDURE — 84100 ASSAY OF PHOSPHORUS: CPT | Performed by: INTERNAL MEDICINE

## 2024-09-19 PROCEDURE — 0TB13ZX EXCISION OF LEFT KIDNEY, PERCUTANEOUS APPROACH, DIAGNOSTIC: ICD-10-PCS | Performed by: RADIOLOGY

## 2024-09-19 PROCEDURE — 50200 RENAL BIOPSY PERQ: CPT | Performed by: RADIOLOGY

## 2024-09-19 PROCEDURE — 85610 PROTHROMBIN TIME: CPT | Performed by: INTERNAL MEDICINE

## 2024-09-19 PROCEDURE — 88348 ELECTRON MICROSCOPY DX: CPT | Performed by: INTERNAL MEDICINE

## 2024-09-19 PROCEDURE — 88350 IMFLUOR EA ADDL 1ANTB STN PX: CPT | Performed by: INTERNAL MEDICINE

## 2024-09-19 PROCEDURE — 80048 BASIC METABOLIC PNL TOTAL CA: CPT | Performed by: INTERNAL MEDICINE

## 2024-09-19 PROCEDURE — NC001 PR NO CHARGE: Performed by: RADIOLOGY

## 2024-09-19 PROCEDURE — 88313 SPECIAL STAINS GROUP 2: CPT | Performed by: INTERNAL MEDICINE

## 2024-09-19 RX ORDER — FENTANYL CITRATE 50 UG/ML
INJECTION, SOLUTION INTRAMUSCULAR; INTRAVENOUS AS NEEDED
Status: COMPLETED | OUTPATIENT
Start: 2024-09-19 | End: 2024-09-19

## 2024-09-19 RX ORDER — MIDAZOLAM HYDROCHLORIDE 2 MG/2ML
INJECTION, SOLUTION INTRAMUSCULAR; INTRAVENOUS AS NEEDED
Status: COMPLETED | OUTPATIENT
Start: 2024-09-19 | End: 2024-09-19

## 2024-09-19 RX ORDER — LIDOCAINE HYDROCHLORIDE 10 MG/ML
INJECTION, SOLUTION EPIDURAL; INFILTRATION; INTRACAUDAL; PERINEURAL AS NEEDED
Status: COMPLETED | OUTPATIENT
Start: 2024-09-19 | End: 2024-09-19

## 2024-09-19 RX ADMIN — FLUOXETINE HYDROCHLORIDE 20 MG: 20 CAPSULE ORAL at 08:06

## 2024-09-19 RX ADMIN — CARVEDILOL 6.25 MG: 3.12 TABLET, FILM COATED ORAL at 08:06

## 2024-09-19 RX ADMIN — HEPARIN SODIUM 5000 UNITS: 5000 INJECTION, SOLUTION INTRAVENOUS; SUBCUTANEOUS at 21:17

## 2024-09-19 RX ADMIN — LIDOCAINE HYDROCHLORIDE 10 ML: 10 INJECTION, SOLUTION EPIDURAL; INFILTRATION; INTRACAUDAL; PERINEURAL at 16:30

## 2024-09-19 RX ADMIN — FENTANYL CITRATE 50 MCG: 50 INJECTION, SOLUTION INTRAMUSCULAR; INTRAVENOUS at 16:44

## 2024-09-19 RX ADMIN — MIDAZOLAM 0.5 MG: 1 INJECTION INTRAMUSCULAR; INTRAVENOUS at 16:19

## 2024-09-19 NOTE — PROGRESS NOTES
Progress Note - Hospitalist   Name: Ramos Rosenthal 74 y.o. male I MRN: [id number]  Unit/Bed#: ICU 04-01 I Date of Admission: 9/14/2024   Date of Service: 9/19/2024 I Hospital Day: 5    Assessment & Plan  LYLY (acute kidney injury) ([facility])      Lab Results   Component Value Date    CREATININE 5.70 (H) 09/19/2024    CREATININE 6.86 (H) 09/18/2024    CREATININE 8.38 (H) 09/17/2024    EGFR 8 09/19/2024    EGFR 7 09/18/2024    EGFR 5 09/17/2024   Unspecified exact etiology  Patient has not had blood work since April 2023 at which time his kidney function was normal  Patient was recently prescribed Bactrim, however since there has been approximately a year and a half in between BMP testing unsure if this is more of an acute process versus an acute on chronic process or just a progression of chronic kidney disease  Arrival creatinine was 7.95, creatinine peak on this admission was 9.42  Patient also has an anion gap metabolic acidosis secondary to his renal failure  Patient was initially treated with IV fluid, however due to concerns for volume overload state, IV fluids were discontinued   Patient was subsequently treated with Lasix also however no improvement in creatinine  Status post an IR intervention on Monday, 9/16/2024 at which time a dialysis line was placed  Patient was successfully dialyzed on 9/16/2024  Follow-up creatinine this morning is 8.38  Will consult IR for both a renal biopsy and a permacath placement for 9/19/2024  DC planning once cleared by Nephrology  Case Management has also been made aware of the potential need to set up an outpatient dialysis chair if this indeed is to become a chronic process  SIRS (systemic inflammatory response syndrome) ([facility])  Patient met sepsis criteria, present on admission as evident by leukocytosis, tachypnea and tachycardia, arrival temp was 100.4 °F  Secondary to an acute cystitis without hematuria  SIRS criteria have significantly improved, patient is no longer  tachycardic, and/or febrile  Status post 3 days worth of IV ceftriaxone-treatment was in place for suspected UTI  Appreciate ID input  Plan is to monitor off of antibiotics  Gout  Allopurinol to remain on hold in view of the acute kidney injury  Depression  Continue home dose fluoxetine  Paresthesia of both lower extremities  Hx of back surgery in 2004 and 2014 and has since bilateral LE paresthesia  He is primarily wheelchair bound  Venous statis ulcers noted to Bilateral LE  B/L extremity edema R>L  Continue supportive therapy  Hyponatremia  Most likely a hypervolemic hyponatremia in the setting of acute renal failure, and volume overload  Sodium is improving  Continue to monitor BMPs  Anticipate sodium level should get further better with dialysis  Bilateral lower extremity edema  Chronic condition  Patient with a history of lower back/spinal surgery  Patient does not ambulate  Patient is wheelchair-bound at baseline  Hyperkalemia  Resolved with dialysis    VTE Pharmacologic Prophylaxis: VTE Score: 8 High Risk (Score >/= 5) - Pharmacological DVT Prophylaxis Ordered: heparin. Sequential Compression Devices Ordered.    Mobility:   Basic Mobility Inpatient Raw Score: 11  JH-HLM Goal: 4: Move to chair/commode  JH-HLM Achieved: 3: Sit at edge of bed  JH-HLM Goal NOT achieved. Continue with multidisciplinary rounding and encourage appropriate mobility to improve upon JH-HLM goals.    Patient Centered Rounds: I performed bedside rounds with nursing staff today.     Discussions with Specialists or Other Care Team Provider: Nephrology    Education and Discussions with Family / Patient: Updated  (wife) at bedside.    Current Length of Stay: 5 day(s)  Current Patient Status: Inpatient   Certification Statement: The patient will continue to require additional inpatient hospital stay due to ESRD on HD  Discharge Plan: Anticipate discharge tomorrow to home.    Code Status: Level 1 - Full Code    Subjective  "  Patient seen and examined at bedside. No acute events overnight. Denies chest pain, SOB, diaphoresis, nausea/vomiting/diarrhea, fevers/chills.     Objective     Vitals:   Temp (24hrs), Av.6 °F (37 °C), Min:97.5 °F (36.4 °C), Max:99.4 °F (37.4 °C)    Temp:  [97.5 °F (36.4 °C)-99.4 °F (37.4 °C)] 97.5 °F (36.4 °C)  HR:  [59-78] 77  Resp:  [16-24] 18  BP: (105-158)/(54-82) 156/73  SpO2:  [92 %-100 %] 100 %  Body mass index is 39.68 kg/m².     Input and Output Summary (last 24 hours):     Intake/Output Summary (Last 24 hours) at 2024 0800  Last data filed at 2024 1335  Gross per 24 hour   Intake 740 ml   Output 2369 ml   Net -1629 ml       /73 (BP Location: Left arm)   Pulse 77   Temp 97.5 °F (36.4 °C) (Tympanic)   Resp 18   Ht 6' 2\" (1.88 m)   Wt (!) 140 kg (309 lb 1.4 oz)   SpO2 100%   BMI 39.68 kg/m²     General Appearance:    Alert, cooperative, no distress, appears stated age   Head:    Normocephalic, without obvious abnormality, atraumatic   Eyes:    PERRL, conjunctiva/corneas clear, EOM's intact, fundi     benign, both eyes        Ears:    Normal TM's and external ear canals, both ears   Nose:   Nares normal, septum midline, mucosa normal, no drainage    or sinus tenderness   Throat:   Lips, mucosa, and tongue normal; teeth and gums normal   Neck:   Supple, symmetrical, trachea midline, no adenopathy;        thyroid:  No enlargement/tenderness/nodules; no carotid    bruit or JVD   Back:     Symmetric, no curvature, ROM normal, no CVA tenderness   Lungs:     Clear to auscultation bilaterally, respirations unlabored   Chest wall:    No tenderness or deformity   Heart:    Regular rate and rhythm, S1 and S2 normal, no murmur, rub    or gallop   Abdomen:     Soft, non-tender, bowel sounds active all four quadrants,     no masses, no organomegaly   Genitalia:    Normal male without lesion, discharge or tenderness   Rectal:    Normal tone, normal prostate, no masses or tenderness;    guaiac " negative stool   Extremities:   Extremities normal, atraumatic, no cyanosis or edema   Pulses:   2+ and symmetric all extremities   Skin:   Skin color, texture, turgor normal, no rashes or lesions   Lymph nodes:   Cervical, supraclavicular, and axillary nodes normal   Neurologic:   CNII-XII intact. Normal strength, sensation and reflexes       throughout        Lines/Drains:  Lines/Drains/Airways       Active Status       Name Placement date Placement time Site Days    CVC Central Lines 09/16/24 Double 09/16/24  0909  --  2    HD Temporary Double Catheter 09/16/24  0909  Other (Comment)  2                    Central Line:  Goal for removal: N/A - Chronic PICC               Lab Results: I have reviewed the following results: CBC/BMP:   .     09/19/24  0602   WBC 15.66*   HGB 8.7*   HCT 27.3*      SODIUM 131*   K 4.2   CL 98   CO2 22   BUN 38*   CREATININE 5.70*   GLUC 109   MG 2.2   PHOS 3.6       Results from last 7 days   Lab Units 09/19/24  0602   WBC Thousand/uL 15.66*   HEMOGLOBIN g/dL 8.7*   HEMATOCRIT % 27.3*   PLATELETS Thousands/uL 344   SEGS PCT % 85*   LYMPHO PCT % 5*   MONO PCT % 8   EOS PCT % 1     Results from last 7 days   Lab Units 09/19/24  0602 09/16/24  2142 09/16/24  0608   SODIUM mmol/L 131*   < > 130*   POTASSIUM mmol/L 4.2   < > 5.4*   CHLORIDE mmol/L 98   < > 98   CO2 mmol/L 22   < > 17*   BUN mg/dL 38*   < > 79*   CREATININE mg/dL 5.70*   < > 9.42*   ANION GAP mmol/L 11   < > 15*   CALCIUM mg/dL 8.0*   < > 8.2*   ALBUMIN g/dL  --   --  2.9*   TOTAL BILIRUBIN mg/dL  --   --  0.31   ALK PHOS U/L  --   --  79   ALT U/L  --   --  82*   AST U/L  --   --  79*   GLUCOSE RANDOM mg/dL 109   < > 97    < > = values in this interval not displayed.     Results from last 7 days   Lab Units 09/19/24  0602   INR  1.28*             Results from last 7 days   Lab Units 09/14/24  1314   LACTIC ACID mmol/L 1.1   PROCALCITONIN ng/ml 1.48*       Recent Cultures (last 7 days):   Results from last 7 days    Lab Units 09/14/24  1314   BLOOD CULTURE  No Growth After 4 Days.  No Growth After 4 Days.       Imaging Review: No pertinent imaging studies reviewed.  Other Studies: No additional pertinent studies reviewed.    Last 24 Hours Medication List:     Current Facility-Administered Medications:     calcium carbonate (TUMS) chewable tablet 1,000 mg, Daily PRN    carvedilol (COREG) tablet 6.25 mg, BID With Meals    FLUoxetine (PROzac) capsule 20 mg, Daily    heparin (porcine) subcutaneous injection 5,000 Units, Q8H LORENZO    ondansetron (ZOFRAN) injection 4 mg, Q6H PRN    Administrative Statements   Today, Patient Was Seen By: Mike Perez, DO  I have spent a total time of 35 minutes in caring for this patient on the day of the visit/encounter including Diagnostic results, Prognosis, Risks and benefits of tx options, Instructions for management, Patient and family education, Importance of tx compliance, Risk factor reductions, Impressions, Counseling / Coordination of care, Documenting in the medical record, Reviewing / ordering tests, medicine, procedures  , Obtaining or reviewing history  , and Communicating with other healthcare professionals .    **Please Note: This note may have been constructed using a voice recognition system.**

## 2024-09-19 NOTE — ASSESSMENT & PLAN NOTE
Lab Results   Component Value Date    CREATININE 5.70 (H) 09/19/2024    CREATININE 6.86 (H) 09/18/2024    CREATININE 8.38 (H) 09/17/2024    EGFR 8 09/19/2024    EGFR 7 09/18/2024    EGFR 5 09/17/2024   Unspecified exact etiology  Patient has not had blood work since April 2023 at which time his kidney function was normal  Patient was recently prescribed Bactrim, however since there has been approximately a year and a half in between BMP testing unsure if this is more of an acute process versus an acute on chronic process or just a progression of chronic kidney disease  Arrival creatinine was 7.95, creatinine peak on this admission was 9.42  Patient also has an anion gap metabolic acidosis secondary to his renal failure  Patient was initially treated with IV fluid, however due to concerns for volume overload state, IV fluids were discontinued   Patient was subsequently treated with Lasix also however no improvement in creatinine  Status post an IR intervention on Monday, 9/16/2024 at which time a dialysis line was placed  Patient was successfully dialyzed on 9/16/2024  Follow-up creatinine this morning is 8.38  Will consult IR for both a renal biopsy and a permacath placement for 9/19/2024  DC planning once cleared by Nephrology  Case Management has also been made aware of the potential need to set up an outpatient dialysis chair if this indeed is to become a chronic process

## 2024-09-19 NOTE — ASSESSMENT & PLAN NOTE
Slightly improved, bilateral lower extremity edema is chronic.  Will continue to ultrafilter as tolerated dialysis sessions.

## 2024-09-19 NOTE — PLAN OF CARE
Problem: GENITOURINARY - ADULT  Goal: Urinary catheter remains patent  Description: INTERVENTIONS:  - Assess patency of urinary catheter  - If patient has a chronic medrano, consider changing catheter if non-functioning  - Follow guidelines for intermittent irrigation of non-functioning urinary catheter  Outcome: Completed     Problem: METABOLIC, FLUID AND ELECTROLYTES - ADULT  Goal: Electrolytes maintained within normal limits  Description: INTERVENTIONS:  - Monitor labs and assess patient for signs and symptoms of electrolyte imbalances  - Administer electrolyte replacement as ordered  - Monitor response to electrolyte replacements, including repeat lab results as appropriate  - Instruct patient on fluid and nutrition as appropriate  Outcome: Progressing  Goal: Fluid balance maintained  Description: INTERVENTIONS:  - Monitor labs   - Monitor I/O and WT  - Instruct patient on fluid and nutrition as appropriate  - Assess for signs & symptoms of volume excess or deficit  Outcome: Progressing

## 2024-09-19 NOTE — PROGRESS NOTES
Progress Note - Nephrology   Name: Ramos Rosenthal 74 y.o. male I MRN: [id number]  Unit/Bed#: ICU 04-01 I Date of Admission: 9/14/2024   Date of Service: 9/19/2024 I Hospital Day: 5     Assessment & Plan  LYLY (acute kidney injury) (HCC)  Patient remains hemodialysis dependent, will follow-up with kidney biopsy which is to be performed today, along with a PermCath placement.  Outpatient dialysis arrangements have been made with Monday Wednesday Friday schedule at the [facility].  No additional serologies have been resulted since last update.    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    Hyperkalemia  Stable, continue with monitoring and hemodialysis.  Hyponatremia  Continue fluid restriction, remains stable  Bilateral lower extremity edema  Slightly improved, bilateral lower extremity edema is chronic.  Will continue to ultrafilter as tolerated dialysis sessions.  Gout  Remains stable, continue to monitor  SIRS (systemic inflammatory response syndrome) (HCC)  Appears to be stable at this time      Case discussed with hospitalist.  Patient for kidney biopsy today, follow-up results.  Patient may be discharged when clinically stable post kidney biopsy.  We can review results in the outpatient setting and provide appropriate interventions accordingly.    Follow up reason for today's visit: Hemodialysis dependent acute kidney injury/electrolyte disorder/volume management    LYLY (acute kidney injury) ([facility])    Patient Active Problem List   Diagnosis    Gout    Gait abnormality    Lumbar stenosis    Polyneuropathy    Lower extremity weakness    Lumbosacral plexopathy    Elevated troponin    EDGAR (dyspnea on exertion)    Rash due to vaculitis     LYLY (acute kidney injury) (HCC)    Depression    Multiple open wounds of lower leg    Paresthesia of both lower extremities    Leucocytosis    Bloating    Hyperlipidemia    Neurogenic  "claudication    Prediabetes    Vitamin D deficiency    Hyponatremia    SIRS (systemic inflammatory response syndrome) (HCC)    Bilateral lower extremity edema    Hyperkalemia    Secondary hyperparathyroidism of renal origin (HCC)    Uremia    Primary hypertension         Subjective:   No acute complaints at this time.    Objective:     Vitals: Blood pressure 156/73, pulse 77, temperature 97.5 °F (36.4 °C), temperature source Tympanic, resp. rate 18, height 6' 2\" (1.88 m), weight (!) 140 kg (309 lb 1.4 oz), SpO2 100%.,Body mass index is 39.68 kg/m².    Weight (last 2 days)       Date/Time Weight    09/19/24 0600 140 (309.09)    09/18/24 0600 140 (309.31)    09/17/24 0600 140 (308.2)              Intake/Output Summary (Last 24 hours) at 9/19/2024 1001  Last data filed at 9/18/2024 1335  Gross per 24 hour   Intake 540 ml   Output 2369 ml   Net -1829 ml     I/O last 3 completed shifts:  In: 980 [P.O.:480; I.V.:500]  Out: 2369 [Other:2369]    HD Temporary Double Catheter (Active)   Reasons to continue HD Cath Treatment Therapy 09/18/24 2000   Goal for Removal N/A- chronic HD catheter 09/18/24 2000   Line Necessity Reviewed Yes, reviewed with provider 09/17/24 0800   Site Assessment WDL 09/18/24 2000   Proximal Lumen Status Passive disinfecting cap applied 09/18/24 2000   Distal Lumen Status Passive disinfecting cap applied 09/18/24 2000   Catheter Out on Skin (cm) 0 cm 09/16/24 1500   Line Care Connections checked and tightened 09/18/24 2000   Dressing Type Chlorhexidine dressing 09/18/24 2000   Dressing Status Clean;Dry;Intact 09/18/24 2000   Dressing Intervention 7 day central line dressing changed 09/16/24 2000   Dressing Change Due 09/23/24 09/17/24 0800       Physical Exam: /73 (BP Location: Left arm)   Pulse 77   Temp 97.5 °F (36.4 °C) (Tympanic)   Resp 18   Ht 6' 2\" (1.88 m)   Wt (!) 140 kg (309 lb 1.4 oz)   SpO2 100%   BMI 39.68 kg/m²     General Appearance:    Alert, cooperative, no distress, " appears stated age   Head:    Normocephalic, without obvious abnormality, atraumatic   Eyes:    Conjunctiva/corneas clear   Ears:    Normal external ears   Nose:   Nares normal, septum midline, mucosa normal, no drainage    or sinus tenderness   Throat:   Lips, mucosa, and tongue normal; teeth and gums normal   Neck:   Supple   Back:     Symmetric, no curvature, ROM normal, no CVA tenderness   Lungs:     Clear to auscultation bilaterally, respirations unlabored   Chest wall:    No tenderness or deformity   Heart:    Regular rate and rhythm, S1 and S2 normal, no murmur, rub   or gallop   Abdomen:     Soft, non-tender, bowel sounds active   Extremities:   Extremities normal, atraumatic, no cyanosis, mild bilateral lower extremity edema   Skin:   Skin color, texture, turgor normal, no rashes or lesions   Lymph nodes:   Cervical normal   Neurologic:   CNII-XII intact            Lab, Imaging and other studies: I have personally reviewed pertinent labs.  CBC:   Lab Results   Component Value Date    WBC 15.66 (H) 09/19/2024    HGB 8.7 (L) 09/19/2024    HCT 27.3 (L) 09/19/2024    MCV 88 09/19/2024     09/19/2024    RBC 3.09 (L) 09/19/2024    MCH 28.2 09/19/2024    MCHC 31.9 09/19/2024    RDW 15.5 (H) 09/19/2024    MPV 8.5 (L) 09/19/2024    NRBC 0 09/19/2024     CMP:   Lab Results   Component Value Date    K 4.2 09/19/2024    CL 98 09/19/2024    CO2 22 09/19/2024    BUN 38 (H) 09/19/2024    CREATININE 5.70 (H) 09/19/2024    CALCIUM 8.0 (L) 09/19/2024    EGFR 8 09/19/2024       .  Results from last 7 days   Lab Units 09/19/24  0602 09/18/24  0559 09/17/24  0545 09/16/24  2142 09/16/24  0608   POTASSIUM mmol/L 4.2 4.5 5.3   < > 5.4*   CHLORIDE mmol/L 98 97 97  --  98   CO2 mmol/L 22 24 21  --  17*   BUN mg/dL 38* 50* 66*  --  79*   CREATININE mg/dL 5.70* 6.86* 8.38*  --  9.42*   CALCIUM mg/dL 8.0* 7.8* 8.1*  --  8.2*   ALK PHOS U/L  --   --   --   --  79   ALT U/L  --   --   --   --  82*   AST U/L  --   --   --   --   "79*    < > = values in this interval not displayed.         Phosphorus:   Lab Results   Component Value Date    PHOS 3.6 09/19/2024     Magnesium:   Lab Results   Component Value Date    MG 2.2 09/19/2024     Urinalysis: No results found for: \"COLORU\", \"CLARITYU\", \"SPECGRAV\", \"PHUR\", \"LEUKOCYTESUR\", \"NITRITE\", \"PROTEINUA\", \"GLUCOSEU\", \"KETONESU\", \"BILIRUBINUR\", \"BLOODU\"  Ionized Calcium: No results found for: \"CAION\"  Coagulation:   Lab Results   Component Value Date    INR 1.28 (H) 09/19/2024     Troponin: No results found for: \"TROPONINI\"  ABG: No results found for: \"PHART\", \"SQC6CAV\", \"PO2ART\", \"FJV0XKI\", \"N9KKVNYU\", \"BEART\", \"SOURCE\"  Radiology review:     IMAGING  No results found.      Current Facility-Administered Medications:     calcium carbonate (TUMS) chewable tablet 1,000 mg, Daily PRN    carvedilol (COREG) tablet 6.25 mg, BID With Meals    FLUoxetine (PROzac) capsule 20 mg, Daily    heparin (porcine) subcutaneous injection 5,000 Units, Q8H LORENZO    ondansetron (ZOFRAN) injection 4 mg, Q6H PRN  Medications Discontinued During This Encounter   Medication Reason    meloxicam (MOBIC) 15 mg tablet Therapy completed    multi-electrolyte (PLASMALYTE-A/ISOLYTE-S PH 7.4) IV solution     cefTRIAXone (ROCEPHIN) IVPB (premix in dextrose) 1,000 mg 50 mL        Gigi Ortega, DO      This progress note was produced in part using a dictation device which may document imprecise wording from author's original intent.      "

## 2024-09-19 NOTE — ASSESSMENT & PLAN NOTE
Patient remains hemodialysis dependent, will follow-up with kidney biopsy which is to be performed today, along with a PermCath placement.  Outpatient dialysis arrangements have been made with Monday Wednesday Friday schedule at the [facility].  No additional serologies have been resulted since last update.    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

## 2024-09-19 NOTE — PROCEDURES
Interventional Radiology Procedure Note    PATIENT NAME: Ramos Rosenthal  : 1950  MRN: [id number]     Pre-op Diagnosis:   1. LYLY (acute kidney injury) (HCC)    2. SIRS (systemic inflammatory response syndrome) (HCC)    3. Uremia      2.        Post-op Diagnosis:   1. LYLY (acute kidney injury) (HCC)    2. SIRS (systemic inflammatory response syndrome) (HCC)    3. Uremia      2.   Same    Procedure: CT Guided Biopsy of the left kidney  Surgeon: Antoine Case MD  Assistants: No qualified resident was available, Resident is only observing  Estimated Blood Loss: Less than 5 cc  Findings: Relatively flat lateral cortex on the left.  Able to obtain 18-gauge core biopsies, 33 mm in length.  Should be adequate glomerular count.  Specimens: 18-gauge by 33 mm long core x 3.  1 submitted in each vial.  Complications: None  Anesthesia: conscious sedation and local  Prep: 2% Chlorhexidine and alcohol, allowed to dry prior to sterile draping  Timeout: Performed  Fluoro time: Please see full report in PACS  Radiation dose: Please see full report in PACS  Contrast dose: Please see full report in PACS  Contrast type: Please see full report in PACS  Contrast strength: Please see full report in PACS  Contrast route of administration: Please see full report in PACS  Antibiotics: None        Antoine Case MD     Date: 2024  Time: 5:57 PM

## 2024-09-19 NOTE — CASE MANAGEMENT
Case Management Discharge Planning Note    Patient name Ramos Rosenthal  Location ICU 04/ICU  MRN [id number]  : 1950 Date 2024       Current Admission Date: 2024  Current Admission Diagnosis:LYLY (acute kidney injury) ([facility])   Patient Active Problem List    Diagnosis Date Noted Date Diagnosed    Primary hypertension 2024     Hyperkalemia 2024     Secondary hyperparathyroidism of renal origin ([facility]) 2024     Uremia 2024     Hyponatremia 2024     SIRS (systemic inflammatory response syndrome) ([facility]) 2024     Bilateral lower extremity edema 2024     Neurogenic claudication 2023    Bloating 10/05/2022     Paresthesia of both lower extremities 10/01/2022     Leucocytosis 10/01/2022     Elevated troponin 2022     EDGAR (dyspnea on exertion) 2022     Rash due to vaculitis  2022     LYLY (acute kidney injury) ([facility]) 2022     Depression 2022     Multiple open wounds of lower leg 2022     Lower extremity weakness 2019     Lumbosacral plexopathy 2019     Gait abnormality 2019     Lumbar stenosis 2019     Polyneuropathy 2019     Gout      Prediabetes 2018    Hyperlipidemia 2014    Vitamin D deficiency 2014      LOS (days): 5  Geometric Mean LOS (GMLOS) (days): 5.1  Days to GMLOS:0.2     OBJECTIVE:  Risk of Unplanned Readmission Score: 20.32      Current admission status: Inpatient   Preferred Pharmacy:   [id number] [place] - [address]  [address] [address]  Phone: [phone] Fax: [phone]    Primary Care Provider: Jessika Carrillo MD    Primary Insurance: MEDICARE  Secondary Insurance: [employer]    DISCHARGE DETAILS:  As per SLIM, pt will get his dialysis treatment tomorrow in the hospital.  Needs to get the permacath today and the kidney  biopsy.  Pt will start OP  dialysis on Monday.  TC to [place] [facility] at [phone], spoke to Daija to notify his dialysis will start on Monday and not Friday. Notified pt and called wife Louise of same.  As per wife, they will transport home anytime after 3:30 as daughter wioll help get him into the car.

## 2024-09-20 ENCOUNTER — APPOINTMENT (INPATIENT)
Dept: DIALYSIS | Facility: HOSPITAL | Age: 74
DRG: 871 | End: 2024-09-20
Payer: MEDICARE

## 2024-09-20 PROBLEM — N01.9: Status: ACTIVE | Noted: 2024-09-20

## 2024-09-20 LAB
ABO GROUP BLD: NORMAL
ABO GROUP BLD: NORMAL
ANION GAP SERPL CALCULATED.3IONS-SCNC: 12 MMOL/L (ref 4–13)
BASOPHILS # BLD AUTO: 0.05 THOUSANDS/ΜL (ref 0–0.1)
BASOPHILS NFR BLD AUTO: 0 % (ref 0–1)
BLD GP AB SCN SERPL QL: NEGATIVE
BUN SERPL-MCNC: 53 MG/DL (ref 5–25)
CALCIUM SERPL-MCNC: 8.1 MG/DL (ref 8.4–10.2)
CHLORIDE SERPL-SCNC: 98 MMOL/L (ref 96–108)
CO2 SERPL-SCNC: 21 MMOL/L (ref 21–32)
CREAT SERPL-MCNC: 7.08 MG/DL (ref 0.6–1.3)
CRYOGLOB SER QL 1D COLD INC: POSITIVE
EOSINOPHIL # BLD AUTO: 0.23 THOUSAND/ΜL (ref 0–0.61)
EOSINOPHIL NFR BLD AUTO: 1 % (ref 0–6)
ERYTHROCYTE [DISTWIDTH] IN BLOOD BY AUTOMATED COUNT: 15.5 % (ref 11.6–15.1)
GFR SERPL CREATININE-BSD FRML MDRD: 6 ML/MIN/1.73SQ M
GLUCOSE SERPL-MCNC: 109 MG/DL (ref 65–140)
HCT VFR BLD AUTO: 26.9 % (ref 36.5–49.3)
HGB BLD-MCNC: 8.6 G/DL (ref 12–17)
IMM GRANULOCYTES # BLD AUTO: 0.27 THOUSAND/UL (ref 0–0.2)
IMM GRANULOCYTES NFR BLD AUTO: 2 % (ref 0–2)
LYMPHOCYTES # BLD AUTO: 0.98 THOUSANDS/ΜL (ref 0.6–4.47)
LYMPHOCYTES NFR BLD AUTO: 5 % (ref 14–44)
MAGNESIUM SERPL-MCNC: 2.3 MG/DL (ref 1.9–2.7)
MCH RBC QN AUTO: 28.1 PG (ref 26.8–34.3)
MCHC RBC AUTO-ENTMCNC: 32 G/DL (ref 31.4–37.4)
MCV RBC AUTO: 88 FL (ref 82–98)
MONOCYTES # BLD AUTO: 1.53 THOUSAND/ΜL (ref 0.17–1.22)
MONOCYTES NFR BLD AUTO: 9 % (ref 4–12)
NEUTROPHILS # BLD AUTO: 14.93 THOUSANDS/ΜL (ref 1.85–7.62)
NEUTS SEG NFR BLD AUTO: 83 % (ref 43–75)
NRBC BLD AUTO-RTO: 0 /100 WBCS
PHOSPHATE SERPL-MCNC: 4.3 MG/DL (ref 2.3–4.1)
PLA2R IGG SER IA-ACNC: <1.8 RU/ML (ref 0–19.9)
PLATELET # BLD AUTO: 362 THOUSANDS/UL (ref 149–390)
PMV BLD AUTO: 8.4 FL (ref 8.9–12.7)
POTASSIUM SERPL-SCNC: 4.5 MMOL/L (ref 3.5–5.3)
RBC # BLD AUTO: 3.06 MILLION/UL (ref 3.88–5.62)
RH BLD: POSITIVE
RH BLD: POSITIVE
SODIUM SERPL-SCNC: 131 MMOL/L (ref 135–147)
SPECIMEN EXPIRATION DATE: NORMAL
WBC # BLD AUTO: 17.99 THOUSAND/UL (ref 4.31–10.16)

## 2024-09-20 PROCEDURE — 86901 BLOOD TYPING SEROLOGIC RH(D): CPT | Performed by: PHYSICIAN ASSISTANT

## 2024-09-20 PROCEDURE — 86850 RBC ANTIBODY SCREEN: CPT | Performed by: PHYSICIAN ASSISTANT

## 2024-09-20 PROCEDURE — 99232 SBSQ HOSP IP/OBS MODERATE 35: CPT | Performed by: INTERNAL MEDICINE

## 2024-09-20 PROCEDURE — 99223 1ST HOSP IP/OBS HIGH 75: CPT | Performed by: INTERNAL MEDICINE

## 2024-09-20 PROCEDURE — 86480 TB TEST CELL IMMUN MEASURE: CPT | Performed by: INTERNAL MEDICINE

## 2024-09-20 PROCEDURE — 86900 BLOOD TYPING SEROLOGIC ABO: CPT | Performed by: PHYSICIAN ASSISTANT

## 2024-09-20 PROCEDURE — 80048 BASIC METABOLIC PNL TOTAL CA: CPT | Performed by: INTERNAL MEDICINE

## 2024-09-20 PROCEDURE — 84100 ASSAY OF PHOSPHORUS: CPT | Performed by: INTERNAL MEDICINE

## 2024-09-20 PROCEDURE — 85025 COMPLETE CBC W/AUTO DIFF WBC: CPT | Performed by: INTERNAL MEDICINE

## 2024-09-20 PROCEDURE — 83735 ASSAY OF MAGNESIUM: CPT | Performed by: INTERNAL MEDICINE

## 2024-09-20 RX ORDER — FAMOTIDINE 20 MG/1
20 TABLET, FILM COATED ORAL DAILY
Status: DISCONTINUED | OUTPATIENT
Start: 2024-09-20 | End: 2024-10-12 | Stop reason: HOSPADM

## 2024-09-20 RX ORDER — CYCLOPHOSPHAMIDE 50 MG/1
2 CAPSULE ORAL DAILY
Status: DISCONTINUED | OUTPATIENT
Start: 2024-09-21 | End: 2024-09-23

## 2024-09-20 RX ORDER — ONDANSETRON 2 MG/ML
4 INJECTION INTRAMUSCULAR; INTRAVENOUS EVERY 6 HOURS PRN
Status: CANCELLED | OUTPATIENT
Start: 2024-09-20

## 2024-09-20 RX ORDER — CALCIUM CARBONATE 500 MG/1
1000 TABLET, CHEWABLE ORAL DAILY PRN
Status: CANCELLED | OUTPATIENT
Start: 2024-09-20

## 2024-09-20 RX ORDER — SULFAMETHOXAZOLE AND TRIMETHOPRIM 800; 160 MG/1; MG/1
1 TABLET ORAL 3 TIMES WEEKLY
Status: DISCONTINUED | OUTPATIENT
Start: 2024-09-20 | End: 2024-09-24

## 2024-09-20 RX ORDER — HEPARIN SODIUM 5000 [USP'U]/ML
5000 INJECTION, SOLUTION INTRAVENOUS; SUBCUTANEOUS EVERY 8 HOURS SCHEDULED
Status: CANCELLED | OUTPATIENT
Start: 2024-09-20

## 2024-09-20 RX ORDER — CARVEDILOL 3.12 MG/1
6.25 TABLET ORAL 2 TIMES DAILY WITH MEALS
Status: CANCELLED | OUTPATIENT
Start: 2024-09-20

## 2024-09-20 RX ADMIN — CARVEDILOL 6.25 MG: 3.12 TABLET, FILM COATED ORAL at 09:13

## 2024-09-20 RX ADMIN — FLUOXETINE HYDROCHLORIDE 20 MG: 20 CAPSULE ORAL at 09:13

## 2024-09-20 RX ADMIN — SODIUM CHLORIDE 1000 MG: 0.9 INJECTION, SOLUTION INTRAVENOUS at 09:13

## 2024-09-20 RX ADMIN — SULFAMETHOXAZOLE AND TRIMETHOPRIM 1 TABLET: 800; 160 TABLET ORAL at 19:24

## 2024-09-20 RX ADMIN — FAMOTIDINE 20 MG: 20 TABLET, FILM COATED ORAL at 19:24

## 2024-09-20 RX ADMIN — HEPARIN SODIUM 5000 UNITS: 5000 INJECTION, SOLUTION INTRAVENOUS; SUBCUTANEOUS at 05:29

## 2024-09-20 RX ADMIN — HEPARIN SODIUM 5000 UNITS: 5000 INJECTION, SOLUTION INTRAVENOUS; SUBCUTANEOUS at 22:17

## 2024-09-20 RX ADMIN — CARVEDILOL 6.25 MG: 3.12 TABLET, FILM COATED ORAL at 16:21

## 2024-09-20 NOTE — HEMODIALYSIS
Post-Dialysis RN Treatment Note    Blood Pressure:  Pre 112/76 mm/Hg  Post 114/58 mmHg   EDW  TBD kg    Weight:  Pre 137 kg   Post 135 kg   Mode of weight measurement: Bed Scale   Volume Removed  2000 ml    Treatment duration 4 hours   NS given  No    Treatment shortened? No   Medications given during Rx None Reported   Estimated Kt/V  Not Applicable   Access type: Temporary HD catheter   Access Issues: No; had to reverse lines mid treatment for high arterial pressure; system also clotted and needed to be changed mid way through; venous chamber very clotted.    Report called to primary nurse   Yes, Sury TABOR via secure chat

## 2024-09-20 NOTE — QUICK NOTE
Addendum:    After discussion regarding care availability at the [facility], it has been determined that we can perform plasmapheresis here through the management of our critical care colleagues.  Will therefore keep the patient at the [facility] and provide appropriate care and therapy here.     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  Will initiate cyclophosphamide 2 mg/kilogram, will use ideal body weight.  With respect to plasmapheresis, recommend daily plasmapheresis with 4 L exchanges for the next 2 weeks.  Depending on how he progresses clinically, this may be able to be completed in the outpatient setting.  Will need to continue to closely monitor the patient.    Gigi Ortega

## 2024-09-20 NOTE — PROGRESS NOTES
Progress Note -     Name: Ramos Rosenthal 74 y.o. male I MRN: [id number]  Unit/Bed#: ICU 04-01 I Date of Admission: 9/14/2024   Date of Service: 9/20/2024 I Hospital Day: 6     This SmartSection is not supported in the current .     Pastoral Care Progress Note  CH attempted introductory supportive visit; pt off unit           09/20/24 1000   Clinical Encounter Type   Visited With Patient not available

## 2024-09-20 NOTE — ASSESSMENT & PLAN NOTE
Patient with elevated white blood cell count, potentially due to inflammatory condition of anti-GBM antibodies.  Currently not on antibiotics.

## 2024-09-20 NOTE — ASSESSMENT & PLAN NOTE
Update and serologies noted that he came back positive for glomerular basement membrane antibodies.  Given overall clinical circumstances, this is an accordance with anti-GBM glomerulonephropathy.  Will initiate treatment immediately with pulse dose Solu-Medrol 1000 mg daily x 3 days.  Will seek for the patient to have plasmapheresis performed, which will require transfer.  Will follow-up kidney biopsy to have clinical suspicions confirmed with tissue sample.    Will continue with hemodialysis Monday Wednesday and Friday for now, patient for hemodialysis this morning.

## 2024-09-20 NOTE — PLAN OF CARE
Pt on HD treatment for 4 hours with a UF goal of 2 L as tolerated. Pt on a 3 k 2.5 juan bath for a potassium of 4.5 on 09/20/24  Problem: METABOLIC, FLUID AND ELECTROLYTES - ADULT  Goal: Electrolytes maintained within normal limits  Description: INTERVENTIONS:  - Monitor labs and assess patient for signs and symptoms of electrolyte imbalances  - Administer electrolyte replacement as ordered  - Monitor response to electrolyte replacements, including repeat lab results as appropriate  - Instruct patient on fluid and nutrition as appropriate  Outcome: Progressing  Goal: Fluid balance maintained  Description: INTERVENTIONS:  - Monitor labs   - Monitor I/O and WT  - Instruct patient on fluid and nutrition as appropriate  - Assess for signs & symptoms of volume excess or deficit  Outcome: Progressing

## 2024-09-20 NOTE — PLAN OF CARE
Problem: PAIN - ADULT  Goal: Verbalizes/displays adequate comfort level or baseline comfort level  Description: Interventions:  - Encourage patient to monitor pain and request assistance  - Assess pain using appropriate pain scale  - Administer analgesics based on type and severity of pain and evaluate response  - Implement non-pharmacological measures as appropriate and evaluate response  - Consider cultural and social influences on pain and pain management  - Notify physician/advanced practitioner if interventions unsuccessful or patient reports new pain  Outcome: Progressing     Problem: INFECTION - ADULT  Goal: Absence or prevention of progression during hospitalization  Description: INTERVENTIONS:  - Assess and monitor for signs and symptoms of infection  - Monitor lab/diagnostic results  - Monitor all insertion sites, i.e. indwelling lines, tubes, and drains  - Monitor endotracheal if appropriate and nasal secretions for changes in amount and color  - [place] appropriate cooling/warming therapies per order  - Administer medications as ordered  - Instruct and encourage patient and family to use good hand hygiene technique  - Identify and instruct in appropriate isolation precautions for identified infection/condition  Outcome: Progressing     Problem: SAFETY ADULT  Goal: Patient will remain free of falls  Description: INTERVENTIONS:  - Educate patient/family on patient safety including physical limitations  - Instruct patient to call for assistance with activity   - Consult OT/PT to assist with strengthening/mobility   - Keep Call bell within reach  - Keep bed low and locked with side rails adjusted as appropriate  - Keep care items and personal belongings within reach  - Initiate and maintain comfort rounds  - Make Fall Risk Sign visible to staff  - Offer Toileting every 2 mHours, in advance of need  - Initiate/Maintain bed alarm  - Obtain necessary fall risk management equipment  - Apply yellow socks and  bracelet for high fall risk patients  - Consider moving patient to room near nurses station  Outcome: Progressing     Problem: Nutrition/Hydration-ADULT  Goal: Nutrient/Hydration intake appropriate for improving, restoring or maintaining nutritional needs  Description: Monitor and assess patient's nutrition/hydration status for malnutrition. Collaborate with interdisciplinary team and initiate plan and interventions as ordered.  Monitor patient's weight and dietary intake as ordered or per policy. Utilize nutrition screening tool and intervene as necessary. Determine patient's food preferences and provide high-protein, high-caloric foods as appropriate.     INTERVENTIONS:  - Monitor oral intake, urinary output, labs, and treatment plans  - Assess nutrition and hydration status and recommend course of action  - Evaluate amount of meals eaten  - Assist patient with eating if necessary   - Allow adequate time for meals  - Recommend/ encourage appropriate diets, oral nutritional supplements, and vitamin/mineral supplements  - Order, calculate, and assess calorie counts as needed  - Recommend, monitor, and adjust tube feedings and TPN/PPN based on assessed needs  - Assess need for intravenous fluids  - Provide specific nutrition/hydration education as appropriate  - Include patient/family/caregiver in decisions related to nutrition  Outcome: Progressing     Problem: Prexisting or High Potential for Compromised Skin Integrity  Goal: Skin integrity is maintained or improved  Description: INTERVENTIONS:  - Identify patients at risk for skin breakdown  - Assess and monitor skin integrity  - Assess and monitor nutrition and hydration status  - Monitor labs   - Assess for incontinence   - Turn and reposition patient  - Assist with mobility/ambulation  - Relieve pressure over bony prominences  - Avoid friction and shearing  - Provide appropriate hygiene as needed including keeping skin clean and dry  - Evaluate need for skin  moisturizer/barrier cream  - Collaborate with interdisciplinary team   - Patient/family teaching  - Consider wound care consult   Outcome: Progressing     Problem: METABOLIC, FLUID AND ELECTROLYTES - ADULT  Goal: Electrolytes maintained within normal limits  Description: INTERVENTIONS:  - Monitor labs and assess patient for signs and symptoms of electrolyte imbalances  - Administer electrolyte replacement as ordered  - Monitor response to electrolyte replacements, including repeat lab results as appropriate  - Instruct patient on fluid and nutrition as appropriate  Outcome: Progressing  Goal: Fluid balance maintained  Description: INTERVENTIONS:  - Monitor labs   - Monitor I/O and WT  - Instruct patient on fluid and nutrition as appropriate  - Assess for signs & symptoms of volume excess or deficit  Outcome: Progressing

## 2024-09-20 NOTE — ASSESSMENT & PLAN NOTE
Lab Results   Component Value Date    CREATININE 7.08 (H) 09/20/2024    CREATININE 5.70 (H) 09/19/2024    CREATININE 6.86 (H) 09/18/2024    EGFR 6 09/20/2024    EGFR 8 09/19/2024    EGFR 7 09/18/2024     Patient has not had blood work since April 2023 at which time his kidney function was normal  Patient was recently prescribed Bactrim, however since there has been approximately a year and a half in between BMP testing unsure if this is more of an acute process versus an acute on chronic process or just a progression of chronic kidney disease  Arrival creatinine was 7.95, creatinine peak on this admission was 9.42  Patient also has an anion gap metabolic acidosis secondary to his renal failure  Patient was initially treated with IV fluid, however due to concerns for volume overload state, IV fluids were discontinued   Patient was subsequently treated with Lasix also however no improvement in creatinine  Status post an IR intervention on Monday, 9/16/2024 at which time a dialysis line was placed  Patient was successfully dialyzed on 9/16/2024  Follow-up creatinine this morning is 8.38  Will consult IR for both a renal biopsy and a permacath placement for 9/19/2024  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

## 2024-09-20 NOTE — PROGRESS NOTES
Progress Note - Nephrology   Name: Ramos Rosenthal 74 y.o. male I MRN: [id number]  Unit/Bed#: ICU 04-01 I Date of Admission: 9/14/2024   Date of Service: 9/20/2024 I Hospital Day: 6     Assessment & Plan  LYLY (acute kidney injury) ([facility])  Update and serologies noted that he came back positive for glomerular basement membrane antibodies.  Given overall clinical circumstances, this is an accordance with anti-GBM glomerulonephropathy.  Will initiate treatment immediately with pulse dose Solu-Medrol 1000 mg daily x 3 days.  Will seek for the patient to have plasmapheresis performed, which will require transfer.  Will follow-up kidney biopsy to have clinical suspicions confirmed with tissue sample.    Will continue with hemodialysis Monday Wednesday and Friday for now, patient for hemodialysis this morning.    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  Further workup from the pulmonary standpoint if indicated.  Hyperkalemia  Has been stable since initiation of hemodialysis.  Hyponatremia  Continue fluid restriction, remains stable  Bilateral lower extremity edema  Will continue to ultrafilter as tolerated dialysis sessions, continue with low-sodium diet.  Gout  Stable with no acute flare at this time  SIRS (systemic inflammatory response syndrome) ([facility])  Patient with elevated white blood cell count, potentially due to inflammatory condition of anti-GBM antibodies.  Currently not on antibiotics.      Case discussed with hospitalist.  Patient will need to have an immediate transfer for plasmapheresis.  Will initiate pulse dose steroids with Solu-Medrol 1000 mg daily x 3 days.    Follow up reason for today's visit: Hemodialysis dependent acute kidney injury/anti-GBM associated RPGN    LYLY  "(acute kidney injury) ([facility])    Patient Active Problem List   Diagnosis    Gout    Gait abnormality    Lumbar stenosis    Polyneuropathy    Lower extremity weakness    Lumbosacral plexopathy    Elevated troponin    EDGAR (dyspnea on exertion)    Rash due to vaculitis     LYLY (acute kidney injury) ([facility])    Depression    Multiple open wounds of lower leg    Paresthesia of both lower extremities    Leucocytosis    Bloating    Hyperlipidemia    Neurogenic claudication    Prediabetes    Vitamin D deficiency    Hyponatremia    SIRS (systemic inflammatory response syndrome) ([facility])    Bilateral lower extremity edema    Hyperkalemia    Secondary hyperparathyroidism of renal origin ([facility])    Uremia    Primary hypertension    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    Objective:     Vitals: Blood pressure 122/58, pulse 65, temperature 99.1 °F (37.3 °C), resp. rate 16, height 6' 2\" (1.88 m), weight (!) 137 kg (302 lb 0.5 oz), SpO2 95%.,Body mass index is 38.78 kg/m².    Weight (last 2 days)       Date/Time Weight    09/20/24 0600 137 (302.03)    09/19/24 0600 140 (309.09)    09/18/24 0600 140 (309.31)              Intake/Output Summary (Last 24 hours) at 9/20/2024 1021  Last data filed at 9/20/2024 0940  Gross per 24 hour   Intake 350 ml   Output 0 ml   Net 350 ml     I/O last 3 completed shifts:  In: 150 [P.O.:150]  Out: 0     HD Temporary Double Catheter (Active)   Reasons to continue HD Cath Treatment Therapy 09/20/24 0940   Goal for Removal Other (comment) 09/20/24 0940   Line Necessity Reviewed Yes, reviewed with provider 09/20/24 0940   Site Assessment WDL;Dry;Intact 09/20/24 0940   Proximal Lumen Status Blood return noted;Flushed 09/20/24 0940   Distal Lumen Status Blood return noted;Flushed 09/20/24 0940   Catheter Out on Skin (cm) 0 cm 09/16/24 1500   Line Care Connections checked and tightened " "09/20/24 0935   Dressing Type Chlorhexidine dressing 09/20/24 0940   Dressing Status Dry;Intact;Old drainage 09/20/24 0940   Dressing Intervention 7 day central line dressing changed 09/16/24 2000   Dressing Change Due 09/23/24 09/20/24 0940       Physical Exam: /58 (BP Location: Left arm)   Pulse 65   Temp 99.1 °F (37.3 °C)   Resp 16   Ht 6' 2\" (1.88 m)   Wt (!) 137 kg (302 lb 0.5 oz)   SpO2 95%   BMI 38.78 kg/m²     General Appearance:    Alert, cooperative, no distress, appears stated age   Head:    Normocephalic, without obvious abnormality, atraumatic   Eyes:    Conjunctiva/corneas clear   Ears:    Normal external ears   Nose:   Nares normal, septum midline, mucosa normal, no drainage    or sinus tenderness   Throat:   Lips, mucosa, and tongue normal; teeth and gums normal   Neck:   Supple   Back:     Symmetric, no curvature, ROM normal, no CVA tenderness   Lungs:     Clear to auscultation bilaterally, respirations unlabored   Chest wall:    No tenderness or deformity   Heart:    Regular rate and rhythm, S1 and S2 normal, no murmur, rub   or gallop   Abdomen:     Soft, non-tender, bowel sounds active   Extremities:   Extremities normal, atraumatic, no cyanosis, mild bilateral lower extremity edema   Skin:   Skin color, texture, turgor normal, no rashes or lesions   Lymph nodes:   Cervical normal   Neurologic:   CNII-XII intact            Lab, Imaging and other studies: I have personally reviewed pertinent labs.  CBC:   Lab Results   Component Value Date    WBC 17.99 (H) 09/20/2024    HGB 8.6 (L) 09/20/2024    HCT 26.9 (L) 09/20/2024    MCV 88 09/20/2024     09/20/2024    RBC 3.06 (L) 09/20/2024    MCH 28.1 09/20/2024    MCHC 32.0 09/20/2024    RDW 15.5 (H) 09/20/2024    MPV 8.4 (L) 09/20/2024    NRBC 0 09/20/2024     CMP:   Lab Results   Component Value Date    K 4.5 09/20/2024    CL 98 09/20/2024    CO2 21 09/20/2024    BUN 53 (H) 09/20/2024    CREATININE 7.08 (H) 09/20/2024    CALCIUM 8.1 " "(L) 09/20/2024    EGFR 6 09/20/2024       .  Results from last 7 days   Lab Units 09/20/24  0534 09/19/24  0602 09/18/24  0559 09/16/24  2142 09/16/24  0608   POTASSIUM mmol/L 4.5 4.2 4.5   < > 5.4*   CHLORIDE mmol/L 98 98 97   < > 98   CO2 mmol/L 21 22 24   < > 17*   BUN mg/dL 53* 38* 50*   < > 79*   CREATININE mg/dL 7.08* 5.70* 6.86*   < > 9.42*   CALCIUM mg/dL 8.1* 8.0* 7.8*   < > 8.2*   ALK PHOS U/L  --   --   --   --  79   ALT U/L  --   --   --   --  82*   AST U/L  --   --   --   --  79*    < > = values in this interval not displayed.         Phosphorus:   Lab Results   Component Value Date    PHOS 4.3 (H) 09/20/2024     Magnesium:   Lab Results   Component Value Date    MG 2.3 09/20/2024     Urinalysis: No results found for: \"COLORU\", \"CLARITYU\", \"SPECGRAV\", \"PHUR\", \"LEUKOCYTESUR\", \"NITRITE\", \"PROTEINUA\", \"GLUCOSEU\", \"KETONESU\", \"BILIRUBINUR\", \"BLOODU\"  Ionized Calcium: No results found for: \"CAION\"  Coagulation: No results found for: \"PT\", \"INR\", \"APTT\"  Troponin: No results found for: \"TROPONINI\"  ABG: No results found for: \"PHART\", \"WRS2MCA\", \"PO2ART\", \"COJ8UVF\", \"V1CBMIUT\", \"BEART\", \"SOURCE\"  Radiology review:     IMAGING  No results found.      Current Facility-Administered Medications:     calcium carbonate (TUMS) chewable tablet 1,000 mg, Daily PRN    carvedilol (COREG) tablet 6.25 mg, BID With Meals    FLUoxetine (PROzac) capsule 20 mg, Daily    heparin (porcine) subcutaneous injection 5,000 Units, Q8H LORENZO    methylPREDNISolone Na Suc (PF) 1,000 mg in sodium chloride 0.9 % 250 mL IVPB, Daily, Last Rate: 1,000 mg (09/20/24 0913)    ondansetron (ZOFRAN) injection 4 mg, Q6H PRN  Medications Discontinued During This Encounter   Medication Reason    meloxicam (MOBIC) 15 mg tablet Therapy completed    multi-electrolyte (PLASMALYTE-A/ISOLYTE-S PH 7.4) IV solution     cefTRIAXone (ROCEPHIN) IVPB (premix in dextrose) 1,000 mg 50 mL        Gigi Ortega, DO      This progress note was produced in part " using a dictation device which may document imprecise wording from author's original intent.

## 2024-09-20 NOTE — ASSESSMENT & PLAN NOTE
Lab Results   Component Value Date    CREATININE 7.08 (H) 09/20/2024    CREATININE 5.70 (H) 09/19/2024    CREATININE 6.86 (H) 09/18/2024    EGFR 6 09/20/2024    EGFR 8 09/19/2024    EGFR 7 09/18/2024     Patient has not had blood work since April 2023 at which time his kidney function was normal  Patient was recently prescribed Bactrim, however since there has been approximately a year and a half in between BMP testing unsure if this is more of an acute process versus an acute on chronic process or just a progression of chronic kidney disease  Arrival creatinine was 7.95, creatinine peak on this admission was 9.42  Patient also has an anion gap metabolic acidosis secondary to his renal failure  Patient was initially treated with IV fluid, however due to concerns for volume overload state, IV fluids were discontinued   Patient was subsequently treated with Lasix also however no improvement in creatinine  Status post an IR intervention on Monday, 9/16/2024 at which time a dialysis line was placed  Patient was successfully dialyzed on 9/16/2024  Follow-up creatinine this morning is 8.38  Will consult IR for both a renal biopsy and a permacath placement for 9/19/2024  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 which is because of patient's significant renal dysfunction  Initiate Solu-Medrol 1000 mg IV x 3 days  Patient will need plasmapheresis either at this institution versus higher level of care  Nephrology following

## 2024-09-20 NOTE — ASSESSMENT & PLAN NOTE
As mentioned above, patient be transferred for plasmapheresis, will initiate Solu-Medrol 1000 mg 3 times daily with further care and treatment at accepting facility with the guidance of our nephrology colleagues.  Further workup from the pulmonary standpoint if indicated.

## 2024-09-20 NOTE — CONSULTS
Pulmonary Consultation   Ramos Rosenthal 74 y.o. male MRN: [id number]   ICU 04-01 Encounter: [id number]      Reason for consultation:   Anti-GBM glomerulonephritis needs plasmapheresis      Requesting physician:   Mike Perez DO      Impressions:   Anti-GBM glomerulonephritis.  It seems an isolated anti-GBM disease.  No pulmonary involvement.  Acute kidney injury.  Hyperkalemia.  Hyponatremia.  Bilateral lower extremity edema.  Obesity.    Recommendations:  Plasma exchange therapy.  Immunosuppressants as per nephrology.      I explained the procedure to the patient and the family at the bedside.  Consent was signed by the patient.      History of Present Illness   HPI:  Ramos Rosenthal is a 74 y.o. male who was admitted about a week ago because of generalized weakness.  The patient was found to have acute kidney injury.  Workup was done including kidney biopsy.  The patient was found to have 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.  Plasma exchange therapy will be initiated.  The patient also was started on pulse dose IV steroids.  .  The patient denies shortness of breath.  Denies significant cough.  No hemoptysis.    Review of systems:  12 point review of systems was completed and was otherwise negative except as listed in HPI.      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 HERZOG    CATARACT EXTRACTION, BILATERAL      IR BIOPSY KIDNEY RANDOM  9/19/2024    IR TEMPORARY DIALYSIS CATHETER PLACEMENT  9/16/2024    KNEE ARTHROSCOPY Right     x2 , meniscus repair    TONSILECTOMY AND ADNOIDECTOMY       Family History   Problem Relation Age of Onset    Diabetes Father     Diabetes Brother     Diabetes Sister        Family History:  No  "family history of chronic lung disease or kidney disease.    Social History:  The patient is  and lives with his wife.  He has a remote smoking history which was not significant.  He worked at [employer].      Meds/Allergies     Current Facility-Administered Medications:     calcium carbonate (TUMS) chewable tablet 1,000 mg, Daily PRN    carvedilol (COREG) tablet 6.25 mg, BID With Meals    [START ON 9/21/2024] cyclophosphamide (CYTOXAN) capsule 150 mg, Daily    FLUoxetine (PROzac) capsule 20 mg, Daily    heparin (porcine) subcutaneous injection 5,000 Units, Q8H LORENZO    methylPREDNISolone Na Suc (PF) 1,000 mg in sodium chloride 0.9 % 250 mL IVPB, Daily, Last Rate: 1,000 mg (09/20/24 0913)    ondansetron (ZOFRAN) injection 4 mg, Q6H PRN    Medications Prior to Admission:     allopurinol (ZYLOPRIM) 100 mg tablet    cholecalciferol (VITAMIN D3) 1,000 units tablet    FLUoxetine (PROzac) 20 mg capsule    loratadine (CLARITIN) 10 mg tablet    Magnesium 100 MG CAPS    Misc Natural Products (OSTEO BI-FLEX/5-LOXIN ADVANCED PO)    Multiple Vitamins-Minerals (CENTRUM SILVER 50+MEN PO)    sulfamethoxazole-trimethoprim (BACTRIM DS) 800-160 mg per tablet    SUPER B COMPLEX/C PO    betamethasone, augmented, (DIPROLENE) 0.05 % ointment    famotidine (PEPCID) 20 mg tablet  Allergies   Allergen Reactions    Ciprofloxacin Blisters    Other      IVORY SOAP    Penicillins Rash       Vitals: Blood pressure 122/58, pulse 73, temperature 98 °F (36.7 °C), temperature source Temporal, resp. rate (!) 31, height 6' 2\" (1.88 m), weight (!) 137 kg (302 lb 0.5 oz), SpO2 96%.,      Intake/Output Summary (Last 24 hours) at 9/20/2024 1722  Last data filed at 9/20/2024 1601  Gross per 24 hour   Intake 960 ml   Output 0 ml   Net 960 ml       Physical exam:        Head/eyes:    Normocephalic, without obvious abnormality, atraumatic,         PERRL, extraocular muscles intact, no scleral icterus    Nose:   Nares normal, septum midline, mucosa " normal, no drainage    or sinus tenderness   Throat:   Moist mucous membranes, no thrush   Neck:   Supple, trachea midline, no adenopathy; no carotid    bruit or JVD   Lungs:   Decreased breath sounds.  No wheezing or rhonchi.        Heart:    Regular rate and rhythm, S1 and S2 normal, no murmur,     rub   or gallop   Abdomen:     Soft, non-tender, bowel sounds active all four quadrants,     no masses, no organomegaly   Extremities:   3+ edema   Skin:   Warm, dry, turgor normal, no rashes or lesions   Neurologic:   CNII-XII intact, normal strength, non-focal             Labs: CBC:   Lab Results   Component Value Date    WBC 17.99 (H) 09/20/2024    HGB 8.6 (L) 09/20/2024    HCT 26.9 (L) 09/20/2024    MCV 88 09/20/2024     09/20/2024    RBC 3.06 (L) 09/20/2024    MCH 28.1 09/20/2024    MCHC 32.0 09/20/2024    RDW 15.5 (H) 09/20/2024    MPV 8.4 (L) 09/20/2024    NRBC 0 09/20/2024   , CMP:   Lab Results   Component Value Date    SODIUM 131 (L) 09/20/2024    K 4.5 09/20/2024    CL 98 09/20/2024    CO2 21 09/20/2024    BUN 53 (H) 09/20/2024    CREATININE 7.08 (H) 09/20/2024    CALCIUM 8.1 (L) 09/20/2024    EGFR 6 09/20/2024       Imaging and other studies: Personally reviewed the following image studies in PACS and associated radiology reports: chest xray. My interpretation of the radiology images/reports is: Clear lungs.  No acute or chronic pulmonary findings..        Rodolfo Oakley MD

## 2024-09-20 NOTE — PLAN OF CARE
Problem: PAIN - ADULT  Goal: Verbalizes/displays adequate comfort level or baseline comfort level  Description: Interventions:  - Encourage patient to monitor pain and request assistance  - Assess pain using appropriate pain scale  - Administer analgesics based on type and severity of pain and evaluate response  - Implement non-pharmacological measures as appropriate and evaluate response  - Consider cultural and social influences on pain and pain management  - Notify physician/advanced practitioner if interventions unsuccessful or patient reports new pain  Outcome: Progressing     Problem: INFECTION - ADULT  Goal: Absence or prevention of progression during hospitalization  Description: INTERVENTIONS:  - Assess and monitor for signs and symptoms of infection  - Monitor lab/diagnostic results  - Monitor all insertion sites, i.e. indwelling lines, tubes, and drains  - Monitor endotracheal if appropriate and nasal secretions for changes in amount and color  - Binger appropriate cooling/warming therapies per order  - Administer medications as ordered  - Instruct and encourage patient and family to use good hand hygiene technique  - Identify and instruct in appropriate isolation precautions for identified infection/condition  Outcome: Progressing  Goal: Absence of fever/infection during neutropenic period  Description: INTERVENTIONS:  - Monitor WBC    Outcome: Progressing

## 2024-09-20 NOTE — PROGRESS NOTES
Progress Note - Hospitalist   Name: Ramos Rosenthal 74 y.o. male I MRN: [id number]  Unit/Bed#: ICU 04-01 I Date of Admission: 9/14/2024   Date of Service: 9/20/2024 I Hospital Day: 6    Assessment & Plan  LYLY (acute kidney injury) (Formerly [facility])      Lab Results   Component Value Date    CREATININE 7.08 (H) 09/20/2024    CREATININE 5.70 (H) 09/19/2024    CREATININE 6.86 (H) 09/18/2024    EGFR 6 09/20/2024    EGFR 8 09/19/2024    EGFR 7 09/18/2024     Patient has not had blood work since April 2023 at which time his kidney function was normal  Patient was recently prescribed Bactrim, however since there has been approximately a year and a half in between BMP testing unsure if this is more of an acute process versus an acute on chronic process or just a progression of chronic kidney disease  Arrival creatinine was 7.95, creatinine peak on this admission was 9.42  Patient also has an anion gap metabolic acidosis secondary to his renal failure  Patient was initially treated with IV fluid, however due to concerns for volume overload state, IV fluids were discontinued   Patient was subsequently treated with Lasix also however no improvement in creatinine  Status post an IR intervention on Monday, 9/16/2024 at which time a dialysis line was placed  Patient was successfully dialyzed on 9/16/2024  Follow-up creatinine this morning is 8.38  Will consult IR for both a renal biopsy and a permacath placement for 9/19/2024  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  SIRS (systemic inflammatory response syndrome) (Formerly [facility])  Patient met sepsis criteria, present on admission as evident by leukocytosis, tachypnea and tachycardia, arrival temp was 100.4 °F  Secondary to an acute cystitis without hematuria  SIRS criteria have significantly improved, patient is no longer tachycardic, and/or  febrile  Status post 3 days worth of IV ceftriaxone-treatment was in place for suspected UTI  Appreciate ID input  Plan is to monitor off of antibiotics  Gout  Allopurinol to remain on hold in view of the acute kidney injury  Depression  Continue home dose fluoxetine  Paresthesia of both lower extremities  Hx of back surgery in 2004 and 2014 and has since bilateral LE paresthesia  He is primarily wheelchair bound  Venous statis ulcers noted to Bilateral LE  B/L extremity edema R>L  Continue supportive therapy  Hyponatremia  Most likely a hypervolemic hyponatremia in the setting of acute renal failure, and volume overload  Sodium is improving  Continue to monitor BMPs  Anticipate sodium level should get further better with dialysis  Bilateral lower extremity edema  Chronic condition  Patient with a history of lower back/spinal surgery  Patient does not ambulate  Patient is wheelchair-bound at baseline  Hyperkalemia  Resolved with dialysis    VTE Pharmacologic Prophylaxis: VTE Score: 8 High Risk (Score >/= 5) - Pharmacological DVT Prophylaxis Ordered: heparin. Sequential Compression Devices Ordered.    Mobility:   Basic Mobility Inpatient Raw Score: 12  JH-HLM Goal: 4: Move to chair/commode  JH-HLM Achieved: 4: Move to chair/commode  JH-HLM Goal NOT achieved. Continue with multidisciplinary rounding and encourage appropriate mobility to improve upon JH-HLM goals.    Patient Centered Rounds: I performed bedside rounds with nursing staff today.     Discussions with Specialists or Other Care Team Provider: Nephrology    Education and Discussions with Family / Patient: Updated  (wife) at bedside.    Current Length of Stay: 6 day(s)  Current Patient Status: Inpatient   Certification Statement: The patient will continue to require additional inpatient hospital stay due to ESRD on HD  Discharge Plan: Anticipate discharge tomorrow to home.    Code Status: Level 1 - Full Code    Subjective   Patient seen and  "examined at bedside. No acute events overnight. Denies chest pain, SOB, diaphoresis, nausea/vomiting/diarrhea, fevers/chills.     Objective     Vitals:   Temp (24hrs), Av.3 °F (36.8 °C), Min:97.8 °F (36.6 °C), Max:99 °F (37.2 °C)    Temp:  [97.8 °F (36.6 °C)-99 °F (37.2 °C)] 99 °F (37.2 °C)  HR:  [63-80] 70  Resp:  [14-21] 18  BP: (109-168)/(51-81) 133/63  SpO2:  [94 %-100 %] 96 %  Body mass index is 38.78 kg/m².     Input and Output Summary (last 24 hours):     Intake/Output Summary (Last 24 hours) at 2024 0810  Last data filed at 2024 0600  Gross per 24 hour   Intake 150 ml   Output 0 ml   Net 150 ml       /63   Pulse 70   Temp 99 °F (37.2 °C) (Tympanic)   Resp 18   Ht 6' 2\" (1.88 m)   Wt (!) 137 kg (302 lb 0.5 oz)   SpO2 96%   BMI 38.78 kg/m²     General Appearance:    Alert, cooperative, no distress, appears stated age   Head:    Normocephalic, without obvious abnormality, atraumatic   Eyes:    PERRL, conjunctiva/corneas clear, EOM's intact, fundi     benign, both eyes        Ears:    Normal TM's and external ear canals, both ears   Nose:   Nares normal, septum midline, mucosa normal, no drainage    or sinus tenderness   Throat:   Lips, mucosa, and tongue normal; teeth and gums normal   Neck:   Supple, symmetrical, trachea midline, no adenopathy;        thyroid:  No enlargement/tenderness/nodules; no carotid    bruit or JVD   Back:     Symmetric, no curvature, ROM normal, no CVA tenderness   Lungs:     Clear to auscultation bilaterally, respirations unlabored   Chest wall:    No tenderness or deformity   Heart:    Regular rate and rhythm, S1 and S2 normal, no murmur, rub    or gallop   Abdomen:     Soft, non-tender, bowel sounds active all four quadrants,     no masses, no organomegaly   Genitalia:    Normal male without lesion, discharge or tenderness   Rectal:    Normal tone, normal prostate, no masses or tenderness;    guaiac negative stool   Extremities:   Extremities normal, " atraumatic, no cyanosis or edema   Pulses:   2+ and symmetric all extremities   Skin:   Skin color, texture, turgor normal, no rashes or lesions   Lymph nodes:   Cervical, supraclavicular, and axillary nodes normal   Neurologic:   CNII-XII intact. Normal strength, sensation and reflexes       throughout        Lines/Drains:  Lines/Drains/Airways       Active Status       Name Placement date Placement time Site Days    CVC Central Lines 09/16/24 Double 09/16/24  0909  --  3    HD Temporary Double Catheter 09/16/24  0909  Other (Comment)  3                    Central Line:  Goal for removal: N/A - Chronic PICC               Lab Results: I have reviewed the following results: CBC/BMP:   .     09/20/24  0534   WBC 17.99*   HGB 8.6*   HCT 26.9*      SODIUM 131*   K 4.5   CL 98   CO2 21   BUN 53*   CREATININE 7.08*   GLUC 109   MG 2.3   PHOS 4.3*       Results from last 7 days   Lab Units 09/20/24  0534   WBC Thousand/uL 17.99*   HEMOGLOBIN g/dL 8.6*   HEMATOCRIT % 26.9*   PLATELETS Thousands/uL 362   SEGS PCT % 83*   LYMPHO PCT % 5*   MONO PCT % 9   EOS PCT % 1     Results from last 7 days   Lab Units 09/20/24  0534 09/16/24  2142 09/16/24  0608   SODIUM mmol/L 131*   < > 130*   POTASSIUM mmol/L 4.5   < > 5.4*   CHLORIDE mmol/L 98   < > 98   CO2 mmol/L 21   < > 17*   BUN mg/dL 53*   < > 79*   CREATININE mg/dL 7.08*   < > 9.42*   ANION GAP mmol/L 12   < > 15*   CALCIUM mg/dL 8.1*   < > 8.2*   ALBUMIN g/dL  --   --  2.9*   TOTAL BILIRUBIN mg/dL  --   --  0.31   ALK PHOS U/L  --   --  79   ALT U/L  --   --  82*   AST U/L  --   --  79*   GLUCOSE RANDOM mg/dL 109   < > 97    < > = values in this interval not displayed.     Results from last 7 days   Lab Units 09/19/24  0602   INR  1.28*             Results from last 7 days   Lab Units 09/14/24  1314   LACTIC ACID mmol/L 1.1   PROCALCITONIN ng/ml 1.48*       Recent Cultures (last 7 days):   Results from last 7 days   Lab Units 09/14/24  1314   BLOOD CULTURE  No Growth  After 5 Days.  No Growth After 5 Days.       Imaging Review: No pertinent imaging studies reviewed.  Other Studies: No additional pertinent studies reviewed.    Last 24 Hours Medication List:     Current Facility-Administered Medications:     calcium carbonate (TUMS) chewable tablet 1,000 mg, Daily PRN    carvedilol (COREG) tablet 6.25 mg, BID With Meals    FLUoxetine (PROzac) capsule 20 mg, Daily    heparin (porcine) subcutaneous injection 5,000 Units, Q8H LORENZO    methylPREDNISolone Na Suc (PF) 1,000 mg in sodium chloride 0.9 % 250 mL IVPB, Daily    ondansetron (ZOFRAN) injection 4 mg, Q6H PRN    Administrative Statements   Today, Patient Was Seen By: Mike Perez, DO  I have spent a total time of 35 minutes in caring for this patient on the day of the visit/encounter including Diagnostic results, Prognosis, Risks and benefits of tx options, Instructions for management, Patient and family education, Importance of tx compliance, Risk factor reductions, Impressions, Counseling / Coordination of care, Documenting in the medical record, Reviewing / ordering tests, medicine, procedures  , Obtaining or reviewing history  , and Communicating with other healthcare professionals .    **Please Note: This note may have been constructed using a voice recognition system.**

## 2024-09-21 PROBLEM — Z99.2: Status: ACTIVE | Noted: 2024-09-21

## 2024-09-21 PROBLEM — R65.10 SIRS (SYSTEMIC INFLAMMATORY RESPONSE SYNDROME) (HCC): Status: RESOLVED | Noted: 2024-09-14 | Resolved: 2024-09-21

## 2024-09-21 LAB
ALBUMIN SERPL BCG-MCNC: 2.6 G/DL (ref 3.5–5)
ALP SERPL-CCNC: 91 U/L (ref 34–104)
ALT SERPL W P-5'-P-CCNC: 187 U/L (ref 7–52)
ANION GAP SERPL CALCULATED.3IONS-SCNC: 11 MMOL/L (ref 4–13)
ANISOCYTOSIS BLD QL SMEAR: PRESENT
AST SERPL W P-5'-P-CCNC: 225 U/L (ref 13–39)
BASOPHILS # BLD MANUAL: 0 THOUSAND/UL (ref 0–0.1)
BASOPHILS NFR MAR MANUAL: 0 % (ref 0–1)
BILIRUB SERPL-MCNC: 0.28 MG/DL (ref 0.2–1)
BUN SERPL-MCNC: 40 MG/DL (ref 5–25)
CA-I BLD-SCNC: 1.06 MMOL/L (ref 1.12–1.32)
CALCIUM ALBUM COR SERPL-MCNC: 9.3 MG/DL (ref 8.3–10.1)
CALCIUM SERPL-MCNC: 8.2 MG/DL (ref 8.4–10.2)
CHLORIDE SERPL-SCNC: 96 MMOL/L (ref 96–108)
CO2 SERPL-SCNC: 23 MMOL/L (ref 21–32)
CREAT SERPL-MCNC: 4.75 MG/DL (ref 0.6–1.3)
EOSINOPHIL # BLD MANUAL: 0 THOUSAND/UL (ref 0–0.4)
EOSINOPHIL NFR BLD MANUAL: 0 % (ref 0–6)
ERYTHROCYTE [DISTWIDTH] IN BLOOD BY AUTOMATED COUNT: 15.4 % (ref 11.6–15.1)
GFR SERPL CREATININE-BSD FRML MDRD: 11 ML/MIN/1.73SQ M
GLUCOSE SERPL-MCNC: 200 MG/DL (ref 65–140)
HCT VFR BLD AUTO: 26.3 % (ref 36.5–49.3)
HGB BLD-MCNC: 8.4 G/DL (ref 12–17)
INR PPP: 1.37 (ref 0.85–1.19)
LYMPHOCYTES # BLD AUTO: 0.64 THOUSAND/UL (ref 0.6–4.47)
LYMPHOCYTES # BLD AUTO: 3 % (ref 14–44)
MAGNESIUM SERPL-MCNC: 2.2 MG/DL (ref 1.9–2.7)
MCH RBC QN AUTO: 27.7 PG (ref 26.8–34.3)
MCHC RBC AUTO-ENTMCNC: 31.9 G/DL (ref 31.4–37.4)
MCV RBC AUTO: 87 FL (ref 82–98)
MONOCYTES # BLD AUTO: 0.21 THOUSAND/UL (ref 0–1.22)
MONOCYTES NFR BLD: 1 % (ref 4–12)
NEUTROPHILS # BLD MANUAL: 20.38 THOUSAND/UL (ref 1.85–7.62)
NEUTS BAND NFR BLD MANUAL: 4 % (ref 0–8)
NEUTS SEG NFR BLD AUTO: 92 % (ref 43–75)
PHOSPHATE SERPL-MCNC: 4.2 MG/DL (ref 2.3–4.1)
PLATELET # BLD AUTO: 347 THOUSANDS/UL (ref 149–390)
PLATELET BLD QL SMEAR: ADEQUATE
PMV BLD AUTO: 8.7 FL (ref 8.9–12.7)
POTASSIUM SERPL-SCNC: 4.3 MMOL/L (ref 3.5–5.3)
PROCALCITONIN SERPL-MCNC: 3.04 NG/ML
PROT SERPL-MCNC: 6.1 G/DL (ref 6.4–8.4)
PROTHROMBIN TIME: 17.4 SECONDS (ref 12.3–15)
RBC # BLD AUTO: 3.03 MILLION/UL (ref 3.88–5.62)
RBC MORPH BLD: PRESENT
SODIUM SERPL-SCNC: 130 MMOL/L (ref 135–147)
WBC # BLD AUTO: 21.23 THOUSAND/UL (ref 4.31–10.16)

## 2024-09-21 PROCEDURE — 84145 PROCALCITONIN (PCT): CPT | Performed by: INTERNAL MEDICINE

## 2024-09-21 PROCEDURE — 85610 PROTHROMBIN TIME: CPT | Performed by: NURSE PRACTITIONER

## 2024-09-21 PROCEDURE — 85384 FIBRINOGEN ACTIVITY: CPT | Performed by: NURSE PRACTITIONER

## 2024-09-21 PROCEDURE — P9017 PLASMA 1 DONOR FRZ W/IN 8 HR: HCPCS

## 2024-09-21 PROCEDURE — 80053 COMPREHEN METABOLIC PANEL: CPT | Performed by: INTERNAL MEDICINE

## 2024-09-21 PROCEDURE — 99232 SBSQ HOSP IP/OBS MODERATE 35: CPT | Performed by: INTERNAL MEDICINE

## 2024-09-21 PROCEDURE — 85007 BL SMEAR W/DIFF WBC COUNT: CPT | Performed by: INTERNAL MEDICINE

## 2024-09-21 PROCEDURE — 85027 COMPLETE CBC AUTOMATED: CPT | Performed by: INTERNAL MEDICINE

## 2024-09-21 PROCEDURE — 30233K1 TRANSFUSION OF NONAUTOLOGOUS FROZEN PLASMA INTO PERIPHERAL VEIN, PERCUTANEOUS APPROACH: ICD-10-PCS | Performed by: INTERNAL MEDICINE

## 2024-09-21 PROCEDURE — 84100 ASSAY OF PHOSPHORUS: CPT | Performed by: INTERNAL MEDICINE

## 2024-09-21 PROCEDURE — 83735 ASSAY OF MAGNESIUM: CPT | Performed by: INTERNAL MEDICINE

## 2024-09-21 PROCEDURE — 99233 SBSQ HOSP IP/OBS HIGH 50: CPT | Performed by: INTERNAL MEDICINE

## 2024-09-21 PROCEDURE — 82330 ASSAY OF CALCIUM: CPT | Performed by: INTERNAL MEDICINE

## 2024-09-21 RX ORDER — CALCIUM GLUCONATE 20 MG/ML
1 INJECTION, SOLUTION INTRAVENOUS ONCE
Status: COMPLETED | OUTPATIENT
Start: 2024-09-21 | End: 2024-09-21

## 2024-09-21 RX ORDER — ALBUMIN, HUMAN INJ 5% 5 %
75 SOLUTION INTRAVENOUS ONCE
Status: COMPLETED | OUTPATIENT
Start: 2024-09-21 | End: 2024-09-22

## 2024-09-21 RX ORDER — ALBUMIN, HUMAN INJ 5% 5 %
100 SOLUTION INTRAVENOUS ONCE
Status: COMPLETED | OUTPATIENT
Start: 2024-09-21 | End: 2024-09-21

## 2024-09-21 RX ORDER — CEFTRIAXONE 1 G/50ML
1000 INJECTION, SOLUTION INTRAVENOUS EVERY 24 HOURS
Status: DISCONTINUED | OUTPATIENT
Start: 2024-09-21 | End: 2024-09-22

## 2024-09-21 RX ORDER — CALCIUM GLUCONATE 20 MG/ML
1 INJECTION, SOLUTION INTRAVENOUS ONCE
Status: COMPLETED | OUTPATIENT
Start: 2024-09-21 | End: 2024-09-22

## 2024-09-21 RX ORDER — DIPHENHYDRAMINE HCL 25 MG
50 TABLET ORAL ONCE
Status: COMPLETED | OUTPATIENT
Start: 2024-09-21 | End: 2024-09-21

## 2024-09-21 RX ORDER — PANTOPRAZOLE SODIUM 20 MG/1
20 TABLET, DELAYED RELEASE ORAL
Status: DISCONTINUED | OUTPATIENT
Start: 2024-09-21 | End: 2024-10-07

## 2024-09-21 RX ADMIN — CALCIUM GLUCONATE 1 G: 20 INJECTION, SOLUTION INTRAVENOUS at 23:50

## 2024-09-21 RX ADMIN — FLUOXETINE HYDROCHLORIDE 20 MG: 20 CAPSULE ORAL at 08:12

## 2024-09-21 RX ADMIN — CALCIUM GLUCONATE 1 G: 20 INJECTION, SOLUTION INTRAVENOUS at 21:57

## 2024-09-21 RX ADMIN — ALBUMIN (HUMAN) 75 G: 12.5 INJECTION, SOLUTION INTRAVENOUS at 22:21

## 2024-09-21 RX ADMIN — FAMOTIDINE 20 MG: 20 TABLET, FILM COATED ORAL at 08:13

## 2024-09-21 RX ADMIN — CYCLOPHOSPHAMIDE 150 MG: 50 CAPSULE ORAL at 08:13

## 2024-09-21 RX ADMIN — CARVEDILOL 6.25 MG: 3.12 TABLET, FILM COATED ORAL at 17:04

## 2024-09-21 RX ADMIN — HEPARIN SODIUM 5000 UNITS: 5000 INJECTION, SOLUTION INTRAVENOUS; SUBCUTANEOUS at 21:00

## 2024-09-21 RX ADMIN — CEFTRIAXONE 1000 MG: 1 INJECTION, SOLUTION INTRAVENOUS at 08:37

## 2024-09-21 RX ADMIN — HEPARIN SODIUM 5000 UNITS: 5000 INJECTION, SOLUTION INTRAVENOUS; SUBCUTANEOUS at 13:57

## 2024-09-21 RX ADMIN — DIPHENHYDRAMINE HYDROCHLORIDE 50 MG: 25 TABLET ORAL at 22:51

## 2024-09-21 RX ADMIN — ALBUMIN (HUMAN) 100 G: 12.5 INJECTION, SOLUTION INTRAVENOUS at 22:21

## 2024-09-21 RX ADMIN — HEPARIN SODIUM 5000 UNITS: 5000 INJECTION, SOLUTION INTRAVENOUS; SUBCUTANEOUS at 06:42

## 2024-09-21 RX ADMIN — PANTOPRAZOLE SODIUM 20 MG: 20 TABLET, DELAYED RELEASE ORAL at 13:57

## 2024-09-21 RX ADMIN — SODIUM CHLORIDE 1000 MG: 0.9 INJECTION, SOLUTION INTRAVENOUS at 09:21

## 2024-09-21 RX ADMIN — CARVEDILOL 6.25 MG: 3.12 TABLET, FILM COATED ORAL at 08:12

## 2024-09-21 NOTE — ASSESSMENT & PLAN NOTE
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  He remains oligoanuric.  Volume status is hypervolemic with chronic bilateral lower extremity edema.  Access is currently a temporary catheter with plans for this to be converted to a tunneled dialysis catheter on Monday for outpatient dialysis usage.  There was mention that the patient's daughters do prefer peritoneal dialysis as a modality but after discussion with the nephrologist and hospitalist peritoneal dialysis catheter placement will be deferred for now and the patient will be potentially initiated on home hemodialysis.  His last hemodialysis treatment was yesterday for 4 hours with 2 L removed and a post weight of 135 kg.  His next dialysis session is scheduled for Monday, September 23.  After reviewing the patient's labs and vital signs, there is no acute indication for dialysis today.

## 2024-09-21 NOTE — ASSESSMENT & PLAN NOTE
Chronic in the setting of wheel chair bound 2/2 lower back pain/spinal surgery hx    Plan:  Fluid restriction   Mastoid Interpolation Flap Division And Inset Text: Division and inset of the mastoid interpolation flap was performed to achieve optimal aesthetic result, restore normal anatomic appearance and avoid distortion of normal anatomy, expedite and facilitate wound healing, achieve optimal functional result and because linear closure either not possible or would produce suboptimal result. The patient was prepped and draped in the usual manner. The pedicle was infiltrated with local anesthesia. The pedicle was sectioned with a #15 blade. The pedicle was de-bulked and trimmed to match the shape of the defect. Hemostasis was achieved. The flap donor site and free margin of the flap were secured with deep buried sutures and the wound edges were re-approximated.

## 2024-09-21 NOTE — ASSESSMENT & PLAN NOTE
Suspect in the setting hypervolemia associated with decreased UO in setting LYLY    Plan:  Fluid restriction per nephrology  Trend BMP daily

## 2024-09-21 NOTE — ASSESSMENT & PLAN NOTE
Pt has a hx of back surgery in 2004 and 2014 and has had b/l LE paresthesia since  Primarily wheel chair bound    Plan:  Continue supportive care

## 2024-09-21 NOTE — PROGRESS NOTES
Progress Note - Critical Care/ICU   Name: Ramos Rosenthal 74 y.o. male I MRN: [id number]  Unit/Bed#: ICU 04-01 I Date of Admission: 9/14/2024   Date of Service: 9/21/2024 I Hospital Day: 7      Assessment & Plan  LYLY (acute kidney injury) (HCC)  In the setting of anti-GBM glomerulonephritis   Baseline creat was 0.9 to 7.95 on admission    Plan:  Nephrology consulted  Temp HD cath with M/W/F dialysis  Trend renal indices  Avoid nephrotoxins and hypotension  Hyponatremia  Suspect in the setting hypervolemia associated with decreased UO in setting LYLY    Plan:  Fluid restriction per nephrology  Trend BMP daily  Bilateral lower extremity edema  Chronic in the setting of wheel chair bound 2/2 lower back pain/spinal surgery hx    Plan:  Fluid restriction  Hyperkalemia  In setting of LYLY-started on HD    Plan:  Trend BMP daily  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 Yes  Need for changes to pain regimen? No   B: Both SAT/SAT  N/A   C: Choice of Sedation RASS Goal: 0 Alert and Calm or N/A patient not on sedation  Need for changes to sedation or analgesia regimen? NA   D: Delirium CAM-ICU: Negative   E: Early Mobility  Plan for early mobility? Yes   F: Family Engagement Plan for family engagement today? Yes       Antibiotic Review: Patient on appropriate coverage based on culture data.     Review of Invasive Devices:      Central access plan: HD cath in place.  Plan in used for iHD and now  plasmaphoresis      Prophylaxis:  VTE VTE covered by:  heparin (porcine), Subcutaneous, 5,000 Units at 09/21/24 1357       Stress Ulcer  covered byfamotidine (PEPCID) 20 mg tablet [[id number]] (Long-Term Med), famotidine (PEPCID) tablet 20 mg [[id number]], pantoprazole (PROTONIX) EC tablet 20 mg [[id number]]         24 Hour Events   24hr events: Pt received Plasmapheresis treatment overnight with plan for next one this afternoon around 1300.     Subjective   Review of Systems: Review of Systems   Constitutional:  Positive for fatigue.   Respiratory:  Negative for shortness of breath.    Cardiovascular:  Positive for leg swelling. Negative for chest pain.   Gastrointestinal:  Negative for abdominal pain and nausea.   Neurological:  Negative for dizziness and headaches.   Psychiatric/Behavioral:  Negative for agitation and confusion.    All other systems reviewed and are negative.      Objective                          Vitals I/O      Most Recent Min/Max in 24hrs   Temp (!) 97.3 °F (36.3 °C) Temp  Min: 97 °F (36.1 °C)  Max: 97.3 °F (36.3 °C)   Pulse 59 Pulse  Min: 53  Max: 65   Resp 16 Resp  Min: 8  Max: 22   /57 BP  Min: 106/66  Max: 135/65   O2 Sat 96 % SpO2  Min: 90 %  Max: 97 %      Intake/Output Summary (Last 24 hours) at 9/21/2024 1900  Last data filed at 9/21/2024 1800  Gross per 24 hour   Intake 1230 ml   Output 0 ml   Net 1230 ml       Diet Renal; Renal [place]; Yes; Fluid Restriction 2000 ML; No    Invasive Monitoring           Physical Exam   Physical Exam  Vitals and nursing note reviewed.   Eyes:      Pupils: Pupils are equal, round, and reactive to light.   Skin:     General: Skin is warm and dry.      Capillary Refill: Capillary refill takes less than 2 seconds.   HENT:      Head: Normocephalic.      Mouth/Throat:      Mouth: Mucous membranes are moist.   Neck:      Vascular: Central line present.   Cardiovascular:      Rate and Rhythm: Regular rhythm. Bradycardia present.      Pulses: Normal  pulses.      Heart sounds: Normal heart sounds.   Musculoskeletal:         General: Normal range of motion.      Right lower le+ Edema present.      Left lower le+ Edema present.   Abdominal: General: Bowel sounds are normal.      Palpations: Abdomen is soft.   Constitutional:       Appearance: He is ill-appearing.   Pulmonary:      Breath sounds: Normal breath sounds.   Neurological:      Mental Status: He is alert and oriented to person, place and time. He is not agitated.      Motor: Strength full and intact in all extremities.          Diagnostic Studies        Lab Results: I have reviewed the following results: CBC/BMP:   .     24  0448   WBC 21.23*   HGB 8.4*   HCT 26.3*      BANDSPCT 4   SODIUM 130*   K 4.3   CL 96   CO2 23   BUN 40*   CREATININE 4.75*   GLUC 200*   CAIONIZED 1.06*   MG 2.2   PHOS 4.2*         Medications:  Scheduled PRN   Albumin 5%, 75 g, Once   And  Albumin 5%, 100 g, Once  carvedilol, 6.25 mg, BID With Meals  cefTRIAXone, 1,000 mg, Q24H  cyclophosphamide, 2 mg/kg (Ideal), Daily  famotidine, 20 mg, Daily  FLUoxetine, 20 mg, Daily  heparin (porcine), 5,000 Units, Q8H LORENZO  methylPREDNISolone sodium succinate, 1,000 mg, Daily  pantoprazole, 20 mg, Early Morning  sulfamethoxazole-trimethoprim, 1 tablet, Once per day on       calcium carbonate, 1,000 mg, Daily PRN  ondansetron, 4 mg, Q6H PRN       Continuous          Labs:   CBC    Recent Labs     24  0534 24  0448   WBC 17.99* 21.23*   HGB 8.6* 8.4*   HCT 26.9* 26.3*    347   BANDSPCT  --  4     BMP    Recent Labs     24  0534 24  0448   SODIUM 131* 130*   K 4.5 4.3   CL 98 96   CO2 21 23   AGAP 12 11   BUN 53* 40*   CREATININE 7.08* 4.75*   CALCIUM 8.1* 8.2*       Coags    Recent Labs     24  1229   INR 1.37*        Additional Electrolytes  Recent Labs     24  0534 24  0448   MG 2.3 2.2   PHOS 4.3* 4.2*   CAIONIZED  --  1.06*          Blood Gas    No  recent results  No recent results LFTs  Recent Labs     09/21/24  0448   *   *   ALKPHOS 91   ALB 2.6*   TBILI 0.28       Infectious  Recent Labs     09/21/24  0448   PROCALCITONI 3.04*     Glucose  Recent Labs     09/20/24  0534 09/21/24  0448   GLUC 109 200*

## 2024-09-21 NOTE — ASSESSMENT & PLAN NOTE
Bone marrow with Anti-GBM glomerulonephritis  Needs plasma exchange-Spoke to [facility] and awaiting for return call  1.5 L plasma exchange with Albumin 5%/FFP-awaiting total amount needed from [facility]  Baseline and daily CBC/PT/INR/Ical/ and fibrinogen level  Calcium gluconate replacement per plasma exchange tech  Nephrology following  Continue Solumedrol 1000 mg daily day 2/3

## 2024-09-21 NOTE — ASSESSMENT & PLAN NOTE
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  Awaiting results of kidney biopsy.  In the meantime patient has been initiated on treatment with IV methylprednisolone 1000 mg daily for 3 doses, oral cyclophosphamide 150 mg daily, and plasmapheresis.  Plasmapheresis to be initiated later today or tomorrow, critical care team is managing.  Utilizing albumin as replacement fluid.  Will continue with Bactrim 3 times weekly for immunoprophylaxis and Pepcid 20 mg daily for stress ulcer prophylaxis.  Continue to follow daily CBC, calcium, magnesium, acid-base monitoring

## 2024-09-21 NOTE — ASSESSMENT & PLAN NOTE
Lab Results   Component Value Date    CREATININE 4.75 (H) 09/21/2024    CREATININE 7.08 (H) 09/20/2024    CREATININE 5.70 (H) 09/19/2024    EGFR 11 09/21/2024    EGFR 6 09/20/2024    EGFR 8 09/19/2024     Patient has not had blood work since April 2023 at which time his kidney function was normal  Patient was recently prescribed Bactrim, however since there has been approximately a year and a half in between BMP testing unsure if this is more of an acute process versus an acute on chronic process or just a progression of chronic kidney disease  Arrival creatinine was 7.95, creatinine peak on this admission was 9.42  Patient also has an anion gap metabolic acidosis secondary to his renal failure  Patient was initially treated with IV fluid, however due to concerns for volume overload state, IV fluids were discontinued   Patient was subsequently treated with Lasix also however no improvement in creatinine  Status post an IR intervention on Monday, 9/16/2024 at which time a dialysis line was placed  Patient was successfully dialyzed on 9/16/2024  Follow-up creatinine this morning is 8.38  Will consult IR for both a renal biopsy and a permacath placement for 9/19/2024  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

## 2024-09-21 NOTE — ASSESSMENT & PLAN NOTE
In the setting of anti-GBM glomerulonephritis   Baseline creat was 0.9 to 7.95 on admission  Nephrology consulted  Temp HD cath with M/W/F dialysis  Trend renal indices

## 2024-09-21 NOTE — PLAN OF CARE
Problem: INFECTION - ADULT  Goal: Absence or prevention of progression during hospitalization  Description: INTERVENTIONS:  - Assess and monitor for signs and symptoms of infection  - Monitor lab/diagnostic results  - Monitor all insertion sites, i.e. indwelling lines, tubes, and drains  - Monitor endotracheal if appropriate and nasal secretions for changes in amount and color  - [place] appropriate cooling/warming therapies per order  - Administer medications as ordered  - Instruct and encourage patient and family to use good hand hygiene technique  - Identify and instruct in appropriate isolation precautions for identified infection/condition  Outcome: Progressing  Goal: Absence of fever/infection during neutropenic period  Description: INTERVENTIONS:  - Monitor WBC    Outcome: Progressing     Problem: METABOLIC, FLUID AND ELECTROLYTES - ADULT  Goal: Electrolytes maintained within normal limits  Description: INTERVENTIONS:  - Monitor labs and assess patient for signs and symptoms of electrolyte imbalances  - Administer electrolyte replacement as ordered  - Monitor response to electrolyte replacements, including repeat lab results as appropriate  - Instruct patient on fluid and nutrition as appropriate  Outcome: Progressing  Goal: Fluid balance maintained  Description: INTERVENTIONS:  - Monitor labs   - Monitor I/O and WT  - Instruct patient on fluid and nutrition as appropriate  - Assess for signs & symptoms of volume excess or deficit  Outcome: Progressing

## 2024-09-21 NOTE — ASSESSMENT & PLAN NOTE
Suspect in the setting hypervolemia associated with decreased UO in setting LYLY  Fluid restriction per nephrology  Trend

## 2024-09-21 NOTE — PROGRESS NOTES
Progress Note - Critical Care/ICU   Name: Ramos Rosenthal 74 y.o. 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/spinal surgery hx  Fluid restriction  Hyperkalemia  In setting of LYLY-started on HD  Trend  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 Yes  Need for changes to pain regimen? NA   B: Both SAT/SAT  N/A   C: Choice of Sedation RASS Goal: N/A patient not on sedation  Need for changes to sedation or analgesia regimen? NA   D: Delirium CAM-ICU: Negative   E: Early Mobility  Plan for early mobility? Yes   F: Family Engagement Plan for family engagement today? Yes       Antibiotic Review: Patient on appropriate coverage based on culture data.     Review of Invasive Devices:      Central access plan: HD cath in place.  Plan patient receiving HD  and will receive plasma exchange       Prophylaxis:  VTE VTE covered by:  heparin (porcine), Subcutaneous, 5,000 Units  at 09/21/24 0642       Stress Ulcer  covered byfamotidine (PEPCID) 20 mg tablet [[id number]] (Long-Term Med), famotidine (PEPCID) tablet 20 mg [[id number]]         24 Hour Events   24hr events: No complaints overnight. Awaiting to receive plasma exchange today  Subjective   Review of Systems: Review of Systems   Constitutional:  Negative for fatigue and fever.   HENT:  Negative for sore throat.    Respiratory:  Negative for cough and shortness of breath.    Cardiovascular:  Positive for leg swelling. Negative for chest pain and palpitations.   Gastrointestinal:  Negative for abdominal distention, abdominal pain, diarrhea, nausea and vomiting.   Musculoskeletal:  Negative for arthralgias and myalgias.   Skin:  Negative for color change and rash.   Neurological:  Negative for dizziness, tremors, weakness and light-headedness.   Psychiatric/Behavioral:  The patient is not nervous/anxious.    All other systems reviewed and are negative.      Objective                          Vitals I/O      Most Recent Min/Max in 24hrs   Temp (!) 97.1 °F (36.2 °C) Temp  Min: 97 °F (36.1 °C)  Max: 98 °F (36.7 °C)   Pulse 56 Pulse  Min: 53  Max: 73   Resp 18 Resp  Min: 8  Max: 31   /58 BP  Min: 104/55  Max: 152/54   O2 Sat 96 % SpO2  Min: 90 %  Max: 97 %      Intake/Output Summary (Last 24 hours) at 9/21/2024 1248  Last data filed at 9/21/2024 1100  Gross per 24 hour   Intake 1000 ml   Output 0 ml   Net 1000 ml       Diet Renal; Renal [place]; Yes; Fluid Restriction 2000 ML; No    Invasive Monitoring           Physical Exam   Physical Exam  Eyes:      Extraocular Movements: Extraocular movements intact.      Conjunctiva/sclera: Conjunctivae normal.      Pupils: Pupils are equal, round, and reactive to light.   Skin:     General: Skin is warm and dry.   HENT:      Head: Normocephalic and atraumatic.      Mouth/Throat:      Mouth: Mucous membranes are moist.   Neck:      Vascular: Central line present.   Cardiovascular:      Rate and  Rhythm: Normal rate and regular rhythm.   Musculoskeletal:         General: Normal range of motion.      Right lower leg: 3+ Edema present.      Left lower le+ Edema present.   Abdominal: General: Bowel sounds are normal. There is no distension.      Palpations: Abdomen is soft.      Tenderness: There is no abdominal tenderness.   Constitutional:       General: He is not in acute distress.     Appearance: He is well-developed and well-nourished.   Pulmonary:      Effort: Tachypnea present. No respiratory distress.      Comments: Diminished at bases  Neurological:      Mental Status: He is oriented to person, place and time.      Motor: Strength full and intact in all extremities.          Diagnostic Studies             Medications:  Scheduled PRN   carvedilol, 6.25 mg, BID With Meals  cefTRIAXone, 1,000 mg, Q24H  cyclophosphamide, 2 mg/kg (Ideal), Daily  famotidine, 20 mg, Daily  FLUoxetine, 20 mg, Daily  heparin (porcine), 5,000 Units, Q8H LORENZO  methylPREDNISolone sodium succinate, 1,000 mg, Daily  sulfamethoxazole-trimethoprim, 1 tablet, Once per day on       calcium carbonate, 1,000 mg, Daily PRN  ondansetron, 4 mg, Q6H PRN       Continuous          Labs:   CBC    Recent Labs     24  0534 24  0448   WBC 17.99* 21.23*   HGB 8.6* 8.4*   HCT 26.9* 26.3*    347   BANDSPCT  --  4     BMP    Recent Labs     24  0534 24  0448   SODIUM 131* 130*   K 4.5 4.3   CL 98 96   CO2 21 23   AGAP 12 11   BUN 53* 40*   CREATININE 7.08* 4.75*   CALCIUM 8.1* 8.2*       Coags    No recent results     Additional Electrolytes  Recent Labs     24  0534 24  0448   MG 2.3 2.2   PHOS 4.3* 4.2*   CAIONIZED  --  1.06*          Blood Gas    No recent results  No recent results LFTs  Recent Labs     24  0448   *   *   ALKPHOS 91   ALB 2.6*   TBILI 0.28       Infectious  Recent Labs     24  0448   PROCALCITONI 3.04*     Glucose  Recent Labs      09/20/24  0534 09/21/24  0448   GLUC 109 200*

## 2024-09-21 NOTE — PLAN OF CARE
Problem: PAIN - ADULT  Goal: Verbalizes/displays adequate comfort level or baseline comfort level  Description: Interventions:  - Encourage patient to monitor pain and request assistance  - Assess pain using appropriate pain scale  - Administer analgesics based on type and severity of pain and evaluate response  - Implement non-pharmacological measures as appropriate and evaluate response  - Consider cultural and social influences on pain and pain management  - Notify physician/advanced practitioner if interventions unsuccessful or patient reports new pain  Outcome: Progressing     Problem: INFECTION - ADULT  Goal: Absence or prevention of progression during hospitalization  Description: INTERVENTIONS:  - Assess and monitor for signs and symptoms of infection  - Monitor lab/diagnostic results  - Monitor all insertion sites, i.e. indwelling lines, tubes, and drains  - Monitor endotracheal if appropriate and nasal secretions for changes in amount and color  - [place] appropriate cooling/warming therapies per order  - Administer medications as ordered  - Instruct and encourage patient and family to use good hand hygiene technique  - Identify and instruct in appropriate isolation precautions for identified infection/condition  Outcome: Progressing  Goal: Absence of fever/infection during neutropenic period  Description: INTERVENTIONS:  - Monitor WBC    Outcome: Progressing     Problem: SAFETY ADULT  Goal: Patient will remain free of falls  Description: INTERVENTIONS:  - Educate patient/family on patient safety including physical limitations  - Instruct patient to call for assistance with activity   - Consult OT/PT to assist with strengthening/mobility   - Keep Call bell within reach  - Keep bed low and locked with side rails adjusted as appropriate  - Keep care items and personal belongings within reach  - Initiate and maintain comfort rounds  - Make Fall Risk Sign visible to staff  - Offer Toileting every 2 Hours,  in advance of need  - Initiate/Maintain bed alarm  - Obtain necessary fall risk management equipment  - Apply yellow socks and bracelet for high fall risk patients  - Consider moving patient to room near nurses station  Outcome: Progressing  Goal: Maintain or return to baseline ADL function  Description: INTERVENTIONS:  -  Assess patient's ability to carry out ADLs; assess patient's baseline for ADL function and identify physical deficits which impact ability to perform ADLs (bathing, care of mouth/teeth, toileting, grooming, dressing, etc.)  - Assess/evaluate cause of self-care deficits   - Assess range of motion  - Assess patient's mobility; develop plan if impaired  - Assess patient's need for assistive devices and provide as appropriate  - Encourage maximum independence but intervene and supervise when necessary  - Involve family in performance of ADLs  - Assess for home care needs following discharge   - Consider OT consult to assist with ADL evaluation and planning for discharge  - Provide patient education as appropriate  Outcome: Progressing  Goal: Maintains/Returns to pre admission functional level  Description: INTERVENTIONS:  - Perform AM-PAC 6 Click Basic Mobility/ Daily Activity assessment daily.  - Set and communicate daily mobility goal to care team and patient/family/caregiver.   - Collaborate with rehabilitation services on mobility goals if consulted  - Perform Range of Motion 3 times a day.  - Reposition patient every 2 hours.  - Dangle patient 3 times a day  - Stand patient   - Ambulate patient   - Out of bed for meals   - Out of bed for toileting  - Record patient progress and toleration of activity level   Outcome: Progressing     Problem: DISCHARGE PLANNING  Goal: Discharge to home or other facility with appropriate resources  Description: INTERVENTIONS:  - Identify barriers to discharge w/patient and caregiver  - Arrange for needed discharge resources and transportation as appropriate  -  Identify discharge learning needs (meds, wound care, etc.)  - Arrange for interpretive services to assist at discharge as needed  - Refer to Case Management Department for coordinating discharge planning if the patient needs post-hospital services based on physician/advanced practitioner order or complex needs related to functional status, cognitive ability, or social support system  Outcome: Progressing     Problem: Knowledge Deficit  Goal: Patient/family/caregiver demonstrates understanding of disease process, treatment plan, medications, and discharge instructions  Description: Complete learning assessment and assess knowledge base.  Interventions:  - Provide teaching at level of understanding  - Provide teaching via preferred learning methods  Outcome: Progressing     Problem: Nutrition/Hydration-ADULT  Goal: Nutrient/Hydration intake appropriate for improving, restoring or maintaining nutritional needs  Description: Monitor and assess patient's nutrition/hydration status for malnutrition. Collaborate with interdisciplinary team and initiate plan and interventions as ordered.  Monitor patient's weight and dietary intake as ordered or per policy. Utilize nutrition screening tool and intervene as necessary. Determine patient's food preferences and provide high-protein, high-caloric foods as appropriate.     INTERVENTIONS:  - Monitor oral intake, urinary output, labs, and treatment plans  - Assess nutrition and hydration status and recommend course of action  - Evaluate amount of meals eaten  - Assist patient with eating if necessary   - Allow adequate time for meals  - Recommend/ encourage appropriate diets, oral nutritional supplements, and vitamin/mineral supplements  - Order, calculate, and assess calorie counts as needed  - Recommend, monitor, and adjust tube feedings and TPN/PPN based on assessed needs  - Assess need for intravenous fluids  - Provide specific nutrition/hydration education as appropriate  -  Include patient/family/caregiver in decisions related to nutrition  Outcome: Progressing     Problem: Prexisting or High Potential for Compromised Skin Integrity  Goal: Skin integrity is maintained or improved  Description: INTERVENTIONS:  - Identify patients at risk for skin breakdown  - Assess and monitor skin integrity  - Assess and monitor nutrition and hydration status  - Monitor labs   - Assess for incontinence   - Turn and reposition patient  - Assist with mobility/ambulation  - Relieve pressure over bony prominences  - Avoid friction and shearing  - Provide appropriate hygiene as needed including keeping skin clean and dry  - Evaluate need for skin moisturizer/barrier cream  - Collaborate with interdisciplinary team   - Patient/family teaching  - Consider wound care consult   Outcome: Progressing     Problem: METABOLIC, FLUID AND ELECTROLYTES - ADULT  Goal: Electrolytes maintained within normal limits  Description: INTERVENTIONS:  - Monitor labs and assess patient for signs and symptoms of electrolyte imbalances  - Administer electrolyte replacement as ordered  - Monitor response to electrolyte replacements, including repeat lab results as appropriate  - Instruct patient on fluid and nutrition as appropriate  Outcome: Progressing  Goal: Fluid balance maintained  Description: INTERVENTIONS:  - Monitor labs   - Monitor I/O and WT  - Instruct patient on fluid and nutrition as appropriate  - Assess for signs & symptoms of volume excess or deficit  Outcome: Progressing

## 2024-09-21 NOTE — CASE MANAGEMENT
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 2024     Primary hypertension 2024     Hyperkalemia 2024     Secondary hyperparathyroidism of renal origin (HCC) 2024     Uremia 2024     Hyponatremia 2024     SIRS (systemic inflammatory response syndrome) ([facility]) 2024     Bilateral lower extremity edema 2024     Neurogenic claudication 2023    Bloating 10/05/2022     Paresthesia of both lower extremities 10/01/2022     Leucocytosis 10/01/2022     Elevated troponin 2022     EDGAR (dyspnea on exertion) 2022     Rash due to vaculitis  2022     LYLY (acute kidney injury) ([facility]) 2022     Depression 2022     Multiple open wounds of lower leg 2022     Lower extremity weakness 2019     Lumbosacral plexopathy 2019     Gait abnormality 2019     Lumbar stenosis 2019     Polyneuropathy 2019     Gout      Prediabetes 2018    Hyperlipidemia 2014    Vitamin D deficiency 2014      LOS (days): 7  Geometric Mean LOS (GMLOS) (days): 5.1  Days to GMLOS:-1.8     OBJECTIVE:  Risk of Unplanned Readmission Score: 21.94     Current admission status: Inpatient   Preferred Pharmacy:   [id number] - [place] - [address]  [address]  [place] [address]  Phone: [phone] Fax: [phone]    Primary Care Provider: Jessika Carrillo MD    Primary Insurance: MEDICARE  Secondary Insurance: [employer]    DISCHARGE DETAILS:  Pt  will not  be ready for DC on Monday d/t needing plasmapheresis.  TC to [facility], spoke to Itzel to notify of same.

## 2024-09-21 NOTE — ASSESSMENT & PLAN NOTE
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

## 2024-09-21 NOTE — PROGRESS NOTES
Progress Note - Hospitalist   Name: Ramos Rosenthal 74 y.o. male I MRN: [id number]  Unit/Bed#: ICU 04-01 I Date of Admission: 9/14/2024   Date of Service: 9/21/2024 I Hospital Day: 7    Assessment & Plan  LYLY (acute kidney injury) ([facility])      Lab Results   Component Value Date    CREATININE 4.75 (H) 09/21/2024    CREATININE 7.08 (H) 09/20/2024    CREATININE 5.70 (H) 09/19/2024    EGFR 11 09/21/2024    EGFR 6 09/20/2024    EGFR 8 09/19/2024     Patient has not had blood work since April 2023 at which time his kidney function was normal  Patient was recently prescribed Bactrim, however since there has been approximately a year and a half in between BMP testing unsure if this is more of an acute process versus an acute on chronic process or just a progression of chronic kidney disease  Arrival creatinine was 7.95, creatinine peak on this admission was 9.42  Patient also has an anion gap metabolic acidosis secondary to his renal failure  Patient was initially treated with IV fluid, however due to concerns for volume overload state, IV fluids were discontinued   Patient was subsequently treated with Lasix also however no improvement in creatinine  Status post an IR intervention on Monday, 9/16/2024 at which time a dialysis line was placed  Patient was successfully dialyzed on 9/16/2024  Follow-up creatinine this morning is 8.38  Will consult IR for both a renal biopsy and a permacath placement for 9/19/2024  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  SIRS (systemic inflammatory response syndrome) ([facility])  Patient met sepsis criteria, present on admission as evident by leukocytosis, tachypnea and tachycardia, arrival temp was 100.4 °F  Secondary to an acute cystitis without hematuria  SIRS criteria have significantly improved, patient is no longer tachycardic, and/or  febrile  Status post 3 days worth of IV ceftriaxone-treatment was in place for suspected UTI  Appreciate ID input  Plan is to monitor off of antibiotics  Gout  Allopurinol to remain on hold in view of the acute kidney injury  Depression  Continue home dose fluoxetine  Paresthesia of both lower extremities  Hx of back surgery in 2004 and 2014 and has since bilateral LE paresthesia  He is primarily wheelchair bound  Venous statis ulcers noted to Bilateral LE  B/L extremity edema R>L  Continue supportive therapy  Hyponatremia  Most likely a hypervolemic hyponatremia in the setting of acute renal failure, and volume overload  Sodium is improving  Continue to monitor BMPs  Anticipate sodium level should get further better with dialysis  Bilateral lower extremity edema  Chronic condition  Patient with a history of lower back/spinal surgery  Patient does not ambulate  Patient is wheelchair-bound at baseline  Hyperkalemia  Resolved with dialysis  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 Sequential Compression Devices Ordered.    Mobility:   Basic Mobility Inpatient Raw Score: 12  JH-HLM Goal: 4: Move to chair/commode  JH-HLM Achieved: 3: Sit at edge of bed  JH-HLM Goal NOT achieved. Continue with multidisciplinary rounding and encourage appropriate mobility to improve upon JH-HLM goals.    Patient Centered Rounds: I performed bedside rounds with nursing staff today.     Discussions with Specialists or Other Care Team Provider: Nephrology    Education and Discussions with Family / Patient: Updated  (wife) at bedside.    Current Length of Stay: 7 day(s)  Current Patient Status: Inpatient   Certification Statement: The patient will continue to require additional inpatient hospital stay due to ESRD on HD  Discharge Plan: Anticipate discharge in >72 hrs to home.    Code  "Status: Level 1 - Full Code    Subjective   Patient seen and examined at bedside. No acute events overnight. Denies chest pain, SOB, diaphoresis, nausea/vomiting/diarrhea, fevers/chills.     Objective     Vitals:   Temp (24hrs), Av.7 °F (36.5 °C), Min:97 °F (36.1 °C), Max:99.1 °F (37.3 °C)    Temp:  [97 °F (36.1 °C)-99.1 °F (37.3 °C)] 97.1 °F (36.2 °C)  HR:  [53-78] 55  Resp:  [8-31] 18  BP: (104-155)/() 115/56  SpO2:  [90 %-97 %] 96 %  Body mass index is 39.34 kg/m².     Input and Output Summary (last 24 hours):     Intake/Output Summary (Last 24 hours) at 2024 0740  Last data filed at 2024 0448  Gross per 24 hour   Intake 930 ml   Output 0 ml   Net 930 ml       /56 (BP Location: Left arm)   Pulse 55   Temp (!) 97.1 °F (36.2 °C) (Temporal)   Resp 18   Ht 6' 2\" (1.88 m)   Wt (!) 139 kg (306 lb 7 oz)   SpO2 96%   BMI 39.34 kg/m²     General Appearance:    Alert, cooperative, no distress, appears stated age   Head:    Normocephalic, without obvious abnormality, atraumatic   Eyes:    PERRL, conjunctiva/corneas clear, EOM's intact, fundi     benign, both eyes        Ears:    Normal TM's and external ear canals, both ears   Nose:   Nares normal, septum midline, mucosa normal, no drainage    or sinus tenderness   Throat:   Lips, mucosa, and tongue normal; teeth and gums normal   Neck:   Supple, symmetrical, trachea midline, no adenopathy;        thyroid:  No enlargement/tenderness/nodules; no carotid    bruit or JVD   Back:     Symmetric, no curvature, ROM normal, no CVA tenderness   Lungs:     Clear to auscultation bilaterally, respirations unlabored   Chest wall:    No tenderness or deformity   Heart:    Regular rate and rhythm, S1 and S2 normal, no murmur, rub    or gallop   Abdomen:     Soft, non-tender, bowel sounds active all four quadrants,     no masses, no organomegaly   Genitalia:    Normal male without lesion, discharge or tenderness   Rectal:    Normal tone, normal prostate, no " masses or tenderness;    guaiac negative stool   Extremities:   Extremities normal, atraumatic, no cyanosis or edema   Pulses:   2+ and symmetric all extremities   Skin:   Skin color, texture, turgor normal, no rashes or lesions   Lymph nodes:   Cervical, supraclavicular, and axillary nodes normal   Neurologic:   CNII-XII intact. Normal strength, sensation and reflexes       throughout        Lines/Drains:  Lines/Drains/Airways       Active Status       Name Placement date Placement time Site Days    CVC Central Lines 09/16/24 Double 09/16/24 0909  --  4    HD Temporary Double Catheter 09/16/24 0909  Other (Comment)  4                    Central Line:  Goal for removal: N/A - Chronic PICC               Lab Results: I have reviewed the following results: CBC/BMP:   .     09/21/24 0448   WBC 21.23*   HGB 8.4*   HCT 26.3*      BANDSPCT 4   SODIUM 130*   K 4.3   CL 96   CO2 23   BUN 40*   CREATININE 4.75*   GLUC 200*   CAIONIZED 1.06*   MG 2.2   PHOS 4.2*       Results from last 7 days   Lab Units 09/21/24 0448 09/20/24  0534   WBC Thousand/uL 21.23* 17.99*   HEMOGLOBIN g/dL 8.4* 8.6*   HEMATOCRIT % 26.3* 26.9*   PLATELETS Thousands/uL 347 362   BANDS PCT % 4  --    SEGS PCT %  --  83*   LYMPHO PCT % 3* 5*   MONO PCT % 1* 9   EOS PCT % 0 1     Results from last 7 days   Lab Units 09/21/24  0448   SODIUM mmol/L 130*   POTASSIUM mmol/L 4.3   CHLORIDE mmol/L 96   CO2 mmol/L 23   BUN mg/dL 40*   CREATININE mg/dL 4.75*   ANION GAP mmol/L 11   CALCIUM mg/dL 8.2*   ALBUMIN g/dL 2.6*   TOTAL BILIRUBIN mg/dL 0.28   ALK PHOS U/L 91   ALT U/L 187*   AST U/L 225*   GLUCOSE RANDOM mg/dL 200*     Results from last 7 days   Lab Units 09/19/24  0602   INR  1.28*             Results from last 7 days   Lab Units 09/21/24  0448 09/14/24  1314   LACTIC ACID mmol/L  --  1.1   PROCALCITONIN ng/ml 3.04* 1.48*       Recent Cultures (last 7 days):   Results from last 7 days   Lab Units 09/14/24  1314   BLOOD CULTURE  No Growth After  5 Days.  No Growth After 5 Days.       Imaging Review: No pertinent imaging studies reviewed.  Other Studies: No additional pertinent studies reviewed.    Last 24 Hours Medication List:     Current Facility-Administered Medications:     calcium carbonate (TUMS) chewable tablet 1,000 mg, Daily PRN    carvedilol (COREG) tablet 6.25 mg, BID With Meals    cyclophosphamide (CYTOXAN) capsule 150 mg, Daily    famotidine (PEPCID) tablet 20 mg, Daily    FLUoxetine (PROzac) capsule 20 mg, Daily    heparin (porcine) subcutaneous injection 5,000 Units, Q8H LORENZO    methylPREDNISolone Na Suc (PF) 1,000 mg in sodium chloride 0.9 % 250 mL IVPB, Daily, Last Rate: 1,000 mg (09/20/24 0913)    ondansetron (ZOFRAN) injection 4 mg, Q6H PRN    sulfamethoxazole-trimethoprim (BACTRIM DS) 800-160 mg per tablet 1 tablet, Once per day on Monday Wednesday Friday    Administrative Statements   Today, Patient Was Seen By: Mike Perez, DO  I have spent a total time of 35 minutes in caring for this patient on the day of the visit/encounter including Diagnostic results, Prognosis, Risks and benefits of tx options, Instructions for management, Patient and family education, Importance of tx compliance, Risk factor reductions, Impressions, Counseling / Coordination of care, Documenting in the medical record, Reviewing / ordering tests, medicine, procedures  , Obtaining or reviewing history  , and Communicating with other healthcare professionals .    **Please Note: This note may have been constructed using a voice recognition system.**

## 2024-09-21 NOTE — ASSESSMENT & PLAN NOTE
Despite 1 incidence of hyperkalemia on September 16 there has been no further incidences.  Appropriate potassium concentrations will be achieved with dialysis

## 2024-09-21 NOTE — ASSESSMENT & PLAN NOTE
Bone marrow with Anti-GBM glomerulonephritis  Needs plasma exchange-Spoke to [facility] [place]   1.5 L plasma exchange with Albumin 5%/FFP-awaiting total amount needed from [facility]  Baseline and daily CBC/PT/INR/Ical/ and fibrinogen level  Calcium gluconate replacement per plasma exchange tech    Plan:  Nephrology following  Continue Solumedrol 1000 mg daily day 3/3

## 2024-09-21 NOTE — ASSESSMENT & PLAN NOTE
Will defer PD catheter placement for now and have the patient undergo a tunneled dialysis catheter for long-term hemodialysis usage.

## 2024-09-21 NOTE — ASSESSMENT & PLAN NOTE
In the setting of anti-GBM glomerulonephritis   Baseline creat was 0.9 to 7.95 on admission    Plan:  Nephrology consulted  Temp HD cath with M/W/F dialysis  Trend renal indices  Avoid nephrotoxins and hypotension

## 2024-09-21 NOTE — PROGRESS NOTES
Progress Note - Nephrology   Name: Ramos Rosenthal 74 y.o. male I MRN: [id number]  Unit/Bed#: ICU 04-01 I Date of Admission: 9/14/2024   Date of Service: 9/21/2024 I Hospital Day: 7     Assessment & Plan  LYLY (acute kidney injury) (HCC)  In the setting of a rapidly progressive GN, now is hemodialysis dependent.  He remains oligoanuric.  Volume status is hypervolemic with chronic bilateral lower extremity edema.  Access is currently a temporary catheter with plans for this to be converted to a tunneled dialysis catheter on Monday for outpatient dialysis usage.  There was mention that the patient's daughters do prefer peritoneal dialysis as a modality but after discussion with the nephrologist and hospitalist peritoneal dialysis catheter placement will be deferred for now and the patient will be potentially initiated on home hemodialysis.  His last hemodialysis treatment was yesterday for 4 hours with 2 L removed and a post weight of 135 kg.  His next dialysis session is scheduled for Monday, September 23.  After reviewing the patient's labs and vital signs, there is no acute indication for dialysis today.    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  Awaiting results of kidney biopsy.  In the meantime patient has been initiated on treatment with IV methylprednisolone 1000 mg daily for 3 doses, oral cyclophosphamide 150 mg daily, and plasmapheresis.  Plasmapheresis to be initiated later today or tomorrow, critical care team is managing.  Utilizing albumin as replacement fluid.  Will continue with Bactrim 3 times weekly for immunoprophylaxis and Pepcid 20 mg daily for stress ulcer prophylaxis.  Continue to follow daily CBC, calcium, magnesium, acid-base monitoring   Hyperkalemia  Despite 1 incidence of hyperkalemia on September 16 there has been no further  incidences.  Appropriate potassium concentrations will be achieved with dialysis  Hyponatremia  Hypervolemic hyponatremia in the setting of impaired free water excretion with ESRD.  Volume management is with dialysis due to oligoanuria.  Otherwise continue with conservative management including a mild fluid restriction and sodium restriction  Bilateral lower extremity edema  Plan is sodium restriction and ultrafiltration with hemodialysis as listed above  Gout  Allopurinol has been on hold in light of acute kidney injury, now that patient is dialysis dependent for the foreseeable future, can reinitiate 100 mg 3 times weekly after dialysis sessions if clinically indicated.  SIRS (systemic inflammatory response syndrome) (HCC)  Patient was initially meeting SIRS criteria in the setting of suspected UTI and received an initial course of IV antibiotics.  ID was consulted and there is no evidence of acute infection so the patient is being monitored off antibiotics.  He has remained afebrile and hemodynamically stable with no blood or urine culture growth to date.  Dependence on intermittent renal dialysis (HCC)  Will defer PD catheter placement for now and have the patient undergo a tunneled dialysis catheter for long-term hemodialysis usage.      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  He was started on pulse dose IV steroids, cyclophosphamide and is to be initiated on plasmapheresis.  Nephrology was consulted for the management of acute kidney injury.  At present the patient denies any acute complaints including chest pain, shortness of breath, dizziness, nausea or vomiting.  He reports that appetite  is improving.      Objective      Temp:  [97 °F (36.1 °C)-98 °F (36.7 °C)] 97.1 °F (36.2 °C)  HR:  [53-73] 56  Resp:  [8-31] 18  BP: (104-152)/(51-68) 119/58  O2 Device: None (Room air)         Vitals:    09/20/24 0600 09/21/24 0600   Weight: (!) 137 kg (302 lb 0.5 oz) (!) 139 kg (306 lb 7 oz)     I/O last 24 hours:  In: 1560 [P.O.:780; I.V.:480; IV Piggyback:300]  Out: 0   Lines/Drains/Airways       Active Status       Name Placement date Placement time Site Days    CVC Central Lines 09/16/24 Double 09/16/24 0909  --  5    HD Temporary Double Catheter 09/16/24 0909  Other (Comment)  5                  Physical Exam  Vitals and nursing note reviewed.   Constitutional:       General: He is not in acute distress.     Appearance: He is well-developed.   HENT:      Head: Normocephalic and atraumatic.   Cardiovascular:      Rate and Rhythm: Normal rate and regular rhythm.      Heart sounds: No murmur heard.  Pulmonary:      Effort: Pulmonary effort is normal. No respiratory distress.      Breath sounds: Normal breath sounds.   Abdominal:      Palpations: Abdomen is soft.      Tenderness: There is no abdominal tenderness.   Musculoskeletal:      Right lower leg: Edema present.      Left lower leg: Edema present.   Skin:     General: Skin is warm and dry.      Capillary Refill: Capillary refill takes less than 2 seconds.   Neurological:      Mental Status: He is alert.   Psychiatric:         Mood and Affect: Mood normal.        Medications:    Current Facility-Administered Medications:     calcium carbonate (TUMS) chewable tablet 1,000 mg, 1,000 mg, Oral, Daily PRN, BRETT Xie    carvedilol (COREG) tablet 6.25 mg, 6.25 mg, Oral, BID With Meals, Gigi Ortega DO, 6.25 mg at 09/21/24 0812    cefTRIAXone (ROCEPHIN) IVPB (premix in dextrose) 1,000 mg 50 mL, 1,000 mg, Intravenous, Q24H, Mike Perez DO, Stopped at 09/21/24 0921    cyclophosphamide (CYTOXAN) capsule 150 mg, 2 mg/kg (Ideal), Oral,  Daily, Gigi Ortega DO, 150 mg at 09/21/24 0813    famotidine (PEPCID) tablet 20 mg, 20 mg, Oral, Daily, Ramos Mclean DO, 20 mg at 09/21/24 0813    FLUoxetine (PROzac) capsule 20 mg, 20 mg, Oral, Daily, BRETT Xie, 20 mg at 09/21/24 0812    heparin (porcine) subcutaneous injection 5,000 Units, 5,000 Units, Subcutaneous, Q8H LORENZO, BRETT Xie, 5,000 Units at 09/21/24 0642    methylPREDNISolone Na Suc (PF) 1,000 mg in sodium chloride 0.9 % 250 mL IVPB, 1,000 mg, Intravenous, Daily, Mike Perez DO, Stopped at 09/21/24 1100    ondansetron (ZOFRAN) injection 4 mg, 4 mg, Intravenous, Q6H PRN, BRETT Xie    sulfamethoxazole-trimethoprim (BACTRIM DS) 800-160 mg per tablet 1 tablet, 1 tablet, Oral, Once per day on Monday Wednesday Friday, Ramos Mclean DO, 1 tablet at 09/20/24 1924      Lab Results: I have reviewed the following results:   Results from last 7 days   Lab Units 09/21/24  0448 09/20/24  0534 09/19/24  0602 09/18/24  0559 09/17/24  0545 09/16/24  2142 09/16/24  0608 09/15/24  0631 09/14/24  1744 09/14/24  1314   WBC Thousand/uL 21.23* 17.99* 15.66* 13.41* 15.45*  --  16.69* 14.09*  --  12.14*   HEMOGLOBIN g/dL 8.4* 8.6* 8.7* 8.8* 9.9*  --  10.0* 9.4*  --  10.5*   HEMATOCRIT % 26.3* 26.9* 27.3* 27.1* 30.4*  --  30.7* 30.0*  --  32.0*   PLATELETS Thousands/uL 347 362 344 350 399*  --  419* 429*  --  435*   POTASSIUM mmol/L 4.3 4.5 4.2 4.5 5.3 5.0 5.4* 5.2   < > 4.9   CHLORIDE mmol/L 96 98 98 97 97  --  98 98   < > 94*   CO2 mmol/L 23 21 22 24 21  --  17* 17*   < > 19*   BUN mg/dL 40* 53* 38* 50* 66*  --  79* 69*   < > 61*   CREATININE mg/dL 4.75* 7.08* 5.70* 6.86* 8.38*  --  9.42* 8.41*   < > 7.95*   CALCIUM mg/dL 8.2* 8.1* 8.0* 7.8* 8.1*  --  8.2* 8.0*   < > 8.6   MAGNESIUM mg/dL 2.2 2.3 2.2  --   --  2.7  --   --   --  2.9*   PHOSPHORUS mg/dL 4.2* 4.3* 3.6  --   --   --   --   --   --   --    ALBUMIN g/dL 2.6*  --   --   --   --   --  " 2.9*  --   --   --     < > = values in this interval not displayed.       Administrative Statements     Portions of the record may have been created with voice recognition software. Occasional wrong word or \"sound a like\" substitutions may have occurred due to the inherent limitations of voice recognition software. Read the chart carefully and recognize, using context, where substitutions have occurred.If you have any questions, please contact the dictating provider.  "

## 2024-09-21 NOTE — ASSESSMENT & PLAN NOTE
Patient was initially meeting SIRS criteria in the setting of suspected UTI and received an initial course of IV antibiotics.  ID was consulted and there is no evidence of acute infection so the patient is being monitored off antibiotics.  He has remained afebrile and hemodynamically stable with no blood or urine culture growth to date.

## 2024-09-21 NOTE — ASSESSMENT & PLAN NOTE
Hypervolemic hyponatremia in the setting of impaired free water excretion with ESRD.  Volume management is with dialysis due to oligoanuria.  Otherwise continue with conservative management including a mild fluid restriction and sodium restriction

## 2024-09-21 NOTE — ASSESSMENT & PLAN NOTE
Allopurinol has been on hold in light of acute kidney injury, now that patient is dialysis dependent for the foreseeable future, can reinitiate 100 mg 3 times weekly after dialysis sessions if clinically indicated.

## 2024-09-21 NOTE — NURSING NOTE
0700-Assumed care of pt. Pt resting in bed comfortably with no complaints    0740- Collaborative rounding w/ Dr. Perez at bedside    0800- Assessment complete. See flowsheets. Vitals stable. Pt on room air. Alert and Oriented. No complaints of pain.     1230- Labs ordered, drawn and sent to lab for anticipated plasmapheresis today    1800- Pt vitals remain stable. Assessment unchanged. Awaiting blood products to be delivered. Still awaiting plasmapheresis. Being coordinated by Valerio WEST. Pt updated on care plan

## 2024-09-22 PROBLEM — E87.5 HYPERKALEMIA: Status: RESOLVED | Noted: 2024-09-16 | Resolved: 2024-09-22

## 2024-09-22 PROBLEM — D64.9 ANEMIA: Status: ACTIVE | Noted: 2024-09-22

## 2024-09-22 LAB
ABO GROUP BLD BPU: NORMAL
ANION GAP SERPL CALCULATED.3IONS-SCNC: 10 MMOL/L (ref 4–13)
BASOPHILS # BLD AUTO: 0.03 THOUSANDS/ΜL (ref 0–0.1)
BASOPHILS NFR BLD AUTO: 0 % (ref 0–1)
BPU ID: NORMAL
BUN SERPL-MCNC: 62 MG/DL (ref 5–25)
CA-I BLD-SCNC: 1.02 MMOL/L (ref 1.12–1.32)
CALCIUM SERPL-MCNC: 7.9 MG/DL (ref 8.4–10.2)
CHLORIDE SERPL-SCNC: 99 MMOL/L (ref 96–108)
CO2 SERPL-SCNC: 20 MMOL/L (ref 21–32)
CREAT SERPL-MCNC: 5.72 MG/DL (ref 0.6–1.3)
EOSINOPHIL # BLD AUTO: 0.01 THOUSAND/ΜL (ref 0–0.61)
EOSINOPHIL NFR BLD AUTO: 0 % (ref 0–6)
ERYTHROCYTE [DISTWIDTH] IN BLOOD BY AUTOMATED COUNT: 15.6 % (ref 11.6–15.1)
FIBRINOGEN PPP-MCNC: 325 MG/DL (ref 206–523)
FIBRINOGEN PPP-MCNC: 801 MG/DL (ref 206–523)
GAMMA INTERFERON BACKGROUND BLD IA-ACNC: 0.07 IU/ML
GFR SERPL CREATININE-BSD FRML MDRD: 8 ML/MIN/1.73SQ M
GLUCOSE SERPL-MCNC: 177 MG/DL (ref 65–140)
HCT VFR BLD AUTO: 24.3 % (ref 36.5–49.3)
HGB BLD-MCNC: 7.7 G/DL (ref 12–17)
IMM GRANULOCYTES # BLD AUTO: >0.5 THOUSAND/UL (ref 0–0.2)
IMM GRANULOCYTES NFR BLD AUTO: 2 % (ref 0–2)
INR PPP: 1.46 (ref 0.85–1.19)
LYMPHOCYTES # BLD AUTO: 1.12 THOUSANDS/ΜL (ref 0.6–4.47)
LYMPHOCYTES NFR BLD AUTO: 5 % (ref 14–44)
M TB IFN-G BLD-IMP: ABNORMAL
M TB IFN-G CD4+ BCKGRND COR BLD-ACNC: -0.05 IU/ML
M TB IFN-G CD4+ BCKGRND COR BLD-ACNC: -0.05 IU/ML
MAGNESIUM SERPL-MCNC: 2.2 MG/DL (ref 1.9–2.7)
MCH RBC QN AUTO: 28 PG (ref 26.8–34.3)
MCHC RBC AUTO-ENTMCNC: 31.7 G/DL (ref 31.4–37.4)
MCV RBC AUTO: 88 FL (ref 82–98)
MITOGEN IGNF BCKGRD COR BLD-ACNC: <0.1 IU/ML
MONOCYTES # BLD AUTO: 1.05 THOUSAND/ΜL (ref 0.17–1.22)
MONOCYTES NFR BLD AUTO: 4 % (ref 4–12)
NEUTROPHILS # BLD AUTO: 22.31 THOUSANDS/ΜL (ref 1.85–7.62)
NEUTS SEG NFR BLD AUTO: 89 % (ref 43–75)
NRBC BLD AUTO-RTO: 0 /100 WBCS
PHOSPHATE SERPL-MCNC: 5.2 MG/DL (ref 2.3–4.1)
PLATELET # BLD AUTO: 343 THOUSANDS/UL (ref 149–390)
PMV BLD AUTO: 9.2 FL (ref 8.9–12.7)
POTASSIUM SERPL-SCNC: 4.3 MMOL/L (ref 3.5–5.3)
PROTHROMBIN TIME: 18.2 SECONDS (ref 12.3–15)
RBC # BLD AUTO: 2.75 MILLION/UL (ref 3.88–5.62)
SODIUM SERPL-SCNC: 129 MMOL/L (ref 135–147)
UNIT DISPENSE STATUS: NORMAL
UNIT PRODUCT CODE: NORMAL
UNIT PRODUCT VOLUME: 188 ML
UNIT PRODUCT VOLUME: 250 ML
UNIT PRODUCT VOLUME: 280 ML
UNIT RH: NORMAL
WBC # BLD AUTO: 25.08 THOUSAND/UL (ref 4.31–10.16)

## 2024-09-22 PROCEDURE — 99233 SBSQ HOSP IP/OBS HIGH 50: CPT | Performed by: INTERNAL MEDICINE

## 2024-09-22 PROCEDURE — 84100 ASSAY OF PHOSPHORUS: CPT | Performed by: INTERNAL MEDICINE

## 2024-09-22 PROCEDURE — 85610 PROTHROMBIN TIME: CPT | Performed by: NURSE PRACTITIONER

## 2024-09-22 PROCEDURE — 99232 SBSQ HOSP IP/OBS MODERATE 35: CPT | Performed by: INTERNAL MEDICINE

## 2024-09-22 PROCEDURE — 83520 IMMUNOASSAY QUANT NOS NONAB: CPT | Performed by: INTERNAL MEDICINE

## 2024-09-22 PROCEDURE — 83735 ASSAY OF MAGNESIUM: CPT | Performed by: INTERNAL MEDICINE

## 2024-09-22 PROCEDURE — 82330 ASSAY OF CALCIUM: CPT | Performed by: NURSE PRACTITIONER

## 2024-09-22 PROCEDURE — 80048 BASIC METABOLIC PNL TOTAL CA: CPT | Performed by: INTERNAL MEDICINE

## 2024-09-22 PROCEDURE — P9017 PLASMA 1 DONOR FRZ W/IN 8 HR: HCPCS

## 2024-09-22 PROCEDURE — 85384 FIBRINOGEN ACTIVITY: CPT | Performed by: NURSE PRACTITIONER

## 2024-09-22 PROCEDURE — 85025 COMPLETE CBC W/AUTO DIFF WBC: CPT | Performed by: INTERNAL MEDICINE

## 2024-09-22 RX ORDER — PREDNISONE 20 MG/1
60 TABLET ORAL DAILY
Status: DISCONTINUED | OUTPATIENT
Start: 2024-09-23 | End: 2024-09-23

## 2024-09-22 RX ORDER — CALCIUM GLUCONATE 20 MG/ML
1 INJECTION, SOLUTION INTRAVENOUS ONCE
Status: COMPLETED | OUTPATIENT
Start: 2024-09-22 | End: 2024-09-23

## 2024-09-22 RX ORDER — ALBUMIN, HUMAN INJ 5% 5 %
100 SOLUTION INTRAVENOUS ONCE
Status: COMPLETED | OUTPATIENT
Start: 2024-09-22 | End: 2024-09-23

## 2024-09-22 RX ORDER — CALCIUM GLUCONATE 20 MG/ML
2 INJECTION, SOLUTION INTRAVENOUS ONCE
Status: COMPLETED | OUTPATIENT
Start: 2024-09-22 | End: 2024-09-23

## 2024-09-22 RX ORDER — ALBUMIN, HUMAN INJ 5% 5 %
75 SOLUTION INTRAVENOUS ONCE
Status: COMPLETED | OUTPATIENT
Start: 2024-09-22 | End: 2024-09-23

## 2024-09-22 RX ORDER — DIPHENHYDRAMINE HCL 25 MG
50 TABLET ORAL ONCE
Status: COMPLETED | OUTPATIENT
Start: 2024-09-22 | End: 2024-09-23

## 2024-09-22 RX ADMIN — SODIUM CHLORIDE 1000 MG: 0.9 INJECTION, SOLUTION INTRAVENOUS at 10:37

## 2024-09-22 RX ADMIN — CARVEDILOL 6.25 MG: 3.12 TABLET, FILM COATED ORAL at 10:30

## 2024-09-22 RX ADMIN — PANTOPRAZOLE SODIUM 20 MG: 20 TABLET, DELAYED RELEASE ORAL at 05:46

## 2024-09-22 RX ADMIN — CYCLOPHOSPHAMIDE 150 MG: 50 CAPSULE ORAL at 10:37

## 2024-09-22 RX ADMIN — FAMOTIDINE 20 MG: 20 TABLET, FILM COATED ORAL at 10:30

## 2024-09-22 RX ADMIN — CALCIUM GLUCONATE 1 G: 20 INJECTION, SOLUTION INTRAVENOUS at 13:45

## 2024-09-22 RX ADMIN — HEPARIN SODIUM 5000 UNITS: 5000 INJECTION, SOLUTION INTRAVENOUS; SUBCUTANEOUS at 13:44

## 2024-09-22 RX ADMIN — CARVEDILOL 6.25 MG: 3.12 TABLET, FILM COATED ORAL at 17:16

## 2024-09-22 RX ADMIN — FLUOXETINE HYDROCHLORIDE 20 MG: 20 CAPSULE ORAL at 10:30

## 2024-09-22 RX ADMIN — ALBUMIN (HUMAN) 100 G: 12.5 INJECTION, SOLUTION INTRAVENOUS at 13:45

## 2024-09-22 RX ADMIN — HEPARIN SODIUM 5000 UNITS: 5000 INJECTION, SOLUTION INTRAVENOUS; SUBCUTANEOUS at 05:46

## 2024-09-22 RX ADMIN — ALBUMIN (HUMAN) 75 G: 12.5 INJECTION, SOLUTION INTRAVENOUS at 13:46

## 2024-09-22 RX ADMIN — CALCIUM GLUCONATE 2 G: 20 INJECTION, SOLUTION INTRAVENOUS at 10:30

## 2024-09-22 RX ADMIN — HEPARIN SODIUM 5000 UNITS: 5000 INJECTION, SOLUTION INTRAVENOUS; SUBCUTANEOUS at 21:21

## 2024-09-22 NOTE — PLAN OF CARE
Problem: PAIN - ADULT  Goal: Verbalizes/displays adequate comfort level or baseline comfort level  Description: Interventions:  - Encourage patient to monitor pain and request assistance  - Assess pain using appropriate pain scale  - Administer analgesics based on type and severity of pain and evaluate response  - Implement non-pharmacological measures as appropriate and evaluate response  - Consider cultural and social influences on pain and pain management  - Notify physician/advanced practitioner if interventions unsuccessful or patient reports new pain  Outcome: Progressing     Problem: INFECTION - ADULT  Goal: Absence or prevention of progression during hospitalization  Description: INTERVENTIONS:  - Assess and monitor for signs and symptoms of infection  - Monitor lab/diagnostic results  - Monitor all insertion sites, i.e. indwelling lines, tubes, and drains  - Monitor endotracheal if appropriate and nasal secretions for changes in amount and color  - [place] appropriate cooling/warming therapies per order  - Administer medications as ordered  - Instruct and encourage patient and family to use good hand hygiene technique  - Identify and instruct in appropriate isolation precautions for identified infection/condition  Outcome: Progressing  Goal: Absence of fever/infection during neutropenic period  Description: INTERVENTIONS:  - Monitor WBC    Outcome: Progressing     Problem: SAFETY ADULT  Goal: Patient will remain free of falls  Description: INTERVENTIONS:  - Educate patient/family on patient safety including physical limitations  - Instruct patient to call for assistance with activity   - Consult OT/PT to assist with strengthening/mobility   - Keep Call bell within reach  - Keep bed low and locked with side rails adjusted as appropriate  - Keep care items and personal belongings within reach  - Initiate and maintain comfort rounds  - Make Fall Risk Sign visible to staff  - Offer Toileting every 2 Hours,  in advance of need  - Initiate/Maintain bed alarm  - Obtain necessary fall risk management equipment  - Apply yellow socks and bracelet for high fall risk patients  - Consider moving patient to room near nurses station  Outcome: Progressing  Goal: Maintain or return to baseline ADL function  Description: INTERVENTIONS:  -  Assess patient's ability to carry out ADLs; assess patient's baseline for ADL function and identify physical deficits which impact ability to perform ADLs (bathing, care of mouth/teeth, toileting, grooming, dressing, etc.)  - Assess/evaluate cause of self-care deficits   - Assess range of motion  - Assess patient's mobility; develop plan if impaired  - Assess patient's need for assistive devices and provide as appropriate  - Encourage maximum independence but intervene and supervise when necessary  - Involve family in performance of ADLs  - Assess for home care needs following discharge   - Consider OT consult to assist with ADL evaluation and planning for discharge  - Provide patient education as appropriate  Outcome: Progressing  Goal: Maintains/Returns to pre admission functional level  Description: INTERVENTIONS:  - Perform AM-PAC 6 Click Basic Mobility/ Daily Activity assessment daily.  - Set and communicate daily mobility goal to care team and patient/family/caregiver.   - Collaborate with rehabilitation services on mobility goals if consulted  - Perform Range of Motion 3 times a day.  - Reposition patient every 2 hours.  - Dangle patient 3 times a day  - Stand patient   - Ambulate patient   - Out of bed for meals   - Out of bed for toileting  - Record patient progress and toleration of activity level   Outcome: Progressing     Problem: DISCHARGE PLANNING  Goal: Discharge to home or other facility with appropriate resources  Description: INTERVENTIONS:  - Identify barriers to discharge w/patient and caregiver  - Arrange for needed discharge resources and transportation as appropriate  -  Identify discharge learning needs (meds, wound care, etc.)  - Arrange for interpretive services to assist at discharge as needed  - Refer to Case Management Department for coordinating discharge planning if the patient needs post-hospital services based on physician/advanced practitioner order or complex needs related to functional status, cognitive ability, or social support system  Outcome: Progressing     Problem: Knowledge Deficit  Goal: Patient/family/caregiver demonstrates understanding of disease process, treatment plan, medications, and discharge instructions  Description: Complete learning assessment and assess knowledge base.  Interventions:  - Provide teaching at level of understanding  - Provide teaching via preferred learning methods  Outcome: Progressing     Problem: Nutrition/Hydration-ADULT  Goal: Nutrient/Hydration intake appropriate for improving, restoring or maintaining nutritional needs  Description: Monitor and assess patient's nutrition/hydration status for malnutrition. Collaborate with interdisciplinary team and initiate plan and interventions as ordered.  Monitor patient's weight and dietary intake as ordered or per policy. Utilize nutrition screening tool and intervene as necessary. Determine patient's food preferences and provide high-protein, high-caloric foods as appropriate.     INTERVENTIONS:  - Monitor oral intake, urinary output, labs, and treatment plans  - Assess nutrition and hydration status and recommend course of action  - Evaluate amount of meals eaten  - Assist patient with eating if necessary   - Allow adequate time for meals  - Recommend/ encourage appropriate diets, oral nutritional supplements, and vitamin/mineral supplements  - Order, calculate, and assess calorie counts as needed  - Recommend, monitor, and adjust tube feedings and TPN/PPN based on assessed needs  - Assess need for intravenous fluids  - Provide specific nutrition/hydration education as appropriate  -  Include patient/family/caregiver in decisions related to nutrition  Outcome: Progressing     Problem: Prexisting or High Potential for Compromised Skin Integrity  Goal: Skin integrity is maintained or improved  Description: INTERVENTIONS:  - Identify patients at risk for skin breakdown  - Assess and monitor skin integrity  - Assess and monitor nutrition and hydration status  - Monitor labs   - Assess for incontinence   - Turn and reposition patient  - Assist with mobility/ambulation  - Relieve pressure over bony prominences  - Avoid friction and shearing  - Provide appropriate hygiene as needed including keeping skin clean and dry  - Evaluate need for skin moisturizer/barrier cream  - Collaborate with interdisciplinary team   - Patient/family teaching  - Consider wound care consult   Outcome: Progressing     Problem: METABOLIC, FLUID AND ELECTROLYTES - ADULT  Goal: Electrolytes maintained within normal limits  Description: INTERVENTIONS:  - Monitor labs and assess patient for signs and symptoms of electrolyte imbalances  - Administer electrolyte replacement as ordered  - Monitor response to electrolyte replacements, including repeat lab results as appropriate  - Instruct patient on fluid and nutrition as appropriate  Outcome: Progressing  Goal: Fluid balance maintained  Description: INTERVENTIONS:  - Monitor labs   - Monitor I/O and WT  - Instruct patient on fluid and nutrition as appropriate  - Assess for signs & symptoms of volume excess or deficit  Outcome: Progressing

## 2024-09-22 NOTE — NURSING NOTE
Patient received plasmaphersis today by Minnie.    Products used are as follows:    Unit # [id number] started @ 1450/ended @ 1455  /63 HR 53 R 18 T 97.7  Unit # [id number] started @ 1455/ended @ 1501  /53 HR 52 R 18 T 97.7   Unit # [id number] started @ 1501/ended @ 1505  /53 HR 52 R 18 T 97.7  Unit # [id number] started @ 1505/ended @ 1509  /58 HR 52 R 18 T 97.7   Unit # [id number] started @ 1509/ended @ 1515  /58 HR 52 R 18 T 97.7   Unit # [id number] started @ 1513/ended @ 1519  /58 HR 52 R 18 T 97.7

## 2024-09-22 NOTE — NURSING NOTE
1900:  Assumed care of patient. Patient is on room air. VS stable. Awaiting plasmapheresis treatment.     2200-0030:  Plasmapheresis tech on floor. . Albumin running. Plasma running. Patients VS remain stable through whole treatment. Treatment finished around 12. Technician left at 1220.

## 2024-09-22 NOTE — PROGRESS NOTES
Progress Note - Nephrology   Name: Ramos Rosenthal 74 y.o. male I MRN: [id number]  Unit/Bed#: ICU 04-01 I Date of Admission: 9/14/2024   Date of Service: 9/22/2024 I Hospital Day: 8     Assessment & Plan  LYLY (acute kidney injury) (HCC)  Patient is now hemodialysis dependent.  His last hemodialysis treatment was on Friday, September 20 for 4 hours with 2 L removed.  His next treatment will be tomorrow.  There is no urgent indication for dialysis today.  He will get a tunneled dialysis catheter placed tomorrow.  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  Awaiting results of renal biopsy.  Patient is undergoing plasmapheresis and the first exchange was completed last night with the next to be scheduled for later today.  The blood bank is assisting with obtaining more FFP.  Hyperkalemia (Resolved: 9/22/2024)  Potassium levels remained appropriate.  Electrolyte management will be with dialysis  Hyponatremia  Continue with fluid restriction and ultrafiltration as tolerated on dialysis days  Bilateral lower extremity edema  Plan is sodium restriction and ultrafiltration with hemodialysis as listed above  Dependence on intermittent renal dialysis (HCC)  Will defer PD catheter placement for now and have the patient undergo a tunneled dialysis catheter for long-term hemodialysis usage.  Anemia  Hemoglobin level has dropped from 8.4 to 7.7.  We will check iron studies prior to starting the patient on an SHREE.      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 He was started on pulse dose IV steroids, cyclophosphamide and is to be initiated on plasmapheresis. Nephrology was consulted for the management of acute kidney injury.  He denies any complaints today.  He reports appetite is improving.  He underwent his first plasmapheresis session yesterday.  Plan to undergo another plasmapheresis session today and have a hemodialysis session tomorrow.      Objective      Temp:  [97 °F (36.1 °C)-97.5 °F (36.4 °C)] 97 °F (36.1 °C)  HR:  [50-62] 53  Resp:  [12-25] 19  BP: (104-135)/(53-66) 121/60  O2 Device: None (Room air)         Vitals:    09/21/24 0600 09/22/24 0544   Weight: (!) 139 kg (306 lb 7 oz) (!) 141 kg (310 lb 3 oz)     I/O last 24 hours:  In: 5960 [P.O.:830; I.V.:30; Blood:1200; IV Piggyback:3900]  Out: 0   Lines/Drains/Airways       Active Status       Name Placement date Placement time Site Days    [facility] Central Lines 09/16/24 Double 09/16/24  0909  --  6    HD Temporary Double Catheter 09/16/24 0909  Other (Comment)  6                  Physical Exam  Vitals and nursing note reviewed.   Constitutional:       General: He is not in acute distress.     Appearance: He is well-developed.   HENT:      Head: Normocephalic and atraumatic.   Cardiovascular:      Rate and Rhythm: Normal rate and regular rhythm.      Heart sounds: No murmur heard.  Pulmonary:      Effort: Pulmonary effort is normal. No respiratory distress.      Breath sounds: Normal breath sounds.   Abdominal:      Palpations: Abdomen is soft.      Tenderness: There is no abdominal tenderness.   Musculoskeletal:         General: No swelling.      Right lower leg: Edema present.      Left lower leg: Edema present.   Skin:     General: Skin is warm and dry.      Capillary Refill: Capillary refill takes less than 2 seconds.   Neurological:      Mental Status: He is alert.   Psychiatric:         Mood and Affect: Mood normal.        Medications:    Current Facility-Administered  Medications:     albumin human (FLEXBUMIN) 5 % injection 75 g, 75 g, Intravenous, Once **AND** albumin human (FLEXBUMIN) 5 % injection 100 g, 100 g, Intravenous, Once, BRETT Ledesma    calcium carbonate (TUMS) chewable tablet 1,000 mg, 1,000 mg, Oral, Daily PRN, BRETT Xie    calcium gluconate 2 g in sodium chloride 0.9% 100 mL (premix), 2 g, Intravenous, Once, BRETT Stovall    carvedilol (COREG) tablet 6.25 mg, 6.25 mg, Oral, BID With Meals, Gigi VarvarelisDO, 6.25 mg at 09/21/24 1704    cyclophosphamide (CYTOXAN) capsule 150 mg, 2 mg/kg (Ideal), Oral, Daily, Gigi Varvarshabbirs DO, 150 mg at 09/21/24 0813    famotidine (PEPCID) tablet 20 mg, 20 mg, Oral, Daily, Ramos Mclean DO, 20 mg at 09/21/24 0813    FLUoxetine (PROzac) capsule 20 mg, 20 mg, Oral, Daily, BRETT Xie, 20 mg at 09/21/24 0812    heparin (porcine) subcutaneous injection 5,000 Units, 5,000 Units, Subcutaneous, Q8H LORENZO, BRETT Xie, 5,000 Units at 09/22/24 0546    methylPREDNISolone Na Suc (PF) 1,000 mg in sodium chloride 0.9 % 250 mL IVPB, 1,000 mg, Intravenous, Daily, Mike Perez DO, Stopped at 09/21/24 1100    ondansetron (ZOFRAN) injection 4 mg, 4 mg, Intravenous, Q6H PRN, BRETT Xie    pantoprazole (PROTONIX) EC tablet 20 mg, 20 mg, Oral, Early Morning, Ramos Mclean DO, 20 mg at 09/22/24 0546    sulfamethoxazole-trimethoprim (BACTRIM DS) 800-160 mg per tablet 1 tablet, 1 tablet, Oral, Once per day on Monday Wednesday Friday, Ramos Mclean DO, 1 tablet at 09/20/24 1924      Lab Results: I have reviewed the following results:   Results from last 7 days   Lab Units 09/22/24  0553 09/21/24  0448 09/20/24  0534 09/19/24  0602 09/18/24  0559 09/17/24  0545 09/16/24  2142 09/16/24  0608   WBC Thousand/uL 25.08* 21.23* 17.99* 15.66* 13.41* 15.45*  --  16.69*   HEMOGLOBIN g/dL 7.7* 8.4* 8.6* 8.7* 8.8* 9.9*  --  10.0*   HEMATOCRIT  "% 24.3* 26.3* 26.9* 27.3* 27.1* 30.4*  --  30.7*   PLATELETS Thousands/uL 343 347 362 344 350 399*  --  419*   POTASSIUM mmol/L 4.3 4.3 4.5 4.2 4.5 5.3 5.0 5.4*   CHLORIDE mmol/L 99 96 98 98 97 97  --  98   CO2 mmol/L 20* 23 21 22 24 21  --  17*   BUN mg/dL 62* 40* 53* 38* 50* 66*  --  79*   CREATININE mg/dL 5.72* 4.75* 7.08* 5.70* 6.86* 8.38*  --  9.42*   CALCIUM mg/dL 7.9* 8.2* 8.1* 8.0* 7.8* 8.1*  --  8.2*   MAGNESIUM mg/dL 2.2 2.2 2.3 2.2  --   --  2.7  --    PHOSPHORUS mg/dL 5.2* 4.2* 4.3* 3.6  --   --   --   --    ALBUMIN g/dL  --  2.6*  --   --   --   --   --  2.9*       Administrative Statements     Portions of the record may have been created with voice recognition software. Occasional wrong word or \"sound a like\" substitutions may have occurred due to the inherent limitations of voice recognition software. Read the chart carefully and recognize, using context, where substitutions have occurred.If you have any questions, please contact the dictating provider.  "

## 2024-09-22 NOTE — ASSESSMENT & PLAN NOTE
Patient is now hemodialysis dependent.  His last hemodialysis treatment was on Friday, September 20 for 4 hours with 2 L removed.  His next treatment will be tomorrow.  There is no urgent indication for dialysis today.  He will get a tunneled dialysis catheter placed tomorrow.  Volume status is hypervolemic, urine output is still anuric

## 2024-09-22 NOTE — PROGRESS NOTES
Progress Note - Hospitalist   Name: Ramos Rosenthal 74 y.o. male I MRN: [id number]  Unit/Bed#: ICU 04-01 I Date of Admission: 9/14/2024   Date of Service: 9/22/2024 I Hospital Day: 8    Assessment & Plan  LYLY (acute kidney injury) (HCC)      Lab Results   Component Value Date    CREATININE 5.72 (H) 09/22/2024    CREATININE 4.75 (H) 09/21/2024    CREATININE 7.08 (H) 09/20/2024    EGFR 8 09/22/2024    EGFR 11 09/21/2024    EGFR 6 09/20/2024     Patient has not had blood work since April 2023 at which time his kidney function was normal  Arrival creatinine was 7.95, creatinine peak on this admission was 9.42  Status post IR intervention on Monday, 9/16/2024 at which time a dialysis line was placed  Patient was successfully dialyzed on 9/16/2024 and has been undergoing HD Monday/Wednesday/Friday  Plan for eventual permacath placement  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 blood bank does not currently have an adequate supply of AB+ blood  Medical Critical Care and Nephrology following  Depression  Continue home dose fluoxetine  Paresthesia of both lower extremities  Hx of back surgery in 2004 and 2014 and has since bilateral LE paresthesia  He is primarily wheelchair bound  Venous statis ulcers noted to Bilateral LE  B/L extremity edema R>L  Continue supportive therapy  Hyponatremia  Most likely a hypervolemic hyponatremia in the setting of acute renal failure, and volume overload  Sodium is improving  Continue to monitor BMPs  Anticipate sodium level should get further better with dialysis  Bilateral lower extremity edema  Chronic condition  Patient with a history of lower back/spinal surgery  Patient does not ambulate  Patient is wheelchair-bound at  baseline  Hyperkalemia  Resolved with dialysis    VTE Pharmacologic Prophylaxis: VTE Score: 8 High Risk (Score >/= 5) - Pharmacological DVT Prophylaxis Ordered: heparin. Sequential Compression Devices Ordered.    Mobility:   Basic Mobility Inpatient Raw Score: 12  JH-HLM Goal: 4: Move to chair/commode  JH-HLM Achieved: 4: Move to chair/commode  JH-HLM Goal NOT achieved. Continue with multidisciplinary rounding and encourage appropriate mobility to improve upon JH-HLM goals.    Patient Centered Rounds: I performed bedside rounds with nursing staff today.     Discussions with Specialists or Other Care Team Provider: Nephrology    Education and Discussions with Family / Patient: Updated  (wife) at bedside.    Current Length of Stay: 8 day(s)  Current Patient Status: Inpatient   Certification Statement: The patient will continue to require additional inpatient hospital stay due to ESRD on HD  Discharge Plan:  Anticipate a 2-week hospital course for plasmapheresis versus transfer to another hospital network    Code Status: Level 1 - Full Code    Subjective   Patient seen and examined at bedside. No acute events overnight. Denies chest pain, SOB, diaphoresis, nausea/vomiting/diarrhea, fevers/chills.  Status post 1 round of plasmapheresis.  Medical critical care and nephrology discussing blood shortage and plan for future rounds of plasmapheresis.    Objective     Vitals:   Temp (24hrs), Av.1 °F (36.2 °C), Min:97 °F (36.1 °C), Max:97.5 °F (36.4 °C)    Temp:  [97 °F (36.1 °C)-97.5 °F (36.4 °C)] 97 °F (36.1 °C)  HR:  [50-65] 53  Resp:  [12-25] 19  BP: (104-135)/(53-66) 121/60  SpO2:  [94 %-99 %] 96 %  Body mass index is 39.83 kg/m².     Input and Output Summary (last 24 hours):     Intake/Output Summary (Last 24 hours) at 2024 0806  Last data filed at 2024 0600  Gross per 24 hour   Intake 5780 ml   Output 0 ml   Net 5780 ml       /60   Pulse (!) 53   Temp (!) 97 °F (36.1 °C) (Temporal)    "Resp 19   Ht 6' 2\" (1.88 m)   Wt (!) 141 kg (310 lb 3 oz)   SpO2 96%   BMI 39.83 kg/m²     General Appearance:    Alert, cooperative, no distress, appears stated age   Head:    Normocephalic, without obvious abnormality, atraumatic   Eyes:    PERRL, conjunctiva/corneas clear, EOM's intact, fundi     benign, both eyes        Ears:    Normal TM's and external ear canals, both ears   Nose:   Nares normal, septum midline, mucosa normal, no drainage    or sinus tenderness   Throat:   Lips, mucosa, and tongue normal; teeth and gums normal   Neck:   Supple, symmetrical, trachea midline, no adenopathy;        thyroid:  No enlargement/tenderness/nodules; no carotid    bruit or JVD   Back:     Symmetric, no curvature, ROM normal, no CVA tenderness   Lungs:     Clear to auscultation bilaterally, respirations unlabored   Chest wall:    No tenderness or deformity   Heart:    Regular rate and rhythm, S1 and S2 normal, no murmur, rub    or gallop   Abdomen:     Soft, non-tender, bowel sounds active all four quadrants,     no masses, no organomegaly   Genitalia:    Normal male without lesion, discharge or tenderness   Rectal:    Normal tone, normal prostate, no masses or tenderness;    guaiac negative stool   Extremities:   Extremities normal, atraumatic, no cyanosis or edema   Pulses:   2+ and symmetric all extremities   Skin:   Skin color, texture, turgor normal, no rashes or lesions   Lymph nodes:   Cervical, supraclavicular, and axillary nodes normal   Neurologic:   CNII-XII intact. Normal strength, sensation and reflexes       throughout        Lines/Drains:  Lines/Drains/Airways       Active Status       Name Placement date Placement time Site Days    CVC Central Lines 09/16/24 Double 09/16/24 0909  --  5    HD Temporary Double Catheter 09/16/24 0909  Other (Comment)  5                    Central Line:  Goal for removal: N/A - Chronic PICC               Lab Results: I have reviewed the following results: CBC/BMP:   .    "  09/22/24  0553   WBC 25.08*   HGB 7.7*   HCT 24.3*      SODIUM 129*   K 4.3   CL 99   CO2 20*   BUN 62*   CREATININE 5.72*   GLUC 177*   CAIONIZED 1.02*   MG 2.2   PHOS 5.2*       Results from last 7 days   Lab Units 09/22/24  0553 09/21/24  0448   WBC Thousand/uL 25.08* 21.23*   HEMOGLOBIN g/dL 7.7* 8.4*   HEMATOCRIT % 24.3* 26.3*   PLATELETS Thousands/uL 343 347   BANDS PCT %  --  4   SEGS PCT % 89*  --    LYMPHO PCT % 5* 3*   MONO PCT % 4 1*   EOS PCT % 0 0     Results from last 7 days   Lab Units 09/22/24  0553 09/21/24  0448   SODIUM mmol/L 129* 130*   POTASSIUM mmol/L 4.3 4.3   CHLORIDE mmol/L 99 96   CO2 mmol/L 20* 23   BUN mg/dL 62* 40*   CREATININE mg/dL 5.72* 4.75*   ANION GAP mmol/L 10 11   CALCIUM mg/dL 7.9* 8.2*   ALBUMIN g/dL  --  2.6*   TOTAL BILIRUBIN mg/dL  --  0.28   ALK PHOS U/L  --  91   ALT U/L  --  187*   AST U/L  --  225*   GLUCOSE RANDOM mg/dL 177* 200*     Results from last 7 days   Lab Units 09/22/24  0553   INR  1.46*             Results from last 7 days   Lab Units 09/21/24  0448   PROCALCITONIN ng/ml 3.04*       Recent Cultures (last 7 days):           Imaging Review: No pertinent imaging studies reviewed.  Other Studies: No additional pertinent studies reviewed.    Last 24 Hours Medication List:     Current Facility-Administered Medications:     albumin human (FLEXBUMIN) 5 % injection 75 g, Once **AND** albumin human (FLEXBUMIN) 5 % injection 100 g, Once    calcium carbonate (TUMS) chewable tablet 1,000 mg, Daily PRN    calcium gluconate 2 g in sodium chloride 0.9% 100 mL (premix), Once    carvedilol (COREG) tablet 6.25 mg, BID With Meals    cefTRIAXone (ROCEPHIN) IVPB (premix in dextrose) 1,000 mg 50 mL, Q24H, Last Rate: Stopped (09/21/24 0921)    cyclophosphamide (CYTOXAN) capsule 150 mg, Daily    famotidine (PEPCID) tablet 20 mg, Daily    FLUoxetine (PROzac) capsule 20 mg, Daily    heparin (porcine) subcutaneous injection 5,000 Units, Q8H LORENZO    methylPREDNISolone Na Suc (PF)  1,000 mg in sodium chloride 0.9 % 250 mL IVPB, Daily, Last Rate: Stopped (09/21/24 1100)    ondansetron (ZOFRAN) injection 4 mg, Q6H PRN    pantoprazole (PROTONIX) EC tablet 20 mg, Early Morning    sulfamethoxazole-trimethoprim (BACTRIM DS) 800-160 mg per tablet 1 tablet, Once per day on Monday Wednesday Friday    Administrative Statements   Today, Patient Was Seen By: Mike Perez, DO  I have spent a total time of 35 minutes in caring for this patient on the day of the visit/encounter including Diagnostic results, Prognosis, Risks and benefits of tx options, Instructions for management, Patient and family education, Importance of tx compliance, Risk factor reductions, Impressions, Counseling / Coordination of care, Documenting in the medical record, Reviewing / ordering tests, medicine, procedures  , Obtaining or reviewing history  , and Communicating with other healthcare professionals .    **Please Note: This note may have been constructed using a voice recognition system.**

## 2024-09-22 NOTE — ASSESSMENT & PLAN NOTE
Hemoglobin level has dropped from 8.4 to 7.7.  We will check iron studies prior to starting the patient on an SHREE.

## 2024-09-22 NOTE — ASSESSMENT & PLAN NOTE
Lab Results   Component Value Date    CREATININE 5.72 (H) 09/22/2024    CREATININE 4.75 (H) 09/21/2024    CREATININE 7.08 (H) 09/20/2024    EGFR 8 09/22/2024    EGFR 11 09/21/2024    EGFR 6 09/20/2024     Patient has not had blood work since April 2023 at which time his kidney function was normal  Arrival creatinine was 7.95, creatinine peak on this admission was 9.42  Status post IR intervention on Monday, 9/16/2024 at which time a dialysis line was placed  Patient was successfully dialyzed on 9/16/2024 and has been undergoing HD Monday/Wednesday/Friday  Plan for eventual permacath placement  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

## 2024-09-22 NOTE — PLAN OF CARE
Problem: PAIN - ADULT  Goal: Verbalizes/displays adequate comfort level or baseline comfort level  Description: Interventions:  - Encourage patient to monitor pain and request assistance  - Assess pain using appropriate pain scale  - Administer analgesics based on type and severity of pain and evaluate response  - Implement non-pharmacological measures as appropriate and evaluate response  - Consider cultural and social influences on pain and pain management  - Notify physician/advanced practitioner if interventions unsuccessful or patient reports new pain  Outcome: Progressing     Problem: INFECTION - ADULT  Goal: Absence or prevention of progression during hospitalization  Description: INTERVENTIONS:  - Assess and monitor for signs and symptoms of infection  - Monitor lab/diagnostic results  - Monitor all insertion sites, i.e. indwelling lines, tubes, and drains  - Monitor endotracheal if appropriate and nasal secretions for changes in amount and color  - [place] appropriate cooling/warming therapies per order  - Administer medications as ordered  - Instruct and encourage patient and family to use good hand hygiene technique  - Identify and instruct in appropriate isolation precautions for identified infection/condition  Outcome: Progressing  Goal: Absence of fever/infection during neutropenic period  Description: INTERVENTIONS:  - Monitor WBC    Outcome: Progressing     Problem: SAFETY ADULT  Goal: Patient will remain free of falls  Description: INTERVENTIONS:  - Educate patient/family on patient safety including physical limitations  - Instruct patient to call for assistance with activity   - Consult OT/PT to assist with strengthening/mobility   - Keep Call bell within reach  - Keep bed low and locked with side rails adjusted as appropriate  - Keep care items and personal belongings within reach  - Initiate and maintain comfort rounds  - Make Fall Risk Sign visible to staff  - Offer Toileting every 2 Hours,  in advance of need  - Initiate/Maintain bed alarm  - Obtain necessary fall risk management equipment  - Apply yellow socks and bracelet for high fall risk patients  - Consider moving patient to room near nurses station  Outcome: Progressing  Goal: Maintain or return to baseline ADL function  Description: INTERVENTIONS:  -  Assess patient's ability to carry out ADLs; assess patient's baseline for ADL function and identify physical deficits which impact ability to perform ADLs (bathing, care of mouth/teeth, toileting, grooming, dressing, etc.)  - Assess/evaluate cause of self-care deficits   - Assess range of motion  - Assess patient's mobility; develop plan if impaired  - Assess patient's need for assistive devices and provide as appropriate  - Encourage maximum independence but intervene and supervise when necessary  - Involve family in performance of ADLs  - Assess for home care needs following discharge   - Consider OT consult to assist with ADL evaluation and planning for discharge  - Provide patient education as appropriate  Outcome: Progressing  Goal: Maintains/Returns to pre admission functional level  Description: INTERVENTIONS:  - Perform AM-PAC 6 Click Basic Mobility/ Daily Activity assessment daily.  - Set and communicate daily mobility goal to care team and patient/family/caregiver.   - Collaborate with rehabilitation services on mobility goals if consulted  - Perform Range of Motion 3 times a day.  - Reposition patient every 2 hours.  - Dangle patient 3 times a day  - Stand patient   - Ambulate patient   - Out of bed for meals   - Out of bed for toileting  - Record patient progress and toleration of activity level   Outcome: Progressing     Problem: DISCHARGE PLANNING  Goal: Discharge to home or other facility with appropriate resources  Description: INTERVENTIONS:  - Identify barriers to discharge w/patient and caregiver  - Arrange for needed discharge resources and transportation as appropriate  -  Identify discharge learning needs (meds, wound care, etc.)  - Arrange for interpretive services to assist at discharge as needed  - Refer to Case Management Department for coordinating discharge planning if the patient needs post-hospital services based on physician/advanced practitioner order or complex needs related to functional status, cognitive ability, or social support system  Outcome: Progressing     Problem: Knowledge Deficit  Goal: Patient/family/caregiver demonstrates understanding of disease process, treatment plan, medications, and discharge instructions  Description: Complete learning assessment and assess knowledge base.  Interventions:  - Provide teaching at level of understanding  - Provide teaching via preferred learning methods  Outcome: Progressing     Problem: Nutrition/Hydration-ADULT  Goal: Nutrient/Hydration intake appropriate for improving, restoring or maintaining nutritional needs  Description: Monitor and assess patient's nutrition/hydration status for malnutrition. Collaborate with interdisciplinary team and initiate plan and interventions as ordered.  Monitor patient's weight and dietary intake as ordered or per policy. Utilize nutrition screening tool and intervene as necessary. Determine patient's food preferences and provide high-protein, high-caloric foods as appropriate.     INTERVENTIONS:  - Monitor oral intake, urinary output, labs, and treatment plans  - Assess nutrition and hydration status and recommend course of action  - Evaluate amount of meals eaten  - Assist patient with eating if necessary   - Allow adequate time for meals  - Recommend/ encourage appropriate diets, oral nutritional supplements, and vitamin/mineral supplements  - Order, calculate, and assess calorie counts as needed  - Recommend, monitor, and adjust tube feedings and TPN/PPN based on assessed needs  - Assess need for intravenous fluids  - Provide specific nutrition/hydration education as appropriate  -  Include patient/family/caregiver in decisions related to nutrition  Outcome: Progressing     Problem: Prexisting or High Potential for Compromised Skin Integrity  Goal: Skin integrity is maintained or improved  Description: INTERVENTIONS:  - Identify patients at risk for skin breakdown  - Assess and monitor skin integrity  - Assess and monitor nutrition and hydration status  - Monitor labs   - Assess for incontinence   - Turn and reposition patient  - Assist with mobility/ambulation  - Relieve pressure over bony prominences  - Avoid friction and shearing  - Provide appropriate hygiene as needed including keeping skin clean and dry  - Evaluate need for skin moisturizer/barrier cream  - Collaborate with interdisciplinary team   - Patient/family teaching  - Consider wound care consult   Outcome: Progressing     Problem: METABOLIC, FLUID AND ELECTROLYTES - ADULT  Goal: Electrolytes maintained within normal limits  Description: INTERVENTIONS:  - Monitor labs and assess patient for signs and symptoms of electrolyte imbalances  - Administer electrolyte replacement as ordered  - Monitor response to electrolyte replacements, including repeat lab results as appropriate  - Instruct patient on fluid and nutrition as appropriate  Outcome: Progressing  Goal: Fluid balance maintained  Description: INTERVENTIONS:  - Monitor labs   - Monitor I/O and WT  - Instruct patient on fluid and nutrition as appropriate  - Assess for signs & symptoms of volume excess or deficit  Outcome: Progressing

## 2024-09-22 NOTE — ASSESSMENT & PLAN NOTE
Continue with present therapy including plasmapheresis, day 3 out of 3 of IV steroids and cyclophosphamide.  Awaiting results of renal biopsy.  Patient is undergoing plasmapheresis and the first exchange was completed last night with the next to be scheduled for later today.  The blood bank is assisting with obtaining more FFP.

## 2024-09-23 ENCOUNTER — APPOINTMENT (INPATIENT)
Dept: DIALYSIS | Facility: HOSPITAL | Age: 74
DRG: 871 | End: 2024-09-23
Attending: INTERNAL MEDICINE
Payer: MEDICARE

## 2024-09-23 LAB
ABO GROUP BLD BPU: NORMAL
ANION GAP SERPL CALCULATED.3IONS-SCNC: 11 MMOL/L (ref 4–13)
BPU ID: NORMAL
BUN SERPL-MCNC: 75 MG/DL (ref 5–25)
CA-I BLD-SCNC: 1.04 MMOL/L (ref 1.12–1.32)
CALCIUM SERPL-MCNC: 7.6 MG/DL (ref 8.4–10.2)
CHLORIDE SERPL-SCNC: 99 MMOL/L (ref 96–108)
CO2 SERPL-SCNC: 20 MMOL/L (ref 21–32)
CREAT SERPL-MCNC: 6.52 MG/DL (ref 0.6–1.3)
ERYTHROCYTE [DISTWIDTH] IN BLOOD BY AUTOMATED COUNT: 15.8 % (ref 11.6–15.1)
FERRITIN SERPL-MCNC: 184 NG/ML (ref 24–336)
FIBRINOGEN PPP-MCNC: 202 MG/DL (ref 206–523)
GFR SERPL CREATININE-BSD FRML MDRD: 7 ML/MIN/1.73SQ M
GLUCOSE SERPL-MCNC: 197 MG/DL (ref 65–140)
HCT VFR BLD AUTO: 24.2 % (ref 36.5–49.3)
HGB BLD-MCNC: 7.7 G/DL (ref 12–17)
INR PPP: 1.4 (ref 0.85–1.19)
IRON SATN MFR SERPL: 27 % (ref 15–50)
IRON SERPL-MCNC: 41 UG/DL (ref 50–212)
MAGNESIUM SERPL-MCNC: 2.1 MG/DL (ref 1.9–2.7)
MCH RBC QN AUTO: 28 PG (ref 26.8–34.3)
MCHC RBC AUTO-ENTMCNC: 31.8 G/DL (ref 31.4–37.4)
MCV RBC AUTO: 88 FL (ref 82–98)
PHOSPHATE SERPL-MCNC: 5.5 MG/DL (ref 2.3–4.1)
PLATELET # BLD AUTO: 326 THOUSANDS/UL (ref 149–390)
PMV BLD AUTO: 9 FL (ref 8.9–12.7)
POTASSIUM SERPL-SCNC: 4.6 MMOL/L (ref 3.5–5.3)
PROTHROMBIN TIME: 17.7 SECONDS (ref 12.3–15)
RBC # BLD AUTO: 2.75 MILLION/UL (ref 3.88–5.62)
SODIUM SERPL-SCNC: 130 MMOL/L (ref 135–147)
TIBC SERPL-MCNC: 151 UG/DL (ref 250–450)
UIBC SERPL-MCNC: 110 UG/DL (ref 155–355)
UNIT DISPENSE STATUS: NORMAL
UNIT PRODUCT CODE: NORMAL
UNIT PRODUCT VOLUME: 250 ML
UNIT PRODUCT VOLUME: 250 ML
UNIT PRODUCT VOLUME: 280 ML
UNIT RH: NORMAL
WBC # BLD AUTO: 22.78 THOUSAND/UL (ref 4.31–10.16)

## 2024-09-23 PROCEDURE — 99233 SBSQ HOSP IP/OBS HIGH 50: CPT | Performed by: STUDENT IN AN ORGANIZED HEALTH CARE EDUCATION/TRAINING PROGRAM

## 2024-09-23 PROCEDURE — 82728 ASSAY OF FERRITIN: CPT

## 2024-09-23 PROCEDURE — 99232 SBSQ HOSP IP/OBS MODERATE 35: CPT | Performed by: HOSPITALIST

## 2024-09-23 PROCEDURE — 82330 ASSAY OF CALCIUM: CPT | Performed by: NURSE PRACTITIONER

## 2024-09-23 PROCEDURE — 85610 PROTHROMBIN TIME: CPT | Performed by: NURSE PRACTITIONER

## 2024-09-23 PROCEDURE — 85384 FIBRINOGEN ACTIVITY: CPT | Performed by: NURSE PRACTITIONER

## 2024-09-23 PROCEDURE — 80048 BASIC METABOLIC PNL TOTAL CA: CPT | Performed by: NURSE PRACTITIONER

## 2024-09-23 PROCEDURE — 84100 ASSAY OF PHOSPHORUS: CPT | Performed by: NURSE PRACTITIONER

## 2024-09-23 PROCEDURE — 85027 COMPLETE CBC AUTOMATED: CPT | Performed by: NURSE PRACTITIONER

## 2024-09-23 PROCEDURE — 83735 ASSAY OF MAGNESIUM: CPT | Performed by: NURSE PRACTITIONER

## 2024-09-23 PROCEDURE — P9017 PLASMA 1 DONOR FRZ W/IN 8 HR: HCPCS

## 2024-09-23 PROCEDURE — 83540 ASSAY OF IRON: CPT

## 2024-09-23 PROCEDURE — 83550 IRON BINDING TEST: CPT

## 2024-09-23 PROCEDURE — 99232 SBSQ HOSP IP/OBS MODERATE 35: CPT | Performed by: INTERNAL MEDICINE

## 2024-09-23 RX ORDER — CYCLOPHOSPHAMIDE 50 MG/1
100 CAPSULE ORAL DAILY
Status: DISCONTINUED | OUTPATIENT
Start: 2024-09-24 | End: 2024-10-12 | Stop reason: HOSPADM

## 2024-09-23 RX ORDER — PREDNISONE 20 MG/1
60 TABLET ORAL DAILY
Status: DISCONTINUED | OUTPATIENT
Start: 2024-09-24 | End: 2024-10-12 | Stop reason: HOSPADM

## 2024-09-23 RX ORDER — ALBUMIN, HUMAN INJ 5% 5 %
100 SOLUTION INTRAVENOUS ONCE
Status: DISCONTINUED | OUTPATIENT
Start: 2024-09-23 | End: 2024-09-23

## 2024-09-23 RX ORDER — ALBUMIN, HUMAN INJ 5% 5 %
75 SOLUTION INTRAVENOUS ONCE
Status: DISCONTINUED | OUTPATIENT
Start: 2024-09-23 | End: 2024-09-23

## 2024-09-23 RX ADMIN — DIPHENHYDRAMINE HYDROCHLORIDE 50 MG: 25 TABLET ORAL at 23:02

## 2024-09-23 RX ADMIN — HEPARIN SODIUM 5000 UNITS: 5000 INJECTION, SOLUTION INTRAVENOUS; SUBCUTANEOUS at 06:10

## 2024-09-23 RX ADMIN — CARVEDILOL 6.25 MG: 3.12 TABLET, FILM COATED ORAL at 08:08

## 2024-09-23 RX ADMIN — FAMOTIDINE 20 MG: 20 TABLET, FILM COATED ORAL at 08:08

## 2024-09-23 RX ADMIN — FLUOXETINE HYDROCHLORIDE 20 MG: 20 CAPSULE ORAL at 08:08

## 2024-09-23 RX ADMIN — PREDNISONE 60 MG: 20 TABLET ORAL at 08:08

## 2024-09-23 RX ADMIN — HEPARIN SODIUM 5000 UNITS: 5000 INJECTION, SOLUTION INTRAVENOUS; SUBCUTANEOUS at 22:55

## 2024-09-23 RX ADMIN — SULFAMETHOXAZOLE AND TRIMETHOPRIM 1 TABLET: 800; 160 TABLET ORAL at 08:08

## 2024-09-23 RX ADMIN — CYCLOPHOSPHAMIDE 150 MG: 50 CAPSULE ORAL at 08:09

## 2024-09-23 RX ADMIN — PANTOPRAZOLE SODIUM 20 MG: 20 TABLET, DELAYED RELEASE ORAL at 06:10

## 2024-09-23 NOTE — ASSESSMENT & PLAN NOTE
Noted on renal biopsy  Once again, patient is now being treated with plasmapheresis  Continue cyclophosphamide, prednisone, and Bactrim  Continued management as per nephrology/critical care

## 2024-09-23 NOTE — ASSESSMENT & PLAN NOTE
Chronic in the setting of wheel chair bound 2/2 lower back pain/spinal surgery hx    Plan:  Fluid restriction

## 2024-09-23 NOTE — PROGRESS NOTES
Progress Note - Hospitalist   Name: Ramos Rosenthal 74 y.o. male I MRN: [id number]  Unit/Bed#: ICU 04-01 I Date of Admission: 9/14/2024   Date of Service: 9/23/2024 I Hospital Day: 9    Assessment & Plan  LYLY (acute kidney injury) (HCC)      Lab Results   Component Value Date    CREATININE 6.52 (H) 09/23/2024    CREATININE 5.72 (H) 09/22/2024    CREATININE 4.75 (H) 09/21/2024    EGFR 7 09/23/2024    EGFR 8 09/22/2024    EGFR 11 09/21/2024     Now on hemodialysis -Mondays, Wednesdays, and Fridays  Plan for eventual permacath placement  Status post a renal biopsy-evidence of glomerular basement membrane disease noted  Status post a completed 3-day course of Solu-Medrol x 1 g daily  Patient now on prednisone-60 mg p.o. daily  Patient also initiated on plasmapheresis-next plasma exchange is scheduled for later today after which the patient will be dialyzed  Nephrology, and critical care are following  The remaining management as outlined below  Depression  Continue home dose fluoxetine  Paresthesia of both lower extremities  Hx of back surgery in 2004 and 2014 and has since bilateral LE paresthesia  He is primarily wheelchair bound  Venous statis ulcers noted to Bilateral LE  B/L extremity edema R>L  Continue supportive therapy  Hyponatremia  Patient has a hypervolemic hyponatremia  Patient has remained asymptomatic  Sodium is stable at 130  Bilateral lower extremity edema  Chronic condition  Patient with a history of lower back/spinal surgery  Patient does not ambulate  Patient is wheelchair-bound at baseline  Primary hypertension  Blood pressure stable  Continue carvedilol 6.25 mg p.o. 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 Sequential Compression Devices Ordered.    Mobility:   Basic Mobility Inpatient Raw Score: 9  -[facility] Goal: 3: Sit at edge of bed  -HL Achieved: 2: Bed activities/Dependent transfer  Patient is at baseline from a mobility standpoint    Patient Centered Rounds: I performed bedside rounds with nursing staff today.   Discussions with Specialists or Other Care Team Provider: Critical care, nephrology, case management, nursing    Education and Discussions with Family / Patient:  Will bring the patient's family up to par as the day progresses, they will be in to see the patient later today.     Current Length of Stay: 9 day(s)  Current Patient Status: Inpatient   Certification Statement: The patient will continue to require additional inpatient hospital stay due to the need for plasmapheresis, dialysis  Discharge Plan:  Discharge planning once the patient has been cleared from a nephrology standpoint    Code Status: Level 1 - Full Code    Subjective   Patient seen, resting in bed, looks and feels well, has no new medical complaints.  Is currently eating breakfast.    Objective     Vitals:   Temp (24hrs), Av.9 °F (36.1 °C), Min:96.9 °F (36.1 °C), Max:96.9 °F (36.1 °C)    Temp:  [96.9 °F (36.1 °C)] 96.9 °F (36.1 °C)  HR:  [51-68] 63  Resp:  [11-35] 11  BP: ()/(51-74) 119/59  SpO2:  [95 %-99 %] 97 %  Body mass index is 40.39 kg/m².     Input and Output Summary (last 24 hours):     Intake/Output Summary (Last 24 hours) at 2024 0851  Last data filed at 2024 1727  Gross per 24 hour   Intake 606 ml   Output --   Net 606 ml       Physical Exam  Vitals reviewed.   Constitutional:       Appearance: Normal appearance. He is well-developed.   HENT:      Head: Normocephalic and atraumatic.      Nose: Nose normal.      Mouth/Throat:      Mouth: Oropharynx is clear and moist.   Eyes:      Extraocular Movements: EOM normal.      Conjunctiva/sclera: Conjunctivae  normal.      Pupils: Pupils are equal, round, and reactive to light.   Neck:      Thyroid: No thyromegaly.      Vascular: No JVD.   Cardiovascular:      Rate and Rhythm: Normal rate and regular rhythm.      Heart sounds: No murmur heard.     No friction rub. No gallop.   Pulmonary:      Effort: Pulmonary effort is normal. No respiratory distress.      Breath sounds: Normal breath sounds.   Abdominal:      General: Bowel sounds are normal. There is no distension.      Palpations: Abdomen is soft. There is no mass.      Tenderness: There is no abdominal tenderness. There is no guarding.   Musculoskeletal:         General: No edema. Normal range of motion.      Cervical back: Normal range of motion and neck supple.      Comments: 2-3+ bilateral lower extremity chronic edema noted   Lymphadenopathy:      Cervical: No cervical adenopathy.   Skin:     General: Skin is warm.      Findings: No erythema or rash.   Neurological:      General: No focal deficit present.      Mental Status: He is alert and oriented to person, place, and time.      Cranial Nerves: No cranial nerve deficit.   Psychiatric:         Mood and Affect: Mood and affect and mood normal.         Behavior: Behavior normal.         Thought Content: Thought content normal.         Judgment: Judgment normal.          Lines/Drains:  Lines/Drains/Airways       Active Status       Name Placement date Placement time Site Days    CV[facility] Central Lines 09/16/24 Double 09/16/24  0909  --  6    HD Temporary Double Catheter 09/16/24  0909  Other (Comment)  6                    Central Line:  Goal for removal: N/A - Chronic PICC               Lab Results: I have reviewed the following results:    Results from last 7 days   Lab Units 09/23/24  0424 09/22/24  0553 09/21/24  0448   WBC Thousand/uL 22.78* 25.08* 21.23*   HEMOGLOBIN g/dL 7.7* 7.7* 8.4*   HEMATOCRIT % 24.2* 24.3* 26.3*   PLATELETS Thousands/uL 326 343 347   BANDS PCT %  --   --  4   SEGS PCT %  --  89*  --     LYMPHO PCT %  --  5* 3*   MONO PCT %  --  4 1*   EOS PCT %  --  0 0     Results from last 7 days   Lab Units 09/23/24  0424 09/22/24  0553 09/21/24  0448   SODIUM mmol/L 130*   < > 130*   POTASSIUM mmol/L 4.6   < > 4.3   CHLORIDE mmol/L 99   < > 96   CO2 mmol/L 20*   < > 23   BUN mg/dL 75*   < > 40*   CREATININE mg/dL 6.52*   < > 4.75*   ANION GAP mmol/L 11   < > 11   CALCIUM mg/dL 7.6*   < > 8.2*   ALBUMIN g/dL  --   --  2.6*   TOTAL BILIRUBIN mg/dL  --   --  0.28   ALK PHOS U/L  --   --  91   ALT U/L  --   --  187*   AST U/L  --   --  225*   GLUCOSE RANDOM mg/dL 197*   < > 200*    < > = values in this interval not displayed.     Results from last 7 days   Lab Units 09/23/24 0424   INR  1.40*             Results from last 7 days   Lab Units 09/21/24  0448   PROCALCITONIN ng/ml 3.04*       Recent Cultures (last 7 days):         Imaging Review: No pertinent imaging studies reviewed.  Other Studies: No additional pertinent studies reviewed.    Last 24 Hours Medication List:     Current Facility-Administered Medications:     albumin human (FLEXBUMIN) 5 % injection 75 g, Once **AND** albumin human (FLEXBUMIN) 5 % injection 100 g, Once    calcium carbonate (TUMS) chewable tablet 1,000 mg, Daily PRN    carvedilol (COREG) tablet 6.25 mg, BID With Meals    [START ON 9/24/2024] cyclophosphamide (CYTOXAN) capsule 100 mg, Daily    diphenhydrAMINE (BENADRYL) tablet 50 mg, Once    famotidine (PEPCID) tablet 20 mg, Daily    FLUoxetine (PROzac) capsule 20 mg, Daily    heparin (porcine) subcutaneous injection 5,000 Units, Q8H LORENZO    ondansetron (ZOFRAN) injection 4 mg, Q6H PRN    pantoprazole (PROTONIX) EC tablet 20 mg, Early Morning    [START ON 9/24/2024] predniSONE tablet 60 mg, Daily    sulfamethoxazole-trimethoprim (BACTRIM DS) 800-160 mg per tablet 1 tablet, Once per day on Monday Wednesday Friday    Administrative Statements   Today, Patient Was Seen By: Agnes Rust MD  I have spent a total time of 40 minutes in  caring for this patient on the day of the visit/encounter including Diagnostic results, Prognosis, Patient and family education, Impressions, Counseling / Coordination of care, Documenting in the medical record, Reviewing / ordering tests, medicine, procedures  , and Communicating with other healthcare professionals .    **Please Note: This note may have been constructed using a voice recognition system.**

## 2024-09-23 NOTE — ASSESSMENT & PLAN NOTE
Patient has a hypervolemic hyponatremia  Patient has remained asymptomatic  Sodium is stable at 130

## 2024-09-23 NOTE — ASSESSMENT & PLAN NOTE
Avoid PD procedure given nature of PLEX and immunosuppression.   Plan for PC placement, need coordination with IR.

## 2024-09-23 NOTE — ASSESSMENT & PLAN NOTE
Continue with present therapy including plasmapheresis, day 3 out of 3 of IV steroids and cyclophosphamide.  Awaiting results of renal biopsy.    Critical care coordinating FFP/albumin on PLEX, consideration for albumin only unless active bleeding.  Will need to coordinate timing of PC placement as post PLEX not ideal for procedure.   Follow daily fibrinogen.   Calcium supplementation with PLEX.   Continue PCP ppx with bactrim QOD and PPI.

## 2024-09-23 NOTE — CASE MANAGEMENT
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 2024     Primary hypertension 2024     Secondary hyperparathyroidism of renal origin ([facility]) 2024     Uremia 2024     Hyponatremia 2024     Bilateral lower extremity edema 2024     Neurogenic claudication 2023    Bloating 10/05/2022     Paresthesia of both lower extremities 10/01/2022     Leucocytosis 10/01/2022     Elevated troponin 2022     EDGAR (dyspnea on exertion) 2022     Rash due to vaculitis  2022     LYLY (acute kidney injury) ([facility]) 2022     Depression 2022     Multiple open wounds of lower leg 2022     Lower extremity weakness 2019     Lumbosacral plexopathy 2019     Gait abnormality 2019     Lumbar stenosis 2019     Polyneuropathy 2019     Prediabetes 2018    Hyperlipidemia 2014    Vitamin D deficiency 2014      LOS (days): 9  Geometric Mean LOS (GMLOS) (days): 5.1  Days to GMLOS:-3.6     OBJECTIVE:  Risk of Unplanned Readmission Score: 25.83       ]  Current admission status: Inpatient   Preferred Pharmacy:   [id number] - [place] - [address]  [address] [address]  Phone: [phone] Fax: [phone]    Primary Care Provider: Jessika Carrillo MD    Primary Insurance: MEDICARE  Secondary Insurance: [employer]    DISCHARGE DETAILS:  Pt continues with plasmaphoresis and dialysis.  Pt has a chair set up OP with [facility]  at 1130.  Will update the dialysis center when pt is closer to DC.

## 2024-09-23 NOTE — PLAN OF CARE
Problem: PAIN - ADULT  Goal: Verbalizes/displays adequate comfort level or baseline comfort level  Description: Interventions:  - Encourage patient to monitor pain and request assistance  - Assess pain using appropriate pain scale  - Administer analgesics based on type and severity of pain and evaluate response  - Implement non-pharmacological measures as appropriate and evaluate response  - Consider cultural and social influences on pain and pain management  - Notify physician/advanced practitioner if interventions unsuccessful or patient reports new pain  9/22/2024 2105 by Che Viveros RN  Outcome: Progressing  9/22/2024 2105 by Che Viveros RN  Outcome: Progressing     Problem: INFECTION - ADULT  Goal: Absence or prevention of progression during hospitalization  Description: INTERVENTIONS:  - Assess and monitor for signs and symptoms of infection  - Monitor lab/diagnostic results  - Monitor all insertion sites, i.e. indwelling lines, tubes, and drains  - Monitor endotracheal if appropriate and nasal secretions for changes in amount and color  - [place] appropriate cooling/warming therapies per order  - Administer medications as ordered  - Instruct and encourage patient and family to use good hand hygiene technique  - Identify and instruct in appropriate isolation precautions for identified infection/condition  9/22/2024 2105 by Che Viveros RN  Outcome: Progressing  9/22/2024 2105 by Che Viveros RN  Outcome: Progressing  Goal: Absence of fever/infection during neutropenic period  Description: INTERVENTIONS:  - Monitor WBC    9/22/2024 2105 by Che Viveros RN  Outcome: Progressing  9/22/2024 2105 by Che Viveros RN  Outcome: Progressing     Problem: SAFETY ADULT  Goal: Patient will remain free of falls  Description: INTERVENTIONS:  - Educate patient/family on patient safety including physical limitations  - Instruct patient to call for assistance with activity   - Consult OT/PT to assist with  strengthening/mobility   - Keep Call bell within reach  - Keep bed low and locked with side rails adjusted as appropriate  - Keep care items and personal belongings within reach  - Initiate and maintain comfort rounds  - Make Fall Risk Sign visible to staff  - Offer Toileting every 2 Hours, in advance of need  - Initiate/Maintain bed alarm  - Obtain necessary fall risk management equipment  - Apply yellow socks and bracelet for high fall risk patients  - Consider moving patient to room near nurses station  9/22/2024 2105 by Che Viveros RN  Outcome: Progressing  9/22/2024 2105 by Che Viveros RN  Outcome: Progressing  Goal: Maintain or return to baseline ADL function  Description: INTERVENTIONS:  -  Assess patient's ability to carry out ADLs; assess patient's baseline for ADL function and identify physical deficits which impact ability to perform ADLs (bathing, care of mouth/teeth, toileting, grooming, dressing, etc.)  - Assess/evaluate cause of self-care deficits   - Assess range of motion  - Assess patient's mobility; develop plan if impaired  - Assess patient's need for assistive devices and provide as appropriate  - Encourage maximum independence but intervene and supervise when necessary  - Involve family in performance of ADLs  - Assess for home care needs following discharge   - Consider OT consult to assist with ADL evaluation and planning for discharge  - Provide patient education as appropriate  9/22/2024 2105 by Che Viveros RN  Outcome: Progressing  9/22/2024 2105 by Che Viveros RN  Outcome: Progressing  Goal: Maintains/Returns to pre admission functional level  Description: INTERVENTIONS:  - Perform AM-PAC 6 Click Basic Mobility/ Daily Activity assessment daily.  - Set and communicate daily mobility goal to care team and patient/family/caregiver.   - Collaborate with rehabilitation services on mobility goals if consulted  - Perform Range of Motion 3 times a day.  - Reposition patient every 2  hours.  - Dangle patient 3 times a day  - Stand patient   - Ambulate patient   - Out of bed for meals   - Out of bed for toileting  - Record patient progress and toleration of activity level   9/22/2024 2105 by Che Viveros RN  Outcome: Progressing  9/22/2024 2105 by Che Viveros RN  Outcome: Progressing     Problem: DISCHARGE PLANNING  Goal: Discharge to home or other facility with appropriate resources  Description: INTERVENTIONS:  - Identify barriers to discharge w/patient and caregiver  - Arrange for needed discharge resources and transportation as appropriate  - Identify discharge learning needs (meds, wound care, etc.)  - Arrange for interpretive services to assist at discharge as needed  - Refer to Case Management Department for coordinating discharge planning if the patient needs post-hospital services based on physician/advanced practitioner order or complex needs related to functional status, cognitive ability, or social support system  9/22/2024 2105 by Che Viveros RN  Outcome: Progressing  9/22/2024 2105 by Che Viveros RN  Outcome: Progressing     Problem: Knowledge Deficit  Goal: Patient/family/caregiver demonstrates understanding of disease process, treatment plan, medications, and discharge instructions  Description: Complete learning assessment and assess knowledge base.  Interventions:  - Provide teaching at level of understanding  - Provide teaching via preferred learning methods  9/22/2024 2105 by Che Viveros RN  Outcome: Progressing  9/22/2024 2105 by Che Viveros RN  Outcome: Progressing     Problem: Nutrition/Hydration-ADULT  Goal: Nutrient/Hydration intake appropriate for improving, restoring or maintaining nutritional needs  Description: Monitor and assess patient's nutrition/hydration status for malnutrition. Collaborate with interdisciplinary team and initiate plan and interventions as ordered.  Monitor patient's weight and dietary intake as ordered or per policy. Utilize  nutrition screening tool and intervene as necessary. Determine patient's food preferences and provide high-protein, high-caloric foods as appropriate.     INTERVENTIONS:  - Monitor oral intake, urinary output, labs, and treatment plans  - Assess nutrition and hydration status and recommend course of action  - Evaluate amount of meals eaten  - Assist patient with eating if necessary   - Allow adequate time for meals  - Recommend/ encourage appropriate diets, oral nutritional supplements, and vitamin/mineral supplements  - Order, calculate, and assess calorie counts as needed  - Recommend, monitor, and adjust tube feedings and TPN/PPN based on assessed needs  - Assess need for intravenous fluids  - Provide specific nutrition/hydration education as appropriate  - Include patient/family/caregiver in decisions related to nutrition  9/22/2024 2105 by Che Viveros RN  Outcome: Progressing  9/22/2024 2105 by Che Viveros RN  Outcome: Progressing     Problem: Prexisting or High Potential for Compromised Skin Integrity  Goal: Skin integrity is maintained or improved  Description: INTERVENTIONS:  - Identify patients at risk for skin breakdown  - Assess and monitor skin integrity  - Assess and monitor nutrition and hydration status  - Monitor labs   - Assess for incontinence   - Turn and reposition patient  - Assist with mobility/ambulation  - Relieve pressure over bony prominences  - Avoid friction and shearing  - Provide appropriate hygiene as needed including keeping skin clean and dry  - Evaluate need for skin moisturizer/barrier cream  - Collaborate with interdisciplinary team   - Patient/family teaching  - Consider wound care consult   9/22/2024 2105 by Che Viveros RN  Outcome: Progressing  9/22/2024 2105 by Che Viveros RN  Outcome: Progressing     Problem: METABOLIC, FLUID AND ELECTROLYTES - ADULT  Goal: Electrolytes maintained within normal limits  Description: INTERVENTIONS:  - Monitor labs and assess patient  for signs and symptoms of electrolyte imbalances  - Administer electrolyte replacement as ordered  - Monitor response to electrolyte replacements, including repeat lab results as appropriate  - Instruct patient on fluid and nutrition as appropriate  9/22/2024 2105 by Che Viveros RN  Outcome: Progressing  9/22/2024 2105 by Che Viveros RN  Outcome: Progressing  Goal: Fluid balance maintained  Description: INTERVENTIONS:  - Monitor labs   - Monitor I/O and WT  - Instruct patient on fluid and nutrition as appropriate  - Assess for signs & symptoms of volume excess or deficit  9/22/2024 2105 by Che Viveros RN  Outcome: Progressing  9/22/2024 2105 by Che Viveros RN  Outcome: Progressing

## 2024-09-23 NOTE — ASSESSMENT & PLAN NOTE
Likely in setting of CKD  Baseline appears to be around 8.0    Plan:  Trend daily CBC  Monitor for any signs of bleeding  Transfuse for hgb < 7.0  Nephrology following; may need to start SHREE this week  Iron studies pending

## 2024-09-23 NOTE — PROGRESS NOTES
Progress Note - Critical Care/ICU   Name: Ramos Rosenthal 74 y.o. male I MRN: [id number]  Unit/Bed#: ICU 04-01 I Date of Admission: 9/14/2024   Date of Service: 9/22/2024 I Hospital Day: 8      Assessment & Plan  LYLY (acute kidney injury) (HCC)  In the setting of anti-GBM glomerulonephritis   Baseline creat was 0.9 to 7.95 on admission    Plan:  Nephrology consulted  Temp HD cath with M/W/F dialysis  Trend renal indices  Avoid nephrotoxins and hypotension  Hyponatremia  Suspect in the setting hypervolemia associated with decreased UO in setting LYLY    Plan:  Fluid restriction per nephrology  Trend BMP daily  Bilateral lower extremity edema  Chronic in the setting of wheel chair bound 2/2 lower back pain/spinal surgery hx    Plan:  Fluid restriction  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  Bone marrow with Anti-GBM glomerulonephritis  Needs plasma exchange-Spoke to [facility]   1.5 L plasma exchange with Albumin 5%/FFP-awaiting total amount needed from [facility]  Baseline and daily CBC/PT/INR/Ical/ and fibrinogen level  Calcium gluconate replacement per plasma exchange tech    Plan:  Nephrology following  Completed Solumedrol 1000 mg daily x3d    Anemia  Likely in setting of CKD  Baseline appears to be around 8.0    Plan:  Trend daily CBC  Monitor for any signs of bleeding  Transfuse for hgb < 7.0  Nephrology following; may need to start SHREE this week  Iron studies pending  Disposition: Stepdown Level 2    ICU Core Measures     A: Assess, Prevent, and Manage Pain Has pain been assessed? Yes  Need for changes to pain regimen? No   B: Both SAT/SAT  N/A   C: Choice of Sedation RASS Goal: 0 Alert and Calm or N/A patient not on sedation  Need for changes to sedation or analgesia regimen? NA   D: Delirium CAM-ICU: Negative   E: Early Mobility  Plan for early mobility? Yes   F: Family Engagement Plan for family engagement today? Yes       Antibiotic Review: Patient on appropriate  coverage based on culture data.     Review of Invasive Devices:      Central access plan: HD cath in place.  Plan for iHD and Plasmapheresis exchange      Prophylaxis:  VTE VTE covered by:  heparin (porcine), Subcutaneous, 5,000 Units at 09/22/24 2121       Stress Ulcer  covered byfamotidine (PEPCID) 20 mg tablet [[id number]] (Long-Term Med), famotidine (PEPCID) tablet 20 mg [[id number]], pantoprazole (PROTONIX) EC tablet 20 mg [[id number]]         24 Hour Events   24hr events: Received 2nd plasmapheresis exchange yesterday. Plan for 3rd exchange today as well as iHD treatment.    Subjective   Review of Systems: Review of Systems   Constitutional:  Positive for fatigue.   Respiratory:  Negative for shortness of breath.    Cardiovascular:  Negative for chest pain.   Gastrointestinal:  Negative for abdominal pain and nausea.   Neurological:  Negative for dizziness and headaches.   Psychiatric/Behavioral:  Negative for agitation and confusion.    All other systems reviewed and are negative.      Objective                          Vitals I/O      Most Recent Min/Max in 24hrs   Temp (!) 96.9 °F (36.1 °C) Temp  Min: 96.9 °F (36.1 °C)  Max: 97.1 °F (36.2 °C)   Pulse 57 Pulse  Min: 50  Max: 68   Resp 18 Resp  Min: 12  Max: 36   /59 BP  Min: 104/55  Max: 144/59   O2 Sat 98 % SpO2  Min: 95 %  Max: 99 %      Intake/Output Summary (Last 24 hours) at 9/22/2024 2333  Last data filed at 9/22/2024 1727  Gross per 24 hour   Intake 5456 ml   Output --   Net 5456 ml       Diet Renal; Renal [place]; Yes; Fluid Restriction 2000 ML; No    Invasive Monitoring           Physical Exam   Physical Exam  Vitals and nursing note reviewed.   Eyes:      Pupils: Pupils are equal, round, and reactive to light.   Skin:     General: Skin is warm and dry.      Capillary Refill: Capillary refill takes less than 2 seconds.   HENT:      Mouth/Throat:      Mouth: Mucous membranes are moist.   Neck:      Vascular: Central line present.   Cardiovascular:       Rate and Rhythm: Regular rhythm. Bradycardia present.      Pulses: Normal pulses.      Heart sounds: Normal heart sounds.   Musculoskeletal:         General: Normal range of motion.      Right lower le+ Edema present.      Left lower le+ Edema present.   Abdominal: General: Bowel sounds are normal.      Palpations: Abdomen is soft.   Constitutional:       General: He is awake.      Appearance: He is obese. He is ill-appearing.   Pulmonary:      Breath sounds: Normal breath sounds.   Psychiatric:         Behavior: Behavior is cooperative.   Neurological:      Mental Status: He is alert and oriented to person, place and time. He is not agitated.      Motor: Strength full and intact in all extremities.          Diagnostic Studies        Lab Results: I have reviewed the following results:      Medications:  Scheduled PRN   carvedilol, 6.25 mg, BID With Meals  cyclophosphamide, 2 mg/kg (Ideal), Daily  diphenhydrAMINE, 50 mg, Once  famotidine, 20 mg, Daily  FLUoxetine, 20 mg, Daily  heparin (porcine), 5,000 Units, Q8H LORENZO  pantoprazole, 20 mg, Early Morning  [START ON 2024] predniSONE, 60 mg, Daily  sulfamethoxazole-trimethoprim, 1 tablet, Once per day on       calcium carbonate, 1,000 mg, Daily PRN  ondansetron, 4 mg, Q6H PRN       Continuous          Labs:   CBC    Recent Labs     24  0553   WBC 21.23* 25.08*   HGB 8.4* 7.7*   HCT 26.3* 24.3*    343   BANDSPCT 4  --      BMP    Recent Labs     24  0553   SODIUM 130* 129*   K 4.3 4.3   CL 96 99   CO2 23 20*   AGAP 11 10   BUN 40* 62*   CREATININE 4.75* 5.72*   CALCIUM 8.2* 7.9*       Coags    Recent Labs     24  1229 24  0553   INR 1.37* 1.46*        Additional Electrolytes  Recent Labs     24  0553   MG 2.2 2.2   PHOS 4.2* 5.2*   CAIONIZED 1.06* 1.02*          Blood Gas    No recent results  No recent results LFTs  Recent Labs     24    *   *   ALKPHOS 91   ALB 2.6*   TBILI 0.28       Infectious  Recent Labs     09/21/24  0448   PROCALCITONI 3.04*     Glucose  Recent Labs     09/21/24  0448 09/22/24  0553   GLUC 200* 177*

## 2024-09-23 NOTE — PROGRESS NOTES
Progress Note - Nephrology   Name: Ramos Rosenthal 74 y.o. 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 Awaiting on pathology for confirmation of diagnosis and % crescents. PLEX controversial in renal limited anti gbm, already on dialysis, due to limited ability for renal recovery.    Currently undergoing daily PLEX with assistance of critical care for management. Day #3 9/23, on albumin and FFP per protocol. Sufficient FFP available at this hospital system. Dialysis should be POST PLEX for volume and metabolic management. FFP has high citrate content that lends itself towards alkalosis. Discussed timing of HD post PLEX AT 12-1. PLEX duration typically 2-3 weeks based on antigbm levels. Antigbm,> 8 on 9/15.   Cyclophosphamide dosing on HD should be 0.8mg/kg for patients on dialysis, discussed with pharmacy regarding dosing recommendations. Cyclophosphamide should be ordered POST HD/PLEX due to removal on these therapies. Prednisone should be dosed POST PLEX as well. Appreciate pharmacy assistance on dosing.   Sp pulse dose steroids and transitioned to 60mg prednisone.    Recommend following antigbm levels, timing is Q2 days but turn around time with labs may limit utility of close levels. Typically recommend following clinically and q week antigbm levels for ongoing need for PLEX.     After discussion with pharmacy, will reduce cyclophosphamide dose to 1mg/kg based on current kidney function on HD.   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  Awaiting results of renal biopsy.    Critical care coordinating FFP/albumin on PLEX, consideration for albumin only unless active bleeding.  Will need to  coordinate timing of PC placement as post PLEX not ideal for procedure.   Follow daily fibrinogen.   Calcium supplementation with PLEX.   Continue PCP ppx with bactrim QOD and PPI.   Hyponatremia  Continue with fluid restriction and ultrafiltration as tolerated on dialysis days  Bilateral lower extremity edema  HD POST PLEX for volume management.   Dependence on intermittent renal dialysis (HCC)  Avoid PD procedure given nature of PLEX and immunosuppression.   Plan for PC placement, need coordination with IR.   Anemia  Hgb stable at 7.7.  We will check iron studies prior to starting the patient on an SHREE.    I have reviewed the nephrology recommendations including cyclophosphamide dosing/administration/timing with pharmacy, Crtiical care and primary Dr RILEY and we are in agreement with renal plan including the information outlined above.     History of Present Illness   Brief History of Admission - 73 yo male with hx gout presented with severe LYLY with Cr 7.9 and hematuria and proteinuria. Found to have + antigbm on serologies and renal biopsy performed 9/19. Awaiting official biopsy results. Started on PLEX 9/21.     Patient seen and examined today. Tolerated PLEX over the weekend. Denies chest pain,shortness of breath, cough, hemoptysis. A complete 10 point review of systems was performed and is otherwise negative.     Objective      Temp:  [96.9 °F (36.1 °C)] 96.9 °F (36.1 °C)  HR:  [51-68] 56  Resp:  [11-36] 11  BP: ()/(51-74) 101/54  O2 Device: None (Room air)         Vitals:    09/22/24 0544 09/23/24 0600   Weight: (!) 141 kg (310 lb 3 oz) (!) 143 kg (314 lb 9.5 oz)     I/O last 24 hours:  In: 606 [P.O.:606]  Out: -   Lines/Drains/Airways       Active Status       Name Placement date Placement time Site Days    CVC Central Lines 09/16/24 Double 09/16/24 0909  --  6    HD Temporary Double Catheter 09/16/24 0909  Other (Comment)  6                  Physical Exam  Vitals reviewed.   Constitutional:        General: He is not in acute distress.     Appearance: He is obese.   HENT:      Nose: Nose normal.      Mouth/Throat:      Mouth: Mucous membranes are moist.      Pharynx: Oropharynx is clear.   Eyes:      Extraocular Movements: Extraocular movements intact.      Conjunctiva/sclera: Conjunctivae normal.   Cardiovascular:      Rate and Rhythm: Normal rate and regular rhythm.      Heart sounds:      No friction rub.   Pulmonary:      Effort: Pulmonary effort is normal.      Breath sounds: Normal breath sounds. No wheezing, rhonchi or rales.   Abdominal:      Palpations: Abdomen is soft.      Tenderness: There is no abdominal tenderness. There is no guarding.   Musculoskeletal:      Right lower leg: Edema present.      Left lower leg: Edema present.   Skin:     Coloration: Skin is not jaundiced.      Findings: No bruising or rash.   Neurological:      Mental Status: He is alert. Mental status is at baseline.        Medications:    Current Facility-Administered Medications:     albumin human (FLEXBUMIN) 5 % injection 75 g, 75 g, Intravenous, Once **AND** albumin human (FLEXBUMIN) 5 % injection 100 g, 100 g, Intravenous, Once, BRETT Ledesma    calcium carbonate (TUMS) chewable tablet 1,000 mg, 1,000 mg, Oral, Daily PRN, BRETT Xie    carvedilol (COREG) tablet 6.25 mg, 6.25 mg, Oral, BID With Meals, Gigi Varvarelis, DO, 6.25 mg at 09/22/24 1716    cyclophosphamide (CYTOXAN) capsule 150 mg, 2 mg/kg (Ideal), Oral, Daily, Gigi Varvarelis, DO, 150 mg at 09/22/24 1037    diphenhydrAMINE (BENADRYL) tablet 50 mg, 50 mg, Oral, Once, BRETT Stovall    famotidine (PEPCID) tablet 20 mg, 20 mg, Oral, Daily, Ramos Mclean DO, 20 mg at 09/22/24 1030    FLUoxetine (PROzac) capsule 20 mg, 20 mg, Oral, Daily, BRETT Xie, 20 mg at 09/22/24 1030    heparin (porcine) subcutaneous injection 5,000 Units, 5,000 Units, Subcutaneous, Q8H LORENZO, BRETT Xie,  "5,000 Units at 09/23/24 0610    ondansetron (ZOFRAN) injection 4 mg, 4 mg, Intravenous, Q6H PRN, BRETT Xie    pantoprazole (PROTONIX) EC tablet 20 mg, 20 mg, Oral, Early Morning, Ramos Mclean DO, 20 mg at 09/23/24 0610    predniSONE tablet 60 mg, 60 mg, Oral, Daily, Ramos Mclean DO    sulfamethoxazole-trimethoprim (BACTRIM DS) 800-160 mg per tablet 1 tablet, 1 tablet, Oral, Once per day on Monday Wednesday Friday, Ramos Mclean DO, 1 tablet at 09/20/24 1924      Lab Results: I have reviewed the following results:   Results from last 7 days   Lab Units 09/23/24  0424 09/22/24  0553 09/21/24  0448 09/20/24  0534 09/19/24  0602 09/18/24  0559 09/17/24  0545 09/16/24  2142   WBC Thousand/uL 22.78* 25.08* 21.23* 17.99* 15.66* 13.41* 15.45*  --    HEMOGLOBIN g/dL 7.7* 7.7* 8.4* 8.6* 8.7* 8.8* 9.9*  --    HEMATOCRIT % 24.2* 24.3* 26.3* 26.9* 27.3* 27.1* 30.4*  --    PLATELETS Thousands/uL 326 343 347 362 344 350 399*  --    POTASSIUM mmol/L 4.6 4.3 4.3 4.5 4.2 4.5 5.3 5.0   CHLORIDE mmol/L 99 99 96 98 98 97 97  --    CO2 mmol/L 20* 20* 23 21 22 24 21  --    BUN mg/dL 75* 62* 40* 53* 38* 50* 66*  --    CREATININE mg/dL 6.52* 5.72* 4.75* 7.08* 5.70* 6.86* 8.38*  --    CALCIUM mg/dL 7.6* 7.9* 8.2* 8.1* 8.0* 7.8* 8.1*  --    MAGNESIUM mg/dL 2.1 2.2 2.2 2.3 2.2  --   --  2.7   PHOSPHORUS mg/dL 5.5* 5.2* 4.2* 4.3* 3.6  --   --   --    ALBUMIN g/dL  --   --  2.6*  --   --   --   --   --        Administrative Statements     Portions of the record may have been created with voice recognition software. Occasional wrong word or \"sound a like\" substitutions may have occurred due to the inherent limitations of voice recognition software. Read the chart carefully and recognize, using context, where substitutions have occurred.If you have any questions, please contact the dictating provider.  "

## 2024-09-23 NOTE — ASSESSMENT & PLAN NOTE
Lab Results   Component Value Date    CREATININE 6.52 (H) 09/23/2024    CREATININE 5.72 (H) 09/22/2024    CREATININE 4.75 (H) 09/21/2024    EGFR 7 09/23/2024    EGFR 8 09/22/2024    EGFR 11 09/21/2024     Now on hemodialysis -Mondays, Wednesdays, and Fridays  Plan for eventual permacath placement  Status post a renal biopsy-evidence of glomerular basement membrane disease noted  Status post a completed 3-day course of Solu-Medrol x 1 g daily  Patient now on prednisone-60 mg p.o. daily  Patient also initiated on plasmapheresis-next plasma exchange is scheduled for later today after which the patient will be dialyzed  Nephrology, and critical care are following  The remaining management as outlined below

## 2024-09-23 NOTE — ASSESSMENT & PLAN NOTE
Bone marrow with Anti-GBM glomerulonephritis  Needs plasma exchange-Spoke to [facility]   1.5 L plasma exchange with Albumin 5%/FFP-awaiting total amount needed from [facility]  Baseline and daily CBC/PT/INR/Ical/ and fibrinogen level  Calcium gluconate replacement per plasma exchange tech    Plan:  Nephrology following  Completed Solumedrol 1000 mg daily x3d

## 2024-09-23 NOTE — PLAN OF CARE
Target UF Goal 2 L as tolerated. Patient dialyzing for 2 hours on 2 K bath for serum K of  4.6  per protocol. Treatment plan reviewed with Nephrology.     Problem: METABOLIC, FLUID AND ELECTROLYTES - ADULT  Goal: Electrolytes maintained within normal limits  Description: INTERVENTIONS:  - Monitor labs and assess patient for signs and symptoms of electrolyte imbalances  - Administer electrolyte replacement as ordered  - Monitor response to electrolyte replacements, including repeat lab results as appropriate  - Instruct patient on fluid and nutrition as appropriate  Outcome: Progressing  Goal: Fluid balance maintained  Description: INTERVENTIONS:  - Monitor labs   - Monitor I/O and WT  - Instruct patient on fluid and nutrition as appropriate  - Assess for signs & symptoms of volume excess or deficit  Outcome: Progressing

## 2024-09-23 NOTE — ASSESSMENT & PLAN NOTE
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 Awaiting on pathology for confirmation of diagnosis and % crescents. PLEX controversial in renal limited anti gbm, already on dialysis, due to limited ability for renal recovery.    Currently undergoing daily PLEX with assistance of critical care for management. Day #3 9/23, on albumin and FFP per protocol. Sufficient FFP available at this hospital system. Dialysis should be POST PLEX for volume and metabolic management. FFP has high citrate content that lends itself towards alkalosis. Discussed timing of HD post PLEX AT 12-1. PLEX duration typically 2-3 weeks based on antigbm levels. Antigbm,> 8 on 9/15.   Cyclophosphamide dosing on HD should be 0.8mg/kg for patients on dialysis, discussed with pharmacy regarding dosing recommendations. Cyclophosphamide should be ordered POST HD/PLEX due to removal on these therapies. Prednisone should be dosed POST PLEX as well. Appreciate pharmacy assistance on dosing.   Sp pulse dose steroids and transitioned to 60mg prednisone.    Recommend following antigbm levels, timing is Q2 days but turn around time with labs may limit utility of close levels. Typically recommend following clinically and q week antigbm levels for ongoing need for PLEX.     After discussion with pharmacy, will reduce cyclophosphamide dose to 1mg/kg based on current kidney function on HD.

## 2024-09-23 NOTE — HEMODIALYSIS
Post-Dialysis RN Treatment Note    Blood Pressure:  Pre 118/54 mm/Hg  Post 120/60 mmHg   EDW  TBD kg    Weight:  Pre 142.7 kg   Post 138.3 kg   Mode of weight measurement: Bed Scale   Volume Removed  2002 ml    Treatment duration 2 hours   NS given  No    Treatment shortened? No   Medications given during Rx None Reported   Estimated Kt/V  0.63   Access type: Temporary HD catheter   Access Issues: Yes, describe: unable to aspirate from red limb lines reversed      Verbal Report to primary nurse   Yes Tigre TABOR

## 2024-09-24 ENCOUNTER — APPOINTMENT (INPATIENT)
Dept: DIALYSIS | Facility: HOSPITAL | Age: 74
DRG: 871 | End: 2024-09-24
Payer: MEDICARE

## 2024-09-24 LAB
ABO GROUP BLD BPU: NORMAL
ANION GAP SERPL CALCULATED.3IONS-SCNC: 11 MMOL/L (ref 4–13)
ANISOCYTOSIS BLD QL SMEAR: ABNORMAL
APTT PPP: 35 SECONDS (ref 23–34)
BASOPHILS # BLD MANUAL: 0 THOUSAND/UL (ref 0–0.1)
BASOPHILS NFR MAR MANUAL: 0 % (ref 0–1)
BPU ID: NORMAL
BUN SERPL-MCNC: 60 MG/DL (ref 5–25)
CA-I BLD-SCNC: 0.94 MMOL/L (ref 1.12–1.32)
CALCIUM SERPL-MCNC: 7.3 MG/DL (ref 8.4–10.2)
CHLORIDE SERPL-SCNC: 100 MMOL/L (ref 96–108)
CO2 SERPL-SCNC: 19 MMOL/L (ref 21–32)
CREAT SERPL-MCNC: 5.17 MG/DL (ref 0.6–1.3)
EOSINOPHIL # BLD MANUAL: 0 THOUSAND/UL (ref 0–0.4)
EOSINOPHIL NFR BLD MANUAL: 0 % (ref 0–6)
ERYTHROCYTE [DISTWIDTH] IN BLOOD BY AUTOMATED COUNT: 15.9 % (ref 11.6–15.1)
FIBRINOGEN PPP-MCNC: 165 MG/DL (ref 206–523)
GBM AB SER IA-ACNC: >8 UNITS (ref 0–0.9)
GFR SERPL CREATININE-BSD FRML MDRD: 9 ML/MIN/1.73SQ M
GLUCOSE SERPL-MCNC: 135 MG/DL (ref 65–140)
HCT VFR BLD AUTO: 24.9 % (ref 36.5–49.3)
HGB BLD-MCNC: 8 G/DL (ref 12–17)
INR PPP: 1.47 (ref 0.85–1.19)
LYMPHOCYTES # BLD AUTO: 1.99 THOUSAND/UL (ref 0.6–4.47)
LYMPHOCYTES # BLD AUTO: 9 % (ref 14–44)
MCH RBC QN AUTO: 28.8 PG (ref 26.8–34.3)
MCHC RBC AUTO-ENTMCNC: 32.1 G/DL (ref 31.4–37.4)
MCV RBC AUTO: 90 FL (ref 82–98)
MONOCYTES # BLD AUTO: 0.88 THOUSAND/UL (ref 0–1.22)
MONOCYTES NFR BLD: 4 % (ref 4–12)
NEUTROPHILS # BLD MANUAL: 19.2 THOUSAND/UL (ref 1.85–7.62)
NEUTS BAND NFR BLD MANUAL: 3 % (ref 0–8)
NEUTS SEG NFR BLD AUTO: 84 % (ref 43–75)
OVALOCYTES BLD QL SMEAR: ABNORMAL
PLATELET # BLD AUTO: 334 THOUSANDS/UL (ref 149–390)
PLATELET BLD QL SMEAR: ADEQUATE
PMV BLD AUTO: 9.2 FL (ref 8.9–12.7)
POTASSIUM SERPL-SCNC: 4.4 MMOL/L (ref 3.5–5.3)
PROTHROMBIN TIME: 18.3 SECONDS (ref 12.3–15)
RBC # BLD AUTO: 2.78 MILLION/UL (ref 3.88–5.62)
RBC MORPH BLD: PRESENT
SODIUM SERPL-SCNC: 130 MMOL/L (ref 135–147)
UNIT DISPENSE STATUS: NORMAL
UNIT PRODUCT CODE: NORMAL
UNIT PRODUCT VOLUME: 204 ML
UNIT PRODUCT VOLUME: 215 ML
UNIT PRODUCT VOLUME: 220 ML
UNIT PRODUCT VOLUME: 225 ML
UNIT PRODUCT VOLUME: 250 ML
UNIT PRODUCT VOLUME: 280 ML
UNIT RH: NORMAL
WBC # BLD AUTO: 22.07 THOUSAND/UL (ref 4.31–10.16)

## 2024-09-24 PROCEDURE — 82330 ASSAY OF CALCIUM: CPT | Performed by: NURSE PRACTITIONER

## 2024-09-24 PROCEDURE — 80048 BASIC METABOLIC PNL TOTAL CA: CPT | Performed by: HOSPITALIST

## 2024-09-24 PROCEDURE — 85027 COMPLETE CBC AUTOMATED: CPT | Performed by: HOSPITALIST

## 2024-09-24 PROCEDURE — NC001 PR NO CHARGE: Performed by: STUDENT IN AN ORGANIZED HEALTH CARE EDUCATION/TRAINING PROGRAM

## 2024-09-24 PROCEDURE — 99232 SBSQ HOSP IP/OBS MODERATE 35: CPT | Performed by: INTERNAL MEDICINE

## 2024-09-24 PROCEDURE — 85384 FIBRINOGEN ACTIVITY: CPT | Performed by: NURSE PRACTITIONER

## 2024-09-24 PROCEDURE — 85730 THROMBOPLASTIN TIME PARTIAL: CPT | Performed by: STUDENT IN AN ORGANIZED HEALTH CARE EDUCATION/TRAINING PROGRAM

## 2024-09-24 PROCEDURE — 85007 BL SMEAR W/DIFF WBC COUNT: CPT | Performed by: HOSPITALIST

## 2024-09-24 PROCEDURE — 99232 SBSQ HOSP IP/OBS MODERATE 35: CPT | Performed by: HOSPITALIST

## 2024-09-24 PROCEDURE — 85610 PROTHROMBIN TIME: CPT | Performed by: NURSE PRACTITIONER

## 2024-09-24 RX ORDER — ALBUMIN, HUMAN INJ 5% 5 %
250 SOLUTION INTRAVENOUS ONCE
Status: COMPLETED | OUTPATIENT
Start: 2024-09-25 | End: 2024-09-25

## 2024-09-24 RX ORDER — SULFAMETHOXAZOLE AND TRIMETHOPRIM 800; 160 MG/1; MG/1
1 TABLET ORAL 3 TIMES WEEKLY
Status: DISCONTINUED | OUTPATIENT
Start: 2024-09-25 | End: 2024-10-12 | Stop reason: HOSPADM

## 2024-09-24 RX ORDER — CALCIUM GLUCONATE 20 MG/ML
2 INJECTION, SOLUTION INTRAVENOUS ONCE
Status: COMPLETED | OUTPATIENT
Start: 2024-09-25 | End: 2024-09-25

## 2024-09-24 RX ORDER — ALBUMIN, HUMAN INJ 5% 5 %
125 SOLUTION INTRAVENOUS ONCE
Status: COMPLETED | OUTPATIENT
Start: 2024-09-24 | End: 2024-09-25

## 2024-09-24 RX ORDER — CALCIUM GLUCONATE 20 MG/ML
2 INJECTION, SOLUTION INTRAVENOUS ONCE
Status: COMPLETED | OUTPATIENT
Start: 2024-09-24 | End: 2024-09-25

## 2024-09-24 RX ORDER — ALBUMIN, HUMAN INJ 5% 5 %
150 SOLUTION INTRAVENOUS ONCE
Status: COMPLETED | OUTPATIENT
Start: 2024-09-24 | End: 2024-09-25

## 2024-09-24 RX ADMIN — FAMOTIDINE 20 MG: 20 TABLET, FILM COATED ORAL at 08:02

## 2024-09-24 RX ADMIN — ALBUMIN (HUMAN) 150 G: 12.5 INJECTION, SOLUTION INTRAVENOUS at 12:20

## 2024-09-24 RX ADMIN — HEPARIN SODIUM 5000 UNITS: 5000 INJECTION, SOLUTION INTRAVENOUS; SUBCUTANEOUS at 23:06

## 2024-09-24 RX ADMIN — HEPARIN SODIUM 5000 UNITS: 5000 INJECTION, SOLUTION INTRAVENOUS; SUBCUTANEOUS at 06:29

## 2024-09-24 RX ADMIN — CALCIUM GLUCONATE 2 G: 20 INJECTION, SOLUTION INTRAVENOUS at 12:19

## 2024-09-24 RX ADMIN — CYCLOPHOSPHAMIDE 100 MG: 50 CAPSULE ORAL at 19:13

## 2024-09-24 RX ADMIN — FLUOXETINE HYDROCHLORIDE 20 MG: 20 CAPSULE ORAL at 08:02

## 2024-09-24 RX ADMIN — PANTOPRAZOLE SODIUM 20 MG: 20 TABLET, DELAYED RELEASE ORAL at 06:29

## 2024-09-24 RX ADMIN — CARVEDILOL 6.25 MG: 3.12 TABLET, FILM COATED ORAL at 19:13

## 2024-09-24 RX ADMIN — PREDNISONE 60 MG: 20 TABLET ORAL at 19:13

## 2024-09-24 RX ADMIN — ALBUMIN (HUMAN) 125 G: 12.5 INJECTION, SOLUTION INTRAVENOUS at 13:13

## 2024-09-24 NOTE — ASSESSMENT & PLAN NOTE
Lab Results   Component Value Date    CREATININE 5.17 (H) 09/24/2024    CREATININE 6.52 (H) 09/23/2024    CREATININE 5.72 (H) 09/22/2024    EGFR 9 09/24/2024    EGFR 7 09/23/2024    EGFR 8 09/22/2024     Now on hemodialysis -Mondays, Wednesdays, and Fridays  Patient was successfully dialyzed yesterday on Monday, 9/23/2024  Status post a renal biopsy-evidence of glomerular basement membrane disease noted  Status post a completed 3-day course of Solu-Medrol x 1 g daily  Patient now on prednisone-60 mg p.o. daily -defer dosing management to nephrology  Patient also initiated on plasmapheresis on this admission-plasma exchange was performed last evening   Nephrology, and critical care are following  Dispo/discharge planning once cleared by nephrology

## 2024-09-24 NOTE — PLAN OF CARE
Pre-tx:  UF 2 L x 2.5 hours as tolerated per order.  Plasmapheresis done prior to HD today.  BP stable to start dialysis.  HD temp line accessed but running with lines reversed d/t high arterial pressures.  3K bath per serum K of 4.2 today.    Post-Dialysis RN Treatment Note    Blood Pressure:  Pre 115/58 mm/Hg  Post 120/58 mmHg   EDW TBD  kg    Weight:  Pre 142 kg   Post 139.8  kg   Mode of weight measurement: Bed Scale   Volume Removed 2200  ml    Treatment duration 2.5 hours    NS given  No    Treatment shortened? No   Medications given during Rx NA   Estimated Kt/V  Not Applicable   Access type: Temporary HD catheter   Access Issues: Lines reversed x 1 per protocol for HD, BFR down to 350 by end of tx d/t AP alarming   Report called to primary nurse   Yes             Problem: METABOLIC, FLUID AND ELECTROLYTES - ADULT  Goal: Electrolytes maintained within normal limits  Description: INTERVENTIONS:  - Monitor labs and assess patient for signs and symptoms of electrolyte imbalances  - Administer electrolyte replacement as ordered  - Monitor response to electrolyte replacements, including repeat lab results as appropriate  - Instruct patient on fluid and nutrition as appropriate  Outcome: Progressing  Goal: Fluid balance maintained  Description: INTERVENTIONS:  - Monitor labs   - Monitor I/O and WT  - Instruct patient on fluid and nutrition as appropriate  - Assess for signs & symptoms of volume excess or deficit  Outcome: Progressing

## 2024-09-24 NOTE — ASSESSMENT & PLAN NOTE
Continue with present therapy including plasmapheresis, day 3 out of 3 of IV steroids and cyclophosphamide.  Awaiting results of renal biopsy.  Continue prednisone 60mg/day post PLEX.  Will need to coordinate timing of PC placement as post PLEX not ideal for procedure due to risks of bleeding.  Discussed with CC, currently using 5% albumin. Can use FFP based on fibrinogen and if bleeding concerns. Critical care currently managing PLEX.   Follow daily fibrinogen.   Calcium supplementation with PLEX.   Continue PCP ppx with bactrim QOD and PPI.

## 2024-09-24 NOTE — PLAN OF CARE
Problem: PAIN - ADULT  Goal: Verbalizes/displays adequate comfort level or baseline comfort level  Description: Interventions:  - Encourage patient to monitor pain and request assistance  - Assess pain using appropriate pain scale  - Administer analgesics based on type and severity of pain and evaluate response  - Implement non-pharmacological measures as appropriate and evaluate response  - Consider cultural and social influences on pain and pain management  - Notify physician/advanced practitioner if interventions unsuccessful or patient reports new pain  Outcome: Progressing     Problem: INFECTION - ADULT  Goal: Absence or prevention of progression during hospitalization  Description: INTERVENTIONS:  - Assess and monitor for signs and symptoms of infection  - Monitor lab/diagnostic results  - Monitor all insertion sites, i.e. indwelling lines, tubes, and drains  - Monitor endotracheal if appropriate and nasal secretions for changes in amount and color  - [place] appropriate cooling/warming therapies per order  - Administer medications as ordered  - Instruct and encourage patient and family to use good hand hygiene technique  - Identify and instruct in appropriate isolation precautions for identified infection/condition  Outcome: Progressing  Goal: Absence of fever/infection during neutropenic period  Description: INTERVENTIONS:  - Monitor WBC    Outcome: Progressing     Problem: SAFETY ADULT  Goal: Patient will remain free of falls  Description: INTERVENTIONS:  - Educate patient/family on patient safety including physical limitations  - Instruct patient to call for assistance with activity   - Consult OT/PT to assist with strengthening/mobility   - Keep Call bell within reach  - Keep bed low and locked with side rails adjusted as appropriate  - Keep care items and personal belongings within reach  - Initiate and maintain comfort rounds  - Make Fall Risk Sign visible to staff  - Offer Toileting every 2 Hours,  in advance of need  - Initiate/Maintain bed alarm  - Obtain necessary fall risk management equipment  - Apply yellow socks and bracelet for high fall risk patients  - Consider moving patient to room near nurses station  Outcome: Progressing  Goal: Maintain or return to baseline ADL function  Description: INTERVENTIONS:  -  Assess patient's ability to carry out ADLs; assess patient's baseline for ADL function and identify physical deficits which impact ability to perform ADLs (bathing, care of mouth/teeth, toileting, grooming, dressing, etc.)  - Assess/evaluate cause of self-care deficits   - Assess range of motion  - Assess patient's mobility; develop plan if impaired  - Assess patient's need for assistive devices and provide as appropriate  - Encourage maximum independence but intervene and supervise when necessary  - Involve family in performance of ADLs  - Assess for home care needs following discharge   - Consider OT consult to assist with ADL evaluation and planning for discharge  - Provide patient education as appropriate  Outcome: Progressing  Goal: Maintains/Returns to pre admission functional level  Description: INTERVENTIONS:  - Perform AM-PAC 6 Click Basic Mobility/ Daily Activity assessment daily.  - Set and communicate daily mobility goal to care team and patient/family/caregiver.   - Collaborate with rehabilitation services on mobility goals if consulted  - Perform Range of Motion 3 times a day.  - Reposition patient every 2 hours.  - Dangle patient 3 times a day  - Stand patient   - Ambulate patient   - Out of bed for meals   - Out of bed for toileting  - Record patient progress and toleration of activity level   Outcome: Progressing     Problem: METABOLIC, FLUID AND ELECTROLYTES - ADULT  Goal: Electrolytes maintained within normal limits  Description: INTERVENTIONS:  - Monitor labs and assess patient for signs and symptoms of electrolyte imbalances  - Administer electrolyte replacement as ordered  -  Monitor response to electrolyte replacements, including repeat lab results as appropriate  - Instruct patient on fluid and nutrition as appropriate  Outcome: Progressing  Goal: Fluid balance maintained  Description: INTERVENTIONS:  - Monitor labs   - Monitor I/O and WT  - Instruct patient on fluid and nutrition as appropriate  - Assess for signs & symptoms of volume excess or deficit  Outcome: Progressing

## 2024-09-24 NOTE — PROGRESS NOTES
Progress Note - Nephrology   Name: Ramos Rosenthal 74 y.o. 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 Awaiting on pathology for confirmation of diagnosis and % crescents. PLEX controversial in renal limited anti gbm, already on dialysis, due to limited ability for renal recovery.    Currently undergoing daily PLEX with assistance of critical care for management. Day #4 9/24.   Current plan is to follow up antigbm q 2 days but turn around time maybe limiting. 9/15 anti gbm >8.0  PLEX done late yesterday evening. Ideally, arrange HD post PLEX for volume management.   Coordinate cyclophosphamide, prednisone and bactrim post HD/PLEX due to removal. PLEX schedule done by [facility] and timing presents a challenge as schedule may fluctuate.   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  Awaiting results of renal biopsy.  Continue prednisone 60mg/day post PLEX.  Will need to coordinate timing of PC placement as post PLEX not ideal for procedure due to risks of bleeding.  Discussed with CC, currently using 5% albumin. Can use FFP based on fibrinogen and if bleeding concerns. Critical care currently managing PLEX.   Follow daily fibrinogen.   Calcium supplementation with PLEX.   Continue PCP ppx with bactrim QOD and PPI.   Hyponatremia  Continue with fluid restriction and ultrafiltration as tolerated on dialysis days  Bilateral lower extremity edema  HD POST PLEX for volume management.   Primary hypertension  Patient has been experiencing blood pressures above goal, and has been associated with elevated heart rates.  Will initiate low-dose carvedilol 6.25 mg twice daily.  Continue to closely monitor clinical  parameters.    I have reviewed the nephrology recommendations including PLEX management with primary and critical care team, and we are in agreement with renal plan including the information outlined above.     History of Present Illness   Brief History of Admission - 75 yo male with hx gout presented with severe LYLY with Cr 7.9 and hematuria and proteinuria. Found to have + antigbm on serologies and renal biopsy performed 9/19. Awaiting official biopsy results. Started on PLEX 9/21.     Patient seen and examined today. Denies chest pain, shortness of breath, nausea,vomiting, diarrhea.   A complete 10 point review of systems was performed and is otherwise negative.     Objective      Temp:  [96.6 °F (35.9 °C)-97.2 °F (36.2 °C)] 97.2 °F (36.2 °C)  HR:  [50-66] 56  Resp:  [12-38] 21  BP: (106-136)/(54-61) 128/61  O2 Device: None (Room air)         Vitals:    09/23/24 1600 09/24/24 0600   Weight: (!) 138 kg (304 lb 14.3 oz) (!) 142 kg (313 lb 0.9 oz)     I/O last 24 hours:  In: 930 [P.O.:480; I.V.:450]  Out: 2002 [Other:2002]  Lines/Drains/Airways       Active Status       Name Placement date Placement time Site Days    [facility] Central Lines 09/16/24 Double 09/16/24  0909  --  8    HD Temporary Double Catheter 09/16/24  0909  Other (Comment)  8                  Physical Exam  Vitals reviewed.   Constitutional:       General: He is not in acute distress.     Appearance: He is not ill-appearing.   HENT:      Head: Normocephalic and atraumatic.      Nose: Nose normal.      Mouth/Throat:      Mouth: Mucous membranes are moist.      Pharynx: Oropharynx is clear.   Eyes:      Extraocular Movements: Extraocular movements intact.      Conjunctiva/sclera: Conjunctivae normal.   Cardiovascular:      Rate and Rhythm: Normal rate and regular rhythm.   Pulmonary:      Breath sounds: Normal breath sounds. No wheezing, rhonchi or rales.   Abdominal:      Tenderness: There is no abdominal tenderness. There is no guarding.    Musculoskeletal:      Right lower leg: Edema present.      Left lower leg: Edema present.   Skin:     Coloration: Skin is not jaundiced.      Findings: No bruising or rash.   Neurological:      Mental Status: He is alert and oriented to person, place, and time.      Cranial Nerves: No cranial nerve deficit.        Medications:    Current Facility-Administered Medications:     albumin human (FLEXBUMIN) 5 % injection 125 g, 125 g, Intravenous, Once, BRETT Carrington    calcium carbonate (TUMS) chewable tablet 1,000 mg, 1,000 mg, Oral, Daily PRN, BRETT Xie    calcium gluconate 2 g in sodium chloride 0.9% 100 mL (premix), 2 g, Intravenous, Once, BRETT Carrington, Last Rate: 100 mL/hr at 09/24/24 1219, 2 g at 09/24/24 1219    carvedilol (COREG) tablet 6.25 mg, 6.25 mg, Oral, BID With Meals, Gigi Ortega DO, 6.25 mg at 09/23/24 0808    cyclophosphamide (CYTOXAN) capsule 100 mg, 100 mg, Oral, Daily, Lisa Moore DO    famotidine (PEPCID) tablet 20 mg, 20 mg, Oral, Daily, Ramos Mclean DO, 20 mg at 09/24/24 0802    FLUoxetine (PROzac) capsule 20 mg, 20 mg, Oral, Daily, BRETT Xie, 20 mg at 09/24/24 0802    heparin (porcine) subcutaneous injection 5,000 Units, 5,000 Units, Subcutaneous, Q8H LORENZO, BRETT Xie, 5,000 Units at 09/24/24 0629    ondansetron (ZOFRAN) injection 4 mg, 4 mg, Intravenous, Q6H PRN, BRETT Xie    pantoprazole (PROTONIX) EC tablet 20 mg, 20 mg, Oral, Early Morning, Ramos Mclean DO, 20 mg at 09/24/24 0629    predniSONE tablet 60 mg, 60 mg, Oral, Daily, Lisa Moore DO    [START ON 9/25/2024] sulfamethoxazole-trimethoprim (BACTRIM DS) 800-160 mg per tablet 1 tablet, 1 tablet, Oral, Once per day on Monday Wednesday Friday, Lisa Moore DO      Lab Results: I have reviewed the following results:   Results from last 7 days   Lab Units 09/24/24  0616 09/23/24  0424 09/22/24  0553 09/21/24  0448  "09/20/24  0534 09/19/24  0602 09/18/24  0559   WBC Thousand/uL 22.07* 22.78* 25.08* 21.23* 17.99* 15.66* 13.41*   HEMOGLOBIN g/dL 8.0* 7.7* 7.7* 8.4* 8.6* 8.7* 8.8*   HEMATOCRIT % 24.9* 24.2* 24.3* 26.3* 26.9* 27.3* 27.1*   PLATELETS Thousands/uL 334 326 343 347 362 344 350   POTASSIUM mmol/L 4.4 4.6 4.3 4.3 4.5 4.2 4.5   CHLORIDE mmol/L 100 99 99 96 98 98 97   CO2 mmol/L 19* 20* 20* 23 21 22 24   BUN mg/dL 60* 75* 62* 40* 53* 38* 50*   CREATININE mg/dL 5.17* 6.52* 5.72* 4.75* 7.08* 5.70* 6.86*   CALCIUM mg/dL 7.3* 7.6* 7.9* 8.2* 8.1* 8.0* 7.8*   MAGNESIUM mg/dL  --  2.1 2.2 2.2 2.3 2.2  --    PHOSPHORUS mg/dL  --  5.5* 5.2* 4.2* 4.3* 3.6  --    ALBUMIN g/dL  --   --   --  2.6*  --   --   --        Administrative Statements     Portions of the record may have been created with voice recognition software. Occasional wrong word or \"sound a like\" substitutions may have occurred due to the inherent limitations of voice recognition software. Read the chart carefully and recognize, using context, where substitutions have occurred.If you have any questions, please contact the dictating provider.  "

## 2024-09-24 NOTE — NURSING NOTE
Plasmapheresis tech arrived approx. 2200 and began running albumin. Scanning and verification of 6 units of fresh frozen plasma completed with second RN in pt room. RN administered 50 mg benadryl to pt per tech request before beginning plasma at 2302 (see MAR). Pt tolerated plasmapheresis well. Therapy completed at approx. 0010

## 2024-09-24 NOTE — PROGRESS NOTES
Progress Note - Hospitalist   Name: Ramos Rosenthal 74 y.o. male I MRN: [id number]  Unit/Bed#: ICU 04-01 I Date of Admission: 9/14/2024   Date of Service: 9/24/2024 I Hospital Day: 10    Assessment & Plan  LYLY (acute kidney injury) (HCC)      Lab Results   Component Value Date    CREATININE 5.17 (H) 09/24/2024    CREATININE 6.52 (H) 09/23/2024    CREATININE 5.72 (H) 09/22/2024    EGFR 9 09/24/2024    EGFR 7 09/23/2024    EGFR 8 09/22/2024     Now on hemodialysis -Mondays, Wednesdays, and Fridays  Patient was successfully dialyzed yesterday on Monday, 9/23/2024  Status post a renal biopsy-evidence of glomerular basement membrane disease noted  Status post a completed 3-day course of Solu-Medrol x 1 g daily  Patient now on prednisone-60 mg p.o. daily -defer dosing management to nephrology  Patient also initiated on plasmapheresis on this admission-plasma exchange was performed last evening   Nephrology, and critical care are following  Dispo/discharge planning once cleared by nephrology  Depression  Continue home dose fluoxetine  Paresthesia of both lower extremities  Hx of back surgery in 2004 and 2014 and has since bilateral LE paresthesia  He is primarily wheelchair bound  Venous statis ulcers noted to Bilateral LE  B/L extremity edema R>L  Continue supportive therapy  Hyponatremia  Patient has a hypervolemic hyponatremia  Patient has remained asymptomatic  Sodium is stable at 130  Bilateral lower extremity edema  Chronic condition  Patient with a history of lower back/spinal surgery  Patient does not ambulate  Patient is wheelchair-bound at baseline  Primary hypertension  Blood pressure stable  Continue carvedilol 6.25 mg p.o. 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 Sequential Compression Devices Ordered.    Mobility:   Basic Mobility Inpatient Raw Score: 9  -[facility] Goal: 3: Sit at edge of bed  -[facility] Achieved: 2: Bed activities/Dependent transfer  Patient is at baseline from a mobility standpoint    Patient Centered Rounds: I performed bedside rounds with nursing staff today.   Discussions with Specialists or Other Care Team Provider: Nephrology, critical care    Education and Discussions with Family / Patient:  Family members were brought up to par in great depth last evening, will again meet with the family once the coming to visit with the patient later today.     Current Length of Stay: 10 day(s)  Current Patient Status: Inpatient   Certification Statement: The patient will continue to require additional inpatient hospital stay due to continued need for plasmapheresis and dialysis  Discharge Plan:  Discharge planning will commence once the patient has been cleared for discharge by nephrology    Code Status: Level 1 - Full Code    Subjective   Patient seen, resting in bed, feels a little tired this morning but otherwise has no new medical complaints    Objective     Vitals:   Temp (24hrs), Av.9 °F (36.1 °C), Min:96.6 °F (35.9 °C), Max:97 °F (36.1 °C)    Temp:  [96.6 °F (35.9 °C)-97 °F (36.1 °C)] 97 °F (36.1 °C)  HR:  [50-63] 50  Resp:  [13-38] 13  BP: (106-136)/(54-61) 136/61  SpO2:  [98 %-100 %] 99 %  Body mass index is 40.19 kg/m².     Input and Output Summary (last 24 hours):     Intake/Output Summary (Last 24 hours) at 2024 0737  Last data filed at 2024 1945  Gross per 24 hour   Intake 930 ml   Output 2002 ml   Net -1072 ml       Physical Exam  Vitals reviewed.   Constitutional:       Appearance: Normal appearance. He is well-developed.   HENT:      Head: Normocephalic and atraumatic.      Nose: Nose normal.      Mouth/Throat:      Mouth: Oropharynx is  clear and moist.   Eyes:      Extraocular Movements: EOM normal.      Conjunctiva/sclera: Conjunctivae normal.      Pupils: Pupils are equal, round, and reactive to light.   Neck:      Thyroid: No thyromegaly.      Vascular: No JVD.   Cardiovascular:      Rate and Rhythm: Normal rate and regular rhythm.      Heart sounds: No murmur heard.     No friction rub. No gallop.   Pulmonary:      Effort: Pulmonary effort is normal. No respiratory distress.      Breath sounds: Normal breath sounds.   Abdominal:      General: Bowel sounds are normal. There is no distension.      Palpations: Abdomen is soft. There is no mass.      Tenderness: There is no abdominal tenderness. There is no guarding.   Musculoskeletal:         General: No edema. Normal range of motion.      Cervical back: Normal range of motion and neck supple.      Comments: Dialysis line right upper anterior chest wall   Lymphadenopathy:      Cervical: No cervical adenopathy.   Skin:     General: Skin is warm.      Findings: No erythema or rash.   Neurological:      Mental Status: He is alert and oriented to person, place, and time. Mental status is at baseline.      Cranial Nerves: No cranial nerve deficit.      Comments: Motor 0 out of 5 right lower extremity, 1 out of 5 left lower extremity, this is baseline   Psychiatric:         Mood and Affect: Mood and affect and mood normal.         Behavior: Behavior normal.         Thought Content: Thought content normal.          Lines/Drains:  Lines/Drains/Airways       Active Status       Name Placement date Placement time Site Days    CVC Central Lines 09/16/24 Double 09/16/24  0909  --  7    HD Temporary Double Catheter 09/16/24  0909  Other (Comment)  7                    Central Line:  Goal for removal: N/A - Chronic PICC               Lab Results: I have reviewed the following results:    Results from last 7 days   Lab Units 09/24/24  0616 09/23/24  0424 09/22/24  0553 09/21/24  0448   WBC Thousand/uL 22.07*    < > 25.08* 21.23*   HEMOGLOBIN g/dL 8.0*   < > 7.7* 8.4*   HEMATOCRIT % 24.9*   < > 24.3* 26.3*   PLATELETS Thousands/uL 334   < > 343 347   BANDS PCT %  --   --   --  4   SEGS PCT %  --   --  89*  --    LYMPHO PCT %  --   --  5* 3*   MONO PCT %  --   --  4 1*   EOS PCT %  --   --  0 0    < > = values in this interval not displayed.     Results from last 7 days   Lab Units 09/24/24  0616 09/22/24  0553 09/21/24  0448   SODIUM mmol/L 130*   < > 130*   POTASSIUM mmol/L 4.4   < > 4.3   CHLORIDE mmol/L 100   < > 96   CO2 mmol/L 19*   < > 23   BUN mg/dL 60*   < > 40*   CREATININE mg/dL 5.17*   < > 4.75*   ANION GAP mmol/L 11   < > 11   CALCIUM mg/dL 7.3*   < > 8.2*   ALBUMIN g/dL  --   --  2.6*   TOTAL BILIRUBIN mg/dL  --   --  0.28   ALK PHOS U/L  --   --  91   ALT U/L  --   --  187*   AST U/L  --   --  225*   GLUCOSE RANDOM mg/dL 135   < > 200*    < > = values in this interval not displayed.     Results from last 7 days   Lab Units 09/24/24  0616   INR  1.47*             Results from last 7 days   Lab Units 09/21/24  0448   PROCALCITONIN ng/ml 3.04*       Recent Cultures (last 7 days):         Imaging Review: No pertinent imaging studies reviewed.  Other Studies: No additional pertinent studies reviewed.    Last 24 Hours Medication List:     Current Facility-Administered Medications:     calcium carbonate (TUMS) chewable tablet 1,000 mg, Daily PRN    carvedilol (COREG) tablet 6.25 mg, BID With Meals    cyclophosphamide (CYTOXAN) capsule 100 mg, Daily    famotidine (PEPCID) tablet 20 mg, Daily    FLUoxetine (PROzac) capsule 20 mg, Daily    heparin (porcine) subcutaneous injection 5,000 Units, Q8H LORENZO    ondansetron (ZOFRAN) injection 4 mg, Q6H PRN    pantoprazole (PROTONIX) EC tablet 20 mg, Early Morning    predniSONE tablet 60 mg, Daily    sulfamethoxazole-trimethoprim (BACTRIM DS) 800-160 mg per tablet 1 tablet, Once per day on Monday Wednesday Friday    Administrative Statements   Today, Patient Was Seen By: [facility]  Trista Rust MD  I have spent a total time of 35 minutes in caring for this patient on the day of the visit/encounter including Diagnostic results, Prognosis, Patient and family education, Impressions, Counseling / Coordination of care, Documenting in the medical record, Reviewing / ordering tests, medicine, procedures  , and Communicating with other healthcare professionals .    **Please Note: This note may have been constructed using a voice recognition system.**

## 2024-09-24 NOTE — ASSESSMENT & PLAN NOTE
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 Awaiting on pathology for confirmation of diagnosis and % crescents. PLEX controversial in renal limited anti gbm, already on dialysis, due to limited ability for renal recovery.    Currently undergoing daily PLEX with assistance of critical care for management. Day #4 9/24.   Current plan is to follow up antigbm q 2 days but turn around time maybe limiting. 9/15 anti gbm >8.0  PLEX done late yesterday evening. Ideally, arrange HD post PLEX for volume management.   Coordinate cyclophosphamide, prednisone and bactrim post HD/PLEX due to removal. PLEX schedule done by [facility] and timing presents a challenge as schedule may fluctuate.    Home

## 2024-09-25 ENCOUNTER — TELEPHONE (OUTPATIENT)
Dept: OTHER | Facility: HOSPITAL | Age: 74
End: 2024-09-25

## 2024-09-25 ENCOUNTER — APPOINTMENT (INPATIENT)
Dept: DIALYSIS | Facility: HOSPITAL | Age: 74
DRG: 871 | End: 2024-09-25
Attending: INTERNAL MEDICINE
Payer: MEDICARE

## 2024-09-25 PROBLEM — I10 PRIMARY HYPERTENSION: Status: RESOLVED | Noted: 2024-09-17 | Resolved: 2024-09-25

## 2024-09-25 LAB
ANION GAP SERPL CALCULATED.3IONS-SCNC: 10 MMOL/L (ref 4–13)
BUN SERPL-MCNC: 44 MG/DL (ref 5–25)
CA-I BLD-SCNC: 1 MMOL/L (ref 1.12–1.32)
CALCIUM SERPL-MCNC: 7.6 MG/DL (ref 8.4–10.2)
CHLORIDE SERPL-SCNC: 101 MMOL/L (ref 96–108)
CO2 SERPL-SCNC: 20 MMOL/L (ref 21–32)
CREAT SERPL-MCNC: 4.41 MG/DL (ref 0.6–1.3)
ERYTHROCYTE [DISTWIDTH] IN BLOOD BY AUTOMATED COUNT: 15.9 % (ref 11.6–15.1)
FIBRINOGEN PPP-MCNC: 159 MG/DL (ref 206–523)
GFR SERPL CREATININE-BSD FRML MDRD: 12 ML/MIN/1.73SQ M
GLUCOSE SERPL-MCNC: 159 MG/DL (ref 65–140)
HCT VFR BLD AUTO: 25.2 % (ref 36.5–49.3)
HGB BLD-MCNC: 8.1 G/DL (ref 12–17)
INR PPP: 1.44 (ref 0.85–1.19)
MCH RBC QN AUTO: 28 PG (ref 26.8–34.3)
MCHC RBC AUTO-ENTMCNC: 32.1 G/DL (ref 31.4–37.4)
MCV RBC AUTO: 87 FL (ref 82–98)
PLATELET # BLD AUTO: 317 THOUSANDS/UL (ref 149–390)
PMV BLD AUTO: 8.9 FL (ref 8.9–12.7)
POTASSIUM SERPL-SCNC: 4.5 MMOL/L (ref 3.5–5.3)
PROTHROMBIN TIME: 18 SECONDS (ref 12.3–15)
RBC # BLD AUTO: 2.89 MILLION/UL (ref 3.88–5.62)
SODIUM SERPL-SCNC: 131 MMOL/L (ref 135–147)
WBC # BLD AUTO: 20.08 THOUSAND/UL (ref 4.31–10.16)

## 2024-09-25 PROCEDURE — NC001 PR NO CHARGE: Performed by: STUDENT IN AN ORGANIZED HEALTH CARE EDUCATION/TRAINING PROGRAM

## 2024-09-25 PROCEDURE — 82330 ASSAY OF CALCIUM: CPT | Performed by: STUDENT IN AN ORGANIZED HEALTH CARE EDUCATION/TRAINING PROGRAM

## 2024-09-25 PROCEDURE — 85384 FIBRINOGEN ACTIVITY: CPT | Performed by: STUDENT IN AN ORGANIZED HEALTH CARE EDUCATION/TRAINING PROGRAM

## 2024-09-25 PROCEDURE — 85610 PROTHROMBIN TIME: CPT | Performed by: STUDENT IN AN ORGANIZED HEALTH CARE EDUCATION/TRAINING PROGRAM

## 2024-09-25 PROCEDURE — 99232 SBSQ HOSP IP/OBS MODERATE 35: CPT | Performed by: HOSPITALIST

## 2024-09-25 PROCEDURE — 80048 BASIC METABOLIC PNL TOTAL CA: CPT | Performed by: HOSPITALIST

## 2024-09-25 PROCEDURE — 99232 SBSQ HOSP IP/OBS MODERATE 35: CPT

## 2024-09-25 PROCEDURE — 85027 COMPLETE CBC AUTOMATED: CPT | Performed by: HOSPITALIST

## 2024-09-25 PROCEDURE — P9012 CRYOPRECIPITATE EACH UNIT: HCPCS

## 2024-09-25 RX ORDER — ALBUMIN, HUMAN INJ 5% 5 %
25 SOLUTION INTRAVENOUS ONCE
Status: COMPLETED | OUTPATIENT
Start: 2024-09-25 | End: 2024-09-25

## 2024-09-25 RX ORDER — ALBUMIN, HUMAN INJ 5% 5 %
SOLUTION INTRAVENOUS
Status: COMPLETED
Start: 2024-09-25 | End: 2024-09-25

## 2024-09-25 RX ORDER — ALBUMIN, HUMAN INJ 5% 5 %
275 SOLUTION INTRAVENOUS ONCE
Status: COMPLETED | OUTPATIENT
Start: 2024-09-26 | End: 2024-09-26

## 2024-09-25 RX ADMIN — CARVEDILOL 6.25 MG: 3.12 TABLET, FILM COATED ORAL at 08:21

## 2024-09-25 RX ADMIN — HEPARIN SODIUM 5000 UNITS: 5000 INJECTION, SOLUTION INTRAVENOUS; SUBCUTANEOUS at 21:43

## 2024-09-25 RX ADMIN — PANTOPRAZOLE SODIUM 20 MG: 20 TABLET, DELAYED RELEASE ORAL at 05:53

## 2024-09-25 RX ADMIN — PREDNISONE 60 MG: 20 TABLET ORAL at 17:54

## 2024-09-25 RX ADMIN — HEPARIN SODIUM 5000 UNITS: 5000 INJECTION, SOLUTION INTRAVENOUS; SUBCUTANEOUS at 14:07

## 2024-09-25 RX ADMIN — CYCLOPHOSPHAMIDE 100 MG: 50 CAPSULE ORAL at 17:54

## 2024-09-25 RX ADMIN — CALCIUM GLUCONATE 2 G: 20 INJECTION, SOLUTION INTRAVENOUS at 08:57

## 2024-09-25 RX ADMIN — FLUOXETINE HYDROCHLORIDE 20 MG: 20 CAPSULE ORAL at 08:21

## 2024-09-25 RX ADMIN — FAMOTIDINE 20 MG: 20 TABLET, FILM COATED ORAL at 08:21

## 2024-09-25 RX ADMIN — ALBUMIN, HUMAN INJ 5% 25 G: 5 SOLUTION at 09:09

## 2024-09-25 RX ADMIN — ALBUMIN (HUMAN) 250 G: 12.5 INJECTION, SOLUTION INTRAVENOUS at 08:57

## 2024-09-25 RX ADMIN — ALBUMIN (HUMAN) 25 G: 12.5 INJECTION, SOLUTION INTRAVENOUS at 09:09

## 2024-09-25 RX ADMIN — SULFAMETHOXAZOLE AND TRIMETHOPRIM 1 TABLET: 800; 160 TABLET ORAL at 17:54

## 2024-09-25 RX ADMIN — HEPARIN SODIUM 5000 UNITS: 5000 INJECTION, SOLUTION INTRAVENOUS; SUBCUTANEOUS at 05:53

## 2024-09-25 NOTE — ASSESSMENT & PLAN NOTE
Patient was initiated on carvedilol 6.25 mg twice daily yesterday.  Continue to monitor at this time

## 2024-09-25 NOTE — ASSESSMENT & PLAN NOTE
Continue with current management including plasmapheresis, cyclophosphamide and prednisone.  Continue with Bactrim for immunoprophylaxis and PPI/Pepcid for gastric prophylaxis.  The patient's medications are to be dosed post hemodialysis.

## 2024-09-25 NOTE — ASSESSMENT & PLAN NOTE
Patient is now dialysis dependent after being diagnosed with anti-GBM glomerulonephritis.  His next full dialysis treatment will be today.  Will continue to ultrafilter as tolerated with dialysis sessions. He will need to have a tunneled dialysis catheter placed for outpatient HD usage. This was previously scheduled for 9/23

## 2024-09-25 NOTE — CASE MANAGEMENT
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 2024     Primary hypertension 2024     Secondary hyperparathyroidism of renal origin ([facility]) 2024     Uremia 2024     Hyponatremia 2024     Bilateral lower extremity edema 2024     Neurogenic claudication 2023    Bloating 10/05/2022     Paresthesia of both lower extremities 10/01/2022     Leucocytosis 10/01/2022     Elevated troponin 2022     EDGAR (dyspnea on exertion) 2022     Rash due to vaculitis  2022     LYLY (acute kidney injury) ([facility]) 2022     Depression 2022     Multiple open wounds of lower leg 2022     Lower extremity weakness 2019     Lumbosacral plexopathy 2019     Gait abnormality 2019     Lumbar stenosis 2019     Polyneuropathy 2019     Prediabetes 2018    Hyperlipidemia 2014    Vitamin D deficiency 2014      LOS (days): 11  Geometric Mean LOS (GMLOS) (days): 5.1  Days to GMLOS:-5.9     OBJECTIVE:  Risk of Unplanned Readmission Score: 26.08      Current admission status: Inpatient   Preferred Pharmacy:   [id number] [place] - [address] [place]  [address]  [place] [address]  Phone: [phone] Fax: [phone]    Primary Care Provider: Jessika Carrillo MD    Primary Insurance: MEDICARE  Secondary Insurance: [employer]    DISCHARGE DETAILS:  Spoke to the pt at the bedside about transport to and from home when dialysis starts as an  outpt with [facility].  Pt states his daughter Ann will leave work in am (she works in [employer]).  Daughter will also assist the wife when pt leaves dialysis.  Daughter will be finished with work when pt completes his dialysis treatment.  Pt is able to get up with a lift chair and pivot into the WC.  Pt has a chairglide outside on the porch to the ground level.

## 2024-09-25 NOTE — PROGRESS NOTES
Progress Note - Nephrology   Name: Ramos Rosenthal 74 y.o. 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  His next full dialysis treatment will be today.  Will continue to ultrafilter as tolerated with dialysis sessions. He will need to have a tunneled dialysis catheter placed for outpatient HD usage. 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  Continue with Bactrim for immunoprophylaxis and PPI/Pepcid for gastric prophylaxis.  The patient's medications are to be dosed post hemodialysis.  Hyponatremia  Continue with fluid restriction and ultrafiltration as tolerated on dialysis days  Bilateral lower extremity edema  HD POST PLEX for volume management, low-salt diet  Primary hypertension  Patient was initiated on carvedilol 6.25 mg twice daily yesterday.  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 He was started on pulse dose IV steroids, cyclophosphamide and is to be initiated on plasmapheresis. Nephrology was consulted for the management of acute kidney injury.  Patient was seen and examined today.  He reports appetite is still good.  He denies any further complaints today.  He completed his plasmapheresis session early in the morning today and will have hemodialysis later in the afternoon.          Objective      Temp:  [96 °F (35.6 °C)-97.6 °F (36.4 °C)] 96 °F (35.6 °C)  HR:  [53-67] 53  Resp:  [14-27] 19  BP: ()/(46-62) 113/57  O2 Device: None (Room air)         Vitals:    09/25/24 0408 09/25/24 0600   Weight: (!) 140 kg (309 lb 1.4 oz) (!) 140 kg (309 lb 1.4 oz)     I/O last 24 hours:  In: 1490 [P.O.:960; I.V.:430; IV Piggyback:100]  Out: 2419 [Other:2419]  Lines/Drains/Airways       Active Status       Name Placement date Placement time Site Days    CVC Central Lines 09/16/24 Double 09/16/24  0909  --  9    HD Temporary Double Catheter 09/16/24  0909  Other (Comment)  9                  Physical Exam  Vitals and nursing note reviewed.   Constitutional:       General: He is not in acute distress.     Appearance: He is well-developed.   HENT:      Head: Normocephalic and atraumatic.   Cardiovascular:      Rate and Rhythm: Normal rate and regular rhythm.      Heart sounds: No murmur heard.  Pulmonary:      Effort: Pulmonary effort is normal. No respiratory distress.      Breath sounds: Normal breath sounds.   Abdominal:      Palpations: Abdomen is soft.      Tenderness: There is no abdominal tenderness.   Musculoskeletal:      Right lower leg: Edema present.      Left lower leg: Edema present.      Comments: 4+ edema in the right lower extremity, chronic   Skin:     General: Skin is warm and dry.      Capillary Refill: Capillary refill takes less than 2 seconds.   Neurological:      Mental Status: He is alert.   Psychiatric:         Mood and Affect: Mood normal.        Medications:    Current Facility-Administered Medications:     calcium carbonate (TUMS) chewable tablet 1,000 mg, 1,000 mg, Oral, Daily PRN, BRETT Xie    carvedilol (COREG) tablet 6.25 mg, 6.25 mg, Oral, BID With Meals, Gigi Ortega, DO, 6.25 mg at 09/25/24 0821    cyclophosphamide (CYTOXAN) capsule 100 mg, 100 mg, Oral, Daily, Lisa Moore, DO, 100 mg at 09/24/24 1913    famotidine (PEPCID) tablet  "20 mg, 20 mg, Oral, Daily, Ramos Mclean DO, 20 mg at 09/25/24 0821    FLUoxetine (PROzac) capsule 20 mg, 20 mg, Oral, Daily, BRETT Xie, 20 mg at 09/25/24 0821    heparin (porcine) subcutaneous injection 5,000 Units, 5,000 Units, Subcutaneous, Q8H LORENZO, BRETT Xie, 5,000 Units at 09/25/24 1407    ondansetron (ZOFRAN) injection 4 mg, 4 mg, Intravenous, Q6H PRN, BRETT Xie    pantoprazole (PROTONIX) EC tablet 20 mg, 20 mg, Oral, Early Morning, Ramos Mclean DO, 20 mg at 09/25/24 0553    predniSONE tablet 60 mg, 60 mg, Oral, Daily, Lisa Moore DO, 60 mg at 09/24/24 1913    sulfamethoxazole-trimethoprim (BACTRIM DS) 800-160 mg per tablet 1 tablet, 1 tablet, Oral, Once per day on Monday Wednesday Friday, Lisa Moore DO      Lab Results: I have reviewed the following results:   Results from last 7 days   Lab Units 09/25/24  0555 09/24/24  0616 09/23/24  0424 09/22/24  0553 09/21/24  0448 09/20/24  0534 09/19/24  0602   WBC Thousand/uL 20.08* 22.07* 22.78* 25.08* 21.23* 17.99* 15.66*   HEMOGLOBIN g/dL 8.1* 8.0* 7.7* 7.7* 8.4* 8.6* 8.7*   HEMATOCRIT % 25.2* 24.9* 24.2* 24.3* 26.3* 26.9* 27.3*   PLATELETS Thousands/uL 317 334 326 343 347 362 344   POTASSIUM mmol/L 4.5 4.4 4.6 4.3 4.3 4.5 4.2   CHLORIDE mmol/L 101 100 99 99 96 98 98   CO2 mmol/L 20* 19* 20* 20* 23 21 22   BUN mg/dL 44* 60* 75* 62* 40* 53* 38*   CREATININE mg/dL 4.41* 5.17* 6.52* 5.72* 4.75* 7.08* 5.70*   CALCIUM mg/dL 7.6* 7.3* 7.6* 7.9* 8.2* 8.1* 8.0*   MAGNESIUM mg/dL  --   --  2.1 2.2 2.2 2.3 2.2   PHOSPHORUS mg/dL  --   --  5.5* 5.2* 4.2* 4.3* 3.6   ALBUMIN g/dL  --   --   --   --  2.6*  --   --        Administrative Statements     Portions of the record may have been created with voice recognition software. Occasional wrong word or \"sound a like\" substitutions may have occurred due to the inherent limitations of voice recognition software. Read the chart carefully and " recognize, using context, where substitutions have occurred.If you have any questions, please contact the dictating provider.

## 2024-09-25 NOTE — PROGRESS NOTES
Progress Note - Critical Care/ICU   Name: Ramos Rosenthal 74 y.o. male I MRN: [id number]  Unit/Bed#: ICU 04-01 I Date of Admission: 9/14/2024   Date of Service: 9/25/2024 I Hospital Day: 11     75 yo M w/ LYLY in the setting of anti-GBM glomerulonephritis requiring iHD and plasmapharesis. CC service assisting with plasmapharesis management. Primary care per SLIM and nephrology teams.      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   -- D/w pathology. Tolerating volume replacement with 5% albumin without signs of bleeding. Slow downtrend in fibrinogen. Will transfuse 1u pooled cryo tonight. Plan to c/w 5% albumin volume replacement with tomorrow's session. If AM fibrinogen<150, will transfuse cryo again. INR remains<1.5. C/w daily checks. Ca2+ supplementation.        Annabel Adamson DO

## 2024-09-25 NOTE — QUICK NOTE
Called daughter, Mary, by phone and explained LYLY and would not advise home modalities at this time.Explained ongoing PLEX/immunosuppression and would not advise a surgery for PD catheter either with ongoing  risks of bleeding /infection. Daughter is able to transport to and from outpatient HD with assistance from mother.

## 2024-09-25 NOTE — ASSESSMENT & PLAN NOTE
Noted on renal biopsy  Continue plasmapheresis  Continue cyclophosphamide, prednisone, and Bactrim  Continued management as per nephrology/critical care

## 2024-09-25 NOTE — PLAN OF CARE
Target UF Goal 2 L as tolerated. Patient dialyzing for  3 hrs and 30 mins  on 2 K bath for serum K of  4.5  per protocol. Treatment plan reviewed with Nephrology.       Post-Dialysis RN Treatment Note    Blood Pressure:  Pre 98/55 mm/Hg  Post 109/53 mmHg   EDW  TBD kg    Weight:  Pre 140 kg   Post 138.3 kg   Mode of weight measurement: Bed Scale   Volume Removed  1700 ml    Treatment duration  3 hrs and 30 mins   NS given  No    Treatment shortened? No   Medications given during Rx None Reported   Estimated Kt/V  Not Applicable   Access type: Temporary HD catheter   Access Issues: No    Report called to primary nurse   Yes, Tigre RN at bedside       Problem: METABOLIC, FLUID AND ELECTROLYTES - ADULT  Goal: Electrolytes maintained within normal limits  Description: INTERVENTIONS:  - Monitor labs and assess patient for signs and symptoms of electrolyte imbalances  - Administer electrolyte replacement as ordered  - Monitor response to electrolyte replacements, including repeat lab results as appropriate  - Instruct patient on fluid and nutrition as appropriate  Outcome: Progressing  Goal: Fluid balance maintained  Description: INTERVENTIONS:  - Monitor labs   - Monitor I/O and WT  - Instruct patient on fluid and nutrition as appropriate  - Assess for signs & symptoms of volume excess or deficit  Outcome: Progressing

## 2024-09-25 NOTE — PROGRESS NOTES
73 yo M w/ LYLY in the setting of anti-GBM glomerulonephritis requiring iHD and plasmapharesis. CC service assisting with plasmapharesis management. Primary care per SLIM and nephrology teams.      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 Total plasma volume required 5.5L per plasmapharesis tech - 275g albumin. No signs of bleeding, fibrinogen>150, INR<1.5. C/w daily checks. Ca2+ supplementation.

## 2024-09-25 NOTE — ASSESSMENT & PLAN NOTE
Bone marrow with Anti-GBM glomerulonephritis  Continued plasma exchange-Spoke to [facility]   1.5 L plasma exchange with Albumin 5%/FFP-awaiting total amount needed from [facility]  Baseline and daily CBC/PT/INR/Ical/ and fibrinogen level  Calcium gluconate replacement per plasma exchange tech    Plan:  Nephrology following  Completed Solumedrol 1000 mg daily x3d  Pt receiving daily plasmapheresis with 5% Albumin  Received 1 unit of Cryo 9/25 for Fibrinogen level of 159.   If am Fibrinogen level < 150, transfuse Cryo

## 2024-09-25 NOTE — ASSESSMENT & PLAN NOTE
Patient has a hypervolemic hyponatremia  Patient has remained asymptomatic  Sodium is stable at 131  Continue fluid restriction

## 2024-09-25 NOTE — ASSESSMENT & PLAN NOTE
Lab Results   Component Value Date    CREATININE 4.41 (H) 09/25/2024    CREATININE 5.17 (H) 09/24/2024    CREATININE 6.52 (H) 09/23/2024    EGFR 12 09/25/2024    EGFR 9 09/24/2024    EGFR 7 09/23/2024     Now on hemodialysis -Mondays, Wednesdays, and Fridays  Next dialysis session scheduled for later today  Plasmapheresis/plasma exchange also scheduled for later today  Status post a renal biopsy-evidence of glomerular basement membrane disease noted  Status post a completed 3-day course of Solu-Medrol x 1 g daily  Continue prednisone 60 mg p.o. daily  Nephrology, and critical care are following  Dispo/discharge planning once cleared by nephrology

## 2024-09-25 NOTE — PLAN OF CARE
Problem: PAIN - ADULT  Goal: Verbalizes/displays adequate comfort level or baseline comfort level  Description: Interventions:  - Encourage patient to monitor pain and request assistance  - Assess pain using appropriate pain scale  - Administer analgesics based on type and severity of pain and evaluate response  - Implement non-pharmacological measures as appropriate and evaluate response  - Consider cultural and social influences on pain and pain management  - Notify physician/advanced practitioner if interventions unsuccessful or patient reports new pain  Outcome: Progressing     Problem: INFECTION - ADULT  Goal: Absence or prevention of progression during hospitalization  Description: INTERVENTIONS:  - Assess and monitor for signs and symptoms of infection  - Monitor lab/diagnostic results  - Monitor all insertion sites, i.e. indwelling lines, tubes, and drains  - Monitor endotracheal if appropriate and nasal secretions for changes in amount and color  - [place] appropriate cooling/warming therapies per order  - Administer medications as ordered  - Instruct and encourage patient and family to use good hand hygiene technique  - Identify and instruct in appropriate isolation precautions for identified infection/condition  Outcome: Progressing

## 2024-09-25 NOTE — PROGRESS NOTES
Progress Note - Critical Care/ICU   Name: Ramos Rosenthal 74 y.o. male I MRN: [id number]  Unit/Bed#: ICU 04-01 I Date of Admission: 9/14/2024   Date of Service: 9/25/2024 I Hospital Day: 11      Assessment & Plan  LYLY (acute kidney injury) (HCC)  In the setting of anti-GBM glomerulonephritis   Baseline creat was 0.9 to 7.95 on admission    Plan:  Nephrology consulted  Temp HD cath with M/W/F dialysis  Trend renal indices  Avoid nephrotoxins and hypotension  Hyponatremia  Suspect in the setting hypervolemia associated with decreased UO in setting LYLY    Plan:  Fluid restriction per nephrology  Trend BMP daily  Bilateral lower extremity edema  Chronic in the setting of wheel chair bound 2/2 lower back pain/spinal surgery hx    Plan:  Fluid restriction  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   If am Fibrinogen level < 150, transfuse Cryo    Primary hypertension (Resolved: 9/25/2024)    Disposition: Stepdown Level 2    ICU Core Measures     A: Assess, Prevent, and Manage Pain Has pain been assessed? Yes  Need for changes to pain regimen? No   B: Both SAT/SAT  N/A   C: Choice of Sedation RASS Goal: 0 Alert and Calm or N/A patient not on sedation  Need for changes to sedation or analgesia regimen? NA   D: Delirium CAM-ICU: Negative   E: Early Mobility  Plan for early mobility? Yes   F: Family Engagement Plan for family engagement today? Yes       Antibiotic Review: Patient on appropriate coverage based on culture data.      Review of Invasive Devices:      Central access plan: HD cath in place.  Plan to remain in place for iHD and plasmapheresis      Prophylaxis:  VTE VTE covered by:  heparin (porcine), Subcutaneous, 5,000 Units at 09/25/24 1407       Stress Ulcer  covered byfamotidine (PEPCID) 20 mg tablet [[id number]] (Long-Term Med), famotidine (PEPCID) tablet 20 mg [[id number]], pantoprazole (PROTONIX) EC tablet 20 mg [[id number]]         24 Hour Events   24hr events: Pt continues iHD M/W/F and  daily plasmapheresis with 5% Albumin. Pt received 1 unit of Cryo yesterday. Continue to trend daily labs.  Subjective   Review of Systems: Review of Systems   Constitutional:  Positive for fatigue.   Respiratory:  Negative for shortness of breath.    Cardiovascular:  Positive for leg swelling. Negative for chest pain.   Gastrointestinal:  Negative for abdominal pain and nausea.   Neurological:  Negative for dizziness and headaches.   Psychiatric/Behavioral:  Negative for agitation and confusion.    All other systems reviewed and are negative.      Objective                          Vitals I/O      Most Recent Min/Max in 24hrs   Temp (!) 95.9 °F (35.5 °C) Temp  Min: 95.6 °F (35.3 °C)  Max: 97.6 °F (36.4 °C)   Pulse 59 Pulse  Min: 53  Max: 67   Resp (!) 31 Resp  Min: 14  Max: 33   /60 BP  Min: 97/46  Max: 125/60   O2 Sat 100 % SpO2  Min: 96 %  Max: 100 %      Intake/Output Summary (Last 24 hours) at 9/25/2024 1838  Last data filed at 9/25/2024 1750  Gross per 24 hour   Intake 1035 ml   Output 2200 ml   Net -1165 ml       Diet Renal; Renal Benton; Yes; Fluid Restriction 2000 ML; No    Invasive Monitoring           Physical Exam   Physical Exam  Vitals and nursing note reviewed.   Eyes:      Pupils: Pupils are equal, round, and reactive to light.   Skin:     General: Skin is warm and dry.      Capillary Refill: Capillary refill takes less than 2 seconds.   HENT:      Head: Normocephalic.      Mouth/Throat:      Mouth: Mucous membranes  are moist.   Neck:      Vascular: Central line present.   Cardiovascular:      Rate and Rhythm: Normal rate and regular rhythm.      Pulses: Normal pulses.      Heart sounds: Normal heart sounds.   Musculoskeletal:      Right lower le+ Edema present.      Left lower le+ Edema present.   Abdominal: General: Bowel sounds are normal.      Palpations: Abdomen is soft.   Constitutional:       General: He is awake.      Appearance: He is obese.   Pulmonary:      Effort: Pulmonary effort is normal.      Breath sounds: Normal breath sounds.   Psychiatric:         Behavior: Behavior is cooperative.   Neurological:      Mental Status: He is alert and oriented to person, place and time. He is not agitated.      Motor: Weakness and strength full and intact in all extremities.      Comments: Decreased strength in b/l LE - this is at baseline as pt is primarily wheelchair bound          Diagnostic Studies        Lab Results: I have reviewed the following results: CBC/BMP:   .     24  0555   WBC 20.08*   HGB 8.1*   HCT 25.2*      SODIUM 131*   K 4.5      CO2 20*   BUN 44*   CREATININE 4.41*   GLUC 159*   CAIONIZED 1.00*         Medications:  Scheduled PRN   [START ON 2024] Albumin 5%, 275 g, Once  carvedilol, 6.25 mg, BID With Meals  cyclophosphamide, 100 mg, Daily  famotidine, 20 mg, Daily  FLUoxetine, 20 mg, Daily  heparin (porcine), 5,000 Units, Q8H LORENZO  pantoprazole, 20 mg, Early Morning  predniSONE, 60 mg, Daily  sulfamethoxazole-trimethoprim, 1 tablet, Once per day on       calcium carbonate, 1,000 mg, Daily PRN  ondansetron, 4 mg, Q6H PRN       Continuous          Labs:   CBC    Recent Labs     24  0616 24  0555   WBC 22.07* 20.08*   HGB 8.0* 8.1*   HCT 24.9* 25.2*    317   BANDSPCT 3  --      BMP    Recent Labs     24  0616 24  0555   SODIUM 130* 131*   K 4.4 4.5    101   CO2 19* 20*   AGAP 11 10   BUN 60* 44*   CREATININE 5.17*  4.41*   CALCIUM 7.3* 7.6*       Coags    Recent Labs     09/24/24  0616 09/25/24  0555   INR 1.47* 1.44*   PTT 35*  --         Additional Electrolytes  Recent Labs     09/24/24  0616 09/25/24  0555   CAIONIZED 0.94* 1.00*          Blood Gas    No recent results  No recent results LFTs  No recent results    Infectious  No recent results  Glucose  Recent Labs     09/24/24  0616 09/25/24  0555   GLUC 135 159*

## 2024-09-25 NOTE — TELEPHONE ENCOUNTER
Placed call to Daughter Mary to review logistics of dialysis therapy and transportation. Interested in home modalities but is currently LYLY.   Left VM to call back.

## 2024-09-25 NOTE — PROGRESS NOTES
Progress Note - Hospitalist   Name: Ramos Rosenthal 74 y.o. male I MRN: [id number]  Unit/Bed#: ICU 04-01 I Date of Admission: 9/14/2024   Date of Service: 9/25/2024 I Hospital Day: 11    Assessment & Plan  LYLY (acute kidney injury) (HCC)      Lab Results   Component Value Date    CREATININE 4.41 (H) 09/25/2024    CREATININE 5.17 (H) 09/24/2024    CREATININE 6.52 (H) 09/23/2024    EGFR 12 09/25/2024    EGFR 9 09/24/2024    EGFR 7 09/23/2024     Now on hemodialysis -Mondays, Wednesdays, and Fridays  Next dialysis session scheduled for later today  Plasmapheresis/plasma exchange also scheduled for later today  Status post a renal biopsy-evidence of glomerular basement membrane disease noted  Status post a completed 3-day course of Solu-Medrol x 1 g daily  Continue prednisone 60 mg p.o. daily  Nephrology, and critical care are following  Dispo/discharge planning once cleared by nephrology  Depression  Continue home dose fluoxetine  Paresthesia of both lower extremities  Hx of back surgery in 2004 and 2014 and has since bilateral LE paresthesia  He is primarily wheelchair bound  Venous statis ulcers noted to Bilateral LE  B/L extremity edema R>L  Continue supportive therapy  Hyponatremia  Patient has a hypervolemic hyponatremia  Patient has remained asymptomatic  Sodium is stable at 131  Continue fluid restriction  Bilateral lower extremity edema  Chronic condition  Patient with a history of lower back/spinal surgery  Patient does not ambulate  Patient is wheelchair-bound at baseline  Primary hypertension  Blood pressure stable  Continue carvedilol 6.25 mg p.o. 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 Sequential  Compression Devices Ordered.    Mobility:   Basic Mobility Inpatient Raw Score: 9  -[facility] Goal: 3: Sit at edge of bed  -HL Achieved: 2: Bed activities/Dependent transfer  Patient remains at baseline from a mobility standpoint    Patient Centered Rounds: I performed bedside rounds with nursing staff today.   Discussions with Specialists or Other Care Team Provider: Nephrology    Education and Discussions with Family / Patient:  Family brought up to par.     Current Length of Stay: 11 day(s)  Current Patient Status: Inpatient   Certification Statement: The patient will continue to require additional inpatient hospital stay due to need for continued hemodialysis and plasmapheresis  Discharge Plan:  Discharge planning once cleared by nephrology    Code Status: Level 1 - Full Code    Subjective   Patient seen, resting in bed, no new complaints    Objective     Vitals:   Temp (24hrs), Av.4 °F (36.3 °C), Min:97 °F (36.1 °C), Max:97.6 °F (36.4 °C)    Temp:  [97 °F (36.1 °C)-97.6 °F (36.4 °C)] 97.6 °F (36.4 °C)  HR:  [54-67] 67  Resp:  [12-27] 26  BP: ()/(46-62) 114/59  SpO2:  [96 %-100 %] 98 %  Body mass index is 39.68 kg/m².     Input and Output Summary (last 24 hours):     Intake/Output Summary (Last 24 hours) at 2024 0804  Last data filed at 2024 1730  Gross per 24 hour   Intake 810 ml   Output 2419 ml   Net -1609 ml       Physical Exam  Vitals reviewed.   Constitutional:       Appearance: Normal appearance. He is well-developed.   HENT:      Head: Normocephalic and atraumatic.      Nose: Nose normal.      Mouth/Throat:      Mouth: Oropharynx is clear and moist.   Eyes:      Extraocular Movements: EOM normal.      Conjunctiva/sclera: Conjunctivae normal.      Pupils: Pupils are equal, round, and reactive to light.   Neck:      Thyroid: No thyromegaly.      Vascular: No JVD.   Cardiovascular:      Rate and Rhythm: Normal rate and regular rhythm.      Heart sounds: No murmur heard.     No friction  rub. No gallop.   Pulmonary:      Effort: Pulmonary effort is normal. No respiratory distress.      Breath sounds: Normal breath sounds.   Abdominal:      General: Bowel sounds are normal. There is no distension.      Palpations: Abdomen is soft. There is no mass.      Tenderness: There is no abdominal tenderness. There is no guarding.   Musculoskeletal:         General: No edema. Normal range of motion.      Cervical back: Normal range of motion and neck supple.      Comments: 2+ bilateral lower extremity chronic edema   Lymphadenopathy:      Cervical: No cervical adenopathy.   Skin:     General: Skin is warm.      Findings: No erythema or rash.   Neurological:      Mental Status: He is alert and oriented to person, place, and time.      Cranial Nerves: No cranial nerve deficit.      Comments: Left lower extremity motor 0 out of 5, right lower extremity 1 out of 5-at baseline   Psychiatric:         Mood and Affect: Mood and affect normal.         Behavior: Behavior normal.          Lines/Drains:  Lines/Drains/Airways       Active Status       Name Placement date Placement time Site Days    CVC Central Lines 09/16/24 Double 09/16/24  0909  --  8    HD Temporary Double Catheter 09/16/24  0909  Other (Comment)  8                    Central Line:  Goal for removal: N/A - Chronic PICC               Lab Results: I have reviewed the following results:    Results from last 7 days   Lab Units 09/25/24  0555 09/24/24  0616 09/23/24  0424 09/22/24  0553   WBC Thousand/uL 20.08* 22.07*   < > 25.08*   HEMOGLOBIN g/dL 8.1* 8.0*   < > 7.7*   HEMATOCRIT % 25.2* 24.9*   < > 24.3*   PLATELETS Thousands/uL 317 334   < > 343   BANDS PCT %  --  3  --   --    SEGS PCT %  --   --   --  89*   LYMPHO PCT %  --  9*  --  5*   MONO PCT %  --  4  --  4   EOS PCT %  --  0  --  0    < > = values in this interval not displayed.     Results from last 7 days   Lab Units 09/25/24  0555 09/22/24  0553 09/21/24  0448   SODIUM mmol/L 131*   < > 130*    POTASSIUM mmol/L 4.5   < > 4.3   CHLORIDE mmol/L 101   < > 96   CO2 mmol/L 20*   < > 23   BUN mg/dL 44*   < > 40*   CREATININE mg/dL 4.41*   < > 4.75*   ANION GAP mmol/L 10   < > 11   CALCIUM mg/dL 7.6*   < > 8.2*   ALBUMIN g/dL  --   --  2.6*   TOTAL BILIRUBIN mg/dL  --   --  0.28   ALK PHOS U/L  --   --  91   ALT U/L  --   --  187*   AST U/L  --   --  225*   GLUCOSE RANDOM mg/dL 159*   < > 200*    < > = values in this interval not displayed.     Results from last 7 days   Lab Units 09/25/24  0555   INR  1.44*             Results from last 7 days   Lab Units 09/21/24  0448   PROCALCITONIN ng/ml 3.04*       Recent Cultures (last 7 days):         Imaging Review: No pertinent imaging studies reviewed.  Other Studies: No additional pertinent studies reviewed.    Last 24 Hours Medication List:     Current Facility-Administered Medications:     albumin human (FLEXBUMIN) 5 % injection 250 g, Once    calcium carbonate (TUMS) chewable tablet 1,000 mg, Daily PRN    calcium gluconate 2 g in sodium chloride 0.9% 100 mL (premix), Once    carvedilol (COREG) tablet 6.25 mg, BID With Meals    cyclophosphamide (CYTOXAN) capsule 100 mg, Daily    famotidine (PEPCID) tablet 20 mg, Daily    FLUoxetine (PROzac) capsule 20 mg, Daily    heparin (porcine) subcutaneous injection 5,000 Units, Q8H LORENZO    ondansetron (ZOFRAN) injection 4 mg, Q6H PRN    pantoprazole (PROTONIX) EC tablet 20 mg, Early Morning    predniSONE tablet 60 mg, Daily    sulfamethoxazole-trimethoprim (BACTRIM DS) 800-160 mg per tablet 1 tablet, Once per day on Monday Wednesday Friday    Administrative Statements   Today, Patient Was Seen By: Agnes Rust MD  I have spent a total time of 35 minutes in caring for this patient on the day of the visit/encounter including Patient and family education, Impressions, Counseling / Coordination of care, Documenting in the medical record, Reviewing / ordering tests, medicine, procedures  , and Communicating with other  healthcare professionals .    **Please Note: This note may have been constructed using a voice recognition system.**

## 2024-09-26 PROBLEM — I10 PRIMARY HYPERTENSION: Status: RESOLVED | Noted: 2024-09-17 | Resolved: 2024-09-26

## 2024-09-26 LAB
ABO GROUP BLD BPU: NORMAL
ABO GROUP BLD: NORMAL
ANION GAP SERPL CALCULATED.3IONS-SCNC: 7 MMOL/L (ref 4–13)
BLD GP AB SCN SERPL QL: NEGATIVE
BPU ID: NORMAL
BUN SERPL-MCNC: 26 MG/DL (ref 5–25)
CA-I BLD-SCNC: 0.93 MMOL/L (ref 1.12–1.32)
CALCIUM SERPL-MCNC: 7.7 MG/DL (ref 8.4–10.2)
CHLORIDE SERPL-SCNC: 100 MMOL/L (ref 96–108)
CO2 SERPL-SCNC: 25 MMOL/L (ref 21–32)
CREAT SERPL-MCNC: 3.17 MG/DL (ref 0.6–1.3)
ERYTHROCYTE [DISTWIDTH] IN BLOOD BY AUTOMATED COUNT: 15.9 % (ref 11.6–15.1)
FIBRINOGEN PPP-MCNC: 165 MG/DL (ref 206–523)
GFR SERPL CREATININE-BSD FRML MDRD: 18 ML/MIN/1.73SQ M
GLUCOSE SERPL-MCNC: 164 MG/DL (ref 65–140)
HCT VFR BLD AUTO: 22.8 % (ref 36.5–49.3)
HGB BLD-MCNC: 7.3 G/DL (ref 12–17)
INR PPP: 1.37 (ref 0.85–1.19)
MCH RBC QN AUTO: 28 PG (ref 26.8–34.3)
MCHC RBC AUTO-ENTMCNC: 32 G/DL (ref 31.4–37.4)
MCV RBC AUTO: 87 FL (ref 82–98)
PLATELET # BLD AUTO: 302 THOUSANDS/UL (ref 149–390)
PMV BLD AUTO: 9.1 FL (ref 8.9–12.7)
POTASSIUM SERPL-SCNC: 4 MMOL/L (ref 3.5–5.3)
PROTHROMBIN TIME: 17.4 SECONDS (ref 12.3–15)
RBC # BLD AUTO: 2.61 MILLION/UL (ref 3.88–5.62)
RH BLD: POSITIVE
SODIUM SERPL-SCNC: 132 MMOL/L (ref 135–147)
SPECIMEN EXPIRATION DATE: NORMAL
UNIT DISPENSE STATUS: NORMAL
UNIT PRODUCT CODE: NORMAL
UNIT PRODUCT VOLUME: 125 ML
UNIT RH: NORMAL
WBC # BLD AUTO: 18.92 THOUSAND/UL (ref 4.31–10.16)

## 2024-09-26 PROCEDURE — 99233 SBSQ HOSP IP/OBS HIGH 50: CPT | Performed by: STUDENT IN AN ORGANIZED HEALTH CARE EDUCATION/TRAINING PROGRAM

## 2024-09-26 PROCEDURE — 99232 SBSQ HOSP IP/OBS MODERATE 35: CPT | Performed by: HOSPITALIST

## 2024-09-26 PROCEDURE — 82330 ASSAY OF CALCIUM: CPT | Performed by: STUDENT IN AN ORGANIZED HEALTH CARE EDUCATION/TRAINING PROGRAM

## 2024-09-26 PROCEDURE — 99232 SBSQ HOSP IP/OBS MODERATE 35: CPT | Performed by: INTERNAL MEDICINE

## 2024-09-26 PROCEDURE — 86901 BLOOD TYPING SEROLOGIC RH(D): CPT | Performed by: STUDENT IN AN ORGANIZED HEALTH CARE EDUCATION/TRAINING PROGRAM

## 2024-09-26 PROCEDURE — 85384 FIBRINOGEN ACTIVITY: CPT | Performed by: STUDENT IN AN ORGANIZED HEALTH CARE EDUCATION/TRAINING PROGRAM

## 2024-09-26 PROCEDURE — 83520 IMMUNOASSAY QUANT NOS NONAB: CPT

## 2024-09-26 PROCEDURE — 86923 COMPATIBILITY TEST ELECTRIC: CPT

## 2024-09-26 PROCEDURE — 85610 PROTHROMBIN TIME: CPT | Performed by: STUDENT IN AN ORGANIZED HEALTH CARE EDUCATION/TRAINING PROGRAM

## 2024-09-26 PROCEDURE — 86850 RBC ANTIBODY SCREEN: CPT | Performed by: STUDENT IN AN ORGANIZED HEALTH CARE EDUCATION/TRAINING PROGRAM

## 2024-09-26 PROCEDURE — 80048 BASIC METABOLIC PNL TOTAL CA: CPT | Performed by: HOSPITALIST

## 2024-09-26 PROCEDURE — 86900 BLOOD TYPING SEROLOGIC ABO: CPT | Performed by: STUDENT IN AN ORGANIZED HEALTH CARE EDUCATION/TRAINING PROGRAM

## 2024-09-26 PROCEDURE — 85027 COMPLETE CBC AUTOMATED: CPT | Performed by: HOSPITALIST

## 2024-09-26 RX ORDER — CALCIUM GLUCONATE 20 MG/ML
2 INJECTION, SOLUTION INTRAVENOUS ONCE
Status: COMPLETED | OUTPATIENT
Start: 2024-09-26 | End: 2024-09-26

## 2024-09-26 RX ADMIN — PREDNISONE 60 MG: 20 TABLET ORAL at 16:42

## 2024-09-26 RX ADMIN — ALBUMIN (HUMAN) 275 G: 12.5 INJECTION, SOLUTION INTRAVENOUS at 11:56

## 2024-09-26 RX ADMIN — CYCLOPHOSPHAMIDE 100 MG: 50 CAPSULE ORAL at 16:45

## 2024-09-26 RX ADMIN — FLUOXETINE HYDROCHLORIDE 20 MG: 20 CAPSULE ORAL at 08:00

## 2024-09-26 RX ADMIN — HEPARIN SODIUM 5000 UNITS: 5000 INJECTION, SOLUTION INTRAVENOUS; SUBCUTANEOUS at 14:45

## 2024-09-26 RX ADMIN — CARVEDILOL 6.25 MG: 3.12 TABLET, FILM COATED ORAL at 07:59

## 2024-09-26 RX ADMIN — CARVEDILOL 6.25 MG: 3.12 TABLET, FILM COATED ORAL at 16:42

## 2024-09-26 RX ADMIN — FAMOTIDINE 20 MG: 20 TABLET, FILM COATED ORAL at 08:00

## 2024-09-26 RX ADMIN — HEPARIN SODIUM 5000 UNITS: 5000 INJECTION, SOLUTION INTRAVENOUS; SUBCUTANEOUS at 21:02

## 2024-09-26 RX ADMIN — HEPARIN SODIUM 5000 UNITS: 5000 INJECTION, SOLUTION INTRAVENOUS; SUBCUTANEOUS at 05:00

## 2024-09-26 RX ADMIN — CALCIUM GLUCONATE 2 G: 20 INJECTION, SOLUTION INTRAVENOUS at 12:15

## 2024-09-26 RX ADMIN — PANTOPRAZOLE SODIUM 20 MG: 20 TABLET, DELAYED RELEASE ORAL at 05:00

## 2024-09-26 NOTE — ASSESSMENT & PLAN NOTE
Patient has a hypervolemic hyponatremia  Patient has remained asymptomatic  Sodium is stable at 132  Continue fluid restriction

## 2024-09-26 NOTE — PLAN OF CARE
Problem: PAIN - ADULT  Goal: Verbalizes/displays adequate comfort level or baseline comfort level  Description: Interventions:  - Encourage patient to monitor pain and request assistance  - Assess pain using appropriate pain scale  - Administer analgesics based on type and severity of pain and evaluate response  - Implement non-pharmacological measures as appropriate and evaluate response  - Consider cultural and social influences on pain and pain management  - Notify physician/advanced practitioner if interventions unsuccessful or patient reports new pain  Outcome: Progressing     Problem: INFECTION - ADULT  Goal: Absence or prevention of progression during hospitalization  Description: INTERVENTIONS:  - Assess and monitor for signs and symptoms of infection  - Monitor lab/diagnostic results  - Monitor all insertion sites, i.e. indwelling lines, tubes, and drains  - Monitor endotracheal if appropriate and nasal secretions for changes in amount and color  - [place] appropriate cooling/warming therapies per order  - Administer medications as ordered  - Instruct and encourage patient and family to use good hand hygiene technique  - Identify and instruct in appropriate isolation precautions for identified infection/condition  Outcome: Progressing  Goal: Absence of fever/infection during neutropenic period  Description: INTERVENTIONS:  - Monitor WBC    Outcome: Progressing     Problem: SAFETY ADULT  Goal: Patient will remain free of falls  Description: INTERVENTIONS:  - Educate patient/family on patient safety including physical limitations  - Instruct patient to call for assistance with activity   - Consult OT/PT to assist with strengthening/mobility   - Keep Call bell within reach  - Keep bed low and locked with side rails adjusted as appropriate  - Keep care items and personal belongings within reach  - Initiate and maintain comfort rounds  - Make Fall Risk Sign visible to staff  - Offer Toileting every 2 Hours,  in advance of need  - Initiate/Maintain bed alarm  - Obtain necessary fall risk management equipment  - Apply yellow socks and bracelet for high fall risk patients  - Consider moving patient to room near nurses station  Outcome: Progressing  Goal: Maintain or return to baseline ADL function  Description: INTERVENTIONS:  -  Assess patient's ability to carry out ADLs; assess patient's baseline for ADL function and identify physical deficits which impact ability to perform ADLs (bathing, care of mouth/teeth, toileting, grooming, dressing, etc.)  - Assess/evaluate cause of self-care deficits   - Assess range of motion  - Assess patient's mobility; develop plan if impaired  - Assess patient's need for assistive devices and provide as appropriate  - Encourage maximum independence but intervene and supervise when necessary  - Involve family in performance of ADLs  - Assess for home care needs following discharge   - Consider OT consult to assist with ADL evaluation and planning for discharge  - Provide patient education as appropriate  Outcome: Progressing  Goal: Maintains/Returns to pre admission functional level  Description: INTERVENTIONS:  - Perform AM-PAC 6 Click Basic Mobility/ Daily Activity assessment daily.  - Set and communicate daily mobility goal to care team and patient/family/caregiver.   - Collaborate with rehabilitation services on mobility goals if consulted  - Out of bed for meals   - Out of bed for toileting  - Record patient progress and toleration of activity level   Outcome: Progressing     Problem: DISCHARGE PLANNING  Goal: Discharge to home or other facility with appropriate resources  Description: INTERVENTIONS:  - Identify barriers to discharge w/patient and caregiver  - Arrange for needed discharge resources and transportation as appropriate  - Identify discharge learning needs (meds, wound care, etc.)  - Arrange for interpretive services to assist at discharge as needed  - Refer to Case  Management Department for coordinating discharge planning if the patient needs post-hospital services based on physician/advanced practitioner order or complex needs related to functional status, cognitive ability, or social support system  Outcome: Progressing     Problem: Knowledge Deficit  Goal: Patient/family/caregiver demonstrates understanding of disease process, treatment plan, medications, and discharge instructions  Description: Complete learning assessment and assess knowledge base.  Interventions:  - Provide teaching at level of understanding  - Provide teaching via preferred learning methods  Outcome: Progressing     Problem: Prexisting or High Potential for Compromised Skin Integrity  Goal: Skin integrity is maintained or improved  Description: INTERVENTIONS:  - Identify patients at risk for skin breakdown  - Assess and monitor skin integrity  - Assess and monitor nutrition and hydration status  - Monitor labs   - Assess for incontinence   - Turn and reposition patient  - Assist with mobility/ambulation  - Relieve pressure over bony prominences  - Avoid friction and shearing  - Provide appropriate hygiene as needed including keeping skin clean and dry  - Evaluate need for skin moisturizer/barrier cream  - Collaborate with interdisciplinary team   - Patient/family teaching  - Consider wound care consult   Outcome: Progressing     Problem: METABOLIC, FLUID AND ELECTROLYTES - ADULT  Goal: Electrolytes maintained within normal limits  Description: INTERVENTIONS:  - Monitor labs and assess patient for signs and symptoms of electrolyte imbalances  - Administer electrolyte replacement as ordered  - Monitor response to electrolyte replacements, including repeat lab results as appropriate  - Instruct patient on fluid and nutrition as appropriate  Outcome: Progressing  Goal: Fluid balance maintained  Description: INTERVENTIONS:  - Monitor labs   - Monitor I/O and WT  - Instruct patient on fluid and nutrition as  appropriate  - Assess for signs & symptoms of volume excess or deficit  Outcome: Progressing

## 2024-09-26 NOTE — ASSESSMENT & PLAN NOTE
Continue with fluid restriction and ultrafiltration as tolerated on dialysis days   Social Work Case note          Clinic Name: [facility] [address]  [address]  [facility] [address]    Time Encounter Began: 430    :Encounter Type: Telephone Encounter    Others Present:     Data: Provider requested SW call patient to discuss housing resources    Assessment: SW called patient who reported that she is looking for housing options for her and her young children.  Patient indicated that she has not applied but is interested in community resources.  SW offered to send her resources through her [employer] nika which patient was agreeable to.  SW encouraged patient to call or message SW if any additional needs arise.     Social Determinants of Health  [] Financial  [] Food  [] Social  [] Health Care  [x] Environment   Patient was informed of rights and responsibilities and consented to participate in telehealth social work services: Yes    Patient confirmed sufficient privacy to participate in telehealth social work services: Yes    Patient consented to receive patient education materials and healthcare documents via [facility] encrypted email: No      Other pertinent information:     Plan:  SW to send housing resources to patient via [employer].  SW encouraged patient to call or message if any additional needs arise or if additional assistance is needed.    Community Resources needed? Yes    Resources provided: [employer], Legal action, Community advocates, Rental Housing resource center    Maryam Ortiz

## 2024-09-26 NOTE — ASSESSMENT & PLAN NOTE
Bone marrow with Anti-GBM glomerulonephritis  Continued plasma exchange-Spoke to [facility]   1.5 L plasma exchange with Albumin 5%/FFP-awaiting total amount needed from [facility]  Baseline and daily CBC/PT/INR/Ical/ and fibrinogen level  Calcium gluconate replacement per plasma exchange tech    Plan:  Nephrology following  Completed Solumedrol 1000 mg daily x3d  Pt receiving daily plasmapheresis with 5% Albumin. Next exchange scheduled for 9/28/24  Received 1 unit of Cryo 9/25 for Fibrinogen level of 159.   If am Fibrinogen level < 150, transfuse Cryo

## 2024-09-26 NOTE — PROGRESS NOTES
Progress Note - Hospitalist   Name: Ramos Rosenthal 74 y.o. male I MRN: [id number]  Unit/Bed#: ICU 04-01 I Date of Admission: 9/14/2024   Date of Service: 9/26/2024 I Hospital Day: 12    Assessment & Plan  LYLY (acute kidney injury) (HCC)      Lab Results   Component Value Date    CREATININE 3.17 (H) 09/26/2024    CREATININE 4.41 (H) 09/25/2024    CREATININE 5.17 (H) 09/24/2024    EGFR 18 09/26/2024    EGFR 12 09/25/2024    EGFR 9 09/24/2024     Now on hemodialysis -Mondays, Wednesdays, and Fridays  Next dialysis scheduled for tomorrow  Continue plasmapheresis/plasma exchange  Status post a renal biopsy-evidence of glomerular basement membrane disease noted  Status post a completed 3-day course of Solu-Medrol x 1 g daily  Continue prednisone 60 mg p.o. daily -steroid induced leukocytosis noted  Nephrology, and critical care are following  Patient needs a dialysis permacath -hopefully IR can place this tomorrow on 9/27/2024 -patient will have to miss 1 days worth of plasmapheresis/plasma exchange for the permacath placement  Case reviewed in great depth with nephrology, discharge planning will commence upon 2 weeks of completion of plasmapheresis, therefore discharge planning will commence around Saturday, October 5 or later    Depression  Continue home dose fluoxetine  Paresthesia of both lower extremities  Hx of back surgery in 2004 and 2014 and has since bilateral LE paresthesia  He is primarily wheelchair bound  Venous statis ulcers noted to Bilateral LE  B/L extremity edema R>L  Continue supportive therapy  Hyponatremia  Patient has a hypervolemic hyponatremia  Patient has remained asymptomatic  Sodium is stable at 132  Continue fluid restriction  Bilateral lower extremity edema  Chronic condition  Patient with a history of lower back/spinal surgery  Patient does not ambulate  Patient is wheelchair-bound at baseline  Primary hypertension  Continue carvedilol  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 Sequential Compression Devices Ordered.    Mobility:   Basic Mobility Inpatient Raw Score: 9  -[facility] Goal: 3: Sit at edge of bed  -[facility] Achieved: 2: Bed activities/Dependent transfer  Patient is at baseline from a mobility standpoint    Patient Centered Rounds: I performed bedside rounds with nursing staff today.   Discussions with Specialists or Other Care Team Provider: Nephrology    Education and Discussions with Family / Patient: Patient's family members have been brought up to par on a daily basis    Current Length of Stay: 12 day(s)  Current Patient Status: Inpatient   Certification Statement: The patient will continue to require additional inpatient hospital stay due to need for continued plasmapheresis, and hemodialysis  Discharge Plan:  Discharge planning will happen after Saturday, 2024    Code Status: Level 1 - Full Code    Subjective   Patient seen, resting in bed, no new complaints, reports feeling well    Objective     Vitals:   Temp (24hrs), Av.4 °F (35.8 °C), Min:95.6 °F (35.3 °C), Max:97.3 °F (36.3 °C)    Temp:  [95.6 °F (35.3 °C)-97.3 °F (36.3 °C)] 97.3 °F (36.3 °C)  HR:  [53-79] 65  Resp:  [14-40] 16  BP: ()/(51-80) 137/80  SpO2:  [95 %-100 %] 95 %  Body mass index is 39.6 kg/m².     Input and Output Summary (last 24 hours):     Intake/Output Summary (Last 24 hours) at 2024 0810  Last data filed at 2024 1801  Gross per 24 hour   Intake 1275 ml   Output 2200 ml   Net -925 ml       Physical Exam  Vitals reviewed.   Constitutional:       Appearance: Normal appearance. He is well-developed.   HENT:      Head: Normocephalic and atraumatic.      Nose: Nose normal.      Mouth/Throat:       Mouth: Oropharynx is clear and moist.   Eyes:      Extraocular Movements: EOM normal.      Conjunctiva/sclera: Conjunctivae normal.      Pupils: Pupils are equal, round, and reactive to light.   Neck:      Thyroid: No thyromegaly.      Vascular: No JVD.   Cardiovascular:      Rate and Rhythm: Normal rate and regular rhythm.      Heart sounds: No murmur heard.     No friction rub. No gallop.   Pulmonary:      Effort: Pulmonary effort is normal. No respiratory distress.      Breath sounds: Normal breath sounds.   Abdominal:      General: Bowel sounds are normal. There is no distension.      Palpations: Abdomen is soft. There is no mass.      Tenderness: There is no abdominal tenderness. There is no guarding.   Musculoskeletal:         General: No edema. Normal range of motion.      Cervical back: Normal range of motion and neck supple.      Comments: Bilateral lower extremity chronic edema noted   Lymphadenopathy:      Cervical: No cervical adenopathy.   Skin:     General: Skin is warm.      Findings: No erythema or rash.   Neurological:      Mental Status: He is alert and oriented to person, place, and time.      Cranial Nerves: No cranial nerve deficit.      Comments: Motor 1 out of 5 in the left lower extremity, 0 out of 5 in the right lower extremity   Psychiatric:         Mood and Affect: Mood and affect normal.         Behavior: Behavior normal.          Lines/Drains:  Lines/Drains/Airways       Active Status       Name Placement date Placement time Site Days    CVC Central Lines 09/16/24 Double 09/16/24  0909  --  9    HD Temporary Double Catheter 09/16/24  0909  Other (Comment)  9                    Central Line:  Goal for removal: Continuing for TPN.                Lab Results: I have reviewed the following results:    Results from last 7 days   Lab Units 09/26/24  0452 09/25/24  0555 09/24/24  0616 09/23/24  0424 09/22/24  0553   WBC Thousand/uL 18.92*   < > 22.07*   < > 25.08*   HEMOGLOBIN g/dL 7.3*   < >  8.0*   < > 7.7*   HEMATOCRIT % 22.8*   < > 24.9*   < > 24.3*   PLATELETS Thousands/uL 302   < > 334   < > 343   BANDS PCT %  --   --  3  --   --    SEGS PCT %  --   --   --   --  89*   LYMPHO PCT %  --   --  9*  --  5*   MONO PCT %  --   --  4  --  4   EOS PCT %  --   --  0  --  0    < > = values in this interval not displayed.     Results from last 7 days   Lab Units 09/26/24  0452 09/22/24  0553 09/21/24  0448   SODIUM mmol/L 132*   < > 130*   POTASSIUM mmol/L 4.0   < > 4.3   CHLORIDE mmol/L 100   < > 96   CO2 mmol/L 25   < > 23   BUN mg/dL 26*   < > 40*   CREATININE mg/dL 3.17*   < > 4.75*   ANION GAP mmol/L 7   < > 11   CALCIUM mg/dL 7.7*   < > 8.2*   ALBUMIN g/dL  --   --  2.6*   TOTAL BILIRUBIN mg/dL  --   --  0.28   ALK PHOS U/L  --   --  91   ALT U/L  --   --  187*   AST U/L  --   --  225*   GLUCOSE RANDOM mg/dL 164*   < > 200*    < > = values in this interval not displayed.     Results from last 7 days   Lab Units 09/26/24  0452   INR  1.37*             Results from last 7 days   Lab Units 09/21/24  0448   PROCALCITONIN ng/ml 3.04*       Recent Cultures (last 7 days):         Imaging Review: No pertinent imaging studies reviewed.  Other Studies: No additional pertinent studies reviewed.    Last 24 Hours Medication List:     Current Facility-Administered Medications:     albumin human (FLEXBUMIN) 5 % injection 275 g, Once    calcium carbonate (TUMS) chewable tablet 1,000 mg, Daily PRN    carvedilol (COREG) tablet 6.25 mg, BID With Meals    cyclophosphamide (CYTOXAN) capsule 100 mg, Daily    famotidine (PEPCID) tablet 20 mg, Daily    FLUoxetine (PROzac) capsule 20 mg, Daily    heparin (porcine) subcutaneous injection 5,000 Units, Q8H LORENZO    ondansetron (ZOFRAN) injection 4 mg, Q6H PRN    pantoprazole (PROTONIX) EC tablet 20 mg, Early Morning    predniSONE tablet 60 mg, Daily    sulfamethoxazole-trimethoprim (BACTRIM DS) 800-160 mg per tablet 1 tablet, Once per day on Monday Wednesday Friday    Administrative  Statements   Today, Patient Was Seen By: Agnes Rust MD  I have spent a total time of 35 minutes in caring for this patient on the day of the visit/encounter including Prognosis, Patient and family education, Impressions, Counseling / Coordination of care, Documenting in the medical record, Reviewing / ordering tests, medicine, procedures  , and Communicating with other healthcare professionals .    **Please Note: This note may have been constructed using a voice recognition system.**

## 2024-09-26 NOTE — PLAN OF CARE
Problem: PAIN - ADULT  Goal: Verbalizes/displays adequate comfort level or baseline comfort level  Description: Interventions:  - Encourage patient to monitor pain and request assistance  - Assess pain using appropriate pain scale  - Administer analgesics based on type and severity of pain and evaluate response  - Implement non-pharmacological measures as appropriate and evaluate response  - Consider cultural and social influences on pain and pain management  - Notify physician/advanced practitioner if interventions unsuccessful or patient reports new pain  Outcome: Progressing     Problem: SAFETY ADULT  Goal: Patient will remain free of falls  Description: INTERVENTIONS:  - Educate patient/family on patient safety including physical limitations  - Instruct patient to call for assistance with activity   - Consult OT/PT to assist with strengthening/mobility   - Keep Call bell within reach  - Keep bed low and locked with side rails adjusted as appropriate  - Keep care items and personal belongings within reach  - Initiate and maintain comfort rounds  - Make Fall Risk Sign visible to staff  - Offer Toileting every 2 Hours, in advance of need  - Initiate/Maintain bed alarm  - Obtain necessary fall risk management equipment  - Apply yellow socks and bracelet for high fall risk patients  - Consider moving patient to room near nurses station  Outcome: Progressing  Goal: Maintain or return to baseline ADL function  Description: INTERVENTIONS:  -  Assess patient's ability to carry out ADLs; assess patient's baseline for ADL function and identify physical deficits which impact ability to perform ADLs (bathing, care of mouth/teeth, toileting, grooming, dressing, etc.)  - Assess/evaluate cause of self-care deficits   - Assess range of motion  - Assess patient's mobility; develop plan if impaired  - Assess patient's need for assistive devices and provide as appropriate  - Encourage maximum independence but intervene and  supervise when necessary  - Involve family in performance of ADLs  - Assess for home care needs following discharge   - Consider OT consult to assist with ADL evaluation and planning for discharge  - Provide patient education as appropriate  Outcome: Progressing  Goal: Maintains/Returns to pre admission functional level  Description: INTERVENTIONS:  - Perform AM-PAC 6 Click Basic Mobility/ Daily Activity assessment daily.  - Set and communicate daily mobility goal to care team and patient/family/caregiver.   - Collaborate with rehabilitation services on mobility goals if consulted  - Out of bed for meals   - Out of bed for toileting  - Record patient progress and toleration of activity level   Outcome: Progressing     Problem: METABOLIC, FLUID AND ELECTROLYTES - ADULT  Goal: Electrolytes maintained within normal limits  Description: INTERVENTIONS:  - Monitor labs and assess patient for signs and symptoms of electrolyte imbalances  - Administer electrolyte replacement as ordered  - Monitor response to electrolyte replacements, including repeat lab results as appropriate  - Instruct patient on fluid and nutrition as appropriate  Outcome: Progressing  Goal: Fluid balance maintained  Description: INTERVENTIONS:  - Monitor labs   - Monitor I/O and WT  - Instruct patient on fluid and nutrition as appropriate  - Assess for signs & symptoms of volume excess or deficit  Outcome: Progressing

## 2024-09-26 NOTE — ASSESSMENT & PLAN NOTE
Lab Results   Component Value Date    CREATININE 3.17 (H) 09/26/2024    CREATININE 4.41 (H) 09/25/2024    CREATININE 5.17 (H) 09/24/2024    EGFR 18 09/26/2024    EGFR 12 09/25/2024    EGFR 9 09/24/2024     Now on hemodialysis -Mondays, Wednesdays, and Fridays  Next dialysis scheduled for tomorrow  Continue plasmapheresis/plasma exchange  Status post a renal biopsy-evidence of glomerular basement membrane disease noted  Status post a completed 3-day course of Solu-Medrol x 1 g daily  Continue prednisone 60 mg p.o. daily -steroid induced leukocytosis noted  Nephrology, and critical care are following  Patient needs a dialysis permacath -hopefully IR can place this tomorrow on 9/27/2024 -patient will have to miss 1 days worth of plasmapheresis/plasma exchange for the permacath placement  Case reviewed in great depth with nephrology, discharge planning will commence upon 2 weeks of completion of plasmapheresis, therefore discharge planning will commence around Saturday, October 5 or later

## 2024-09-26 NOTE — ASSESSMENT & PLAN NOTE
Continue with current management including plasmapheresis, cyclophosphamide and prednisone.  Continue with Bactrim for immunoprophylaxis and PPI/Pepcid for gastric prophylaxis.  The patient's medications are to be dosed post hemodialysis.  Cyclophosphamide dose adjusted for 1mg/kg due to dialysis requirement.   Continue 5% albumin for PLEX and FFP/cryo as appropriate per CC. Monitor daily fibrinogen.

## 2024-09-26 NOTE — PROGRESS NOTES
Progress Note - Nephrology   Name: Ramos Rosenthal 74 y.o. male I MRN: [id number]  Unit/Bed#: ICU 04-01 I Date of Admission: 9/14/2024   Date of Service: 9/26/2024 I Hospital Day: 12     Assessment & Plan  LYLY (acute kidney injury) (HCC)  Patient is now dialysis dependent after being diagnosed with anti-GBM glomerulonephritis.  100% crescents on biopsy from preliminary report, awaiting official biopsy report. Higher crescent % and dialysis requirement associated with poor renal outcomes. Daughter and patient aware after discussion regarding prelim biopsy.   UOP anuric as recorded, per patient not urinating.  Continue PLEX therapy, next tx today on 9/26. Plan for PLEX until 10/5 for 2 week duration.  Discussed with primary, CC team and IR, plan to hold PLEX tomorrow to allow permcath placement and minimize risk of bleeding complications. Resume PLEX on 9/28. IR aware.   Continue to monitor for renal recovery.   Next HD on 9/27, currently on MWF schedule. As best we can, arrange HD post PLEX for volume management.   Trend antigbm antibody q 2 days--- 9/15 >8.0.   9/22 >8.0.     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  Continue with current management including plasmapheresis, cyclophosphamide and prednisone.  Continue with Bactrim for immunoprophylaxis and PPI/Pepcid for gastric prophylaxis.  The patient's medications are to be dosed post hemodialysis.  Cyclophosphamide dose adjusted for 1mg/kg due to dialysis requirement.   Continue 5% albumin for PLEX and FFP/cryo as appropriate per CC. Monitor daily fibrinogen.   Hyponatremia  Continue with fluid restriction and ultrafiltration as tolerated on dialysis days  Bilateral lower extremity edema  HD POST PLEX for volume management, low-salt diet  Primary hypertension  Monitor on Coreg.  Continue to monitor at this time    I have reviewed the nephrology recommendations including PLEX/ permcath, with primary, IR and critical care, and we are in  agreement with renal plan including the information outlined above.     History of Present Illness   Brief History of Admission - 75 yo male with hx gout presented with severe LYLY with Cr 7.9 and hematuria and proteinuria. Found to have + antigbm on serologies and renal biopsy performed 9/19. Awaiting official biopsy results. Started on PLEX 9/21.     Patient seen and examined today. Denies chest pain,shortness of breath,nausea,vomiting,diarrhea, lightheadness, dizziness, paresthesias.  A complete 10 point review of systems was performed and is otherwise negative.     Objective      Temp:  [95.6 °F (35.3 °C)-97.3 °F (36.3 °C)] 97.3 °F (36.3 °C)  HR:  [53-79] 65  Resp:  [14-40] 23  BP: ()/(51-80) 137/80  O2 Device: None (Room air)         Vitals:    09/25/24 0600 09/26/24 0600   Weight: (!) 140 kg (309 lb 1.4 oz) (!) 140 kg (308 lb 6.8 oz)     I/O last 24 hours:  In: 1455 [P.O.:900; I.V.:430; Blood:125]  Out: 2200 [Other:2200]  Lines/Drains/Airways       Active Status       Name Placement date Placement time Site Days    CVC Central Lines 09/16/24 Double 09/16/24  0909  --  10    HD Temporary Double Catheter 09/16/24 0909  Other (Comment)  10                  Physical Exam  Vitals reviewed.   Constitutional:       General: He is not in acute distress.     Appearance: He is obese. He is not ill-appearing.   HENT:      Head: Normocephalic and atraumatic.      Nose: Nose normal.      Mouth/Throat:      Mouth: Mucous membranes are moist.      Pharynx: Oropharynx is clear.   Cardiovascular:      Rate and Rhythm: Normal rate and regular rhythm.      Heart sounds:      No friction rub.   Pulmonary:      Breath sounds: No wheezing, rhonchi or rales.   Abdominal:      Palpations: Abdomen is soft.      Tenderness: There is no abdominal tenderness. There is no guarding.   Musculoskeletal:      Right lower leg: Edema present.      Left lower leg: Edema present.   Skin:     General: Skin is warm and dry.      Coloration:  Skin is not jaundiced.      Findings: No bruising.   Neurological:      Mental Status: He is alert. Mental status is at baseline.      Cranial Nerves: No cranial nerve deficit.        Medications:    Current Facility-Administered Medications:     calcium carbonate (TUMS) chewable tablet 1,000 mg, 1,000 mg, Oral, Daily PRN, BRETT Xie    calcium gluconate 2 g in sodium chloride 0.9% 100 mL (premix), 2 g, Intravenous, Once, BRETT Gary, Last Rate: 100 mL/hr at 09/26/24 1215, 2 g at 09/26/24 1215    carvedilol (COREG) tablet 6.25 mg, 6.25 mg, Oral, BID With Meals, Gigi Varvarshabbirs DO, 6.25 mg at 09/26/24 0759    cyclophosphamide (CYTOXAN) capsule 100 mg, 100 mg, Oral, Daily, Lisa Moore DO, 100 mg at 09/25/24 1754    famotidine (PEPCID) tablet 20 mg, 20 mg, Oral, Daily, Ramos Mclean DO, 20 mg at 09/26/24 0800    FLUoxetine (PROzac) capsule 20 mg, 20 mg, Oral, Daily, BRETT Xie, 20 mg at 09/26/24 0800    heparin (porcine) subcutaneous injection 5,000 Units, 5,000 Units, Subcutaneous, Q8H LORENZO, BRETT Xie, 5,000 Units at 09/26/24 0500    ondansetron (ZOFRAN) injection 4 mg, 4 mg, Intravenous, Q6H PRN, BRETT Xie    pantoprazole (PROTONIX) EC tablet 20 mg, 20 mg, Oral, Early Morning, Ramos Mclean DO, 20 mg at 09/26/24 0500    predniSONE tablet 60 mg, 60 mg, Oral, Daily, Lisa Moore DO, 60 mg at 09/25/24 1754    sulfamethoxazole-trimethoprim (BACTRIM DS) 800-160 mg per tablet 1 tablet, 1 tablet, Oral, Once per day on Monday Wednesday Friday, Lisa Moore DO, 1 tablet at 09/25/24 1754      Lab Results: I have reviewed the following results:   Results from last 7 days   Lab Units 09/26/24  0452 09/25/24  0555 09/24/24  0616 09/23/24  0424 09/22/24  0553 09/21/24  0448 09/20/24  0534   WBC Thousand/uL 18.92* 20.08* 22.07* 22.78* 25.08* 21.23* 17.99*   HEMOGLOBIN g/dL 7.3* 8.1* 8.0* 7.7* 7.7* 8.4* 8.6*  "  HEMATOCRIT % 22.8* 25.2* 24.9* 24.2* 24.3* 26.3* 26.9*   PLATELETS Thousands/uL 302 317 334 326 343 347 362   POTASSIUM mmol/L 4.0 4.5 4.4 4.6 4.3 4.3 4.5   CHLORIDE mmol/L 100 101 100 99 99 96 98   CO2 mmol/L 25 20* 19* 20* 20* 23 21   BUN mg/dL 26* 44* 60* 75* 62* 40* 53*   CREATININE mg/dL 3.17* 4.41* 5.17* 6.52* 5.72* 4.75* 7.08*   CALCIUM mg/dL 7.7* 7.6* 7.3* 7.6* 7.9* 8.2* 8.1*   MAGNESIUM mg/dL  --   --   --  2.1 2.2 2.2 2.3   PHOSPHORUS mg/dL  --   --   --  5.5* 5.2* 4.2* 4.3*   ALBUMIN g/dL  --   --   --   --   --  2.6*  --        Administrative Statements     Portions of the record may have been created with voice recognition software. Occasional wrong word or \"sound a like\" substitutions may have occurred due to the inherent limitations of voice recognition software. Read the chart carefully and recognize, using context, where substitutions have occurred.If you have any questions, please contact the dictating provider.  "

## 2024-09-26 NOTE — ASSESSMENT & PLAN NOTE
Patient is now dialysis dependent after being diagnosed with anti-GBM glomerulonephritis.  100% crescents on biopsy from preliminary report, awaiting official biopsy report. Higher crescent % and dialysis requirement associated with poor renal outcomes. Daughter and patient aware after discussion regarding prelim biopsy.   UOP anuric as recorded, per patient not urinating.  Continue PLEX therapy, next tx today on 9/26. Plan for PLEX until 10/5 for 2 week duration.  Discussed with primary, CC team and IR, plan to hold PLEX tomorrow to allow permcath placement and minimize risk of bleeding complications. Resume PLEX on 9/28. IR aware.   Continue to monitor for renal recovery.   Next HD on 9/27, currently on MWF schedule. As best we can, arrange HD post PLEX for volume management.   Trend antigbm antibody q 2 days--- 9/15 >8.0.   9/22 >8.0.

## 2024-09-26 NOTE — PROGRESS NOTES
Progress Note - Critical Care/ICU   Name: Ramos Rosenthal 74 y.o. male I MRN: [id number]  Unit/Bed#: ICU 04-01 I Date of Admission: 9/14/2024   Date of Service: 9/26/2024 I Hospital Day: 12      Assessment & Plan  LYLY (acute kidney injury) (HCC)  In the setting of anti-GBM glomerulonephritis   Baseline creat was 0.9 to 7.95 on admission    Plan:  Nephrology consulted  Temp HD cath with M/W/F dialysis  Trend renal indices  Avoid nephrotoxins and hypotension  Hyponatremia  Suspect in the setting hypervolemia associated with decreased UO in setting LYLY    Plan:  Fluid restriction per nephrology  Trend BMP daily  Bilateral lower extremity edema  Chronic in the setting of wheel chair bound 2/2 lower back pain/spinal surgery hx    Plan:  Fluid restriction  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 Next exchange scheduled for 9/28/24  Received 1 unit of Cryo 9/25 for Fibrinogen level of 159.   If am Fibrinogen level < 150, transfuse Cryo    Disposition: Stepdown Level 2    ICU Core Measures     A: Assess, Prevent, and Manage Pain Has pain been assessed? Yes  Need for changes to pain regimen? No   B: Both SAT/SAT  N/A   C: Choice of Sedation RASS Goal: 0 Alert and Calm or N/A patient not on sedation  Need for changes to sedation or analgesia regimen? NA   D: Delirium CAM-ICU: Negative   E: Early Mobility  Plan for early mobility? Yes   F: Family Engagement Plan for family engagement today? Yes       Antibiotic Review: Patient on appropriate coverage based on culture data.      Review of Invasive Devices:      Central access plan: HD cath in place.  Plan for iHD and Plasmapheresis      Prophylaxis:  VTE VTE covered by:  heparin (porcine), Subcutaneous, 5,000 Units at 09/26/24 1445       Stress Ulcer  covered byfamotidine (PEPCID) 20 mg tablet [[id number]] (Long-Term Med), famotidine (PEPCID) tablet 20 mg [[id number]], pantoprazole (PROTONIX) EC tablet 20 mg [[id number]]         24 Hour Events   24hr events: Pt to have permacath placed tomorrow. Plan for next plasmapheresis treatment on Saturday 9/28/24.    Subjective   Review of Systems: Review of Systems   Respiratory:  Negative for shortness of breath.    Cardiovascular:  Positive for leg swelling. Negative for chest pain.   Gastrointestinal:  Negative for abdominal pain and nausea.   Neurological:  Negative for dizziness and headaches.   Psychiatric/Behavioral:  Negative for agitation and confusion.    All other systems reviewed and are negative.      Objective                          Vitals I/O      Most Recent Min/Max in 24hrs   Temp (!) 96.7 °F (35.9 °C) Temp  Min: 96.5 °F (35.8 °C)  Max: 97.3 °F (36.3 °C)   Pulse 55 Pulse  Min: 54  Max: 79   Resp (!) 28 Resp  Min: 14  Max: 40   /60 BP  Min: 104/51  Max: 137/80   O2 Sat 100 % SpO2  Min: 95 %  Max: 100 %      Intake/Output Summary (Last 24 hours) at 9/26/2024 1840  Last data filed at 9/26/2024 1747  Gross per 24 hour   Intake 6242 ml   Output 0 ml   Net 6242 ml       Diet Renal; Renal Kerrick; Yes; Fluid Restriction 2000 ML; No    Invasive Monitoring           Physical Exam   Physical Exam  Vitals and nursing note reviewed.   Eyes:      Pupils: Pupils are equal, round, and reactive to light.   Skin:     General: Skin is warm and dry.      Capillary Refill: Capillary refill takes less than 2 seconds.   HENT:      Head: Normocephalic.      Mouth/Throat:      Mouth: Mucous membranes are moist.   Neck:      Vascular: Central line present.   Cardiovascular:      Rate and Rhythm:  Regular rhythm. Bradycardia present.      Pulses: Normal pulses.      Heart sounds: Normal heart sounds.   Musculoskeletal:      Right lower le+ Edema present.      Left lower le+ Edema present.      Comments: B/l LE with decreased strength; chronic in setting of wheelchair bound   Abdominal: General: Bowel sounds are normal.      Palpations: Abdomen is soft.   Constitutional:       General: He is awake.      Appearance: He is obese. He is ill-appearing.   Pulmonary:      Effort: Pulmonary effort is normal.      Breath sounds: Normal breath sounds.   Psychiatric:         Behavior: Behavior is cooperative.   Neurological:      Mental Status: He is alert and oriented to person, place and time. He is CAM ICU negative and not agitated.          Diagnostic Studies        Lab Results: I have reviewed the following results: CBC/BMP:   .     24  045   WBC 18.92*   HGB 7.3*   HCT 22.8*      SODIUM 132*   K 4.0      CO2 25   BUN 26*   CREATININE 3.17*   GLUC 164*   CAIONIZED 0.93*         Medications:  Scheduled PRN   carvedilol, 6.25 mg, BID With Meals  cyclophosphamide, 100 mg, Daily  famotidine, 20 mg, Daily  FLUoxetine, 20 mg, Daily  heparin (porcine), 5,000 Units, Q8H LORENZO  pantoprazole, 20 mg, Early Morning  predniSONE, 60 mg, Daily  sulfamethoxazole-trimethoprim, 1 tablet, Once per day on       calcium carbonate, 1,000 mg, Daily PRN  ondansetron, 4 mg, Q6H PRN       Continuous          Labs:   CBC    Recent Labs     2455 242   WBC 20.08* 18.92*   HGB 8.1* 7.3*   HCT 25.2* 22.8*    302     BMP    Recent Labs     24  045   SODIUM 131* 132*   K 4.5 4.0    100   CO2 20* 25   AGAP 10 7   BUN 44* 26*   CREATININE 4.41* 3.17*   CALCIUM 7.6* 7.7*       Coags    Recent Labs     24  0555 24  0452   INR 1.44* 1.37*        Additional Electrolytes  Recent Labs     24  0555 24  045   CAIONIZED 1.00*  0.93*          Blood Gas    No recent results  No recent results LFTs  No recent results    Infectious  No recent results  Glucose  Recent Labs     09/25/24  0555 09/26/24  0452   GLUC 159* 164*

## 2024-09-27 ENCOUNTER — APPOINTMENT (INPATIENT)
Dept: INTERVENTIONAL RADIOLOGY/VASCULAR | Facility: HOSPITAL | Age: 74
DRG: 871 | End: 2024-09-27
Payer: MEDICARE

## 2024-09-27 ENCOUNTER — APPOINTMENT (INPATIENT)
Dept: DIALYSIS | Facility: HOSPITAL | Age: 74
DRG: 871 | End: 2024-09-27
Payer: MEDICARE

## 2024-09-27 PROBLEM — D63.1 ANEMIA DUE TO CHRONIC KIDNEY DISEASE, ON CHRONIC DIALYSIS (HCC): Status: ACTIVE | Noted: 2024-09-27

## 2024-09-27 PROBLEM — Z99.2 ANEMIA DUE TO CHRONIC KIDNEY DISEASE, ON CHRONIC DIALYSIS (HCC): Status: ACTIVE | Noted: 2024-09-27

## 2024-09-27 PROBLEM — N18.6 ANEMIA DUE TO CHRONIC KIDNEY DISEASE, ON CHRONIC DIALYSIS (HCC): Status: ACTIVE | Noted: 2024-09-27

## 2024-09-27 LAB
ANION GAP SERPL CALCULATED.3IONS-SCNC: 9 MMOL/L (ref 4–13)
ANISOCYTOSIS BLD QL SMEAR: PRESENT
BASOPHILS # BLD MANUAL: 0 THOUSAND/UL (ref 0–0.1)
BASOPHILS NFR MAR MANUAL: 0 % (ref 0–1)
BUN SERPL-MCNC: 34 MG/DL (ref 5–25)
CALCIUM SERPL-MCNC: 7.6 MG/DL (ref 8.4–10.2)
CHLORIDE SERPL-SCNC: 102 MMOL/L (ref 96–108)
CO2 SERPL-SCNC: 21 MMOL/L (ref 21–32)
CREAT SERPL-MCNC: 4.25 MG/DL (ref 0.6–1.3)
EOSINOPHIL # BLD MANUAL: 0 THOUSAND/UL (ref 0–0.4)
EOSINOPHIL NFR BLD MANUAL: 0 % (ref 0–6)
ERYTHROCYTE [DISTWIDTH] IN BLOOD BY AUTOMATED COUNT: 16.3 % (ref 11.6–15.1)
FIBRINOGEN PPP-MCNC: 125 MG/DL (ref 206–523)
GBM AB SER IA-ACNC: >8 UNITS (ref 0–0.9)
GFR SERPL CREATININE-BSD FRML MDRD: 12 ML/MIN/1.73SQ M
GLUCOSE SERPL-MCNC: 174 MG/DL (ref 65–140)
HCT VFR BLD AUTO: 20.7 % (ref 36.5–49.3)
HGB BLD-MCNC: 6.7 G/DL (ref 12–17)
HYPERCHROMIA BLD QL SMEAR: PRESENT
INR PPP: 1.29 (ref 0.85–1.19)
LYMPHOCYTES # BLD AUTO: 1.01 THOUSAND/UL (ref 0.6–4.47)
LYMPHOCYTES # BLD AUTO: 4 % (ref 14–44)
MCH RBC QN AUTO: 28.3 PG (ref 26.8–34.3)
MCHC RBC AUTO-ENTMCNC: 32.4 G/DL (ref 31.4–37.4)
MCV RBC AUTO: 87 FL (ref 82–98)
METAMYELOCYTE ABSOLUTE CT: 0.76 THOUSAND/UL (ref 0–0.1)
METAMYELOCYTES NFR BLD MANUAL: 3 % (ref 0–1)
MONOCYTES # BLD AUTO: 0 THOUSAND/UL (ref 0–1.22)
MONOCYTES NFR BLD: 0 % (ref 4–12)
NEUTROPHILS # BLD MANUAL: 23.47 THOUSAND/UL (ref 1.85–7.62)
NEUTS BAND NFR BLD MANUAL: 1 % (ref 0–8)
NEUTS SEG NFR BLD AUTO: 92 % (ref 43–75)
PLATELET # BLD AUTO: 298 THOUSANDS/UL (ref 149–390)
PLATELET BLD QL SMEAR: ADEQUATE
PMV BLD AUTO: 8.8 FL (ref 8.9–12.7)
POTASSIUM SERPL-SCNC: 4.2 MMOL/L (ref 3.5–5.3)
PROTHROMBIN TIME: 16.6 SECONDS (ref 12.3–15)
RBC # BLD AUTO: 2.37 MILLION/UL (ref 3.88–5.62)
RBC MORPH BLD: PRESENT
SODIUM SERPL-SCNC: 132 MMOL/L (ref 135–147)
WBC # BLD AUTO: 25.24 THOUSAND/UL (ref 4.31–10.16)

## 2024-09-27 PROCEDURE — 36558 INSERT TUNNELED CV CATH: CPT | Performed by: RADIOLOGY

## 2024-09-27 PROCEDURE — 85610 PROTHROMBIN TIME: CPT | Performed by: INTERNAL MEDICINE

## 2024-09-27 PROCEDURE — 85384 FIBRINOGEN ACTIVITY: CPT | Performed by: STUDENT IN AN ORGANIZED HEALTH CARE EDUCATION/TRAINING PROGRAM

## 2024-09-27 PROCEDURE — P9012 CRYOPRECIPITATE EACH UNIT: HCPCS

## 2024-09-27 PROCEDURE — C1769 GUIDE WIRE: HCPCS

## 2024-09-27 PROCEDURE — 77001 FLUOROGUIDE FOR VEIN DEVICE: CPT

## 2024-09-27 PROCEDURE — 30233M1 TRANSFUSION OF NONAUTOLOGOUS PLASMA CRYOPRECIPITATE INTO PERIPHERAL VEIN, PERCUTANEOUS APPROACH: ICD-10-PCS | Performed by: HOSPITALIST

## 2024-09-27 PROCEDURE — 0JH63XZ INSERTION OF TUNNELED VASCULAR ACCESS DEVICE INTO CHEST SUBCUTANEOUS TISSUE AND FASCIA, PERCUTANEOUS APPROACH: ICD-10-PCS | Performed by: RADIOLOGY

## 2024-09-27 PROCEDURE — 99233 SBSQ HOSP IP/OBS HIGH 50: CPT | Performed by: STUDENT IN AN ORGANIZED HEALTH CARE EDUCATION/TRAINING PROGRAM

## 2024-09-27 PROCEDURE — 36558 INSERT TUNNELED CV CATH: CPT

## 2024-09-27 PROCEDURE — 99232 SBSQ HOSP IP/OBS MODERATE 35: CPT | Performed by: INTERNAL MEDICINE

## 2024-09-27 PROCEDURE — 80048 BASIC METABOLIC PNL TOTAL CA: CPT | Performed by: HOSPITALIST

## 2024-09-27 PROCEDURE — 99232 SBSQ HOSP IP/OBS MODERATE 35: CPT | Performed by: HOSPITALIST

## 2024-09-27 PROCEDURE — C1750 CATH, HEMODIALYSIS,LONG-TERM: HCPCS

## 2024-09-27 PROCEDURE — 99152 MOD SED SAME PHYS/QHP 5/>YRS: CPT | Performed by: RADIOLOGY

## 2024-09-27 PROCEDURE — 02H633Z INSERTION OF INFUSION DEVICE INTO RIGHT ATRIUM, PERCUTANEOUS APPROACH: ICD-10-PCS | Performed by: RADIOLOGY

## 2024-09-27 PROCEDURE — 99152 MOD SED SAME PHYS/QHP 5/>YRS: CPT

## 2024-09-27 PROCEDURE — 85007 BL SMEAR W/DIFF WBC COUNT: CPT | Performed by: HOSPITALIST

## 2024-09-27 PROCEDURE — P9016 RBC LEUKOCYTES REDUCED: HCPCS

## 2024-09-27 PROCEDURE — 77001 FLUOROGUIDE FOR VEIN DEVICE: CPT | Performed by: RADIOLOGY

## 2024-09-27 PROCEDURE — 85027 COMPLETE CBC AUTOMATED: CPT | Performed by: HOSPITALIST

## 2024-09-27 RX ORDER — MIDAZOLAM HYDROCHLORIDE 2 MG/2ML
INJECTION, SOLUTION INTRAMUSCULAR; INTRAVENOUS AS NEEDED
Status: COMPLETED | OUTPATIENT
Start: 2024-09-27 | End: 2024-09-27

## 2024-09-27 RX ORDER — CEFAZOLIN SODIUM 1 G/3ML
INJECTION, POWDER, FOR SOLUTION INTRAMUSCULAR; INTRAVENOUS AS NEEDED
Status: COMPLETED | OUTPATIENT
Start: 2024-09-27 | End: 2024-09-27

## 2024-09-27 RX ORDER — LIDOCAINE HYDROCHLORIDE AND EPINEPHRINE 10; 10 MG/ML; UG/ML
INJECTION, SOLUTION INFILTRATION; PERINEURAL AS NEEDED
Status: COMPLETED | OUTPATIENT
Start: 2024-09-27 | End: 2024-09-27

## 2024-09-27 RX ORDER — FENTANYL CITRATE 50 UG/ML
INJECTION, SOLUTION INTRAMUSCULAR; INTRAVENOUS AS NEEDED
Status: COMPLETED | OUTPATIENT
Start: 2024-09-27 | End: 2024-09-27

## 2024-09-27 RX ADMIN — FENTANYL CITRATE 50 MCG: 50 INJECTION, SOLUTION INTRAMUSCULAR; INTRAVENOUS at 13:01

## 2024-09-27 RX ADMIN — PANTOPRAZOLE SODIUM 20 MG: 20 TABLET, DELAYED RELEASE ORAL at 05:36

## 2024-09-27 RX ADMIN — HEPARIN SODIUM 5000 UNITS: 5000 INJECTION, SOLUTION INTRAVENOUS; SUBCUTANEOUS at 05:36

## 2024-09-27 RX ADMIN — MIDAZOLAM 1 MG: 1 INJECTION INTRAMUSCULAR; INTRAVENOUS at 13:01

## 2024-09-27 RX ADMIN — CYCLOPHOSPHAMIDE 100 MG: 50 CAPSULE ORAL at 16:13

## 2024-09-27 RX ADMIN — CARVEDILOL 6.25 MG: 3.12 TABLET, FILM COATED ORAL at 07:46

## 2024-09-27 RX ADMIN — CARVEDILOL 6.25 MG: 3.12 TABLET, FILM COATED ORAL at 16:13

## 2024-09-27 RX ADMIN — HEPARIN SODIUM 5000 UNITS: 5000 INJECTION, SOLUTION INTRAVENOUS; SUBCUTANEOUS at 21:35

## 2024-09-27 RX ADMIN — MIDAZOLAM 1 MG: 1 INJECTION INTRAMUSCULAR; INTRAVENOUS at 13:12

## 2024-09-27 RX ADMIN — FAMOTIDINE 20 MG: 20 TABLET, FILM COATED ORAL at 07:46

## 2024-09-27 RX ADMIN — FLUOXETINE HYDROCHLORIDE 20 MG: 20 CAPSULE ORAL at 07:46

## 2024-09-27 RX ADMIN — SULFAMETHOXAZOLE AND TRIMETHOPRIM 1 TABLET: 800; 160 TABLET ORAL at 16:13

## 2024-09-27 RX ADMIN — FENTANYL CITRATE 50 MCG: 50 INJECTION, SOLUTION INTRAMUSCULAR; INTRAVENOUS at 13:12

## 2024-09-27 RX ADMIN — LIDOCAINE HYDROCHLORIDE AND EPINEPHRINE 20 ML: 10; 10 INJECTION, SOLUTION INFILTRATION; PERINEURAL at 13:12

## 2024-09-27 RX ADMIN — PREDNISONE 60 MG: 20 TABLET ORAL at 16:13

## 2024-09-27 RX ADMIN — CEFAZOLIN 3000 MG: 1 INJECTION, POWDER, FOR SOLUTION INTRAMUSCULAR; INTRAVENOUS at 13:01

## 2024-09-27 NOTE — PLAN OF CARE
Target UF Goal  2.5  L as tolerated. Patient dialyzing for  3.5  on 3 K bath for serum K of  4.2  per protocol. Treatment plan reviewed with Nephrology.       Post-Dialysis RN Treatment Note    Blood Pressure:  Pre 109/55 mm/Hg  Post 130/52 mmHg   EDW  TBD kg    Weight:  Pre 138 kg   Post 136.3 kg   Mode of weight measurement: Bed Scale   Volume Removed  2000 ml    Treatment duration  3 hrs and 30 mins   NS given  No    Treatment shortened? No   Medications given during Rx 1 unit PRBCs   Estimated Kt/V  Not Applicable   Access type: Temporary HD catheter   Access Issues: Yes, describe: arterial resistance and spasm noted; had to reverse lines for tx to run at prescribed blood flow.     Report called to primary nurse   Yes, Johana RN at bedside        Problem: METABOLIC, FLUID AND ELECTROLYTES - ADULT  Goal: Electrolytes maintained within normal limits  Description: INTERVENTIONS:  - Monitor labs and assess patient for signs and symptoms of electrolyte imbalances  - Administer electrolyte replacement as ordered  - Monitor response to electrolyte replacements, including repeat lab results as appropriate  - Instruct patient on fluid and nutrition as appropriate  Outcome: Progressing  Goal: Fluid balance maintained  Description: INTERVENTIONS:  - Monitor labs   - Monitor I/O and WT  - Instruct patient on fluid and nutrition as appropriate  - Assess for signs & symptoms of volume excess or deficit  Outcome: Progressing     Problem: HEMATOLOGIC - ADULT  Goal: Maintains hematologic stability  Description: INTERVENTIONS  - Assess for signs and symptoms of bleeding or hemorrhage  - Monitor labs  - Administer supportive blood products/factors as ordered and appropriate  Outcome: Progressing

## 2024-09-27 NOTE — ASSESSMENT & PLAN NOTE
Lab Results   Component Value Date    EGFR 12 09/27/2024    EGFR 18 09/26/2024    EGFR 12 09/25/2024    CREATININE 4.25 (H) 09/27/2024    CREATININE 3.17 (H) 09/26/2024    CREATININE 4.41 (H) 09/25/2024   Hemoglobin noted to be 6.7 today  We will transfuse 1 unit of packed red blood cells  No evidence of active bleeding  Repeat CBC in the a.m.

## 2024-09-27 NOTE — ASSESSMENT & PLAN NOTE
Lab Results   Component Value Date    EGFR 12 09/27/2024    EGFR 18 09/26/2024    EGFR 12 09/25/2024    CREATININE 4.25 (H) 09/27/2024    CREATININE 3.17 (H) 09/26/2024    CREATININE 4.41 (H) 09/25/2024     1 unit PRBC planned today. Continue transfusion support per primary team.   Iron studies do not support RILEY. Ferritin>100 and t sat 27.

## 2024-09-27 NOTE — PROGRESS NOTES
Progress Note - Hospitalist   Name: Ramos Rosenthal 74 y.o. male I MRN: [id number]  Unit/Bed#: ICU 04-01 I Date of Admission: 9/14/2024   Date of Service: 9/27/2024 I Hospital Day: 13    Assessment & Plan  LYLY (acute kidney injury) (HCC)      Lab Results   Component Value Date    CREATININE 4.25 (H) 09/27/2024    CREATININE 3.17 (H) 09/26/2024    CREATININE 4.41 (H) 09/25/2024    EGFR 12 09/27/2024    EGFR 18 09/26/2024    EGFR 12 09/25/2024     Now on hemodialysis -Mondays, Wednesdays, and Fridays  Next dialysis scheduled for later today  Continue plasmapheresis/plasma exchange as directed by nephrology  Status post a renal biopsy-evidence of glomerular basement membrane disease noted  Continue prednisone 60 mg p.o. daily -steroid induced leukocytosis noted  Nephrology, and critical care are following  Hopeful permacath placement today by IR -patient will have to miss 1 days worth of plasmapheresis/plasma exchange for the permacath placement  Case reviewed in great depth with nephrology, discharge planning will commence upon 2 weeks of completion of plasmapheresis, therefore discharge planning will commence around Saturday, October 5 or later    Depression  Continue home dose fluoxetine  Paresthesia of both lower extremities  Hx of back surgery in 2004 and 2014 and has since bilateral LE paresthesia  He is primarily wheelchair bound  Venous statis ulcers noted to Bilateral LE  B/L extremity edema R>L  Continue supportive therapy  Hyponatremia  Patient has a hypervolemic hyponatremia  Patient has remained asymptomatic  Sodium is stable at 132  Continue fluid restriction  Bilateral lower extremity edema  Chronic condition  Patient with a history of lower back/spinal surgery  Patient does not ambulate  Patient is wheelchair-bound at baseline  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    VTE Pharmacologic Prophylaxis: VTE Score: 8 High Risk (Score >/= 5) - Pharmacological DVT Prophylaxis Ordered: heparin. Sequential Compression Devices Ordered.    Mobility:   Basic Mobility Inpatient Raw Score: 10  -[facility] Goal: 4: Move to chair/commode  -[facility] Achieved: 2: Bed activities/Dependent transfer  Patient is at baseline from a mobility standpoint    Patient Centered Rounds: I performed bedside rounds with nursing staff today.   Discussions with Specialists or Other Care Team Provider: Nephrology, case management    Education and Discussions with Family / Patient:  Will bring the family up to par later today once they come in to visit with the patient.     Current Length of Stay: 13 day(s)  Current Patient Status: Inpatient   Certification Statement: The patient will continue to require additional inpatient hospital stay due to need for blood transfusion, dialysis, and continued plasmapheresis  Discharge Plan:  Discharge planning will be sometime after 2024    Code Status: Level 1 - Full Code    Subjective   Patient seen, resting in bed, no new complaints    Objective     Vitals:   Temp (24hrs), Av.7 °F (36.5 °C), Min:96.7 °F (35.9 °C), Max:98.4 °F (36.9 °C)    Temp:  [96.7 °F (35.9 °C)-98.4 °F (36.9 °C)] 98.4 °F (36.9 °C)  HR:  [54-65] 59  Resp:  [12-28] 13  BP: ()/(46-80) 122/59  SpO2:  [95 %-100 %] 98 %  Body mass index is 38.75 kg/m².     Input and Output Summary (last 24 hours):     Intake/Output Summary (Last 24 hours) at 2024  0750  Last data filed at 9/27/2024 0559  Gross per 24 hour   Intake 6242 ml   Output 0 ml   Net 6242 ml       Physical Exam  Vitals reviewed.   Constitutional:       Appearance: Normal appearance. He is well-developed.   HENT:      Head: Normocephalic and atraumatic.      Nose: Nose normal.      Mouth/Throat:      Mouth: Oropharynx is clear and moist.   Eyes:      Extraocular Movements: EOM normal.      Conjunctiva/sclera: Conjunctivae normal.      Pupils: Pupils are equal, round, and reactive to light.   Neck:      Thyroid: No thyromegaly.      Vascular: No JVD.   Cardiovascular:      Rate and Rhythm: Normal rate and regular rhythm.      Heart sounds: No murmur heard.     No friction rub. No gallop.   Pulmonary:      Effort: Pulmonary effort is normal. No respiratory distress.      Breath sounds: Normal breath sounds.   Abdominal:      General: Bowel sounds are normal. There is no distension.      Palpations: Abdomen is soft. There is no mass.      Tenderness: There is no abdominal tenderness. There is no guarding.   Musculoskeletal:         General: No edema. Normal range of motion.      Cervical back: Normal range of motion and neck supple.      Comments: 2-3+ bilateral lower extremity chronic edema   Lymphadenopathy:      Cervical: No cervical adenopathy.   Skin:     General: Skin is warm.      Findings: No erythema or rash.   Neurological:      Mental Status: He is alert and oriented to person, place, and time. Mental status is at baseline.      Cranial Nerves: No cranial nerve deficit.      Comments: Motor 0 out of 5 left lower extremity, 1 out of 5 right lower extremity   Psychiatric:         Mood and Affect: Mood and affect normal.         Behavior: Behavior normal.          Lines/Drains:  Lines/Drains/Airways       Active Status       Name Placement date Placement time Site Days    [facility] Central Lines 09/16/24 Double 09/16/24  0909  --  10    HD Temporary Double Catheter 09/16/24  0909  Other (Comment)  10                     Central Line:  Goal for removal: Continuing for TPN.                Lab Results: I have reviewed the following results:    Results from last 7 days   Lab Units 09/27/24 0429 09/23/24 0424 09/22/24  0553   WBC Thousand/uL 25.24*   < > 25.08*   HEMOGLOBIN g/dL 6.7*   < > 7.7*   HEMATOCRIT % 20.7*   < > 24.3*   PLATELETS Thousands/uL 298   < > 343   BANDS PCT % 1   < >  --    SEGS PCT %  --   --  89*   LYMPHO PCT % 4*   < > 5*   MONO PCT % 0*   < > 4   EOS PCT % 0   < > 0    < > = values in this interval not displayed.     Results from last 7 days   Lab Units 09/27/24 0429 09/22/24  0553 09/21/24  0448   SODIUM mmol/L 132*   < > 130*   POTASSIUM mmol/L 4.2   < > 4.3   CHLORIDE mmol/L 102   < > 96   CO2 mmol/L 21   < > 23   BUN mg/dL 34*   < > 40*   CREATININE mg/dL 4.25*   < > 4.75*   ANION GAP mmol/L 9   < > 11   CALCIUM mg/dL 7.6*   < > 8.2*   ALBUMIN g/dL  --   --  2.6*   TOTAL BILIRUBIN mg/dL  --   --  0.28   ALK PHOS U/L  --   --  91   ALT U/L  --   --  187*   AST U/L  --   --  225*   GLUCOSE RANDOM mg/dL 174*   < > 200*    < > = values in this interval not displayed.     Results from last 7 days   Lab Units 09/26/24  0452   INR  1.37*             Results from last 7 days   Lab Units 09/21/24  0448   PROCALCITONIN ng/ml 3.04*       Recent Cultures (last 7 days):         Imaging Review: No pertinent imaging studies reviewed.  Other Studies: No additional pertinent studies reviewed.    Last 24 Hours Medication List:     Current Facility-Administered Medications:     calcium carbonate (TUMS) chewable tablet 1,000 mg, Daily PRN    carvedilol (COREG) tablet 6.25 mg, BID With Meals    cyclophosphamide (CYTOXAN) capsule 100 mg, Daily    famotidine (PEPCID) tablet 20 mg, Daily    FLUoxetine (PROzac) capsule 20 mg, Daily    heparin (porcine) subcutaneous injection 5,000 Units, Q8H LORENZO    ondansetron (ZOFRAN) injection 4 mg, Q6H PRN    pantoprazole (PROTONIX) EC tablet 20 mg, Early Morning     predniSONE tablet 60 mg, Daily    sulfamethoxazole-trimethoprim (BACTRIM DS) 800-160 mg per tablet 1 tablet, Once per day on Monday Wednesday Friday    Administrative Statements   Today, Patient Was Seen By: Agnes Rust MD  I have spent a total time of 40 minutes in caring for this patient on the day of the visit/encounter including Impressions, Counseling / Coordination of care, Documenting in the medical record, Reviewing / ordering tests, medicine, procedures  , and Communicating with other healthcare professionals .    **Please Note: This note may have been constructed using a voice recognition system.**

## 2024-09-27 NOTE — CASE MANAGEMENT
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 2024     Secondary hyperparathyroidism of renal origin ([facility]) 2024     Uremia 2024     Hyponatremia 2024     Bilateral lower extremity edema 2024     Neurogenic claudication 2023    Bloating 10/05/2022     Paresthesia of both lower extremities 10/01/2022     Leucocytosis 10/01/2022     Elevated troponin 2022     EDGAR (dyspnea on exertion) 2022     Rash due to vaculitis  2022     LYLY (acute kidney injury) ([facility]) 2022     Depression 2022     Multiple open wounds of lower leg 2022     Lower extremity weakness 2019     Lumbosacral plexopathy 2019     Gait abnormality 2019     Lumbar stenosis 2019     Polyneuropathy 2019     Prediabetes 2018    Hyperlipidemia 2014    Vitamin D deficiency 2014      LOS (days): 13  Geometric Mean LOS (GMLOS) (days): 5.1  Days to GMLOS:-7.6     OBJECTIVE:  Risk of Unplanned Readmission Score: 23.72     Current admission status: Inpatient   Preferred Pharmacy:   [id number] [place] - [address]  [address]  [place] [address]  Phone: [phone] Fax: [phone]    Primary Care Provider: Jessika Carrillo MD    Primary Insurance: MEDICARE  Secondary Insurance: [employer]    DISCHARGE DETAILS:  Pt is set up with OP [facility] chair [facility]   Wife and daughter will assist with transport to and from dialysis.  Pt will continue to be hospitalized until 10/5/24 d/t needing plasmaphoresis.  Family will transport home upon DC.

## 2024-09-27 NOTE — BRIEF OP NOTE (RAD/CATH)
INTERVENTIONAL RADIOLOGY PROCEDURE NOTE    Date: 9/27/2024    Procedure:     Conversion of nontunneled to tunneled dialysis catheter    Procedure Summary       Date:  Room / Location:     Anesthesia Start:  Anesthesia Stop:     Procedure:  Diagnosis:     Scheduled Providers:  Responsible Provider:     Anesthesia Type: Not recorded ASA Status: Not recorded            Preoperative diagnosis:   1. LYLY (acute kidney injury) (HCC)    2. SIRS (systemic inflammatory response syndrome) (HCC)    3. Uremia         Postoperative diagnosis: Same.    Surgeon: Madison Brooks MD     Assistant: None. No qualified resident was available.    Blood loss: 0    Specimens: 0     Findings:     Conversion of a nontunneled catheter to a tunneled 32 cm dialysis catheter    Okay to use    Antibiotics: Ancef    Complications: None immediate.    Anesthesia: conscious sedation

## 2024-09-27 NOTE — ASSESSMENT & PLAN NOTE
Patient is now dialysis dependent after being diagnosed with anti-GBM glomerulonephritis.  100% crescents on biopsy from preliminary report, awaiting official biopsy report. Higher crescent % and dialysis requirement associated with poor renal outcomes. Daughter and patient aware after discussion regarding prelim biopsy.   UOP anuric as recorded, per patient not urinating.  Continue PLEX therapy, plan for 1 day hold on PLEX for PC placement and resume on 9/28. Critical care coordinating with [facility]. Plan for PLEX until 10/5 for 2 week duration permitting improvement in antigbm levels.  Permcath placement this afternoon.   Continue to monitor for renal recovery.   Next HD on 9/27, currently on MWF schedule. As best we can, arrange HD post PLEX for volume management.   Trend antigbm antibody q 2 days--- 9/15 >8.0.   9/22 >8.0.

## 2024-09-27 NOTE — PLAN OF CARE
Problem: PAIN - ADULT  Goal: Verbalizes/displays adequate comfort level or baseline comfort level  Description: Interventions:  - Encourage patient to monitor pain and request assistance  - Assess pain using appropriate pain scale  - Administer analgesics based on type and severity of pain and evaluate response  - Implement non-pharmacological measures as appropriate and evaluate response  - Consider cultural and social influences on pain and pain management  - Notify physician/advanced practitioner if interventions unsuccessful or patient reports new pain  Outcome: Progressing     Problem: INFECTION - ADULT  Goal: Absence or prevention of progression during hospitalization  Description: INTERVENTIONS:  - Assess and monitor for signs and symptoms of infection  - Monitor lab/diagnostic results  - Monitor all insertion sites, i.e. indwelling lines, tubes, and drains  - Monitor endotracheal if appropriate and nasal secretions for changes in amount and color  - [place] appropriate cooling/warming therapies per order  - Administer medications as ordered  - Instruct and encourage patient and family to use good hand hygiene technique  - Identify and instruct in appropriate isolation precautions for identified infection/condition  Outcome: Progressing     Problem: SAFETY ADULT  Goal: Patient will remain free of falls  Description: INTERVENTIONS:  - Educate patient/family on patient safety including physical limitations  - Instruct patient to call for assistance with activity   - Consult OT/PT to assist with strengthening/mobility   - Keep Call bell within reach  - Keep bed low and locked with side rails adjusted as appropriate  - Keep care items and personal belongings within reach  - Initiate and maintain comfort rounds  - Make Fall Risk Sign visible to staff  - Offer Toileting every 2 Hours, in advance of need  - Initiate/Maintain bed alarm  - Obtain necessary fall risk management equipment  - Apply yellow socks and  bracelet for high fall risk patients  - Consider moving patient to room near nurses station  Outcome: Progressing     Problem: Knowledge Deficit  Goal: Patient/family/caregiver demonstrates understanding of disease process, treatment plan, medications, and discharge instructions  Description: Complete learning assessment and assess knowledge base.  Interventions:  - Provide teaching at level of understanding  - Provide teaching via preferred learning methods  Outcome: Progressing     Problem: Nutrition/Hydration-ADULT  Goal: Nutrient/Hydration intake appropriate for improving, restoring or maintaining nutritional needs  Description: Monitor and assess patient's nutrition/hydration status for malnutrition. Collaborate with interdisciplinary team and initiate plan and interventions as ordered.  Monitor patient's weight and dietary intake as ordered or per policy. Utilize nutrition screening tool and intervene as necessary. Determine patient's food preferences and provide high-protein, high-caloric foods as appropriate.     INTERVENTIONS:  - Monitor oral intake, urinary output, labs, and treatment plans  - Assess nutrition and hydration status and recommend course of action  - Evaluate amount of meals eaten  - Assist patient with eating if necessary   - Allow adequate time for meals  - Recommend/ encourage appropriate diets, oral nutritional supplements, and vitamin/mineral supplements  - Order, calculate, and assess calorie counts as needed  - Recommend, monitor, and adjust tube feedings and TPN/PPN based on assessed needs  - Assess need for intravenous fluids  - Provide specific nutrition/hydration education as appropriate  - Include patient/family/caregiver in decisions related to nutrition  Outcome: Progressing     Problem: METABOLIC, FLUID AND ELECTROLYTES - ADULT  Goal: Electrolytes maintained within normal limits  Description: INTERVENTIONS:  - Monitor labs and assess patient for signs and symptoms of  electrolyte imbalances  - Administer electrolyte replacement as ordered  - Monitor response to electrolyte replacements, including repeat lab results as appropriate  - Instruct patient on fluid and nutrition as appropriate  Outcome: Progressing  Goal: Fluid balance maintained  Description: INTERVENTIONS:  - Monitor labs   - Monitor I/O and WT  - Instruct patient on fluid and nutrition as appropriate  - Assess for signs & symptoms of volume excess or deficit  Outcome: Progressing

## 2024-09-27 NOTE — ASSESSMENT & PLAN NOTE
Lab Results   Component Value Date    CREATININE 4.25 (H) 09/27/2024    CREATININE 3.17 (H) 09/26/2024    CREATININE 4.41 (H) 09/25/2024    EGFR 12 09/27/2024    EGFR 18 09/26/2024    EGFR 12 09/25/2024     Now on hemodialysis -Mondays, Wednesdays, and Fridays  Next dialysis scheduled for later today  Continue plasmapheresis/plasma exchange as directed by nephrology  Status post a renal biopsy-evidence of glomerular basement membrane disease noted  Continue prednisone 60 mg p.o. daily -steroid induced leukocytosis noted  Nephrology, and critical care are following  Hopeful permacath placement today by IR -patient will have to miss 1 days worth of plasmapheresis/plasma exchange for the permacath placement  Case reviewed in great depth with nephrology, discharge planning will commence upon 2 weeks of completion of plasmapheresis, therefore discharge planning will commence around Saturday, October 5 or later

## 2024-09-27 NOTE — PROGRESS NOTES
Progress Note -     Name: Ramos Rosenthal 74 y.o. male I MRN: [id number]  Unit/Bed#: ICU 04-01 I Date of Admission: 9/14/2024   Date of Service: 9/27/2024 I Hospital Day: 13     This SmartSection is not supported in the current .     Pastoral Care Progress Note  CH attempted introductory visit; pt not in room           09/27/24 1000   Clinical Encounter Type   Visited With Patient not available

## 2024-09-28 LAB
ABO GROUP BLD BPU: NORMAL
ABO GROUP BLD BPU: NORMAL
ANION GAP SERPL CALCULATED.3IONS-SCNC: 9 MMOL/L (ref 4–13)
BPU ID: NORMAL
BPU ID: NORMAL
BUN SERPL-MCNC: 26 MG/DL (ref 5–25)
CA-I BLD-SCNC: 1.03 MMOL/L (ref 1.12–1.32)
CALCIUM SERPL-MCNC: 7.8 MG/DL (ref 8.4–10.2)
CHLORIDE SERPL-SCNC: 99 MMOL/L (ref 96–108)
CO2 SERPL-SCNC: 24 MMOL/L (ref 21–32)
CREAT SERPL-MCNC: 3.64 MG/DL (ref 0.6–1.3)
CROSSMATCH: NORMAL
ERYTHROCYTE [DISTWIDTH] IN BLOOD BY AUTOMATED COUNT: 15.9 % (ref 11.6–15.1)
FIBRINOGEN PPP-MCNC: 174 MG/DL (ref 206–523)
GFR SERPL CREATININE-BSD FRML MDRD: 15 ML/MIN/1.73SQ M
GLUCOSE SERPL-MCNC: 147 MG/DL (ref 65–140)
HCT VFR BLD AUTO: 21.1 % (ref 36.5–49.3)
HCT VFR BLD AUTO: 25.2 % (ref 36.5–49.3)
HGB BLD-MCNC: 6.9 G/DL (ref 12–17)
HGB BLD-MCNC: 8.2 G/DL (ref 12–17)
INR PPP: 1.21 (ref 0.85–1.19)
MCH RBC QN AUTO: 29 PG (ref 26.8–34.3)
MCHC RBC AUTO-ENTMCNC: 32.7 G/DL (ref 31.4–37.4)
MCV RBC AUTO: 89 FL (ref 82–98)
PLATELET # BLD AUTO: 290 THOUSANDS/UL (ref 149–390)
PMV BLD AUTO: 9.1 FL (ref 8.9–12.7)
POTASSIUM SERPL-SCNC: 4.2 MMOL/L (ref 3.5–5.3)
PROTHROMBIN TIME: 15.8 SECONDS (ref 12.3–15)
RBC # BLD AUTO: 2.38 MILLION/UL (ref 3.88–5.62)
SODIUM SERPL-SCNC: 132 MMOL/L (ref 135–147)
UNIT DISPENSE STATUS: NORMAL
UNIT DISPENSE STATUS: NORMAL
UNIT PRODUCT CODE: NORMAL
UNIT PRODUCT CODE: NORMAL
UNIT PRODUCT VOLUME: 125 ML
UNIT PRODUCT VOLUME: 350 ML
UNIT RH: NORMAL
UNIT RH: NORMAL
WBC # BLD AUTO: 20.53 THOUSAND/UL (ref 4.31–10.16)

## 2024-09-28 PROCEDURE — 85384 FIBRINOGEN ACTIVITY: CPT | Performed by: INTERNAL MEDICINE

## 2024-09-28 PROCEDURE — 82330 ASSAY OF CALCIUM: CPT | Performed by: STUDENT IN AN ORGANIZED HEALTH CARE EDUCATION/TRAINING PROGRAM

## 2024-09-28 PROCEDURE — P9016 RBC LEUKOCYTES REDUCED: HCPCS

## 2024-09-28 PROCEDURE — 99232 SBSQ HOSP IP/OBS MODERATE 35: CPT | Performed by: INTERNAL MEDICINE

## 2024-09-28 PROCEDURE — 85025 COMPLETE CBC W/AUTO DIFF WBC: CPT | Performed by: HOSPITALIST

## 2024-09-28 PROCEDURE — 85018 HEMOGLOBIN: CPT | Performed by: HOSPITALIST

## 2024-09-28 PROCEDURE — 80048 BASIC METABOLIC PNL TOTAL CA: CPT | Performed by: HOSPITALIST

## 2024-09-28 PROCEDURE — 30233M1 TRANSFUSION OF NONAUTOLOGOUS PLASMA CRYOPRECIPITATE INTO PERIPHERAL VEIN, PERCUTANEOUS APPROACH: ICD-10-PCS | Performed by: HOSPITALIST

## 2024-09-28 PROCEDURE — 85014 HEMATOCRIT: CPT | Performed by: HOSPITALIST

## 2024-09-28 PROCEDURE — 99232 SBSQ HOSP IP/OBS MODERATE 35: CPT | Performed by: HOSPITALIST

## 2024-09-28 PROCEDURE — 83520 IMMUNOASSAY QUANT NOS NONAB: CPT | Performed by: INTERNAL MEDICINE

## 2024-09-28 PROCEDURE — 85610 PROTHROMBIN TIME: CPT | Performed by: INTERNAL MEDICINE

## 2024-09-28 PROCEDURE — NC001 PR NO CHARGE: Performed by: STUDENT IN AN ORGANIZED HEALTH CARE EDUCATION/TRAINING PROGRAM

## 2024-09-28 RX ORDER — CALCIUM GLUCONATE 20 MG/ML
2 INJECTION, SOLUTION INTRAVENOUS ONCE
Status: COMPLETED | OUTPATIENT
Start: 2024-09-28 | End: 2024-09-28

## 2024-09-28 RX ORDER — LANOLIN ALCOHOL/MO/W.PET/CERES
3 CREAM (GRAM) TOPICAL
Status: DISCONTINUED | OUTPATIENT
Start: 2024-09-28 | End: 2024-10-12 | Stop reason: HOSPADM

## 2024-09-28 RX ORDER — ALBUMIN, HUMAN INJ 5% 5 %
87.5 SOLUTION INTRAVENOUS ONCE
Status: COMPLETED | OUTPATIENT
Start: 2024-09-28 | End: 2024-09-28

## 2024-09-28 RX ADMIN — CYCLOPHOSPHAMIDE 100 MG: 50 CAPSULE ORAL at 16:35

## 2024-09-28 RX ADMIN — ALBUMIN (HUMAN) 87.5 G: 12.5 INJECTION, SOLUTION INTRAVENOUS at 18:15

## 2024-09-28 RX ADMIN — PREDNISONE 60 MG: 20 TABLET ORAL at 16:34

## 2024-09-28 RX ADMIN — CALCIUM GLUCONATE 2 G: 20 INJECTION, SOLUTION INTRAVENOUS at 18:13

## 2024-09-28 RX ADMIN — CARVEDILOL 6.25 MG: 3.12 TABLET, FILM COATED ORAL at 08:06

## 2024-09-28 RX ADMIN — PANTOPRAZOLE SODIUM 20 MG: 20 TABLET, DELAYED RELEASE ORAL at 05:19

## 2024-09-28 RX ADMIN — HEPARIN SODIUM 5000 UNITS: 5000 INJECTION, SOLUTION INTRAVENOUS; SUBCUTANEOUS at 21:40

## 2024-09-28 RX ADMIN — CARVEDILOL 6.25 MG: 3.12 TABLET, FILM COATED ORAL at 16:34

## 2024-09-28 RX ADMIN — HEPARIN SODIUM 5000 UNITS: 5000 INJECTION, SOLUTION INTRAVENOUS; SUBCUTANEOUS at 15:13

## 2024-09-28 RX ADMIN — Medication 3 MG: at 00:57

## 2024-09-28 RX ADMIN — FAMOTIDINE 20 MG: 20 TABLET, FILM COATED ORAL at 08:06

## 2024-09-28 RX ADMIN — FLUOXETINE HYDROCHLORIDE 20 MG: 20 CAPSULE ORAL at 08:06

## 2024-09-28 RX ADMIN — Medication 275 G: at 18:14

## 2024-09-28 RX ADMIN — HEPARIN SODIUM 5000 UNITS: 5000 INJECTION, SOLUTION INTRAVENOUS; SUBCUTANEOUS at 05:19

## 2024-09-28 NOTE — ASSESSMENT & PLAN NOTE
As noted previously, patient currently hemodialysis dependent acute kidney injury due to rapidly progressive anti-GBM glomerulonephritis.  Will continue with treatments, please refer below.  Will continue with hemodialysis sessions Monday Wednesday Friday with next session to be on Monday, September 30.  Most recent session on Friday was without incident.

## 2024-09-28 NOTE — ASSESSMENT & PLAN NOTE
Lab Results   Component Value Date    EGFR 15 09/28/2024    EGFR 12 09/27/2024    EGFR 18 09/26/2024    CREATININE 3.64 (H) 09/28/2024    CREATININE 4.25 (H) 09/27/2024    CREATININE 3.17 (H) 09/26/2024     Continue to provide packed red blood cells as indicated, currently not on SHREE, consider this going forward depending on the patient progresses clinically.

## 2024-09-28 NOTE — PROGRESS NOTES
Progress Note - Critical Care/ICU   Name: Ramos Rosenthal 74 y.o. male I MRN: [id number]  Unit/Bed#: ICU 04-01 I Date of Admission: 9/14/2024   Date of Service: 9/28/2024 I Hospital Day: 14       73 yo M w/ LYLY in the setting of anti-GBM glomerulonephritis requiring iHD and plasmapharesis. CC service assisting with plasmapharesis management. Primary care per SLIM and nephrology teams.      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 Next session tomorrow 9/28, confirmed with [place] blood bank today. Recommend 1.2-1.3 plasma volume exchange  -- S/p 1u pooled cryo on 9/27 for fibrinogen 125. No signs of bleeding  -- Will d/w further volume replacement with 5% albumin alone vs albumin/FFP alternating treatments now that increasing daily plasma volume exchange. C/w cryo/FFP supplementation as needed for bleeding or fibrinogen<150  -- Daily fibrinogen, INR, ionized Ca checks. Ca2+ supplementation. Keep active T/S         Annabel Adamson, DO

## 2024-09-28 NOTE — PROGRESS NOTES
Progress Note - Hospitalist   Name: Ramos Rosenthal 74 y.o. male I MRN: [id number]  Unit/Bed#: ICU 04-01 I Date of Admission: 9/14/2024   Date of Service: 9/28/2024 I Hospital Day: 14    Assessment & Plan  LYLY (acute kidney injury) ([facility])      Lab Results   Component Value Date    CREATININE 3.64 (H) 09/28/2024    CREATININE 4.25 (H) 09/27/2024    CREATININE 3.17 (H) 09/26/2024    EGFR 15 09/28/2024    EGFR 12 09/27/2024    EGFR 18 09/26/2024     Now on hemodialysis -Mondays, Wednesdays, and Fridays  Patient was successfully dialyzed yesterday on 9/27/2024, next dialysis scheduled for Monday, 9/30/2024  Plasmapheresis/plasma exchange was held yesterday in view of permacath placement, patient is scheduled for plasmapheresis today  Status post a renal biopsy-evidence of glomerular basement membrane disease noted  Continue prednisone 60 mg p.o. daily -steroid induced leukocytosis noted  Nephrology, and critical care are following  Permacath was placed by IR on 9/27/2024   Discharge planning in approximately 7 to 10 days    Depression  Continue home dose fluoxetine  Paresthesia of both lower extremities  Hx of back surgery in 2004 and 2014 and has since bilateral LE paresthesia  He is primarily wheelchair bound  Venous statis ulcers noted to Bilateral LE  B/L extremity edema R>L  Continue supportive therapy  Hyponatremia  Patient has a hypervolemic hyponatremia  Patient has remained asymptomatic  Sodium is stable at 132  Continue fluid restriction  Bilateral lower extremity edema  Chronic condition  Patient with a history of lower back/spinal surgery  Patient does not ambulate  Patient is wheelchair-bound at baseline  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  Essential hypertension  Continue carvedilol    VTE Pharmacologic Prophylaxis: VTE Score: 8 High Risk (Score >/= 5) - Pharmacological DVT Prophylaxis Ordered: heparin. Sequential Compression Devices Ordered.    Mobility:   Basic Mobility Inpatient Raw Score: 10  -[facility] Goal: 4: Move to chair/commode  JH-HLM Achieved: 2: Bed activities/Dependent transfer  JH-HLM Goal achieved. Continue to encourage appropriate mobility.    Patient Centered Rounds: I performed bedside rounds with nursing staff today.   Discussions with Specialists or Other Care Team Provider: Critical care    Education and Discussions with Family / Patient:  Will bring the family up to par later today.     Current Length of Stay: 14 day(s)  Current Patient Status: Inpatient   Certification Statement: The patient will continue to require additional inpatient hospital stay due to need for continued plasma exchange, hemodialysis, and blood transfusion  Discharge Plan:  Discharge planning in approximately 7 to 10 days    Code Status: Level 1 - Full Code    Subjective   Patient seen, resting in bed, appears comfortable, denies pain or discomfort    Objective     Vitals:   Temp (24hrs), Av.4 °F (36.3 °C), Min:96.4 °F (35.8 °C), Max:98 °F (36.7 °C)    Temp:  [96.4 °F (35.8 °C)-98 °F (36.7 °C)] 97.5 °F (36.4 °C)  HR:  [52-80] 56  Resp:  [13-35] 21  BP: (100-141)/(50-83) 112/57  SpO2:  [94 %-100 %] 98 %  Body mass index is 38.5 kg/m².     Input and Output Summary (last 24 hours):     Intake/Output Summary (Last 24 hours) at 2024 0741  Last data filed at 2024 2250  Gross per 24 hour   Intake 2341.67 ml   Output 2500 ml   Net -158.33 ml       Physical Exam  Vitals reviewed.    Constitutional:       Appearance: Normal appearance. He is well-developed.   HENT:      Head: Normocephalic and atraumatic.      Nose: Nose normal.      Mouth/Throat:      Mouth: Oropharynx is clear and moist.   Eyes:      Extraocular Movements: EOM normal.      Conjunctiva/sclera: Conjunctivae normal.      Pupils: Pupils are equal, round, and reactive to light.   Neck:      Thyroid: No thyromegaly.      Vascular: No JVD.   Cardiovascular:      Rate and Rhythm: Normal rate and regular rhythm.      Heart sounds: No murmur heard.     No friction rub. No gallop.   Pulmonary:      Effort: Pulmonary effort is normal. No respiratory distress.      Breath sounds: Normal breath sounds.   Abdominal:      General: Bowel sounds are normal. There is no distension.      Palpations: Abdomen is soft. There is no mass.      Tenderness: There is no abdominal tenderness. There is no guarding.   Musculoskeletal:         General: Normal range of motion.      Cervical back: Normal range of motion and neck supple.      Right lower leg: Edema present.      Left lower leg: Edema present.   Lymphadenopathy:      Cervical: No cervical adenopathy.   Skin:     General: Skin is warm.      Findings: No erythema or rash.   Neurological:      Mental Status: He is alert and oriented to person, place, and time. Mental status is at baseline.      Cranial Nerves: No cranial nerve deficit.      Comments: Motor 0 out of 5 left lower extremity, 1 out of 5 right lower extremity   Psychiatric:         Mood and Affect: Mood and affect normal.         Behavior: Behavior normal.          Lines/Drains:  Lines/Drains/Airways       Active Status       Name Placement date Placement time Site Days    HD Permanent Double Catheter 09/27/24  1316  Internal jugular  less than 1                            Lab Results: I have reviewed the following results:    Results from last 7 days   Lab Units 09/28/24  0519 09/27/24  0429 09/23/24  0424 09/22/24  0553   WBC  Thousand/uL 20.53* 25.24*   < > 25.08*   HEMOGLOBIN g/dL 6.9* 6.7*   < > 7.7*   HEMATOCRIT % 21.1* 20.7*   < > 24.3*   PLATELETS Thousands/uL 290 298   < > 343   BANDS PCT %  --  1   < >  --    SEGS PCT %  --   --   --  89*   LYMPHO PCT %  --  4*   < > 5*   MONO PCT %  --  0*   < > 4   EOS PCT %  --  0   < > 0    < > = values in this interval not displayed.     Results from last 7 days   Lab Units 09/28/24  0519   SODIUM mmol/L 132*   POTASSIUM mmol/L 4.2   CHLORIDE mmol/L 99   CO2 mmol/L 24   BUN mg/dL 26*   CREATININE mg/dL 3.64*   ANION GAP mmol/L 9   CALCIUM mg/dL 7.8*   GLUCOSE RANDOM mg/dL 147*     Results from last 7 days   Lab Units 09/28/24  0519   INR  1.21*                   Recent Cultures (last 7 days):         Imaging Review: No pertinent imaging studies reviewed.  Other Studies: No additional pertinent studies reviewed.    Last 24 Hours Medication List:     Current Facility-Administered Medications:     calcium carbonate (TUMS) chewable tablet 1,000 mg, Daily PRN    carvedilol (COREG) tablet 6.25 mg, BID With Meals    cyclophosphamide (CYTOXAN) capsule 100 mg, Daily    famotidine (PEPCID) tablet 20 mg, Daily    FLUoxetine (PROzac) capsule 20 mg, Daily    heparin (porcine) subcutaneous injection 5,000 Units, Q8H LORENZO    melatonin tablet 3 mg, HS PRN    ondansetron (ZOFRAN) injection 4 mg, Q6H PRN    pantoprazole (PROTONIX) EC tablet 20 mg, Early Morning    predniSONE tablet 60 mg, Daily    sulfamethoxazole-trimethoprim (BACTRIM DS) 800-160 mg per tablet 1 tablet, Once per day on Monday Wednesday Friday    Administrative Statements   Today, Patient Was Seen By: Agnes Rust MD  I have spent a total time of 35 minutes in caring for this patient on the day of the visit/encounter including Impressions, Counseling / Coordination of care, Documenting in the medical record, Reviewing / ordering tests, medicine, procedures  , Obtaining or reviewing history  , and Communicating with other healthcare  professionals .    **Please Note: This note may have been constructed using a voice recognition system.**[phone]

## 2024-09-28 NOTE — PLAN OF CARE
Problem: PAIN - ADULT  Goal: Verbalizes/displays adequate comfort level or baseline comfort level  Description: Interventions:  - Encourage patient to monitor pain and request assistance  - Assess pain using appropriate pain scale  - Administer analgesics based on type and severity of pain and evaluate response  - Implement non-pharmacological measures as appropriate and evaluate response  - Consider cultural and social influences on pain and pain management  - Notify physician/advanced practitioner if interventions unsuccessful or patient reports new pain  Outcome: Progressing     Problem: INFECTION - ADULT  Goal: Absence or prevention of progression during hospitalization  Description: INTERVENTIONS:  - Assess and monitor for signs and symptoms of infection  - Monitor lab/diagnostic results  - Monitor all insertion sites, i.e. indwelling lines, tubes, and drains  - Monitor endotracheal if appropriate and nasal secretions for changes in amount and color  - [place] appropriate cooling/warming therapies per order  - Administer medications as ordered  - Instruct and encourage patient and family to use good hand hygiene technique  - Identify and instruct in appropriate isolation precautions for identified infection/condition  Outcome: Progressing     Problem: SAFETY ADULT  Goal: Patient will remain free of falls  Description: INTERVENTIONS:  - Educate patient/family on patient safety including physical limitations  - Instruct patient to call for assistance with activity   - Consult OT/PT to assist with strengthening/mobility   - Keep Call bell within reach  - Keep bed low and locked with side rails adjusted as appropriate  - Keep care items and personal belongings within reach  - Initiate and maintain comfort rounds  - Make Fall Risk Sign visible to staff  - Offer Toileting every 2 Hours, in advance of need  - Initiate/Maintain bed alarm  - Obtain necessary fall risk management equipment  - Apply yellow socks and  bracelet for high fall risk patients  - Consider moving patient to room near nurses station  Outcome: Progressing     Problem: Nutrition/Hydration-ADULT  Goal: Nutrient/Hydration intake appropriate for improving, restoring or maintaining nutritional needs  Description: Monitor and assess patient's nutrition/hydration status for malnutrition. Collaborate with interdisciplinary team and initiate plan and interventions as ordered.  Monitor patient's weight and dietary intake as ordered or per policy. Utilize nutrition screening tool and intervene as necessary. Determine patient's food preferences and provide high-protein, high-caloric foods as appropriate.     INTERVENTIONS:  - Monitor oral intake, urinary output, labs, and treatment plans  - Assess nutrition and hydration status and recommend course of action  - Evaluate amount of meals eaten  - Assist patient with eating if necessary   - Allow adequate time for meals  - Recommend/ encourage appropriate diets, oral nutritional supplements, and vitamin/mineral supplements  - Order, calculate, and assess calorie counts as needed  - Recommend, monitor, and adjust tube feedings and TPN/PPN based on assessed needs  - Assess need for intravenous fluids  - Provide specific nutrition/hydration education as appropriate  - Include patient/family/caregiver in decisions related to nutrition  Outcome: Progressing

## 2024-09-28 NOTE — NURSING NOTE
Scheduled unit of RBC's infused; patient tolerated transfusion well; no signs or symptoms of transfusion reaction. Vital signs stable

## 2024-09-28 NOTE — ASSESSMENT & PLAN NOTE
Patient currently on oral cyclophosphamide 100 mg daily, prednisone 60 mg daily and receiving plasmapheresis.  With respect to antibody levels, they continue to remain greater than 8.  Recommend increasing plasma exchange volume which is currently only at 1.0, consider increasing to at least twice that volume, but will ultimately defer to our critical care colleagues regarding change in prescription according to their recommendations.  Goal is to have the patient's antibodies undetectable.  This may not be achievable but if patient is otherwise clinically stable we should try to push therapy.  Continue supplement with albumin and cryoprecipitate as indicated along with FFP according to bleeding/fibrinogen levels.  Continue to provide Bactrim double strength 1 tablet 3 times weekly for prophylaxis.    Patient's finalized kidney pathology remains pending at this time.  Preliminarily, the patient has all glomeruli positive for crescents with staining positive for anti-GBM.

## 2024-09-28 NOTE — ASSESSMENT & PLAN NOTE
Patient with a low hemoglobin on 9/27/2024-it was 6.7  Patient was transfused 1 unit of packed red blood cells with dialysis  Follow-up hemoglobin this morning is 6.9, no evidence of active bleeding  We will transfuse a second unit of packed red blood cells today  Repeat CBC in the a.m.

## 2024-09-28 NOTE — PROGRESS NOTES
Progress Note - Nephrology   Name: Ramos Rosenthal 74 y.o. male I MRN: [id number]  Unit/Bed#: ICU 04-01 I Date of Admission: 9/14/2024   Date of Service: 9/28/2024 I Hospital Day: 14     Assessment & Plan  LYLY (acute kidney injury) ([facility])  As noted previously, patient currently hemodialysis dependent acute kidney injury due to rapidly progressive anti-GBM glomerulonephritis.  Will continue with treatments, please refer below.  Will continue with hemodialysis sessions Monday Wednesday Friday with next session to be on Monday, September 30.  Most recent session on Friday was without incident.  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  With respect to antibody levels, they continue to remain greater than 8.  Recommend increasing plasma exchange volume which is currently only at 1.0, consider increasing to at least twice that volume, but will ultimately defer to our critical care colleagues regarding change in prescription according to their recommendations.  Goal is to have the patient's antibodies undetectable.  This may not be achievable but if patient is otherwise clinically stable we should try to push therapy.  Continue supplement with albumin and cryoprecipitate as indicated along with FFP according to bleeding/fibrinogen levels.  Continue to provide Bactrim double strength 1 tablet 3 times weekly for prophylaxis.    Patient's finalized kidney pathology remains pending at this time.  Preliminarily, the patient has all glomeruli positive for crescents with staining positive for anti-GBM.  Hyponatremia  Remains stable at this time, continue with fluid restriction, and hemodialysis as prescribed.  Bilateral lower extremity edema  Continue low-sodium diet, will ultrafilter as tolerated hemodialysis sessions.  Anemia due to chronic kidney disease, on chronic dialysis (HCC)  Lab Results  "  Component Value Date    EGFR 15 09/28/2024    EGFR 12 09/27/2024    EGFR 18 09/26/2024    CREATININE 3.64 (H) 09/28/2024    CREATININE 4.25 (H) 09/27/2024    CREATININE 3.17 (H) 09/26/2024     Continue to provide packed red blood cells as indicated, currently not on SHREE, consider this going forward depending on the patient progresses clinically.  Essential hypertension  Blood pressure stable on carvedilol, continue to monitor for now.        Case discussed with family at the bedside.  Total time required for this visit today was 40 minutes including discussions with family and attending providers.    Follow up reason for today's visit: Hemodialysis dependent acute kidney injury/anti-GBM glomerulonephritis    LYLY (acute kidney injury) ([facility])    Patient Active Problem List   Diagnosis    Gait abnormality    Lumbar stenosis    Polyneuropathy    Lower extremity weakness    Lumbosacral plexopathy    Elevated troponin    EDGAR (dyspnea on exertion)    Rash due to vaculitis     LYLY (acute kidney injury) ([facility])    Depression    Multiple open wounds of lower leg    Paresthesia of both lower extremities    Leucocytosis    Bloating    Hyperlipidemia    Neurogenic claudication    Prediabetes    Vitamin D deficiency    Hyponatremia    Bilateral lower extremity edema    Secondary hyperparathyroidism of renal origin ([facility])    Uremia    Essential hypertension    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    Objective:     Vitals: Blood pressure 119/58, pulse (!) 53, temperature (!) 97.4 °F (36.3 °C), temperature source Tympanic, resp. rate 16, height 6' 2\" (1.88 m), weight 136 kg (299 lb 13.2 oz), SpO2 100%.,Body mass index is 38.5 kg/m².    Weight (last 2 days)       Date/Time Weight    09/28/24 " "0535 136 (299.83)    09/28/24 0500 136 (299.83)    09/27/24 0553 137 (301.81)    09/26/24 0600 140 (308.42)              Intake/Output Summary (Last 24 hours) at 9/28/2024 1424  Last data filed at 9/28/2024 1200  Gross per 24 hour   Intake 1724.58 ml   Output 0 ml   Net 1724.58 ml     I/O last 3 completed shifts:  In: 2341.7 [P.O.:940; I.V.:450; Blood:951.7]  Out: 2500 [Other:2500]    HD Permanent Double Catheter (Active)   Reasons to continue HD Cath Treatment Therapy 09/28/24 0901   Site Assessment WDL 09/28/24 0901   Proximal Lumen Status Passive disinfecting cap applied 09/28/24 0901   Distal Lumen Status Passive disinfecting cap applied 09/28/24 0901   Catheter Out on Skin (cm) 0 cm 09/27/24 1322   Dressing Type Chlorhexidine dressing 09/28/24 0901   Dressing Status Clean;Dry;Intact 09/28/24 0901   Dressing Change Due 10/04/24 09/28/24 0901   CLABSI Time-out performed for Positive Blood Culture Yes 09/28/24 0800       Physical Exam: /58   Pulse (!) 53   Temp (!) 97.4 °F (36.3 °C) (Tympanic)   Resp 16   Ht 6' 2\" (1.88 m)   Wt 136 kg (299 lb 13.2 oz)   SpO2 100%   BMI 38.50 kg/m²     General Appearance:    Alert, cooperative, no distress, appears stated age   Head:    Normocephalic, without obvious abnormality, atraumatic   Eyes:    Conjunctiva/corneas clear   Ears:    Normal external ears   Nose:   Nares normal, septum midline, mucosa normal, no drainage    or sinus tenderness   Throat:   Lips, mucosa, and tongue normal; teeth and gums normal   Neck:   Supple   Back:     Symmetric, no curvature, ROM normal, no CVA tenderness   Lungs:     Clear to auscultation bilaterally, respirations unlabored   Chest wall:    No tenderness or deformity   Heart:    Regular rate and rhythm, S1 and S2 normal, no murmur, rub   or gallop   Abdomen:     Soft, non-tender, bowel sounds active   Extremities:   Extremities normal, atraumatic, no cyanosis, mild bilateral lower extremity edema   Skin:   Skin color, texture, " "turgor normal, no rashes or lesions   Lymph nodes:   Cervical normal   Neurologic:   CNII-XII intact            Lab, Imaging and other studies: I have personally reviewed pertinent labs.  CBC:   Lab Results   Component Value Date    WBC 20.53 (H) 09/28/2024    HGB 8.2 (L) 09/28/2024    HCT 25.2 (L) 09/28/2024    MCV 89 09/28/2024     09/28/2024    RBC 2.38 (L) 09/28/2024    MCH 29.0 09/28/2024    MCHC 32.7 09/28/2024    RDW 15.9 (H) 09/28/2024    MPV 9.1 09/28/2024     CMP:   Lab Results   Component Value Date    K 4.2 09/28/2024    CL 99 09/28/2024    CO2 24 09/28/2024    BUN 26 (H) 09/28/2024    CREATININE 3.64 (H) 09/28/2024    CALCIUM 7.8 (L) 09/28/2024    EGFR 15 09/28/2024       .  Results from last 7 days   Lab Units 09/28/24  0519 09/27/24  0429 09/26/24  0452   POTASSIUM mmol/L 4.2 4.2 4.0   CHLORIDE mmol/L 99 102 100   CO2 mmol/L 24 21 25   BUN mg/dL 26* 34* 26*   CREATININE mg/dL 3.64* 4.25* 3.17*   CALCIUM mg/dL 7.8* 7.6* 7.7*         Phosphorus: No results found for: \"PHOS\"  Magnesium: No results found for: \"MG\"  Urinalysis: No results found for: \"COLORU\", \"CLARITYU\", \"SPECGRAV\", \"PHUR\", \"LEUKOCYTESUR\", \"NITRITE\", \"PROTEINUA\", \"GLUCOSEU\", \"KETONESU\", \"BILIRUBINUR\", \"BLOODU\"  Ionized Calcium: No results found for: \"CAION\"  Coagulation:   Lab Results   Component Value Date    INR 1.21 (H) 09/28/2024     Troponin: No results found for: \"TROPONINI\"  ABG: No results found for: \"PHART\", \"SPK3WDV\", \"PO2ART\", \"XZG0GUS\", \"R3WBXGKG\", \"BEART\", \"SOURCE\"  Radiology review:     IMAGING  Procedure: IR tunneled dialysis catheter placement    Result Date: 9/27/2024  Narrative: Nontunneled temporary dialysis catheter conversion to a tunneled catheter Clinical History: Patient with a temporary catheter in place, now presenting for conversion to a tunneled catheter for long-term access . Renal failure Fluoro time: 1 minute Antibiotics: Ancef Conscious sedation time: 15 minutes Technique: Informed consent was " previously obtained.  The patient was brought to the interventional radiology suite and identified verbally and by wrist band. The patient was placed supine on the table and the existing catheter was prepped and draped in the usual sterile fashion.  All elements of maximal sterile barrier technique were followed (cap, mask, sterile gown, sterile gloves, large sterile sheet, hand hygiene, and 2% chlorhexidine for cutaneous antisepsis). Lidocaine was administered to the skin of the insertion site. A tunnel site was chosen.  Lidocaine was given.  A  32   cm catheter was then tunneled under the skin. An Amplatz wire was advanced through the existing catheter. The existing catheter was removed, and a peel-away sheath was placed.  The dialysis catheter was advanced into the peel-away. The peel-away was removed. The catheter tip was then positioned in the right atrium under fluoroscopic control. The external portion of the catheter was sutured to the skin with 0 Prolene. The counter incision was closed with Vicryl and medical glue.  Each lumen flushed and aspirated appropriately.  A sterile dressing was applied. Patient tolerated the procedure without immediate complications.     Impression: Impression: Conversion of a nontunneled dialysis catheter to a tunneled 32 cm 15.5 Sinhala Titan right internal jugular dialysis catheter. Workstation performed: [id number]         Current Facility-Administered Medications:     albumin human 275 g 5% IVPB, Once    calcium carbonate (TUMS) chewable tablet 1,000 mg, Daily PRN    carvedilol (COREG) tablet 6.25 mg, BID With Meals    cyclophosphamide (CYTOXAN) capsule 100 mg, Daily    famotidine (PEPCID) tablet 20 mg, Daily    FLUoxetine (PROzac) capsule 20 mg, Daily    heparin (porcine) subcutaneous injection 5,000 Units, Q8H LORENZO    melatonin tablet 3 mg, HS PRN    ondansetron (ZOFRAN) injection 4 mg, Q6H PRN    pantoprazole (PROTONIX) EC tablet 20 mg, Early Morning    predniSONE tablet 60  mg, Daily    sulfamethoxazole-trimethoprim (BACTRIM DS) 800-160 mg per tablet 1 tablet, Once per day on Monday Wednesday Friday  Medications Discontinued During This Encounter   Medication Reason    meloxicam (MOBIC) 15 mg tablet Therapy completed    multi-electrolyte (PLASMALYTE-A/ISOLYTE-S PH 7.4) IV solution     cefTRIAXone (ROCEPHIN) IVPB (premix in dextrose) 1,000 mg 50 mL     cefTRIAXone (ROCEPHIN) IVPB (premix in dextrose) 1,000 mg 50 mL     albumin human (FLEXBUMIN) 5 % injection 75 g     albumin human (FLEXBUMIN) 5 % injection 100 g     cyclophosphamide (CYTOXAN) capsule 150 mg     predniSONE tablet 60 mg     albumin human (FLEXBUMIN) 5 % injection 75 g     albumin human (FLEXBUMIN) 5 % injection 100 g     sulfamethoxazole-trimethoprim (BACTRIM DS) 800-160 mg per tablet 1 tablet        Gigi Ortega, DO      This progress note was produced in part using a dictation device which may document imprecise wording from author's original intent.

## 2024-09-28 NOTE — ASSESSMENT & PLAN NOTE
Lab Results   Component Value Date    CREATININE 3.64 (H) 09/28/2024    CREATININE 4.25 (H) 09/27/2024    CREATININE 3.17 (H) 09/26/2024    EGFR 15 09/28/2024    EGFR 12 09/27/2024    EGFR 18 09/26/2024     Now on hemodialysis -Mondays, Wednesdays, and Fridays  Patient was successfully dialyzed yesterday on 9/27/2024, next dialysis scheduled for Monday, 9/30/2024  Plasmapheresis/plasma exchange was held yesterday in view of permacath placement, patient is scheduled for plasmapheresis today  Status post a renal biopsy-evidence of glomerular basement membrane disease noted  Continue prednisone 60 mg p.o. daily -steroid induced leukocytosis noted  Nephrology, and critical care are following  Permacath was placed by IR on 9/27/2024   Discharge planning in approximately 7 to 10 days

## 2024-09-29 LAB
ABO GROUP BLD BPU: NORMAL
ANION GAP SERPL CALCULATED.3IONS-SCNC: 10 MMOL/L (ref 4–13)
BPU ID: NORMAL
BUN SERPL-MCNC: 36 MG/DL (ref 5–25)
CA-I BLD-SCNC: 0.94 MMOL/L (ref 1.12–1.32)
CA-I BLD-SCNC: 1 MMOL/L (ref 1.12–1.32)
CALCIUM SERPL-MCNC: 7.5 MG/DL (ref 8.4–10.2)
CHLORIDE SERPL-SCNC: 103 MMOL/L (ref 96–108)
CO2 SERPL-SCNC: 20 MMOL/L (ref 21–32)
CREAT SERPL-MCNC: 4.52 MG/DL (ref 0.6–1.3)
CROSSMATCH: NORMAL
ERYTHROCYTE [DISTWIDTH] IN BLOOD BY AUTOMATED COUNT: 16.3 % (ref 11.6–15.1)
FIBRINOGEN PPP-MCNC: 87 MG/DL (ref 206–523)
GFR SERPL CREATININE-BSD FRML MDRD: 11 ML/MIN/1.73SQ M
GLUCOSE SERPL-MCNC: 146 MG/DL (ref 65–140)
HCT VFR BLD AUTO: 21.4 % (ref 36.5–49.3)
HGB BLD-MCNC: 7 G/DL (ref 12–17)
INR PPP: 1.86 (ref 0.85–1.19)
MCH RBC QN AUTO: 29 PG (ref 26.8–34.3)
MCHC RBC AUTO-ENTMCNC: 32.7 G/DL (ref 31.4–37.4)
MCV RBC AUTO: 89 FL (ref 82–98)
PLATELET # BLD AUTO: 233 THOUSANDS/UL (ref 149–390)
PMV BLD AUTO: 8.9 FL (ref 8.9–12.7)
POTASSIUM SERPL-SCNC: 4.7 MMOL/L (ref 3.5–5.3)
PROTHROMBIN TIME: 21.8 SECONDS (ref 12.3–15)
RBC # BLD AUTO: 2.41 MILLION/UL (ref 3.88–5.62)
SODIUM SERPL-SCNC: 133 MMOL/L (ref 135–147)
UNIT DISPENSE STATUS: NORMAL
UNIT PRODUCT CODE: NORMAL
UNIT PRODUCT VOLUME: 350 ML
UNIT RH: NORMAL
WBC # BLD AUTO: 19.47 THOUSAND/UL (ref 4.31–10.16)

## 2024-09-29 PROCEDURE — 85027 COMPLETE CBC AUTOMATED: CPT | Performed by: HOSPITALIST

## 2024-09-29 PROCEDURE — 85610 PROTHROMBIN TIME: CPT | Performed by: STUDENT IN AN ORGANIZED HEALTH CARE EDUCATION/TRAINING PROGRAM

## 2024-09-29 PROCEDURE — 85027 COMPLETE CBC AUTOMATED: CPT | Performed by: STUDENT IN AN ORGANIZED HEALTH CARE EDUCATION/TRAINING PROGRAM

## 2024-09-29 PROCEDURE — NC001 PR NO CHARGE: Performed by: STUDENT IN AN ORGANIZED HEALTH CARE EDUCATION/TRAINING PROGRAM

## 2024-09-29 PROCEDURE — 82330 ASSAY OF CALCIUM: CPT | Performed by: STUDENT IN AN ORGANIZED HEALTH CARE EDUCATION/TRAINING PROGRAM

## 2024-09-29 PROCEDURE — 80048 BASIC METABOLIC PNL TOTAL CA: CPT | Performed by: HOSPITALIST

## 2024-09-29 PROCEDURE — 85384 FIBRINOGEN ACTIVITY: CPT | Performed by: STUDENT IN AN ORGANIZED HEALTH CARE EDUCATION/TRAINING PROGRAM

## 2024-09-29 PROCEDURE — P9012 CRYOPRECIPITATE EACH UNIT: HCPCS

## 2024-09-29 PROCEDURE — 80048 BASIC METABOLIC PNL TOTAL CA: CPT | Performed by: STUDENT IN AN ORGANIZED HEALTH CARE EDUCATION/TRAINING PROGRAM

## 2024-09-29 PROCEDURE — 99232 SBSQ HOSP IP/OBS MODERATE 35: CPT | Performed by: INTERNAL MEDICINE

## 2024-09-29 PROCEDURE — 99232 SBSQ HOSP IP/OBS MODERATE 35: CPT | Performed by: HOSPITALIST

## 2024-09-29 RX ORDER — ALBUMIN, HUMAN INJ 5% 5 %
12.5 SOLUTION INTRAVENOUS ONCE
Status: DISCONTINUED | OUTPATIENT
Start: 2024-09-29 | End: 2024-09-29

## 2024-09-29 RX ORDER — CALCIUM GLUCONATE 20 MG/ML
2 INJECTION, SOLUTION INTRAVENOUS ONCE
Status: COMPLETED | OUTPATIENT
Start: 2024-09-29 | End: 2024-09-29

## 2024-09-29 RX ADMIN — HEPARIN SODIUM 5000 UNITS: 5000 INJECTION, SOLUTION INTRAVENOUS; SUBCUTANEOUS at 05:06

## 2024-09-29 RX ADMIN — CARVEDILOL 6.25 MG: 3.12 TABLET, FILM COATED ORAL at 16:19

## 2024-09-29 RX ADMIN — Medication 400 G: at 09:38

## 2024-09-29 RX ADMIN — CALCIUM GLUCONATE 2 G: 20 INJECTION, SOLUTION INTRAVENOUS at 09:38

## 2024-09-29 RX ADMIN — FLUOXETINE HYDROCHLORIDE 20 MG: 20 CAPSULE ORAL at 09:39

## 2024-09-29 RX ADMIN — FAMOTIDINE 20 MG: 20 TABLET, FILM COATED ORAL at 09:39

## 2024-09-29 RX ADMIN — CYCLOPHOSPHAMIDE 100 MG: 50 CAPSULE ORAL at 16:21

## 2024-09-29 RX ADMIN — CARVEDILOL 6.25 MG: 3.12 TABLET, FILM COATED ORAL at 09:39

## 2024-09-29 RX ADMIN — HEPARIN SODIUM 5000 UNITS: 5000 INJECTION, SOLUTION INTRAVENOUS; SUBCUTANEOUS at 18:48

## 2024-09-29 RX ADMIN — PREDNISONE 60 MG: 20 TABLET ORAL at 16:19

## 2024-09-29 RX ADMIN — PANTOPRAZOLE SODIUM 20 MG: 20 TABLET, DELAYED RELEASE ORAL at 05:06

## 2024-09-29 NOTE — PROGRESS NOTES
Progress Note - Hospitalist   Name: Ramos Rosenthal 74 y.o. male I MRN: [id number]  Unit/Bed#: ICU 04-01 I Date of Admission: 9/14/2024   Date of Service: 9/29/2024 I Hospital Day: 15    Assessment & Plan  LYLY (acute kidney injury) (HCC)      Lab Results   Component Value Date    CREATININE 4.52 (H) 09/29/2024    CREATININE 3.64 (H) 09/28/2024    CREATININE 4.25 (H) 09/27/2024    EGFR 11 09/29/2024    EGFR 15 09/28/2024    EGFR 12 09/27/2024     Now on hemodialysis -Mondays, Wednesdays, and Fridays  Dialysis scheduled for tomorrow on Monday, 9/30/2024  Plasmapheresis started on 9/21/2024  Patient has had 7 plasmapheresis treatments thus far (he did not have 1 on 9/27/2024 in view of permacath placement)  Status post a renal biopsy-evidence of glomerular basement membrane disease noted  Continue prednisone 60 mg p.o. daily -steroid induced leukocytosis noted  Nephrology, and critical care are following  Discharge planning after 2 weeks of plasmapheresis has been completed and once cleared by nephrology  Case management is working on an outpatient dialysis chair    Depression  Continue home dose fluoxetine  Paresthesia of both lower extremities  Hx of back surgery in 2004 and 2014 and has since bilateral LE paresthesia  He is primarily wheelchair bound  Venous statis ulcers noted to Bilateral LE  B/L extremity edema R>L  Continue supportive therapy  Hyponatremia  Patient has a hypervolemic hyponatremia  Patient has remained asymptomatic  Sodium is stable at 13  Continue fluid restriction  Continued management as per nephrology  Bilateral lower extremity edema  Chronic condition  Patient with a history of lower back/spinal surgery  Patient does not ambulate  Patient is wheelchair-bound at baseline  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  Noted on renal biopsy  Continue plasmapheresis as outlined above  Continue cyclophosphamide, prednisone, and Bactrim  Continued management as per  nephrology/critical care  Anemia due to chronic kidney disease, on chronic dialysis (HCC)  Patient with a low hemoglobin on 2024-it was 6.7  Since then the patient has been transfused 2 units of packed red blood cells  Hemoglobin this morning is 7.0  Patient remains hemodynamically stable  We will transfuse again on Monday, 2024 with dialysis  Platelet count is downtrending but stable, will continue to monitor  Essential hypertension  Continue carvedilol    VTE Pharmacologic Prophylaxis: VTE Score: 8 High Risk (Score >/= 5) - Pharmacological DVT Prophylaxis Ordered: heparin. Sequential Compression Devices Ordered.    Mobility:   Basic Mobility Inpatient Raw Score: 10  -[facility] Goal: 4: Move to chair/commode  -[facility] Achieved: 3: Sit at edge of bed  Patient is at baseline from a mobility standpoint    Patient Centered Rounds: I performed bedside rounds with nursing staff today.   Discussions with Specialists or Other Care Team Provider: Nephrology    Education and Discussions with Family / Patient:  Patient's wife was brought up to par last evening, will update again when she comes into visit today.     Current Length of Stay: 15 day(s)  Current Patient Status: Inpatient   Certification Statement: The patient will continue to require additional inpatient hospital stay due to continued hemodialysis, and plasmapheresis  Discharge Plan:  Discharge planning once cleared by nephrology and 2 weeks of plasmapheresis have been completed    Code Status: Level 1 - Full Code    Subjective   Patient seen, resting in bed, is comfortable, denies pain or discomfort    Objective     Vitals:   Temp (24hrs), Av.6 °F (36.4 °C), Min:97.1 °F (36.2 °C), Max:98.5 °F (36.9 °C)    Temp:  [97.1 °F (36.2 °C)-98.5 °F (36.9 °C)] 98.5 °F (36.9 °C)  HR:  [52-65] 56  Resp:  [14-25] 17  BP: ()/(42-67) 133/62  SpO2:  [95 %-100 %] 97 %  Body mass index is 39.12 kg/m².     Input and Output Summary (last 24 hours):     Intake/Output  Summary (Last 24 hours) at 9/29/2024 0742  Last data filed at 9/29/2024 0400  Gross per 24 hour   Intake 2749.58 ml   Output 0 ml   Net 2749.58 ml       Physical Exam  Vitals reviewed.   Constitutional:       Appearance: Normal appearance. He is well-developed.   HENT:      Head: Normocephalic and atraumatic.      Nose: Nose normal.      Mouth/Throat:      Mouth: Oropharynx is clear and moist.   Eyes:      Extraocular Movements: EOM normal.      Conjunctiva/sclera: Conjunctivae normal.      Pupils: Pupils are equal, round, and reactive to light.   Neck:      Thyroid: No thyromegaly.      Vascular: No JVD.   Cardiovascular:      Rate and Rhythm: Normal rate and regular rhythm.      Heart sounds: No murmur heard.     No friction rub. No gallop.   Pulmonary:      Effort: Pulmonary effort is normal. No respiratory distress.      Breath sounds: Normal breath sounds.   Abdominal:      General: Bowel sounds are normal. There is no distension.      Palpations: Abdomen is soft. There is no mass.      Tenderness: There is no abdominal tenderness. There is no guarding.   Musculoskeletal:         General: No edema. Normal range of motion.      Cervical back: Normal range of motion and neck supple.      Comments: 1-2+ bilateral lower extremity chronic edema   Lymphadenopathy:      Cervical: No cervical adenopathy.   Skin:     General: Skin is warm.      Findings: No erythema or rash.   Neurological:      Mental Status: He is alert and oriented to person, place, and time. Mental status is at baseline.      Cranial Nerves: No cranial nerve deficit.      Comments: Motor 0 out of 5 left lower extremity, 1 out of 5 right lower extremity   Psychiatric:         Mood and Affect: Mood and affect normal.         Behavior: Behavior normal.          Lines/Drains:  Lines/Drains/Airways       Active Status       Name Placement date Placement time Site Days    HD Permanent Double Catheter 09/27/24  1316  Internal jugular  1                             Lab Results: I have reviewed the following results:    Results from last 7 days   Lab Units 09/29/24  0511 09/28/24  0519 09/27/24  0429   WBC Thousand/uL 19.47*   < > 25.24*   HEMOGLOBIN g/dL 7.0*   < > 6.7*   HEMATOCRIT % 21.4*   < > 20.7*   PLATELETS Thousands/uL 233   < > 298   BANDS PCT %  --   --  1   LYMPHO PCT %  --   --  4*   MONO PCT %  --   --  0*   EOS PCT %  --   --  0    < > = values in this interval not displayed.     Results from last 7 days   Lab Units 09/29/24  0511   SODIUM mmol/L 133*   POTASSIUM mmol/L 4.7   CHLORIDE mmol/L 103   CO2 mmol/L 20*   BUN mg/dL 36*   CREATININE mg/dL 4.52*   ANION GAP mmol/L 10   CALCIUM mg/dL 7.5*   GLUCOSE RANDOM mg/dL 146*     Results from last 7 days   Lab Units 09/29/24  0511   INR  1.86*                   Recent Cultures (last 7 days):         Imaging Review: No pertinent imaging studies reviewed.  Other Studies: No additional pertinent studies reviewed.    Last 24 Hours Medication List:     Current Facility-Administered Medications:     calcium carbonate (TUMS) chewable tablet 1,000 mg, Daily PRN    carvedilol (COREG) tablet 6.25 mg, BID With Meals    cyclophosphamide (CYTOXAN) capsule 100 mg, Daily    famotidine (PEPCID) tablet 20 mg, Daily    FLUoxetine (PROzac) capsule 20 mg, Daily    heparin (porcine) subcutaneous injection 5,000 Units, Q8H LORENZO    melatonin tablet 3 mg, HS PRN    ondansetron (ZOFRAN) injection 4 mg, Q6H PRN    pantoprazole (PROTONIX) EC tablet 20 mg, Early Morning    predniSONE tablet 60 mg, Daily    sulfamethoxazole-trimethoprim (BACTRIM DS) 800-160 mg per tablet 1 tablet, Once per day on Monday Wednesday Friday    Administrative Statements   Today, Patient Was Seen By: Agnes Rust MD  I have spent a total time of 35 minutes in caring for this patient on the day of the visit/encounter including Diagnostic results, Patient and family education, Counseling / Coordination of care, Documenting in the medical record,  Reviewing / ordering tests, medicine, procedures  , and Communicating with other healthcare professionals .    **Please Note: This note may have been constructed using a voice recognition system.**

## 2024-09-29 NOTE — ASSESSMENT & PLAN NOTE
Continue with oral cyclophosphamide, oral prednisone, and plasmapheresis/[facility].  Plasmapheresis dose increased from 1 up to 1.5, follow anti-GBM antibody level with goal being less than 8 if possible.  Continue Bactrim double strength 3 times weekly for prophylaxis    As noted previously, the patient's finalized pathology remains pending.  Will look to follow-up this coming week.

## 2024-09-29 NOTE — ASSESSMENT & PLAN NOTE
Patient has a hypervolemic hyponatremia  Patient has remained asymptomatic  Sodium is stable at 13  Continue fluid restriction  Continued management as per nephrology

## 2024-09-29 NOTE — PLAN OF CARE
Problem: PAIN - ADULT  Goal: Verbalizes/displays adequate comfort level or baseline comfort level  Description: Interventions:  - Encourage patient to monitor pain and request assistance  - Assess pain using appropriate pain scale  - Administer analgesics based on type and severity of pain and evaluate response  - Implement non-pharmacological measures as appropriate and evaluate response  - Consider cultural and social influences on pain and pain management  - Notify physician/advanced practitioner if interventions unsuccessful or patient reports new pain  Outcome: Progressing     Problem: SAFETY ADULT  Goal: Patient will remain free of falls  Description: INTERVENTIONS:  - Educate patient/family on patient safety including physical limitations  - Instruct patient to call for assistance with activity   - Consult OT/PT to assist with strengthening/mobility   - Keep Call bell within reach  - Keep bed low and locked with side rails adjusted as appropriate  - Keep care items and personal belongings within reach  - Initiate and maintain comfort rounds  - Make Fall Risk Sign visible to staff  - Offer Toileting every 2 Hours, in advance of need  - Initiate/Maintain bed alarm  - Obtain necessary fall risk management equipment  - Apply yellow socks and bracelet for high fall risk patients  - Consider moving patient to room near nurses station  Outcome: Progressing  Goal: Maintain or return to baseline ADL function  Description: INTERVENTIONS:  -  Assess patient's ability to carry out ADLs; assess patient's baseline for ADL function and identify physical deficits which impact ability to perform ADLs (bathing, care of mouth/teeth, toileting, grooming, dressing, etc.)  - Assess/evaluate cause of self-care deficits   - Assess range of motion  - Assess patient's mobility; develop plan if impaired  - Assess patient's need for assistive devices and provide as appropriate  - Encourage maximum independence but intervene and  supervise when necessary  - Involve family in performance of ADLs  - Assess for home care needs following discharge   - Consider OT consult to assist with ADL evaluation and planning for discharge  - Provide patient education as appropriate  Outcome: Progressing  Goal: Maintains/Returns to pre admission functional level  Description: INTERVENTIONS:  - Perform AM-PAC 6 Click Basic Mobility/ Daily Activity assessment daily.  - Set and communicate daily mobility goal to care team and patient/family/caregiver.   - Collaborate with rehabilitation services on mobility goals if consulted  - Out of bed for meals   - Out of bed for toileting  - Record patient progress and toleration of activity level   Outcome: Progressing     Problem: DISCHARGE PLANNING  Goal: Discharge to home or other facility with appropriate resources  Description: INTERVENTIONS:  - Identify barriers to discharge w/patient and caregiver  - Arrange for needed discharge resources and transportation as appropriate  - Identify discharge learning needs (meds, wound care, etc.)  - Arrange for interpretive services to assist at discharge as needed  - Refer to Case Management Department for coordinating discharge planning if the patient needs post-hospital services based on physician/advanced practitioner order or complex needs related to functional status, cognitive ability, or social support system  Outcome: Progressing

## 2024-09-29 NOTE — ASSESSMENT & PLAN NOTE
Noted on renal biopsy  Continue plasmapheresis as outlined above  Continue cyclophosphamide, prednisone, and Bactrim  Continued management as per nephrology/critical care

## 2024-09-29 NOTE — ASSESSMENT & PLAN NOTE
Patient diagnosed with anti-GBM related RPGN, treatment according to documentation below.  Patient remains hemodialysis dependent at this time will likely remain hemodialysis dependent at least in the short-term.  Status post PermCath placement, continue hemodialysis sessions Monday Wednesday Friday next session scheduled for tomorrow, Monday, September 30.

## 2024-09-29 NOTE — PROGRESS NOTES
Progress Note - Nephrology   Name: Ramos Rosenthal 74 y.o. male I MRN: [id number]  Unit/Bed#: ICU 04-01 I Date of Admission: 9/14/2024   Date of Service: 9/29/2024 I Hospital Day: 15     Assessment & Plan  LYLY (acute kidney injury) (HCC)  Patient diagnosed with anti-GBM related RPGN, treatment according to documentation below.  Patient remains hemodialysis dependent at this time will likely remain hemodialysis dependent at least in the short-term.  Status post PermCath placement, continue hemodialysis sessions Monday Wednesday Friday next session scheduled for tomorrow, Monday, September 30.  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  Plasmapheresis dose increased from 1 up to 1.5, follow anti-GBM antibody level with goal being less than 8 if possible.  Continue Bactrim double strength 3 times weekly for prophylaxis    As noted previously, the patient's finalized pathology remains pending.  Will look to follow-up this coming week.  Hyponatremia  Continue fluid restriction, serum sodium levels are stable.  Bilateral lower extremity edema  Continue to ultrafilter as tolerated hemodialysis sessions, continue low-sodium diet.  Anemia due to chronic kidney disease, on chronic dialysis ([facility])  Lab Results   Component Value Date    EGFR 11 09/29/2024    EGFR 15 09/28/2024    EGFR 12 09/27/2024    CREATININE 4.52 (H) 09/29/2024    CREATININE 3.64 (H) 09/28/2024    CREATININE 4.25 (H) 09/27/2024     Continue provide to provide packed red blood cells as indicated, may add SHREE in the near future.  Essential hypertension  Blood pressure stable, continue with carvedilol      Follow up reason for today's visit: Hemodialysis dependent acute kidney injury/anti-GBM RPGN/electrolyte disorder/volume management    LYLY (acute kidney injury) (HCC)    Patient Active Problem List   Diagnosis    Gait abnormality    Lumbar stenosis     "Polyneuropathy    Lower extremity weakness    Lumbosacral plexopathy    Elevated troponin    EDGAR (dyspnea on exertion)    Rash due to vaculitis     LYLY (acute kidney injury) ([facility])    Depression    Multiple open wounds of lower leg    Paresthesia of both lower extremities    Leucocytosis    Bloating    Hyperlipidemia    Neurogenic claudication    Prediabetes    Vitamin D deficiency    Hyponatremia    Bilateral lower extremity edema    Secondary hyperparathyroidism of renal origin (HCC)    Uremia    Essential hypertension    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    Objective:     Vitals: Blood pressure 118/56, pulse 62, temperature 98.5 °F (36.9 °C), temperature source Temporal, resp. rate 17, height 6' 2\" (1.88 m), weight (!) 138 kg (304 lb 10.8 oz), SpO2 97%.,Body mass index is 39.12 kg/m².    Weight (last 2 days)       Date/Time Weight    09/29/24 0532 138 (304.68)    09/29/24 0500 138 (304.68)    09/28/24 0535 136 (299.83)    09/28/24 0500 136 (299.83)    09/27/24 0553 137 (301.81)              Intake/Output Summary (Last 24 hours) at 9/29/2024 1336  Last data filed at 9/29/2024 0400  Gross per 24 hour   Intake 2090 ml   Output 0 ml   Net 2090 ml     I/O last 3 completed shifts:  In: 2874.6 [P.O.:660; Blood:364.6; IV Piggyback:1850]  Out: 0     HD Permanent Double Catheter (Active)   Reasons to continue HD Cath Treatment Therapy 09/28/24 2000   Goal for Removal N/A- chronic HD catheter 09/28/24 2000   Line Necessity Reviewed Yes, reviewed with provider 09/28/24 2000   Site Assessment Clean;Dry;Intact 09/28/24 2000   Proximal Lumen Status Capped 09/28/24 2000   Distal Lumen Status Capped 09/28/24 2000   Catheter Out on Skin (cm) 0 cm 09/27/24 1322   Dressing " "Type Chlorhexidine dressing 09/28/24 2000   Dressing Status Clean;Dry;Intact 09/28/24 2000   Dressing Change Due 10/04/24 09/28/24 2000   CLABSI Time-out performed for Positive Blood Culture Yes 09/28/24 0800       Physical Exam: /56   Pulse 62   Temp 98.5 °F (36.9 °C) (Temporal)   Resp 17   Ht 6' 2\" (1.88 m)   Wt (!) 138 kg (304 lb 10.8 oz)   SpO2 97%   BMI 39.12 kg/m²     General Appearance:    Alert, cooperative, no distress, appears stated age   Head:    Normocephalic, without obvious abnormality, atraumatic   Eyes:    Conjunctiva/corneas clear   Ears:    Normal external ears   Nose:   Nares normal, septum midline, mucosa normal, no drainage    or sinus tenderness   Throat:   Lips, mucosa, and tongue normal; teeth and gums normal   Neck:   Supple   Back:     Symmetric, no curvature, ROM normal, no CVA tenderness   Lungs:     Clear to auscultation bilaterally, respirations unlabored   Chest wall:    No tenderness or deformity   Heart:    Regular rate and rhythm, S1 and S2 normal, no murmur, rub   or gallop   Abdomen:     Soft, non-tender, bowel sounds active   Extremities:   Extremities normal, atraumatic, no cyanosis, mild bilateral lower extremity edema   Skin:   Skin color, texture, turgor normal, no rashes or lesions   Lymph nodes:   Cervical normal   Neurologic:   CNII-XII intact            Lab, Imaging and other studies: I have personally reviewed pertinent labs.  CBC:   Lab Results   Component Value Date    WBC 19.47 (H) 09/29/2024    HGB 7.0 (L) 09/29/2024    HCT 21.4 (L) 09/29/2024    MCV 89 09/29/2024     09/29/2024    RBC 2.41 (L) 09/29/2024    MCH 29.0 09/29/2024    MCHC 32.7 09/29/2024    RDW 16.3 (H) 09/29/2024    MPV 8.9 09/29/2024     CMP:   Lab Results   Component Value Date    K 4.7 09/29/2024     09/29/2024    CO2 20 (L) 09/29/2024    BUN 36 (H) 09/29/2024    CREATININE 4.52 (H) 09/29/2024    CALCIUM 7.5 (L) 09/29/2024    EGFR 11 09/29/2024       .  Results from last 7 " "days   Lab Units 09/29/24  0511 09/28/24  0519 09/27/24  0429   POTASSIUM mmol/L 4.7 4.2 4.2   CHLORIDE mmol/L 103 99 102   CO2 mmol/L 20* 24 21   BUN mg/dL 36* 26* 34*   CREATININE mg/dL 4.52* 3.64* 4.25*   CALCIUM mg/dL 7.5* 7.8* 7.6*         Phosphorus: No results found for: \"PHOS\"  Magnesium: No results found for: \"MG\"  Urinalysis: No results found for: \"COLORU\", \"CLARITYU\", \"SPECGRAV\", \"PHUR\", \"LEUKOCYTESUR\", \"NITRITE\", \"PROTEINUA\", \"GLUCOSEU\", \"KETONESU\", \"BILIRUBINUR\", \"BLOODU\"  Ionized Calcium: No results found for: \"CAION\"  Coagulation:   Lab Results   Component Value Date    INR 1.86 (H) 09/29/2024     Troponin: No results found for: \"TROPONINI\"  ABG: No results found for: \"PHART\", \"VBD7YBH\", \"PO2ART\", \"KWZ0GXE\", \"P6MGMKEE\", \"BEART\", \"SOURCE\"  Radiology review:     IMAGING  Procedure: IR tunneled dialysis catheter placement    Result Date: 9/27/2024  Narrative: Nontunneled temporary dialysis catheter conversion to a tunneled catheter Clinical History: Patient with a temporary catheter in place, now presenting for conversion to a tunneled catheter for long-term access . Renal failure Fluoro time: 1 minute Antibiotics: Ancef Conscious sedation time: 15 minutes Technique: Informed consent was previously obtained.  The patient was brought to the interventional radiology suite and identified verbally and by wrist band. The patient was placed supine on the table and the existing catheter was prepped and draped in the usual sterile fashion.  All elements of maximal sterile barrier technique were followed (cap, mask, sterile gown, sterile gloves, large sterile sheet, hand hygiene, and 2% chlorhexidine for cutaneous antisepsis). Lidocaine was administered to the skin of the insertion site. A tunnel site was chosen.  Lidocaine was given.  A  32   cm catheter was then tunneled under the skin. An Amplatz wire was advanced through the existing catheter. The existing catheter was removed, and a peel-away sheath was " placed.  The dialysis catheter was advanced into the peel-away. The peel-away was removed. The catheter tip was then positioned in the right atrium under fluoroscopic control. The external portion of the catheter was sutured to the skin with 0 Prolene. The counter incision was closed with Vicryl and medical glue.  Each lumen flushed and aspirated appropriately.  A sterile dressing was applied. Patient tolerated the procedure without immediate complications.     Impression: Impression: Conversion of a nontunneled dialysis catheter to a tunneled 32 cm 15.5 Divehi Titan right internal jugular dialysis catheter. Workstation performed: [id number]         Current Facility-Administered Medications:     albumin human (FLEXBUMIN) 5 % injection 12.5 g, Once    calcium carbonate (TUMS) chewable tablet 1,000 mg, Daily PRN    carvedilol (COREG) tablet 6.25 mg, BID With Meals    cyclophosphamide (CYTOXAN) capsule 100 mg, Daily    famotidine (PEPCID) tablet 20 mg, Daily    FLUoxetine (PROzac) capsule 20 mg, Daily    heparin (porcine) subcutaneous injection 5,000 Units, Q8H LORENZO    melatonin tablet 3 mg, HS PRN    ondansetron (ZOFRAN) injection 4 mg, Q6H PRN    pantoprazole (PROTONIX) EC tablet 20 mg, Early Morning    predniSONE tablet 60 mg, Daily    sulfamethoxazole-trimethoprim (BACTRIM DS) 800-160 mg per tablet 1 tablet, Once per day on Monday Wednesday Friday  Medications Discontinued During This Encounter   Medication Reason    meloxicam (MOBIC) 15 mg tablet Therapy completed    multi-electrolyte (PLASMALYTE-A/ISOLYTE-S PH 7.4) IV solution     cefTRIAXone (ROCEPHIN) IVPB (premix in dextrose) 1,000 mg 50 mL     cefTRIAXone (ROCEPHIN) IVPB (premix in dextrose) 1,000 mg 50 mL     albumin human (FLEXBUMIN) 5 % injection 75 g     albumin human (FLEXBUMIN) 5 % injection 100 g     cyclophosphamide (CYTOXAN) capsule 150 mg     predniSONE tablet 60 mg     albumin human (FLEXBUMIN) 5 % injection 75 g     albumin human (FLEXBUMIN) 5 %  injection 100 g     sulfamethoxazole-trimethoprim (BACTRIM DS) 800-160 mg per tablet 1 tablet        Gigi Ortega,       This progress note was produced in part using a dictation device which may document imprecise wording from author's original intent.

## 2024-09-29 NOTE — PROGRESS NOTES
Name: Ramos Rosenthal 74 y.o. male I MRN: [id number]  Unit/Bed#: ICU 04-01 I Date of Admission: 9/14/2024   Date of Service: 9/29/2024 I Hospital Day: 15       73 yo M w/ LYLY in the setting of anti-GBM glomerulonephritis requiring iHD and plasmapharesis. CC service assisting with plasmapharesis management. Primary care per [facility] and nephrology teams.      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 D/w [place] blood bank and with our pathology team. Will increase to 1.5 today and reassess daily per coags to determine tolerance  -- TPE increased today 1.5 plasma volume exchange with 5% albumin - 8L, 400g. Daily sessions for 14 days to complete 10/5  -- Last s/p 1u pooled cryo on 9/27. No signs of bleeding. Transfuse cryo today for fibrinogen 87. Recheck tomorrow prior to PLEX. May need reduced volume exchange if remains low vs combination albumin/FFP replacement for tomorrow's session.  -- C/w cryo/FFP supplementation as needed for bleeding or fibrinogen<150. Transfuse pRBC for Hct<21  -- Daily fibrinogen, INR, ionized Ca checks. Ca2+ supplementation. Keep active T/S         Annabel Adamson, DO

## 2024-09-29 NOTE — ASSESSMENT & PLAN NOTE
Lab Results   Component Value Date    EGFR 11 09/29/2024    EGFR 15 09/28/2024    EGFR 12 09/27/2024    CREATININE 4.52 (H) 09/29/2024    CREATININE 3.64 (H) 09/28/2024    CREATININE 4.25 (H) 09/27/2024     Continue provide to provide packed red blood cells as indicated, may add SHREE in the near future.

## 2024-09-29 NOTE — ASSESSMENT & PLAN NOTE
Lab Results   Component Value Date    CREATININE 4.52 (H) 09/29/2024    CREATININE 3.64 (H) 09/28/2024    CREATININE 4.25 (H) 09/27/2024    EGFR 11 09/29/2024    EGFR 15 09/28/2024    EGFR 12 09/27/2024     Now on hemodialysis -Mondays, Wednesdays, and Fridays  Dialysis scheduled for tomorrow on Monday, 9/30/2024  Plasmapheresis started on 9/21/2024  Patient has had 7 plasmapheresis treatments thus far (he did not have 1 on 9/27/2024 in view of permacath placement)  Status post a renal biopsy-evidence of glomerular basement membrane disease noted  Continue prednisone 60 mg p.o. daily -steroid induced leukocytosis noted  Nephrology, and critical care are following  Discharge planning after 2 weeks of plasmapheresis has been completed and once cleared by nephrology  Case management is working on an outpatient dialysis chair

## 2024-09-29 NOTE — ASSESSMENT & PLAN NOTE
Patient with a low hemoglobin on 9/27/2024-it was 6.7  Since then the patient has been transfused 2 units of packed red blood cells  Hemoglobin this morning is 7.0  Patient remains hemodynamically stable  We will transfuse again on Monday, 9/30/2024 with dialysis  Platelet count is downtrending but stable, will continue to monitor

## 2024-09-29 NOTE — PLAN OF CARE
Problem: PAIN - ADULT  Goal: Verbalizes/displays adequate comfort level or baseline comfort level  Description: Interventions:  - Encourage patient to monitor pain and request assistance  - Assess pain using appropriate pain scale  - Administer analgesics based on type and severity of pain and evaluate response  - Implement non-pharmacological measures as appropriate and evaluate response  - Consider cultural and social influences on pain and pain management  - Notify physician/advanced practitioner if interventions unsuccessful or patient reports new pain  Outcome: Progressing     Problem: INFECTION - ADULT  Goal: Absence or prevention of progression during hospitalization  Description: INTERVENTIONS:  - Assess and monitor for signs and symptoms of infection  - Monitor lab/diagnostic results  - Monitor all insertion sites, i.e. indwelling lines, tubes, and drains  - Monitor endotracheal if appropriate and nasal secretions for changes in amount and color  - [place] appropriate cooling/warming therapies per order  - Administer medications as ordered  - Instruct and encourage patient and family to use good hand hygiene technique  - Identify and instruct in appropriate isolation precautions for identified infection/condition  Outcome: Progressing     Problem: SAFETY ADULT  Goal: Patient will remain free of falls  Description: INTERVENTIONS:  - Educate patient/family on patient safety including physical limitations  - Instruct patient to call for assistance with activity   - Consult OT/PT to assist with strengthening/mobility   - Keep Call bell within reach  - Keep bed low and locked with side rails adjusted as appropriate  - Keep care items and personal belongings within reach  - Initiate and maintain comfort rounds  - Make Fall Risk Sign visible to staff  - Offer Toileting every 2 Hours, in advance of need  - Initiate/Maintain bed alarm  - Obtain necessary fall risk management equipment  - Apply yellow socks and  bracelet for high fall risk patients  - Consider moving patient to room near nurses station  Outcome: Progressing     Problem: Knowledge Deficit  Goal: Patient/family/caregiver demonstrates understanding of disease process, treatment plan, medications, and discharge instructions  Description: Complete learning assessment and assess knowledge base.  Interventions:  - Provide teaching at level of understanding  - Provide teaching via preferred learning methods  Outcome: Progressing     Problem: Nutrition/Hydration-ADULT  Goal: Nutrient/Hydration intake appropriate for improving, restoring or maintaining nutritional needs  Description: Monitor and assess patient's nutrition/hydration status for malnutrition. Collaborate with interdisciplinary team and initiate plan and interventions as ordered.  Monitor patient's weight and dietary intake as ordered or per policy. Utilize nutrition screening tool and intervene as necessary. Determine patient's food preferences and provide high-protein, high-caloric foods as appropriate.     INTERVENTIONS:  - Monitor oral intake, urinary output, labs, and treatment plans  - Assess nutrition and hydration status and recommend course of action  - Evaluate amount of meals eaten  - Assist patient with eating if necessary   - Allow adequate time for meals  - Recommend/ encourage appropriate diets, oral nutritional supplements, and vitamin/mineral supplements  - Order, calculate, and assess calorie counts as needed  - Recommend, monitor, and adjust tube feedings and TPN/PPN based on assessed needs  - Assess need for intravenous fluids  - Provide specific nutrition/hydration education as appropriate  - Include patient/family/caregiver in decisions related to nutrition  Outcome: Progressing     Problem: Prexisting or High Potential for Compromised Skin Integrity  Goal: Skin integrity is maintained or improved  Description: INTERVENTIONS:  - Identify patients at risk for skin breakdown  - Assess  and monitor skin integrity  - Assess and monitor nutrition and hydration status  - Monitor labs   - Assess for incontinence   - Turn and reposition patient  - Assist with mobility/ambulation  - Relieve pressure over bony prominences  - Avoid friction and shearing  - Provide appropriate hygiene as needed including keeping skin clean and dry  - Evaluate need for skin moisturizer/barrier cream  - Collaborate with interdisciplinary team   - Patient/family teaching  - Consider wound care consult   Outcome: Progressing     Problem: METABOLIC, FLUID AND ELECTROLYTES - ADULT  Goal: Electrolytes maintained within normal limits  Description: INTERVENTIONS:  - Monitor labs and assess patient for signs and symptoms of electrolyte imbalances  - Administer electrolyte replacement as ordered  - Monitor response to electrolyte replacements, including repeat lab results as appropriate  - Instruct patient on fluid and nutrition as appropriate  Outcome: Progressing  Goal: Fluid balance maintained  Description: INTERVENTIONS:  - Monitor labs   - Monitor I/O and WT  - Instruct patient on fluid and nutrition as appropriate  - Assess for signs & symptoms of volume excess or deficit  Outcome: Progressing     Problem: HEMATOLOGIC - ADULT  Goal: Maintains hematologic stability  Description: INTERVENTIONS  - Assess for signs and symptoms of bleeding or hemorrhage  - Monitor labs  - Administer supportive blood products/factors as ordered and appropriate  Outcome: Progressing

## 2024-09-30 ENCOUNTER — APPOINTMENT (INPATIENT)
Dept: DIALYSIS | Facility: HOSPITAL | Age: 74
DRG: 871 | End: 2024-09-30
Payer: MEDICARE

## 2024-09-30 LAB
ABO GROUP BLD BPU: NORMAL
ABO GROUP BLD: NORMAL
ANION GAP SERPL CALCULATED.3IONS-SCNC: 10 MMOL/L (ref 4–13)
ANION GAP SERPL CALCULATED.3IONS-SCNC: 11 MMOL/L (ref 4–13)
BASOPHILS # BLD AUTO: 0.02 THOUSANDS/ΜL (ref 0–0.1)
BASOPHILS NFR BLD AUTO: 0 % (ref 0–1)
BLD GP AB SCN SERPL QL: NEGATIVE
BPU ID: NORMAL
BUN SERPL-MCNC: 40 MG/DL (ref 5–25)
BUN SERPL-MCNC: 45 MG/DL (ref 5–25)
CALCIUM SERPL-MCNC: 7.2 MG/DL (ref 8.4–10.2)
CALCIUM SERPL-MCNC: 7.6 MG/DL (ref 8.4–10.2)
CHLORIDE SERPL-SCNC: 106 MMOL/L (ref 96–108)
CHLORIDE SERPL-SCNC: 106 MMOL/L (ref 96–108)
CO2 SERPL-SCNC: 16 MMOL/L (ref 21–32)
CO2 SERPL-SCNC: 16 MMOL/L (ref 21–32)
CREAT SERPL-MCNC: 4.9 MG/DL (ref 0.6–1.3)
CREAT SERPL-MCNC: 5.27 MG/DL (ref 0.6–1.3)
EOSINOPHIL # BLD AUTO: 0 THOUSAND/ΜL (ref 0–0.61)
EOSINOPHIL NFR BLD AUTO: 0 % (ref 0–6)
ERYTHROCYTE [DISTWIDTH] IN BLOOD BY AUTOMATED COUNT: 16.6 % (ref 11.6–15.1)
ERYTHROCYTE [DISTWIDTH] IN BLOOD BY AUTOMATED COUNT: 16.7 % (ref 11.6–15.1)
FIBRINOGEN PPP-MCNC: 99 MG/DL (ref 206–523)
GBM AB SER IA-ACNC: >8 UNITS (ref 0–0.9)
GFR SERPL CREATININE-BSD FRML MDRD: 10 ML/MIN/1.73SQ M
GFR SERPL CREATININE-BSD FRML MDRD: 9 ML/MIN/1.73SQ M
GLUCOSE SERPL-MCNC: 122 MG/DL (ref 65–140)
GLUCOSE SERPL-MCNC: 165 MG/DL (ref 65–140)
HCT VFR BLD AUTO: 20.2 % (ref 36.5–49.3)
HCT VFR BLD AUTO: 20.5 % (ref 36.5–49.3)
HCT VFR BLD AUTO: 22.7 % (ref 36.5–49.3)
HGB BLD-MCNC: 6.5 G/DL (ref 12–17)
HGB BLD-MCNC: 6.8 G/DL (ref 12–17)
HGB BLD-MCNC: 7.5 G/DL (ref 12–17)
IMM GRANULOCYTES # BLD AUTO: 0.33 THOUSAND/UL (ref 0–0.2)
IMM GRANULOCYTES NFR BLD AUTO: 2 % (ref 0–2)
INR PPP: 1.87 (ref 0.85–1.19)
LYMPHOCYTES # BLD AUTO: 0.86 THOUSANDS/ΜL (ref 0.6–4.47)
LYMPHOCYTES NFR BLD AUTO: 5 % (ref 14–44)
MCH RBC QN AUTO: 29.3 PG (ref 26.8–34.3)
MCH RBC QN AUTO: 30 PG (ref 26.8–34.3)
MCHC RBC AUTO-ENTMCNC: 32.2 G/DL (ref 31.4–37.4)
MCHC RBC AUTO-ENTMCNC: 33.2 G/DL (ref 31.4–37.4)
MCV RBC AUTO: 90 FL (ref 82–98)
MCV RBC AUTO: 91 FL (ref 82–98)
MONOCYTES # BLD AUTO: 0.62 THOUSAND/ΜL (ref 0.17–1.22)
MONOCYTES NFR BLD AUTO: 4 % (ref 4–12)
NEUTROPHILS # BLD AUTO: 15.83 THOUSANDS/ΜL (ref 1.85–7.62)
NEUTS SEG NFR BLD AUTO: 89 % (ref 43–75)
NRBC BLD AUTO-RTO: 0 /100 WBCS
PLATELET # BLD AUTO: 208 THOUSANDS/UL (ref 149–390)
PLATELET # BLD AUTO: 213 THOUSANDS/UL (ref 149–390)
PMV BLD AUTO: 8.8 FL (ref 8.9–12.7)
PMV BLD AUTO: 9 FL (ref 8.9–12.7)
POTASSIUM SERPL-SCNC: 4.5 MMOL/L (ref 3.5–5.3)
POTASSIUM SERPL-SCNC: 4.6 MMOL/L (ref 3.5–5.3)
PROTHROMBIN TIME: 21.9 SECONDS (ref 12.3–15)
RBC # BLD AUTO: 2.22 MILLION/UL (ref 3.88–5.62)
RBC # BLD AUTO: 2.27 MILLION/UL (ref 3.88–5.62)
RH BLD: POSITIVE
SODIUM SERPL-SCNC: 132 MMOL/L (ref 135–147)
SODIUM SERPL-SCNC: 133 MMOL/L (ref 135–147)
SPECIMEN EXPIRATION DATE: NORMAL
UNIT DISPENSE STATUS: NORMAL
UNIT PRODUCT CODE: NORMAL
UNIT PRODUCT VOLUME: 125 ML
UNIT RH: NORMAL
WBC # BLD AUTO: 17.66 THOUSAND/UL (ref 4.31–10.16)
WBC # BLD AUTO: 19.32 THOUSAND/UL (ref 4.31–10.16)

## 2024-09-30 PROCEDURE — 83520 IMMUNOASSAY QUANT NOS NONAB: CPT | Performed by: NURSE PRACTITIONER

## 2024-09-30 PROCEDURE — P9016 RBC LEUKOCYTES REDUCED: HCPCS

## 2024-09-30 PROCEDURE — 86850 RBC ANTIBODY SCREEN: CPT | Performed by: NURSE PRACTITIONER

## 2024-09-30 PROCEDURE — 85384 FIBRINOGEN ACTIVITY: CPT | Performed by: NURSE PRACTITIONER

## 2024-09-30 PROCEDURE — 85014 HEMATOCRIT: CPT | Performed by: NURSE PRACTITIONER

## 2024-09-30 PROCEDURE — 90935 HEMODIALYSIS ONE EVALUATION: CPT

## 2024-09-30 PROCEDURE — 85018 HEMOGLOBIN: CPT | Performed by: NURSE PRACTITIONER

## 2024-09-30 PROCEDURE — 86920 COMPATIBILITY TEST SPIN: CPT

## 2024-09-30 PROCEDURE — P9012 CRYOPRECIPITATE EACH UNIT: HCPCS

## 2024-09-30 PROCEDURE — 80048 BASIC METABOLIC PNL TOTAL CA: CPT | Performed by: INTERNAL MEDICINE

## 2024-09-30 PROCEDURE — 86923 COMPATIBILITY TEST ELECTRIC: CPT

## 2024-09-30 PROCEDURE — 86900 BLOOD TYPING SEROLOGIC ABO: CPT | Performed by: NURSE PRACTITIONER

## 2024-09-30 PROCEDURE — 85025 COMPLETE CBC W/AUTO DIFF WBC: CPT | Performed by: INTERNAL MEDICINE

## 2024-09-30 PROCEDURE — 99232 SBSQ HOSP IP/OBS MODERATE 35: CPT | Performed by: INTERNAL MEDICINE

## 2024-09-30 PROCEDURE — 86901 BLOOD TYPING SEROLOGIC RH(D): CPT | Performed by: NURSE PRACTITIONER

## 2024-09-30 PROCEDURE — P9017 PLASMA 1 DONOR FRZ W/IN 8 HR: HCPCS

## 2024-09-30 PROCEDURE — NC001 PR NO CHARGE: Performed by: INTERNAL MEDICINE

## 2024-09-30 RX ORDER — ALBUMIN, HUMAN INJ 5% 5 %
62.5 SOLUTION INTRAVENOUS ONCE
Status: COMPLETED | OUTPATIENT
Start: 2024-09-30 | End: 2024-09-30

## 2024-09-30 RX ORDER — CALCIUM GLUCONATE 20 MG/ML
2 INJECTION, SOLUTION INTRAVENOUS ONCE
Status: COMPLETED | OUTPATIENT
Start: 2024-09-30 | End: 2024-09-30

## 2024-09-30 RX ADMIN — CALCIUM GLUCONATE 2 G: 20 INJECTION, SOLUTION INTRAVENOUS at 15:11

## 2024-09-30 RX ADMIN — PANTOPRAZOLE SODIUM 20 MG: 20 TABLET, DELAYED RELEASE ORAL at 07:40

## 2024-09-30 RX ADMIN — FLUOXETINE HYDROCHLORIDE 20 MG: 20 CAPSULE ORAL at 08:29

## 2024-09-30 RX ADMIN — CYCLOPHOSPHAMIDE 100 MG: 50 CAPSULE ORAL at 17:03

## 2024-09-30 RX ADMIN — HEPARIN SODIUM 5000 UNITS: 5000 INJECTION, SOLUTION INTRAVENOUS; SUBCUTANEOUS at 07:40

## 2024-09-30 RX ADMIN — CARVEDILOL 6.25 MG: 3.12 TABLET, FILM COATED ORAL at 17:03

## 2024-09-30 RX ADMIN — SULFAMETHOXAZOLE AND TRIMETHOPRIM 1 TABLET: 800; 160 TABLET ORAL at 17:03

## 2024-09-30 RX ADMIN — Medication 250 G: at 14:54

## 2024-09-30 RX ADMIN — CARVEDILOL 6.25 MG: 3.12 TABLET, FILM COATED ORAL at 07:41

## 2024-09-30 RX ADMIN — ALBUMIN (HUMAN) 62.5 G: 12.5 INJECTION, SOLUTION INTRAVENOUS at 17:02

## 2024-09-30 RX ADMIN — FAMOTIDINE 20 MG: 20 TABLET, FILM COATED ORAL at 08:29

## 2024-09-30 RX ADMIN — PREDNISONE 60 MG: 20 TABLET ORAL at 17:03

## 2024-09-30 RX ADMIN — HEPARIN SODIUM 5000 UNITS: 5000 INJECTION, SOLUTION INTRAVENOUS; SUBCUTANEOUS at 21:31

## 2024-09-30 NOTE — PROGRESS NOTES
Progress Note - Hospitalist   Name: Ramos Rosenthal 74 y.o. male I MRN: [id number]  Unit/Bed#: ICU 04-01 I Date of Admission: 9/14/2024   Date of Service: 9/30/2024 I Hospital Day: 16     Assessment & Plan  LYLY (acute kidney injury) ([facility])      Lab Results   Component Value Date    CREATININE 4.90 (H) 09/29/2024    CREATININE 4.52 (H) 09/29/2024    CREATININE 3.64 (H) 09/28/2024    EGFR 10 09/29/2024    EGFR 11 09/29/2024    EGFR 15 09/28/2024     Now on hemodialysis -Mondays, Wednesdays, and Fridays  Plasmapheresis started on 9/21/2024  Patient has had 7 plasmapheresis treatments thus far (he did not have 1 on 9/27/2024 in view of permacath placement)  Status post a renal biopsy-evidence of glomerular basement membrane disease noted  Continue prednisone 60 mg p.o. 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  Noted on renal biopsy  Continue plasmapheresis as outlined above  Continue cyclophosphamide, prednisone, and Bactrim  Continued management as per nephrology/critical care  Anemia due to chronic kidney disease, on chronic dialysis ([facility])  Patient with a low hemoglobin on 9/27/2024-it was 6.7  Since then the patient has been transfused 2 units of packed red blood cells  Hemoglobin this morning is 7.0  Patient remains hemodynamically stable  We will transfuse again on Monday, 9/30/2024 with dialysis  Platelet count is downtrending but stable, will continue to monitor  Hyponatremia  Patient has a hypervolemic hyponatremia  Patient has remained asymptomatic  Sodium is stable at 13  Continue fluid restriction  Continued management as per nephrology  Paresthesia of both lower extremities  Hx of back surgery in 2004 and 2014 and has since bilateral LE  paresthesia  He is primarily wheelchair bound  Venous statis ulcers noted to Bilateral LE  B/L extremity edema R>L  Continue supportive therapy  Bilateral lower extremity edema  Chronic condition  Patient with a history of lower back/spinal surgery  Patient does not ambulate  Patient is wheelchair-bound at baseline  Depression  Continue home dose fluoxetine  Essential hypertension  Continue carvedilol    VTE Pharmacologic Prophylaxis: VTE Score: 8 High Risk (Score >/= 5) - Pharmacological DVT Prophylaxis Ordered: heparin. Sequential Compression Devices Ordered.    Mobility:   Basic Mobility Inpatient Raw Score: 10  JH-HLM Goal: 4: Move to chair/commode  JH-HLM Achieved: 3: Sit at edge of bed  JH-HLM Goal NOT achieved. Continue with multidisciplinary rounding and encourage appropriate mobility to improve upon JH-HLM goals.    Patient Centered Rounds: I performed bedside rounds with nursing staff today.     Discussions with Specialists or Other Care Team Provider: Nephrology    Education and Discussions with Family / Patient: Updated  (wife and daughter) at bedside.    Current Length of Stay: 16 day(s)  Current Patient Status: Inpatient   Certification Statement: The patient will continue to require additional inpatient hospital stay due to anti-glomerular basement membrane disease  Discharge Plan: Anticipate discharge in >72 hrs to home with home services.    Code Status: Level 1 - Full Code    Subjective   Patient seen and examined at bedside. No acute events overnight. Denies chest pain, SOB, diaphoresis, nausea/vomiting/diarrhea, fevers/chills.     Objective     Vitals:   Temp (24hrs), Av.7 °F (36.5 °C), Min:97.5 °F (36.4 °C), Max:97.8 °F (36.6 °C)    Temp:  [97.5 °F (36.4 °C)-97.8 °F (36.6 °C)] 97.8 °F (36.6 °C)  HR:  [51-64] 55  Resp:  [12-23] 14  BP: (113-159)/(53-93) 120/56  SpO2:  [94 %-100 %] 99 %  Body mass index is 39.29 kg/m².     Input and Output Summary (last 24 hours):  "    Intake/Output Summary (Last 24 hours) at 9/30/2024 0725  Last data filed at 9/29/2024 1700  Gross per 24 hour   Intake 1006 ml   Output --   Net 1006 ml       /56 (BP Location: Right arm)   Pulse 55   Temp 97.8 °F (36.6 °C) (Temporal)   Resp 14   Ht 6' 2\" (1.88 m)   Wt (!) 139 kg (306 lb)   SpO2 99%   BMI 39.29 kg/m²     General Appearance:    Alert, cooperative, no distress, appears stated age   Head:    Normocephalic, without obvious abnormality, atraumatic   Eyes:    PERRL, conjunctiva/corneas clear, EOM's intact, fundi     benign, both eyes        Ears:    Normal TM's and external ear canals, both ears   Nose:   Nares normal, septum midline, mucosa normal, no drainage    or sinus tenderness   Throat:   Lips, mucosa, and tongue normal; teeth and gums normal   Neck:   Supple, symmetrical, trachea midline, no adenopathy;        thyroid:  No enlargement/tenderness/nodules; no carotid    bruit or JVD   Back:     Symmetric, no curvature, ROM normal, no CVA tenderness   Lungs:     Clear to auscultation bilaterally, respirations unlabored   Chest wall:    No tenderness or deformity   Heart:    Regular rate and rhythm, S1 and S2 normal, no murmur, rub    or gallop   Abdomen:     Soft, non-tender, bowel sounds active all four quadrants,     no masses, no organomegaly   Genitalia:    Normal male without lesion, discharge or tenderness   Rectal:    Normal tone, normal prostate, no masses or tenderness;    guaiac negative stool   Extremities:   Extremities normal, atraumatic, no cyanosis or edema   Pulses:   2+ and symmetric all extremities   Skin:   Skin color, texture, turgor normal, no rashes or lesions   Lymph nodes:   Cervical, supraclavicular, and axillary nodes normal   Neurologic:   CNII-XII intact. Normal strength, sensation and reflexes       throughout        Lines/Drains:  Lines/Drains/Airways       Active Status       Name Placement date Placement time Site Days    HD Permanent Double Catheter " 09/27/24  1316  Internal jugular  2                            Lab Results: I have reviewed the following results: CBC/BMP:   .     09/29/24  2341   WBC 19.32*   HGB 6.8*   HCT 20.5*      SODIUM 132*   K 4.6      CO2 16*   BUN 40*   CREATININE 4.90*   GLUC 165*   CAIONIZED 0.94*       Results from last 7 days   Lab Units 09/29/24  2341 09/28/24  0519 09/27/24  0429   WBC Thousand/uL 19.32*   < > 25.24*   HEMOGLOBIN g/dL 6.8*   < > 6.7*   HEMATOCRIT % 20.5*   < > 20.7*   PLATELETS Thousands/uL 208   < > 298   BANDS PCT %  --   --  1   LYMPHO PCT %  --   --  4*   MONO PCT %  --   --  0*   EOS PCT %  --   --  0    < > = values in this interval not displayed.     Results from last 7 days   Lab Units 09/29/24  2341   SODIUM mmol/L 132*   POTASSIUM mmol/L 4.6   CHLORIDE mmol/L 106   CO2 mmol/L 16*   BUN mg/dL 40*   CREATININE mg/dL 4.90*   ANION GAP mmol/L 10   CALCIUM mg/dL 7.2*   GLUCOSE RANDOM mg/dL 165*     Results from last 7 days   Lab Units 09/29/24  2341   INR  1.87*                   Recent Cultures (last 7 days):         Imaging Review: No pertinent imaging studies reviewed.  Other Studies: No additional pertinent studies reviewed.    Last 24 Hours Medication List:     Current Facility-Administered Medications:     calcium carbonate (TUMS) chewable tablet 1,000 mg, Daily PRN    carvedilol (COREG) tablet 6.25 mg, BID With Meals    cyclophosphamide (CYTOXAN) capsule 100 mg, Daily    famotidine (PEPCID) tablet 20 mg, Daily    FLUoxetine (PROzac) capsule 20 mg, Daily    heparin (porcine) subcutaneous injection 5,000 Units, Q8H LORENZO    melatonin tablet 3 mg, HS PRN    ondansetron (ZOFRAN) injection 4 mg, Q6H PRN    pantoprazole (PROTONIX) EC tablet 20 mg, Early Morning    predniSONE tablet 60 mg, Daily    sulfamethoxazole-trimethoprim (BACTRIM DS) 800-160 mg per tablet 1 tablet, Once per day on Monday Wednesday Friday    Administrative Statements   Today, Patient Was Seen By: Mike Perez, DO  I have  spent a total time of 35 minutes in caring for this patient on the day of the visit/encounter including Diagnostic results, Prognosis, Risks and benefits of tx options, Instructions for management, Patient and family education, Importance of tx compliance, Risk factor reductions, Impressions, Counseling / Coordination of care, Documenting in the medical record, Reviewing / ordering tests, medicine, procedures  , Obtaining or reviewing history  , and Communicating with other healthcare professionals .    **Please Note: This note may have been constructed using a voice recognition system.**

## 2024-09-30 NOTE — ASSESSMENT & PLAN NOTE
Continue with prednisone, cyclophophamide, plasmapharesis.  Plasmapheresis management per critical care team.  Check anti-GBM levels every 2 days with goal level being less than 8.  Continue Bactrim 3 times weekly for immuno prophylaxis and Protonix for GI prophylaxis.  Follow-up on results of finalized pathology report.

## 2024-09-30 NOTE — PLAN OF CARE
Post-Dialysis RN Treatment Note    Blood Pressure:  Pre-122/57 mm/Hg  Post-128/62 mmHg   EDW-TBD   Weight:  Pre-139.9 kg   Post-138 kg   Mode of weight measurement: Bed Scale   Volume Removed-2000 ml    Treatment duration-210 minutes    NS given-  No    Treatment shortened? No   Medications given during Rx- 1 U PRBC given, 1 U Cryoprecipitate given   Estimated Kt/V- Not Applicable   Access type: Permacath/TDC   Access Issues: No    Report called to primary nurse   Yes-Tigre Ceja RN        Goal- remove 1.5-2 L as tolerated using 3 K+ bath over 3 1/2 hr hd tx.    Problem: METABOLIC, FLUID AND ELECTROLYTES - ADULT  Goal: Electrolytes maintained within normal limits  Description: INTERVENTIONS:  - Monitor labs and assess patient for signs and symptoms of electrolyte imbalances  - Administer electrolyte replacement as ordered  - Monitor response to electrolyte replacements, including repeat lab results as appropriate  - Instruct patient on fluid and nutrition as appropriate  Outcome: Progressing

## 2024-09-30 NOTE — ASSESSMENT & PLAN NOTE
Continue with treatment for antiGBM glomerulonephritis including plasmapharesis. Patient had permcath placed last week as patient is dialysis dependent for foreseeable future. Patient had dialysis today, 2 L was ultrafiltered for a post weight of 138 kg. He received 1 unit of PRBCs on treatment. Next treatment is planned for this Wednesday.

## 2024-09-30 NOTE — PROGRESS NOTES
Progress Note - Nephrology   Name: Ramos Rosenthal 74 y.o. male I MRN: [id number]  Unit/Bed#: ICU 04-01 I Date of Admission: 9/14/2024   Date of Service: 9/30/2024 I Hospital Day: 16     Assessment & Plan  LYLY (acute kidney injury) ([facility])  Continue with treatment for antiGBM glomerulonephritis including plasmapharesis. Patient had permcath placed last week as patient is dialysis dependent for foreseeable future. Patient had dialysis today, 2 L was ultrafiltered for a post weight of 138 kg. He received 1 unit of PRBCs on treatment. Next treatment is planned for this Wednesday.   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  Plasmapheresis management per critical care team.  Check anti-GBM levels every 2 days with goal level being less than 8.  Continue Bactrim 3 times weekly for immuno prophylaxis and Protonix for GI prophylaxis.  Follow-up on results of finalized pathology report.  Hyponatremia  Continue fluid restriction, serum sodium levels are stable.  Bilateral lower extremity edema  Continue to ultrafilter as tolerated hemodialysis sessions, continue low-sodium diet.  Anemia due to chronic kidney disease, on chronic dialysis ([facility])  Lab Results   Component Value Date    EGFR 9 09/30/2024    EGFR 10 09/29/2024    EGFR 11 09/29/2024    CREATININE 5.27 (H) 09/30/2024    CREATININE 4.90 (H) 09/29/2024    CREATININE 4.52 (H) 09/29/2024     Iron studies may reveal a functional iron deficiency.  At this time patient is transfusion dependent.  Consider supplementing with iron if indicated.  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 He was started on pulse dose IV steroids, cyclophosphamide and is to be initiated on plasmapheresis. Nephrology was consulted for the management of acute kidney injury.  Patient was seen and examined on dialysis today.  He denies complaints      Objective      Temp:  [96.8 °F (36 °C)-98 °F (36.7 °C)] 97.5 °F (36.4 °C)  HR:  [53-66] 57  Resp:  [12-20] 18  BP: (107-141)/(50-69) 128/62  O2 Device: None (Room air)         Vitals:    09/29/24 0532 09/30/24 0600   Weight: (!) 138 kg (304 lb 10.8 oz) (!) 139 kg (306 lb)     I/O last 24 hours:  In: 2083.5 [P.O.:846; I.V.:500; Blood:737.5]  Out: 2500 [Other:2500]  Lines/Drains/Airways       Active Status       Name Placement date Placement time Site Days    HD Permanent Double Catheter 09/27/24  1316  Internal jugular  3                  Physical Exam  Vitals and nursing note reviewed.   Constitutional:       General: He is not in acute distress.     Appearance: He is well-developed.   HENT:      Head: Normocephalic and atraumatic.   Cardiovascular:      Rate and Rhythm: Normal rate and regular rhythm.      Heart sounds: No murmur heard.  Pulmonary:      Effort: Pulmonary effort is normal. No respiratory distress.      Breath sounds: Normal breath sounds.   Abdominal:      Palpations: Abdomen is soft.      Tenderness: There is no abdominal tenderness.   Musculoskeletal:      Right lower leg: Edema present.      Left lower leg: Edema present.   Skin:     General: Skin is warm and dry.      Capillary Refill: Capillary refill takes less than 2 seconds.   Neurological:      Mental Status: He is alert.   Psychiatric:         Mood and Affect: Mood normal.        Medications:    Current Facility-Administered Medications:     albumin human 250 g 5% IVPB, 250 g, Intravenous, Once, BRETT Stovall    calcium carbonate (TUMS) chewable tablet 1,000 mg, 1,000 mg, Oral, Daily PRN, [place]  BRETT Williamson    carvedilol (COREG) tablet 6.25 mg, 6.25 mg, Oral, BID With Meals, Gigi Ortega DO, 6.25 mg at 09/30/24 0741    cyclophosphamide (CYTOXAN) capsule 100 mg, 100 mg, Oral, Daily, Lisa Moore DO, 100 mg at 09/29/24 1621    famotidine (PEPCID) tablet 20 mg, 20 mg, Oral, Daily, Ramos Mclean DO, 20 mg at 09/30/24 0829    FLUoxetine (PROzac) capsule 20 mg, 20 mg, Oral, Daily, BRETT Xie, 20 mg at 09/30/24 0829    heparin (porcine) subcutaneous injection 5,000 Units, 5,000 Units, Subcutaneous, Q8H LORENZO, BRETT Xie, 5,000 Units at 09/30/24 0740    melatonin tablet 3 mg, 3 mg, Oral, HS PRN, Laurel Rosenberg MD, 3 mg at 09/28/24 0057    ondansetron (ZOFRAN) injection 4 mg, 4 mg, Intravenous, Q6H PRN, BRETT Xie    pantoprazole (PROTONIX) EC tablet 20 mg, 20 mg, Oral, Early Morning, Ramos Mclean DO, 20 mg at 09/30/24 0740    predniSONE tablet 60 mg, 60 mg, Oral, Daily, Lisa Moore DO, 60 mg at 09/29/24 1619    sulfamethoxazole-trimethoprim (BACTRIM DS) 800-160 mg per tablet 1 tablet, 1 tablet, Oral, Once per day on Monday Wednesday Friday, Lisa Moore DO, 1 tablet at 09/27/24 1613      Lab Results: I have reviewed the following results:   Results from last 7 days   Lab Units 09/30/24  0751 09/29/24  2341 09/29/24  0511 09/28/24  1301 09/28/24  0519 09/27/24  0429 09/26/24  0452 09/25/24  0555   WBC Thousand/uL 17.66* 19.32* 19.47*  --  20.53* 25.24* 18.92* 20.08*   HEMOGLOBIN g/dL 6.5* 6.8* 7.0* 8.2* 6.9* 6.7* 7.3* 8.1*   HEMATOCRIT % 20.2* 20.5* 21.4* 25.2* 21.1* 20.7* 22.8* 25.2*   PLATELETS Thousands/uL 213 208 233  --  290 298 302 317   POTASSIUM mmol/L 4.5 4.6 4.7  --  4.2 4.2 4.0 4.5   CHLORIDE mmol/L 106 106 103  --  99 102 100 101   CO2 mmol/L 16* 16* 20*  --  24 21 25 20*   BUN mg/dL 45* 40* 36*  --  26* 34* 26* 44*   CREATININE mg/dL 5.27* 4.90* 4.52*  --  3.64* 4.25* 3.17* 4.41*   CALCIUM mg/dL  "7.6* 7.2* 7.5*  --  7.8* 7.6* 7.7* 7.6*       Administrative Statements     Portions of the record may have been created with voice recognition software. Occasional wrong word or \"sound a like\" substitutions may have occurred due to the inherent limitations of voice recognition software. Read the chart carefully and recognize, using context, where substitutions have occurred.If you have any questions, please contact the dictating provider.  "

## 2024-09-30 NOTE — PROGRESS NOTES
Critical care note    Assessment  Acute kidney injury  Rapidly progressive glomerulonephritis  + Anti-GBM antibodies  Hyponatremia  Bilateral lower extremity edema    Plex plan  --Noted downtrending fibrinogen 174 on 9/28, 87>>99 yesterday at midnight  --Plan for 1.3 plasmapheresis was exchange of mixed albumin (5L)/ FFP (1L)  --Cryoprecipitate x 1  --Recheck fibrinogen tomorrow, goal for >150   --Calcium gluconate with the [facility]lex   --Last sent GBM 9/28, pending goal for <8.0 if possible, continue to check q2 days

## 2024-09-30 NOTE — ASSESSMENT & PLAN NOTE
Lab Results   Component Value Date    CREATININE 4.90 (H) 09/29/2024    CREATININE 4.52 (H) 09/29/2024    CREATININE 3.64 (H) 09/28/2024    EGFR 10 09/29/2024    EGFR 11 09/29/2024    EGFR 15 09/28/2024     Now on hemodialysis -Mondays, Wednesdays, and Fridays  Plasmapheresis started on 9/21/2024  Patient has had 7 plasmapheresis treatments thus far (he did not have 1 on 9/27/2024 in view of permacath placement)  Status post a renal biopsy-evidence of glomerular basement membrane disease noted  Continue prednisone 60 mg p.o. daily - steroid induced leukocytosis noted  Nephrology, and critical care are following  Discharge planning after 2 weeks of plasmapheresis has been completed and once cleared by nephrology  Case management is working on an outpatient dialysis chair

## 2024-09-30 NOTE — PLAN OF CARE
Problem: METABOLIC, FLUID AND ELECTROLYTES - ADULT  Goal: Electrolytes maintained within normal limits  Description: INTERVENTIONS:  - Monitor labs and assess patient for signs and symptoms of electrolyte imbalances  - Administer electrolyte replacement as ordered  - Monitor response to electrolyte replacements, including repeat lab results as appropriate  - Instruct patient on fluid and nutrition as appropriate  Outcome: Progressing  Goal: Fluid balance maintained  Description: INTERVENTIONS:  - Monitor labs   - Monitor I/O and WT  - Instruct patient on fluid and nutrition as appropriate  - Assess for signs & symptoms of volume excess or deficit  Outcome: Progressing     Problem: HEMATOLOGIC - ADULT  Goal: Maintains hematologic stability  Description: INTERVENTIONS  - Assess for signs and symptoms of bleeding or hemorrhage  - Monitor labs  - Administer supportive blood products/factors as ordered and appropriate  Outcome: Progressing

## 2024-09-30 NOTE — ASSESSMENT & PLAN NOTE
Lab Results   Component Value Date    EGFR 9 09/30/2024    EGFR 10 09/29/2024    EGFR 11 09/29/2024    CREATININE 5.27 (H) 09/30/2024    CREATININE 4.90 (H) 09/29/2024    CREATININE 4.52 (H) 09/29/2024     Iron studies may reveal a functional iron deficiency.  At this time patient is transfusion dependent.  Consider supplementing with iron if indicated.

## 2024-10-01 PROBLEM — E83.51 HYPOCALCEMIA: Status: ACTIVE | Noted: 2024-10-01

## 2024-10-01 LAB
ABO GROUP BLD BPU: NORMAL
ANION GAP SERPL CALCULATED.3IONS-SCNC: 9 MMOL/L (ref 4–13)
ANISOCYTOSIS BLD QL SMEAR: PRESENT
BASOPHILS # BLD MANUAL: 0 THOUSAND/UL (ref 0–0.1)
BASOPHILS NFR MAR MANUAL: 0 % (ref 0–1)
BPU ID: NORMAL
BUN SERPL-MCNC: 31 MG/DL (ref 5–25)
CA-I BLD-SCNC: 0.96 MMOL/L (ref 1.12–1.32)
CALCIUM SERPL-MCNC: 7.7 MG/DL (ref 8.4–10.2)
CHLORIDE SERPL-SCNC: 103 MMOL/L (ref 96–108)
CO2 SERPL-SCNC: 21 MMOL/L (ref 21–32)
CREAT SERPL-MCNC: 3.7 MG/DL (ref 0.6–1.3)
CROSSMATCH: NORMAL
EOSINOPHIL # BLD MANUAL: 0.15 THOUSAND/UL (ref 0–0.4)
EOSINOPHIL NFR BLD MANUAL: 1 % (ref 0–6)
ERYTHROCYTE [DISTWIDTH] IN BLOOD BY AUTOMATED COUNT: 16.4 % (ref 11.6–15.1)
ERYTHROCYTE [DISTWIDTH] IN BLOOD BY AUTOMATED COUNT: 16.7 % (ref 11.6–15.1)
FIBRINOGEN PPP-MCNC: 146 MG/DL (ref 206–523)
FIBRINOGEN PPP-MCNC: 193 MG/DL (ref 206–523)
GBM AB SER IA-ACNC: >8 UNITS (ref 0–0.9)
GFR SERPL CREATININE-BSD FRML MDRD: 14 ML/MIN/1.73SQ M
GLUCOSE SERPL-MCNC: 155 MG/DL (ref 65–140)
HCT VFR BLD AUTO: 20.9 % (ref 36.5–49.3)
HCT VFR BLD AUTO: 21 % (ref 36.5–49.3)
HGB BLD-MCNC: 6.9 G/DL (ref 12–17)
HGB BLD-MCNC: 6.9 G/DL (ref 12–17)
HGB BLD-MCNC: 7.7 G/DL (ref 12–17)
HYPERCHROMIA BLD QL SMEAR: PRESENT
LYMPHOCYTES # BLD AUTO: 0.89 THOUSAND/UL (ref 0.6–4.47)
LYMPHOCYTES # BLD AUTO: 6 % (ref 14–44)
MAGNESIUM SERPL-MCNC: 1.6 MG/DL (ref 1.9–2.7)
MCH RBC QN AUTO: 29.2 PG (ref 26.8–34.3)
MCH RBC QN AUTO: 29.2 PG (ref 26.8–34.3)
MCHC RBC AUTO-ENTMCNC: 32.9 G/DL (ref 31.4–37.4)
MCHC RBC AUTO-ENTMCNC: 33 G/DL (ref 31.4–37.4)
MCV RBC AUTO: 89 FL (ref 82–98)
MCV RBC AUTO: 89 FL (ref 82–98)
METAMYELOCYTE ABSOLUTE CT: 0.15 THOUSAND/UL (ref 0–0.1)
METAMYELOCYTES NFR BLD MANUAL: 1 % (ref 0–1)
MONOCYTES # BLD AUTO: 0.15 THOUSAND/UL (ref 0–1.22)
MONOCYTES NFR BLD: 1 % (ref 4–12)
NEUTROPHILS # BLD MANUAL: 13.42 THOUSAND/UL (ref 1.85–7.62)
NEUTS BAND NFR BLD MANUAL: 2 % (ref 0–8)
NEUTS SEG NFR BLD AUTO: 89 % (ref 43–75)
PHOSPHATE SERPL-MCNC: 4.5 MG/DL (ref 2.3–4.1)
PLATELET # BLD AUTO: 203 THOUSANDS/UL (ref 149–390)
PLATELET # BLD AUTO: 207 THOUSANDS/UL (ref 149–390)
PLATELET BLD QL SMEAR: ADEQUATE
PMV BLD AUTO: 9.2 FL (ref 8.9–12.7)
PMV BLD AUTO: 9.3 FL (ref 8.9–12.7)
POTASSIUM SERPL-SCNC: 4.3 MMOL/L (ref 3.5–5.3)
RBC # BLD AUTO: 2.36 MILLION/UL (ref 3.88–5.62)
RBC # BLD AUTO: 2.36 MILLION/UL (ref 3.88–5.62)
RBC MORPH BLD: PRESENT
SODIUM SERPL-SCNC: 133 MMOL/L (ref 135–147)
UNIT DISPENSE STATUS: NORMAL
UNIT PRODUCT CODE: NORMAL
UNIT PRODUCT VOLUME: 125 ML
UNIT PRODUCT VOLUME: 280 ML
UNIT PRODUCT VOLUME: 350 ML
UNIT RH: NORMAL
WBC # BLD AUTO: 14.75 THOUSAND/UL (ref 4.31–10.16)
WBC # BLD AUTO: 15.31 THOUSAND/UL (ref 4.31–10.16)

## 2024-10-01 PROCEDURE — 85018 HEMOGLOBIN: CPT | Performed by: NURSE PRACTITIONER

## 2024-10-01 PROCEDURE — 82330 ASSAY OF CALCIUM: CPT | Performed by: NURSE PRACTITIONER

## 2024-10-01 PROCEDURE — 83735 ASSAY OF MAGNESIUM: CPT | Performed by: INTERNAL MEDICINE

## 2024-10-01 PROCEDURE — 84100 ASSAY OF PHOSPHORUS: CPT | Performed by: INTERNAL MEDICINE

## 2024-10-01 PROCEDURE — 85384 FIBRINOGEN ACTIVITY: CPT | Performed by: NURSE PRACTITIONER

## 2024-10-01 PROCEDURE — 83520 IMMUNOASSAY QUANT NOS NONAB: CPT | Performed by: INTERNAL MEDICINE

## 2024-10-01 PROCEDURE — NC001 PR NO CHARGE: Performed by: INTERNAL MEDICINE

## 2024-10-01 PROCEDURE — P9017 PLASMA 1 DONOR FRZ W/IN 8 HR: HCPCS

## 2024-10-01 PROCEDURE — 99232 SBSQ HOSP IP/OBS MODERATE 35: CPT | Performed by: INTERNAL MEDICINE

## 2024-10-01 PROCEDURE — 85007 BL SMEAR W/DIFF WBC COUNT: CPT | Performed by: NURSE PRACTITIONER

## 2024-10-01 PROCEDURE — 85027 COMPLETE CBC AUTOMATED: CPT | Performed by: NURSE PRACTITIONER

## 2024-10-01 PROCEDURE — P9016 RBC LEUKOCYTES REDUCED: HCPCS

## 2024-10-01 PROCEDURE — 80048 BASIC METABOLIC PNL TOTAL CA: CPT | Performed by: INTERNAL MEDICINE

## 2024-10-01 RX ORDER — CALCIUM CARBONATE 500(1250)
1 TABLET ORAL
Status: DISCONTINUED | OUTPATIENT
Start: 2024-10-01 | End: 2024-10-02

## 2024-10-01 RX ORDER — ASCORBIC ACID, THIAMINE MONONITRATE,RIBOFLAVIN, NIACINAMIDE, PYRIDOXINE HYDROCHLORIDE, FOLIC ACID, CYANOCOBALAMIN, BIOTIN, CALCIUM PANTOTHENATE, 100; 1.5; 1.7; 20; 10; 1; 6000; 150000; 5 MG/1; MG/1; MG/1; MG/1; MG/1; MG/1; UG/1; UG/1; MG/1
1 CAPSULE, LIQUID FILLED ORAL
Status: DISCONTINUED | OUTPATIENT
Start: 2024-10-01 | End: 2024-10-12 | Stop reason: HOSPADM

## 2024-10-01 RX ORDER — ALBUMIN, HUMAN INJ 5% 5 %
100 SOLUTION INTRAVENOUS ONCE
Status: DISCONTINUED | OUTPATIENT
Start: 2024-10-01 | End: 2024-10-01

## 2024-10-01 RX ORDER — CALCIUM GLUCONATE 20 MG/ML
2 INJECTION, SOLUTION INTRAVENOUS ONCE
Status: COMPLETED | OUTPATIENT
Start: 2024-10-01 | End: 2024-10-01

## 2024-10-01 RX ORDER — ALBUMIN, HUMAN INJ 5% 5 %
150 SOLUTION INTRAVENOUS ONCE
Status: DISCONTINUED | OUTPATIENT
Start: 2024-10-01 | End: 2024-10-01

## 2024-10-01 RX ORDER — MAGNESIUM SULFATE HEPTAHYDRATE 40 MG/ML
2 INJECTION, SOLUTION INTRAVENOUS ONCE
Status: COMPLETED | OUTPATIENT
Start: 2024-10-01 | End: 2024-10-01

## 2024-10-01 RX ADMIN — Medication 250 G: at 13:30

## 2024-10-01 RX ADMIN — CALCIUM GLUCONATE 2 G: 20 INJECTION, SOLUTION INTRAVENOUS at 07:43

## 2024-10-01 RX ADMIN — CALCIUM 1 TABLET: 500 TABLET ORAL at 16:05

## 2024-10-01 RX ADMIN — HEPARIN SODIUM 5000 UNITS: 5000 INJECTION, SOLUTION INTRAVENOUS; SUBCUTANEOUS at 05:26

## 2024-10-01 RX ADMIN — HEPARIN SODIUM 5000 UNITS: 5000 INJECTION, SOLUTION INTRAVENOUS; SUBCUTANEOUS at 21:58

## 2024-10-01 RX ADMIN — CALCIUM GLUCONATE 2 G: 20 INJECTION, SOLUTION INTRAVENOUS at 13:43

## 2024-10-01 RX ADMIN — CALCIUM 1 TABLET: 500 TABLET ORAL at 09:53

## 2024-10-01 RX ADMIN — CYCLOPHOSPHAMIDE 100 MG: 50 CAPSULE ORAL at 16:05

## 2024-10-01 RX ADMIN — CARVEDILOL 6.25 MG: 3.12 TABLET, FILM COATED ORAL at 17:51

## 2024-10-01 RX ADMIN — CARVEDILOL 6.25 MG: 3.12 TABLET, FILM COATED ORAL at 08:26

## 2024-10-01 RX ADMIN — FAMOTIDINE 20 MG: 20 TABLET, FILM COATED ORAL at 08:18

## 2024-10-01 RX ADMIN — MAGNESIUM SULFATE HEPTAHYDRATE 2 G: 40 INJECTION, SOLUTION INTRAVENOUS at 09:53

## 2024-10-01 RX ADMIN — Medication 1 CAPSULE: at 16:07

## 2024-10-01 RX ADMIN — CALCIUM 1 TABLET: 500 TABLET ORAL at 13:43

## 2024-10-01 RX ADMIN — FLUOXETINE HYDROCHLORIDE 20 MG: 20 CAPSULE ORAL at 08:18

## 2024-10-01 RX ADMIN — HEPARIN SODIUM 5000 UNITS: 5000 INJECTION, SOLUTION INTRAVENOUS; SUBCUTANEOUS at 16:05

## 2024-10-01 RX ADMIN — PANTOPRAZOLE SODIUM 20 MG: 20 TABLET, DELAYED RELEASE ORAL at 05:26

## 2024-10-01 RX ADMIN — PREDNISONE 60 MG: 20 TABLET ORAL at 16:14

## 2024-10-01 NOTE — ASSESSMENT & PLAN NOTE
Remains on aggressive immunosuppression, status post pulse steroids, now on prednisone 60 mg daily, cyclophosphamide 100 mg daily, and with daily plasmapheresis.  Will check anti-GBM antibody prior to plasmapheresis today, and obtain another anti-GBM antibody tomorrow morning to observe clearances.  Goal is to have the antibodies at less than 8 if possible.  As noted previously, plasmapheresis exchange now at 1.3.  Will continue to advocate for the highest exchange with plasmapheresis possible while maintaining appropriate safety parameters in order to aggressively address underlying condition.

## 2024-10-01 NOTE — ASSESSMENT & PLAN NOTE
Patient with a low hemoglobin on 9/27/2024-it was 6.7  Since then the patient has been transfused 2 units of packed red blood cells  Hemoglobin this morning is 7.0  Patient remains hemodynamically stable  Platelet count is downtrending but stable, will continue to monitor  Hemoglobin 6.9  We will give IV Venofer as patient has iron deficiency anemia on iron studies

## 2024-10-01 NOTE — PLAN OF CARE
Problem: PAIN - ADULT  Goal: Verbalizes/displays adequate comfort level or baseline comfort level  Description: Interventions:  - Encourage patient to monitor pain and request assistance  - Assess pain using appropriate pain scale  - Administer analgesics based on type and severity of pain and evaluate response  - Implement non-pharmacological measures as appropriate and evaluate response  - Consider cultural and social influences on pain and pain management  - Notify physician/advanced practitioner if interventions unsuccessful or patient reports new pain  Outcome: Progressing     Problem: INFECTION - ADULT  Goal: Absence or prevention of progression during hospitalization  Description: INTERVENTIONS:  - Assess and monitor for signs and symptoms of infection  - Monitor lab/diagnostic results  - Monitor all insertion sites, i.e. indwelling lines, tubes, and drains  - Monitor endotracheal if appropriate and nasal secretions for changes in amount and color  - [place] appropriate cooling/warming therapies per order  - Administer medications as ordered  - Instruct and encourage patient and family to use good hand hygiene technique  - Identify and instruct in appropriate isolation precautions for identified infection/condition  Outcome: Progressing     Problem: SAFETY ADULT  Goal: Patient will remain free of falls  Description: INTERVENTIONS:  - Educate patient/family on patient safety including physical limitations  - Instruct patient to call for assistance with activity   - Consult OT/PT to assist with strengthening/mobility   - Keep Call bell within reach  - Keep bed low and locked with side rails adjusted as appropriate  - Keep care items and personal belongings within reach  - Initiate and maintain comfort rounds  - Make Fall Risk Sign visible to staff  - Offer Toileting every 2 Hours, in advance of need  - Initiate/Maintain bed alarm  - Obtain necessary fall risk management equipment  - Apply yellow socks and  bracelet for high fall risk patients  - Consider moving patient to room near nurses station  Outcome: Progressing  Goal: Maintain or return to baseline ADL function  Description: INTERVENTIONS:  -  Assess patient's ability to carry out ADLs; assess patient's baseline for ADL function and identify physical deficits which impact ability to perform ADLs (bathing, care of mouth/teeth, toileting, grooming, dressing, etc.)  - Assess/evaluate cause of self-care deficits   - Assess range of motion  - Assess patient's mobility; develop plan if impaired  - Assess patient's need for assistive devices and provide as appropriate  - Encourage maximum independence but intervene and supervise when necessary  - Involve family in performance of ADLs  - Assess for home care needs following discharge   - Consider OT consult to assist with ADL evaluation and planning for discharge  - Provide patient education as appropriate  Outcome: Progressing     Problem: Knowledge Deficit  Goal: Patient/family/caregiver demonstrates understanding of disease process, treatment plan, medications, and discharge instructions  Description: Complete learning assessment and assess knowledge base.  Interventions:  - Provide teaching at level of understanding  - Provide teaching via preferred learning methods  Outcome: Progressing     Problem: Nutrition/Hydration-ADULT  Goal: Nutrient/Hydration intake appropriate for improving, restoring or maintaining nutritional needs  Description: Monitor and assess patient's nutrition/hydration status for malnutrition. Collaborate with interdisciplinary team and initiate plan and interventions as ordered.  Monitor patient's weight and dietary intake as ordered or per policy. Utilize nutrition screening tool and intervene as necessary. Determine patient's food preferences and provide high-protein, high-caloric foods as appropriate.     INTERVENTIONS:  - Monitor oral intake, urinary output, labs, and treatment plans  -  Assess nutrition and hydration status and recommend course of action  - Evaluate amount of meals eaten  - Assist patient with eating if necessary   - Allow adequate time for meals  - Recommend/ encourage appropriate diets, oral nutritional supplements, and vitamin/mineral supplements  - Order, calculate, and assess calorie counts as needed  - Recommend, monitor, and adjust tube feedings and TPN/PPN based on assessed needs  - Assess need for intravenous fluids  - Provide specific nutrition/hydration education as appropriate  - Include patient/family/caregiver in decisions related to nutrition  Outcome: Progressing     Problem: Prexisting or High Potential for Compromised Skin Integrity  Goal: Skin integrity is maintained or improved  Description: INTERVENTIONS:  - Identify patients at risk for skin breakdown  - Assess and monitor skin integrity  - Assess and monitor nutrition and hydration status  - Monitor labs   - Assess for incontinence   - Turn and reposition patient  - Assist with mobility/ambulation  - Relieve pressure over bony prominences  - Avoid friction and shearing  - Provide appropriate hygiene as needed including keeping skin clean and dry  - Evaluate need for skin moisturizer/barrier cream  - Collaborate with interdisciplinary team   - Patient/family teaching  - Consider wound care consult   Outcome: Progressing     Problem: METABOLIC, FLUID AND ELECTROLYTES - ADULT  Goal: Fluid balance maintained  Description: INTERVENTIONS:  - Monitor labs   - Monitor I/O and WT  - Instruct patient on fluid and nutrition as appropriate  - Assess for signs & symptoms of volume excess or deficit  Outcome: Progressing     Problem: HEMATOLOGIC - ADULT  Goal: Maintains hematologic stability  Description: INTERVENTIONS  - Assess for signs and symptoms of bleeding or hemorrhage  - Monitor labs  - Administer supportive blood products/factors as ordered and appropriate  Outcome: Progressing

## 2024-10-01 NOTE — ASSESSMENT & PLAN NOTE
Continue intravenous calcium supplementation with Plex, will add 500 mg of oral calcium carbonate 3 times daily for supportive care.

## 2024-10-01 NOTE — PROGRESS NOTES
Progress Note - Nephrology   Name: Ramos Rosenthal 74 y.o. male I MRN: [id number]  Unit/Bed#: ICU 04-01 I Date of Admission: 9/14/2024   Date of Service: 10/1/2024 I Hospital Day: 17     Assessment & Plan  LYLY (acute kidney injury) (HCC)  Continue with hemodialysis sessions Monday Wednesday and Friday, patient potentially will be on hemodialysis long-term depending he improves going forward.  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  Will check anti-GBM antibody prior to plasmapheresis today, and obtain another anti-GBM antibody tomorrow morning to observe clearances.  Goal is to have the antibodies at less than 8 if possible.  As noted previously, plasmapheresis exchange now at 1.3.  Will continue to advocate for the highest exchange with plasmapheresis possible while maintaining appropriate safety parameters in order to aggressively address underlying condition.  Hyponatremia  Continue fluid restriction, serum sodium level was acceptable.  Hypocalcemia  Continue intravenous calcium supplementation with Plex, will add 500 mg of oral calcium carbonate 3 times daily for supportive care.  Bilateral lower extremity edema  Edema continues to improve, continue low-sodium diet, ultrafilter as tolerated dialysis sessions  Anemia due to chronic kidney disease, on chronic dialysis (HCC)  Lab Results   Component Value Date    EGFR 14 10/01/2024    EGFR 9 09/30/2024    EGFR 10 09/29/2024    CREATININE 3.70 (H) 10/01/2024    CREATININE 5.27 (H) 09/30/2024    CREATININE 4.90 (H) 09/29/2024     Will add Epogen to assist with increased Red cell production, continue to provide packed red blood cells as indicated.  Avoid IV iron at this time.  Essential hypertension  Blood pressures have been appropriate, continue with carvedilol.    Case discussed  "with hospitalist.  Continue with current care of daily plasmapheresis, 3 days a week hemodialysis, and immunosuppression as detailed above.    Follow up reason for today's visit: Hemodialysis dependent acute kidney injury/anti-GBM antibody disease/immunosuppression management    LYLY (acute kidney injury) ([facility])    Patient Active Problem List   Diagnosis    Gait abnormality    Lumbar stenosis    Polyneuropathy    Lower extremity weakness    Lumbosacral plexopathy    Elevated troponin    EDGAR (dyspnea on exertion)    Rash due to vaculitis     LYLY (acute kidney injury) ([facility])    Depression    Multiple open wounds of lower leg    Paresthesia of both lower extremities    Leucocytosis    Bloating    Hyperlipidemia    Neurogenic claudication    Prediabetes    Vitamin D deficiency    Hyponatremia    Bilateral lower extremity edema    Secondary hyperparathyroidism of renal origin ([facility])    Uremia    Essential hypertension    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    Objective:     Vitals: Blood pressure 134/65, pulse 66, temperature (!) 97.3 °F (36.3 °C), temperature source Temporal, resp. rate (!) 35, height 6' 2\" (1.88 m), weight (!) 137 kg (302 lb 14.6 oz), SpO2 98%.,Body mass index is 38.89 kg/m².    Weight (last 2 days)       Date/Time Weight    10/01/24 0551 137 (302.91)    09/30/24 0600 139 (306)    09/29/24 0532 138 (304.68)    09/29/24 0500 138 (304.68)              Intake/Output Summary (Last 24 hours) at 10/1/2024 0831  Last data filed at 10/1/2024 0600  Gross per 24 hour   Intake 1227.5 ml   Output 2500 ml   Net -1272.5 ml     I/O last 3 completed shifts:  In: 1227.5 [P.O.:50; I.V.:500; Blood:577.5; IV Piggyback:100]  Out: 2500 [Other:2500]    HD Permanent " "Double Catheter (Active)   Reasons to continue HD Cath Treatment Therapy 09/30/24 2000   Goal for Removal N/A- chronic HD catheter 09/30/24 2000   Line Necessity Reviewed Yes, reviewed with provider 09/30/24 2000   Site Assessment WDL 09/30/24 2000   Proximal Lumen Status Capped;Passive disinfecting cap applied 09/30/24 2000   Distal Lumen Status Capped;Passive disinfecting cap applied 09/30/24 2000   Catheter Out on Skin (cm) 0 cm 09/27/24 1322   Dressing Type Chlorhexidine dressing 09/30/24 2000   Dressing Status Clean;Dry;Intact 09/30/24 2000   Dressing Change Due 10/04/24 09/30/24 0900   CLABSI Time-out performed for Positive Blood Culture Yes 09/28/24 0800       Physical Exam: /65   Pulse 66   Temp (!) 97.3 °F (36.3 °C) (Temporal)   Resp (!) 35   Ht 6' 2\" (1.88 m)   Wt (!) 137 kg (302 lb 14.6 oz)   SpO2 98%   BMI 38.89 kg/m²     General Appearance:    Alert, cooperative, no distress, appears stated age   Head:    Normocephalic, without obvious abnormality, atraumatic   Eyes:    Conjunctiva/corneas clear   Ears:    Normal external ears   Nose:   Nares normal, septum midline, mucosa normal, no drainage    or sinus tenderness   Throat:   Lips, mucosa, and tongue normal; teeth and gums normal   Neck:   Supple   Back:     Symmetric, no curvature, ROM normal, no CVA tenderness   Lungs:     Clear to auscultation bilaterally, respirations unlabored   Chest wall:    No tenderness or deformity   Heart:    Regular rate and rhythm, S1 and S2 normal, no murmur, rub   or gallop   Abdomen:     Soft, non-tender, bowel sounds active   Extremities:   Extremities normal, atraumatic, no cyanosis, trace edema   Skin:   Skin color, texture, turgor normal, no rashes or lesions   Lymph nodes:   Cervical normal   Neurologic:   CNII-XII intact            Lab, Imaging and other studies: I have personally reviewed pertinent labs.  CBC:   Lab Results   Component Value Date    WBC 15.31 (H) 10/01/2024    WBC 14.75 (H) " "10/01/2024    HGB 6.9 (L) 10/01/2024    HGB 6.9 (L) 10/01/2024    HCT 21.0 (L) 10/01/2024    HCT 20.9 (L) 10/01/2024    MCV 89 10/01/2024    MCV 89 10/01/2024     10/01/2024     10/01/2024    RBC 2.36 (L) 10/01/2024    RBC 2.36 (L) 10/01/2024    MCH 29.2 10/01/2024    MCH 29.2 10/01/2024    MCHC 32.9 10/01/2024    MCHC 33.0 10/01/2024    RDW 16.4 (H) 10/01/2024    RDW 16.7 (H) 10/01/2024    MPV 9.3 10/01/2024    MPV 9.2 10/01/2024     CMP:   Lab Results   Component Value Date    K 4.3 10/01/2024     10/01/2024    CO2 21 10/01/2024    BUN 31 (H) 10/01/2024    CREATININE 3.70 (H) 10/01/2024    CALCIUM 7.7 (L) 10/01/2024    EGFR 14 10/01/2024       .  Results from last 7 days   Lab Units 10/01/24  0534 09/30/24  0751 09/29/24  2341   POTASSIUM mmol/L 4.3 4.5 4.6   CHLORIDE mmol/L 103 106 106   CO2 mmol/L 21 16* 16*   BUN mg/dL 31* 45* 40*   CREATININE mg/dL 3.70* 5.27* 4.90*   CALCIUM mg/dL 7.7* 7.6* 7.2*         Phosphorus:   Lab Results   Component Value Date    PHOS 4.5 (H) 10/01/2024     Magnesium:   Lab Results   Component Value Date    MG 1.6 (L) 10/01/2024     Urinalysis: No results found for: \"COLORU\", \"CLARITYU\", \"SPECGRAV\", \"PHUR\", \"LEUKOCYTESUR\", \"NITRITE\", \"PROTEINUA\", \"GLUCOSEU\", \"KETONESU\", \"BILIRUBINUR\", \"BLOODU\"  Ionized Calcium: No results found for: \"CAION\"  Coagulation: No results found for: \"PT\", \"INR\", \"APTT\"  Troponin: No results found for: \"TROPONINI\"  ABG: No results found for: \"PHART\", \"RDL0UEH\", \"PO2ART\", \"JDW7BNL\", \"U4WZKRLR\", \"BEART\", \"SOURCE\"  Radiology review:     IMAGING  No results found.      Current Facility-Administered Medications:     calcium carbonate (TUMS) chewable tablet 1,000 mg, Daily PRN    calcium gluconate 2 g in sodium chloride 0.9% 100 mL (premix), Once, Last Rate: 2 g (10/01/24 0743)    carvedilol (COREG) tablet 6.25 mg, BID With Meals    cyclophosphamide (CYTOXAN) capsule 100 mg, Daily    famotidine (PEPCID) tablet 20 mg, Daily    FLUoxetine (PROzac) " capsule 20 mg, Daily    heparin (porcine) subcutaneous injection 5,000 Units, Q8H LORENZO    magnesium sulfate 2 g/50 mL IVPB (premix) 2 g, Once    melatonin tablet 3 mg, HS PRN    ondansetron (ZOFRAN) injection 4 mg, Q6H PRN    pantoprazole (PROTONIX) EC tablet 20 mg, Early Morning    predniSONE tablet 60 mg, Daily    sulfamethoxazole-trimethoprim (BACTRIM DS) 800-160 mg per tablet 1 tablet, Once per day on Monday Wednesday Friday  Medications Discontinued During This Encounter   Medication Reason    meloxicam (MOBIC) 15 mg tablet Therapy completed    multi-electrolyte (PLASMALYTE-A/ISOLYTE-S PH 7.4) IV solution     cefTRIAXone (ROCEPHIN) IVPB (premix in dextrose) 1,000 mg 50 mL     cefTRIAXone (ROCEPHIN) IVPB (premix in dextrose) 1,000 mg 50 mL     albumin human (FLEXBUMIN) 5 % injection 75 g     albumin human (FLEXBUMIN) 5 % injection 100 g     cyclophosphamide (CYTOXAN) capsule 150 mg     predniSONE tablet 60 mg     albumin human (FLEXBUMIN) 5 % injection 75 g     albumin human (FLEXBUMIN) 5 % injection 100 g     sulfamethoxazole-trimethoprim (BACTRIM DS) 800-160 mg per tablet 1 tablet     albumin human (FLEXBUMIN) 5 % injection 12.5 g        Gigi Ortega,       This progress note was produced in part using a dictation device which may document imprecise wording from author's original intent.

## 2024-10-01 NOTE — ASSESSMENT & PLAN NOTE
Lab Results   Component Value Date    EGFR 14 10/01/2024    EGFR 9 09/30/2024    EGFR 10 09/29/2024    CREATININE 3.70 (H) 10/01/2024    CREATININE 5.27 (H) 09/30/2024    CREATININE 4.90 (H) 09/29/2024     Will add Epogen to assist with increased Red cell production, continue to provide packed red blood cells as indicated.  Avoid IV iron at this time.

## 2024-10-01 NOTE — PROGRESS NOTES
Progress Note - Hospitalist   Name: Ramos Rosenthal 74 y.o. male I MRN: [id number]  Unit/Bed#: ICU 04-01 I Date of Admission: 9/14/2024   Date of Service: 10/1/2024 I Hospital Day: 17     Assessment & Plan  LYLY (acute kidney injury) ([facility])      Lab Results   Component Value Date    CREATININE 3.70 (H) 10/01/2024    CREATININE 5.27 (H) 09/30/2024    CREATININE 4.90 (H) 09/29/2024    EGFR 14 10/01/2024    EGFR 9 09/30/2024    EGFR 10 09/29/2024     Now on hemodialysis -Mondays, Wednesdays, and Fridays  Plasmapheresis started on 9/21/2024  Patient has had 7 plasmapheresis treatments thus far (he did not have 1 on 9/27/2024 in view of permacath placement)  Status post a renal biopsy-evidence of glomerular basement membrane disease noted  Continue prednisone 60 mg p.o. daily - steroid induced leukocytosis noted  Nephrology, and critical care are following  Discharge planning after 2 weeks of plasmapheresis has been completed and once cleared by Nephrology  Case management is working on an outpatient dialysis chair    Anemia due to chronic kidney disease, on chronic dialysis ([facility])  Patient with a low hemoglobin on 9/27/2024-it was 6.7  Since then the patient has been transfused 2 units of packed red blood cells  Hemoglobin this morning is 7.0  Patient remains hemodynamically stable  Platelet count is downtrending but stable, will continue to monitor  Hemoglobin 6.9  We will give IV Venofer as patient has iron deficiency anemia on iron studies  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  Noted on renal biopsy  Continue plasmapheresis as outlined above  Continue cyclophosphamide, prednisone, and Bactrim  Continued management as per nephrology/critical care  Hyponatremia  Patient has a hypervolemic hyponatremia  Patient has remained asymptomatic  Sodium is stable at 13  Continue fluid restriction  Continued management as per nephrology  Paresthesia of both lower extremities  Hx of back surgery in 2004  and  and has since bilateral LE paresthesia  He is primarily wheelchair bound  Venous statis ulcers noted to Bilateral LE  B/L extremity edema R>L  Continue supportive therapy  Bilateral lower extremity edema  Chronic condition  Patient with a history of lower back/spinal surgery  Patient does not ambulate  Patient is wheelchair-bound at baseline  Depression  Continue home dose fluoxetine  Essential hypertension  Continue carvedilol    VTE Pharmacologic Prophylaxis: VTE Score: 8 High Risk (Score >/= 5) - Pharmacological DVT Prophylaxis Ordered: heparin. Sequential Compression Devices Ordered.    Mobility:   Basic Mobility Inpatient Raw Score: 10  JH-HLM Goal: 4: Move to chair/commode  JH-HLM Achieved: 2: Bed activities/Dependent transfer  JH-HLM Goal NOT achieved. Continue with multidisciplinary rounding and encourage appropriate mobility to improve upon JH-HLM goals.    Patient Centered Rounds: I performed bedside rounds with nursing staff today.     Discussions with Specialists or Other Care Team Provider: Nephrology    Education and Discussions with Family / Patient: Updated  (wife and daughter) at bedside.    Current Length of Stay: 17 day(s)  Current Patient Status: Inpatient   Certification Statement: The patient will continue to require additional inpatient hospital stay due to anti-glomerular basement membrane disease  Discharge Plan: Anticipate discharge in >72 hrs to home with home services.    Code Status: Level 1 - Full Code    Subjective   Patient seen and examined at bedside. No acute events overnight. Denies chest pain, SOB, diaphoresis, nausea/vomiting/diarrhea, fevers/chills.     Objective     Vitals:   Temp (24hrs), Av.3 °F (36.3 °C), Min:96.8 °F (36 °C), Max:98 °F (36.7 °C)    Temp:  [96.8 °F (36 °C)-98 °F (36.7 °C)] 97.3 °F (36.3 °C)  HR:  [54-88] 59  Resp:  [12-51] 18  BP: ()/(50-69) 105/55  SpO2:  [93 %-100 %] 98 %  Body mass index is 38.89 kg/m².     Input and  "Output Summary (last 24 hours):     Intake/Output Summary (Last 24 hours) at 10/1/2024 0749  Last data filed at 10/1/2024 0600  Gross per 24 hour   Intake 1227.5 ml   Output 2500 ml   Net -1272.5 ml       /55   Pulse 59   Temp (!) 97.3 °F (36.3 °C) (Temporal)   Resp 18   Ht 6' 2\" (1.88 m)   Wt (!) 137 kg (302 lb 14.6 oz)   SpO2 98%   BMI 38.89 kg/m²     General Appearance:    Alert, cooperative, no distress, appears stated age   Head:    Normocephalic, without obvious abnormality, atraumatic   Eyes:    PERRL, conjunctiva/corneas clear, EOM's intact, fundi     benign, both eyes        Ears:    Normal TM's and external ear canals, both ears   Nose:   Nares normal, septum midline, mucosa normal, no drainage    or sinus tenderness   Throat:   Lips, mucosa, and tongue normal; teeth and gums normal   Neck:   Supple, symmetrical, trachea midline, no adenopathy;        thyroid:  No enlargement/tenderness/nodules; no carotid    bruit or JVD   Back:     Symmetric, no curvature, ROM normal, no CVA tenderness   Lungs:     Clear to auscultation bilaterally, respirations unlabored   Chest wall:    No tenderness or deformity   Heart:    Regular rate and rhythm, S1 and S2 normal, no murmur, rub    or gallop   Abdomen:     Soft, non-tender, bowel sounds active all four quadrants,     no masses, no organomegaly   Genitalia:    Normal male without lesion, discharge or tenderness   Rectal:    Normal tone, normal prostate, no masses or tenderness;    guaiac negative stool   Extremities:   Extremities normal, atraumatic, no cyanosis or edema   Pulses:   2+ and symmetric all extremities   Skin:   Skin color, texture, turgor normal, no rashes or lesions   Lymph nodes:   Cervical, supraclavicular, and axillary nodes normal   Neurologic:   CNII-XII intact. Normal strength, sensation and reflexes       throughout        Lines/Drains:  Lines/Drains/Airways       Active Status       Name Placement date Placement time Site Days    " HD Permanent Double Catheter 09/27/24  1316  Internal jugular  3                            Lab Results: I have reviewed the following results: CBC/BMP:   .     10/01/24  0534   WBC 15.31*  14.75*   HGB 6.9*  6.9*   HCT 21.0*  20.9*     203   BANDSPCT 2   SODIUM 133*   K 4.3      CO2 21   BUN 31*   CREATININE 3.70*   GLUC 155*   CAIONIZED 0.96*   MG 1.6*   PHOS 4.5*       Results from last 7 days   Lab Units 10/01/24  0534 09/30/24  1914 09/30/24  0751   WBC Thousand/uL 15.31*  14.75*  --  17.66*   HEMOGLOBIN g/dL 6.9*  6.9*   < > 6.5*   HEMATOCRIT % 21.0*  20.9*   < > 20.2*   PLATELETS Thousands/uL 207  203  --  213   BANDS PCT % 2  --   --    SEGS PCT %  --   --  89*   LYMPHO PCT % 6*  --  5*   MONO PCT % 1*  --  4   EOS PCT % 1  --  0    < > = values in this interval not displayed.     Results from last 7 days   Lab Units 10/01/24  0534   SODIUM mmol/L 133*   POTASSIUM mmol/L 4.3   CHLORIDE mmol/L 103   CO2 mmol/L 21   BUN mg/dL 31*   CREATININE mg/dL 3.70*   ANION GAP mmol/L 9   CALCIUM mg/dL 7.7*   GLUCOSE RANDOM mg/dL 155*     Results from last 7 days   Lab Units 09/29/24  2341   INR  1.87*                   Recent Cultures (last 7 days):         Imaging Review: No pertinent imaging studies reviewed.  Other Studies: No additional pertinent studies reviewed.    Last 24 Hours Medication List:     Current Facility-Administered Medications:     calcium carbonate (TUMS) chewable tablet 1,000 mg, Daily PRN    calcium gluconate 2 g in sodium chloride 0.9% 100 mL (premix), Once, Last Rate: 2 g (10/01/24 0743)    carvedilol (COREG) tablet 6.25 mg, BID With Meals    cyclophosphamide (CYTOXAN) capsule 100 mg, Daily    famotidine (PEPCID) tablet 20 mg, Daily    FLUoxetine (PROzac) capsule 20 mg, Daily    heparin (porcine) subcutaneous injection 5,000 Units, Q8H LORENZO    magnesium sulfate 2 g/50 mL IVPB (premix) 2 g, Once    melatonin tablet 3 mg, HS PRN    ondansetron (ZOFRAN) injection 4 mg, Q6H PRN     pantoprazole (PROTONIX) EC tablet 20 mg, Early Morning    predniSONE tablet 60 mg, Daily    sulfamethoxazole-trimethoprim (BACTRIM DS) 800-160 mg per tablet 1 tablet, Once per day on Monday Wednesday Friday    Administrative Statements   Today, Patient Was Seen By: Mike Perez, DO  I have spent a total time of 35 minutes in caring for this patient on the day of the visit/encounter including Diagnostic results, Prognosis, Risks and benefits of tx options, Instructions for management, Patient and family education, Importance of tx compliance, Risk factor reductions, Impressions, Counseling / Coordination of care, Documenting in the medical record, Reviewing / ordering tests, medicine, procedures  , Obtaining or reviewing history  , and Communicating with other healthcare professionals .    **Please Note: This note may have been constructed using a voice recognition system.**

## 2024-10-01 NOTE — ASSESSMENT & PLAN NOTE
Continue with hemodialysis sessions Monday Wednesday and Friday, patient potentially will be on hemodialysis long-term depending he improves going forward.

## 2024-10-01 NOTE — CASE MANAGEMENT
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 2024     Essential hypertension 2024     Secondary hyperparathyroidism of renal origin ([facility]) 2024     Uremia 2024     Hyponatremia 2024     Bilateral lower extremity edema 2024     Neurogenic claudication 2023    Bloating 10/05/2022     Paresthesia of both lower extremities 10/01/2022     Leucocytosis 10/01/2022     Elevated troponin 2022     EDGAR (dyspnea on exertion) 2022     Rash due to vaculitis  2022     LYLY (acute kidney injury) ([facility]) 2022     Depression 2022     Multiple open wounds of lower leg 2022     Lower extremity weakness 2019     Lumbosacral plexopathy 2019     Gait abnormality 2019     Lumbar stenosis 2019     Polyneuropathy 2019     Prediabetes 2018    Hyperlipidemia 2014    Vitamin D deficiency 2014      LOS (days): 17  Geometric Mean LOS (GMLOS) (days): 5.1  Days to GMLOS:-11.8     OBJECTIVE:  Risk of Unplanned Readmission Score: 28.66         Current admission status: Inpatient   Preferred Pharmacy:   [id number] - [place] - [address]  [address]  [place] [id number]  Phone: [phone] Fax: [phone]    Primary Care Provider: Jessika Carrillo MD    Primary Insurance: MEDICARE  Secondary Insurance: [employer]  ASIF    DISCHARGE DETAILS:  Pt continues to need to be hospitalized for plasmaphoresis treatments until 10/5/24.  Pt has OP dialysis chair set up for M-W-F at 11;30.  Daughter and wife will transport to [facility] in [place].  Pt will start services with [facility] also upon DC.

## 2024-10-01 NOTE — PROGRESS NOTES
Critical care note     Assessment  Acute kidney injury  Rapidly progressive glomerulonephritis  + Anti-GBM antibodies  Hyponatremia  Bilateral lower extremity edema    Plex plan  -- Fibrinogen 193 checked yesterday  --Plan for Plex 1.3 with a mix albumin/FFP for today (5 L albumin/1 L FFP)  Sent fibrinogen for today as well  --Follow-up anti-GBM still >8.0  --Plan for Plex tomorrow 1.5 with albumin, will be challenging to obtain blood products given the rare blood type, will reassess based on fibrinogen levels

## 2024-10-01 NOTE — ASSESSMENT & PLAN NOTE
Lab Results   Component Value Date    CREATININE 3.70 (H) 10/01/2024    CREATININE 5.27 (H) 09/30/2024    CREATININE 4.90 (H) 09/29/2024    EGFR 14 10/01/2024    EGFR 9 09/30/2024    EGFR 10 09/29/2024     Now on hemodialysis -Mondays, Wednesdays, and Fridays  Plasmapheresis started on 9/21/2024  Patient has had 7 plasmapheresis treatments thus far (he did not have 1 on 9/27/2024 in view of permacath placement)  Status post a renal biopsy-evidence of glomerular basement membrane disease noted  Continue prednisone 60 mg p.o. daily - steroid induced leukocytosis noted  Nephrology, and critical care are following  Discharge planning after 2 weeks of plasmapheresis has been completed and once cleared by Nephrology  Case management is working on an outpatient dialysis chair

## 2024-10-01 NOTE — PLAN OF CARE
Problem: PAIN - ADULT  Goal: Verbalizes/displays adequate comfort level or baseline comfort level  Description: Interventions:  - Encourage patient to monitor pain and request assistance  - Assess pain using appropriate pain scale  - Administer analgesics based on type and severity of pain and evaluate response  - Implement non-pharmacological measures as appropriate and evaluate response  - Consider cultural and social influences on pain and pain management  - Notify physician/advanced practitioner if interventions unsuccessful or patient reports new pain  Outcome: Progressing     Problem: INFECTION - ADULT  Goal: Absence or prevention of progression during hospitalization  Description: INTERVENTIONS:  - Assess and monitor for signs and symptoms of infection  - Monitor lab/diagnostic results  - Monitor all insertion sites, i.e. indwelling lines, tubes, and drains  - Monitor endotracheal if appropriate and nasal secretions for changes in amount and color  - [place] appropriate cooling/warming therapies per order  - Administer medications as ordered  - Instruct and encourage patient and family to use good hand hygiene technique  - Identify and instruct in appropriate isolation precautions for identified infection/condition  Outcome: Progressing  Goal: Absence of fever/infection during neutropenic period  Description: INTERVENTIONS:  - Monitor WBC    Outcome: Progressing     Problem: SAFETY ADULT  Goal: Patient will remain free of falls  Description: INTERVENTIONS:  - Educate patient/family on patient safety including physical limitations  - Instruct patient to call for assistance with activity   - Consult OT/PT to assist with strengthening/mobility   - Keep Call bell within reach  - Keep bed low and locked with side rails adjusted as appropriate  - Keep care items and personal belongings within reach  - Initiate and maintain comfort rounds  - Make Fall Risk Sign visible to staff  - Offer Toileting every 2 Hours,  in advance of need  - Initiate/Maintain bed alarm  - Obtain necessary fall risk management equipment  - Apply yellow socks and bracelet for high fall risk patients  - Consider moving patient to room near nurses station  Outcome: Progressing  Goal: Maintain or return to baseline ADL function  Description: INTERVENTIONS:  -  Assess patient's ability to carry out ADLs; assess patient's baseline for ADL function and identify physical deficits which impact ability to perform ADLs (bathing, care of mouth/teeth, toileting, grooming, dressing, etc.)  - Assess/evaluate cause of self-care deficits   - Assess range of motion  - Assess patient's mobility; develop plan if impaired  - Assess patient's need for assistive devices and provide as appropriate  - Encourage maximum independence but intervene and supervise when necessary  - Involve family in performance of ADLs  - Assess for home care needs following discharge   - Consider OT consult to assist with ADL evaluation and planning for discharge  - Provide patient education as appropriate  Outcome: Progressing  Goal: Maintains/Returns to pre admission functional level  Description: INTERVENTIONS:  - Perform AM-PAC 6 Click Basic Mobility/ Daily Activity assessment daily.  - Set and communicate daily mobility goal to care team and patient/family/caregiver.   - Collaborate with rehabilitation services on mobility goals if consulted  - Out of bed for meals   - Out of bed for toileting  - Record patient progress and toleration of activity level   Outcome: Progressing     Problem: DISCHARGE PLANNING  Goal: Discharge to home or other facility with appropriate resources  Description: INTERVENTIONS:  - Identify barriers to discharge w/patient and caregiver  - Arrange for needed discharge resources and transportation as appropriate  - Identify discharge learning needs (meds, wound care, etc.)  - Arrange for interpretive services to assist at discharge as needed  - Refer to Case  Management Department for coordinating discharge planning if the patient needs post-hospital services based on physician/advanced practitioner order or complex needs related to functional status, cognitive ability, or social support system  Outcome: Progressing     Problem: Knowledge Deficit  Goal: Patient/family/caregiver demonstrates understanding of disease process, treatment plan, medications, and discharge instructions  Description: Complete learning assessment and assess knowledge base.  Interventions:  - Provide teaching at level of understanding  - Provide teaching via preferred learning methods  Outcome: Progressing     Problem: Nutrition/Hydration-ADULT  Goal: Nutrient/Hydration intake appropriate for improving, restoring or maintaining nutritional needs  Description: Monitor and assess patient's nutrition/hydration status for malnutrition. Collaborate with interdisciplinary team and initiate plan and interventions as ordered.  Monitor patient's weight and dietary intake as ordered or per policy. Utilize nutrition screening tool and intervene as necessary. Determine patient's food preferences and provide high-protein, high-caloric foods as appropriate.     INTERVENTIONS:  - Monitor oral intake, urinary output, labs, and treatment plans  - Assess nutrition and hydration status and recommend course of action  - Evaluate amount of meals eaten  - Assist patient with eating if necessary   - Allow adequate time for meals  - Recommend/ encourage appropriate diets, oral nutritional supplements, and vitamin/mineral supplements  - Order, calculate, and assess calorie counts as needed  - Recommend, monitor, and adjust tube feedings and TPN/PPN based on assessed needs  - Assess need for intravenous fluids  - Provide specific nutrition/hydration education as appropriate  - Include patient/family/caregiver in decisions related to nutrition  Outcome: Progressing     Problem: Prexisting or High Potential for Compromised  Skin Integrity  Goal: Skin integrity is maintained or improved  Description: INTERVENTIONS:  - Identify patients at risk for skin breakdown  - Assess and monitor skin integrity  - Assess and monitor nutrition and hydration status  - Monitor labs   - Assess for incontinence   - Turn and reposition patient  - Assist with mobility/ambulation  - Relieve pressure over bony prominences  - Avoid friction and shearing  - Provide appropriate hygiene as needed including keeping skin clean and dry  - Evaluate need for skin moisturizer/barrier cream  - Collaborate with interdisciplinary team   - Patient/family teaching  - Consider wound care consult   Outcome: Progressing     Problem: METABOLIC, FLUID AND ELECTROLYTES - ADULT  Goal: Electrolytes maintained within normal limits  Description: INTERVENTIONS:  - Monitor labs and assess patient for signs and symptoms of electrolyte imbalances  - Administer electrolyte replacement as ordered  - Monitor response to electrolyte replacements, including repeat lab results as appropriate  - Instruct patient on fluid and nutrition as appropriate  Outcome: Progressing  Goal: Fluid balance maintained  Description: INTERVENTIONS:  - Monitor labs   - Monitor I/O and WT  - Instruct patient on fluid and nutrition as appropriate  - Assess for signs & symptoms of volume excess or deficit  Outcome: Progressing     Problem: HEMATOLOGIC - ADULT  Goal: Maintains hematologic stability  Description: INTERVENTIONS  - Assess for signs and symptoms of bleeding or hemorrhage  - Monitor labs  - Administer supportive blood products/factors as ordered and appropriate  Outcome: Progressing

## 2024-10-02 ENCOUNTER — APPOINTMENT (INPATIENT)
Dept: DIALYSIS | Facility: HOSPITAL | Age: 74
DRG: 871 | End: 2024-10-02
Attending: INTERNAL MEDICINE
Payer: MEDICARE

## 2024-10-02 LAB
ABO GROUP BLD BPU: NORMAL
ANION GAP SERPL CALCULATED.3IONS-SCNC: 10 MMOL/L (ref 4–13)
BASOPHILS # BLD AUTO: 0.01 THOUSANDS/ΜL (ref 0–0.1)
BASOPHILS NFR BLD AUTO: 0 % (ref 0–1)
BPU ID: NORMAL
BUN SERPL-MCNC: 43 MG/DL (ref 5–25)
CA-I BLD-SCNC: 1.03 MMOL/L (ref 1.12–1.32)
CALCIUM SERPL-MCNC: 8 MG/DL (ref 8.4–10.2)
CHLORIDE SERPL-SCNC: 104 MMOL/L (ref 96–108)
CO2 SERPL-SCNC: 18 MMOL/L (ref 21–32)
CREAT SERPL-MCNC: 4.63 MG/DL (ref 0.6–1.3)
CROSSMATCH: NORMAL
EOSINOPHIL # BLD AUTO: 0 THOUSAND/ΜL (ref 0–0.61)
EOSINOPHIL NFR BLD AUTO: 0 % (ref 0–6)
ERYTHROCYTE [DISTWIDTH] IN BLOOD BY AUTOMATED COUNT: 16.2 % (ref 11.6–15.1)
FIBRINOGEN PPP-MCNC: 105 MG/DL (ref 206–523)
GBM AB SER IA-ACNC: 7.7 UNITS (ref 0–0.9)
GFR SERPL CREATININE-BSD FRML MDRD: 11 ML/MIN/1.73SQ M
GLUCOSE SERPL-MCNC: 147 MG/DL (ref 65–140)
HCT VFR BLD AUTO: 22.1 % (ref 36.5–49.3)
HGB BLD-MCNC: 7.2 G/DL (ref 12–17)
IMM GRANULOCYTES # BLD AUTO: 0.19 THOUSAND/UL (ref 0–0.2)
IMM GRANULOCYTES NFR BLD AUTO: 1 % (ref 0–2)
LYMPHOCYTES # BLD AUTO: 0.6 THOUSANDS/ΜL (ref 0.6–4.47)
LYMPHOCYTES NFR BLD AUTO: 5 % (ref 14–44)
MAGNESIUM SERPL-MCNC: 2 MG/DL (ref 1.9–2.7)
MCH RBC QN AUTO: 28.8 PG (ref 26.8–34.3)
MCHC RBC AUTO-ENTMCNC: 32.6 G/DL (ref 31.4–37.4)
MCV RBC AUTO: 88 FL (ref 82–98)
MONOCYTES # BLD AUTO: 0.37 THOUSAND/ΜL (ref 0.17–1.22)
MONOCYTES NFR BLD AUTO: 3 % (ref 4–12)
NEUTROPHILS # BLD AUTO: 12.21 THOUSANDS/ΜL (ref 1.85–7.62)
NEUTS SEG NFR BLD AUTO: 91 % (ref 43–75)
NRBC BLD AUTO-RTO: 0 /100 WBCS
PHOSPHATE SERPL-MCNC: 5.3 MG/DL (ref 2.3–4.1)
PLATELET # BLD AUTO: 168 THOUSANDS/UL (ref 149–390)
PMV BLD AUTO: 9.4 FL (ref 8.9–12.7)
POTASSIUM SERPL-SCNC: 4.1 MMOL/L (ref 3.5–5.3)
RBC # BLD AUTO: 2.5 MILLION/UL (ref 3.88–5.62)
SODIUM SERPL-SCNC: 132 MMOL/L (ref 135–147)
UNIT DISPENSE STATUS: NORMAL
UNIT PRODUCT CODE: NORMAL
UNIT PRODUCT VOLUME: 280 ML
UNIT PRODUCT VOLUME: 350 ML
UNIT RH: NORMAL
WBC # BLD AUTO: 13.38 THOUSAND/UL (ref 4.31–10.16)

## 2024-10-02 PROCEDURE — 85384 FIBRINOGEN ACTIVITY: CPT | Performed by: NURSE PRACTITIONER

## 2024-10-02 PROCEDURE — 85027 COMPLETE CBC AUTOMATED: CPT | Performed by: NURSE PRACTITIONER

## 2024-10-02 PROCEDURE — 99232 SBSQ HOSP IP/OBS MODERATE 35: CPT | Performed by: INTERNAL MEDICINE

## 2024-10-02 PROCEDURE — 83735 ASSAY OF MAGNESIUM: CPT | Performed by: NURSE PRACTITIONER

## 2024-10-02 PROCEDURE — 82330 ASSAY OF CALCIUM: CPT | Performed by: NURSE PRACTITIONER

## 2024-10-02 PROCEDURE — 84100 ASSAY OF PHOSPHORUS: CPT | Performed by: NURSE PRACTITIONER

## 2024-10-02 PROCEDURE — NC001 PR NO CHARGE: Performed by: INTERNAL MEDICINE

## 2024-10-02 PROCEDURE — P9012 CRYOPRECIPITATE EACH UNIT: HCPCS

## 2024-10-02 PROCEDURE — 80048 BASIC METABOLIC PNL TOTAL CA: CPT | Performed by: NURSE PRACTITIONER

## 2024-10-02 RX ORDER — CALCIUM GLUCONATE 20 MG/ML
2 INJECTION, SOLUTION INTRAVENOUS ONCE
Status: COMPLETED | OUTPATIENT
Start: 2024-10-02 | End: 2024-10-02

## 2024-10-02 RX ORDER — CALCIUM CARBONATE 500(1250)
2 TABLET ORAL
Status: DISCONTINUED | OUTPATIENT
Start: 2024-10-02 | End: 2024-10-07

## 2024-10-02 RX ORDER — HEPARIN SODIUM 1000 [USP'U]/ML
6000 INJECTION, SOLUTION INTRAVENOUS; SUBCUTANEOUS 3 TIMES WEEKLY
Status: DISCONTINUED | OUTPATIENT
Start: 2024-10-02 | End: 2024-10-12 | Stop reason: HOSPADM

## 2024-10-02 RX ADMIN — CALCIUM 2 TABLET: 500 TABLET ORAL at 13:03

## 2024-10-02 RX ADMIN — FLUOXETINE HYDROCHLORIDE 20 MG: 20 CAPSULE ORAL at 08:01

## 2024-10-02 RX ADMIN — FAMOTIDINE 20 MG: 20 TABLET, FILM COATED ORAL at 08:01

## 2024-10-02 RX ADMIN — CALCIUM GLUCONATE 2 G: 20 INJECTION, SOLUTION INTRAVENOUS at 14:02

## 2024-10-02 RX ADMIN — Medication 1 CAPSULE: at 17:02

## 2024-10-02 RX ADMIN — PREDNISONE 60 MG: 20 TABLET ORAL at 17:02

## 2024-10-02 RX ADMIN — CARVEDILOL 6.25 MG: 3.12 TABLET, FILM COATED ORAL at 07:47

## 2024-10-02 RX ADMIN — CALCIUM 1 TABLET: 500 TABLET ORAL at 07:47

## 2024-10-02 RX ADMIN — CALCIUM GLUCONATE 2 G: 20 INJECTION, SOLUTION INTRAVENOUS at 07:49

## 2024-10-02 RX ADMIN — CALCIUM 2 TABLET: 500 TABLET ORAL at 17:02

## 2024-10-02 RX ADMIN — Medication 400 G: at 13:52

## 2024-10-02 RX ADMIN — PANTOPRAZOLE SODIUM 20 MG: 20 TABLET, DELAYED RELEASE ORAL at 05:50

## 2024-10-02 RX ADMIN — HEPARIN SODIUM 5000 UNITS: 5000 INJECTION, SOLUTION INTRAVENOUS; SUBCUTANEOUS at 13:03

## 2024-10-02 RX ADMIN — HEPARIN SODIUM 5000 UNITS: 5000 INJECTION, SOLUTION INTRAVENOUS; SUBCUTANEOUS at 22:11

## 2024-10-02 RX ADMIN — HEPARIN SODIUM 5000 UNITS: 5000 INJECTION, SOLUTION INTRAVENOUS; SUBCUTANEOUS at 05:50

## 2024-10-02 RX ADMIN — CYCLOPHOSPHAMIDE 100 MG: 50 CAPSULE ORAL at 17:02

## 2024-10-02 RX ADMIN — SULFAMETHOXAZOLE AND TRIMETHOPRIM 1 TABLET: 800; 160 TABLET ORAL at 17:02

## 2024-10-02 NOTE — ASSESSMENT & PLAN NOTE
Patient with a low hemoglobin on 9/27/2024-it was 6.7  Since then the patient has been transfused 2 units of packed red blood cells  Hemoglobin this morning is 7.0  Patient remains hemodynamically stable  Platelet count is downtrending but stable, will continue to monitor  Hemoglobin 7.2  We will give IV Venofer as patient has iron deficiency anemia on iron studies

## 2024-10-02 NOTE — ASSESSMENT & PLAN NOTE
Continue with current immunosuppression including Cytoxan and high-dose steroids.  Will continue with plasmapheresis, this was discussed in detail with her critical care colleagues.  Unfortunately, despite current plasma exchange dose, patient's anti-GBM antibodies remain greater than 8.  Will follow-up more recent antibodies that were drawn yesterday, but anticipate unchanged levels.  If possible, recommend increasing exchange up to 3 or 4 which is certainly a large amount of volume given patient's overall weight, but this may be needed in order to more effectively treat this significant glomerular nephritic process.

## 2024-10-02 NOTE — PLAN OF CARE
Problem: PAIN - ADULT  Goal: Verbalizes/displays adequate comfort level or baseline comfort level  Description: Interventions:  - Encourage patient to monitor pain and request assistance  - Assess pain using appropriate pain scale  - Administer analgesics based on type and severity of pain and evaluate response  - Implement non-pharmacological measures as appropriate and evaluate response  - Consider cultural and social influences on pain and pain management  - Notify physician/advanced practitioner if interventions unsuccessful or patient reports new pain  Outcome: Progressing     Problem: INFECTION - ADULT  Goal: Absence or prevention of progression during hospitalization  Description: INTERVENTIONS:  - Assess and monitor for signs and symptoms of infection  - Monitor lab/diagnostic results  - Monitor all insertion sites, i.e. indwelling lines, tubes, and drains  - Monitor endotracheal if appropriate and nasal secretions for changes in amount and color  - [place] appropriate cooling/warming therapies per order  - Administer medications as ordered  - Instruct and encourage patient and family to use good hand hygiene technique  - Identify and instruct in appropriate isolation precautions for identified infection/condition  Outcome: Progressing  Goal: Absence of fever/infection during neutropenic period  Description: INTERVENTIONS:  - Monitor WBC    Outcome: Progressing     Problem: SAFETY ADULT  Goal: Patient will remain free of falls  Description: INTERVENTIONS:  - Educate patient/family on patient safety including physical limitations  - Instruct patient to call for assistance with activity   - Consult OT/PT to assist with strengthening/mobility   - Keep Call bell within reach  - Keep bed low and locked with side rails adjusted as appropriate  - Keep care items and personal belongings within reach  - Initiate and maintain comfort rounds  - Make Fall Risk Sign visible to staff  - Offer Toileting every 2 Hours,  in advance of need  - Initiate/Maintain bed alarm  - Obtain necessary fall risk management equipment  - Apply yellow socks and bracelet for high fall risk patients  - Consider moving patient to room near nurses station  Outcome: Progressing  Goal: Maintain or return to baseline ADL function  Description: INTERVENTIONS:  -  Assess patient's ability to carry out ADLs; assess patient's baseline for ADL function and identify physical deficits which impact ability to perform ADLs (bathing, care of mouth/teeth, toileting, grooming, dressing, etc.)  - Assess/evaluate cause of self-care deficits   - Assess range of motion  - Assess patient's mobility; develop plan if impaired  - Assess patient's need for assistive devices and provide as appropriate  - Encourage maximum independence but intervene and supervise when necessary  - Involve family in performance of ADLs  - Assess for home care needs following discharge   - Consider OT consult to assist with ADL evaluation and planning for discharge  - Provide patient education as appropriate  Outcome: Progressing  Goal: Maintains/Returns to pre admission functional level  Description: INTERVENTIONS:  - Perform AM-PAC 6 Click Basic Mobility/ Daily Activity assessment daily.  - Set and communicate daily mobility goal to care team and patient/family/caregiver.   - Collaborate with rehabilitation services on mobility goals if consulted  - Out of bed for meals   - Out of bed for toileting  - Record patient progress and toleration of activity level   Outcome: Progressing     Problem: DISCHARGE PLANNING  Goal: Discharge to home or other facility with appropriate resources  Description: INTERVENTIONS:  - Identify barriers to discharge w/patient and caregiver  - Arrange for needed discharge resources and transportation as appropriate  - Identify discharge learning needs (meds, wound care, etc.)  - Arrange for interpretive services to assist at discharge as needed  - Refer to Case  Management Department for coordinating discharge planning if the patient needs post-hospital services based on physician/advanced practitioner order or complex needs related to functional status, cognitive ability, or social support system  Outcome: Progressing     Problem: Knowledge Deficit  Goal: Patient/family/caregiver demonstrates understanding of disease process, treatment plan, medications, and discharge instructions  Description: Complete learning assessment and assess knowledge base.  Interventions:  - Provide teaching at level of understanding  - Provide teaching via preferred learning methods  Outcome: Progressing     Problem: Nutrition/Hydration-ADULT  Goal: Nutrient/Hydration intake appropriate for improving, restoring or maintaining nutritional needs  Description: Monitor and assess patient's nutrition/hydration status for malnutrition. Collaborate with interdisciplinary team and initiate plan and interventions as ordered.  Monitor patient's weight and dietary intake as ordered or per policy. Utilize nutrition screening tool and intervene as necessary. Determine patient's food preferences and provide high-protein, high-caloric foods as appropriate.     INTERVENTIONS:  - Monitor oral intake, urinary output, labs, and treatment plans  - Assess nutrition and hydration status and recommend course of action  - Evaluate amount of meals eaten  - Assist patient with eating if necessary   - Allow adequate time for meals  - Recommend/ encourage appropriate diets, oral nutritional supplements, and vitamin/mineral supplements  - Order, calculate, and assess calorie counts as needed  - Recommend, monitor, and adjust tube feedings and TPN/PPN based on assessed needs  - Assess need for intravenous fluids  - Provide specific nutrition/hydration education as appropriate  - Include patient/family/caregiver in decisions related to nutrition  Outcome: Progressing     Problem: Prexisting or High Potential for Compromised  Skin Integrity  Goal: Skin integrity is maintained or improved  Description: INTERVENTIONS:  - Identify patients at risk for skin breakdown  - Assess and monitor skin integrity  - Assess and monitor nutrition and hydration status  - Monitor labs   - Assess for incontinence   - Turn and reposition patient  - Assist with mobility/ambulation  - Relieve pressure over bony prominences  - Avoid friction and shearing  - Provide appropriate hygiene as needed including keeping skin clean and dry  - Evaluate need for skin moisturizer/barrier cream  - Collaborate with interdisciplinary team   - Patient/family teaching  - Consider wound care consult   Outcome: Progressing     Problem: METABOLIC, FLUID AND ELECTROLYTES - ADULT  Goal: Electrolytes maintained within normal limits  Description: INTERVENTIONS:  - Monitor labs and assess patient for signs and symptoms of electrolyte imbalances  - Administer electrolyte replacement as ordered  - Monitor response to electrolyte replacements, including repeat lab results as appropriate  - Instruct patient on fluid and nutrition as appropriate  Outcome: Progressing  Goal: Fluid balance maintained  Description: INTERVENTIONS:  - Monitor labs   - Monitor I/O and WT  - Instruct patient on fluid and nutrition as appropriate  - Assess for signs & symptoms of volume excess or deficit  Outcome: Progressing     Problem: HEMATOLOGIC - ADULT  Goal: Maintains hematologic stability  Description: INTERVENTIONS  - Assess for signs and symptoms of bleeding or hemorrhage  - Monitor labs  - Administer supportive blood products/factors as ordered and appropriate  Outcome: Progressing

## 2024-10-02 NOTE — ASSESSMENT & PLAN NOTE
Remains hemodialysis dependent, will increase treatment time from 3 hours and 30 minutes up to 3 hours and 45 minutes with goal estimated Kt/V of 1.2, currently 1.06 at 3.5 hours.  Patient had dialysis session earlier today.  Patient also had 2 stop dialysis early due to machine being clotted.  Will provide heparin 6000 units bolus at the initiation of hemodialysis.

## 2024-10-02 NOTE — PROGRESS NOTES
Progress Note - Nephrology   Name: Ramos Rosenthal 74 y.o. male I MRN: [id number]  Unit/Bed#: ICU 04-01 I Date of Admission: 9/14/2024   Date of Service: 10/2/2024 I Hospital Day: 18     Assessment & Plan  LYLY (acute kidney injury) (HCC)  Remains hemodialysis dependent, will increase treatment time from 3 hours and 30 minutes up to 3 hours and 45 minutes with goal estimated Kt/V of 1.2, currently 1.06 at 3.5 hours.  Patient had dialysis session earlier today.  Patient also had 2 stop dialysis early due to machine being clotted.  Will provide heparin 6000 units bolus at the initiation of hemodialysis.  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  Will continue with plasmapheresis, this was discussed in detail with her critical care colleagues.  Unfortunately, despite current plasma exchange dose, patient's anti-GBM antibodies remain greater than 8.  Will follow-up more recent antibodies that were drawn yesterday, but anticipate unchanged levels.  If possible, recommend increasing exchange up to 3 or 4 which is certainly a large amount of volume given patient's overall weight, but this may be needed in order to more effectively treat this significant glomerular nephritic process.  Hyponatremia  Sodium level appropriate, continue fluid restriction.  Hypocalcemia  Will increase the patient's calcium supplementation from 500 mg 3 times daily up to 1000 mg 3 times daily.  Continue to provide additional calcium supplementation intravenously as indicated.  Bilateral lower extremity edema  Continue with low-sodium diet, ultrafiltration as tolerated dialysis sessions.  Anemia due to chronic kidney disease, on chronic dialysis (HCC)  Lab Results   Component Value Date    EGFR 11 10/02/2024    EGFR 14 10/01/2024    EGFR 9 09/30/2024    CREATININE 4.63 (H) 10/02/2024    CREATININE 3.70 (H) 10/01/2024    CREATININE 5.27 (H)  09/30/2024   Will place the patient on 10,000 units of Epogen intravenous with each hemodialysis session.    Essential hypertension  Blood pressures continue to be appropriate, continue to monitor for now.    Case discussed with hospitalist and critical care colleagues.  Patient may be a candidate for transfer to another center depending on what is possible from the plasma exchange standpoint.  Please refer above for more details regarding goals with plasmapheresis.  Will place the patient on heparin bolus at the initiation of hemodialysis, will increase hemodialysis dosing by 15 minutes, and start Epogen 10,000 units with hemodialysis sessions.    Follow up reason for today's visit: Hemodialysis dependent acute kidney injury/rapidly progressive glomerulonephritis due to anti-GBM antibodies/electrolyte disorder/anemia due to end-stage renal disease    LYLY (acute kidney injury) ([facility])    Patient Active Problem List   Diagnosis    Gait abnormality    Lumbar stenosis    Polyneuropathy    Lower extremity weakness    Lumbosacral plexopathy    Elevated troponin    EDGAR (dyspnea on exertion)    Rash due to vaculitis     LYLY (acute kidney injury) ([facility])    Depression    Multiple open wounds of lower leg    Paresthesia of both lower extremities    Leucocytosis    Bloating    Hyperlipidemia    Neurogenic claudication    Prediabetes    Vitamin D deficiency    Hyponatremia    Bilateral lower extremity edema    Secondary hyperparathyroidism of renal origin ([facility])    Uremia    Essential hypertension    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    Objective:     Vitals: Blood pressure 122/57, pulse (!) 51, temperature 97.6 °F (36.4 °C), temperature source Tympanic, resp. rate 13, height  "6' 2\" (1.88 m), weight (!) 138 kg (305 lb 5.4 oz), SpO2 100%.,Body mass index is 39.2 kg/m².    Weight (last 2 days)       Date/Time Weight    10/02/24 0600 138 (305.34)    10/01/24 0551 137 (302.91)    09/30/24 0600 139 (306)              Intake/Output Summary (Last 24 hours) at 10/2/2024 1436  Last data filed at 10/2/2024 1240  Gross per 24 hour   Intake 2443 ml   Output 2500 ml   Net -57 ml     I/O last 3 completed shifts:  In: 2598.9 [P.O.:696; Blood:1750; IV Piggyback:152.9]  Out: 0     HD Permanent Double Catheter (Active)   Reasons to continue HD Cath Treatment Therapy 10/02/24 1240   Goal for Removal N/A- chronic HD catheter 10/02/24 1240   Line Necessity Reviewed Yes, reviewed with provider 10/02/24 1240   Site Assessment WDL;Clean;Dry;Intact 10/02/24 1240   Proximal Lumen Status Normal saline locked;Needle-free valve changed;Passive disinfecting cap applied 10/02/24 1240   Distal Lumen Status Normal saline locked;Needle-free valve changed;Passive disinfecting cap applied 10/02/24 1240   Catheter Out on Skin (cm) 0 cm 09/27/24 1322   Dressing Type Chlorhexidine dressing 10/02/24 1240   Dressing Status Clean;Dry;Intact 10/02/24 1240   Dressing Change Due 10/04/24 10/02/24 1240   CLABSI Time-out performed for Positive Blood Culture Yes 09/28/24 0800       Physical Exam: /57   Pulse (!) 51   Temp 97.6 °F (36.4 °C) (Tympanic)   Resp 13   Ht 6' 2\" (1.88 m)   Wt (!) 138 kg (305 lb 5.4 oz)   SpO2 100%   BMI 39.20 kg/m²     General Appearance:    Alert, cooperative, no distress, appears stated age   Head:    Normocephalic, without obvious abnormality, atraumatic   Eyes:    Conjunctiva/corneas clear   Ears:    Normal external ears   Nose:   Nares normal, septum midline, mucosa normal, no drainage    or sinus tenderness   Throat:   Lips, mucosa, and tongue normal; teeth and gums normal   Neck:   Supple   Back:     Symmetric, no curvature, ROM normal, no CVA tenderness   Lungs:     Clear to auscultation " "bilaterally, respirations unlabored   Chest wall:    No tenderness or deformity   Heart:    Regular rate and rhythm, S1 and S2 normal, no murmur, rub   or gallop   Abdomen:     Soft, non-tender, bowel sounds active   Extremities:   Extremities normal, atraumatic, no cyanosis, mild bilateral lower extremity edema   Skin:   Skin color, texture, turgor normal, no rashes or lesions   Lymph nodes:   Cervical normal   Neurologic:   CNII-XII intact            Lab, Imaging and other studies: I have personally reviewed pertinent labs.  CBC:   Lab Results   Component Value Date    WBC 13.38 (H) 10/02/2024    HGB 7.2 (L) 10/02/2024    HCT 22.1 (L) 10/02/2024    MCV 88 10/02/2024     10/02/2024    RBC 2.50 (L) 10/02/2024    MCH 28.8 10/02/2024    MCHC 32.6 10/02/2024    RDW 16.2 (H) 10/02/2024    MPV 9.4 10/02/2024    NRBC 0 10/02/2024     CMP:   Lab Results   Component Value Date    K 4.1 10/02/2024     10/02/2024    CO2 18 (L) 10/02/2024    BUN 43 (H) 10/02/2024    CREATININE 4.63 (H) 10/02/2024    CALCIUM 8.0 (L) 10/02/2024    EGFR 11 10/02/2024       .  Results from last 7 days   Lab Units 10/02/24  0559 10/01/24  0534 09/30/24  0751   POTASSIUM mmol/L 4.1 4.3 4.5   CHLORIDE mmol/L 104 103 106   CO2 mmol/L 18* 21 16*   BUN mg/dL 43* 31* 45*   CREATININE mg/dL 4.63* 3.70* 5.27*   CALCIUM mg/dL 8.0* 7.7* 7.6*         Phosphorus:   Lab Results   Component Value Date    PHOS 5.3 (H) 10/02/2024     Magnesium:   Lab Results   Component Value Date    MG 2.0 10/02/2024     Urinalysis: No results found for: \"COLORU\", \"CLARITYU\", \"SPECGRAV\", \"PHUR\", \"LEUKOCYTESUR\", \"NITRITE\", \"PROTEINUA\", \"GLUCOSEU\", \"KETONESU\", \"BILIRUBINUR\", \"BLOODU\"  Ionized Calcium: No results found for: \"CAION\"  Coagulation: No results found for: \"PT\", \"INR\", \"APTT\"  Troponin: No results found for: \"TROPONINI\"  ABG: No results found for: \"PHART\", \"KQD5KXD\", \"PO2ART\", \"YUI3EJJ\", \"N5MSDQQI\", \"BEART\", \"SOURCE\"  Radiology review:     IMAGING  No results " found.      Current Facility-Administered Medications:     albumin human 250 g 5% IVPB, Once    calcium carbonate (OYSTER SHELL,OSCAL) 500 mg tablet 2 tablet, TID With Meals    calcium carbonate (TUMS) chewable tablet 1,000 mg, Daily PRN    calcium gluconate 2 g in sodium chloride 0.9% 100 mL (premix), Once, Last Rate: 2 g (10/02/24 1402)    carvedilol (COREG) tablet 6.25 mg, BID With Meals    cyclophosphamide (CYTOXAN) capsule 100 mg, Daily    famotidine (PEPCID) tablet 20 mg, Daily    FLUoxetine (PROzac) capsule 20 mg, Daily    heparin (porcine) subcutaneous injection 5,000 Units, Q8H LORENZO    melatonin tablet 3 mg, HS PRN    ondansetron (ZOFRAN) injection 4 mg, Q6H PRN    pantoprazole (PROTONIX) EC tablet 20 mg, Early Morning    predniSONE tablet 60 mg, Daily    sulfamethoxazole-trimethoprim (BACTRIM DS) 800-160 mg per tablet 1 tablet, Once per day on Monday Wednesday Friday    vit B complex-vit C-folic acid (Renal Caps) capsule 1 capsule, Daily With Dinner  Medications Discontinued During This Encounter   Medication Reason    meloxicam (MOBIC) 15 mg tablet Therapy completed    multi-electrolyte (PLASMALYTE-A/ISOLYTE-S PH 7.4) IV solution     cefTRIAXone (ROCEPHIN) IVPB (premix in dextrose) 1,000 mg 50 mL     cefTRIAXone (ROCEPHIN) IVPB (premix in dextrose) 1,000 mg 50 mL     albumin human (FLEXBUMIN) 5 % injection 75 g     albumin human (FLEXBUMIN) 5 % injection 100 g     cyclophosphamide (CYTOXAN) capsule 150 mg     predniSONE tablet 60 mg     albumin human (FLEXBUMIN) 5 % injection 75 g     albumin human (FLEXBUMIN) 5 % injection 100 g     sulfamethoxazole-trimethoprim (BACTRIM DS) 800-160 mg per tablet 1 tablet     albumin human (FLEXBUMIN) 5 % injection 12.5 g     albumin human 250 g 5% IVPB     albumin human (FLEXBUMIN) 5 % injection 150 g     albumin human (FLEXBUMIN) 5 % injection 100 g     calcium carbonate (OYSTER SHELL,OSCAL) 500 mg tablet 1 tablet        Gigi Ortega, DO      This progress note  was produced in part using a dictation device which may document imprecise wording from author's original intent.

## 2024-10-02 NOTE — PLAN OF CARE
Target UF Goal  2.5  L as tolerated. Patient dialyzing for  3.5 hrs  on 3 K bath for serum K of  4.1  per protocol. Treatment plan reviewed with Nephrology.       Post-Dialysis RN Treatment Note    Blood Pressure:  Pre 128/56 mm/Hg  Post 120/58 mmHg   EDW  TBD kg    Weight:  Pre 139.6 kg   Post 137.1 kg   Mode of weight measurement: Bed Scale   Volume Removed  2500 ml    Treatment duration  3 hrs and 30 mins   NS given  No    Treatment shortened? No   Medications given during Rx None Reported   Estimated Kt/V  1.06   Access type: Permacath/TDC   Access Issues: Yes, describe: arterial resistance noted, had to reverse lines from start of tx; unable to run blood pump with lines straight .  System did clot at 1:30 into tx, was a total system loss, unable to return any of patient's blood; restarted tx with new setup.  Dr. Ortega was notified.     Report called to primary nurse   Yes, Hodan RN at bedside       Problem: METABOLIC, FLUID AND ELECTROLYTES - ADULT  Goal: Electrolytes maintained within normal limits  Description: INTERVENTIONS:  - Monitor labs and assess patient for signs and symptoms of electrolyte imbalances  - Administer electrolyte replacement as ordered  - Monitor response to electrolyte replacements, including repeat lab results as appropriate  - Instruct patient on fluid and nutrition as appropriate  Outcome: Progressing  Goal: Fluid balance maintained  Description: INTERVENTIONS:  - Monitor labs   - Monitor I/O and WT  - Instruct patient on fluid and nutrition as appropriate  - Assess for signs & symptoms of volume excess or deficit  Outcome: Progressing

## 2024-10-02 NOTE — PROGRESS NOTES
Progress Note - Hospitalist   Name: Ramos Rosenthal 74 y.o. male I MRN: [id number]  Unit/Bed#: ICU 04-01 I Date of Admission: 9/14/2024   Date of Service: 10/2/2024 I Hospital Day: 18     Assessment & Plan  LYLY (acute kidney injury) ([facility])      Lab Results   Component Value Date    CREATININE 4.63 (H) 10/02/2024    CREATININE 3.70 (H) 10/01/2024    CREATININE 5.27 (H) 09/30/2024    EGFR 11 10/02/2024    EGFR 14 10/01/2024    EGFR 9 09/30/2024     Now on hemodialysis -Mondays, Wednesdays, and Fridays  Plasmapheresis started on 9/21/2024  Patient has had 7 plasmapheresis treatments thus far (he did not have 1 on 9/27/2024 in view of permacath placement)  Status post a renal biopsy-evidence of glomerular basement membrane disease noted  Continue prednisone 60 mg p.o. daily - steroid induced leukocytosis noted  Nephrology, and critical care are following  Discharge planning after 2 weeks of plasmapheresis has been completed and once cleared by Nephrology  Case management is working on an outpatient dialysis chair    Anemia due to chronic kidney disease, on chronic dialysis ([facility])  Patient with a low hemoglobin on 9/27/2024-it was 6.7  Since then the patient has been transfused 2 units of packed red blood cells  Hemoglobin this morning is 7.0  Patient remains hemodynamically stable  Platelet count is downtrending but stable, will continue to monitor  Hemoglobin 7.2  We will give IV Venofer as patient has iron deficiency anemia on iron studies  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  Noted on renal biopsy  Continue plasmapheresis as outlined above  Continue cyclophosphamide, prednisone, and Bactrim  Continued management as per nephrology/critical care  Hyponatremia  Patient has a hypervolemic hyponatremia  Patient has remained asymptomatic  Sodium is stable at 13  Continue fluid restriction  Continued management as per nephrology  Paresthesia of both lower extremities  Hx of back surgery in 2004  and  and has since bilateral LE paresthesia  He is primarily wheelchair bound  Venous statis ulcers noted to Bilateral LE  B/L extremity edema R>L  Continue supportive therapy  Bilateral lower extremity edema  Chronic condition  Patient with a history of lower back/spinal surgery  Patient does not ambulate  Patient is wheelchair-bound at baseline  Depression  Continue home dose fluoxetine  Essential hypertension  Continue carvedilol  Hypocalcemia      VTE Pharmacologic Prophylaxis: VTE Score: 8 High Risk (Score >/= 5) - Pharmacological DVT Prophylaxis Ordered: heparin. Sequential Compression Devices Ordered.    Mobility:   Basic Mobility Inpatient Raw Score: 10  JH-HLM Goal: 4: Move to chair/commode  JH-HLM Achieved: 4: Move to chair/commode  JH-HLM Goal NOT achieved. Continue with multidisciplinary rounding and encourage appropriate mobility to improve upon JH-HLM goals.    Patient Centered Rounds: I performed bedside rounds with nursing staff today.     Discussions with Specialists or Other Care Team Provider: Nephrology    Education and Discussions with Family / Patient: Updated  (wife and daughter) at bedside.    Current Length of Stay: 18 day(s)  Current Patient Status: Inpatient   Certification Statement: The patient will continue to require additional inpatient hospital stay due to anti-glomerular basement membrane disease  Discharge Plan: Anticipate discharge in >72 hrs to home with home services.    Code Status: Level 1 - Full Code    Subjective   Patient seen and examined at bedside. No acute events overnight. Denies chest pain, SOB, diaphoresis, nausea/vomiting/diarrhea, fevers/chills.     Objective     Vitals:   Temp (24hrs), Av.4 °F (36.3 °C), Min:96.7 °F (35.9 °C), Max:97.9 °F (36.6 °C)    Temp:  [96.7 °F (35.9 °C)-97.9 °F (36.6 °C)] 96.8 °F (36 °C)  HR:  [52-73] 55  Resp:  [11-55] 17  BP: (111-148)/(55-70) 129/63  SpO2:  [92 %-100 %] 100 %  Body mass index is 39.2 kg/m².  "    Input and Output Summary (last 24 hours):     Intake/Output Summary (Last 24 hours) at 10/2/2024 0713  Last data filed at 10/2/2024 0600  Gross per 24 hour   Intake 2548.92 ml   Output 0 ml   Net 2548.92 ml       /63   Pulse 55   Temp (!) 96.8 °F (36 °C) (Tympanic)   Resp 17   Ht 6' 2\" (1.88 m)   Wt (!) 138 kg (305 lb 5.4 oz)   SpO2 100%   BMI 39.20 kg/m²     General Appearance:    Alert, cooperative, no distress, appears stated age   Head:    Normocephalic, without obvious abnormality, atraumatic   Eyes:    PERRL, conjunctiva/corneas clear, EOM's intact, fundi     benign, both eyes        Ears:    Normal TM's and external ear canals, both ears   Nose:   Nares normal, septum midline, mucosa normal, no drainage    or sinus tenderness   Throat:   Lips, mucosa, and tongue normal; teeth and gums normal   Neck:   Supple, symmetrical, trachea midline, no adenopathy;        thyroid:  No enlargement/tenderness/nodules; no carotid    bruit or JVD   Back:     Symmetric, no curvature, ROM normal, no CVA tenderness   Lungs:     Clear to auscultation bilaterally, respirations unlabored   Chest wall:    No tenderness or deformity   Heart:    Regular rate and rhythm, S1 and S2 normal, no murmur, rub    or gallop   Abdomen:     Soft, non-tender, bowel sounds active all four quadrants,     no masses, no organomegaly   Genitalia:    Normal male without lesion, discharge or tenderness   Rectal:    Normal tone, normal prostate, no masses or tenderness;    guaiac negative stool   Extremities:   Extremities normal, atraumatic, no cyanosis or edema   Pulses:   2+ and symmetric all extremities   Skin:   Skin color, texture, turgor normal, no rashes or lesions   Lymph nodes:   Cervical, supraclavicular, and axillary nodes normal   Neurologic:   CNII-XII intact. Normal strength, sensation and reflexes       throughout        Lines/Drains:  Lines/Drains/Airways       Active Status       Name Placement date Placement time Site " Days    HD Permanent Double Catheter 09/27/24  1316  Internal jugular  4                            Lab Results: I have reviewed the following results: CBC/BMP:   .     10/02/24  0559   WBC 13.38*   HGB 7.2*   HCT 22.1*      SODIUM 132*   K 4.1      CO2 18*   BUN 43*   CREATININE 4.63*   GLUC 147*   CAIONIZED 1.03*   MG 2.0   PHOS 5.3*       Results from last 7 days   Lab Units 10/02/24  0559 10/01/24  1752 10/01/24  0534 09/30/24  1914 09/30/24  0751   WBC Thousand/uL 13.38*  --  15.31*  14.75*  --  17.66*   HEMOGLOBIN g/dL 7.2*   < > 6.9*  6.9*   < > 6.5*   HEMATOCRIT % 22.1*  --  21.0*  20.9*   < > 20.2*   PLATELETS Thousands/uL 168  --  207  203  --  213   BANDS PCT %  --   --  2  --   --    SEGS PCT %  --   --   --   --  89*   LYMPHO PCT %  --   --  6*  --  5*   MONO PCT %  --   --  1*  --  4   EOS PCT %  --   --  1  --  0    < > = values in this interval not displayed.     Results from last 7 days   Lab Units 10/02/24  0559   SODIUM mmol/L 132*   POTASSIUM mmol/L 4.1   CHLORIDE mmol/L 104   CO2 mmol/L 18*   BUN mg/dL 43*   CREATININE mg/dL 4.63*   ANION GAP mmol/L 10   CALCIUM mg/dL 8.0*   GLUCOSE RANDOM mg/dL 147*     Results from last 7 days   Lab Units 09/29/24  2341   INR  1.87*                   Recent Cultures (last 7 days):         Imaging Review: No pertinent imaging studies reviewed.  Other Studies: No additional pertinent studies reviewed.    Last 24 Hours Medication List:     Current Facility-Administered Medications:     albumin human 250 g 5% IVPB, Once    albumin human 400 g 5% IVPB, Once    calcium carbonate (OYSTER SHELL,OSCAL) 500 mg tablet 1 tablet, TID With Meals    calcium carbonate (TUMS) chewable tablet 1,000 mg, Daily PRN    carvedilol (COREG) tablet 6.25 mg, BID With Meals    cyclophosphamide (CYTOXAN) capsule 100 mg, Daily    famotidine (PEPCID) tablet 20 mg, Daily    FLUoxetine (PROzac) capsule 20 mg, Daily    heparin (porcine) subcutaneous injection 5,000 Units, Q8H  LORENZO    melatonin tablet 3 mg, HS PRN    ondansetron (ZOFRAN) injection 4 mg, Q6H PRN    pantoprazole (PROTONIX) EC tablet 20 mg, Early Morning    predniSONE tablet 60 mg, Daily    sulfamethoxazole-trimethoprim (BACTRIM DS) 800-160 mg per tablet 1 tablet, Once per day on Monday Wednesday Friday    vit B complex-vit C-folic acid (Renal Caps) capsule 1 capsule, Daily With Dinner    Administrative Statements   Today, Patient Was Seen By: Mike Perez, DO  I have spent a total time of 35 minutes in caring for this patient on the day of the visit/encounter including Diagnostic results, Prognosis, Risks and benefits of tx options, Instructions for management, Patient and family education, Importance of tx compliance, Risk factor reductions, Impressions, Counseling / Coordination of care, Documenting in the medical record, Reviewing / ordering tests, medicine, procedures  , Obtaining or reviewing history  , and Communicating with other healthcare professionals .    **Please Note: This note may have been constructed using a voice recognition system.**

## 2024-10-02 NOTE — ASSESSMENT & PLAN NOTE
Lab Results   Component Value Date    EGFR 11 10/02/2024    EGFR 14 10/01/2024    EGFR 9 09/30/2024    CREATININE 4.63 (H) 10/02/2024    CREATININE 3.70 (H) 10/01/2024    CREATININE 5.27 (H) 09/30/2024   Will place the patient on 10,000 units of Epogen intravenous with each hemodialysis session.

## 2024-10-02 NOTE — ASSESSMENT & PLAN NOTE
Lab Results   Component Value Date    CREATININE 4.63 (H) 10/02/2024    CREATININE 3.70 (H) 10/01/2024    CREATININE 5.27 (H) 09/30/2024    EGFR 11 10/02/2024    EGFR 14 10/01/2024    EGFR 9 09/30/2024     Now on hemodialysis -Mondays, Wednesdays, and Fridays  Plasmapheresis started on 9/21/2024  Patient has had 7 plasmapheresis treatments thus far (he did not have 1 on 9/27/2024 in view of permacath placement)  Status post a renal biopsy-evidence of glomerular basement membrane disease noted  Continue prednisone 60 mg p.o. daily - steroid induced leukocytosis noted  Nephrology, and critical care are following  Discharge planning after 2 weeks of plasmapheresis has been completed and once cleared by Nephrology  Case management is working on an outpatient dialysis chair

## 2024-10-02 NOTE — PROGRESS NOTES
Critical care note     Assessment  Acute kidney injury  Rapidly progressive glomerulonephritis  + Anti-GBM antibodies  Hyponatremia  Bilateral lower extremity edema    Plex obtained  --Fibrinogen 104, will continue with Plex 1.5 with albumin for today and tomorrow  --Cryoprecipitate x 1 today, another dose tomorrow a.m.  --Pending last anti-GBM check, if still > 8.0, discussed with nephrology will need to consider higher volume Plex with a ratio 3:5, probably at a tertiary care center  --Plex tomorrow with albumin 1.5, follow fibrinogen levels, replating calcium

## 2024-10-02 NOTE — ASSESSMENT & PLAN NOTE
Will increase the patient's calcium supplementation from 500 mg 3 times daily up to 1000 mg 3 times daily.  Continue to provide additional calcium supplementation intravenously as indicated.

## 2024-10-03 LAB
ABO GROUP BLD BPU: NORMAL
ANION GAP SERPL CALCULATED.3IONS-SCNC: 7 MMOL/L (ref 4–13)
BPU ID: NORMAL
BUN SERPL-MCNC: 32 MG/DL (ref 5–25)
CA-I BLD-SCNC: 1.05 MMOL/L (ref 1.12–1.32)
CALCIUM SERPL-MCNC: 7.8 MG/DL (ref 8.4–10.2)
CHLORIDE SERPL-SCNC: 105 MMOL/L (ref 96–108)
CO2 SERPL-SCNC: 20 MMOL/L (ref 21–32)
CREAT SERPL-MCNC: 3.39 MG/DL (ref 0.6–1.3)
ERYTHROCYTE [DISTWIDTH] IN BLOOD BY AUTOMATED COUNT: 16.7 % (ref 11.6–15.1)
FIBRINOGEN PPP-MCNC: <60 MG/DL (ref 206–523)
GFR SERPL CREATININE-BSD FRML MDRD: 16 ML/MIN/1.73SQ M
GLUCOSE SERPL-MCNC: 144 MG/DL (ref 65–140)
HCT VFR BLD AUTO: 20.5 % (ref 36.5–49.3)
HGB BLD-MCNC: 6.6 G/DL (ref 12–17)
HGB BLD-MCNC: 7.4 G/DL (ref 12–17)
MAGNESIUM SERPL-MCNC: 1.6 MG/DL (ref 1.9–2.7)
MCH RBC QN AUTO: 28.7 PG (ref 26.8–34.3)
MCHC RBC AUTO-ENTMCNC: 32.2 G/DL (ref 31.4–37.4)
MCV RBC AUTO: 89 FL (ref 82–98)
PHOSPHATE SERPL-MCNC: 3.7 MG/DL (ref 2.3–4.1)
PLATELET # BLD AUTO: 165 THOUSANDS/UL (ref 149–390)
PMV BLD AUTO: 9 FL (ref 8.9–12.7)
POTASSIUM SERPL-SCNC: 4.2 MMOL/L (ref 3.5–5.3)
RBC # BLD AUTO: 2.3 MILLION/UL (ref 3.88–5.62)
SODIUM SERPL-SCNC: 132 MMOL/L (ref 135–147)
UNIT DISPENSE STATUS: NORMAL
UNIT PRODUCT CODE: NORMAL
UNIT PRODUCT VOLUME: 125 ML
UNIT RH: NORMAL
WBC # BLD AUTO: 16.21 THOUSAND/UL (ref 4.31–10.16)

## 2024-10-03 PROCEDURE — 80048 BASIC METABOLIC PNL TOTAL CA: CPT | Performed by: INTERNAL MEDICINE

## 2024-10-03 PROCEDURE — 99232 SBSQ HOSP IP/OBS MODERATE 35: CPT | Performed by: INTERNAL MEDICINE

## 2024-10-03 PROCEDURE — P9016 RBC LEUKOCYTES REDUCED: HCPCS

## 2024-10-03 PROCEDURE — 85018 HEMOGLOBIN: CPT | Performed by: NURSE PRACTITIONER

## 2024-10-03 PROCEDURE — 85384 FIBRINOGEN ACTIVITY: CPT | Performed by: NURSE PRACTITIONER

## 2024-10-03 PROCEDURE — P9012 CRYOPRECIPITATE EACH UNIT: HCPCS

## 2024-10-03 PROCEDURE — 84100 ASSAY OF PHOSPHORUS: CPT | Performed by: INTERNAL MEDICINE

## 2024-10-03 PROCEDURE — 86920 COMPATIBILITY TEST SPIN: CPT

## 2024-10-03 PROCEDURE — 85025 COMPLETE CBC W/AUTO DIFF WBC: CPT | Performed by: NURSE PRACTITIONER

## 2024-10-03 PROCEDURE — 83520 IMMUNOASSAY QUANT NOS NONAB: CPT | Performed by: INTERNAL MEDICINE

## 2024-10-03 PROCEDURE — 82330 ASSAY OF CALCIUM: CPT | Performed by: NURSE PRACTITIONER

## 2024-10-03 PROCEDURE — 83735 ASSAY OF MAGNESIUM: CPT | Performed by: INTERNAL MEDICINE

## 2024-10-03 PROCEDURE — NC001 PR NO CHARGE: Performed by: INTERNAL MEDICINE

## 2024-10-03 RX ORDER — CALCIUM GLUCONATE 20 MG/ML
1 INJECTION, SOLUTION INTRAVENOUS ONCE
Status: DISCONTINUED | OUTPATIENT
Start: 2024-10-03 | End: 2024-10-03

## 2024-10-03 RX ORDER — MAGNESIUM SULFATE HEPTAHYDRATE 40 MG/ML
2 INJECTION, SOLUTION INTRAVENOUS ONCE
Status: DISCONTINUED | OUTPATIENT
Start: 2024-10-03 | End: 2024-10-03

## 2024-10-03 RX ORDER — MAGNESIUM SULFATE HEPTAHYDRATE 40 MG/ML
2 INJECTION, SOLUTION INTRAVENOUS ONCE
Status: COMPLETED | OUTPATIENT
Start: 2024-10-03 | End: 2024-10-03

## 2024-10-03 RX ORDER — CALCIUM GLUCONATE 20 MG/ML
2 INJECTION, SOLUTION INTRAVENOUS ONCE
Status: COMPLETED | OUTPATIENT
Start: 2024-10-03 | End: 2024-10-03

## 2024-10-03 RX ADMIN — MAGNESIUM SULFATE HEPTAHYDRATE 2 G: 40 INJECTION, SOLUTION INTRAVENOUS at 07:12

## 2024-10-03 RX ADMIN — CYCLOPHOSPHAMIDE 100 MG: 50 CAPSULE ORAL at 16:20

## 2024-10-03 RX ADMIN — CALCIUM 2 TABLET: 500 TABLET ORAL at 12:13

## 2024-10-03 RX ADMIN — Medication 1 CAPSULE: at 16:23

## 2024-10-03 RX ADMIN — PREDNISONE 60 MG: 20 TABLET ORAL at 16:23

## 2024-10-03 RX ADMIN — HEPARIN SODIUM 5000 UNITS: 5000 INJECTION, SOLUTION INTRAVENOUS; SUBCUTANEOUS at 21:27

## 2024-10-03 RX ADMIN — CARVEDILOL 6.25 MG: 3.12 TABLET, FILM COATED ORAL at 07:31

## 2024-10-03 RX ADMIN — HEPARIN SODIUM 5000 UNITS: 5000 INJECTION, SOLUTION INTRAVENOUS; SUBCUTANEOUS at 05:52

## 2024-10-03 RX ADMIN — CALCIUM GLUCONATE 2 G: 20 INJECTION, SOLUTION INTRAVENOUS at 07:12

## 2024-10-03 RX ADMIN — PANTOPRAZOLE SODIUM 20 MG: 20 TABLET, DELAYED RELEASE ORAL at 05:52

## 2024-10-03 RX ADMIN — HEPARIN SODIUM 5000 UNITS: 5000 INJECTION, SOLUTION INTRAVENOUS; SUBCUTANEOUS at 14:25

## 2024-10-03 RX ADMIN — FLUOXETINE HYDROCHLORIDE 20 MG: 20 CAPSULE ORAL at 09:47

## 2024-10-03 RX ADMIN — CALCIUM 2 TABLET: 500 TABLET ORAL at 07:31

## 2024-10-03 RX ADMIN — FAMOTIDINE 20 MG: 20 TABLET, FILM COATED ORAL at 09:47

## 2024-10-03 RX ADMIN — CALCIUM 2 TABLET: 500 TABLET ORAL at 16:23

## 2024-10-03 NOTE — ASSESSMENT & PLAN NOTE
Lab Results   Component Value Date    CREATININE 3.39 (H) 10/03/2024    CREATININE 4.63 (H) 10/02/2024    CREATININE 3.70 (H) 10/01/2024    EGFR 16 10/03/2024    EGFR 11 10/02/2024    EGFR 14 10/01/2024     Now on hemodialysis -Mondays, Wednesdays, and Fridays  Plasmapheresis started on 9/21/2024  Patient has had 7 plasmapheresis treatments thus far (he did not have 1 on 9/27/2024 in view of permacath placement)  Status post a renal biopsy-evidence of glomerular basement membrane disease noted  Continue prednisone 60 mg p.o. daily - steroid induced leukocytosis noted  Nephrology, and critical care are following  Discharge planning after 2 weeks of plasmapheresis has been completed and once cleared by Nephrology  Ongoing discussions to be held for possible transfer to [facility] or [facility] as patient may require more blood products  Trend anti-GBM antibody with goal less than 8  Case management is working on an outpatient dialysis chair

## 2024-10-03 NOTE — PROGRESS NOTES
Progress Note - Hospitalist   Name: Ramos Rosenthal 74 y.o. male I MRN: [id number]  Unit/Bed#: ICU 04-01 I Date of Admission: 9/14/2024   Date of Service: 10/3/2024 I Hospital Day: 19     Assessment & Plan  LYLY (acute kidney injury) ([facility])      Lab Results   Component Value Date    CREATININE 3.39 (H) 10/03/2024    CREATININE 4.63 (H) 10/02/2024    CREATININE 3.70 (H) 10/01/2024    EGFR 16 10/03/2024    EGFR 11 10/02/2024    EGFR 14 10/01/2024     Now on hemodialysis -Mondays, Wednesdays, and Fridays  Plasmapheresis started on 9/21/2024  Patient has had 7 plasmapheresis treatments thus far (he did not have 1 on 9/27/2024 in view of permacath placement)  Status post a renal biopsy-evidence of glomerular basement membrane disease noted  Continue prednisone 60 mg p.o. daily - steroid induced leukocytosis noted  Nephrology, and critical care are following  Discharge planning after 2 weeks of plasmapheresis has been completed and once cleared by Nephrology  Ongoing discussions to be held for possible transfer to [facility] or [facility] as patient may require more blood products  Trend anti-GBM antibody with goal less than 8  Case management is working on an outpatient dialysis chair    Anemia due to chronic kidney disease, on chronic dialysis ([facility])  Patient with a low hemoglobin on 9/27/2024-it was 6.7  Since then the patient has been transfused 2 units of packed red blood cells  Hemoglobin this morning is 7.0  Patient remains hemodynamically stable  Platelet count is downtrending but stable, will continue to monitor  Hemoglobin 7.2  We will give IV Venofer as patient has iron deficiency anemia on iron studies  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  Noted on renal biopsy  Continue plasmapheresis as outlined above  Continue cyclophosphamide, prednisone, and Bactrim  Continued management as per nephrology/critical care  Hyponatremia  Patient has a hypervolemic  hyponatremia  Patient has remained asymptomatic  Sodium is stable at 13  Continue fluid restriction  Continued management as per nephrology  Paresthesia of both lower extremities  Hx of back surgery in  and  and has since bilateral LE paresthesia  He is primarily wheelchair bound  Venous statis ulcers noted to Bilateral LE  B/L extremity edema R>L  Continue supportive therapy  Bilateral lower extremity edema  Chronic condition  Patient with a history of lower back/spinal surgery  Patient does not ambulate  Patient is wheelchair-bound at baseline  Depression  Continue home dose fluoxetine  Essential hypertension  Continue carvedilol  Hypocalcemia      VTE Pharmacologic Prophylaxis: VTE Score: 8 High Risk (Score >/= 5) - Pharmacological DVT Prophylaxis Ordered: heparin. Sequential Compression Devices Ordered.    Mobility:   Basic Mobility Inpatient Raw Score: 10  JH-HLM Goal: 4: Move to chair/commode  JH-HLM Achieved: 4: Move to chair/commode  JH-HLM Goal NOT achieved. Continue with multidisciplinary rounding and encourage appropriate mobility to improve upon JH-HLM goals.    Patient Centered Rounds: I performed bedside rounds with nursing staff today.     Discussions with Specialists or Other Care Team Provider: Nephrology    Education and Discussions with Family / Patient: Updated  (wife and daughter) at bedside.    Current Length of Stay: 19 day(s)  Current Patient Status: Inpatient   Certification Statement: The patient will continue to require additional inpatient hospital stay due to anti-glomerular basement membrane disease  Discharge Plan: Anticipate discharge in >72 hrs to home with home services.    Code Status: Level 1 - Full Code    Subjective   Patient seen and examined at bedside. No acute events overnight. Denies chest pain, SOB, diaphoresis, nausea/vomiting/diarrhea, fevers/chills.     Objective     Vitals:   Temp (24hrs), Av.1 °F (36.2 °C), Min:96 °F (35.6 °C), Max:97.6 °F  "(36.4 °C)    Temp:  [96 °F (35.6 °C)-97.6 °F (36.4 °C)] 96.2 °F (35.7 °C)  HR:  [47-66] 57  Resp:  [11-33] 20  BP: ()/(38-62) 132/60  SpO2:  [97 %-100 %] 100 %  Body mass index is 38.78 kg/m².     Input and Output Summary (last 24 hours):     Intake/Output Summary (Last 24 hours) at 10/3/2024 0751  Last data filed at 10/3/2024 0745  Gross per 24 hour   Intake 1105 ml   Output 2500 ml   Net -1395 ml       /60   Pulse 57   Temp (!) 96.2 °F (35.7 °C)   Resp 20   Ht 6' 2\" (1.88 m)   Wt (!) 137 kg (302 lb 0.5 oz)   SpO2 100%   BMI 38.78 kg/m²     General Appearance:    Alert, cooperative, no distress, appears stated age   Head:    Normocephalic, without obvious abnormality, atraumatic   Eyes:    PERRL, conjunctiva/corneas clear, EOM's intact, fundi     benign, both eyes        Ears:    Normal TM's and external ear canals, both ears   Nose:   Nares normal, septum midline, mucosa normal, no drainage    or sinus tenderness   Throat:   Lips, mucosa, and tongue normal; teeth and gums normal   Neck:   Supple, symmetrical, trachea midline, no adenopathy;        thyroid:  No enlargement/tenderness/nodules; no carotid    bruit or JVD   Back:     Symmetric, no curvature, ROM normal, no CVA tenderness   Lungs:     Clear to auscultation bilaterally, respirations unlabored   Chest wall:    No tenderness or deformity   Heart:    Regular rate and rhythm, S1 and S2 normal, no murmur, rub    or gallop   Abdomen:     Soft, non-tender, bowel sounds active all four quadrants,     no masses, no organomegaly   Genitalia:    Normal male without lesion, discharge or tenderness   Rectal:    Normal tone, normal prostate, no masses or tenderness;    guaiac negative stool   Extremities:   Extremities normal, atraumatic, no cyanosis or edema   Pulses:   2+ and symmetric all extremities   Skin:   Skin color, texture, turgor normal, no rashes or lesions   Lymph nodes:   Cervical, supraclavicular, and axillary nodes normal "   Neurologic:   CNII-XII intact. Normal strength, sensation and reflexes       throughout        Lines/Drains:  Lines/Drains/Airways       Active Status       Name Placement date Placement time Site Days    HD Permanent Double Catheter 09/27/24  1316  Internal jugular  5                            Lab Results: I have reviewed the following results: CBC/BMP:   .     10/03/24  0538   WBC 16.21*   HGB 6.6*   HCT 20.5*      SODIUM 132*   K 4.2      CO2 20*   BUN 32*   CREATININE 3.39*   GLUC 144*   CAIONIZED 1.05*   MG 1.6*   PHOS 3.7       Results from last 7 days   Lab Units 10/03/24  0538 10/02/24  0559 10/01/24  1752 10/01/24  0534   WBC Thousand/uL 16.21* 13.38*  --  15.31*  14.75*   HEMOGLOBIN g/dL 6.6* 7.2*   < > 6.9*  6.9*   HEMATOCRIT % 20.5* 22.1*  --  21.0*  20.9*   PLATELETS Thousands/uL 165 168  --  207  203   BANDS PCT %  --   --   --  2   SEGS PCT %  --  91*  --   --    LYMPHO PCT %  --  5*  --  6*   MONO PCT %  --  3*  --  1*   EOS PCT %  --  0  --  1    < > = values in this interval not displayed.     Results from last 7 days   Lab Units 10/03/24  0538   SODIUM mmol/L 132*   POTASSIUM mmol/L 4.2   CHLORIDE mmol/L 105   CO2 mmol/L 20*   BUN mg/dL 32*   CREATININE mg/dL 3.39*   ANION GAP mmol/L 7   CALCIUM mg/dL 7.8*   GLUCOSE RANDOM mg/dL 144*     Results from last 7 days   Lab Units 09/29/24  2341   INR  1.87*                   Recent Cultures (last 7 days):         Imaging Review: No pertinent imaging studies reviewed.  Other Studies: No additional pertinent studies reviewed.    Last 24 Hours Medication List:     Current Facility-Administered Medications:     albumin human 250 g 5% IVPB, Once    albumin human 400 g 5% IVPB, Once    calcium carbonate (OYSTER SHELL,OSCAL) 500 mg tablet 2 tablet, TID With Meals    calcium carbonate (TUMS) chewable tablet 1,000 mg, Daily PRN    calcium gluconate 2 g in sodium chloride 0.9% 100 mL (premix), Once, Last Rate: 2 g (10/03/24 0712) **FOLLOWED  BY** [DISCONTINUED] calcium gluconate 1 g in sodium chloride 0.9% 50 mL (premix), Once    carvedilol (COREG) tablet 6.25 mg, BID With Meals    cyclophosphamide (CYTOXAN) capsule 100 mg, Daily    epoetin marcela (EPOGEN,PROCRIT) injection 10,000 Units, Once per day on Monday Wednesday Friday    famotidine (PEPCID) tablet 20 mg, Daily    FLUoxetine (PROzac) capsule 20 mg, Daily    heparin (porcine) injection 6,000 Units, Once per day on Monday Wednesday Friday    heparin (porcine) subcutaneous injection 5,000 Units, Q8H LORENZO    magnesium sulfate 2 g/50 mL IVPB (premix) 2 g, Once, Last Rate: 2 g (10/03/24 0712)    melatonin tablet 3 mg, HS PRN    ondansetron (ZOFRAN) injection 4 mg, Q6H PRN    pantoprazole (PROTONIX) EC tablet 20 mg, Early Morning    predniSONE tablet 60 mg, Daily    sulfamethoxazole-trimethoprim (BACTRIM DS) 800-160 mg per tablet 1 tablet, Once per day on Monday Wednesday Friday    vit B complex-vit C-folic acid (Renal Caps) capsule 1 capsule, Daily With Dinner    Administrative Statements   Today, Patient Was Seen By: Mike Perez, DO  I have spent a total time of 35 minutes in caring for this patient on the day of the visit/encounter including Diagnostic results, Prognosis, Risks and benefits of tx options, Instructions for management, Patient and family education, Importance of tx compliance, Risk factor reductions, Impressions, Counseling / Coordination of care, Documenting in the medical record, Reviewing / ordering tests, medicine, procedures  , Obtaining or reviewing history  , and Communicating with other healthcare professionals .    **Please Note: This note may have been constructed using a voice recognition system.**

## 2024-10-03 NOTE — ASSESSMENT & PLAN NOTE
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   Will provide heparin 6000 units bolus at the initiation of hemodialysis.  Continue MWF schedule.   Continue tx with PLEX for anti-GBM antibody removal.

## 2024-10-03 NOTE — PROGRESS NOTES
Progress Note - Nephrology   Name: Ramos Rosenthal 74 y.o. 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   Will provide heparin 6000 units bolus at the initiation of hemodialysis.  Continue MWF schedule.   Continue tx with PLEX for anti-GBM antibody removal.   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  Will continue with plasmapheresis, this was discussed in detail with her critical care colleagues.   10/1 antigbm level improving to 7.7. Ideally would goal for much lower levels. Advise to recheck level today, results should be available on 10/5. Recommend ongoing PLEX with 1.5 volume exchange until repeat levels from today evaluated. If NO improvement, increase plasma volume to 2 and continue PLEX for up to additional 1 week.   Discussed with pathology and CC regarding plan for ongoing PLEX. Blood products can be accommodated here. Follow daily fibrinogen and if less<100 use FFP.   Continue PPI and Bactrim for PCP ppx.  Hyponatremia  Sodium level appropriate, continue fluid restriction.  Hypocalcemia  Will increase the patient's calcium supplementation from 500 mg 3 times daily up to 1000 mg 3 times daily.  Continue to provide additional calcium supplementation intravenously as indicated.  Bilateral lower extremity edema  Continue with low-sodium diet, ultrafiltration as tolerated dialysis sessions.  Anemia due to chronic kidney disease, on chronic dialysis (HCC)  Lab Results   Component Value Date    EGFR 16 10/03/2024    EGFR 11 10/02/2024    EGFR 14 10/01/2024    CREATININE 3.39 (H) 10/03/2024    CREATININE 4.63 (H) 10/02/2024    CREATININE 3.70 (H) 10/01/2024   Will place the patient on 10,000 units of Epogen intravenous with each  hemodialysis session.    Essential hypertension  Blood pressures continue to be appropriate, continue to monitor for now.    I have reviewed the nephrology recommendations including PLEX with pathology, primary and CC team and we are in agreement with renal plan including the information outlined above.     Subjective   Brief History of Admission - 75 yo male presented to [facility] with hematuria and decreased UOP. 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     Patient seen and examined today. Denies chest pain,shortness of breath, nausea, vomiting. Denies making any urine.  A complete 10 point review of systems was performed and is otherwise negative.    Objective :  Temp:  [96 °F (35.6 °C)-97 °F (36.1 °C)] 96.6 °F (35.9 °C)  HR:  [49-70] 59  BP: ()/(38-67) 120/59  Resp:  [11-33] 20  SpO2:  [85 %-100 %] 99 %  O2 Device: None (Room air)    Current Weight: Weight - Scale: (!) 137 kg (302 lb 0.5 oz)  First Weight: Weight - Scale: (!) 140 kg (309 lb 8.4 oz)  I/O         10/01 0701  10/02 0700 10/02 0701  10/03 0700 10/03 0701  10/04 0700    P.O. 646 300     I.V. (mL/kg)  480 (3.5)     Blood 1750 125 371.7    IV Piggyback 152.9 200     Total Intake(mL/kg) 2548.9 (18.5) 1105 (8.1) 371.7 (2.7)    Urine (mL/kg/hr) 0 (0) 0 (0)     Other  2500     Stool 0 0     Total Output 0 2500     Net +2548.9 -1395 +371.7           Unmeasured Stool Occurrence 0 x 0 x           Physical Exam  Vitals reviewed.   Constitutional:       General: He is not in acute distress.     Appearance: He is obese. He is not ill-appearing.   HENT:      Head: Atraumatic.      Nose: Nose normal.      Mouth/Throat:      Mouth: Mucous membranes are moist.      Pharynx: Oropharynx is clear.   Cardiovascular:      Rate and Rhythm: Normal rate and regular rhythm.      Heart sounds:      No friction rub.   Pulmonary:      Breath sounds: No wheezing, rhonchi or rales.   Abdominal:      Tenderness: There  is no abdominal tenderness. There is no guarding.   Musculoskeletal:      Right lower leg: Edema present.      Left lower leg: Edema present.   Skin:     Coloration: Skin is not jaundiced.      Findings: No bruising or rash.   Neurological:      Mental Status: He is alert and oriented to person, place, and time. Mental status is at baseline.   Psychiatric:         Mood and Affect: Mood normal.         Behavior: Behavior normal.         Medications:    Current Facility-Administered Medications:     albumin human 250 g 5% IVPB, 250 g, Intravenous, Once, Rebecca A Terese, LETICIANP    albumin human 400 g 5% IVPB, 400 g, Intravenous, Once, BRETT Stovall    calcium carbonate (OYSTER SHELL,OSCAL) 500 mg tablet 2 tablet, 2 tablet, Oral, TID With Meals, Gigi Ortega DO, 2 tablet at 10/03/24 0731    calcium carbonate (TUMS) chewable tablet 1,000 mg, 1,000 mg, Oral, Daily PRN, BRETT Xie    carvedilol (COREG) tablet 6.25 mg, 6.25 mg, Oral, BID With Meals, Gigi Ortega DO, 6.25 mg at 10/03/24 0731    cyclophosphamide (CYTOXAN) capsule 100 mg, 100 mg, Oral, Daily, Lisa Moore, DO, 100 mg at 10/02/24 1702    epoetin marcela (EPOGEN,PROCRIT) injection 10,000 Units, 10,000 Units, Intravenous, Once per day on Monday Wednesday Friday, Gigi Ortega DO    famotidine (PEPCID) tablet 20 mg, 20 mg, Oral, Daily, Ramos Mclean DO, 20 mg at 10/03/24 0947    FLUoxetine (PROzac) capsule 20 mg, 20 mg, Oral, Daily, BRETT Xie, 20 mg at 10/03/24 0947    heparin (porcine) injection 6,000 Units, 6,000 Units, Intravenous, Once per day on Monday Wednesday Friday, Gigi Ortega DO    heparin (porcine) subcutaneous injection 5,000 Units, 5,000 Units, Subcutaneous, Q8H [facility], BRETT Xie, 5,000 Units at 10/03/24 0552    melatonin tablet 3 mg, 3 mg, Oral, HS PRN, Laurel Rosenberg MD, 3 mg at 09/28/24 0057    ondansetron (ZOFRAN) injection 4 mg, 4 mg,  "Intravenous, Q6H PRN, BRETT Xie    pantoprazole (PROTONIX) EC tablet 20 mg, 20 mg, Oral, Early Morning, Ramos Mclean DO, 20 mg at 10/03/24 0552    predniSONE tablet 60 mg, 60 mg, Oral, Daily, Lisa Moore DO, 60 mg at 10/02/24 1702    sulfamethoxazole-trimethoprim (BACTRIM DS) 800-160 mg per tablet 1 tablet, 1 tablet, Oral, Once per day on Monday Wednesday Friday, Lisa Moore DO, 1 tablet at 10/02/24 1702    vit B complex-vit C-folic acid (Renal Caps) capsule 1 capsule, 1 capsule, Oral, Daily With Dinner, Gigi Ortega DO, 1 capsule at 10/02/24 1702      Lab Results: I have reviewed the following results:  Results from last 7 days   Lab Units 10/03/24  0538 10/02/24  0559 10/01/24  1752 10/01/24  0534 09/30/24  1914 09/30/24  0751 09/29/24  2341 09/29/24  0511 09/28/24  1301 09/28/24  0519   WBC Thousand/uL 16.21* 13.38*  --  15.31*  14.75*  --  17.66* 19.32* 19.47*  --  20.53*   HEMOGLOBIN g/dL 6.6* 7.2* 7.7* 6.9*  6.9* 7.5* 6.5* 6.8* 7.0*   < > 6.9*   HEMATOCRIT % 20.5* 22.1*  --  21.0*  20.9* 22.7* 20.2* 20.5* 21.4*   < > 21.1*   PLATELETS Thousands/uL 165 168  --  207  203  --  213 208 233  --  290   POTASSIUM mmol/L 4.2 4.1  --  4.3  --  4.5 4.6 4.7  --  4.2   CHLORIDE mmol/L 105 104  --  103  --  106 106 103  --  99   CO2 mmol/L 20* 18*  --  21  --  16* 16* 20*  --  24   BUN mg/dL 32* 43*  --  31*  --  45* 40* 36*  --  26*   CREATININE mg/dL 3.39* 4.63*  --  3.70*  --  5.27* 4.90* 4.52*  --  3.64*   CALCIUM mg/dL 7.8* 8.0*  --  7.7*  --  7.6* 7.2* 7.5*  --  7.8*   MAGNESIUM mg/dL 1.6* 2.0  --  1.6*  --   --   --   --   --   --    PHOSPHORUS mg/dL 3.7 5.3*  --  4.5*  --   --   --   --   --   --     < > = values in this interval not displayed.       Administrative Statements     Portions of the record may have been created with voice recognition software. Occasional wrong word or \"sound a like\" substitutions may have occurred due to the inherent limitations of " voice recognition software. Read the chart carefully and recognize, using context, where substitutions have occurred.If you have any questions, please contact the dictating provider.

## 2024-10-03 NOTE — ASSESSMENT & PLAN NOTE
Lab Results   Component Value Date    EGFR 16 10/03/2024    EGFR 11 10/02/2024    EGFR 14 10/01/2024    CREATININE 3.39 (H) 10/03/2024    CREATININE 4.63 (H) 10/02/2024    CREATININE 3.70 (H) 10/01/2024   Will place the patient on 10,000 units of Epogen intravenous with each hemodialysis session.

## 2024-10-03 NOTE — ASSESSMENT & PLAN NOTE
Continue with current immunosuppression including Cytoxan and high-dose steroids.  Will continue with plasmapheresis, this was discussed in detail with her critical care colleagues.   10/1 antigbm level improving to 7.7. Ideally would goal for much lower levels. Advise to recheck level today, results should be available on 10/5. Recommend ongoing PLEX with 1.5 volume exchange until repeat levels from today evaluated. If NO improvement, increase plasma volume to 2 and continue PLEX for up to additional 1 week.   Discussed with pathology and CC regarding plan for ongoing PLEX. Blood products can be accommodated here. Follow daily fibrinogen and if less<100 use FFP.   Continue PPI and Bactrim for PCP ppx.

## 2024-10-03 NOTE — PROGRESS NOTES
Critical care note    Assessment  Acute kidney injury  Rapidly progressive glomerulonephritis  + anti-GBM antibodies  Hyponatremia  Anemia-likely Cytoxan related  Bilateral lower extremity edema    Plex plan  --Fibrinogen<60, plan for Plex 1.5 and exchange with a mix albumin (about 4L) FFP (2 L)  --Cryoprecipitate x 1 given earlier today, will give additional 2 units  --Anti-GBM antibodies drawn 10/1 resulted today at 7.7  --Discussed with nephrology, will continue Plex until GBM close to undetectable at least through the weekend

## 2024-10-03 NOTE — PLAN OF CARE
Problem: PAIN - ADULT  Goal: Verbalizes/displays adequate comfort level or baseline comfort level  Description: Interventions:  - Encourage patient to monitor pain and request assistance  - Assess pain using appropriate pain scale  - Administer analgesics based on type and severity of pain and evaluate response  - Implement non-pharmacological measures as appropriate and evaluate response  - Consider cultural and social influences on pain and pain management  - Notify physician/advanced practitioner if interventions unsuccessful or patient reports new pain  Outcome: Progressing     Problem: INFECTION - ADULT  Goal: Absence or prevention of progression during hospitalization  Description: INTERVENTIONS:  - Assess and monitor for signs and symptoms of infection  - Monitor lab/diagnostic results  - Monitor all insertion sites, i.e. indwelling lines, tubes, and drains  - Monitor endotracheal if appropriate and nasal secretions for changes in amount and color  - [place] appropriate cooling/warming therapies per order  - Administer medications as ordered  - Instruct and encourage patient and family to use good hand hygiene technique  - Identify and instruct in appropriate isolation precautions for identified infection/condition  Outcome: Progressing  Goal: Absence of fever/infection during neutropenic period  Description: INTERVENTIONS:  - Monitor WBC    Outcome: Progressing     Problem: SAFETY ADULT  Goal: Patient will remain free of falls  Description: INTERVENTIONS:  - Educate patient/family on patient safety including physical limitations  - Instruct patient to call for assistance with activity   - Consult OT/PT to assist with strengthening/mobility   - Keep Call bell within reach  - Keep bed low and locked with side rails adjusted as appropriate  - Keep care items and personal belongings within reach  - Initiate and maintain comfort rounds  - Make Fall Risk Sign visible to staff  - Offer Toileting every 2 Hours,  in advance of need  - Initiate/Maintain bed alarm  - Obtain necessary fall risk management equipment  - Apply yellow socks and bracelet for high fall risk patients  - Consider moving patient to room near nurses station  Outcome: Progressing  Goal: Maintain or return to baseline ADL function  Description: INTERVENTIONS:  -  Assess patient's ability to carry out ADLs; assess patient's baseline for ADL function and identify physical deficits which impact ability to perform ADLs (bathing, care of mouth/teeth, toileting, grooming, dressing, etc.)  - Assess/evaluate cause of self-care deficits   - Assess range of motion  - Assess patient's mobility; develop plan if impaired  - Assess patient's need for assistive devices and provide as appropriate  - Encourage maximum independence but intervene and supervise when necessary  - Involve family in performance of ADLs  - Assess for home care needs following discharge   - Consider OT consult to assist with ADL evaluation and planning for discharge  - Provide patient education as appropriate  Outcome: Progressing  Goal: Maintains/Returns to pre admission functional level  Description: INTERVENTIONS:  - Perform AM-PAC 6 Click Basic Mobility/ Daily Activity assessment daily.  - Set and communicate daily mobility goal to care team and patient/family/caregiver.   - Collaborate with rehabilitation services on mobility goals if consulted  - Out of bed for meals   - Out of bed for toileting  - Record patient progress and toleration of activity level   Outcome: Progressing     Problem: Knowledge Deficit  Goal: Patient/family/caregiver demonstrates understanding of disease process, treatment plan, medications, and discharge instructions  Description: Complete learning assessment and assess knowledge base.  Interventions:  - Provide teaching at level of understanding  - Provide teaching via preferred learning methods  Outcome: Progressing     Problem: Nutrition/Hydration-ADULT  Goal:  Nutrient/Hydration intake appropriate for improving, restoring or maintaining nutritional needs  Description: Monitor and assess patient's nutrition/hydration status for malnutrition. Collaborate with interdisciplinary team and initiate plan and interventions as ordered.  Monitor patient's weight and dietary intake as ordered or per policy. Utilize nutrition screening tool and intervene as necessary. Determine patient's food preferences and provide high-protein, high-caloric foods as appropriate.     INTERVENTIONS:  - Monitor oral intake, urinary output, labs, and treatment plans  - Assess nutrition and hydration status and recommend course of action  - Evaluate amount of meals eaten  - Assist patient with eating if necessary   - Allow adequate time for meals  - Recommend/ encourage appropriate diets, oral nutritional supplements, and vitamin/mineral supplements  - Order, calculate, and assess calorie counts as needed  - Recommend, monitor, and adjust tube feedings and TPN/PPN based on assessed needs  - Assess need for intravenous fluids  - Provide specific nutrition/hydration education as appropriate  - Include patient/family/caregiver in decisions related to nutrition  Outcome: Progressing     Problem: Prexisting or High Potential for Compromised Skin Integrity  Goal: Skin integrity is maintained or improved  Description: INTERVENTIONS:  - Identify patients at risk for skin breakdown  - Assess and monitor skin integrity  - Assess and monitor nutrition and hydration status  - Monitor labs   - Assess for incontinence   - Turn and reposition patient  - Assist with mobility/ambulation  - Relieve pressure over bony prominences  - Avoid friction and shearing  - Provide appropriate hygiene as needed including keeping skin clean and dry  - Evaluate need for skin moisturizer/barrier cream  - Collaborate with interdisciplinary team   - Patient/family teaching  - Consider wound care consult   Outcome: Progressing      Problem: METABOLIC, FLUID AND ELECTROLYTES - ADULT  Goal: Electrolytes maintained within normal limits  Description: INTERVENTIONS:  - Monitor labs and assess patient for signs and symptoms of electrolyte imbalances  - Administer electrolyte replacement as ordered  - Monitor response to electrolyte replacements, including repeat lab results as appropriate  - Instruct patient on fluid and nutrition as appropriate  Outcome: Progressing  Goal: Fluid balance maintained  Description: INTERVENTIONS:  - Monitor labs   - Monitor I/O and WT  - Instruct patient on fluid and nutrition as appropriate  - Assess for signs & symptoms of volume excess or deficit  Outcome: Progressing     Problem: HEMATOLOGIC - ADULT  Goal: Maintains hematologic stability  Description: INTERVENTIONS  - Assess for signs and symptoms of bleeding or hemorrhage  - Monitor labs  - Administer supportive blood products/factors as ordered and appropriate  Outcome: Progressing

## 2024-10-03 NOTE — PLAN OF CARE
Problem: PAIN - ADULT  Goal: Verbalizes/displays adequate comfort level or baseline comfort level  Description: Interventions:  - Encourage patient to monitor pain and request assistance  - Assess pain using appropriate pain scale  - Administer analgesics based on type and severity of pain and evaluate response  - Implement non-pharmacological measures as appropriate and evaluate response  - Consider cultural and social influences on pain and pain management  - Notify physician/advanced practitioner if interventions unsuccessful or patient reports new pain  Outcome: Progressing     Problem: INFECTION - ADULT  Goal: Absence or prevention of progression during hospitalization  Description: INTERVENTIONS:  - Assess and monitor for signs and symptoms of infection  - Monitor lab/diagnostic results  - Monitor all insertion sites, i.e. indwelling lines, tubes, and drains  - Monitor endotracheal if appropriate and nasal secretions for changes in amount and color  - [place] appropriate cooling/warming therapies per order  - Administer medications as ordered  - Instruct and encourage patient and family to use good hand hygiene technique  - Identify and instruct in appropriate isolation precautions for identified infection/condition  Outcome: Progressing  Goal: Absence of fever/infection during neutropenic period  Description: INTERVENTIONS:  - Monitor WBC    Outcome: Progressing     Problem: SAFETY ADULT  Goal: Patient will remain free of falls  Description: INTERVENTIONS:  - Educate patient/family on patient safety including physical limitations  - Instruct patient to call for assistance with activity   - Consult OT/PT to assist with strengthening/mobility   - Keep Call bell within reach  - Keep bed low and locked with side rails adjusted as appropriate  - Keep care items and personal belongings within reach  - Initiate and maintain comfort rounds  - Make Fall Risk Sign visible to staff  - Offer Toileting every 2 Hours,  in advance of need  - Initiate/Maintain bed alarm  - Obtain necessary fall risk management equipment  - Apply yellow socks and bracelet for high fall risk patients  - Consider moving patient to room near nurses station  Outcome: Progressing  Goal: Maintain or return to baseline ADL function  Description: INTERVENTIONS:  -  Assess patient's ability to carry out ADLs; assess patient's baseline for ADL function and identify physical deficits which impact ability to perform ADLs (bathing, care of mouth/teeth, toileting, grooming, dressing, etc.)  - Assess/evaluate cause of self-care deficits   - Assess range of motion  - Assess patient's mobility; develop plan if impaired  - Assess patient's need for assistive devices and provide as appropriate  - Encourage maximum independence but intervene and supervise when necessary  - Involve family in performance of ADLs  - Assess for home care needs following discharge   - Consider OT consult to assist with ADL evaluation and planning for discharge  - Provide patient education as appropriate  Outcome: Progressing  Goal: Maintains/Returns to pre admission functional level  Description: INTERVENTIONS:  - Perform AM-PAC 6 Click Basic Mobility/ Daily Activity assessment daily.  - Set and communicate daily mobility goal to care team and patient/family/caregiver.   - Collaborate with rehabilitation services on mobility goals if consulted  - Out of bed for meals   - Out of bed for toileting  - Record patient progress and toleration of activity level   Outcome: Progressing     Problem: DISCHARGE PLANNING  Goal: Discharge to home or other facility with appropriate resources  Description: INTERVENTIONS:  - Identify barriers to discharge w/patient and caregiver  - Arrange for needed discharge resources and transportation as appropriate  - Identify discharge learning needs (meds, wound care, etc.)  - Arrange for interpretive services to assist at discharge as needed  - Refer to Case  Management Department for coordinating discharge planning if the patient needs post-hospital services based on physician/advanced practitioner order or complex needs related to functional status, cognitive ability, or social support system  Outcome: Progressing     Problem: Knowledge Deficit  Goal: Patient/family/caregiver demonstrates understanding of disease process, treatment plan, medications, and discharge instructions  Description: Complete learning assessment and assess knowledge base.  Interventions:  - Provide teaching at level of understanding  - Provide teaching via preferred learning methods  Outcome: Progressing     Problem: Nutrition/Hydration-ADULT  Goal: Nutrient/Hydration intake appropriate for improving, restoring or maintaining nutritional needs  Description: Monitor and assess patient's nutrition/hydration status for malnutrition. Collaborate with interdisciplinary team and initiate plan and interventions as ordered.  Monitor patient's weight and dietary intake as ordered or per policy. Utilize nutrition screening tool and intervene as necessary. Determine patient's food preferences and provide high-protein, high-caloric foods as appropriate.     INTERVENTIONS:  - Monitor oral intake, urinary output, labs, and treatment plans  - Assess nutrition and hydration status and recommend course of action  - Evaluate amount of meals eaten  - Assist patient with eating if necessary   - Allow adequate time for meals  - Recommend/ encourage appropriate diets, oral nutritional supplements, and vitamin/mineral supplements  - Order, calculate, and assess calorie counts as needed  - Recommend, monitor, and adjust tube feedings and TPN/PPN based on assessed needs  - Assess need for intravenous fluids  - Provide specific nutrition/hydration education as appropriate  - Include patient/family/caregiver in decisions related to nutrition  Outcome: Progressing     Problem: Prexisting or High Potential for Compromised  Skin Integrity  Goal: Skin integrity is maintained or improved  Description: INTERVENTIONS:  - Identify patients at risk for skin breakdown  - Assess and monitor skin integrity  - Assess and monitor nutrition and hydration status  - Monitor labs   - Assess for incontinence   - Turn and reposition patient  - Assist with mobility/ambulation  - Relieve pressure over bony prominences  - Avoid friction and shearing  - Provide appropriate hygiene as needed including keeping skin clean and dry  - Evaluate need for skin moisturizer/barrier cream  - Collaborate with interdisciplinary team   - Patient/family teaching  - Consider wound care consult   Outcome: Progressing     Problem: METABOLIC, FLUID AND ELECTROLYTES - ADULT  Goal: Electrolytes maintained within normal limits  Description: INTERVENTIONS:  - Monitor labs and assess patient for signs and symptoms of electrolyte imbalances  - Administer electrolyte replacement as ordered  - Monitor response to electrolyte replacements, including repeat lab results as appropriate  - Instruct patient on fluid and nutrition as appropriate  Outcome: Progressing  Goal: Fluid balance maintained  Description: INTERVENTIONS:  - Monitor labs   - Monitor I/O and WT  - Instruct patient on fluid and nutrition as appropriate  - Assess for signs & symptoms of volume excess or deficit  Outcome: Progressing     Problem: HEMATOLOGIC - ADULT  Goal: Maintains hematologic stability  Description: INTERVENTIONS  - Assess for signs and symptoms of bleeding or hemorrhage  - Monitor labs  - Administer supportive blood products/factors as ordered and appropriate  Outcome: Progressing

## 2024-10-04 ENCOUNTER — APPOINTMENT (INPATIENT)
Dept: DIALYSIS | Facility: HOSPITAL | Age: 74
DRG: 871 | End: 2024-10-04
Payer: MEDICARE

## 2024-10-04 LAB
ABO GROUP BLD BPU: NORMAL
ABO GROUP BLD: NORMAL
ANION GAP SERPL CALCULATED.3IONS-SCNC: 9 MMOL/L (ref 4–13)
BLD GP AB SCN SERPL QL: NEGATIVE
BPU ID: NORMAL
BUN SERPL-MCNC: 47 MG/DL (ref 5–25)
CA-I BLD-SCNC: 1.06 MMOL/L (ref 1.12–1.32)
CA-I BLD-SCNC: 1.11 MMOL/L (ref 1.12–1.32)
CALCIUM SERPL-MCNC: 7.9 MG/DL (ref 8.4–10.2)
CHLORIDE SERPL-SCNC: 107 MMOL/L (ref 96–108)
CO2 SERPL-SCNC: 17 MMOL/L (ref 21–32)
CREAT SERPL-MCNC: 4.29 MG/DL (ref 0.6–1.3)
CROSSMATCH: NORMAL
ERYTHROCYTE [DISTWIDTH] IN BLOOD BY AUTOMATED COUNT: 16.5 % (ref 11.6–15.1)
FIBRINOGEN PPP-MCNC: 130 MG/DL (ref 206–523)
GFR SERPL CREATININE-BSD FRML MDRD: 12 ML/MIN/1.73SQ M
GLUCOSE SERPL-MCNC: 152 MG/DL (ref 65–140)
HCT VFR BLD AUTO: 21.5 % (ref 36.5–49.3)
HGB BLD-MCNC: 7 G/DL (ref 12–17)
MAGNESIUM SERPL-MCNC: 1.8 MG/DL (ref 1.9–2.7)
MCH RBC QN AUTO: 29 PG (ref 26.8–34.3)
MCHC RBC AUTO-ENTMCNC: 32.6 G/DL (ref 31.4–37.4)
MCV RBC AUTO: 89 FL (ref 82–98)
PHOSPHATE SERPL-MCNC: 4.5 MG/DL (ref 2.3–4.1)
PLATELET # BLD AUTO: 148 THOUSANDS/UL (ref 149–390)
PMV BLD AUTO: 9 FL (ref 8.9–12.7)
POTASSIUM SERPL-SCNC: 4.5 MMOL/L (ref 3.5–5.3)
RBC # BLD AUTO: 2.41 MILLION/UL (ref 3.88–5.62)
RH BLD: POSITIVE
SODIUM SERPL-SCNC: 133 MMOL/L (ref 135–147)
SPECIMEN EXPIRATION DATE: NORMAL
UNIT DISPENSE STATUS: NORMAL
UNIT PRODUCT CODE: NORMAL
UNIT PRODUCT VOLUME: 125 ML
UNIT PRODUCT VOLUME: 203 ML
UNIT PRODUCT VOLUME: 205 ML
UNIT PRODUCT VOLUME: 207 ML
UNIT PRODUCT VOLUME: 209 ML
UNIT PRODUCT VOLUME: 211 ML
UNIT PRODUCT VOLUME: 212 ML
UNIT PRODUCT VOLUME: 217 ML
UNIT PRODUCT VOLUME: 250 ML
UNIT PRODUCT VOLUME: 280 ML
UNIT PRODUCT VOLUME: 280 ML
UNIT PRODUCT VOLUME: 350 ML
UNIT RH: NORMAL
WBC # BLD AUTO: 17.56 THOUSAND/UL (ref 4.31–10.16)

## 2024-10-04 PROCEDURE — 99232 SBSQ HOSP IP/OBS MODERATE 35: CPT | Performed by: INTERNAL MEDICINE

## 2024-10-04 PROCEDURE — 86850 RBC ANTIBODY SCREEN: CPT

## 2024-10-04 PROCEDURE — 85027 COMPLETE CBC AUTOMATED: CPT | Performed by: INTERNAL MEDICINE

## 2024-10-04 PROCEDURE — 86920 COMPATIBILITY TEST SPIN: CPT

## 2024-10-04 PROCEDURE — 84100 ASSAY OF PHOSPHORUS: CPT | Performed by: INTERNAL MEDICINE

## 2024-10-04 PROCEDURE — 86901 BLOOD TYPING SEROLOGIC RH(D): CPT

## 2024-10-04 PROCEDURE — 80048 BASIC METABOLIC PNL TOTAL CA: CPT | Performed by: INTERNAL MEDICINE

## 2024-10-04 PROCEDURE — 85384 FIBRINOGEN ACTIVITY: CPT | Performed by: NURSE PRACTITIONER

## 2024-10-04 PROCEDURE — P9012 CRYOPRECIPITATE EACH UNIT: HCPCS

## 2024-10-04 PROCEDURE — 82330 ASSAY OF CALCIUM: CPT | Performed by: NURSE PRACTITIONER

## 2024-10-04 PROCEDURE — 86900 BLOOD TYPING SEROLOGIC ABO: CPT

## 2024-10-04 PROCEDURE — 83735 ASSAY OF MAGNESIUM: CPT | Performed by: INTERNAL MEDICINE

## 2024-10-04 RX ORDER — DILTIAZEM HYDROCHLORIDE 120 MG/1
120 CAPSULE, COATED, EXTENDED RELEASE ORAL DAILY
Status: DISCONTINUED | OUTPATIENT
Start: 2024-10-04 | End: 2024-10-04

## 2024-10-04 RX ORDER — CALCIUM GLUCONATE 20 MG/ML
2 INJECTION, SOLUTION INTRAVENOUS ONCE
Status: COMPLETED | OUTPATIENT
Start: 2024-10-04 | End: 2024-10-04

## 2024-10-04 RX ADMIN — FAMOTIDINE 20 MG: 20 TABLET, FILM COATED ORAL at 09:15

## 2024-10-04 RX ADMIN — HEPARIN SODIUM 5000 UNITS: 5000 INJECTION, SOLUTION INTRAVENOUS; SUBCUTANEOUS at 05:00

## 2024-10-04 RX ADMIN — CARVEDILOL 6.25 MG: 3.12 TABLET, FILM COATED ORAL at 07:28

## 2024-10-04 RX ADMIN — CARVEDILOL 6.25 MG: 3.12 TABLET, FILM COATED ORAL at 16:48

## 2024-10-04 RX ADMIN — HEPARIN SODIUM 5000 UNITS: 5000 INJECTION, SOLUTION INTRAVENOUS; SUBCUTANEOUS at 22:08

## 2024-10-04 RX ADMIN — Medication 1 CAPSULE: at 16:48

## 2024-10-04 RX ADMIN — EPOETIN ALFA 10000 UNITS: 10000 SOLUTION INTRAVENOUS; SUBCUTANEOUS at 09:15

## 2024-10-04 RX ADMIN — PREDNISONE 60 MG: 20 TABLET ORAL at 16:48

## 2024-10-04 RX ADMIN — SULFAMETHOXAZOLE AND TRIMETHOPRIM 1 TABLET: 800; 160 TABLET ORAL at 16:51

## 2024-10-04 RX ADMIN — HEPARIN SODIUM 6000 UNITS: 1000 INJECTION INTRAVENOUS; SUBCUTANEOUS at 09:14

## 2024-10-04 RX ADMIN — CALCIUM GLUCONATE 2 G: 20 INJECTION, SOLUTION INTRAVENOUS at 17:46

## 2024-10-04 RX ADMIN — CALCIUM 2 TABLET: 500 TABLET ORAL at 16:48

## 2024-10-04 RX ADMIN — CALCIUM 2 TABLET: 500 TABLET ORAL at 07:28

## 2024-10-04 RX ADMIN — CALCIUM 2 TABLET: 500 TABLET ORAL at 12:21

## 2024-10-04 RX ADMIN — HEPARIN SODIUM 5000 UNITS: 5000 INJECTION, SOLUTION INTRAVENOUS; SUBCUTANEOUS at 14:48

## 2024-10-04 RX ADMIN — PANTOPRAZOLE SODIUM 20 MG: 20 TABLET, DELAYED RELEASE ORAL at 05:00

## 2024-10-04 RX ADMIN — CYCLOPHOSPHAMIDE 100 MG: 50 CAPSULE ORAL at 16:48

## 2024-10-04 RX ADMIN — FLUOXETINE HYDROCHLORIDE 20 MG: 20 CAPSULE ORAL at 09:15

## 2024-10-04 NOTE — PROGRESS NOTES
Progress Note - Nephrology   Name: Ramos Rosenthal 74 y.o. male I MRN: [id number]  Unit/Bed#: ICU 04-01 I Date of Admission: 9/14/2024   Date of Service: 10/4/2024 I Hospital Day: 20     Assessment & Plan  LYLY (acute kidney injury) (HCC)  Patient remains an uric, continues hemodialysis sessions Monday Wednesday and Friday, as noted previously will increase the amount of time with dialysis slightly up to 3 hours and 45 minutes.  Patient also placed on heparin 6000 units bolus with hemodialysis to assist with clotting during sessions.  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  Status post pulse dose steroids, remains on prednisone 60 mg daily and cyclophosphamide 100 mg daily.  Continue plasmapheresis, goal is to have undetectable anti-GBM antibodies.  The minimal achieved level of less than 8 occurred with blood draw from October 1.  However, we are looking for an undetectable antibody count and noted to provide the patient to best possible prognosis going forward given the overall poor prognosis for this particular diagnosis in general.  Patient remains anuric and will be cautiously optimistic going forward as long as we can reduce antibodies.  We are currently having 1.5 exchanges with plasmapheresis, will increase to at least 2 to continue to clear antibodies as effectively as possible.  Hyponatremia  Continue with fluid restriction, sodium level remains within goal.  Hypocalcemia  Calcium supplementation increased to 1000 mg 3 times daily.  Continue with IV calcium supplement as indicated given above treatments.  Bilateral lower extremity edema  Edema controlled at this time  Anemia due to chronic kidney disease, on chronic dialysis (HCC)  Lab Results   Component Value Date    EGFR 12 10/04/2024    EGFR 16 10/03/2024    EGFR 11 10/02/2024    CREATININE 4.29 (H) 10/04/2024    CREATININE 3.39  (H) 10/03/2024    CREATININE 4.63 (H) 10/02/2024   Patient started on 10,000 units of Epogen IV with each hemodialysis session    Essential hypertension  Blood pressures continue to be well-controlled, continue with carvedilol, low-sodium diet.        Case discussed with hospitalist and critical care colleagues.  We are cautiously optimistic with respect to slight improvement with anti-GBM antibodies, ultimately looking for undetectable values.  Will look to see if we can increase plasma exchange up to 2, knowing the patient's body weight/mass, this will renin a significant increase in the amount of plasma which I believe is appropriate given the patient's overall clinical circumstances.  As long as this can be achieved in a safe manner, it should be our goal to provide this amount of plasma exchange.  Will continue to hemodialysis sessions Monday Wednesday Friday.  Will continue plasmapheresis over this weekend and into next week, unclear total time but at this point with presumed a full 3 weeks which will place his plasmapheresis to be continued through October 11.    Follow up reason for today's visit: Hemodialysis acute kidney injury/anti-GBM RPGN/anemia due to kidney failure/electrolyte abnormalities    LYLY (acute kidney injury) ([facility])    Patient Active Problem List   Diagnosis    Gait abnormality    Lumbar stenosis    Polyneuropathy    Lower extremity weakness    Lumbosacral plexopathy    Elevated troponin    EDGAR (dyspnea on exertion)    Rash due to vaculitis     LYLY (acute kidney injury) ([facility])    Depression    Multiple open wounds of lower leg    Paresthesia of both lower extremities    Leucocytosis    Bloating    Hyperlipidemia    Neurogenic claudication    Prediabetes    Vitamin D deficiency    Hyponatremia    Bilateral lower extremity edema    Secondary hyperparathyroidism of renal origin (HCC)    Uremia    Essential hypertension    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    Objective:     Vitals: Blood pressure 125/73, pulse 70, temperature 97.9 °F (36.6 °C), temperature source Temporal, resp. rate (!) 36, height 6' 2\" (1.88 m), weight (!) 140 kg (308 lb 10.3 oz), SpO2 96%.,Body mass index is 39.63 kg/m².    Weight (last 2 days)       Date/Time Weight    10/04/24 0600 140 (308.64)    10/03/24 0600 137 (302.03)    10/02/24 0600 138 (305.34)              Intake/Output Summary (Last 24 hours) at 10/4/2024 0832  Last data filed at 10/3/2024 2120  Gross per 24 hour   Intake 532.5 ml   Output --   Net 532.5 ml     I/O last 3 completed shifts:  In: 1231.7 [P.O.:460; Blood:621.7; IV Piggyback:150]  Out: -     HD Permanent Double Catheter (Active)   Reasons to continue HD Cath Treatment Therapy 10/03/24 2000   Goal for Removal N/A- chronic HD catheter 10/03/24 2000   Line Necessity Reviewed Yes, reviewed with provider 10/03/24 2000   Site Assessment WDL 10/03/24 2000   Proximal Lumen Status Passive disinfecting cap applied 10/03/24 2000   Distal Lumen Status Passive disinfecting cap applied 10/03/24 2000   Catheter Out on Skin (cm) 0 cm 09/27/24 1322   Dressing Type Chlorhexidine dressing 10/03/24 2000   Dressing Status Clean;Dry;Intact 10/03/24 2000   Dressing Change Due 10/04/24 10/03/24 2000   CLABSI Time-out performed for Positive Blood Culture Yes 09/28/24 0800       Physical Exam: /73   Pulse 70   Temp 97.9 °F (36.6 °C) (Temporal)   Resp (!) 36   Ht 6' 2\" (1.88 m)   Wt (!) 140 kg (308 lb 10.3 oz)   SpO2 96%   BMI 39.63 kg/m²     General Appearance:    Alert, cooperative, no distress, appears stated age   Head:    Normocephalic, without obvious abnormality, atraumatic   Eyes:    Conjunctiva/corneas clear   Ears:    Normal external ears   Nose:   Nares normal, septum " "midline, mucosa normal, no drainage    or sinus tenderness   Throat:   Lips, mucosa, and tongue normal; teeth and gums normal   Neck:   Supple   Back:     Symmetric, no curvature, ROM normal, no CVA tenderness   Lungs:     Clear to auscultation bilaterally, respirations unlabored   Chest wall:    No tenderness or deformity   Heart:    Regular rate and rhythm, S1 and S2 normal, no murmur, rub   or gallop   Abdomen:     Soft, non-tender, bowel sounds active   Extremities:   Extremities normal, atraumatic, no cyanosis, mild bilateral lower extremity edema   Skin:   Skin color, texture, turgor normal, no rashes or lesions   Lymph nodes:   Cervical normal   Neurologic:   CNII-XII intact            Lab, Imaging and other studies: I have personally reviewed pertinent labs.  CBC:   Lab Results   Component Value Date    WBC 17.56 (H) 10/04/2024    HGB 7.0 (L) 10/04/2024    HCT 21.5 (L) 10/04/2024    MCV 89 10/04/2024     (L) 10/04/2024    RBC 2.41 (L) 10/04/2024    MCH 29.0 10/04/2024    MCHC 32.6 10/04/2024    RDW 16.5 (H) 10/04/2024    MPV 9.0 10/04/2024     CMP:   Lab Results   Component Value Date    K 4.5 10/04/2024     10/04/2024    CO2 17 (L) 10/04/2024    BUN 47 (H) 10/04/2024    CREATININE 4.29 (H) 10/04/2024    CALCIUM 7.9 (L) 10/04/2024    EGFR 12 10/04/2024       .  Results from last 7 days   Lab Units 10/04/24  0432 10/03/24  0538 10/02/24  0559   POTASSIUM mmol/L 4.5 4.2 4.1   CHLORIDE mmol/L 107 105 104   CO2 mmol/L 17* 20* 18*   BUN mg/dL 47* 32* 43*   CREATININE mg/dL 4.29* 3.39* 4.63*   CALCIUM mg/dL 7.9* 7.8* 8.0*         Phosphorus:   Lab Results   Component Value Date    PHOS 4.5 (H) 10/04/2024     Magnesium:   Lab Results   Component Value Date    MG 1.8 (L) 10/04/2024     Urinalysis: No results found for: \"COLORU\", \"CLARITYU\", \"SPECGRAV\", \"PHUR\", \"LEUKOCYTESUR\", \"NITRITE\", \"PROTEINUA\", \"GLUCOSEU\", \"KETONESU\", \"BILIRUBINUR\", \"BLOODU\"  Ionized Calcium: No results found for: " "\"CAION\"  Coagulation: No results found for: \"PT\", \"INR\", \"APTT\"  Troponin: No results found for: \"TROPONINI\"  ABG: No results found for: \"PHART\", \"EFP7WIR\", \"PO2ART\", \"IGZ3THO\", \"F6PCEWUK\", \"BEART\", \"SOURCE\"  Radiology review:     IMAGING  No results found.      Current Facility-Administered Medications:     albumin human 250 g 5% IVPB, Once    albumin human 400 g 5% IVPB, Once    calcium carbonate (OYSTER SHELL,OSCAL) 500 mg tablet 2 tablet, TID With Meals    calcium carbonate (TUMS) chewable tablet 1,000 mg, Daily PRN    carvedilol (COREG) tablet 6.25 mg, BID With Meals    cyclophosphamide (CYTOXAN) capsule 100 mg, Daily    epoetin marcela (EPOGEN,PROCRIT) injection 10,000 Units, Once per day on Monday Wednesday Friday    famotidine (PEPCID) tablet 20 mg, Daily    FLUoxetine (PROzac) capsule 20 mg, Daily    heparin (porcine) injection 6,000 Units, Once per day on Monday Wednesday Friday    heparin (porcine) subcutaneous injection 5,000 Units, Q8H LORENZO    melatonin tablet 3 mg, HS PRN    ondansetron (ZOFRAN) injection 4 mg, Q6H PRN    pantoprazole (PROTONIX) EC tablet 20 mg, Early Morning    predniSONE tablet 60 mg, Daily    sulfamethoxazole-trimethoprim (BACTRIM DS) 800-160 mg per tablet 1 tablet, Once per day on Monday Wednesday Friday    vit B complex-vit C-folic acid (Renal Caps) capsule 1 capsule, Daily With Dinner  Medications Discontinued During This Encounter   Medication Reason    meloxicam (MOBIC) 15 mg tablet Therapy completed    multi-electrolyte (PLASMALYTE-A/ISOLYTE-S PH 7.4) IV solution     cefTRIAXone (ROCEPHIN) IVPB (premix in dextrose) 1,000 mg 50 mL     cefTRIAXone (ROCEPHIN) IVPB (premix in dextrose) 1,000 mg 50 mL     albumin human (FLEXBUMIN) 5 % injection 75 g     albumin human (FLEXBUMIN) 5 % injection 100 g     cyclophosphamide (CYTOXAN) capsule 150 mg     predniSONE tablet 60 mg     albumin human (FLEXBUMIN) 5 % injection 75 g     albumin human (FLEXBUMIN) 5 % injection 100 g     " sulfamethoxazole-trimethoprim (BACTRIM DS) 800-160 mg per tablet 1 tablet     albumin human (FLEXBUMIN) 5 % injection 12.5 g     albumin human 250 g 5% IVPB     albumin human (FLEXBUMIN) 5 % injection 150 g     albumin human (FLEXBUMIN) 5 % injection 100 g     calcium carbonate (OYSTER SHELL,OSCAL) 500 mg tablet 1 tablet     magnesium sulfate 2 g/50 mL IVPB (premix) 2 g     calcium gluconate 1 g in sodium chloride 0.9% 50 mL (premix)     diltiazem (CARDIZEM CD) 24 hr capsule 120 mg        Gigi Ortega,       This progress note was produced in part using a dictation device which may document imprecise wording from author's original intent.

## 2024-10-04 NOTE — ASSESSMENT & PLAN NOTE
Patient remains an uric, continues hemodialysis sessions Monday Wednesday and Friday, as noted previously will increase the amount of time with dialysis slightly up to 3 hours and 45 minutes.  Patient also placed on heparin 6000 units bolus with hemodialysis to assist with clotting during sessions.

## 2024-10-04 NOTE — ASSESSMENT & PLAN NOTE
Lab Results   Component Value Date    EGFR 16 10/03/2024    EGFR 11 10/02/2024    EGFR 14 10/01/2024    CREATININE 3.39 (H) 10/03/2024    CREATININE 4.63 (H) 10/02/2024    CREATININE 3.70 (H) 10/01/2024     Pt received 10,000 units of Epogen IV with each HD treatment per Nephrology

## 2024-10-04 NOTE — PLAN OF CARE
Problem: PAIN - ADULT  Goal: Verbalizes/displays adequate comfort level or baseline comfort level  Description: Interventions:  - Encourage patient to monitor pain and request assistance  - Assess pain using appropriate pain scale  - Administer analgesics based on type and severity of pain and evaluate response  - Implement non-pharmacological measures as appropriate and evaluate response  - Consider cultural and social influences on pain and pain management  - Notify physician/advanced practitioner if interventions unsuccessful or patient reports new pain  Outcome: Progressing     Problem: INFECTION - ADULT  Goal: Absence or prevention of progression during hospitalization  Description: INTERVENTIONS:  - Assess and monitor for signs and symptoms of infection  - Monitor lab/diagnostic results  - Monitor all insertion sites, i.e. indwelling lines, tubes, and drains  - Monitor endotracheal if appropriate and nasal secretions for changes in amount and color  - Warden appropriate cooling/warming therapies per order  - Administer medications as ordered  - Instruct and encourage patient and family to use good hand hygiene technique  - Identify and instruct in appropriate isolation precautions for identified infection/condition  Outcome: Progressing  Goal: Absence of fever/infection during neutropenic period  Description: INTERVENTIONS:  - Monitor WBC    Outcome: Progressing     Problem: SAFETY ADULT  Goal: Patient will remain free of falls  Description: INTERVENTIONS:  - Educate patient/family on patient safety including physical limitations  - Instruct patient to call for assistance with activity   - Consult OT/PT to assist with strengthening/mobility   - Keep Call bell within reach  - Keep bed low and locked with side rails adjusted as appropriate  - Keep care items and personal belongings within reach  - Initiate and maintain comfort rounds  - Make Fall Risk Sign visible to staff  - Offer Toileting every 2 Hours,  in advance of need  - Initiate/Maintain bed alarm  - Obtain necessary fall risk management equipment  - Apply yellow socks and bracelet for high fall risk patients  - Consider moving patient to room near nurses station  Outcome: Progressing  Goal: Maintain or return to baseline ADL function  Description: INTERVENTIONS:  -  Assess patient's ability to carry out ADLs; assess patient's baseline for ADL function and identify physical deficits which impact ability to perform ADLs (bathing, care of mouth/teeth, toileting, grooming, dressing, etc.)  - Assess/evaluate cause of self-care deficits   - Assess range of motion  - Assess patient's mobility; develop plan if impaired  - Assess patient's need for assistive devices and provide as appropriate  - Encourage maximum independence but intervene and supervise when necessary  - Involve family in performance of ADLs  - Assess for home care needs following discharge   - Consider OT consult to assist with ADL evaluation and planning for discharge  - Provide patient education as appropriate  Outcome: Progressing  Goal: Maintains/Returns to pre admission functional level  Description: INTERVENTIONS:  - Perform AM-PAC 6 Click Basic Mobility/ Daily Activity assessment daily.  - Set and communicate daily mobility goal to care team and patient/family/caregiver.   - Collaborate with rehabilitation services on mobility goals if consulted  - Out of bed for meals   - Out of bed for toileting  - Record patient progress and toleration of activity level   Outcome: Progressing     Problem: DISCHARGE PLANNING  Goal: Discharge to home or other facility with appropriate resources  Description: INTERVENTIONS:  - Identify barriers to discharge w/patient and caregiver  - Arrange for needed discharge resources and transportation as appropriate  - Identify discharge learning needs (meds, wound care, etc.)  - Arrange for interpretive services to assist at discharge as needed  - Refer to Case  Management Department for coordinating discharge planning if the patient needs post-hospital services based on physician/advanced practitioner order or complex needs related to functional status, cognitive ability, or social support system  Outcome: Progressing     Problem: Knowledge Deficit  Goal: Patient/family/caregiver demonstrates understanding of disease process, treatment plan, medications, and discharge instructions  Description: Complete learning assessment and assess knowledge base.  Interventions:  - Provide teaching at level of understanding  - Provide teaching via preferred learning methods  Outcome: Progressing     Problem: Prexisting or High Potential for Compromised Skin Integrity  Goal: Skin integrity is maintained or improved  Description: INTERVENTIONS:  - Identify patients at risk for skin breakdown  - Assess and monitor skin integrity  - Assess and monitor nutrition and hydration status  - Monitor labs   - Assess for incontinence   - Turn and reposition patient  - Assist with mobility/ambulation  - Relieve pressure over bony prominences  - Avoid friction and shearing  - Provide appropriate hygiene as needed including keeping skin clean and dry  - Evaluate need for skin moisturizer/barrier cream  - Collaborate with interdisciplinary team   - Patient/family teaching  - Consider wound care consult   Outcome: Progressing     Problem: METABOLIC, FLUID AND ELECTROLYTES - ADULT  Goal: Electrolytes maintained within normal limits  Description: INTERVENTIONS:  - Monitor labs and assess patient for signs and symptoms of electrolyte imbalances  - Administer electrolyte replacement as ordered  - Monitor response to electrolyte replacements, including repeat lab results as appropriate  - Instruct patient on fluid and nutrition as appropriate  Outcome: Progressing  Goal: Fluid balance maintained  Description: INTERVENTIONS:  - Monitor labs   - Monitor I/O and WT  - Instruct patient on fluid and nutrition as  appropriate  - Assess for signs & symptoms of volume excess or deficit  Outcome: Progressing

## 2024-10-04 NOTE — ASSESSMENT & PLAN NOTE
Patient with a low hemoglobin on 9/27/2024  Hemoglobin this morning is 7.0  Patient remains hemodynamically stable  Hemoglobin stable  Trend CBC  Epogen as per Nephrology

## 2024-10-04 NOTE — ASSESSMENT & PLAN NOTE
Bone marrow with Anti-GBM glomerulonephritis  Continued plasma exchange-Spoke to [facility]   1.5 L plasma exchange with Albumin 5%/FFP-awaiting total amount needed from [facility]  Baseline and daily CBC/PT/INR/Ical/ and fibrinogen level  Calcium gluconate replacement per plasma exchange tech    Plan:  Nephrology following  Completed Solumedrol 1000 mg daily x3d  Pt receiving daily plasmapheresis with 5% Albumin. Next exchange scheduled for 10/4/24  Received a total of 3 units of Cryo 10/3   If am Fibrinogen level < 150, transfuse Cryo

## 2024-10-04 NOTE — PROGRESS NOTES
Progress Note - Hospitalist   Name: Ramos Rosenthal 74 y.o. male I MRN: [id number]  Unit/Bed#: ICU 04-01 I Date of Admission: 9/14/2024   Date of Service: 10/4/2024 I Hospital Day: 20     Assessment & Plan  LYLY (acute kidney injury) (Formerly [facility])      Lab Results   Component Value Date    CREATININE 4.29 (H) 10/04/2024    CREATININE 3.39 (H) 10/03/2024    CREATININE 4.63 (H) 10/02/2024    EGFR 12 10/04/2024    EGFR 16 10/03/2024    EGFR 11 10/02/2024     Now on hemodialysis -Mondays/Wednesdays/Fridays  Plasmapheresis started on 9/21/2024  Status post renal biopsy-evidence of glomerular basement membrane disease  Continue prednisone 60 mg p.o. 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  Noted on renal biopsy  Continue plasmapheresis as outlined above  Continue cyclophosphamide, prednisone, and Bactrim  Continued management as per nephrology/critical care  Hyponatremia  Patient has a hypervolemic hyponatremia  Patient has remained asymptomatic  Sodium is stable at 13  Continue fluid restriction  Continued management as per nephrology  Paresthesia of both lower extremities  Hx of back surgery in 2004 and 2014 and has since bilateral LE paresthesia  He is primarily wheelchair bound  Venous statis ulcers noted to Bilateral LE  B/L extremity edema R>L  Continue supportive therapy  Bilateral lower extremity edema  Chronic condition  Patient with a history of lower back/spinal surgery  Patient does not ambulate  Patient is wheelchair-bound at baseline  Depression  Continue home dose  "fluoxetine  Essential hypertension  Continue carvedilol    VTE Pharmacologic Prophylaxis: VTE Score: 8 High Risk (Score >/= 5) - Pharmacological DVT Prophylaxis Ordered: heparin. Sequential Compression Devices Ordered.    Mobility:   Basic Mobility Inpatient Raw Score: 10  JH-HLM Goal: 4: Move to chair/commode  JH-HLM Achieved: 3: Sit at edge of bed  JH-HLM Goal NOT achieved. Continue with multidisciplinary rounding and encourage appropriate mobility to improve upon JH-HLM goals.    Patient Centered Rounds: I performed bedside rounds with nursing staff today.     Discussions with Specialists or Other Care Team Provider: Nephrology    Education and Discussions with Family / Patient: Updated  (wife and daughter) at bedside.    Current Length of Stay: 20 day(s)  Current Patient Status: Inpatient   Certification Statement: The patient will continue to require additional inpatient hospital stay due to anti-glomerular basement membrane disease  Discharge Plan: Anticipate discharge in >72 hrs to home with home services.    Code Status: Level 1 - Full Code    Subjective   Patient seen and examined at bedside. No acute events overnight. Denies chest pain, SOB, diaphoresis, nausea/vomiting/diarrhea, fevers/chills.     Objective     Vitals:   Temp (24hrs), Av.5 °F (35.8 °C), Min:96 °F (35.6 °C), Max:98.4 °F (36.9 °C)    Temp:  [96 °F (35.6 °C)-98.4 °F (36.9 °C)] 97.9 °F (36.6 °C)  HR:  [52-70] 58  Resp:  [12-45] 12  BP: (107-155)/(53-89) 142/65  SpO2:  [95 %-100 %] 99 %  Body mass index is 39.63 kg/m².     Input and Output Summary (last 24 hours):     Intake/Output Summary (Last 24 hours) at 10/4/2024 0755  Last data filed at 10/3/2024 2120  Gross per 24 hour   Intake 1101.69 ml   Output --   Net 1101.69 ml       /65   Pulse 58   Temp 97.9 °F (36.6 °C) (Temporal)   Resp 12   Ht 6' 2\" (1.88 m)   Wt (!) 140 kg (308 lb 10.3 oz)   SpO2 99%   BMI 39.63 kg/m²     General Appearance:    Alert, " cooperative, no distress, appears stated age   Head:    Normocephalic, without obvious abnormality, atraumatic   Eyes:    PERRL, conjunctiva/corneas clear, EOM's intact, fundi     benign, both eyes        Ears:    Normal TM's and external ear canals, both ears   Nose:   Nares normal, septum midline, mucosa normal, no drainage    or sinus tenderness   Throat:   Lips, mucosa, and tongue normal; teeth and gums normal   Neck:   Supple, symmetrical, trachea midline, no adenopathy;        thyroid:  No enlargement/tenderness/nodules; no carotid    bruit or JVD   Back:     Symmetric, no curvature, ROM normal, no CVA tenderness   Lungs:     Clear to auscultation bilaterally, respirations unlabored   Chest wall:    No tenderness or deformity   Heart:    Regular rate and rhythm, S1 and S2 normal, no murmur, rub    or gallop   Abdomen:     Soft, non-tender, bowel sounds active all four quadrants,     no masses, no organomegaly   Genitalia:    Normal male without lesion, discharge or tenderness   Rectal:    Normal tone, normal prostate, no masses or tenderness;    guaiac negative stool   Extremities:   Extremities normal, atraumatic, no cyanosis or edema   Pulses:   2+ and symmetric all extremities   Skin:   Skin color, texture, turgor normal, no rashes or lesions   Lymph nodes:   Cervical, supraclavicular, and axillary nodes normal   Neurologic:   CNII-XII intact. Normal strength, sensation and reflexes       throughout        Lines/Drains:  Lines/Drains/Airways       Active Status       Name Placement date Placement time Site Days    HD Permanent Double Catheter 09/27/24  1316  Internal jugular  6                            Lab Results: I have reviewed the following results: CBC/BMP:   .     10/04/24  0432 10/04/24  0535   WBC 17.56*  --    HGB 7.0*  --    HCT 21.5*  --    *  --    SODIUM 133*  --    K 4.5  --      --    CO2 17*  --    BUN 47*  --    CREATININE 4.29*  --    GLUC 152*  --    CAIONIZED 1.11* 1.06*    MG 1.8*  --    PHOS 4.5*  --        Results from last 7 days   Lab Units 10/04/24  0432 10/03/24  0538 10/02/24  0559 10/01/24  1752 10/01/24  0534   WBC Thousand/uL 17.56*   < > 13.38*  --  15.31*  14.75*   HEMOGLOBIN g/dL 7.0*   < > 7.2*   < > 6.9*  6.9*   HEMATOCRIT % 21.5*   < > 22.1*  --  21.0*  20.9*   PLATELETS Thousands/uL 148*   < > 168  --  207  203   BANDS PCT %  --   --   --   --  2   SEGS PCT %  --   --  91*  --   --    LYMPHO PCT %  --   --  5*  --  6*   MONO PCT %  --   --  3*  --  1*   EOS PCT %  --   --  0  --  1    < > = values in this interval not displayed.     Results from last 7 days   Lab Units 10/04/24  0432   SODIUM mmol/L 133*   POTASSIUM mmol/L 4.5   CHLORIDE mmol/L 107   CO2 mmol/L 17*   BUN mg/dL 47*   CREATININE mg/dL 4.29*   ANION GAP mmol/L 9   CALCIUM mg/dL 7.9*   GLUCOSE RANDOM mg/dL 152*     Results from last 7 days   Lab Units 09/29/24  2341   INR  1.87*                   Recent Cultures (last 7 days):         Imaging Review: No pertinent imaging studies reviewed.  Other Studies: No additional pertinent studies reviewed.    Last 24 Hours Medication List:     Current Facility-Administered Medications:     albumin human 250 g 5% IVPB, Once    albumin human 400 g 5% IVPB, Once    calcium carbonate (OYSTER SHELL,OSCAL) 500 mg tablet 2 tablet, TID With Meals    calcium carbonate (TUMS) chewable tablet 1,000 mg, Daily PRN    carvedilol (COREG) tablet 6.25 mg, BID With Meals    cyclophosphamide (CYTOXAN) capsule 100 mg, Daily    epoetin marcela (EPOGEN,PROCRIT) injection 10,000 Units, Once per day on Monday Wednesday Friday    famotidine (PEPCID) tablet 20 mg, Daily    FLUoxetine (PROzac) capsule 20 mg, Daily    heparin (porcine) injection 6,000 Units, Once per day on Monday Wednesday Friday    heparin (porcine) subcutaneous injection 5,000 Units, Q8H LORENZO    melatonin tablet 3 mg, HS PRN    ondansetron (ZOFRAN) injection 4 mg, Q6H PRN    pantoprazole (PROTONIX) EC tablet 20 mg, Early  Morning    predniSONE tablet 60 mg, Daily    sulfamethoxazole-trimethoprim (BACTRIM DS) 800-160 mg per tablet 1 tablet, Once per day on Monday Wednesday Friday    vit B complex-vit C-folic acid (Renal Caps) capsule 1 capsule, Daily With Dinner    Administrative Statements   Today, Patient Was Seen By: Mike Perez, DO  I have spent a total time of 35 minutes in caring for this patient on the day of the visit/encounter including Diagnostic results, Prognosis, Risks and benefits of tx options, Instructions for management, Patient and family education, Importance of tx compliance, Risk factor reductions, Impressions, Counseling / Coordination of care, Documenting in the medical record, Reviewing / ordering tests, medicine, procedures  , Obtaining or reviewing history  , and Communicating with other healthcare professionals .    **Please Note: This note may have been constructed using a voice recognition system.**

## 2024-10-04 NOTE — ASSESSMENT & PLAN NOTE
Lab Results   Component Value Date    CREATININE 4.29 (H) 10/04/2024    CREATININE 3.39 (H) 10/03/2024    CREATININE 4.63 (H) 10/02/2024    EGFR 12 10/04/2024    EGFR 16 10/03/2024    EGFR 11 10/02/2024     Now on hemodialysis -Mondays/Wednesdays/Fridays  Plasmapheresis started on 9/21/2024  Status post renal biopsy-evidence of glomerular basement membrane disease  Continue prednisone 60 mg p.o. daily - steroid induced leukocytosis noted  Nephrology and Critical Care following  Trend anti-GBM antibody with goal less than 8  Case Management is working on an outpatient dialysis chair

## 2024-10-04 NOTE — CASE MANAGEMENT
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 2024     Essential hypertension 2024     Secondary hyperparathyroidism of renal origin ([facility]) 2024     Uremia 2024     Hyponatremia 2024     Bilateral lower extremity edema 2024     Neurogenic claudication 2023    Bloating 10/05/2022     Paresthesia of both lower extremities 10/01/2022     Leucocytosis 10/01/2022     Elevated troponin 2022     EDGAR (dyspnea on exertion) 2022     Rash due to vaculitis  2022     LYLY (acute kidney injury) (HCC) 2022     Depression 2022     Multiple open wounds of lower leg 2022     Lower extremity weakness 2019     Lumbosacral plexopathy 2019     Gait abnormality 2019     Lumbar stenosis 2019     Polyneuropathy 2019     Prediabetes 2018    Hyperlipidemia 2014    Vitamin D deficiency 2014      LOS (days): 20  Geometric Mean LOS (GMLOS) (days): 4.9  Days to GMLOS:-15     OBJECTIVE:  Risk of Unplanned Readmission Score: 27.51     Current admission status: Inpatient   Preferred Pharmacy:   [id number] - [place] - [address]  [address]  [place] [address]  Phone: [phone] Fax: [phone]    Primary Care Provider: Jessika Carrillo MD    Primary Insurance: MEDICARE  Secondary Insurance: [employer]  ASIF    DISCHARGE DETAILS:  Pt will continue with dialysis and plasmapheresis.  Pt has his OP dialysis chair set up with  [facility].  Will notify the center when a definite DC date has been set.  Family will provide transport for dialysis and home.  Pt will start services with [facility] upon DC.

## 2024-10-04 NOTE — ASSESSMENT & PLAN NOTE
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  Status post pulse dose steroids, remains on prednisone 60 mg daily and cyclophosphamide 100 mg daily.  Continue plasmapheresis, goal is to have undetectable anti-GBM antibodies.  The minimal achieved level of less than 8 occurred with blood draw from October 1.  However, we are looking for an undetectable antibody count and noted to provide the patient to best possible prognosis going forward given the overall poor prognosis for this particular diagnosis in general.  Patient remains anuric and will be cautiously optimistic going forward as long as we can reduce antibodies.  We are currently having 1.5 exchanges with plasmapheresis, will increase to at least 2 to continue to clear antibodies as effectively as possible.

## 2024-10-04 NOTE — PLAN OF CARE
Target UF Goal  2.5  L as tolerated. Patient dialyzing for  3.5 hrs  on 2 K bath for serum K of  4.5  per protocol. Treatment plan reviewed with Nephrology.       Post-Dialysis RN Treatment Note    Blood Pressure:  Pre 125/73 mm/Hg  Post 133/61 mmHg   EDW  TBD kg    Weight:  Pre 142.5 kg   Post 140 kg   Mode of weight measurement: Bed Scale   Volume Removed  2500 ml    Treatment duration  3 hrs and 30 mins   NS given  No    Treatment shortened? No   Medications given during Rx Heparin 6,000 units IV, Epogen 10,000 units IV   Estimated Kt/V  0.88   Access type: Permacath/TDC   Access Issues: Yes, describe: arterial resistance noted, had to reverse lines to run tx     Report called to primary nurse   Yes, Bernard RN at bedside        Problem: METABOLIC, FLUID AND ELECTROLYTES - ADULT  Goal: Electrolytes maintained within normal limits  Description: INTERVENTIONS:  - Monitor labs and assess patient for signs and symptoms of electrolyte imbalances  - Administer electrolyte replacement as ordered  - Monitor response to electrolyte replacements, including repeat lab results as appropriate  - Instruct patient on fluid and nutrition as appropriate  Outcome: Progressing  Goal: Fluid balance maintained  Description: INTERVENTIONS:  - Monitor labs   - Monitor I/O and WT  - Instruct patient on fluid and nutrition as appropriate  - Assess for signs & symptoms of volume excess or deficit  Outcome: Progressing

## 2024-10-04 NOTE — ASSESSMENT & PLAN NOTE
Lab Results   Component Value Date    EGFR 12 10/04/2024    EGFR 16 10/03/2024    EGFR 11 10/02/2024    CREATININE 4.29 (H) 10/04/2024    CREATININE 3.39 (H) 10/03/2024    CREATININE 4.63 (H) 10/02/2024   Patient started on 10,000 units of Epogen IV with each hemodialysis session

## 2024-10-04 NOTE — ASSESSMENT & PLAN NOTE
Calcium supplementation increased to 1000 mg 3 times daily.  Continue with IV calcium supplement as indicated given above treatments.

## 2024-10-05 PROBLEM — E83.42 HYPOMAGNESEMIA: Status: ACTIVE | Noted: 2024-10-05

## 2024-10-05 LAB
ABO GROUP BLD BPU: NORMAL
ANION GAP SERPL CALCULATED.3IONS-SCNC: 9 MMOL/L (ref 4–13)
ANISOCYTOSIS BLD QL SMEAR: PRESENT
BASOPHILS # BLD MANUAL: 0 THOUSAND/UL (ref 0–0.1)
BASOPHILS NFR MAR MANUAL: 0 % (ref 0–1)
BPU ID: NORMAL
BUN SERPL-MCNC: 41 MG/DL (ref 5–25)
CA-I BLD-SCNC: 1.04 MMOL/L (ref 1.12–1.32)
CALCIUM SERPL-MCNC: 8 MG/DL (ref 8.4–10.2)
CHLORIDE SERPL-SCNC: 105 MMOL/L (ref 96–108)
CO2 SERPL-SCNC: 18 MMOL/L (ref 21–32)
CREAT SERPL-MCNC: 2.92 MG/DL (ref 0.6–1.3)
EOSINOPHIL # BLD MANUAL: 0 THOUSAND/UL (ref 0–0.4)
EOSINOPHIL NFR BLD MANUAL: 0 % (ref 0–6)
ERYTHROCYTE [DISTWIDTH] IN BLOOD BY AUTOMATED COUNT: 16.5 % (ref 11.6–15.1)
ERYTHROCYTE [DISTWIDTH] IN BLOOD BY AUTOMATED COUNT: 17.2 % (ref 11.6–15.1)
FIBRINOGEN PPP-MCNC: 92 MG/DL (ref 206–523)
GFR SERPL CREATININE-BSD FRML MDRD: 19 ML/MIN/1.73SQ M
GLUCOSE SERPL-MCNC: 150 MG/DL (ref 65–140)
HCT VFR BLD AUTO: 18.5 % (ref 36.5–49.3)
HCT VFR BLD AUTO: 19.5 % (ref 36.5–49.3)
HGB BLD-MCNC: 6.1 G/DL (ref 12–17)
HGB BLD-MCNC: 6.5 G/DL (ref 12–17)
HYPERCHROMIA BLD QL SMEAR: PRESENT
LYMPHOCYTES # BLD AUTO: 0.32 THOUSAND/UL (ref 0.6–4.47)
LYMPHOCYTES # BLD AUTO: 2 % (ref 14–44)
MAGNESIUM SERPL-MCNC: 1.6 MG/DL (ref 1.9–2.7)
MCH RBC QN AUTO: 29.8 PG (ref 26.8–34.3)
MCH RBC QN AUTO: 30.5 PG (ref 26.8–34.3)
MCHC RBC AUTO-ENTMCNC: 33 G/DL (ref 31.4–37.4)
MCHC RBC AUTO-ENTMCNC: 33.3 G/DL (ref 31.4–37.4)
MCV RBC AUTO: 89 FL (ref 82–98)
MCV RBC AUTO: 93 FL (ref 82–98)
MONOCYTES # BLD AUTO: 0.16 THOUSAND/UL (ref 0–1.22)
MONOCYTES NFR BLD: 1 % (ref 4–12)
NEUTROPHILS # BLD MANUAL: 15.43 THOUSAND/UL (ref 1.85–7.62)
NEUTS SEG NFR BLD AUTO: 97 % (ref 43–75)
PHOSPHATE SERPL-MCNC: 3.1 MG/DL (ref 2.3–4.1)
PLATELET # BLD AUTO: 131 THOUSANDS/UL (ref 149–390)
PLATELET # BLD AUTO: 145 THOUSANDS/UL (ref 149–390)
PLATELET BLD QL SMEAR: ADEQUATE
PMV BLD AUTO: 9.3 FL (ref 8.9–12.7)
PMV BLD AUTO: 9.4 FL (ref 8.9–12.7)
POTASSIUM SERPL-SCNC: 4 MMOL/L (ref 3.5–5.3)
RBC # BLD AUTO: 2 MILLION/UL (ref 3.88–5.62)
RBC # BLD AUTO: 2.18 MILLION/UL (ref 3.88–5.62)
RBC MORPH BLD: PRESENT
SODIUM SERPL-SCNC: 132 MMOL/L (ref 135–147)
UNIT DISPENSE STATUS: NORMAL
UNIT PRODUCT CODE: NORMAL
UNIT PRODUCT VOLUME: 125 ML
UNIT RH: NORMAL
WBC # BLD AUTO: 15.91 THOUSAND/UL (ref 4.31–10.16)
WBC # BLD AUTO: 18.13 THOUSAND/UL (ref 4.31–10.16)

## 2024-10-05 PROCEDURE — NC001 PR NO CHARGE: Performed by: INTERNAL MEDICINE

## 2024-10-05 PROCEDURE — 85384 FIBRINOGEN ACTIVITY: CPT | Performed by: NURSE PRACTITIONER

## 2024-10-05 PROCEDURE — 83735 ASSAY OF MAGNESIUM: CPT | Performed by: INTERNAL MEDICINE

## 2024-10-05 PROCEDURE — 99232 SBSQ HOSP IP/OBS MODERATE 35: CPT | Performed by: INTERNAL MEDICINE

## 2024-10-05 PROCEDURE — P9012 CRYOPRECIPITATE EACH UNIT: HCPCS

## 2024-10-05 PROCEDURE — 82330 ASSAY OF CALCIUM: CPT | Performed by: NURSE PRACTITIONER

## 2024-10-05 PROCEDURE — 84100 ASSAY OF PHOSPHORUS: CPT | Performed by: INTERNAL MEDICINE

## 2024-10-05 PROCEDURE — P9016 RBC LEUKOCYTES REDUCED: HCPCS

## 2024-10-05 PROCEDURE — 80048 BASIC METABOLIC PNL TOTAL CA: CPT | Performed by: INTERNAL MEDICINE

## 2024-10-05 PROCEDURE — 85027 COMPLETE CBC AUTOMATED: CPT | Performed by: INTERNAL MEDICINE

## 2024-10-05 PROCEDURE — 30233N1 TRANSFUSION OF NONAUTOLOGOUS RED BLOOD CELLS INTO PERIPHERAL VEIN, PERCUTANEOUS APPROACH: ICD-10-PCS | Performed by: HOSPITALIST

## 2024-10-05 PROCEDURE — 85007 BL SMEAR W/DIFF WBC COUNT: CPT | Performed by: INTERNAL MEDICINE

## 2024-10-05 PROCEDURE — 99232 SBSQ HOSP IP/OBS MODERATE 35: CPT | Performed by: HOSPITALIST

## 2024-10-05 PROCEDURE — 30233N1 TRANSFUSION OF NONAUTOLOGOUS RED BLOOD CELLS INTO PERIPHERAL VEIN, PERCUTANEOUS APPROACH: ICD-10-PCS | Performed by: INTERNAL MEDICINE

## 2024-10-05 PROCEDURE — 83520 IMMUNOASSAY QUANT NOS NONAB: CPT | Performed by: INTERNAL MEDICINE

## 2024-10-05 RX ORDER — CALCIUM GLUCONATE 20 MG/ML
2 INJECTION, SOLUTION INTRAVENOUS ONCE
Status: COMPLETED | OUTPATIENT
Start: 2024-10-05 | End: 2024-10-05

## 2024-10-05 RX ORDER — MAGNESIUM SULFATE HEPTAHYDRATE 40 MG/ML
2 INJECTION, SOLUTION INTRAVENOUS ONCE
Status: COMPLETED | OUTPATIENT
Start: 2024-10-05 | End: 2024-10-05

## 2024-10-05 RX ADMIN — CALCIUM GLUCONATE 2 G: 20 INJECTION, SOLUTION INTRAVENOUS at 13:04

## 2024-10-05 RX ADMIN — CALCIUM 2 TABLET: 500 TABLET ORAL at 11:47

## 2024-10-05 RX ADMIN — Medication 400 G: at 13:05

## 2024-10-05 RX ADMIN — Medication 1 CAPSULE: at 16:27

## 2024-10-05 RX ADMIN — HEPARIN SODIUM 5000 UNITS: 5000 INJECTION, SOLUTION INTRAVENOUS; SUBCUTANEOUS at 05:17

## 2024-10-05 RX ADMIN — MAGNESIUM SULFATE HEPTAHYDRATE 2 G: 40 INJECTION, SOLUTION INTRAVENOUS at 08:28

## 2024-10-05 RX ADMIN — CYCLOPHOSPHAMIDE 100 MG: 50 CAPSULE ORAL at 16:27

## 2024-10-05 RX ADMIN — CALCIUM 2 TABLET: 500 TABLET ORAL at 07:50

## 2024-10-05 RX ADMIN — PANTOPRAZOLE SODIUM 20 MG: 20 TABLET, DELAYED RELEASE ORAL at 05:17

## 2024-10-05 RX ADMIN — CARVEDILOL 6.25 MG: 3.12 TABLET, FILM COATED ORAL at 16:27

## 2024-10-05 RX ADMIN — HEPARIN SODIUM 5000 UNITS: 5000 INJECTION, SOLUTION INTRAVENOUS; SUBCUTANEOUS at 21:42

## 2024-10-05 RX ADMIN — HEPARIN SODIUM 5000 UNITS: 5000 INJECTION, SOLUTION INTRAVENOUS; SUBCUTANEOUS at 15:00

## 2024-10-05 RX ADMIN — FAMOTIDINE 20 MG: 20 TABLET, FILM COATED ORAL at 08:27

## 2024-10-05 RX ADMIN — PREDNISONE 60 MG: 20 TABLET ORAL at 16:27

## 2024-10-05 RX ADMIN — CARVEDILOL 6.25 MG: 3.12 TABLET, FILM COATED ORAL at 07:50

## 2024-10-05 RX ADMIN — CALCIUM 2 TABLET: 500 TABLET ORAL at 16:27

## 2024-10-05 RX ADMIN — FLUOXETINE HYDROCHLORIDE 20 MG: 20 CAPSULE ORAL at 08:27

## 2024-10-05 NOTE — PLAN OF CARE
Problem: PAIN - ADULT  Goal: Verbalizes/displays adequate comfort level or baseline comfort level  Description: Interventions:  - Encourage patient to monitor pain and request assistance  - Assess pain using appropriate pain scale  - Administer analgesics based on type and severity of pain and evaluate response  - Implement non-pharmacological measures as appropriate and evaluate response  - Consider cultural and social influences on pain and pain management  - Notify physician/advanced practitioner if interventions unsuccessful or patient reports new pain  Outcome: Progressing     Problem: INFECTION - ADULT  Goal: Absence or prevention of progression during hospitalization  Description: INTERVENTIONS:  - Assess and monitor for signs and symptoms of infection  - Monitor lab/diagnostic results  - Monitor all insertion sites, i.e. indwelling lines, tubes, and drains  - Monitor endotracheal if appropriate and nasal secretions for changes in amount and color  - [place] appropriate cooling/warming therapies per order  - Administer medications as ordered  - Instruct and encourage patient and family to use good hand hygiene technique  - Identify and instruct in appropriate isolation precautions for identified infection/condition  Outcome: Progressing  Goal: Absence of fever/infection during neutropenic period  Description: INTERVENTIONS:  - Monitor WBC    Outcome: Progressing     Problem: SAFETY ADULT  Goal: Patient will remain free of falls  Description: INTERVENTIONS:  - Educate patient/family on patient safety including physical limitations  - Instruct patient to call for assistance with activity   - Consult OT/PT to assist with strengthening/mobility   - Keep Call bell within reach  - Keep bed low and locked with side rails adjusted as appropriate  - Keep care items and personal belongings within reach  - Initiate and maintain comfort rounds  - Make Fall Risk Sign visible to staff  - Offer Toileting every 2 Hours,  in advance of need  - Initiate/Maintain bed alarm  - Obtain necessary fall risk management equipment  - Apply yellow socks and bracelet for high fall risk patients  - Consider moving patient to room near nurses station  Outcome: Progressing  Goal: Maintain or return to baseline ADL function  Description: INTERVENTIONS:  -  Assess patient's ability to carry out ADLs; assess patient's baseline for ADL function and identify physical deficits which impact ability to perform ADLs (bathing, care of mouth/teeth, toileting, grooming, dressing, etc.)  - Assess/evaluate cause of self-care deficits   - Assess range of motion  - Assess patient's mobility; develop plan if impaired  - Assess patient's need for assistive devices and provide as appropriate  - Encourage maximum independence but intervene and supervise when necessary  - Involve family in performance of ADLs  - Assess for home care needs following discharge   - Consider OT consult to assist with ADL evaluation and planning for discharge  - Provide patient education as appropriate  Outcome: Progressing  Goal: Maintains/Returns to pre admission functional level  Description: INTERVENTIONS:  - Perform AM-PAC 6 Click Basic Mobility/ Daily Activity assessment daily.  - Set and communicate daily mobility goal to care team and patient/family/caregiver.   - Collaborate with rehabilitation services on mobility goals if consulted  - Out of bed for meals   - Out of bed for toileting  - Record patient progress and toleration of activity level   Outcome: Progressing     Problem: DISCHARGE PLANNING  Goal: Discharge to home or other facility with appropriate resources  Description: INTERVENTIONS:  - Identify barriers to discharge w/patient and caregiver  - Arrange for needed discharge resources and transportation as appropriate  - Identify discharge learning needs (meds, wound care, etc.)  - Arrange for interpretive services to assist at discharge as needed  - Refer to Case  Management Department for coordinating discharge planning if the patient needs post-hospital services based on physician/advanced practitioner order or complex needs related to functional status, cognitive ability, or social support system  Outcome: Progressing     Problem: Knowledge Deficit  Goal: Patient/family/caregiver demonstrates understanding of disease process, treatment plan, medications, and discharge instructions  Description: Complete learning assessment and assess knowledge base.  Interventions:  - Provide teaching at level of understanding  - Provide teaching via preferred learning methods  Outcome: Progressing     Problem: Nutrition/Hydration-ADULT  Goal: Nutrient/Hydration intake appropriate for improving, restoring or maintaining nutritional needs  Description: Monitor and assess patient's nutrition/hydration status for malnutrition. Collaborate with interdisciplinary team and initiate plan and interventions as ordered.  Monitor patient's weight and dietary intake as ordered or per policy. Utilize nutrition screening tool and intervene as necessary. Determine patient's food preferences and provide high-protein, high-caloric foods as appropriate.     INTERVENTIONS:  - Monitor oral intake, urinary output, labs, and treatment plans  - Assess nutrition and hydration status and recommend course of action  - Evaluate amount of meals eaten  - Assist patient with eating if necessary   - Allow adequate time for meals  - Recommend/ encourage appropriate diets, oral nutritional supplements, and vitamin/mineral supplements  - Order, calculate, and assess calorie counts as needed  - Recommend, monitor, and adjust tube feedings and TPN/PPN based on assessed needs  - Assess need for intravenous fluids  - Provide specific nutrition/hydration education as appropriate  - Include patient/family/caregiver in decisions related to nutrition  Outcome: Progressing     Problem: Prexisting or High Potential for Compromised  Skin Integrity  Goal: Skin integrity is maintained or improved  Description: INTERVENTIONS:  - Identify patients at risk for skin breakdown  - Assess and monitor skin integrity  - Assess and monitor nutrition and hydration status  - Monitor labs   - Assess for incontinence   - Turn and reposition patient  - Assist with mobility/ambulation  - Relieve pressure over bony prominences  - Avoid friction and shearing  - Provide appropriate hygiene as needed including keeping skin clean and dry  - Evaluate need for skin moisturizer/barrier cream  - Collaborate with interdisciplinary team   - Patient/family teaching  - Consider wound care consult   Outcome: Progressing     Problem: METABOLIC, FLUID AND ELECTROLYTES - ADULT  Goal: Electrolytes maintained within normal limits  Description: INTERVENTIONS:  - Monitor labs and assess patient for signs and symptoms of electrolyte imbalances  - Administer electrolyte replacement as ordered  - Monitor response to electrolyte replacements, including repeat lab results as appropriate  - Instruct patient on fluid and nutrition as appropriate  Outcome: Progressing  Goal: Fluid balance maintained  Description: INTERVENTIONS:  - Monitor labs   - Monitor I/O and WT  - Instruct patient on fluid and nutrition as appropriate  - Assess for signs & symptoms of volume excess or deficit  Outcome: Progressing     Problem: HEMATOLOGIC - ADULT  Goal: Maintains hematologic stability  Description: INTERVENTIONS  - Assess for signs and symptoms of bleeding or hemorrhage  - Monitor labs  - Administer supportive blood products/factors as ordered and appropriate  Outcome: Progressing

## 2024-10-05 NOTE — ASSESSMENT & PLAN NOTE
Patient has now progressed to end-stage renal disease.  He is dependent on hemodialysis sessions Monday Wednesday and Friday

## 2024-10-05 NOTE — PROGRESS NOTES
Progress Note - Nephrology   Name: Ramos Rosenthal 74 y.o. male I MRN: [id number]  Unit/Bed#: ICU 04-01 I Date of Admission: 9/14/2024   Date of Service: 10/5/2024 I Hospital Day: 21     Assessment & Plan  LYLY (acute kidney injury) (HCC)  Patient has now progressed to end-stage renal disease.  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  Plasmapheresis exchange volume was recently increased from 1.5 L to 2 L to assist with clearance of anti-GBM antibodies.  Anti-GBM level is decreasing, goal is for it to be undetectable.  Continue plasmapheresis for 1 additional week.  Continue with other therapy including cyclophosphamide and steroid taper.  Hyponatremia  Continue with fluid restriction, sodium level remains within goal.  Hypocalcemia  Calcium levels noted to be mildly low, continue with calcium supplementation  Bilateral lower extremity edema  Edema controlled at this time, volume management is with dialysis  Anemia due to chronic kidney disease, on chronic dialysis ([facility])  Lab Results   Component Value Date    EGFR 19 10/05/2024    EGFR 12 10/04/2024    EGFR 16 10/03/2024    CREATININE 2.92 (H) 10/05/2024    CREATININE 4.29 (H) 10/04/2024    CREATININE 3.39 (H) 10/03/2024   Patient started on 10,000 units of Epogen IV with each hemodialysis session.  May need to increase to 30,000 units of Epogen as the patient is still transfusion dependent    Essential hypertension  Blood pressures continue to be well-controlled, continue with carvedilol, low-sodium diet.  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 He was started on pulse dose IV steroids, cyclophosphamide and is to be initiated on plasmapheresis. Nephrology was consulted for the management of acute kidney injury.  Patient seen and examined, he is undergoing plasmapheresis.  He appears to be in good spirits.       Objective :  Temp:  [97.2 °F (36.2 °C)-97.8 °F (36.6 °C)] 97.4 °F (36.3 °C)  HR:  [59-76] 62  BP: ()/(47-70) 122/58  Resp:  [15-30] 18  SpO2:  [96 %-100 %] 100 %  O2 Device: None (Room air)    Current Weight: Weight - Scale: (!) 141 kg (311 lb 11.7 oz)  First Weight: Weight - Scale: (!) 140 kg (309 lb 8.4 oz)  I/O         10/03 0701  10/04 0700 10/04 0701  10/05 0700 10/05 0701  10/06 0700    P.O. 360 540 540    I.V. (mL/kg)  430 (3)     Blood 621.7 116.7     IV Piggyback 150  50    Total Intake(mL/kg) 1131.7 (8.1) 1086.7 (7.7) 590 (4.2)    Urine (mL/kg/hr)   0 (0)    Other  2700     Stool   0    Total Output  2700 0    Net +1131.7 -1613.3 +590           Unmeasured Stool Occurrence   1 x          Physical Exam  Vitals and nursing note reviewed.   Constitutional:       General: He is not in acute distress.     Appearance: He is well-developed.   HENT:      Head: Normocephalic and atraumatic.   Cardiovascular:      Rate and Rhythm: Normal rate and regular rhythm.      Heart sounds: No murmur heard.  Pulmonary:      Effort: Pulmonary effort is normal. No respiratory distress.      Breath sounds: Normal breath sounds.   Abdominal:      Palpations: Abdomen is soft.      Tenderness: There is no abdominal tenderness.   Musculoskeletal:      Right lower leg: Edema present.      Left lower leg: Edema present.   Skin:     General: Skin is warm and dry.      Capillary Refill: Capillary refill takes less than 2 seconds.   Neurological:      Mental Status: He is alert.   Psychiatric:         Mood and Affect: Mood normal.          Medications:    Current Facility-Administered Medications:     calcium carbonate (OYSTER SHELL,OSCAL) 500 mg tablet 2 tablet, 2 tablet, Oral, TID With Meals, Gigi Ortega DO, 2 tablet at 10/05/24 1147    calcium carbonate (TUMS) chewable tablet 1,000 mg, 1,000 mg, Oral, Daily PRN, BRETT Xie    carvedilol (COREG) tablet 6.25 mg, 6.25 mg, Oral, BID With Meals, Gigi Ortega DO, 6.25 mg at 10/05/24 0750    cyclophosphamide (CYTOXAN) capsule 100 mg, 100 mg, Oral, Daily, Lisa Oscar, DO, 100 mg at 10/04/24 1648    epoetin marcela (EPOGEN,PROCRIT) injection 10,000 Units, 10,000 Units, Intravenous, Once per day on Monday Wednesday Friday, Gigi Ortega DO, 10,000 Units at 10/04/24 0915    famotidine (PEPCID) tablet 20 mg, 20 mg, Oral, Daily, Ramos Mclean DO, 20 mg at 10/05/24 0827    FLUoxetine (PROzac) capsule 20 mg, 20 mg, Oral, Daily, BRETT Xie, 20 mg at 10/05/24 0827    heparin (porcine) injection 6,000 Units, 6,000 Units, Intravenous, Once per day on Monday Wednesday Friday, Gigi Ortega DO, 6,000 Units at 10/04/24 0914    heparin (porcine) subcutaneous injection 5,000 Units, 5,000 Units, Subcutaneous, Q8H LORENZO, BRETT Xie, 5,000 Units at 10/05/24 1500    melatonin tablet 3 mg, 3 mg, Oral, HS PRN, Laurel Rosenberg MD, 3 mg at 09/28/24 0057    ondansetron (ZOFRAN) injection 4 mg, 4 mg, Intravenous, Q6H PRN, BRETT Xie    pantoprazole (PROTONIX) EC tablet 20 mg, 20 mg, Oral, Early Morning, Ramos Mclean DO, 20 mg at 10/05/24 0517    predniSONE tablet 60 mg, 60 mg, Oral, Daily, Lisa Moore DO, 60 mg at 10/04/24 1648    sulfamethoxazole-trimethoprim (BACTRIM DS) 800-160 mg per tablet 1 tablet, 1 tablet, Oral, Once per day on Monday Wednesday Friday, Lisa Moore DO, 1 tablet at 10/04/24 1651    vit B complex-vit C-folic acid (Renal Caps) capsule 1 capsule, 1 capsule, Oral, Daily  "With Dinner, Gigi Ortega DO, 1 capsule at 10/04/24 1648      Lab Results: I have reviewed the following results:  Results from last 7 days   Lab Units 10/05/24  0451 10/04/24  0432 10/03/24  1856 10/03/24  0538 10/02/24  0559 10/01/24  1752 10/01/24  0534 09/30/24  1914 09/30/24  0751 09/29/24  2341   WBC Thousand/uL 15.91* 17.56*  --  16.21* 13.38*  --  15.31*  14.75*  --  17.66* 19.32*   HEMOGLOBIN g/dL 6.5* 7.0* 7.4* 6.6* 7.2* 7.7* 6.9*  6.9* 7.5* 6.5* 6.8*   HEMATOCRIT % 19.5* 21.5*  --  20.5* 22.1*  --  21.0*  20.9* 22.7* 20.2* 20.5*   PLATELETS Thousands/uL 145* 148*  --  165 168  --  207  203  --  213 208   POTASSIUM mmol/L 4.0 4.5  --  4.2 4.1  --  4.3  --  4.5 4.6   CHLORIDE mmol/L 105 107  --  105 104  --  103  --  106 106   CO2 mmol/L 18* 17*  --  20* 18*  --  21  --  16* 16*   BUN mg/dL 41* 47*  --  32* 43*  --  31*  --  45* 40*   CREATININE mg/dL 2.92* 4.29*  --  3.39* 4.63*  --  3.70*  --  5.27* 4.90*   CALCIUM mg/dL 8.0* 7.9*  --  7.8* 8.0*  --  7.7*  --  7.6* 7.2*   MAGNESIUM mg/dL 1.6* 1.8*  --  1.6* 2.0  --  1.6*  --   --   --    PHOSPHORUS mg/dL 3.1 4.5*  --  3.7 5.3*  --  4.5*  --   --   --        Administrative Statements     Portions of the record may have been created with voice recognition software. Occasional wrong word or \"sound a like\" substitutions may have occurred due to the inherent limitations of voice recognition software. Read the chart carefully and recognize, using context, where substitutions have occurred.If you have any questions, please contact the dictating provider.  "

## 2024-10-05 NOTE — PHYSICIAN ADVISOR
Current patient class: Inpatient  The patient is currently on Hospital Day: 22      The patient was admitted to the hospital at  3:04 PM on 9/14/24 for the following diagnosis:  Blood in urine [R31.9]  LYLY (acute kidney injury) (HCC) [N17.9]     CMS OUTLIER STAY REVIEW    After review of the relevant documentation, labs, vital signs and test results, the patient is appropriate for CONTINUED INPATIENT ADMISSION.     The patient continues to remain hospitalized receiving acute medical care.  The patient has surpassed the expected duration of stay, however given the clinical condition, need for further acute care management, the patient is appropriate to remain in an inpatient status.  The patient still being actively managed, and does have unresolved medical issues requiring further hospitalization.      This review is conducted at 20 days, to help satisfy the requirements for significant outlier stay review as per CMS.  Given the current condition of this patient, the patient satisfies this review was determination for continued inpatient stay.    Rationale is as follows:    The patient presented with acute kidney injury and underwent renal biopsy.  His diagnoses include 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.  He is now on hemodialysis and started plasmapheresis in late September.  Discharge is hopeful in the next several days and case management is assisting in dialysis chair set up.  He has anemia due to chronic kidney disease and hemoglobin this morning was 6.5.  A unit of blood is ordered.  The patient is still requiring active hospital management.    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 HERZOG    CATARACT EXTRACTION, BILATERAL      IR BIOPSY KIDNEY RANDOM  9/19/2024    IR TEMPORARY DIALYSIS CATHETER PLACEMENT  9/16/2024    IR TUNNELED DIALYSIS CATHETER PLACEMENT  9/27/2024    KNEE ARTHROSCOPY Right     x2 , meniscus repair    TONSILECTOMY AND ADNOIDECTOMY             Consults have been placed to:   IP CONSULT TO NEPHROLOGY  INPATIENT CONSULT TO IR  IP CONSULT TO INFECTIOUS DISEASES  INPATIENT CONSULT TO IR  INPATIENT CONSULT TO IR  IP CONSULT TO MEDICAL CRITICAL CARE    Vitals:    10/05/24 1605 10/05/24 1620 10/05/24 1627 10/05/24 1650   BP: 121/59 124/54 124/54 127/59   BP Location:       Pulse: 59 59 61 60   Resp: 18 19  21   Temp: 97.5 °F (36.4 °C) 97.5 °F (36.4 °C)  (!) 97.4 °F (36.3 °C)   TempSrc: Temporal Temporal  Temporal   SpO2: 100% 100%  100%   Weight:       Height:           Most recent labs:    Recent Labs     10/05/24  0451   WBC 15.91*   HGB 6.5*   HCT 19.5*   *   K 4.0   CALCIUM 8.0*   BUN 41*   CREATININE 2.92*       Scheduled Meds:  Current Facility-Administered Medications   Medication Dose Route Frequency Provider Last Rate    calcium carbonate  2 tablet Oral TID With Meals Gigi Ortega DO      calcium carbonate  1,000 mg Oral Daily PRN BRETT Xie      carvedilol  6.25 mg Oral BID With Meals Gigi Ortega DO      cyclophosphamide  100 mg Oral Daily Lisa Moore DO      epoetin marcela  10,000 Units Intravenous Once per day on Monday Wednesday Friday Gigi Ortega DO      famotidine  20 mg Oral Daily Ramos Mclean DO       FLUoxetine  20 mg Oral Daily BRETT Xie      heparin (porcine)  6,000 Units Intravenous Once per day on Monday Wednesday Friday Gigi Ortega DO      heparin (porcine)  5,000 Units Subcutaneous Q8H [facility] BRETT Xie      melatonin  3 mg Oral HS PRN Laurel Rosenberg MD      ondansetron  4 mg Intravenous Q6H PRN BRETT Xie      pantoprazole  20 mg Oral Early Morning Ramos Mclean DO      predniSONE  60 mg Oral Daily Lisa Moore DO      sulfamethoxazole-trimethoprim  1 tablet Oral Once per day on Monday Wednesday Friday Lisa Moore DO      vit B complex-vit C-folic acid  1 capsule Oral Daily With Dinner Gigi Ortega DO       Continuous Infusions:   PRN Meds:.  calcium carbonate    melatonin    ondansetron    Surgical procedures (if appropriate):

## 2024-10-05 NOTE — ASSESSMENT & PLAN NOTE
Lab Results   Component Value Date    CREATININE 2.92 (H) 10/05/2024    CREATININE 4.29 (H) 10/04/2024    CREATININE 3.39 (H) 10/03/2024    EGFR 19 10/05/2024    EGFR 12 10/04/2024    EGFR 16 10/03/2024     Now on hemodialysis -Mondays/Wednesdays/Fridays  Plasmapheresis started on 9/21/2024  Status post renal biopsy-evidence of glomerular basement membrane disease  Continue prednisone 60 mg p.o. daily - steroid induced leukocytosis noted  Nephrology and Critical Care following  Trend anti-GBM antibody with goal less than 8  Notified by case management that an outpatient dialysis chair has been set up  Will review with nephrology regarding discharge clearance  Hopeful discharge planning for early in the week of Monday, 10/7/2024

## 2024-10-05 NOTE — PROGRESS NOTES
Progress Note - Hospitalist   Name: Ramos Rosenthal 74 y.o. male I MRN: [id number]  Unit/Bed#: ICU 04-01 I Date of Admission: 9/14/2024   Date of Service: 10/5/2024 I Hospital Day: 21    Assessment & Plan  LYLY (acute kidney injury) (HCC)      Lab Results   Component Value Date    CREATININE 2.92 (H) 10/05/2024    CREATININE 4.29 (H) 10/04/2024    CREATININE 3.39 (H) 10/03/2024    EGFR 19 10/05/2024    EGFR 12 10/04/2024    EGFR 16 10/03/2024     Now on hemodialysis -Mondays/Wednesdays/Fridays  Plasmapheresis started on 9/21/2024  Status post renal biopsy-evidence of glomerular basement membrane disease  Continue prednisone 60 mg p.o. daily - steroid induced leukocytosis noted  Nephrology and Critical Care following  Trend anti-GBM antibody with goal less than 8  Notified by case management that an outpatient dialysis chair has been set up  Will review with nephrology regarding discharge clearance  Hopeful discharge planning for early in the week of Monday, 10/7/2024  Anemia due to chronic kidney disease, on chronic dialysis (HCC)  Patient with a low hemoglobin on 9/27/2024  Hemoglobin this morning is 6.5  Patient remains hemodynamically stable  Epogen as per Nephrology  Will defer PRBC transfusion decision to nephrology  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  Noted on renal biopsy  Continue plasmapheresis as outlined above  Continue cyclophosphamide, prednisone, and Bactrim  Continued management as per nephrology/critical care  Hyponatremia  Patient has a hypervolemic hyponatremia  Patient has remained asymptomatic  Sodium is stable at 132  Continue fluid restriction  Continued management as per nephrology  Paresthesia of both lower extremities  Hx of back surgery in 2004 and 2014 and has since bilateral LE paresthesia  He is primarily wheelchair bound  Venous statis ulcers noted to Bilateral LE  B/L extremity edema R>L  Continue supportive therapy  Bilateral lower extremity edema  Chronic  condition  Patient with a history of lower back/spinal surgery  Patient does not ambulate  Patient is wheelchair-bound at baseline  Depression  Continue home dose fluoxetine  Essential hypertension  Blood pressure is controlled   continue carvedilol    VTE Pharmacologic Prophylaxis: VTE Score: 8 High Risk (Score >/= 5) - Pharmacological DVT Prophylaxis Ordered: heparin. Sequential Compression Devices Ordered.    Mobility:   Basic Mobility Inpatient Raw Score: 10  -[facility] Goal: 4: Move to chair/commode  -HLM Achieved: 3: Sit at edge of bed  Patient is at baseline from a mobility standpoint    Patient Centered Rounds: I performed bedside rounds with nursing staff today.   Discussions with Specialists or Other Care Team Provider: Nephrology, case management    Education and Discussions with Family / Patient:  Will bring the patient's family members up to par as the day progresses when they come into visit with the patient.     Current Length of Stay: 21 day(s)  Current Patient Status: Inpatient   Certification Statement: The patient will continue to require additional inpatient hospital stay due to need for continued plasmapheresis  Discharge Plan: Anticipate discharge in 48-72 hrs to home with home services.    Code Status: Level 1 - Full Code    Subjective   Patient seen, resting in bed, is comfortable, denies any pain or discomfort    Objective :  Temp:  [95.6 °F (35.3 °C)-97.8 °F (36.6 °C)] 97.8 °F (36.6 °C)  HR:  [50-76] 61  BP: ()/(47-73) 128/58  Resp:  [15-30] 20  SpO2:  [98 %-100 %] 98 %  O2 Device: None (Room air)    Body mass index is 40.02 kg/m².     Input and Output Summary (last 24 hours):     Intake/Output Summary (Last 24 hours) at 10/5/2024 0812  Last data filed at 10/4/2024 2125  Gross per 24 hour   Intake 906.67 ml   Output 2700 ml   Net -1793.33 ml       Physical Exam  Vitals and nursing note reviewed.   Constitutional:       General: He is not in acute distress.     Appearance: Normal  appearance. He is not ill-appearing.   HENT:      Head: Normocephalic and atraumatic.      Nose: Nose normal.   Eyes:      Extraocular Movements: Extraocular movements intact.      Pupils: Pupils are equal, round, and reactive to light.   Cardiovascular:      Rate and Rhythm: Normal rate and regular rhythm.      Pulses: Normal pulses.      Heart sounds: Normal heart sounds. No murmur heard.     No friction rub. No gallop.   Pulmonary:      Effort: Pulmonary effort is normal.      Breath sounds: Normal breath sounds.   Abdominal:      General: There is no distension.      Palpations: Abdomen is soft. There is no mass.      Tenderness: There is no abdominal tenderness. There is no guarding or rebound.   Musculoskeletal:         General: No swelling or tenderness. Normal range of motion.      Cervical back: Normal range of motion and neck supple. No rigidity. No muscular tenderness.      Right lower leg: No edema.      Left lower leg: No edema.   Skin:     General: Skin is warm.      Capillary Refill: Capillary refill takes less than 2 seconds.      Findings: No erythema or rash.   Neurological:      Mental Status: He is alert and oriented to person, place, and time. Mental status is at baseline.      Comments: Motor 1 out of 5 left lower extremity, 0 out of 5 right lower extremity   Psychiatric:         Mood and Affect: Mood normal.         Behavior: Behavior normal.           Lines/Drains:  Lines/Drains/Airways       Active Status       Name Placement date Placement time Site Days    HD Permanent Double Catheter 09/27/24  1316  Internal jugular  7                            Lab Results: I have reviewed the following results:   Results from last 7 days   Lab Units 10/05/24  0451 10/03/24  0538 10/02/24  0559 10/01/24  1752 10/01/24  0534   WBC Thousand/uL 15.91*   < > 13.38*  --  15.31*  14.75*   HEMOGLOBIN g/dL 6.5*   < > 7.2*   < > 6.9*  6.9*   HEMATOCRIT % 19.5*   < > 22.1*  --  21.0*  20.9*   PLATELETS  Thousands/uL 145*   < > 168  --  207  203   BANDS PCT %  --   --   --   --  2   SEGS PCT %  --   --  91*  --   --    LYMPHO PCT % 2*  --  5*  --  6*   MONO PCT % 1*  --  3*  --  1*   EOS PCT % 0  --  0  --  1    < > = values in this interval not displayed.     Results from last 7 days   Lab Units 10/05/24  0451   SODIUM mmol/L 132*   POTASSIUM mmol/L 4.0   CHLORIDE mmol/L 105   CO2 mmol/L 18*   BUN mg/dL 41*   CREATININE mg/dL 2.92*   ANION GAP mmol/L 9   CALCIUM mg/dL 8.0*   GLUCOSE RANDOM mg/dL 150*     Results from last 7 days   Lab Units 09/29/24  2341   INR  1.87*                   Recent Cultures (last 7 days):         Imaging Results Review: No pertinent imaging studies reviewed.  Other Study Results Review: No additional pertinent studies reviewed.    Last 24 Hours Medication List:     Current Facility-Administered Medications:     calcium carbonate (OYSTER SHELL,OSCAL) 500 mg tablet 2 tablet, TID With Meals    calcium carbonate (TUMS) chewable tablet 1,000 mg, Daily PRN    carvedilol (COREG) tablet 6.25 mg, BID With Meals    cyclophosphamide (CYTOXAN) capsule 100 mg, Daily    epoetin marcela (EPOGEN,PROCRIT) injection 10,000 Units, Once per day on Monday Wednesday Friday    famotidine (PEPCID) tablet 20 mg, Daily    FLUoxetine (PROzac) capsule 20 mg, Daily    heparin (porcine) injection 6,000 Units, Once per day on Monday Wednesday Friday    heparin (porcine) subcutaneous injection 5,000 Units, Q8H LORENZO    magnesium sulfate 2 g/50 mL IVPB (premix) 2 g, Once    melatonin tablet 3 mg, HS PRN    ondansetron (ZOFRAN) injection 4 mg, Q6H PRN    pantoprazole (PROTONIX) EC tablet 20 mg, Early Morning    predniSONE tablet 60 mg, Daily    sulfamethoxazole-trimethoprim (BACTRIM DS) 800-160 mg per tablet 1 tablet, Once per day on Monday Wednesday Friday    vit B complex-vit C-folic acid (Renal Caps) capsule 1 capsule, Daily With Dinner    Administrative Statements   Today, Patient Was Seen By: Agnes Rust MD  I  have spent a total time of 35 minutes in caring for this patient on the day of the visit/encounter including Diagnostic results, Patient and family education, Impressions, Counseling / Coordination of care, Documenting in the medical record, Reviewing / ordering tests, medicine, procedures  , and Communicating with other healthcare professionals .    **Please Note: This note may have been constructed using a voice recognition system.**   2/2

## 2024-10-05 NOTE — ASSESSMENT & PLAN NOTE
Patient has a hypervolemic hyponatremia  Patient has remained asymptomatic  Sodium is stable at 132  Continue fluid restriction  Continued management as per nephrology

## 2024-10-05 NOTE — ASSESSMENT & PLAN NOTE
Continue with current treatment involving plasmapheresis.  Plasmapheresis exchange volume was recently increased from 1.5 L to 2 L to assist with clearance of anti-GBM antibodies.  Anti-GBM level is decreasing, goal is for it to be undetectable.  Continue plasmapheresis for 1 additional week.  Continue with other therapy including cyclophosphamide and steroid taper.

## 2024-10-05 NOTE — PROGRESS NOTES
Critical care note     Assessment  Acute kidney injury  Rapidly progressive glomerulonephritis  + anti-GBM antibodies  Hyponatremia  Anemia-likely Cytoxan related  Bilateral lower extremity edema      Plex plan:  --Fibrinogen resulted 130 yesterday  --Performed Plex 1.5 with exchange of albumin only yesterday 10/4  --Plex 1.5 and exchange with albumin 8 L today  --Follow-up fibrinogen drawn earlier today, if it is below 100 we will give a mix of albumin/FFP tomorrow, (units of FFP available in the blood bank)  --Follow anti-GBM antibodies drawn 10/3

## 2024-10-05 NOTE — ASSESSMENT & PLAN NOTE
Patient with a low hemoglobin on 9/27/2024  Hemoglobin this morning is 6.5  Patient remains hemodynamically stable  Epogen as per Nephrology  Will defer PRBC transfusion decision to nephrology

## 2024-10-05 NOTE — ASSESSMENT & PLAN NOTE
Lab Results   Component Value Date    EGFR 19 10/05/2024    EGFR 12 10/04/2024    EGFR 16 10/03/2024    CREATININE 2.92 (H) 10/05/2024    CREATININE 4.29 (H) 10/04/2024    CREATININE 3.39 (H) 10/03/2024   Patient started on 10,000 units of Epogen IV with each hemodialysis session.  May need to increase to 30,000 units of Epogen as the patient is still transfusion dependent

## 2024-10-05 NOTE — PLAN OF CARE
Problem: INFECTION - ADULT  Goal: Absence or prevention of progression during hospitalization  Description: INTERVENTIONS:  - Assess and monitor for signs and symptoms of infection  - Monitor lab/diagnostic results  - Monitor all insertion sites, i.e. indwelling lines, tubes, and drains  - Monitor endotracheal if appropriate and nasal secretions for changes in amount and color  - [place] appropriate cooling/warming therapies per order  - Administer medications as ordered  - Instruct and encourage patient and family to use good hand hygiene technique  - Identify and instruct in appropriate isolation precautions for identified infection/condition  Outcome: Progressing  Goal: Absence of fever/infection during neutropenic period  Description: INTERVENTIONS:  - Monitor WBC    Outcome: Progressing     Problem: DISCHARGE PLANNING  Goal: Discharge to home or other facility with appropriate resources  Description: INTERVENTIONS:  - Identify barriers to discharge w/patient and caregiver  - Arrange for needed discharge resources and transportation as appropriate  - Identify discharge learning needs (meds, wound care, etc.)  - Arrange for interpretive services to assist at discharge as needed  - Refer to Case Management Department for coordinating discharge planning if the patient needs post-hospital services based on physician/advanced practitioner order or complex needs related to functional status, cognitive ability, or social support system  Outcome: Progressing     Problem: METABOLIC, FLUID AND ELECTROLYTES - ADULT  Goal: Electrolytes maintained within normal limits  Description: INTERVENTIONS:  - Monitor labs and assess patient for signs and symptoms of electrolyte imbalances  - Administer electrolyte replacement as ordered  - Monitor response to electrolyte replacements, including repeat lab results as appropriate  - Instruct patient on fluid and nutrition as appropriate  Outcome: Progressing  Goal: Fluid balance  maintained  Description: INTERVENTIONS:  - Monitor labs   - Monitor I/O and WT  - Instruct patient on fluid and nutrition as appropriate  - Assess for signs & symptoms of volume excess or deficit  Outcome: Progressing     Problem: HEMATOLOGIC - ADULT  Goal: Maintains hematologic stability  Description: INTERVENTIONS  - Assess for signs and symptoms of bleeding or hemorrhage  - Monitor labs  - Administer supportive blood products/factors as ordered and appropriate  Outcome: Progressing

## 2024-10-06 LAB
ABO GROUP BLD BPU: NORMAL
ALBUMIN SERPL BCG-MCNC: 4.1 G/DL (ref 3.5–5)
ALP SERPL-CCNC: 10 U/L (ref 34–104)
ALT SERPL W P-5'-P-CCNC: 5 U/L (ref 7–52)
ANION GAP SERPL CALCULATED.3IONS-SCNC: 9 MMOL/L (ref 4–13)
AST SERPL W P-5'-P-CCNC: 14 U/L (ref 13–39)
BILIRUB SERPL-MCNC: 0.76 MG/DL (ref 0.2–1)
BPU ID: NORMAL
BUN SERPL-MCNC: 70 MG/DL (ref 5–25)
CA-I BLD-SCNC: 1.1 MMOL/L (ref 1.12–1.32)
CALCIUM SERPL-MCNC: 8 MG/DL (ref 8.4–10.2)
CHLORIDE SERPL-SCNC: 106 MMOL/L (ref 96–108)
CO2 SERPL-SCNC: 15 MMOL/L (ref 21–32)
CREAT SERPL-MCNC: 4.02 MG/DL (ref 0.6–1.3)
CROSSMATCH: NORMAL
ERYTHROCYTE [DISTWIDTH] IN BLOOD BY AUTOMATED COUNT: 15.9 % (ref 11.6–15.1)
FIBRINOGEN PPP-MCNC: 124 MG/DL (ref 206–523)
GFR SERPL CREATININE-BSD FRML MDRD: 13 ML/MIN/1.73SQ M
GLUCOSE SERPL-MCNC: 166 MG/DL (ref 65–140)
HCT VFR BLD AUTO: 21.3 % (ref 36.5–49.3)
HGB BLD-MCNC: 6.6 G/DL (ref 12–17)
HGB BLD-MCNC: 7 G/DL (ref 12–17)
HGB BLD-MCNC: 7.1 G/DL (ref 12–17)
MAGNESIUM SERPL-MCNC: 1.8 MG/DL (ref 1.9–2.7)
MCH RBC QN AUTO: 30 PG (ref 26.8–34.3)
MCHC RBC AUTO-ENTMCNC: 32.9 G/DL (ref 31.4–37.4)
MCV RBC AUTO: 91 FL (ref 82–98)
PHOSPHATE SERPL-MCNC: 4 MG/DL (ref 2.3–4.1)
PLATELET # BLD AUTO: 125 THOUSANDS/UL (ref 149–390)
PMV BLD AUTO: 9.5 FL (ref 8.9–12.7)
POTASSIUM SERPL-SCNC: 4.6 MMOL/L (ref 3.5–5.3)
PROT SERPL-MCNC: 4.7 G/DL (ref 6.4–8.4)
RBC # BLD AUTO: 2.33 MILLION/UL (ref 3.88–5.62)
SODIUM SERPL-SCNC: 130 MMOL/L (ref 135–147)
UNIT DISPENSE STATUS: NORMAL
UNIT PRODUCT CODE: NORMAL
UNIT PRODUCT VOLUME: 125 ML
UNIT PRODUCT VOLUME: 350 ML
UNIT RH: NORMAL
WBC # BLD AUTO: 15.38 THOUSAND/UL (ref 4.31–10.16)

## 2024-10-06 PROCEDURE — 85018 HEMOGLOBIN: CPT

## 2024-10-06 PROCEDURE — 99232 SBSQ HOSP IP/OBS MODERATE 35: CPT | Performed by: HOSPITALIST

## 2024-10-06 PROCEDURE — P9016 RBC LEUKOCYTES REDUCED: HCPCS

## 2024-10-06 PROCEDURE — 83735 ASSAY OF MAGNESIUM: CPT

## 2024-10-06 PROCEDURE — 84100 ASSAY OF PHOSPHORUS: CPT

## 2024-10-06 PROCEDURE — 99233 SBSQ HOSP IP/OBS HIGH 50: CPT | Performed by: INTERNAL MEDICINE

## 2024-10-06 PROCEDURE — 85384 FIBRINOGEN ACTIVITY: CPT

## 2024-10-06 PROCEDURE — 82330 ASSAY OF CALCIUM: CPT

## 2024-10-06 PROCEDURE — 30233N1 TRANSFUSION OF NONAUTOLOGOUS RED BLOOD CELLS INTO PERIPHERAL VEIN, PERCUTANEOUS APPROACH: ICD-10-PCS | Performed by: INTERNAL MEDICINE

## 2024-10-06 PROCEDURE — NC001 PR NO CHARGE: Performed by: INTERNAL MEDICINE

## 2024-10-06 PROCEDURE — 80053 COMPREHEN METABOLIC PANEL: CPT

## 2024-10-06 PROCEDURE — 85027 COMPLETE CBC AUTOMATED: CPT

## 2024-10-06 RX ORDER — MAGNESIUM SULFATE HEPTAHYDRATE 40 MG/ML
2 INJECTION, SOLUTION INTRAVENOUS ONCE
Status: COMPLETED | OUTPATIENT
Start: 2024-10-06 | End: 2024-10-06

## 2024-10-06 RX ORDER — CALCIUM GLUCONATE 20 MG/ML
1 INJECTION, SOLUTION INTRAVENOUS ONCE
Status: COMPLETED | OUTPATIENT
Start: 2024-10-06 | End: 2024-10-06

## 2024-10-06 RX ADMIN — CALCIUM 2 TABLET: 500 TABLET ORAL at 08:01

## 2024-10-06 RX ADMIN — CARVEDILOL 6.25 MG: 3.12 TABLET, FILM COATED ORAL at 16:17

## 2024-10-06 RX ADMIN — PREDNISONE 60 MG: 20 TABLET ORAL at 16:17

## 2024-10-06 RX ADMIN — PANTOPRAZOLE SODIUM 20 MG: 20 TABLET, DELAYED RELEASE ORAL at 06:16

## 2024-10-06 RX ADMIN — FLUOXETINE HYDROCHLORIDE 20 MG: 20 CAPSULE ORAL at 08:01

## 2024-10-06 RX ADMIN — CALCIUM 2 TABLET: 500 TABLET ORAL at 16:17

## 2024-10-06 RX ADMIN — HEPARIN SODIUM 5000 UNITS: 5000 INJECTION, SOLUTION INTRAVENOUS; SUBCUTANEOUS at 14:01

## 2024-10-06 RX ADMIN — HEPARIN SODIUM 5000 UNITS: 5000 INJECTION, SOLUTION INTRAVENOUS; SUBCUTANEOUS at 06:16

## 2024-10-06 RX ADMIN — FAMOTIDINE 20 MG: 20 TABLET, FILM COATED ORAL at 08:01

## 2024-10-06 RX ADMIN — CYCLOPHOSPHAMIDE 100 MG: 50 CAPSULE ORAL at 16:17

## 2024-10-06 RX ADMIN — CALCIUM GLUCONATE 1 G: 20 INJECTION, SOLUTION INTRAVENOUS at 12:52

## 2024-10-06 RX ADMIN — HEPARIN SODIUM 5000 UNITS: 5000 INJECTION, SOLUTION INTRAVENOUS; SUBCUTANEOUS at 22:40

## 2024-10-06 RX ADMIN — MAGNESIUM SULFATE HEPTAHYDRATE 2 G: 40 INJECTION, SOLUTION INTRAVENOUS at 08:01

## 2024-10-06 RX ADMIN — SODIUM BICARBONATE 50 ML/HR: 84 INJECTION, SOLUTION INTRAVENOUS at 13:45

## 2024-10-06 RX ADMIN — CALCIUM 2 TABLET: 500 TABLET ORAL at 12:52

## 2024-10-06 RX ADMIN — CARVEDILOL 6.25 MG: 3.12 TABLET, FILM COATED ORAL at 08:01

## 2024-10-06 RX ADMIN — Medication 1 CAPSULE: at 16:17

## 2024-10-06 NOTE — NURSING NOTE
Nurse at bedside to perform plasmapheresis. Nurse given 2 g calcium gluconate and 400 g Albumin for procedure.

## 2024-10-06 NOTE — PROGRESS NOTES
Critical care note    Assessment  Acute kidney injury  Rapidly progressive glomerulonephritis  + anti-GBM antibodies  Hyponatremia  Anemia-likely Cytoxan related  Bilateral lower extremity edema    Plex plan  --Fibrinogen 92 yesterday, given cryoprecipitate recheck this a.m. 124  --Plan for Plex 1.5 and exchange with mix of albumin/FFPs (available in the blood bank)  --Follow-up fibrinogen daily, will likely exchange with albumin tomorrow unless it is <100  --Still pending anti-GBM, last check was 7.7, plan to continue Plex until it is undetectable  --Cytoxan/prednisone as per the primary team/nephrology

## 2024-10-06 NOTE — PROGRESS NOTES
Progress Note - Nephrology   Name: Ramos Rosenthal 74 y.o. male I MRN: [id number]  Unit/Bed#: ICU 04-01 I Date of Admission: 9/14/2024   Date of Service: 10/6/2024 I Hospital Day: 22     Assessment & Plan  LYLY (acute kidney injury) (HCC)  Patient has now progressed to end-stage renal disease.  He is dependent on hemodialysis sessions Monday Wednesday and Friday.  Likely there is no urgent indication for dialysis today as the patient's oxygenation, vital signs and laboratory results are stable.  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  Plasmapheresis exchange volume was recently increased from 1.5 L to 2 L to assist with clearance of anti-GBM antibodies.  Anti-GBM level is decreasing, goal is for it to be undetectable.  We will check a repeat anti-GBM level tomorrow consistent with every 2 days.  Continue plasmapheresis for 1 additional week.  Continue with other therapy including cyclophosphamide and steroid taper.  Hyponatremia  Continue with fluid restriction, sodium level remains stable  Hypocalcemia  Calcium levels noted to be mildly low, continue with calcium supplementation  Bilateral lower extremity edema  Edema controlled at this time, volume management is with dialysis  Anemia due to chronic kidney disease, on chronic dialysis (HCC)  Lab Results   Component Value Date    EGFR 13 10/06/2024    EGFR 19 10/05/2024    EGFR 12 10/04/2024    CREATININE 4.02 (H) 10/06/2024    CREATININE 2.92 (H) 10/05/2024    CREATININE 4.29 (H) 10/04/2024   Patient receiving Epogen with dialysis sessions.  He is status post 1 unit of PRBC infusion yesterday for hemoglobin of 6.1.  His hemoglobin is 7.0 this morning.  Continue to monitor daily CBC levels and transfuse for hemoglobin less than 7 if indicated.    Essential hypertension  Blood pressures continue to be well-controlled,  continue with carvedilol, low-sodium diet.  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 He was started on pulse dose IV steroids, cyclophosphamide and is to be initiated on plasmapheresis. Nephrology was consulted for the management of acute kidney injury.  He remains in good spirits.      Objective :  Temp:  [96.2 °F (35.7 °C)-97.5 °F (36.4 °C)] 97 °F (36.1 °C)  HR:  [58-71] 70  BP: (120-158)/(54-72) 149/70  Resp:  [12-35] 35  SpO2:  [96 %-100 %] 97 %  O2 Device: None (Room air)    Current Weight: Weight - Scale: (!) 142 kg (313 lb 0.9 oz)  First Weight: Weight - Scale: (!) 140 kg (309 lb 8.4 oz)  I/O         10/04 0701  10/05 0700 10/05 0701  10/06 0700 10/06 0701  10/07 0700    P.O. 540 1020     I.V. (mL/kg) 430 (3)      Blood 116.7 1149.2     IV Piggyback  150     Total Intake(mL/kg) 1086.7 (7.7) 2319.2 (16.3)     Urine (mL/kg/hr)  0 (0)     Other 2700      Stool  0     Total Output 2700 0     Net -1613.3 +2319.2            Unmeasured Stool Occurrence  1 x           Physical Exam  Vitals and nursing note reviewed.   Constitutional:       General: He is not in acute distress.     Appearance: He is well-developed.   HENT:      Head: Normocephalic and atraumatic.   Cardiovascular:      Rate and Rhythm: Normal rate and regular rhythm.      Heart sounds: No murmur heard.  Pulmonary:      Effort: Pulmonary effort is normal. No respiratory distress.      Breath sounds: Normal breath sounds.   Abdominal:      Palpations: Abdomen is soft.      Tenderness: There is no abdominal tenderness.   Musculoskeletal:      Right lower leg: Edema  present.      Left lower leg: Edema present.   Skin:     General: Skin is warm and dry.      Capillary Refill: Capillary refill takes less than 2 seconds.   Neurological:      Mental Status: He is alert.   Psychiatric:         Mood and Affect: Mood normal.         Medications:    Current Facility-Administered Medications:     calcium carbonate (OYSTER SHELL,OSCAL) 500 mg tablet 2 tablet, 2 tablet, Oral, TID With Meals, Gigi Ortega DO, 2 tablet at 10/06/24 0801    calcium carbonate (TUMS) chewable tablet 1,000 mg, 1,000 mg, Oral, Daily PRN, BRETT Xie    carvedilol (COREG) tablet 6.25 mg, 6.25 mg, Oral, BID With Meals, Gigi Ortega DO, 6.25 mg at 10/06/24 0801    cyclophosphamide (CYTOXAN) capsule 100 mg, 100 mg, Oral, Daily, Lisa Moore, DO, 100 mg at 10/05/24 1627    epoetin marcela (EPOGEN,PROCRIT) injection 10,000 Units, 10,000 Units, Intravenous, Once per day on Monday Wednesday Friday, Gigi Ortega DO, 10,000 Units at 10/04/24 0915    famotidine (PEPCID) tablet 20 mg, 20 mg, Oral, Daily, Ramos Mclean DO, 20 mg at 10/06/24 0801    FLUoxetine (PROzac) capsule 20 mg, 20 mg, Oral, Daily, BRETT Xie, 20 mg at 10/06/24 0801    heparin (porcine) injection 6,000 Units, 6,000 Units, Intravenous, Once per day on Monday Wednesday Friday, Gigi Ortega DO, 6,000 Units at 10/04/24 0914    heparin (porcine) subcutaneous injection 5,000 Units, 5,000 Units, Subcutaneous, Q8H [facility], BRETT Xie, 5,000 Units at 10/06/24 0616    melatonin tablet 3 mg, 3 mg, Oral, HS PRN, Laurel Rosenberg MD, 3 mg at 09/28/24 0057    ondansetron (ZOFRAN) injection 4 mg, 4 mg, Intravenous, Q6H PRN, BRETT Xie    pantoprazole (PROTONIX) EC tablet 20 mg, 20 mg, Oral, Early Morning, Ramos Mclean DO, 20 mg at 10/06/24 0616    predniSONE tablet 60 mg, 60 mg, Oral, Daily, Lisa Moore DO, 60 mg at 10/05/24 1622     "sulfamethoxazole-trimethoprim (BACTRIM DS) 800-160 mg per tablet 1 tablet, 1 tablet, Oral, Once per day on Monday Wednesday Friday, Lisa MooreDO, 1 tablet at 10/04/24 1651    vit B complex-vit C-folic acid (Renal Caps) capsule 1 capsule, 1 capsule, Oral, Daily With Dinner, Gigi NunezshabbirDO angela, 1 capsule at 10/05/24 1627      Lab Results: I have reviewed the following results:  Results from last 7 days   Lab Units 10/06/24  0612 10/06/24  0003 10/05/24  2008 10/05/24  0451 10/04/24  0432 10/03/24  1856 10/03/24  0538 10/02/24  0559 10/01/24  1752 10/01/24  0534 09/30/24  1914 09/30/24  0751   WBC Thousand/uL 15.38*  --  18.13* 15.91* 17.56*  --  16.21* 13.38*  --  15.31*  14.75*  --  17.66*   HEMOGLOBIN g/dL 7.0* 6.6* 6.1* 6.5* 7.0* 7.4* 6.6* 7.2*   < > 6.9*  6.9*   < > 6.5*   HEMATOCRIT % 21.3*  --  18.5* 19.5* 21.5*  --  20.5* 22.1*  --  21.0*  20.9*   < > 20.2*   PLATELETS Thousands/uL 125*  --  131* 145* 148*  --  165 168  --  207  203  --  213   POTASSIUM mmol/L 4.6  --   --  4.0 4.5  --  4.2 4.1  --  4.3  --  4.5   CHLORIDE mmol/L 106  --   --  105 107  --  105 104  --  103  --  106   CO2 mmol/L 15*  --   --  18* 17*  --  20* 18*  --  21  --  16*   BUN mg/dL 70*  --   --  41* 47*  --  32* 43*  --  31*  --  45*   CREATININE mg/dL 4.02*  --   --  2.92* 4.29*  --  3.39* 4.63*  --  3.70*  --  5.27*   CALCIUM mg/dL 8.0*  --   --  8.0* 7.9*  --  7.8* 8.0*  --  7.7*  --  7.6*   MAGNESIUM mg/dL 1.8*  --   --  1.6* 1.8*  --  1.6* 2.0  --  1.6*  --   --    PHOSPHORUS mg/dL 4.0  --   --  3.1 4.5*  --  3.7 5.3*  --  4.5*  --   --    ALBUMIN g/dL 4.1  --   --   --   --   --   --   --   --   --   --   --     < > = values in this interval not displayed.       Administrative Statements     Portions of the record may have been created with voice recognition software. Occasional wrong word or \"sound a like\" substitutions may have occurred due to the inherent limitations of voice recognition software. Read the " chart carefully and recognize, using context, where substitutions have occurred.If you have any questions, please contact the dictating provider.

## 2024-10-06 NOTE — ASSESSMENT & PLAN NOTE
Continue with current treatment involving plasmapheresis.  Plasmapheresis exchange volume was recently increased from 1.5 L to 2 L to assist with clearance of anti-GBM antibodies.  Anti-GBM level is decreasing, goal is for it to be undetectable.  We will check a repeat anti-GBM level tomorrow consistent with every 2 days.  Continue plasmapheresis for 1 additional week.  Continue with other therapy including cyclophosphamide and steroid taper.

## 2024-10-06 NOTE — ASSESSMENT & PLAN NOTE
Lab Results   Component Value Date    CREATININE 4.02 (H) 10/06/2024    CREATININE 2.92 (H) 10/05/2024    CREATININE 4.29 (H) 10/04/2024    EGFR 13 10/06/2024    EGFR 19 10/05/2024    EGFR 12 10/04/2024     Now on hemodialysis -Mondays/Wednesdays/Fridays  Plasmapheresis started on 9/21/2024  Status post renal biopsy-evidence of glomerular basement membrane disease  Continue prednisone 60 mg p.o. daily - steroid induced leukocytosis noted  Nephrology and Critical Care following  Trend anti-GBM antibody with goal less than 8  Notified by case management that an outpatient dialysis chair has been set up  Hopeful discharge planning for sometime this week if cleared by nephrology

## 2024-10-06 NOTE — PROGRESS NOTES
Progress Note - Hospitalist   Name: Ramos Rosenthal 74 y.o. male I MRN: [id number]  Unit/Bed#: ICU 04-01 I Date of Admission: 9/14/2024   Date of Service: 10/6/2024 I Hospital Day: 22    Assessment & Plan  LYLY (acute kidney injury) ([facility])      Lab Results   Component Value Date    CREATININE 4.02 (H) 10/06/2024    CREATININE 2.92 (H) 10/05/2024    CREATININE 4.29 (H) 10/04/2024    EGFR 13 10/06/2024    EGFR 19 10/05/2024    EGFR 12 10/04/2024     Now on hemodialysis -Mondays/Wednesdays/Fridays  Plasmapheresis started on 9/21/2024  Status post renal biopsy-evidence of glomerular basement membrane disease  Continue prednisone 60 mg p.o. daily - steroid induced leukocytosis noted  Nephrology and Critical Care following  Trend anti-GBM antibody with goal less than 8  Notified by case management that an outpatient dialysis chair has been set up  Hopeful discharge planning for sometime this week if cleared by nephrology  Anemia due to chronic kidney disease, on chronic dialysis (HCC)  Patient with a low hemoglobin on 9/27/2024  Patient was transfused 2 units of packed red blood cells over the past 24 hours  Hemoglobin this morning is 7.0  Patient remains hemodynamically stable  Epogen as per Nephrology  Continue to monitor daily CBCs  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  Noted on renal biopsy  Continue plasmapheresis as outlined above  Continue cyclophosphamide, prednisone, and Bactrim  Patient has a continued steroid-induced leukocytosis  Continued management as per nephrology/critical care  Hyponatremia  Patient has a hypervolemic hyponatremia  Patient has remained asymptomatic  Sodium is stable   Continue fluid restriction  Continued management as per nephrology  Paresthesia of both lower extremities  Hx of back surgery in 2004 and 2014 and has since bilateral LE paresthesia  He is primarily wheelchair bound  Venous statis ulcers noted to Bilateral LE  B/L extremity edema R>L  Continue  supportive therapy  Bilateral lower extremity edema  Chronic condition  Patient with a history of lower back/spinal surgery  Patient does not ambulate  Patient is wheelchair-bound at baseline  Depression  Continue home dose fluoxetine  Essential hypertension  Blood pressure is controlled   continue carvedilol  Hypocalcemia  Continue daily calcium carbonate supplementation    VTE Pharmacologic Prophylaxis: VTE Score: 8 High Risk (Score >/= 5) - Pharmacological DVT Prophylaxis Ordered: heparin. Sequential Compression Devices Ordered.    Mobility:   Basic Mobility Inpatient Raw Score: 9  -[facility] Goal: 3: Sit at edge of bed  -[facility] Achieved: 2: Bed activities/Dependent transfer  Patient is at baseline from a mobility standpoint    Patient Centered Rounds: I performed bedside rounds with nursing staff today.   Discussions with Specialists or Other Care Team Provider: Critical care, nephrology    Education and Discussions with Family / Patient:  Will update the family members as the day progresses.     Current Length of Stay: 22 day(s)  Current Patient Status: Inpatient   Certification Statement: The patient will continue to require additional inpatient hospital stay due to the need for continued plasmapheresis  Discharge Plan:  Hopeful discharge planning sometime later this week when and if cleared by nephrology    Code Status: Level 1 - Full Code    Subjective   Patient seen, resting in bed, appears comfortable, no new complaints    Objective :  Temp:  [96.2 °F (35.7 °C)-97.5 °F (36.4 °C)] 97 °F (36.1 °C)  HR:  [58-71] 62  BP: (120-158)/(54-72) 149/70  Resp:  [12-26] 18  SpO2:  [96 %-100 %] 100 %  O2 Device: None (Room air)    Body mass index is 40.19 kg/m².     Input and Output Summary (last 24 hours):     Intake/Output Summary (Last 24 hours) at 10/6/2024 0811  Last data filed at 10/6/2024 0601  Gross per 24 hour   Intake 2019.17 ml   Output 0 ml   Net 2019.17 ml       Physical Exam  Vitals and nursing note reviewed.    Constitutional:       General: He is not in acute distress.     Appearance: Normal appearance. He is not ill-appearing.   HENT:      Head: Normocephalic and atraumatic.      Nose: Nose normal.   Eyes:      Extraocular Movements: Extraocular movements intact.      Pupils: Pupils are equal, round, and reactive to light.   Cardiovascular:      Rate and Rhythm: Normal rate and regular rhythm.      Pulses: Normal pulses.      Heart sounds: Normal heart sounds. No murmur heard.     No friction rub. No gallop.   Pulmonary:      Effort: Pulmonary effort is normal.      Breath sounds: Normal breath sounds.   Abdominal:      General: There is no distension.      Palpations: Abdomen is soft. There is no mass.      Tenderness: There is no abdominal tenderness. There is no guarding or rebound.   Musculoskeletal:         General: No swelling or tenderness. Normal range of motion.      Cervical back: Normal range of motion and neck supple. No rigidity. No muscular tenderness.      Right lower leg: No edema.      Left lower leg: No edema.   Skin:     General: Skin is warm.      Capillary Refill: Capillary refill takes less than 2 seconds.      Findings: No erythema or rash.   Neurological:      Mental Status: He is alert and oriented to person, place, and time. Mental status is at baseline.      Comments: Motor 1 out of 5 left lower extremity, 0 out of 5 right lower extremity   Psychiatric:         Mood and Affect: Mood normal.         Behavior: Behavior normal.           Lines/Drains:  Lines/Drains/Airways       Active Status       Name Placement date Placement time Site Days    HD Permanent Double Catheter 09/27/24  1316  Internal jugular  8                            Lab Results: I have reviewed the following results:   Results from last 7 days   Lab Units 10/06/24  0612 10/05/24  2008 10/05/24  0451 10/03/24  0538 10/02/24  0559 10/01/24  1752 10/01/24  0534   WBC Thousand/uL 15.38*   < > 15.91*   < > 13.38*  --  15.31*   14.75*   HEMOGLOBIN g/dL 7.0*   < > 6.5*   < > 7.2*   < > 6.9*  6.9*   HEMATOCRIT % 21.3*   < > 19.5*   < > 22.1*  --  21.0*  20.9*   PLATELETS Thousands/uL 125*   < > 145*   < > 168  --  207  203   BANDS PCT %  --   --   --   --   --   --  2   SEGS PCT %  --   --   --   --  91*  --   --    LYMPHO PCT %  --   --  2*  --  5*  --  6*   MONO PCT %  --   --  1*  --  3*  --  1*   EOS PCT %  --   --  0  --  0  --  1    < > = values in this interval not displayed.     Results from last 7 days   Lab Units 10/06/24  0612   SODIUM mmol/L 130*   POTASSIUM mmol/L 4.6   CHLORIDE mmol/L 106   CO2 mmol/L 15*   BUN mg/dL 70*   CREATININE mg/dL 4.02*   ANION GAP mmol/L 9   CALCIUM mg/dL 8.0*   ALBUMIN g/dL 4.1   TOTAL BILIRUBIN mg/dL 0.76   ALK PHOS U/L 10*   ALT U/L 5*   AST U/L 14   GLUCOSE RANDOM mg/dL 166*     Results from last 7 days   Lab Units 09/29/24  2341   INR  1.87*                   Recent Cultures (last 7 days):         Imaging Results Review: No pertinent imaging studies reviewed.  Other Study Results Review: No additional pertinent studies reviewed.    Last 24 Hours Medication List:     Current Facility-Administered Medications:     calcium carbonate (OYSTER SHELL,OSCAL) 500 mg tablet 2 tablet, TID With Meals    calcium carbonate (TUMS) chewable tablet 1,000 mg, Daily PRN    carvedilol (COREG) tablet 6.25 mg, BID With Meals    cyclophosphamide (CYTOXAN) capsule 100 mg, Daily    epoetin marcela (EPOGEN,PROCRIT) injection 10,000 Units, Once per day on Monday Wednesday Friday    famotidine (PEPCID) tablet 20 mg, Daily    FLUoxetine (PROzac) capsule 20 mg, Daily    heparin (porcine) injection 6,000 Units, Once per day on Monday Wednesday Friday    heparin (porcine) subcutaneous injection 5,000 Units, Q8H LOR[facility]    magnesium sulfate 2 g/50 mL IVPB (premix) 2 g, Once, Last Rate: 2 g (10/06/24 0801)    melatonin tablet 3 mg, HS PRN    ondansetron (ZOFRAN) injection 4 mg, Q6H PRN    pantoprazole (PROTONIX) EC tablet 20 mg,  Early Morning    predniSONE tablet 60 mg, Daily    sulfamethoxazole-trimethoprim (BACTRIM DS) 800-160 mg per tablet 1 tablet, Once per day on Monday Wednesday Friday    vit B complex-vit C-folic acid (Renal Caps) capsule 1 capsule, Daily With Dinner    Administrative Statements   Today, Patient Was Seen By: Agnes Rust MD  I have spent a total time of 40 minutes in caring for this patient on the day of the visit/encounter including Instructions for management, Patient and family education, Impressions, Counseling / Coordination of care, Documenting in the medical record, Reviewing / ordering tests, medicine, procedures  , and Communicating with other healthcare professionals .    **Please Note: This note may have been constructed using a voice recognition system.**

## 2024-10-06 NOTE — ASSESSMENT & PLAN NOTE
Patient has a hypervolemic hyponatremia  Patient has remained asymptomatic  Sodium is stable   Continue fluid restriction  Continued management as per nephrology

## 2024-10-06 NOTE — QUICK NOTE
Repeat Hgb was 6.1. Prior Fibrinogen post pheresis was 62. I communicated with the advanced practitioner in the ICU - patient received cryoprecipitate earlier and is to receive 1 unit PRBCs now. VSS, check labs in the morning.

## 2024-10-06 NOTE — ASSESSMENT & PLAN NOTE
Patient with a low hemoglobin on 9/27/2024  Patient was transfused 2 units of packed red blood cells over the past 24 hours  Hemoglobin this morning is 7.0  Patient remains hemodynamically stable  Epogen as per Nephrology  Continue to monitor daily CBCs

## 2024-10-06 NOTE — ASSESSMENT & PLAN NOTE
Noted on renal biopsy  Continue plasmapheresis as outlined above  Continue cyclophosphamide, prednisone, and Bactrim  Patient has a continued steroid-induced leukocytosis  Continued management as per nephrology/critical care

## 2024-10-06 NOTE — ASSESSMENT & PLAN NOTE
Patient has now progressed to end-stage renal disease.  He is dependent on hemodialysis sessions Monday Wednesday and Friday.  Likely there is no urgent indication for dialysis today as the patient's oxygenation, vital signs and laboratory results are stable.  His next hemodialysis treatment will be on Monday, October 7

## 2024-10-06 NOTE — PLAN OF CARE
Problem: PAIN - ADULT  Goal: Verbalizes/displays adequate comfort level or baseline comfort level  Description: Interventions:  - Encourage patient to monitor pain and request assistance  - Assess pain using appropriate pain scale  - Administer analgesics based on type and severity of pain and evaluate response  - Implement non-pharmacological measures as appropriate and evaluate response  - Consider cultural and social influences on pain and pain management  - Notify physician/advanced practitioner if interventions unsuccessful or patient reports new pain  Outcome: Progressing     Problem: INFECTION - ADULT  Goal: Absence or prevention of progression during hospitalization  Description: INTERVENTIONS:  - Assess and monitor for signs and symptoms of infection  - Monitor lab/diagnostic results  - Monitor all insertion sites, i.e. indwelling lines, tubes, and drains  - Monitor endotracheal if appropriate and nasal secretions for changes in amount and color  - [place] appropriate cooling/warming therapies per order  - Administer medications as ordered  - Instruct and encourage patient and family to use good hand hygiene technique  - Identify and instruct in appropriate isolation precautions for identified infection/condition  Outcome: Progressing     Problem: SAFETY ADULT  Goal: Patient will remain free of falls  Description: INTERVENTIONS:  - Educate patient/family on patient safety including physical limitations  - Instruct patient to call for assistance with activity   - Consult OT/PT to assist with strengthening/mobility   - Keep Call bell within reach  - Keep bed low and locked with side rails adjusted as appropriate  - Keep care items and personal belongings within reach  - Initiate and maintain comfort rounds  - Make Fall Risk Sign visible to staff  - Offer Toileting every 2 Hours, in advance of need  - Initiate/Maintain bed alarm  - Obtain necessary fall risk management equipment  - Apply yellow socks and  bracelet for high fall risk patients  - Consider moving patient to room near nurses station  Outcome: Progressing     Problem: Prexisting or High Potential for Compromised Skin Integrity  Goal: Skin integrity is maintained or improved  Description: INTERVENTIONS:  - Identify patients at risk for skin breakdown  - Assess and monitor skin integrity  - Assess and monitor nutrition and hydration status  - Monitor labs   - Assess for incontinence   - Turn and reposition patient  - Assist with mobility/ambulation  - Relieve pressure over bony prominences  - Avoid friction and shearing  - Provide appropriate hygiene as needed including keeping skin clean and dry  - Evaluate need for skin moisturizer/barrier cream  - Collaborate with interdisciplinary team   - Patient/family teaching  - Consider wound care consult   Outcome: Progressing

## 2024-10-07 ENCOUNTER — APPOINTMENT (INPATIENT)
Dept: DIALYSIS | Facility: HOSPITAL | Age: 74
DRG: 871 | End: 2024-10-07
Attending: INTERNAL MEDICINE
Payer: MEDICARE

## 2024-10-07 LAB
ALBUMIN SERPL BCG-MCNC: 3.6 G/DL (ref 3.5–5)
ALP SERPL-CCNC: 14 U/L (ref 34–104)
ALT SERPL W P-5'-P-CCNC: 7 U/L (ref 7–52)
ANION GAP SERPL CALCULATED.3IONS-SCNC: 11 MMOL/L (ref 4–13)
ANISOCYTOSIS BLD QL SMEAR: PRESENT
AST SERPL W P-5'-P-CCNC: 11 U/L (ref 13–39)
BASOPHILS # BLD MANUAL: 0 THOUSAND/UL (ref 0–0.1)
BASOPHILS NFR MAR MANUAL: 0 % (ref 0–1)
BILIRUB SERPL-MCNC: 0.73 MG/DL (ref 0.2–1)
BUN SERPL-MCNC: 100 MG/DL (ref 5–25)
CA-I BLD-SCNC: 1.17 MMOL/L (ref 1.12–1.32)
CALCIUM SERPL-MCNC: 8.2 MG/DL (ref 8.4–10.2)
CHLORIDE SERPL-SCNC: 104 MMOL/L (ref 96–108)
CO2 SERPL-SCNC: 15 MMOL/L (ref 21–32)
CREAT SERPL-MCNC: 5.01 MG/DL (ref 0.6–1.3)
EOSINOPHIL # BLD MANUAL: 0 THOUSAND/UL (ref 0–0.4)
EOSINOPHIL NFR BLD MANUAL: 0 % (ref 0–6)
ERYTHROCYTE [DISTWIDTH] IN BLOOD BY AUTOMATED COUNT: 15.5 % (ref 11.6–15.1)
ERYTHROCYTE [DISTWIDTH] IN BLOOD BY AUTOMATED COUNT: 16.4 % (ref 11.6–15.1)
FIBRINOGEN PPP-MCNC: 152 MG/DL (ref 206–523)
FIBRINOGEN PPP-MCNC: 172 MG/DL (ref 206–523)
GBM AB SER IA-ACNC: 4.2 UNITS (ref 0–0.9)
GFR SERPL CREATININE-BSD FRML MDRD: 10 ML/MIN/1.73SQ M
GLUCOSE SERPL-MCNC: 177 MG/DL (ref 65–140)
HCT VFR BLD AUTO: 18.3 % (ref 36.5–49.3)
HCT VFR BLD AUTO: 18.4 % (ref 36.5–49.3)
HGB BLD-MCNC: 5.9 G/DL (ref 12–17)
HGB BLD-MCNC: 6.4 G/DL (ref 12–17)
INR PPP: 1.21 (ref 0.85–1.19)
LACTATE SERPL-SCNC: 1.1 MMOL/L (ref 0.5–2)
LYMPHOCYTES # BLD AUTO: 0.49 THOUSAND/UL (ref 0.6–4.47)
LYMPHOCYTES # BLD AUTO: 3 % (ref 14–44)
MAGNESIUM SERPL-MCNC: 2.1 MG/DL (ref 1.9–2.7)
MCH RBC QN AUTO: 29.5 PG (ref 26.8–34.3)
MCH RBC QN AUTO: 30 PG (ref 26.8–34.3)
MCHC RBC AUTO-ENTMCNC: 32.2 G/DL (ref 31.4–37.4)
MCHC RBC AUTO-ENTMCNC: 34.8 G/DL (ref 31.4–37.4)
MCV RBC AUTO: 86 FL (ref 82–98)
MCV RBC AUTO: 92 FL (ref 82–98)
MONOCYTES # BLD AUTO: 0.33 THOUSAND/UL (ref 0–1.22)
MONOCYTES NFR BLD: 2 % (ref 4–12)
NEUTROPHILS # BLD MANUAL: 15.49 THOUSAND/UL (ref 1.85–7.62)
NEUTS SEG NFR BLD AUTO: 95 % (ref 43–75)
PHOSPHATE SERPL-MCNC: 3.8 MG/DL (ref 2.3–4.1)
PLATELET # BLD AUTO: 133 THOUSANDS/UL (ref 149–390)
PLATELET # BLD AUTO: 137 THOUSANDS/UL (ref 149–390)
PLATELET BLD QL SMEAR: ABNORMAL
PMV BLD AUTO: 9.3 FL (ref 8.9–12.7)
PMV BLD AUTO: 9.6 FL (ref 8.9–12.7)
POTASSIUM SERPL-SCNC: 4.6 MMOL/L (ref 3.5–5.3)
PROT SERPL-MCNC: 4.5 G/DL (ref 6.4–8.4)
PROTHROMBIN TIME: 15.8 SECONDS (ref 12.3–15)
RBC # BLD AUTO: 2 MILLION/UL (ref 3.88–5.62)
RBC # BLD AUTO: 2.13 MILLION/UL (ref 3.88–5.62)
RBC MORPH BLD: PRESENT
SODIUM SERPL-SCNC: 130 MMOL/L (ref 135–147)
WBC # BLD AUTO: 16.18 THOUSAND/UL (ref 4.31–10.16)
WBC # BLD AUTO: 16.3 THOUSAND/UL (ref 4.31–10.16)

## 2024-10-07 PROCEDURE — 85610 PROTHROMBIN TIME: CPT | Performed by: INTERNAL MEDICINE

## 2024-10-07 PROCEDURE — 85384 FIBRINOGEN ACTIVITY: CPT

## 2024-10-07 PROCEDURE — 84100 ASSAY OF PHOSPHORUS: CPT

## 2024-10-07 PROCEDURE — P9017 PLASMA 1 DONOR FRZ W/IN 8 HR: HCPCS

## 2024-10-07 PROCEDURE — 86923 COMPATIBILITY TEST ELECTRIC: CPT

## 2024-10-07 PROCEDURE — 85027 COMPLETE CBC AUTOMATED: CPT | Performed by: INTERNAL MEDICINE

## 2024-10-07 PROCEDURE — P9016 RBC LEUKOCYTES REDUCED: HCPCS

## 2024-10-07 PROCEDURE — 83520 IMMUNOASSAY QUANT NOS NONAB: CPT

## 2024-10-07 PROCEDURE — 85384 FIBRINOGEN ACTIVITY: CPT | Performed by: INTERNAL MEDICINE

## 2024-10-07 PROCEDURE — 85027 COMPLETE CBC AUTOMATED: CPT

## 2024-10-07 PROCEDURE — 99233 SBSQ HOSP IP/OBS HIGH 50: CPT | Performed by: INTERNAL MEDICINE

## 2024-10-07 PROCEDURE — 85007 BL SMEAR W/DIFF WBC COUNT: CPT

## 2024-10-07 PROCEDURE — 80053 COMPREHEN METABOLIC PANEL: CPT

## 2024-10-07 PROCEDURE — 82330 ASSAY OF CALCIUM: CPT

## 2024-10-07 PROCEDURE — 83605 ASSAY OF LACTIC ACID: CPT | Performed by: INTERNAL MEDICINE

## 2024-10-07 PROCEDURE — 99232 SBSQ HOSP IP/OBS MODERATE 35: CPT | Performed by: HOSPITALIST

## 2024-10-07 PROCEDURE — 83735 ASSAY OF MAGNESIUM: CPT

## 2024-10-07 RX ORDER — CALCIUM GLUCONATE 20 MG/ML
1 INJECTION, SOLUTION INTRAVENOUS ONCE
Status: COMPLETED | OUTPATIENT
Start: 2024-10-07 | End: 2024-10-07

## 2024-10-07 RX ORDER — PANTOPRAZOLE SODIUM 40 MG/1
40 TABLET, DELAYED RELEASE ORAL
Status: DISCONTINUED | OUTPATIENT
Start: 2024-10-07 | End: 2024-10-12 | Stop reason: HOSPADM

## 2024-10-07 RX ORDER — CALCIUM CARBONATE 500(1250)
2 TABLET ORAL 2 TIMES DAILY WITH MEALS
Status: DISCONTINUED | OUTPATIENT
Start: 2024-10-07 | End: 2024-10-12 | Stop reason: HOSPADM

## 2024-10-07 RX ORDER — ALBUMIN, HUMAN INJ 5% 5 %
150 SOLUTION INTRAVENOUS ONCE
Status: COMPLETED | OUTPATIENT
Start: 2024-10-07 | End: 2024-10-07

## 2024-10-07 RX ORDER — ALBUMIN, HUMAN INJ 5% 5 %
50 SOLUTION INTRAVENOUS ONCE
Status: COMPLETED | OUTPATIENT
Start: 2024-10-07 | End: 2024-10-07

## 2024-10-07 RX ADMIN — CYCLOPHOSPHAMIDE 100 MG: 50 CAPSULE ORAL at 16:52

## 2024-10-07 RX ADMIN — ALBUMIN (HUMAN) 50 G: 12.5 INJECTION, SOLUTION INTRAVENOUS at 13:12

## 2024-10-07 RX ADMIN — CARVEDILOL 6.25 MG: 3.12 TABLET, FILM COATED ORAL at 16:53

## 2024-10-07 RX ADMIN — SODIUM BICARBONATE 50 ML/HR: 84 INJECTION, SOLUTION INTRAVENOUS at 14:14

## 2024-10-07 RX ADMIN — CALCIUM 2 TABLET: 500 TABLET ORAL at 16:52

## 2024-10-07 RX ADMIN — HEPARIN SODIUM 5000 UNITS: 5000 INJECTION, SOLUTION INTRAVENOUS; SUBCUTANEOUS at 14:26

## 2024-10-07 RX ADMIN — HEPARIN SODIUM 5000 UNITS: 5000 INJECTION, SOLUTION INTRAVENOUS; SUBCUTANEOUS at 06:37

## 2024-10-07 RX ADMIN — FLUOXETINE HYDROCHLORIDE 20 MG: 20 CAPSULE ORAL at 08:11

## 2024-10-07 RX ADMIN — ALBUMIN (HUMAN) 150 G: 12.5 INJECTION, SOLUTION INTRAVENOUS at 13:10

## 2024-10-07 RX ADMIN — PANTOPRAZOLE SODIUM 40 MG: 40 TABLET, DELAYED RELEASE ORAL at 16:53

## 2024-10-07 RX ADMIN — PREDNISONE 60 MG: 20 TABLET ORAL at 16:54

## 2024-10-07 RX ADMIN — CALCIUM GLUCONATE 1 G: 20 INJECTION, SOLUTION INTRAVENOUS at 14:25

## 2024-10-07 RX ADMIN — HEPARIN SODIUM 6000 UNITS: 1000 INJECTION INTRAVENOUS; SUBCUTANEOUS at 09:53

## 2024-10-07 RX ADMIN — FAMOTIDINE 20 MG: 20 TABLET, FILM COATED ORAL at 08:11

## 2024-10-07 RX ADMIN — EPOETIN ALFA 10000 UNITS: 10000 SOLUTION INTRAVENOUS; SUBCUTANEOUS at 09:55

## 2024-10-07 RX ADMIN — CALCIUM 2 TABLET: 500 TABLET ORAL at 08:11

## 2024-10-07 RX ADMIN — Medication 1 CAPSULE: at 16:53

## 2024-10-07 RX ADMIN — SULFAMETHOXAZOLE AND TRIMETHOPRIM 1 TABLET: 800; 160 TABLET ORAL at 16:54

## 2024-10-07 RX ADMIN — PANTOPRAZOLE SODIUM 20 MG: 20 TABLET, DELAYED RELEASE ORAL at 06:37

## 2024-10-07 RX ADMIN — HEPARIN SODIUM 5000 UNITS: 5000 INJECTION, SOLUTION INTRAVENOUS; SUBCUTANEOUS at 21:25

## 2024-10-07 RX ADMIN — CARVEDILOL 6.25 MG: 3.12 TABLET, FILM COATED ORAL at 08:11

## 2024-10-07 NOTE — NURSING NOTE
Patient OOB to chair with erasmo lift at 0530 with this RN and SHAISTA Mclean. Transferred back to bed at 0700 by this RN and SHARONA Pettit.

## 2024-10-07 NOTE — PROGRESS NOTES
Progress Note - Hospitalist   Name: Ramos Rosenthal 74 y.o. male I MRN: [id number]  Unit/Bed#: ICU 04-01 I Date of Admission: 9/14/2024   Date of Service: 10/7/2024 I Hospital Day: 23    Assessment & Plan  LYLY (acute kidney injury) ([facility])      Lab Results   Component Value Date    CREATININE 5.01 (H) 10/07/2024    CREATININE 4.02 (H) 10/06/2024    CREATININE 2.92 (H) 10/05/2024    EGFR 10 10/07/2024    EGFR 13 10/06/2024    EGFR 19 10/05/2024     Now on hemodialysis -Mondays/Wednesdays/Fridays  Plasmapheresis started on 9/21/2024  Status post renal biopsy-evidence of glomerular basement membrane disease  Continue prednisone 60 mg p.o. daily - steroid induced leukocytosis noted  Nephrology and Critical Care following  Trend anti-GBM antibody with goal less than 8  Notified by case management that an outpatient dialysis chair has been set up  Discharge planning once cleared by nephrology  Anemia due to chronic kidney disease, on chronic dialysis ([facility])  Patient with a low hemoglobin on 9/27/2024  Hemoglobin back down to 5.9 this morning, no evidence of active bleeding  Patient is getting his ninth unit of packed red blood cells on this admission today  Patient remains hemodynamically stable  Epogen as per Nephrology  Continue to monitor daily CBCs  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  Noted on renal biopsy  Plasmapheresis was not completed on Sunday, 10/6/2024 for unspecified reasons  Plasmapheresis to resume today  Continue cyclophosphamide, prednisone, and Bactrim  Patient has a continued steroid-induced leukocytosis  Continued management as per nephrology/critical care  Hyponatremia  Patient has a hypervolemic hyponatremia  Patient has remained asymptomatic  Sodium is stable at 130  Continue fluid restriction  Continued management as per nephrology  Paresthesia of both lower extremities  Hx of back surgery in 2004 and 2014 and has since bilateral LE paresthesia  He is primarily wheelchair  bound  Venous statis ulcers noted to Bilateral LE  B/L extremity edema R>L  Continue supportive therapy  Bilateral lower extremity edema  Chronic condition  Patient with a history of lower back/spinal surgery  Patient does not ambulate  Patient is wheelchair-bound at baseline  Depression  Continue home dose fluoxetine  Essential hypertension  Blood pressure is controlled   continue carvedilol  Hypocalcemia  Continue daily calcium carbonate supplementation    VTE Pharmacologic Prophylaxis: VTE Score: 8 High Risk (Score >/= 5) - Pharmacological DVT Prophylaxis Ordered: heparin. Sequential Compression Devices Ordered.    Mobility:   Basic Mobility Inpatient Raw Score: 9  -[facility] Goal: 3: Sit at edge of bed  -[facility] Achieved: 4: Move to chair/commode  Patient is at baseline from a mobility standpoint    Patient Centered Rounds: I performed bedside rounds with nursing staff today.   Discussions with Specialists or Other Care Team Provider: Nephrology, critical care    Education and Discussions with Family / Patient:  Will update the family members as the day progresses.     Current Length of Stay: 23 day(s)  Current Patient Status: Inpatient   Certification Statement: The patient will continue to require additional inpatient hospital stay due to need for blood transfusion, plasmapheresis, and dialysis  Discharge Plan:  Discharge planning once cleared from a nephrology standpoint    Code Status: Level 1 - Full Code    Subjective   Patient seen, resting in bed, feels okay, has no new complaints    Objective :  Temp:  [97.8 °F (36.6 °C)-98.1 °F (36.7 °C)] 97.9 °F (36.6 °C)  HR:  [] 63  BP: (133-185)/(63-79) 158/69  Resp:  [13-33] 18  SpO2:  [97 %-100 %] 100 %  O2 Device: None (Room air)    Body mass index is 41.33 kg/m².     Input and Output Summary (last 24 hours):     Intake/Output Summary (Last 24 hours) at 10/7/2024 0855  Last data filed at 10/7/2024 0821  Gross per 24 hour   Intake 1043.33 ml   Output 0 ml   Net  1043.33 ml       Physical Exam  Vitals and nursing note reviewed.   Constitutional:       General: He is not in acute distress.     Appearance: Normal appearance. He is not ill-appearing.   HENT:      Head: Normocephalic and atraumatic.      Nose: Nose normal.   Eyes:      Extraocular Movements: Extraocular movements intact.      Pupils: Pupils are equal, round, and reactive to light.   Cardiovascular:      Rate and Rhythm: Normal rate and regular rhythm.      Pulses: Normal pulses.      Heart sounds: Normal heart sounds. No murmur heard.     No friction rub. No gallop.   Pulmonary:      Effort: Pulmonary effort is normal.      Breath sounds: Normal breath sounds.   Abdominal:      General: There is no distension.      Palpations: Abdomen is soft. There is no mass.      Tenderness: There is no abdominal tenderness. There is no guarding or rebound.   Musculoskeletal:         General: No swelling or tenderness. Normal range of motion.      Cervical back: Normal range of motion and neck supple. No rigidity. No muscular tenderness.      Right lower leg: No edema.      Left lower leg: No edema.   Skin:     General: Skin is warm.      Capillary Refill: Capillary refill takes less than 2 seconds.      Findings: No erythema or rash.   Neurological:      Mental Status: He is alert and oriented to person, place, and time. Mental status is at baseline.      Comments: Motor left lower extremity 0/5, right lower extremity 1/5   Psychiatric:         Mood and Affect: Mood normal.         Behavior: Behavior normal.           Lines/Drains:  Lines/Drains/Airways       Active Status       Name Placement date Placement time Site Days    HD Permanent Double Catheter 09/27/24  1316  Internal jugular  9                            Lab Results: I have reviewed the following results:   Results from last 7 days   Lab Units 10/07/24  0438 10/03/24  0538 10/02/24  0559 10/01/24  1752 10/01/24  0534   WBC Thousand/uL 16.30*   < > 13.38*  --   15.31*  14.75*   HEMOGLOBIN g/dL 5.9*   < > 7.2*   < > 6.9*  6.9*   HEMATOCRIT % 18.3*   < > 22.1*  --  21.0*  20.9*   PLATELETS Thousands/uL 137*   < > 168  --  207  203   BANDS PCT %  --   --   --   --  2   SEGS PCT %  --   --  91*  --   --    LYMPHO PCT % 3*   < > 5*  --  6*   MONO PCT % 2*   < > 3*  --  1*   EOS PCT % 0   < > 0  --  1    < > = values in this interval not displayed.     Results from last 7 days   Lab Units 10/07/24  0438   SODIUM mmol/L 130*   POTASSIUM mmol/L 4.6   CHLORIDE mmol/L 104   CO2 mmol/L 15*   BUN mg/dL 100*   CREATININE mg/dL 5.01*   ANION GAP mmol/L 11   CALCIUM mg/dL 8.2*   ALBUMIN g/dL 3.6   TOTAL BILIRUBIN mg/dL 0.73   ALK PHOS U/L 14*   ALT U/L 7   AST U/L 11*   GLUCOSE RANDOM mg/dL 177*                 Results from last 7 days   Lab Units 10/07/24  0438   LACTIC ACID mmol/L 1.1       Recent Cultures (last 7 days):         Imaging Results Review: No pertinent imaging studies reviewed.  Other Study Results Review: No additional pertinent studies reviewed.    Last 24 Hours Medication List:     Current Facility-Administered Medications:     calcium carbonate (OYSTER SHELL,OSCAL) 500 mg tablet 2 tablet, TID With Meals    calcium carbonate (TUMS) chewable tablet 1,000 mg, Daily PRN    carvedilol (COREG) tablet 6.25 mg, BID With Meals    cyclophosphamide (CYTOXAN) capsule 100 mg, Daily    epoetin marcela (EPOGEN,PROCRIT) injection 10,000 Units, Once per day on Monday Wednesday Friday    famotidine (PEPCID) tablet 20 mg, Daily    FLUoxetine (PROzac) capsule 20 mg, Daily    heparin (porcine) injection 6,000 Units, Once per day on Monday Wednesday Friday    heparin (porcine) subcutaneous injection 5,000 Units, Q8H LORENZO    melatonin tablet 3 mg, HS PRN    ondansetron (ZOFRAN) injection 4 mg, Q6H PRN    pantoprazole (PROTONIX) EC tablet 40 mg, BID AC    predniSONE tablet 60 mg, Daily    sodium bicarbonate 150 mEq in dextrose 5 % 1,000 mL infusion, Continuous, Last Rate: 50 mL/hr (10/07/24  0701)    sulfamethoxazole-trimethoprim (BACTRIM DS) 800-160 mg per tablet 1 tablet, Once per day on Monday Wednesday Friday    vit B complex-vit C-folic acid (Renal Caps) capsule 1 capsule, Daily With Dinner    Administrative Statements   Today, Patient Was Seen By: Agnes Rust MD  I have spent a total time of 45 minutes in caring for this patient on the day of the visit/encounter including Impressions, Counseling / Coordination of care, Documenting in the medical record, Reviewing / ordering tests, medicine, procedures  , Obtaining or reviewing history  , and Communicating with other healthcare professionals .    **Please Note: This note may have been constructed using a voice recognition system.**

## 2024-10-07 NOTE — ASSESSMENT & PLAN NOTE
Patient with a low hemoglobin on 9/27/2024  Hemoglobin back down to 5.9 this morning, no evidence of active bleeding  Patient is getting his ninth unit of packed red blood cells on this admission today  Patient remains hemodynamically stable  Epogen as per Nephrology  Continue to monitor daily CBCs

## 2024-10-07 NOTE — ASSESSMENT & PLAN NOTE
Lab Results   Component Value Date    EGFR 10 10/07/2024    EGFR 13 10/06/2024    EGFR 19 10/05/2024    CREATININE 5.01 (H) 10/07/2024    CREATININE 4.02 (H) 10/06/2024    CREATININE 2.92 (H) 10/05/2024   Patient is on Epogen 10,000 units with each hemodialysis session, continue to monitor hemoglobin levels, provide packed red blood cells as indicated depending on the patient progresses clinically.

## 2024-10-07 NOTE — NURSING NOTE
[facility] called by this RN at 2052 regarding patient's plasmapheresis treatment scheduled for 10/6/2024. Message taken by , stated she would contact unit regarding time of arrival.

## 2024-10-07 NOTE — ASSESSMENT & PLAN NOTE
Lab Results   Component Value Date    CREATININE 5.01 (H) 10/07/2024    CREATININE 4.02 (H) 10/06/2024    CREATININE 2.92 (H) 10/05/2024    EGFR 10 10/07/2024    EGFR 13 10/06/2024    EGFR 19 10/05/2024     Now on hemodialysis -Mondays/Wednesdays/Fridays  Plasmapheresis started on 9/21/2024  Status post renal biopsy-evidence of glomerular basement membrane disease  Continue prednisone 60 mg p.o. daily - steroid induced leukocytosis noted  Nephrology and Critical Care following  Trend anti-GBM antibody with goal less than 8  Notified by case management that an outpatient dialysis chair has been set up  Discharge planning once cleared by nephrology

## 2024-10-07 NOTE — PROGRESS NOTES
Progress Note - Nephrology   Name: Ramos Rosenthal 74 y.o. male I MRN: [id number]  Unit/Bed#: ICU 04-01 I Date of Admission: 9/14/2024   Date of Service: 10/7/2024 I Hospital Day: 23     Assessment & Plan  LYLY (acute kidney injury) ([facility])  Patient remains in hemodialysis dependent acute kidney injury.  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  Please refer below for details.  Will continue to provide the patient with Monday Wednesday Friday hemodialysis sessions.  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  Unfortunately the patient did not have a plasmapheresis performed yesterday, Sunday October 6.  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  Continue with cyclophosphamide and high-dose prednisone along with Bactrim for prophylaxis.  Hyponatremia  Continue to provide fluid restriction, follow sodium levels from time to time.  Hypocalcemia  Calcium levels continue to improve, now up to a more appropriate level, will look to reduce calcium dosing, he is currently on 1000 mg calcium carbonate 3 times daily, will reduce to twice daily for now.  Continue to monitor and further adjust as indicated going forward.  Anemia due to chronic kidney disease, on chronic dialysis ([facility]CC)  Lab Results   Component Value Date    EGFR 10 10/07/2024    EGFR 13 10/06/2024    EGFR 19 10/05/2024    CREATININE 5.01 (H) 10/07/2024    CREATININE 4.02 (H) 10/06/2024    CREATININE 2.92 (H) 10/05/2024   Patient is on Epogen 10,000 units with each hemodialysis session, continue to monitor hemoglobin levels, provide packed red blood cells as indicated depending on the patient progresses clinically.    Bilateral lower extremity edema  Edema now  "well-controlled, continue to ultrafilter as tolerated dialysis.  Unfortunately, the patient remains anuric  Essential hypertension  Blood pressure appropriate, continue to monitor for now.  Continue with carvedilol.  Hypomagnesemia  Continue to monitor magnesium levels, offer supplementation as indicated.    Case discussed with hospitalist and critical care colleagues, continue with daily plasmapheresis through the week, will potentially look for discharge toward the end of the week as plasmapheresis comes to an end.  Patient has otherwise remained clinically stable.    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/multiple electrolyte abnormalities    LYLY (acute kidney injury) ([facility])    Patient Active Problem List   Diagnosis    Gait abnormality    Lumbar stenosis    Polyneuropathy    Lower extremity weakness    Lumbosacral plexopathy    Elevated troponin    EDGAR (dyspnea on exertion)    Rash due to vaculitis     LYYL (acute kidney injury) ([facility])    Depression    Multiple open wounds of lower leg    Paresthesia of both lower extremities    Leucocytosis    Bloating    Hyperlipidemia    Neurogenic claudication    Prediabetes    Vitamin D deficiency    Hyponatremia    Bilateral lower extremity edema    Secondary hyperparathyroidism of renal origin ([facility])    Uremia    Essential hypertension    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  Denies nausea or vomiting.    Objective:     Vitals: Blood pressure 168/72, pulse 65, temperature 98 °F (36.7 °C), resp. rate 21, height 6' 2\" (1.88 m), weight (!) 146 kg (321 lb 14 oz), SpO2 99%.,Body mass index is 41.33 kg/m².    Weight (last 2 days)       Date/Time " "Weight    10/07/24 0600 146 (321.87)    10/06/24 0600 142 (313.05)    10/05/24 0600 141 (311.73)    10/05/24 0500 141 (311.73)              Intake/Output Summary (Last 24 hours) at 10/7/2024 0756  Last data filed at 10/7/2024 0701  Gross per 24 hour   Intake 863.33 ml   Output 0 ml   Net 863.33 ml     I/O last 3 completed shifts:  In: 1497.9 [I.V.:713.3; Blood:784.6]  Out: 0     HD Permanent Double Catheter (Active)   Reasons to continue HD Cath Treatment Therapy 10/07/24 0400   Goal for Removal N/A- chronic HD catheter 10/07/24 0400   Line Necessity Reviewed Yes, reviewed with provider 10/07/24 0400   Site Assessment WDL;Clean;Dry;Intact 10/07/24 0400   Proximal Lumen Status Capped 10/07/24 0400   Distal Lumen Status Capped 10/07/24 0400   Catheter Out on Skin (cm) 0 cm 09/27/24 1322   Dressing Type Chlorhexidine dressing;Transparent 10/07/24 0400   Dressing Status Clean;Intact;Dry 10/07/24 0400   Dressing Intervention 7 day central line dressing changed 10/04/24 0834   Dressing Change Due 10/10/24 10/07/24 0400   CLABSI Time-out performed for Positive Blood Culture Yes 09/28/24 0800       Physical Exam: /72   Pulse 65   Temp 98 °F (36.7 °C)   Resp 21   Ht 6' 2\" (1.88 m)   Wt (!) 146 kg (321 lb 14 oz)   SpO2 99%   BMI 41.33 kg/m²     General Appearance:    Alert, cooperative, no distress, appears stated age   Head:    Normocephalic, without obvious abnormality, atraumatic   Eyes:    Conjunctiva/corneas clear   Ears:    Normal external ears   Nose:   Nares normal, septum midline, mucosa normal, no drainage    or sinus tenderness   Throat:   Lips, mucosa, and tongue normal; teeth and gums normal   Neck:   Supple, symmetrical, trachea midline, no adenopathy;        thyroid:  No enlargement/tenderness/nodules; no carotid    bruit or JVD   Back:     Symmetric, no curvature, ROM normal, no CVA tenderness   Lungs:     Clear to auscultation bilaterally, respirations unlabored   Chest wall:    No tenderness or " "deformity   Heart:    Regular rate and rhythm, S1 and S2 normal, no murmur, rub   or gallop   Abdomen:     Soft, non-tender, bowel sounds active   Extremities:   Extremities normal, atraumatic, no cyanosis, bilateral lower extremity edema   Skin:   Skin color, texture, turgor normal, no rashes or lesions   Lymph nodes:   Cervical normal   Neurologic:   CNII-XII intact            Lab, Imaging and other studies: I have personally reviewed pertinent labs.  CBC:   Lab Results   Component Value Date    WBC 16.30 (H) 10/07/2024    HGB 5.9 (LL) 10/07/2024    HCT 18.3 (L) 10/07/2024    MCV 92 10/07/2024     (L) 10/07/2024    RBC 2.00 (L) 10/07/2024    MCH 29.5 10/07/2024    MCHC 32.2 10/07/2024    RDW 16.4 (H) 10/07/2024    MPV 9.3 10/07/2024     CMP:   Lab Results   Component Value Date    K 4.6 10/07/2024     10/07/2024    CO2 15 (L) 10/07/2024     (H) 10/07/2024    CREATININE 5.01 (H) 10/07/2024    CALCIUM 8.2 (L) 10/07/2024    AST 11 (L) 10/07/2024    ALT 7 10/07/2024    ALKPHOS 14 (L) 10/07/2024    EGFR 10 10/07/2024       .  Results from last 7 days   Lab Units 10/07/24  0438 10/06/24  0612 10/05/24  0451   POTASSIUM mmol/L 4.6 4.6 4.0   CHLORIDE mmol/L 104 106 105   CO2 mmol/L 15* 15* 18*   BUN mg/dL 100* 70* 41*   CREATININE mg/dL 5.01* 4.02* 2.92*   CALCIUM mg/dL 8.2* 8.0* 8.0*   ALK PHOS U/L 14* 10*  --    ALT U/L 7 5*  --    AST U/L 11* 14  --          Phosphorus:   Lab Results   Component Value Date    PHOS 3.8 10/07/2024     Magnesium:   Lab Results   Component Value Date    MG 2.1 10/07/2024     Urinalysis: No results found for: \"COLORU\", \"CLARITYU\", \"SPECGRAV\", \"PHUR\", \"LEUKOCYTESUR\", \"NITRITE\", \"PROTEINUA\", \"GLUCOSEU\", \"KETONESU\", \"BILIRUBINUR\", \"BLOODU\"  Ionized Calcium: No results found for: \"CAION\"  Coagulation: No results found for: \"PT\", \"INR\", \"APTT\"  Troponin: No results found for: \"TROPONINI\"  ABG: No results found for: \"PHART\", \"RWJ3QTT\", \"PO2ART\", \"BRB3HRF\", \"R2ZJDGDE\", \"BEART\", " "\"SOURCE\"  Radiology review:     IMAGING  No results found.      Current Facility-Administered Medications:     calcium carbonate (OYSTER SHELL,OSCAL) 500 mg tablet 2 tablet, TID With Meals    calcium carbonate (TUMS) chewable tablet 1,000 mg, Daily PRN    carvedilol (COREG) tablet 6.25 mg, BID With Meals    cyclophosphamide (CYTOXAN) capsule 100 mg, Daily    epoetin marcela (EPOGEN,PROCRIT) injection 10,000 Units, Once per day on Monday Wednesday Friday    famotidine (PEPCID) tablet 20 mg, Daily    FLUoxetine (PROzac) capsule 20 mg, Daily    heparin (porcine) injection 6,000 Units, Once per day on Monday Wednesday Friday    heparin (porcine) subcutaneous injection 5,000 Units, Q8H LORENZO    melatonin tablet 3 mg, HS PRN    ondansetron (ZOFRAN) injection 4 mg, Q6H PRN    pantoprazole (PROTONIX) EC tablet 40 mg, BID AC    predniSONE tablet 60 mg, Daily    sodium bicarbonate 150 mEq in dextrose 5 % 1,000 mL infusion, Continuous, Last Rate: 50 mL/hr (10/07/24 0701)    sulfamethoxazole-trimethoprim (BACTRIM DS) 800-160 mg per tablet 1 tablet, Once per day on Monday Wednesday Friday    vit B complex-vit C-folic acid (Renal Caps) capsule 1 capsule, Daily With Dinner  Medications Discontinued During This Encounter   Medication Reason    meloxicam (MOBIC) 15 mg tablet Therapy completed    multi-electrolyte (PLASMALYTE-A/ISOLYTE-S PH 7.4) IV solution     cefTRIAXone (ROCEPHIN) IVPB (premix in dextrose) 1,000 mg 50 mL     cefTRIAXone (ROCEPHIN) IVPB (premix in dextrose) 1,000 mg 50 mL     albumin human (FLEXBUMIN) 5 % injection 75 g     albumin human (FLEXBUMIN) 5 % injection 100 g     cyclophosphamide (CYTOXAN) capsule 150 mg     predniSONE tablet 60 mg     albumin human (FLEXBUMIN) 5 % injection 75 g     albumin human (FLEXBUMIN) 5 % injection 100 g     sulfamethoxazole-trimethoprim (BACTRIM DS) 800-160 mg per tablet 1 tablet     albumin human (FLEXBUMIN) 5 % injection 12.5 g     albumin human 250 g 5% IVPB     albumin human " (FLEXBUMIN) 5 % injection 150 g     albumin human (FLEXBUMIN) 5 % injection 100 g     calcium carbonate (OYSTER SHELL,OSCAL) 500 mg tablet 1 tablet     magnesium sulfate 2 g/50 mL IVPB (premix) 2 g     calcium gluconate 1 g in sodium chloride 0.9% 50 mL (premix)     diltiazem (CARDIZEM CD) 24 hr capsule 120 mg     albumin human 250 g 5% IVPB     albumin human 400 g 5% IVPB     pantoprazole (PROTONIX) EC tablet 20 mg        Gigi Ortega, DO      This progress note was produced in part using a dictation device which may document imprecise wording from author's original intent.

## 2024-10-07 NOTE — NURSING NOTE
[facility] did not return call to ICU/this RN or come to complete treatment. Patient did not receive plasmapheresis 10/6/2024. Patient is scheduled for HD today 10/7/2024. BRETT Jurado aware.

## 2024-10-07 NOTE — PLAN OF CARE
Target UF Goal  2.5  L as tolerated. Patient dialyzing for  3 hrs and 45 mins  on 2 K bath for serum K of  4.6  per protocol. Treatment plan reviewed with Nephrology.       Post-Dialysis RN Treatment Note    Blood Pressure:  Pre 158/69 mm/Hg  Post 137/61 mmHg   EDW  TBD kg    Weight:  Pre 146 kg   Post 143.9 kg   Mode of weight measurement: Other (was weighed in erasmo lift and bed scale was not working)   Volume Removed  2000 ml    Treatment duration  3 hours and 45 mins   NS given  No    Treatment shortened? No   Medications given during Rx Heparin 6,000 units IV, Epogen 10,000 units IV   Estimated Kt/V  1.06   Access type: Permacath/TDC   Access Issues: Yes, describe: arterial resistance, had to run lines reversed since start of tx     Report called to primary nurse   Yes, Hodan RN at bedside       Problem: METABOLIC, FLUID AND ELECTROLYTES - ADULT  Goal: Electrolytes maintained within normal limits  Description: INTERVENTIONS:  - Monitor labs and assess patient for signs and symptoms of electrolyte imbalances  - Administer electrolyte replacement as ordered  - Monitor response to electrolyte replacements, including repeat lab results as appropriate  - Instruct patient on fluid and nutrition as appropriate  Outcome: Progressing  Goal: Fluid balance maintained  Description: INTERVENTIONS:  - Monitor labs   - Monitor I/O and WT  - Instruct patient on fluid and nutrition as appropriate  - Assess for signs & symptoms of volume excess or deficit  Outcome: Progressing

## 2024-10-07 NOTE — ASSESSMENT & PLAN NOTE
Calcium levels continue to improve, now up to a more appropriate level, will look to reduce calcium dosing, he is currently on 1000 mg calcium carbonate 3 times daily, will reduce to twice daily for now.  Continue to monitor and further adjust as indicated going forward.

## 2024-10-07 NOTE — PLAN OF CARE
Problem: PAIN - ADULT  Goal: Verbalizes/displays adequate comfort level or baseline comfort level  Description: Interventions:  - Encourage patient to monitor pain and request assistance  - Assess pain using appropriate pain scale  - Administer analgesics based on type and severity of pain and evaluate response  - Implement non-pharmacological measures as appropriate and evaluate response  - Consider cultural and social influences on pain and pain management  - Notify physician/advanced practitioner if interventions unsuccessful or patient reports new pain  Outcome: Progressing     Problem: INFECTION - ADULT  Goal: Absence or prevention of progression during hospitalization  Description: INTERVENTIONS:  - Assess and monitor for signs and symptoms of infection  - Monitor lab/diagnostic results  - Monitor all insertion sites, i.e. indwelling lines, tubes, and drains  - Monitor endotracheal if appropriate and nasal secretions for changes in amount and color  - [place] appropriate cooling/warming therapies per order  - Administer medications as ordered  - Instruct and encourage patient and family to use good hand hygiene technique  - Identify and instruct in appropriate isolation precautions for identified infection/condition  Outcome: Progressing  Goal: Absence of fever/infection during neutropenic period  Description: INTERVENTIONS:  - Monitor WBC    Outcome: Progressing     Problem: SAFETY ADULT  Goal: Patient will remain free of falls  Description: INTERVENTIONS:  - Educate patient/family on patient safety including physical limitations  - Instruct patient to call for assistance with activity   - Consult OT/PT to assist with strengthening/mobility   - Keep Call bell within reach  - Keep bed low and locked with side rails adjusted as appropriate  - Keep care items and personal belongings within reach  - Initiate and maintain comfort rounds  - Make Fall Risk Sign visible to staff  - Offer Toileting every 2 Hours,  in advance of need  - Initiate/Maintain bed alarm  - Obtain necessary fall risk management equipment  - Apply yellow socks and bracelet for high fall risk patients  - Consider moving patient to room near nurses station  Outcome: Progressing  Goal: Maintain or return to baseline ADL function  Description: INTERVENTIONS:  -  Assess patient's ability to carry out ADLs; assess patient's baseline for ADL function and identify physical deficits which impact ability to perform ADLs (bathing, care of mouth/teeth, toileting, grooming, dressing, etc.)  - Assess/evaluate cause of self-care deficits   - Assess range of motion  - Assess patient's mobility; develop plan if impaired  - Assess patient's need for assistive devices and provide as appropriate  - Encourage maximum independence but intervene and supervise when necessary  - Involve family in performance of ADLs  - Assess for home care needs following discharge   - Consider OT consult to assist with ADL evaluation and planning for discharge  - Provide patient education as appropriate  Outcome: Progressing  Goal: Maintains/Returns to pre admission functional level  Description: INTERVENTIONS:  - Perform AM-PAC 6 Click Basic Mobility/ Daily Activity assessment daily.  - Set and communicate daily mobility goal to care team and patient/family/caregiver.   - Collaborate with rehabilitation services on mobility goals if consulted  - Out of bed for meals   - Out of bed for toileting  - Record patient progress and toleration of activity level   Outcome: Progressing     Problem: DISCHARGE PLANNING  Goal: Discharge to home or other facility with appropriate resources  Description: INTERVENTIONS:  - Identify barriers to discharge w/patient and caregiver  - Arrange for needed discharge resources and transportation as appropriate  - Identify discharge learning needs (meds, wound care, etc.)  - Arrange for interpretive services to assist at discharge as needed  - Refer to Case  Management Department for coordinating discharge planning if the patient needs post-hospital services based on physician/advanced practitioner order or complex needs related to functional status, cognitive ability, or social support system  Outcome: Progressing     Problem: Knowledge Deficit  Goal: Patient/family/caregiver demonstrates understanding of disease process, treatment plan, medications, and discharge instructions  Description: Complete learning assessment and assess knowledge base.  Interventions:  - Provide teaching at level of understanding  - Provide teaching via preferred learning methods  Outcome: Progressing     Problem: Nutrition/Hydration-ADULT  Goal: Nutrient/Hydration intake appropriate for improving, restoring or maintaining nutritional needs  Description: Monitor and assess patient's nutrition/hydration status for malnutrition. Collaborate with interdisciplinary team and initiate plan and interventions as ordered.  Monitor patient's weight and dietary intake as ordered or per policy. Utilize nutrition screening tool and intervene as necessary. Determine patient's food preferences and provide high-protein, high-caloric foods as appropriate.     INTERVENTIONS:  - Monitor oral intake, urinary output, labs, and treatment plans  - Assess nutrition and hydration status and recommend course of action  - Evaluate amount of meals eaten  - Assist patient with eating if necessary   - Allow adequate time for meals  - Recommend/ encourage appropriate diets, oral nutritional supplements, and vitamin/mineral supplements  - Order, calculate, and assess calorie counts as needed  - Recommend, monitor, and adjust tube feedings and TPN/PPN based on assessed needs  - Assess need for intravenous fluids  - Provide specific nutrition/hydration education as appropriate  - Include patient/family/caregiver in decisions related to nutrition  Outcome: Progressing     Problem: METABOLIC, FLUID AND ELECTROLYTES -  ADULT  Goal: Electrolytes maintained within normal limits  Description: INTERVENTIONS:  - Monitor labs and assess patient for signs and symptoms of electrolyte imbalances  - Administer electrolyte replacement as ordered  - Monitor response to electrolyte replacements, including repeat lab results as appropriate  - Instruct patient on fluid and nutrition as appropriate  Outcome: Progressing  Goal: Fluid balance maintained  Description: INTERVENTIONS:  - Monitor labs   - Monitor I/O and WT  - Instruct patient on fluid and nutrition as appropriate  - Assess for signs & symptoms of volume excess or deficit  Outcome: Progressing     Problem: HEMATOLOGIC - ADULT  Goal: Maintains hematologic stability  Description: INTERVENTIONS  - Assess for signs and symptoms of bleeding or hemorrhage  - Monitor labs  - Administer supportive blood products/factors as ordered and appropriate  Outcome: Progressing

## 2024-10-07 NOTE — ASSESSMENT & PLAN NOTE
Patient has a hypervolemic hyponatremia  Patient has remained asymptomatic  Sodium is stable at 130  Continue fluid restriction  Continued management as per nephrology

## 2024-10-07 NOTE — ASSESSMENT & PLAN NOTE
Edema now well-controlled, continue to ultrafilter as tolerated dialysis.  Unfortunately, the patient remains anuric

## 2024-10-07 NOTE — ASSESSMENT & PLAN NOTE
Patient's anti-GBM antibody level remains pending from October 3, October 5, and levels are just drawn this morning.  Unfortunately the patient did not have a plasmapheresis performed yesterday, Sunday October 6.  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  Continue with cyclophosphamide and high-dose prednisone along with Bactrim for prophylaxis.

## 2024-10-07 NOTE — ASSESSMENT & PLAN NOTE
Noted on renal biopsy  Plasmapheresis was not completed on Sunday, 10/6/2024 for unspecified reasons  Plasmapheresis to resume today  Continue cyclophosphamide, prednisone, and Bactrim  Patient has a continued steroid-induced leukocytosis  Continued management as per nephrology/critical care

## 2024-10-07 NOTE — QUICK NOTE
Notified of Hb down to 5.9. 1u PRBC transfusion ordered stat, with repeat labs at noon incl CBC, INR, serum fibrinogen.

## 2024-10-08 LAB
ABO GROUP BLD BPU: NORMAL
ABO GROUP BLD: NORMAL
ANION GAP SERPL CALCULATED.3IONS-SCNC: 11 MMOL/L (ref 4–13)
ANISOCYTOSIS BLD QL SMEAR: PRESENT
BASOPHILS # BLD MANUAL: 0 THOUSAND/UL (ref 0–0.1)
BASOPHILS NFR MAR MANUAL: 0 % (ref 0–1)
BLD GP AB SCN SERPL QL: NEGATIVE
BPU ID: NORMAL
BUN SERPL-MCNC: 78 MG/DL (ref 5–25)
CALCIUM SERPL-MCNC: 8.3 MG/DL (ref 8.4–10.2)
CHLORIDE SERPL-SCNC: 96 MMOL/L (ref 96–108)
CO2 SERPL-SCNC: 23 MMOL/L (ref 21–32)
CREAT SERPL-MCNC: 3.6 MG/DL (ref 0.6–1.3)
CROSSMATCH: NORMAL
CROSSMATCH: NORMAL
EOSINOPHIL # BLD MANUAL: 0 THOUSAND/UL (ref 0–0.4)
EOSINOPHIL NFR BLD MANUAL: 0 % (ref 0–6)
ERYTHROCYTE [DISTWIDTH] IN BLOOD BY AUTOMATED COUNT: 16.6 % (ref 11.6–15.1)
FIBRINOGEN PPP-MCNC: 186 MG/DL (ref 206–523)
FOLATE SERPL-MCNC: 13.2 NG/ML
GBM AB SER IA-ACNC: 6.2 UNITS (ref 0–0.9)
GFR SERPL CREATININE-BSD FRML MDRD: 15 ML/MIN/1.73SQ M
GLUCOSE SERPL-MCNC: 165 MG/DL (ref 65–140)
HCT VFR BLD AUTO: 16.5 % (ref 36.5–49.3)
HCT VFR BLD AUTO: 20.1 % (ref 36.5–49.3)
HGB BLD-MCNC: 5.7 G/DL (ref 12–17)
HGB BLD-MCNC: 6.9 G/DL (ref 12–17)
LYMPHOCYTES # BLD AUTO: 0.57 THOUSAND/UL (ref 0.6–4.47)
LYMPHOCYTES # BLD AUTO: 4 % (ref 14–44)
MCH RBC QN AUTO: 30.5 PG (ref 26.8–34.3)
MCHC RBC AUTO-ENTMCNC: 34.5 G/DL (ref 31.4–37.4)
MCV RBC AUTO: 88 FL (ref 82–98)
MONOCYTES # BLD AUTO: 0.28 THOUSAND/UL (ref 0–1.22)
MONOCYTES NFR BLD: 2 % (ref 4–12)
NEUTROPHILS # BLD MANUAL: 13.31 THOUSAND/UL (ref 1.85–7.62)
NEUTS SEG NFR BLD AUTO: 94 % (ref 43–75)
PLATELET # BLD AUTO: 125 THOUSANDS/UL (ref 149–390)
PLATELET BLD QL SMEAR: ABNORMAL
PMV BLD AUTO: 9.4 FL (ref 8.9–12.7)
POTASSIUM SERPL-SCNC: 4 MMOL/L (ref 3.5–5.3)
RBC # BLD AUTO: 1.87 MILLION/UL (ref 3.88–5.62)
RBC MORPH BLD: PRESENT
RH BLD: POSITIVE
SODIUM SERPL-SCNC: 130 MMOL/L (ref 135–147)
SPECIMEN EXPIRATION DATE: NORMAL
UNIT DISPENSE STATUS: NORMAL
UNIT PRODUCT CODE: NORMAL
UNIT PRODUCT VOLUME: 203 ML
UNIT PRODUCT VOLUME: 205 ML
UNIT PRODUCT VOLUME: 207 ML
UNIT PRODUCT VOLUME: 209 ML
UNIT PRODUCT VOLUME: 211 ML
UNIT PRODUCT VOLUME: 212 ML
UNIT PRODUCT VOLUME: 217 ML
UNIT PRODUCT VOLUME: 250 ML
UNIT PRODUCT VOLUME: 280 ML
UNIT PRODUCT VOLUME: 280 ML
UNIT PRODUCT VOLUME: 350 ML
UNIT PRODUCT VOLUME: 350 ML
UNIT RH: NORMAL
VIT B12 SERPL-MCNC: 227 PG/ML (ref 180–914)
WBC # BLD AUTO: 14.16 THOUSAND/UL (ref 4.31–10.16)

## 2024-10-08 PROCEDURE — 85018 HEMOGLOBIN: CPT | Performed by: NURSE PRACTITIONER

## 2024-10-08 PROCEDURE — 86900 BLOOD TYPING SEROLOGIC ABO: CPT | Performed by: NURSE PRACTITIONER

## 2024-10-08 PROCEDURE — 99233 SBSQ HOSP IP/OBS HIGH 50: CPT | Performed by: INTERNAL MEDICINE

## 2024-10-08 PROCEDURE — 86920 COMPATIBILITY TEST SPIN: CPT

## 2024-10-08 PROCEDURE — 82746 ASSAY OF FOLIC ACID SERUM: CPT | Performed by: INTERNAL MEDICINE

## 2024-10-08 PROCEDURE — 86850 RBC ANTIBODY SCREEN: CPT | Performed by: NURSE PRACTITIONER

## 2024-10-08 PROCEDURE — 99232 SBSQ HOSP IP/OBS MODERATE 35: CPT | Performed by: HOSPITALIST

## 2024-10-08 PROCEDURE — P9016 RBC LEUKOCYTES REDUCED: HCPCS

## 2024-10-08 PROCEDURE — 99447 NTRPROF PH1/NTRNET/EHR 11-20: CPT | Performed by: INTERNAL MEDICINE

## 2024-10-08 PROCEDURE — 85007 BL SMEAR W/DIFF WBC COUNT: CPT | Performed by: HOSPITALIST

## 2024-10-08 PROCEDURE — 80048 BASIC METABOLIC PNL TOTAL CA: CPT | Performed by: HOSPITALIST

## 2024-10-08 PROCEDURE — 82607 VITAMIN B-12: CPT | Performed by: INTERNAL MEDICINE

## 2024-10-08 PROCEDURE — 85014 HEMATOCRIT: CPT | Performed by: NURSE PRACTITIONER

## 2024-10-08 PROCEDURE — 86923 COMPATIBILITY TEST ELECTRIC: CPT

## 2024-10-08 PROCEDURE — 85027 COMPLETE CBC AUTOMATED: CPT | Performed by: HOSPITALIST

## 2024-10-08 PROCEDURE — 86901 BLOOD TYPING SEROLOGIC RH(D): CPT | Performed by: NURSE PRACTITIONER

## 2024-10-08 PROCEDURE — 85384 FIBRINOGEN ACTIVITY: CPT | Performed by: NURSE PRACTITIONER

## 2024-10-08 RX ORDER — ALBUMIN, HUMAN INJ 5% 5 %
150 SOLUTION INTRAVENOUS ONCE
Status: COMPLETED | OUTPATIENT
Start: 2024-10-08 | End: 2024-10-08

## 2024-10-08 RX ORDER — CALCIUM GLUCONATE 20 MG/ML
2 INJECTION, SOLUTION INTRAVENOUS ONCE
Status: COMPLETED | OUTPATIENT
Start: 2024-10-08 | End: 2024-10-08

## 2024-10-08 RX ADMIN — CARVEDILOL 6.25 MG: 3.12 TABLET, FILM COATED ORAL at 07:43

## 2024-10-08 RX ADMIN — Medication 1 CAPSULE: at 16:44

## 2024-10-08 RX ADMIN — CARVEDILOL 6.25 MG: 3.12 TABLET, FILM COATED ORAL at 16:44

## 2024-10-08 RX ADMIN — CALCIUM 2 TABLET: 500 TABLET ORAL at 07:43

## 2024-10-08 RX ADMIN — CALCIUM GLUCONATE 2 G: 20 INJECTION, SOLUTION INTRAVENOUS at 14:09

## 2024-10-08 RX ADMIN — HEPARIN SODIUM 5000 UNITS: 5000 INJECTION, SOLUTION INTRAVENOUS; SUBCUTANEOUS at 21:41

## 2024-10-08 RX ADMIN — CYCLOPHOSPHAMIDE 100 MG: 50 CAPSULE ORAL at 16:44

## 2024-10-08 RX ADMIN — PREDNISONE 60 MG: 20 TABLET ORAL at 16:44

## 2024-10-08 RX ADMIN — CALCIUM 2 TABLET: 500 TABLET ORAL at 16:44

## 2024-10-08 RX ADMIN — PANTOPRAZOLE SODIUM 40 MG: 40 TABLET, DELAYED RELEASE ORAL at 06:05

## 2024-10-08 RX ADMIN — FAMOTIDINE 20 MG: 20 TABLET, FILM COATED ORAL at 08:24

## 2024-10-08 RX ADMIN — HEPARIN SODIUM 5000 UNITS: 5000 INJECTION, SOLUTION INTRAVENOUS; SUBCUTANEOUS at 06:05

## 2024-10-08 RX ADMIN — ALBUMIN (HUMAN) 150 G: 12.5 INJECTION, SOLUTION INTRAVENOUS at 14:10

## 2024-10-08 RX ADMIN — HEPARIN SODIUM 5000 UNITS: 5000 INJECTION, SOLUTION INTRAVENOUS; SUBCUTANEOUS at 13:59

## 2024-10-08 RX ADMIN — SODIUM BICARBONATE 50 ML/HR: 84 INJECTION, SOLUTION INTRAVENOUS at 11:26

## 2024-10-08 RX ADMIN — ALBUMIN (HUMAN) 150 G: 12.5 INJECTION, SOLUTION INTRAVENOUS at 14:09

## 2024-10-08 RX ADMIN — FLUOXETINE HYDROCHLORIDE 20 MG: 20 CAPSULE ORAL at 08:24

## 2024-10-08 RX ADMIN — PANTOPRAZOLE SODIUM 40 MG: 40 TABLET, DELAYED RELEASE ORAL at 16:44

## 2024-10-08 NOTE — ASSESSMENT & PLAN NOTE
Edema now well-controlled, continue to ultrafilter as tolerated dialysis.  Unfortunately, the patient remains anuric  Increase UF with HD, suspect anemia in part dilutional in nature

## 2024-10-08 NOTE — ASSESSMENT & PLAN NOTE
Lab Results   Component Value Date    EGFR 15 10/08/2024    EGFR 10 10/07/2024    EGFR 13 10/06/2024    CREATININE 3.60 (H) 10/08/2024    CREATININE 5.01 (H) 10/07/2024    CREATININE 4.02 (H) 10/06/2024   Patient is on Epogen 10,000 units with each hemodialysis session, continue to monitor hemoglobin levels, provide packed red blood cells as indicated depending on the patient progresses clinically.  Agree with hematology consultation. 2 unit PRBC.

## 2024-10-08 NOTE — PLAN OF CARE
Problem: PAIN - ADULT  Goal: Verbalizes/displays adequate comfort level or baseline comfort level  Description: Interventions:  - Encourage patient to monitor pain and request assistance  - Assess pain using appropriate pain scale  - Administer analgesics based on type and severity of pain and evaluate response  - Implement non-pharmacological measures as appropriate and evaluate response  - Consider cultural and social influences on pain and pain management  - Notify physician/advanced practitioner if interventions unsuccessful or patient reports new pain  Outcome: Progressing     Problem: INFECTION - ADULT  Goal: Absence or prevention of progression during hospitalization  Description: INTERVENTIONS:  - Assess and monitor for signs and symptoms of infection  - Monitor lab/diagnostic results  - Monitor all insertion sites, i.e. indwelling lines, tubes, and drains  - Monitor endotracheal if appropriate and nasal secretions for changes in amount and color  - [place] appropriate cooling/warming therapies per order  - Administer medications as ordered  - Instruct and encourage patient and family to use good hand hygiene technique  - Identify and instruct in appropriate isolation precautions for identified infection/condition  Outcome: Progressing     Problem: SAFETY ADULT  Goal: Patient will remain free of falls  Description: INTERVENTIONS:  - Educate patient/family on patient safety including physical limitations  - Instruct patient to call for assistance with activity   - Consult OT/PT to assist with strengthening/mobility   - Keep Call bell within reach  - Keep bed low and locked with side rails adjusted as appropriate  - Keep care items and personal belongings within reach  - Initiate and maintain comfort rounds  - Make Fall Risk Sign visible to staff  - Offer Toileting every 2 Hours, in advance of need  - Initiate/Maintain bed alarm  - Obtain necessary fall risk management equipment  - Apply yellow socks and  bracelet for high fall risk patients  - Consider moving patient to room near nurses station  Outcome: Progressing     Problem: Nutrition/Hydration-ADULT  Goal: Nutrient/Hydration intake appropriate for improving, restoring or maintaining nutritional needs  Description: Monitor and assess patient's nutrition/hydration status for malnutrition. Collaborate with interdisciplinary team and initiate plan and interventions as ordered.  Monitor patient's weight and dietary intake as ordered or per policy. Utilize nutrition screening tool and intervene as necessary. Determine patient's food preferences and provide high-protein, high-caloric foods as appropriate.     INTERVENTIONS:  - Monitor oral intake, urinary output, labs, and treatment plans  - Assess nutrition and hydration status and recommend course of action  - Evaluate amount of meals eaten  - Assist patient with eating if necessary   - Allow adequate time for meals  - Recommend/ encourage appropriate diets, oral nutritional supplements, and vitamin/mineral supplements  - Order, calculate, and assess calorie counts as needed  - Recommend, monitor, and adjust tube feedings and TPN/PPN based on assessed needs  - Assess need for intravenous fluids  - Provide specific nutrition/hydration education as appropriate  - Include patient/family/caregiver in decisions related to nutrition  Outcome: Progressing     Problem: Prexisting or High Potential for Compromised Skin Integrity  Goal: Skin integrity is maintained or improved  Description: INTERVENTIONS:  - Identify patients at risk for skin breakdown  - Assess and monitor skin integrity  - Assess and monitor nutrition and hydration status  - Monitor labs   - Assess for incontinence   - Turn and reposition patient  - Assist with mobility/ambulation  - Relieve pressure over bony prominences  - Avoid friction and shearing  - Provide appropriate hygiene as needed including keeping skin clean and dry  - Evaluate need for skin  moisturizer/barrier cream  - Collaborate with interdisciplinary team   - Patient/family teaching  - Consider wound care consult   Outcome: Progressing     Problem: METABOLIC, FLUID AND ELECTROLYTES - ADULT  Goal: Electrolytes maintained within normal limits  Description: INTERVENTIONS:  - Monitor labs and assess patient for signs and symptoms of electrolyte imbalances  - Administer electrolyte replacement as ordered  - Monitor response to electrolyte replacements, including repeat lab results as appropriate  - Instruct patient on fluid and nutrition as appropriate  Outcome: Progressing  Goal: Fluid balance maintained  Description: INTERVENTIONS:  - Monitor labs   - Monitor I/O and WT  - Instruct patient on fluid and nutrition as appropriate  - Assess for signs & symptoms of volume excess or deficit  Outcome: Progressing     Problem: HEMATOLOGIC - ADULT  Goal: Maintains hematologic stability  Description: INTERVENTIONS  - Assess for signs and symptoms of bleeding or hemorrhage  - Monitor labs  - Administer supportive blood products/factors as ordered and appropriate  Outcome: Progressing

## 2024-10-08 NOTE — CONSULTS
E-Consult  Ramos Rosenthal 74 y.o. 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  He has been on plasmapheresis sent September 21.  He is currently being exchange with albumin.  His hemoglobin was 10 when he arrived for admission.  Since then his hemoglobin has drifted down multiple times and has required 9 units of packed red blood cells.  Hemoglobin today is 5.7.  White count is slightly elevated at 14.1 and platelet count is 125,000.  There is no obvious evidence of hemolysis or bleeding.  He had a normal SPEP recently.  Last folate and B12 levels were in January 2023.  He is on Epogen 10,000 units weekly.  Creatinine today was 3.6.        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  Nephrology and primary team are adequately correcting coagulation factors.  There is no obvious bleeding or evidence of hemolysis.  I suspect that he is experiencing some delusional anemia from the exchange.  Consider decreasing the volume of albumin.  I will also check a folate and B12 level.  He has had so many units of packed red blood cells that it is very likely his iron levels are high.  I would offer to do a bone marrow biopsy if he is stable enough to go for the procedure and is willing.  I do not see any obvious evidence of a bone marrow disorder but it is the 1 thing we have not checked so far that could be contributing to anemia.  Please let me know if the patient and nephrology would be agreeable to this and I will put in the order.  Will continue to follow with you.        Total time spent 15 minutes, >50% of the total time devoted to medical consultative verbal/EMR discussion  between providers. Written report will be generated in the EMR.

## 2024-10-08 NOTE — ASSESSMENT & PLAN NOTE
Patient with a low hemoglobin on 9/27/2024  Hemoglobin was 5.9 yesterday, at which point he was transfused his 9th unit of packed red blood cells on this admission  Hemoglobin this morning again is low at 5.7  Case reviewed with critical care-they have already ordered 2 more units of packed red blood cells for today which would be his 10th, and 11th PRBC transfusion on this admission  No evidence of bleeding  No evidence of hemolysis  Possibly related to the plasmapheresis  We will consult hematology oncology to ask for their opinion on the cause of this anemia  Patient remains hemodynamically stable  Epogen as per Nephrology  Continue to monitor daily CBCs

## 2024-10-08 NOTE — ASSESSMENT & PLAN NOTE
Noted on renal biopsy  Continue daily plasmapheresis  Continue cyclophosphamide, prednisone, and Bactrim  Patient has a continued steroid-induced leukocytosis  Continued management as per nephrology/critical care

## 2024-10-08 NOTE — PROGRESS NOTES
Progress Note - Hospitalist   Name: Ramos Rosenthal 74 y.o. male I MRN: [id number]  Unit/Bed#: ICU 04-01 I Date of Admission: 9/14/2024   Date of Service: 10/8/2024 I Hospital Day: 24    Assessment & Plan  LYLY (acute kidney injury) ([facility])      Lab Results   Component Value Date    CREATININE 3.60 (H) 10/08/2024    CREATININE 5.01 (H) 10/07/2024    CREATININE 4.02 (H) 10/06/2024    EGFR 15 10/08/2024    EGFR 10 10/07/2024    EGFR 13 10/06/2024     Now on hemodialysis -Mondays/Wednesdays/Fridays  Plasmapheresis started on 9/21/2024 -it has occurred daily since -2 days  Status post renal biopsy-evidence of glomerular basement membrane disease  Continue prednisone 60 mg p.o. daily - steroid induced leukocytosis noted  Nephrology and Critical Care following  Trend anti-GBM antibody with goal less than 8 -most recent level 4.2  Notified by case management that an outpatient dialysis chair has been set up  Discharge planning once cleared by nephrology -hopefully later this week  Anemia due to chronic kidney disease, on chronic dialysis ([facility])  Patient with a low hemoglobin on 9/27/2024  Hemoglobin was 5.9 yesterday, at which point he was transfused his 9th unit of packed red blood cells on this admission  Hemoglobin this morning again is low at 5.7  Case reviewed with critical care-they have already ordered 2 more units of packed red blood cells for today which would be his 10th, and 11th PRBC transfusion on this admission  No evidence of bleeding  No evidence of hemolysis  Possibly related to the plasmapheresis  We will consult hematology oncology to ask for their opinion on the cause of this anemia  Patient remains hemodynamically stable  Epogen as per Nephrology  Continue to monitor daily CBCs  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  Noted on renal biopsy  Continue daily plasmapheresis  Continue cyclophosphamide, prednisone, and Bactrim  Patient has a continued steroid-induced  leukocytosis  Continued management as per nephrology/critical care  Hyponatremia  Patient has a hypervolemic hyponatremia  Patient has remained asymptomatic  Sodium is stable at 130  Continue fluid restriction  Continued management as per nephrology  Paresthesia of both lower extremities  Hx of back surgery in 2004 and 2014 and has since bilateral LE paresthesia  He is primarily wheelchair bound  Venous statis ulcers noted to Bilateral LE  B/L extremity edema R>L  Continue supportive therapy  Bilateral lower extremity edema  Chronic condition  Patient with a history of lower back/spinal surgery  Patient does not ambulate  Patient is wheelchair-bound at baseline  Depression  Continue home dose fluoxetine  Essential hypertension  Blood pressure is controlled   continue carvedilol  Hypocalcemia  Continue daily calcium carbonate supplementation    VTE Pharmacologic Prophylaxis: VTE Score: 8 High Risk (Score >/= 5) - Pharmacological DVT Prophylaxis Ordered: heparin. Sequential Compression Devices Ordered.    Mobility:   Basic Mobility Inpatient Raw Score: 9  -[facility] Goal: 3: Sit at edge of bed  -HL Achieved: 3: Sit at edge of bed  Patient is at baseline from a mobility standpoint    Patient Centered Rounds: I performed bedside rounds with nursing staff today.   Discussions with Specialists or Other Care Team Provider: Critical care, nephrology    Education and Discussions with Family / Patient:  Will update the family as the day progresses, they normally come in the afternoons.     Current Length of Stay: 24 day(s)  Current Patient Status: Inpatient   Certification Statement: The patient will continue to require additional inpatient hospital stay due to need for continued plasmapheresis, and PRBC transfusions  Discharge Plan:  Potential discharge planning for later this week if cleared by nephrology    Code Status: Level 1 - Full Code    Subjective   Patient seen, resting in bed, is comfortable, denies any pain or  discomfort    Objective :  Temp:  [96 °F (35.6 °C)-97.9 °F (36.6 °C)] 97.3 °F (36.3 °C)  HR:  [] 61  BP: (104-185)/(54-74) 113/55  Resp:  [13-29] 20  SpO2:  [98 %-100 %] 100 %  O2 Device: None (Room air)    Body mass index is 41.67 kg/m².     Input and Output Summary (last 24 hours):     Intake/Output Summary (Last 24 hours) at 10/8/2024 0836  Last data filed at 10/8/2024 0600  Gross per 24 hour   Intake 9065 ml   Output 2506 ml   Net 6559 ml       Physical Exam  Vitals and nursing note reviewed.   Constitutional:       General: He is not in acute distress.     Appearance: Normal appearance. He is not ill-appearing.   HENT:      Head: Normocephalic and atraumatic.      Nose: Nose normal.   Eyes:      Extraocular Movements: Extraocular movements intact.      Pupils: Pupils are equal, round, and reactive to light.   Cardiovascular:      Rate and Rhythm: Normal rate and regular rhythm.      Pulses: Normal pulses.      Heart sounds: Normal heart sounds. No murmur heard.     No friction rub. No gallop.   Pulmonary:      Effort: Pulmonary effort is normal.      Breath sounds: Normal breath sounds.   Abdominal:      General: There is no distension.      Palpations: Abdomen is soft. There is no mass.      Tenderness: There is no abdominal tenderness. There is no guarding or rebound.   Musculoskeletal:         General: No swelling or tenderness. Normal range of motion.      Cervical back: Normal range of motion and neck supple. No rigidity. No muscular tenderness.      Right lower leg: No edema.      Left lower leg: No edema.   Skin:     General: Skin is warm.      Capillary Refill: Capillary refill takes less than 2 seconds.      Findings: No erythema or rash.   Neurological:      Mental Status: He is alert and oriented to person, place, and time. Mental status is at baseline.   Psychiatric:         Mood and Affect: Mood normal.         Behavior: Behavior normal.           Lines/Drains:  Lines/Drains/Airways        Active Status       Name Placement date Placement time Site Days    HD Permanent Double Catheter 09/27/24  1316  Internal jugular  10                            Lab Results: I have reviewed the following results:   Results from last 7 days   Lab Units 10/08/24  0621 10/03/24  0538 10/02/24  0559   WBC Thousand/uL 14.16*   < > 13.38*   HEMOGLOBIN g/dL 5.7*   < > 7.2*   HEMATOCRIT % 16.5*   < > 22.1*   PLATELETS Thousands/uL 125*   < > 168   SEGS PCT %  --   --  91*   LYMPHO PCT % 4*   < > 5*   MONO PCT % 2*   < > 3*   EOS PCT % 0   < > 0    < > = values in this interval not displayed.     Results from last 7 days   Lab Units 10/08/24  0621 10/07/24  0438   SODIUM mmol/L 130* 130*   POTASSIUM mmol/L 4.0 4.6   CHLORIDE mmol/L 96 104   CO2 mmol/L 23 15*   BUN mg/dL 78* 100*   CREATININE mg/dL 3.60* 5.01*   ANION GAP mmol/L 11 11   CALCIUM mg/dL 8.3* 8.2*   ALBUMIN g/dL  --  3.6   TOTAL BILIRUBIN mg/dL  --  0.73   ALK PHOS U/L  --  14*   ALT U/L  --  7   AST U/L  --  11*   GLUCOSE RANDOM mg/dL 165* 177*     Results from last 7 days   Lab Units 10/07/24  1320   INR  1.21*             Results from last 7 days   Lab Units 10/07/24  0438   LACTIC ACID mmol/L 1.1       Recent Cultures (last 7 days):         Imaging Results Review: No pertinent imaging studies reviewed.  Other Study Results Review: No additional pertinent studies reviewed.    Last 24 Hours Medication List:     Current Facility-Administered Medications:     albumin human (FLEXBUMIN) 5 % injection 150 g, Once    albumin human (FLEXBUMIN) 5 % injection 150 g, Once    calcium carbonate (OYSTER SHELL,OSCAL) 500 mg tablet 2 tablet, BID With Meals    calcium carbonate (TUMS) chewable tablet 1,000 mg, Daily PRN    carvedilol (COREG) tablet 6.25 mg, BID With Meals    cyclophosphamide (CYTOXAN) capsule 100 mg, Daily    epoetin marcela (EPOGEN,PROCRIT) injection 10,000 Units, Once per day on Monday Wednesday Friday    famotidine (PEPCID) tablet 20 mg, Daily    FLUoxetine  (PROzac) capsule 20 mg, Daily    heparin (porcine) injection 6,000 Units, Once per day on Monday Wednesday Friday    heparin (porcine) subcutaneous injection 5,000 Units, Q8H LORENZO    melatonin tablet 3 mg, HS PRN    ondansetron (ZOFRAN) injection 4 mg, Q6H PRN    pantoprazole (PROTONIX) EC tablet 40 mg, BID AC    predniSONE tablet 60 mg, Daily    sodium bicarbonate 150 mEq in dextrose 5 % 1,000 mL infusion, Continuous, Last Rate: 50 mL/hr (10/07/24 1414)    sulfamethoxazole-trimethoprim (BACTRIM DS) 800-160 mg per tablet 1 tablet, Once per day on Monday Wednesday Friday    vit B complex-vit C-folic acid (Renal Caps) capsule 1 capsule, Daily With Dinner    Administrative Statements   Today, Patient Was Seen By: Agnes Rust MD  I have spent a total time of 35 minutes in caring for this patient on the day of the visit/encounter including Diagnostic results, Impressions, Counseling / Coordination of care, Documenting in the medical record, Reviewing / ordering tests, medicine, procedures  , and Communicating with other healthcare professionals .    **Please Note: This note may have been constructed using a voice recognition system.**

## 2024-10-08 NOTE — ASSESSMENT & PLAN NOTE
Patient remains in hemodialysis dependent acute kidney injury.  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  Please refer below for details.  Will continue to provide the patient with Monday Wednesday Friday hemodialysis sessions.    No signs of renal recovery as of yet.  Continue PLEX therapy as outlined below.   TCO2 23, attempt to stop bicarb gtt.  Challenge UF with HD as tolerates.

## 2024-10-08 NOTE — CASE MANAGEMENT
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 2024     Essential hypertension 2024     Secondary hyperparathyroidism of renal origin ([facility]) 2024     Uremia 2024     Hyponatremia 2024     Bilateral lower extremity edema 2024     Neurogenic claudication 2023    Bloating 10/05/2022     Paresthesia of both lower extremities 10/01/2022     Leucocytosis 10/01/2022     Elevated troponin 2022     EDGAR (dyspnea on exertion) 2022     Rash due to vaculitis  2022     LYLY (acute kidney injury) ([facility]) 2022     Depression 2022     Multiple open wounds of lower leg 2022     Lower extremity weakness 2019     Lumbosacral plexopathy 2019     Gait abnormality 2019     Lumbar stenosis 2019     Polyneuropathy 2019     Prediabetes 2018    Hyperlipidemia 2014    Vitamin D deficiency 2014      LOS (days): 24  Geometric Mean LOS (GMLOS) (days): 4.9  Days to GMLOS:-18.8     OBJECTIVE:  Risk of Unplanned Readmission Score: 32.75         Current admission status: Inpatient   Preferred Pharmacy:   [id number] [place] - [address]  [address] [address]  Phone: [phone] Fax: [phone]    Primary Care Provider: Jessika Carrillo MD    Primary Insurance: MEDICARE  Secondary  Insurance: [facility]    DISCHARGE DETAILS:          Additional Comments: CM continuing to follow for discharge planning. Pt has his OP dialysis chair set up with  [facility].  Will notify the center when a definite DC date has been set.  Family will provide transport for dialysis and home.  Pt will start services with [facility] upon DC.

## 2024-10-08 NOTE — PLAN OF CARE
Problem: PAIN - ADULT  Goal: Verbalizes/displays adequate comfort level or baseline comfort level  Description: Interventions:  - Encourage patient to monitor pain and request assistance  - Assess pain using appropriate pain scale  - Administer analgesics based on type and severity of pain and evaluate response  - Implement non-pharmacological measures as appropriate and evaluate response  - Consider cultural and social influences on pain and pain management  - Notify physician/advanced practitioner if interventions unsuccessful or patient reports new pain  10/8/2024 1116 by Johana Lizarraga  Outcome: Progressing  10/8/2024 1116 by Johana Lizarraga  Outcome: Progressing     Problem: INFECTION - ADULT  Goal: Absence or prevention of progression during hospitalization  Description: INTERVENTIONS:  - Assess and monitor for signs and symptoms of infection  - Monitor lab/diagnostic results  - Monitor all insertion sites, i.e. indwelling lines, tubes, and drains  - Monitor endotracheal if appropriate and nasal secretions for changes in amount and color  - [place] appropriate cooling/warming therapies per order  - Administer medications as ordered  - Instruct and encourage patient and family to use good hand hygiene technique  - Identify and instruct in appropriate isolation precautions for identified infection/condition  10/8/2024 1116 by Johana Lizarraga  Outcome: Progressing  10/8/2024 1116 by Johana Lizarraga  Outcome: Progressing  Goal: Absence of fever/infection during neutropenic period  Description: INTERVENTIONS:  - Monitor WBC    10/8/2024 1116 by Johana Lizarraga  Outcome: Progressing  10/8/2024 1116 by Johana Lizarraga  Outcome: Progressing     Problem: SAFETY ADULT  Goal: Patient will remain free of falls  Description: INTERVENTIONS:  - Educate patient/family on patient safety including physical limitations  - Instruct patient to call for assistance with activity   - Consult OT/PT to assist with strengthening/mobility   -  Keep Call bell within reach  - Keep bed low and locked with side rails adjusted as appropriate  - Keep care items and personal belongings within reach  - Initiate and maintain comfort rounds  - Make Fall Risk Sign visible to staff  - Offer Toileting every 2 Hours, in advance of need  - Initiate/Maintain bed alarm  - Obtain necessary fall risk management equipment  - Apply yellow socks and bracelet for high fall risk patients  - Consider moving patient to room near nurses station  10/8/2024 1116 by Johana Lizarraga  Outcome: Progressing  10/8/2024 1116 by Johana Lizarraga  Outcome: Progressing  Goal: Maintain or return to baseline ADL function  Description: INTERVENTIONS:  -  Assess patient's ability to carry out ADLs; assess patient's baseline for ADL function and identify physical deficits which impact ability to perform ADLs (bathing, care of mouth/teeth, toileting, grooming, dressing, etc.)  - Assess/evaluate cause of self-care deficits   - Assess range of motion  - Assess patient's mobility; develop plan if impaired  - Assess patient's need for assistive devices and provide as appropriate  - Encourage maximum independence but intervene and supervise when necessary  - Involve family in performance of ADLs  - Assess for home care needs following discharge   - Consider OT consult to assist with ADL evaluation and planning for discharge  - Provide patient education as appropriate  10/8/2024 1116 by Johana Lizarraga  Outcome: Progressing  10/8/2024 1116 by Johana Lizarraga  Outcome: Progressing  Goal: Maintains/Returns to pre admission functional level  Description: INTERVENTIONS:  - Perform AM-PAC 6 Click Basic Mobility/ Daily Activity assessment daily.  - Set and communicate daily mobility goal to care team and patient/family/caregiver.   - Collaborate with rehabilitation services on mobility goals if consulted  - Out of bed for meals   - Out of bed for toileting  - Record patient progress and toleration of activity level    10/8/2024 1116 by Johana Lizarraga  Outcome: Progressing  10/8/2024 1116 by Johana Lizarraga  Outcome: Progressing     Problem: DISCHARGE PLANNING  Goal: Discharge to home or other facility with appropriate resources  Description: INTERVENTIONS:  - Identify barriers to discharge w/patient and caregiver  - Arrange for needed discharge resources and transportation as appropriate  - Identify discharge learning needs (meds, wound care, etc.)  - Arrange for interpretive services to assist at discharge as needed  - Refer to Case Management Department for coordinating discharge planning if the patient needs post-hospital services based on physician/advanced practitioner order or complex needs related to functional status, cognitive ability, or social support system  10/8/2024 1116 by Johana Lizarraga  Outcome: Progressing  10/8/2024 1116 by Johana Lizarraga  Outcome: Progressing     Problem: Knowledge Deficit  Goal: Patient/family/caregiver demonstrates understanding of disease process, treatment plan, medications, and discharge instructions  Description: Complete learning assessment and assess knowledge base.  Interventions:  - Provide teaching at level of understanding  - Provide teaching via preferred learning methods  10/8/2024 1116 by Johana Lizarraga  Outcome: Progressing  10/8/2024 1116 by Johana Lizarraga  Outcome: Progressing     Problem: Nutrition/Hydration-ADULT  Goal: Nutrient/Hydration intake appropriate for improving, restoring or maintaining nutritional needs  Description: Monitor and assess patient's nutrition/hydration status for malnutrition. Collaborate with interdisciplinary team and initiate plan and interventions as ordered.  Monitor patient's weight and dietary intake as ordered or per policy. Utilize nutrition screening tool and intervene as necessary. Determine patient's food preferences and provide high-protein, high-caloric foods as appropriate.     INTERVENTIONS:  - Monitor oral intake, urinary output, labs,  and treatment plans  - Assess nutrition and hydration status and recommend course of action  - Evaluate amount of meals eaten  - Assist patient with eating if necessary   - Allow adequate time for meals  - Recommend/ encourage appropriate diets, oral nutritional supplements, and vitamin/mineral supplements  - Order, calculate, and assess calorie counts as needed  - Recommend, monitor, and adjust tube feedings and TPN/PPN based on assessed needs  - Assess need for intravenous fluids  - Provide specific nutrition/hydration education as appropriate  - Include patient/family/caregiver in decisions related to nutrition  10/8/2024 1116 by Johana Lizarraga  Outcome: Progressing  10/8/2024 1116 by Johana Lizarraga  Outcome: Progressing     Problem: Prexisting or High Potential for Compromised Skin Integrity  Goal: Skin integrity is maintained or improved  Description: INTERVENTIONS:  - Identify patients at risk for skin breakdown  - Assess and monitor skin integrity  - Assess and monitor nutrition and hydration status  - Monitor labs   - Assess for incontinence   - Turn and reposition patient  - Assist with mobility/ambulation  - Relieve pressure over bony prominences  - Avoid friction and shearing  - Provide appropriate hygiene as needed including keeping skin clean and dry  - Evaluate need for skin moisturizer/barrier cream  - Collaborate with interdisciplinary team   - Patient/family teaching  - Consider wound care consult   10/8/2024 1116 by Johana Lizarraga  Outcome: Progressing  10/8/2024 1116 by Johana Lizarraga  Outcome: Progressing     Problem: METABOLIC, FLUID AND ELECTROLYTES - ADULT  Goal: Electrolytes maintained within normal limits  Description: INTERVENTIONS:  - Monitor labs and assess patient for signs and symptoms of electrolyte imbalances  - Administer electrolyte replacement as ordered  - Monitor response to electrolyte replacements, including repeat lab results as appropriate  - Instruct patient on fluid and  nutrition as appropriate  10/8/2024 1116 by Johana Lizarraga  Outcome: Progressing  10/8/2024 1116 by Johana Lizarraga  Outcome: Progressing  Goal: Fluid balance maintained  Description: INTERVENTIONS:  - Monitor labs   - Monitor I/O and WT  - Instruct patient on fluid and nutrition as appropriate  - Assess for signs & symptoms of volume excess or deficit  10/8/2024 1116 by Johana Lizarraga  Outcome: Progressing  10/8/2024 1116 by Johana Lizarraga  Outcome: Progressing     Problem: HEMATOLOGIC - ADULT  Goal: Maintains hematologic stability  Description: INTERVENTIONS  - Assess for signs and symptoms of bleeding or hemorrhage  - Monitor labs  - Administer supportive blood products/factors as ordered and appropriate  10/8/2024 1116 by Johana Lizarraga  Outcome: Progressing  10/8/2024 1116 by Johana Lizarraga  Outcome: Progressing

## 2024-10-08 NOTE — ASSESSMENT & PLAN NOTE
Lab Results   Component Value Date    CREATININE 3.60 (H) 10/08/2024    CREATININE 5.01 (H) 10/07/2024    CREATININE 4.02 (H) 10/06/2024    EGFR 15 10/08/2024    EGFR 10 10/07/2024    EGFR 13 10/06/2024     Now on hemodialysis -Mondays/Wednesdays/Fridays  Plasmapheresis started on 9/21/2024 -it has occurred daily since -2 days  Status post renal biopsy-evidence of glomerular basement membrane disease  Continue prednisone 60 mg p.o. daily - steroid induced leukocytosis noted  Nephrology and Critical Care following  Trend anti-GBM antibody with goal less than 8 -most recent level 4.2  Notified by case management that an outpatient dialysis chair has been set up  Discharge planning once cleared by nephrology -hopefully later this week

## 2024-10-08 NOTE — PROGRESS NOTES
Progress Note - Nephrology   Name: Ramos Rosenthal 74 y.o. male I MRN: [id number]  Unit/Bed#: ICU 04-01 I Date of Admission: 9/14/2024   Date of Service: 10/8/2024 I Hospital Day: 24     Assessment & Plan  LYLY (acute kidney injury) (HCC)  Patient remains in hemodialysis dependent acute kidney injury.  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  Please refer below for details.  Will continue to provide the patient with Monday Wednesday Friday hemodialysis sessions.    No signs of renal recovery as of yet.  Continue PLEX therapy as outlined below.   TCO2 23, attempt to stop bicarb gtt.  Challenge UF with HD as tolerates.   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  Continue with cyclophosphamide and high-dose prednisone along with Bactrim for prophylaxis.    10/5/24 anti gbm 4.2. continue to follow, check level today.   Agree with PRBC 2 unit, PLEX likely contributing to anemia with volume administration/dilution. Appreciate heme/onc consult.? Weight gain --bed scale weight?   Will attempt to challenge EDW.   Worsening azotemia raises question for bleeding as well, denies blood loss.   Hyponatremia  Continue to provide fluid restriction, follow sodium levels from time to time.  Hypocalcemia  Continue calcium supplementation with PLEX and calcium carbonate 1 gram BID.   Anemia due to chronic kidney disease, on chronic dialysis (HCC)  Lab Results   Component Value Date    EGFR 15 10/08/2024    EGFR 10 10/07/2024    EGFR 13 10/06/2024    CREATININE 3.60 (H) 10/08/2024    CREATININE 5.01 (H) 10/07/2024    CREATININE 4.02 (H) 10/06/2024   Patient is on Epogen 10,000 units with each hemodialysis session, continue to monitor hemoglobin levels, provide packed red blood cells as indicated depending on the  patient progresses clinically.  Agree with hematology consultation. 2 unit PRBC.    Bilateral lower extremity edema  Edema now well-controlled, continue to ultrafilter as tolerated dialysis.  Unfortunately, the patient remains anuric  Increase UF with HD, suspect anemia in part dilutional in nature  Essential hypertension  Blood pressure appropriate, continue to monitor for now.  Continue with carvedilol.  Hypomagnesemia  Continue to monitor magnesium levels, offer supplementation as indicated.    I have reviewed the nephrology recommendations including hematology consultation, with primary, and we are in agreement with renal plan including the information outlined above.     Subjective       Patient seen and examined today. Patient denies complaints, continues with anemia. Denies dark stools/BRBPR.   A complete 10 point review of systems was performed and is otherwise negative.     Objective :  Temp:  [97 °F (36.1 °C)-97.7 °F (36.5 °C)] 97.2 °F (36.2 °C)  HR:  [] 58  BP: (104-147)/(54-76) 131/62  Resp:  [13-29] 17  SpO2:  [97 %-100 %] 97 %  O2 Device: None (Room air)    Current Weight: Weight - Scale: (!) 147 kg (324 lb 8.3 oz)  First Weight: Weight - Scale: (!) 140 kg (309 lb 8.4 oz)  I/O         10/06 0701  10/07 0700 10/07 0701  10/08 0700 10/08 0701  10/09 0700    P.O.  891 498    I.V. (mL/kg) 713.3 (4.9) 1729.2 (11.8) 300 (2)    Blood  2774 352.5    IV Piggyback  4050     Total Intake(mL/kg) 713.3 (4.9) 9444.2 (64.2) 1150.5 (7.8)    Urine (mL/kg/hr) 0 (0) 0 (0) 0 (0)    Other  2506     Stool 0 0 0    Total Output 0 2506 0    Net +713.3 +6938.2 +1150.5           Unmeasured Urine Occurrence 0 x      Unmeasured Stool Occurrence 0 x 0 x 0 x          Physical Exam  Vitals reviewed.   Constitutional:       General: He is not in acute distress.     Appearance: He is obese. He is not ill-appearing.   HENT:      Head: Normocephalic and atraumatic.      Nose: Nose normal.      Mouth/Throat:      Mouth: Mucous  membranes are moist.      Pharynx: Oropharynx is clear.   Cardiovascular:      Rate and Rhythm: Normal rate and regular rhythm.   Pulmonary:      Breath sounds: No wheezing, rhonchi or rales.   Abdominal:      Palpations: Abdomen is soft.      Tenderness: There is no abdominal tenderness. There is no guarding.   Musculoskeletal:      Right lower leg: Edema present.      Left lower leg: Edema present.   Skin:     Coloration: Skin is not jaundiced.      Findings: No bruising or rash.   Neurological:      Mental Status: He is alert and oriented to person, place, and time. Mental status is at baseline.         Medications:    Current Facility-Administered Medications:     albumin human (FLEXBUMIN) 5 % injection 150 g, 150 g, Intravenous, Once, Rebecca A Davidora, CRNP    albumin human (FLEXBUMIN) 5 % injection 150 g, 150 g, Intravenous, Once, BRETT Stovall    calcium carbonate (OYSTER SHELL,OSCAL) 500 mg tablet 2 tablet, 2 tablet, Oral, BID With Meals, Gigi Morochos DO, 2 tablet at 10/08/24 0743    calcium carbonate (TUMS) chewable tablet 1,000 mg, 1,000 mg, Oral, Daily PRN, BRETT Xie    carvedilol (COREG) tablet 6.25 mg, 6.25 mg, Oral, BID With Meals, Gigi Varvarelis, DO, 6.25 mg at 10/08/24 0743    cyclophosphamide (CYTOXAN) capsule 100 mg, 100 mg, Oral, Daily, Lisa Moore, DO, 100 mg at 10/07/24 1652    epoetin marcela (EPOGEN,PROCRIT) injection 10,000 Units, 10,000 Units, Intravenous, Once per day on Monday Wednesday Friday, Gigi Ortega, DO, 10,000 Units at 10/07/24 0955    famotidine (PEPCID) tablet 20 mg, 20 mg, Oral, Daily, Ramos Mclean, DO, 20 mg at 10/08/24 0824    FLUoxetine (PROzac) capsule 20 mg, 20 mg, Oral, Daily, BRETT Xie, 20 mg at 10/08/24 0824    heparin (porcine) injection 6,000 Units, 6,000 Units, Intravenous, Once per day on Monday Wednesday Friday, Gigi Ortega DO, 6,000 Units at 10/07/24 0953    heparin (porcine)  subcutaneous injection 5,000 Units, 5,000 Units, Subcutaneous, Q8H LORENZO, BRETT Xie, 5,000 Units at 10/08/24 0605    melatonin tablet 3 mg, 3 mg, Oral, HS PRN, Laurel Rosenberg MD, 3 mg at 09/28/24 0057    ondansetron (ZOFRAN) injection 4 mg, 4 mg, Intravenous, Q6H PRN, BRETT Xie    pantoprazole (PROTONIX) EC tablet 40 mg, 40 mg, Oral, BID AC, Rui Chappell MD, 40 mg at 10/08/24 0605    predniSONE tablet 60 mg, 60 mg, Oral, Daily, Lisa Moore DO, 60 mg at 10/07/24 1654    sulfamethoxazole-trimethoprim (BACTRIM DS) 800-160 mg per tablet 1 tablet, 1 tablet, Oral, Once per day on Monday Wednesday Friday, Lisa Moore DO, 1 tablet at 10/07/24 1654    vit B complex-vit C-folic acid (Renal Caps) capsule 1 capsule, 1 capsule, Oral, Daily With Dinner, Gigi Ortega DO, 1 capsule at 10/07/24 1653      Lab Results: I have reviewed the following results:  Results from last 7 days   Lab Units 10/08/24  0621 10/07/24  1320 10/07/24  0438 10/06/24  1626 10/06/24  0612 10/06/24  0003 10/05/24  2008 10/05/24  0451 10/04/24  0432 10/03/24  1856 10/03/24  0538 10/02/24  0559   WBC Thousand/uL 14.16* 16.18* 16.30*  --  15.38*  --  18.13* 15.91* 17.56*  --  16.21* 13.38*   HEMOGLOBIN g/dL 5.7* 6.4* 5.9* 7.1* 7.0* 6.6* 6.1* 6.5* 7.0*   < > 6.6* 7.2*   HEMATOCRIT % 16.5* 18.4* 18.3*  --  21.3*  --  18.5* 19.5* 21.5*  --  20.5* 22.1*   PLATELETS Thousands/uL 125* 133* 137*  --  125*  --  131* 145* 148*  --  165 168   POTASSIUM mmol/L 4.0  --  4.6  --  4.6  --   --  4.0 4.5  --  4.2 4.1   CHLORIDE mmol/L 96  --  104  --  106  --   --  105 107  --  105 104   CO2 mmol/L 23  --  15*  --  15*  --   --  18* 17*  --  20* 18*   BUN mg/dL 78*  --  100*  --  70*  --   --  41* 47*  --  32* 43*   CREATININE mg/dL 3.60*  --  5.01*  --  4.02*  --   --  2.92* 4.29*  --  3.39* 4.63*   CALCIUM mg/dL 8.3*  --  8.2*  --  8.0*  --   --  8.0* 7.9*  --  7.8* 8.0*   MAGNESIUM mg/dL  --   --  2.1   "--  1.8*  --   --  1.6* 1.8*  --  1.6* 2.0   PHOSPHORUS mg/dL  --   --  3.8  --  4.0  --   --  3.1 4.5*  --  3.7 5.3*   ALBUMIN g/dL  --   --  3.6  --  4.1  --   --   --   --   --   --   --     < > = values in this interval not displayed.       Administrative Statements     Portions of the record may have been created with voice recognition software. Occasional wrong word or \"sound a like\" substitutions may have occurred due to the inherent limitations of voice recognition software. Read the chart carefully and recognize, using context, where substitutions have occurred.If you have any questions, please contact the dictating provider.  "

## 2024-10-08 NOTE — ASSESSMENT & PLAN NOTE
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  Continue with cyclophosphamide and high-dose prednisone along with Bactrim for prophylaxis.    10/5/24 anti gbm 4.2. continue to follow, check level today.   Agree with PRBC 2 unit, PLEX likely contributing to anemia with volume administration/dilution. Appreciate heme/onc consult.? Weight gain --bed scale weight?   Will attempt to challenge EDW.   Worsening azotemia raises question for bleeding as well, denies blood loss.

## 2024-10-09 ENCOUNTER — APPOINTMENT (INPATIENT)
Dept: DIALYSIS | Facility: HOSPITAL | Age: 74
DRG: 871 | End: 2024-10-09
Attending: INTERNAL MEDICINE
Payer: MEDICARE

## 2024-10-09 LAB
ABO GROUP BLD BPU: NORMAL
ABO GROUP BLD BPU: NORMAL
ANION GAP SERPL CALCULATED.3IONS-SCNC: 11 MMOL/L (ref 4–13)
BPU ID: NORMAL
BPU ID: NORMAL
BUN SERPL-MCNC: 89 MG/DL (ref 5–25)
CA-I BLD-SCNC: 1.1 MMOL/L (ref 1.12–1.32)
CALCIUM SERPL-MCNC: 8.2 MG/DL (ref 8.4–10.2)
CHLORIDE SERPL-SCNC: 100 MMOL/L (ref 96–108)
CO2 SERPL-SCNC: 21 MMOL/L (ref 21–32)
CREAT SERPL-MCNC: 4.18 MG/DL (ref 0.6–1.3)
CROSSMATCH: NORMAL
CROSSMATCH: NORMAL
ERYTHROCYTE [DISTWIDTH] IN BLOOD BY AUTOMATED COUNT: 16 % (ref 11.6–15.1)
FIBRINOGEN PPP-MCNC: 78 MG/DL (ref 206–523)
GFR SERPL CREATININE-BSD FRML MDRD: 12 ML/MIN/1.73SQ M
GLUCOSE SERPL-MCNC: 159 MG/DL (ref 65–140)
HCT VFR BLD AUTO: 18.9 % (ref 36.5–49.3)
HGB BLD-MCNC: 6.6 G/DL (ref 12–17)
MAGNESIUM SERPL-MCNC: 1.8 MG/DL (ref 1.9–2.7)
MCH RBC QN AUTO: 31.3 PG (ref 26.8–34.3)
MCHC RBC AUTO-ENTMCNC: 34.9 G/DL (ref 31.4–37.4)
MCV RBC AUTO: 90 FL (ref 82–98)
PLATELET # BLD AUTO: 119 THOUSANDS/UL (ref 149–390)
PMV BLD AUTO: 9.1 FL (ref 8.9–12.7)
POTASSIUM SERPL-SCNC: 4.2 MMOL/L (ref 3.5–5.3)
RBC # BLD AUTO: 2.11 MILLION/UL (ref 3.88–5.62)
SODIUM SERPL-SCNC: 132 MMOL/L (ref 135–147)
UNIT DISPENSE STATUS: NORMAL
UNIT DISPENSE STATUS: NORMAL
UNIT PRODUCT CODE: NORMAL
UNIT PRODUCT CODE: NORMAL
UNIT PRODUCT VOLUME: 350 ML
UNIT PRODUCT VOLUME: 350 ML
UNIT RH: NORMAL
UNIT RH: NORMAL
WBC # BLD AUTO: 12.51 THOUSAND/UL (ref 4.31–10.16)

## 2024-10-09 PROCEDURE — 82330 ASSAY OF CALCIUM: CPT | Performed by: NURSE PRACTITIONER

## 2024-10-09 PROCEDURE — 80048 BASIC METABOLIC PNL TOTAL CA: CPT | Performed by: NURSE PRACTITIONER

## 2024-10-09 PROCEDURE — 83520 IMMUNOASSAY QUANT NOS NONAB: CPT | Performed by: INTERNAL MEDICINE

## 2024-10-09 PROCEDURE — 85027 COMPLETE CBC AUTOMATED: CPT | Performed by: NURSE PRACTITIONER

## 2024-10-09 PROCEDURE — P9012 CRYOPRECIPITATE EACH UNIT: HCPCS

## 2024-10-09 PROCEDURE — 90935 HEMODIALYSIS ONE EVALUATION: CPT

## 2024-10-09 PROCEDURE — 99232 SBSQ HOSP IP/OBS MODERATE 35: CPT | Performed by: HOSPITALIST

## 2024-10-09 PROCEDURE — P9017 PLASMA 1 DONOR FRZ W/IN 8 HR: HCPCS

## 2024-10-09 PROCEDURE — P9016 RBC LEUKOCYTES REDUCED: HCPCS

## 2024-10-09 PROCEDURE — 83735 ASSAY OF MAGNESIUM: CPT | Performed by: NURSE PRACTITIONER

## 2024-10-09 PROCEDURE — 85384 FIBRINOGEN ACTIVITY: CPT | Performed by: NURSE PRACTITIONER

## 2024-10-09 RX ORDER — MAGNESIUM SULFATE HEPTAHYDRATE 40 MG/ML
2 INJECTION, SOLUTION INTRAVENOUS ONCE
Status: COMPLETED | OUTPATIENT
Start: 2024-10-09 | End: 2024-10-09

## 2024-10-09 RX ORDER — CALCIUM GLUCONATE 20 MG/ML
2 INJECTION, SOLUTION INTRAVENOUS ONCE
Status: COMPLETED | OUTPATIENT
Start: 2024-10-09 | End: 2024-10-10

## 2024-10-09 RX ORDER — ALBUMIN, HUMAN INJ 5% 5 %
150 SOLUTION INTRAVENOUS ONCE
Status: COMPLETED | OUTPATIENT
Start: 2024-10-09 | End: 2024-10-10

## 2024-10-09 RX ORDER — ALBUMIN, HUMAN INJ 5% 5 %
12.5 SOLUTION INTRAVENOUS ONCE
Status: DISCONTINUED | OUTPATIENT
Start: 2024-10-09 | End: 2024-10-09

## 2024-10-09 RX ADMIN — HEPARIN SODIUM 5000 UNITS: 5000 INJECTION, SOLUTION INTRAVENOUS; SUBCUTANEOUS at 06:02

## 2024-10-09 RX ADMIN — EPOETIN ALFA 10000 UNITS: 10000 SOLUTION INTRAVENOUS; SUBCUTANEOUS at 11:33

## 2024-10-09 RX ADMIN — FAMOTIDINE 20 MG: 20 TABLET, FILM COATED ORAL at 09:00

## 2024-10-09 RX ADMIN — HEPARIN SODIUM 5000 UNITS: 5000 INJECTION, SOLUTION INTRAVENOUS; SUBCUTANEOUS at 21:03

## 2024-10-09 RX ADMIN — FLUOXETINE HYDROCHLORIDE 20 MG: 20 CAPSULE ORAL at 08:03

## 2024-10-09 RX ADMIN — ALBUMIN (HUMAN) 150 G: 12.5 INJECTION, SOLUTION INTRAVENOUS at 15:09

## 2024-10-09 RX ADMIN — CALCIUM GLUCONATE 2 G: 20 INJECTION, SOLUTION INTRAVENOUS at 15:02

## 2024-10-09 RX ADMIN — HEPARIN SODIUM 6000 UNITS: 1000 INJECTION INTRAVENOUS; SUBCUTANEOUS at 09:40

## 2024-10-09 RX ADMIN — CYCLOPHOSPHAMIDE 100 MG: 50 CAPSULE ORAL at 16:42

## 2024-10-09 RX ADMIN — CALCIUM 2 TABLET: 500 TABLET ORAL at 07:44

## 2024-10-09 RX ADMIN — HEPARIN SODIUM 5000 UNITS: 5000 INJECTION, SOLUTION INTRAVENOUS; SUBCUTANEOUS at 13:07

## 2024-10-09 RX ADMIN — PREDNISONE 60 MG: 20 TABLET ORAL at 16:42

## 2024-10-09 RX ADMIN — PANTOPRAZOLE SODIUM 40 MG: 40 TABLET, DELAYED RELEASE ORAL at 16:42

## 2024-10-09 RX ADMIN — Medication 1 CAPSULE: at 16:42

## 2024-10-09 RX ADMIN — CALCIUM GLUCONATE 2 G: 20 INJECTION, SOLUTION INTRAVENOUS at 06:43

## 2024-10-09 RX ADMIN — CALCIUM 2 TABLET: 500 TABLET ORAL at 16:42

## 2024-10-09 RX ADMIN — MAGNESIUM SULFATE HEPTAHYDRATE 2 G: 40 INJECTION, SOLUTION INTRAVENOUS at 07:41

## 2024-10-09 RX ADMIN — SULFAMETHOXAZOLE AND TRIMETHOPRIM 1 TABLET: 800; 160 TABLET ORAL at 16:42

## 2024-10-09 RX ADMIN — PANTOPRAZOLE SODIUM 40 MG: 40 TABLET, DELAYED RELEASE ORAL at 07:46

## 2024-10-09 NOTE — PLAN OF CARE
Target UF Goal  3.5  L as tolerated. Patient dialyzing for  3 hrs and 45 mins  on 3 K bath for serum K of  4.2  per protocol. Treatment plan reviewed with Nephrology.       Post-Dialysis RN Treatment Note    Blood Pressure:  Pre 121/54 mm/Hg  Post 122/58 mmHg   EDW  TBD kg    Weight:  Pre 147 kg   Post 144.1 kg   Mode of weight measurement: Bed Scale   Volume Removed  3000 ml    Treatment duration  3 hrs and 45 mins   NS given  No    Treatment shortened? No   Medications given during Rx 1 units PRBCs   Estimated Kt/V  0.86   Access type: Permacath/TDC   Access Issues: Yes, describe: arterial resistance noted, tried to run tx straight, but had to reverse lines from the start.     Report called to primary nurse   Yes , Lester RN at bedside    Problem: METABOLIC, FLUID AND ELECTROLYTES - ADULT  Goal: Electrolytes maintained within normal limits  Description: INTERVENTIONS:  - Monitor labs and assess patient for signs and symptoms of electrolyte imbalances  - Administer electrolyte replacement as ordered  - Monitor response to electrolyte replacements, including repeat lab results as appropriate  - Instruct patient on fluid and nutrition as appropriate  Outcome: Progressing  Goal: Fluid balance maintained  Description: INTERVENTIONS:  - Monitor labs   - Monitor I/O and WT  - Instruct patient on fluid and nutrition as appropriate  - Assess for signs & symptoms of volume excess or deficit  Outcome: Progressing

## 2024-10-09 NOTE — ASSESSMENT & PLAN NOTE
Chronic condition  Patient with a history of lower back/spinal surgery  Patient does not ambulate  Patient is wheelchair-bound at baseline   DISCHARGE

## 2024-10-09 NOTE — PROGRESS NOTES
Progress Note - Nephrology   Name: Ramos Rosenthal 74 y.o. male I MRN: [id number]  Unit/Bed#: ICU 04-01 I Date of Admission: 9/14/2024   Date of Service: 10/9/2024 I Hospital Day: 25         HEMODIALYSIS PROCEDURE NOTE  The patient was seen and examined on hemodialysis.  Time: 3.75 hours  Sodium: 135 Blood flow: 400   Dialyzer: F160 Potassium: 3 Dialysate flow: 1.5x   Access: L PermCath Bicarbonate: 35 Ultrafiltration goal: 3.5L as renee   Medications on HD: PRBCx1, epogen 10,000 units       Assessment & Plan  LYLY (acute kidney injury) (HCC)  Patient remains in hemodialysis dependent acute kidney injury.  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   Please refer below for details.  Continue Monday Wednesday Friday hemodialysis sessions.    No signs of renal recovery as of yet. Remains anuric.   Continue PLEX therapy as outlined below.   TCO2 21, attempt to stop bicarb gtt.  Challenge UF with HD as tolerates. Challenging UF to 3.5 L today, 10/9.  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 Continue with cyclophosphamide and high-dose prednisone along with Bactrim for prophylaxis.    10/5/24 anti gbm 4.2. continue to follow, checked level on 10/8 still pending.  Agree with PRBC 1 unit, PLEX likely contributing to anemia with volume administration/dilution. Hematology following, recommendations include possible bone marrow biopsy, checking iron, B12 and folate, consider decreasing albumin. Weight gain -- bed scale weight trending up to 329 lbs 10/9, was 309 lbs on admission 9/25.  Examines hypervolemic with BLE edema - 4+ right, 3+ left. Lungs sounds clear but diminished.   Will attempt to challenge EDW to 3.5L UF today.   Worsening azotemia raises question for bleeding as well, denies blood loss.  "  Hyponatremia  Continue to provide fluid restriction, follow sodium levels from time to time. Sodium 132 mmol/L on 10/9 predialysis.   Hypocalcemia  Continue calcium supplementation with PLEX and calcium gluconate 2 gram given 10/9.   Anemia due to chronic kidney disease, on chronic dialysis (HCC)  Lab Results   Component Value Date    EGFR 12 10/09/2024    EGFR 15 10/08/2024    EGFR 10 10/07/2024    CREATININE 4.18 (H) 10/09/2024    CREATININE 3.60 (H) 10/08/2024    CREATININE 5.01 (H) 10/07/2024   Patient is on Epogen 10,000 units with each hemodialysis session, continue to monitor hemoglobin levels, provide packed red blood cells as indicated depending on the patient progresses clinically. Hematology following.  2 unit PRBC 10/8, 1 unit PRBC 10/9.    Bilateral lower extremity edema  Edema 4+ RLE, 3+ LLE, continue to ultrafilter as tolerated dialysis.  Unfortunately, the patient remains anuric  Increase UF with HD, suspect anemia in part dilutional in nature. Challenging UF to 3.5 L today.  Essential hypertension  Blood pressure appropriate, continue to monitor for now.  Continue with carvedilol.  Hypomagnesemia  Continue to monitor magnesium levels, offer supplementation as indicated.    I have reviewed the nephrology recommendations including fluid status, transfusion status, lab results and hemodialysis and plasmapheresis treatments with CC team, and we are in agreement with renal plan including the information outlined above.     Subjective     Patient admitted to [facility] 9/14 with acute kidney injury noted to have anti-GBM nephropathy requiring dialysis and plasmapheresis as noted above.     Examined resting in bed, denies complaints \"I am in good shape for the shape I am in\". Patient states he remains anuric. States his appetite has been good. Examined during hemodialysis today, denies cramping, dizziness or nausea with HD treatments.         Objective :  Temp:  [95.8 °F (35.4 °C)-97.7 °F (36.5 °C)] 97.3 °F " (36.3 °C)  HR:  [53-66] 54  BP: (100-148)/(52-76) 132/62  Resp:  [11-22] 18  SpO2:  [97 %-100 %] 100 %  O2 Device: None (Room air)    Current Weight: Weight - Scale: (!) 149 kg (329 lb 2.4 oz)  First Weight: Weight - Scale: (!) 140 kg (309 lb 8.4 oz)  I/O         10/07 0701  10/08 0700 10/08 0701  10/09 0700 10/09 0701  10/10 0700    P.O. 891 498     I.V. (mL/kg) 1729.2 (11.8) 397.5 (2.7) 200 (1.3)    Blood 2774 702.5 355.8    IV Piggyback 4050 100     Total Intake(mL/kg) 9444.2 (64.2) 1698 (11.6) 555.8 (3.7)    Urine (mL/kg/hr) 0 (0) 0 (0)     Other 2506      Stool 0 0     Total Output 2506 0     Net +6938.2 +1698 +555.8           Unmeasured Stool Occurrence 0 x 0 x           Physical Exam  Vitals and nursing note reviewed.   Constitutional:       General: He is not in acute distress.     Appearance: He is well-developed. He is obese. He is ill-appearing.   HENT:      Head: Normocephalic and atraumatic.      Right Ear: External ear normal.      Left Ear: External ear normal.      Mouth/Throat:      Mouth: Mucous membranes are moist.      Pharynx: Oropharynx is clear.   Eyes:      Extraocular Movements: Extraocular movements intact.      Conjunctiva/sclera: Conjunctivae normal.   Cardiovascular:      Rate and Rhythm: Normal rate and regular rhythm.      Heart sounds: Normal heart sounds. No murmur heard.  Pulmonary:      Effort: Pulmonary effort is normal. No respiratory distress.      Breath sounds: Normal breath sounds.      Comments: Decreased breath sounds  Abdominal:      Palpations: Abdomen is soft.      Tenderness: There is no abdominal tenderness.      Comments: obese   Musculoskeletal:         General: No swelling.      Cervical back: Neck supple.      Right lower leg: Edema present.      Left lower leg: Edema present.      Comments: RLE 4+, LLE 3+ edema   Skin:     General: Skin is warm and dry.      Capillary Refill: Capillary refill takes less than 2 seconds.      Coloration: Skin is pale.    Neurological:      General: No focal deficit present.      Mental Status: He is alert and oriented to person, place, and time.   Psychiatric:         Mood and Affect: Mood normal.         Medications:    Current Facility-Administered Medications:     albumin human 400 g 5% IVPB, 400 g, Intravenous, Once, BRETT Stovall    calcium carbonate (OYSTER SHELL,OSCAL) 500 mg tablet 2 tablet, 2 tablet, Oral, BID With Meals, Gigi Ortega DO, 2 tablet at 10/09/24 0744    calcium carbonate (TUMS) chewable tablet 1,000 mg, 1,000 mg, Oral, Daily PRN, BRETT Xie    carvedilol (COREG) tablet 6.25 mg, 6.25 mg, Oral, BID With Meals, Gigi Morochos DO, 6.25 mg at 10/08/24 1644    cyclophosphamide (CYTOXAN) capsule 100 mg, 100 mg, Oral, Daily, Lisa Moore DO, 100 mg at 10/08/24 1644    epoetin marcela (EPOGEN,PROCRIT) injection 10,000 Units, 10,000 Units, Intravenous, Once per day on Monday Wednesday Friday, Gigi Ortega DO, 10,000 Units at 10/07/24 0955    famotidine (PEPCID) tablet 20 mg, 20 mg, Oral, Daily, Ramos Mclean DO, 20 mg at 10/08/24 0824    FLUoxetine (PROzac) capsule 20 mg, 20 mg, Oral, Daily, BRETT Xie, 20 mg at 10/09/24 0803    heparin (porcine) injection 6,000 Units, 6,000 Units, Intravenous, Once per day on Monday Wednesday Friday, Gigi Ortega DO, 6,000 Units at 10/09/24 0940    heparin (porcine) subcutaneous injection 5,000 Units, 5,000 Units, Subcutaneous, Q8H LORENZO, BRETT Xie, 5,000 Units at 10/09/24 0602    melatonin tablet 3 mg, 3 mg, Oral, HS PRN, Laurel Rosenberg MD, 3 mg at 09/28/24 0057    ondansetron (ZOFRAN) injection 4 mg, 4 mg, Intravenous, Q6H PRN, BRETT Xie    pantoprazole (PROTONIX) EC tablet 40 mg, 40 mg, Oral, BID AC, Rui Chappell MD, 40 mg at 10/09/24 07    predniSONE tablet 60 mg, 60 mg, Oral, Daily, Lisa Moore DO, 60 mg at 10/08/24 0864     "sulfamethoxazole-trimethoprim (BACTRIM DS) 800-160 mg per tablet 1 tablet, 1 tablet, Oral, Once per day on Monday Wednesday Friday, Lisa MooreDO, 1 tablet at 10/07/24 1654    vit B complex-vit C-folic acid (Renal Caps) capsule 1 capsule, 1 capsule, Oral, Daily With Dinner, Gigi Ortega DO, 1 capsule at 10/08/24 1644      Lab Results: I have reviewed the following results:  Results from last 7 days   Lab Units 10/09/24  0519 10/08/24  1651 10/08/24  0621 10/07/24  1320 10/07/24  0438 10/06/24  1626 10/06/24  0612 10/06/24  0003 10/05/24  2008 10/05/24  0451 10/04/24  0432 10/03/24  1856 10/03/24  0538   WBC Thousand/uL 12.51*  --  14.16* 16.18* 16.30*  --  15.38*  --  18.13* 15.91* 17.56*  --  16.21*   HEMOGLOBIN g/dL 6.6* 6.9* 5.7* 6.4* 5.9* 7.1* 7.0*   < > 6.1* 6.5* 7.0*   < > 6.6*   HEMATOCRIT % 18.9* 20.1* 16.5* 18.4* 18.3*  --  21.3*  --  18.5* 19.5* 21.5*  --  20.5*   PLATELETS Thousands/uL 119*  --  125* 133* 137*  --  125*  --  131* 145* 148*  --  165   POTASSIUM mmol/L 4.2  --  4.0  --  4.6  --  4.6  --   --  4.0 4.5  --  4.2   CHLORIDE mmol/L 100  --  96  --  104  --  106  --   --  105 107  --  105   CO2 mmol/L 21  --  23  --  15*  --  15*  --   --  18* 17*  --  20*   BUN mg/dL 89*  --  78*  --  100*  --  70*  --   --  41* 47*  --  32*   CREATININE mg/dL 4.18*  --  3.60*  --  5.01*  --  4.02*  --   --  2.92* 4.29*  --  3.39*   CALCIUM mg/dL 8.2*  --  8.3*  --  8.2*  --  8.0*  --   --  8.0* 7.9*  --  7.8*   MAGNESIUM mg/dL 1.8*  --   --   --  2.1  --  1.8*  --   --  1.6* 1.8*  --  1.6*   PHOSPHORUS mg/dL  --   --   --   --  3.8  --  4.0  --   --  3.1 4.5*  --  3.7   ALBUMIN g/dL  --   --   --   --  3.6  --  4.1  --   --   --   --   --   --     < > = values in this interval not displayed.       Administrative Statements     Portions of the record may have been created with voice recognition software. Occasional wrong word or \"sound a like\" substitutions may have occurred due to the inherent " limitations of voice recognition software. Read the chart carefully and recognize, using context, where substitutions have occurred.If you have any questions, please contact the dictating provider.

## 2024-10-09 NOTE — ASSESSMENT & PLAN NOTE
Patient remains in hemodialysis dependent acute kidney injury.  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   Please refer below for details.  Continue Monday Wednesday Friday hemodialysis sessions.    No signs of renal recovery as of yet. Remains anuric.   Continue PLEX therapy as outlined below.   TCO2 21, attempt to stop bicarb gtt.  Challenge UF with HD as tolerates. Challenging UF to 3.5 L today, 10/9.

## 2024-10-09 NOTE — CASE MANAGEMENT
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 2024     Essential hypertension 2024     Secondary hyperparathyroidism of renal origin ([facility]) 2024     Uremia 2024     Hyponatremia 2024     Bilateral lower extremity edema 2024     Neurogenic claudication 2023    Bloating 10/05/2022     Paresthesia of both lower extremities 10/01/2022     Leucocytosis 10/01/2022     Elevated troponin 2022     EDGAR (dyspnea on exertion) 2022     Rash due to vaculitis  2022     LYLY (acute kidney injury) ([facility]) 2022     Depression 2022     Multiple open wounds of lower leg 2022     Lower extremity weakness 2019     Lumbosacral plexopathy 2019     Gait abnormality 2019     Lumbar stenosis 2019     Polyneuropathy 2019     Prediabetes 2018    Hyperlipidemia 2014    Vitamin D deficiency 2014      LOS (days): 25  Geometric Mean LOS (GMLOS) (days): 4.9  Days to GMLOS:-20.1     OBJECTIVE:  Risk of Unplanned Readmission Score: 33.2         Current admission status: Inpatient   Preferred Pharmacy:   [id number] [place] - [address]  [address] [address]  Phone: [phone] Fax: [phone]    Primary Care Provider: Jessika Carrillo MD    Primary Insurance: MEDICARE  Secondary  Insurance: [facility]    DISCHARGE DETAILS:    Discharge planning discussed with:: patient  Freedom of Choice: Yes  Comments - Freedom of Choice: CM met with pt at bedside to discuss discharge planning. Pt remains in agreement with returning home with [facility] for [facility] services on discharge. He is also aware and in agreement with plan to follow up with Trey ROLLE at 11:30. CM spoke with Itzel at [facility] [phone] and provided update. Pt able to start HD Monday 10/14 if discharged Friday. CM to follow.

## 2024-10-09 NOTE — ASSESSMENT & PLAN NOTE
Continue to provide fluid restriction, follow sodium levels from time to time. Sodium 132 mmol/L on 10/9 predialysis.

## 2024-10-09 NOTE — ASSESSMENT & PLAN NOTE
Lab Results   Component Value Date    EGFR 12 10/09/2024    EGFR 15 10/08/2024    EGFR 10 10/07/2024    CREATININE 4.18 (H) 10/09/2024    CREATININE 3.60 (H) 10/08/2024    CREATININE 5.01 (H) 10/07/2024   Patient is on Epogen 10,000 units with each hemodialysis session, continue to monitor hemoglobin levels, provide packed red blood cells as indicated depending on the patient progresses clinically. Hematology following.  2 unit PRBC 10/8, 1 unit PRBC 10/9.

## 2024-10-09 NOTE — PLAN OF CARE
Problem: PAIN - ADULT  Goal: Verbalizes/displays adequate comfort level or baseline comfort level  Description: Interventions:  - Encourage patient to monitor pain and request assistance  - Assess pain using appropriate pain scale  - Administer analgesics based on type and severity of pain and evaluate response  - Implement non-pharmacological measures as appropriate and evaluate response  - Consider cultural and social influences on pain and pain management  - Notify physician/advanced practitioner if interventions unsuccessful or patient reports new pain  Outcome: Progressing     Problem: INFECTION - ADULT  Goal: Absence or prevention of progression during hospitalization  Description: INTERVENTIONS:  - Assess and monitor for signs and symptoms of infection  - Monitor lab/diagnostic results  - Monitor all insertion sites, i.e. indwelling lines, tubes, and drains  - Monitor endotracheal if appropriate and nasal secretions for changes in amount and color  - [place] appropriate cooling/warming therapies per order  - Administer medications as ordered  - Instruct and encourage patient and family to use good hand hygiene technique  - Identify and instruct in appropriate isolation precautions for identified infection/condition  Outcome: Progressing  Goal: Absence of fever/infection during neutropenic period  Description: INTERVENTIONS:  - Monitor WBC    Outcome: Progressing

## 2024-10-09 NOTE — ASSESSMENT & PLAN NOTE
Patient with a low hemoglobin on 9/27/2024  Status post 11 units of packed red blood cell transfusions thus far on this admission  Hemoglobin today is up to 6.6, it was up to as high as 6.9 after the blood transfusions yesterday  Case reviewed with critical care-they will transfuse 1 more unit of packed red blood cells today for a goal hemoglobin of around 7   No evidence of bleeding  No evidence of hemolysis  Possibly related to the plasmapheresis  Appreciate heme-onc input, bone marrow biopsy consideration is in place, however at this time the patient needs more time to think about this  Patient remains hemodynamically stable  Epogen as per Nephrology  Continue to monitor daily CBCs

## 2024-10-09 NOTE — ASSESSMENT & PLAN NOTE
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 Continue with cyclophosphamide and high-dose prednisone along with Bactrim for prophylaxis.    10/5/24 anti gbm 4.2. continue to follow, checked level on 10/8 still pending.  Agree with PRBC 1 unit, PLEX likely contributing to anemia with volume administration/dilution. Hematology following, recommendations include possible bone marrow biopsy, checking iron, B12 and folate, consider decreasing albumin. Weight gain -- bed scale weight trending up to 329 lbs 10/9, was 309 lbs on admission 9/25.  Examines hypervolemic with BLE edema - 4+ right, 3+ left. Lungs sounds clear but diminished.   Will attempt to challenge EDW to 3.5L UF today.   Worsening azotemia raises question for bleeding as well, denies blood loss.

## 2024-10-09 NOTE — ASSESSMENT & PLAN NOTE
Edema 4+ RLE, 3+ LLE, continue to ultrafilter as tolerated dialysis.  Unfortunately, the patient remains anuric  Increase UF with HD, suspect anemia in part dilutional in nature. Challenging UF to 3.5 L today.

## 2024-10-09 NOTE — PLAN OF CARE
Problem: PAIN - ADULT  Goal: Verbalizes/displays adequate comfort level or baseline comfort level  Description: Interventions:  - Encourage patient to monitor pain and request assistance  - Assess pain using appropriate pain scale  - Administer analgesics based on type and severity of pain and evaluate response  - Implement non-pharmacological measures as appropriate and evaluate response  - Consider cultural and social influences on pain and pain management  - Notify physician/advanced practitioner if interventions unsuccessful or patient reports new pain  Outcome: Progressing     Problem: INFECTION - ADULT  Goal: Absence or prevention of progression during hospitalization  Description: INTERVENTIONS:  - Assess and monitor for signs and symptoms of infection  - Monitor lab/diagnostic results  - Monitor all insertion sites, i.e. indwelling lines, tubes, and drains  - Monitor endotracheal if appropriate and nasal secretions for changes in amount and color  - [place] appropriate cooling/warming therapies per order  - Administer medications as ordered  - Instruct and encourage patient and family to use good hand hygiene technique  - Identify and instruct in appropriate isolation precautions for identified infection/condition  Outcome: Progressing     Problem: SAFETY ADULT  Goal: Patient will remain free of falls  Description: INTERVENTIONS:  - Educate patient/family on patient safety including physical limitations  - Instruct patient to call for assistance with activity   - Consult OT/PT to assist with strengthening/mobility   - Keep Call bell within reach  - Keep bed low and locked with side rails adjusted as appropriate  - Keep care items and personal belongings within reach  - Initiate and maintain comfort rounds  - Make Fall Risk Sign visible to staff  - Offer Toileting every 2 Hours, in advance of need  - Initiate/Maintain bed alarm  - Obtain necessary fall risk management equipment  - Apply yellow socks and  bracelet for high fall risk patients  - Consider moving patient to room near nurses station  Outcome: Progressing     Problem: Knowledge Deficit  Goal: Patient/family/caregiver demonstrates understanding of disease process, treatment plan, medications, and discharge instructions  Description: Complete learning assessment and assess knowledge base.  Interventions:  - Provide teaching at level of understanding  - Provide teaching via preferred learning methods  Outcome: Progressing     Problem: Nutrition/Hydration-ADULT  Goal: Nutrient/Hydration intake appropriate for improving, restoring or maintaining nutritional needs  Description: Monitor and assess patient's nutrition/hydration status for malnutrition. Collaborate with interdisciplinary team and initiate plan and interventions as ordered.  Monitor patient's weight and dietary intake as ordered or per policy. Utilize nutrition screening tool and intervene as necessary. Determine patient's food preferences and provide high-protein, high-caloric foods as appropriate.     INTERVENTIONS:  - Monitor oral intake, urinary output, labs, and treatment plans  - Assess nutrition and hydration status and recommend course of action  - Evaluate amount of meals eaten  - Assist patient with eating if necessary   - Allow adequate time for meals  - Recommend/ encourage appropriate diets, oral nutritional supplements, and vitamin/mineral supplements  - Order, calculate, and assess calorie counts as needed  - Recommend, monitor, and adjust tube feedings and TPN/PPN based on assessed needs  - Assess need for intravenous fluids  - Provide specific nutrition/hydration education as appropriate  - Include patient/family/caregiver in decisions related to nutrition  Outcome: Progressing     Problem: Prexisting or High Potential for Compromised Skin Integrity  Goal: Skin integrity is maintained or improved  Description: INTERVENTIONS:  - Identify patients at risk for skin breakdown  - Assess  and monitor skin integrity  - Assess and monitor nutrition and hydration status  - Monitor labs   - Assess for incontinence   - Turn and reposition patient  - Assist with mobility/ambulation  - Relieve pressure over bony prominences  - Avoid friction and shearing  - Provide appropriate hygiene as needed including keeping skin clean and dry  - Evaluate need for skin moisturizer/barrier cream  - Collaborate with interdisciplinary team   - Patient/family teaching  - Consider wound care consult   Outcome: Progressing     Problem: METABOLIC, FLUID AND ELECTROLYTES - ADULT  Goal: Electrolytes maintained within normal limits  Description: INTERVENTIONS:  - Monitor labs and assess patient for signs and symptoms of electrolyte imbalances  - Administer electrolyte replacement as ordered  - Monitor response to electrolyte replacements, including repeat lab results as appropriate  - Instruct patient on fluid and nutrition as appropriate  Outcome: Progressing  Goal: Fluid balance maintained  Description: INTERVENTIONS:  - Monitor labs   - Monitor I/O and WT  - Instruct patient on fluid and nutrition as appropriate  - Assess for signs & symptoms of volume excess or deficit  Outcome: Progressing     Problem: HEMATOLOGIC - ADULT  Goal: Maintains hematologic stability  Description: INTERVENTIONS  - Assess for signs and symptoms of bleeding or hemorrhage  - Monitor labs  - Administer supportive blood products/factors as ordered and appropriate  Outcome: Progressing

## 2024-10-09 NOTE — ASSESSMENT & PLAN NOTE
Lab Results   Component Value Date    CREATININE 4.18 (H) 10/09/2024    CREATININE 3.60 (H) 10/08/2024    CREATININE 5.01 (H) 10/07/2024    EGFR 12 10/09/2024    EGFR 15 10/08/2024    EGFR 10 10/07/2024     Now on hemodialysis -Mondays/Wednesdays/Fridays  Plasmapheresis started on 9/21/2024 -it has occurred daily since  minus 2 days  Status post a renal biopsy-evidence of glomerular basement membrane disease noted  Continue prednisone 60 mg p.o. daily - steroid induced leukocytosis noted  Nephrology and Critical Care following  Trend anti-GBM antibody with goal less than 8 -was down to 4.2, but is now back up to 6.2  Notified by case management that an outpatient dialysis chair has been set up  Discharge planning once cleared by nephrology -hopefully later this week

## 2024-10-09 NOTE — PROGRESS NOTES
Progress Note - Hospitalist   Name: Ramos Rosenthal 74 y.o. male I MRN: [id number]  Unit/Bed#: ICU 04-01 I Date of Admission: 9/14/2024   Date of Service: 10/9/2024 I Hospital Day: 25    Assessment & Plan  LYLY (acute kidney injury) ([facility])      Lab Results   Component Value Date    CREATININE 4.18 (H) 10/09/2024    CREATININE 3.60 (H) 10/08/2024    CREATININE 5.01 (H) 10/07/2024    EGFR 12 10/09/2024    EGFR 15 10/08/2024    EGFR 10 10/07/2024     Now on hemodialysis -Mondays/Wednesdays/Fridays  Plasmapheresis started on 9/21/2024 -it has occurred daily since  minus 2 days  Status post a renal biopsy-evidence of glomerular basement membrane disease noted  Continue prednisone 60 mg p.o. daily - steroid induced leukocytosis noted  Nephrology and Critical Care following  Trend anti-GBM antibody with goal less than 8 -was down to 4.2, but is now back up to 6.2  Notified by case management that an outpatient dialysis chair has been set up  Discharge planning once cleared by nephrology -hopefully later this week  Anemia due to chronic kidney disease, on chronic dialysis ([facility])  Patient with a low hemoglobin on 9/27/2024  Status post 11 units of packed red blood cell transfusions thus far on this admission  Hemoglobin today is up to 6.6, it was up to as high as 6.9 after the blood transfusions yesterday  Case reviewed with critical care-they will transfuse 1 more unit of packed red blood cells today for a goal hemoglobin of around 7   No evidence of bleeding  No evidence of hemolysis  Possibly related to the plasmapheresis  Appreciate heme-onc input, bone marrow biopsy consideration is in place, however at this time the patient needs more time to think about this  Patient remains hemodynamically stable  Epogen as per Nephrology  Continue to monitor daily CBCs  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  Noted on renal biopsy  Continue daily plasmapheresis  Continue cyclophosphamide, prednisone, and  Bactrim  Patient has a continued steroid-induced leukocytosis  Continued management as per nephrology/critical care  Hyponatremia  Patient has a hypervolemic hyponatremia  Patient has remained asymptomatic  Sodium is stable at 132  Continue fluid restriction  Continued management as per nephrology  Paresthesia of both lower extremities  Hx of back surgery in 2004 and 2014 and has since bilateral LE paresthesia  He is primarily wheelchair bound  Venous statis ulcers noted to Bilateral LE  B/L extremity edema R>L  Continue supportive therapy  Bilateral lower extremity edema  Chronic condition  Patient with a history of lower back/spinal surgery  Patient does not ambulate  Patient is wheelchair-bound at baseline  Depression  Continue home dose fluoxetine  Essential hypertension  Blood pressure is controlled   continue carvedilol  Hypocalcemia  Continue daily calcium carbonate supplementation    VTE Pharmacologic Prophylaxis: VTE Score: 8 High Risk (Score >/= 5) - Pharmacological DVT Prophylaxis Ordered: heparin. Sequential Compression Devices Ordered.    Mobility:   Basic Mobility Inpatient Raw Score: 10  -[facility] Goal: 4: Move to chair/commode  -[facility] Achieved: 2: Bed activities/Dependent transfer  Patient is at baseline from a mobility standpoint    Patient Centered Rounds: I performed bedside rounds with nursing staff today.   Discussions with Specialists or Other Care Team Provider: Nephrology, critical care    Education and Discussions with Family / Patient:  Will update the family members as the day progresses.     Current Length of Stay: 25 day(s)  Current Patient Status: Inpatient   Certification Statement: The patient will continue to require additional inpatient hospital stay due to need for continued plasmapheresis, and a PRBC transfusion  Discharge Plan:  Discharge planning once cleared by nephrology    Code Status: Level 1 - Full Code    Subjective   Patient seen, resting in bed, is comfortable, has no new  complaints    Objective :  Temp:  [97 °F (36.1 °C)-97.7 °F (36.5 °C)] 97.7 °F (36.5 °C)  HR:  [54-66] 55  BP: (100-148)/(52-76) 128/60  Resp:  [11-22] 22  SpO2:  [97 %-100 %] 98 %  O2 Device: None (Room air)    Body mass index is 41.67 kg/m².     Input and Output Summary (last 24 hours):     Intake/Output Summary (Last 24 hours) at 10/9/2024 0818  Last data filed at 10/9/2024 0400  Gross per 24 hour   Intake 1398 ml   Output 0 ml   Net 1398 ml       Physical Exam  Vitals and nursing note reviewed.   Constitutional:       General: He is not in acute distress.     Appearance: Normal appearance. He is not ill-appearing.   HENT:      Head: Normocephalic and atraumatic.      Nose: Nose normal.   Eyes:      Extraocular Movements: Extraocular movements intact.      Pupils: Pupils are equal, round, and reactive to light.   Cardiovascular:      Rate and Rhythm: Normal rate and regular rhythm.      Pulses: Normal pulses.      Heart sounds: Normal heart sounds. No murmur heard.     No friction rub. No gallop.   Pulmonary:      Effort: Pulmonary effort is normal.      Breath sounds: Normal breath sounds.   Abdominal:      General: There is no distension.      Palpations: Abdomen is soft. There is no mass.      Tenderness: There is no abdominal tenderness. There is no guarding or rebound.   Musculoskeletal:         General: No swelling or tenderness. Normal range of motion.      Cervical back: Normal range of motion and neck supple. No rigidity. No muscular tenderness.      Right lower leg: Edema present.      Left lower leg: Edema present.   Skin:     General: Skin is warm.      Capillary Refill: Capillary refill takes less than 2 seconds.      Findings: No erythema or rash.   Neurological:      Mental Status: He is alert and oriented to person, place, and time. Mental status is at baseline.      Comments: Motor 0/5 left lower extremity, 1/5 right lower extremity-at baseline   Psychiatric:         Mood and Affect: Mood normal.          Behavior: Behavior normal.           Lines/Drains:  Lines/Drains/Airways       Active Status       Name Placement date Placement time Site Days    HD Permanent Double Catheter 09/27/24  1316  Internal jugular  11                            Lab Results: I have reviewed the following results:   Results from last 7 days   Lab Units 10/09/24  0519 10/08/24  1651 10/08/24  0621   WBC Thousand/uL 12.51*  --  14.16*   HEMOGLOBIN g/dL 6.6*   < > 5.7*   HEMATOCRIT % 18.9*   < > 16.5*   PLATELETS Thousands/uL 119*  --  125*   LYMPHO PCT %  --   --  4*   MONO PCT %  --   --  2*   EOS PCT %  --   --  0    < > = values in this interval not displayed.     Results from last 7 days   Lab Units 10/09/24  0519 10/08/24  0621 10/07/24  0438   SODIUM mmol/L 132*   < > 130*   POTASSIUM mmol/L 4.2   < > 4.6   CHLORIDE mmol/L 100   < > 104   CO2 mmol/L 21   < > 15*   BUN mg/dL 89*   < > 100*   CREATININE mg/dL 4.18*   < > 5.01*   ANION GAP mmol/L 11   < > 11   CALCIUM mg/dL 8.2*   < > 8.2*   ALBUMIN g/dL  --   --  3.6   TOTAL BILIRUBIN mg/dL  --   --  0.73   ALK PHOS U/L  --   --  14*   ALT U/L  --   --  7   AST U/L  --   --  11*   GLUCOSE RANDOM mg/dL 159*   < > 177*    < > = values in this interval not displayed.     Results from last 7 days   Lab Units 10/07/24  1320   INR  1.21*             Results from last 7 days   Lab Units 10/07/24  0438   LACTIC ACID mmol/L 1.1       Recent Cultures (last 7 days):         Imaging Results Review: No pertinent imaging studies reviewed.  Other Study Results Review: No additional pertinent studies reviewed.    Last 24 Hours Medication List:     Current Facility-Administered Medications:     albumin human 400 g 5% IVPB, Once    calcium carbonate (OYSTER SHELL,OSCAL) 500 mg tablet 2 tablet, BID With Meals    calcium carbonate (TUMS) chewable tablet 1,000 mg, Daily PRN    carvedilol (COREG) tablet 6.25 mg, BID With Meals    cyclophosphamide (CYTOXAN) capsule 100 mg, Daily    epoetin marcela  (EPOGEN,PROCRIT) injection 10,000 Units, Once per day on Monday Wednesday Friday    famotidine (PEPCID) tablet 20 mg, Daily    FLUoxetine (PROzac) capsule 20 mg, Daily    heparin (porcine) injection 6,000 Units, Once per day on Monday Wednesday Friday    heparin (porcine) subcutaneous injection 5,000 Units, Q8H LORENZO    magnesium sulfate 2 g/50 mL IVPB (premix) 2 g, Once, Last Rate: 2 g (10/09/24 0741)    melatonin tablet 3 mg, HS PRN    ondansetron (ZOFRAN) injection 4 mg, Q6H PRN    pantoprazole (PROTONIX) EC tablet 40 mg, BID AC    predniSONE tablet 60 mg, Daily    sulfamethoxazole-trimethoprim (BACTRIM DS) 800-160 mg per tablet 1 tablet, Once per day on Monday Wednesday Friday    vit B complex-vit C-folic acid (Renal Caps) capsule 1 capsule, Daily With Dinner    Administrative Statements   Today, Patient Was Seen By: Agnes Rust MD      **Please Note: This note may have been constructed using a voice recognition system.**

## 2024-10-10 ENCOUNTER — APPOINTMENT (INPATIENT)
Dept: DIALYSIS | Facility: HOSPITAL | Age: 74
DRG: 871 | End: 2024-10-10
Payer: MEDICARE

## 2024-10-10 LAB
ABO GROUP BLD BPU: NORMAL
ANION GAP SERPL CALCULATED.3IONS-SCNC: 9 MMOL/L (ref 4–13)
BPU ID: NORMAL
BUN SERPL-MCNC: 51 MG/DL (ref 5–25)
CA-I BLD-SCNC: 1.09 MMOL/L (ref 1.12–1.32)
CALCIUM SERPL-MCNC: 8.2 MG/DL (ref 8.4–10.2)
CHLORIDE SERPL-SCNC: 99 MMOL/L (ref 96–108)
CO2 SERPL-SCNC: 23 MMOL/L (ref 21–32)
CREAT SERPL-MCNC: 3.01 MG/DL (ref 0.6–1.3)
CROSSMATCH: NORMAL
ERYTHROCYTE [DISTWIDTH] IN BLOOD BY AUTOMATED COUNT: 16.8 % (ref 11.6–15.1)
FIBRINOGEN PPP-MCNC: 135 MG/DL (ref 206–523)
GFR SERPL CREATININE-BSD FRML MDRD: 19 ML/MIN/1.73SQ M
GLUCOSE SERPL-MCNC: 151 MG/DL (ref 65–140)
HCT VFR BLD AUTO: 21.8 % (ref 36.5–49.3)
HGB BLD-MCNC: 7.4 G/DL (ref 12–17)
MAGNESIUM SERPL-MCNC: 1.8 MG/DL (ref 1.9–2.7)
MCH RBC QN AUTO: 31.1 PG (ref 26.8–34.3)
MCHC RBC AUTO-ENTMCNC: 33.9 G/DL (ref 31.4–37.4)
MCV RBC AUTO: 92 FL (ref 82–98)
PLATELET # BLD AUTO: 132 THOUSANDS/UL (ref 149–390)
PMV BLD AUTO: 9.5 FL (ref 8.9–12.7)
POTASSIUM SERPL-SCNC: 4 MMOL/L (ref 3.5–5.3)
RBC # BLD AUTO: 2.38 MILLION/UL (ref 3.88–5.62)
SODIUM SERPL-SCNC: 131 MMOL/L (ref 135–147)
UNIT DISPENSE STATUS: NORMAL
UNIT PRODUCT CODE: NORMAL
UNIT PRODUCT VOLUME: 125 ML
UNIT PRODUCT VOLUME: 205 ML
UNIT PRODUCT VOLUME: 217 ML
UNIT PRODUCT VOLUME: 280 ML
UNIT PRODUCT VOLUME: 350 ML
UNIT RH: NORMAL
WBC # BLD AUTO: 10.99 THOUSAND/UL (ref 4.31–10.16)

## 2024-10-10 PROCEDURE — 85384 FIBRINOGEN ACTIVITY: CPT

## 2024-10-10 PROCEDURE — 82330 ASSAY OF CALCIUM: CPT | Performed by: NURSE PRACTITIONER

## 2024-10-10 PROCEDURE — 99232 SBSQ HOSP IP/OBS MODERATE 35: CPT | Performed by: HOSPITALIST

## 2024-10-10 PROCEDURE — 80048 BASIC METABOLIC PNL TOTAL CA: CPT | Performed by: NURSE PRACTITIONER

## 2024-10-10 PROCEDURE — P9017 PLASMA 1 DONOR FRZ W/IN 8 HR: HCPCS

## 2024-10-10 PROCEDURE — 83735 ASSAY OF MAGNESIUM: CPT | Performed by: NURSE PRACTITIONER

## 2024-10-10 PROCEDURE — 99233 SBSQ HOSP IP/OBS HIGH 50: CPT | Performed by: INTERNAL MEDICINE

## 2024-10-10 PROCEDURE — 85025 COMPLETE CBC W/AUTO DIFF WBC: CPT | Performed by: NURSE PRACTITIONER

## 2024-10-10 RX ORDER — CARVEDILOL 3.12 MG/1
3.12 TABLET ORAL 2 TIMES DAILY WITH MEALS
Status: DISCONTINUED | OUTPATIENT
Start: 2024-10-10 | End: 2024-10-12 | Stop reason: HOSPADM

## 2024-10-10 RX ORDER — MAGNESIUM SULFATE HEPTAHYDRATE 40 MG/ML
2 INJECTION, SOLUTION INTRAVENOUS ONCE
Status: COMPLETED | OUTPATIENT
Start: 2024-10-10 | End: 2024-10-10

## 2024-10-10 RX ORDER — CALCIUM GLUCONATE 20 MG/ML
2 INJECTION, SOLUTION INTRAVENOUS ONCE
Status: COMPLETED | OUTPATIENT
Start: 2024-10-10 | End: 2024-10-10

## 2024-10-10 RX ORDER — ALBUMIN, HUMAN INJ 5% 5 %
150 SOLUTION INTRAVENOUS ONCE
Status: COMPLETED | OUTPATIENT
Start: 2024-10-10 | End: 2024-10-10

## 2024-10-10 RX ADMIN — FAMOTIDINE 20 MG: 20 TABLET, FILM COATED ORAL at 08:15

## 2024-10-10 RX ADMIN — ALBUMIN (HUMAN) 150 G: 12.5 INJECTION, SOLUTION INTRAVENOUS at 14:26

## 2024-10-10 RX ADMIN — CALCIUM GLUCONATE 2 G: 20 INJECTION, SOLUTION INTRAVENOUS at 13:54

## 2024-10-10 RX ADMIN — FLUOXETINE HYDROCHLORIDE 20 MG: 20 CAPSULE ORAL at 08:15

## 2024-10-10 RX ADMIN — CYCLOPHOSPHAMIDE 100 MG: 50 CAPSULE ORAL at 17:00

## 2024-10-10 RX ADMIN — CARVEDILOL 3.12 MG: 3.12 TABLET, FILM COATED ORAL at 07:32

## 2024-10-10 RX ADMIN — PANTOPRAZOLE SODIUM 40 MG: 40 TABLET, DELAYED RELEASE ORAL at 17:00

## 2024-10-10 RX ADMIN — CALCIUM 2 TABLET: 500 TABLET ORAL at 17:00

## 2024-10-10 RX ADMIN — Medication 1 CAPSULE: at 17:00

## 2024-10-10 RX ADMIN — HEPARIN SODIUM 5000 UNITS: 5000 INJECTION, SOLUTION INTRAVENOUS; SUBCUTANEOUS at 22:11

## 2024-10-10 RX ADMIN — CARVEDILOL 3.12 MG: 3.12 TABLET, FILM COATED ORAL at 17:00

## 2024-10-10 RX ADMIN — CALCIUM GLUCONATE 2 G: 20 INJECTION, SOLUTION INTRAVENOUS at 07:33

## 2024-10-10 RX ADMIN — PREDNISONE 60 MG: 20 TABLET ORAL at 17:00

## 2024-10-10 RX ADMIN — CALCIUM 2 TABLET: 500 TABLET ORAL at 07:32

## 2024-10-10 RX ADMIN — PANTOPRAZOLE SODIUM 40 MG: 40 TABLET, DELAYED RELEASE ORAL at 07:42

## 2024-10-10 RX ADMIN — MAGNESIUM SULFATE HEPTAHYDRATE 2 G: 40 INJECTION, SOLUTION INTRAVENOUS at 07:33

## 2024-10-10 RX ADMIN — HEPARIN SODIUM 5000 UNITS: 5000 INJECTION, SOLUTION INTRAVENOUS; SUBCUTANEOUS at 05:36

## 2024-10-10 NOTE — ASSESSMENT & PLAN NOTE
Patient with a low hemoglobin on 9/27/2024  Status post a total of 12 units of packed red blood cells on this admission thus far  Hemoglobin today 7.4  No evidence of bleeding  No evidence of hemolysis  Possibly related to the plasmapheresis  Appreciate heme-onc input, bone marrow biopsy consideration is in place, however at this time the patient needs more time to think about this  Patient remains hemodynamically stable  Epogen as per Nephrology  Continue to monitor daily CBCs

## 2024-10-10 NOTE — ASSESSMENT & PLAN NOTE
Noted on renal biopsy  Continue daily plasmapheresis -x 2 more sessions, plasmapheresis to end on Friday, 10/11/2024  Continue cyclophosphamide, prednisone, and Bactrim  Patient has a continued steroid-induced leukocytosis  Continued management as per nephrology/critical care  Patient will most likely be dialysis dependent for the rest of his life  Once again, discharge planning for Saturday, 10/12/2024

## 2024-10-10 NOTE — ASSESSMENT & PLAN NOTE
Patient has a hypervolemic hyponatremia  Patient has remained asymptomatic  Sodium is stable at 131  Continue fluid restriction  Continued management as per nephrology

## 2024-10-10 NOTE — ASSESSMENT & PLAN NOTE
UF 3 L on 10/9.   Plan for iso UF today for volume overload concerns due to blood products/albumin replacement with PLEX.

## 2024-10-10 NOTE — ASSESSMENT & PLAN NOTE
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 Continue with cyclophosphamide, dosed 1mg/kg for dialysis, and high-dose prednisone along with Bactrim/PPI for prophylaxis.    Volume status:  Examines hypervolemic with severe BL LE edema.   Bed scale weight not felt to be reliable-- post weight 144.1 kg 10/9. Suspect anemia is dilutional in nature in part.     Anti GBM levels improving to 4.2 on 10/5 but unfortunately priscila to 6.2 on 10/7. Levels likely priscila as patient did not receive PLEX the previous day 10/6. Levels were improving considerably by 50% prior to this. Plan for 5 PLEX treatment this week.     PLEX replacement 1.5 plasma volume with mix of albumin/FFP at discretion of critical care team. Replace with FFP if fibrinogen<100. Fibrinogen 78 10/9. Sp 1 unit PRBC, cryo, half FFP/albumin 10/9.       Recommend:  Recommend to finish PLEX therapy for 3 week duration 10/11. Overall, no signs of renal recovery which meli poorly for liberalization from dialysis. Patient initiated on dialysis prior to PLEX with 100% crescents on biopsy so his renal prognosis has been suboptimal from start. 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 Anticipate continued decline based on trends. Unlikely that continued PLEX will liberalize patient from HD at this juncture. Will repeat antiGBM levels which will result by 10/12. Recommend conversation with patient and family to discuss prognosis and decision on PLEX.

## 2024-10-10 NOTE — ASSESSMENT & PLAN NOTE
Patient remains in hemodialysis dependent acute kidney injury.  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   Please refer below for details.  Continue Monday Wednesday Friday hemodialysis sessions.    No signs of renal recovery as of yet. Remains anuric as recorded.  Continue PLEX therapy as outlined below.     Plan for isolated UF today to help combat fluid gains with PLEX. Suspected anemia in part dilutional in nature.  Discussed UF tx with patient and HD RN. Challenge UF with HD. Obtain weights as best we can.

## 2024-10-10 NOTE — PROGRESS NOTES
Progress Note - Nephrology   Name: Ramos Rosenthal 74 y.o. male I MRN: [id number]  Unit/Bed#: ICU 04-01 I Date of Admission: 9/14/2024   Date of Service: 10/10/2024 I Hospital Day: 26     Assessment & Plan  LYLY (acute kidney injury) (HCC)  Patient remains in hemodialysis dependent acute kidney injury.  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   Please refer below for details.  Continue Monday Wednesday Friday hemodialysis sessions.    No signs of renal recovery as of yet. Remains anuric as recorded.  Continue PLEX therapy as outlined below.     Plan for isolated UF today to help combat fluid gains with PLEX. Suspected anemia in part dilutional in nature.  Discussed UF tx with patient and HD RN. Challenge UF with HD. Obtain weights as best we can.   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 Continue with cyclophosphamide, dosed 1mg/kg for dialysis, and high-dose prednisone along with Bactrim/PPI for prophylaxis.    Volume status:  Examines hypervolemic with severe BL LE edema.   Bed scale weight not felt to be reliable-- post weight 144.1 kg 10/9. Suspect anemia is dilutional in nature in part.     Anti GBM levels improving to 4.2 on 10/5 but unfortunately priscila to 6.2 on 10/7. Levels likely priscila as patient did not receive PLEX the previous day 10/6. Levels were improving considerably by 50% prior to this. Plan for 5 PLEX treatment this week.     PLEX replacement 1.5 plasma volume with mix of albumin/FFP at discretion of critical care team. Replace with FFP if fibrinogen<100. Fibrinogen 78 10/9. Sp 1 unit PRBC, cryo, half FFP/albumin 10/9.       Recommend:  Recommend to finish PLEX therapy for 3 week duration 10/11. Overall, no signs of renal recovery which meli poorly for liberalization from  dialysis. Patient initiated on dialysis prior to PLEX with 100% crescents on biopsy so his renal prognosis has been suboptimal from start. 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 Anticipate continued decline based on trends. Unlikely that continued PLEX will liberalize patient from HD at this juncture. Will repeat antiGBM levels which will result by 10/12. Recommend conversation with patient and family to discuss prognosis and decision on PLEX.   Hyponatremia  Continue to provide fluid restriction, follow sodium levels from time to time. Sodium 132 mmol/L on 10/9 predialysis.   Hypocalcemia  Continue calcium supplementation with PLEX and calcium carbonate 2 tablet BID.   Anemia due to chronic kidney disease, on chronic dialysis (HCC)  Lab Results   Component Value Date    EGFR 12 10/09/2024    EGFR 15 10/08/2024    EGFR 10 10/07/2024    CREATININE 4.18 (H) 10/09/2024    CREATININE 3.60 (H) 10/08/2024    CREATININE 5.01 (H) 10/07/2024   Patient is on Epogen 10,000 units with each hemodialysis session, continue to monitor hemoglobin levels, provide packed red blood cells as indicated depending on the patient progresses clinically. Hematology following.  2 unit PRBC 10/8, 1 unit PRBC 10/9.  Folate normal at 13.2.   Vitamin B12 normal at 227.    Follow up CBC today, PRBC to be coordinated per primary/critical care team.  Reviewed heme/onc consult from 10/8 with patient, discussed consideration of dilution from PLEX blood products and plan for additional dialysis for ultrafiltration. Discussed bone marrow biopsy as well, patient has not made decision on this and wants to consider this further.  Bilateral lower extremity edema  UF 3 L on 10/9.   Plan for iso UF today for volume overload concerns due to blood products/albumin replacement with PLEX.   Essential hypertension  Blood pressure appropriate, decrease Coreg dose with bradycardia.    Hypomagnesemia  Continue to monitor magnesium levels, offer supplementation as indicated.    I have reviewed the nephrology recommendations including PLEX treatment, with critical care team , and we are in agreement with renal plan including the information outlined above.     Subjective       Patient seen and examined today. Patient asymptomatic, denies chest pain,shortness of breath,nausea,vomiting or diarrhea. NO UOP recorded.  A complete 10 point review of systems was performed and is otherwise negative.     Objective :  Temp:  [95.8 °F (35.4 °C)-97.6 °F (36.4 °C)] 97.6 °F (36.4 °C)  HR:  [51-61] 57  BP: (102-136)/(50-63) 122/60  Resp:  [12-26] 14  SpO2:  [94 %-100 %] 98 %  O2 Device: None (Room air)    Current Weight: Weight - Scale: (!) 149 kg (329 lb 2.4 oz)  First Weight: Weight - Scale: (!) 140 kg (309 lb 8.4 oz)  I/O         10/08 0701  10/09 0700 10/09 0701  10/10 0700    P.O. 498 600    I.V. (mL/kg) 397.5 (2.7) 500 (3.4)    Blood 702.5 1742.8    IV Piggyback 100     Total Intake(mL/kg) 1698 (11.6) 2842.8 (19.1)    Urine (mL/kg/hr) 0 (0)     Other  3501    Stool 0     Total Output 0 3501    Net +1698 -658.2          Unmeasured Stool Occurrence 0 x           Physical Exam  Vitals reviewed.   Constitutional:       General: He is not in acute distress.     Appearance: He is obese. He is not ill-appearing.   HENT:      Head: Normocephalic and atraumatic.      Nose: Nose normal.      Mouth/Throat:      Mouth: Mucous membranes are moist.      Pharynx: Oropharynx is clear.   Eyes:      Extraocular Movements: Extraocular movements intact.      Conjunctiva/sclera: Conjunctivae normal.   Cardiovascular:      Rate and Rhythm: Normal rate and regular rhythm.      Heart sounds:      No friction rub.   Pulmonary:      Breath sounds: No wheezing, rhonchi or rales.   Abdominal:      General: Bowel sounds are normal.      Palpations: Abdomen is soft.      Tenderness: There is no abdominal tenderness. There is no  guarding.   Musculoskeletal:      Right lower leg: Edema present.      Left lower leg: Edema present.      Comments: Severe LE edema   Skin:     Coloration: Skin is not jaundiced.      Findings: No bruising or rash.   Neurological:      Mental Status: He is alert and oriented to person, place, and time. Mental status is at baseline.      Cranial Nerves: No cranial nerve deficit.         Medications:    Current Facility-Administered Medications:     calcium carbonate (OYSTER SHELL,OSCAL) 500 mg tablet 2 tablet, 2 tablet, Oral, BID With Meals, Gigi Ortega DO, 2 tablet at 10/09/24 1642    calcium carbonate (TUMS) chewable tablet 1,000 mg, 1,000 mg, Oral, Daily PRN, BRETT Xie    carvedilol (COREG) tablet 6.25 mg, 6.25 mg, Oral, BID With Meals, Gigi Ortega DO, 6.25 mg at 10/08/24 1644    cyclophosphamide (CYTOXAN) capsule 100 mg, 100 mg, Oral, Daily, Lisa Moore, DO, 100 mg at 10/09/24 1642    epoetin marcela (EPOGEN,PROCRIT) injection 10,000 Units, 10,000 Units, Intravenous, Once per day on Monday Wednesday Friday, Gigi Ortega DO, 10,000 Units at 10/09/24 1133    famotidine (PEPCID) tablet 20 mg, 20 mg, Oral, Daily, Ramos Mclean DO, 20 mg at 10/09/24 0900    FLUoxetine (PROzac) capsule 20 mg, 20 mg, Oral, Daily, BRETT Xie, 20 mg at 10/09/24 0803    heparin (porcine) injection 6,000 Units, 6,000 Units, Intravenous, Once per day on Monday Wednesday Friday, Gigi Ortega DO, 6,000 Units at 10/09/24 0940    heparin (porcine) subcutaneous injection 5,000 Units, 5,000 Units, Subcutaneous, Q8H LORENZO, BRETT Xie, 5,000 Units at 10/10/24 0536    melatonin tablet 3 mg, 3 mg, Oral, HS PRN, Laurel Rosenberg MD, 3 mg at 09/28/24 0057    ondansetron (ZOFRAN) injection 4 mg, 4 mg, Intravenous, Q6H PRN, BRETT Xie    pantoprazole (PROTONIX) EC tablet 40 mg, 40 mg, Oral, BID AC, Rui Chappell MD, 40 mg at  "10/09/24 1642    predniSONE tablet 60 mg, 60 mg, Oral, Daily, Lisa Moore DO, 60 mg at 10/09/24 1642    sulfamethoxazole-trimethoprim (BACTRIM DS) 800-160 mg per tablet 1 tablet, 1 tablet, Oral, Once per day on Monday Wednesday Friday, Lisa Moore , 1 tablet at 10/09/24 1642    vit B complex-vit C-folic acid (Renal Caps) capsule 1 capsule, 1 capsule, Oral, Daily With Dinner, Gigi Ortega DO, 1 capsule at 10/09/24 1642      Lab Results: I have reviewed the following results:  Results from last 7 days   Lab Units 10/09/24  0519 10/08/24  1651 10/08/24  0621 10/07/24  1320 10/07/24  0438 10/06/24  1626 10/06/24  0612 10/06/24  0003 10/05/24  2008 10/05/24  0451 10/04/24  0432   WBC Thousand/uL 12.51*  --  14.16* 16.18* 16.30*  --  15.38*  --  18.13* 15.91* 17.56*   HEMOGLOBIN g/dL 6.6* 6.9* 5.7* 6.4* 5.9* 7.1* 7.0*   < > 6.1* 6.5* 7.0*   HEMATOCRIT % 18.9* 20.1* 16.5* 18.4* 18.3*  --  21.3*  --  18.5* 19.5* 21.5*   PLATELETS Thousands/uL 119*  --  125* 133* 137*  --  125*  --  131* 145* 148*   POTASSIUM mmol/L 4.2  --  4.0  --  4.6  --  4.6  --   --  4.0 4.5   CHLORIDE mmol/L 100  --  96  --  104  --  106  --   --  105 107   CO2 mmol/L 21  --  23  --  15*  --  15*  --   --  18* 17*   BUN mg/dL 89*  --  78*  --  100*  --  70*  --   --  41* 47*   CREATININE mg/dL 4.18*  --  3.60*  --  5.01*  --  4.02*  --   --  2.92* 4.29*   CALCIUM mg/dL 8.2*  --  8.3*  --  8.2*  --  8.0*  --   --  8.0* 7.9*   MAGNESIUM mg/dL 1.8*  --   --   --  2.1  --  1.8*  --   --  1.6* 1.8*   PHOSPHORUS mg/dL  --   --   --   --  3.8  --  4.0  --   --  3.1 4.5*   ALBUMIN g/dL  --   --   --   --  3.6  --  4.1  --   --   --   --     < > = values in this interval not displayed.       Administrative Statements     Portions of the record may have been created with voice recognition software. Occasional wrong word or \"sound a like\" substitutions may have occurred due to the inherent limitations of voice recognition software. Read the " chart carefully and recognize, using context, where substitutions have occurred.If you have any questions, please contact the dictating provider.

## 2024-10-10 NOTE — ASSESSMENT & PLAN NOTE
Lab Results   Component Value Date    EGFR 12 10/09/2024    EGFR 15 10/08/2024    EGFR 10 10/07/2024    CREATININE 4.18 (H) 10/09/2024    CREATININE 3.60 (H) 10/08/2024    CREATININE 5.01 (H) 10/07/2024   Patient is on Epogen 10,000 units with each hemodialysis session, continue to monitor hemoglobin levels, provide packed red blood cells as indicated depending on the patient progresses clinically. Hematology following.  2 unit PRBC 10/8, 1 unit PRBC 10/9.  Folate normal at 13.2.   Vitamin B12 normal at 227.    Follow up CBC today, PRBC to be coordinated per primary/critical care team.  Reviewed heme/onc consult from 10/8 with patient, discussed consideration of dilution from PLEX blood products and plan for additional dialysis for ultrafiltration. Discussed bone marrow biopsy as well, patient has not made decision on this and wants to consider this further.

## 2024-10-10 NOTE — PROGRESS NOTES
Progress Note - Hospitalist   Name: Ramos Rosenthal 74 y.o. male I MRN: [id number]  Unit/Bed#: ICU 04-01 I Date of Admission: 9/14/2024   Date of Service: 10/10/2024 I Hospital Day: 26    Assessment & Plan  LYLY (acute kidney injury) ([facility])      Lab Results   Component Value Date    CREATININE 3.01 (H) 10/10/2024    CREATININE 4.18 (H) 10/09/2024    CREATININE 3.60 (H) 10/08/2024    EGFR 19 10/10/2024    EGFR 12 10/09/2024    EGFR 15 10/08/2024     Now on hemodialysis -Mondays/Wednesdays/Fridays  Patient to get an extra dialysis session today on 10/10/2024  Plasmapheresis started on 9/21/2024 -it has occurred daily since  (minus 2 days)  Status post a renal biopsy-evidence of glomerular basement membrane disease noted  Continue prednisone 60 mg p.o. daily - steroid induced leukocytosis noted  Nephrology and Critical Care following  Trend anti-GBM antibody with goal less than 8 -was down to 4.2, but the most recent result revealed that it is now back up to 6.2  Notified by case management that an outpatient dialysis chair has been set up  Hopeful discharge planning for 10/12/2024  Anemia due to chronic kidney disease, on chronic dialysis ([facility])  Patient with a low hemoglobin on 9/27/2024  Status post a total of 12 units of packed red blood cells on this admission thus far  Hemoglobin today 7.4  No evidence of bleeding  No evidence of hemolysis  Possibly related to the plasmapheresis  Appreciate heme-onc input, bone marrow biopsy consideration is in place, however at this time the patient needs more time to think about this  Patient remains hemodynamically stable  Epogen as per Nephrology  Continue to monitor daily CBCs  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  Noted on renal biopsy  Continue daily plasmapheresis -x 2 more sessions, plasmapheresis to end on Friday, 10/11/2024  Continue cyclophosphamide, prednisone, and Bactrim  Patient has a continued steroid-induced leukocytosis  Continued  management as per nephrology/critical care  Patient will most likely be dialysis dependent for the rest of his life  Once again, discharge planning for Saturday, 10/12/2024  Hyponatremia  Patient has a hypervolemic hyponatremia  Patient has remained asymptomatic  Sodium is stable at 131  Continue fluid restriction  Continued management as per nephrology  Paresthesia of both lower extremities  Hx of back surgery in 2004 and 2014 and has since bilateral LE paresthesia  He is primarily wheelchair bound  Venous statis ulcers noted to Bilateral LE  B/L extremity edema R>L  Continue supportive therapy  Bilateral lower extremity edema  Chronic condition  Patient with a history of lower back/spinal surgery  Patient does not ambulate  Patient is wheelchair-bound at baseline  Depression  Continue home dose fluoxetine  Essential hypertension  Blood pressure is controlled   continue carvedilol  Hypocalcemia  Continue daily calcium carbonate supplementation    VTE Pharmacologic Prophylaxis: VTE Score: 8 High Risk (Score >/= 5) - Pharmacological DVT Prophylaxis Ordered: heparin. Sequential Compression Devices Ordered.    Mobility:   Basic Mobility Inpatient Raw Score: 10  -[facility] Goal: 4: Move to chair/commode  -[facility] Achieved: 3: Sit at edge of bed  Patient is at baseline from a mobility standpoint    Patient Centered Rounds: I performed bedside rounds with nursing staff today.   Discussions with Specialists or Other Care Team Provider: Critical care, nephrology    Education and Discussions with Family / Patient:  Will update the family members later today.     Current Length of Stay: 26 day(s)  Current Patient Status: Inpatient   Certification Statement: The patient will continue to require additional inpatient hospital stay due to continued hemodialysis, and plasmapheresis  Discharge Plan: Anticipate discharge in 48 hrs to home with home services.    Code Status: Level 1 - Full Code    Subjective   Patient seen, resting in bed,  is comfortable, has no new complaints    Objective :  Temp:  [95.8 °F (35.4 °C)-97.6 °F (36.4 °C)] 97.6 °F (36.4 °C)  HR:  [51-61] 53  BP: (102-150)/(50-68) 150/68  Resp:  [11-26] 13  SpO2:  [94 %-100 %] 97 %  O2 Device: None (Room air)    Body mass index is 41.98 kg/m².     Input and Output Summary (last 24 hours):     Intake/Output Summary (Last 24 hours) at 10/10/2024 0801  Last data filed at 10/9/2024 1750  Gross per 24 hour   Intake 2602.83 ml   Output 3501 ml   Net -898.17 ml       Physical Exam  Vitals and nursing note reviewed.   Constitutional:       General: He is not in acute distress.     Appearance: Normal appearance. He is not ill-appearing.   HENT:      Head: Normocephalic and atraumatic.      Nose: Nose normal.   Eyes:      Extraocular Movements: Extraocular movements intact.      Pupils: Pupils are equal, round, and reactive to light.   Cardiovascular:      Rate and Rhythm: Normal rate and regular rhythm.      Pulses: Normal pulses.      Heart sounds: Normal heart sounds. No murmur heard.     No friction rub. No gallop.   Pulmonary:      Effort: Pulmonary effort is normal.      Breath sounds: Normal breath sounds.   Abdominal:      General: There is no distension.      Palpations: Abdomen is soft. There is no mass.      Tenderness: There is no abdominal tenderness. There is no guarding or rebound.   Musculoskeletal:         General: No swelling or tenderness. Normal range of motion.      Cervical back: Normal range of motion and neck supple. No rigidity. No muscular tenderness.      Right lower leg: No edema.      Left lower leg: No edema.   Skin:     General: Skin is warm.      Capillary Refill: Capillary refill takes less than 2 seconds.      Findings: No erythema or rash.   Neurological:      Mental Status: He is alert and oriented to person, place, and time. Mental status is at baseline.      Comments: Motor strength 0/5 right lower extremity, 1/5 left lower extremity   Psychiatric:         Mood  and Affect: Mood normal.         Behavior: Behavior normal.           Lines/Drains:  Lines/Drains/Airways       Active Status       Name Placement date Placement time Site Days    HD Permanent Double Catheter 09/27/24  1316  Internal jugular  12                            Lab Results: I have reviewed the following results:   Results from last 7 days   Lab Units 10/10/24  0548 10/08/24  1651 10/08/24  0621   WBC Thousand/uL 10.99*   < > 14.16*   HEMOGLOBIN g/dL 7.4*   < > 5.7*   HEMATOCRIT % 21.8*   < > 16.5*   PLATELETS Thousands/uL 132*   < > 125*   LYMPHO PCT %  --   --  4*   MONO PCT %  --   --  2*   EOS PCT %  --   --  0    < > = values in this interval not displayed.     Results from last 7 days   Lab Units 10/10/24  0548 10/08/24  0621 10/07/24  0438   SODIUM mmol/L 131*   < > 130*   POTASSIUM mmol/L 4.0   < > 4.6   CHLORIDE mmol/L 99   < > 104   CO2 mmol/L 23   < > 15*   BUN mg/dL 51*   < > 100*   CREATININE mg/dL 3.01*   < > 5.01*   ANION GAP mmol/L 9   < > 11   CALCIUM mg/dL 8.2*   < > 8.2*   ALBUMIN g/dL  --   --  3.6   TOTAL BILIRUBIN mg/dL  --   --  0.73   ALK PHOS U/L  --   --  14*   ALT U/L  --   --  7   AST U/L  --   --  11*   GLUCOSE RANDOM mg/dL 151*   < > 177*    < > = values in this interval not displayed.     Results from last 7 days   Lab Units 10/07/24  1320   INR  1.21*             Results from last 7 days   Lab Units 10/07/24  0438   LACTIC ACID mmol/L 1.1       Recent Cultures (last 7 days):         Imaging Results Review: No pertinent imaging studies reviewed.  Other Study Results Review: No additional pertinent studies reviewed.    Last 24 Hours Medication List:     Current Facility-Administered Medications:     calcium carbonate (OYSTER SHELL,OSCAL) 500 mg tablet 2 tablet, BID With Meals    calcium carbonate (TUMS) chewable tablet 1,000 mg, Daily PRN    calcium gluconate 2 g in sodium chloride 0.9% 100 mL (premix), Once, Last Rate: 2 g (10/10/24 0733)    carvedilol (COREG) tablet 3.125  mg, BID With Meals    cyclophosphamide (CYTOXAN) capsule 100 mg, Daily    epoetin marcela (EPOGEN,PROCRIT) injection 10,000 Units, Once per day on Monday Wednesday Friday    famotidine (PEPCID) tablet 20 mg, Daily    FLUoxetine (PROzac) capsule 20 mg, Daily    heparin (porcine) injection 6,000 Units, Once per day on Monday Wednesday Friday    heparin (porcine) subcutaneous injection 5,000 Units, Q8H LORENZO    magnesium sulfate 2 g/50 mL IVPB (premix) 2 g, Once, Last Rate: 2 g (10/10/24 0733)    melatonin tablet 3 mg, HS PRN    ondansetron (ZOFRAN) injection 4 mg, Q6H PRN    pantoprazole (PROTONIX) EC tablet 40 mg, BID AC    predniSONE tablet 60 mg, Daily    sulfamethoxazole-trimethoprim (BACTRIM DS) 800-160 mg per tablet 1 tablet, Once per day on Monday Wednesday Friday    vit B complex-vit C-folic acid (Renal Caps) capsule 1 capsule, Daily With Dinner    Administrative Statements   Today, Patient Was Seen By: Agnes Rust MD      **Please Note: This note may have been constructed using a voice recognition system.**

## 2024-10-10 NOTE — PLAN OF CARE
Pre-tx:  UF only treatment, 2.5L x 2.5 hrs.  BP stable to start HD.  HD permcath accessed and running with lines reversed d/t high arterial pressure.      Post-Dialysis RN Treatment Note    Blood Pressure:  Pre 131/60 mm/Hg  Post 167/65 mmHg   EDW  TBD kg    Weight:  Pre 147.6 kg   Post 145 kg   Mode of weight measurement: Bed Scale   Volume Removed  2600 ml    Treatment duration 2.5 hours    NS given  No    Treatment shortened? No   Medications given during Rx Not Applicable   Estimated Kt/V  Not Applicable   Access type: Permacath/TDC   Access Issues: see note above    Report called to primary nurse   Yes               Problem: METABOLIC, FLUID AND ELECTROLYTES - ADULT  Goal: Electrolytes maintained within normal limits  Description: INTERVENTIONS:  - Monitor labs and assess patient for signs and symptoms of electrolyte imbalances  - Administer electrolyte replacement as ordered  - Monitor response to electrolyte replacements, including repeat lab results as appropriate  - Instruct patient on fluid and nutrition as appropriate  Outcome: Progressing  Goal: Fluid balance maintained  Description: INTERVENTIONS:  - Monitor labs   - Monitor I/O and WT  - Instruct patient on fluid and nutrition as appropriate  - Assess for signs & symptoms of volume excess or deficit  Outcome: Progressing

## 2024-10-10 NOTE — ASSESSMENT & PLAN NOTE
Lab Results   Component Value Date    CREATININE 3.01 (H) 10/10/2024    CREATININE 4.18 (H) 10/09/2024    CREATININE 3.60 (H) 10/08/2024    EGFR 19 10/10/2024    EGFR 12 10/09/2024    EGFR 15 10/08/2024     Now on hemodialysis -Mondays/Wednesdays/Fridays  Patient to get an extra dialysis session today on 10/10/2024  Plasmapheresis started on 9/21/2024 -it has occurred daily since  (minus 2 days)  Status post a renal biopsy-evidence of glomerular basement membrane disease noted  Continue prednisone 60 mg p.o. daily - steroid induced leukocytosis noted  Nephrology and Critical Care following  Trend anti-GBM antibody with goal less than 8 -was down to 4.2, but the most recent result revealed that it is now back up to 6.2  Notified by case management that an outpatient dialysis chair has been set up  Hopeful discharge planning for 10/12/2024

## 2024-10-10 NOTE — ASSESSMENT & PLAN NOTE
Albuterol - budesonide, Trelegy   CXR, Mucinex, Levi   Blood pressure appropriate, decrease Coreg dose with bradycardia.

## 2024-10-11 ENCOUNTER — APPOINTMENT (INPATIENT)
Dept: DIALYSIS | Facility: HOSPITAL | Age: 74
DRG: 871 | End: 2024-10-11
Payer: MEDICARE

## 2024-10-11 LAB
ABO GROUP BLD BPU: NORMAL
ANION GAP SERPL CALCULATED.3IONS-SCNC: 8 MMOL/L (ref 4–13)
BPU ID: NORMAL
BUN SERPL-MCNC: 62 MG/DL (ref 5–25)
CA-I BLD-SCNC: 1.13 MMOL/L (ref 1.12–1.32)
CALCIUM SERPL-MCNC: 8.5 MG/DL (ref 8.4–10.2)
CHLORIDE SERPL-SCNC: 102 MMOL/L (ref 96–108)
CO2 SERPL-SCNC: 22 MMOL/L (ref 21–32)
CREAT SERPL-MCNC: 3.88 MG/DL (ref 0.6–1.3)
ERYTHROCYTE [DISTWIDTH] IN BLOOD BY AUTOMATED COUNT: 17.2 % (ref 11.6–15.1)
FIBRINOGEN PPP-MCNC: 106 MG/DL (ref 206–523)
GBM AB SER IA-ACNC: 3.5 UNITS (ref 0–0.9)
GFR SERPL CREATININE-BSD FRML MDRD: 14 ML/MIN/1.73SQ M
GLUCOSE SERPL-MCNC: 159 MG/DL (ref 65–140)
HCT VFR BLD AUTO: 20.9 % (ref 36.5–49.3)
HGB BLD-MCNC: 7 G/DL (ref 12–17)
MAGNESIUM SERPL-MCNC: 2.1 MG/DL (ref 1.9–2.7)
MCH RBC QN AUTO: 31.4 PG (ref 26.8–34.3)
MCHC RBC AUTO-ENTMCNC: 33.5 G/DL (ref 31.4–37.4)
MCV RBC AUTO: 94 FL (ref 82–98)
PLATELET # BLD AUTO: 143 THOUSANDS/UL (ref 149–390)
PMV BLD AUTO: 9.4 FL (ref 8.9–12.7)
POTASSIUM SERPL-SCNC: 4.2 MMOL/L (ref 3.5–5.3)
RBC # BLD AUTO: 2.23 MILLION/UL (ref 3.88–5.62)
SODIUM SERPL-SCNC: 132 MMOL/L (ref 135–147)
UNIT DISPENSE STATUS: NORMAL
UNIT PRODUCT CODE: NORMAL
UNIT PRODUCT VOLUME: 280 ML
UNIT RH: NORMAL
WBC # BLD AUTO: 10.51 THOUSAND/UL (ref 4.31–10.16)

## 2024-10-11 PROCEDURE — 99232 SBSQ HOSP IP/OBS MODERATE 35: CPT | Performed by: PHYSICIAN ASSISTANT

## 2024-10-11 PROCEDURE — 85384 FIBRINOGEN ACTIVITY: CPT | Performed by: NURSE PRACTITIONER

## 2024-10-11 PROCEDURE — 85027 COMPLETE CBC AUTOMATED: CPT | Performed by: NURSE PRACTITIONER

## 2024-10-11 PROCEDURE — 82330 ASSAY OF CALCIUM: CPT | Performed by: NURSE PRACTITIONER

## 2024-10-11 PROCEDURE — 99232 SBSQ HOSP IP/OBS MODERATE 35: CPT

## 2024-10-11 PROCEDURE — 80048 BASIC METABOLIC PNL TOTAL CA: CPT | Performed by: NURSE PRACTITIONER

## 2024-10-11 PROCEDURE — 83735 ASSAY OF MAGNESIUM: CPT | Performed by: NURSE PRACTITIONER

## 2024-10-11 RX ORDER — CALCIUM GLUCONATE 20 MG/ML
2 INJECTION, SOLUTION INTRAVENOUS ONCE
Status: COMPLETED | OUTPATIENT
Start: 2024-10-11 | End: 2024-10-11

## 2024-10-11 RX ADMIN — HEPARIN SODIUM 5000 UNITS: 5000 INJECTION, SOLUTION INTRAVENOUS; SUBCUTANEOUS at 05:06

## 2024-10-11 RX ADMIN — EPOETIN ALFA 10000 UNITS: 10000 SOLUTION INTRAVENOUS; SUBCUTANEOUS at 16:02

## 2024-10-11 RX ADMIN — FLUOXETINE HYDROCHLORIDE 20 MG: 20 CAPSULE ORAL at 08:30

## 2024-10-11 RX ADMIN — CARVEDILOL 3.12 MG: 3.12 TABLET, FILM COATED ORAL at 19:10

## 2024-10-11 RX ADMIN — HEPARIN SODIUM 5000 UNITS: 5000 INJECTION, SOLUTION INTRAVENOUS; SUBCUTANEOUS at 21:14

## 2024-10-11 RX ADMIN — PREDNISONE 60 MG: 20 TABLET ORAL at 19:10

## 2024-10-11 RX ADMIN — CYCLOPHOSPHAMIDE 100 MG: 50 CAPSULE ORAL at 19:10

## 2024-10-11 RX ADMIN — FAMOTIDINE 20 MG: 20 TABLET, FILM COATED ORAL at 08:30

## 2024-10-11 RX ADMIN — PANTOPRAZOLE SODIUM 40 MG: 40 TABLET, DELAYED RELEASE ORAL at 19:10

## 2024-10-11 RX ADMIN — SULFAMETHOXAZOLE AND TRIMETHOPRIM 1 TABLET: 800; 160 TABLET ORAL at 19:10

## 2024-10-11 RX ADMIN — CARVEDILOL 3.12 MG: 3.12 TABLET, FILM COATED ORAL at 08:30

## 2024-10-11 RX ADMIN — PANTOPRAZOLE SODIUM 40 MG: 40 TABLET, DELAYED RELEASE ORAL at 08:30

## 2024-10-11 RX ADMIN — Medication 350 G: at 17:00

## 2024-10-11 RX ADMIN — CALCIUM 2 TABLET: 500 TABLET ORAL at 19:10

## 2024-10-11 RX ADMIN — HEPARIN SODIUM 6000 UNITS: 1000 INJECTION INTRAVENOUS; SUBCUTANEOUS at 13:46

## 2024-10-11 RX ADMIN — Medication 1 CAPSULE: at 19:10

## 2024-10-11 RX ADMIN — CALCIUM 2 TABLET: 500 TABLET ORAL at 08:30

## 2024-10-11 RX ADMIN — CALCIUM GLUCONATE 2 G: 20 INJECTION, SOLUTION INTRAVENOUS at 17:03

## 2024-10-11 NOTE — ASSESSMENT & PLAN NOTE
Continue to provide fluid restriction, follow sodium levels from time to time. Sodium 132 mmol/L on 10/11 predialysis.

## 2024-10-11 NOTE — ASSESSMENT & PLAN NOTE
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 Continue with cyclophosphamide, dosed 1mg/kg for dialysis, and high-dose prednisone along with Bactrim/PPI for prophylaxis.    Volume status:  Examines hypervolemic with severe BL LE edema.   Bed scale weight not felt to be reliable-- post weight 144.1 kg 10/9. Suspect anemia is dilutional in nature in part.     Anti GBM levels improving to 4.2 on 10/5 but unfortunately priscila to 6.2 on 10/7. Levels likely priscila as patient did not receive PLEX the previous day 10/6. Levels were improving considerably by 50% prior to this. Plan for 5 PLEX treatment this week.     PLEX replacement 1.5 plasma volume with mix of albumin/FFP at discretion of critical care team. Replace with FFP if fibrinogen<100. Fibrinogen 78 10/9. Sp 1 unit PRBC, cryo, half FFP/albumin 10/9.       Recommend:  Recommend to finish PLEX therapy for 3 week duration 10/11. Overall, no signs of renal recovery which meli poorly for liberalization from dialysis. Patient initiated on dialysis prior to PLEX with 100% crescents on biopsy so his renal prognosis has been suboptimal from start. 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 Anticipate continued decline based on trends. Unlikely that continued PLEX will liberalize patient from HD at this juncture. Will repeat antiGBM levels which will result by 10/12. Recommend conversation with patient and family to discuss prognosis and decision on PLEX.     Discussed care with hospitalist, current plan is for discharge home tomorrow 10/12. [facility], Friday with dialysis orders provided to clinic.   kg to start with gradual challenging of UF.

## 2024-10-11 NOTE — ASSESSMENT & PLAN NOTE
Patient remains in hemodialysis dependent acute kidney injury.  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   Please refer below for details.  Continue Monday, Wednesday, Friday hemodialysis sessions.    No signs of renal recovery as of yet. Remains anuric as recorded.  Continue PLEX therapy as outlined below.     Plan for regularly scheduled HD today with challenging UF as tolerated due to hypervolemia and suspected dilutional anemia due to fluid gains with PLEX. Obtain weights as best we can.

## 2024-10-11 NOTE — ASSESSMENT & PLAN NOTE
Lab Results   Component Value Date    EGFR 14 10/11/2024    EGFR 19 10/10/2024    EGFR 12 10/09/2024    CREATININE 3.88 (H) 10/11/2024    CREATININE 3.01 (H) 10/10/2024    CREATININE 4.18 (H) 10/09/2024   Patient is on Epogen 10,000 units with each hemodialysis session, continue to monitor hemoglobin levels, provide packed red blood cells as indicated depending on the patient progresses clinically. Hematology following.  Folate normal at 13.2. Vitamin B12 normal at 227. Hemoglobin 7.0 g/dL, 10/11.    Follow up CBC today, PRBC to be coordinated per primary/critical care team.  Reviewed heme/onc consult from 10/8 with patient, discussed consideration of dilution from PLEX blood products. Discussed bone marrow biopsy as well, patient has not made decision on this and wants to consider this further.

## 2024-10-11 NOTE — PLAN OF CARE
Problem: PAIN - ADULT  Goal: Verbalizes/displays adequate comfort level or baseline comfort level  Description: Interventions:  - Encourage patient to monitor pain and request assistance  - Assess pain using appropriate pain scale  - Administer analgesics based on type and severity of pain and evaluate response  - Implement non-pharmacological measures as appropriate and evaluate response  - Consider cultural and social influences on pain and pain management  - Notify physician/advanced practitioner if interventions unsuccessful or patient reports new pain  Outcome: Progressing     Problem: INFECTION - ADULT  Goal: Absence or prevention of progression during hospitalization  Description: INTERVENTIONS:  - Assess and monitor for signs and symptoms of infection  - Monitor lab/diagnostic results  - Monitor all insertion sites, i.e. indwelling lines, tubes, and drains  - Monitor endotracheal if appropriate and nasal secretions for changes in amount and color  - [place] appropriate cooling/warming therapies per order  - Administer medications as ordered  - Instruct and encourage patient and family to use good hand hygiene technique  - Identify and instruct in appropriate isolation precautions for identified infection/condition  Outcome: Progressing

## 2024-10-11 NOTE — ASSESSMENT & PLAN NOTE
Patient with a low hemoglobin on 9/27/2024  Status post a total of 12 units of packed red blood cells on this admission thus far  Hemoglobin today 7.0  No evidence of bleeding  No evidence of hemolysis  Possibly related to the plasmapheresis  Appreciate heme-onc input, bone marrow biopsy consideration is in place, however at this time the patient needs more time to think about this  Patient remains hemodynamically stable  Epogen as per Nephrology  Continue to monitor daily CBCs

## 2024-10-11 NOTE — ASSESSMENT & PLAN NOTE
Lab Results   Component Value Date    CREATININE 3.88 (H) 10/11/2024    CREATININE 3.01 (H) 10/10/2024    CREATININE 4.18 (H) 10/09/2024    EGFR 14 10/11/2024    EGFR 19 10/10/2024    EGFR 12 10/09/2024     Now on hemodialysis -Mondays/Wednesdays/Fridays  Patient to get an extra dialysis session on 10/10/2024  Plasmapheresis started on 9/21/2024 -it has occurred daily since  (minus 2 days)- no plasmapheresis needed today(10/11)  Status post a renal biopsy-evidence of glomerular basement membrane disease noted  Continue prednisone 60 mg p.o. daily - steroid induced leukocytosis noted  Nephrology and Critical Care following  Trend anti-GBM antibody with goal less than 8 -was down to 4.2, but the most recent result revealed that it is now back up to 6.2- 10/9 results pending  Notified by case management that an outpatient dialysis chair has been set up  Hopeful discharge planning for 10/12/2024

## 2024-10-11 NOTE — PROGRESS NOTES
Progress Note - Nephrology   Name: Ramos Rosenthal 74 y.o. male I MRN: [id number]  Unit/Bed#: ICU 04-01 I Date of Admission: 9/14/2024   Date of Service: 10/11/2024 I Hospital Day: 27     Assessment & Plan  LYLY (acute kidney injury) (HCC)  Patient remains in hemodialysis dependent acute kidney injury.  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   Please refer below for details.  Continue Monday, Wednesday, Friday hemodialysis sessions.    No signs of renal recovery as of yet. Remains anuric as recorded.  Continue PLEX therapy as outlined below.     Plan for regularly scheduled HD today with challenging UF as tolerated due to hypervolemia and suspected dilutional anemia due to fluid gains with PLEX. Obtain weights as best we can.   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 Continue with cyclophosphamide, dosed 1mg/kg for dialysis, and high-dose prednisone along with Bactrim/PPI for prophylaxis.    Volume status:  Examines hypervolemic with severe BL LE edema.   Bed scale weight not felt to be reliable-- post weight 144.1 kg 10/9. Suspect anemia is dilutional in nature in part.     Anti GBM levels improving to 4.2 on 10/5 but unfortunately priscila to 6.2 on 10/7. Levels likely priscila as patient did not receive PLEX the previous day 10/6. Levels were improving considerably by 50% prior to this. Plan for 5 PLEX treatment this week.     PLEX replacement 1.5 plasma volume with mix of albumin/FFP at discretion of critical care team. Replace with FFP if fibrinogen<100. Fibrinogen 78 10/9. Sp 1 unit PRBC, cryo, half FFP/albumin 10/9.       Recommend:  Recommend to finish PLEX therapy for 3 week duration 10/11. Overall, no signs of renal recovery which meli poorly for liberalization from dialysis. Patient  initiated on dialysis prior to PLEX with 100% crescents on biopsy so his renal prognosis has been suboptimal from start. 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 Anticipate continued decline based on trends. Unlikely that continued PLEX will liberalize patient from HD at this juncture. Will repeat antiGBM levels which will result by 10/12. Recommend conversation with patient and family to discuss prognosis and decision on PLEX.     Discussed care with hospitalist, current plan is for discharge home tomorrow 10/12. Hemodialysis set up at [facility] Monday, Wed, Friday with dialysis orders provided to clinic.   kg to start with gradual challenging of UF.  Hyponatremia  Continue to provide fluid restriction, follow sodium levels from time to time. Sodium 132 mmol/L on 10/11 predialysis.   Hypocalcemia  Continue calcium supplementation with PLEX and calcium carbonate 2 tablet BID.   Anemia due to chronic kidney disease, on chronic dialysis (HCC)  Lab Results   Component Value Date    EGFR 14 10/11/2024    EGFR 19 10/10/2024    EGFR 12 10/09/2024    CREATININE 3.88 (H) 10/11/2024    CREATININE 3.01 (H) 10/10/2024    CREATININE 4.18 (H) 10/09/2024   Patient is on Epogen 10,000 units with each hemodialysis session, continue to monitor hemoglobin levels, provide packed red blood cells as indicated depending on the patient progresses clinically. Hematology following.  Folate normal at 13.2. Vitamin B12 normal at 227. Hemoglobin 7.0 g/dL, 10/11.    Follow up CBC today, PRBC to be coordinated per primary/critical care team.  Reviewed heme/onc consult from 10/8 with patient, discussed consideration of dilution from PLEX blood products. Discussed bone marrow biopsy as well, patient has not made decision on this and wants to consider this further.  Bilateral lower extremity edema  UF 3 L on 10/9.   Plan for next regularly scheduled hemodialysis  today with challenge EDW as tolerated for volume overload concerns due to blood products/albumin replacement with PLEX.   Essential hypertension  Blood pressure appropriate, decrease Coreg dose with bradycardia.   Hypomagnesemia  Continue to monitor magnesium levels, offer supplementation as indicated.      Objective :  Temp:  [96.8 °F (36 °C)-97.6 °F (36.4 °C)] 96.8 °F (36 °C)  HR:  [49-65] 62  BP: (116-177)/(54-77) 144/64  Resp:  [10-27] 18  SpO2:  [83 %-99 %] 99 %  O2 Device: None (Room air)    Current Weight: Weight - Scale: (!) 146 kg (322 lb 1.5 oz)  First Weight: Weight - Scale: (!) 140 kg (309 lb 8.4 oz)  I/O         10/09 0701  10/10 0700 10/10 0701  10/11 0700 10/11 0701  10/12 0700    P.O. 600 435     I.V. (mL/kg) 500 (3.4) 500 (3.4)     Blood 1742.8      IV Piggyback  6350     Total Intake(mL/kg) 2842.8 (19.2) 7285 (49.9)     Urine (mL/kg/hr)       Other 3501 2987     Stool       Total Output 3501 2987     Net -658.2 +4298            Unmeasured Stool Occurrence  0 x           Physical Exam  Vitals and nursing note reviewed.   Constitutional:       General: He is not in acute distress.     Appearance: He is well-developed. He is obese. He is ill-appearing.   HENT:      Head: Normocephalic and atraumatic.      Right Ear: External ear normal.      Left Ear: External ear normal.      Nose: Nose normal.      Mouth/Throat:      Mouth: Mucous membranes are moist.      Pharynx: Oropharynx is clear.   Eyes:      Extraocular Movements: Extraocular movements intact.      Conjunctiva/sclera: Conjunctivae normal.      Pupils: Pupils are equal, round, and reactive to light.   Cardiovascular:      Rate and Rhythm: Normal rate and regular rhythm.      Heart sounds: Normal heart sounds. No murmur heard.  Pulmonary:      Effort: Pulmonary effort is normal. No respiratory distress.      Breath sounds: Normal breath sounds.      Comments: Decreased lung sounds  Abdominal:      Palpations: Abdomen is soft.      Tenderness:  There is no abdominal tenderness.      Comments: obese   Musculoskeletal:         General: No swelling.      Cervical back: Neck supple.      Right lower leg: Edema present.      Left lower leg: Edema present.      Comments: 4+ BLE edema R>L, appears somewhat improved over 2 days prior   Skin:     General: Skin is warm and dry.      Capillary Refill: Capillary refill takes less than 2 seconds.      Coloration: Skin is pale.   Neurological:      Mental Status: He is alert and oriented to person, place, and time. Mental status is at baseline.   Psychiatric:         Mood and Affect: Mood normal.         Behavior: Behavior normal.       Medications:    Current Facility-Administered Medications:     albumin human 350 g 5% IVPB, 350 g, Intravenous, Once, BRETT Henriquez    calcium carbonate (OYSTER SHELL,OSCAL) 500 mg tablet 2 tablet, 2 tablet, Oral, BID With Meals, Gigi Ortega DO, 2 tablet at 10/11/24 0830    calcium carbonate (TUMS) chewable tablet 1,000 mg, 1,000 mg, Oral, Daily PRN, BRETT Xie    carvedilol (COREG) tablet 3.125 mg, 3.125 mg, Oral, BID With Meals, Lisa Oscar, DO, 3.125 mg at 10/11/24 0830    cyclophosphamide (CYTOXAN) capsule 100 mg, 100 mg, Oral, Daily, Lisa Oscar, DO, 100 mg at 10/10/24 1700    epoetin marcela (EPOGEN,PROCRIT) injection 10,000 Units, 10,000 Units, Intravenous, Once per day on Monday Wednesday Friday, Gigi Ortega DO, 10,000 Units at 10/09/24 1133    famotidine (PEPCID) tablet 20 mg, 20 mg, Oral, Daily, Ramos Mclean DO, 20 mg at 10/11/24 0830    FLUoxetine (PROzac) capsule 20 mg, 20 mg, Oral, Daily, BRETT Xie, 20 mg at 10/11/24 0830    heparin (porcine) injection 6,000 Units, 6,000 Units, Intravenous, Once per day on Monday Wednesday Friday, Gigi Ortega DO, 6,000 Units at 10/09/24 0940    heparin (porcine) subcutaneous injection 5,000 Units, 5,000 Units, Subcutaneous, Q8H Virginia VENTURA  BRETT Williamson, 5,000 Units at 10/11/24 0506    melatonin tablet 3 mg, 3 mg, Oral, HS PRN, Laurel Rosenberg MD, 3 mg at 09/28/24 0057    ondansetron (ZOFRAN) injection 4 mg, 4 mg, Intravenous, Q6H PRN, BRETT Xie    pantoprazole (PROTONIX) EC tablet 40 mg, 40 mg, Oral, BID AC, Rui Chappell MD, 40 mg at 10/11/24 0830    predniSONE tablet 60 mg, 60 mg, Oral, Daily, Lisa Moore DO, 60 mg at 10/10/24 1700    sulfamethoxazole-trimethoprim (BACTRIM DS) 800-160 mg per tablet 1 tablet, 1 tablet, Oral, Once per day on Monday Wednesday Friday, Lisa Moore DO, 1 tablet at 10/09/24 1642    vit B complex-vit C-folic acid (Renal Caps) capsule 1 capsule, 1 capsule, Oral, Daily With Dinner, Gigi Ortega DO, 1 capsule at 10/10/24 1700      Lab Results: I have reviewed the following results:  Results from last 7 days   Lab Units 10/11/24  0505 10/10/24  0548 10/09/24  0519 10/08/24  1651 10/08/24  0621 10/07/24  1320 10/07/24  0438 10/06/24  1626 10/06/24  0612 10/05/24  2008 10/05/24  0451   WBC Thousand/uL 10.51* 10.99* 12.51*  --  14.16* 16.18* 16.30*  --  15.38*   < > 15.91*   HEMOGLOBIN g/dL 7.0* 7.4* 6.6* 6.9* 5.7* 6.4* 5.9*   < > 7.0*   < > 6.5*   HEMATOCRIT % 20.9* 21.8* 18.9* 20.1* 16.5* 18.4* 18.3*  --  21.3*   < > 19.5*   PLATELETS Thousands/uL 143* 132* 119*  --  125* 133* 137*  --  125*   < > 145*   POTASSIUM mmol/L 4.2 4.0 4.2  --  4.0  --  4.6  --  4.6  --  4.0   CHLORIDE mmol/L 102 99 100  --  96  --  104  --  106  --  105   CO2 mmol/L 22 23 21  --  23  --  15*  --  15*  --  18*   BUN mg/dL 62* 51* 89*  --  78*  --  100*  --  70*  --  41*   CREATININE mg/dL 3.88* 3.01* 4.18*  --  3.60*  --  5.01*  --  4.02*  --  2.92*   CALCIUM mg/dL 8.5 8.2* 8.2*  --  8.3*  --  8.2*  --  8.0*  --  8.0*   MAGNESIUM mg/dL 2.1 1.8* 1.8*  --   --   --  2.1  --  1.8*  --  1.6*   PHOSPHORUS mg/dL  --   --   --   --   --   --  3.8  --  4.0  --  3.1   ALBUMIN g/dL  --   --   --   --    "--   --  3.6  --  4.1  --   --     < > = values in this interval not displayed.       Administrative Statements     Portions of the record may have been created with voice recognition software. Occasional wrong word or \"sound a like\" substitutions may have occurred due to the inherent limitations of voice recognition software. Read the chart carefully and recognize, using context, where substitutions have occurred.If you have any questions, please contact the dictating provider.  "

## 2024-10-11 NOTE — ASSESSMENT & PLAN NOTE
UF 3 L on 10/9.   Plan for next regularly scheduled hemodialysis today with challenge EDW as tolerated for volume overload concerns due to blood products/albumin replacement with PLEX.

## 2024-10-11 NOTE — PLAN OF CARE
Post-Dialysis RN Treatment Note    Blood Pressure:  Pre-137/63 mm/Hg  Post-149/69 mmHg   EDW-TBD   Weight:  Pre-146.1 kg   Post-143.6 kg   Mode of weight measurement: Bed Scale   Volume Removed-2500 ml    Treatment duration-210 minutes    NS given-  No    Treatment shortened? Yes, describe: by 15 min due to phoresis needing to be done post tx.   Medications given during Rx-Heparin 6,000 u, Epogen 10,000 u   Estimated Kt/V-0.65   Access type: Permacath/TDC   Access Issues: Yes, describe: reversed lines      Report called to primary nurse-   Yes Ramos Kahn RN      Goal-remove 2.5-3 L as tolerated using 3 K+ bath over 3 hr 45 min hd tx.    Problem: METABOLIC, FLUID AND ELECTROLYTES - ADULT  Goal: Electrolytes maintained within normal limits  Description: INTERVENTIONS:  - Monitor labs and assess patient for signs and symptoms of electrolyte imbalances  - Administer electrolyte replacement as ordered  - Monitor response to electrolyte replacements, including repeat lab results as appropriate  - Instruct patient on fluid and nutrition as appropriate  Outcome: Progressing

## 2024-10-11 NOTE — PLAN OF CARE
Problem: PAIN - ADULT  Goal: Verbalizes/displays adequate comfort level or baseline comfort level  Description: Interventions:  - Encourage patient to monitor pain and request assistance  - Assess pain using appropriate pain scale  - Administer analgesics based on type and severity of pain and evaluate response  - Implement non-pharmacological measures as appropriate and evaluate response  - Consider cultural and social influences on pain and pain management  - Notify physician/advanced practitioner if interventions unsuccessful or patient reports new pain  Outcome: Progressing     Problem: INFECTION - ADULT  Goal: Absence or prevention of progression during hospitalization  Description: INTERVENTIONS:  - Assess and monitor for signs and symptoms of infection  - Monitor lab/diagnostic results  - Monitor all insertion sites, i.e. indwelling lines, tubes, and drains  - Monitor endotracheal if appropriate and nasal secretions for changes in amount and color  - Summit appropriate cooling/warming therapies per order  - Administer medications as ordered  - Instruct and encourage patient and family to use good hand hygiene technique  - Identify and instruct in appropriate isolation precautions for identified infection/condition  Outcome: Progressing  Goal: Absence of fever/infection during neutropenic period  Description: INTERVENTIONS:  - Monitor WBC    Outcome: Progressing     Problem: SAFETY ADULT  Goal: Patient will remain free of falls  Description: INTERVENTIONS:  - Educate patient/family on patient safety including physical limitations  - Instruct patient to call for assistance with activity   - Consult OT/PT to assist with strengthening/mobility   - Keep Call bell within reach  - Keep bed low and locked with side rails adjusted as appropriate  - Keep care items and personal belongings within reach  - Initiate and maintain comfort rounds  - Make Fall Risk Sign visible to staff  - Offer Toileting every 2 Hours,  in advance of need  - Initiate/Maintain bed alarm  - Obtain necessary fall risk management equipment  - Apply yellow socks and bracelet for high fall risk patients  - Consider moving patient to room near nurses station  Outcome: Progressing  Goal: Maintain or return to baseline ADL function  Description: INTERVENTIONS:  -  Assess patient's ability to carry out ADLs; assess patient's baseline for ADL function and identify physical deficits which impact ability to perform ADLs (bathing, care of mouth/teeth, toileting, grooming, dressing, etc.)  - Assess/evaluate cause of self-care deficits   - Assess range of motion  - Assess patient's mobility; develop plan if impaired  - Assess patient's need for assistive devices and provide as appropriate  - Encourage maximum independence but intervene and supervise when necessary  - Involve family in performance of ADLs  - Assess for home care needs following discharge   - Consider OT consult to assist with ADL evaluation and planning for discharge  - Provide patient education as appropriate  Outcome: Progressing  Goal: Maintains/Returns to pre admission functional level  Description: INTERVENTIONS:  - Perform AM-PAC 6 Click Basic Mobility/ Daily Activity assessment daily.  - Set and communicate daily mobility goal to care team and patient/family/caregiver.   - Collaborate with rehabilitation services on mobility goals if consulted  - Out of bed for meals   - Out of bed for toileting  - Record patient progress and toleration of activity level   Outcome: Progressing     Problem: DISCHARGE PLANNING  Goal: Discharge to home or other facility with appropriate resources  Description: INTERVENTIONS:  - Identify barriers to discharge w/patient and caregiver  - Arrange for needed discharge resources and transportation as appropriate  - Identify discharge learning needs (meds, wound care, etc.)  - Arrange for interpretive services to assist at discharge as needed  - Refer to Case  Management Department for coordinating discharge planning if the patient needs post-hospital services based on physician/advanced practitioner order or complex needs related to functional status, cognitive ability, or social support system  Outcome: Progressing     Problem: Knowledge Deficit  Goal: Patient/family/caregiver demonstrates understanding of disease process, treatment plan, medications, and discharge instructions  Description: Complete learning assessment and assess knowledge base.  Interventions:  - Provide teaching at level of understanding  - Provide teaching via preferred learning methods  Outcome: Progressing     Problem: Nutrition/Hydration-ADULT  Goal: Nutrient/Hydration intake appropriate for improving, restoring or maintaining nutritional needs  Description: Monitor and assess patient's nutrition/hydration status for malnutrition. Collaborate with interdisciplinary team and initiate plan and interventions as ordered.  Monitor patient's weight and dietary intake as ordered or per policy. Utilize nutrition screening tool and intervene as necessary. Determine patient's food preferences and provide high-protein, high-caloric foods as appropriate.     INTERVENTIONS:  - Monitor oral intake, urinary output, labs, and treatment plans  - Assess nutrition and hydration status and recommend course of action  - Evaluate amount of meals eaten  - Assist patient with eating if necessary   - Allow adequate time for meals  - Recommend/ encourage appropriate diets, oral nutritional supplements, and vitamin/mineral supplements  - Order, calculate, and assess calorie counts as needed  - Recommend, monitor, and adjust tube feedings and TPN/PPN based on assessed needs  - Assess need for intravenous fluids  - Provide specific nutrition/hydration education as appropriate  - Include patient/family/caregiver in decisions related to nutrition  Outcome: Progressing     Problem: Prexisting or High Potential for Compromised  Skin Integrity  Goal: Skin integrity is maintained or improved  Description: INTERVENTIONS:  - Identify patients at risk for skin breakdown  - Assess and monitor skin integrity  - Assess and monitor nutrition and hydration status  - Monitor labs   - Assess for incontinence   - Turn and reposition patient  - Assist with mobility/ambulation  - Relieve pressure over bony prominences  - Avoid friction and shearing  - Provide appropriate hygiene as needed including keeping skin clean and dry  - Evaluate need for skin moisturizer/barrier cream  - Collaborate with interdisciplinary team   - Patient/family teaching  - Consider wound care consult   Outcome: Progressing     Problem: METABOLIC, FLUID AND ELECTROLYTES - ADULT  Goal: Electrolytes maintained within normal limits  Description: INTERVENTIONS:  - Monitor labs and assess patient for signs and symptoms of electrolyte imbalances  - Administer electrolyte replacement as ordered  - Monitor response to electrolyte replacements, including repeat lab results as appropriate  - Instruct patient on fluid and nutrition as appropriate  Outcome: Progressing  Goal: Fluid balance maintained  Description: INTERVENTIONS:  - Monitor labs   - Monitor I/O and WT  - Instruct patient on fluid and nutrition as appropriate  - Assess for signs & symptoms of volume excess or deficit  Outcome: Progressing     Problem: HEMATOLOGIC - ADULT  Goal: Maintains hematologic stability  Description: INTERVENTIONS  - Assess for signs and symptoms of bleeding or hemorrhage  - Monitor labs  - Administer supportive blood products/factors as ordered and appropriate  Outcome: Progressing

## 2024-10-11 NOTE — ASSESSMENT & PLAN NOTE
Noted on renal biopsy  Daily plasmapheresis to end on Friday, 10/11/2024- no need for plasmapheresis today per critical care team  Continue cyclophosphamide, prednisone, and Bactrim  Patient has a continued steroid-induced leukocytosis  Continued management as per nephrology/critical care  Patient will most likely be dialysis dependent for the rest of his life  Once again, discharge planning for Saturday, 10/12/2024

## 2024-10-11 NOTE — CASE MANAGEMENT
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 2024     Essential hypertension 2024     Secondary hyperparathyroidism of renal origin ([facility]) 2024     Uremia 2024     Hyponatremia 2024     Bilateral lower extremity edema 2024     Neurogenic claudication 2023    Bloating 10/05/2022     Paresthesia of both lower extremities 10/01/2022     Leucocytosis 10/01/2022     Elevated troponin 2022     EDGAR (dyspnea on exertion) 2022     Rash due to vaculitis  2022     LYLY (acute kidney injury) ([facility]) 2022     Depression 2022     Multiple open wounds of lower leg 2022     Lower extremity weakness 2019     Lumbosacral plexopathy 2019     Gait abnormality 2019     Lumbar stenosis 2019     Polyneuropathy 2019     Prediabetes 2018    Hyperlipidemia 2014    Vitamin D deficiency 2014      LOS (days): 27  Geometric Mean LOS (GMLOS) (days): 4.9  Days to GMLOS:-21.9     OBJECTIVE:  Risk of Unplanned Readmission Score: 24.81         Current admission status: Inpatient   Preferred Pharmacy:   [id number] [place] [facility] [address] [address]  Phone: [phone] Fax: [phone]    Primary Care Provider: Jessika Carrillo MD    Primary Insurance: MEDICARE  Secondary  Insurance: [facility]    DISCHARGE DETAILS:    Comments - Freedom of Choice: CM continuing to follow for discharge planning. Chart reviewed. [facility]. He is also aware and in agreement with plan to follow up with Trey ROLLE at 11:30 AM schedule. [phone] and provided update. Anticipate discharge in the next 24 hours per ZACIM. Itzel aware and confirms if pt discharge tomorrow 10/12 as planned he is able to start HD Monday 10/14 at 11:30 AM.         Contacts  Patient Contacts: Louise Rosenthal  Spouse  [phone] - message left to discuss discharge planning  Relationship to Patient:: Family  Contact Method: Phone  Reason/Outcome: Discharge Planning        Treatment Team Recommendation: Home with Home Health Care  Discharge Destination Plan:: Home with Home Health Care  Transport at Discharge : Family           IMM Given (Date):: 10/11/24  IMM Given to:: Patient

## 2024-10-11 NOTE — PROGRESS NOTES
Progress Note - Hospitalist   Name: Ramos Rosenthal 74 y.o. male I MRN: [id number]  Unit/Bed#: ICU 04-01 I Date of Admission: 9/14/2024   Date of Service: 10/11/2024 I Hospital Day: 27    Assessment & Plan  LYLY (acute kidney injury) ([facility])      Lab Results   Component Value Date    CREATININE 3.88 (H) 10/11/2024    CREATININE 3.01 (H) 10/10/2024    CREATININE 4.18 (H) 10/09/2024    EGFR 14 10/11/2024    EGFR 19 10/10/2024    EGFR 12 10/09/2024     Now on hemodialysis -Mondays/Wednesdays/Fridays  Patient to get an extra dialysis session on 10/10/2024  Plasmapheresis started on 9/21/2024 -it has occurred daily since  (minus 2 days)- no plasmapheresis needed today(10/11)  Status post a renal biopsy-evidence of glomerular basement membrane disease noted  Continue prednisone 60 mg p.o. daily - steroid induced leukocytosis noted  Nephrology and Critical Care following  Trend anti-GBM antibody with goal less than 8 -was down to 4.2, but the most recent result revealed that it is now back up to 6.2- 10/9 results pending  Notified by case management that an outpatient dialysis chair has been set up  Hopeful discharge planning for 10/12/2024  Anemia due to chronic kidney disease, on chronic dialysis (HCC)  Patient with a low hemoglobin on 9/27/2024  Status post a total of 12 units of packed red blood cells on this admission thus far  Hemoglobin today 7.0  No evidence of bleeding  No evidence of hemolysis  Possibly related to the plasmapheresis  Appreciate heme-onc input, bone marrow biopsy consideration is in place, however at this time the patient needs more time to think about this  Patient remains hemodynamically stable  Epogen as per Nephrology  Continue to monitor daily CBCs  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  Noted on renal biopsy  Daily plasmapheresis to end on Friday, 10/11/2024- no need for plasmapheresis today per critical care team  Continue cyclophosphamide, prednisone, and  Bactrim  Patient has a continued steroid-induced leukocytosis  Continued management as per nephrology/critical care  Patient will most likely be dialysis dependent for the rest of his life  Once again, discharge planning for Saturday, 10/12/2024  Hyponatremia  Patient has a hypervolemic hyponatremia  Patient has remained asymptomatic  Sodium is stable at 132  Continue fluid restriction  Continued management as per nephrology  Paresthesia of both lower extremities  Hx of back surgery in 2004 and 2014 and has since bilateral LE paresthesia  He is primarily wheelchair bound  Venous statis ulcers noted to Bilateral LE  B/L extremity edema R>L  Continue supportive therapy  Bilateral lower extremity edema  Chronic condition  Patient with a history of lower back/spinal surgery  Patient does not ambulate  Patient is wheelchair-bound at baseline  Depression  Continue home dose fluoxetine  Essential hypertension  Blood pressure is controlled   continue carvedilol  Hypocalcemia  Continue daily calcium carbonate supplementation  Hypomagnesemia      VTE Pharmacologic Prophylaxis: VTE Score: 8 High Risk (Score >/= 5) - Pharmacological DVT Prophylaxis Ordered: heparin. Sequential Compression Devices Ordered.    Mobility:   Basic Mobility Inpatient Raw Score: 10  JH-HLM Goal: 4: Move to chair/commode  JH-HLM Achieved: 2: Bed activities/Dependent transfer  JH-HLM Goal NOT achieved. Continue with multidisciplinary rounding and encourage appropriate mobility to improve upon JH-HLM goals.    Patient Centered Rounds: I performed bedside rounds with nursing staff today.   Discussions with Specialists or Other Care Team Provider: nursing, CM, nephrology    Education and Discussions with Family / Patient:  patient updated at bedside.     Current Length of Stay: 27 day(s)  Current Patient Status: Inpatient   Certification Statement: The patient will continue to require additional inpatient hospital stay due to continued monitoring of labs.    Discharge Plan: Anticipate discharge tomorrow to home.    Code Status: Level 1 - Full Code    Subjective   The patient was seen and examined. The patient is sitting up in bed in no acute distress. He's asking when he'll be discharged home.     Objective :  Temp:  [96.8 °F (36 °C)-97 °F (36.1 °C)] 97 °F (36.1 °C)  HR:  [51-65] 51  BP: (116-153)/(54-71) 153/71  Resp:  [10-24] 16  SpO2:  [83 %-99 %] 98 %  O2 Device: None (Room air)    Body mass index is 41.35 kg/m².     Input and Output Summary (last 24 hours):     Intake/Output Summary (Last 24 hours) at 10/11/2024 1439  Last data filed at 10/11/2024 1330  Gross per 24 hour   Intake 6475 ml   Output --   Net 6475 ml       Physical Exam  Vitals and nursing note reviewed.   Constitutional:       General: He is awake.      Appearance: He is morbidly obese.   HENT:      Head: Normocephalic and atraumatic.   Cardiovascular:      Rate and Rhythm: Normal rate and regular rhythm.   Pulmonary:      Effort: Pulmonary effort is normal.      Breath sounds: Normal breath sounds.   Abdominal:      Palpations: Abdomen is soft.      Tenderness: There is no abdominal tenderness.   Skin:     General: Skin is warm and dry.   Neurological:      Mental Status: He is alert and oriented to person, place, and time.   Psychiatric:         Attention and Perception: Attention normal.         Mood and Affect: Mood normal.         Speech: Speech normal.         Behavior: Behavior is cooperative.           Lines/Drains:  Lines/Drains/Airways       Active Status       Name Placement date Placement time Site Days    HD Permanent Double Catheter 09/27/24  1316  Internal jugular  14                            Lab Results: I have reviewed the following results:   Results from last 7 days   Lab Units 10/11/24  0505 10/08/24  1651 10/08/24  0621   WBC Thousand/uL 10.51*   < > 14.16*   HEMOGLOBIN g/dL 7.0*   < > 5.7*   HEMATOCRIT % 20.9*   < > 16.5*   PLATELETS Thousands/uL 143*   < > 125*   LYMPHO PCT  %  --   --  4*   MONO PCT %  --   --  2*   EOS PCT %  --   --  0    < > = values in this interval not displayed.     Results from last 7 days   Lab Units 10/11/24  0505 10/08/24  0621 10/07/24  0438   SODIUM mmol/L 132*   < > 130*   POTASSIUM mmol/L 4.2   < > 4.6   CHLORIDE mmol/L 102   < > 104   CO2 mmol/L 22   < > 15*   BUN mg/dL 62*   < > 100*   CREATININE mg/dL 3.88*   < > 5.01*   ANION GAP mmol/L 8   < > 11   CALCIUM mg/dL 8.5   < > 8.2*   ALBUMIN g/dL  --   --  3.6   TOTAL BILIRUBIN mg/dL  --   --  0.73   ALK PHOS U/L  --   --  14*   ALT U/L  --   --  7   AST U/L  --   --  11*   GLUCOSE RANDOM mg/dL 159*   < > 177*    < > = values in this interval not displayed.     Results from last 7 days   Lab Units 10/07/24  1320   INR  1.21*             Results from last 7 days   Lab Units 10/07/24  0438   LACTIC ACID mmol/L 1.1       Recent Cultures (last 7 days):         Imaging Results Review: No pertinent imaging studies reviewed.  Other Study Results Review: No additional pertinent studies reviewed.    Last 24 Hours Medication List:     Current Facility-Administered Medications:     albumin human 350 g 5% IVPB, Once    calcium carbonate (OYSTER SHELL,OSCAL) 500 mg tablet 2 tablet, BID With Meals    calcium carbonate (TUMS) chewable tablet 1,000 mg, Daily PRN    carvedilol (COREG) tablet 3.125 mg, BID With Meals    cyclophosphamide (CYTOXAN) capsule 100 mg, Daily    epoetin marcela (EPOGEN,PROCRIT) injection 10,000 Units, Once per day on Monday Wednesday Friday    famotidine (PEPCID) tablet 20 mg, Daily    FLUoxetine (PROzac) capsule 20 mg, Daily    heparin (porcine) injection 6,000 Units, Once per day on Monday Wednesday Friday    heparin (porcine) subcutaneous injection 5,000 Units, Q8H LORENZO    melatonin tablet 3 mg, HS PRN    ondansetron (ZOFRAN) injection 4 mg, Q6H PRN    pantoprazole (PROTONIX) EC tablet 40 mg, BID AC    predniSONE tablet 60 mg, Daily    sulfamethoxazole-trimethoprim (BACTRIM DS) 800-160 mg per  tablet 1 tablet, Once per day on Monday Wednesday Friday    vit B complex-vit C-folic acid (Renal Caps) capsule 1 capsule, Daily With Dinner    Administrative Statements   Today, Patient Was Seen By: Josias Chacko PA-C  I have spent a total time of 25 minutes in caring for this patient on the day of the visit/encounter including Counseling / Coordination of care, Documenting in the medical record, Reviewing / ordering tests, medicine, procedures  , and Communicating with other healthcare professionals .    **Please Note: This note may have been constructed using a voice recognition system.**

## 2024-10-12 VITALS
HEART RATE: 59 BPM | OXYGEN SATURATION: 98 % | RESPIRATION RATE: 29 BRPM | TEMPERATURE: 97.3 F | SYSTOLIC BLOOD PRESSURE: 151 MMHG | WEIGHT: 312.61 LBS | HEIGHT: 74 IN | BODY MASS INDEX: 40.12 KG/M2 | DIASTOLIC BLOOD PRESSURE: 65 MMHG

## 2024-10-12 LAB
ANION GAP SERPL CALCULATED.3IONS-SCNC: 8 MMOL/L (ref 4–13)
BUN SERPL-MCNC: 46 MG/DL (ref 5–25)
CA-I BLD-SCNC: 1.09 MMOL/L (ref 1.12–1.32)
CALCIUM SERPL-MCNC: 8.1 MG/DL (ref 8.4–10.2)
CHLORIDE SERPL-SCNC: 102 MMOL/L (ref 96–108)
CO2 SERPL-SCNC: 21 MMOL/L (ref 21–32)
CREAT SERPL-MCNC: 3.03 MG/DL (ref 0.6–1.3)
ERYTHROCYTE [DISTWIDTH] IN BLOOD BY AUTOMATED COUNT: 18.1 % (ref 11.6–15.1)
GFR SERPL CREATININE-BSD FRML MDRD: 19 ML/MIN/1.73SQ M
GLUCOSE SERPL-MCNC: 155 MG/DL (ref 65–140)
HCT VFR BLD AUTO: 21.8 % (ref 36.5–49.3)
HGB BLD-MCNC: 7.3 G/DL (ref 12–17)
MCH RBC QN AUTO: 31.5 PG (ref 26.8–34.3)
MCHC RBC AUTO-ENTMCNC: 33.5 G/DL (ref 31.4–37.4)
MCV RBC AUTO: 94 FL (ref 82–98)
PLATELET # BLD AUTO: 147 THOUSANDS/UL (ref 149–390)
PMV BLD AUTO: 9.5 FL (ref 8.9–12.7)
POTASSIUM SERPL-SCNC: 3.8 MMOL/L (ref 3.5–5.3)
RBC # BLD AUTO: 2.32 MILLION/UL (ref 3.88–5.62)
SODIUM SERPL-SCNC: 131 MMOL/L (ref 135–147)
WBC # BLD AUTO: 9.3 THOUSAND/UL (ref 4.31–10.16)

## 2024-10-12 PROCEDURE — 99233 SBSQ HOSP IP/OBS HIGH 50: CPT | Performed by: INTERNAL MEDICINE

## 2024-10-12 PROCEDURE — 99239 HOSP IP/OBS DSCHRG MGMT >30: CPT | Performed by: HOSPITALIST

## 2024-10-12 PROCEDURE — 82330 ASSAY OF CALCIUM: CPT | Performed by: NURSE PRACTITIONER

## 2024-10-12 PROCEDURE — 83520 IMMUNOASSAY QUANT NOS NONAB: CPT | Performed by: INTERNAL MEDICINE

## 2024-10-12 PROCEDURE — 80048 BASIC METABOLIC PNL TOTAL CA: CPT | Performed by: PHYSICIAN ASSISTANT

## 2024-10-12 PROCEDURE — 85027 COMPLETE CBC AUTOMATED: CPT | Performed by: PHYSICIAN ASSISTANT

## 2024-10-12 RX ORDER — PANTOPRAZOLE SODIUM 40 MG/1
40 TABLET, DELAYED RELEASE ORAL
Qty: 60 TABLET | Refills: 0 | Status: SHIPPED | OUTPATIENT
Start: 2024-10-12 | End: 2024-11-11

## 2024-10-12 RX ORDER — PREDNISONE 20 MG/1
60 TABLET ORAL DAILY
Qty: 90 TABLET | Refills: 0 | Status: SHIPPED | OUTPATIENT
Start: 2024-10-12 | End: 2024-11-11

## 2024-10-12 RX ORDER — CARVEDILOL 3.12 MG/1
3.12 TABLET ORAL 2 TIMES DAILY WITH MEALS
Qty: 60 TABLET | Refills: 0 | Status: SHIPPED | OUTPATIENT
Start: 2024-10-12 | End: 2024-11-11

## 2024-10-12 RX ORDER — CALCIUM CARBONATE 500(1250)
2 TABLET ORAL 2 TIMES DAILY WITH MEALS
Qty: 120 TABLET | Refills: 0 | Status: SHIPPED | OUTPATIENT
Start: 2024-10-12 | End: 2024-11-11

## 2024-10-12 RX ORDER — SULFAMETHOXAZOLE AND TRIMETHOPRIM 800; 160 MG/1; MG/1
1 TABLET ORAL 3 TIMES WEEKLY
Qty: 12 TABLET | Refills: 0 | Status: SHIPPED | OUTPATIENT
Start: 2024-10-14 | End: 2024-11-13

## 2024-10-12 RX ORDER — CYCLOPHOSPHAMIDE 50 MG/1
100 CAPSULE ORAL DAILY
Qty: 60 CAPSULE | Refills: 0 | Status: SHIPPED | OUTPATIENT
Start: 2024-10-12 | End: 2024-10-17 | Stop reason: SDUPTHER

## 2024-10-12 RX ORDER — ASCORBIC ACID, THIAMINE MONONITRATE,RIBOFLAVIN, NIACINAMIDE, PYRIDOXINE HYDROCHLORIDE, FOLIC ACID, CYANOCOBALAMIN, BIOTIN, CALCIUM PANTOTHENATE, 100; 1.5; 1.7; 20; 10; 1; 6000; 150000; 5 MG/1; MG/1; MG/1; MG/1; MG/1; MG/1; UG/1; UG/1; MG/1
1 CAPSULE, LIQUID FILLED ORAL
Qty: 30 CAPSULE | Refills: 0 | Status: SHIPPED | OUTPATIENT
Start: 2024-10-12 | End: 2024-11-11

## 2024-10-12 RX ADMIN — FLUOXETINE HYDROCHLORIDE 20 MG: 20 CAPSULE ORAL at 08:34

## 2024-10-12 RX ADMIN — HEPARIN SODIUM 5000 UNITS: 5000 INJECTION, SOLUTION INTRAVENOUS; SUBCUTANEOUS at 05:14

## 2024-10-12 RX ADMIN — FAMOTIDINE 20 MG: 20 TABLET, FILM COATED ORAL at 08:34

## 2024-10-12 RX ADMIN — PANTOPRAZOLE SODIUM 40 MG: 40 TABLET, DELAYED RELEASE ORAL at 07:27

## 2024-10-12 RX ADMIN — CARVEDILOL 3.12 MG: 3.12 TABLET, FILM COATED ORAL at 07:27

## 2024-10-12 RX ADMIN — CALCIUM 2 TABLET: 500 TABLET ORAL at 07:27

## 2024-10-12 NOTE — ASSESSMENT & PLAN NOTE
Lab Results   Component Value Date    EGFR 19 10/12/2024    EGFR 14 10/11/2024    EGFR 19 10/10/2024    CREATININE 3.03 (H) 10/12/2024    CREATININE 3.88 (H) 10/11/2024    CREATININE 3.01 (H) 10/10/2024   Patient is on Epogen 10,000 units with each hemodialysis session, continue to monitor hemoglobin levels, provide packed red blood cells as indicated depending on the patient progresses clinically. Hematology following.  Folate normal at 13.2. Vitamin B12 normal at 227. Hemoglobin 7.0 g/dL, 10/11.    Hgb stable at 7.3, explained dilution from PLEX therapy and severe LYLY as causes of ongoing anemia.

## 2024-10-12 NOTE — ASSESSMENT & PLAN NOTE
Hx of back surgery in 2004 and 2014 and has since bilateral LE paresthesia  He is primarily wheelchair bound  Venous statis ulcers noted to Bilateral LE  B/L extremity edema R>L  Continue supportive therapy postdischarge  Home health care services have been set at

## 2024-10-12 NOTE — PROGRESS NOTES
Progress Note - Nephrology   Name: Ramos Rosenthal 74 y.o. male I MRN: [id number]  Unit/Bed#: ICU 04-01 I Date of Admission: 9/14/2024   Date of Service: 10/12/2024 I Hospital Day: 28     Assessment & Plan  LYLY (acute kidney injury) (HCC)  Patient remains in hemodialysis dependent acute kidney injury.  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   Please refer below for details.  Continue Monday, Wednesday, Friday hemodialysis sessions.    Plans for DC, please see documentation below under RPGN.   Continue MWF schedule.   Send off antigbm level today.   Will challenge EDW as outpatient.   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 Continue with cyclophosphamide, dosed 1mg/kg for dialysis, and high-dose prednisone along with Bactrim/PPI for prophylaxis.    Volume status:  Examines hypervolemic with severe BL LE edema.   Bed scale weight not felt to be reliable-- post weight 144.1 kg 10/9. Suspect anemia is dilutional in nature in part.     10/9 antigbm level 3.5, lowest they have been.     Recommend:    Sp PLEX therapy x ~3 weeks. He had missed 3 days during PLEX therapy process.   Antigbm level declined to 3.5 on 10/9 and received additional PLEX 10/9-10/11. Discussed care with patient in person and daughter Mary on phone. 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 Despite improvement in antibody and PLEX therapy, remains anuric. Patient had 95% crescent on biopsy which is associated with poor kidney outcomes which was noted on biopsy report. Anticipate that patient will remain dialysis dependent going forward. Continued PLEX puts patient at risk for further immunosuppression, bleeding,  volume overload and electrolyte disorders but unlikely to change outcomes. Daughter and patient verbalized understanding to above and are in agreement with discharge plans today without further PLEX. . Level will be drawn today and monitored weekly in the outpatient setting. No pulmonary manifestations found during hospitalization and daughter understands that would have a more severe prognosis and would have required PLEX until resolution. At discharge, will need cyclosphosphamide, prednisone, bactrim and PPI at current doses. Cyclosphosphamide and bactrim should be dosed POST HD as have some clearance with HD.     I have spent a total time of 45 minutes in caring for this patient on the day of the visit/encounter including Diagnostic results, Prognosis, Risks and benefits of tx options, and Instructions for management.   Hyponatremia  Continue to provide fluid restriction, follow sodium levels from time to time. Sodium 132 mmol/L on 10/11 predialysis.   Hypocalcemia  Continue calcium supplementation with PLEX and calcium carbonate 2 tablet BID.   Anemia due to chronic kidney disease, on chronic dialysis (HCC)  Lab Results   Component Value Date    EGFR 19 10/12/2024    EGFR 14 10/11/2024    EGFR 19 10/10/2024    CREATININE 3.03 (H) 10/12/2024    CREATININE 3.88 (H) 10/11/2024    CREATININE 3.01 (H) 10/10/2024   Patient is on Epogen 10,000 units with each hemodialysis session, continue to monitor hemoglobin levels, provide packed red blood cells as indicated depending on the patient progresses clinically. Hematology following.  Folate normal at 13.2. Vitamin B12 normal at 227. Hemoglobin 7.0 g/dL, 10/11.    Hgb stable at 7.3, explained dilution from PLEX therapy and severe LYLY as causes of ongoing anemia.   Bilateral lower extremity edema  UF 3 L on 10/9.   Plan for next regularly scheduled hemodialysis today with challenge EDW as tolerated for volume overload concerns due to blood products/albumin replacement with  PLEX.   Challenge EDW as outpatient.   Essential hypertension  Blood pressure appropriate, decrease Coreg dose with bradycardia.   Hypomagnesemia  Continue to monitor magnesium levels, offer supplementation as indicated.    I have reviewed the nephrology recommendations including risks/benefits of PLEX therapy , antiGBM nephritis, with primary team, and we are in agreement with renal plan including the information outlined above.     Subjective       Patient seen and examined today. Offers no complaints, plan for DC to home.  Denies chest pains,shortness of breath, hemoptysis, nausea,vomiting and diarrhea. NO UOP.  A complete 10 point review of systems was performed and is otherwise negative.     Objective :  Temp:  [96.7 °F (35.9 °C)-97.7 °F (36.5 °C)] 97.3 °F (36.3 °C)  HR:  [49-63] 59  BP: (115-167)/(57-71) 151/65  Resp:  [12-29] 29  SpO2:  [96 %-100 %] 98 %  O2 Device: None (Room air)    Current Weight: Weight - Scale: (!) 142 kg (312 lb 9.8 oz)  First Weight: Weight - Scale: (!) 140 kg (309 lb 8.4 oz)  I/O         10/10 0701  10/11 0700 10/11 0701  10/12 0700 10/12 0701  10/13 0700    P.O. 435 920     I.V. (mL/kg) 500 (3.4) 500 (3.5)     Blood       IV Piggyback 6350 100     Total Intake(mL/kg) 7285 (49.9) 1520 (10.7)     Urine (mL/kg/hr)  0 (0)     Other 2987 3000     Stool  0     Total Output 2987 3000     Net +4298 -1480            Unmeasured Urine Occurrence  0 x     Unmeasured Stool Occurrence 0 x 1 x           Physical Exam  Vitals reviewed.   Constitutional:       General: He is not in acute distress.     Appearance: He is obese. He is not ill-appearing.   HENT:      Head: Normocephalic and atraumatic.      Nose: Nose normal.      Mouth/Throat:      Mouth: Mucous membranes are moist.      Pharynx: Oropharynx is clear.   Eyes:      Extraocular Movements: Extraocular movements intact.      Conjunctiva/sclera: Conjunctivae normal.   Cardiovascular:      Rate and Rhythm: Regular rhythm. Bradycardia present.       Heart sounds:      No friction rub.   Pulmonary:      Breath sounds: No wheezing, rhonchi or rales.   Abdominal:      Palpations: Abdomen is soft.      Tenderness: There is no abdominal tenderness. There is no guarding.   Musculoskeletal:      Right lower leg: Edema present.      Left lower leg: Edema present.      Comments: Moderate edema BL LE    Skin:     Coloration: Skin is not jaundiced.      Findings: No bruising or rash.   Neurological:      Mental Status: He is alert and oriented to person, place, and time. Mental status is at baseline.         Medications:    Current Facility-Administered Medications:     calcium carbonate (OYSTER SHELL,OSCAL) 500 mg tablet 2 tablet, 2 tablet, Oral, BID With Meals, Gigi Ortega DO, 2 tablet at 10/12/24 0727    calcium carbonate (TUMS) chewable tablet 1,000 mg, 1,000 mg, Oral, Daily PRN, BRETT Xie    carvedilol (COREG) tablet 3.125 mg, 3.125 mg, Oral, BID With Meals, Lisa Moore DO, 3.125 mg at 10/12/24 0727    cyclophosphamide (CYTOXAN) capsule 100 mg, 100 mg, Oral, Daily, Lisa Oscar, DO, 100 mg at 10/11/24 1910    epoetin marcela (EPOGEN,PROCRIT) injection 10,000 Units, 10,000 Units, Intravenous, Once per day on Monday Wednesday Friday, Gigi Ortega DO, 10,000 Units at 10/11/24 1602    famotidine (PEPCID) tablet 20 mg, 20 mg, Oral, Daily, Ramos Mclean DO, 20 mg at 10/12/24 0834    FLUoxetine (PROzac) capsule 20 mg, 20 mg, Oral, Daily, BRETT Xie, 20 mg at 10/12/24 0834    heparin (porcine) injection 6,000 Units, 6,000 Units, Intravenous, Once per day on Monday Wednesday Friday, Gigi Ortega DO, 6,000 Units at 10/11/24 1346    heparin (porcine) subcutaneous injection 5,000 Units, 5,000 Units, Subcutaneous, Q8H LORENZO, BRETT Xie, 5,000 Units at 10/12/24 0514    melatonin tablet 3 mg, 3 mg, Oral, HS PRN, Laurel Rosenberg MD, 3 mg at 09/28/24 0057    ondansetron (ZOFRAN)  "injection 4 mg, 4 mg, Intravenous, Q6H PRN, BRETT Xie    pantoprazole (PROTONIX) EC tablet 40 mg, 40 mg, Oral, BID AC, Rui Chappell MD, 40 mg at 10/12/24 0727    predniSONE tablet 60 mg, 60 mg, Oral, Daily, Lisa Moore DO, 60 mg at 10/11/24 1910    sulfamethoxazole-trimethoprim (BACTRIM DS) 800-160 mg per tablet 1 tablet, 1 tablet, Oral, Once per day on Monday Wednesday Friday, Lisa Moore DO, 1 tablet at 10/11/24 1910    vit B complex-vit C-folic acid (Renal Caps) capsule 1 capsule, 1 capsule, Oral, Daily With Dinner, Gigi Ortega DO, 1 capsule at 10/11/24 1910      Lab Results: I have reviewed the following results:  Results from last 7 days   Lab Units 10/12/24  0514 10/11/24  0505 10/10/24  0548 10/09/24  0519 10/08/24  1651 10/08/24  0621 10/07/24  1320 10/07/24  0438 10/06/24  1626 10/06/24  0612   WBC Thousand/uL 9.30 10.51* 10.99* 12.51*  --  14.16* 16.18* 16.30*  --  15.38*   HEMOGLOBIN g/dL 7.3* 7.0* 7.4* 6.6* 6.9* 5.7* 6.4* 5.9*   < > 7.0*   HEMATOCRIT % 21.8* 20.9* 21.8* 18.9* 20.1* 16.5* 18.4* 18.3*  --  21.3*   PLATELETS Thousands/uL 147* 143* 132* 119*  --  125* 133* 137*  --  125*   POTASSIUM mmol/L 3.8 4.2 4.0 4.2  --  4.0  --  4.6  --  4.6   CHLORIDE mmol/L 102 102 99 100  --  96  --  104  --  106   CO2 mmol/L 21 22 23 21  --  23  --  15*  --  15*   BUN mg/dL 46* 62* 51* 89*  --  78*  --  100*  --  70*   CREATININE mg/dL 3.03* 3.88* 3.01* 4.18*  --  3.60*  --  5.01*  --  4.02*   CALCIUM mg/dL 8.1* 8.5 8.2* 8.2*  --  8.3*  --  8.2*  --  8.0*   MAGNESIUM mg/dL  --  2.1 1.8* 1.8*  --   --   --  2.1  --  1.8*   PHOSPHORUS mg/dL  --   --   --   --   --   --   --  3.8  --  4.0   ALBUMIN g/dL  --   --   --   --   --   --   --  3.6  --  4.1    < > = values in this interval not displayed.       Administrative Statements     Portions of the record may have been created with voice recognition software. Occasional wrong word or \"sound a like\" substitutions may have " occurred due to the inherent limitations of voice recognition software. Read the chart carefully and recognize, using context, where substitutions have occurred.If you have any questions, please contact the dictating provider.

## 2024-10-12 NOTE — ASSESSMENT & PLAN NOTE
Noted on renal biopsy  Status post 2 weeks worth of plasmapheresis  Discharge home on cyclophosphamide, prednisone, and Bactrim  Patient has a continued steroid-induced leukocytosis  Continued management as per nephrology in the outpatient setting  Patient will most likely be dialysis dependent for the rest of his life  Next dialysis on Monday, 10/14/2024 in the outpatient setting

## 2024-10-12 NOTE — ASSESSMENT & PLAN NOTE
Patient has a hypervolemic hyponatremia  Patient has remained asymptomatic  Sodium is stable at 131  Continued outpatient follow-up with nephrology

## 2024-10-12 NOTE — ASSESSMENT & PLAN NOTE
Patient with a low hemoglobin on 9/27/2024  Status post a total of 12 units of packed red blood cells on this admission thus far  Discharge hemoglobin 7.3  No evidence of bleeding  No evidence of hemolysis  Possibly related to the plasmapheresis  Potential outpatient follow-up with hematology oncology when instructed by nephrology  Patient remains hemodynamically stable  Patient to continue to get Epogen in the outpatient setting with dialysis  Nephrology will be monitoring CBC with dialysis session

## 2024-10-12 NOTE — ASSESSMENT & PLAN NOTE
UF 3 L on 10/9.   Plan for next regularly scheduled hemodialysis today with challenge EDW as tolerated for volume overload concerns due to blood products/albumin replacement with PLEX.   Challenge EDW as outpatient.

## 2024-10-12 NOTE — ASSESSMENT & PLAN NOTE
Patient remains in hemodialysis dependent acute kidney injury.  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   Please refer below for details.  Continue Monday, Wednesday, Friday hemodialysis sessions.    Plans for DC, please see documentation below under RPGN.   Continue MWF schedule.   Send off antigbm level today.   Will challenge EDW as outpatient.

## 2024-10-12 NOTE — DISCHARGE INSTR - AVS FIRST PAGE
Dear Ramos Rosenthal,     It was our pleasure to care for you here at [facility].  It is our hope that we were always able to exceed the expected standards for your care during your stay.  You were hospitalized due to an acute kidney injury.  You were cared for on the medical/surgical floor by Agnes Rust MD with the [facility] who covers for your primary care physician (PCP), Jessika Carrillo MD, while you were hospitalized.     You were additionally seen by the following physicians-    [facility] critical care team for plasmapheresis management   [facility] interventional radiology for dialysis line placement   [facility] for renal function management     if you have any questions or concerns related to this hospitalization, you may contact us at .  For follow up as well as any medication refills, we recommend that you follow up with your primary care physician.  A registered nurse will reach out to you by phone within a few days after your discharge to answer any additional questions that you may have after going home.  However, at this time we provide for you here, the most important instructions / recommendations at discharge:       Notable Medication Adjustments -new prescriptions as follows-  #1.  Bactrim 800-160 mg 1 tablet by mouth 3 times weekly to be taken after dialysis on hemodialysis days  #2.  Calcium carbonate-2 tablets twice a day with meals  #3.  Carvedilol 6.25 mg twice daily by mouth  #4.  Prednisone 60 mg daily by mouth  #5.  Cyclophosphamide 100 mg p.o. daily  -to be taken after dialysis on hemodialysis day  #6.  Protonix 40 mg daily p.o.  #7.  Renal caps 1 capsule daily with dinner  Okay to continue all other preadmission medications at the preadmission doses  Testing Required after Discharge -   To be further determined in the outpatient setting by your primary care provider, and/or by  nephrology  Important follow up information -   Please follow-up with the providers as outlined in this discharge package  Other Instructions -   Please maintain a healthy diet  Please review this entire after visit summary as additional general instructions including medication list, appointments, activity, diet, any pertinent wound care, and other additional recommendations from your care team that may be provided for you.      Sincerely,     Agnes Rust MD

## 2024-10-12 NOTE — ASSESSMENT & PLAN NOTE
Continue daily calcium carbonate supplementation post discharge  Will need periodic outpatient BMP monitoring via either his PCP, and/or by nephrology

## 2024-10-12 NOTE — ASSESSMENT & PLAN NOTE
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 Continue with cyclophosphamide, dosed 1mg/kg for dialysis, and high-dose prednisone along with Bactrim/PPI for prophylaxis.    Volume status:  Examines hypervolemic with severe BL LE edema.   Bed scale weight not felt to be reliable-- post weight 144.1 kg 10/9. Suspect anemia is dilutional in nature in part.     10/9 antigbm level 3.5, lowest they have been.     Recommend:    Sp PLEX therapy x ~3 weeks. He had missed 3 days during PLEX therapy process.   Antigbm level declined to 3.5 on 10/9 and received additional PLEX 10/9-10/11. Discussed care with patient in person and daughter Mary on phone. 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 Despite improvement in antibody and PLEX therapy, remains anuric. Patient had 95% crescent on biopsy which is associated with poor kidney outcomes which was noted on biopsy report. Anticipate that patient will remain dialysis dependent going forward. Continued PLEX puts patient at risk for further immunosuppression, bleeding, volume overload and electrolyte disorders but unlikely to change outcomes. Daughter and patient verbalized understanding to above and are in agreement with discharge plans today without further PLEX. . Level will be drawn today and monitored weekly in the outpatient setting. No pulmonary manifestations found during hospitalization and daughter understands that would have a more severe prognosis and would have required PLEX until resolution. At discharge, will need cyclosphosphamide, prednisone, bactrim and PPI at current doses. Cyclosphosphamide and bactrim should be dosed POST HD as have some clearance with HD.     I have spent a total time of 45 minutes in caring for this patient on  the day of the visit/encounter including Diagnostic results, Prognosis, Risks and benefits of tx options, and Instructions for management.

## 2024-10-12 NOTE — PLAN OF CARE
Problem: PAIN - ADULT  Goal: Verbalizes/displays adequate comfort level or baseline comfort level  Description: Interventions:  - Encourage patient to monitor pain and request assistance  - Assess pain using appropriate pain scale  - Administer analgesics based on type and severity of pain and evaluate response  - Implement non-pharmacological measures as appropriate and evaluate response  - Consider cultural and social influences on pain and pain management  - Notify physician/advanced practitioner if interventions unsuccessful or patient reports new pain  Outcome: Progressing     Problem: INFECTION - ADULT  Goal: Absence or prevention of progression during hospitalization  Description: INTERVENTIONS:  - Assess and monitor for signs and symptoms of infection  - Monitor lab/diagnostic results  - Monitor all insertion sites, i.e. indwelling lines, tubes, and drains  - Monitor endotracheal if appropriate and nasal secretions for changes in amount and color  - [place] appropriate cooling/warming therapies per order  - Administer medications as ordered  - Instruct and encourage patient and family to use good hand hygiene technique  - Identify and instruct in appropriate isolation precautions for identified infection/condition  Outcome: Progressing  Goal: Absence of fever/infection during neutropenic period  Description: INTERVENTIONS:  - Monitor WBC    Outcome: Progressing     Problem: SAFETY ADULT  Goal: Patient will remain free of falls  Description: INTERVENTIONS:  - Educate patient/family on patient safety including physical limitations  - Instruct patient to call for assistance with activity   - Consult OT/PT to assist with strengthening/mobility   - Keep Call bell within reach  - Keep bed low and locked with side rails adjusted as appropriate  - Keep care items and personal belongings within reach  - Initiate and maintain comfort rounds  - Make Fall Risk Sign visible to staff  - Offer Toileting every 2 Hours,  in advance of need  - Initiate/Maintain bed alarm  - Obtain necessary fall risk management equipment  - Apply yellow socks and bracelet for high fall risk patients  - Consider moving patient to room near nurses station  Outcome: Progressing  Goal: Maintain or return to baseline ADL function  Description: INTERVENTIONS:  -  Assess patient's ability to carry out ADLs; assess patient's baseline for ADL function and identify physical deficits which impact ability to perform ADLs (bathing, care of mouth/teeth, toileting, grooming, dressing, etc.)  - Assess/evaluate cause of self-care deficits   - Assess range of motion  - Assess patient's mobility; develop plan if impaired  - Assess patient's need for assistive devices and provide as appropriate  - Encourage maximum independence but intervene and supervise when necessary  - Involve family in performance of ADLs  - Assess for home care needs following discharge   - Consider OT consult to assist with ADL evaluation and planning for discharge  - Provide patient education as appropriate  Outcome: Progressing  Goal: Maintains/Returns to pre admission functional level  Description: INTERVENTIONS:  - Perform AM-PAC 6 Click Basic Mobility/ Daily Activity assessment daily.  - Set and communicate daily mobility goal to care team and patient/family/caregiver.   - Collaborate with rehabilitation services on mobility goals if consulted  - Out of bed for meals   - Out of bed for toileting  - Record patient progress and toleration of activity level   Outcome: Progressing     Problem: DISCHARGE PLANNING  Goal: Discharge to home or other facility with appropriate resources  Description: INTERVENTIONS:  - Identify barriers to discharge w/patient and caregiver  - Arrange for needed discharge resources and transportation as appropriate  - Identify discharge learning needs (meds, wound care, etc.)  - Arrange for interpretive services to assist at discharge as needed  - Refer to Case  Management Department for coordinating discharge planning if the patient needs post-hospital services based on physician/advanced practitioner order or complex needs related to functional status, cognitive ability, or social support system  Outcome: Progressing     Problem: Nutrition/Hydration-ADULT  Goal: Nutrient/Hydration intake appropriate for improving, restoring or maintaining nutritional needs  Description: Monitor and assess patient's nutrition/hydration status for malnutrition. Collaborate with interdisciplinary team and initiate plan and interventions as ordered.  Monitor patient's weight and dietary intake as ordered or per policy. Utilize nutrition screening tool and intervene as necessary. Determine patient's food preferences and provide high-protein, high-caloric foods as appropriate.     INTERVENTIONS:  - Monitor oral intake, urinary output, labs, and treatment plans  - Assess nutrition and hydration status and recommend course of action  - Evaluate amount of meals eaten  - Assist patient with eating if necessary   - Allow adequate time for meals  - Recommend/ encourage appropriate diets, oral nutritional supplements, and vitamin/mineral supplements  - Order, calculate, and assess calorie counts as needed  - Recommend, monitor, and adjust tube feedings and TPN/PPN based on assessed needs  - Assess need for intravenous fluids  - Provide specific nutrition/hydration education as appropriate  - Include patient/family/caregiver in decisions related to nutrition  Outcome: Progressing

## 2024-10-12 NOTE — ASSESSMENT & PLAN NOTE
Lab Results   Component Value Date    CREATININE 3.03 (H) 10/12/2024    CREATININE 3.88 (H) 10/11/2024    CREATININE 3.01 (H) 10/10/2024    EGFR 19 10/12/2024    EGFR 14 10/11/2024    EGFR 19 10/10/2024     Now on hemodialysis -Mondays/Wednesdays/Fridays  Patient to get an extra dialysis session on 10/10/2024  Plasmapheresis started on 9/21/2024 -it has occurred daily since  (minus 2 days)- no plasmapheresis needed today(10/11)  Status post a renal biopsy-evidence of glomerular basement membrane disease noted  Continue prednisone 60 mg p.o. daily - steroid induced leukocytosis noted  Nephrology and Critical Care following  Trend anti-GBM antibody with goal less than 8 -was down to 4.2, but the most recent result revealed that it is now back up to 6.2- 10/9 results pending  Notified by case management that an outpatient dialysis chair has been set up  Discharge home today  Outpatient hemodialysis starting on Monday, then every Wednesday and Friday thereafter  Patient will be discharged home on prednisone, cyclophosphamide, and Bactrim as he was receiving here in the hospital  Patient will be following up in the near future in the outpatient setting with his PCP, and nephrology

## 2024-10-12 NOTE — DISCHARGE SUMMARY
Discharge Summary - Hospitalist   Name: Ramos Rosenthal 74 y.o. male I MRN: [id number]  Unit/Bed#: ICU 04-01 I Date of Admission: 9/14/2024   Date of Service: 10/12/2024 I Hospital Day: 28     Assessment & Plan  LYLY (acute kidney injury) ([facility])      Lab Results   Component Value Date    CREATININE 3.03 (H) 10/12/2024    CREATININE 3.88 (H) 10/11/2024    CREATININE 3.01 (H) 10/10/2024    EGFR 19 10/12/2024    EGFR 14 10/11/2024    EGFR 19 10/10/2024     Now on hemodialysis -Mondays/Wednesdays/Fridays  Patient to get an extra dialysis session on 10/10/2024  Plasmapheresis started on 9/21/2024 -it has occurred daily since  (minus 2 days)- no plasmapheresis needed today(10/11)  Status post a renal biopsy-evidence of glomerular basement membrane disease noted  Continue prednisone 60 mg p.o. daily - steroid induced leukocytosis noted  Nephrology and Critical Care following  Trend anti-GBM antibody with goal less than 8 -was down to 4.2, but the most recent result revealed that it is now back up to 6.2- 10/9 results pending  Notified by case management that an outpatient dialysis chair has been set up  Discharge home today  Outpatient hemodialysis starting on Monday, then every Wednesday and Friday thereafter  Patient will be discharged home on prednisone, cyclophosphamide, and Bactrim as he was receiving here in the hospital  Patient will be following up in the near future in the outpatient setting with his PCP, and nephrology  Anemia due to chronic kidney disease, on chronic dialysis ([facility])  Patient with a low hemoglobin on 9/27/2024  Status post a total of 12 units of packed red blood cells on this admission thus far  Discharge hemoglobin 7.3  No evidence of bleeding  No evidence of hemolysis  Possibly related to the plasmapheresis  Potential outpatient follow-up with hematology oncology when instructed by nephrology  Patient remains hemodynamically stable  Patient to continue to get Epogen in the outpatient setting with  dialysis  Nephrology will be monitoring CBC with dialysis session  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  Noted on renal biopsy  Status post 2 weeks worth of plasmapheresis  Discharge home on cyclophosphamide, prednisone, and Bactrim  Patient has a continued steroid-induced leukocytosis  Continued management as per nephrology in the outpatient setting  Patient will most likely be dialysis dependent for the rest of his life  Next dialysis on Monday, 10/14/2024 in the outpatient setting  Hyponatremia  Patient has a hypervolemic hyponatremia  Patient has remained asymptomatic  Sodium is stable at 131  Continued outpatient follow-up with nephrology  Paresthesia of both lower extremities  Hx of back surgery in 2004 and 2014 and has since bilateral LE paresthesia  He is primarily wheelchair bound  Venous statis ulcers noted to Bilateral LE  B/L extremity edema R>L  Continue supportive therapy postdischarge  Home health care services have been set at  Bilateral lower extremity edema  Chronic condition  Patient with a history of lower back/spinal surgery  Patient does not ambulate  Patient is wheelchair-bound at baseline  Depression  Continue home dose fluoxetine post discharge  Essential hypertension  Blood pressure is controlled   DC home on carvedilol prescription provided  Hypocalcemia  Continue daily calcium carbonate supplementation post discharge  Will need periodic outpatient BMP monitoring via either his PCP, and/or by nephrology  Hypomagnesemia       Medical Problems       Resolved Problems  Date Reviewed: 10/3/2024            Resolved    Gout 9/21/2024     Resolved by  BRETT Ledesma    SIRS (systemic inflammatory response syndrome) (HCC) 9/21/2024     Resolved by  BRETT Ledesma    Hyperkalemia 9/22/2024     Resolved by  Flip Hope PA-C        Discharging Physician / Practitioner: Agnes Rust MD  PCP: Jessika Carrillo MD  Admission Date:    Admission Orders (From admission, onward)       Ordered        09/14/24 1504  INPATIENT ADMISSION  Once                          Discharge Date: 10/12/24    Consultations During Hospital Stay:  Nephrology  Critical care  Interventional radiology  Infectious disease    Procedures Performed:   9/16/2024-dialysis/Central line placement by interventional radiology  9/19/2024-kidney biopsy performed by interventional radiology  9/27/2024-conversion of nontunneled central line access to tunneled dialysis catheter placement by IR    Significant Findings / Test Results:   Conversion of a nontunneled dialysis catheter to a tunneled 32 cm 15.5 Bengali Titan right internal jugular dialysis catheter.     Incidental Findings:   None    Test Results Pending at Discharge (will require follow up):   None     Outpatient Tests Requested:  None    Complications: None    Reason for Admission: Acute kidney injury    Hospital Course:   Ramos Rosenthal is a 74 y.o. male patient who originally presented to the hospital on 9/14/2024 due to hematuria.  Please refer to the initial history and physical examination completed by Antonino WEST for the initial presenting features and complaints.  In brief, the patient is a pleasant 74-year-old male, who was admitted to the hospital after he presented with hematuria.    He was found to have an acute kidney injury state.  Initially it was felt that this acute kidney injury was possibly secondary to recent use of Bactrim that the patient had used for UTI prior to being admitted.  He was started on IV fluid.  Nephrology consultation was obtained.  Patient had no improvement with IV fluid.  The patient underwent a CT-guided biopsy of his kidney, and was found to have 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.  After this point, interventional radiology placed a line, and the patient was started on hemodialysis.  Patient had also been seen in conjunction with ID  "for the initial suspected UTI, but ultimately antibiotics were discontinued.  Patient was also treated with 2 weeks worth of plasmapheresis.  Plasmapheresis was managed by the critical care team.  Alongside the plasmapheresis, the patient was also started on suppressive medications including prednisone, cyclophosphamide, and Bactrim.  Unfortunately, upon completion of 2 weeks of plasmapheresis, the patient will now need continued hemodialysis in the outpatient setting.    He was also seen in conjunction with hematology oncology for recurrent anemia.  He required 12 units of packed red blood cells to maintain a hemoglobin of around 7.  There was never any active bleeding noted, and it was felt that this was most likely secondary in the setting of the patient receiving daily plasmapheresis.  Heme-onc may consider an outpatient bone marrow biopsy when and if cleared by nephrology.    Ultimately, he was discharged home on 10/12/2024 after an outpatient hemodialysis chair was set up.  Patient will be following up in the near future in the outpatient setting with nephrology, and his PCP.  New medications were prescribed as outlined above.  Case management set up home health care, and established an outpatient dialysis chair for Mondays, Wednesdays, and Fridays.    Please refer to the assessment/plan portion of this discharge summary as outlined above for additional details regarding his stay.      Please see above list of diagnoses and related plan for additional information.     Condition at Discharge: fair    Discharge Day Visit / Exam:   Subjective: Patient seen, is very happy that he gets to go home today, is very thankful for everything that we did for him here in the hospital  Vitals: Blood Pressure: 151/65 (10/12/24 0724)  Pulse: 59 (10/12/24 0724)  Temperature: (!) 97.3 °F (36.3 °C) (10/12/24 0724)  Temp Source: Tympanic (10/12/24 0724)  Respirations: (!) 29 (10/12/24 0724)  Height: 6' 2\" (188 cm) (09/16/24 " 0742)  Weight - Scale: (!) 142 kg (312 lb 9.8 oz) (10/12/24 0600)  SpO2: 98 % (10/12/24 0724)  Physical Exam  Vitals and nursing note reviewed.   Constitutional:       General: He is not in acute distress.     Appearance: Normal appearance. He is not ill-appearing.   HENT:      Head: Normocephalic and atraumatic.      Nose: Nose normal.   Eyes:      Extraocular Movements: Extraocular movements intact.      Pupils: Pupils are equal, round, and reactive to light.   Cardiovascular:      Rate and Rhythm: Normal rate and regular rhythm.      Pulses: Normal pulses.      Heart sounds: Normal heart sounds. No murmur heard.     No friction rub. No gallop.   Pulmonary:      Effort: Pulmonary effort is normal.      Breath sounds: Normal breath sounds.   Abdominal:      General: There is no distension.      Palpations: Abdomen is soft. There is no mass.      Tenderness: There is no abdominal tenderness. There is no guarding or rebound.   Musculoskeletal:         General: No swelling or tenderness. Normal range of motion.      Cervical back: Normal range of motion and neck supple. No rigidity. No muscular tenderness.      Right lower leg: Edema present.      Left lower leg: Edema present.   Skin:     General: Skin is warm.      Capillary Refill: Capillary refill takes less than 2 seconds.      Findings: No erythema or rash.   Neurological:      General: No focal deficit present.      Mental Status: He is alert and oriented to person, place, and time. Mental status is at baseline.      Comments: At baseline  Motor 0/5 left lower extremity, 1/5 right lower extremity  Otherwise alert, and awake   Psychiatric:         Mood and Affect: Mood normal.         Behavior: Behavior normal.          Discussion with Family:  Extensive family conversation was had on the evening of Thursday, 10/10/2024.     Discharge instructions/Information to patient and family:   See after visit summary for information provided to patient and family.       Provisions for Follow-Up Care:  See after visit summary for information related to follow-up care and any pertinent home health orders.      Mobility at time of Discharge:   Basic Mobility Inpatient Raw Score: 10  JH-HLM Goal: 4: Move to chair/commode  JH-HLM Achieved: 3: Sit at edge of bed  At baseline from a mobility standpoint     Disposition:   Home with VNA Services (Reminder: Complete face to face encounter)    Planned Readmission: None    Discharge Medications:  See after visit summary for reconciled discharge medications provided to patient and/or family.      Administrative Statements   Discharge Statement:  I have spent a total time of 35 minutes in caring for this patient on the day of the visit/encounter. >30 minutes of time was spent on: Prognosis, Instructions for management, Patient and family education, Risk factor reductions, Impressions, Counseling / Coordination of care, Documenting in the medical record, Reviewing / ordering tests, medicine, procedures  , and Communicating with other healthcare professionals .    **Please Note: This note may have been constructed using a voice recognition system**

## 2024-10-13 ENCOUNTER — HOME CARE VISIT (OUTPATIENT)
Dept: HOME HEALTH SERVICES | Facility: HOME HEALTHCARE | Age: 74
End: 2024-10-13
Payer: MEDICARE

## 2024-10-13 VITALS
TEMPERATURE: 97.2 F | RESPIRATION RATE: 18 BRPM | HEART RATE: 65 BPM | SYSTOLIC BLOOD PRESSURE: 142 MMHG | OXYGEN SATURATION: 97 % | DIASTOLIC BLOOD PRESSURE: 68 MMHG

## 2024-10-13 PROCEDURE — 10330081 VN NO-PAY CLAIM PROCEDURE

## 2024-10-13 PROCEDURE — 400013 VN SOC

## 2024-10-13 PROCEDURE — G0299 HHS/HOSPICE OF RN EA 15 MIN: HCPCS

## 2024-10-13 NOTE — CASE COMMUNICATION
Medication discrepancies or Major drug interactions_-patients daughter unable to obtain cyclophosphamide (CYTOXAN) 50 mg capsule as pharmacy stated they need to all [facility] speciality pharmacy. This SN attempted to contact today but they are not open. Daughter reports she will call first thing tomorrow    Abnormal clinical findings_patient reports weakness stating he was only out of bed once over 28 day hospital stay and that he was not candace luated by therapy while inpatient. it was discussed at SOC if he needed to return to the hospital for eval for rehab placement however family states they will make sure that someone is with patient at all times and they will work with him on non therapy days to assist with rehab.     This report is informational only, no response is needed  [facility] has Admitted your patient to Home Health service with the following disciplines: S N, PT, OT and MSW  Patient stated goals of care__to get my normal strength back    Potential barriers to goal achievement__n/a    Primary focus of home health care:Genitourinary    Anticipated visit pattern and next visit date__SN next vs 10/17/2024__2w3__1w2__SN to assess for needs following this time frame    Thank you for allowing us to participate in the care of your patient.      Lindy Orellana RN

## 2024-10-14 LAB
GBM AB SER IA-ACNC: 1.5 UNITS (ref 0–0.9)
GBM AB SER IA-ACNC: 2.6 UNITS (ref 0–0.9)

## 2024-10-15 ENCOUNTER — TELEPHONE (OUTPATIENT)
Dept: FAMILY MEDICINE CLINIC | Facility: CLINIC | Age: 74
End: 2024-10-15

## 2024-10-15 ENCOUNTER — HOME CARE VISIT (OUTPATIENT)
Dept: HOME HEALTH SERVICES | Facility: HOME HEALTHCARE | Age: 74
End: 2024-10-15
Payer: MEDICARE

## 2024-10-15 ENCOUNTER — TRANSITIONAL CARE MANAGEMENT (OUTPATIENT)
Dept: FAMILY MEDICINE CLINIC | Facility: CLINIC | Age: 74
End: 2024-10-15

## 2024-10-15 ENCOUNTER — TELEPHONE (OUTPATIENT)
Dept: NEPHROLOGY | Facility: CLINIC | Age: 74
End: 2024-10-15

## 2024-10-15 VITALS — OXYGEN SATURATION: 97 % | HEART RATE: 73 BPM | DIASTOLIC BLOOD PRESSURE: 50 MMHG | SYSTOLIC BLOOD PRESSURE: 110 MMHG

## 2024-10-15 DIAGNOSIS — N01.9 RAPIDLY PROGRESSIVE GLOMERULONEPHRITIS WITH ANTI-GBM ANTIBODIES: Primary | ICD-10-CM

## 2024-10-15 PROCEDURE — G0152 HHCP-SERV OF OT,EA 15 MIN: HCPCS

## 2024-10-15 PROCEDURE — G0151 HHCP-SERV OF PT,EA 15 MIN: HCPCS

## 2024-10-15 NOTE — TELEPHONE ENCOUNTER
I called and left a VM for Ramos to call the office back regarding the following:      ----- Message from Lisa Moore DO sent at 10/15/2024  9:44 AM EDT -----  Please call patient, the antibodies were nicely improving down to 2.6. we need to check the antibody in 1 week and this cannot be done at dialysis,  Please tell them I placed order in [facility] chart

## 2024-10-15 NOTE — CASE COMMUNICATION
OT evaluation completed 10/15/24.  Plan to continue OT 2wk1, 1wk1 to address UE HEP, ADL's, functional transfers, and safety.

## 2024-10-15 NOTE — TELEPHONE ENCOUNTER
Sent a message to Dr Carrillo to see if [facility]

## 2024-10-15 NOTE — TELEPHONE ENCOUNTER
Patient's wife returning call and made aware:   Message from Lisa Moore DO sent at 10/15/2024  9:44 AM EDT -----  Please call patient, the antibodies were nicely improving down to 2.6. we need to check the antibody in 1 week and this cannot be done at dialysis,  Please tell them I placed order in [facility] chart    Wife verbalized understanding.

## 2024-10-16 ENCOUNTER — OFFICE VISIT (OUTPATIENT)
Dept: FAMILY MEDICINE CLINIC | Facility: CLINIC | Age: 74
End: 2024-10-16
Payer: MEDICARE

## 2024-10-16 VITALS — SYSTOLIC BLOOD PRESSURE: 150 MMHG | DIASTOLIC BLOOD PRESSURE: 80 MMHG

## 2024-10-16 DIAGNOSIS — I10 ESSENTIAL HYPERTENSION: ICD-10-CM

## 2024-10-16 DIAGNOSIS — Z99.2 ANEMIA DUE TO CHRONIC KIDNEY DISEASE, ON CHRONIC DIALYSIS (HCC): Chronic | ICD-10-CM

## 2024-10-16 DIAGNOSIS — E83.42 HYPOMAGNESEMIA: ICD-10-CM

## 2024-10-16 DIAGNOSIS — N18.6 ANEMIA DUE TO CHRONIC KIDNEY DISEASE, ON CHRONIC DIALYSIS (HCC): Chronic | ICD-10-CM

## 2024-10-16 DIAGNOSIS — N25.81 SECONDARY HYPERPARATHYROIDISM OF RENAL ORIGIN (HCC): ICD-10-CM

## 2024-10-16 DIAGNOSIS — Z99.2: ICD-10-CM

## 2024-10-16 DIAGNOSIS — N01.9 RAPIDLY PROGRESSIVE GLOMERULONEPHRITIS WITH ANTI-GBM ANTIBODIES: Primary | ICD-10-CM

## 2024-10-16 DIAGNOSIS — R73.03 PREDIABETES: ICD-10-CM

## 2024-10-16 DIAGNOSIS — Z23 ENCOUNTER FOR IMMUNIZATION: ICD-10-CM

## 2024-10-16 DIAGNOSIS — G62.9 POLYNEUROPATHY: ICD-10-CM

## 2024-10-16 DIAGNOSIS — E83.51 HYPOCALCEMIA: ICD-10-CM

## 2024-10-16 DIAGNOSIS — E87.1 HYPONATREMIA: ICD-10-CM

## 2024-10-16 DIAGNOSIS — F34.1 PERSISTENT DEPRESSIVE DISORDER: ICD-10-CM

## 2024-10-16 DIAGNOSIS — D63.1 ANEMIA DUE TO CHRONIC KIDNEY DISEASE, ON CHRONIC DIALYSIS (HCC): Chronic | ICD-10-CM

## 2024-10-16 DIAGNOSIS — R60.0 BILATERAL LOWER EXTREMITY EDEMA: ICD-10-CM

## 2024-10-16 PROBLEM — R21 RASH: Status: RESOLVED | Noted: 2022-09-30 | Resolved: 2024-10-16

## 2024-10-16 PROBLEM — S81.809A MULTIPLE OPEN WOUNDS OF LOWER LEG: Status: RESOLVED | Noted: 2022-09-30 | Resolved: 2024-10-16

## 2024-10-16 PROCEDURE — 99496 TRANSJ CARE MGMT HIGH F2F 7D: CPT | Performed by: FAMILY MEDICINE

## 2024-10-16 NOTE — ASSESSMENT & PLAN NOTE
BM ab level improved S/P treatment   Nephro to cont to monitor    ?? If pred dose will be tapered   I will discuss with aliza

## 2024-10-16 NOTE — ASSESSMENT & PLAN NOTE
Chronic    hopefully will improve with dialysis    ??lymphedema clinic but at present tough with limited transportationand need for dialysis

## 2024-10-16 NOTE — PROGRESS NOTES
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 If pred dose will be tapered   I will discuss with aliza       Anemia due to chronic kidney disease, on chronic dialysis (HCC)  Lab Results   Component Value Date    EGFR 19 10/12/2024    EGFR 14 10/11/2024    EGFR 19 10/10/2024    CREATININE 3.03 (H) 10/12/2024    CREATININE 3.88 (H) 10/11/2024    CREATININE 3.01 (H) 10/10/2024   Followed by aliza    Epogen to be continued         Essential hypertension  Readings slightly high today but with small Cuff    Nephro to follow    encouraged low salt diet       Secondary hyperparathyroidism of renal origin (HCC)  As per nephro       Polyneuropathy  Chronic & without chnage       Persistent depressive disorder  Stable with current meds         Bilateral lower extremity edema  Chronic    hopefully will improve with dialysis    ??lymphedema clinic but at present tough with limited transportationand need for dialysis       Dependence on intermittent renal dialysis (HCC)         Hypocalcemia  Per nephro       Hypomagnesemia  Per nephro       Hyponatremia  Per nephro       Prediabetes  Will monitor       Encounter for immunization  Will discuss need for immun wht nephro   Pt NOT interested in flu vax!!         Depression Screening and Follow-up Plan: Patient was screened for depression during today's encounter. They screened negative with a PHQ-9 score of 2.        History of Present Illness     Transitional Care Management Review:   Ramos Rosenthal is a 74 y.o. male here for TCM follow up.     During the TCM phone call patient stated:  TCM Call       Date and time call was made  10/15/2024 12:56 PM     Patient was hospitialized at  [facility]    Date of Admission  09/14/24    Date of discharge  10/12/24    Diagnosis  acute kidney injry    Disposition  Home    Were the patients medications reviewed and updated  Yes          TCM Call       Scheduled for follow up?  Yes    I have advised the patient to call PCP with any new or worsening symptoms  Bernarda Leung    Living Arrangements  Family members    Support System  Family          Pt admitted 9/14-10/12 due to acute progressive glomerulonephritis due to anti - GBM AB's diagnosed per renal biopsy. Pt was started on diaylsis and 2 week course of plasmaphoresis with improvement of labs therefore felt stable for discharge home to cont dialysis 3 times/week likely indefinitely.  He  reguired 12 Units PRBC during hosp stay due to anemia - due to renal disease. Labs being monitored closely by nephro     Since home ptfeeling fine though very weak and tired. Was out of bed in chair only once during hosp stay. PT and OT are now involved. Pt is wheelchair bound therefore difficulty getting to dialysis , but family getting him there. Easier once ramp for steps getting to sidewalk so hopefully van can then puick him up   He fantasma any pain.   No CP     easily SOB with activity. Chronic swelling of LE's   No voiding    BM's as always - q 3-4 days without constipation/h/m  Appetite god without G/I complaints   Dialysis q m-w-f     Meds reviewed - pharmacy has NOT yet gotten the cyclo      He is NOT interested in flu vax  or updated covid vaccine    Not sure if interested in any other immun      Review of Systems  Objective     /80 (BP Location: Left arm, Patient Position: Sitting, Cuff Size: Adult)     Physical Exam  Vitals and nursing note reviewed.   Constitutional:       General: He is not in acute distress.     Appearance: Normal appearance. He is well-developed. He is obese.   HENT:      Head: Normocephalic and atraumatic.   Eyes:      Conjunctiva/sclera:  Conjunctivae normal.   Neck:      Vascular: Carotid bruit present.   Cardiovascular:      Rate and Rhythm: Normal rate and regular rhythm.      Heart sounds: No murmur heard.     Comments: Gd 2/6 soft blowing systolic murmur over upper sternal borders without radiation     calves without TTP   no open areas over LE's    ++ multiple scrs from prior ulcerated areas  Pulmonary:      Effort: Pulmonary effort is normal. No respiratory distress.      Breath sounds: Normal breath sounds.   Abdominal:      Tenderness: There is no abdominal tenderness.   Musculoskeletal:         General: No swelling.      Cervical back: Neck supple.      Right lower le+ Edema present.      Left lower leg: 3+ Edema present.   Lymphadenopathy:      Cervical: No cervical adenopathy.   Skin:     General: Skin is warm and dry.      Capillary Refill: Capillary refill takes less than 2 seconds.   Neurological:      Mental Status: He is alert and oriented to person, place, and time. Mental status is at baseline.   Psychiatric:         Mood and Affect: Mood normal.         Behavior: Behavior normal.       Medications have been reviewed by provider in current encounter

## 2024-10-16 NOTE — ASSESSMENT & PLAN NOTE
Readings slightly high today but with small Cuff    Nephro to follow    encouraged low salt diet

## 2024-10-16 NOTE — CASE COMMUNICATION
PT winnie on 10/15: PT POC for 2wk3.    No- pt's wife reports pt has bloodwork next wk. Please look into. ? about getting pt established with mobile lab for future.

## 2024-10-16 NOTE — ASSESSMENT & PLAN NOTE
Lab Results   Component Value Date    EGFR 19 10/12/2024    EGFR 14 10/11/2024    EGFR 19 10/10/2024    CREATININE 3.03 (H) 10/12/2024    CREATININE 3.88 (H) 10/11/2024    CREATININE 3.01 (H) 10/10/2024   Followed by neprho    Epogen to be continued

## 2024-10-17 ENCOUNTER — HOME CARE VISIT (OUTPATIENT)
Dept: HOME HEALTH SERVICES | Facility: HOME HEALTHCARE | Age: 74
End: 2024-10-17
Payer: MEDICARE

## 2024-10-17 ENCOUNTER — TELEPHONE (OUTPATIENT)
Age: 74
End: 2024-10-17

## 2024-10-17 VITALS
RESPIRATION RATE: 18 BRPM | TEMPERATURE: 97.6 F | DIASTOLIC BLOOD PRESSURE: 68 MMHG | HEART RATE: 66 BPM | SYSTOLIC BLOOD PRESSURE: 124 MMHG | OXYGEN SATURATION: 96 %

## 2024-10-17 DIAGNOSIS — N01.9 RAPIDLY PROGRESSIVE GLOMERULONEPHRITIS WITH ANTI-GBM ANTIBODIES: ICD-10-CM

## 2024-10-17 PROCEDURE — G0299 HHS/HOSPICE OF RN EA 15 MIN: HCPCS

## 2024-10-17 PROCEDURE — G0152 HHCP-SERV OF OT,EA 15 MIN: HCPCS

## 2024-10-17 PROCEDURE — G0151 HHCP-SERV OF PT,EA 15 MIN: HCPCS

## 2024-10-17 RX ORDER — CYCLOPHOSPHAMIDE 50 MG/1
100 CAPSULE ORAL DAILY
Qty: 60 CAPSULE | Refills: 5 | Status: SHIPPED | OUTPATIENT
Start: 2024-10-17 | End: 2025-04-15

## 2024-10-17 NOTE — TELEPHONE ENCOUNTER
PA for cyclophosphamide (CYTOXAN) 50 mg capsule SUBMITTED     via    [x]Critical access hospital-KEY: [id number]  []Surescripts-Case ID #   []Availity-Auth ID # NDC #   []Faxed to plan   []Other website   []Phone call Case ID #     Office notes sent, clinical questions answered. Awaiting determination    Turnaround time for your insurance to make a decision on your Prior Authorization can take 7-21 business days.

## 2024-10-17 NOTE — TELEPHONE ENCOUNTER
Pt's  spouse, Louise called to let Dr. Carrillo know that her insurance approved the medication.  She doesn't know the name of the medication and when I asked her what it is for she said for his dialysis  and that Dr. Carrillo would know.  She's asking that the medication be sent to [facility] [address].

## 2024-10-17 NOTE — TELEPHONE ENCOUNTER
Patients daughter called in looking for an update on patients cyclophosphamide (CYTOXAN) 50 mg capsule prior auth. No notes in chart stating one was started. Daughter stating Insurance is requiring one. Please advise.  [facility]  PH:[phone]

## 2024-10-17 NOTE — TELEPHONE ENCOUNTER
Patients wife is call about prior auth for cyclophosphamide (CYTOXAN) 50 mg   Advised PA is being worked on   She did give [facility] contact # as   [phone]

## 2024-10-17 NOTE — TELEPHONE ENCOUNTER
PA for cyclophosphamide (CYTOXAN) 50 mg capsule  APPROVED     Date(s) approved 10/03/2024 until further notice    Case #[id number]         Pharmacy advised by    [x]Fax  []Phone call    Approval letter scanned into [employer] Yes

## 2024-10-18 ENCOUNTER — TELEPHONE (OUTPATIENT)
Age: 74
End: 2024-10-18

## 2024-10-18 NOTE — TELEPHONE ENCOUNTER
Mary called back and she did call dialysis and they told her Medical Necessary form needs to be completed by PCP and as many diagnosis as possible should be included.  Mary will fill out patients part and then fax to our office for Dr Carrillo to complete.

## 2024-10-18 NOTE — TELEPHONE ENCOUNTER
Called Mary back unfortunately she did not answer. I left a detailed message for her to check with dialysis because they do provide transportation for their patients. The lyft rides is just if they have an appt here at our office that we make the arrangements.  Did leave office # if she would like to call me back to discuss further.

## 2024-10-18 NOTE — TELEPHONE ENCOUNTER
Mary thorne called, to request medical transportation to and from home ( round trip to Dialysis.  Daughter spoken with Medicare has to be medically necessary. Lyft rides, or ambulance rides covered by medicare.    She would like to know how this is set up.    Requesting an order to be placed for Lyft rides  For dialysis Monday, Wednesday, Friday     Please reach out to Mary Thorne - [phone]    Please advise. Thank you.

## 2024-10-21 ENCOUNTER — APPOINTMENT (OUTPATIENT)
Dept: DIALYSIS | Facility: HOSPITAL | Age: 74
DRG: 640 | End: 2024-10-21
Payer: MEDICARE

## 2024-10-21 ENCOUNTER — APPOINTMENT (EMERGENCY)
Dept: RADIOLOGY | Facility: HOSPITAL | Age: 74
DRG: 640 | End: 2024-10-21
Payer: MEDICARE

## 2024-10-21 ENCOUNTER — HOSPITAL ENCOUNTER (INPATIENT)
Facility: HOSPITAL | Age: 74
LOS: 2 days | Discharge: HOME HEALTH | DRG: 640 | End: 2024-10-24
Attending: EMERGENCY MEDICINE | Admitting: INTERNAL MEDICINE
Payer: MEDICARE

## 2024-10-21 ENCOUNTER — HOME CARE VISIT (OUTPATIENT)
Dept: HOME HEALTH SERVICES | Facility: HOME HEALTHCARE | Age: 74
End: 2024-10-21
Payer: MEDICARE

## 2024-10-21 DIAGNOSIS — R20.2 PARESTHESIA OF BOTH LOWER EXTREMITIES: ICD-10-CM

## 2024-10-21 DIAGNOSIS — N18.6 ESRD (END STAGE RENAL DISEASE) (HCC): Primary | ICD-10-CM

## 2024-10-21 DIAGNOSIS — N01.9 RAPIDLY PROGRESSIVE GLOMERULONEPHRITIS WITH ANTI-GBM ANTIBODIES: ICD-10-CM

## 2024-10-21 DIAGNOSIS — J18.9 PNEUMONIA DUE TO INFECTIOUS ORGANISM, UNSPECIFIED LATERALITY, UNSPECIFIED PART OF LUNG: ICD-10-CM

## 2024-10-21 PROBLEM — E87.70 VOLUME OVERLOAD: Status: ACTIVE | Noted: 2024-10-21

## 2024-10-21 PROBLEM — M1A.39X0 CHRONIC GOUT DUE TO RENAL IMPAIRMENT OF MULTIPLE SITES WITHOUT TOPHUS: Status: ACTIVE | Noted: 2024-10-21

## 2024-10-21 LAB
2HR DELTA HS TROPONIN: 65 NG/L
4HR DELTA HS TROPONIN: 102 NG/L
ANION GAP SERPL CALCULATED.3IONS-SCNC: 12 MMOL/L (ref 4–13)
ANISOCYTOSIS BLD QL SMEAR: PRESENT
APTT PPP: 24 SECONDS (ref 23–34)
ATRIAL RATE: 90 BPM
BASOPHILS # BLD MANUAL: 0 THOUSAND/UL (ref 0–0.1)
BASOPHILS NFR MAR MANUAL: 0 % (ref 0–1)
BNP SERPL-MCNC: 2163 PG/ML (ref 0–100)
BUN SERPL-MCNC: 71 MG/DL (ref 5–25)
CALCIUM SERPL-MCNC: 9.3 MG/DL (ref 8.4–10.2)
CARDIAC TROPONIN I PNL SERPL HS: 156 NG/L
CARDIAC TROPONIN I PNL SERPL HS: 221 NG/L
CARDIAC TROPONIN I PNL SERPL HS: 258 NG/L
CHLORIDE SERPL-SCNC: 99 MMOL/L (ref 96–108)
CO2 SERPL-SCNC: 28 MMOL/L (ref 21–32)
CREAT SERPL-MCNC: 7.03 MG/DL (ref 0.6–1.3)
EOSINOPHIL # BLD MANUAL: 0 THOUSAND/UL (ref 0–0.4)
EOSINOPHIL NFR BLD MANUAL: 0 % (ref 0–6)
ERYTHROCYTE [DISTWIDTH] IN BLOOD BY AUTOMATED COUNT: 18.9 % (ref 11.6–15.1)
FLUAV AG UPPER RESP QL IA.RAPID: NEGATIVE
FLUBV AG UPPER RESP QL IA.RAPID: NEGATIVE
GFR SERPL CREATININE-BSD FRML MDRD: 6 ML/MIN/1.73SQ M
GLUCOSE SERPL-MCNC: 145 MG/DL (ref 65–140)
HCT VFR BLD AUTO: 23.8 % (ref 36.5–49.3)
HGB BLD-MCNC: 7.8 G/DL (ref 12–17)
INR PPP: 1.11 (ref 0.85–1.19)
LYMPHOCYTES # BLD AUTO: 0.25 THOUSAND/UL (ref 0.6–4.47)
LYMPHOCYTES # BLD AUTO: 2 % (ref 14–44)
MAGNESIUM SERPL-MCNC: 1.9 MG/DL (ref 1.9–2.7)
MCH RBC QN AUTO: 32.6 PG (ref 26.8–34.3)
MCHC RBC AUTO-ENTMCNC: 32.8 G/DL (ref 31.4–37.4)
MCV RBC AUTO: 100 FL (ref 82–98)
MONOCYTES # BLD AUTO: 0.61 THOUSAND/UL (ref 0–1.22)
MONOCYTES NFR BLD: 5 % (ref 4–12)
NEUTROPHILS # BLD MANUAL: 11.42 THOUSAND/UL (ref 1.85–7.62)
NEUTS SEG NFR BLD AUTO: 93 % (ref 43–75)
P AXIS: 114 DEGREES
PLATELET # BLD AUTO: 208 THOUSANDS/UL (ref 149–390)
PLATELET BLD QL SMEAR: ADEQUATE
PMV BLD AUTO: 8.6 FL (ref 8.9–12.7)
POTASSIUM SERPL-SCNC: 3.9 MMOL/L (ref 3.5–5.3)
PR INTERVAL: 206 MS
PROTHROMBIN TIME: 14.8 SECONDS (ref 12.3–15)
QRS AXIS: -8 DEGREES
QRSD INTERVAL: 106 MS
QT INTERVAL: 354 MS
QTC INTERVAL: 433 MS
RBC # BLD AUTO: 2.39 MILLION/UL (ref 3.88–5.62)
RBC MORPH BLD: PRESENT
SARS-COV+SARS-COV-2 AG RESP QL IA.RAPID: NEGATIVE
SODIUM SERPL-SCNC: 139 MMOL/L (ref 135–147)
T WAVE AXIS: 81 DEGREES
VENTRICULAR RATE: 90 BPM
WBC # BLD AUTO: 12.28 THOUSAND/UL (ref 4.31–10.16)

## 2024-10-21 PROCEDURE — 36415 COLL VENOUS BLD VENIPUNCTURE: CPT | Performed by: EMERGENCY MEDICINE

## 2024-10-21 PROCEDURE — 99285 EMERGENCY DEPT VISIT HI MDM: CPT

## 2024-10-21 PROCEDURE — 99285 EMERGENCY DEPT VISIT HI MDM: CPT | Performed by: EMERGENCY MEDICINE

## 2024-10-21 PROCEDURE — 80048 BASIC METABOLIC PNL TOTAL CA: CPT | Performed by: EMERGENCY MEDICINE

## 2024-10-21 PROCEDURE — 87081 CULTURE SCREEN ONLY: CPT | Performed by: HOSPITALIST

## 2024-10-21 PROCEDURE — 85610 PROTHROMBIN TIME: CPT | Performed by: EMERGENCY MEDICINE

## 2024-10-21 PROCEDURE — G0257 UNSCHED DIALYSIS ESRD PT HOS: HCPCS

## 2024-10-21 PROCEDURE — 93005 ELECTROCARDIOGRAM TRACING: CPT

## 2024-10-21 PROCEDURE — 87811 SARS-COV-2 COVID19 W/OPTIC: CPT | Performed by: EMERGENCY MEDICINE

## 2024-10-21 PROCEDURE — G0180 MD CERTIFICATION HHA PATIENT: HCPCS | Performed by: HOSPITALIST

## 2024-10-21 PROCEDURE — 85730 THROMBOPLASTIN TIME PARTIAL: CPT | Performed by: EMERGENCY MEDICINE

## 2024-10-21 PROCEDURE — 87804 INFLUENZA ASSAY W/OPTIC: CPT | Performed by: EMERGENCY MEDICINE

## 2024-10-21 PROCEDURE — 83735 ASSAY OF MAGNESIUM: CPT | Performed by: EMERGENCY MEDICINE

## 2024-10-21 PROCEDURE — 84484 ASSAY OF TROPONIN QUANT: CPT | Performed by: EMERGENCY MEDICINE

## 2024-10-21 PROCEDURE — 83880 ASSAY OF NATRIURETIC PEPTIDE: CPT | Performed by: EMERGENCY MEDICINE

## 2024-10-21 PROCEDURE — 93010 ELECTROCARDIOGRAM REPORT: CPT | Performed by: INTERNAL MEDICINE

## 2024-10-21 PROCEDURE — 85027 COMPLETE CBC AUTOMATED: CPT | Performed by: EMERGENCY MEDICINE

## 2024-10-21 PROCEDURE — 99285 EMERGENCY DEPT VISIT HI MDM: CPT | Performed by: INTERNAL MEDICINE

## 2024-10-21 PROCEDURE — 71045 X-RAY EXAM CHEST 1 VIEW: CPT

## 2024-10-21 PROCEDURE — 85007 BL SMEAR W/DIFF WBC COUNT: CPT | Performed by: EMERGENCY MEDICINE

## 2024-10-21 PROCEDURE — 99223 1ST HOSP IP/OBS HIGH 75: CPT | Performed by: HOSPITALIST

## 2024-10-21 PROCEDURE — NC001 PR NO CHARGE: Performed by: INTERNAL MEDICINE

## 2024-10-21 PROCEDURE — 96374 THER/PROPH/DIAG INJ IV PUSH: CPT

## 2024-10-21 RX ORDER — ASCORBIC ACID, THIAMINE MONONITRATE,RIBOFLAVIN, NIACINAMIDE, PYRIDOXINE HYDROCHLORIDE, FOLIC ACID, CYANOCOBALAMIN, BIOTIN, CALCIUM PANTOTHENATE, 100; 1.5; 1.7; 20; 10; 1; 6000; 150000; 5 MG/1; MG/1; MG/1; MG/1; MG/1; MG/1; UG/1; UG/1; MG/1
1 CAPSULE, LIQUID FILLED ORAL
Status: DISCONTINUED | OUTPATIENT
Start: 2024-10-21 | End: 2024-10-24 | Stop reason: HOSPADM

## 2024-10-21 RX ORDER — POLYETHYLENE GLYCOL 3350 17 G/17G
17 POWDER, FOR SOLUTION ORAL DAILY
Status: DISCONTINUED | OUTPATIENT
Start: 2024-10-22 | End: 2024-10-24 | Stop reason: HOSPADM

## 2024-10-21 RX ORDER — PANTOPRAZOLE SODIUM 40 MG/1
40 TABLET, DELAYED RELEASE ORAL
Status: DISCONTINUED | OUTPATIENT
Start: 2024-10-21 | End: 2024-10-24 | Stop reason: HOSPADM

## 2024-10-21 RX ORDER — ALLOPURINOL 100 MG/1
100 TABLET ORAL 2 TIMES DAILY
Status: DISCONTINUED | OUTPATIENT
Start: 2024-10-21 | End: 2024-10-24 | Stop reason: HOSPADM

## 2024-10-21 RX ORDER — CYCLOPHOSPHAMIDE 50 MG/1
100 CAPSULE ORAL DAILY
Status: DISCONTINUED | OUTPATIENT
Start: 2024-10-21 | End: 2024-10-24 | Stop reason: HOSPADM

## 2024-10-21 RX ORDER — CARVEDILOL 3.12 MG/1
3.12 TABLET ORAL ONCE
Status: COMPLETED | OUTPATIENT
Start: 2024-10-21 | End: 2024-10-21

## 2024-10-21 RX ORDER — SULFAMETHOXAZOLE AND TRIMETHOPRIM 800; 160 MG/1; MG/1
1 TABLET ORAL 3 TIMES WEEKLY
Status: DISCONTINUED | OUTPATIENT
Start: 2024-10-21 | End: 2024-10-24 | Stop reason: HOSPADM

## 2024-10-21 RX ORDER — PREDNISONE 20 MG/1
20 TABLET ORAL ONCE
Status: COMPLETED | OUTPATIENT
Start: 2024-10-21 | End: 2024-10-21

## 2024-10-21 RX ORDER — CARVEDILOL 3.12 MG/1
3.12 TABLET ORAL 2 TIMES DAILY WITH MEALS
Status: DISCONTINUED | OUTPATIENT
Start: 2024-10-21 | End: 2024-10-24 | Stop reason: HOSPADM

## 2024-10-21 RX ORDER — PREDNISONE 20 MG/1
60 TABLET ORAL DAILY
Status: DISCONTINUED | OUTPATIENT
Start: 2024-10-22 | End: 2024-10-24 | Stop reason: HOSPADM

## 2024-10-21 RX ORDER — ACETAMINOPHEN 325 MG/1
650 TABLET ORAL EVERY 6 HOURS PRN
Status: DISCONTINUED | OUTPATIENT
Start: 2024-10-21 | End: 2024-10-24 | Stop reason: HOSPADM

## 2024-10-21 RX ORDER — HEPARIN SODIUM 5000 [USP'U]/ML
5000 INJECTION, SOLUTION INTRAVENOUS; SUBCUTANEOUS EVERY 8 HOURS SCHEDULED
Status: DISCONTINUED | OUTPATIENT
Start: 2024-10-21 | End: 2024-10-24 | Stop reason: HOSPADM

## 2024-10-21 RX ADMIN — CARVEDILOL 3.12 MG: 3.12 TABLET, FILM COATED ORAL at 11:58

## 2024-10-21 RX ADMIN — FUROSEMIDE 120 MG: 10 INJECTION, SOLUTION INTRAMUSCULAR; INTRAVENOUS at 12:51

## 2024-10-21 RX ADMIN — PREDNISONE 20 MG: 20 TABLET ORAL at 11:58

## 2024-10-21 RX ADMIN — HEPARIN SODIUM 5000 UNITS: 5000 INJECTION, SOLUTION INTRAVENOUS; SUBCUTANEOUS at 17:56

## 2024-10-21 RX ADMIN — Medication 1 CAPSULE: at 17:57

## 2024-10-21 RX ADMIN — SULFAMETHOXAZOLE AND TRIMETHOPRIM 1 TABLET: 800; 160 TABLET ORAL at 17:57

## 2024-10-21 RX ADMIN — CYCLOPHOSPHAMIDE 100 MG: 50 CAPSULE ORAL at 17:57

## 2024-10-21 RX ADMIN — ALLOPURINOL 100 MG: 100 TABLET ORAL at 17:56

## 2024-10-21 RX ADMIN — CARVEDILOL 3.12 MG: 3.12 TABLET, FILM COATED ORAL at 17:57

## 2024-10-21 RX ADMIN — HEPARIN SODIUM 5000 UNITS: 5000 INJECTION, SOLUTION INTRAVENOUS; SUBCUTANEOUS at 22:38

## 2024-10-21 RX ADMIN — PANTOPRAZOLE SODIUM 40 MG: 40 TABLET, DELAYED RELEASE ORAL at 17:57

## 2024-10-21 NOTE — ASSESSMENT & PLAN NOTE
Recently diagnosed  Recently completed 2 to 3 weeks worth of plasmapheresis-now has end-stage renal disease on hemodialysis  Luckily no pulmonary involvement thus far  Continue cyclophosphamide, prednisone, and Bactrim at home doses  Continue Protonix for GI prophylaxis  Steroid-induced leukocytosis noted

## 2024-10-21 NOTE — ED PROVIDER NOTES
Time reflects when diagnosis was documented in both MDM as applicable and the Disposition within this note       Time User Action Codes Description Comment    10/21/2024 11:35 AM Catarino Greenwood Add [N18.6] ESRD (end stage renal disease) (HCC)           ED Disposition       ED Disposition   Admit    Condition   Stable    Date/Time   Mon Oct 21, 2024 12:59 PM    Comment   Case was discussed with tbd and the patient's admission status was agreed to be Admission Status: observation status to the service of Dr. Rust .               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 This appears to be fairly aggressive despite plasmapharesis. He is due for dialysis today he said that if he ever feels shortness of breath his dialysis unit told him he should go to the emergency department.  He was last dialyzed on Friday.  He no longer makes urine.    He has got some scattered crackles in the lower bases bilaterally.  He saturating well on room air, not tachycardic.Generally weak appearing.  Afebrile.    Doubt CHF, possibly some mild pulmonary edema secondary to ESRD.  Will likely need dialysis.    He has bilateral edema right is worse than left which is chronic/unchanged.  EKG nonischemic will send troponin will likely be elevated secondary to ESRD    Will get a chest x-ray.    Problems Addressed:  ESRD (end stage renal disease) (HCC): chronic illness or injury with severe exacerbation, progression, or side effects of treatment that poses a threat to life or bodily functions    Amount and/or Complexity of Data Reviewed  Labs: ordered. Decision-making details documented in ED Course.  Radiology: ordered and independent interpretation performed. Decision-making details documented in ED Course.    Risk  Prescription drug management.  Decision regarding  hospitalization.        ED Course as of 10/21/24 1419   Mon Oct 21, 2024   1048  Left Ventricle: Left ventricular cavity size is normal. There is mild concentric hypertrophy. The left ventricular ejection fraction is 50%. Systolic function is low normal. Wall motion is low  normal.   1115 BNP(!): 2,163  Will require dialysis, discussing with nephrology   1251 Patient requires dialysis.  Discussing with nephrology and hospitalist to get this arranged   1314 Chest x-ray demonstrates pulmonary edema bilaterally.  He has a mild leukocytosis secondary to steroids.  Cardiac enzyme within expected range for ESRD patient.    Discussed with nephrology agrees patient requires dialysis.  Given patient's degree of dyspnea and will think outpatient treatment is appropriate.  Dialysis not immediately available in the emergency department.  He does not require emergent dialysis at this time but should be dialyzed urgently.  Discussed with nephrology as well as hospitalist and weight options such as partial dialysis, full dialysis, or transfer.  Nephrology will arrange for dialysis today for full session.  Recommend admission for repeat dialysis tomorrow.    Had some discussions with family regarding this, they were understandably frustrated regarding the change in his care plan but ultimately agreed with admission and plan for dialysis.        Medications   carvedilol (COREG) tablet 3.125 mg (3.125 mg Oral Given 10/21/24 1158)   predniSONE tablet 20 mg (20 mg Oral Given 10/21/24 1158)   furosemide (LASIX) 120 mg in dextrose 5 % 50 mL IVPB (120 mg Intravenous New Bag 10/21/24 1251)       ED Risk Strat Scores   HEART Risk Score      Flowsheet Row Most Recent Value   Heart Score Risk Calculator    History 0 Filed at: 10/21/2024 1118   ECG 0 Filed at: 10/21/2024 1118   Age 2 Filed at: 10/21/2024 1118   Risk Factors 2 Filed at: 10/21/2024 1118   Troponin 2 Filed at: 10/21/2024 1118   HEART Score 6 Filed at: 10/21/2024 1118                                SBIRT 22yo+      Flowsheet Row Most Recent Value   Initial Alcohol Screen: US AUDIT-C     1. How often do you have a drink containing alcohol? 0 Filed at: 10/21/2024 1040   2. How many drinks containing alcohol do you have on a typical day you are drinking?  0 Filed at: 10/21/2024 1040   3b. FEMALE Any Age, or MALE 65+: How often do you have 4 or more drinks on one occassion? 0 Filed at: 10/21/2024 1040   Audit-C Score 0 Filed at: 10/21/2024 1040   ELSY: How many times in the past year have you...    Used an illegal drug or used a prescription medication for non-medical reasons? 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 HERZOG    CATARACT EXTRACTION, BILATERAL      IR BIOPSY KIDNEY RANDOM  2024    IR TEMPORARY DIALYSIS CATHETER PLACEMENT  2024    IR TUNNELED DIALYSIS CATHETER PLACEMENT  2024    KNEE ARTHROSCOPY Right     x2 , meniscus repair    TONSILECTOMY AND ADNOIDECTOMY        Family History   Problem Relation Age of Onset    Diabetes Father     Diabetes Brother     Diabetes Sister       Social History     Tobacco Use    Smoking status: Former     Current packs/day: 0.00     Types: Cigarettes     Quit date:      Years since quittin.8    Smokeless tobacco: Never   Vaping Use    Vaping status: Never Used   Substance Use Topics    Alcohol use: Not Currently     Comment: occasional    Drug use: Never      E-Cigarette/Vaping    E-Cigarette Use Never User       E-Cigarette/Vaping Substances    Nicotine  No     THC No     CBD No     Flavoring No     Other No     Unknown No       I have reviewed and agree with the history as documented.     74M hx ESRD with SOB.  Patient recently diagnosed with rapidly progressing glomerulonephritis, had plasmapheresis for 2 weeks on recent admission however still required dialysis.      Shortness of Breath      Review of Systems   Respiratory:  Positive for shortness of breath.            Objective       ED Triage Vitals [10/21/24 1033]   Temperature Pulse Blood Pressure Respirations SpO2 Patient Position - Orthostatic VS   (!) 96.8 °F (36 °C) 88 170/79 22 93 % Sitting      Temp Source Heart Rate Source BP Location FiO2 (%) Pain Score    Tympanic Monitor Left arm -- No Pain      Vitals      Date and Time Temp Pulse SpO2 Resp BP Pain Score FACES Pain Rating User   10/21/24 1330 -- 69 94 % 24 169/79 -- --    10/21/24 1158 -- 78 95 % 26 168/80 -- --    10/21/24 1039 98.1 °F (36.7 °C) -- -- -- -- -- --    10/21/24 1033 96.8 °F (36 °C) 88 93 % 22 170/79 No Pain -- JW            Physical Exam  Vitals and nursing note reviewed.   Constitutional:       General: He is not in acute distress.     Appearance: Normal appearance. He is well-developed. He is ill-appearing.   HENT:      Head: Normocephalic and atraumatic.   Eyes:      Conjunctiva/sclera: Conjunctivae normal.      Pupils: Pupils are equal, round, and reactive to light.   Neck:      Trachea: No tracheal deviation.   Cardiovascular:      Rate and Rhythm: Normal rate and regular rhythm.      Heart sounds: Normal heart sounds. No murmur heard.  Pulmonary:      Effort: Pulmonary effort is normal. Tachypnea present. No respiratory distress.      Breath sounds: Examination of the right-middle field reveals rales. Examination of the right-lower field reveals rales. Examination of the left-lower field reveals rales. Rales present. No wheezing.   Abdominal:      General: Bowel sounds are normal. There is no distension.      Palpations:  Abdomen is soft.      Tenderness: There is no abdominal tenderness.   Musculoskeletal:         General: No deformity.      Cervical back: Normal range of motion and neck supple.      Right lower leg: Edema present.      Left lower leg: Edema present.   Skin:     General: Skin is warm and dry.      Capillary Refill: Capillary refill takes less than 2 seconds.   Neurological:      General: No focal deficit present.      Mental Status: He is alert and oriented to person, place, and time.      Sensory: No sensory deficit.   Psychiatric:         Mood and Affect: Mood normal.         Judgment: Judgment normal.         Results Reviewed       Procedure Component Value Units Date/Time    HS Troponin I 2hr [[id number]]  (Abnormal) Collected: 10/21/24 1248    Lab Status: Final result Specimen: Blood from Arm, Right Updated: 10/21/24 1316     hs TnI 2hr 221 ng/L      Delta 2hr hsTnI 65 ng/L     HS Troponin I 4hr [[id number]]     Lab Status: No result Specimen: Blood     RBC Morphology Reflex Test [[id number]] Collected: 10/21/24 1049    Lab Status: Final result Specimen: Blood from Arm, Right Updated: 10/21/24 1201    Manual Differential(PHLEBS Do Not Order) [[id number]]  (Abnormal) Collected: 10/21/24 1049    Lab Status: Final result Specimen: Blood from Arm, Right Updated: 10/21/24 1133     Segmented % 93 %      Lymphocytes % 2 %      Monocytes % 5 %      Eosinophils % 0 %      Basophils % 0 %      Absolute Neutrophils 11.42 Thousand/uL      Absolute Lymphocytes 0.25 Thousand/uL      Absolute Monocytes 0.61 Thousand/uL      Absolute Eosinophils 0.00 Thousand/uL      Absolute Basophils 0.00 Thousand/uL      Total Counted --     RBC Morphology Present     Platelet Estimate Adequate     Anisocytosis Present    CBC and differential [[id number]]  (Abnormal) Collected: 10/21/24 1049    Lab Status: Final result Specimen: Blood from Arm, Right Updated: 10/21/24 1133     WBC 12.28 Thousand/uL      RBC 2.39 Million/uL      Hemoglobin 7.8  g/dL      Hematocrit 23.8 %       fL      MCH 32.6 pg      MCHC 32.8 g/dL      RDW 18.9 %      MPV 8.6 fL      Platelets 208 Thousands/uL     Narrative:      This is an appended report.  These results have been appended to a previously verified report.    Basic metabolic panel [[id number]]  (Abnormal) Collected: 10/21/24 1049    Lab Status: Final result Specimen: Blood from Arm, Right Updated: 10/21/24 1120     Sodium 139 mmol/L      Potassium 3.9 mmol/L      Chloride 99 mmol/L      CO2 28 mmol/L      ANION GAP 12 mmol/L      BUN 71 mg/dL      Creatinine 7.03 mg/dL      Glucose 145 mg/dL      Calcium 9.3 mg/dL      eGFR 6 ml/min/1.73sq m     Narrative:      National Kid[employer] Disease Foundation guidelines for Chronic Kidney Disease (CKD):     Stage 1 with normal or high GFR (GFR > 90 mL/min/1.73 square meters)    Stage 2 Mild CKD (GFR = 60-89 mL/min/1.73 square meters)    Stage 3A Moderate CKD (GFR = 45-59 mL/min/1.73 square meters)    Stage 3B Moderate CKD (GFR = 30-44 mL/min/1.73 square meters)    Stage 4 Severe CKD (GFR = 15-29 mL/min/1.73 square meters)    Stage 5 End Stage CKD (GFR <15 mL/min/1.73 square meters)  Note: GFR calculation is accurate only with a steady state creatinine    Magnesium [[id number]]  (Normal) Collected: 10/21/24 1049    Lab Status: Final result Specimen: Blood from Arm, Right Updated: 10/21/24 1120     Magnesium 1.9 mg/dL     HS Troponin 0hr (reflex protocol) [[id number]]  (Abnormal) Collected: 10/21/24 1049    Lab Status: Final result Specimen: Blood from Arm, Right Updated: 10/21/24 1116     hs TnI 0hr 156 ng/L     B-Type Natriuretic Peptide(BNP) [[id number]]  (Abnormal) Collected: 10/21/24 1049    Lab Status: Final result Specimen: Blood from Arm, Right Updated: 10/21/24 1114     BNP 2,163 pg/mL     FLU/COVID Rapid Antigen (30 min. TAT) - Preferred screening test in ED [[id number]]  (Normal) Collected: 10/21/24 1044    Lab Status: Final result Specimen: Nares from Nose Updated:  10/21/24 1111     SARS COV Rapid Antigen Negative     Influenza A Rapid Antigen Negative     Influenza B Rapid Antigen Negative    Narrative:      This test has been performed using the Quidel Abeba 2 FLU+SARS Antigen test under the Emergency Use Authorization (EUA). This test has been validated by the  and verified by the performing laboratory. The Abeba uses lateral flow immunofluorescent sandwich assay to detect SARS-COV, Influenza A and Influenza B Antigen.     The Quidel Abeba 2 SARS Antigen test does not differentiate between SARS-CoV and SARS-CoV-2.     Negative results are presumptive and may be confirmed with a molecular assay, if necessary, for patient management. Negative results do not rule out SARS-CoV-2 or influenza infection and should not be used as the sole basis for treatment or patient management decisions. A negative test result may occur if the level of antigen in a sample is below the limit of detection of this test.     Positive results are indicative of the presence of viral antigens, but do not rule out bacterial infection or co-infection with other viruses.     All test results should be used as an adjunct to clinical observations and other information available to the provider.    FOR PEDIATRIC PATIENTS - copy/paste COVID Guidelines URL to browser: [email or web address]    Protime-INR [[id number]]  (Normal) Collected: 10/21/24 1049    Lab Status: Final result Specimen: Blood from Arm, Right Updated: 10/21/24 1108     Protime 14.8 seconds      INR 1.11    Narrative:      INR Therapeutic Range    Indication                                             INR Range      Atrial Fibrillation                                               2.0-3.0  Hypercoagulable State                                    2.0.2.3  Left Ventricular Asist Device                            2.0-3.0  Mechanical Heart Valve                                  -     Aortic(with afib, MI, embolism, HF, LA enlargement,    and/or coagulopathy)                                     2.0-3.0 (2.5-3.5)     Mitral                                                             2.5-3.5  Prosthetic/Bioprosthetic Heart Valve               2.0-3.0  Venous thromboembolism (VTE: VT, PE        2.0-3.0    APTT [[id number]]  (Normal) Collected: 10/21/24 1049    Lab Status: Final result Specimen: Blood from Arm, Right Updated: 10/21/24 1108     PTT 24 seconds             XR chest 1 view portable   Final Interpretation by Nicole Justice MD (10/21 1413)   Bibasilar airspace opacities, right greater than left, pneumonia cannot be excluded.            Workstation performed: [id number]             ECG 12 Lead Documentation Only    Date/Time: 10/21/2024 10:53 AM    Performed by: Catarino Greenwood DO  Authorized by: Catarino Greenwood DO    Indications / Diagnosis:  SOB  Interpretation:     Interpretation: non-specific    Rate:     ECG rate:  90    ECG rate assessment: normal    Rhythm:     Rhythm: sinus rhythm    Ectopy:     Ectopy: none        ED Medication and Procedure Management   Prior to Admission Medications   Prescriptions Last Dose Informant Patient Reported? Taking?   B Complex-C-Folic Acid (TRIPHROCAPS PO) 10/20/2024  Yes Yes   Sig: Take 1 Capful by mouth daily with dinner.   FLUoxetine (PROzac) 20 mg capsule 10/20/2024  No Yes   Sig: TAKE 1 CAPSULE BY MOUTH EVERY DAY   Magnesium 100 MG CAPS 10/20/2024 Self Yes Yes   Sig: Take 1 capsule by mouth daily   Multiple Vitamins-Minerals (CENTRUM SILVER 50+MEN PO) 10/20/2024 Self Yes Yes   Sig: Take 1 tablet by mouth daily   allopurinol (ZYLOPRIM) 100 mg tablet 10/20/2024  No Yes   Sig: Take 1 tablet (100 mg total) by mouth 2 (two) times a day   calcium carbonate (OYSTER SHELL,OSCAL) 500 mg 10/20/2024  No Yes   Sig: Take 2 tablets by mouth 2 (two) times a day with meals   carvedilol (COREG) 3.125 mg tablet 10/20/2024  No Yes   Sig: Take 1 tablet  (3.125 mg total) by mouth 2 (two) times a day with meals   cholecalciferol (VITAMIN D3) 1,000 units tablet 10/20/2024  Yes Yes   Sig: Take 2,000 Units by mouth daily   cyclophosphamide (CYTOXAN) 50 mg capsule 10/20/2024  No Yes   Sig: Take 2 capsules (100 mg total) by mouth in the morning   loratadine (CLARITIN) 10 mg tablet 10/20/2024  Yes Yes   Sig: Take 10 mg by mouth as needed for allergies   pantoprazole (PROTONIX) 40 mg tablet 10/20/2024  No Yes   Sig: Take 1 tablet (40 mg total) by mouth 2 (two) times a day before meals   predniSONE 20 mg tablet 10/20/2024  No Yes   Sig: Take 3 tablets (60 mg total) by mouth daily   sulfamethoxazole-trimethoprim (BACTRIM DS) 800-160 mg per tablet 10/20/2024  No Yes   Sig: Take 1 tablet by mouth 3 (three) times a week   vit B complex-vit C-folic acid (Renal Caps) 1 mg 10/20/2024  No Yes   Sig: Take 1 capsule by mouth daily with dinner      Facility-Administered Medications: None     Patient's Medications   Discharge Prescriptions    No medications on file     No discharge procedures on file.  ED SEPSIS DOCUMENTATION   Time reflects when diagnosis was documented in both MDM as applicable and the Disposition within this note       Time User Action Codes Description Comment    10/21/2024 11:35 AM Catarino Greenwood Add [N18.6] ESRD (end stage renal disease) ([facility])                  Catarino Greenwood,   10/21/24 1419

## 2024-10-21 NOTE — CONSULTS
Consultation - Nephrology   Ramos Rosenthal 74 y.o. male MRN: [id number]  Unit/Bed#: ED 23 Encounter: [id number]    ASSESSMENT:    ESRD on HD (MWF)  -Location: [facility]  -Dry weight: 128.5 kg  -Patient presented due to dyspnea on exertion. States he felt short of breath unlike he's felt before. Today is his dialysis day. Labs in ED are stable. Patient is saturating well on room air and appears in no respiratory distress. CXR consistent with pulmonary edema. We will arrange for urgent 2.5 hour treatment today and full treatment tomorrow. EDW will likely need to be adjusted to address fluid gains.     Access  -Right CVC    Blood pressure  -continue outpatient hypertensive medicines    CKD Anemia:  -Recent hgb: 7.8, on SHREE as outpatient, defer in inpatient setting    CKD MBD  -continue with calcium supplementation     Volume overload  Likely to be the cause of the patient's underlying EDGAR. Will UF as tolerated. Will adjust EDW as indicated.      HISTORY OF PRESENT ILLNESS:  Requesting Physician: Catarino Greenwood DO  Reason for Consult: ESRD on HD    Ramos Rosenthal is a 74 y.o. year old male h/o antigbm glomerulonephritis, HTN who was admitted to [facility] after presenting with shortness of breath.  He states that he was getting ready for dialysis this morning when he felt increasingly short of breath.  He presented to the emergency room for further evaluation.  A renal consultation is requested today for assistance in the management of ESRD. Ramos Rosenthal is a known ESRD patient who undergoes maintenance hemodialysis at [facility] on MWF. He reports feeling nauseous this morning when he was ambulating to go to HD. 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 HERZOG    CATARACT EXTRACTION, BILATERAL      IR BIOPSY KIDNEY RANDOM  2024    IR TEMPORARY DIALYSIS CATHETER PLACEMENT  2024    IR TUNNELED DIALYSIS CATHETER PLACEMENT  2024    KNEE ARTHROSCOPY Right     x2 , meniscus repair    TONSILECTOMY AND ADNOIDECTOMY         ALLERGIES:  Allergies   Allergen Reactions    Ciprofloxacin Blisters    Other      IVORY SOAP    Penicillins Rash       SOCIAL HISTORY:  Social History     Substance and Sexual Activity   Alcohol Use Not Currently    Comment: occasional     Social History     Substance and Sexual Activity   Drug Use Never     Social History     Tobacco Use   Smoking Status Former    Current packs/day: 0.00    Types: Cigarettes    Quit date:     Years since quittin.8   Smokeless Tobacco Never       FAMILY HISTORY:  Family History   Problem Relation Age of Onset    Diabetes Father     Diabetes Brother     Diabetes Sister        MEDICATIONS:    Current Facility-Administered Medications:     furosemide (LASIX) 120 mg in dextrose 5 % 50 mL IVPB, 120 mg, Intravenous, Once, Flip Hope PA-C    Current Outpatient Medications:     allopurinol (ZYLOPRIM) 100 mg tablet, Take 1 tablet (100 mg total) by mouth 2 (two) times a day, Disp: 180 tablet, Rfl: 1    B Complex-C-Folic Acid (TRIPHROCAPS PO), Take 1 Capful by mouth daily with dinner., Disp: , Rfl:     calcium carbonate (OYSTER SHELL,OSCAL) 500 mg, Take 2 tablets by mouth 2 (two) times a day with meals, Disp: 120 tablet, Rfl: 0    carvedilol (COREG) 3.125 mg tablet, Take 1 tablet (3.125 mg total) by mouth 2 (two) times a day with meals, Disp: 60 tablet, Rfl: 0    cholecalciferol (VITAMIN D3) 1,000 units tablet, Take 2,000 Units by mouth daily, Disp: , Rfl:     cyclophosphamide (CYTOXAN) 50 mg capsule, Take 2 capsules (100  "mg total) by mouth in the morning, Disp: 60 capsule, Rfl: 5    FLUoxetine (PROzac) 20 mg capsule, TAKE 1 CAPSULE BY MOUTH EVERY DAY, Disp: 90 capsule, Rfl: 3    loratadine (CLARITIN) 10 mg tablet, Take 10 mg by mouth as needed for allergies, Disp: , Rfl:     Magnesium 100 MG CAPS, Take 1 capsule by mouth daily, Disp: , Rfl:     Multiple Vitamins-Minerals (CENTRUM SILVER 50+MEN PO), Take 1 tablet by mouth daily, Disp: , Rfl:     pantoprazole (PROTONIX) 40 mg tablet, Take 1 tablet (40 mg total) by mouth 2 (two) times a day before meals, Disp: 60 tablet, Rfl: 0    predniSONE 20 mg tablet, Take 3 tablets (60 mg total) by mouth daily, Disp: 90 tablet, Rfl: 0    sulfamethoxazole-trimethoprim (BACTRIM DS) 800-160 mg per tablet, Take 1 tablet by mouth 3 (three) times a week, Disp: 12 tablet, Rfl: 0    vit B complex-vit C-folic acid (Renal Caps) 1 mg, Take 1 capsule by mouth daily with dinner, Disp: 30 capsule, Rfl: 0    REVIEW OF SYSTEMS:  A complete 10 point review of systems was performed and found to be negative unless otherwise noted below or in the HPI.  Review of Systems   Constitutional:  Negative for chills and fever.   Respiratory:  Positive for shortness of breath.    Cardiovascular:  Positive for leg swelling. Negative for chest pain.   Gastrointestinal:  Negative for abdominal pain and diarrhea.   Neurological:  Negative for dizziness and headaches.        PHYSICAL EXAM:  Current Weight: Weight - Scale: (!) 143 kg (314 lb 6 oz)  First Weight: Weight - Scale: (!) 143 kg (314 lb 6 oz)  Vitals:    10/21/24 1033 10/21/24 1039 10/21/24 1158   BP: 170/79  168/80   BP Location: Left arm  Left arm   Pulse: 88  78   Resp: 22  (!) 26   Temp: (!) 96.8 °F (36 °C) 98.1 °F (36.7 °C)    TempSrc: Tympanic Oral    SpO2: 93%  95%   Weight: (!) 143 kg (314 lb 6 oz)     Height: 6' 1\" (1.854 m)       No intake or output data in the 24 hours ending 10/21/24 1226  Physical Exam  Vitals and nursing note reviewed.   Constitutional:      "  General: He is not in acute distress.     Appearance: He is well-developed.   HENT:      Head: Normocephalic and atraumatic.   Cardiovascular:      Rate and Rhythm: Normal rate and regular rhythm.      Heart sounds: No murmur heard.  Pulmonary:      Effort: Pulmonary effort is normal. No respiratory distress.      Breath sounds: Normal breath sounds.   Abdominal:      Palpations: Abdomen is soft.      Tenderness: There is no abdominal tenderness.   Musculoskeletal:         General: No swelling.      Right lower leg: Edema present.      Left lower leg: Edema present.   Skin:     General: Skin is warm and dry.      Capillary Refill: Capillary refill takes less than 2 seconds.   Neurological:      Mental Status: He is alert.   Psychiatric:         Mood and Affect: Mood normal.          Invasive Devices:      Lab Results:   Results from last 7 days   Lab Units 10/21/24  1049   WBC Thousand/uL 12.28*   HEMOGLOBIN g/dL 7.8*   HEMATOCRIT % 23.8*   PLATELETS Thousands/uL 208   POTASSIUM mmol/L 3.9   CHLORIDE mmol/L 99   CO2 mmol/L 28   BUN mg/dL 71*   CREATININE mg/dL 7.03*   CALCIUM mg/dL 9.3   MAGNESIUM mg/dL 1.9     Lab Results   Component Value Date    .2 (H) 09/15/2024    CALCIUM 9.3 10/21/2024    PHOS 3.8 10/07/2024       I have personally reviewed the blood work as stated above and in my note.  I have personally reviewed CXR imaging studies.  I have personally reviewed ED note.

## 2024-10-21 NOTE — H&P
H&P - Hospitalist   Name: Ramos Rosenthal 74 y.o. male I MRN: [id number]  Unit/Bed#: -01 I Date of Admission: 10/21/2024   Date of Service: 10/21/2024 I Hospital Day: 0     Assessment & Plan  Volume overload  Admit to [facility] observation with telemetry  Initial complaint was shortness of breath  Case reviewed with nephrology prior to being admitted to medicine  Patient will be dialyzed today, and again tomorrow  Nephrology will be evaluating further to see if they need to further adjust his dry weight  Of note, the patient was recently only started on hemodialysis a few weeks ago on a previous admission at which time he was diagnosed with 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 at which time he failed 2 to 3 weeks worth of plasmapheresis  Continue renal caps  Hopeful discharge planning tomorrow after a second session of dialysis  End-stage renal disease on hemodialysis (HCC)  Lab Results   Component Value Date    EGFR 6 10/21/2024    EGFR 19 10/12/2024    EGFR 14 10/11/2024    CREATININE 7.03 (H) 10/21/2024    CREATININE 3.03 (H) 10/12/2024    CREATININE 3.88 (H) 10/11/2024   Management as per nephrology  See the remaining management as outlined above  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  Recently diagnosed  Recently completed 2 to 3 weeks worth of plasmapheresis-now has end-stage renal disease on hemodialysis  Luckily no pulmonary involvement thus far  Continue cyclophosphamide, prednisone, and Bactrim at home doses  Continue Protonix for GI prophylaxis  Steroid-induced leukocytosis noted  Bilateral lower extremity edema  Chronic  At baseline, the patient is wheelchair-bound  Fluid management will be with dialysis  Chronic gout due to renal impairment of multiple sites without tophus  Continue home dosing of allopurinol  Paresthesia of both lower extremities  Chronic stable condition  Essential hypertension  Continue Coreg  Depression  Continue  Prozac      VTE Pharmacologic Prophylaxis: VTE Score: 7 High Risk (Score >/= 5) - Pharmacological DVT Prophylaxis Ordered: heparin. Sequential Compression Devices Ordered.  Code Status: Level 1 - Full Code reviewed with the patient  Discussion with family: Updated  (wife and daughter) at bedside.    Anticipated Length of Stay: Patient will be admitted on an observation basis with an anticipated length of stay of less than 2 midnights secondary to the need for dialysis.    History of Present Illness   Chief Complaint: Shortness of breath x 1 day    Ramos Rosenthal is a 74 y.o. male with a PMH of the medical conditions as outlined below who presents with the chief complaint as outlined above.    In brief, the patient was recently discharged from our facility on Saturday October 5th, 2024 after having a prolonged stay here in the hospital.  He was initially diagnosed with an acute kidney injury, and then subsequently via renal biopsy was diagnosed with anti-GBM antibodies limited to the kidneys.  He completed approximately 2 to 3 weeks worth of plasmapheresis on a daily basis.  Unfortunately, he transitioned to having end-stage renal disease on hemodialysis since for the most part of plasmapheresis was unsuccessful.  He was discharged home on cyclophosphamide, prednisone, and Bactrim with the instructions to continue dialysis 3 times weekly on Mondays, Wednesdays, and Fridays.    Patient reports having gone to dialysis since discharge, but presented to the emergency room earlier today with a chief complaint of shortness of breath.  Today otherwise ideally would have been his dialysis day.    He reports waking up early this morning and was extremely short of breath more so with exertion.  He also reported some nausea, dry heaving, with some associated lightheadedness and dizziness.  The symptoms prompted him to come to the emergency room for further evaluation since he was explicitly told in the  outpatient setting to bypass dialysis and go to the ER if he has any of those symptoms.  Otherwise, he denies any chest pain, fevers, palpitations, numbness, and/or tingling.  He reports some weakness.    In the emergency room, he was diagnosed with a volume overloaded state, the ER physician spoke with nephrology, and a decision was made to admit the patient to [facility] observation for dialysis today, and dialysis again tomorrow.    Review of Systems   Constitutional:  Positive for fatigue.   Respiratory:  Positive for cough and shortness of breath.    Neurological:  Positive for dizziness and light-headedness.       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 HERZOG    CATARACT EXTRACTION, BILATERAL      IR BIOPSY KIDNEY RANDOM  2024    IR TEMPORARY DIALYSIS CATHETER PLACEMENT  2024    IR TUNNELED DIALYSIS CATHETER PLACEMENT  2024    KNEE ARTHROSCOPY Right     x2 , meniscus repair    TONSILECTOMY AND ADNOIDECTOMY       Social History     Tobacco Use    Smoking status: Former     Current packs/day: 0.00     Types: Cigarettes     Quit date:      Years since quittin.8    Smokeless tobacco: Never   Vaping Use    Vaping status: Never Used   Substance and Sexual Activity    Alcohol use: Not Currently     Comment: occasional    Drug use: Never    Sexual activity: Not on file     E-Cigarette/Vaping    E-Cigarette Use Never User      E-Cigarette/Vaping Substances    Nicotine No     THC No     CBD No     Flavoring No     Other No     Unknown No      Family History   Problem Relation Age of Onset    Diabetes Father     Diabetes Brother     Diabetes Sister       Social History:  Marital Status: /Civil Union   Occupation: Retired  Patient Pre-hospital Living Situation: Home  Patient Pre-hospital Level of Mobility: manual wheelchair  Patient Pre-hospital Diet Restrictions: Renal dietary restrictions were put in place    Meds/Allergies   I have reviewed home medications with patient personally.  Prior to Admission medications    Medication Sig Start Date End Date Taking? Authorizing Provider   allopurinol (ZYLOPRIM) 100 mg tablet Take 1 tablet (100 mg total) by mouth 2 (two) times a day 8/23/24  Yes Jessika Carrillo MD   B Complex-C-Folic Acid (TRIPHROCAPS PO) Take 1 Capful by mouth daily with dinner.   Yes Historical Provider, MD   calcium carbonate (OYSTER SHELL,OSCAL) 500 mg Take 2 tablets by mouth 2 (two) times a day with meals 10/12/24 11/11/24 Yes Agnes Rust MD   carvedilol (COREG) 3.125 mg tablet Take 1 tablet (3.125 mg total) by mouth 2 (two) times a day with meals 10/12/24 11/11/24 Yes Agnes Rust MD   cholecalciferol (VITAMIN D3) 1,000 units tablet Take 2,000 Units by mouth daily   Yes Historical Provider, MD   cyclophosphamide (CYTOXAN) 50 mg capsule Take 2 capsules (100 mg total) by mouth in the morning 10/17/24 4/15/25 Yes Jessika Carrillo MD   FLUoxetine (PROzac) 20 mg capsule TAKE 1 CAPSULE BY MOUTH EVERY DAY 11/20/23  Yes Jessika Carrillo MD   loratadine (CLARITIN) 10 mg tablet Take 10 mg by mouth as needed for allergies   Yes Historical Provider, MD   Magnesium 100 MG CAPS Take 1 capsule by mouth daily   Yes Historical Provider, MD   Multiple Vitamins-Minerals (CENTRUM SILVER 50+MEN PO) Take 1 tablet by mouth daily   Yes Historical Provider, MD   pantoprazole (PROTONIX) 40 mg tablet Take 1 tablet (40 mg total) by mouth 2 (two) times a day before meals 10/12/24 11/11/24 Yes Agnes Rust MD   predniSONE 20 mg tablet Take 3 tablets (60 mg total) by mouth daily 10/12/24 11/11/24 Yes Agnes Rust MD    sulfamethoxazole-trimethoprim (BACTRIM DS) 800-160 mg per tablet Take 1 tablet by mouth 3 (three) times a week 10/14/24 11/13/24 Yes Agnes Rust MD   vit B complex-vit C-folic acid (Renal Caps) 1 mg Take 1 capsule by mouth daily with dinner 10/12/24 11/11/24 Yes Agnes Rust MD     Allergies   Allergen Reactions    Ciprofloxacin Blisters    Other      IVORY SOAP    Penicillins Rash       Objective :  Temp:  [96.8 °F (36 °C)-98.1 °F (36.7 °C)] 97.9 °F (36.6 °C)  HR:  [61-88] 62  BP: (159-170)/() 161/95  Resp:  [20-26] 22  SpO2:  [93 %-95 %] 94 %  O2 Device: None (Room air)    Physical Exam  Vitals and nursing note reviewed.   Constitutional:       General: He is not in acute distress.     Appearance: Normal appearance. He is not ill-appearing.   HENT:      Head: Normocephalic and atraumatic.      Nose: Nose normal.   Eyes:      Extraocular Movements: Extraocular movements intact.      Pupils: Pupils are equal, round, and reactive to light.   Cardiovascular:      Rate and Rhythm: Normal rate and regular rhythm.      Pulses: Normal pulses.      Heart sounds: Normal heart sounds. No murmur heard.     No friction rub. No gallop.   Pulmonary:      Effort: Pulmonary effort is normal.      Breath sounds: Normal breath sounds.      Comments: Bilateral crackles appreciated  Abdominal:      General: There is no distension.      Palpations: Abdomen is soft. There is no mass.      Tenderness: There is no abdominal tenderness. There is no guarding or rebound.   Musculoskeletal:         General: No swelling or tenderness. Normal range of motion.      Cervical back: Normal range of motion and neck supple. No rigidity. No muscular tenderness.      Right lower leg: Edema present.      Left lower leg: Edema present.      Comments: 3+ bilateral lower extremity chronic lymphedema noted   Skin:     General: Skin is warm.      Capillary Refill: Capillary refill takes less than 2 seconds.      Findings: No  erythema or rash.   Neurological:      General: No focal deficit present.      Mental Status: He is alert and oriented to person, place, and time. Mental status is at baseline.   Psychiatric:         Mood and Affect: Mood normal.         Behavior: Behavior normal.          Lines/Drains:  Lines/Drains/Airways       Active Status       Name Placement date Placement time Site Days    HD Permanent Double Catheter 09/27/24  1316  Internal jugular  24                          Lab Results: I have reviewed the following results:  Results from last 7 days   Lab Units 10/21/24  1049   WBC Thousand/uL 12.28*   HEMOGLOBIN g/dL 7.8*   HEMATOCRIT % 23.8*   PLATELETS Thousands/uL 208   LYMPHO PCT % 2*   MONO PCT % 5   EOS PCT % 0     Results from last 7 days   Lab Units 10/21/24  1049   SODIUM mmol/L 139   POTASSIUM mmol/L 3.9   CHLORIDE mmol/L 99   CO2 mmol/L 28   BUN mg/dL 71*   CREATININE mg/dL 7.03*   ANION GAP mmol/L 12   CALCIUM mg/dL 9.3   GLUCOSE RANDOM mg/dL 145*     Results from last 7 days   Lab Units 10/21/24  1049   INR  1.11         Lab Results   Component Value Date    HGBA1C 5.8 (H) 01/13/2023    HGBA1C 5.6 09/13/2022    HGBA1C 5.8 07/31/2019           Imaging Results Review: I reviewed radiology reports from this admission including: chest xray.  Other Study Results Review: No additional pertinent studies reviewed.    Administrative Statements   I have spent a total time of 65 minutes in caring for this patient on the day of the visit/encounter including Diagnostic results, Prognosis, Risks and benefits of tx options, Instructions for management, Patient and family education, Importance of tx compliance, Risk factor reductions, Impressions, Counseling / Coordination of care, Documenting in the medical record, Reviewing / ordering tests, medicine, procedures  , Obtaining or reviewing history  , and Communicating with other healthcare professionals .    ** Please Note: This note has been constructed using a voice  recognition system. **

## 2024-10-21 NOTE — ASSESSMENT & PLAN NOTE
Admit to [facility] observation with telemetry  Initial complaint was shortness of breath  Case reviewed with nephrology prior to being admitted to medicine  Patient will be dialyzed today, and again tomorrow  Nephrology will be evaluating further to see if they need to further adjust his dry weight  Of note, the patient was recently only started on hemodialysis a few weeks ago on a previous admission at which time he was diagnosed with 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 at which time he failed 2 to 3 weeks worth of plasmapheresis  Continue renal caps  Hopeful discharge planning tomorrow after a second session of dialysis

## 2024-10-21 NOTE — ED NOTES
"This nurse witnessed pt's family speaking very aggressively toward Dr. Greenwood. He calmly was explaining the pt's situation and the plan of action, and family would not take anything he stated as an answer and accused him of \"not knowing\" and \"doing nothing\", as well as \"not going fast enough\".     Leah Crane RN  10/21/24 0271    "

## 2024-10-21 NOTE — PLAN OF CARE
Hemodialysis treatment planned for 150 minutes using a 4 K+ bath for potassium 3.9 this morning.  Fluid goal 3000 ml/2500 ml net as ordered.          Post-Dialysis RN Treatment Note    Blood Pressure:  Pre 165/100 mm/Hg  Post 148/100 mmHg   EDW  128.5 kg    Weight:  Pre 140.7 kg   Post 138.1 kg   Mode of weight measurement: Bed Scale   Volume Removed  3010 ml/2500 ml net   Treatment duration: 150 minutes    NS given:  No    Treatment shortened:  No   Medications given during Rx:  None Reported   Estimated Kt/V:  1.21   Access type: Permacath/TDC   Access Issues: Yes, describe: lines reversed     Report called to primary nurse:   Yes                 Problem: METABOLIC, FLUID AND ELECTROLYTES - ADULT  Goal: Electrolytes maintained within normal limits  Description: INTERVENTIONS:  - Monitor labs and assess patient for signs and symptoms of electrolyte imbalances  - Administer electrolyte replacement as ordered  - Monitor response to electrolyte replacements, including repeat lab results as appropriate  - Instruct patient on fluid and nutrition as appropriate  Outcome: Progressing  Goal: Fluid balance maintained  Description: INTERVENTIONS:  - Monitor labs   - Monitor I/O and WT  - Instruct patient on fluid and nutrition as appropriate  - Assess for signs & symptoms of volume excess or deficit  Outcome: Progressing

## 2024-10-21 NOTE — ASSESSMENT & PLAN NOTE
Lab Results   Component Value Date    EGFR 6 10/21/2024    EGFR 19 10/12/2024    EGFR 14 10/11/2024    CREATININE 7.03 (H) 10/21/2024    CREATININE 3.03 (H) 10/12/2024    CREATININE 3.88 (H) 10/11/2024   Management as per nephrology  See the remaining management as outlined above

## 2024-10-22 ENCOUNTER — APPOINTMENT (OUTPATIENT)
Dept: DIALYSIS | Facility: HOSPITAL | Age: 74
DRG: 640 | End: 2024-10-22
Payer: MEDICARE

## 2024-10-22 ENCOUNTER — HOME CARE VISIT (OUTPATIENT)
Dept: HOME HEALTH SERVICES | Facility: HOME HEALTHCARE | Age: 74
End: 2024-10-22
Payer: MEDICARE

## 2024-10-22 PROBLEM — J18.9 PNEUMONIA: Status: ACTIVE | Noted: 2024-10-22

## 2024-10-22 PROBLEM — R68.83 CHILLS: Status: ACTIVE | Noted: 2024-10-22

## 2024-10-22 LAB
ALBUMIN SERPL BCG-MCNC: 4 G/DL (ref 3.5–5)
ALP SERPL-CCNC: 45 U/L (ref 34–104)
ALT SERPL W P-5'-P-CCNC: 12 U/L (ref 7–52)
ANION GAP SERPL CALCULATED.3IONS-SCNC: 12 MMOL/L (ref 4–13)
AST SERPL W P-5'-P-CCNC: 14 U/L (ref 13–39)
BILIRUB SERPL-MCNC: 1.08 MG/DL (ref 0.2–1)
BUN SERPL-MCNC: 55 MG/DL (ref 5–25)
CALCIUM SERPL-MCNC: 8.8 MG/DL (ref 8.4–10.2)
CHLORIDE SERPL-SCNC: 97 MMOL/L (ref 96–108)
CO2 SERPL-SCNC: 27 MMOL/L (ref 21–32)
CREAT SERPL-MCNC: 5.79 MG/DL (ref 0.6–1.3)
ERYTHROCYTE [DISTWIDTH] IN BLOOD BY AUTOMATED COUNT: 19.3 % (ref 11.6–15.1)
GFR SERPL CREATININE-BSD FRML MDRD: 8 ML/MIN/1.73SQ M
GLUCOSE SERPL-MCNC: 98 MG/DL (ref 65–140)
HBV CORE AB SER QL: NORMAL
HBV CORE IGM SER QL: NORMAL
HBV SURFACE AB SER-ACNC: 24.8 MIU/ML
HBV SURFACE AG SER QL: NORMAL
HCT VFR BLD AUTO: 24.9 % (ref 36.5–49.3)
HCV AB SER QL: NORMAL
HGB BLD-MCNC: 8 G/DL (ref 12–17)
MAGNESIUM SERPL-MCNC: 1.8 MG/DL (ref 1.9–2.7)
MCH RBC QN AUTO: 31.7 PG (ref 26.8–34.3)
MCHC RBC AUTO-ENTMCNC: 32.1 G/DL (ref 31.4–37.4)
MCV RBC AUTO: 99 FL (ref 82–98)
MRSA NOSE QL CULT: NORMAL
PHOSPHATE SERPL-MCNC: 2.6 MG/DL (ref 2.3–4.1)
PLATELET # BLD AUTO: 179 THOUSANDS/UL (ref 149–390)
PMV BLD AUTO: 8.6 FL (ref 8.9–12.7)
POTASSIUM SERPL-SCNC: 4 MMOL/L (ref 3.5–5.3)
PROT SERPL-MCNC: 5.5 G/DL (ref 6.4–8.4)
RBC # BLD AUTO: 2.52 MILLION/UL (ref 3.88–5.62)
SODIUM SERPL-SCNC: 136 MMOL/L (ref 135–147)
WBC # BLD AUTO: 11.87 THOUSAND/UL (ref 4.31–10.16)

## 2024-10-22 PROCEDURE — 99232 SBSQ HOSP IP/OBS MODERATE 35: CPT | Performed by: INTERNAL MEDICINE

## 2024-10-22 PROCEDURE — 80053 COMPREHEN METABOLIC PANEL: CPT | Performed by: HOSPITALIST

## 2024-10-22 PROCEDURE — 86705 HEP B CORE ANTIBODY IGM: CPT

## 2024-10-22 PROCEDURE — 86704 HEP B CORE ANTIBODY TOTAL: CPT

## 2024-10-22 PROCEDURE — 90935 HEMODIALYSIS ONE EVALUATION: CPT

## 2024-10-22 PROCEDURE — 84100 ASSAY OF PHOSPHORUS: CPT | Performed by: HOSPITALIST

## 2024-10-22 PROCEDURE — 83735 ASSAY OF MAGNESIUM: CPT | Performed by: HOSPITALIST

## 2024-10-22 PROCEDURE — 99222 1ST HOSP IP/OBS MODERATE 55: CPT | Performed by: STUDENT IN AN ORGANIZED HEALTH CARE EDUCATION/TRAINING PROGRAM

## 2024-10-22 PROCEDURE — 86803 HEPATITIS C AB TEST: CPT

## 2024-10-22 PROCEDURE — 83520 IMMUNOASSAY QUANT NOS NONAB: CPT

## 2024-10-22 PROCEDURE — G0257 UNSCHED DIALYSIS ESRD PT HOS: HCPCS

## 2024-10-22 PROCEDURE — 86706 HEP B SURFACE ANTIBODY: CPT

## 2024-10-22 PROCEDURE — 87040 BLOOD CULTURE FOR BACTERIA: CPT | Performed by: INTERNAL MEDICINE

## 2024-10-22 PROCEDURE — 87340 HEPATITIS B SURFACE AG IA: CPT

## 2024-10-22 PROCEDURE — 85027 COMPLETE CBC AUTOMATED: CPT | Performed by: HOSPITALIST

## 2024-10-22 RX ORDER — CEFTRIAXONE 1 G/50ML
1000 INJECTION, SOLUTION INTRAVENOUS EVERY 24 HOURS
Status: DISCONTINUED | OUTPATIENT
Start: 2024-10-22 | End: 2024-10-22

## 2024-10-22 RX ADMIN — ALLOPURINOL 100 MG: 100 TABLET ORAL at 17:01

## 2024-10-22 RX ADMIN — PREDNISONE 60 MG: 20 TABLET ORAL at 14:24

## 2024-10-22 RX ADMIN — FLUOXETINE HYDROCHLORIDE 20 MG: 20 CAPSULE ORAL at 14:24

## 2024-10-22 RX ADMIN — PANTOPRAZOLE SODIUM 40 MG: 40 TABLET, DELAYED RELEASE ORAL at 17:01

## 2024-10-22 RX ADMIN — CYCLOPHOSPHAMIDE 100 MG: 50 CAPSULE ORAL at 15:13

## 2024-10-22 RX ADMIN — PANTOPRAZOLE SODIUM 40 MG: 40 TABLET, DELAYED RELEASE ORAL at 07:36

## 2024-10-22 RX ADMIN — Medication 1 CAPSULE: at 17:01

## 2024-10-22 RX ADMIN — CARVEDILOL 3.12 MG: 3.12 TABLET, FILM COATED ORAL at 17:01

## 2024-10-22 RX ADMIN — Medication 2000 UNITS: at 14:25

## 2024-10-22 RX ADMIN — Medication 1 TABLET: at 14:24

## 2024-10-22 RX ADMIN — HEPARIN SODIUM 5000 UNITS: 5000 INJECTION, SOLUTION INTRAVENOUS; SUBCUTANEOUS at 21:01

## 2024-10-22 RX ADMIN — ALLOPURINOL 100 MG: 100 TABLET ORAL at 14:24

## 2024-10-22 RX ADMIN — HEPARIN SODIUM 5000 UNITS: 5000 INJECTION, SOLUTION INTRAVENOUS; SUBCUTANEOUS at 06:21

## 2024-10-22 RX ADMIN — HEPARIN SODIUM 5000 UNITS: 5000 INJECTION, SOLUTION INTRAVENOUS; SUBCUTANEOUS at 15:13

## 2024-10-22 NOTE — PROGRESS NOTES
Patient:  JOSELYN GOMEZ    MRN:  [id number]    Jammiein Request ID:  [id number]    Level of care reserved:  Home Health Agency    Partner Reserved:  [facility], [place] [address] [phone]    Clinical needs requested:    Geography searched:  [id number]    Start of Service:    Request sent:  11:01am EDT on 10/22/2024 by Cora Carias    Partner reserved:  12:44pm EDT on 10/22/2024 by Cora Carias    Choice list shared:  12:43pm EDT on 10/22/2024 by Cora Carias

## 2024-10-22 NOTE — PROGRESS NOTES
Progress Note - Hospitalist   Name: Ramos Rosenthal 74 y.o. 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 at which time he failed 2 to 3 weeks worth of plasmapheresis  Continue renal caps  Outpatient follow-up with Nephrology  Pneumonia  Chest x-ray with evidence of pneumonia  Patient reports slight cough  Spiked a fever overnight and experienced rigors this morning  Obtain blood cultures  Initiate IV ceftriaxone  Trend fever curve/WBC count/procalcitonin  De-escalate antibiotics as appropriate  Can hopefully discharge home with oral antibiotics in 24 hours pending negative blood cultures and patient remaining afebrile  Consider ID consult if patient does not improve clinically as he is immunosuppressed  End-stage renal disease on hemodialysis (HCC)  Lab Results   Component Value Date    EGFR 8 10/22/2024    EGFR 6 10/21/2024    EGFR 19 10/12/2024    CREATININE 5.79 (H) 10/22/2024    CREATININE 7.03 (H) 10/21/2024    CREATININE 3.03 (H) 10/12/2024   Management as per nephrology  See the remaining management as outlined above  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  Recently diagnosed  Recently completed 2 to 3 weeks worth of plasmapheresis-now has end-stage renal disease on hemodialysis  Luckily no pulmonary involvement thus far  Continue cyclophosphamide, prednisone, and Bactrim at home doses  Continue Protonix for GI  prophylaxis  Steroid-induced leukocytosis noted  Bilateral lower extremity edema  Chronic  At baseline, the patient is wheelchair-bound  Fluid management will be with dialysis  Chronic gout due to renal impairment of multiple sites without tophus  Continue home dosing of allopurinol  Paresthesia of both lower extremities  Chronic stable condition  Essential hypertension  Continue Coreg  Depression  Continue Prozac    VTE Pharmacologic Prophylaxis: VTE Score: 7 High Risk (Score >/= 5) - Pharmacological DVT Prophylaxis Ordered: heparin. Sequential Compression Devices Ordered.    Mobility:   Basic Mobility Inpatient Raw Score: 7  JH-HLM Goal: 2: Bed activities/Dependent transfer  JH-HLM Achieved: 2: Bed activities/Dependent transfer  JH-HLM Goal achieved. Continue to encourage appropriate mobility.    Patient Centered Rounds: I performed bedside rounds with nursing staff today.     Discussions with Specialists or Other Care Team Provider: Case Management    Education and Discussions with Family / Patient: Updated  (daughter) via phone.    Current Length of Stay: 0 day(s)  Current Patient Status: Inpatient   Certification Statement: The patient will continue to require additional inpatient hospital stay due to pneumonia and rigors  Discharge Plan: Anticipate discharge tomorrow to home.    Code Status: Level 1 - Full Code    Subjective   Patient seen and examined at bedside.  Spiked a fever overnight and experienced rigors this morning.  Blood cultures obtained.  IV ceftriaxone initiated.  Will flip to inpatient.  Consider ID consult if patient does not improve clinically.    Objective :  Temp:  [97.5 °F (36.4 °C)-100.5 °F (38.1 °C)] 100.5 °F (38.1 °C)  HR:  [] 88  BP: (121-169)/() 125/98  Resp:  [18-26] 20  SpO2:  [94 %-98 %] 96 %  O2 Device: None (Room air)    Body mass index is 40.58 kg/m².     Input and Output Summary (last 24 hours):     Intake/Output Summary (Last 24 hours) at 10/22/2024  1156  Last data filed at 10/22/2024 0927  Gross per 24 hour   Intake 750 ml   Output 3010 ml   Net -2260 ml       Physical Exam  Vitals and nursing note reviewed.   Constitutional:       General: He is not in acute distress.     Appearance: He is well-developed. He is obese.   HENT:      Head: Normocephalic and atraumatic.   Eyes:      Conjunctiva/sclera: Conjunctivae normal.   Cardiovascular:      Rate and Rhythm: Normal rate and regular rhythm.      Heart sounds: No murmur heard.  Pulmonary:      Effort: Pulmonary effort is normal. No respiratory distress.      Breath sounds: Normal breath sounds.   Abdominal:      Palpations: Abdomen is soft.      Tenderness: There is no abdominal tenderness.   Musculoskeletal:         General: No swelling.      Cervical back: Neck supple.   Skin:     General: Skin is warm and dry.      Capillary Refill: Capillary refill takes less than 2 seconds.   Neurological:      Mental Status: He is alert.   Psychiatric:         Mood and Affect: Mood normal.         Lines/Drains:  Lines/Drains/Airways       Active Status       Name Placement date Placement time Site Days    HD Permanent Double Catheter 09/27/24  1316  Internal jugular  24                            Lab Results: I have reviewed the following results:   Results from last 7 days   Lab Units 10/22/24  0925 10/21/24  1049   WBC Thousand/uL 11.87* 12.28*   HEMOGLOBIN g/dL 8.0* 7.8*   HEMATOCRIT % 24.9* 23.8*   PLATELETS Thousands/uL 179 208   LYMPHO PCT %  --  2*   MONO PCT %  --  5   EOS PCT %  --  0     Results from last 7 days   Lab Units 10/22/24  0925   SODIUM mmol/L 136   POTASSIUM mmol/L 4.0   CHLORIDE mmol/L 97   CO2 mmol/L 27   BUN mg/dL 55*   CREATININE mg/dL 5.79*   ANION GAP mmol/L 12   CALCIUM mg/dL 8.8   ALBUMIN g/dL 4.0   TOTAL BILIRUBIN mg/dL 1.08*   ALK PHOS U/L 45   ALT U/L 12   AST U/L 14   GLUCOSE RANDOM mg/dL 98     Results from last 7 days   Lab Units 10/21/24  1049   INR  1.11                   Recent  Cultures (last 7 days):         Imaging Results Review: I reviewed radiology reports from this admission including: chest xray.  Other Study Results Review: No additional pertinent studies reviewed.    Last 24 Hours Medication List:     Current Facility-Administered Medications:     acetaminophen (TYLENOL) tablet 650 mg, Q6H PRN    allopurinol (ZYLOPRIM) tablet 100 mg, BID    carvedilol (COREG) tablet 3.125 mg, BID With Meals    cefTRIAXone (ROCEPHIN) IVPB (premix in dextrose) 1,000 mg 50 mL, Q24H    Cholecalciferol (VITAMIN D3) tablet 2,000 Units, Daily    cyclophosphamide (CYTOXAN) capsule 100 mg, Daily    FLUoxetine (PROzac) capsule 20 mg, Daily    heparin (porcine) subcutaneous injection 5,000 Units, Q8H LORENZO **AND** Platelet count, Once    multivitamin-minerals (CENTRUM) tablet 1 tablet, Daily    pantoprazole (PROTONIX) EC tablet 40 mg, BID AC    polyethylene glycol (MIRALAX) packet 17 g, Daily    predniSONE tablet 60 mg, Daily    sulfamethoxazole-trimethoprim (BACTRIM DS) 800-160 mg per tablet 1 tablet, Once per day on Monday Wednesday Friday    vit B complex-vit C-folic acid (Renal Caps) capsule 1 capsule, Daily With Dinner    Administrative Statements   Today, Patient Was Seen By: Mike Perez, DO  I have spent a total time of 35 minutes in caring for this patient on the day of the visit/encounter including Diagnostic results, Prognosis, Risks and benefits of tx options, Instructions for management, Patient and family education, Importance of tx compliance, Risk factor reductions, Impressions, Counseling / Coordination of care, Documenting in the medical record, Reviewing / ordering tests, medicine, procedures  , Obtaining or reviewing history  , and Communicating with other healthcare professionals .    **Please Note: This note may have been constructed using a voice recognition system.**

## 2024-10-22 NOTE — ASSESSMENT & PLAN NOTE
Lab Results   Component Value Date    EGFR 8 10/22/2024    EGFR 6 10/21/2024    EGFR 19 10/12/2024    CREATININE 5.79 (H) 10/22/2024    CREATININE 7.03 (H) 10/21/2024    CREATININE 3.03 (H) 10/12/2024   Patient seen examined on dialysis. ESRD on HD MWF. Dry weight 128.5 kg. Right CVC access. Challenging dry weight with UF of 3.5 L today.  Potential discharge home today per hospitalist service. May need extra Saturday treatment to address volume status. Rechecking anti-GBM antibody levels. It seems he will remain inpatient again tomorrow so will dialyze him again tomorrow.

## 2024-10-22 NOTE — PLAN OF CARE
Post-Dialysis RN Treatment Note    Blood Pressure:  Pre 164/92 mm/Hg  Post 132/101 mmHg   EDW  128.5 kg    Weight:  Pre 136.3 kg   Post 132.5 kg   Mode of weight measurement: Standing Scale   Volume Removed  3512 ml    Treatment duration 225 minutes    NS given  No    Treatment shortened? No   Medications given during Rx None Reported   Estimated Kt/V  1.17   Access type: Permacath/TDC   Access Issues: Yes, describe: lines reversed half way through treatment for optimal blood flow due to poor arterial flow     Report called to primary nurse   Yes Yimi Kathleen RN        Current hemodialysis plan of care is to remove a total of 4000 ml of fluid over a 3 3/4 hour treatment for a net of 3.5 liters of fluid as tolerated.  Monitor vital signs every 15  minutes while on treatment for patient safety.  Maintain cardiac monitoring while on treatment for patient safety.  Utilize a 4 K+ bath for serum potassium of 3.9 to maintain electrolyte balance.  Report received from Yimi Kathleen RN.  Plans reviewed with [facility].            Problem: METABOLIC, FLUID AND ELECTROLYTES - ADULT  Goal: Electrolytes maintained within normal limits  Description: INTERVENTIONS:  - Monitor labs and assess patient for signs and symptoms of electrolyte imbalances  - Administer electrolyte replacement as ordered  - Monitor response to electrolyte replacements, including repeat lab results as appropriate  - Instruct patient on fluid and nutrition as appropriate  Outcome: Progressing  Goal: Fluid balance maintained  Description: INTERVENTIONS:  - Monitor labs   - Monitor I/O and WT  - Instruct patient on fluid and nutrition as appropriate  - Assess for signs & symptoms of volume excess or deficit  Outcome: Progressing

## 2024-10-22 NOTE — DISCHARGE SUMMARY
Discharge Summary - Hospitalist   Name: Ramos Rosenthal 74 y.o. male I MRN: [id number]  Unit/Bed#: -01 I Date of Admission: 10/21/2024   Date of Service: 10/22/2024 I Hospital Day: 0     Update: Patient with low grade fever and rigors this morning. Patient is immunosuppressed and high risk for systemic infection. Will check blood cultures and start IV Ceftriaxone. Possible pneumonia noted on CXR which may be source of infection. Follow-up blood cultures. Can likely DC when blood cultures negative @ 24 hours and when patient has been afebrile.   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 at which time he failed 2 to 3 weeks worth of plasmapheresis  Continue renal caps  Will discharge home today following second session of hemodialysis  End-stage renal disease on hemodialysis (HCC)  Lab Results   Component Value Date    EGFR 6 10/21/2024    EGFR 19 10/12/2024    EGFR 14 10/11/2024    CREATININE 7.03 (H) 10/21/2024    CREATININE 3.03 (H) 10/12/2024    CREATININE 3.88 (H) 10/11/2024   Management as per nephrology  See the remaining management as outlined above  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  Recently diagnosed  Recently completed 2 to 3 weeks worth of plasmapheresis-now has end-stage renal disease on hemodialysis  Luckily no pulmonary involvement thus far  Continue cyclophosphamide, prednisone, and Bactrim at home doses  Continue Protonix for GI prophylaxis  Steroid-induced leukocytosis noted  Bilateral lower extremity edema  Chronic  At  baseline, the patient is wheelchair-bound  Fluid management will be with dialysis  Chronic gout due to renal impairment of multiple sites without tophus  Continue home dosing of allopurinol  Paresthesia of both lower extremities  Chronic stable condition  Essential hypertension  Continue Coreg  Depression  Continue Prozac     Medical Problems       Resolved Problems  Date Reviewed: 10/16/2024   None       Discharging Physician / Practitioner: Mike Perez DO  PCP: Jessika Carrillo MD  Admission Date:   Admission Orders (From admission, onward)       Ordered        10/21/24 1300  Place in Observation  Once                          Discharge Date: 10/22/24    Consultations During Hospital Stay:  Nephrology    Procedures Performed:   Hemodialysis    Significant Findings / Test Results:   Not applicable    Incidental Findings:   Not applicable    Test Results Pending at Discharge (will require follow up):   Not applicable     Outpatient Tests Requested:  Outpatient HD    Complications: Not applicable    Reason for Admission: Shortness of breath    Hospital Course:   Ramos Rosenthal is a 74 y.o. male with a PMH of the medical conditions as outlined below who presents with the chief complaint as outlined above.     In brief, the patient was recently discharged from our facility on Saturday October 5th, 2024 after having a prolonged stay here in the hospital.  He was initially diagnosed with an acute kidney injury, and then subsequently via renal biopsy was diagnosed with anti-GBM antibodies limited to the kidneys.  He completed approximately 2 to 3 weeks worth of plasmapheresis on a daily basis.  Unfortunately, he transitioned to having end-stage renal disease on hemodialysis since for the most part of plasmapheresis was unsuccessful.  He was discharged home on cyclophosphamide, prednisone, and Bactrim with the instructions to continue dialysis 3 times weekly on Mondays, Wednesdays, and Fridays.     Patient reports  "having gone to dialysis since discharge, but presented to the emergency room earlier today with a chief complaint of shortness of breath.  Today otherwise ideally would have been his dialysis day.     He reports waking up early this morning and was extremely short of breath more so with exertion.  He also reported some nausea, dry heaving, with some associated lightheadedness and dizziness.  The symptoms prompted him to come to the emergency room for further evaluation since he was explicitly told in the outpatient setting to bypass dialysis and go to the ER if he has any of those symptoms.  Otherwise, he denies any chest pain, fevers, palpitations, numbness, and/or tingling.  He reports some weakness.     In the emergency room, he was diagnosed with a volume overloaded state, the ER physician spoke with nephrology, and a decision was made to admit the patient to [facility] for dialysis today, and dialysis again tomorrow.    The patient will undergo 2 sessions of hemodialysis in the setting of volume overload.  He has remained hemodynamically stable and saturating well on room air throughout his hospital course.  He was strongly encouraged to resume all PTA medications and to follow-up with his nephrologist within 7 days 14 days.  The patient was also encouraged to resume HD as previously scheduled.  This serves as a brief discharge summary.  Please see full medical record for more details.    Condition at Discharge: stable    Discharge Day Visit / Exam:   Subjective:  Patient seen and examined at bedside. No acute events overnight. Denies chest pain, SOB, diaphoresis, nausea/vomiting/diarrhea, fevers/chills.     Vitals: Blood Pressure: 151/73 (10/22/24 0712)  Pulse: 75 (10/22/24 0712)  Temperature: 98.6 °F (37 °C) (10/22/24 0712)  Temp Source: Oral (10/21/24 1039)  Respirations: 20 (10/22/24 0712)  Height: 6' 1\" (185.4 cm) (10/21/24 1033)  Weight - Scale: (!) 140 kg (307 lb 8.7 oz) (pillow, sheet and shannon " only) (10/22/24 0600)  SpO2: 96 % (10/22/24 0712)    Physical Exam  Vitals and nursing note reviewed.   Constitutional:       General: He is not in acute distress.     Appearance: He is well-developed.   HENT:      Head: Normocephalic and atraumatic.   Eyes:      Conjunctiva/sclera: Conjunctivae normal.   Cardiovascular:      Rate and Rhythm: Normal rate and regular rhythm.      Heart sounds: No murmur heard.  Pulmonary:      Effort: Pulmonary effort is normal. No respiratory distress.      Breath sounds: Normal breath sounds.   Abdominal:      Palpations: Abdomen is soft.      Tenderness: There is no abdominal tenderness.   Musculoskeletal:         General: No swelling.      Cervical back: Neck supple.   Skin:     General: Skin is warm and dry.      Capillary Refill: Capillary refill takes less than 2 seconds.   Neurological:      Mental Status: He is alert.   Psychiatric:         Mood and Affect: Mood normal.         Discussion with Family: Updated  (daughter) at bedside.    Discharge instructions/Information to patient and family:   See after visit summary for information provided to patient and family.      Provisions for Follow-Up Care:  See after visit summary for information related to follow-up care and any pertinent home health orders.      Mobility at time of Discharge:   Basic Mobility Inpatient Raw Score: 6  JH-HLM Goal: 2: Bed activities/Dependent transfer  JH-HLM Achieved: 2: Bed activities/Dependent transfer  HLM Goal achieved. Continue to encourage appropriate mobility.     Disposition:   Home    Planned Readmission: Not applicable    Discharge Medications:  See after visit summary for reconciled discharge medications provided to patient and/or family.      Administrative Statements   Discharge Statement:  I have spent a total time of 75 minutes in caring for this patient on the day of the visit/encounter. >30 minutes of time was spent on: Diagnostic results, Prognosis, Risks and  benefits of tx options, Instructions for management, Patient and family education, Importance of tx compliance, Risk factor reductions, Impressions, Counseling / Coordination of care, Documenting in the medical record, Reviewing / ordering tests, medicine, procedures  , and Communicating with other healthcare professionals .    **Please Note: This note may have been constructed using a voice recognition system**

## 2024-10-22 NOTE — ASSESSMENT & PLAN NOTE
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

## 2024-10-22 NOTE — CONSULTS
Consultation - Infectious Disease   Name: Ramos Rosenthal 74 y.o. male I MRN: [id number]  Unit/Bed#: -01 I Date of Admission: 10/21/2024   Date of Service: 10/22/2024 I Hospital Day: 0     Inpatient consult to Infectious Diseases  Consult performed by: Jian Emerson PA-C  Consult ordered by: Mike Perez DO      Physician Requesting Evaluation: Mike Perez DO   Reason for Evaluation / Principal Problem: possible infection     Assessment & Plan  Chills  Sudden onset rigors/shaking chills. Developed shaking chills this morning with tachycardia and low grade temp of 100.5*F. WIth tunneled catheter in place, consider possibility of bacteremia and line infection. Low suspicion for pneumonia. COVID/Flu/RSV negative. He is otherwise afebrile and HD stable.   Discontinue IV ceftriaxone   Monitor off abx for now   Recommend obtaining peripheral blood cx    Follow up blood cx drawn from CVC  If patient spikes fever, consider CT C/A/P   Trend temps and hemodynamics   Daily CBCd and BMP to monitor response to treatment    Volume overload  Initially presented with SOB, LE edema and dialyzed x2 with UF and resolution of sx. CXR findings more consistent with pulmonary edema.   Ongoing nephrology follow up   End-stage renal disease on hemodialysis (HCC)  Due to progressive glomerulonephritis as above. On HD MWF via R CVC. Dialyzed x 2 since admission.   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 On Cytoxan, slow weaning course of  prednisone 60mg daily with thrice weekly bactrim for PJP ppx.     I have discussed the above management plan in detail with the primary service.   Infectious Disease service will follow.    Antibiotics:  Thrice weekly bactrim     History of Present Illness   Ramos Rosenthal is a 74 y.o. year old male who initially presented yesterday with complaints of shortness of breath and lower extremity edema.  Of note patient has history of recent  prolonged hospitalization where he was diagnosed with progressive glomerular nephritis treated with several weeks of plasmapheresis.  Unfortunately renal recovery was not obtained and he was started on hemodialysis.  Right central line was placed in IR on 9/27.  He has been following up with thrice weekly hemodialysis Monday Wednesday and Fridays.  He is on cyclophosphamide, prednisone with slow taper and prophylactic Bactrim.  Patient was admitted with volume overload and nephrology was consulted who recommended urgent 2-1/2-hour dialysis with ultrafiltration as tolerated.  His new dry weight was challenged.  Shortness of breath and lower extremity edema improved.  Patient was planning for discharge today following dialysis treatment however developed shaking chills around 1030 and had a low-grade temp 100.5 °F 1 hour prior.  Patient was started on IV ceftriaxone and 1 blood culture obtained from central line. He denies SOB post 2nd HD treatment. Denies abdominal pain. States during episode of rigors he did feel nauseated but did not vomit. Has cough which he suspects is allergy related, not productive and not new. Central line is functioning appropriately. No pain at site.     A complete review of systems is negative other than that noted in the HPI.    I have reviewed the patient's PMH, PSH, Social History, Family History, Meds, and Allergies    Objective :  Temp:  [97.5 °F (36.4 °C)-100.5 °F (38.1 °C)] 100.5 °F (38.1 °C)  HR:  [] 94  BP: (108-169)/() 108/67  Resp:  [18-24] 20  SpO2:  [94 %-98 %] 96 %  O2 Device: None (Room air)    Physical exam:  General:  No acute distress. Sitting upright in bed. Nursing at bedside getting blood work.   Psychiatric:  Awake and alert  HEENT: MM dry. Head atraumatic normocephalic.   Cardiovascular: RRR without murmur.  Pulmonary: dry cough noted. Lungs clear throughout. Though diminished. On room air.  Normal respiratory excursion without accessory muscle  use  Abdomen:  Soft, nontender, obese.   Extremities:  chronic LE edema.  Skin:  No rashes or wounds on exposed skin. Chronic venous stasis changes to b/l LE noted without SOI. R chest wall CVC with surrounding ecchymoses without SOI.  : no suprapuibic or CVA tenderness.   Neuro: no focal deficit.       Lab Results: I have reviewed the following results:  Results from last 7 days   Lab Units 10/22/24  0925 10/21/24  1049   WBC Thousand/uL 11.87* 12.28*   HEMOGLOBIN g/dL 8.0* 7.8*   PLATELETS Thousands/uL 179 208     Results from last 7 days   Lab Units 10/22/24  0925 10/21/24  1049   SODIUM mmol/L 136 139   POTASSIUM mmol/L 4.0 3.9   CHLORIDE mmol/L 97 99   CO2 mmol/L 27 28   BUN mg/dL 55* 71*   CREATININE mg/dL 5.79* 7.03*   EGFR ml/min/1.73sq m 8 6   CALCIUM mg/dL 8.8 9.3   AST U/L 14  --    ALT U/L 12  --    ALK PHOS U/L 45  --    ALBUMIN g/dL 4.0  --                            Imaging Results Review: I reviewed radiology reports from this admission including: chest xray.  Other Study Results Review: No additional pertinent studies reviewed.    Administrative Statements   I have spent a total time of 45 minutes in caring for this patient on the day of the visit/encounter including Diagnostic results, Impressions, Counseling / Coordination of care, Documenting in the medical record, Reviewing / ordering tests, medicine, procedures  , Obtaining or reviewing history  , and Communicating with other healthcare professionals .

## 2024-10-22 NOTE — ASSESSMENT & PLAN NOTE
Sudden onset rigors/shaking chills. Developed shaking chills this morning with tachycardia and low grade temp of 100.5*F. WIth tunneled catheter in place, consider possibility of bacteremia and line infection. Low suspicion for pneumonia. COVID/Flu/RSV negative. He is otherwise afebrile and HD stable.   Discontinue IV ceftriaxone   Monitor off abx for now   Recommend obtaining peripheral blood cx    Follow up blood cx drawn from CVC  If patient spikes fever, consider CT C/A/P   Trend temps and hemodynamics   Daily CBCd and BMP to monitor response to treatment

## 2024-10-22 NOTE — ASSESSMENT & PLAN NOTE
Recently diagnosed. On Cytoxan, slow weaning course of  prednisone 60mg daily with thrice weekly bactrim for PJP ppx.

## 2024-10-22 NOTE — ASSESSMENT & PLAN NOTE
Lab Results   Component Value Date    EGFR 8 10/22/2024    EGFR 6 10/21/2024    EGFR 19 10/12/2024    CREATININE 5.79 (H) 10/22/2024    CREATININE 7.03 (H) 10/21/2024    CREATININE 3.03 (H) 10/12/2024   Management as per nephrology  See the remaining management as outlined above

## 2024-10-22 NOTE — ASSESSMENT & PLAN NOTE
Initially presented with SOB, LE edema and dialyzed x2 with UF and resolution of sx. CXR findings more consistent with pulmonary edema.   Ongoing nephrology follow up

## 2024-10-22 NOTE — ASSESSMENT & PLAN NOTE
Due to progressive glomerulonephritis as above. On HD MWF via R CVC. Dialyzed x 2 since admission.

## 2024-10-22 NOTE — ASSESSMENT & PLAN NOTE
Chest x-ray with evidence of pneumonia  Patient reports slight cough  Spiked a fever overnight and experienced rigors this morning  Obtain blood cultures  Initiate IV ceftriaxone  Trend fever curve/WBC count/procalcitonin  De-escalate antibiotics as appropriate  Can hopefully discharge home with oral antibiotics in 24 hours pending negative blood cultures and patient remaining afebrile  Consider ID consult if patient does not improve clinically as he is immunosuppressed

## 2024-10-22 NOTE — PLAN OF CARE
Problem: PAIN - ADULT  Goal: Verbalizes/displays adequate comfort level or baseline comfort level  Description: Interventions:  - Encourage patient to monitor pain and request assistance  - Assess pain using appropriate pain scale  - Administer analgesics based on type and severity of pain and evaluate response  - Implement non-pharmacological measures as appropriate and evaluate response  - Consider cultural and social influences on pain and pain management  - Notify physician/advanced practitioner if interventions unsuccessful or patient reports new pain  Outcome: Progressing     Problem: INFECTION - ADULT  Goal: Absence or prevention of progression during hospitalization  Description: INTERVENTIONS:  - Assess and monitor for signs and symptoms of infection  - Monitor lab/diagnostic results  - Monitor all insertion sites, i.e. indwelling lines, tubes, and drains  - Monitor endotracheal if appropriate and nasal secretions for changes in amount and color  - [place] appropriate cooling/warming therapies per order  - Administer medications as ordered  - Instruct and encourage patient and family to use good hand hygiene technique  - Identify and instruct in appropriate isolation precautions for identified infection/condition  Outcome: Progressing     Problem: INFECTION - ADULT  Goal: Absence of fever/infection during neutropenic period  Description: INTERVENTIONS:  - Monitor WBC    Outcome: Progressing     Problem: SAFETY ADULT  Goal: Patient will remain free of falls  Description: INTERVENTIONS:  - Educate patient/family on patient safety including physical limitations  - Instruct patient to call for assistance with activity   - Consult OT/PT to assist with strengthening/mobility   - Keep Call bell within reach  - Keep bed low and locked with side rails adjusted as appropriate  - Keep care items and personal belongings within reach  - Initiate and maintain comfort rounds  - Make Fall Risk Sign visible to staff  -  Offer Toileting every 2 Hours, in advance of need  - Initiate/Maintain bed alarm  - Obtain necessary fall risk management equipment: non-skid socks  - Apply yellow socks and bracelet for high fall risk patients  - Consider moving patient to room near nurses station  Outcome: Progressing

## 2024-10-22 NOTE — ASSESSMENT & PLAN NOTE
Initial complaint was shortness of breath  Case reviewed with nephrology prior to being admitted to medicine  Patient was dialyzed on 10/21/2024 and again today  Nephrology will be evaluating further to see if they need to further adjust his dry weight  Of note, the patient was recently only started on hemodialysis a few weeks ago on a previous admission at which time he was diagnosed with 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 at which time he failed 2 to 3 weeks worth of plasmapheresis  Continue renal caps  Outpatient follow-up with Nephrology

## 2024-10-22 NOTE — ASSESSMENT & PLAN NOTE
Suspected to be cause of shortness of breath. He also has a low grade fever and productive cough, may have pneumonia. Treatment per primary team on antibiotics

## 2024-10-22 NOTE — PROGRESS NOTES
Progress Note - Nephrology   Name: Ramos Rosenthal 74 y.o. male I MRN: [id number]  Unit/Bed#: -01 I Date of Admission: 10/21/2024   Date of Service: 10/22/2024 I Hospital Day: 0     Assessment & Plan  End-stage renal disease on hemodialysis (HCC)  Lab Results   Component Value Date    EGFR 8 10/22/2024    EGFR 6 10/21/2024    EGFR 19 10/12/2024    CREATININE 5.79 (H) 10/22/2024    CREATININE 7.03 (H) 10/21/2024    CREATININE 3.03 (H) 10/12/2024   Patient seen examined on dialysis. ESRD on HD MWF. Dry weight 128.5 kg. Right CVC access. Challenging dry weight with UF of 3.5 L today.  Potential discharge home today per hospitalist service. May need extra Saturday treatment to address volume status. Rechecking anti-GBM antibody levels. It seems he will remain inpatient again tomorrow so will dialyze him again tomorrow.   Volume overload  Suspected to be cause of shortness of breath. He also has a low grade fever and productive cough, may have pneumonia. 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 year old male h/o antigbm glomerulonephritis, HTN who was admitted to [facility] after presenting with shortness of breath.  Nephrology consulted for management of ESRD.  He states he is short of breath, cough and chills.         Objective :  Temp:  [97.5 °F (36.4 °C)-100.5 °F (38.1 °C)] 100.5 °F (38.1 °C)  HR:  [] 88  BP: (121-169)/() 125/98  Resp:  [18-26] 20  SpO2:  [94 %-98 %] 96 %  O2 Device: None (Room air)    Current Weight: Weight - Scale: (!) 140 kg (307 lb 8.7 oz) (pillow, sheet and shannon only)  First Weight: Weight - Scale: (!) 143 kg (314 lb 6  oz)  I/O         10/20 0701  10/21 0700 10/21 0701  10/22 0700 10/22 0701  10/23 0700    P.O.   0    I.V. (mL/kg)  500 (3.6) 200 (1.4)    IV Piggyback  50     Total Intake(mL/kg)  550 (4) 200 (1.4)    Other  3010     Total Output  3010     Net  -2460 +200                 Physical Exam  Vitals and nursing note reviewed.   Constitutional:       General: He is not in acute distress.     Appearance: He is well-developed.   HENT:      Head: Normocephalic and atraumatic.   Eyes:      Conjunctiva/sclera: Conjunctivae normal.   Cardiovascular:      Rate and Rhythm: Normal rate and regular rhythm.      Heart sounds: No murmur heard.  Pulmonary:      Effort: Pulmonary effort is normal. No respiratory distress.      Breath sounds: Wheezing present.   Abdominal:      Palpations: Abdomen is soft.      Tenderness: There is no abdominal tenderness.   Musculoskeletal:      Cervical back: Neck supple.      Right lower leg: Edema present.      Left lower leg: Edema present.   Skin:     General: Skin is warm and dry.      Capillary Refill: Capillary refill takes less than 2 seconds.   Neurological:      Mental Status: He is alert.   Psychiatric:         Mood and Affect: Mood normal.         Medications:    Current Facility-Administered Medications:     acetaminophen (TYLENOL) tablet 650 mg, 650 mg, Oral, Q6H PRN, Agnes Rust MD    allopurinol (ZYLOPRIM) tablet 100 mg, 100 mg, Oral, BID, Agnes Rust MD, 100 mg at 10/21/24 1756    carvedilol (COREG) tablet 3.125 mg, 3.125 mg, Oral, BID With Meals, Agnes Rust MD, 3.125 mg at 10/21/24 1757    cefTRIAXone (ROCEPHIN) IVPB (premix in dextrose) 1,000 mg 50 mL, 1,000 mg, Intravenous, Q24H, Mike Perez DO    Cholecalciferol (VITAMIN D3) tablet 2,000 Units, 2,000 Units, Oral, Daily, Agnes Rust MD    cyclophosphamide (CYTOXAN) capsule 100 mg, 100 mg, Oral, Daily, Agnes Rust MD, 100 mg at 10/21/24 1757    FLUoxetine (PROzac) capsule 20 mg,  "20 mg, Oral, Daily, Agnes Rust MD    heparin (porcine) subcutaneous injection 5,000 Units, 5,000 Units, Subcutaneous, Q8H LORENZO, 5,000 Units at 10/22/24 0621 **AND** Platelet count, , , Once, Agnes Rust MD    multivitamin-minerals (CENTRUM) tablet 1 tablet, 1 tablet, Oral, Daily, Agnes Rust MD    pantoprazole (PROTONIX) EC tablet 40 mg, 40 mg, Oral, BID AC, Agnes Rust MD, 40 mg at 10/22/24 0736    polyethylene glycol (MIRALAX) packet 17 g, 17 g, Oral, Daily, Agnes Rust MD    predniSONE tablet 60 mg, 60 mg, Oral, Daily, Agnes Rust MD    sulfamethoxazole-trimethoprim (BACTRIM DS) 800-160 mg per tablet 1 tablet, 1 tablet, Oral, Once per day on Monday Wednesday Friday, Agnes Rust MD, 1 tablet at 10/21/24 1757    vit B complex-vit C-folic acid (Renal Caps) capsule 1 capsule, 1 capsule, Oral, Daily With Dinner, Agnes Rust MD, 1 capsule at 10/21/24 1757      Lab Results: I have reviewed the following results:  Results from last 7 days   Lab Units 10/22/24  0925 10/21/24  1049   WBC Thousand/uL 11.87* 12.28*   HEMOGLOBIN g/dL 8.0* 7.8*   HEMATOCRIT % 24.9* 23.8*   PLATELETS Thousands/uL 179 208   POTASSIUM mmol/L 4.0 3.9   CHLORIDE mmol/L 97 99   CO2 mmol/L 27 28   BUN mg/dL 55* 71*   CREATININE mg/dL 5.79* 7.03*   CALCIUM mg/dL 8.8 9.3   MAGNESIUM mg/dL 1.8* 1.9   PHOSPHORUS mg/dL 2.6  --    ALBUMIN g/dL 4.0  --        Administrative Statements     Portions of the record may have been created with voice recognition software. Occasional wrong word or \"sound a like\" substitutions may have occurred due to the inherent limitations of voice recognition software. Read the chart carefully and recognize, using context, where substitutions have occurred.If you have any questions, please contact the dictating provider.  "

## 2024-10-22 NOTE — CASE MANAGEMENT
Case Management Assessment & Discharge Planning Note    Patient name Ramos Rosenthal  Location /-01 MRN [id number]  : 1950 Date 10/22/2024       Current Admission Date: 10/21/2024  Current Admission Diagnosis:Volume overload   Patient Active Problem List    Diagnosis Date Noted Date Diagnosed    Volume overload 10/21/2024     Chronic gout due to renal impairment of multiple sites without tophus 10/21/2024     Encounter for immunization 10/16/2024     Hypomagnesemia 10/05/2024     Hypocalcemia 10/01/2024     End-stage renal disease on hemodialysis ([facility]) 2024     Anemia 2024     Dependence on intermittent renal dialysis ([facility]) 2024     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 2024     Essential hypertension 2024     Secondary hyperparathyroidism of renal origin ([facility]) 2024     Uremia 2024     Hyponatremia 2024     Bilateral lower extremity edema 2024     Neurogenic claudication 2023    Bloating 10/05/2022     Paresthesia of both lower extremities 10/01/2022     Leucocytosis 10/01/2022     Elevated troponin 2022     EDGAR (dyspnea on exertion) 2022     LYLY (acute kidney injury) ([facility]) 2022     Depression 2022     Lower extremity weakness 2019     Lumbosacral plexopathy 2019     Gait abnormality 2019     Lumbar stenosis 2019     Polyneuropathy 2019     Prediabetes 2018    Hyperlipidemia 2014    Vitamin D deficiency 2014      LOS (days): 0  Geometric Mean LOS (GMLOS) (days):   Days to GMLOS:     OBJECTIVE:     Current admission status: Observation    Preferred Pharmacy:   [id number] [place] - [address]  [address]  [place] [address]  Phone: [phone] Fax: [phone]    Primary Care Provider: Jessika Carrillo MD    Primary Insurance: MEDICARE  Secondary  Insurance: [employer]    ASSESSMENT:  Active Health Care Proxies       Vipul Rosenthal [facility] Care Representative - Spouse   Primary Phone: [phone] (Home)                 Advance Directives  Does patient have a Health Care POA?: Yes  Does patient have Advance Directives?: Yes  Advance Directives: Living will  Primary Contact: Vipul Rosenthal    Readmission Root Cause  30 Day Readmission: Yes  During your hospital stay, did someone (provider, nurse, ) explain your care to you in a way you could understand?: Yes  Did you feel medically stable to leave the hospital?: Yes  Were you able to pay for your medication at the pharmacy?: Yes  Did you have reliable transportation to take you to your appointments?: Yes  During previous admission, was a post-acute recommendation made?: Yes  What post-acute resources were offered?: [facility]  Patient was readmitted due to: SOB  Action Plan: Resume OP dialysis and [facility]    Patient Information  Admitted from:: Home  Mental Status: Alert  During Assessment patient was accompanied by: Not accompanied during assessment  Assessment information provided by:: Patient  Primary Caregiver: Spouse  Caregiver's Name:: Vipul Rosenthal  Caregiver's Telephone Number:: [phone]  Support Systems: Spouse/significant other  County of Residence: [facility] city do you live in?: [place]  Home entry access options. Select all that apply.: Other access (Comment) (outside chairglide)  Type of Current Residence: [facility]  Living Arrangements: Lives w/ Spouse/significant other  Is patient a ?: No    Activities of Daily Living Prior to Admission  Functional Status: Assistance  Completes ADLs independently?: No  Level of ADL dependence: Assistance  Ambulates independently?: No  Level of ambulatory dependence: Assistance (WCB at baseline)  Does patient use assisted devices?: Yes  Assisted Devices (DME) used: Wheelchair  Does patient currently own DME?: Yes  What DME does the patient  currently own?: Wheelchair  Does patient have a history of Outpatient Therapy (PT/OT)?: No  Does the patient have a history of Short-Term Rehab?: Yes ([facility])  Does patient have a history of HHC?: Yes  Does patient currently have HHC?: Yes ([facility])    Current Home Health Care  Type of Current Home Care Services: Home PT, Nurse visit  Home Health Agency Name:: [facility]  Current Home Health Follow-Up Provider:: PCP    Patient Information Continued  Income Source: Pension/[employer]CC  Does patient have prescription coverage?: Yes ([employer])  Does patient receive dialysis treatments?: No  Does patient have a history of substance abuse?: No  Does patient have a history of Mental Health Diagnosis?: No    Means of Transportation  Means of Transport to Appts:: Family transport    DISCHARGE DETAILS:    Contacts  Patient Contacts: Louise Mcneal  Relationship to Patient:: Family  Contact Method: Phone  Phone Number: [phone]  Reason/Outcome: Discharge Planning    Requested Home Health Care         Is the patient interested in HHC at discharge?: Yes  Home Health Discipline requested:: Nursing, Physical Therapy  Home Health Agency Name:: [facility]  HHA External Referral Reason (only applicable if external HHA name selected): Patient has established relationship with provider  Home Health Follow-Up Provider:: PCP  Home Health Services Needed:: Evaluate Functional Status and Safety, Gait/ADL Training, Strengthening/Theraputic Exercises to Improve Function  Homebound Criteria Met:: Uses an Assist Device (i.e. cane, walker, etc), Requires the Assistance of Another Person for Safe Ambulation or to Leave the Home  Supporting Clincal Findings:: Bed Bound or Wheelchair Bound    DME Referral Provided  Referral made for DME?: No    Would you like to participate in our [facility] service program?  : No - Declined    Treatment Team Recommendation: Home with Home Health Care  Discharge Destination Plan:: Home with  "Home Health Care  Pt reports of feeling s  \"Sick to his stomach with dry heaves and chills\".  Notified [facility] of same.  Pt has dialysis today, was scheduled to be DC home today.  Will f/u with [facility] on same.  Referral made via [facility] for LISE with [facility].  Will call wife.                                                              "

## 2024-10-23 ENCOUNTER — APPOINTMENT (INPATIENT)
Dept: DIALYSIS | Facility: HOSPITAL | Age: 74
DRG: 640 | End: 2024-10-23
Payer: MEDICARE

## 2024-10-23 PROBLEM — D84.9 IMMUNOCOMPROMISED PATIENT (HCC): Status: ACTIVE | Noted: 2024-10-23

## 2024-10-23 LAB
ANION GAP SERPL CALCULATED.3IONS-SCNC: 10 MMOL/L (ref 4–13)
BUN SERPL-MCNC: 39 MG/DL (ref 5–25)
CALCIUM SERPL-MCNC: 8.9 MG/DL (ref 8.4–10.2)
CHLORIDE SERPL-SCNC: 96 MMOL/L (ref 96–108)
CO2 SERPL-SCNC: 28 MMOL/L (ref 21–32)
CREAT SERPL-MCNC: 4.4 MG/DL (ref 0.6–1.3)
ERYTHROCYTE [DISTWIDTH] IN BLOOD BY AUTOMATED COUNT: 19.2 % (ref 11.6–15.1)
GBM AB SER IA-ACNC: >8 UNITS (ref 0–0.9)
GFR SERPL CREATININE-BSD FRML MDRD: 12 ML/MIN/1.73SQ M
GLUCOSE SERPL-MCNC: 134 MG/DL (ref 65–140)
HCT VFR BLD AUTO: 22.4 % (ref 36.5–49.3)
HGB BLD-MCNC: 7.2 G/DL (ref 12–17)
MAGNESIUM SERPL-MCNC: 1.9 MG/DL (ref 1.9–2.7)
MCH RBC QN AUTO: 31.7 PG (ref 26.8–34.3)
MCHC RBC AUTO-ENTMCNC: 32.1 G/DL (ref 31.4–37.4)
MCV RBC AUTO: 99 FL (ref 82–98)
PHOSPHATE SERPL-MCNC: 3 MG/DL (ref 2.3–4.1)
PLATELET # BLD AUTO: 145 THOUSANDS/UL (ref 149–390)
PMV BLD AUTO: 9.3 FL (ref 8.9–12.7)
POTASSIUM SERPL-SCNC: 4 MMOL/L (ref 3.5–5.3)
PROCALCITONIN SERPL-MCNC: 4.69 NG/ML
RBC # BLD AUTO: 2.27 MILLION/UL (ref 3.88–5.62)
SODIUM SERPL-SCNC: 134 MMOL/L (ref 135–147)
WBC # BLD AUTO: 7.62 THOUSAND/UL (ref 4.31–10.16)

## 2024-10-23 PROCEDURE — 84100 ASSAY OF PHOSPHORUS: CPT | Performed by: INTERNAL MEDICINE

## 2024-10-23 PROCEDURE — 83735 ASSAY OF MAGNESIUM: CPT | Performed by: INTERNAL MEDICINE

## 2024-10-23 PROCEDURE — 99232 SBSQ HOSP IP/OBS MODERATE 35: CPT | Performed by: HOSPITALIST

## 2024-10-23 PROCEDURE — 84145 PROCALCITONIN (PCT): CPT | Performed by: INTERNAL MEDICINE

## 2024-10-23 PROCEDURE — 85025 COMPLETE CBC W/AUTO DIFF WBC: CPT | Performed by: INTERNAL MEDICINE

## 2024-10-23 PROCEDURE — 5A1D70Z PERFORMANCE OF URINARY FILTRATION, INTERMITTENT, LESS THAN 6 HOURS PER DAY: ICD-10-PCS | Performed by: INTERNAL MEDICINE

## 2024-10-23 PROCEDURE — 99232 SBSQ HOSP IP/OBS MODERATE 35: CPT | Performed by: INTERNAL MEDICINE

## 2024-10-23 PROCEDURE — 80048 BASIC METABOLIC PNL TOTAL CA: CPT | Performed by: INTERNAL MEDICINE

## 2024-10-23 PROCEDURE — 94761 N-INVAS EAR/PLS OXIMETRY MLT: CPT

## 2024-10-23 RX ADMIN — SULFAMETHOXAZOLE AND TRIMETHOPRIM 1 TABLET: 800; 160 TABLET ORAL at 12:29

## 2024-10-23 RX ADMIN — HEPARIN SODIUM 5000 UNITS: 5000 INJECTION, SOLUTION INTRAVENOUS; SUBCUTANEOUS at 22:47

## 2024-10-23 RX ADMIN — CYCLOPHOSPHAMIDE 100 MG: 50 CAPSULE ORAL at 12:29

## 2024-10-23 RX ADMIN — ALLOPURINOL 100 MG: 100 TABLET ORAL at 12:29

## 2024-10-23 RX ADMIN — Medication 1 CAPSULE: at 17:07

## 2024-10-23 RX ADMIN — CARVEDILOL 3.12 MG: 3.12 TABLET, FILM COATED ORAL at 17:07

## 2024-10-23 RX ADMIN — PANTOPRAZOLE SODIUM 40 MG: 40 TABLET, DELAYED RELEASE ORAL at 17:07

## 2024-10-23 RX ADMIN — Medication 2000 UNITS: at 12:29

## 2024-10-23 RX ADMIN — HEPARIN SODIUM 5000 UNITS: 5000 INJECTION, SOLUTION INTRAVENOUS; SUBCUTANEOUS at 05:57

## 2024-10-23 RX ADMIN — FLUOXETINE HYDROCHLORIDE 20 MG: 20 CAPSULE ORAL at 12:29

## 2024-10-23 RX ADMIN — PANTOPRAZOLE SODIUM 40 MG: 40 TABLET, DELAYED RELEASE ORAL at 07:48

## 2024-10-23 RX ADMIN — HEPARIN SODIUM 5000 UNITS: 5000 INJECTION, SOLUTION INTRAVENOUS; SUBCUTANEOUS at 14:42

## 2024-10-23 RX ADMIN — PREDNISONE 60 MG: 20 TABLET ORAL at 12:29

## 2024-10-23 RX ADMIN — ALLOPURINOL 100 MG: 100 TABLET ORAL at 17:07

## 2024-10-23 RX ADMIN — Medication 1 TABLET: at 12:29

## 2024-10-23 NOTE — ASSESSMENT & PLAN NOTE
Lab Results   Component Value Date    EGFR 12 10/23/2024    EGFR 8 10/22/2024    EGFR 6 10/21/2024    CREATININE 4.40 (H) 10/23/2024    CREATININE 5.79 (H) 10/22/2024    CREATININE 7.03 (H) 10/21/2024   Will continue to ultrafilter as tolerated, goal ultrafiltration today will be 3 L.  If patient is able to tolerate, this will bring his post hemodialysis weight down to around 131 kg or so.  Continue with Monday Wednesday Friday hemodialysis sessions with next session to be on Friday, October 25.  Patient will have 3 days of dialysis, Monday Tuesday and today Wednesday.

## 2024-10-23 NOTE — ASSESSMENT & PLAN NOTE
Lab Results   Component Value Date    EGFR 12 10/23/2024    EGFR 8 10/22/2024    EGFR 6 10/21/2024    CREATININE 4.40 (H) 10/23/2024    CREATININE 5.79 (H) 10/22/2024    CREATININE 7.03 (H) 10/21/2024   See the management as outlined above

## 2024-10-23 NOTE — TREATMENT PLAN
ID treatment plan:     Patient unable to be evaluated. Off the floor at HD. Blood cultures x 2 still pending. No recurrence of fevers or chills overnight per nursing. Leukocytosis resolved and VSS. Recommend continued inpatient monitoring of blood cultures prior to considering discharge. D/w primary team. Will formally re-evaluate in AM.

## 2024-10-23 NOTE — HEMODIALYSIS
Post-Dialysis RN Treatment Note    Blood Pressure:  Pre 114/66 mm/Hg  Post 107/64 mmHg   EDW  128.5 kg    Weight:  Pre 134 kg   Post 128.8 kg   Mode of weight measurement: Bed Scale   Volume Removed  3000 ml    Treatment duration 225 minutes    NS given  No    Treatment shortened? No   Medications given during Rx None Reported   Estimated Kt/V  0.84   Access type: Permacath/TDC   Access Issues: Yes, describe: Lines reversed due to elevated arterial pressures     Report called to primary nurse   Yes

## 2024-10-23 NOTE — PLAN OF CARE
Pt on HD treatment for 3 hours and 45 minutes, with a UF goal of 3.5 L as tolerated. Pt on a 3 k 2.5 juan bath for a potassium of 4.0 on 10/23/24.  Problem: METABOLIC, FLUID AND ELECTROLYTES - ADULT  Goal: Electrolytes maintained within normal limits  Description: INTERVENTIONS:  - Monitor labs and assess patient for signs and symptoms of electrolyte imbalances  - Administer electrolyte replacement as ordered  - Monitor response to electrolyte replacements, including repeat lab results as appropriate  - Instruct patient on fluid and nutrition as appropriate  Outcome: Progressing  Goal: Fluid balance maintained  Description: INTERVENTIONS:  - Monitor labs   - Monitor I/O and WT  - Instruct patient on fluid and nutrition as appropriate  - Assess for signs & symptoms of volume excess or deficit  Outcome: Progressing

## 2024-10-23 NOTE — RESPIRATORY THERAPY NOTE
Home Oxygen Qualifying Test     Patient name: Ramos Rosenthal        : 1950   Date of Test:  2024  Diagnosis: Fluid overload, ESRD   Home Oxygen Test:    **Medicare Guidelines require item(s) 1-5 on all ambulatory patients or 1 and 2 on non-ambulatory patients.    1. Baseline SPO2 on Room Air at rest 97 %   If <= 88% on Room Air add O2 via NC to obtain SpO2 >=88%. If LPM needed, document LPM NA needed to reach =>88%    SPO2 during exertion on Room Air 97  %  During exertion monitor SPO2. If SPO2 increases >=89%, do not add supplemental oxygen    SPO2 on Oxygen at Rest NA % at NA LPM    SPO2 during exertion on Oxygen NA % at NA LPM    Test performed during exertion activity.      []  Supplemental Home Oxygen is indicated.    [x]  Client does not qualify for home oxygen.    Respiratory Additional Notes-  Patient non ambulatory,  Uses slide board to get from bed to wheelchair.     Alejandra Birmignham, RRT

## 2024-10-23 NOTE — PROGRESS NOTES
Progress Note - Hospitalist   Name: Ramos Rosenthal 74 y.o. male I MRN: [id number]  Unit/Bed#: -01 I Date of Admission: 10/21/2024   Date of Service: 10/23/2024 I Hospital Day: 1    Assessment & Plan  Volume overload  Initial complaint was shortness of breath  Case reviewed with nephrology prior to being admitted to medicine  Patient was dialyzed on 10/21/2024, 10/22/2024, and again today on 10/23/2024  Spoke with nephrology, they have cleared the patient for discharge  Patient will resume his next dialysis session on Friday, 10/25/2024  Of note, the patient was recently only started on hemodialysis a few weeks ago on a previous admission at which time he was diagnosed with 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 at which time he failed 2 to 3 weeks worth of plasmapheresis  Continue renal caps  Outpatient follow-up with Nephrology  Anticipate discharge home tomorrow  Pneumonia  Highest recorded temperature on this admission was 100.5 °F  Patient has otherwise been afebrile throughout and has been afebrile for greater than 24 hours off of antibiotics  No evidence of systemic infection  Appreciate ID input  Antibiotics were discontinued  Okay for DC home tomorrow morning if the blood cultures remain negative  End-stage renal disease on hemodialysis (HCC)  Lab Results   Component Value Date    EGFR 12 10/23/2024    EGFR 8 10/22/2024    EGFR 6 10/21/2024    CREATININE 4.40 (H) 10/23/2024    CREATININE 5.79 (H) 10/22/2024    CREATININE 7.03 (H) 10/21/2024   See the management as outlined above  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  Recently diagnosed  Recently completed 2 to 3 weeks worth of plasmapheresis-now has end-stage renal disease on hemodialysis  Luckily no pulmonary involvement thus far  Continue cyclophosphamide, prednisone, and Bactrim at home doses   Continue Protonix for GI prophylaxis  Outpatient follow-up with nephro  Bilateral lower extremity  edema  Chronic  At baseline, the patient is wheelchair-bound  Fluid management will be with dialysis  Chronic gout due to renal impairment of multiple sites without tophus  Continue home dosing of allopurinol  Paresthesia of both lower extremities  Chronic stable condition  Essential hypertension  Continue Coreg   Depression  Continue Prozac    VTE Pharmacologic Prophylaxis: VTE Score: 7 High Risk (Score >/= 5) - Pharmacological DVT Prophylaxis Ordered: heparin. Sequential Compression Devices Ordered.    Mobility:   Basic Mobility Inpatient Raw Score: 7  -[facility] Goal: 2: Bed activities/Dependent transfer  -HL Achieved: 2: Bed activities/Dependent transfer  Patient is at baseline from a mobility standpoint    Patient Centered Rounds: I performed bedside rounds with nursing staff today.   Discussions with Specialists or Other Care Team Provider: Nephrology, infectious disease    Education and Discussions with Family / Patient: Updated  (wife) via phone.    Current Length of Stay: 1 day(s)  Current Patient Status: Inpatient   Certification Statement: The patient will continue to require additional inpatient hospital stay due to the need to monitor blood cultures  Discharge Plan: Anticipate discharge tomorrow to home.    Code Status: Level 1 - Full Code    Subjective   Patient seen, resting in bed, reports feeling okay, no new complaints    Objective :  Temp:  [97 °F (36.1 °C)-99.9 °F (37.7 °C)] 97.2 °F (36.2 °C)  HR:  [60-89] 60  BP: (101-135)/() 107/64  Resp:  [18-20] 18  SpO2:  [97 %-100 %] 97 %  O2 Device: None (Room air)  Nasal Cannula O2 Flow Rate (L/min):  [2 L/min] 2 L/min    Body mass index is 38.89 kg/m².     Input and Output Summary (last 24 hours):     Intake/Output Summary (Last 24 hours) at 10/23/2024 1346  Last data filed at 10/23/2024 1200  Gross per 24 hour   Intake 660 ml   Output 3500 ml   Net -2840 ml       Physical Exam  Vitals and nursing note reviewed.   Constitutional:        General: He is not in acute distress.     Appearance: Normal appearance. He is not ill-appearing.   HENT:      Head: Normocephalic and atraumatic.      Nose: Nose normal.   Eyes:      Extraocular Movements: Extraocular movements intact.      Pupils: Pupils are equal, round, and reactive to light.   Cardiovascular:      Rate and Rhythm: Normal rate and regular rhythm.      Pulses: Normal pulses.      Heart sounds: Normal heart sounds. No murmur heard.     No friction rub. No gallop.   Pulmonary:      Effort: Pulmonary effort is normal.      Breath sounds: Normal breath sounds.   Abdominal:      General: There is no distension.      Palpations: Abdomen is soft. There is no mass.      Tenderness: There is no abdominal tenderness. There is no guarding or rebound.   Musculoskeletal:         General: No swelling or tenderness. Normal range of motion.      Cervical back: Normal range of motion and neck supple. No rigidity. No muscular tenderness.      Right lower leg: Edema present.      Left lower leg: Edema present.   Skin:     General: Skin is warm.      Capillary Refill: Capillary refill takes less than 2 seconds.      Findings: No erythema or rash.   Neurological:      General: No focal deficit present.      Mental Status: He is alert and oriented to person, place, and time. Mental status is at baseline.   Psychiatric:         Mood and Affect: Mood normal.         Behavior: Behavior normal.           Lines/Drains:  Lines/Drains/Airways       Active Status       Name Placement date Placement time Site Days    HD Permanent Double Catheter 09/27/24  1316  Internal jugular  26                            Lab Results: I have reviewed the following results:   Results from last 7 days   Lab Units 10/23/24  0552 10/22/24  0925 10/21/24  1049   WBC Thousand/uL 7.62   < > 12.28*   HEMOGLOBIN g/dL 7.2*   < > 7.8*   HEMATOCRIT % 22.4*   < > 23.8*   PLATELETS Thousands/uL 145*   < > 208   LYMPHO PCT %  --   --  2*   MONO PCT %  --    --  5   EOS PCT %  --   --  0    < > = values in this interval not displayed.     Results from last 7 days   Lab Units 10/23/24  0552 10/22/24  0925   SODIUM mmol/L 134* 136   POTASSIUM mmol/L 4.0 4.0   CHLORIDE mmol/L 96 97   CO2 mmol/L 28 27   BUN mg/dL 39* 55*   CREATININE mg/dL 4.40* 5.79*   ANION GAP mmol/L 10 12   CALCIUM mg/dL 8.9 8.8   ALBUMIN g/dL  --  4.0   TOTAL BILIRUBIN mg/dL  --  1.08*   ALK PHOS U/L  --  45   ALT U/L  --  12   AST U/L  --  14   GLUCOSE RANDOM mg/dL 134 98     Results from last 7 days   Lab Units 10/21/24  1049   INR  1.11             Results from last 7 days   Lab Units 10/23/24  0552   PROCALCITONIN ng/ml 4.69*       Recent Cultures (last 7 days):   Results from last 7 days   Lab Units 10/22/24  1443 10/22/24  1219   BLOOD CULTURE  Received in Microbiology Lab. Culture in Progress. Received in Microbiology Lab. Culture in Progress.       Imaging Results Review: No pertinent imaging studies reviewed.  Other Study Results Review: No additional pertinent studies reviewed.    Last 24 Hours Medication List:     Current Facility-Administered Medications:     acetaminophen (TYLENOL) tablet 650 mg, Q6H PRN    allopurinol (ZYLOPRIM) tablet 100 mg, BID    carvedilol (COREG) tablet 3.125 mg, BID With Meals    Cholecalciferol (VITAMIN D3) tablet 2,000 Units, Daily    cyclophosphamide (CYTOXAN) capsule 100 mg, Daily    FLUoxetine (PROzac) capsule 20 mg, Daily    heparin (porcine) subcutaneous injection 5,000 Units, Q8H LORENZO **AND** Platelet count, Once    multivitamin-minerals (CENTRUM) tablet 1 tablet, Daily    pantoprazole (PROTONIX) EC tablet 40 mg, BID AC    polyethylene glycol (MIRALAX) packet 17 g, Daily    predniSONE tablet 60 mg, Daily    sulfamethoxazole-trimethoprim (BACTRIM DS) 800-160 mg per tablet 1 tablet, Once per day on Monday Wednesday Friday    vit B complex-vit C-folic acid (Renal Caps) capsule 1 capsule, Daily With Dinner    Administrative Statements   Today, Patient Was Seen  By: Agnes Rust MD      **Please Note: This note may have been constructed using a voice recognition system.**

## 2024-10-23 NOTE — ASSESSMENT & PLAN NOTE
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

## 2024-10-23 NOTE — ASSESSMENT & PLAN NOTE
Recently diagnosed  Recently completed 2 to 3 weeks worth of plasmapheresis-now has end-stage renal disease on hemodialysis  Luckily no pulmonary involvement thus far  Continue cyclophosphamide, prednisone, and Bactrim at home doses   Continue Protonix for GI prophylaxis  Outpatient follow-up with nephro

## 2024-10-23 NOTE — PROGRESS NOTES
Progress Note - Nephrology   Name: Ramos Rosenthal 74 y.o. male I MRN: [id number]  Unit/Bed#: -01 I Date of Admission: 10/21/2024   Date of Service: 10/23/2024 I Hospital Day: 1     Assessment & Plan  End-stage renal disease on hemodialysis (HCC)  Lab Results   Component Value Date    EGFR 12 10/23/2024    EGFR 8 10/22/2024    EGFR 6 10/21/2024    CREATININE 4.40 (H) 10/23/2024    CREATININE 5.79 (H) 10/22/2024    CREATININE 7.03 (H) 10/21/2024   Will continue to ultrafilter as tolerated, goal ultrafiltration today will be 3 L.  If patient is able to tolerate, this will bring his post hemodialysis weight down to around 131 kg or so.  Continue with Monday Wednesday Friday hemodialysis sessions with next session to be on Friday, October 25.  Patient will have 3 days of dialysis, Monday Tuesday and today Wednesday.  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  Immunocompromised patient (HCC)  Patient remains on high-dose oral steroids and cyclophosphamide.  Continue with vigilance regarding high risk for infections.  Essential hypertension  Blood pressure stable, continue to monitor for now.  Volume overload  We will continue to aggressively ultrafilter as tolerated.  Pneumonia  This is a potential etiology of the patient's SIRS.  Line infection less likely given no significant symptoms of fevers or rigors with use of PermCath.  Depression    Bilateral lower extremity edema    Chronic gout due to renal impairment of multiple sites without tophus        Case discussed with hospitalist.  Patient doing well from the renal standpoint, ultrafiltration also acceptable.  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    Volume overload    Patient Active Problem List  "  Diagnosis    Gait abnormality    Lumbar stenosis    Polyneuropathy    Lower extremity weakness    Lumbosacral plexopathy    Elevated troponin    EDGAR (dyspnea on exertion)    LYLY (acute kidney injury) ([facility])    Depression    Paresthesia of both lower extremities    Leucocytosis    Bloating    Hyperlipidemia    Neurogenic claudication    Prediabetes    Vitamin D deficiency    Hyponatremia    Bilateral lower extremity edema    Secondary hyperparathyroidism of renal origin (HCC)    Uremia    Essential hypertension    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    Objective:     Vitals: Blood pressure 119/77, pulse 60, temperature (!) 97 °F (36.1 °C), temperature source Tympanic, resp. rate 18, height 6' 1\" (1.854 m), weight 134 kg (294 lb 12.1 oz), SpO2 100%.,Body mass index is 38.89 kg/m².    Weight (last 2 days)       Date/Time Weight    10/23/24 0549 134 (294.76)    10/22/24 1541 140 (307.54)    10/22/24 0600 140 (307.54)     Weight: pillow, sheet and shannon only at 10/22/24 0600    10/21/24 1033 143 (314.38)              Intake/Output Summary (Last 24 hours) at 10/23/2024 1000  Last data filed at 10/23/2024 0833  Gross per 24 hour   Intake 860 ml   Output 4012 ml   Net -3152 ml     I/O last 3 completed shifts:  In: 740 [P.O.:240; I.V.:500]  Out: 4012 [Other:4012]    HD Permanent Double Catheter (Active)   Reasons to continue HD Cath Treatment Therapy 10/23/24 0815   Goal for Removal N/A- chronic HD catheter 10/23/24 0815   Line Necessity Reviewed Yes, reviewed with provider 10/23/24 0815   Site Assessment " "Clean;Dry;Intact;Ecchymotic 10/23/24 0815   Proximal Lumen Status Blood return noted;Flushed 10/23/24 0815   Distal Lumen Status Blood return noted;Flushed 10/23/24 0815   Dressing Type Chlorhexidine dressing 10/23/24 0815   Dressing Status Clean;Dry;Intact 10/23/24 0815   Dressing Intervention Dressing changed 10/21/24 1505   Dressing Change Due 10/28/24 10/23/24 0815       Physical Exam: /77   Pulse 60   Temp (!) 97 °F (36.1 °C) (Tympanic)   Resp 18   Ht 6' 1\" (1.854 m)   Wt 134 kg (294 lb 12.1 oz)   SpO2 100%   BMI 38.89 kg/m²     General Appearance:    Alert, cooperative, no distress, appears stated age   Head:    Normocephalic, without obvious abnormality, atraumatic   Eyes:    Conjunctiva/corneas clear   Ears:    Normal external ears   Nose:   Nares normal, septum midline, mucosa normal, no drainage    or sinus tenderness   Throat:   Lips, mucosa, and tongue normal; teeth and gums normal   Neck:   Supple   Back:     Symmetric, no curvature, ROM normal, no CVA tenderness   Lungs:     Clear to auscultation bilaterally, respirations unlabored   Chest wall:    No tenderness or deformity   Heart:    Regular rate and rhythm, S1 and S2 normal, no murmur, rub   or gallop   Abdomen:     Soft, non-tender, bowel sounds active   Extremities:   Extremities normal, atraumatic, no cyanosis, +2 bilateral lower extremity edema   Skin:   Skin color, texture, turgor normal, no rashes or lesions   Lymph nodes:   Cervical normal   Neurologic:   CNII-XII intact            Lab, Imaging and other studies: I have personally reviewed pertinent labs.  CBC:   Lab Results   Component Value Date    WBC 7.62 10/23/2024    HGB 7.2 (L) 10/23/2024    HCT 22.4 (L) 10/23/2024    MCV 99 (H) 10/23/2024     (L) 10/23/2024    RBC 2.27 (L) 10/23/2024    MCH 31.7 10/23/2024    MCHC 32.1 10/23/2024    RDW 19.2 (H) 10/23/2024    MPV 9.3 10/23/2024     CMP:   Lab Results   Component Value Date    K 4.0 10/23/2024    CL 96 10/23/2024 " "   CO2 28 10/23/2024    BUN 39 (H) 10/23/2024    CREATININE 4.40 (H) 10/23/2024    CALCIUM 8.9 10/23/2024    EGFR 12 10/23/2024       .  Results from last 7 days   Lab Units 10/23/24  0552 10/22/24  0925 10/21/24  1049   POTASSIUM mmol/L 4.0 4.0 3.9   CHLORIDE mmol/L 96 97 99   CO2 mmol/L 28 27 28   BUN mg/dL 39* 55* 71*   CREATININE mg/dL 4.40* 5.79* 7.03*   CALCIUM mg/dL 8.9 8.8 9.3   ALK PHOS U/L  --  45  --    ALT U/L  --  12  --    AST U/L  --  14  --          Phosphorus:   Lab Results   Component Value Date    PHOS 3.0 10/23/2024     Magnesium:   Lab Results   Component Value Date    MG 1.9 10/23/2024     Urinalysis: No results found for: \"COLORU\", \"CLARITYU\", \"SPECGRAV\", \"PHUR\", \"LEUKOCYTESUR\", \"NITRITE\", \"PROTEINUA\", \"GLUCOSEU\", \"KETONESU\", \"BILIRUBINUR\", \"BLOODU\"  Ionized Calcium: No results found for: \"CAION\"  Coagulation: No results found for: \"PT\", \"INR\", \"APTT\"  Troponin: No results found for: \"TROPONINI\"  ABG: No results found for: \"PHART\", \"OKF8ETL\", \"PO2ART\", \"PSW3IZW\", \"R3WCVFNP\", \"BEART\", \"SOURCE\"  Radiology review:     IMAGING  Procedure: XR chest 1 view portable    Result Date: 10/21/2024  Narrative: XR CHEST PORTABLE INDICATION: sob. COMPARISON: 9/16/2024 FINDINGS: There are airspace opacities in the right greater than left lung bases. The remaining visible lungs are clear. There is stable mild cardiomegaly. The mediastinal silhouette is within normal limits. There is a right IJ approach dialysis catheter terminating in the cavoatrial junction. No pneumothorax or pleural effusion. Bones are unremarkable for age. Normal upper abdomen.     Impression: Bibasilar airspace opacities, right greater than left, pneumonia cannot be excluded. Workstation performed: [id number]         Current Facility-Administered Medications:     acetaminophen (TYLENOL) tablet 650 mg, Q6H PRN    allopurinol (ZYLOPRIM) tablet 100 mg, BID    carvedilol (COREG) tablet 3.125 mg, BID With Meals    Cholecalciferol (VITAMIN D3) " tablet 2,000 Units, Daily    cyclophosphamide (CYTOXAN) capsule 100 mg, Daily    FLUoxetine (PROzac) capsule 20 mg, Daily    heparin (porcine) subcutaneous injection 5,000 Units, Q8H LORENZO **AND** Platelet count, Once    multivitamin-minerals (CENTRUM) tablet 1 tablet, Daily    pantoprazole (PROTONIX) EC tablet 40 mg, BID AC    polyethylene glycol (MIRALAX) packet 17 g, Daily    predniSONE tablet 60 mg, Daily    sulfamethoxazole-trimethoprim (BACTRIM DS) 800-160 mg per tablet 1 tablet, Once per day on Monday Wednesday Friday    vit B complex-vit C-folic acid (Renal Caps) capsule 1 capsule, Daily With Dinner  Medications Discontinued During This Encounter   Medication Reason    cefTRIAXone (ROCEPHIN) IVPB (premix in dextrose) 1,000 mg 50 mL        Gigi Ortega,       This progress note was produced in part using a dictation device which may document imprecise wording from author's original intent.

## 2024-10-23 NOTE — ASSESSMENT & PLAN NOTE
Highest recorded temperature on this admission was 100.5 °F  Patient has otherwise been afebrile throughout and has been afebrile for greater than 24 hours off of antibiotics  No evidence of systemic infection  Appreciate ID input  Antibiotics were discontinued  Okay for DC home tomorrow morning if the blood cultures remain negative

## 2024-10-24 VITALS
OXYGEN SATURATION: 100 % | HEART RATE: 72 BPM | RESPIRATION RATE: 16 BRPM | HEIGHT: 73 IN | DIASTOLIC BLOOD PRESSURE: 50 MMHG | BODY MASS INDEX: 38.57 KG/M2 | SYSTOLIC BLOOD PRESSURE: 121 MMHG | TEMPERATURE: 97.7 F | WEIGHT: 291.01 LBS

## 2024-10-24 PROCEDURE — 99232 SBSQ HOSP IP/OBS MODERATE 35: CPT

## 2024-10-24 PROCEDURE — 99239 HOSP IP/OBS DSCHRG MGMT >30: CPT | Performed by: HOSPITALIST

## 2024-10-24 PROCEDURE — 99232 SBSQ HOSP IP/OBS MODERATE 35: CPT | Performed by: PHYSICIAN ASSISTANT

## 2024-10-24 RX ADMIN — PANTOPRAZOLE SODIUM 40 MG: 40 TABLET, DELAYED RELEASE ORAL at 08:33

## 2024-10-24 RX ADMIN — HEPARIN SODIUM 5000 UNITS: 5000 INJECTION, SOLUTION INTRAVENOUS; SUBCUTANEOUS at 05:33

## 2024-10-24 RX ADMIN — HEPARIN SODIUM 5000 UNITS: 5000 INJECTION, SOLUTION INTRAVENOUS; SUBCUTANEOUS at 13:18

## 2024-10-24 RX ADMIN — PREDNISONE 60 MG: 20 TABLET ORAL at 08:32

## 2024-10-24 RX ADMIN — FLUOXETINE HYDROCHLORIDE 20 MG: 20 CAPSULE ORAL at 08:34

## 2024-10-24 RX ADMIN — CARVEDILOL 3.12 MG: 3.12 TABLET, FILM COATED ORAL at 15:43

## 2024-10-24 RX ADMIN — Medication 1 TABLET: at 08:32

## 2024-10-24 RX ADMIN — PANTOPRAZOLE SODIUM 40 MG: 40 TABLET, DELAYED RELEASE ORAL at 15:43

## 2024-10-24 RX ADMIN — Medication 1 CAPSULE: at 15:43

## 2024-10-24 RX ADMIN — ALLOPURINOL 100 MG: 100 TABLET ORAL at 08:32

## 2024-10-24 RX ADMIN — Medication 2000 UNITS: at 08:32

## 2024-10-24 RX ADMIN — CYCLOPHOSPHAMIDE 100 MG: 50 CAPSULE ORAL at 08:33

## 2024-10-24 NOTE — ASSESSMENT & PLAN NOTE
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

## 2024-10-24 NOTE — PROGRESS NOTES
Progress Note - Infectious Disease   Name: Ramos Rosenthal 74 y.o. male I MRN: [id number]  Unit/Bed#: -01 I Date of Admission: 10/21/2024   Date of Service: 10/24/2024 I Hospital Day: 2    Assessment & Plan  Chills  Sudden onset rigors/shaking chills Tuesday AM. With tachycardia and low grade temp of 100.5*F. WIth tunneled catheter in place, consider possibility of bacteremia and line infection. Fortunately blood cultures remain negative. Low suspicion for pneumonia. COVID/Flu/RSV negative. He is otherwise afebrile and HD stable.   Monitor off abx  Follow up blood cx  Trend temps and hemodynamics   Daily CBCd and BMP to monitor response to treatment    Volume overload  Initially presented with SOB, LE edema and dialyzed x2 with UF and resolution of sx. CXR findings more consistent with pulmonary edema.   Ongoing nephrology follow up   End-stage renal disease on hemodialysis (HCC)  Due to progressive glomerulonephritis as above. On HD MWF via R CVC. Dialyzed x 3 since admission.   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 On Cytoxan, slow weaning course of  prednisone 60mg daily with thrice weekly bactrim for PJP ppx.     I have discussed the above management plan in detail with the primary service.     Antibiotics:  None     Subjective   Patient has no fever, chills, sweats; no nausea, vomiting, diarrhea; no cough, shortness of breath; no pain. No new symptoms. Hopeful for d/c home today.     Objective :  Temp:  [97.2 °F (36.2 °C)-98.2 °F (36.8 °C)] 98.2 °F (36.8 °C)  HR:  [60-66] 66  BP: (101-123)/(46-77) 105/53  Resp:  [18-20] 20  SpO2:  [97 %-100 %] 100 %  O2 Device: None (Room air)    Physical exam:  General:  No acute distress. Sitting upright in bed. Nursing at bedside getting blood work.   Psychiatric:  Awake and alert  HEENT: MM dry. Head atraumatic normocephalic.   Cardiovascular: RRR without murmur.  Pulmonary: dry cough noted. Lungs clear throughout.  Though diminished. On room air.  Normal respiratory excursion without accessory muscle use  Abdomen:  Soft, nontender, obese.   Extremities:  chronic LE edema.  Skin:  No rashes or wounds on exposed skin. Chronic venous stasis changes to b/l LE noted without SOI. L heel with unroofed blister with serous fluid. R chest wall CVC with surrounding ecchymoses without SOI.  : no suprapuibic or CVA tenderness.   Neuro: no focal deficit.          Lab Results: I have reviewed the following results:  Results from last 7 days   Lab Units 10/23/24  0552 10/22/24  0925 10/21/24  1049   WBC Thousand/uL 7.62 11.87* 12.28*   HEMOGLOBIN g/dL 7.2* 8.0* 7.8*   PLATELETS Thousands/uL 145* 179 208     Results from last 7 days   Lab Units 10/23/24  0552 10/22/24  0925 10/21/24  1049   SODIUM mmol/L 134* 136 139   POTASSIUM mmol/L 4.0 4.0 3.9   CHLORIDE mmol/L 96 97 99   CO2 mmol/L 28 27 28   BUN mg/dL 39* 55* 71*   CREATININE mg/dL 4.40* 5.79* 7.03*   EGFR ml/min/1.73sq m 12 8 6   CALCIUM mg/dL 8.9 8.8 9.3   AST U/L  --  14  --    ALT U/L  --  12  --    ALK PHOS U/L  --  45  --    ALBUMIN g/dL  --  4.0  --      Results from last 7 days   Lab Units 10/22/24  1443 10/22/24  1219 10/21/24  1440   BLOOD CULTURE  No Growth at 24 hrs. No Growth at 24 hrs.  --    MRSA CULTURE ONLY   --   --  No Methicillin Resistant Staphlyococcus aureus (MRSA) isolated     Results from last 7 days   Lab Units 10/23/24  0552   PROCALCITONIN ng/ml 4.69*                 Imaging Results Review: No pertinent imaging studies reviewed.  Other Study Results Review: Other studies reviewed include: blood cx, micro

## 2024-10-24 NOTE — ASSESSMENT & PLAN NOTE
Sudden onset rigors/shaking chills Tuesday AM. With tachycardia and low grade temp of 100.5*F. WIth tunneled catheter in place, consider possibility of bacteremia and line infection. Fortunately blood cultures remain negative. Low suspicion for pneumonia. COVID/Flu/RSV negative. He is otherwise afebrile and HD stable.   Monitor off abx  Follow up blood cx  Trend temps and hemodynamics   Daily CBCd and BMP to monitor response to treatment

## 2024-10-24 NOTE — PROGRESS NOTES
Progress Note - Nephrology   Name: Ramos Rosenthal 74 y.o. male I MRN: [id number]  Unit/Bed#: -01 I Date of Admission: 10/21/2024   Date of Service: 10/24/2024 I Hospital Day: 2     Assessment & Plan  End-stage renal disease on hemodialysis (HCC)  Lab Results   Component Value Date    EGFR 12 10/23/2024    EGFR 8 10/22/2024    EGFR 6 10/21/2024    CREATININE 4.40 (H) 10/23/2024    CREATININE 5.79 (H) 10/22/2024    CREATININE 7.03 (H) 10/21/2024   Next dialysis session to be tomorrow at outpatient unit.  Last HD session was yesterday with post weight 128.8 kg.  Dry weight is 128.5 kg.  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  Continue with vigilance regarding high risk for infections.  Essential hypertension  Blood pressure stable, continue to monitor for now.  Volume overload  We will continue to aggressively ultrafilter as tolerated.  Pneumonia  This is a potential etiology of the patient's SIRS.  Line infection less likely given no significant symptoms of fevers or rigors with use of PermCath.  Depression    Bilateral lower extremity edema  UF as tolerated   Chronic gout due to renal impairment of multiple sites without tophus          Subjective   Brief History of Admission - 74 y.o. year old male h/o antigbm glomerulonephritis, HTN who was admitted to [facility] after presenting with shortness of breath.  Nephrology consulted for management of ESRD.  He reports shortness of breath is improved        Objective :  Temp:  [97.3 °F (36.3 °C)-98.2 °F (36.8 °C)] 97.7 °F (36.5 °C)  HR:  [61-72] 72  BP: (102-123)/(46-53) 121/50  Resp:  [16-20] 16  SpO2:  [97 %-100 %] 100 %  O2 Device: None (Room air)    Current Weight: Weight - Scale: 132 kg (291 lb 0.1 oz) (pt weighed 2x)  First Weight: Weight - Scale: (!) 143 kg  (314 lb 6 oz)  I/O         10/22 0701  10/23 0700 10/23 0701  10/24 0700 10/24 0701  10/25 0700    P.O. 240 342 240    I.V. (mL/kg) 500 (3.7) 420 (3.2)     IV Piggyback       Total Intake(mL/kg) 740 (5.5) 762 (5.8) 240 (1.8)    Other 4012 3500     Total Output 4012 3500     Net -3272 -2738 +240                 Physical Exam  Vitals and nursing note reviewed.   Constitutional:       General: He is not in acute distress.     Appearance: He is well-developed.   HENT:      Head: Normocephalic and atraumatic.   Eyes:      Conjunctiva/sclera: Conjunctivae normal.   Cardiovascular:      Rate and Rhythm: Normal rate and regular rhythm.      Heart sounds: No murmur heard.  Pulmonary:      Effort: Pulmonary effort is normal. No respiratory distress.      Breath sounds: Normal breath sounds.   Abdominal:      Palpations: Abdomen is soft.      Tenderness: There is no abdominal tenderness.   Musculoskeletal:         General: Swelling present.      Cervical back: Neck supple.      Right lower leg: Edema present.      Left lower leg: Edema present.   Skin:     General: Skin is warm and dry.      Capillary Refill: Capillary refill takes less than 2 seconds.   Neurological:      Mental Status: He is alert.   Psychiatric:         Mood and Affect: Mood normal.         Medications:    Current Facility-Administered Medications:     acetaminophen (TYLENOL) tablet 650 mg, 650 mg, Oral, Q6H PRN, Agnes Rust MD    allopurinol (ZYLOPRIM) tablet 100 mg, 100 mg, Oral, BID, Agnes Rust MD, 100 mg at 10/24/24 0832    carvedilol (COREG) tablet 3.125 mg, 3.125 mg, Oral, BID With Meals, Agnes Rust MD, 3.125 mg at 10/23/24 1707    Cholecalciferol (VITAMIN D3) tablet 2,000 Units, 2,000 Units, Oral, Daily, Agnes Rust MD, 2,000 Units at 10/24/24 0832    cyclophosphamide (CYTOXAN) capsule 100 mg, 100 mg, Oral, Daily, Agnes Rust MD, 100 mg at 10/24/24 0833    FLUoxetine (PROzac) capsule 20 mg, 20 mg,  "Oral, Daily, Agnes Rust MD, 20 mg at 10/24/24 0834    heparin (porcine) subcutaneous injection 5,000 Units, 5,000 Units, Subcutaneous, Q8H LORENZO, 5,000 Units at 10/24/24 1318 **AND** Platelet count, , , Once, Agnes Rust MD    multivitamin-minerals (CENTRUM) tablet 1 tablet, 1 tablet, Oral, Daily, Agnes Rust MD, 1 tablet at 10/24/24 0832    pantoprazole (PROTONIX) EC tablet 40 mg, 40 mg, Oral, BID AC, Agnes Rust MD, 40 mg at 10/24/24 0833    polyethylene glycol (MIRALAX) packet 17 g, 17 g, Oral, Daily, Agnes Rust MD    predniSONE tablet 60 mg, 60 mg, Oral, Daily, Agnes Rust MD, 60 mg at 10/24/24 0832    sulfamethoxazole-trimethoprim (BACTRIM DS) 800-160 mg per tablet 1 tablet, 1 tablet, Oral, Once per day on Monday Wednesday Friday, Agnes Rust MD, 1 tablet at 10/23/24 1229    vit B complex-vit C-folic acid (Renal Caps) capsule 1 capsule, 1 capsule, Oral, Daily With Dinner, Agnes Rust MD, 1 capsule at 10/23/24 1707      Lab Results: I have reviewed the following results:  Results from last 7 days   Lab Units 10/23/24  0552 10/22/24  0925 10/21/24  1049   WBC Thousand/uL 7.62 11.87* 12.28*   HEMOGLOBIN g/dL 7.2* 8.0* 7.8*   HEMATOCRIT % 22.4* 24.9* 23.8*   PLATELETS Thousands/uL 145* 179 208   POTASSIUM mmol/L 4.0 4.0 3.9   CHLORIDE mmol/L 96 97 99   CO2 mmol/L 28 27 28   BUN mg/dL 39* 55* 71*   CREATININE mg/dL 4.40* 5.79* 7.03*   CALCIUM mg/dL 8.9 8.8 9.3   MAGNESIUM mg/dL 1.9 1.8* 1.9   PHOSPHORUS mg/dL 3.0 2.6  --    ALBUMIN g/dL  --  4.0  --        Administrative Statements     Portions of the record may have been created with voice recognition software. Occasional wrong word or \"sound a like\" substitutions may have occurred due to the inherent limitations of voice recognition software. Read the chart carefully and recognize, using context, where substitutions have occurred.If you have any questions, please contact the dictating " provider.

## 2024-10-24 NOTE — PLAN OF CARE
Problem: PAIN - ADULT  Goal: Verbalizes/displays adequate comfort level or baseline comfort level  Description: Interventions:  - Encourage patient to monitor pain and request assistance  - Assess pain using appropriate pain scale  - Administer analgesics based on type and severity of pain and evaluate response  - Implement non-pharmacological measures as appropriate and evaluate response  - Consider cultural and social influences on pain and pain management  - Notify physician/advanced practitioner if interventions unsuccessful or patient reports new pain  Outcome: Progressing     Problem: INFECTION - ADULT  Goal: Absence or prevention of progression during hospitalization  Description: INTERVENTIONS:  - Assess and monitor for signs and symptoms of infection  - Monitor lab/diagnostic results  - Monitor all insertion sites, i.e. indwelling lines, tubes, and drains  - Monitor endotracheal if appropriate and nasal secretions for changes in amount and color  - [place] appropriate cooling/warming therapies per order  - Administer medications as ordered  - Instruct and encourage patient and family to use good hand hygiene technique  - Identify and instruct in appropriate isolation precautions for identified infection/condition  Outcome: Progressing     Problem: INFECTION - ADULT  Goal: Absence of fever/infection during neutropenic period  Description: INTERVENTIONS:  - Monitor WBC    Outcome: Progressing

## 2024-10-24 NOTE — PLAN OF CARE
Problem: PAIN - ADULT  Goal: Verbalizes/displays adequate comfort level or baseline comfort level  Description: Interventions:  - Encourage patient to monitor pain and request assistance  - Assess pain using appropriate pain scale  - Administer analgesics based on type and severity of pain and evaluate response  - Implement non-pharmacological measures as appropriate and evaluate response  - Consider cultural and social influences on pain and pain management  - Notify physician/advanced practitioner if interventions unsuccessful or patient reports new pain  Outcome: Progressing     Problem: INFECTION - ADULT  Goal: Absence or prevention of progression during hospitalization  Description: INTERVENTIONS:  - Assess and monitor for signs and symptoms of infection  - Monitor lab/diagnostic results  - Monitor all insertion sites, i.e. indwelling lines, tubes, and drains  - Monitor endotracheal if appropriate and nasal secretions for changes in amount and color  - [place] appropriate cooling/warming therapies per order  - Administer medications as ordered  - Instruct and encourage patient and family to use good hand hygiene technique  - Identify and instruct in appropriate isolation precautions for identified infection/condition  Outcome: Progressing  Goal: Absence of fever/infection during neutropenic period  Description: INTERVENTIONS:  - Monitor WBC    Outcome: Progressing     Problem: SAFETY ADULT  Goal: Patient will remain free of falls  Description: INTERVENTIONS:  - Educate patient/family on patient safety including physical limitations  - Instruct patient to call for assistance with activity   - Consult OT/PT to assist with strengthening/mobility   - Keep Call bell within reach  - Keep bed low and locked with side rails adjusted as appropriate  - Keep care items and personal belongings within reach  - Initiate and maintain comfort rounds  - Make Fall Risk Sign visible to staff  - Offer Toileting every 2 Hours,  in advance of need  - Initiate/Maintain bed alarm  - Obtain necessary fall risk management equipment: bed alarm  - Apply yellow socks and bracelet for high fall risk patients  - Consider moving patient to room near nurses station  Outcome: Progressing  Goal: Maintain or return to baseline ADL function  Description: INTERVENTIONS:  -  Assess patient's ability to carry out ADLs; assess patient's baseline for ADL function and identify physical deficits which impact ability to perform ADLs (bathing, care of mouth/teeth, toileting, grooming, dressing, etc.)  - Assess/evaluate cause of self-care deficits   - Assess range of motion  - Assess patient's mobility; develop plan if impaired  - Assess patient's need for assistive devices and provide as appropriate  - Encourage maximum independence but intervene and supervise when necessary  - Involve family in performance of ADLs  - Assess for home care needs following discharge   - Consider OT consult to assist with ADL evaluation and planning for discharge  - Provide patient education as appropriate  Outcome: Progressing  Goal: Maintains/Returns to pre admission functional level  Description: INTERVENTIONS:  - Perform AM-PAC 6 Click Basic Mobility/ Daily Activity assessment daily.  - Set and communicate daily mobility goal to care team and patient/family/caregiver.   - Collaborate with rehabilitation services on mobility goals if consulted  - Perform Range of Motion 4 times a day.  - Reposition patient every 2 hours.  - Dangle patient 3 times a day  - Stand patient 3 times a day  - Ambulate patient 3 times a day  - Out of bed to chair 3 times a day   - Out of bed for meals 3 times a day  - Out of bed for toileting  - Record patient progress and toleration of activity level   Outcome: Progressing     Problem: DISCHARGE PLANNING  Goal: Discharge to home or other facility with appropriate resources  Description: INTERVENTIONS:  - Identify barriers to discharge w/patient and  caregiver  - Arrange for needed discharge resources and transportation as appropriate  - Identify discharge learning needs (meds, wound care, etc.)  - Arrange for interpretive services to assist at discharge as needed  - Refer to Case Management Department for coordinating discharge planning if the patient needs post-hospital services based on physician/advanced practitioner order or complex needs related to functional status, cognitive ability, or social support system  Outcome: Progressing     Problem: Knowledge Deficit  Goal: Patient/family/caregiver demonstrates understanding of disease process, treatment plan, medications, and discharge instructions  Description: Complete learning assessment and assess knowledge base.  Interventions:  - Provide teaching at level of understanding  - Provide teaching via preferred learning methods  Outcome: Progressing     Problem: Prexisting or High Potential for Compromised Skin Integrity  Goal: Skin integrity is maintained or improved  Description: INTERVENTIONS:  - Identify patients at risk for skin breakdown  - Assess and monitor skin integrity  - Assess and monitor nutrition and hydration status  - Monitor labs   - Assess for incontinence   - Turn and reposition patient  - Assist with mobility/ambulation  - Relieve pressure over bony prominences  - Avoid friction and shearing  - Provide appropriate hygiene as needed including keeping skin clean and dry  - Evaluate need for skin moisturizer/barrier cream  - Collaborate with interdisciplinary team   - Patient/family teaching  - Consider wound care consult   Outcome: Progressing     Problem: METABOLIC, FLUID AND ELECTROLYTES - ADULT  Goal: Electrolytes maintained within normal limits  Description: INTERVENTIONS:  - Monitor labs and assess patient for signs and symptoms of electrolyte imbalances  - Administer electrolyte replacement as ordered  - Monitor response to electrolyte replacements, including repeat lab results as  appropriate  - Instruct patient on fluid and nutrition as appropriate  Outcome: Progressing  Goal: Fluid balance maintained  Description: INTERVENTIONS:  - Monitor labs   - Monitor I/O and WT  - Instruct patient on fluid and nutrition as appropriate  - Assess for signs & symptoms of volume excess or deficit  Outcome: Progressing     Problem: Nutrition/Hydration-ADULT  Goal: Nutrient/Hydration intake appropriate for improving, restoring or maintaining nutritional needs  Description: Monitor and assess patient's nutrition/hydration status for malnutrition. Collaborate with interdisciplinary team and initiate plan and interventions as ordered.  Monitor patient's weight and dietary intake as ordered or per policy. Utilize nutrition screening tool and intervene as necessary. Determine patient's food preferences and provide high-protein, high-caloric foods as appropriate.     INTERVENTIONS:  - Monitor oral intake, urinary output, labs, and treatment plans  - Assess nutrition and hydration status and recommend course of action  - Evaluate amount of meals eaten  - Assist patient with eating if necessary   - Allow adequate time for meals  - Recommend/ encourage appropriate diets, oral nutritional supplements, and vitamin/mineral supplements  - Order, calculate, and assess calorie counts as needed  - Recommend, monitor, and adjust tube feedings and TPN/PPN based on assessed needs  - Assess need for intravenous fluids  - Provide specific nutrition/hydration education as appropriate  - Include patient/family/caregiver in decisions related to nutrition  Outcome: Progressing

## 2024-10-24 NOTE — ASSESSMENT & PLAN NOTE
Due to progressive glomerulonephritis as above. On HD MWF via R CVC. Dialyzed x 3 since admission.

## 2024-10-24 NOTE — ASSESSMENT & PLAN NOTE
Patient remains on high-dose oral steroids and cyclophosphamide.  Continue with vigilance regarding high risk for infections.

## 2024-10-24 NOTE — ASSESSMENT & PLAN NOTE
This is a potential etiology of the patient's SIRS.  Line infection less likely given no significant symptoms of fevers or rigors with use of PermCath.

## 2024-10-24 NOTE — ASSESSMENT & PLAN NOTE
Lab Results   Component Value Date    EGFR 12 10/23/2024    EGFR 8 10/22/2024    EGFR 6 10/21/2024    CREATININE 4.40 (H) 10/23/2024    CREATININE 5.79 (H) 10/22/2024    CREATININE 7.03 (H) 10/21/2024   See the management as outlined above  Continue dialysis Mondays, Wednesdays, and Fridays as previously scheduled in the outpatient setting  Follow-up with nephrology

## 2024-10-24 NOTE — PLAN OF CARE
Problem: PAIN - ADULT  Goal: Verbalizes/displays adequate comfort level or baseline comfort level  Description: Interventions:  - Encourage patient to monitor pain and request assistance  - Assess pain using appropriate pain scale  - Administer analgesics based on type and severity of pain and evaluate response  - Implement non-pharmacological measures as appropriate and evaluate response  - Consider cultural and social influences on pain and pain management  - Notify physician/advanced practitioner if interventions unsuccessful or patient reports new pain  10/24/2024 1615 by Brandi Dunahm RN  Outcome: Adequate for Discharge  10/24/2024 0921 by Brandi Dunham RN  Outcome: Progressing     Problem: INFECTION - ADULT  Goal: Absence or prevention of progression during hospitalization  Description: INTERVENTIONS:  - Assess and monitor for signs and symptoms of infection  - Monitor lab/diagnostic results  - Monitor all insertion sites, i.e. indwelling lines, tubes, and drains  - Monitor endotracheal if appropriate and nasal secretions for changes in amount and color  - Hughson appropriate cooling/warming therapies per order  - Administer medications as ordered  - Instruct and encourage patient and family to use good hand hygiene technique  - Identify and instruct in appropriate isolation precautions for identified infection/condition  10/24/2024 1615 by Brandi Dunham RN  Outcome: Adequate for Discharge  10/24/2024 0921 by Brandi Dunham RN  Outcome: Progressing  Goal: Absence of fever/infection during neutropenic period  Description: INTERVENTIONS:  - Monitor WBC    10/24/2024 1615 by Brandi Dunham RN  Outcome: Adequate for Discharge  10/24/2024 0921 by Brandi Dunham RN  Outcome: Progressing     Problem: SAFETY ADULT  Goal: Patient will remain free of falls  Description: INTERVENTIONS:  - Educate patient/family on patient safety including physical limitations  - Instruct patient to call for assistance with activity   -  Consult OT/PT to assist with strengthening/mobility   - Keep Call bell within reach  - Keep bed low and locked with side rails adjusted as appropriate  - Keep care items and personal belongings within reach  - Initiate and maintain comfort rounds  - Make Fall Risk Sign visible to staff  - Apply yellow socks and bracelet for high fall risk patients  - Consider moving patient to room near nurses station  10/24/2024 1615 by Brandi Dunham RN  Outcome: Adequate for Discharge  10/24/2024 0921 by Brandi Dunham RN  Outcome: Progressing  Goal: Maintain or return to baseline ADL function  Description: INTERVENTIONS:  -  Assess patient's ability to carry out ADLs; assess patient's baseline for ADL function and identify physical deficits which impact ability to perform ADLs (bathing, care of mouth/teeth, toileting, grooming, dressing, etc.)  - Assess/evaluate cause of self-care deficits   - Assess range of motion  - Assess patient's mobility; develop plan if impaired  - Assess patient's need for assistive devices and provide as appropriate  - Encourage maximum independence but intervene and supervise when necessary  - Involve family in performance of ADLs  - Assess for home care needs following discharge   - Consider OT consult to assist with ADL evaluation and planning for discharge  - Provide patient education as appropriate  10/24/2024 1615 by Brandi Dunham RN  Outcome: Adequate for Discharge  10/24/2024 0921 by Brandi Dunham RN  Outcome: Progressing  Goal: Maintains/Returns to pre admission functional level  Description: INTERVENTIONS:  - Perform AM-PAC 6 Click Basic Mobility/ Daily Activity assessment daily.  - Set and communicate daily mobility goal to care team and patient/family/caregiver.   - Collaborate with rehabilitation services on mobility goals if consulted  - Perform Range of Motion  times a day.  - Reposition patient every  hours.  - Dangle patient  times a day  - Stand patient  times a day  - Ambulate patient   times a day  - Out of bed to chair  times a day   - Out of bed for meals  times a day  - Out of bed for toileting  - Record patient progress and toleration of activity level   10/24/2024 1615 by Brandi Dunham RN  Outcome: Adequate for Discharge  10/24/2024 0921 by Brandi Dunham RN  Outcome: Progressing     Problem: DISCHARGE PLANNING  Goal: Discharge to home or other facility with appropriate resources  Description: INTERVENTIONS:  - Identify barriers to discharge w/patient and caregiver  - Arrange for needed discharge resources and transportation as appropriate  - Identify discharge learning needs (meds, wound care, etc.)  - Arrange for interpretive services to assist at discharge as needed  - Refer to Case Management Department for coordinating discharge planning if the patient needs post-hospital services based on physician/advanced practitioner order or complex needs related to functional status, cognitive ability, or social support system  10/24/2024 1615 by Brandi Dunham RN  Outcome: Adequate for Discharge  10/24/2024 0921 by Brandi Dunham RN  Outcome: Progressing     Problem: Knowledge Deficit  Goal: Patient/family/caregiver demonstrates understanding of disease process, treatment plan, medications, and discharge instructions  Description: Complete learning assessment and assess knowledge base.  Interventions:  - Provide teaching at level of understanding  - Provide teaching via preferred learning methods  10/24/2024 1615 by Brandi Dunham RN  Outcome: Adequate for Discharge  10/24/2024 0921 by Brandi Dunham RN  Outcome: Progressing     Problem: Prexisting or High Potential for Compromised Skin Integrity  Goal: Skin integrity is maintained or improved  Description: INTERVENTIONS:  - Identify patients at risk for skin breakdown  - Assess and monitor skin integrity  - Assess and monitor nutrition and hydration status  - Monitor labs   - Assess for incontinence   - Turn and reposition patient  - Assist with  mobility/ambulation  - Relieve pressure over bony prominences  - Avoid friction and shearing  - Provide appropriate hygiene as needed including keeping skin clean and dry  - Evaluate need for skin moisturizer/barrier cream  - Collaborate with interdisciplinary team   - Patient/family teaching  - Consider wound care consult   10/24/2024 1615 by Brandi Dunham RN  Outcome: Adequate for Discharge  10/24/2024 0921 by Brandi Dunham RN  Outcome: Progressing     Problem: METABOLIC, FLUID AND ELECTROLYTES - ADULT  Goal: Electrolytes maintained within normal limits  Description: INTERVENTIONS:  - Monitor labs and assess patient for signs and symptoms of electrolyte imbalances  - Administer electrolyte replacement as ordered  - Monitor response to electrolyte replacements, including repeat lab results as appropriate  - Instruct patient on fluid and nutrition as appropriate  10/24/2024 1615 by Brandi Dunham RN  Outcome: Adequate for Discharge  10/24/2024 0921 by Brandi Dunham RN  Outcome: Progressing  Goal: Fluid balance maintained  Description: INTERVENTIONS:  - Monitor labs   - Monitor I/O and WT  - Instruct patient on fluid and nutrition as appropriate  - Assess for signs & symptoms of volume excess or deficit  10/24/2024 1615 by Brandi Dunham RN  Outcome: Adequate for Discharge  10/24/2024 0921 by Brandi Dunham RN  Outcome: Progressing     Problem: Nutrition/Hydration-ADULT  Goal: Nutrient/Hydration intake appropriate for improving, restoring or maintaining nutritional needs  Description: Monitor and assess patient's nutrition/hydration status for malnutrition. Collaborate with interdisciplinary team and initiate plan and interventions as ordered.  Monitor patient's weight and dietary intake as ordered or per policy. Utilize nutrition screening tool and intervene as necessary. Determine patient's food preferences and provide high-protein, high-caloric foods as appropriate.     INTERVENTIONS:  - Monitor oral intake, urinary  output, labs, and treatment plans  - Assess nutrition and hydration status and recommend course of action  - Evaluate amount of meals eaten  - Assist patient with eating if necessary   - Allow adequate time for meals  - Recommend/ encourage appropriate diets, oral nutritional supplements, and vitamin/mineral supplements  - Order, calculate, and assess calorie counts as needed  - Recommend, monitor, and adjust tube feedings and TPN/PPN based on assessed needs  - Assess need for intravenous fluids  - Provide specific nutrition/hydration education as appropriate  - Include patient/family/caregiver in decisions related to nutrition  10/24/2024 1615 by Brandi Dunham, RN  Outcome: Adequate for Discharge  10/24/2024 0921 by Brandi Dunham, RN  Outcome: Progressing

## 2024-10-24 NOTE — ASSESSMENT & PLAN NOTE
Lab Results   Component Value Date    EGFR 12 10/23/2024    EGFR 8 10/22/2024    EGFR 6 10/21/2024    CREATININE 4.40 (H) 10/23/2024    CREATININE 5.79 (H) 10/22/2024    CREATININE 7.03 (H) 10/21/2024   Next dialysis session to be tomorrow at outpatient unit.  Last HD session was yesterday with post weight 128.8 kg.  Dry weight is 128.5 kg.  Patient reports he is feeling better today

## 2024-10-24 NOTE — DISCHARGE INSTR - AVS FIRST PAGE
Dear Ramos Rosenthal,     It was our pleasure to care for you here at [facility].  It is our hope that we were always able to exceed the expected standards for your care during your stay.  You were hospitalized due to volume overload.  You were cared for on the medical/surgical floor by Agnes Rust MD with the [facility] who covers for your primary care physician (PCP), Jessika Carrillo MD, while you were hospitalized.  You were additionally seen by the [facility], and by the [facility] infectious disease [facility].  If you have any questions or concerns related to this hospitalization, you may contact us at .  For follow up as well as any medication refills, we recommend that you follow up with your primary care physician.  A registered nurse will reach out to you by phone within a few days after your discharge to answer any additional questions that you may have after going home.  However, at this time we provide for you here, the most important instructions / recommendations at discharge:     Notable Medication Adjustments -   No changes to any medications  Please continue all of your other preadmission medications at the preadmission doses with no change  Testing Required after Discharge -   To be further determined in the near future in the outpatient setting by your PCP and/or nephrology  Important follow up information -   Follow-up with the providers as outlined in this discharge package  Other Instructions -   Please maintain your regular dialysis schedule of Mondays, Wednesdays, and Fridays  Please maintain a healthy diet  Please review this entire after visit summary as additional general instructions including medication list, appointments, activity, diet, any pertinent wound care, and other additional recommendations from your care team that may be provided for you.      Sincerely,     Agnes  Chente Stanton,     It was our pleasure to care for you here at [facility], ** [place].  It is our hope that we were always able to exceed the expected standards for your care during your stay.  You were hospitalized due to ***.  You were cared for on the *** floor by Agnes Rust MD with the [facility] who covers for your primary care physician (PCP), Jessika Carrillo MD, while you were hospitalized.  If you have any questions or concerns related to this hospitalization, you may contact us at .  For follow up as well as any medication refills, we recommend that you follow up with your primary care physician.  A registered nurse will reach out to you by phone within a few days after your discharge to answer any additional questions that you may have after going home.  However, at this time we provide for you here, the most important instructions / recommendations at discharge:     Notable Medication Adjustments -   ***  Testing Required after Discharge -   ***  Important follow up information -   ***  Other Instructions -   ***  Please review this entire after visit summary as additional general instructions including medication list, appointments, activity, diet, any pertinent wound care, and other additional recommendations from your care team that may be provided for you.      Sincerely,     Agnes Rust MD

## 2024-10-24 NOTE — ASSESSMENT & PLAN NOTE
Chronic  At baseline, the patient is wheelchair-bound  Continued fluid management will be with dialysis in the outpatient setting

## 2024-10-24 NOTE — CASE MANAGEMENT
Case Management Discharge Planning Note    Patient name Ramos Rosenthal  Location /-01 MRN [id number]  : 1950 Date 10/24/2024       Current Admission Date: 10/21/2024  Current Admission Diagnosis:Volume overload   Patient Active Problem List    Diagnosis Date Noted Date Diagnosed    Immunocompromised patient (HCC) 10/23/2024     Pneumonia 10/22/2024     Chills 10/22/2024     Volume overload 10/21/2024     Chronic gout due to renal impairment of multiple sites without tophus 10/21/2024     Encounter for immunization 10/16/2024     Hypomagnesemia 10/05/2024     Hypocalcemia 10/01/2024     End-stage renal disease on hemodialysis ([facility]) 2024     Anemia 2024     Dependence on intermittent renal dialysis ([facility]) 2024     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 2024     Essential hypertension 2024     Secondary hyperparathyroidism of renal origin ([facility]) 2024     Uremia 2024     Hyponatremia 2024     Bilateral lower extremity edema 2024     Neurogenic claudication 2023    Bloating 10/05/2022     Paresthesia of both lower extremities 10/01/2022     Leucocytosis 10/01/2022     Elevated troponin 2022     EDGAR (dyspnea on exertion) 2022     LYLY (acute kidney injury) ([facility]) 2022     Depression 2022     Lower extremity weakness 2019     Lumbosacral plexopathy 2019     Gait abnormality 2019     Lumbar stenosis 2019     Polyneuropathy 2019     Prediabetes 2018    Hyperlipidemia 2014    Vitamin D deficiency 2014      LOS (days): 2  Geometric Mean LOS (GMLOS) (days): 3.6  Days to GMLOS:1.7     OBJECTIVE:  Risk of Unplanned Readmission Score: 23.91     Current admission status: Inpatient   Preferred Pharmacy:   [id number] [place] - [address]  [address]  [place]  [id number]  Phone: [phone] Fax: [phone]    Primary Care Provider: Jessika Carrillo MD    Primary Insurance: MEDICARE  Secondary Insurance: [employer]    DISCHARGE DETAILS:  Pt has been cleared by [facility] for DC today.  Spoke to wife Louise yesterday who indicated they will come around 4pm to transport pt home after daughter gets home from work.

## 2024-10-24 NOTE — ASSESSMENT & PLAN NOTE
This condition has been ruled out   highest recorded temperature on this admission was 100.5 °F  Patient has been afebrile for greater than 48 hours  No evidence of systemic infection  Appreciate ID input  Antibiotics were discontinued  Blood cultures have been negative thus far  Okay for discharge

## 2024-10-24 NOTE — ASSESSMENT & PLAN NOTE
Chronic stable condition  Patient will be following up with PT and OT at home and VNA services at home is set up by case management prior to discharge

## 2024-10-24 NOTE — NURSING NOTE
Patient's daughter upset about her inability to get Cytoxan script filled for patient. Support offered by Rosy  to give daughter advice on how to get the prescription filled. AVS reviewed with patient, wife, and both daughters. All questions answered to their satisfaction.

## 2024-10-24 NOTE — ASSESSMENT & PLAN NOTE
Recently diagnosed  Recently completed 2 to 3 weeks worth of plasmapheresis-now has end-stage renal disease on hemodialysis  Luckily no pulmonary involvement thus far  Continue cyclophosphamide, prednisone, and Bactrim at home doses post discharge  Continue Protonix for GI prophylaxis post discharge  Outpatient follow-up with nephro

## 2024-10-24 NOTE — DISCHARGE INSTR - OTHER ORDERS
Wash left heel wound with soap and water and cover with hydrocolloid dressing. Change dressing every 3 days and as needed

## 2024-10-25 ENCOUNTER — TRANSITIONAL CARE MANAGEMENT (OUTPATIENT)
Dept: FAMILY MEDICINE CLINIC | Facility: CLINIC | Age: 74
End: 2024-10-25

## 2024-10-25 ENCOUNTER — HOME CARE VISIT (OUTPATIENT)
Dept: HOME HEALTH SERVICES | Facility: HOME HEALTHCARE | Age: 74
End: 2024-10-25
Payer: MEDICARE

## 2024-10-25 VITALS
DIASTOLIC BLOOD PRESSURE: 70 MMHG | OXYGEN SATURATION: 97 % | SYSTOLIC BLOOD PRESSURE: 114 MMHG | HEART RATE: 84 BPM | TEMPERATURE: 97.4 F | RESPIRATION RATE: 16 BRPM

## 2024-10-25 PROCEDURE — G0299 HHS/HOSPICE OF RN EA 15 MIN: HCPCS

## 2024-10-27 LAB
BACTERIA BLD CULT: NORMAL
BACTERIA BLD CULT: NORMAL

## 2024-10-29 ENCOUNTER — HOME CARE VISIT (OUTPATIENT)
Dept: HOME HEALTH SERVICES | Facility: HOME HEALTHCARE | Age: 74
End: 2024-10-29
Payer: MEDICARE

## 2024-10-29 ENCOUNTER — TELEPHONE (OUTPATIENT)
Dept: FAMILY MEDICINE CLINIC | Facility: CLINIC | Age: 74
End: 2024-10-29

## 2024-10-29 VITALS
SYSTOLIC BLOOD PRESSURE: 116 MMHG | HEART RATE: 66 BPM | TEMPERATURE: 97.8 F | DIASTOLIC BLOOD PRESSURE: 58 MMHG | OXYGEN SATURATION: 97 % | RESPIRATION RATE: 18 BRPM

## 2024-10-29 PROCEDURE — G0299 HHS/HOSPICE OF RN EA 15 MIN: HCPCS

## 2024-10-29 PROCEDURE — G0151 HHCP-SERV OF PT,EA 15 MIN: HCPCS

## 2024-10-30 ENCOUNTER — OFFICE VISIT (OUTPATIENT)
Dept: FAMILY MEDICINE CLINIC | Facility: CLINIC | Age: 74
End: 2024-10-30
Payer: MEDICARE

## 2024-10-30 ENCOUNTER — DOCUMENTATION (OUTPATIENT)
Age: 74
End: 2024-10-30

## 2024-10-30 DIAGNOSIS — Z99.2 END-STAGE RENAL DISEASE ON HEMODIALYSIS (HCC): ICD-10-CM

## 2024-10-30 DIAGNOSIS — N18.6 END-STAGE RENAL DISEASE ON HEMODIALYSIS (HCC): ICD-10-CM

## 2024-10-30 DIAGNOSIS — I10 ESSENTIAL HYPERTENSION: ICD-10-CM

## 2024-10-30 DIAGNOSIS — N01.9 RAPIDLY PROGRESSIVE GLOMERULONEPHRITIS WITH ANTI-GBM ANTIBODIES: Primary | ICD-10-CM

## 2024-10-30 DIAGNOSIS — N18.6 ANEMIA DUE TO CHRONIC KIDNEY DISEASE, ON CHRONIC DIALYSIS (HCC): ICD-10-CM

## 2024-10-30 DIAGNOSIS — L97.429 HEEL ULCERATION, LEFT, WITH UNSPECIFIED SEVERITY (HCC): ICD-10-CM

## 2024-10-30 DIAGNOSIS — R06.02 SOB (SHORTNESS OF BREATH): Primary | ICD-10-CM

## 2024-10-30 DIAGNOSIS — Z99.2 ANEMIA DUE TO CHRONIC KIDNEY DISEASE, ON CHRONIC DIALYSIS (HCC): ICD-10-CM

## 2024-10-30 DIAGNOSIS — N01.9 RAPIDLY PROGRESSIVE GLOMERULONEPHRITIS WITH ANTI-GBM ANTIBODIES: ICD-10-CM

## 2024-10-30 DIAGNOSIS — Z23 ENCOUNTER FOR IMMUNIZATION: ICD-10-CM

## 2024-10-30 DIAGNOSIS — D63.1 ANEMIA DUE TO CHRONIC KIDNEY DISEASE, ON CHRONIC DIALYSIS (HCC): ICD-10-CM

## 2024-10-30 PROCEDURE — 99215 OFFICE O/P EST HI 40 MIN: CPT | Performed by: FAMILY MEDICINE

## 2024-10-31 ENCOUNTER — HOME CARE VISIT (OUTPATIENT)
Dept: HOME HEALTH SERVICES | Facility: HOME HEALTHCARE | Age: 74
End: 2024-10-31
Payer: MEDICARE

## 2024-10-31 ENCOUNTER — TELEPHONE (OUTPATIENT)
Dept: NEPHROLOGY | Facility: CLINIC | Age: 74
End: 2024-10-31

## 2024-10-31 VITALS — HEART RATE: 68 BPM | DIASTOLIC BLOOD PRESSURE: 70 MMHG | SYSTOLIC BLOOD PRESSURE: 100 MMHG | OXYGEN SATURATION: 98 %

## 2024-10-31 VITALS
TEMPERATURE: 97.7 F | RESPIRATION RATE: 20 BRPM | SYSTOLIC BLOOD PRESSURE: 114 MMHG | HEART RATE: 68 BPM | DIASTOLIC BLOOD PRESSURE: 62 MMHG | OXYGEN SATURATION: 97 %

## 2024-10-31 DIAGNOSIS — D84.9 IMMUNOSUPPRESSED STATUS (HCC): Primary | ICD-10-CM

## 2024-10-31 DIAGNOSIS — N01.9 RAPIDLY PROGRESSIVE GLOMERULONEPHRITIS WITH ANTI-GBM ANTIBODIES: Primary | ICD-10-CM

## 2024-10-31 PROBLEM — J18.9 PNEUMONIA: Status: RESOLVED | Noted: 2024-10-22 | Resolved: 2024-10-31

## 2024-10-31 PROBLEM — L97.429 ULCER OF LEFT HEEL (HCC): Status: ACTIVE | Noted: 2024-10-31

## 2024-10-31 PROBLEM — R06.02 SOB (SHORTNESS OF BREATH): Status: ACTIVE | Noted: 2022-09-30

## 2024-10-31 PROCEDURE — G0299 HHS/HOSPICE OF RN EA 15 MIN: HCPCS

## 2024-10-31 PROCEDURE — G0151 HHCP-SERV OF PT,EA 15 MIN: HCPCS

## 2024-10-31 RX ORDER — PREDNISONE 10 MG/1
TABLET ORAL
Qty: 98 TABLET | Refills: 0 | Status: SHIPPED | OUTPATIENT
Start: 2024-10-31 | End: 2024-11-28

## 2024-10-31 RX ORDER — NYSTATIN 100000 [USP'U]/ML
500000 SUSPENSION ORAL 4 TIMES DAILY
Qty: 473 ML | Refills: 1 | Status: SHIPPED | OUTPATIENT
Start: 2024-10-31

## 2024-10-31 NOTE — ASSESSMENT & PLAN NOTE
Did discuss immunizations with Dr. Moore.  She feels at present we should hold on any I will follow-up with home visits as needed since we will be followed closely by nephrology

## 2024-10-31 NOTE — ASSESSMENT & PLAN NOTE
Lab Results   Component Value Date    EGFR 12 10/23/2024    EGFR 8 10/22/2024    EGFR 6 10/21/2024    CREATININE 4.40 (H) 10/23/2024    CREATININE 5.79 (H) 10/22/2024    CREATININE 7.03 (H) 10/21/2024     Continues with dialysis.  I did speak with Dr. Moore who plans to gradually taper his prednisone however is concerned about his longterm outlook!!

## 2024-10-31 NOTE — ASSESSMENT & PLAN NOTE
Due to fluid retention which is due to underlying end-stage renal disease symptoms now markedly improved status post dialysis.        Statement Selected

## 2024-10-31 NOTE — PROGRESS NOTES
Transition of Care Visit  Name: Ramos Rosenthal      : 1950      MRN: [id number]  Encounter Provider: Jessika Carrillo MD  Encounter Date: 10/30/2024   Encounter department: [facility] PRIMARY [facility]ARE    Assessment & Plan  SOB (shortness of breath)  Due to fluid retention which is due to underlying end-stage renal disease symptoms now markedly improved status post dialysis.       End-stage renal disease on hemodialysis (HCC)  Lab Results   Component Value Date    EGFR 12 10/23/2024    EGFR 8 10/22/2024    EGFR 6 10/21/2024    CREATININE 4.40 (H) 10/23/2024    CREATININE 5.79 (H) 10/22/2024    CREATININE 7.03 (H) 10/21/2024     Continues with dialysis.  I did speak with Dr. Moore who plans to gradually taper his prednisone however is concerned about his longterm outlook!!       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  Unfortunately his antibodies do not seem to be responding to his immunosuppressive agent       Essential hypertension  Will need to monitor closely now on dialysis today with low normal bleeding however without any symptoms       Anemia due to chronic kidney disease, on chronic dialysis (HCC)  Lab Results   Component Value Date    EGFR 12 10/23/2024    EGFR 8 10/22/2024    EGFR 6 10/21/2024    CREATININE 4.40 (H) 10/23/2024    CREATININE 5.79 (H) 10/22/2024    CREATININE 7.03 (H) 10/21/2024   Being addressed at dialysis with appropriate Epogen treatment         Encounter for immunization  Did discuss immunizations with Dr. Moore.  She feels at present we should hold on any I will follow-up with home visits as needed since we will be followed closely by nephrology       Heel ulceration, left, with unspecified severity (HCC)  Will continue treatment as per home health care            History of Present Illness     Transitional Care Management Review:   Ramos Rosenthal is a 74 y.o. male here for TCM follow up.     During the TCM phone call  patient stated:  TCM Call       Date and time call was made  10/25/2024  7:55 AM    Hospital care reviewed  Records reviewed    Patient was hospitialized at  [facility]    Date of Admission  10/21/24    Date of discharge  10/24/24    Diagnosis  Volume overload Principal problem    Disposition  Home    Were the patients medications reviewed and updated  Yes          TCM Call       Should patient be enrolled in anticoag monitoring?  No    Scheduled for follow up?  Yes    Did you obtain your prescribed medications  Yes    Do you need help managing your prescriptions or medications  No    Is transportation to your appointment needed  No    I have advised the patient to call PCP with any new or worsening symptoms  Bernarda Leung    Living Arrangements  Family members    Support System  Family    Are you recieving any outpatient services  No    Are you recieving home care services  Yes    Types of home care services  Nurse visit    Are you using any community resources  No    Current waiver services  No    Have you fallen in the last 12 months  No    Interperter language line needed  No    Counseling  Patient          Patient was admitted emergently on October 21 due to the acute onset of shortness of breath, diagnosed with fluid overload due to his recently 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  He had undergone usual dialysis 3 days before sx's occurred ( dialysis on Fri; Sx's Monday Am) he was given IV Lasix without much relief; really finally offered after dialysis which was given later the day of admission plus again a day later.  He was treated and followed by nephrology and once stabilized it was felt he could be discharged for continued outpatient dialysis.  His medications were not adjusted during his hospitalization.  Initial diagnosis of possible pneumonia was made but this was negated.  Since home he is feeling back to normal without any shortness of breath.  He denies  any fevers or cold symptoms.  He was just arriving from dialysis when I examined him ( home visit) he notes occasional lightheadedness after his dialysis but without symptoms of near syncope or other side effects.  He was found to have a left heel ulcer which apparently developed during his admission.  He is being seen by home health care appropriate treatment      Review of Systems  Objective     /70 (BP Location: Right arm, Patient Position: Sitting, Cuff Size: Extra-Large)   Pulse 68   SpO2 98%     Physical Exam  Constitutional:       Appearance: Normal appearance. He is obese.   Neck:      Vascular: No carotid bruit.   Cardiovascular:      Rate and Rhythm: Normal rate and regular rhythm.      Heart sounds: Normal heart sounds.   Pulmonary:      Effort: Pulmonary effort is normal.      Breath sounds: Normal breath sounds.   Musculoskeletal:      Right lower le+ Edema present.      Left lower leg: 3+ Edema present.   Lymphadenopathy:      Cervical: No cervical adenopathy.   Skin:     Comments: Heel ulcer not exaimined since just dressed by [facility]  ( see pic)   Neurological:      Mental Status: He is oriented to person, place, and time.      Comments: Chronic complete lower extremity flaccidity       Medications have been reviewed by provider in current encounter

## 2024-10-31 NOTE — TELEPHONE ENCOUNTER
Called carson,daughter, by phone and explained antigbm levels. At this point, patient has renal limited antigbm and do not feel patient should retrial PLEX as would not change outcome. He was off cyclophosphamide for 1 week when anitgbm level drawn due to issues obtaining medication from pharmacy.   Recommend to start tapering steroid by 10mg per week until 20mg then will more slowly taper.   Recommend cyclophosphamide for period of 2 months for now, medication very expensive for patient. He has already been on medication for 1 month.  Will start nystatin fungal ppx.  Daughter aware to contact office /dialysis unit, when he has one week of steroids left. Explained he can never run out of steroids. Explained timing of prednisone in AM for adrenal effect.  Take cyclo and bactrim post HD due to clearance.

## 2024-10-31 NOTE — ASSESSMENT & PLAN NOTE
Lab Results   Component Value Date    EGFR 12 10/23/2024    EGFR 8 10/22/2024    EGFR 6 10/21/2024    CREATININE 4.40 (H) 10/23/2024    CREATININE 5.79 (H) 10/22/2024    CREATININE 7.03 (H) 10/21/2024   Being addressed at dialysis with appropriate Epogen treatment

## 2024-10-31 NOTE — ASSESSMENT & PLAN NOTE
As above.  Unfortunately his antibodies do not seem to be responding to his immunosuppressive agent

## 2024-10-31 NOTE — ASSESSMENT & PLAN NOTE
Will need to monitor closely now on dialysis today with low normal bleeding however without any symptoms

## 2024-11-01 ENCOUNTER — TELEPHONE (OUTPATIENT)
Dept: NEPHROLOGY | Facility: CLINIC | Age: 74
End: 2024-11-01

## 2024-11-01 DIAGNOSIS — D84.9 IMMUNOSUPPRESSION (HCC): Primary | ICD-10-CM

## 2024-11-04 ENCOUNTER — HOME CARE VISIT (OUTPATIENT)
Dept: HOME HEALTH SERVICES | Facility: HOME HEALTHCARE | Age: 74
End: 2024-11-04
Payer: MEDICARE

## 2024-11-04 PROCEDURE — 10330064 DRESSING, HYDROCELLULAR ADH STR FOAM 4X"

## 2024-11-04 PROCEDURE — 10330064 CLEANSER, WND SEA-CLEANS 6OZ  COLPLT

## 2024-11-04 PROCEDURE — 10330064 SPONGE, GAUZE 8PLY N/S 4X4" (200/PK 20P"

## 2024-11-05 ENCOUNTER — HOME CARE VISIT (OUTPATIENT)
Dept: HOME HEALTH SERVICES | Facility: HOME HEALTHCARE | Age: 74
End: 2024-11-05
Payer: MEDICARE

## 2024-11-05 VITALS
DIASTOLIC BLOOD PRESSURE: 68 MMHG | OXYGEN SATURATION: 99 % | HEART RATE: 74 BPM | SYSTOLIC BLOOD PRESSURE: 132 MMHG | RESPIRATION RATE: 18 BRPM | TEMPERATURE: 96.9 F

## 2024-11-05 VITALS — SYSTOLIC BLOOD PRESSURE: 132 MMHG | OXYGEN SATURATION: 99 % | DIASTOLIC BLOOD PRESSURE: 68 MMHG | HEART RATE: 74 BPM

## 2024-11-05 PROCEDURE — G0299 HHS/HOSPICE OF RN EA 15 MIN: HCPCS

## 2024-11-05 PROCEDURE — G0151 HHCP-SERV OF PT,EA 15 MIN: HCPCS

## 2024-11-06 DIAGNOSIS — F34.1 PERSISTENT DEPRESSIVE DISORDER: ICD-10-CM

## 2024-11-07 ENCOUNTER — HOME CARE VISIT (OUTPATIENT)
Dept: HOME HEALTH SERVICES | Facility: HOME HEALTHCARE | Age: 74
End: 2024-11-07
Payer: MEDICARE

## 2024-11-07 VITALS
DIASTOLIC BLOOD PRESSURE: 58 MMHG | HEART RATE: 68 BPM | SYSTOLIC BLOOD PRESSURE: 114 MMHG | TEMPERATURE: 97.2 F | OXYGEN SATURATION: 97 % | RESPIRATION RATE: 18 BRPM

## 2024-11-07 DIAGNOSIS — N01.9 RAPIDLY PROGRESSIVE GLOMERULONEPHRITIS WITH ANTI-GBM ANTIBODIES: Primary | ICD-10-CM

## 2024-11-07 PROCEDURE — G0299 HHS/HOSPICE OF RN EA 15 MIN: HCPCS

## 2024-11-07 RX ORDER — PREDNISONE 10 MG/1
TABLET ORAL
Qty: 60 TABLET | Refills: 2 | Status: SHIPPED | OUTPATIENT
Start: 2024-11-07 | End: 2024-11-17

## 2024-11-08 ENCOUNTER — TELEPHONE (OUTPATIENT)
Dept: OTHER | Facility: HOSPITAL | Age: 74
End: 2024-11-08

## 2024-11-08 ENCOUNTER — DOCUMENTATION (OUTPATIENT)
Dept: OTHER | Facility: HOSPITAL | Age: 74
End: 2024-11-08

## 2024-11-08 DIAGNOSIS — I10 ESSENTIAL HYPERTENSION: ICD-10-CM

## 2024-11-08 DIAGNOSIS — E83.51 HYPOCALCEMIA: ICD-10-CM

## 2024-11-08 DIAGNOSIS — N01.9 RAPIDLY PROGRESSIVE GLOMERULONEPHRITIS WITH ANTI-GBM ANTIBODIES: Primary | ICD-10-CM

## 2024-11-08 DIAGNOSIS — N01.9 RAPIDLY PROGRESSIVE GLOMERULONEPHRITIS WITH ANTI-GBM ANTIBODIES: ICD-10-CM

## 2024-11-08 RX ORDER — ASCORBIC ACID, THIAMINE MONONITRATE,RIBOFLAVIN, NIACINAMIDE, PYRIDOXINE HYDROCHLORIDE, FOLIC ACID, CYANOCOBALAMIN, BIOTIN, CALCIUM PANTOTHENATE, 100; 1.5; 1.7; 20; 10; 1; 6000; 150000; 5 MG/1; MG/1; MG/1; MG/1; MG/1; MG/1; UG/1; UG/1; MG/1
1 CAPSULE, LIQUID FILLED ORAL
Qty: 30 CAPSULE | Refills: 0 | Status: SHIPPED | OUTPATIENT
Start: 2024-11-08 | End: 2024-11-08

## 2024-11-08 RX ORDER — PANTOPRAZOLE SODIUM 40 MG/1
40 TABLET, DELAYED RELEASE ORAL
Qty: 60 TABLET | Refills: 0 | Status: SHIPPED | OUTPATIENT
Start: 2024-11-08 | End: 2024-12-08

## 2024-11-08 RX ORDER — CARVEDILOL 3.12 MG/1
3.12 TABLET ORAL 2 TIMES DAILY WITH MEALS
Qty: 60 TABLET | Refills: 0 | Status: SHIPPED | OUTPATIENT
Start: 2024-11-08 | End: 2024-12-08

## 2024-11-08 RX ORDER — B COMPLEX, C NO.20/FOLIC ACID 1 MG
1 CAPSULE ORAL
Qty: 90 CAPSULE | Refills: 1 | Status: SHIPPED | OUTPATIENT
Start: 2024-11-08

## 2024-11-08 RX ORDER — CALCIUM CARBONATE 500(1250)
2 TABLET ORAL 2 TIMES DAILY WITH MEALS
Qty: 120 TABLET | Refills: 0 | Status: SHIPPED | OUTPATIENT
Start: 2024-11-08 | End: 2024-12-08

## 2024-11-08 RX ORDER — SULFAMETHOXAZOLE AND TRIMETHOPRIM 800; 160 MG/1; MG/1
1 TABLET ORAL 3 TIMES WEEKLY
Qty: 12 TABLET | Refills: 0 | Status: SHIPPED | OUTPATIENT
Start: 2024-11-08 | End: 2024-12-08

## 2024-11-08 NOTE — TELEPHONE ENCOUNTER
Spoke with Louise, patient had not received Rx from pharmacy. [facility] has not been calling patient when Rx ready.    Will have office contact pharmacy to make sure it is available and contact home RN that blood work in the system to check antigbm levels and CBC.

## 2024-11-08 NOTE — TELEPHONE ENCOUNTER
I called the pharmacy and the prednisone medication has been ready since Halloween 10/31/2024. Pharmacist states he sees the patient's wife in line for the pharmacy. Medication was obtained today.      [facility] called [facility] and faxed the lab orders over to them for Beverly Sanders.

## 2024-11-08 NOTE — TELEPHONE ENCOUNTER
Please call [facility] pharmacy and see if prednisone Rx available, this was sent in on 10/31 but family did not get a call regarding Rx.    Also please contact home RN provider who is coming to house--Beverly Sanders, patient needs weekly antgbm levels drawn, they are in the [facility] system and CBC.

## 2024-11-12 ENCOUNTER — HOME CARE VISIT (OUTPATIENT)
Dept: HOME HEALTH SERVICES | Facility: HOME HEALTHCARE | Age: 74
End: 2024-11-12
Payer: MEDICARE

## 2024-11-12 VITALS
OXYGEN SATURATION: 98 % | RESPIRATION RATE: 18 BRPM | SYSTOLIC BLOOD PRESSURE: 138 MMHG | HEART RATE: 61 BPM | TEMPERATURE: 97.8 F | DIASTOLIC BLOOD PRESSURE: 78 MMHG

## 2024-11-12 PROCEDURE — G0299 HHS/HOSPICE OF RN EA 15 MIN: HCPCS

## 2024-11-12 PROCEDURE — G0151 HHCP-SERV OF PT,EA 15 MIN: HCPCS

## 2024-11-13 ENCOUNTER — DOCUMENTATION (OUTPATIENT)
Dept: NEPHROLOGY | Facility: CLINIC | Age: 74
End: 2024-11-13

## 2024-11-13 PROCEDURE — 10330064 DRESSING, ALLEVYN GENTLE BORDER HEEL (5/

## 2024-11-13 PROCEDURE — 10330064 FOAM, ADH SIL W/BORDER 4X4" (10/BX 20BX"

## 2024-11-13 PROCEDURE — 10330064 DRESSING, CALCIUM ALGINATE AG SHEET 4X4"

## 2024-11-14 ENCOUNTER — HOME CARE VISIT (OUTPATIENT)
Dept: HOME HEALTH SERVICES | Facility: HOME HEALTHCARE | Age: 74
End: 2024-11-14
Payer: MEDICARE

## 2024-11-14 VITALS
HEART RATE: 70 BPM | SYSTOLIC BLOOD PRESSURE: 130 MMHG | DIASTOLIC BLOOD PRESSURE: 60 MMHG | TEMPERATURE: 97.2 F | RESPIRATION RATE: 20 BRPM | OXYGEN SATURATION: 96 %

## 2024-11-14 DIAGNOSIS — E55.9 VITAMIN D DEFICIENCY: Primary | ICD-10-CM

## 2024-11-14 PROCEDURE — G0151 HHCP-SERV OF PT,EA 15 MIN: HCPCS

## 2024-11-14 PROCEDURE — G0299 HHS/HOSPICE OF RN EA 15 MIN: HCPCS

## 2024-11-14 RX ORDER — ERGOCALCIFEROL 1.25 MG/1
50000 CAPSULE, LIQUID FILLED ORAL WEEKLY
Qty: 12 CAPSULE | Refills: 1 | Status: SHIPPED | OUTPATIENT
Start: 2024-11-14

## 2024-11-15 NOTE — CASE COMMUNICATION
PT reeval this date.   Extend home PT starting wk of 11/17/24: 2wk1 1wk1. Transfer training and determine appropriate slide board due to sacrum wound. LE ther ex/HEP. edu.     Will send orders for MD signature.

## 2024-11-19 ENCOUNTER — HOME CARE VISIT (OUTPATIENT)
Dept: HOME HEALTH SERVICES | Facility: HOME HEALTHCARE | Age: 74
End: 2024-11-19
Payer: MEDICARE

## 2024-11-19 PROCEDURE — G0299 HHS/HOSPICE OF RN EA 15 MIN: HCPCS

## 2024-11-19 PROCEDURE — G0151 HHCP-SERV OF PT,EA 15 MIN: HCPCS

## 2024-11-20 VITALS
TEMPERATURE: 96.8 F | OXYGEN SATURATION: 97 % | DIASTOLIC BLOOD PRESSURE: 60 MMHG | RESPIRATION RATE: 16 BRPM | HEART RATE: 68 BPM | SYSTOLIC BLOOD PRESSURE: 118 MMHG

## 2024-11-21 ENCOUNTER — HOME CARE VISIT (OUTPATIENT)
Dept: HOME HEALTH SERVICES | Facility: HOME HEALTHCARE | Age: 74
End: 2024-11-21
Payer: MEDICARE

## 2024-11-21 VITALS
HEART RATE: 62 BPM | OXYGEN SATURATION: 97 % | DIASTOLIC BLOOD PRESSURE: 80 MMHG | SYSTOLIC BLOOD PRESSURE: 160 MMHG | TEMPERATURE: 96.1 F

## 2024-11-21 VITALS
DIASTOLIC BLOOD PRESSURE: 68 MMHG | OXYGEN SATURATION: 97 % | TEMPERATURE: 97.5 F | HEART RATE: 72 BPM | RESPIRATION RATE: 16 BRPM | SYSTOLIC BLOOD PRESSURE: 116 MMHG

## 2024-11-21 PROCEDURE — G0299 HHS/HOSPICE OF RN EA 15 MIN: HCPCS

## 2024-11-21 PROCEDURE — G0151 HHCP-SERV OF PT,EA 15 MIN: HCPCS

## 2024-11-21 NOTE — PROGRESS NOTES
Physical therapy notified me today the patient is complaining of sinus drainage with yellow rhinorrhea as well as yellow sputum production.  I spoke with patient who states he has had the symptoms for quite some time without change.  He denies fever, sinus pressure, or shortness of breath.  Of note is the fact he does have Claritin but has not been taking it due to all the other medications he is on.  I advised to begin the Claritin 1 pill daily and if symptoms still not improving or worsening he knows to call

## 2024-11-25 ENCOUNTER — HOME CARE VISIT (OUTPATIENT)
Dept: HOME HEALTH SERVICES | Facility: HOME HEALTHCARE | Age: 74
End: 2024-11-25
Payer: MEDICARE

## 2024-11-25 ENCOUNTER — TELEPHONE (OUTPATIENT)
Age: 74
End: 2024-11-25

## 2024-11-25 VITALS
TEMPERATURE: 97.7 F | RESPIRATION RATE: 18 BRPM | OXYGEN SATURATION: 98 % | DIASTOLIC BLOOD PRESSURE: 82 MMHG | HEART RATE: 77 BPM | SYSTOLIC BLOOD PRESSURE: 127 MMHG

## 2024-11-25 DIAGNOSIS — E83.51 HYPOCALCEMIA: ICD-10-CM

## 2024-11-25 DIAGNOSIS — D84.9 IMMUNOSUPPRESSED STATUS (HCC): ICD-10-CM

## 2024-11-25 DIAGNOSIS — N01.9 RPGN (RAPIDLY PROGRESSIVE GLOMERULONEPHRITIS) W/ ANTI-GBM ANTIBODIES: Primary | ICD-10-CM

## 2024-11-25 DIAGNOSIS — N01.9 RAPIDLY PROGRESSIVE GLOMERULONEPHRITIS WITH ANTI-GBM ANTIBODIES: ICD-10-CM

## 2024-11-25 PROCEDURE — G0299 HHS/HOSPICE OF RN EA 15 MIN: HCPCS

## 2024-11-25 RX ORDER — SULFAMETHOXAZOLE AND TRIMETHOPRIM 800; 160 MG/1; MG/1
1 TABLET ORAL 3 TIMES WEEKLY
Qty: 12 TABLET | Refills: 0 | Status: SHIPPED | OUTPATIENT
Start: 2024-11-25 | End: 2024-12-25

## 2024-11-25 RX ORDER — PREDNISONE 5 MG/1
TABLET ORAL
Qty: 63 TABLET | Refills: 0 | Status: SHIPPED | OUTPATIENT
Start: 2024-11-29 | End: 2024-12-13

## 2024-11-25 RX ORDER — NYSTATIN 100000 [USP'U]/ML
500000 SUSPENSION ORAL 4 TIMES DAILY
Qty: 473 ML | Refills: 1 | Status: SHIPPED | OUTPATIENT
Start: 2024-11-25

## 2024-11-25 RX ORDER — CALCIUM CARBONATE 500(1250)
2 TABLET ORAL 2 TIMES DAILY WITH MEALS
Qty: 120 TABLET | Refills: 0 | Status: SHIPPED | OUTPATIENT
Start: 2024-11-25 | End: 2024-12-25

## 2024-11-25 RX ORDER — PANTOPRAZOLE SODIUM 40 MG/1
40 TABLET, DELAYED RELEASE ORAL
Qty: 60 TABLET | Refills: 0 | Status: SHIPPED | OUTPATIENT
Start: 2024-11-25 | End: 2024-12-25

## 2024-11-26 NOTE — TELEPHONE ENCOUNTER
Please call patient daughter Mary in AM and Patient wife.   Please explain that we are going to more slowly taper the prednisone so on FRIDAY, instead of tapering to 20mg. We will taper to 25 mg for 1 week and then 20 mg for 1 week. I sent in new Rx. The pills will be 5mg prednisone pills instead of 10 mg so this is a change to be aware of .   AFTER this, he will decrease prednisone to 2.5 mg per 2 weeks. I will send in new Rx after they  this Rx for th 25 mg and 20 mg .  Please make sure they  new Rx to start this Friday for next 2 weeks  I sent in refills for nystatin, bactrim, protonix, calcium. Let us know if other refills needed.

## 2024-11-26 NOTE — TELEPHONE ENCOUNTER
I called and spoke with Louise regarding the following message from Dr Moore. She is aware of prescriptions sent to the pharmacy and dosing instructions. She is aware of other refills sent to the pharmacy. She had no questions or concerns.

## 2024-11-26 NOTE — TELEPHONE ENCOUNTER
I called and spoke with Letty- one of the nurses at [facility]. She states there was no orders placed on Ramos's chart previous to today. She states orders for the blood labs in question were added today and lab collection will be started for the following labs Antigbm, CBC, and CMP.

## 2024-11-26 NOTE — TELEPHONE ENCOUNTER
Can we figure out what is happening with the home lab draws. I ordered antigbm and CBC but this has not been done in over one month. I ordered the levels and there is a note for weekly labs that we faxed lab orders on 11/8. I am adding CMP too.   He needs this completed ASAP in the next 1 week.

## 2024-11-26 NOTE — PROGRESS NOTES
Called wife and daughter by phone to discuss care this evening.  Reviewed therapy that patient maintained on--prednisone and cyclophosphamide.   Patient supposed to have weekly monitoring of CBC/antigbm to monitor disease state and cyclo but no labs since October hospitalization--10/23.   Explained that certain labs like antigbm would not be drawn on HD as they are not considered dialysis care and thus needed to be drawn at home. Also anticipated to have WBC monitored closely.  WBC ordered on 11/21/11/24 on HD but still not resulted. My staff  documented encounter to home care agency on 11/8 with labs faxed.  Expressed concern that patient not being sufficiently monitored without labs.   Patient finished last cyclophosphamide today and has ~2 months of therapy between home and hospitalization. Would not resume at this time without ability to monitor patient sufficiently and given renal limited antigbm without recovery a 2-3 month time line can be sufficient.    Regarding prednisone, patient tolerating taper and is on 30 mg dose, will taper to 25 mg on Friday, which is a dose change. Then go to 20mg. After this taper by 2.5 mg per week.  Explained we will slow down the taper by lower increments to prevent adrenal insufficiency.    Regarding dialysis modality, patient not ideal PD candidate due to lack of Urine output and would require intensive PD at home. Patient has decided to remain in center for his dialysis care.  Continue with [facility] for dialysis access at this time. Would  not pursue vascular access in the arm until on lower prednisone dose--5mg. Daughter expressed that patient may be resistant to surgeries.    Daughter verbalized understanding to current plan and will contact home agency to find out issue with labs. My office will place another call to home agency to find out why labs not being drawn as ordered.    Last A1C of note 5.3 on 10/14/24.     Daughter expressed concern for bruising on back which  is not new, advised to have home RN to take picture and send to myself and Dr Carrillo to assess.  Also Dr Carrillo spoke with patient about some respiratory concern and she advised claritin.   Expressed concern that we should get a chest xray at least. Patient did not have respiratory manifestation with concern for [place].Last chest xray 1 month ago 10/21/24.

## 2024-11-27 ENCOUNTER — HOME CARE VISIT (OUTPATIENT)
Dept: HOME HEALTH SERVICES | Facility: HOME HEALTHCARE | Age: 74
End: 2024-11-27
Payer: MEDICARE

## 2024-11-27 ENCOUNTER — APPOINTMENT (OUTPATIENT)
Age: 74
End: 2024-11-27
Payer: MEDICARE

## 2024-11-27 VITALS
HEART RATE: 58 BPM | OXYGEN SATURATION: 98 % | SYSTOLIC BLOOD PRESSURE: 118 MMHG | TEMPERATURE: 97.4 F | RESPIRATION RATE: 18 BRPM | DIASTOLIC BLOOD PRESSURE: 56 MMHG

## 2024-11-27 DIAGNOSIS — D84.9 IMMUNOSUPPRESSION (HCC): ICD-10-CM

## 2024-11-27 DIAGNOSIS — N01.9 RAPIDLY PROGRESSIVE GLOMERULONEPHRITIS WITH ANTI-GBM ANTIBODIES: ICD-10-CM

## 2024-11-27 DIAGNOSIS — N01.9 RPGN (RAPIDLY PROGRESSIVE GLOMERULONEPHRITIS) W/ ANTI-GBM ANTIBODIES: ICD-10-CM

## 2024-11-27 LAB
ACANTHOCYTES BLD QL SMEAR: PRESENT
ALBUMIN SERPL BCG-MCNC: 3.4 G/DL (ref 3.5–5)
ALP SERPL-CCNC: 73 U/L (ref 34–104)
ALT SERPL W P-5'-P-CCNC: 19 U/L (ref 7–52)
ANION GAP SERPL CALCULATED.3IONS-SCNC: 12 MMOL/L (ref 4–13)
ANISOCYTOSIS BLD QL SMEAR: PRESENT
AST SERPL W P-5'-P-CCNC: 18 U/L (ref 13–39)
BASOPHILS # BLD MANUAL: 0 THOUSAND/UL (ref 0–0.1)
BASOPHILS NFR MAR MANUAL: 0 % (ref 0–1)
BILIRUB SERPL-MCNC: 0.59 MG/DL (ref 0.2–1)
BUN SERPL-MCNC: 29 MG/DL (ref 5–25)
CALCIUM ALBUM COR SERPL-MCNC: 8.8 MG/DL (ref 8.3–10.1)
CALCIUM SERPL-MCNC: 8.3 MG/DL (ref 8.4–10.2)
CHLORIDE SERPL-SCNC: 96 MMOL/L (ref 96–108)
CO2 SERPL-SCNC: 30 MMOL/L (ref 21–32)
CREAT SERPL-MCNC: 3.63 MG/DL (ref 0.6–1.3)
EOSINOPHIL # BLD MANUAL: 0.04 THOUSAND/UL (ref 0–0.4)
EOSINOPHIL NFR BLD MANUAL: 1 % (ref 0–6)
ERYTHROCYTE [DISTWIDTH] IN BLOOD BY AUTOMATED COUNT: 19.7 % (ref 11.6–15.1)
GFR SERPL CREATININE-BSD FRML MDRD: 15 ML/MIN/1.73SQ M
GLUCOSE SERPL-MCNC: 146 MG/DL (ref 65–140)
HCT VFR BLD AUTO: 27.7 % (ref 36.5–49.3)
HGB BLD-MCNC: 9.1 G/DL (ref 12–17)
LYMPHOCYTES # BLD AUTO: 0.21 THOUSAND/UL (ref 0.6–4.47)
LYMPHOCYTES # BLD AUTO: 5 % (ref 14–44)
MCH RBC QN AUTO: 34.1 PG (ref 26.8–34.3)
MCHC RBC AUTO-ENTMCNC: 32.9 G/DL (ref 31.4–37.4)
MCV RBC AUTO: 104 FL (ref 82–98)
METAMYELOCYTE ABSOLUTE CT: 0.04 THOUSAND/UL (ref 0–0.1)
METAMYELOCYTES NFR BLD MANUAL: 1 % (ref 0–1)
MONOCYTES # BLD AUTO: 0.29 THOUSAND/UL (ref 0–1.22)
MONOCYTES NFR BLD: 7 % (ref 4–12)
MYELOCYTE ABSOLUTE CT: 0.17 THOUSAND/UL (ref 0–0.1)
MYELOCYTES NFR BLD MANUAL: 4 % (ref 0–1)
NEUTROPHILS # BLD MANUAL: 3.4 THOUSAND/UL (ref 1.85–7.62)
NEUTS BAND NFR BLD MANUAL: 17 % (ref 0–8)
NEUTS SEG NFR BLD AUTO: 65 % (ref 43–75)
PLATELET # BLD AUTO: 208 THOUSANDS/UL (ref 149–390)
PLATELET BLD QL SMEAR: ADEQUATE
PLATELET CLUMP BLD QL SMEAR: PRESENT
PMV BLD AUTO: 9.5 FL (ref 8.9–12.7)
POIKILOCYTOSIS BLD QL SMEAR: PRESENT
POLYCHROMASIA BLD QL SMEAR: PRESENT
POTASSIUM SERPL-SCNC: 4.5 MMOL/L (ref 3.5–5.3)
PROT SERPL-MCNC: 4.7 G/DL (ref 6.4–8.4)
RBC # BLD AUTO: 2.67 MILLION/UL (ref 3.88–5.62)
RBC MORPH BLD: PRESENT
SODIUM SERPL-SCNC: 138 MMOL/L (ref 135–147)
WBC # BLD AUTO: 4.15 THOUSAND/UL (ref 4.31–10.16)

## 2024-11-27 PROCEDURE — 85027 COMPLETE CBC AUTOMATED: CPT

## 2024-11-27 PROCEDURE — 36415 COLL VENOUS BLD VENIPUNCTURE: CPT

## 2024-11-27 PROCEDURE — 85007 BL SMEAR W/DIFF WBC COUNT: CPT

## 2024-11-27 PROCEDURE — G0299 HHS/HOSPICE OF RN EA 15 MIN: HCPCS

## 2024-11-27 PROCEDURE — 83520 IMMUNOASSAY QUANT NOS NONAB: CPT

## 2024-11-27 PROCEDURE — 80053 COMPREHEN METABOLIC PANEL: CPT

## 2024-11-27 NOTE — CASE COMMUNICATION
Awaiting new transfer board- no visit this wk as did not receive yet.   Extend home PT for 1 visit wk of 12/1/24 --will send order for MD signature.

## 2024-11-29 ENCOUNTER — RESULTS FOLLOW-UP (OUTPATIENT)
Dept: OTHER | Facility: HOSPITAL | Age: 74
End: 2024-11-29

## 2024-11-29 DIAGNOSIS — R05.9 COUGH: Primary | ICD-10-CM

## 2024-11-29 LAB — GBM AB SER IA-ACNC: 7.1 UNITS (ref 0–0.9)

## 2024-11-29 NOTE — TELEPHONE ENCOUNTER
----- Message from Lisa Moore DO sent at 11/29/2024 11:15 AM EST -----  Please call patient's amanda, I received some of blood work. Antigbm levels will take time to come back. Patient hgb is stable at 9.1, we handle this on HD.   His white blood cell count is low and this can be seen for a variety of reasons including from cyclophosphamide. He has elevated bands in his blood which can sometimes be seen with infection. Any new concerns for fevers, cough, shortness of breath? I ordered a chest xray for completeness.

## 2024-11-30 DIAGNOSIS — I10 ESSENTIAL HYPERTENSION: ICD-10-CM

## 2024-12-02 ENCOUNTER — TELEPHONE (OUTPATIENT)
Dept: NEPHROLOGY | Facility: CLINIC | Age: 74
End: 2024-12-02

## 2024-12-02 ENCOUNTER — HOME CARE VISIT (OUTPATIENT)
Dept: HOME HEALTH SERVICES | Facility: HOME HEALTHCARE | Age: 74
End: 2024-12-02
Payer: MEDICARE

## 2024-12-02 DIAGNOSIS — N01.9 RAPIDLY PROGRESSIVE GLOMERULONEPHRITIS WITH ANTI-GBM ANTIBODIES: Primary | ICD-10-CM

## 2024-12-02 RX ORDER — CARVEDILOL 3.12 MG/1
3.12 TABLET ORAL 2 TIMES DAILY WITH MEALS
Qty: 180 TABLET | Refills: 1 | Status: SHIPPED | OUTPATIENT
Start: 2024-12-02

## 2024-12-02 RX ORDER — PREDNISONE 2.5 MG/1
TABLET ORAL
Qty: 154 TABLET | Refills: 0 | Status: SHIPPED | OUTPATIENT
Start: 2024-12-13 | End: 2025-01-10

## 2024-12-02 NOTE — TELEPHONE ENCOUNTER
Patient's wife calling back.  Informed her of results message.  She denies that patient having any fevers, cough, or shortness of breath.  She said that the home care nurse has been coming and listening to his lungs and said they are clear.  She said it is very difficult to get him out of the house and prefer that he not have the chest X ray at this time.  She will call office if any concerns in meantime.  She reports that he did start the prednisone 25 mg on Friday.

## 2024-12-02 NOTE — PROGRESS NOTES
Patient started 25mg prednisone dose on 11/29. Plan to taper down to 20mg 12/6. Then will decrease by 2.5mg per week for the next 4 weeks.   17.5 x1 week  15mg x 1 week  12.5mg x 1 week  10mg x 1 week.  Rx sent to pharmacy.

## 2024-12-03 ENCOUNTER — HOME CARE VISIT (OUTPATIENT)
Dept: HOME HEALTH SERVICES | Facility: HOME HEALTHCARE | Age: 74
End: 2024-12-03
Payer: MEDICARE

## 2024-12-03 ENCOUNTER — APPOINTMENT (OUTPATIENT)
Age: 74
End: 2024-12-03
Payer: MEDICARE

## 2024-12-03 VITALS
TEMPERATURE: 97 F | DIASTOLIC BLOOD PRESSURE: 64 MMHG | RESPIRATION RATE: 16 BRPM | HEART RATE: 80 BPM | SYSTOLIC BLOOD PRESSURE: 118 MMHG | OXYGEN SATURATION: 99 %

## 2024-12-03 DIAGNOSIS — N01.9 RAPIDLY PROGRESSIVE GLOMERULONEPHRITIS WITH ANTI-GBM ANTIBODIES: ICD-10-CM

## 2024-12-03 DIAGNOSIS — N01.9 RPGN (RAPIDLY PROGRESSIVE GLOMERULONEPHRITIS) W/ ANTI-GBM ANTIBODIES: ICD-10-CM

## 2024-12-03 DIAGNOSIS — D84.9 IMMUNOSUPPRESSION (HCC): ICD-10-CM

## 2024-12-03 LAB
ALBUMIN SERPL BCG-MCNC: 3.4 G/DL (ref 3.5–5)
ALP SERPL-CCNC: 81 U/L (ref 34–104)
ALT SERPL W P-5'-P-CCNC: 18 U/L (ref 7–52)
ANION GAP SERPL CALCULATED.3IONS-SCNC: 11 MMOL/L (ref 4–13)
ANISOCYTOSIS BLD QL SMEAR: PRESENT
AST SERPL W P-5'-P-CCNC: 18 U/L (ref 13–39)
BASOPHILS # BLD MANUAL: 0.15 THOUSAND/UL (ref 0–0.1)
BASOPHILS NFR MAR MANUAL: 2 % (ref 0–1)
BILIRUB SERPL-MCNC: 0.47 MG/DL (ref 0.2–1)
BUN SERPL-MCNC: 45 MG/DL (ref 5–25)
CALCIUM ALBUM COR SERPL-MCNC: 9.2 MG/DL (ref 8.3–10.1)
CALCIUM SERPL-MCNC: 8.7 MG/DL (ref 8.4–10.2)
CHLORIDE SERPL-SCNC: 96 MMOL/L (ref 96–108)
CO2 SERPL-SCNC: 31 MMOL/L (ref 21–32)
CREAT SERPL-MCNC: 5.89 MG/DL (ref 0.6–1.3)
EOSINOPHIL # BLD MANUAL: 0 THOUSAND/UL (ref 0–0.4)
EOSINOPHIL NFR BLD MANUAL: 0 % (ref 0–6)
ERYTHROCYTE [DISTWIDTH] IN BLOOD BY AUTOMATED COUNT: 19.9 % (ref 11.6–15.1)
GFR SERPL CREATININE-BSD FRML MDRD: 8 ML/MIN/1.73SQ M
GLUCOSE SERPL-MCNC: 178 MG/DL (ref 65–140)
HCT VFR BLD AUTO: 28.1 % (ref 36.5–49.3)
HGB BLD-MCNC: 9.2 G/DL (ref 12–17)
LYMPHOCYTES # BLD AUTO: 0.22 THOUSAND/UL (ref 0.6–4.47)
LYMPHOCYTES # BLD AUTO: 3 % (ref 14–44)
MACROCYTES BLD QL AUTO: PRESENT
MCH RBC QN AUTO: 35.2 PG (ref 26.8–34.3)
MCHC RBC AUTO-ENTMCNC: 32.7 G/DL (ref 31.4–37.4)
MCV RBC AUTO: 108 FL (ref 82–98)
MONOCYTES # BLD AUTO: 0.22 THOUSAND/UL (ref 0–1.22)
MONOCYTES NFR BLD: 3 % (ref 4–12)
NEUTROPHILS # BLD MANUAL: 6.82 THOUSAND/UL (ref 1.85–7.62)
NEUTS BAND NFR BLD MANUAL: 36 % (ref 0–8)
NEUTS SEG NFR BLD AUTO: 56 % (ref 43–75)
NRBC BLD AUTO-RTO: 2 /100 WBC (ref 0–2)
PLATELET # BLD AUTO: 191 THOUSANDS/UL (ref 149–390)
PLATELET BLD QL SMEAR: ADEQUATE
PMV BLD AUTO: 9.3 FL (ref 8.9–12.7)
POIKILOCYTOSIS BLD QL SMEAR: PRESENT
POLYCHROMASIA BLD QL SMEAR: PRESENT
POTASSIUM SERPL-SCNC: 4.8 MMOL/L (ref 3.5–5.3)
PROT SERPL-MCNC: 5 G/DL (ref 6.4–8.4)
RBC # BLD AUTO: 2.61 MILLION/UL (ref 3.88–5.62)
RBC MORPH BLD: PRESENT
SODIUM SERPL-SCNC: 138 MMOL/L (ref 135–147)
WBC # BLD AUTO: 7.41 THOUSAND/UL (ref 4.31–10.16)

## 2024-12-03 PROCEDURE — G0299 HHS/HOSPICE OF RN EA 15 MIN: HCPCS

## 2024-12-03 PROCEDURE — 80053 COMPREHEN METABOLIC PANEL: CPT

## 2024-12-03 PROCEDURE — 85007 BL SMEAR W/DIFF WBC COUNT: CPT

## 2024-12-03 PROCEDURE — 85027 COMPLETE CBC AUTOMATED: CPT

## 2024-12-03 PROCEDURE — 36415 COLL VENOUS BLD VENIPUNCTURE: CPT

## 2024-12-03 PROCEDURE — 83520 IMMUNOASSAY QUANT NOS NONAB: CPT

## 2024-12-04 LAB — GBM AB SER IA-ACNC: 6.2 UNITS (ref 0–0.9)

## 2024-12-04 NOTE — TELEPHONE ENCOUNTER
I called and spoke with Mary regarding the following:      ----- Message from Lisa Moore DO sent at 12/4/2024  3:40 AM EST -----  Please call patient daughter Mary to review labs. Electrolytes and protein are stable.His white blood cell count is normal now. He has elevated bands that are up from 7 days. Any concern for infection at this time, any fever,chills,cough,wounds?      She is aware of lab results. She states Ramos had a foot wound when he had labs last drawn.   She had no questions or concerns.

## 2024-12-05 ENCOUNTER — HOME CARE VISIT (OUTPATIENT)
Dept: HOME HEALTH SERVICES | Facility: HOME HEALTHCARE | Age: 74
End: 2024-12-05
Payer: MEDICARE

## 2024-12-05 PROCEDURE — G0151 HHCP-SERV OF PT,EA 15 MIN: HCPCS

## 2024-12-05 PROCEDURE — G0299 HHS/HOSPICE OF RN EA 15 MIN: HCPCS

## 2024-12-06 VITALS — DIASTOLIC BLOOD PRESSURE: 70 MMHG | SYSTOLIC BLOOD PRESSURE: 130 MMHG | OXYGEN SATURATION: 100 % | HEART RATE: 73 BPM

## 2024-12-06 NOTE — TELEPHONE ENCOUNTER
Patients wife calling back, she states no, he is not on antibiotics. There is a nurse that comes to their house to change his dressing and clean the wound. She states the wound is continuing to get smaller.  JACKY

## 2024-12-06 NOTE — CASE COMMUNICATION
d/c home PT.   If pt gets his new beasy board and needs PT - please get MD order and refer back in.

## 2024-12-10 ENCOUNTER — APPOINTMENT (OUTPATIENT)
Dept: LAB | Facility: CLINIC | Age: 74
End: 2024-12-10
Payer: MEDICARE

## 2024-12-10 ENCOUNTER — HOME CARE VISIT (OUTPATIENT)
Dept: HOME HEALTH SERVICES | Facility: HOME HEALTHCARE | Age: 74
End: 2024-12-10
Payer: MEDICARE

## 2024-12-10 VITALS
HEIGHT: 74 IN | TEMPERATURE: 98 F | OXYGEN SATURATION: 98 % | RESPIRATION RATE: 18 BRPM | SYSTOLIC BLOOD PRESSURE: 136 MMHG | WEIGHT: 280 LBS | BODY MASS INDEX: 35.94 KG/M2 | DIASTOLIC BLOOD PRESSURE: 74 MMHG | HEART RATE: 68 BPM

## 2024-12-10 DIAGNOSIS — N01.9 RAPIDLY PROGRESSIVE GLOMERULONEPHRITIS WITH ANTI-GBM ANTIBODIES: ICD-10-CM

## 2024-12-10 DIAGNOSIS — D84.9 IMMUNOSUPPRESSION (HCC): ICD-10-CM

## 2024-12-10 DIAGNOSIS — N01.9 RPGN (RAPIDLY PROGRESSIVE GLOMERULONEPHRITIS) W/ ANTI-GBM ANTIBODIES: ICD-10-CM

## 2024-12-10 LAB
ALBUMIN SERPL BCG-MCNC: 3.2 G/DL (ref 3.5–5)
ALP SERPL-CCNC: 81 U/L (ref 34–104)
ALT SERPL W P-5'-P-CCNC: 18 U/L (ref 7–52)
ANION GAP SERPL CALCULATED.3IONS-SCNC: 9 MMOL/L (ref 4–13)
ANISOCYTOSIS BLD QL SMEAR: PRESENT
AST SERPL W P-5'-P-CCNC: 18 U/L (ref 13–39)
BASOPHILS # BLD MANUAL: 0.08 THOUSAND/UL (ref 0–0.1)
BASOPHILS NFR MAR MANUAL: 1 % (ref 0–1)
BILIRUB SERPL-MCNC: 0.49 MG/DL (ref 0.2–1)
BUN SERPL-MCNC: 29 MG/DL (ref 5–25)
CALCIUM ALBUM COR SERPL-MCNC: 9.1 MG/DL (ref 8.3–10.1)
CALCIUM SERPL-MCNC: 8.5 MG/DL (ref 8.4–10.2)
CHLORIDE SERPL-SCNC: 95 MMOL/L (ref 96–108)
CO2 SERPL-SCNC: 32 MMOL/L (ref 21–32)
CREAT SERPL-MCNC: 4.76 MG/DL (ref 0.6–1.3)
EOSINOPHIL # BLD MANUAL: 0 THOUSAND/UL (ref 0–0.4)
EOSINOPHIL NFR BLD MANUAL: 0 % (ref 0–6)
ERYTHROCYTE [DISTWIDTH] IN BLOOD BY AUTOMATED COUNT: 18.4 % (ref 11.6–15.1)
GFR SERPL CREATININE-BSD FRML MDRD: 11 ML/MIN/1.73SQ M
GLUCOSE SERPL-MCNC: 158 MG/DL (ref 65–140)
HCT VFR BLD AUTO: 27.1 % (ref 36.5–49.3)
HGB BLD-MCNC: 9.1 G/DL (ref 12–17)
LYMPHOCYTES # BLD AUTO: 0.55 THOUSAND/UL (ref 0.6–4.47)
LYMPHOCYTES # BLD AUTO: 7 % (ref 14–44)
MACROCYTES BLD QL AUTO: PRESENT
MCH RBC QN AUTO: 36 PG (ref 26.8–34.3)
MCHC RBC AUTO-ENTMCNC: 33.6 G/DL (ref 31.4–37.4)
MCV RBC AUTO: 107 FL (ref 82–98)
METAMYELOCYTE ABSOLUTE CT: 0.24 THOUSAND/UL (ref 0–0.1)
METAMYELOCYTES NFR BLD MANUAL: 3 % (ref 0–1)
MONOCYTES # BLD AUTO: 0.24 THOUSAND/UL (ref 0–1.22)
MONOCYTES NFR BLD: 3 % (ref 4–12)
MYELOCYTE ABSOLUTE CT: 0.16 THOUSAND/UL (ref 0–0.1)
MYELOCYTES NFR BLD MANUAL: 2 % (ref 0–1)
NEUTROPHILS # BLD MANUAL: 6.61 THOUSAND/UL (ref 1.85–7.62)
NEUTS BAND NFR BLD MANUAL: 26 % (ref 0–8)
NEUTS SEG NFR BLD AUTO: 58 % (ref 43–75)
PLATELET # BLD AUTO: 193 THOUSANDS/UL (ref 149–390)
PLATELET BLD QL SMEAR: ADEQUATE
PMV BLD AUTO: 9.3 FL (ref 8.9–12.7)
POIKILOCYTOSIS BLD QL SMEAR: PRESENT
POTASSIUM SERPL-SCNC: 4.6 MMOL/L (ref 3.5–5.3)
PROT SERPL-MCNC: 4.9 G/DL (ref 6.4–8.4)
RBC # BLD AUTO: 2.53 MILLION/UL (ref 3.88–5.62)
RBC MORPH BLD: PRESENT
SODIUM SERPL-SCNC: 136 MMOL/L (ref 135–147)
WBC # BLD AUTO: 7.87 THOUSAND/UL (ref 4.31–10.16)

## 2024-12-10 PROCEDURE — 80053 COMPREHEN METABOLIC PANEL: CPT

## 2024-12-10 PROCEDURE — 36415 COLL VENOUS BLD VENIPUNCTURE: CPT

## 2024-12-10 PROCEDURE — 85007 BL SMEAR W/DIFF WBC COUNT: CPT

## 2024-12-10 PROCEDURE — G0299 HHS/HOSPICE OF RN EA 15 MIN: HCPCS

## 2024-12-10 PROCEDURE — 85027 COMPLETE CBC AUTOMATED: CPT

## 2024-12-10 PROCEDURE — 83520 IMMUNOASSAY QUANT NOS NONAB: CPT

## 2024-12-12 ENCOUNTER — HOME CARE VISIT (OUTPATIENT)
Dept: HOME HEALTH SERVICES | Facility: HOME HEALTHCARE | Age: 74
End: 2024-12-12
Payer: MEDICARE

## 2024-12-12 VITALS
OXYGEN SATURATION: 98 % | SYSTOLIC BLOOD PRESSURE: 124 MMHG | TEMPERATURE: 97.6 F | RESPIRATION RATE: 18 BRPM | DIASTOLIC BLOOD PRESSURE: 76 MMHG | HEART RATE: 76 BPM

## 2024-12-12 LAB — GBM AB SER IA-ACNC: 6 UNITS (ref 0–0.9)

## 2024-12-12 PROCEDURE — G0299 HHS/HOSPICE OF RN EA 15 MIN: HCPCS

## 2024-12-12 PROCEDURE — 400014 VN F/U

## 2024-12-16 DIAGNOSIS — E83.51 HYPOCALCEMIA: ICD-10-CM

## 2024-12-16 DIAGNOSIS — N01.9 RAPIDLY PROGRESSIVE GLOMERULONEPHRITIS WITH ANTI-GBM ANTIBODIES: ICD-10-CM

## 2024-12-16 RX ORDER — CALCIUM CARBONATE 500(1250)
2 TABLET ORAL 2 TIMES DAILY WITH MEALS
Qty: 360 TABLET | Refills: 0 | Status: SHIPPED | OUTPATIENT
Start: 2024-12-16 | End: 2024-12-18 | Stop reason: SDUPTHER

## 2024-12-16 RX ORDER — B COMPLEX, C NO.20/FOLIC ACID 1 MG
1 CAPSULE ORAL
Qty: 90 CAPSULE | Refills: 0 | Status: SHIPPED | OUTPATIENT
Start: 2024-12-16

## 2024-12-16 NOTE — PROGRESS NOTES
Contacted by wife on the phone, she needs refill on Triphocaps and calcium. Refill sent to [facility].Patient has prednisone Rx with taper, she confirmed he is taking.

## 2024-12-17 ENCOUNTER — APPOINTMENT (OUTPATIENT)
Age: 74
End: 2024-12-17
Payer: MEDICARE

## 2024-12-17 ENCOUNTER — HOME CARE VISIT (OUTPATIENT)
Dept: HOME HEALTH SERVICES | Facility: HOME HEALTHCARE | Age: 74
End: 2024-12-17
Payer: MEDICARE

## 2024-12-17 VITALS — DIASTOLIC BLOOD PRESSURE: 70 MMHG | SYSTOLIC BLOOD PRESSURE: 106 MMHG

## 2024-12-17 DIAGNOSIS — D84.9 IMMUNOSUPPRESSION (HCC): ICD-10-CM

## 2024-12-17 DIAGNOSIS — N01.9 RAPIDLY PROGRESSIVE GLOMERULONEPHRITIS WITH ANTI-GBM ANTIBODIES: ICD-10-CM

## 2024-12-17 DIAGNOSIS — N01.9 RPGN (RAPIDLY PROGRESSIVE GLOMERULONEPHRITIS) W/ ANTI-GBM ANTIBODIES: ICD-10-CM

## 2024-12-17 LAB
ALBUMIN SERPL BCG-MCNC: 3.1 G/DL (ref 3.5–5)
ALP SERPL-CCNC: 77 U/L (ref 34–104)
ALT SERPL W P-5'-P-CCNC: 53 U/L (ref 7–52)
ANION GAP SERPL CALCULATED.3IONS-SCNC: 12 MMOL/L (ref 4–13)
AST SERPL W P-5'-P-CCNC: 33 U/L (ref 13–39)
BASOPHILS # BLD AUTO: 0.03 THOUSANDS/ÂΜL (ref 0–0.1)
BASOPHILS NFR BLD AUTO: 1 % (ref 0–1)
BILIRUB SERPL-MCNC: 0.48 MG/DL (ref 0.2–1)
BUN SERPL-MCNC: 29 MG/DL (ref 5–25)
CALCIUM ALBUM COR SERPL-MCNC: 8.9 MG/DL (ref 8.3–10.1)
CALCIUM SERPL-MCNC: 8.2 MG/DL (ref 8.4–10.2)
CHLORIDE SERPL-SCNC: 96 MMOL/L (ref 96–108)
CO2 SERPL-SCNC: 29 MMOL/L (ref 21–32)
CREAT SERPL-MCNC: 4.95 MG/DL (ref 0.6–1.3)
EOSINOPHIL # BLD AUTO: 0.07 THOUSAND/ÂΜL (ref 0–0.61)
EOSINOPHIL NFR BLD AUTO: 2 % (ref 0–6)
ERYTHROCYTE [DISTWIDTH] IN BLOOD BY AUTOMATED COUNT: 18.5 % (ref 11.6–15.1)
GFR SERPL CREATININE-BSD FRML MDRD: 10 ML/MIN/1.73SQ M
GLUCOSE SERPL-MCNC: 174 MG/DL (ref 65–140)
HCT VFR BLD AUTO: 25.3 % (ref 36.5–49.3)
HGB BLD-MCNC: 8.2 G/DL (ref 12–17)
IMM GRANULOCYTES # BLD AUTO: 0.04 THOUSAND/UL (ref 0–0.2)
IMM GRANULOCYTES NFR BLD AUTO: 1 % (ref 0–2)
LYMPHOCYTES # BLD AUTO: 0.74 THOUSANDS/ÂΜL (ref 0.6–4.47)
LYMPHOCYTES NFR BLD AUTO: 25 % (ref 14–44)
MCH RBC QN AUTO: 34.9 PG (ref 26.8–34.3)
MCHC RBC AUTO-ENTMCNC: 32.4 G/DL (ref 31.4–37.4)
MCV RBC AUTO: 108 FL (ref 82–98)
MONOCYTES # BLD AUTO: 0.52 THOUSAND/ÂΜL (ref 0.17–1.22)
MONOCYTES NFR BLD AUTO: 18 % (ref 4–12)
NEUTROPHILS # BLD AUTO: 1.51 THOUSANDS/ÂΜL (ref 1.85–7.62)
NEUTS SEG NFR BLD AUTO: 53 % (ref 43–75)
NRBC BLD AUTO-RTO: 1 /100 WBCS
PLATELET # BLD AUTO: 189 THOUSANDS/UL (ref 149–390)
PMV BLD AUTO: 8.8 FL (ref 8.9–12.7)
POTASSIUM SERPL-SCNC: 3.8 MMOL/L (ref 3.5–5.3)
PROT SERPL-MCNC: 4.7 G/DL (ref 6.4–8.4)
RBC # BLD AUTO: 2.35 MILLION/UL (ref 3.88–5.62)
SODIUM SERPL-SCNC: 137 MMOL/L (ref 135–147)
WBC # BLD AUTO: 2.91 THOUSAND/UL (ref 4.31–10.16)

## 2024-12-17 PROCEDURE — 85025 COMPLETE CBC W/AUTO DIFF WBC: CPT

## 2024-12-17 PROCEDURE — 83520 IMMUNOASSAY QUANT NOS NONAB: CPT

## 2024-12-17 PROCEDURE — G0299 HHS/HOSPICE OF RN EA 15 MIN: HCPCS

## 2024-12-17 PROCEDURE — 36415 COLL VENOUS BLD VENIPUNCTURE: CPT

## 2024-12-17 PROCEDURE — 80053 COMPREHEN METABOLIC PANEL: CPT

## 2024-12-18 ENCOUNTER — DOCUMENTATION (OUTPATIENT)
Dept: NEPHROLOGY | Facility: CLINIC | Age: 74
End: 2024-12-18

## 2024-12-18 DIAGNOSIS — R55 SYNCOPE, UNSPECIFIED SYNCOPE TYPE: ICD-10-CM

## 2024-12-18 DIAGNOSIS — N01.9 RAPIDLY PROGRESSIVE GLOMERULONEPHRITIS WITH ANTI-GBM ANTIBODIES: ICD-10-CM

## 2024-12-18 DIAGNOSIS — I10 ESSENTIAL HYPERTENSION: Primary | ICD-10-CM

## 2024-12-18 DIAGNOSIS — I95.0 IDIOPATHIC HYPOTENSION: ICD-10-CM

## 2024-12-18 DIAGNOSIS — E83.51 HYPOCALCEMIA: ICD-10-CM

## 2024-12-18 LAB — GBM AB SER IA-ACNC: 5.2 UNITS (ref 0–0.9)

## 2024-12-18 RX ORDER — MIDODRINE HYDROCHLORIDE 5 MG/1
5 TABLET ORAL 3 TIMES WEEKLY
Qty: 12 TABLET | Refills: 1 | Status: SHIPPED | OUTPATIENT
Start: 2024-12-18

## 2024-12-18 RX ORDER — MIDODRINE HYDROCHLORIDE 2.5 MG/1
2.5 TABLET ORAL
Qty: 90 TABLET | Refills: 0 | Status: SHIPPED | OUTPATIENT
Start: 2024-12-18 | End: 2024-12-19 | Stop reason: ALTCHOICE

## 2024-12-18 RX ORDER — CALCIUM CARBONATE 500(1250)
2 TABLET ORAL 2 TIMES DAILY WITH MEALS
Qty: 360 TABLET | Refills: 0 | Status: SHIPPED | OUTPATIENT
Start: 2024-12-18

## 2024-12-19 ENCOUNTER — HOME CARE VISIT (OUTPATIENT)
Dept: HOME HEALTH SERVICES | Facility: HOME HEALTHCARE | Age: 74
End: 2024-12-19
Payer: MEDICARE

## 2024-12-19 ENCOUNTER — TELEPHONE (OUTPATIENT)
Dept: NEPHROLOGY | Facility: CLINIC | Age: 74
End: 2024-12-19

## 2024-12-19 VITALS
OXYGEN SATURATION: 96 % | HEART RATE: 84 BPM | RESPIRATION RATE: 18 BRPM | DIASTOLIC BLOOD PRESSURE: 54 MMHG | TEMPERATURE: 97 F | SYSTOLIC BLOOD PRESSURE: 98 MMHG

## 2024-12-19 DIAGNOSIS — R55 SYNCOPE, UNSPECIFIED SYNCOPE TYPE: ICD-10-CM

## 2024-12-19 DIAGNOSIS — I95.0 IDIOPATHIC HYPOTENSION: ICD-10-CM

## 2024-12-19 DIAGNOSIS — N01.9 RAPIDLY PROGRESSIVE GLOMERULONEPHRITIS WITH ANTI-GBM ANTIBODIES: ICD-10-CM

## 2024-12-19 PROCEDURE — G0299 HHS/HOSPICE OF RN EA 15 MIN: HCPCS

## 2024-12-19 RX ORDER — PREDNISONE 2.5 MG/1
TABLET ORAL
Qty: 133 TABLET | Refills: 0 | Status: SHIPPED | OUTPATIENT
Start: 2024-12-19 | End: 2025-01-09

## 2024-12-19 RX ORDER — MIDODRINE HYDROCHLORIDE 2.5 MG/1
5 TABLET ORAL
Qty: 180 TABLET | Refills: 0 | Status: SHIPPED | OUTPATIENT
Start: 2024-12-19

## 2024-12-19 NOTE — TELEPHONE ENCOUNTER
Patient had episode of unresponsiveness on HD 12/18 which resolved with prompt fluid resuscitation. Patient EDW  adjusted to account for softer BP trends and adjusted from 127->127.5 the week prior.  After episode, EDW changed to 128 kg.    Updated wife,Louise, on phone 12/18 that midodrine 5mg pre HD added, and should bring to HD. Standing midodrine 2.5mg TID added due to soft BP trends on HD days. BP trends were not relayed due to myself prior to this but wife reported soft BP on 12/17.  Per wife, patient offers NO complaints at home. His heel wound is improving and he has no fevers. No dizziness/lightheadedness/chest pain/sob/vomiting/diarrhea. BP in clinic can be 100-110 to start HD but can drop with treatment.    Advised to check 2D echo as well to evaluate for any pericardial effusion/valvular issues.    Called daughter, Mary, to update on care 12/19. Explained episode of unresponsiveness and potential etiologies.  Hypovolemia is a concern, patient drinking ,<16 ounces per day which has been concerning to family. Encouraged hydration to 32 ounces of fluid per day to avoid dehydration.     Advised to monitor BP twice daily at home, if SBP<100, notify HD unit/me and may need ER evaluation. His etiology of hypotension is unclear. Daughter verbalized understanding to follow trends and bring him to ER if any concerns.    Hgb 8-9 but no reports of bleeding and no major drops. Asked about melena/BRBPR and daughter denies any history of this.    Infection is a possibility and WBC count lower, will plan to check WBC on HD tomorrow and ensure not declining further. No other concerning sources of infection at this time.No Permcath issues reported at clinic.    Will check echo to ensure no issues.    This provider has been slowly tapering his prednisone by 2.5mg per week as he had no response to cyclophosphamide and had no recovery. Rapid steroid reduction can predispose to adrenal insufficiency. Will more slowly  taper while BP trends are a concern by 2.5/2 weeks. Daughter understands this will lengthen his steroid exposure which puts at risk for steroid side effects.    Will check TSH with HD tomorrow as well to ensure no thyroid disorder as factor.

## 2024-12-23 ENCOUNTER — HOME CARE VISIT (OUTPATIENT)
Dept: HOME HEALTH SERVICES | Facility: HOME HEALTHCARE | Age: 74
End: 2024-12-23
Payer: MEDICARE

## 2024-12-23 ENCOUNTER — APPOINTMENT (OUTPATIENT)
Age: 74
End: 2024-12-23
Payer: MEDICARE

## 2024-12-23 VITALS
OXYGEN SATURATION: 99 % | HEART RATE: 70 BPM | SYSTOLIC BLOOD PRESSURE: 118 MMHG | DIASTOLIC BLOOD PRESSURE: 60 MMHG | TEMPERATURE: 97 F | RESPIRATION RATE: 20 BRPM

## 2024-12-23 DIAGNOSIS — N01.9 RAPIDLY PROGRESSIVE GLOMERULONEPHRITIS WITH ANTI-GBM ANTIBODIES: ICD-10-CM

## 2024-12-23 DIAGNOSIS — D84.9 IMMUNOSUPPRESSION (HCC): ICD-10-CM

## 2024-12-23 LAB
ANISOCYTOSIS BLD QL SMEAR: PRESENT
BASOPHILS # BLD MANUAL: 0.17 THOUSAND/UL (ref 0–0.1)
BASOPHILS NFR MAR MANUAL: 2 % (ref 0–1)
DACRYOCYTES BLD QL SMEAR: PRESENT
EOSINOPHIL # BLD MANUAL: 0.17 THOUSAND/UL (ref 0–0.4)
EOSINOPHIL NFR BLD MANUAL: 2 % (ref 0–6)
ERYTHROCYTE [DISTWIDTH] IN BLOOD BY AUTOMATED COUNT: 17.4 % (ref 11.6–15.1)
HCT VFR BLD AUTO: 26.9 % (ref 36.5–49.3)
HGB BLD-MCNC: 8.4 G/DL (ref 12–17)
LYMPHOCYTES # BLD AUTO: 1.43 THOUSAND/UL (ref 0.6–4.47)
LYMPHOCYTES # BLD AUTO: 14 % (ref 14–44)
MACROCYTES BLD QL AUTO: PRESENT
MCH RBC QN AUTO: 34.1 PG (ref 26.8–34.3)
MCHC RBC AUTO-ENTMCNC: 31.2 G/DL (ref 31.4–37.4)
MCV RBC AUTO: 109 FL (ref 82–98)
MONOCYTES # BLD AUTO: 0.59 THOUSAND/UL (ref 0–1.22)
MONOCYTES NFR BLD: 7 % (ref 4–12)
NEUTROPHILS # BLD MANUAL: 6.04 THOUSAND/UL (ref 1.85–7.62)
NEUTS BAND NFR BLD MANUAL: 5 % (ref 0–8)
NEUTS SEG NFR BLD AUTO: 67 % (ref 43–75)
NRBC BLD AUTO-RTO: 2 /100 WBC (ref 0–2)
OVALOCYTES BLD QL SMEAR: PRESENT
PLATELET # BLD AUTO: 280 THOUSANDS/UL (ref 149–390)
PLATELET BLD QL SMEAR: ADEQUATE
PMV BLD AUTO: 8.9 FL (ref 8.9–12.7)
POIKILOCYTOSIS BLD QL SMEAR: PRESENT
POLYCHROMASIA BLD QL SMEAR: PRESENT
RBC # BLD AUTO: 2.46 MILLION/UL (ref 3.88–5.62)
RBC MORPH BLD: PRESENT
VARIANT LYMPHS # BLD AUTO: 3 %
WBC # BLD AUTO: 8.39 THOUSAND/UL (ref 4.31–10.16)

## 2024-12-23 PROCEDURE — G0299 HHS/HOSPICE OF RN EA 15 MIN: HCPCS

## 2024-12-23 PROCEDURE — 85007 BL SMEAR W/DIFF WBC COUNT: CPT

## 2024-12-23 PROCEDURE — 83520 IMMUNOASSAY QUANT NOS NONAB: CPT

## 2024-12-23 PROCEDURE — 36415 COLL VENOUS BLD VENIPUNCTURE: CPT

## 2024-12-23 PROCEDURE — 85027 COMPLETE CBC AUTOMATED: CPT

## 2024-12-23 NOTE — Clinical Note
New skin tear on right calf.  Photo in Epic.  Patient states it occurred at HD while transferring.  I applied silver alginate and foam dressing.  Please let me know if you would prefer a different treatment.  TY.

## 2024-12-24 LAB — GBM AB SER IA-ACNC: 5.2 UNITS (ref 0–0.9)

## 2024-12-26 ENCOUNTER — HOME CARE VISIT (OUTPATIENT)
Dept: HOME HEALTH SERVICES | Facility: HOME HEALTHCARE | Age: 74
End: 2024-12-26
Payer: MEDICARE

## 2024-12-26 VITALS
BODY MASS INDEX: 36.08 KG/M2 | RESPIRATION RATE: 20 BRPM | WEIGHT: 281 LBS | OXYGEN SATURATION: 98 % | HEART RATE: 98 BPM | DIASTOLIC BLOOD PRESSURE: 56 MMHG | TEMPERATURE: 96 F | SYSTOLIC BLOOD PRESSURE: 122 MMHG

## 2024-12-26 PROCEDURE — G0299 HHS/HOSPICE OF RN EA 15 MIN: HCPCS

## 2024-12-30 ENCOUNTER — APPOINTMENT (OUTPATIENT)
Age: 74
End: 2024-12-30
Payer: MEDICARE

## 2024-12-30 ENCOUNTER — DOCUMENTATION (OUTPATIENT)
Dept: OTHER | Facility: HOSPITAL | Age: 74
End: 2024-12-30

## 2024-12-30 ENCOUNTER — HOME CARE VISIT (OUTPATIENT)
Dept: HOME HEALTH SERVICES | Facility: HOME HEALTHCARE | Age: 74
End: 2024-12-30
Payer: MEDICARE

## 2024-12-30 VITALS
SYSTOLIC BLOOD PRESSURE: 120 MMHG | TEMPERATURE: 97.2 F | HEART RATE: 54 BPM | RESPIRATION RATE: 18 BRPM | OXYGEN SATURATION: 97 % | DIASTOLIC BLOOD PRESSURE: 72 MMHG

## 2024-12-30 DIAGNOSIS — D84.9 IMMUNOSUPPRESSION (HCC): ICD-10-CM

## 2024-12-30 DIAGNOSIS — N01.9 RAPIDLY PROGRESSIVE GLOMERULONEPHRITIS WITH ANTI-GBM ANTIBODIES: ICD-10-CM

## 2024-12-30 LAB
BASOPHILS # BLD AUTO: 0.09 THOUSANDS/ΜL (ref 0–0.1)
BASOPHILS NFR BLD AUTO: 1 % (ref 0–1)
EOSINOPHIL # BLD AUTO: 0.11 THOUSAND/ΜL (ref 0–0.61)
EOSINOPHIL NFR BLD AUTO: 1 % (ref 0–6)
ERYTHROCYTE [DISTWIDTH] IN BLOOD BY AUTOMATED COUNT: 17 % (ref 11.6–15.1)
HCT VFR BLD AUTO: 27.6 % (ref 36.5–49.3)
HGB BLD-MCNC: 8.5 G/DL (ref 12–17)
IMM GRANULOCYTES # BLD AUTO: 0.16 THOUSAND/UL (ref 0–0.2)
IMM GRANULOCYTES NFR BLD AUTO: 1 % (ref 0–2)
LYMPHOCYTES # BLD AUTO: 0.62 THOUSANDS/ΜL (ref 0.6–4.47)
LYMPHOCYTES NFR BLD AUTO: 4 % (ref 14–44)
MCH RBC QN AUTO: 34.4 PG (ref 26.8–34.3)
MCHC RBC AUTO-ENTMCNC: 30.8 G/DL (ref 31.4–37.4)
MCV RBC AUTO: 112 FL (ref 82–98)
MONOCYTES # BLD AUTO: 0.66 THOUSAND/ΜL (ref 0.17–1.22)
MONOCYTES NFR BLD AUTO: 4 % (ref 4–12)
NEUTROPHILS # BLD AUTO: 13.64 THOUSANDS/ΜL (ref 1.85–7.62)
NEUTS SEG NFR BLD AUTO: 89 % (ref 43–75)
NRBC BLD AUTO-RTO: 0 /100 WBCS
PLATELET # BLD AUTO: 285 THOUSANDS/UL (ref 149–390)
PMV BLD AUTO: 8.8 FL (ref 8.9–12.7)
RBC # BLD AUTO: 2.47 MILLION/UL (ref 3.88–5.62)
WBC # BLD AUTO: 15.28 THOUSAND/UL (ref 4.31–10.16)

## 2024-12-30 PROCEDURE — 36415 COLL VENOUS BLD VENIPUNCTURE: CPT

## 2024-12-30 PROCEDURE — 83520 IMMUNOASSAY QUANT NOS NONAB: CPT

## 2024-12-30 PROCEDURE — 85025 COMPLETE CBC W/AUTO DIFF WBC: CPT

## 2024-12-30 PROCEDURE — G0299 HHS/HOSPICE OF RN EA 15 MIN: HCPCS

## 2024-12-30 RX ORDER — PREDNISONE 2.5 MG/1
TABLET ORAL
Qty: 133 TABLET | Refills: 0 | Status: SHIPPED | OUTPATIENT
Start: 2025-01-09 | End: 2025-02-06

## 2024-12-31 LAB — GBM AB SER IA-ACNC: 4.9 UNITS (ref 0–0.9)

## 2025-01-02 ENCOUNTER — HOME CARE VISIT (OUTPATIENT)
Dept: HOME HEALTH SERVICES | Facility: HOME HEALTHCARE | Age: 75
End: 2025-01-02
Payer: MEDICARE

## 2025-01-02 VITALS
SYSTOLIC BLOOD PRESSURE: 150 MMHG | RESPIRATION RATE: 18 BRPM | OXYGEN SATURATION: 95 % | HEART RATE: 76 BPM | TEMPERATURE: 97.8 F | DIASTOLIC BLOOD PRESSURE: 84 MMHG

## 2025-01-02 PROCEDURE — G0299 HHS/HOSPICE OF RN EA 15 MIN: HCPCS

## 2025-01-03 ENCOUNTER — TELEPHONE (OUTPATIENT)
Dept: NEPHROLOGY | Facility: CLINIC | Age: 75
End: 2025-01-03

## 2025-01-03 DIAGNOSIS — I10 ESSENTIAL HYPERTENSION: ICD-10-CM

## 2025-01-03 DIAGNOSIS — I95.0 IDIOPATHIC HYPOTENSION: ICD-10-CM

## 2025-01-03 RX ORDER — MIDODRINE HYDROCHLORIDE 2.5 MG/1
5 TABLET ORAL
Qty: 180 TABLET | Refills: 0 | Status: SHIPPED | OUTPATIENT
Start: 2025-01-03 | End: 2025-01-09

## 2025-01-03 RX ORDER — MIDODRINE HYDROCHLORIDE 5 MG/1
5 TABLET ORAL 3 TIMES WEEKLY
Qty: 12 TABLET | Refills: 1 | Status: SHIPPED | OUTPATIENT
Start: 2025-01-03

## 2025-01-07 ENCOUNTER — APPOINTMENT (OUTPATIENT)
Age: 75
End: 2025-01-07
Payer: MEDICARE

## 2025-01-07 ENCOUNTER — HOME CARE VISIT (OUTPATIENT)
Dept: HOME HEALTH SERVICES | Facility: HOME HEALTHCARE | Age: 75
End: 2025-01-07
Payer: MEDICARE

## 2025-01-07 VITALS
HEART RATE: 88 BPM | DIASTOLIC BLOOD PRESSURE: 58 MMHG | SYSTOLIC BLOOD PRESSURE: 120 MMHG | OXYGEN SATURATION: 98 % | RESPIRATION RATE: 16 BRPM

## 2025-01-07 DIAGNOSIS — D84.9 IMMUNOSUPPRESSION (HCC): ICD-10-CM

## 2025-01-07 DIAGNOSIS — N01.9 RAPIDLY PROGRESSIVE GLOMERULONEPHRITIS WITH ANTI-GBM ANTIBODIES: ICD-10-CM

## 2025-01-07 LAB
BASOPHILS # BLD AUTO: 0.09 THOUSANDS/ΜL (ref 0–0.1)
BASOPHILS NFR BLD AUTO: 1 % (ref 0–1)
EOSINOPHIL # BLD AUTO: 0.11 THOUSAND/ΜL (ref 0–0.61)
EOSINOPHIL NFR BLD AUTO: 1 % (ref 0–6)
ERYTHROCYTE [DISTWIDTH] IN BLOOD BY AUTOMATED COUNT: 15.2 % (ref 11.6–15.1)
HCT VFR BLD AUTO: 31 % (ref 36.5–49.3)
HGB BLD-MCNC: 9.4 G/DL (ref 12–17)
IMM GRANULOCYTES # BLD AUTO: 0.1 THOUSAND/UL (ref 0–0.2)
IMM GRANULOCYTES NFR BLD AUTO: 1 % (ref 0–2)
LYMPHOCYTES # BLD AUTO: 0.75 THOUSANDS/ΜL (ref 0.6–4.47)
LYMPHOCYTES NFR BLD AUTO: 8 % (ref 14–44)
MCH RBC QN AUTO: 33.5 PG (ref 26.8–34.3)
MCHC RBC AUTO-ENTMCNC: 30.3 G/DL (ref 31.4–37.4)
MCV RBC AUTO: 110 FL (ref 82–98)
MONOCYTES # BLD AUTO: 0.6 THOUSAND/ΜL (ref 0.17–1.22)
MONOCYTES NFR BLD AUTO: 7 % (ref 4–12)
NEUTROPHILS # BLD AUTO: 7.52 THOUSANDS/ΜL (ref 1.85–7.62)
NEUTS SEG NFR BLD AUTO: 82 % (ref 43–75)
NRBC BLD AUTO-RTO: 0 /100 WBCS
PLATELET # BLD AUTO: 321 THOUSANDS/UL (ref 149–390)
PMV BLD AUTO: 9.3 FL (ref 8.9–12.7)
RBC # BLD AUTO: 2.81 MILLION/UL (ref 3.88–5.62)
WBC # BLD AUTO: 9.17 THOUSAND/UL (ref 4.31–10.16)

## 2025-01-07 PROCEDURE — G0299 HHS/HOSPICE OF RN EA 15 MIN: HCPCS

## 2025-01-07 PROCEDURE — 36415 COLL VENOUS BLD VENIPUNCTURE: CPT

## 2025-01-07 PROCEDURE — 85025 COMPLETE CBC W/AUTO DIFF WBC: CPT

## 2025-01-08 ENCOUNTER — HOSPITAL ENCOUNTER (OUTPATIENT)
Facility: HOSPITAL | Age: 75
Setting detail: OBSERVATION
Discharge: HOME | End: 2025-01-09
Attending: EMERGENCY MEDICINE | Admitting: INTERNAL MEDICINE
Payer: MEDICARE

## 2025-01-08 ENCOUNTER — HOME CARE VISIT (OUTPATIENT)
Dept: HOME HEALTH SERVICES | Facility: HOME HEALTHCARE | Age: 75
End: 2025-01-08
Payer: MEDICARE

## 2025-01-08 DIAGNOSIS — Z99.2 END-STAGE RENAL DISEASE ON HEMODIALYSIS (HCC): ICD-10-CM

## 2025-01-08 DIAGNOSIS — T82.9XXA COMPLICATION ASSOCIATED WITH DIALYSIS CATHETER: Primary | ICD-10-CM

## 2025-01-08 DIAGNOSIS — N18.6 END-STAGE RENAL DISEASE ON HEMODIALYSIS (HCC): ICD-10-CM

## 2025-01-08 DIAGNOSIS — N01.9 RPGN (RAPIDLY PROGRESSIVE GLOMERULONEPHRITIS) W/ ANTI-GBM ANTIBODIES: ICD-10-CM

## 2025-01-08 DIAGNOSIS — I95.0 IDIOPATHIC HYPOTENSION: ICD-10-CM

## 2025-01-08 PROBLEM — T82.898A PROBLEM WITH DIALYSIS ACCESS (HCC): Status: ACTIVE | Noted: 2024-09-21

## 2025-01-08 LAB
ALBUMIN SERPL BCG-MCNC: 3.5 G/DL (ref 3.5–5)
ALP SERPL-CCNC: 61 U/L (ref 34–104)
ALT SERPL W P-5'-P-CCNC: 11 U/L (ref 7–52)
ANION GAP SERPL CALCULATED.3IONS-SCNC: 12 MMOL/L (ref 4–13)
APTT PPP: 27 SECONDS (ref 23–34)
AST SERPL W P-5'-P-CCNC: 19 U/L (ref 13–39)
BASOPHILS # BLD AUTO: 0.07 THOUSANDS/ΜL (ref 0–0.1)
BASOPHILS NFR BLD AUTO: 1 % (ref 0–1)
BILIRUB SERPL-MCNC: 0.42 MG/DL (ref 0.2–1)
BUN SERPL-MCNC: 45 MG/DL (ref 5–25)
CALCIUM SERPL-MCNC: 8.9 MG/DL (ref 8.4–10.2)
CHLORIDE SERPL-SCNC: 94 MMOL/L (ref 96–108)
CO2 SERPL-SCNC: 30 MMOL/L (ref 21–32)
CREAT SERPL-MCNC: 7.03 MG/DL (ref 0.6–1.3)
EOSINOPHIL # BLD AUTO: 0.12 THOUSAND/ΜL (ref 0–0.61)
EOSINOPHIL NFR BLD AUTO: 1 % (ref 0–6)
ERYTHROCYTE [DISTWIDTH] IN BLOOD BY AUTOMATED COUNT: 15 % (ref 11.6–15.1)
GFR SERPL CREATININE-BSD FRML MDRD: 6 ML/MIN/1.73SQ M
GLUCOSE SERPL-MCNC: 175 MG/DL (ref 65–140)
HCT VFR BLD AUTO: 31.3 % (ref 36.5–49.3)
HGB BLD-MCNC: 9.9 G/DL (ref 12–17)
IMM GRANULOCYTES # BLD AUTO: 0.14 THOUSAND/UL (ref 0–0.2)
IMM GRANULOCYTES NFR BLD AUTO: 1 % (ref 0–2)
INR PPP: 0.96 (ref 0.85–1.19)
LYMPHOCYTES # BLD AUTO: 0.91 THOUSANDS/ΜL (ref 0.6–4.47)
LYMPHOCYTES NFR BLD AUTO: 8 % (ref 14–44)
MCH RBC QN AUTO: 34.3 PG (ref 26.8–34.3)
MCHC RBC AUTO-ENTMCNC: 31.6 G/DL (ref 31.4–37.4)
MCV RBC AUTO: 108 FL (ref 82–98)
MONOCYTES # BLD AUTO: 0.69 THOUSAND/ΜL (ref 0.17–1.22)
MONOCYTES NFR BLD AUTO: 6 % (ref 4–12)
NEUTROPHILS # BLD AUTO: 10.09 THOUSANDS/ΜL (ref 1.85–7.62)
NEUTS SEG NFR BLD AUTO: 83 % (ref 43–75)
NRBC BLD AUTO-RTO: 0 /100 WBCS
PLATELET # BLD AUTO: 253 THOUSANDS/UL (ref 149–390)
PLATELET # BLD AUTO: 304 THOUSANDS/UL (ref 149–390)
PMV BLD AUTO: 8.3 FL (ref 8.9–12.7)
PMV BLD AUTO: 8.5 FL (ref 8.9–12.7)
POTASSIUM SERPL-SCNC: 4.2 MMOL/L (ref 3.5–5.3)
PROT SERPL-MCNC: 6 G/DL (ref 6.4–8.4)
PROTHROMBIN TIME: 13.3 SECONDS (ref 12.3–15)
RBC # BLD AUTO: 2.89 MILLION/UL (ref 3.88–5.62)
SODIUM SERPL-SCNC: 136 MMOL/L (ref 135–147)
WBC # BLD AUTO: 12.02 THOUSAND/UL (ref 4.31–10.16)

## 2025-01-08 PROCEDURE — 99214 OFFICE O/P EST MOD 30 MIN: CPT

## 2025-01-08 PROCEDURE — 85025 COMPLETE CBC W/AUTO DIFF WBC: CPT | Performed by: EMERGENCY MEDICINE

## 2025-01-08 PROCEDURE — 99223 1ST HOSP IP/OBS HIGH 75: CPT

## 2025-01-08 PROCEDURE — 85610 PROTHROMBIN TIME: CPT | Performed by: EMERGENCY MEDICINE

## 2025-01-08 PROCEDURE — 36415 COLL VENOUS BLD VENIPUNCTURE: CPT | Performed by: EMERGENCY MEDICINE

## 2025-01-08 PROCEDURE — 85730 THROMBOPLASTIN TIME PARTIAL: CPT | Performed by: EMERGENCY MEDICINE

## 2025-01-08 PROCEDURE — 87081 CULTURE SCREEN ONLY: CPT | Performed by: INTERNAL MEDICINE

## 2025-01-08 PROCEDURE — 99285 EMERGENCY DEPT VISIT HI MDM: CPT | Performed by: EMERGENCY MEDICINE

## 2025-01-08 PROCEDURE — 80053 COMPREHEN METABOLIC PANEL: CPT | Performed by: EMERGENCY MEDICINE

## 2025-01-08 PROCEDURE — 85049 AUTOMATED PLATELET COUNT: CPT

## 2025-01-08 PROCEDURE — 99283 EMERGENCY DEPT VISIT LOW MDM: CPT

## 2025-01-08 RX ORDER — CALCIUM CARBONATE 500(1250)
2 TABLET ORAL 2 TIMES DAILY WITH MEALS
Status: DISCONTINUED | OUTPATIENT
Start: 2025-01-08 | End: 2025-01-09 | Stop reason: HOSPADM

## 2025-01-08 RX ORDER — HEPARIN SODIUM 5000 [USP'U]/ML
5000 INJECTION, SOLUTION INTRAVENOUS; SUBCUTANEOUS EVERY 8 HOURS SCHEDULED
Status: DISCONTINUED | OUTPATIENT
Start: 2025-01-08 | End: 2025-01-09 | Stop reason: HOSPADM

## 2025-01-08 RX ORDER — PREDNISONE 1 MG/1
5 TABLET ORAL ONCE
Status: COMPLETED | OUTPATIENT
Start: 2025-01-08 | End: 2025-01-08

## 2025-01-08 RX ORDER — PREDNISONE 10 MG/1
10 TABLET ORAL ONCE
Status: COMPLETED | OUTPATIENT
Start: 2025-01-09 | End: 2025-01-09

## 2025-01-08 RX ORDER — ONDANSETRON 2 MG/ML
4 INJECTION INTRAMUSCULAR; INTRAVENOUS EVERY 6 HOURS PRN
Status: DISCONTINUED | OUTPATIENT
Start: 2025-01-08 | End: 2025-01-09 | Stop reason: HOSPADM

## 2025-01-08 RX ORDER — ACETAMINOPHEN 325 MG/1
650 TABLET ORAL EVERY 6 HOURS PRN
Status: DISCONTINUED | OUTPATIENT
Start: 2025-01-08 | End: 2025-01-09 | Stop reason: HOSPADM

## 2025-01-08 RX ORDER — PANTOPRAZOLE SODIUM 40 MG/1
40 TABLET, DELAYED RELEASE ORAL
Status: DISCONTINUED | OUTPATIENT
Start: 2025-01-08 | End: 2025-01-09 | Stop reason: HOSPADM

## 2025-01-08 RX ORDER — MIDODRINE HYDROCHLORIDE 5 MG/1
5 TABLET ORAL
Status: DISCONTINUED | OUTPATIENT
Start: 2025-01-08 | End: 2025-01-09 | Stop reason: HOSPADM

## 2025-01-08 RX ORDER — ERGOCALCIFEROL 1.25 MG/1
50000 CAPSULE, LIQUID FILLED ORAL WEEKLY
Status: DISCONTINUED | OUTPATIENT
Start: 2025-01-08 | End: 2025-01-09 | Stop reason: HOSPADM

## 2025-01-08 RX ORDER — LANOLIN ALCOHOL/MO/W.PET/CERES
400 CREAM (GRAM) TOPICAL DAILY
Status: DISCONTINUED | OUTPATIENT
Start: 2025-01-08 | End: 2025-01-09 | Stop reason: HOSPADM

## 2025-01-08 RX ORDER — ALLOPURINOL 100 MG/1
100 TABLET ORAL 2 TIMES DAILY
Status: DISCONTINUED | OUTPATIENT
Start: 2025-01-08 | End: 2025-01-09 | Stop reason: HOSPADM

## 2025-01-08 RX ADMIN — ALLOPURINOL 100 MG: 100 TABLET ORAL at 17:50

## 2025-01-08 RX ADMIN — PREDNISONE 5 MG: 1 TABLET ORAL at 17:49

## 2025-01-08 RX ADMIN — HEPARIN SODIUM 5000 UNITS: 5000 INJECTION, SOLUTION INTRAVENOUS; SUBCUTANEOUS at 21:31

## 2025-01-08 RX ADMIN — PANTOPRAZOLE SODIUM 40 MG: 40 TABLET, DELAYED RELEASE ORAL at 16:28

## 2025-01-08 RX ADMIN — MIDODRINE HYDROCHLORIDE 5 MG: 5 TABLET ORAL at 16:28

## 2025-01-08 RX ADMIN — Medication 400 MG: at 16:28

## 2025-01-08 RX ADMIN — HEPARIN SODIUM 5000 UNITS: 5000 INJECTION, SOLUTION INTRAVENOUS; SUBCUTANEOUS at 16:27

## 2025-01-08 RX ADMIN — CALCIUM 2 TABLET: 500 TABLET ORAL at 16:28

## 2025-01-08 RX ADMIN — PREDNISONE 5 MG: 1 TABLET ORAL at 16:28

## 2025-01-08 NOTE — TELEMEDICINE
e-Consult ([facility])  - Interventional Radiology  Ramos Rosenthal 74 y.o. male MRN: [id number]  Unit/Bed#: -01 Encounter: [id number]          Interventional Radiology has been consulted to evaluate Ramos Rosenthal      Inpatient Consult to IR  Consult performed by: Jorge Adamson MD  Consult ordered by: BRETT Kaminski        01/08/25    Assessment/Recommendation:   Permacath reportedly fell out this morning.  Originally placed in September 2024.  Plan to either recannulate track to replace catheter if possible versus new access.  Will plan to do this tomorrow.  Please keep n.p.o. after midnight.  No need to hold any anticoagulants.    5-10 minutes, >50% of the total time devoted to medical consultative verbal/EMR discussion between providers. Written report will be generated in the EMR.     Thank you for allowing Interventional Radiology to participate in the care of Ramos Rosenthal. Please don't hesitate to call or TigerText us with any questions.     Jorge Adamson MD            
no

## 2025-01-08 NOTE — ASSESSMENT & PLAN NOTE
"Patient is a hemodialysis patient on a Monday Wednesday Friday schedule with last session being 1/6  Patient reports that permacath in the right chest \"fell out\" at home this morning while doing a.m. care  Came to ED as he was instructed  Consult nephrology-planning HD tomorrow after access placed  Consult IR for permacath placement  "

## 2025-01-08 NOTE — CONSULTS
Consultation - Nephrology   Ramos Rosenthal 74 y.o. male MRN: [id number]  Unit/Bed#: -01 Encounter: [id number]    ASSESSMENT:    ESRD on HD (MWF)  -Location: [facility]  -Dry weight: 129 kg  -Last treatment was January 6 for 3.5 hours with post weight 129.9 kg  -Patient missed HD treatment today, next treatment scheduled for tomorrow if IR able to place a permacath. Then he is stable for discharge if no other problems  -Patient on midodrine 5 mg with dialysis sessions  -Dr Moore wanted to check an echocardiogram for the hypotension and midodrine requirement as an outpatient which we can also do today or tomorrow     Access  -Right [facility].  This fell out and a new 1 will be put in tomorrow  -Follow-up on blood cultures    Blood pressure  -Outpatient regimen: Midodrine 5 mg 3 times daily  -Current regimen: As above  -Blood pressure currently stable    CKD Anemia:  -Recent hgb: 9.9  -Medications: Last issued Mircera 100 mcg on January 6.  No short acting SHREE while inpatient    CKD MBD  -Routine OP monitoring of intact PTH  -Medications: Calcitriol 0.75 mcg 3 times weekly    Additional medical problems: Depression      HISTORY OF PRESENT ILLNESS:  Requesting Physician: Clarissa Jiang, *  Reason for Consult: ESRD on HD    Ramos Rosenthal is a 74 y.o. year old male history of ESRD on HD who was admitted to [facility] after his dialysis access fell out.  He will be admitted for IR to place a PermCath tomorrow and have a dialysis session afterwards.  He denies other complaints.  A renal consultation is requested today for assistance in the management of ESRD. Ramos Rosenthal is a known ESRD patient who undergoes maintenance hemodialysis at [facility] on Monday Wednesday Friday.    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 HERZOG    CATARACT EXTRACTION, BILATERAL      IR BIOPSY KIDNEY RANDOM  2024    IR TEMPORARY DIALYSIS CATHETER PLACEMENT  2024    IR TUNNELED DIALYSIS CATHETER PLACEMENT  2024    KNEE ARTHROSCOPY Right     x2 , meniscus repair    TONSILECTOMY AND ADNOIDECTOMY         ALLERGIES:  Allergies   Allergen Reactions    Ciprofloxacin Blisters    Other      IVORY SOAP    Penicillins Rash       SOCIAL HISTORY:  Social History     Substance and Sexual Activity   Alcohol Use Not Currently    Comment: occasional     Social History     Substance and Sexual Activity   Drug Use Never     Social History     Tobacco Use   Smoking Status Former    Current packs/day: 0.00    Types: Cigarettes    Quit date:     Years since quittin.0   Smokeless Tobacco Never       FAMILY HISTORY:  Family History   Problem Relation Age of Onset    Diabetes Father     Diabetes Brother     Diabetes Sister        MEDICATIONS:  No current facility-administered medications for this encounter.    REVIEW OF SYSTEMS:  A complete 10 point review of systems was performed and found to be negative unless otherwise noted below or in the HPI.  Review of Systems   Constitutional:  Negative for chills and fever.   Respiratory:  Negative for cough and shortness of breath.    Cardiovascular:  Positive for leg swelling. Negative for chest pain.   Gastrointestinal:  Negative for abdominal pain and diarrhea.   Neurological:  Negative for dizziness and headaches.        PHYSICAL EXAM:  Current Weight: Weight - Scale: 126 kg (278 lb)  First Weight: Weight - Scale: 126 kg (278 lb)  Vitals:    25 1109 25 1421 25 1428 25 1432   BP: 117/62 119/63  116/62   BP Location: Left  "arm      Pulse: 92 79  81   Resp: 18 18  17   Temp: 98.6 °F (37 °C)   97.6 °F (36.4 °C)   TempSrc: Temporal      SpO2: 99% 97%  99%   Weight:   126 kg (278 lb)    Height:   6' 2\" (1.88 m)      No intake or output data in the 24 hours ending 01/08/25 1521  Physical Exam  Vitals and nursing note reviewed.   Constitutional:       General: He is not in acute distress.     Appearance: He is well-developed.   HENT:      Head: Normocephalic and atraumatic.   Cardiovascular:      Rate and Rhythm: Normal rate and regular rhythm.      Heart sounds: No murmur heard.  Pulmonary:      Effort: Pulmonary effort is normal. No respiratory distress.      Breath sounds: Normal breath sounds.   Abdominal:      Palpations: Abdomen is soft.      Tenderness: There is no abdominal tenderness.   Musculoskeletal:         General: No swelling.   Skin:     General: Skin is warm and dry.      Capillary Refill: Capillary refill takes less than 2 seconds.   Neurological:      Mental Status: He is alert.   Psychiatric:         Mood and Affect: Mood normal.          Invasive Devices:      Lab Results:   Results from last 7 days   Lab Units 01/08/25  1130 01/07/25  1145   WBC Thousand/uL 12.02* 9.17   HEMOGLOBIN g/dL 9.9* 9.4*   HEMATOCRIT % 31.3* 31.0*   PLATELETS Thousands/uL 304 321   POTASSIUM mmol/L 4.2  --    CHLORIDE mmol/L 94*  --    CO2 mmol/L 30  --    BUN mg/dL 45*  --    CREATININE mg/dL 7.03*  --    CALCIUM mg/dL 8.9  --    ALK PHOS U/L 61  --    ALT U/L 11  --    AST U/L 19  --      Lab Results   Component Value Date    .2 (H) 09/15/2024    CALCIUM 8.9 01/08/2025    PHOS 3.0 10/23/2024       I have personally reviewed the blood work as stated above and in my note.  I have personally reviewed internal medicine note.     "

## 2025-01-08 NOTE — H&P
"H&P - Hospitalist   Name: Ramos Rosenthal 74 y.o. male I MRN: [id number]  Unit/Bed#: -01 I Date of Admission: 1/8/2025   Date of Service: 1/8/2025 I Hospital Day: 0     Assessment & Plan  Problem with dialysis access (HCC)  Patient is a hemodialysis patient on a Monday Wednesday Friday schedule with last session being 1/6  Patient reports that permacath in the right chest \"fell out\" at home this morning while doing a.m. care  Came to ED as he was instructed  Consult nephrology-planning HD tomorrow after access placed  Consult IR for permacath placement  End-stage renal disease on hemodialysis (HCC)  Lab Results   Component Value Date    EGFR 6 01/08/2025    EGFR 10 12/17/2024    EGFR 11 12/10/2024    CREATININE 7.03 (H) 01/08/2025    CREATININE 4.95 (H) 12/17/2024    CREATININE 4.76 (H) 12/10/2024     Patient is a hemodialysis patient on a Monday Wednesday Friday schedule  Last HD treatment 1/6  Patient was to have an HD treatment today and noted that his permacath fell out of his right chest wall while he was washing at home-sent to ED  Consult nephrology  Consult IR for permacath placement  Avoid nephrotoxins and hypotension  Monitor fluid status closely  BMP a.m.  Essential hypertension  Blood pressure controlled  Continue prehospital midodrine  Monitor BP per protocol  Depression  No signs of depression  Continue prehospital Prozac  Vitamin D deficiency  Continue prehospital supplementation      VTE Pharmacologic Prophylaxis: VTE Score: 4 Moderate Risk (Score 3-4) - Pharmacological DVT Prophylaxis Ordered: heparin.  Code Status: Full  Discussion with family: Updated  (wife) via phone.    Anticipated Length of Stay: Patient will be admitted on an inpatient basis with an anticipated length of stay of greater than 2 midnights secondary to permacath \"fell out\" prehospital requiring replacement, IR consulted, nephrology consulted.    History of Present Illness   Chief Complaint: Loss of permacath " "access prehospital    Ramos Rosenthal is a 74 y.o. male with a PMH of end-stage renal disease on hemodialysis [id number] with last session being 1/6, depression, essential hypertension and vitamin D deficiency who presented to the ED due to \"permacath falling out\".  Patient states that he is a hemodialysis patient Monday Wednesday Friday through permacath in his right chest.  During his a.m. care this morning he notes that his permacath was found laying in his bed at home.  There was no bleeding from the site.  He was advised to come to the ED.  Patient appears euvolemic on exam.  No complaints of discomfort.  Will place consult to nephrology and consult to IR for new permacath.  Anticipate patient will have dialysis tomorrow after permacath replaced and could potentially be discharged home afterwards if stable.  He is an observation status under [facility] service    Review of Systems   Constitutional: Negative.    HENT: Negative.     Respiratory: Negative.  Negative for cough, shortness of breath and wheezing.    Cardiovascular: Negative.  Negative for chest pain and palpitations.   Gastrointestinal: Negative.  Negative for abdominal distention, constipation, diarrhea, nausea and vomiting.   Endocrine: Negative.    Genitourinary: Negative.    Musculoskeletal: Negative.    Skin: Negative.    Allergic/Immunologic: Negative.    Neurological: Negative.    Hematological: Negative.    Psychiatric/Behavioral: Negative.         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 HERZOG    CATARACT EXTRACTION, " BILATERAL      IR BIOPSY KIDNEY RANDOM  2024    IR TEMPORARY DIALYSIS CATHETER PLACEMENT  2024    IR TUNNELED DIALYSIS CATHETER PLACEMENT  2024    KNEE ARTHROSCOPY Right     x2 , meniscus repair    TONSILECTOMY AND ADNOIDECTOMY       Social History     Tobacco Use    Smoking status: Former     Current packs/day: 0.00     Types: Cigarettes     Quit date:      Years since quittin.0    Smokeless tobacco: Never   Vaping Use    Vaping status: Never Used   Substance and Sexual Activity    Alcohol use: Not Currently     Comment: occasional    Drug use: Never    Sexual activity: Not on file     E-Cigarette/Vaping    E-Cigarette Use Never User      E-Cigarette/Vaping Substances    Nicotine No     THC No     CBD No     Flavoring No     Other No     Unknown No      Family History   Problem Relation Age of Onset    Diabetes Father     Diabetes Brother     Diabetes Sister      Social History:  Marital Status: /Civil Union   Occupation: Retired  Patient Pre-hospital Living Situation: Home  Patient Pre-hospital Level of Mobility: walks  Patient Pre-hospital Diet Restrictions: Renal diet    Meds/Allergies   I have reviewed home medications with patient family member.  Prior to Admission medications    Medication Sig Start Date End Date Taking? Authorizing Provider   allopurinol (ZYLOPRIM) 100 mg tablet Take 1 tablet (100 mg total) by mouth 2 (two) times a day 24  Yes Jessika Carrillo MD   B Complex-C-Folic Acid (TRIPHROCAPS PO) Take 1 Capful by mouth daily with dinner.   Yes Historical Provider, MD   calcium carbonate (OYSTER SHELL,OSCAL) 500 mg Take 2 tablets by mouth 2 (two) times a day with meals 24  Yes Lisa Moore,    cholecalciferol (VITAMIN D3) 1,000 units tablet Take 2,000 Units by mouth daily   Yes Historical Provider, MD   ergocalciferol (VITAMIN D2) 50,000 units Take 1 capsule (50,000 Units total) by mouth once a week 24  Yes BRETT Victor    FLUoxetine (PROzac) 20 mg capsule TAKE 1 CAPSULE BY MOUTH EVERY DAY 11/6/24  Yes Jessika Carrillo MD   loratadine (CLARITIN) 10 mg tablet Take 10 mg by mouth as needed for allergies   Yes Historical Provider, MD   Magnesium 100 MG CAPS Take 1 capsule by mouth daily   Yes Historical Provider, MD   midodrine (PROAMATINE) 2.5 mg tablet Take 2 tablets (5 mg total) by mouth 3 (three) times a day before meals 1/3/25  Yes Lisa Moore DO   predniSONE 2.5 mg tablet Take 5 tablets (12.5 mg total) by mouth daily for 14 days, THEN 4 tablets (10 mg total) daily for 14 days. Do not start before January 9, 2025. 1/9/25 2/6/25 Yes Lisa Moore DO   B Complex-C-Folic Acid (triphrocaps) 1 MG CAPS Take 1 capsule (1 mg total) by mouth daily with dinner 12/16/24   Lisa Moore DO   midodrine (PROAMATINE) 5 mg tablet Take 1 tablet (5 mg total) by mouth 3 (three) times a week 1/3/25   Lisa Moore DO   Multiple Vitamins-Minerals (CENTRUM SILVER 50+MEN PO) Take 1 tablet by mouth daily  Patient not taking: Reported on 1/8/2025    Historical Provider, MD   pantoprazole (PROTONIX) 40 mg tablet Take 1 tablet (40 mg total) by mouth 2 (two) times a day before meals 11/25/24 12/25/24  Lisa Moore DO     Allergies   Allergen Reactions    Ciprofloxacin Blisters    Other      IVORY SOAP    Penicillins Rash       Objective :  Temp:  [97.6 °F (36.4 °C)-98.6 °F (37 °C)] 97.6 °F (36.4 °C)  HR:  [79-92] 81  BP: (116-119)/(62-63) 116/62  Resp:  [17-18] 17  SpO2:  [97 %-99 %] 99 %  O2 Device: None (Room air)    Physical Exam  Vitals and nursing note reviewed.   Constitutional:       General: He is not in acute distress.     Appearance: He is well-developed. He is obese.   HENT:      Head: Normocephalic and atraumatic.   Cardiovascular:      Rate and Rhythm: Normal rate and regular rhythm.      Pulses: Normal pulses.      Heart sounds: Normal heart sounds. No murmur heard.  Pulmonary:      Effort: Pulmonary effort is normal.  No respiratory distress.      Breath sounds: Normal breath sounds. No wheezing or rales.   Abdominal:      Palpations: Abdomen is soft.      Tenderness: There is no abdominal tenderness.   Musculoskeletal:         General: No swelling.   Skin:     General: Skin is warm and dry.      Capillary Refill: Capillary refill takes less than 2 seconds.   Neurological:      General: No focal deficit present.      Mental Status: He is alert and oriented to person, place, and time. Mental status is at baseline.   Psychiatric:         Mood and Affect: Mood normal.         Behavior: Behavior normal.         Thought Content: Thought content normal.         Judgment: Judgment normal.          Lines/Drains:            Lab Results: I have reviewed the following results:  Results from last 7 days   Lab Units 01/08/25  1130   WBC Thousand/uL 12.02*   HEMOGLOBIN g/dL 9.9*   HEMATOCRIT % 31.3*   PLATELETS Thousands/uL 304   SEGS PCT % 83*   LYMPHO PCT % 8*   MONO PCT % 6   EOS PCT % 1     Results from last 7 days   Lab Units 01/08/25  1130   SODIUM mmol/L 136   POTASSIUM mmol/L 4.2   CHLORIDE mmol/L 94*   CO2 mmol/L 30   BUN mg/dL 45*   CREATININE mg/dL 7.03*   ANION GAP mmol/L 12   CALCIUM mg/dL 8.9   ALBUMIN g/dL 3.5   TOTAL BILIRUBIN mg/dL 0.42   ALK PHOS U/L 61   ALT U/L 11   AST U/L 19   GLUCOSE RANDOM mg/dL 175*     Results from last 7 days   Lab Units 01/08/25  1130   INR  0.96         Lab Results   Component Value Date    HGBA1C 5.8 (H) 01/13/2023    HGBA1C 5.6 09/13/2022    HGBA1C 5.8 07/31/2019           Imaging Results Review: No pertinent imaging studies reviewed.  Other Study Results Review: No additional pertinent studies reviewed.    Administrative Statements   I have spent a total time of 37 minutes in caring for this patient on the day of the visit/encounter including Documenting in the medical record, Reviewing / ordering tests, medicine, procedures  , Obtaining or reviewing history  , and Communicating with other  healthcare professionals .    ** Please Note: This note has been constructed using a voice recognition system. **

## 2025-01-08 NOTE — ED PROVIDER NOTES
Time reflects when diagnosis was documented in both MDM as applicable and the Disposition within this note       Time User Action Codes Description Comment    1/8/2025  1:57 PM Eran Juan Add [T82.9XXA] Complication associated with dialysis catheter     1/8/2025  2:53 PM Taylordesireeshruthi Elizabeth Add [N18.6,  Z99.2] End-stage renal disease on hemodialysis (HCC)           ED Disposition       ED Disposition   Admit    Condition   Stable    Date/Time   Wed Jan 8, 2025 12:27 PM    Comment   Case was discussed with Dr. Jiang and the patient's admission status was agreed to be Admission Status: observation status to the service of Dr. Jiang.               Assessment & Plan       Medical Decision Making  74-year-old male presents emergency department secondary to his dialysis catheter falling out overnight while he was sleeping.  Patient has no active bleeding and has had the dialysis catheter since the end of last year.  Patient is due for dialysis today and contacted nephrology who recommended that the patient be admitted, get some baseline lab work, and then be seen by IR tomorrow to have a repeat catheter placement.  Patient denies fever chills or shortness of breath.  The patient had the suture removed by myself that was still in his skin that broke when his dialysis catheter came out, and the skin was evaluated for cellulitis which did not appear to be present.  Patient's vital signs are otherwise stable at this time and lab work is reassuring.  Patient will be hospitalized under the medicine service with a IR consult in the morning.    Amount and/or Complexity of Data Reviewed  Labs: ordered.    Risk  Decision regarding hospitalization.             Medications       ED Risk Strat Scores                          SBIRT 22yo+      Flowsheet Row Most Recent Value   Initial Alcohol Screen: US AUDIT-C     1. How often do you have a drink containing alcohol? 0 Filed at: 01/08/2025 1110   2. How many drinks containing  alcohol do you have on a typical day you are drinking?  0 Filed at: 2025 1110   3a. Male UNDER 65: How often do you have five or more drinks on one occasion? 0 Filed at: 2025 1110   3b. FEMALE Any Age, or MALE 65+: How often do you have 4 or more drinks on one occassion? 0 Filed at: 2025 1110   Audit-C Score 0 Filed at: 2025 1110   ELSY: How many times in the past year have you...    Used an illegal drug or used a prescription medication for non-medical reasons? Never Filed at: 2025 1110                            History of Present Illness       Chief Complaint   Patient presents with    Vascular Access Problem     Patient presents after dialysis catheter fell out this morning. Denies any redness or swelling to the area or fever.   Here for admission and then replacement tomorrow.        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 HERZOG    CATARACT EXTRACTION, BILATERAL      IR BIOPSY KIDNEY RANDOM  2024    IR TEMPORARY DIALYSIS CATHETER PLACEMENT  2024    IR TUNNELED DIALYSIS CATHETER PLACEMENT  2024    KNEE ARTHROSCOPY Right     x2 , meniscus repair    TONSILECTOMY AND ADNOIDECTOMY        Family History   Problem Relation Age of Onset    Diabetes Father     Diabetes Brother     Diabetes Sister       Social History     Tobacco Use    Smoking status: Former     Current packs/day: 0.00     Types: Cigarettes     Quit date:      Years since quittin.0    Smokeless tobacco: Never   Vaping Use    Vaping status: Never Used   Substance Use Topics    Alcohol use: Not Currently      Comment: occasional    Drug use: Never      E-Cigarette/Vaping    E-Cigarette Use Never User       E-Cigarette/Vaping Substances    Nicotine No     THC No     CBD No     Flavoring No     Other No     Unknown No       I have reviewed and agree with the history as documented.     74-year-old male who gets dialysis presents to the emergency department because of an issue with his dialysis catheter.  The patient last was able to have dialysis Monday however family is concerned and the patient will need to have a new one placed.  Nephrology contacted the patient and had them come to the hospital with the thought of being admitted to have the catheter replaced by IR tomorrow morning.  Patient denies any shortness of breath.  Patient states he is due for dialysis today.  The patient denies any actual complaints.  Patient minimally nonambulatory        Review of Systems   Constitutional:  Positive for activity change. Negative for chills and fever.   HENT:  Negative for ear pain and sore throat.    Eyes:  Negative for pain and visual disturbance.   Respiratory:  Negative for cough and shortness of breath.    Cardiovascular:  Negative for chest pain and palpitations.   Gastrointestinal:  Negative for abdominal pain and vomiting.   Genitourinary:  Negative for dysuria and hematuria.   Musculoskeletal:  Negative for arthralgias and back pain.   Skin:  Negative for color change and rash.   Neurological:  Negative for seizures and syncope.   All other systems reviewed and are negative.          Objective       ED Triage Vitals [01/08/25 1109]   Temperature Pulse Blood Pressure Respirations SpO2 Patient Position - Orthostatic VS   98.6 °F (37 °C) 92 117/62 18 99 % --      Temp Source Heart Rate Source BP Location FiO2 (%) Pain Score    Temporal Monitor Left arm -- No Pain      Vitals      Date and Time Temp Pulse SpO2 Resp BP Pain Score FACES Pain Rating User   01/08/25 1551 97.7 °F (36.5 °C) 83 98 % 17 124/73 -- -- DII    01/08/25 1432 97.6 °F (36.4 °C) 81 99 % 17 116/62 -- -- DII   01/08/25 1421 -- 79 97 % 18 119/63 -- -- EM   01/08/25 1319 -- -- -- -- -- No Pain -- EM   01/08/25 1147 -- -- -- -- -- No Pain -- EM   01/08/25 1136 -- -- -- -- -- No Pain -- EM   01/08/25 1109 98.6 °F (37 °C) 92 99 % 18 117/62 No Pain -- RC            Physical Exam  Constitutional:       General: He is not in acute distress.     Appearance: Normal appearance. He is normal weight. He is not ill-appearing.   HENT:      Head: Normocephalic and atraumatic.      Right Ear: External ear normal.      Left Ear: External ear normal.      Nose: Nose normal.      Mouth/Throat:      Mouth: Mucous membranes are moist.   Eyes:      Conjunctiva/sclera: Conjunctivae normal.   Cardiovascular:      Rate and Rhythm: Normal rate and regular rhythm.      Pulses: Normal pulses.      Heart sounds: Normal heart sounds.   Pulmonary:      Effort: Pulmonary effort is normal.      Breath sounds: Normal breath sounds.      Comments: Coarse breath sounds, otherwise clear.  Abdominal:      General: Abdomen is flat. There is no distension.      Palpations: Abdomen is soft. There is no mass.   Musculoskeletal:         General: No swelling, tenderness or deformity. Normal range of motion.      Cervical back: Normal range of motion.   Skin:     General: Skin is warm and dry.      Capillary Refill: Capillary refill takes 2 to 3 seconds.      Coloration: Skin is pale.   Neurological:      General: No focal deficit present.      Mental Status: He is alert and oriented to person, place, and time. Mental status is at baseline.   Psychiatric:         Mood and Affect: Mood normal.         Results Reviewed       Procedure Component Value Units Date/Time    Comprehensive metabolic panel [[id number]]  (Abnormal) Collected: 01/08/25 1130    Lab Status: Final result Specimen: Blood from Arm, Right Updated: 01/08/25 1155     Sodium 136 mmol/L      Potassium 4.2 mmol/L      Chloride 94 mmol/L       CO2 30 mmol/L      ANION GAP 12 mmol/L      BUN 45 mg/dL      Creatinine 7.03 mg/dL      Glucose 175 mg/dL      Calcium 8.9 mg/dL      AST 19 U/L      ALT 11 U/L      Alkaline Phosphatase 61 U/L      Total Protein 6.0 g/dL      Albumin 3.5 g/dL      Total Bilirubin 0.42 mg/dL      eGFR 6 ml/min/1.73sq m     Narrative:      National Kidney Disease Foundation guidelines for Chronic Kidney Disease (CKD):     Stage 1 with normal or high GFR (GFR > 90 mL/min/1.73 square meters)    Stage 2 Mild CKD (GFR = 60-89 mL/min/1.73 square meters)    Stage 3A Moderate CKD (GFR = 45-59 mL/min/1.73 square meters)    Stage 3B Moderate CKD (GFR = 30-44 mL/min/1.73 square meters)    Stage 4 Severe CKD (GFR = 15-29 mL/min/1.73 square meters)    Stage 5 End Stage CKD (GFR <15 mL/min/1.73 square meters)  Note: GFR calculation is accurate only with a steady state creatinine    Protime-INR [[id number]]  (Normal) Collected: 01/08/25 1130    Lab Status: Final result Specimen: Blood from Arm, Right Updated: 01/08/25 1149     Protime 13.3 seconds      INR 0.96    Narrative:      INR Therapeutic Range    Indication                                             INR Range      Atrial Fibrillation                                               2.0-3.0  Hypercoagulable State                                    2.0.2.3  Left Ventricular Asist Device                            2.0-3.0  Mechanical Heart Valve                                  -    Aortic(with afib, MI, embolism, HF, LA enlargement,    and/or coagulopathy)                                     2.0-3.0 (2.5-3.5)     Mitral                                                             2.5-3.5  Prosthetic/Bioprosthetic Heart Valve               2.0-3.0  Venous thromboembolism (VTE: VT, PE        2.0-3.0    APTT [[id number]]  (Normal) Collected: 01/08/25 1130    Lab Status: Final result Specimen: Blood from Arm, Right Updated: 01/08/25 1149     PTT 27 seconds     CBC and differential [[id number]]   (Abnormal) Collected: 01/08/25 1130    Lab Status: Final result Specimen: Blood from Arm, Right Updated: 01/08/25 1138     WBC 12.02 Thousand/uL      RBC 2.89 Million/uL      Hemoglobin 9.9 g/dL      Hematocrit 31.3 %       fL      MCH 34.3 pg      MCHC 31.6 g/dL      RDW 15.0 %      MPV 8.5 fL      Platelets 304 Thousands/uL      nRBC 0 /100 WBCs      Segmented % 83 %      Immature Grans % 1 %      Lymphocytes % 8 %      Monocytes % 6 %      Eosinophils Relative 1 %      Basophils Relative 1 %      Absolute Neutrophils 10.09 Thousands/µL      Absolute Immature Grans 0.14 Thousand/uL      Absolute Lymphocytes 0.91 Thousands/µL      Absolute Monocytes 0.69 Thousand/µL      Eosinophils Absolute 0.12 Thousand/µL      Basophils Absolute 0.07 Thousands/µL             IR tunneled dialysis catheter placement    (Results Pending)       Procedures    ED Medication and Procedure Management   Prior to Admission Medications   Prescriptions Last Dose Informant Patient Reported? Taking?   B Complex-C-Folic Acid (TRIPHROCAPS PO) 1/8/2025  Yes Yes   Sig: Take 1 Capful by mouth daily with dinner.   B Complex-C-Folic Acid (triphrocaps) 1 MG CAPS   No No   Sig: Take 1 capsule (1 mg total) by mouth daily with dinner   FLUoxetine (PROzac) 20 mg capsule 1/8/2025  No Yes   Sig: TAKE 1 CAPSULE BY MOUTH EVERY DAY   Magnesium 100 MG CAPS 1/8/2025 Self Yes Yes   Sig: Take 1 capsule by mouth daily   Multiple Vitamins-Minerals (CENTRUM SILVER 50+MEN PO) Not Taking Self Yes No   Sig: Take 1 tablet by mouth daily   Patient not taking: Reported on 1/8/2025   allopurinol (ZYLOPRIM) 100 mg tablet 1/8/2025  No Yes   Sig: Take 1 tablet (100 mg total) by mouth 2 (two) times a day   calcium carbonate (OYSTER SHELL,OSCAL) 500 mg 1/8/2025  No Yes   Sig: Take 2 tablets by mouth 2 (two) times a day with meals   cholecalciferol (VITAMIN D3) 1,000 units tablet 1/8/2025  Yes Yes   Sig: Take 2,000 Units by mouth daily   ergocalciferol (VITAMIN D2) 50,000  units Past Week  No Yes   Sig: Take 1 capsule (50,000 Units total) by mouth once a week   loratadine (CLARITIN) 10 mg tablet Past Month  Yes Yes   Sig: Take 10 mg by mouth as needed for allergies   midodrine (PROAMATINE) 2.5 mg tablet 1/8/2025  No Yes   Sig: Take 2 tablets (5 mg total) by mouth 3 (three) times a day before meals   midodrine (PROAMATINE) 5 mg tablet   No No   Sig: Take 1 tablet (5 mg total) by mouth 3 (three) times a week   pantoprazole (PROTONIX) 40 mg tablet   No No   Sig: Take 1 tablet (40 mg total) by mouth 2 (two) times a day before meals   predniSONE 2.5 mg tablet 1/8/2025  No Yes   Sig: Take 5 tablets (12.5 mg total) by mouth daily for 14 days, THEN 4 tablets (10 mg total) daily for 14 days. Do not start before January 9, 2025.      Facility-Administered Medications: None     Current Discharge Medication List        CONTINUE these medications which have NOT CHANGED    Details   allopurinol (ZYLOPRIM) 100 mg tablet Take 1 tablet (100 mg total) by mouth 2 (two) times a day  Qty: 180 tablet, Refills: 1    Associated Diagnoses: Chronic idiopathic gout involving toe without tophus, unspecified laterality      !! B Complex-C-Folic Acid (TRIPHROCAPS PO) Take 1 Capful by mouth daily with dinner.      calcium carbonate (OYSTER SHELL,OSCAL) 500 mg Take 2 tablets by mouth 2 (two) times a day with meals  Qty: 360 tablet, Refills: 0    Associated Diagnoses: Hypocalcemia      cholecalciferol (VITAMIN D3) 1,000 units tablet Take 2,000 Units by mouth daily      ergocalciferol (VITAMIN D2) 50,000 units Take 1 capsule (50,000 Units total) by mouth once a week  Qty: 12 capsule, Refills: 1    Associated Diagnoses: Vitamin D deficiency      FLUoxetine (PROzac) 20 mg capsule TAKE 1 CAPSULE BY MOUTH EVERY DAY  Qty: 90 capsule, Refills: 1    Associated Diagnoses: Persistent depressive disorder      loratadine (CLARITIN) 10 mg tablet Take 10 mg by mouth as needed for allergies      Magnesium 100 MG CAPS Take 1 capsule  by mouth daily      !! midodrine (PROAMATINE) 2.5 mg tablet Take 2 tablets (5 mg total) by mouth 3 (three) times a day before meals  Qty: 180 tablet, Refills: 0    Associated Diagnoses: Idiopathic hypotension      predniSONE 2.5 mg tablet Take 5 tablets (12.5 mg total) by mouth daily for 14 days, THEN 4 tablets (10 mg total) daily for 14 days. Do not start before January 9, 2025.  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 - Potential duplicate medications found. Please discuss with provider.        No discharge procedures on file.  ED SEPSIS DOCUMENTATION   Time reflects when diagnosis was documented in both MDM as applicable and the Disposition within this note       Time User Action Codes Description Comment    1/8/2025  1:57 PM Eran Juan [T82.9XXA] Complication associated with dialysis catheter     1/8/2025  2:53 PM Elizabeth Perez [N18.6,  Z99.2] End-stage renal disease on hemodialysis (HCC)                  Eran Juan Jr., DO  01/08/25 1925

## 2025-01-08 NOTE — PLAN OF CARE
Problem: Potential for Falls  Goal: Patient will remain free of falls  Description: INTERVENTIONS:  - Educate patient/family on patient safety including physical limitations  - Instruct patient to call for assistance with activity   - Consult OT/PT to assist with strengthening/mobility   - Keep Call bell within reach  - Keep bed low and locked with side rails adjusted as appropriate  - Keep care items and personal belongings within reach  - Initiate and maintain comfort rounds  - Make Fall Risk Sign visible to staff  - Offer Toileting every 2 Hours, in advance of need  - Initiate/Maintain bed alarm  - Apply yellow socks and bracelet for high fall risk patients  - Consider moving patient to room near nurses station  Outcome: Progressing     Problem: Nutrition/Hydration-ADULT  Goal: Nutrient/Hydration intake appropriate for improving, restoring or maintaining nutritional needs  Description: Monitor and assess patient's nutrition/hydration status for malnutrition. Collaborate with interdisciplinary team and initiate plan and interventions as ordered.  Monitor patient's weight and dietary intake as ordered or per policy. Utilize nutrition screening tool and intervene as necessary. Determine patient's food preferences and provide high-protein, high-caloric foods as appropriate.     INTERVENTIONS:  - Monitor oral intake, urinary output, labs, and treatment plans  - Assess nutrition and hydration status and recommend course of action  - Evaluate amount of meals eaten  - Assist patient with eating if necessary   - Allow adequate time for meals  - Recommend/ encourage appropriate diets, oral nutritional supplements, and vitamin/mineral supplements  - Order, calculate, and assess calorie counts as needed  - Recommend, monitor, and adjust tube feedings and TPN/PPN based on assessed needs  - Assess need for intravenous fluids  - Provide specific nutrition/hydration education as appropriate  - Include patient/family/caregiver  in decisions related to nutrition  Outcome: Progressing

## 2025-01-08 NOTE — ASSESSMENT & PLAN NOTE
Lab Results   Component Value Date    EGFR 6 01/08/2025    EGFR 10 12/17/2024    EGFR 11 12/10/2024    CREATININE 7.03 (H) 01/08/2025    CREATININE 4.95 (H) 12/17/2024    CREATININE 4.76 (H) 12/10/2024     Patient is a hemodialysis patient on a Monday Wednesday Friday schedule  Last HD treatment 1/6  Patient was to have an HD treatment today and noted that his permacath fell out of his right chest wall while he was washing at home-sent to ED  Consult nephrology  Consult IR for permacath placement  Avoid nephrotoxins and hypotension  Monitor fluid status closely  BMP a.m.

## 2025-01-09 ENCOUNTER — APPOINTMENT (OUTPATIENT)
Dept: NON INVASIVE DIAGNOSTICS | Facility: HOSPITAL | Age: 75
End: 2025-01-09
Payer: MEDICARE

## 2025-01-09 ENCOUNTER — HOME CARE VISIT (OUTPATIENT)
Dept: HOME HEALTH SERVICES | Facility: HOME HEALTHCARE | Age: 75
End: 2025-01-09
Payer: MEDICARE

## 2025-01-09 ENCOUNTER — APPOINTMENT (OUTPATIENT)
Dept: INTERVENTIONAL RADIOLOGY/VASCULAR | Facility: HOSPITAL | Age: 75
End: 2025-01-09
Attending: RADIOLOGY
Payer: MEDICARE

## 2025-01-09 ENCOUNTER — APPOINTMENT (OUTPATIENT)
Dept: DIALYSIS | Facility: HOSPITAL | Age: 75
End: 2025-01-09
Payer: MEDICARE

## 2025-01-09 VITALS
DIASTOLIC BLOOD PRESSURE: 82 MMHG | BODY MASS INDEX: 35.42 KG/M2 | RESPIRATION RATE: 18 BRPM | HEIGHT: 74 IN | TEMPERATURE: 96.7 F | OXYGEN SATURATION: 99 % | SYSTOLIC BLOOD PRESSURE: 118 MMHG | HEART RATE: 60 BPM | WEIGHT: 276 LBS

## 2025-01-09 PROBLEM — N01.9: Status: ACTIVE | Noted: 2025-01-09

## 2025-01-09 LAB
ANION GAP SERPL CALCULATED.3IONS-SCNC: 10 MMOL/L (ref 4–13)
AORTIC ROOT: 3.9 CM
AORTIC VALVE MEAN VELOCITY: 14 M/S
ASCENDING AORTA: 4.4 CM
AV AREA BY CONTINUOUS VTI: 1.8 CM2
AV AREA PEAK VELOCITY: 1.5 CM2
AV LVOT MEAN GRADIENT: 1 MMHG
AV LVOT PEAK GRADIENT: 2 MMHG
AV MEAN PRESS GRAD SYS DOP V1V2: 9 MMHG
AV ORIFICE AREA US: 1.85 CM2
AV PEAK GRADIENT: 17 MMHG
AV REGURGITATION PRESSURE HALF TIME: 577 MS
AV VELOCITY RATIO: 0.37
AV VMAX SYS DOP: 2.04 M/S
BSA FOR ECHO PROCEDURE: 2.49 M2
BUN SERPL-MCNC: 54 MG/DL (ref 5–25)
CALCIUM SERPL-MCNC: 8.9 MG/DL (ref 8.4–10.2)
CHLORIDE SERPL-SCNC: 98 MMOL/L (ref 96–108)
CO2 SERPL-SCNC: 30 MMOL/L (ref 21–32)
CREAT SERPL-MCNC: 7.92 MG/DL (ref 0.6–1.3)
DOP CALC AO VTI: 40.41 CM
DOP CALC LVOT AREA: 4.15 CM2
DOP CALC LVOT CARDIAC INDEX: 2.58 L/MIN/M2
DOP CALC LVOT CARDIAC OUTPUT: 6.41 L/MIN
DOP CALC LVOT DIAMETER: 2.3 CM
DOP CALC LVOT PEAK VEL VTI: 17.96 CM
DOP CALC LVOT PEAK VEL: 0.76 M/S
DOP CALC LVOT STROKE INDEX: 31.3 ML/M2
DOP CALC LVOT STROKE VOLUME: 74.58
ERYTHROCYTE [DISTWIDTH] IN BLOOD BY AUTOMATED COUNT: 14.8 % (ref 11.6–15.1)
FRACTIONAL SHORTENING: 22 (ref 28–44)
GFR SERPL CREATININE-BSD FRML MDRD: 5 ML/MIN/1.73SQ M
GLUCOSE SERPL-MCNC: 130 MG/DL (ref 65–140)
HCT VFR BLD AUTO: 26 % (ref 36.5–49.3)
HGB BLD-MCNC: 8.2 G/DL (ref 12–17)
INTERVENTRICULAR SEPTUM IN DIASTOLE (PARASTERNAL SHORT AXIS VIEW): 1.2 CM
INTERVENTRICULAR SEPTUM: 1.2 CM (ref 0.6–1.1)
LAAS-AP2: 21.3 CM2
LAAS-AP4: 15.4 CM2
LEFT ATRIUM SIZE: 3.8 CM
LEFT ATRIUM VOLUME (MOD BIPLANE): 50 ML
LEFT ATRIUM VOLUME INDEX (MOD BIPLANE): 20.1 ML/M2
LEFT INTERNAL DIMENSION IN SYSTOLE: 4 CM (ref 2.1–4)
LEFT VENTRICULAR INTERNAL DIMENSION IN DIASTOLE: 5.1 CM (ref 3.5–6)
LEFT VENTRICULAR POSTERIOR WALL IN END DIASTOLE: 1.2 CM
LEFT VENTRICULAR STROKE VOLUME: 54 ML
LV EF US.2D.A4C+ESTIMATED: 52 %
LVSV (TEICH): 54 ML
MAGNESIUM SERPL-MCNC: 2.3 MG/DL (ref 1.9–2.7)
MCH RBC QN AUTO: 33.9 PG (ref 26.8–34.3)
MCHC RBC AUTO-ENTMCNC: 31.5 G/DL (ref 31.4–37.4)
MCV RBC AUTO: 107 FL (ref 82–98)
MITRAL REGURGITATION PEAK VELOCITY: 5.52 M/S
MITRAL VALVE MEAN INFLOW VELOCITY: 4.26 M/S
MITRAL VALVE REGURGITANT PEAK GRADIENT: 122 MMHG
MRSA NOSE QL CULT: NORMAL
MV E'TISSUE VEL-SEP: 10 CM/S
PHOSPHATE SERPL-MCNC: 3.2 MG/DL (ref 2.3–4.1)
PLATELET # BLD AUTO: 267 THOUSANDS/UL (ref 149–390)
PMV BLD AUTO: 8.7 FL (ref 8.9–12.7)
POTASSIUM SERPL-SCNC: 4.6 MMOL/L (ref 3.5–5.3)
RBC # BLD AUTO: 2.42 MILLION/UL (ref 3.88–5.62)
RIGHT ATRIUM AREA SYSTOLE A4C: 14.8 CM2
RIGHT VENTRICLE ID DIMENSION: 2.7 CM
SL CV AV DECELERATION TIME RETROGRADE: 1989 MS
SL CV AV PEAK GRADIENT RETROGRADE: 59 MMHG
SL CV DOP CALC MV VTI RETROGRADE: 170.4 CM
SL CV LEFT ATRIUM LENGTH A2C: 5 CM
SL CV LV EF: 55
SL CV MV MEAN GRADIENT RETROGRADE: 80 MMHG
SL CV PED ECHO LEFT VENTRICLE DIASTOLIC VOLUME (MOD BIPLANE) 2D: 123 ML
SL CV PED ECHO LEFT VENTRICLE SYSTOLIC VOLUME (MOD BIPLANE) 2D: 69 ML
SODIUM SERPL-SCNC: 138 MMOL/L (ref 135–147)
TR MAX PG: 27 MMHG
TR PEAK VELOCITY: 2.6 M/S
TRICUSPID ANNULAR PLANE SYSTOLIC EXCURSION: 2.4 CM
TRICUSPID VALVE PEAK REGURGITATION VELOCITY: 2.62 M/S
TSH SERPL DL<=0.05 MIU/L-ACNC: 1.71 UIU/ML (ref 0.45–4.5)
WBC # BLD AUTO: 8.62 THOUSAND/UL (ref 4.31–10.16)

## 2025-01-09 PROCEDURE — 99152 MOD SED SAME PHYS/QHP 5/>YRS: CPT

## 2025-01-09 PROCEDURE — 84100 ASSAY OF PHOSPHORUS: CPT

## 2025-01-09 PROCEDURE — 76937 US GUIDE VASCULAR ACCESS: CPT

## 2025-01-09 PROCEDURE — 80048 BASIC METABOLIC PNL TOTAL CA: CPT

## 2025-01-09 PROCEDURE — 85027 COMPLETE CBC AUTOMATED: CPT

## 2025-01-09 PROCEDURE — 83735 ASSAY OF MAGNESIUM: CPT

## 2025-01-09 PROCEDURE — 99239 HOSP IP/OBS DSCHRG MGMT >30: CPT

## 2025-01-09 PROCEDURE — C1750 CATH, HEMODIALYSIS,LONG-TERM: HCPCS

## 2025-01-09 PROCEDURE — 99214 OFFICE O/P EST MOD 30 MIN: CPT | Performed by: INTERNAL MEDICINE

## 2025-01-09 PROCEDURE — 36581 REPLACE TUNNELED CV CATH: CPT | Performed by: RADIOLOGY

## 2025-01-09 PROCEDURE — 84443 ASSAY THYROID STIM HORMONE: CPT | Performed by: INTERNAL MEDICINE

## 2025-01-09 PROCEDURE — 93306 TTE W/DOPPLER COMPLETE: CPT | Performed by: INTERNAL MEDICINE

## 2025-01-09 PROCEDURE — C1894 INTRO/SHEATH, NON-LASER: HCPCS

## 2025-01-09 PROCEDURE — 36558 INSERT TUNNELED CV CATH: CPT

## 2025-01-09 PROCEDURE — G0257 UNSCHED DIALYSIS ESRD PT HOS: HCPCS

## 2025-01-09 PROCEDURE — 77001 FLUOROGUIDE FOR VEIN DEVICE: CPT

## 2025-01-09 PROCEDURE — 77001 FLUOROGUIDE FOR VEIN DEVICE: CPT | Performed by: RADIOLOGY

## 2025-01-09 PROCEDURE — 93306 TTE W/DOPPLER COMPLETE: CPT

## 2025-01-09 PROCEDURE — C1769 GUIDE WIRE: HCPCS

## 2025-01-09 RX ORDER — FENTANYL CITRATE 50 UG/ML
INJECTION, SOLUTION INTRAMUSCULAR; INTRAVENOUS AS NEEDED
Status: COMPLETED | OUTPATIENT
Start: 2025-01-09 | End: 2025-01-09

## 2025-01-09 RX ORDER — MIDAZOLAM HYDROCHLORIDE 2 MG/2ML
INJECTION, SOLUTION INTRAMUSCULAR; INTRAVENOUS AS NEEDED
Status: COMPLETED | OUTPATIENT
Start: 2025-01-09 | End: 2025-01-09

## 2025-01-09 RX ORDER — LIDOCAINE WITH 8.4% SOD BICARB 0.9%(10ML)
SYRINGE (ML) INJECTION AS NEEDED
Status: COMPLETED | OUTPATIENT
Start: 2025-01-09 | End: 2025-01-09

## 2025-01-09 RX ORDER — PREDNISONE 1 MG/1
2.5 TABLET ORAL DAILY
Status: COMPLETED | OUTPATIENT
Start: 2025-01-09 | End: 2025-01-09

## 2025-01-09 RX ORDER — SULFAMETHOXAZOLE AND TRIMETHOPRIM 800; 160 MG/1; MG/1
1 TABLET ORAL 3 TIMES WEEKLY
Qty: 12 TABLET | Refills: 0 | Status: SHIPPED | OUTPATIENT
Start: 2025-01-10 | End: 2025-02-07

## 2025-01-09 RX ORDER — SULFAMETHOXAZOLE AND TRIMETHOPRIM 800; 160 MG/1; MG/1
1 TABLET ORAL 3 TIMES WEEKLY
Status: DISCONTINUED | OUTPATIENT
Start: 2025-01-10 | End: 2025-01-09 | Stop reason: HOSPADM

## 2025-01-09 RX ORDER — SULFAMETHOXAZOLE AND TRIMETHOPRIM 800; 160 MG/1; MG/1
1 TABLET ORAL 3 TIMES WEEKLY
Qty: 12 TABLET | Refills: 0 | OUTPATIENT
Start: 2025-01-10 | End: 2025-02-06

## 2025-01-09 RX ADMIN — CALCIUM 2 TABLET: 500 TABLET ORAL at 18:28

## 2025-01-09 RX ADMIN — MIDAZOLAM HYDROCHLORIDE 1 MG: 1 INJECTION, SOLUTION INTRAMUSCULAR; INTRAVENOUS at 13:09

## 2025-01-09 RX ADMIN — Medication 10 ML: at 13:12

## 2025-01-09 RX ADMIN — PANTOPRAZOLE SODIUM 40 MG: 40 TABLET, DELAYED RELEASE ORAL at 18:28

## 2025-01-09 RX ADMIN — Medication 400 MG: at 08:30

## 2025-01-09 RX ADMIN — CALCIUM 2 TABLET: 500 TABLET ORAL at 08:31

## 2025-01-09 RX ADMIN — MIDODRINE HYDROCHLORIDE 5 MG: 5 TABLET ORAL at 11:34

## 2025-01-09 RX ADMIN — VANCOMYCIN HYDROCHLORIDE 1500 MG: 1 INJECTION, POWDER, LYOPHILIZED, FOR SOLUTION INTRAVENOUS at 12:30

## 2025-01-09 RX ADMIN — PREDNISONE 10 MG: 10 TABLET ORAL at 08:31

## 2025-01-09 RX ADMIN — HEPARIN SODIUM 5000 UNITS: 5000 INJECTION, SOLUTION INTRAVENOUS; SUBCUTANEOUS at 06:36

## 2025-01-09 RX ADMIN — FENTANYL CITRATE 50 MCG: 50 INJECTION, SOLUTION INTRAMUSCULAR; INTRAVENOUS at 13:09

## 2025-01-09 RX ADMIN — ALLOPURINOL 100 MG: 100 TABLET ORAL at 18:29

## 2025-01-09 RX ADMIN — FLUOXETINE HYDROCHLORIDE 20 MG: 20 CAPSULE ORAL at 08:30

## 2025-01-09 RX ADMIN — MIDODRINE HYDROCHLORIDE 5 MG: 5 TABLET ORAL at 18:29

## 2025-01-09 RX ADMIN — PANTOPRAZOLE SODIUM 40 MG: 40 TABLET, DELAYED RELEASE ORAL at 08:31

## 2025-01-09 RX ADMIN — PREDNISONE 2.5 MG: 1 TABLET ORAL at 12:46

## 2025-01-09 RX ADMIN — ALLOPURINOL 100 MG: 100 TABLET ORAL at 08:30

## 2025-01-09 RX ADMIN — Medication 2000 UNITS: at 08:30

## 2025-01-09 RX ADMIN — MIDODRINE HYDROCHLORIDE 5 MG: 5 TABLET ORAL at 08:30

## 2025-01-09 NOTE — ASSESSMENT & PLAN NOTE
Renal limited, currently HD dependent. No signs of renal recovery.  Taken off cyclophosphamide after 2 months of therapy due to lack of response and cost.  Currently on slow steroid taper with previous concern for unstable BP/potential AI.     Transition to 12.5 mg today for 2 weeks and taper down by 2.5mg q2 weeks. He is tolerating slower taper. Continue PPI and calcium carbonate. Should be on PCP ppx with Bactrim thrice weekly post HD.   Nystatin not cost effective for patient.

## 2025-01-09 NOTE — DISCHARGE INSTRUCTIONS
Please advise below  I can have pt contact [facility] to re-fax forms if need be   [facility]  Interventional Radiology  [phone]          Perma-cath Placement   WHAT YOU NEED TO KNOW:   A perma-cath is a catheter placed through a vein into or near your right atrium. Your right atrium is the right upper chamber of your heart. A perma-cath is used for dialysis in an emergency or until a long-term device is ready to use. After your procedure, you will have some pain and swelling on your chest and neck. You may have some bruises on your chest and neck. You may also have 2 dressings, one on your chest and one on your neck.   DISCHARGE INSTRUCTIONS:   Call 911 for any of the following:   You feel lightheaded, short of breath, and have chest pain.    Your catheter comes out   Contact Interventional Radiology at [phone] (JANNA PATIENTS: Contact Interventional Radiology at [phone]) (YUNIOR PATIENTS: Contact Interventional Radiology at [phone]) if:  Blood soaks through your bandage.   You have new swelling in your arm, neck, face, or chest on your right side.  Your catheter gets wet.    Your bruises or pain get worse.   You have a fever or chills.  Persistent nausea or vomiting.   Your incision is red, swollen, or draining pus.   You have questions or concerns about your condition or care.  Self-care:       Resume your normal diet.  Keep your dressings dry. Do not take a shower or swim. You may take a tub bath, but do not get your dressings wet. Water in your wound can cause bacteria to grow and cause an infection. If your dressing gets wet, dry it off and cover it with dry sterile gauze. Call your healthcare provider. Do not use soaps or ointments.  Do not change your dressings. Your healthcare provider or dialysis nurse will change your dressings. Your dressings should stay in place until your healthcare provider removes them. The dressing on your chest will stay as long as you have the catheter in place. The dressing prevents infection.    Do not remove  the red and blue caps from the end of your catheter. The caps prevent air from getting into your catheter.  Follow up with your healthcare provider as directed: Write down your questions so you remember to ask them during your visits.                    Moderate Sedation   WHAT YOU NEED TO KNOW:   Moderate sedation, or conscious sedation, is medicine used during procedures to help you feel relaxed and calm. You will be awake and able to follow directions without anxiety or pain. You will remember little to none of the procedure. You may feel tired, weak, or unsteady on your feet after you get sedation. You may also have trouble concentrating or short-term memory loss. These symptoms should go away in 24 hours or less.   DISCHARGE INSTRUCTIONS:   Call 911 or have someone else call for any of the following:   You have sudden trouble breathing.     You cannot be woken.  Seek care immediately if:   You have a severe headache or dizziness.     Your heart is beating faster than usual.  Contact your healthcare provider if:   You have a fever.     You have nausea or are vomiting for more than 8 hours after the procedure.      Your skin is itchy, swollen, or you have a rash.     You have questions or concerns about your condition or care.  Self-care:   Have someone stay with you for 24 hours. This person can drive you to errands and help you do things around the house. This person can also watch for problems.      Rest and do quiet activities for 24 hours. Do not exercise, ride a bike, or play sports. Stand up slowly to prevent dizziness and falls. Take short walks around the house with another person. Slowly return to your usual activities the next day.      Do not drive or use dangerous machines or tools for 24 hours. You may injure yourself or others. Examples include a lawnmower, saw, or drill. Do not return to work for 24 hours if you use dangerous machines or tools for work.      Do not make important decisions for 24  hours. For example, do not sign important papers or invest money.      Drink liquids as directed. Liquids help flush the sedation medicine out of your body. Ask how much liquid to drink each day and which liquids are best for you.      Eat small, frequent meals to prevent nausea and vomiting. Start with clear liquids such as juice or broth. If you do not vomit after clear liquids, you can eat your usual foods.      Do not drink alcohol or take medicines that make you drowsy. This includes medicines that help you sleep and anxiety medicines. Ask your healthcare provider if it is safe for you to take pain medicine.  Follow up with your healthcare provider as directed: Write down your questions so you remember to ask them during your visits.   © 2017 [employer] Information is for End User's use only and may not be sold, redistributed or otherwise used for commercial purposes. All illustrations and images included in CareNotes® are the copyrighted property of [employer], [employer] [facility] [employer] [facility]. or [employer].  The above information is an  only. It is not intended as medical advice for individual conditions or treatments. Talk to your doctor, nurse or pharmacist before following any medical regimen to see if it is safe and effective for you.

## 2025-01-09 NOTE — ASSESSMENT & PLAN NOTE
Lab Results   Component Value Date    EGFR 5 01/09/2025    EGFR 6 01/08/2025    EGFR 10 12/17/2024    CREATININE 7.92 (H) 01/09/2025    CREATININE 7.03 (H) 01/08/2025    CREATININE 4.95 (H) 12/17/2024     Patient is a hemodialysis patient on a Monday Wednesday Friday schedule  Last HD treatment 1/6  Patient was to have an HD treatment 1/8 and noted that his permacath fell out of his right chest wall while he was washing at home-sent to ED  Appreciate input of nephrology who are following  IR placed permacath today  Patient had hemodialysis treatment today, per nephrology will resume outpatient hemodialysis schedule tomorrow  Medically stable discharging home

## 2025-01-09 NOTE — ASSESSMENT & PLAN NOTE
BP in past 1 month has trended down and Coreg DC. Started on midodrine with HD and 5mg TID. Patient BP monitored home RN.   Sp echo today to evaluate hypotension.  EF 55%,normal systolic function, diastolic normal.   Check TSH.

## 2025-01-09 NOTE — CASE MANAGEMENT
Case Management Discharge Planning Note    Patient name Ramos Rosenthal  Location /-01 MRN [id number]  : 1950 Date 2025       Current Admission Date: 2025  Current Admission Diagnosis:Problem with dialysis access ([facility])   Patient Active Problem List    Diagnosis Date Noted Date Diagnosed    Ulcer of left heel (HCC) 10/31/2024     Immunocompromised patient ([facility]) 10/23/2024     Chills 10/22/2024     Volume overload 10/21/2024     Chronic gout due to renal impairment of multiple sites without tophus 10/21/2024     Encounter for immunization 10/16/2024     Hypomagnesemia 10/05/2024     Hypocalcemia 10/01/2024     End-stage renal disease on hemodialysis ([facility]) 2024     Anemia 2024     Problem with dialysis access ([facility]) 2024     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 2024     Essential hypertension 2024     Secondary hyperparathyroidism of renal origin ([facility]) 2024     Uremia 2024     Hyponatremia 2024     Bilateral lower extremity edema 2024     Neurogenic claudication 2023    Bloating 10/05/2022     Paresthesia of both lower extremities 10/01/2022     Leucocytosis 10/01/2022     Elevated troponin 2022     SOB (shortness of breath) 2022     LYLY (acute kidney injury) ([facility]) 2022     Depression 2022     Lower extremity weakness 2019     Lumbosacral plexopathy 2019     Gait abnormality 2019     Lumbar stenosis 2019     Polyneuropathy 2019     Prediabetes 2018    Hyperlipidemia 2014    Vitamin D deficiency 2014      LOS (days): 0  Geometric Mean LOS (GMLOS) (days):   Days to GMLOS:     OBJECTIVE:        Current admission status: Observation   Preferred Pharmacy:   [id number] [place] - [address]  [address]  [place] [address]  Phone: [phone] Fax:  [phone]    Primary Care Provider: Jessika Carrillo MD    Primary Insurance: MEDICARE  Secondary Insurance: [employer]    DISCHARGE DETAILS:    Requested Home Health Care         Is the patient interested in HHC at discharge?: Yes  Home Health Discipline requested:: Nursing  Home Health Agency Name:: [facility]  HHA External Referral Reason (only applicable if external HHA name selected): Patient has established relationship with provider  Home Health Follow-Up Provider:: PCP  Home Health Services Needed:: Other (comment) (Weekly bloodwork)  Homebound Criteria Met:: Requires the Assistance of Another Person for Safe Ambulation or to Leave the Home, Uses an Assist Device (i.e. cane, walker, etc)  Supporting Clincal Findings:: Bed Bound or Wheelchair Bound    Other Referral/Resources/Interventions Provided:  Referral Comments: [facility] referral sent to [facility] able [facility].         Treatment Team Recommendation: Home with Home Health Care  Discharge Destination Plan:: Home with Home Health Care

## 2025-01-09 NOTE — DISCHARGE SUMMARY
"Discharge Summary - Hospitalist   Name: Ramos Rosenthal 74 y.o. male I MRN: [id number]  Unit/Bed#: -01 I Date of Admission: 1/8/2025   Date of Service: 1/9/2025 I Hospital Day: 0     Assessment & Plan  Problem with dialysis access (HCC)  Patient is a hemodialysis patient on a Monday Wednesday Friday schedule with last session being 1/6  Patient reports that permacath in the right chest \"fell out\" at home on 1/8 morning while doing a.m. care  Came to ED as he was instructed  Appreciate nephrology who followed.  Permacath placed by IR today.  Patient had hemodialysis today and will resume prehospital hemodialysis scheduled starting tomorrow  Medically stable after dialysis discharging home  End-stage renal disease on hemodialysis (HCC)  Lab Results   Component Value Date    EGFR 5 01/09/2025    EGFR 6 01/08/2025    EGFR 10 12/17/2024    CREATININE 7.92 (H) 01/09/2025    CREATININE 7.03 (H) 01/08/2025    CREATININE 4.95 (H) 12/17/2024     Patient is a hemodialysis patient on a Monday Wednesday Friday schedule  Last HD treatment 1/6  Patient was to have an HD treatment 1/8 and noted that his permacath fell out of his right chest wall while he was washing at home-sent to ED  Appreciate input of nephrology who are following  IR placed permacath today  Patient had hemodialysis treatment today, per nephrology will resume outpatient hemodialysis schedule tomorrow  Medically stable discharging home  Essential hypertension  Blood pressure controlled  Continue prehospital midodrine on discharge    Depression  No signs of depression  Continue prehospital Prozac on discharge  Vitamin D deficiency  Continue prehospital supplementation on discharge  RPGN (rapidly progressive glomerulonephritis) w/ anti-GBM antibodies  Nephrology following.    Continue on prehospital slow steroid taper on discharge-per nephrology transition to 12.5 mg today for 2 weeks and taper down by 2.5 mg every 2 weeks  Continue PPI and calcium " "carbonate  Patient will be prescribed Bactrim 3 times a week after dialysis per orders of nephrology     Medical Problems       Resolved Problems  Date Reviewed: 1/9/2025   None       Discharging Physician / Practitioner: BRETT Kaminski  PCP: Jessika Carrillo MD  Admission Date:   Admission Orders (From admission, onward)       Ordered        01/08/25 1228  Place in Observation  Once                          Discharge Date: 01/09/25    Consultations During Hospital Stay:  Nephrology, IR    Procedures Performed:   1/8 permacath placed by IR  1/8 hemodialysis session    Significant Findings / Test Results:   None    Incidental Findings:   None    Test Results Pending at Discharge (will require follow up):   None     Outpatient Tests Requested:  None    Complications: None    Reason for Admission: Problem with dialysis access    Hospital Course:   Ramos Rosenthal is a 74 y.o. male patient who originally presented to the hospital on 1/8/2025 after his permacath \"fell out at home\".  Patient reported he was doing his a.m. care and noted that his permacath was laying in the bed with him.  No bleeding.  Discussed with nephrology who advised to come to the ED.  Patient was due for HD 1/8, however could not have due to lack of access.  Last HD session 1/6.  Euvolemic on exam on arrival.  Nephrology followed patient while inpatient.  IR placed new permacath today and patient had hemodialysis treatment and tolerated well.  Medically stable discharged home after hemodialysis session.  Patient to resume prehospital dialysis schedule on discharge with next session being tomorrow 1/10.            Please see above list of diagnoses and related plan for additional information.     Condition at Discharge: good    Discharge Day Visit / Exam:   Subjective: Denies any dizziness, lightness, chest pain or palpitations.  Denies pain or discomfort.  Verbalize needs.  Vitals: Blood Pressure: 144/83 (01/09/25 1500)  Pulse: 80 " "(01/09/25 1445)  Temperature: (!) 96.5 °F (35.8 °C) (01/09/25 1432)  Temp Source: Tympanic (01/09/25 1432)  Respirations: 18 (01/09/25 1500)  Height: 6' 2\" (188 cm) (01/09/25 1031)  Weight - Scale: 125 kg (276 lb) (01/09/25 1031)  SpO2: 99 % (01/09/25 1320)  Physical Exam  Vitals reviewed.   Constitutional:       General: He is not in acute distress.     Appearance: He is well-developed.   HENT:      Head: Normocephalic and atraumatic.   Cardiovascular:      Rate and Rhythm: Normal rate and regular rhythm.      Pulses: Normal pulses.      Heart sounds: Normal heart sounds. No murmur heard.  Pulmonary:      Effort: Pulmonary effort is normal. No respiratory distress.      Breath sounds: Normal breath sounds. No wheezing or rales.   Abdominal:      General: Bowel sounds are normal. There is no distension.      Palpations: Abdomen is soft.      Tenderness: There is no abdominal tenderness. There is no guarding.   Musculoskeletal:         General: No swelling. Normal range of motion.   Skin:     General: Skin is warm and dry.      Capillary Refill: Capillary refill takes less than 2 seconds.   Neurological:      General: No focal deficit present.      Mental Status: He is alert and oriented to person, place, and time. Mental status is at baseline.   Psychiatric:         Mood and Affect: Mood normal.         Behavior: Behavior normal.          Discussion with Family: Updated  (wife) at bedside.    Discharge instructions/Information to patient and family:   See after visit summary for information provided to patient and family.      Provisions for Follow-Up Care:  See after visit summary for information related to follow-up care and any pertinent home health orders.      Mobility at time of Discharge:   Basic Mobility Inpatient Raw Score: 8  JH-HLM Goal: 3: Sit at edge of bed  JH-HLM Achieved: 2: Bed activities/Dependent transfer  HLM Goal NOT achieved. Continue to encourage mobility in post discharge " setting.     Disposition:   Home with [facility] Services (Reminder: Complete face to face encounter)    Planned Readmission: No    Discharge Medications:  See after visit summary for reconciled discharge medications provided to patient and/or family.      Administrative Statements   Discharge Statement:  I have spent a total time of 37 minutes in caring for this patient on the day of the visit/encounter. >30 minutes of time was spent on: Patient and family education, Importance of tx compliance, Documenting in the medical record, Reviewing / ordering tests, medicine, procedures  , and Communicating with other healthcare professionals .    **Please Note: This note may have been constructed using a voice recognition system**

## 2025-01-09 NOTE — CASE MANAGEMENT
Case Management Discharge Planning Note    Patient name Ramos Rosenthal  Location /-01 MRN [id number]  : 1950 Date 2025       Current Admission Date: 2025  Current Admission Diagnosis:Problem with dialysis access ([facility])   Patient Active Problem List    Diagnosis Date Noted Date Diagnosed    Ulcer of left heel (HCC) 10/31/2024     Immunocompromised patient ([facility]) 10/23/2024     Chills 10/22/2024     Volume overload 10/21/2024     Chronic gout due to renal impairment of multiple sites without tophus 10/21/2024     Encounter for immunization 10/16/2024     Hypomagnesemia 10/05/2024     Hypocalcemia 10/01/2024     End-stage renal disease on hemodialysis ([facility]) 2024     Anemia 2024     Problem with dialysis access ([facility]) 2024     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 2024     Essential hypertension 2024     Secondary hyperparathyroidism of renal origin ([facility]) 2024     Uremia 2024     Hyponatremia 2024     Bilateral lower extremity edema 2024     Neurogenic claudication 2023    Bloating 10/05/2022     Paresthesia of both lower extremities 10/01/2022     Leucocytosis 10/01/2022     Elevated troponin 2022     SOB (shortness of breath) 2022     LYLY (acute kidney injury) ([facility]) 2022     Depression 2022     Lower extremity weakness 2019     Lumbosacral plexopathy 2019     Gait abnormality 2019     Lumbar stenosis 2019     Polyneuropathy 2019     Prediabetes 2018    Hyperlipidemia 2014    Vitamin D deficiency 2014      LOS (days): 0  Geometric Mean LOS (GMLOS) (days):   Days to GMLOS:     OBJECTIVE:            Current admission status: Observation   Preferred Pharmacy:   [id number] [place] - [address] [facility] [address]  [address]  [place] [address]  Phone: [phone] Fax:  [phone]    Primary Care Provider: Jessika Carrillo MD    Primary Insurance: MEDICARE  Secondary Insurance: [employer]    DISCHARGE DETAILS:    Additional Comments: CM spoke with pt's spouse via phone call and introduced self and role. Pt's spouse confirmed he was active with [facility] nursing prior to admission. [facility] referral sent and [facility] able to accept back. Pt's spouse reports she was informed pt would be stable for discharge later this evening after his permacath is replaced and he has HD. Pt's spouse will transport him home on discharge.

## 2025-01-09 NOTE — PLAN OF CARE
Problem: Potential for Falls  Goal: Patient will remain free of falls  Description: INTERVENTIONS:  - Educate patient/family on patient safety including physical limitations  - Instruct patient to call for assistance with activity   - Consult OT/PT to assist with strengthening/mobility   - Keep Call bell within reach  - Keep bed low and locked with side rails adjusted as appropriate  - Keep care items and personal belongings within reach  - Initiate and maintain comfort rounds  - Make Fall Risk Sign visible to staff  - Offer Toileting every 2 Hours, in advance of need  - Initiate/Maintain bed alarm  - Apply yellow socks and bracelet for high fall risk patients  - Consider moving patient to room near nurses station  Outcome: Progressing     Problem: Nutrition/Hydration-ADULT  Goal: Nutrient/Hydration intake appropriate for improving, restoring or maintaining nutritional needs  Description: Monitor and assess patient's nutrition/hydration status for malnutrition. Collaborate with interdisciplinary team and initiate plan and interventions as ordered.  Monitor patient's weight and dietary intake as ordered or per policy. Utilize nutrition screening tool and intervene as necessary. Determine patient's food preferences and provide high-protein, high-caloric foods as appropriate.     INTERVENTIONS:  - Monitor oral intake, urinary output, labs, and treatment plans  - Assess nutrition and hydration status and recommend course of action  - Evaluate amount of meals eaten  - Assist patient with eating if necessary   - Allow adequate time for meals  - Recommend/ encourage appropriate diets, oral nutritional supplements, and vitamin/mineral supplements  - Order, calculate, and assess calorie counts as needed  - Recommend, monitor, and adjust tube feedings and TPN/PPN based on assessed needs  - Assess need for intravenous fluids  - Provide specific nutrition/hydration education as appropriate  - Include patient/family/caregiver  in decisions related to nutrition  Outcome: Progressing     Problem: Prexisting or High Potential for Compromised Skin Integrity  Goal: Skin integrity is maintained or improved  Description: INTERVENTIONS:  - Identify patients at risk for skin breakdown  - Assess and monitor skin integrity  - Assess and monitor nutrition and hydration status  - Monitor labs   - Assess for incontinence   - Turn and reposition patient  - Assist with mobility/ambulation  - Relieve pressure over bony prominences  - Avoid friction and shearing  - Provide appropriate hygiene as needed including keeping skin clean and dry  - Evaluate need for skin moisturizer/barrier cream  - Collaborate with interdisciplinary team   - Patient/family teaching  - Consider wound care consult   Outcome: Progressing     Problem: PAIN - ADULT  Goal: Verbalizes/displays adequate comfort level or baseline comfort level  Description: Interventions:  - Encourage patient to monitor pain and request assistance  - Assess pain using appropriate pain scale  - Administer analgesics based on type and severity of pain and evaluate response  - Implement non-pharmacological measures as appropriate and evaluate response  - Consider cultural and social influences on pain and pain management  - Notify physician/advanced practitioner if interventions unsuccessful or patient reports new pain  Outcome: Progressing     Problem: INFECTION - ADULT  Goal: Absence or prevention of progression during hospitalization  Description: INTERVENTIONS:  - Assess and monitor for signs and symptoms of infection  - Monitor lab/diagnostic results  - Monitor all insertion sites, i.e. indwelling lines, tubes, and drains  - Monitor endotracheal if appropriate and nasal secretions for changes in amount and color  - [place] appropriate cooling/warming therapies per order  - Administer medications as ordered  - Instruct and encourage patient and family to use good hand hygiene technique  - Identify and  instruct in appropriate isolation precautions for identified infection/condition  Outcome: Progressing     Problem: SAFETY ADULT  Goal: Patient will remain free of falls  Description: INTERVENTIONS:  - Educate patient/family on patient safety including physical limitations  - Instruct patient to call for assistance with activity   - Consult OT/PT to assist with strengthening/mobility   - Keep Call bell within reach  - Keep bed low and locked with side rails adjusted as appropriate  - Keep care items and personal belongings within reach  - Initiate and maintain comfort rounds  - Make Fall Risk Sign visible to staff  - Offer Toileting every 2 Hours, in advance of need  - Initiate/Maintain bed alarm  - Apply yellow socks and bracelet for high fall risk patients  - Consider moving patient to room near nurses station  Outcome: Progressing  Goal: Maintain or return to baseline ADL function  Description: INTERVENTIONS:  -  Assess patient's ability to carry out ADLs; assess patient's baseline for ADL function and identify physical deficits which impact ability to perform ADLs (bathing, care of mouth/teeth, toileting, grooming, dressing, etc.)  - Assess/evaluate cause of self-care deficits   - Assess range of motion  - Assess patient's mobility; develop plan if impaired  - Assess patient's need for assistive devices and provide as appropriate  - Encourage maximum independence but intervene and supervise when necessary  - Involve family in performance of ADLs  - Assess for home care needs following discharge   - Consider OT consult to assist with ADL evaluation and planning for discharge  - Provide patient education as appropriate  Outcome: Progressing  Goal: Maintains/Returns to pre admission functional level  Description: INTERVENTIONS:  - Perform AM-PAC 6 Click Basic Mobility/ Daily Activity assessment daily.  - Set and communicate daily mobility goal to care team and patient/family/caregiver.   - Collaborate with  rehabilitation services on mobility goals if consulted  - Record patient progress and toleration of activity level   Outcome: Progressing     Problem: DISCHARGE PLANNING  Goal: Discharge to home or other facility with appropriate resources  Description: INTERVENTIONS:  - Identify barriers to discharge w/patient and caregiver  - Arrange for needed discharge resources and transportation as appropriate  - Identify discharge learning needs (meds, wound care, etc.)  - Refer to Case Management Department for coordinating discharge planning if the patient needs post-hospital services based on physician/advanced practitioner order or complex needs related to functional status, cognitive ability, or social support system  Outcome: Progressing     Problem: Knowledge Deficit  Goal: Patient/family/caregiver demonstrates understanding of disease process, treatment plan, medications, and discharge instructions  Description: Complete learning assessment and assess knowledge base.  Interventions:  - Provide teaching at level of understanding  - Provide teaching via preferred learning methods  Outcome: Progressing     Problem: METABOLIC, FLUID AND ELECTROLYTES - ADULT  Goal: Electrolytes maintained within normal limits  Description: INTERVENTIONS:  - Monitor labs and assess patient for signs and symptoms of electrolyte imbalances  - Administer electrolyte replacement as ordered  - Monitor response to electrolyte replacements, including repeat lab results as appropriate  - Instruct patient on fluid and nutrition as appropriate  Outcome: Progressing  Goal: Fluid balance maintained  Description: INTERVENTIONS:  - Monitor labs   - Monitor I/O and WT  - Instruct patient on fluid and nutrition as appropriate  - Assess for signs & symptoms of volume excess or deficit  Outcome: Progressing     Problem: SKIN/TISSUE INTEGRITY - ADULT  Goal: Pressure injury heals and does not worsen  Description: Interventions:  - Implement low air loss  mattress or specialty surface (Criteria met)  - Apply silicone foam dressing  - Instruct/assist with weight shifting when in chair   - Limit chair time   - Use special pressure reducing interventions when in chair   - Apply fecal or urinary incontinence containment device   - Turn and reposition patient & offload bony prominences every two hours   - Utilize friction reducing device or surface for transfers   - Consider consults to  interdisciplinary teams   - Use incontinent care products after each incontinent episode  - Consider nutrition services referral as needed  Outcome: Progressing     Problem: HEMATOLOGIC - ADULT  Goal: Maintains hematologic stability  Description: INTERVENTIONS  - Assess for signs and symptoms of bleeding or hemorrhage  - Monitor labs  - Administer supportive blood products/factors as ordered and appropriate  Outcome: Progressing

## 2025-01-09 NOTE — ASSESSMENT & PLAN NOTE
Nephrology following.    Continue on prehospital slow steroid taper on discharge-per nephrology transition to 12.5 mg today for 2 weeks and taper down by 2.5 mg every 2 weeks  Continue PPI and calcium carbonate  Patient will be prescribed Bactrim 3 times a week after dialysis per orders of nephrology

## 2025-01-09 NOTE — PROGRESS NOTES
Progress Note - Nephrology   Name: Ramos Rosenthal 74 y.o. male I MRN: [id number]  Unit/Bed#: -01 I Date of Admission: 1/8/2025   Date of Service: 1/9/2025 I Hospital Day: 0     Assessment & Plan  Problem with dialysis access (HCC)  RIDAXA PC fell out at home on 1/8. Presented to ED 1/8 for eval and PC replaced today.   Essential hypertension  BP in past 1 month has trended down and Coreg DC. Started on midodrine with HD and 5mg TID. Patient BP monitored home RN.   Sp echo today to evaluate hypotension.  EF 55%,normal systolic function, diastolic normal.   Check TSH.   End-stage renal disease on hemodialysis (HCC)  Lab Results   Component Value Date    EGFR 5 01/09/2025    EGFR 6 01/08/2025    EGFR 10 12/17/2024    CREATININE 7.92 (H) 01/09/2025    CREATININE 7.03 (H) 01/08/2025    CREATININE 4.95 (H) 12/17/2024   MWF schedule, missed 1/8. Anuric.    RIJ PC replaced 1/9. Plan for HD today and can be DC post HD. Resume outpatient HD 1/10.     Plan for HD today and resume outpatient schedule tomorrow.     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 No signs of renal recovery.  Taken off cyclophosphamide after 2 months of therapy due to lack of response and cost.  Currently on slow steroid taper with previous concern for unstable BP/potential AI.     Transition to 12.5 mg today for 2 weeks and taper down by 2.5mg q2 weeks. He is tolerating slower taper. Continue PPI and calcium carbonate. Should be on PCP ppx with Bactrim thrice weekly post HD.   Nystatin not cost effective for patient.     I have reviewed the nephrology recommendations including prednisone and dialysis recommendation, with primary team, and we are in agreement with renal plan including the information outlined above.     Subjective   Patient seen and examined today. Denies chest pain,dyspnea,nausea,vomiting,diarrhea. BP well controlled at present.   A complete 10 point review of  systems was performed and is otherwise negative.     Objective :  Temp:  [96.5 °F (35.8 °C)-97.9 °F (36.6 °C)] 96.5 °F (35.8 °C)  HR:  [64-87] 81  BP: (124-156)/(68-83) 137/83  Resp:  [14-18] 18  SpO2:  [95 %-100 %] 99 %  O2 Device: None (Room air)    Current Weight: Weight - Scale: 125 kg (276 lb)  First Weight: Weight - Scale: 126 kg (278 lb)  I/O         01/07 0701 01/08 0700 01/08 0701 01/09 0700 01/09 0701  01/10 0700    P.O.  660 0    Total Intake(mL/kg)  660 (5.3) 0 (0)    Net  +660 0                 Physical Exam  Vitals reviewed.   Constitutional:       General: He is not in acute distress.     Appearance: He is obese. He is not ill-appearing.   HENT:      Head: Normocephalic and atraumatic.      Nose: Nose normal.      Mouth/Throat:      Mouth: Mucous membranes are moist.      Pharynx: Oropharynx is clear.   Cardiovascular:      Rate and Rhythm: Normal rate and regular rhythm.      Heart sounds:      No friction rub.   Pulmonary:      Breath sounds: No wheezing, rhonchi or rales.   Abdominal:      Palpations: Abdomen is soft.      Tenderness: There is no abdominal tenderness. There is no guarding.   Musculoskeletal:      Right lower leg: No edema.      Left lower leg: No edema.   Skin:     General: Skin is warm.      Coloration: Skin is not jaundiced.      Findings: No bruising.   Neurological:      Mental Status: He is alert. Mental status is at baseline.      Cranial Nerves: No cranial nerve deficit.   Psychiatric:         Mood and Affect: Mood normal.         Behavior: Behavior normal.         Medications:    Current Facility-Administered Medications:     acetaminophen (TYLENOL) tablet 650 mg, 650 mg, Oral, Q6H PRN, BRETT Kaminski    allopurinol (ZYLOPRIM) tablet 100 mg, 100 mg, Oral, BID, BRETT Kaminski, 100 mg at 01/09/25 0830    calcium carbonate (OYSTER SHELL,OSCAL) 500 mg tablet 2 tablet, 2 tablet, Oral, BID With Meals, BRETT Kaminski, 2 tablet at 01/09/25 0831     "Cholecalciferol (VITAMIN D3) tablet 2,000 Units, 2,000 Units, Oral, Daily, BRETT Kaminski, 2,000 Units at 01/09/25 0830    ergocalciferol (VITAMIN D2) capsule 50,000 Units, 50,000 Units, Oral, Weekly, BRETT Kaminski    FLUoxetine (PROzac) capsule 20 mg, 20 mg, Oral, Daily, BRETT Kaminski, 20 mg at 01/09/25 0830    heparin (porcine) subcutaneous injection 5,000 Units, 5,000 Units, Subcutaneous, Q8H LORENZO, 5,000 Units at 01/09/25 0636 **AND** [COMPLETED] Platelet count, , , Once, BRETT Kaminski    magnesium Oxide (MAG-OX) tablet 400 mg, 400 mg, Oral, Daily, BRETT Kaminski, 400 mg at 01/09/25 0830    midodrine (PROAMATINE) tablet 5 mg, 5 mg, Oral, TID AC, BRETT Kaminski, 5 mg at 01/09/25 1134    ondansetron (ZOFRAN) injection 4 mg, 4 mg, Intravenous, Q6H PRN, BRETT Kaminski    pantoprazole (PROTONIX) EC tablet 40 mg, 40 mg, Oral, BID AC, BRETT Kaminski, 40 mg at 01/09/25 0831      Lab Results: I have reviewed the following results:  Results from last 7 days   Lab Units 01/09/25  0444 01/08/25  1619 01/08/25  1130 01/07/25  1145   WBC Thousand/uL 8.62  --  12.02* 9.17   HEMOGLOBIN g/dL 8.2*  --  9.9* 9.4*   HEMATOCRIT % 26.0*  --  31.3* 31.0*   PLATELETS Thousands/uL 267 253 304 321   POTASSIUM mmol/L 4.6  --  4.2  --    CHLORIDE mmol/L 98  --  94*  --    CO2 mmol/L 30  --  30  --    BUN mg/dL 54*  --  45*  --    CREATININE mg/dL 7.92*  --  7.03*  --    CALCIUM mg/dL 8.9  --  8.9  --    MAGNESIUM mg/dL 2.3  --   --   --    PHOSPHORUS mg/dL 3.2  --   --   --    ALBUMIN g/dL  --   --  3.5  --        Administrative Statements     Portions of the record may have been created with voice recognition software. Occasional wrong word or \"sound a like\" substitutions may have occurred due to the inherent limitations of voice recognition software. Read the chart carefully and recognize, using context, where substitutions have occurred.If you have any questions, " please contact the dictating provider.

## 2025-01-09 NOTE — PLAN OF CARE
Problem: DISCHARGE PLANNING  Goal: Discharge to home or other facility with appropriate resources  Description: INTERVENTIONS:  - Identify barriers to discharge w/patient and caregiver  - Arrange for needed discharge resources and transportation as appropriate  - Identify discharge learning needs (meds, wound care, etc.)  - Refer to Case Management Department for coordinating discharge planning if the patient needs post-hospital services based on physician/advanced practitioner order or complex needs related to functional status, cognitive ability, or social support system  Outcome: Adequate for Discharge

## 2025-01-09 NOTE — DISCHARGE INSTR - AVS FIRST PAGE
Had a hemodialysis session today 1/8 prior to discharge  Per orders of nephrology you are to continue your prehospital [id number] schedule starting tomorrow 1/10  Per orders of nephrology you will be prescribed Bactrim DS 3 times a week after your dialysis treatments  Per orders of nephrology prednisone taper will be 12.5 mg daily starting today, decreasing by 2.5 mg every 2 weeks  I have made no other changes to your prehospital medications      It has been a pleasure taking care of you. I wish you all the best on discharge!  Elizabeth WEST

## 2025-01-09 NOTE — PLAN OF CARE
Target UF Goal 2 L as tolerated. Patient dialyzing for  3.5 hrs  on 2 K bath for serum K of  4.6  per protocol. Treatment plan reviewed with Nephrology.       Post-Dialysis RN Treatment Note    Blood Pressure:  Pre 137/ mm/Hg  Post 118/82 mmHg   EDW  129 kg    Weight:  Pre 125 kg   Post 123.5 kg   Mode of weight measurement: Bed Scale   Volume Removed  1500 ml    Treatment duration  3 hrs and 30 mins   NS given  No    Treatment shortened? No   Medications given during Rx None Reported   Estimated Kt/V  1.10   Access type: Permacath/TDC   Access Issues: No   Needle Gauge: N/A   Report called to primary nurse   Yes, SHARONA Mckeon at bedside     Problem: METABOLIC, FLUID AND ELECTROLYTES - ADULT  Goal: Electrolytes maintained within normal limits  Description: INTERVENTIONS:  - Monitor labs and assess patient for signs and symptoms of electrolyte imbalances  - Administer electrolyte replacement as ordered  - Monitor response to electrolyte replacements, including repeat lab results as appropriate  - Instruct patient on fluid and nutrition as appropriate  Outcome: Progressing  Goal: Fluid balance maintained  Description: INTERVENTIONS:  - Monitor labs   - Monitor I/O and WT  - Instruct patient on fluid and nutrition as appropriate  - Assess for signs & symptoms of volume excess or deficit  Outcome: Progressing

## 2025-01-09 NOTE — ASSESSMENT & PLAN NOTE
"Patient is a hemodialysis patient on a Monday Wednesday Friday schedule with last session being 1/6  Patient reports that permacath in the right chest \"fell out\" at home on 1/8 morning while doing a.m. care  Came to ED as he was instructed  Appreciate nephrology who followed.  Permacath placed by IR today.  Patient had hemodialysis today and will resume prehospital hemodialysis scheduled starting tomorrow  Medically stable after dialysis discharging home  "

## 2025-01-09 NOTE — BRIEF OP NOTE (RAD/CATH)
INTERVENTIONAL RADIOLOGY PROCEDURE NOTE    Date: 1/9/2025    Procedure:   Procedure Summary       Date:  Room / Location:     Anesthesia Start:  Anesthesia Stop:     Procedure:  Diagnosis:     Scheduled Providers:  Responsible Provider:     Anesthesia Type: Not recorded ASA Status: Not recorded            Preoperative diagnosis:   1. Complication associated with dialysis catheter    2. End-stage renal disease on hemodialysis (HCC)         Postoperative diagnosis: Same.    Surgeon: Jorge Adamson MD     Assistant: None. No qualified resident was available.    Blood loss: None    Specimens: None    Findings: Right permacath replaced through previous tract under fluoroscopy.  Okay to use immediately for hemodialysis.    Complications: None immediate.    Anesthesia: local

## 2025-01-09 NOTE — PROGRESS NOTES
Patient:  JOSELYN GOMEZ    MRN:  [id number]    [facility] Request ID:  [id number]    Level of care reserved:  Home Health Agency    Partner Reserved:  [facility], [place] [address] [phone]    Clinical needs requested:    Geography searched:  20 miles around [phone]    Start of Service:    Request sent:  8:57am EST on 1/9/2025 by Alisha Salas    Partner reserved:  10:49am EST on 1/9/2025 by Alisha Salas    Choice list shared:  10:49am EST on 1/9/2025 by Alisha Salas

## 2025-01-10 ENCOUNTER — TRANSITIONAL CARE MANAGEMENT (OUTPATIENT)
Dept: FAMILY MEDICINE CLINIC | Facility: CLINIC | Age: 75
End: 2025-01-10

## 2025-01-10 NOTE — NURSING NOTE
Patient discharged to home, no needs. All iv's taken out and pressure was applied. AVS discussed with spouse and other family. All belongs with patient.

## 2025-01-10 NOTE — PLAN OF CARE
Problem: Potential for Falls  Goal: Patient will remain free of falls  Description: INTERVENTIONS:  - Educate patient/family on patient safety including physical limitations  - Instruct patient to call for assistance with activity   - Consult OT/PT to assist with strengthening/mobility   - Keep Call bell within reach  - Keep bed low and locked with side rails adjusted as appropriate  - Keep care items and personal belongings within reach  - Initiate and maintain comfort rounds  - Make Fall Risk Sign visible to staff  - Offer Toileting every 2 Hours, in advance of need  - Initiate/Maintain bed alarm  - Apply yellow socks and bracelet for high fall risk patients  - Consider moving patient to room near nurses station  Outcome: Progressing     Problem: Nutrition/Hydration-ADULT  Goal: Nutrient/Hydration intake appropriate for improving, restoring or maintaining nutritional needs  Description: Monitor and assess patient's nutrition/hydration status for malnutrition. Collaborate with interdisciplinary team and initiate plan and interventions as ordered.  Monitor patient's weight and dietary intake as ordered or per policy. Utilize nutrition screening tool and intervene as necessary. Determine patient's food preferences and provide high-protein, high-caloric foods as appropriate.     INTERVENTIONS:  - Monitor oral intake, urinary output, labs, and treatment plans  - Assess nutrition and hydration status and recommend course of action  - Evaluate amount of meals eaten  - Assist patient with eating if necessary   - Allow adequate time for meals  - Recommend/ encourage appropriate diets, oral nutritional supplements, and vitamin/mineral supplements  - Order, calculate, and assess calorie counts as needed  - Recommend, monitor, and adjust tube feedings and TPN/PPN based on assessed needs  - Assess need for intravenous fluids  - Provide specific nutrition/hydration education as appropriate  - Include patient/family/caregiver  in decisions related to nutrition  Outcome: Progressing     Problem: Prexisting or High Potential for Compromised Skin Integrity  Goal: Skin integrity is maintained or improved  Description: INTERVENTIONS:  - Identify patients at risk for skin breakdown  - Assess and monitor skin integrity  - Assess and monitor nutrition and hydration status  - Monitor labs   - Assess for incontinence   - Turn and reposition patient  - Assist with mobility/ambulation  - Relieve pressure over bony prominences  - Avoid friction and shearing  - Provide appropriate hygiene as needed including keeping skin clean and dry  - Evaluate need for skin moisturizer/barrier cream  - Collaborate with interdisciplinary team   - Patient/family teaching  - Consider wound care consult   Outcome: Progressing     Problem: PAIN - ADULT  Goal: Verbalizes/displays adequate comfort level or baseline comfort level  Description: Interventions:  - Encourage patient to monitor pain and request assistance  - Assess pain using appropriate pain scale  - Administer analgesics based on type and severity of pain and evaluate response  - Implement non-pharmacological measures as appropriate and evaluate response  - Consider cultural and social influences on pain and pain management  - Notify physician/advanced practitioner if interventions unsuccessful or patient reports new pain  Outcome: Progressing     Problem: INFECTION - ADULT  Goal: Absence or prevention of progression during hospitalization  Description: INTERVENTIONS:  - Assess and monitor for signs and symptoms of infection  - Monitor lab/diagnostic results  - Monitor all insertion sites, i.e. indwelling lines, tubes, and drains  - Monitor endotracheal if appropriate and nasal secretions for changes in amount and color  - [place] appropriate cooling/warming therapies per order  - Administer medications as ordered  - Instruct and encourage patient and family to use good hand hygiene technique  - Identify and  instruct in appropriate isolation precautions for identified infection/condition  Outcome: Progressing     Problem: SAFETY ADULT  Goal: Patient will remain free of falls  Description: INTERVENTIONS:  - Educate patient/family on patient safety including physical limitations  - Instruct patient to call for assistance with activity   - Consult OT/PT to assist with strengthening/mobility   - Keep Call bell within reach  - Keep bed low and locked with side rails adjusted as appropriate  - Keep care items and personal belongings within reach  - Initiate and maintain comfort rounds  - Make Fall Risk Sign visible to staff  - Offer Toileting every 2 Hours, in advance of need  - Initiate/Maintain bed alarm  - Apply yellow socks and bracelet for high fall risk patients  - Consider moving patient to room near nurses station  Outcome: Progressing  Goal: Maintain or return to baseline ADL function  Description: INTERVENTIONS:  -  Assess patient's ability to carry out ADLs; assess patient's baseline for ADL function and identify physical deficits which impact ability to perform ADLs (bathing, care of mouth/teeth, toileting, grooming, dressing, etc.)  - Assess/evaluate cause of self-care deficits   - Assess range of motion  - Assess patient's mobility; develop plan if impaired  - Assess patient's need for assistive devices and provide as appropriate  - Encourage maximum independence but intervene and supervise when necessary  - Involve family in performance of ADLs  - Assess for home care needs following discharge   - Consider OT consult to assist with ADL evaluation and planning for discharge  - Provide patient education as appropriate  Outcome: Progressing  Goal: Maintains/Returns to pre admission functional level  Description: INTERVENTIONS:  - Perform AM-PAC 6 Click Basic Mobility/ Daily Activity assessment daily.  - Set and communicate daily mobility goal to care team and patient/family/caregiver.   - Collaborate with  rehabilitation services on mobility goals if consulted  - Record patient progress and toleration of activity level   Outcome: Progressing     Problem: DISCHARGE PLANNING  Goal: Discharge to home or other facility with appropriate resources  Description: INTERVENTIONS:  - Identify barriers to discharge w/patient and caregiver  - Arrange for needed discharge resources and transportation as appropriate  - Identify discharge learning needs (meds, wound care, etc.)  - Refer to Case Management Department for coordinating discharge planning if the patient needs post-hospital services based on physician/advanced practitioner order or complex needs related to functional status, cognitive ability, or social support system  Outcome: Progressing     Problem: Knowledge Deficit  Goal: Patient/family/caregiver demonstrates understanding of disease process, treatment plan, medications, and discharge instructions  Description: Complete learning assessment and assess knowledge base.  Interventions:  - Provide teaching at level of understanding  - Provide teaching via preferred learning methods  Outcome: Progressing     Problem: METABOLIC, FLUID AND ELECTROLYTES - ADULT  Goal: Electrolytes maintained within normal limits  Description: INTERVENTIONS:  - Monitor labs and assess patient for signs and symptoms of electrolyte imbalances  - Administer electrolyte replacement as ordered  - Monitor response to electrolyte replacements, including repeat lab results as appropriate  - Instruct patient on fluid and nutrition as appropriate  Outcome: Progressing  Goal: Fluid balance maintained  Description: INTERVENTIONS:  - Monitor labs   - Monitor I/O and WT  - Instruct patient on fluid and nutrition as appropriate  - Assess for signs & symptoms of volume excess or deficit  Outcome: Progressing     Problem: SKIN/TISSUE INTEGRITY - ADULT  Goal: Pressure injury heals and does not worsen  Description: Interventions:  - Implement low air loss  mattress or specialty surface (Criteria met)  - Apply silicone foam dressing  - Instruct/assist with weight shifting when in chair   - Limit chair time   - Use special pressure reducing interventions when in chair   - Apply fecal or urinary incontinence containment device   - Turn and reposition patient & offload bony prominences every two hours   - Utilize friction reducing device or surface for transfers   - Consider consults to  interdisciplinary teams   - Use incontinent care products after each incontinent episode  - Consider nutrition services referral as needed  Outcome: Progressing     Problem: HEMATOLOGIC - ADULT  Goal: Maintains hematologic stability  Description: INTERVENTIONS  - Assess for signs and symptoms of bleeding or hemorrhage  - Monitor labs  - Administer supportive blood products/factors as ordered and appropriate  Outcome: Progressing

## 2025-01-12 ENCOUNTER — HOME CARE VISIT (OUTPATIENT)
Dept: HOME HEALTH SERVICES | Facility: HOME HEALTHCARE | Age: 75
End: 2025-01-12
Payer: MEDICARE

## 2025-01-12 VITALS
TEMPERATURE: 97.8 F | DIASTOLIC BLOOD PRESSURE: 70 MMHG | HEART RATE: 78 BPM | OXYGEN SATURATION: 98 % | RESPIRATION RATE: 20 BRPM | SYSTOLIC BLOOD PRESSURE: 130 MMHG

## 2025-01-12 PROCEDURE — G0299 HHS/HOSPICE OF RN EA 15 MIN: HCPCS

## 2025-01-12 NOTE — CASE COMMUNICATION
Medication discrepancies or Major drug interactions  Abnormal clinical findings none  This report is informational only, no response is needed  [facility] has Resumed your patient to Home Health service with the following disciplines: SN  Patient stated goals of care increase strength and wound healing  Potential barriers to goal achievement comorbidities  Primary focus of home health care: wound  Anticipated visit pattern and next  visit date 2w3 next visit planned for Tues 1/14  Thank you for allowing us to participate in the care of your patient.

## 2025-01-12 NOTE — CASE COMMUNICATION
Ship to  Pt. Home   Ordering MD Dr Jessika Carrillo (home health only)   Wound 1 left heel Full X Frequency three times a week     Allevyn Life heel foam [id number] ___[id number]  4x4 silicone border foam [id number]____[id number]

## 2025-01-14 ENCOUNTER — APPOINTMENT (OUTPATIENT)
Age: 75
End: 2025-01-14
Payer: MEDICARE

## 2025-01-14 ENCOUNTER — HOME CARE VISIT (OUTPATIENT)
Dept: HOME HEALTH SERVICES | Facility: HOME HEALTHCARE | Age: 75
End: 2025-01-14
Payer: MEDICARE

## 2025-01-14 VITALS
OXYGEN SATURATION: 99 % | TEMPERATURE: 97 F | DIASTOLIC BLOOD PRESSURE: 60 MMHG | RESPIRATION RATE: 16 BRPM | SYSTOLIC BLOOD PRESSURE: 132 MMHG | HEART RATE: 62 BPM

## 2025-01-14 DIAGNOSIS — D84.9 IMMUNOSUPPRESSION (HCC): ICD-10-CM

## 2025-01-14 DIAGNOSIS — N01.9 RAPIDLY PROGRESSIVE GLOMERULONEPHRITIS WITH ANTI-GBM ANTIBODIES: ICD-10-CM

## 2025-01-14 LAB
BASOPHILS # BLD AUTO: 0.11 THOUSANDS/ΜL (ref 0–0.1)
BASOPHILS NFR BLD AUTO: 1 % (ref 0–1)
EOSINOPHIL # BLD AUTO: 0.19 THOUSAND/ΜL (ref 0–0.61)
EOSINOPHIL NFR BLD AUTO: 1 % (ref 0–6)
ERYTHROCYTE [DISTWIDTH] IN BLOOD BY AUTOMATED COUNT: 15.7 % (ref 11.6–15.1)
HCT VFR BLD AUTO: 30.5 % (ref 36.5–49.3)
HGB BLD-MCNC: 9.3 G/DL (ref 12–17)
IMM GRANULOCYTES # BLD AUTO: 0.14 THOUSAND/UL (ref 0–0.2)
IMM GRANULOCYTES NFR BLD AUTO: 1 % (ref 0–2)
LYMPHOCYTES # BLD AUTO: 0.56 THOUSANDS/ΜL (ref 0.6–4.47)
LYMPHOCYTES NFR BLD AUTO: 4 % (ref 14–44)
MCH RBC QN AUTO: 33.3 PG (ref 26.8–34.3)
MCHC RBC AUTO-ENTMCNC: 30.5 G/DL (ref 31.4–37.4)
MCV RBC AUTO: 109 FL (ref 82–98)
MONOCYTES # BLD AUTO: 0.57 THOUSAND/ΜL (ref 0.17–1.22)
MONOCYTES NFR BLD AUTO: 4 % (ref 4–12)
NEUTROPHILS # BLD AUTO: 13.2 THOUSANDS/ΜL (ref 1.85–7.62)
NEUTS SEG NFR BLD AUTO: 89 % (ref 43–75)
NRBC BLD AUTO-RTO: 0 /100 WBCS
PLATELET # BLD AUTO: 258 THOUSANDS/UL (ref 149–390)
PMV BLD AUTO: 9 FL (ref 8.9–12.7)
RBC # BLD AUTO: 2.79 MILLION/UL (ref 3.88–5.62)
WBC # BLD AUTO: 14.77 THOUSAND/UL (ref 4.31–10.16)

## 2025-01-14 PROCEDURE — 36415 COLL VENOUS BLD VENIPUNCTURE: CPT

## 2025-01-14 PROCEDURE — 85025 COMPLETE CBC W/AUTO DIFF WBC: CPT

## 2025-01-14 PROCEDURE — G0299 HHS/HOSPICE OF RN EA 15 MIN: HCPCS

## 2025-01-14 PROCEDURE — 83520 IMMUNOASSAY QUANT NOS NONAB: CPT

## 2025-01-15 LAB — GBM AB SER IA-ACNC: 3.7 UNITS (ref 0–0.9)

## 2025-01-16 ENCOUNTER — OFFICE VISIT (OUTPATIENT)
Dept: FAMILY MEDICINE CLINIC | Facility: CLINIC | Age: 75
End: 2025-01-16
Payer: MEDICARE

## 2025-01-16 ENCOUNTER — HOME CARE VISIT (OUTPATIENT)
Dept: HOME HEALTH SERVICES | Facility: HOME HEALTHCARE | Age: 75
End: 2025-01-16
Payer: MEDICARE

## 2025-01-16 VITALS
HEART RATE: 66 BPM | OXYGEN SATURATION: 98 % | TEMPERATURE: 97.4 F | RESPIRATION RATE: 16 BRPM | SYSTOLIC BLOOD PRESSURE: 102 MMHG | DIASTOLIC BLOOD PRESSURE: 56 MMHG

## 2025-01-16 DIAGNOSIS — R73.03 PREDIABETES: ICD-10-CM

## 2025-01-16 DIAGNOSIS — T82.898D PROBLEM WITH DIALYSIS ACCESS, SUBSEQUENT ENCOUNTER: ICD-10-CM

## 2025-01-16 DIAGNOSIS — R60.0 BILATERAL LOWER EXTREMITY EDEMA: ICD-10-CM

## 2025-01-16 DIAGNOSIS — Z99.2 END-STAGE RENAL DISEASE ON HEMODIALYSIS (HCC): Primary | ICD-10-CM

## 2025-01-16 DIAGNOSIS — E78.2 MIXED HYPERLIPIDEMIA: ICD-10-CM

## 2025-01-16 DIAGNOSIS — N18.6 END-STAGE RENAL DISEASE ON HEMODIALYSIS (HCC): Primary | ICD-10-CM

## 2025-01-16 DIAGNOSIS — N01.9 RPGN (RAPIDLY PROGRESSIVE GLOMERULONEPHRITIS) W/ ANTI-GBM ANTIBODIES: ICD-10-CM

## 2025-01-16 DIAGNOSIS — M1A.39X0 CHRONIC GOUT DUE TO RENAL IMPAIRMENT OF MULTIPLE SITES WITHOUT TOPHUS: ICD-10-CM

## 2025-01-16 DIAGNOSIS — R26.9 GAIT ABNORMALITY: ICD-10-CM

## 2025-01-16 PROCEDURE — G0299 HHS/HOSPICE OF RN EA 15 MIN: HCPCS

## 2025-01-16 PROCEDURE — 99495 TRANSJ CARE MGMT MOD F2F 14D: CPT | Performed by: INTERNAL MEDICINE

## 2025-01-16 NOTE — ASSESSMENT & PLAN NOTE
"Remains in the chair and wheelchair for the most part.  He is able to transfer.  His right leg is essentially \"dead\".  Underlying spinal stenosis.  Too high risk for intervention.  Chronic back pain but tolerable.     "

## 2025-01-16 NOTE — ASSESSMENT & PLAN NOTE
Remains on prednisone at 13 mg daily.  I noticed he is on Bactrim as well, 3 times a week.  Suspect this is prophylactic against PCP given he is anuric.  Will discuss with nephrology, since he is no longer on any immunosuppressants other than prednisone.

## 2025-01-16 NOTE — PROGRESS NOTES
"Name: Ramos Rosenthal      : 1950      MRN: [id number]  Encounter Provider: Wade Arellano DO  Encounter Date: 2025   Encounter department: [facility] PRIMARY [facility]ARE  :  Assessment & Plan  End-stage renal disease on hemodialysis (HCC)  Lab Results   Component Value Date    EGFR 5 2025    EGFR 6 2025    EGFR 10 2024    CREATININE 7.92 (H) 2025    CREATININE 7.03 (H) 2025    CREATININE 4.95 (H) 2024   Continue to be followed by nephrology, dialysis .  Recent dialysis catheter issues resolved.  Had blood work done recently showing leukocytosis which is somewhat concerning given recent manipulation of dialysis catheter.       Problem with dialysis access, subsequent encounter  Recent hospitalization due to dialysis catheter dysfunction.  Subsequently replaced now on the right subclavian area.  Tolerating dialysis well.  Feels great today.       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  I noticed he is on Bactrim as well, 3 times a week.  Suspect this is prophylactic against PCP given he is anuric.  Will discuss with nephrology, since he is no longer on any immunosuppressants other than prednisone.       Prediabetes  Continue periodic monitoring of the A1c.       Chronic gout due to renal impairment of multiple sites without tophus  No issues with gout, but remains on allopurinol 100 mg twice daily.       Gait abnormality  Remains in the chair and wheelchair for the most part.  He is able to transfer.  His right leg is essentially \"dead\".  Underlying spinal stenosis.  Too high risk for intervention.  Chronic back pain but tolerable.       Bilateral lower extremity edema  Related to the GN.       Mixed hyperlipidemia  Should be due for some cholesterol screening in the near future.             Falls Plan of Care: not completed because patient not ambulatory, bed ridden, " immobile, confined to chair, or wheelchair bound.       History of Present Illness     Patient is in good spirits.  Unable to get to office on a regular basis due to amatory dysfunction.  This is brought on by spinal stenosis where he is pretty much paralyzed right lower extremity.  Unable to walk whatsoever.  Sitting in his lazy boy today on his home visit where he spends most of his days.  Gets dialysis Monday Wednesday Friday tolerating well.  Denies any chest pain or shortness of breath.  Nausea or vomiting or diarrhea.  Follows with nephrology.  Reviewed recent blood work revealing slightly elevated white count, noting recent exchange of his Alysis catheter.    Has no questions for me.  Up dated and reviewed his medications.  Remains on midodrine as he is had passed out after dialysis from time to time.  He is wife offer no complaints.  Remains on prednisone now at 13 mg daily on a titrating schedule.  Apparently was on immunosuppressants in the past which failed to diminish his antibody titers.      Review of Systems   Constitutional:  Negative for chills and fever.   HENT:  Negative for ear pain and sore throat.    Eyes:  Negative for pain and visual disturbance.   Respiratory:  Negative for cough and shortness of breath.    Cardiovascular:  Positive for leg swelling. Negative for chest pain and palpitations.   Gastrointestinal:  Negative for abdominal pain and vomiting.   Genitourinary:  Negative for dysuria and hematuria.   Musculoskeletal:  Negative for arthralgias and back pain.   Skin:  Negative for color change and rash.   Neurological:  Negative for seizures and syncope.   All other systems reviewed and are negative.      Objective   /82      Physical Exam  Vitals and nursing note reviewed.   Constitutional:       General: He is not in acute distress.     Appearance: Normal appearance. He is well-developed. He is obese.      Comments: Chronically ill-appearing   HENT:      Head: Normocephalic and  atraumatic.   Eyes:      Conjunctiva/sclera: Conjunctivae normal.   Cardiovascular:      Rate and Rhythm: Normal rate and regular rhythm.      Pulses: Normal pulses.      Heart sounds: Murmur heard.   Pulmonary:      Effort: Pulmonary effort is normal. No respiratory distress.      Breath sounds: Normal breath sounds.   Abdominal:      Palpations: Abdomen is soft.      Tenderness: There is no abdominal tenderness.   Musculoskeletal:         General: No swelling.      Cervical back: Neck supple.      Right lower leg: Edema present.      Left lower leg: Edema present.      Comments: 3+ pitting edema.   Skin:     General: Skin is warm and dry.      Capillary Refill: Capillary refill takes less than 2 seconds.   Neurological:      General: No focal deficit present.      Mental Status: He is alert and oriented to person, place, and time.      Comments: Decreased movement and strength in the right lower extremity.   Psychiatric:         Mood and Affect: Mood normal.

## 2025-01-16 NOTE — ASSESSMENT & PLAN NOTE
Lab Results   Component Value Date    EGFR 5 01/09/2025    EGFR 6 01/08/2025    EGFR 10 12/17/2024    CREATININE 7.92 (H) 01/09/2025    CREATININE 7.03 (H) 01/08/2025    CREATININE 4.95 (H) 12/17/2024   Continue to be followed by nephrology, dialysis Monday Wednesday Fridays.  Recent dialysis catheter issues resolved.  Had blood work done recently showing leukocytosis which is somewhat concerning given recent manipulation of dialysis catheter.

## 2025-01-17 ENCOUNTER — TELEPHONE (OUTPATIENT)
Dept: FAMILY MEDICINE CLINIC | Facility: CLINIC | Age: 75
End: 2025-01-17

## 2025-01-17 VITALS — DIASTOLIC BLOOD PRESSURE: 82 MMHG | SYSTOLIC BLOOD PRESSURE: 130 MMHG

## 2025-01-17 DIAGNOSIS — N01.9 RAPIDLY PROGRESSIVE GLOMERULONEPHRITIS WITH ANTI-GBM ANTIBODIES: ICD-10-CM

## 2025-01-17 PROBLEM — E87.1 HYPONATREMIA: Status: RESOLVED | Noted: 2024-09-14 | Resolved: 2025-01-17

## 2025-01-17 PROBLEM — Z23 ENCOUNTER FOR IMMUNIZATION: Status: RESOLVED | Noted: 2024-10-16 | Resolved: 2025-01-17

## 2025-01-17 PROBLEM — E83.42 HYPOMAGNESEMIA: Status: RESOLVED | Noted: 2024-10-05 | Resolved: 2025-01-17

## 2025-01-17 PROBLEM — R79.89 ELEVATED TROPONIN: Status: RESOLVED | Noted: 2022-09-30 | Resolved: 2025-01-17

## 2025-01-17 RX ORDER — PANTOPRAZOLE SODIUM 40 MG/1
40 TABLET, DELAYED RELEASE ORAL
Qty: 60 TABLET | Refills: 2 | Status: SHIPPED | OUTPATIENT
Start: 2025-01-17 | End: 2025-02-16

## 2025-01-17 NOTE — TELEPHONE ENCOUNTER
Aware  
Message  Received: Today  DO [facility]  Notify wife and patient, I am noticing he is overdue for several vaccines unless he has gotten them already.  These include RSV, pneumonia 20 should be repeated, shingles also would be important as well as the flu shot.  See if he is gotten any of these or if he is any interested in any.  He can get the pneumonia shot and flu shot here but otherwise he would have to get the others at the pharmacy.  Thank you  
Wife called stating patient does not ever get the flu shot. Also, states he is not interested in having another pneumonia shot or any other shot. Also, states his insurance no longer covers the shingles shot.   
Home

## 2025-01-17 NOTE — ASSESSMENT & PLAN NOTE
Recent hospitalization due to dialysis catheter dysfunction.  Subsequently replaced now on the right subclavian area.  Tolerating dialysis well.  Feels great today.

## 2025-01-21 ENCOUNTER — TELEPHONE (OUTPATIENT)
Age: 75
End: 2025-01-21

## 2025-01-21 ENCOUNTER — HOME CARE VISIT (OUTPATIENT)
Dept: HOME HEALTH SERVICES | Facility: HOME HEALTHCARE | Age: 75
End: 2025-01-21
Payer: MEDICARE

## 2025-01-21 VITALS
OXYGEN SATURATION: 99 % | TEMPERATURE: 97.4 F | DIASTOLIC BLOOD PRESSURE: 62 MMHG | SYSTOLIC BLOOD PRESSURE: 114 MMHG | HEART RATE: 64 BPM | RESPIRATION RATE: 16 BRPM

## 2025-01-21 PROCEDURE — G0299 HHS/HOSPICE OF RN EA 15 MIN: HCPCS

## 2025-01-21 NOTE — PROGRESS NOTES
Daily Note     Today's date: 2020  Patient name: Sue Guerrero  : 1950  MRN: [id number]  Referring provider: Paul Wheeler DO  Dx:   Encounter Diagnosis     ICD-10-CM    1  Lumbosacral plexopathy G54 1                   Subjective: I have been doing my exercises  Trying to stand until my legs give out, left knee gives o      Objective: See treatment diary below      Assessment: Tolerated treatment well  Denied any increase LBP with TE performed this date   Pt fatigues quickly with standing/tall kneel activities    Patient demonstrated fatigue post treatment   Pt to benefit with OPPT to improve overall core/lumbar, LE strength/stability to maximize overall QOL    Plan: Continue per plan of care        Precautions: nonambulatory, BLE weakness R>L  Re-eval Date: 20    Date        Visit Count 8/10       FOTO        Pain In 0       Pain Out 0         Manual         Ham stretch        ITB stretch        Quad stretch        Hip Flexor stretch                  Date        UBE  60 RPM  10 min alt       Sit to stand trials from w/c        Evan  MTP/LTP        LTR        Heel slides B        SAQs        AH        AH with hip abd/add        Ankle DF        Bridges  GS 20x, 5"       Hip ABd                Tband Anti Trunk rotation          SLRs        [email or web address]  Blue  3x10,5"        parallel bars   35"x1  55"x1  45"x1                       Transfer w/c to mat supervision       90/90 hip abd SL         Quad hip extn        Quad UE elevations        Knee flexion Prone AAROM  2x10 ea    Iso hold 3" AAROM       Hip extension        UE Elevations        Quad -> Tall kneel 3 trials  5-15" to pt tolerance       HS curls Prone  1x10 B/L  AAROM with deep tendon tapping       [place] Stim trial        Knee extension Seated EOM  Deep tendon tapping   1x10 ea  AAROM L    Iso hold 5" R, 3" AAROM L    Resume upcoming       Dynamic seated balance Seated   Red disc, feet on foam  TB and DB UE exercises Occupational Therapy    Visit Type: treatment  SUBJECTIVE      Patient participated in all scheduled therapy time this session.      Patient / Family Goal: return home and maximize function    Pain     Location: bilateral shoulders. did not formally rate    OBJECTIVE      Patient Activity Tolerance: 1 to 1 activity to rest                 Interventions  Therapeutic Exercise   tabletop sanderbox   - shoulder flex/ext with core activation 3x10     AROM elbow flex/ext x15 each UE  AROM pronation/supination x15 each   Scap adduction 2x10    Trialled scap elevation AROM and PROM, pt reports increase in pain.    Pt reports shoulder pain is same after exercises as before beginning exercises.     Skilled input: verbal instruction/cues  Verbal Consent: Writer verbally educated and received verbal consent for hand placement, positioning of patient, and techniques to be performed today from patient          ASSESSMENT  Impairments: balance, cardiovascular endurance, coordination/proprioception, range of motion, activity tolerance, decreased insight into deficits, safety awareness, strength, communication and pain  Functional Limitations: functional mobility, bed mobility, grooming, toileting, IADLs, dressing, bathing, functional transfers, participating in meaningful/purposeful activities and showering    Session focused on gentle ROM/strengthening for shoulders. Pt limited by pain, however denies increase in pain at end of session.          Discharge Recommendations:  Recommendation for Discharge Location: OT WI: Home with Home therapy  PT/OT Mobility Equipment for Discharge: likely subacute transfer, if home d/c will need wheelchair, ramp, and non mechanical sit to stand transfer equipment  PT/OT ADL Equipment for Discharge: continue to monitor, pending discharge location  OT Identified Barriers to Discharge: decreased activity tolerance, weakness, balance, safety awareness, pain, NWB on LLE; limited mobility/adl  dysfunction      Progress: improving as expected    Education:   - Present and ready to learn: patient  Education provided during session:  - See body of note.  - Results of above outlined education: Needs reinforcement    Patient at End of Session:   Location: in wheelchair  Handoff to: rehab aide/tech      Assistant to continue with current plan of care, current goals are appropriate, patient progressing towards set goals      PLAN  Suggestions for next session as indicated: Treatment plan for next session: ADLs from recliner (use of STEDY), sit <> stands with STEDY and 2WW, grooming tasks, Standing in kitchen at counter top or table in gym, unilateral release of UE support, gentle shoulder (light touch neutral warmth massage, PROM/AAROM within pain free range, (does better with table glides/low plane closed chain exercises)     Additional treatment options: IADLs from w/c level, give handouts for where to purchase AE     Plan considerations: NWB LLE, last shower: 1/9     Outcome measures:     Family/Care partner training details:     Hospital equipment in room: Theracane, reacher, sock aid, dressing stick  Interventions: activity tolerance training, balance, caregiver training, continued evaluation, equipment eval/education, functional transfer training, HEP training, patient education, positioning, safety training, stair training, therapeutic exercise, upper extremity strengthening/ROM, ADL retraining, bed mobility training, body mechanics, coordination, energy conservation, fine motor coordination activities, gait training, IADL, patient/family training, sensory reintegration, therapeutic activity and transfer training Frequency: 5-7 days per week  Frequency Comments: Modified therapy program.  30 minutes/5-7 days/week   Duration:       Agreement to plan and goals: patient agrees with goals and treatment plan      GOALS  Review Date: 1/24/2025  Short Term Goals (STGs): to be met 7 days from date established,  overhead    Reaching outside ELIS to target    Resume Upcoming unless otherwise stated.  - Patient will complete bathing at minimal assist level with adaptive equipment as deemed appropriate.  - Patient will complete upper body dressing at minimal assist level with adaptive equipment as deemed appropriate. MET  - Patient will complete lower body dressing at minimal assist level with adaptive equipment as deemed appropriate.  - Patient will complete toileting at minimal assist level with adaptive equipment as deemed appropriate.  - Patient will complete toilet transfer at minimal assist level with adaptive equipment as deemed appropriate.  Status: all STGs progressing  Long Term Goals (LTGs): to be met by discharge from rehab program.  - Patient will complete bathing at supervision level with adaptive equipment as deemed appropriate.  - Patient will complete upper body dressing at supervision level with adaptive equipment as deemed appropriate.  - Patient will complete lower body dressing at supervision level with adaptive equipment as deemed appropriate.  - Patient will complete toileting at supervision level with adaptive equipment as deemed appropriate.  - Patient will complete toilet transfer at supervision level with adaptive equipment as deemed appropriate.  Status: all LTGs progressing  Documented in the chart in the following areas:  Services. Assessment/Plan. Plan.      Therapy procedure time and total treatment time can be found documented on the Time Entry flowsheet

## 2025-01-21 NOTE — TELEPHONE ENCOUNTER
[facility] nurse calling in states that she will be seeing patient today in about an hour and asks if Dr Moore wants any blood work.  She said they have been doing weekly GBM and CBC, would she like this again?  She will need orders if so.  Please advise and call [facility].

## 2025-01-23 ENCOUNTER — HOME CARE VISIT (OUTPATIENT)
Dept: HOME HEALTH SERVICES | Facility: HOME HEALTHCARE | Age: 75
End: 2025-01-23
Payer: MEDICARE

## 2025-01-23 VITALS
SYSTOLIC BLOOD PRESSURE: 112 MMHG | DIASTOLIC BLOOD PRESSURE: 68 MMHG | HEART RATE: 60 BPM | TEMPERATURE: 97.4 F | RESPIRATION RATE: 18 BRPM | OXYGEN SATURATION: 98 %

## 2025-01-23 DIAGNOSIS — N01.9 RAPIDLY PROGRESSIVE GLOMERULONEPHRITIS WITH ANTI-GBM ANTIBODIES: ICD-10-CM

## 2025-01-23 DIAGNOSIS — I95.89 CHRONIC HYPOTENSION: Primary | ICD-10-CM

## 2025-01-23 PROCEDURE — G0299 HHS/HOSPICE OF RN EA 15 MIN: HCPCS

## 2025-01-23 RX ORDER — MIDODRINE HYDROCHLORIDE 2.5 MG/1
2.5 TABLET ORAL
Qty: 90 TABLET | Refills: 3 | Status: SHIPPED | OUTPATIENT
Start: 2025-01-23

## 2025-01-23 RX ORDER — PREDNISONE 2.5 MG/1
TABLET ORAL
Qty: 133 TABLET | Refills: 0 | Status: SHIPPED | OUTPATIENT
Start: 2025-02-06 | End: 2025-03-06

## 2025-01-28 ENCOUNTER — HOME CARE VISIT (OUTPATIENT)
Dept: HOME HEALTH SERVICES | Facility: HOME HEALTHCARE | Age: 75
End: 2025-01-28
Payer: MEDICARE

## 2025-01-28 VITALS
TEMPERATURE: 97.8 F | SYSTOLIC BLOOD PRESSURE: 106 MMHG | OXYGEN SATURATION: 97 % | RESPIRATION RATE: 18 BRPM | DIASTOLIC BLOOD PRESSURE: 60 MMHG | HEART RATE: 64 BPM

## 2025-01-28 PROCEDURE — G0299 HHS/HOSPICE OF RN EA 15 MIN: HCPCS

## 2025-01-30 ENCOUNTER — HOME CARE VISIT (OUTPATIENT)
Dept: HOME HEALTH SERVICES | Facility: HOME HEALTHCARE | Age: 75
End: 2025-01-30
Payer: MEDICARE

## 2025-01-30 VITALS
DIASTOLIC BLOOD PRESSURE: 56 MMHG | TEMPERATURE: 97.3 F | HEART RATE: 64 BPM | OXYGEN SATURATION: 98 % | RESPIRATION RATE: 18 BRPM | SYSTOLIC BLOOD PRESSURE: 104 MMHG

## 2025-01-30 PROCEDURE — G0299 HHS/HOSPICE OF RN EA 15 MIN: HCPCS

## 2025-02-04 DIAGNOSIS — E55.9 VITAMIN D DEFICIENCY: ICD-10-CM

## 2025-02-04 RX ORDER — ERGOCALCIFEROL 1.25 MG/1
50000 CAPSULE, LIQUID FILLED ORAL WEEKLY
Qty: 12 CAPSULE | Refills: 0 | Status: SHIPPED | OUTPATIENT
Start: 2025-02-04

## 2025-02-11 DIAGNOSIS — N01.9 RAPIDLY PROGRESSIVE GLOMERULONEPHRITIS WITH ANTI-GBM ANTIBODIES: ICD-10-CM

## 2025-02-12 RX ORDER — PANTOPRAZOLE SODIUM 40 MG/1
40 TABLET, DELAYED RELEASE ORAL
Qty: 180 TABLET | Refills: 1 | Status: SHIPPED | OUTPATIENT
Start: 2025-02-12

## 2025-02-15 DIAGNOSIS — F34.1 PERSISTENT DEPRESSIVE DISORDER: ICD-10-CM

## 2025-02-20 DIAGNOSIS — M1A.0790 CHRONIC IDIOPATHIC GOUT INVOLVING TOE WITHOUT TOPHUS, UNSPECIFIED LATERALITY: ICD-10-CM

## 2025-02-20 RX ORDER — ALLOPURINOL 100 MG/1
100 TABLET ORAL 2 TIMES DAILY
Qty: 180 TABLET | Refills: 1 | Status: SHIPPED | OUTPATIENT
Start: 2025-02-20

## 2025-02-22 DIAGNOSIS — I95.89 CHRONIC HYPOTENSION: ICD-10-CM

## 2025-02-24 RX ORDER — MIDODRINE HYDROCHLORIDE 2.5 MG/1
TABLET ORAL
Qty: 270 TABLET | Refills: 2 | Status: SHIPPED | OUTPATIENT
Start: 2025-02-24

## 2025-02-26 ENCOUNTER — DOCUMENTATION (OUTPATIENT)
Dept: NEPHROLOGY | Facility: CLINIC | Age: 75
End: 2025-02-26

## 2025-02-26 DIAGNOSIS — N01.9 RAPIDLY PROGRESSIVE GLOMERULONEPHRITIS WITH ANTI-GBM ANTIBODIES: Primary | ICD-10-CM

## 2025-02-26 RX ORDER — PREDNISONE 1 MG/1
4 TABLET ORAL DAILY
Qty: 60 TABLET | Refills: 0 | Status: SHIPPED | OUTPATIENT
Start: 2025-03-06 | End: 2025-03-08 | Stop reason: SDUPTHER

## 2025-03-07 ENCOUNTER — DOCUMENTATION (OUTPATIENT)
Dept: OTHER | Facility: HOSPITAL | Age: 75
End: 2025-03-07

## 2025-03-08 DIAGNOSIS — N01.9 RAPIDLY PROGRESSIVE GLOMERULONEPHRITIS WITH ANTI-GBM ANTIBODIES: ICD-10-CM

## 2025-03-08 RX ORDER — PREDNISONE 1 MG/1
3 TABLET ORAL DAILY
Qty: 45 TABLET | Refills: 0 | Status: SHIPPED | OUTPATIENT
Start: 2025-03-20

## 2025-03-14 DIAGNOSIS — N01.9 RAPIDLY PROGRESSIVE GLOMERULONEPHRITIS WITH ANTI-GBM ANTIBODIES: ICD-10-CM

## 2025-03-14 RX ORDER — PANTOPRAZOLE SODIUM 40 MG/1
40 TABLET, DELAYED RELEASE ORAL DAILY
Qty: 90 TABLET | Refills: 1 | Status: SHIPPED | OUTPATIENT
Start: 2025-03-14

## 2025-03-14 NOTE — PROGRESS NOTES
Contacted by HD RN bactrim refill, patient does not need to continue medication as he is on very low dose prednisone. Also he only needs to take protonix daily

## 2025-03-15 DIAGNOSIS — I10 ESSENTIAL HYPERTENSION: ICD-10-CM

## 2025-03-17 DIAGNOSIS — E83.51 HYPOCALCEMIA: ICD-10-CM

## 2025-03-17 RX ORDER — MIDODRINE HYDROCHLORIDE 5 MG/1
5 TABLET ORAL 3 TIMES WEEKLY
Qty: 12 TABLET | Refills: 1 | Status: SHIPPED | OUTPATIENT
Start: 2025-03-17

## 2025-03-18 RX ORDER — CALCIUM CARBONATE 500(1250)
TABLET ORAL
Qty: 360 TABLET | Refills: 1 | Status: SHIPPED | OUTPATIENT
Start: 2025-03-18

## 2025-03-19 DIAGNOSIS — E83.51 HYPOCALCEMIA: ICD-10-CM

## 2025-03-20 ENCOUNTER — TELEPHONE (OUTPATIENT)
Dept: FAMILY MEDICINE CLINIC | Facility: CLINIC | Age: 75
End: 2025-03-20

## 2025-03-20 NOTE — TELEPHONE ENCOUNTER
Patient called requesting refill for Allopurinol. Patient made aware medication was refilled on 2/20/25 for 180 with 1 refills to [facility] pharmacy. Patient instructed to contact the pharmacy to obtain refills of medication. Patient verbalized understanding.     H Plasty Text: Given the location of the defect, shape of the defect and the proximity to free margins a H-plasty was deemed most appropriate for repair.  Using a sterile surgical marker, the appropriate advancement arms of the H-plasty were drawn incorporating the defect and placing the expected incisions within the relaxed skin tension lines where possible. The area thus outlined was incised deep to adipose tissue with a #15 scalpel blade. The skin margins were undermined to an appropriate distance in all directions utilizing iris scissors.  The opposing advancement arms were then advanced into place in opposite direction and anchored with interrupted buried subcutaneous sutures.

## 2025-03-30 NOTE — PROGRESS NOTES
"EMERGENCY DEPARTMENT ATTENDING NOTE    Patient Name: Sherie Pennington    Chief Complaint   Patient presents with    Flank Pain       PATIENT PRESENTATION:  Sherie Pennington is a 21 y.o. female with PMH significant for kidney stones and frequent UTIs who presents to the ED suprapubic pain and bilateral flank pain worsening over the past week.  Patient's last menstrual period was 3 weeks ago and is on oral birth control.  Monogamous with her .  Last  was over 14 months ago.  Patient does not believe she is pregnant.  Has not had any vaginal discharge or bleeding.  No vomiting or nausea.  Patient denies any history of PID or sexually transmitted infections.  Does have a history of a left sided 3 mm intrarenal kidney stone.  Was treated for a UTI in December.  Has been treated multiple times for urinary tract infections.  Patient has had pain with urination and increased frequency.      PHYSICAL EXAM:   VS: /73 (BP Location: Left arm, Patient Position: Sitting)   Pulse 94   Temp 98.1 °F (36.7 °C) (Oral)   Resp 16   Ht 170.2 cm (67.01\")   Wt 107 kg (235 lb)   LMP 2025 (Approximate)   SpO2 99%   BMI 36.80 kg/m²   GENERAL: well-nourished, well-developed, awake, alert, no acute distress, nontoxic appearing, comfortable  EYES: PERRL, sclerae anicteric, extraocular movements grossly intact, symmetric lids  EARS, NOSE, MOUTH, THROAT: atraumatic external nose and ears, moist mucous membranes  NECK: symmetric, trachea midline  RESPIRATORY: unlabored respiratory effort, clear to auscultation bilaterally, good air movement  CARDIOVASCULAR: no murmurs, peripheral pulses 2+ and equal in all extremities  GI: soft, nontender, nondistended, bilateral CVA tenderness, suprapubic tenderness  MUSCULOSKELETAL/EXTREMITIES: extremities without obvious deformity  SKIN: warm and dry with no obvious rashes  NEUROLOGIC: moving all 4 extremities symmetrically, CN II-XII grossly intact  PSYCHIATRIC: " DERMATOLOGY:  Torey Oniel 74 y o  male MRN: [id number]  Unit/Bed#: S -01 Encounter: [id number]    PROGRESS NOTE      Assessment/Recommendations   Based on a thorough discussion of this condition and the management approach to it (including a comprehensive discussion of the known risks, side effects and potential benefits of treatment), the patient (family) agrees to implement the following specific plan:    PROCEDURE NOTE:  PUNCH BIOPSY for H&E and DIF      Performing Physician: Dr Ramirez    Anatomic Location; Clinical Description with size (cm); Pre-Op Diagnosis:     SPECIMEN A; Skin; Anatomic Location:left forearm  medial; Procedure/Protocol: Skin Specimen (submit in FORMALIN):Punch Biopsy (when a punch biopsy tool is used; simple closure is included) (CPT 25556; each additional punch biopsy is CPT 32774)   74y o  year old  Male with a Morphological Description:scattered petechiae on arms and necrotic ulcers with hemorrhagic crust on lower extremitis  Differential Diagnosis and/or Specific Clinical Question: small to medium vessel vasculitis    SPECIMEN B; Skin; Anatomic Location: left forearm lateral; Procedure/Protocol:  Skin Specimen (submit in FORMALIN):Punch Biopsy (when a punch biopsy tool is used; simple closure is included) (CPT 24735; each additional punch biopsy is CPT 21272)   74y o  year old  Male with a Morphological Description:scattered petechiae on arms and necrotic ulcers with hemorrhagic crust on lower extremitis  Differential Diagnosis and/or Specific Clinical Question: small to medium vessel vasculitis       Anesthesia: 1% xylocaine with epi       Topical anesthesia: None       Indications: To indicate diagnosis and management plan  Procedure Details     Patient informed of the risks (including bleeding,scaring and infection) and benefits of the procedure explained  Verbal and written informed consent obtained  The area was prepped and draped in the usual fashion  Anesthesia was obtained with 1% lidocaine with epinephrine  The skin was then stretched perpendicular to the skin tension lines and a punch biopsy to an appropriate sampling depth was obtained with a 4 mm punch with a forceps and iris scissors  Hemostasis was obtained with 4-0 vicryl with 2 sutures in each biopsy        Complications:  None      Specimen has been sent for review by Dermatopathology  Plan:  1  Instructed to keep the wound dry and covered for 24-48h and clean thereafter  2  Warning signs of infection were reviewed  3  Recommended that the patient use acetaminophen as needed for pain        Standard post-procedure care has been explained and has been included in written form within the patient's copy of Informed Consent  PAST MEDICAL HISTORY:  Past Medical History:   Diagnosis Date   • Depression    • Gout      Past Surgical History:   Procedure Laterality Date   • BACK SURGERY      X2 LUMBAR INCLUDING FUSION  ,  WITH OAA, [facility] DR Yue Lemons   • CATARACT EXTRACTION, BILATERAL     • KNEE ARTHROSCOPY Right     x2 , meniscus repair   • TONSILECTOMY AND ADNOIDECTOMY         FAMILY HISTORY:  Non-contributory    SOCIAL HISTORY:  Social History   /Civil Union  Social History     Substance and Sexual Activity   Alcohol Use Not Currently    Comment: occasional     Social History     Substance and Sexual Activity   Drug Use Never     Social History     Tobacco Use   Smoking Status Former Smoker   • Quit date:    • Years since quittin 7   Smokeless Tobacco Never Used       ALLERGIES:  Allergies   Allergen Reactions   • Other      IVORY SOAP   • Penicillins Rash       MEDICATIONS:  All current active medications have been reviewed         Physical exam:     Temp:  [97 4 °F (36 3 °C)-98 1 °F (36 7 °C)] 97 4 °F (36 3 °C)  HR:  [59-78] 78  Resp:  [16-20] 18  BP: (106-141)/(51-67) 126/67  SpO2:  [97 %-100 %] 99 %  Temp (24hrs), Av 8 °F (36 6 °C), Min:97 4 °F (36 3 alert, pleasant and cooperative. Appropriate mood and affect.      MEDICAL DECISION MAKING:    Sherie Pennington is a 21 y.o. female who presented to the ED with suprapubic pain, and bilateral flank pain with dysuria and increased frequency    Procedures    Differential Diagnosis Considered: PID, pyelonephritis, infected stone    Labs Ordered:  Labs Reviewed   WET PREP, GENITAL - Abnormal; Notable for the following components:       Result Value    WBC'S 1+ WBC's seen (*)     Clue Cells, Wet Prep 1+ Clue cells seen (*)     All other components within normal limits   URINALYSIS W/ MICROSCOPIC IF INDICATED (NO CULTURE) - Abnormal; Notable for the following components:    Appearance, UA Cloudy (*)     Blood, UA Trace (*)     Leuk Esterase, UA Large (3+) (*)     Nitrite, UA Positive (*)     All other components within normal limits   URINALYSIS, MICROSCOPIC ONLY - Abnormal; Notable for the following components:    RBC, UA 3-5 (*)     WBC, UA Too Numerous to Count (*)     Bacteria, UA 4+ (*)     Squamous Epithelial Cells, UA 7-12 (*)     All other components within normal limits   PREGNANCY, URINE - Normal   LIPASE - Normal   CBC WITH AUTO DIFFERENTIAL - Normal   CHLAMYDIA TRACHOMATIS, NEISSERIA GONORRHOEAE, PCR   COMPREHENSIVE METABOLIC PANEL    Narrative:     GFR Categories in Chronic Kidney Disease (CKD)      GFR Category          GFR (mL/min/1.73)    Interpretation  G1                     90 or greater         Normal or high (1)  G2                      60-89                Mild decrease (1)  G3a                   45-59                Mild to moderate decrease  G3b                   30-44                Moderate to severe decrease  G4                    15-29                Severe decrease  G5                    14 or less           Kidney failure          (1)In the absence of evidence of kidney disease, neither GFR category G1 or G2 fulfill the criteria for CKD.    eGFR calculation 2021 CKD-EPI creatinine  equation, which does not include race as a factor   CBC AND DIFFERENTIAL    Narrative:     The following orders were created for panel order CBC & Differential.  Procedure                               Abnormality         Status                     ---------                               -----------         ------                     CBC Auto Differential[[id number]]        Normal              Final result                 Please view results for these tests on the individual orders.        Imaging Ordered:   No orders to display       Internal chart review:   Past Medical History:   Diagnosis Date    Anxiety     Herpes     PTSD (post-traumatic stress disorder)     Trauma        Past Surgical History:   Procedure Laterality Date     SECTION       SECTION N/A 1/15/2024    Procedure:  SECTION REPEAT;  Surgeon: Rodo Abarca MD;  Location: [facility] LABOR DELIVERY;  Service: Obstetrics/Gynecology;  Laterality: N/A;       No Known Allergies      Current Facility-Administered Medications:     cefTRIAXone (ROCEPHIN) 2,000 mg in sodium chloride 0.9 % 100 mL MBP, 2,000 mg, Intravenous, Once, Zach Banuelos MD    sodium chloride 0.9 % bolus 1,000 mL, 1,000 mL, Intravenous, Once, Zach Banuelos MD, Last Rate: 1,000 mL/hr at 25 1433, 1,000 mL at 25 1433    Current Outpatient Medications:     acetaminophen (TYLENOL) 325 MG tablet, Take 2 tablets by mouth Every 6 (Six) Hours., Disp: , Rfl:     buprenorphine (SUBUTEX) 8 MG sublingual tablet SL tablet, 1 tablet. Twice daily, Disp: , Rfl:     cyclobenzaprine (FLEXERIL) 10 MG tablet, Take 1 tablet by mouth Every 8 (Eight) Hours As Needed for Muscle Spasms (headache)., Disp: 30 tablet, Rfl: 1    doxycycline (MONODOX) 100 MG capsule, Take 1 capsule by mouth 2 (Two) Times a Day for 14 days., Disp: 28 capsule, Rfl: 0    Elastic Bandages & Supports (Comfort Fit Maternity Supp ) misc, Use 1 each Daily., Disp: 1 each, Rfl: 0    ketorolac (TORADOL) 10  °C), Max:98 1 °F (36 7 °C)  Current: Temperature: (!) 97 4 °F (36 3 °C)    PSYCH: Normal mood and affect  EYES: Normal conjunctiva  ENT: Normal lips and oral mucosa  CARDIOVASCULAR: No edema  RESPIRATORY: Normal respirations  HEME/LYMPH/IMMUNO:  No regional lymphadenopathy except as noted below in ASSESSMENT AND PLAN BY DIAGNOSIS    FULL ORGAN SYSTEM SKIN EXAM (SKIN)  Hair, Scalp, Ears, Face Normal except as noted below in Assessment   Neck, Cervical Chain Nodes Normal except as noted below in Assessment   Right Arm/Hand/Fingers Normal except as noted below in Assessment   Left Arm/Hand/Fingers Normal except as noted below in Assessment   Chest/Breasts/Axillae Viewed areas Normal except as noted below in Assessment   Abdomen, Umbilicus Normal except as noted below in Assessment   Back/Spine Normal except as noted below in Assessment   Groin/Genitalia/Buttocks Viewed areas Normal except as noted below in Assessment   Right Leg, Foot, Toes Normal except as noted below in Assessment   Left Leg, Foot, Toes Normal except as noted below in Assessment                      Labs, Imaging, & Other Studies     Lab Results:  I have personally reviewed pertinent labs  Results from last 7 days   Lab Units 10/01/22  0404 09/30/22  2200 09/30/22  0113   WBC Thousand/uL 16 81* 16 42* 16 83*   HEMOGLOBIN g/dL 12 2 12 2 13 8   PLATELETS Thousands/uL 433* 454* 440*     Results from last 7 days   Lab Units 10/01/22  0404 09/30/22  1019 09/30/22  0113   POTASSIUM mmol/L 4 6   < > 4 7   CHLORIDE mmol/L 103   < > 97   CO2 mmol/L 22   < > 23   BUN mg/dL 47*   < > 47*   CREATININE mg/dL 1 46*   < > 1 87*   EGFR ml/min/1 73sq m 46   < > 34   CALCIUM mg/dL 8 6   < > 9 3   AST U/L  --   --  59*   ALT U/L  --   --  73*   ALK PHOS U/L  --   --  64    < > = values in this interval not displayed  Results from last 7 days   Lab Units 09/30/22  0113   BLOOD CULTURE  No Growth at 24 hrs  No Growth at 24 hrs             Pathology:   I have MG tablet, Take 1 tablet by mouth Every 6 (Six) Hours As Needed for Moderate Pain., Disp: 15 tablet, Rfl: 0    lamoTRIgine (LaMICtal) 100 MG tablet, Take 2 tablets by mouth Daily., Disp: , Rfl:     lurasidone (LATUDA) 40 MG tablet tablet, Take 1 tablet by mouth Daily., Disp: , Rfl:     metoclopramide (Reglan) 10 MG tablet, Take 1 tablet by mouth 3 (Three) Times a Day As Needed (nausea)., Disp: 30 tablet, Rfl: 2    metroNIDAZOLE (FLAGYL) 500 MG tablet, Take 1 tablet by mouth 2 (Two) Times a Day for 14 days., Disp: 28 tablet, Rfl: 0    naloxone (NARCAN) 4 MG/0.1ML nasal spray, 1 spray into the nostril(s) as directed by provider., Disp: , Rfl:     omeprazole (priLOSEC) 20 MG capsule, Take 1 capsule by mouth Daily., Disp: 30 capsule, Rfl: 3    ondansetron ODT (ZOFRAN-ODT) 4 MG disintegrating tablet, Place 1 tablet on the tongue Every 6 (Six) Hours As Needed for Nausea., Disp: 12 tablet, Rfl: 0    propranolol LA (INDERAL LA) 60 MG 24 hr capsule, Take 1 capsule by mouth Daily., Disp: 15 capsule, Rfl: 0    External documents reviewed: Reviewed documentation from December with a 3 mm left-sided lower pole kidney not in the ureter and a UTI treated with cefdinir.    My lab interpretation: Urine contaminated with epithelial cells and multiple bacteria.  No significant hematuria.    My imaging interpretation: Bilateral symptoms, do not suspect bilateral renal abscesses, or bilateral renal colic at this time.    Discussed with: Patient    ED Course and Re-evaluation: Patient with recurrent UTIs and cultures not showing significant growth.  Pelvic exam with severe cervicitis and discharge.  Consistent with PID.  Patient to be treated outpatient due to oral tolerance, afebrile, normotensive, no tachycardia.  Discussed with her that the gonorrhea and Chlamydia results are pending we will get called if they are positive.  Possibly secondary to BV.  Patient treated with Rocephin, Doxy, and Flagyl for her PID.  Do not suspect a UTI  personally reviewed pertinent reports  with prior similar UAs, and pelvic exam consistent with untreated PID.    ED Course as of 03/30/25 1525   Sun Mar 30, 2025   2268 Pelvic exam completed by Gume Tamayo with cervical motion tenderness, discharge and erythema, will discussed with patient but recommend treatment for PID. [JJ]   1507 Urine with clue cells.  Urinalysis with multiple white cells, bacteria, and squamous cells.  Suspect secondary to her PID.  Will treat with doxycycline and Rocephin for empiric STD treatment. [JJ]   1517 Patient p.o. tolerant, afebrile, no hypotension or tachycardia.  Pain controlled.  Appropriate for outpatient antibiotics for her PID.  Treating empirically. [JJ]      ED Course User Index  [JJ] Zach Banuelos MD        ED Critical Care time:     ED Diagnosis:  (N73.9) Pelvic inflammatory disease (PID)     Disposition: to home  Follow up plan: PCP follow up within 2 days, return to ED immediately if symptoms worsen        Signed:  Zach Banuelos MD  Emergency Medicine Physician    Please note that portions of this note were completed with a voice recognition program.      Zach Banuelos MD  03/30/25 1527

## 2025-04-21 DIAGNOSIS — E55.9 VITAMIN D DEFICIENCY: ICD-10-CM

## 2025-04-21 RX ORDER — ERGOCALCIFEROL 1.25 MG/1
50000 CAPSULE, LIQUID FILLED ORAL WEEKLY
Qty: 12 CAPSULE | Refills: 1 | Status: SHIPPED | OUTPATIENT
Start: 2025-04-21

## 2025-04-23 ENCOUNTER — LAB REQUISITION (OUTPATIENT)
Dept: LAB | Facility: HOSPITAL | Age: 75
End: 2025-04-23

## 2025-04-23 ENCOUNTER — DOCUMENTATION (OUTPATIENT)
Dept: ADMINISTRATIVE | Facility: OTHER | Age: 75
End: 2025-04-23

## 2025-04-23 DIAGNOSIS — E87.5 HYPERKALEMIA: ICD-10-CM

## 2025-04-23 LAB — POTASSIUM SERPL-SCNC: 4.1 MMOL/L (ref 3.5–5.3)

## 2025-04-23 PROCEDURE — 84132 ASSAY OF SERUM POTASSIUM: CPT | Performed by: INTERNAL MEDICINE

## 2025-04-23 NOTE — PROGRESS NOTES
04/23/25 8:20 AM    Annual Wellness Visit outreach is not required, patient seen in last 3 months.     Thank you.  CHACHA HERNANDEZ MA  [facility]

## 2025-04-30 ENCOUNTER — DOCUMENTATION (OUTPATIENT)
Dept: OTHER | Facility: HOSPITAL | Age: 75
End: 2025-04-30

## 2025-05-14 ENCOUNTER — HOSPITAL ENCOUNTER (INPATIENT)
Facility: HOSPITAL | Age: 75
LOS: 5 days | Discharge: HOME HEALTH | DRG: 602 | End: 2025-05-19
Attending: EMERGENCY MEDICINE | Admitting: INTERNAL MEDICINE
Payer: MEDICARE

## 2025-05-14 ENCOUNTER — APPOINTMENT (EMERGENCY)
Dept: RADIOLOGY | Facility: HOSPITAL | Age: 75
DRG: 602 | End: 2025-05-14
Payer: MEDICARE

## 2025-05-14 DIAGNOSIS — T14.8XXA WOUND INFECTION: ICD-10-CM

## 2025-05-14 DIAGNOSIS — L08.9 WOUND INFECTION: ICD-10-CM

## 2025-05-14 DIAGNOSIS — N18.6 END-STAGE RENAL DISEASE ON HEMODIALYSIS (HCC): ICD-10-CM

## 2025-05-14 DIAGNOSIS — L97.429 HEEL ULCERATION, LEFT, WITH UNSPECIFIED SEVERITY (HCC): Primary | ICD-10-CM

## 2025-05-14 DIAGNOSIS — Z99.2 END-STAGE RENAL DISEASE ON HEMODIALYSIS (HCC): ICD-10-CM

## 2025-05-14 PROBLEM — N01.9: Chronic | Status: ACTIVE | Noted: 2024-09-20

## 2025-05-14 PROBLEM — N17.9 AKI (ACUTE KIDNEY INJURY) (HCC): Status: RESOLVED | Noted: 2022-09-30 | Resolved: 2025-05-14

## 2025-05-14 PROBLEM — E66.812 CLASS 2 SEVERE OBESITY DUE TO EXCESS CALORIES WITH SERIOUS COMORBIDITY AND BODY MASS INDEX (BMI) OF 35.0 TO 35.9 IN ADULT (HCC): Chronic | Status: ACTIVE | Noted: 2025-05-14

## 2025-05-14 PROBLEM — R68.83 CHILLS: Status: RESOLVED | Noted: 2024-10-22 | Resolved: 2025-05-14

## 2025-05-14 PROBLEM — E87.70 VOLUME OVERLOAD: Status: RESOLVED | Noted: 2024-10-21 | Resolved: 2025-05-14

## 2025-05-14 PROBLEM — E83.51 HYPOCALCEMIA: Status: RESOLVED | Noted: 2024-10-01 | Resolved: 2025-05-14

## 2025-05-14 PROBLEM — R06.02 SOB (SHORTNESS OF BREATH): Status: RESOLVED | Noted: 2022-09-30 | Resolved: 2025-05-14

## 2025-05-14 PROBLEM — D72.829 LEUCOCYTOSIS: Status: RESOLVED | Noted: 2022-10-01 | Resolved: 2025-05-14

## 2025-05-14 PROBLEM — R14.0 BLOATING: Status: RESOLVED | Noted: 2022-10-05 | Resolved: 2025-05-14

## 2025-05-14 PROBLEM — R20.2 PARESTHESIA OF BOTH LOWER EXTREMITIES: Chronic | Status: ACTIVE | Noted: 2022-10-01

## 2025-05-14 PROBLEM — R29.898 LOWER EXTREMITY WEAKNESS: Status: RESOLVED | Noted: 2019-09-30 | Resolved: 2025-05-14

## 2025-05-14 PROBLEM — E66.01 CLASS 2 SEVERE OBESITY DUE TO EXCESS CALORIES WITH SERIOUS COMORBIDITY AND BODY MASS INDEX (BMI) OF 35.0 TO 35.9 IN ADULT (HCC): Chronic | Status: ACTIVE | Noted: 2025-05-14

## 2025-05-14 PROBLEM — F32.A DEPRESSION: Chronic | Status: ACTIVE | Noted: 2022-09-30

## 2025-05-14 PROBLEM — T82.898A PROBLEM WITH DIALYSIS ACCESS (HCC): Status: RESOLVED | Noted: 2024-09-21 | Resolved: 2025-05-14

## 2025-05-14 LAB
ALBUMIN SERPL BCG-MCNC: 3.7 G/DL (ref 3.5–5)
ALP SERPL-CCNC: 71 U/L (ref 34–104)
ALT SERPL W P-5'-P-CCNC: 5 U/L (ref 7–52)
ANION GAP SERPL CALCULATED.3IONS-SCNC: 13 MMOL/L (ref 4–13)
APTT PPP: 33 SECONDS (ref 23–34)
AST SERPL W P-5'-P-CCNC: 12 U/L (ref 13–39)
BASOPHILS # BLD AUTO: 0.08 THOUSANDS/ÂΜL (ref 0–0.1)
BASOPHILS NFR BLD AUTO: 1 % (ref 0–1)
BILIRUB SERPL-MCNC: 0.56 MG/DL (ref 0.2–1)
BUN SERPL-MCNC: 11 MG/DL (ref 5–25)
CALCIUM SERPL-MCNC: 8.6 MG/DL (ref 8.4–10.2)
CHLORIDE SERPL-SCNC: 94 MMOL/L (ref 96–108)
CO2 SERPL-SCNC: 29 MMOL/L (ref 21–32)
CREAT SERPL-MCNC: 3.45 MG/DL (ref 0.6–1.3)
CRP SERPL QL: 58.9 MG/L
EOSINOPHIL # BLD AUTO: 0.23 THOUSAND/ÂΜL (ref 0–0.61)
EOSINOPHIL NFR BLD AUTO: 2 % (ref 0–6)
ERYTHROCYTE [DISTWIDTH] IN BLOOD BY AUTOMATED COUNT: 16.6 % (ref 11.6–15.1)
ERYTHROCYTE [SEDIMENTATION RATE] IN BLOOD: 87 MM/HOUR (ref 0–19)
GFR SERPL CREATININE-BSD FRML MDRD: 16 ML/MIN/1.73SQ M
GLUCOSE SERPL-MCNC: 126 MG/DL (ref 65–140)
GLUCOSE SERPL-MCNC: 132 MG/DL (ref 65–140)
HCT VFR BLD AUTO: 37.8 % (ref 36.5–49.3)
HGB BLD-MCNC: 11.4 G/DL (ref 12–17)
IMM GRANULOCYTES # BLD AUTO: 0.02 THOUSAND/UL (ref 0–0.2)
IMM GRANULOCYTES NFR BLD AUTO: 0 % (ref 0–2)
INR PPP: 1.02 (ref 0.85–1.19)
LYMPHOCYTES # BLD AUTO: 1.21 THOUSANDS/ÂΜL (ref 0.6–4.47)
LYMPHOCYTES NFR BLD AUTO: 13 % (ref 14–44)
MCH RBC QN AUTO: 28.6 PG (ref 26.8–34.3)
MCHC RBC AUTO-ENTMCNC: 30.2 G/DL (ref 31.4–37.4)
MCV RBC AUTO: 95 FL (ref 82–98)
MONOCYTES # BLD AUTO: 0.79 THOUSAND/ÂΜL (ref 0.17–1.22)
MONOCYTES NFR BLD AUTO: 8 % (ref 4–12)
NEUTROPHILS # BLD AUTO: 7.36 THOUSANDS/ÂΜL (ref 1.85–7.62)
NEUTS SEG NFR BLD AUTO: 76 % (ref 43–75)
NRBC BLD AUTO-RTO: 0 /100 WBCS
PLATELET # BLD AUTO: 326 THOUSANDS/UL (ref 149–390)
PMV BLD AUTO: 9.4 FL (ref 8.9–12.7)
POTASSIUM SERPL-SCNC: 3.5 MMOL/L (ref 3.5–5.3)
PROCALCITONIN SERPL-MCNC: 0.38 NG/ML
PROT SERPL-MCNC: 6.3 G/DL (ref 6.4–8.4)
PROTHROMBIN TIME: 13.9 SECONDS (ref 12.3–15)
RBC # BLD AUTO: 3.99 MILLION/UL (ref 3.88–5.62)
SODIUM SERPL-SCNC: 136 MMOL/L (ref 135–147)
WBC # BLD AUTO: 9.69 THOUSAND/UL (ref 4.31–10.16)

## 2025-05-14 PROCEDURE — 85730 THROMBOPLASTIN TIME PARTIAL: CPT | Performed by: EMERGENCY MEDICINE

## 2025-05-14 PROCEDURE — 87081 CULTURE SCREEN ONLY: CPT | Performed by: INTERNAL MEDICINE

## 2025-05-14 PROCEDURE — 87205 SMEAR GRAM STAIN: CPT | Performed by: EMERGENCY MEDICINE

## 2025-05-14 PROCEDURE — 87205 SMEAR GRAM STAIN: CPT | Performed by: PHYSICIAN ASSISTANT

## 2025-05-14 PROCEDURE — 99285 EMERGENCY DEPT VISIT HI MDM: CPT | Performed by: EMERGENCY MEDICINE

## 2025-05-14 PROCEDURE — 85610 PROTHROMBIN TIME: CPT | Performed by: EMERGENCY MEDICINE

## 2025-05-14 PROCEDURE — 87070 CULTURE OTHR SPECIMN AEROBIC: CPT | Performed by: PHYSICIAN ASSISTANT

## 2025-05-14 PROCEDURE — 99284 EMERGENCY DEPT VISIT MOD MDM: CPT

## 2025-05-14 PROCEDURE — 84145 PROCALCITONIN (PCT): CPT | Performed by: PHYSICIAN ASSISTANT

## 2025-05-14 PROCEDURE — 87186 SC STD MICRODIL/AGAR DIL: CPT | Performed by: PHYSICIAN ASSISTANT

## 2025-05-14 PROCEDURE — 73630 X-RAY EXAM OF FOOT: CPT

## 2025-05-14 PROCEDURE — 87077 CULTURE AEROBIC IDENTIFY: CPT | Performed by: EMERGENCY MEDICINE

## 2025-05-14 PROCEDURE — 36415 COLL VENOUS BLD VENIPUNCTURE: CPT | Performed by: EMERGENCY MEDICINE

## 2025-05-14 PROCEDURE — 99223 1ST HOSP IP/OBS HIGH 75: CPT | Performed by: PHYSICIAN ASSISTANT

## 2025-05-14 PROCEDURE — 80053 COMPREHEN METABOLIC PANEL: CPT | Performed by: EMERGENCY MEDICINE

## 2025-05-14 PROCEDURE — 85025 COMPLETE CBC W/AUTO DIFF WBC: CPT | Performed by: EMERGENCY MEDICINE

## 2025-05-14 PROCEDURE — 85652 RBC SED RATE AUTOMATED: CPT | Performed by: PHYSICIAN ASSISTANT

## 2025-05-14 PROCEDURE — 82948 REAGENT STRIP/BLOOD GLUCOSE: CPT

## 2025-05-14 PROCEDURE — 87070 CULTURE OTHR SPECIMN AEROBIC: CPT | Performed by: EMERGENCY MEDICINE

## 2025-05-14 PROCEDURE — 86140 C-REACTIVE PROTEIN: CPT | Performed by: PHYSICIAN ASSISTANT

## 2025-05-14 PROCEDURE — 87186 SC STD MICRODIL/AGAR DIL: CPT | Performed by: EMERGENCY MEDICINE

## 2025-05-14 PROCEDURE — 87077 CULTURE AEROBIC IDENTIFY: CPT | Performed by: PHYSICIAN ASSISTANT

## 2025-05-14 RX ORDER — MIDODRINE HYDROCHLORIDE 5 MG/1
2.5 TABLET ORAL
Status: DISCONTINUED | OUTPATIENT
Start: 2025-05-15 | End: 2025-05-19 | Stop reason: HOSPADM

## 2025-05-14 RX ORDER — ALLOPURINOL 100 MG/1
100 TABLET ORAL 2 TIMES DAILY
Status: DISCONTINUED | OUTPATIENT
Start: 2025-05-14 | End: 2025-05-19 | Stop reason: HOSPADM

## 2025-05-14 RX ORDER — PANTOPRAZOLE SODIUM 40 MG/1
40 TABLET, DELAYED RELEASE ORAL DAILY
Status: DISCONTINUED | OUTPATIENT
Start: 2025-05-15 | End: 2025-05-19 | Stop reason: HOSPADM

## 2025-05-14 RX ORDER — VANCOMYCIN HYDROCHLORIDE 1 G/200ML
1000 INJECTION, SOLUTION INTRAVENOUS 3 TIMES WEEKLY
Status: DISCONTINUED | OUTPATIENT
Start: 2025-05-16 | End: 2025-05-15

## 2025-05-14 RX ORDER — CALCIUM CARBONATE 500(1250)
2 TABLET ORAL 2 TIMES DAILY WITH MEALS
Status: DISCONTINUED | OUTPATIENT
Start: 2025-05-15 | End: 2025-05-19 | Stop reason: HOSPADM

## 2025-05-14 RX ORDER — ONDANSETRON 2 MG/ML
4 INJECTION INTRAMUSCULAR; INTRAVENOUS EVERY 6 HOURS PRN
Status: DISCONTINUED | OUTPATIENT
Start: 2025-05-14 | End: 2025-05-19 | Stop reason: HOSPADM

## 2025-05-14 RX ORDER — CEFAZOLIN SODIUM 1 G/50ML
1000 SOLUTION INTRAVENOUS EVERY 12 HOURS
Status: DISCONTINUED | OUTPATIENT
Start: 2025-05-14 | End: 2025-05-14

## 2025-05-14 RX ORDER — HEPARIN SODIUM 5000 [USP'U]/ML
5000 INJECTION, SOLUTION INTRAVENOUS; SUBCUTANEOUS EVERY 8 HOURS SCHEDULED
Status: DISCONTINUED | OUTPATIENT
Start: 2025-05-14 | End: 2025-05-19 | Stop reason: HOSPADM

## 2025-05-14 RX ORDER — ACETAMINOPHEN 325 MG/1
650 TABLET ORAL EVERY 4 HOURS PRN
Status: DISCONTINUED | OUTPATIENT
Start: 2025-05-14 | End: 2025-05-17

## 2025-05-14 RX ORDER — ASCORBIC ACID, THIAMINE MONONITRATE,RIBOFLAVIN, NIACINAMIDE, PYRIDOXINE HYDROCHLORIDE, FOLIC ACID, CYANOCOBALAMIN, BIOTIN, CALCIUM PANTOTHENATE, 100; 1.5; 1.7; 20; 10; 1; 6000; 150000; 5 MG/1; MG/1; MG/1; MG/1; MG/1; MG/1; UG/1; UG/1; MG/1
1 CAPSULE, LIQUID FILLED ORAL
Status: DISCONTINUED | OUTPATIENT
Start: 2025-05-15 | End: 2025-05-19 | Stop reason: HOSPADM

## 2025-05-14 RX ORDER — CEFAZOLIN SODIUM 1 G/50ML
1000 SOLUTION INTRAVENOUS EVERY 24 HOURS
Status: DISCONTINUED | OUTPATIENT
Start: 2025-05-14 | End: 2025-05-17

## 2025-05-14 RX ORDER — MIDODRINE HYDROCHLORIDE 5 MG/1
5 TABLET ORAL 3 TIMES WEEKLY
Status: DISCONTINUED | OUTPATIENT
Start: 2025-05-16 | End: 2025-05-19 | Stop reason: HOSPADM

## 2025-05-14 RX ADMIN — VANCOMYCIN HYDROCHLORIDE 2000 MG: 1 INJECTION, POWDER, LYOPHILIZED, FOR SOLUTION INTRAVENOUS at 20:12

## 2025-05-14 RX ADMIN — ALLOPURINOL 100 MG: 100 TABLET ORAL at 23:26

## 2025-05-14 RX ADMIN — HEPARIN SODIUM 5000 UNITS: 5000 INJECTION INTRAVENOUS; SUBCUTANEOUS at 23:25

## 2025-05-14 RX ADMIN — CEFAZOLIN SODIUM 1000 MG: 1 SOLUTION INTRAVENOUS at 23:27

## 2025-05-14 NOTE — ED PROVIDER NOTES
Time reflects when diagnosis was documented in both MDM as applicable and the Disposition within this note       Time User Action Codes Description Comment    5/14/2025  7:57 PM Eran Juan Add [T14.8XXA,  L08.9] Wound infection     5/14/2025  8:30 PM Karen Parikh Add [L97.429] Heel ulceration, left, with unspecified severity (HCC)     5/14/2025  8:33 PM Karen Parikh Modify [T14.8XXA,  L08.9] Wound infection     5/14/2025  8:33 PM Karen Parikh Modify [L97.429] Heel ulceration, left, with unspecified severity (HCC)           ED Disposition       ED Disposition   Admit    Condition   Stable    Date/Time   Wed May 14, 2025  7:59 PM    Comment   Case was discussed with Karen Pinto and the patient's admission status was agreed to be Admission Status: inpatient status to the service of Dr. Perez .               Assessment & Plan       Medical Decision Making  74-year-old male presents emergency department secondary to wound to his left foot that has been present for some time.  Patient has a history of vascular disease and neuropathy and renal failure.  The patient gets dialysis.  The patient notes that his family noted the wound, which was not improving so the patient was sent to the ER for evaluation.  Upon my eval, there appears to be foul odor coming from the wound.  This wound was cultured and antibiotics were given and lab work was obtained as well as an x-ray.  Differential diagnosis is osteomyelitis versus left heel wound.  Patient's x-ray shows no evidence of gas and white count is within normal limits at this time.  Case was then presented to podiatry who recommends that the patient be admitted, given IV antibiotics and have vascular evaluation of the lower extremities as well as an MRI of the foot done tomorrow.  Patient will be hospitalized for further treatment and workup.    Amount and/or Complexity of Data Reviewed  Labs: ordered.  Radiology: ordered and independent  interpretation performed.    Risk  Decision regarding hospitalization.             Medications   vancomycin (VANCOCIN) 2,000 mg in sodium chloride 0.9 % 500 mL IVPB (2,000 mg Intravenous New Bag 5/14/25 2012)       ED Risk Strat Scores                    No data recorded        SBIRT 22yo+      Flowsheet Row Most Recent Value   Initial Alcohol Screen: US AUDIT-C     1. How often do you have a drink containing alcohol? 0 Filed at: 05/14/2025 1754   2. How many drinks containing alcohol do you have on a typical day you are drinking?  0 Filed at: 05/14/2025 1754   3a. Male UNDER 65: How often do you have five or more drinks on one occasion? 0 Filed at: 05/14/2025 1754   3b. FEMALE Any Age, or MALE 65+: How often do you have 4 or more drinks on one occassion? 0 Filed at: 05/14/2025 1754   Audit-C Score 0 Filed at: 05/14/2025 1754   ELSY: How many times in the past year have you...    Used an illegal drug or used a prescription medication for non-medical reasons? 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 HERZOG    CATARACT EXTRACTION, BILATERAL      IR BIOPSY KIDNEY RANDOM  9/19/2024    IR TEMPORARY DIALYSIS CATHETER PLACEMENT  9/16/2024    IR TUNNELED DIALYSIS CATHETER PLACEMENT  9/27/2024    IR TUNNELED DIALYSIS CATHETER PLACEMENT  1/9/2025    KNEE ARTHROSCOPY Right     x2 , meniscus repair    TONSILECTOMY AND ADNOIDECTOMY         Family History   Problem Relation Age of Onset    Diabetes Father     Diabetes Brother     Diabetes Sister       Social History[1]   E-Cigarette/Vaping    E-Cigarette Use Never User       E-Cigarette/Vaping Substances    Nicotine No     THC No     CBD No     Flavoring No     Other No     Unknown No       I have reviewed and agree with the history as documented.     74-year-old male presents emergency department complaining of a wound to his left heel that has been present for the past few weeks.  The patient has neuropathy and feels that the wound was healing but it appears that it likely is not.  The patient's family saw the wound and made him come to the hospital.  Patient denies any actual complaints and states he has had wound before and has chronic wound care to assist in his healing.  The patient denies diabetes but admits to renal failure and hemodialysis        Review of Systems   Constitutional:  Positive for activity change. Negative for chills and fever.   HENT:  Negative for ear pain and sore throat.    Respiratory:  Negative for cough and shortness of breath.    Cardiovascular:  Negative for chest pain and palpitations.   Gastrointestinal:  Negative for abdominal pain and vomiting.   Genitourinary:  Negative for dysuria and hematuria.   Musculoskeletal:  Negative for arthralgias and back pain.   Skin:  Positive for wound. Negative for color change and rash.   All other systems reviewed and are negative.          Objective       ED Triage Vitals   Temperature Pulse Blood Pressure Respirations SpO2 Patient Position - Orthostatic VS   05/14/25 1741 05/14/25 1743 05/14/25 1741 05/14/25 1741 05/14/25 1741 05/14/25 1741   (!) 97.2 °F (36.2 °C) 65 113/61 18 99 % Sitting      Temp Source Heart Rate Source BP Location FiO2 (%) Pain Score    05/14/25 1741 05/14/25 1741 05/14/25 1741 -- 05/14/25 1741    Temporal Monitor Left arm  No Pain      Vitals      Date and Time Temp Pulse SpO2 Resp BP Pain Score FACES  Pain Rating User   05/14/25 2045 -- -- -- -- -- No Pain -- JU   05/14/25 2044 97.9 °F (36.6 °C) 62 100 % 20 120/60 -- -- DII   05/14/25 2043 -- -- -- -- -- No Pain -- JU   05/14/25 1945 -- 71 100 % 17 93/56 -- -- JR   05/14/25 1915 -- 65 98 % 17 104/57 -- -- JR   05/14/25 1845 -- 73 99 % 17 104/51 -- -- JR   05/14/25 1815 -- 71 100 % 17 107/52 -- -- AMF   05/14/25 1800 -- 65 99 % -- 116/56 -- -- AMF   05/14/25 1743 -- 65 97 % 17 -- -- -- NAD   05/14/25 1741 97.2 °F (36.2 °C) -- -- -- -- No Pain -- NAD   05/14/25 1741 -- -- 99 % 18 113/61 -- -- SMK            Physical Exam  Constitutional:       General: He is not in acute distress.     Appearance: Normal appearance. He is normal weight. He is not ill-appearing.   HENT:      Head: Normocephalic and atraumatic.      Right Ear: External ear normal.      Left Ear: External ear normal.      Nose: Nose normal.      Mouth/Throat:      Mouth: Mucous membranes are moist.     Eyes:      Conjunctiva/sclera: Conjunctivae normal.       Cardiovascular:      Rate and Rhythm: Normal rate and regular rhythm.      Pulses: Normal pulses.      Heart sounds: Normal heart sounds.   Pulmonary:      Effort: Pulmonary effort is normal.      Breath sounds: Normal breath sounds.   Abdominal:      General: Abdomen is flat. There is no distension.      Palpations: Abdomen is soft. There is no mass.     Musculoskeletal:         General: No swelling, tenderness or deformity. Normal range of motion.      Cervical back: Normal range of motion.     Skin:     General: Skin is warm and dry.      Capillary Refill: Capillary refill takes 2 to 3 seconds.      Coloration: Skin is not pale.      Findings: Erythema present.      Comments: Patient with a circular 3 cm wound noted to the medial aspect of the left heel.     Neurological:      General: No focal deficit present.      Mental Status: He is alert and oriented to person, place, and time. Mental status is at baseline.      Motor: No weakness.       Coordination: Coordination normal.     Psychiatric:         Mood and Affect: Mood normal.         Results Reviewed       Procedure Component Value Units Date/Time    Wound culture and Gram stain [[id number]] Collected: 05/14/25 2341    Lab Status: In process Specimen: Wound from Foot, Left Updated: 05/14/25 2346    Procalcitonin [[id number]]  (Abnormal) Collected: 05/14/25 1832    Lab Status: Final result Specimen: Blood from Arm, Left Updated: 05/14/25 2056     Procalcitonin 0.38 ng/ml     C-reactive protein [[id number]]  (Abnormal) Collected: 05/14/25 1832    Lab Status: Final result Specimen: Blood from Arm, Left Updated: 05/14/25 2027     CRP 58.9 mg/L     Sedimentation rate, automated [[id number]]  (Abnormal) Collected: 05/14/25 1832    Lab Status: Final result Specimen: Blood from Arm, Left Updated: 05/14/25 2016     Sed Rate 87 mm/hour     Wound culture and Gram stain [[id number]] Collected: 05/14/25 1901    Lab Status: In process Specimen: Wound from Foot, Left Updated: 05/14/25 1905    Comprehensive metabolic panel [[id number]]  (Abnormal) Collected: 05/14/25 1832    Lab Status: Final result Specimen: Blood from Arm, Left Updated: 05/14/25 1851     Sodium 136 mmol/L      Potassium 3.5 mmol/L      Chloride 94 mmol/L      CO2 29 mmol/L      ANION GAP 13 mmol/L      BUN 11 mg/dL      Creatinine 3.45 mg/dL      Glucose 126 mg/dL      Calcium 8.6 mg/dL      AST 12 U/L      ALT 5 U/L      Alkaline Phosphatase 71 U/L      Total Protein 6.3 g/dL      Albumin 3.7 g/dL      Total Bilirubin 0.56 mg/dL      eGFR 16 ml/min/1.73sq m     Narrative:      National Kid[employer] Foundation guidelines for Chronic Kidney Disease (CKD):     Stage 1 with normal or high GFR (GFR > 90 mL/min/1.73 square meters)    Stage 2 Mild CKD (GFR = 60-89 mL/min/1.73 square meters)    Stage 3A Moderate CKD (GFR = 45-59 mL/min/1.73 square meters)    Stage 3B Moderate CKD (GFR = 30-44 mL/min/1.73 square meters)    Stage 4 Severe CKD (GFR = 15-29  mL/min/1.73 square meters)    Stage 5 End Stage CKD (GFR <15 mL/min/1.73 square meters)  Note: GFR calculation is accurate only with a steady state creatinine    Protime-INR [[id number]]  (Normal) Collected: 05/14/25 1832    Lab Status: Final result Specimen: Blood from Arm, Left Updated: 05/14/25 1850     Protime 13.9 seconds      INR 1.02    Narrative:      INR Therapeutic Range    Indication                                             INR Range      Atrial Fibrillation                                               2.0-3.0  Hypercoagulable State                                    2.0.2.3  Left Ventricular Asist Device                            2.0-3.0  Mechanical Heart Valve                                  -    Aortic(with afib, MI, embolism, HF, LA enlargement,    and/or coagulopathy)                                     2.0-3.0 (2.5-3.5)     Mitral                                                             2.5-3.5  Prosthetic/Bioprosthetic Heart Valve               2.0-3.0  Venous thromboembolism (VTE: VT, PE        2.0-3.0    APTT [[id number]]  (Normal) Collected: 05/14/25 1832    Lab Status: Final result Specimen: Blood from Arm, Left Updated: 05/14/25 1850     PTT 33 seconds     CBC and differential [[id number]]  (Abnormal) Collected: 05/14/25 1832    Lab Status: Final result Specimen: Blood from Arm, Left Updated: 05/14/25 1837     WBC 9.69 Thousand/uL      RBC 3.99 Million/uL      Hemoglobin 11.4 g/dL      Hematocrit 37.8 %      MCV 95 fL      MCH 28.6 pg      MCHC 30.2 g/dL      RDW 16.6 %      MPV 9.4 fL      Platelets 326 Thousands/uL      nRBC 0 /100 WBCs      Segmented % 76 %      Immature Grans % 0 %      Lymphocytes % 13 %      Monocytes % 8 %      Eosinophils Relative 2 %      Basophils Relative 1 %      Absolute Neutrophils 7.36 Thousands/µL      Absolute Immature Grans 0.02 Thousand/uL      Absolute Lymphocytes 1.21 Thousands/µL      Absolute Monocytes 0.79 Thousand/µL      Eosinophils Absolute  0.23 Thousand/µL      Basophils Absolute 0.08 Thousands/µL             XR foot 3+ views LEFT   ED Interpretation by Eran Juan Jr., DO (05/14 1940)   Healed wound noted.  No obvious gas noted.      MRI inpatient order    (Results Pending)   VAS VENOUS DUPLEX -LOWER LIMB UNILATERAL    (Results Pending)       Procedures    ED Medication and Procedure Management   Prior to Admission Medications   Prescriptions Last Dose Informant Patient Reported? Taking?   B Complex-C-Folic Acid (triphrocaps) 1 MG CAPS   No No   Sig: Take 1 capsule (1 mg total) by mouth daily with dinner   FLUoxetine (PROzac) 20 mg capsule   No No   Sig: TAKE 1 CAPSULE BY MOUTH EVERY DAY   Magnesium 100 MG CAPS  Self Yes No   Sig: Take 1 capsule by mouth daily   allopurinol (ZYLOPRIM) 100 mg tablet   No No   Sig: TAKE 1 TABLET BY MOUTH TWICE A DAY   calcium carbonate (OYSTER SHELL,OSCAL) 500 mg   No No   Sig: TAKE 2 TABLETS BY MOUTH 2 TIMES A DAY WITH MEALS.   cholecalciferol (VITAMIN D3) 1,000 units tablet   Yes No   Sig: Take 2,000 Units by mouth daily   ergocalciferol (VITAMIN D2) 50,000 units   No No   Sig: Take 1 capsule (50,000 Units total) by mouth once a week   loratadine (CLARITIN) 10 mg tablet   Yes No   Sig: Take 10 mg by mouth as needed for allergies Take 1 tablet once daily as needed for allergies   midodrine (PROAMATINE) 2.5 mg tablet   No No   Sig: TAKE 1 TABLET (2.5 MG TOTAL) BY MOUTH 3 TIMES A DAY BEFORE MEALS   midodrine (PROAMATINE) 5 mg tablet   No No   Sig: TAKE 1 TABLET BY MOUTH 3 TIMES A WEEK.   pantoprazole (PROTONIX) 40 mg tablet   No No   Sig: Take 1 tablet (40 mg total) by mouth daily      Facility-Administered Medications: None     Current Discharge Medication List        CONTINUE these medications which have NOT CHANGED    Details   allopurinol (ZYLOPRIM) 100 mg tablet TAKE 1 TABLET BY MOUTH TWICE A DAY  Qty: 180 tablet, Refills: 1    Associated Diagnoses: Chronic idiopathic gout involving toe without tophus,  unspecified laterality      B Complex-C-Folic Acid (triphrocaps) 1 MG CAPS Take 1 capsule (1 mg total) by mouth daily with dinner  Qty: 90 capsule, Refills: 0    Associated Diagnoses: 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  Qty: 360 tablet, Refills: 1    Associated Diagnoses: Hypocalcemia      cholecalciferol (VITAMIN D3) 1,000 units tablet Take 2,000 Units by mouth daily      ergocalciferol (VITAMIN D2) 50,000 units Take 1 capsule (50,000 Units total) by mouth once a week  Qty: 12 capsule, Refills: 1    Associated Diagnoses: Vitamin D deficiency      FLUoxetine (PROzac) 20 mg capsule TAKE 1 CAPSULE BY MOUTH EVERY DAY  Qty: 90 capsule, Refills: 1    Associated Diagnoses: Persistent depressive disorder      loratadine (CLARITIN) 10 mg tablet Take 10 mg by mouth as needed for allergies Take 1 tablet once daily as needed for allergies      Magnesium 100 MG CAPS Take 1 capsule by mouth daily      !! midodrine (PROAMATINE) 2.5 mg tablet TAKE 1 TABLET (2.5 MG TOTAL) BY MOUTH 3 TIMES A DAY BEFORE MEALS  Qty: 270 tablet, Refills: 2    Associated Diagnoses: Chronic hypotension      !! midodrine (PROAMATINE) 5 mg tablet TAKE 1 TABLET BY MOUTH 3 TIMES A WEEK.  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 - Potential duplicate medications found. Please discuss with provider.        No discharge procedures on file.  ED SEPSIS DOCUMENTATION   Time reflects when diagnosis was documented in both MDM as applicable and the Disposition within this note       Time User Action Codes Description Comment    5/14/2025  7:57 PM Eran Juan Add [T14.8XXA,  L08.9] Wound  infection     2025  8:30 PM Karen Parikh Add [L97.429] Heel ulceration, left, with unspecified severity (HCC)     2025  8:33 PM Karen Parikh Modify [T14.8XXA,  L08.9] Wound infection     2025  8:33 PM Karen Parikh Modify [L97.429] Heel ulceration, left, with unspecified severity (HCC)                      [1]   Social History  Tobacco Use    Smoking status: Former     Current packs/day: 0.00     Types: Cigarettes     Quit date:      Years since quittin.3    Smokeless tobacco: Never   Vaping Use    Vaping status: Never Used   Substance Use Topics    Alcohol use: Not Currently     Comment: occasional    Drug use: Never        Eran Juan Jr., DO  05/15/25 0004

## 2025-05-14 NOTE — ASSESSMENT & PLAN NOTE
Lab Results   Component Value Date    EGFR 16 05/14/2025    EGFR 7 04/25/2025    EGFR 5 01/09/2025    CREATININE 3.45 (H) 05/14/2025    CREATININE 6.26 (H) 04/25/2025    CREATININE 7.92 (H) 01/09/2025   Patient on HD MWF  Nephrology consult

## 2025-05-15 ENCOUNTER — APPOINTMENT (INPATIENT)
Dept: MRI IMAGING | Facility: HOSPITAL | Age: 75
DRG: 602 | End: 2025-05-15
Payer: MEDICARE

## 2025-05-15 ENCOUNTER — APPOINTMENT (INPATIENT)
Dept: NON INVASIVE DIAGNOSTICS | Facility: HOSPITAL | Age: 75
DRG: 602 | End: 2025-05-15
Payer: MEDICARE

## 2025-05-15 PROBLEM — F33.40 RECURRENT MAJOR DEPRESSIVE DISORDER, IN REMISSION (HCC): Status: ACTIVE | Noted: 2022-09-30

## 2025-05-15 LAB
ANION GAP SERPL CALCULATED.3IONS-SCNC: 7 MMOL/L (ref 4–13)
BACTERIA UR QL AUTO: ABNORMAL /HPF
BILIRUB UR QL STRIP: NEGATIVE
BUN SERPL-MCNC: 18 MG/DL (ref 5–25)
CALCIUM SERPL-MCNC: 8.5 MG/DL (ref 8.4–10.2)
CHLORIDE SERPL-SCNC: 98 MMOL/L (ref 96–108)
CLARITY UR: ABNORMAL
CO2 SERPL-SCNC: 32 MMOL/L (ref 21–32)
COLOR UR: YELLOW
CREAT SERPL-MCNC: 4.32 MG/DL (ref 0.6–1.3)
ERYTHROCYTE [DISTWIDTH] IN BLOOD BY AUTOMATED COUNT: 16.5 % (ref 11.6–15.1)
GFR SERPL CREATININE-BSD FRML MDRD: 12 ML/MIN/1.73SQ M
GLUCOSE SERPL-MCNC: 93 MG/DL (ref 65–140)
GLUCOSE UR STRIP-MCNC: NEGATIVE MG/DL
HCT VFR BLD AUTO: 32.3 % (ref 36.5–49.3)
HGB BLD-MCNC: 9.7 G/DL (ref 12–17)
HGB UR QL STRIP.AUTO: ABNORMAL
KETONES UR STRIP-MCNC: ABNORMAL MG/DL
LEUKOCYTE ESTERASE UR QL STRIP: ABNORMAL
MCH RBC QN AUTO: 28.5 PG (ref 26.8–34.3)
MCHC RBC AUTO-ENTMCNC: 30 G/DL (ref 31.4–37.4)
MCV RBC AUTO: 95 FL (ref 82–98)
NITRITE UR QL STRIP: NEGATIVE
NON-SQ EPI CELLS URNS QL MICRO: ABNORMAL /HPF
PH UR STRIP.AUTO: 6 [PH]
PLATELET # BLD AUTO: 260 THOUSANDS/UL (ref 149–390)
PMV BLD AUTO: 8.8 FL (ref 8.9–12.7)
POTASSIUM SERPL-SCNC: 3.7 MMOL/L (ref 3.5–5.3)
PROCALCITONIN SERPL-MCNC: 0.43 NG/ML
PROT UR STRIP-MCNC: ABNORMAL MG/DL
RBC # BLD AUTO: 3.4 MILLION/UL (ref 3.88–5.62)
RBC #/AREA URNS AUTO: ABNORMAL /HPF
SODIUM SERPL-SCNC: 137 MMOL/L (ref 135–147)
SP GR UR STRIP.AUTO: 1.03
UROBILINOGEN UR QL STRIP.AUTO: 0.2 E.U./DL
WBC # BLD AUTO: 7.98 THOUSAND/UL (ref 4.31–10.16)
WBC #/AREA URNS AUTO: ABNORMAL /HPF

## 2025-05-15 PROCEDURE — 93926 LOWER EXTREMITY STUDY: CPT

## 2025-05-15 PROCEDURE — 81001 URINALYSIS AUTO W/SCOPE: CPT | Performed by: PHYSICIAN ASSISTANT

## 2025-05-15 PROCEDURE — 80048 BASIC METABOLIC PNL TOTAL CA: CPT | Performed by: PHYSICIAN ASSISTANT

## 2025-05-15 PROCEDURE — 87186 SC STD MICRODIL/AGAR DIL: CPT | Performed by: PHYSICIAN ASSISTANT

## 2025-05-15 PROCEDURE — 99222 1ST HOSP IP/OBS MODERATE 55: CPT

## 2025-05-15 PROCEDURE — 5A1D70Z PERFORMANCE OF URINARY FILTRATION, INTERMITTENT, LESS THAN 6 HOURS PER DAY: ICD-10-PCS | Performed by: INTERNAL MEDICINE

## 2025-05-15 PROCEDURE — 87181 SC STD AGAR DILUTION PER AGT: CPT | Performed by: PHYSICIAN ASSISTANT

## 2025-05-15 PROCEDURE — 84145 PROCALCITONIN (PCT): CPT | Performed by: PHYSICIAN ASSISTANT

## 2025-05-15 PROCEDURE — 99232 SBSQ HOSP IP/OBS MODERATE 35: CPT | Performed by: NURSE PRACTITIONER

## 2025-05-15 PROCEDURE — 87086 URINE CULTURE/COLONY COUNT: CPT | Performed by: PHYSICIAN ASSISTANT

## 2025-05-15 PROCEDURE — 73721 MRI JNT OF LWR EXTRE W/O DYE: CPT

## 2025-05-15 PROCEDURE — 93926 LOWER EXTREMITY STUDY: CPT | Performed by: SURGERY

## 2025-05-15 PROCEDURE — 81003 URINALYSIS AUTO W/O SCOPE: CPT | Performed by: PHYSICIAN ASSISTANT

## 2025-05-15 PROCEDURE — 93922 UPR/L XTREMITY ART 2 LEVELS: CPT | Performed by: SURGERY

## 2025-05-15 PROCEDURE — 87077 CULTURE AEROBIC IDENTIFY: CPT | Performed by: PHYSICIAN ASSISTANT

## 2025-05-15 PROCEDURE — 85027 COMPLETE CBC AUTOMATED: CPT | Performed by: PHYSICIAN ASSISTANT

## 2025-05-15 RX ORDER — NYSTATIN 100000 [USP'U]/G
POWDER TOPICAL 2 TIMES DAILY
Status: DISCONTINUED | OUTPATIENT
Start: 2025-05-15 | End: 2025-05-19 | Stop reason: HOSPADM

## 2025-05-15 RX ADMIN — HEPARIN SODIUM 5000 UNITS: 5000 INJECTION INTRAVENOUS; SUBCUTANEOUS at 13:28

## 2025-05-15 RX ADMIN — HEPARIN SODIUM 5000 UNITS: 5000 INJECTION INTRAVENOUS; SUBCUTANEOUS at 21:20

## 2025-05-15 RX ADMIN — Medication 1 CAPSULE: at 17:30

## 2025-05-15 RX ADMIN — CALCIUM 2 TABLET: 500 TABLET ORAL at 08:30

## 2025-05-15 RX ADMIN — CEFAZOLIN SODIUM 1000 MG: 1 SOLUTION INTRAVENOUS at 20:55

## 2025-05-15 RX ADMIN — FLUOXETINE HYDROCHLORIDE 20 MG: 20 CAPSULE ORAL at 08:30

## 2025-05-15 RX ADMIN — MIDODRINE HYDROCHLORIDE 2.5 MG: 5 TABLET ORAL at 11:40

## 2025-05-15 RX ADMIN — MIDODRINE HYDROCHLORIDE 2.5 MG: 5 TABLET ORAL at 06:15

## 2025-05-15 RX ADMIN — NYSTATIN 1 APPLICATION: 100000 POWDER TOPICAL at 17:32

## 2025-05-15 RX ADMIN — MIDODRINE HYDROCHLORIDE 2.5 MG: 5 TABLET ORAL at 17:29

## 2025-05-15 RX ADMIN — ALLOPURINOL 100 MG: 100 TABLET ORAL at 17:30

## 2025-05-15 RX ADMIN — PANTOPRAZOLE SODIUM 40 MG: 40 TABLET, DELAYED RELEASE ORAL at 08:30

## 2025-05-15 RX ADMIN — NYSTATIN: 100000 POWDER TOPICAL at 11:45

## 2025-05-15 RX ADMIN — CHOLECALCIFEROL TAB 25 MCG (1000 UNIT) 2000 UNITS: 25 TAB at 08:30

## 2025-05-15 RX ADMIN — HEPARIN SODIUM 5000 UNITS: 5000 INJECTION INTRAVENOUS; SUBCUTANEOUS at 06:15

## 2025-05-15 RX ADMIN — CALCIUM 2 TABLET: 500 TABLET ORAL at 17:29

## 2025-05-15 RX ADMIN — ALLOPURINOL 100 MG: 100 TABLET ORAL at 08:30

## 2025-05-15 NOTE — PLAN OF CARE
Problem: Potential for Falls  Goal: Patient will remain free of falls  Description: INTERVENTIONS:  - Educate patient/family on patient safety including physical limitations  - Instruct patient to call for assistance with activity   - Consider consulting OT/PT to assist with strengthening/mobility based on AM PAC & JH-HLM score  - Consult OT/PT to assist with strengthening/mobility   - Keep Call bell within reach  - Keep bed low and locked with side rails adjusted as appropriate  - Keep care items and personal belongings within reach  - Initiate and maintain comfort rounds  - Make Fall Risk Sign visible to staff  - Offer Toileting every 2 Hours, in advance of need  - Initiate/Maintain bed alarm  - Obtain necessary fall risk management equipment: nonslip socks   - Apply yellow socks and bracelet for high fall risk patients  - Consider moving patient to room near nurses station  Outcome: Progressing     Problem: PAIN - ADULT  Goal: Verbalizes/displays adequate comfort level or baseline comfort level  Description: Interventions:  - Encourage patient to monitor pain and request assistance  - Assess pain using appropriate pain scale  - Administer analgesics as ordered based on type and severity of pain and evaluate response  - Implement non-pharmacological measures as appropriate and evaluate response  - Consider cultural and social influences on pain and pain management  - Notify physician/advanced practitioner if interventions unsuccessful or patient reports new pain  - Educate patient/family on pain management process including their role and importance of  reporting pain   - Provide non-pharmacologic/complimentary pain relief interventions  Outcome: Progressing     Problem: INFECTION - ADULT  Goal: Absence or prevention of progression during hospitalization  Description: INTERVENTIONS:  - Assess and monitor for signs and symptoms of infection  - Monitor lab/diagnostic results  - Monitor all insertion sites, i.e.  indwelling lines, tubes, and drains  - Monitor endotracheal if appropriate and nasal secretions for changes in amount and color  - Weston appropriate cooling/warming therapies per order  - Administer medications as ordered  - Instruct and encourage patient and family to use good hand hygiene technique  - Identify and instruct in appropriate isolation precautions for identified infection/condition  Outcome: Progressing  Goal: Absence of fever/infection during neutropenic period  Description: INTERVENTIONS:  - Monitor WBC  - Perform strict hand hygiene  - Limit to healthy visitors only  - No plants, dried, fresh or silk flowers with mcduffie in patient room  Outcome: Progressing     Problem: SAFETY ADULT  Goal: Patient will remain free of falls  Description: INTERVENTIONS:  - Educate patient/family on patient safety including physical limitations  - Instruct patient to call for assistance with activity   - Consider consulting OT/PT to assist with strengthening/mobility based on AM PAC & -HLM score  - Consult OT/PT to assist with strengthening/mobility   - Keep Call bell within reach  - Keep bed low and locked with side rails adjusted as appropriate  - Keep care items and personal belongings within reach  - Initiate and maintain comfort rounds  - Make Fall Risk Sign visible to staff  - Offer Toileting every 2 Hours, in advance of need  - Initiate/Maintain bed alarm  - Obtain necessary fall risk management equipment: nonslip socks   - Apply yellow socks and bracelet for high fall risk patients  - Consider moving patient to room near nurses station  Outcome: Progressing  Goal: Maintain or return to baseline ADL function  Description: INTERVENTIONS:  -  Assess patient's ability to carry out ADLs; assess patient's baseline for ADL function and identify physical deficits which impact ability to perform ADLs (bathing, care of mouth/teeth, toileting, grooming, dressing, etc.)  - Assess/evaluate cause of self-care deficits    - Assess range of motion  - Assess patient's mobility; develop plan if impaired  - Assess patient's need for assistive devices and provide as appropriate  - Encourage maximum independence but intervene and supervise when necessary  - Involve family in performance of ADLs  - Assess for home care needs following discharge   - Consider OT consult to assist with ADL evaluation and planning for discharge  - Provide patient education as appropriate  - Monitor functional capacity and physical performance, use of AM PAC & JH-HLM   - Monitor gait, balance and fatigue with ambulation    Outcome: Progressing  Goal: Maintains/Returns to pre admission functional level  Description: INTERVENTIONS:  - Perform AM-PAC 6 Click Basic Mobility/ Daily Activity assessment daily.  - Set and communicate daily mobility goal to care team and patient/family/caregiver.   - Collaborate with rehabilitation services on mobility goals if consulted  - Perform Range of Motion 3 times a day.  - Reposition patient every 2 hours.  - Dangle patient 3 times a day  - Stand patient 3 times a day  - Ambulate patient 3 times a day  - Out of bed to chair 3 times a day   - Out of bed for meals 3 times a day  - Out of bed for toileting  - Record patient progress and toleration of activity level   Outcome: Progressing     Problem: DISCHARGE PLANNING  Goal: Discharge to home or other facility with appropriate resources  Description: INTERVENTIONS:  - Identify barriers to discharge w/patient and caregiver  - Arrange for needed discharge resources and transportation as appropriate  - Identify discharge learning needs (meds, wound care, etc.)  - Arrange for interpretive services to assist at discharge as needed  - Refer to Case Management Department for coordinating discharge planning if the patient needs post-hospital services based on physician/advanced practitioner order or complex needs related to functional status, cognitive ability, or social support  system  Outcome: Progressing     Problem: Knowledge Deficit  Goal: Patient/family/caregiver demonstrates understanding of disease process, treatment plan, medications, and discharge instructions  Description: Complete learning assessment and assess knowledge base.  Interventions:  - Provide teaching at level of understanding  - Provide teaching via preferred learning methods  Outcome: Progressing     Problem: Prexisting or High Potential for Compromised Skin Integrity  Goal: Skin integrity is maintained or improved  Description: INTERVENTIONS:  - Identify patients at risk for skin breakdown  - Assess and monitor skin integrity including under and around medical devices   - Assess and monitor nutrition and hydration status  - Monitor labs  - Assess for incontinence   - Turn and reposition patient  - Assist with mobility/ambulation  - Relieve pressure over sarkis prominences   - Avoid friction and shearing  - Provide appropriate hygiene as needed including keeping skin clean and dry  - Evaluate need for skin moisturizer/barrier cream  - Collaborate with interdisciplinary team  - Patient/family teaching  - Consider wound care consult    Assess:  - Review Devyn scale daily  - Clean and moisturize skin every shift  - Inspect skin when repositioning, toileting, and assisting with ADLS  - Assess under medical devices such as masimo every shift  - Assess extremities for adequate circulation and sensation     Bed Management:  - Have minimal linens on bed & keep smooth, unwrinkled  - Change linens as needed when moist or perspiring  - Avoid sitting or lying in one position for more than 2 hours while in bed?Keep HOB at 30 degrees   - Toileting:  - Offer bedside commode  - Assess for incontinence every shift   - Use incontinent care products after each incontinent episode such as barrier creams     Activity:  - Mobilize patient 3 times a day  - Encourage activity and walks on unit  - Encourage or provide ROM exercises   - Turn  and reposition patient every 2 Hours  - Use appropriate equipment to lift or move patient in bed  - Instruct/ Assist with weight shifting every hour  when out of bed in chair  - Consider limitation of chair time to 1 hour intervals    Skin Care:  - Avoid use of baby powder, tape, friction and shearing, hot water or constrictive clothing  - Relieve pressure over bony prominences using pillows   - Do not massage red bony areas    Next Steps:  - Teach patient strategies to minimize risks such as weight shifting  - Consider consults to  interdisciplinary teams such as mercedes  Outcome: Progressing

## 2025-05-15 NOTE — PROGRESS NOTES
Ramos Rosenthal is a 74 y.o. male who is currently ordered Vancomycin IV with management by the Pharmacy Consult service.  Relevant clinical data and objective / subjective history reviewed.  Vancomycin Assessment:  Indication and Goal AUC/Trough: Soft tissue (goal -600, trough >10)  Clinical Status: stable  Micro:   Cx pending  Renal Function:  SCr: 4.32mg/dL  CrCl: 20.5 mL/min  Renal replacement: HD  Days of Therapy: 1  Current Dose: new start  Vancomycin Plan:  New Dosin mg LD then 1000 mg IV M// post-HD  Next Level:  am prior to HD  Renal Function Monitoring: Daily BMP and UOP  Pharmacy will continue to follow closely for s/sx of nephrotoxicity, infusion reactions and appropriateness of therapy.  BMP and CBC will be ordered per protocol. We will continue to follow the patient’s culture results and clinical progress daily.

## 2025-05-15 NOTE — ASSESSMENT & PLAN NOTE
Patient's family noted heel ulcer and sent to ED.   ED reviewed w/ podiatry   Procal 0.38, 0.43 - in the setting of ESRD on HD   ESR 87 and CRP 58  MRI - no evidence of osteomyelitis   LEADS- pending   Podiatry input appreciated- no acute surgical intervention at this time.  The patient however will need a close follow-up with wound care outpatient.  Elevate right extremity on green foam wedge when nonambulatory.   Weightbearing as tolerated for transfer and drag heel shoe, wheelchair/bedbound, globipedi shoe and Prevalon boots for offloading  No indication for long term IV antibiotic; will continue cefazolin for cellulitis. Transition to PO keflex upon discharge for total of 7 day treatment.

## 2025-05-15 NOTE — DISCHARGE INSTR - OTHER ORDERS
Skin Care Plan:  1-Bilateral groin, left abdominal fold and bilateral upper thighs: Cleanse with soap and water, pat dry. Apply Nystatin powder BID and PRN if soiled.   2-Turn/reposition q2h or when medically stable for pressure re-distribution on skin, utilize wedges for offloading.  3-Elevate heels to offload pressure, utilize EHOB offloading boots.  4-Moisturize skin daily with skin nourishing cream  5-Ehob cushion in chair when out of bed.  6- Apply preventative Hydraguard to bilateral sacro-buttocks and right heel BID and PRN  7- Left pretibial skin tear: Cleanse with VASHE, apply Dermagran to wound bed, cover with silicone foam dressing (Mepilex), eden with T for treatment, date. Change every other day and PRN if soiled.   8- Left heel: Cleanse with VASHE, apply non adherent strip (adaptic) to wound bed, place Melgisorb Ag (calcium alginate with silver) over the adaptic, cover with ABD and wrap with jacob. Eden with T for treatment, date. Change daily and PRN if soiled/displaced.   9- Recommend patient follow up with out patient wound care referral for left heel wound.    Schedule Follow-up Outpatient Wound Appointment.  Call Outpatient Wound and Ostomy Care Team @ [phone]   Option 1: [facility] [place], [place], [place]  Option 2: [facility], [place]      Discharge Instructions - Podiatry    Weight Bearing Status: Weight bearing instructions not necessary at this time                    Pain: Continue analgesics as directed    Follow-up appointment instructions: Please make an appointment within one week of discharge with  wound care. Contact sooner if any increase in pain, or signs of infection occur    Wound Care: Leave dressings clean, dry, and intact between professional dressing changes    Nursing Instructions: Please apply Betadine soaked adaptic. Then cover with Gauze and secure with Kerlix and tape. Please change dressing every Monday, Wednesday, and Friday .

## 2025-05-15 NOTE — ASSESSMENT & PLAN NOTE
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

## 2025-05-15 NOTE — CONSULTS
Podiatry - Consultation    Patient Information:   Ramos Rosenthal 74 y.o. male MRN: [id number]  Unit/Bed#: -01 Encounter: [id number]  PCP: Wade Arellano DO  Date of Admission:  5/14/2025  Date of Consultation: 05/15/25  Requesting Physician: Mike Perez DO      ASSESSMENT:    Ramos Rosenthal is a 74 y.o. male with:    Left heel ulcer  Left low extremity cellulitis  Paresthesia bilateral LE  ESRD on hemodialysis    PLAN:    Wound stable with surrounding cellulitis, no crepitus. No acute need for podiatric surgical intervention at this time. Recommend f/u outpatient in wound care. Instructions placed.   LEADs pending.  Left heel MRI reviewed, Shallow ulceration of the medial heel. No fluid collection within the limits of an unenhanced exam. No MRI findings of acute osteomyelitis.  Local wound care consisting of betadine/DSD. Wound care instructions placed.  Elevation on green foam wedges or pillows when non-ambulatory.  Rest of care per primary team.      Weightbearing status: weightbearing for transfers in darco heel shoe, wheelchair/bed bound, globipedi shoe and prevalon boot for offloading in bed    SUBJECTIVE:    History of Present Illness:    Ramos Rosenthal is a 74 y.o. male who is originally admitted 5/14/2025 due to LLE cellulitis and left heel ulcer. Patient has a past medical history of ESRD, bilateral LE paresthesia.    We are consulted for left heel ulcer. Patient reports having left foot ulcer for about 1 month. He was recently in ICU and developed pressure ulcer. Developed redness and swelling this past week. Has not f/u outpatient yet with wound care.    Review of Systems:    Constitutional: Negative.    HENT: Negative.    Eyes: Negative.    Respiratory: Negative.    Cardiovascular: Negative.    Gastrointestinal: Negative.    Musculoskeletal: Negative   Skin:left heel ulcer   Neurological: no sensation bilateral feet   Psych: Negative.     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 HERZOG    CATARACT EXTRACTION, BILATERAL      IR BIOPSY KIDNEY RANDOM  9/19/2024    IR TEMPORARY DIALYSIS CATHETER PLACEMENT  9/16/2024    IR TUNNELED DIALYSIS CATHETER PLACEMENT  9/27/2024    IR TUNNELED DIALYSIS CATHETER PLACEMENT  1/9/2025    KNEE ARTHROSCOPY Right     x2 , meniscus repair    TONSILECTOMY AND ADNOIDECTOMY         Meds/Allergies:      Medications Prior to Admission:     allopurinol (ZYLOPRIM) 100 mg tablet    B Complex-C-Folic Acid (triphrocaps) 1 MG CAPS    calcium carbonate (OYSTER SHELL,OSCAL) 500 mg    cholecalciferol (VITAMIN D3) 1,000 units tablet    ergocalciferol (VITAMIN D2) 50,000 units    FLUoxetine (PROzac) 20 mg capsule    loratadine (CLARITIN) 10 mg tablet    Magnesium 100 MG CAPS    midodrine (PROAMATINE) 2.5 mg tablet    midodrine (PROAMATINE) 5 mg tablet    pantoprazole (PROTONIX) 40 mg tablet    Allergies[1]    Social History:     Marital Status: /Civil Union    Substance Use History:   Social History     Substance and Sexual Activity   Alcohol Use Not Currently    Comment: occasional     Tobacco Use History[2]  Social History     Substance and Sexual Activity   Drug Use Never       Family History:    Family History   Problem Relation Age of Onset    Diabetes Father     Diabetes Brother     Diabetes Sister          OBJECTIVE:    Vitals:   Blood Pressure: 121/61 (05/15/25 0700)  Pulse: 70 (05/15/25 0700)  Temperature: 97.9 °F (36.6 °C) (05/15/25 0700)  Temp Source: Oral (05/14/25 2030)  Respirations: 18  "(05/15/25 0700)  Height: 6' 1\" (185.4 cm) (05/14/25 2045)  Weight - Scale: 129 kg (284 lb 2.8 oz) (05/14/25 2045)  SpO2: 99 % (05/15/25 0700)    Physical Exam:    General Appearance: Alert, cooperative, no distress.  HEENT: Head normocephalic, atraumatic, without obvious abnormality.  Heart: Normal rate and rhythm.  Lungs: Non-labored breathing. No respiratory distress.  Abdomen: Without distension.  Psychiatric: AAOx3  Lower Extremity:        Dermatological:  Lower extremity wound(s) as noted below:    Wound #: 1  Location: left heel  Length 3cm: Width 2.5cm: Depth 0.3cm:   Deepest Tissue Noted in Base: subcutaneous  Probe to Bone: No  Peripheral Skin Description: Attached  Granulation: 20% Fibrotic Tissue: 40% Necrotic Tissue: 30%   Drainage Amount: minimal, clear  Signs of Infection: Yes      Neurological:  Gross sensation is absent. Protective sensation is absent. Patient Reports numbness and/or paresthesias.    Clinical Images 05/15/25:      Additional data:     Lab Results: I have personally reviewed pertinent labs including:    Results from last 7 days   Lab Units 05/15/25  0533 05/14/25  1832   WBC Thousand/uL 7.98 9.69   HEMOGLOBIN g/dL 9.7* 11.4*   HEMATOCRIT % 32.3* 37.8   PLATELETS Thousands/uL 260 326   SEGS PCT %  --  76*   LYMPHO PCT %  --  13*   MONO PCT %  --  8   EOS PCT %  --  2     Results from last 7 days   Lab Units 05/15/25  0533 05/14/25  1832   POTASSIUM mmol/L 3.7 3.5   CHLORIDE mmol/L 98 94*   CO2 mmol/L 32 29   BUN mg/dL 18 11   CREATININE mg/dL 4.32* 3.45*   CALCIUM mg/dL 8.5 8.6   ALK PHOS U/L  --  71   ALT U/L  --  5*   AST U/L  --  12*     Results from last 7 days   Lab Units 05/14/25  1832   INR  1.02       Cultures: I have personally reviewed pertinent cultures including:    Results from last 7 days   Lab Units 05/14/25  2341 05/14/25  1901   GRAM STAIN RESULT  No polys seen*  2+ Gram positive cocci in pairs*  2+ Gram negative rods* Rare Polys*  4+ Gram negative rods*  3+ Gram " "positive cocci in pairs*  2+ Gram positive rods*   WOUND CULTURE   --  4+ Growth of Escherichia coli*           Imaging: I have personally reviewed pertinent reports in PACS.  EKG, Pathology, and Other Studies: I have personally reviewed pertinent reports.        ** Please Note: Portions of the record may have been created with voice recognition software. Occasional wrong word or \"sound a like\" substitutions may have occurred due to the inherent limitations of voice recognition software. Read the chart carefully and recognize, using context, where substitutions have occurred. **         [1]   Allergies  Allergen Reactions    Ciprofloxacin Blisters    Other      IVORY SOAP    Penicillins Rash   [2]   Social History  Tobacco Use   Smoking Status Former    Current packs/day: 0.00    Types: Cigarettes    Quit date:     Years since quittin.3   Smokeless Tobacco Never     "

## 2025-05-15 NOTE — PROGRESS NOTES
Progress Note - Hospitalist   Name: Ramos Rosenthal 74 y.o. male I MRN: [id number]  Unit/Bed#: -01 I Date of Admission: 5/14/2025   Date of Service: 5/15/2025 I Hospital Day: 1    Assessment & Plan  Ulcer of left heel with cellulitis ([facility])  Patient's family noted heel ulcer and sent to ED.   ED reviewed w/ podiatry   Procal 0.38, 0.43 - in the setting of ESRD on HD   ESR 87 and CRP 58  MRI - no evidence of osteomyelitis   LEADS- pending   Podiatry input appreciated- no acute surgical intervention at this time.  The patient however will need a close follow-up with wound care outpatient.  Elevate right extremity on green foam wedge when nonambulatory.   Weightbearing as tolerated for transfer and drag heel shoe, wheelchair/bedbound, globipedi shoe and Prevalon boots for offloading  No indication for long term IV antibiotic; will continue cefazolin for cellulitis. Transition to PO keflex upon discharge for total of 7 day treatment.   Recurrent major depressive disorder, in remission ([facility])  Continue home Prozac    Paresthesia of both lower extremities  Patient with spinal stenosis. 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 Sequential Compression Devices Ordered.    Mobility:    Basic Mobility Inpatient Raw Score: 11  JH-HLM Goal: 4: Move to chair/commode  JH-HLM Achieved: 3: Sit at edge of bed  JH-HLM Goal achieved. Continue to encourage appropriate mobility.    Patient Centered Rounds: I performed bedside rounds with nursing staff today.   Discussions with Specialists or Other Care Team Provider: podiatry, CM, PT/OT     Education and Discussions with Family / Patient: patient was updated.     Current Length of Stay: 1 day(s)  Current Patient Status: Inpatient   Certification Statement: The patient will continue to require additional inpatient hospital stay due to ulcer of the left heel with cellulitis  Discharge Plan: Anticipate discharge in 24-48 hrs to home with home services.    Code Status: Level 1 - Full Code    Subjective   Patient was seen and examined.  He denies any pain currently.  He is feeling okay.  Per staff, no acute event overnight.    Objective :  Temp:  [97.2 °F (36.2 °C)-97.9 °F (36.6 °C)] 97.4 °F (36.3 °C)  HR:  [62-73] 67  BP: ()/(51-67) 135/67  Resp:  [17-20] 20  SpO2:  [97 %-100 %] 98 %  O2 Device: None (Room air)    Body mass index is 37.49 kg/m².     Input and Output Summary (last 24 hours):     Intake/Output Summary (Last 24 hours) at 5/15/2025 1532  Last data filed at 5/15/2025 1301  Gross per 24 hour   Intake 960 ml   Output 20 ml   Net 940 ml       Physical Exam  Vitals and nursing note reviewed.   Constitutional:       General: He is not in acute distress.     Appearance: Normal appearance.   HENT:      Head: Normocephalic and atraumatic.      Right Ear: External ear normal.      Left Ear: External ear normal.      Nose: Nose normal.      Mouth/Throat:      Mouth: Mucous membranes are moist.      Pharynx: Oropharynx is clear.     Eyes:      General:         Right eye: No discharge.         Left eye: No discharge.      Extraocular Movements: Extraocular movements intact.      Pupils: Pupils are equal, round, and reactive to light.       Cardiovascular:       Rate and Rhythm: Normal rate and regular rhythm.      Pulses: Normal pulses.      Heart sounds: Normal heart sounds. No murmur heard.  Pulmonary:      Effort: Pulmonary effort is normal. No respiratory distress.      Breath sounds: Normal breath sounds. No wheezing or rales.   Abdominal:      General: Bowel sounds are normal. There is no distension.      Palpations: Abdomen is soft. There is no mass.      Tenderness: There is no abdominal tenderness.     Musculoskeletal:         General: No swelling, tenderness or deformity. Normal range of motion.      Cervical back: Normal range of motion and neck supple. No rigidity.     Skin:     General: Skin is warm and dry.      Capillary Refill: Capillary refill takes less than 2 seconds.      Coloration: Skin is not pale.      Findings: No erythema.      Comments: Left heel/ankle dressing intact.  Prevalon boot in place.     Neurological:      General: No focal deficit present.      Mental Status: He is alert and oriented to person, place, and time. Mental status is at baseline.     Psychiatric:         Mood and Affect: Mood normal.         Behavior: Behavior normal.         Thought Content: Thought content normal.         Judgment: Judgment normal.       Lines/Drains:  Lines/Drains/Airways       Active Status       Name Placement date Placement time Site Days    HD Permanent Double Catheter 01/09/25  1317  Internal jugular  126                            Lab Results: I have reviewed the following results:   Results from last 7 days   Lab Units 05/15/25  0533 05/14/25  1832   WBC Thousand/uL 7.98 9.69   HEMOGLOBIN g/dL 9.7* 11.4*   HEMATOCRIT % 32.3* 37.8   PLATELETS Thousands/uL 260 326   SEGS PCT %  --  76*   LYMPHO PCT %  --  13*   MONO PCT %  --  8   EOS PCT %  --  2     Results from last 7 days   Lab Units 05/15/25  0533 05/14/25  1832   SODIUM mmol/L 137 136   POTASSIUM mmol/L 3.7 3.5   CHLORIDE mmol/L 98 94*   CO2 mmol/L 32 29   BUN mg/dL 18 11   CREATININE  mg/dL 4.32* 3.45*   ANION GAP mmol/L 7 13   CALCIUM mg/dL 8.5 8.6   ALBUMIN g/dL  --  3.7   TOTAL BILIRUBIN mg/dL  --  0.56   ALK PHOS U/L  --  71   ALT U/L  --  5*   AST U/L  --  12*   GLUCOSE RANDOM mg/dL 93 126     Results from last 7 days   Lab Units 05/14/25  1832   INR  1.02     Results from last 7 days   Lab Units 05/14/25  2053   POC GLUCOSE mg/dl 132         Results from last 7 days   Lab Units 05/15/25  0533 05/14/25  1832   PROCALCITONIN ng/ml 0.43* 0.38*       Recent Cultures (last 7 days):   Results from last 7 days   Lab Units 05/14/25  2341 05/14/25  1901   GRAM STAIN RESULT  No polys seen*  2+ Gram positive cocci in pairs*  2+ Gram negative rods* Rare Polys*  4+ Gram negative rods*  3+ Gram positive cocci in pairs*  2+ Gram positive rods*   WOUND CULTURE   --  4+ Growth of Escherichia coli*       Imaging Results Review: I reviewed radiology reports from this admission including: MRI left heel/foor .  Other Study Results Review: No additional pertinent studies reviewed.    Last 24 Hours Medication List:     Current Facility-Administered Medications:     acetaminophen (TYLENOL) tablet 650 mg, Q4H PRN    allopurinol (ZYLOPRIM) tablet 100 mg, BID    calcium carbonate (OYSTER SHELL,OSCAL) 500 mg tablet 2 tablet, BID With Meals    ceFAZolin (ANCEF) IVPB (premix in dextrose) 1,000 mg 50 mL, Q24H, Last Rate: 1,000 mg (05/14/25 2020)    Cholecalciferol (VITAMIN D3) tablet 2,000 Units, Daily    FLUoxetine (PROzac) capsule 20 mg, Daily    heparin (porcine) subcutaneous injection 5,000 Units, Q8H LORENZO    midodrine (PROAMATINE) tablet 2.5 mg, TID AC    [START ON 5/16/2025] midodrine (PROAMATINE) tablet 5 mg, Once per day on Monday Wednesday Friday    nystatin (MYCOSTATIN) powder, BID    ondansetron (ZOFRAN) injection 4 mg, Q6H PRN    pantoprazole (PROTONIX) EC tablet 40 mg, Daily    [START ON 5/16/2025] vancomycin (VANCOCIN) IVPB (premix in dextrose) 1,000 mg 200 mL, Once per day on Monday Wednesday Friday     vit B complex-vit C-folic acid (Renal Caps) capsule 1 capsule, Daily With Dinner    Administrative Statements   Today, Patient Was Seen By: BRETT eVlasquez  I have spent a total time of 38 minutes in caring for this patient on the day of the visit/encounter including Diagnostic results, Prognosis, Risks and benefits of tx options, Instructions for management, Patient and family education, Importance of tx compliance, Risk factor reductions, Impressions, Counseling / Coordination of care, Documenting in the medical record, Reviewing/placing orders in the medical record (including tests, medications, and/or procedures), Obtaining or reviewing history  , and Communicating with other healthcare professionals .    **Please Note: This note may have been constructed using a voice recognition system.**

## 2025-05-15 NOTE — CASE MANAGEMENT
Case Management Assessment & Discharge Planning Note    Patient name Ramos Rosenthal  Location /-01 MRN [id number]  : 1950 Date 5/15/2025       Current Admission Date: 2025  Current Admission Diagnosis:Ulcer of left heel with cellulitis (H[facility])   Patient Active Problem List    Diagnosis Date Noted    Class 2 severe obesity due to excess calories with serious comorbidity and body mass index (BMI) of 35.0 to 35.9 in adult ([facility]) 2025    Ulcer of left heel with cellulitis ([facility]) 10/31/2024    Immunocompromised patient ([facility]) 10/23/2024    Chronic gout due to renal impairment of multiple sites without tophus 10/21/2024    End-stage renal disease on hemodialysis ([facility]) 2024    Anemia 2024    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 2024    Essential hypertension 2024    Secondary hyperparathyroidism of renal origin ([facility]) 2024    Uremia 2024    Bilateral lower extremity edema 2024    Neurogenic claudication 2023    Paresthesia of both lower extremities 10/01/2022    Depression 2022    Lumbosacral plexopathy 2019    Gait abnormality 2019    Lumbar stenosis 2019    Polyneuropathy 2019    Prediabetes 2018    Hyperlipidemia 2014    Vitamin D deficiency 2014      LOS (days): 1  Geometric Mean LOS (GMLOS) (days): 4.6  Days to GMLOS:4     OBJECTIVE:    Risk of Unplanned Readmission Score: 14.73         Current admission status: Inpatient  Referral Reason: Other (Discharge planning)    Preferred Pharmacy:   [id number] [place] - [address]  [address] [address]  Phone: [phone] Fax: [phone]    Primary Care Provider: Wade Arellano DO    Primary Insurance: MEDICARE  Secondary Insurance: [employer]    ASSESSMENT:  Active Health Care Proxies       AbimaelLouise mai [facility] Care Representative - Spouse   Primary Phone:  [phone] (Home)                 Advance Directives  Does patient have a Health Care POA?: Yes  Does patient have Advance Directives?: Yes  Primary Contact: Louise Rosenthal-Spouse         Readmission Root Cause  30 Day Readmission: No    Patient Information  Admitted from:: Home  Mental Status: Alert  During Assessment patient was accompanied by: Not accompanied during assessment  Assessment information provided by:: Patient  Primary Caregiver: Family  Caregiver's Name:: Louise Rosenthal  Caregiver's Relationship to Patient:: Family Member  Support Systems: Spouse/significant other, Children  County of Residence: [place]  What city do you live in?: [place]  Home entry access options. Select all that apply.: Ramp  Type of Current Residence: 2 story home  Upon entering residence, is there a bedroom on the main floor (no further steps)?: Yes  Upon entering residence, is there a bathroom on the main floor (no further steps)?: Yes  Living Arrangements: Lives w/ Spouse/significant other, Lives w/ Family members    Activities of Daily Living Prior to Admission  Functional Status: Assistance  Completes ADLs independently?: No  Level of ADL dependence: Assistance  Ambulates independently?: No  Level of ambulatory dependence: Assistance  Does patient use assisted devices?: Yes  Assisted Devices (DME) used: Wheelchair, Electric wheelchair, Other (Comment) (Slide board)  Does patient currently own DME?: Yes  What DME does the patient currently own?: Wheelchair, Electric Wheelchair, Other (Comment) (Slide board)  Does patient have a history of Outpatient Therapy (PT/OT)?: Yes  Does the patient have a history of Short-Term Rehab?: Yes ([facility])  Does patient have a history of HHC?: Yes ([facility])  Does patient currently have HHC?: No         Patient Information Continued  Does patient have prescription coverage?: Yes  Can the patient afford their medications and any related supplies (such as glucometers or test strips)?:  Yes  Does patient receive dialysis treatments?: Yes ([facility].)  Does patient have a history of substance abuse?: No  Does patient have a history of Mental Health Diagnosis?: Yes  Is patient receiving treatment for mental health?: No. Patient declined treatment information.  Has patient received inpatient treatment related to mental health in the last 2 years?: No         Means of Transportation  Means of Transport to Appts:: Family transport          DISCHARGE DETAILS:    Discharge planning discussed with:: CM met with patient at bedside introduced self and role, HD patient at [facility], patient uses Electric wheelchair at HD, family transports.  CM spoke with patient about HC referrals, he was agreeable.  Referrals placed in [place].  CM will continue to follow for needs.  Freedom of Choice: Yes  Comments - Freedom of Choice: blanket referrals placed                                    Would you like to participate in our [facility] service program?  : No - Declined       Discharge Destination Plan:: Home with Home Health Care  Transport at Discharge : Family

## 2025-05-15 NOTE — PLAN OF CARE
Problem: Potential for Falls  Goal: Patient will remain free of falls  Description: INTERVENTIONS:  - Educate patient/family on patient safety including physical limitations  - Instruct patient to call for assistance with activity   - Consider consulting OT/PT to assist with strengthening/mobility based on AM PAC & JH-HLM score  - Consult OT/PT to assist with strengthening/mobility   - Keep Call bell within reach  - Keep bed low and locked with side rails adjusted as appropriate  - Keep care items and personal belongings within reach  - Initiate and maintain comfort rounds  - Make Fall Risk Sign visible to staff  - Offer Toileting every 1 Hours, in advance of need  - Initiate/Maintain bed alarm  - Obtain necessary fall risk management equipment: bed alarm  - Apply yellow socks and bracelet for high fall risk patients  - Consider moving patient to room near nurses station  5/15/2025 0428 by Linda Ayala RN  Outcome: Progressing  5/15/2025 0428 by Linda Ayala RN  Outcome: Progressing     Problem: PAIN - ADULT  Goal: Verbalizes/displays adequate comfort level or baseline comfort level  Description: Interventions:  - Encourage patient to monitor pain and request assistance  - Assess pain using appropriate pain scale  - Administer analgesics as ordered based on type and severity of pain and evaluate response  - Implement non-pharmacological measures as appropriate and evaluate response  - Consider cultural and social influences on pain and pain management  - Notify physician/advanced practitioner if interventions unsuccessful or patient reports new pain  - Educate patient/family on pain management process including their role and importance of  reporting pain   - Provide non-pharmacologic/complimentary pain relief interventions  Outcome: Progressing     Problem: INFECTION - ADULT  Goal: Absence or prevention of progression during hospitalization  Description: INTERVENTIONS:  - Assess and monitor for signs  and symptoms of infection  - Monitor lab/diagnostic results  - Monitor all insertion sites, i.e. indwelling lines, tubes, and drains  - Monitor endotracheal if appropriate and nasal secretions for changes in amount and color  - [place] appropriate cooling/warming therapies per order  - Administer medications as ordered  - Instruct and encourage patient and family to use good hand hygiene technique  - Identify and instruct in appropriate isolation precautions for identified infection/condition  Outcome: Progressing  Goal: Absence of fever/infection during neutropenic period  Description: INTERVENTIONS:  - Monitor WBC  - Perform strict hand hygiene  - Limit to healthy visitors only  - No plants, dried, fresh or silk flowers with mcduffie in patient room  Outcome: Progressing     Problem: SAFETY ADULT  Goal: Patient will remain free of falls  Description: INTERVENTIONS:  - Educate patient/family on patient safety including physical limitations  - Instruct patient to call for assistance with activity   - Consider consulting OT/PT to assist with strengthening/mobility based on AM PAC & -HLM score  - Consult OT/PT to assist with strengthening/mobility   - Keep Call bell within reach  - Keep bed low and locked with side rails adjusted as appropriate  - Keep care items and personal belongings within reach  - Initiate and maintain comfort rounds  - Make Fall Risk Sign visible to staff  - Offer Toileting every 1 Hours, in advance of need  - Initiate/Maintain bed alarm  - Obtain necessary fall risk management equipment: bed alarm  - Apply yellow socks and bracelet for high fall risk patients  - Consider moving patient to room near nurses station  5/15/2025 0428 by Linda Ayala RN  Outcome: Progressing  5/15/2025 0428 by Linda Ayala RN  Outcome: Progressing  Goal: Maintain or return to baseline ADL function  Description: INTERVENTIONS:  -  Assess patient's ability to carry out ADLs; assess patient's baseline for ADL  function and identify physical deficits which impact ability to perform ADLs (bathing, care of mouth/teeth, toileting, grooming, dressing, etc.)  - Assess/evaluate cause of self-care deficits   - Assess range of motion  - Assess patient's mobility; develop plan if impaired  - Assess patient's need for assistive devices and provide as appropriate  - Encourage maximum independence but intervene and supervise when necessary  - Involve family in performance of ADLs  - Assess for home care needs following discharge   - Consider OT consult to assist with ADL evaluation and planning for discharge  - Provide patient education as appropriate  - Monitor functional capacity and physical performance, use of AM PAC & JH-HLM   - Monitor gait, balance and fatigue with ambulation    Outcome: Progressing  Goal: Maintains/Returns to pre admission functional level  Description: INTERVENTIONS:  - Perform AM-PAC 6 Click Basic Mobility/ Daily Activity assessment daily.  - Set and communicate daily mobility goal to care team and patient/family/caregiver.   - Collaborate with rehabilitation services on mobility goals if consulted  - Perform Range of Motion 3 times a day.  - Reposition patient every 2 hours.  - Dangle patient 3 times a day  - Stand patient 3 times a day  - Ambulate patient 3 times a day  - Out of bed to chair 3 times a day   - Out of bed for meals 3 times a day  - Out of bed for toileting  - Record patient progress and toleration of activity level   Outcome: Progressing     Problem: DISCHARGE PLANNING  Goal: Discharge to home or other facility with appropriate resources  Description: INTERVENTIONS:  - Identify barriers to discharge w/patient and caregiver  - Arrange for needed discharge resources and transportation as appropriate  - Identify discharge learning needs (meds, wound care, etc.)  - Arrange for interpretive services to assist at discharge as needed  - Refer to Case Management Department for coordinating discharge  planning if the patient needs post-hospital services based on physician/advanced practitioner order or complex needs related to functional status, cognitive ability, or social support system  Outcome: Progressing     Problem: Knowledge Deficit  Goal: Patient/family/caregiver demonstrates understanding of disease process, treatment plan, medications, and discharge instructions  Description: Complete learning assessment and assess knowledge base.  Interventions:  - Provide teaching at level of understanding  - Provide teaching via preferred learning methods  Outcome: Progressing

## 2025-05-15 NOTE — CONSULTS
Consultation - Nephrology   Name: Ramos Rosenthal 74 y.o. male I MRN: [id number]  Unit/Bed#: -01 I Date of Admission: 5/14/2025   Date of Service: 5/15/2025 I Hospital Day: 1   Inpatient consult to Nephrology  Consult performed by: BRETT Victor  Consult ordered by: BRETT Velasquez      Physician Requesting Evaluation: Mike Perez DO   Reason for Evaluation / Principal Problem: ESRD on HD    Assessment & Plan  End-stage renal disease on hemodialysis (HCC)  Continue management per hospitalist/poditatry.     Lab Results   Component Value Date    EGFR 12 05/15/2025    EGFR 16 05/14/2025    EGFR 7 04/25/2025    CREATININE 4.32 (H) 05/15/2025    CREATININE 3.45 (H) 05/14/2025    CREATININE 6.26 (H) 04/25/2025   ESRD on HD MWF at [facility] under the care of Dr. Moore  OP orders: EWDW 124.3 kg, tx time 210 mins, Access: Rt permcath  /, 2K/35 HCO3/135 Na  Most recent HD  5/14, for full 210 min tx with 1.6L UF, post wt 124.1 kg.  Next hemodialysis will be tomorrow.       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     History of Present Illness   Ramos Rosenthal is a 74 y.o. male  with a PMH of depression, gout, ESRD on HD secondary to rapidly progressive glomerulonephritis with anti-GBM ab who was admitted to [facility] 5/14 after presenting with left heal ulcer. A renal consultation is requested today for assistance in the management of ESRD on HD.    Review of Systems   Constitutional:  Negative for activity change, appetite change, chills, fatigue and fever.    HENT:  Negative for ear pain and sore throat.    Eyes:  Negative for pain and visual disturbance.   Respiratory:  Negative for cough and shortness of breath.    Cardiovascular:  Negative for chest pain and palpitations.   Gastrointestinal:  Negative for abdominal pain, constipation, diarrhea, nausea and vomiting.   Genitourinary:  Negative for difficulty urinating, dysuria and hematuria.   Musculoskeletal:  Negative for arthralgias and back pain.        Left foot wound   Skin:  Negative for color change and rash.   Neurological:  Negative for dizziness, seizures, syncope, light-headedness and headaches.   Hematological: Negative.    Psychiatric/Behavioral: Negative.     All other systems reviewed and are negative.    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 HERZOG    CATARACT EXTRACTION, BILATERAL      IR BIOPSY KIDNEY RANDOM  2024    IR TEMPORARY DIALYSIS CATHETER PLACEMENT  2024    IR TUNNELED DIALYSIS CATHETER PLACEMENT  2024    IR TUNNELED DIALYSIS CATHETER PLACEMENT  2025    KNEE ARTHROSCOPY Right     x2 , meniscus repair    TONSILECTOMY AND ADNOIDECTOMY       Social History     Tobacco Use    Smoking status: Former     Current packs/day: 0.00     Types: Cigarettes     Quit date:      Years since quittin.3    Smokeless tobacco: Never   Vaping Use    Vaping status: Never Used   Substance and Sexual Activity    Alcohol use: Not Currently     Comment: occasional    Drug use: Never    Sexual activity: Not on file      E-Cigarette/Vaping    E-Cigarette Use Never User      E-Cigarette/Vaping Substances    Nicotine No     THC No     CBD No     Flavoring No     Other No     Unknown No          Objective :  Temp:  [97.2 °F (36.2 °C)-97.9 °F (36.6 °C)] 97.9 °F (36.6 °C)  HR:  [62-73] 70  BP: ()/(51-65) 121/61  Resp:  [17-20] 18  SpO2:  [97 %-100 %] 99 %  O2 Device: None (Room air)    Current Weight: Weight - Scale: 129 kg (284 lb 2.8 oz)  First Weight: Weight - Scale: 129 kg (284 lb 2.8 oz)  I/O         05/13 0701  05/14 0700 05/14 0701  05/15 0700 05/15 0701  05/16 0700    P.O.  480 240    Total Intake(mL/kg)  480 (3.7) 240 (1.9)    Urine (mL/kg/hr)  20     Total Output  20     Net  +460 +240                 Physical Exam  Vitals and nursing note reviewed.   Constitutional:       General: He is not in acute distress.     Appearance: He is well-developed. He is obese. He is ill-appearing.   HENT:      Head: Normocephalic and atraumatic.      Right Ear: External ear normal.      Left Ear: External ear normal.      Nose: Nose normal.      Mouth/Throat:      Mouth: Mucous membranes are moist.      Pharynx: Oropharynx is clear.     Eyes:      Extraocular Movements: Extraocular movements intact.      Conjunctiva/sclera: Conjunctivae normal.      Pupils: Pupils are equal, round, and reactive to light.       Cardiovascular:      Rate and Rhythm: Normal rate and regular rhythm.      Heart sounds: Normal heart sounds. No murmur heard.  Pulmonary:      Effort: Pulmonary effort is normal. No respiratory distress.      Breath sounds: Normal breath sounds.   Abdominal:      Palpations: Abdomen is soft.      Tenderness: There is no abdominal tenderness.     Musculoskeletal:         General: No swelling.      Cervical back: Neck supple.      Right lower leg: Edema present.      Left lower leg: Edema present.      Comments: 2+ RLE edema, LLE with intact dressing and ortho boot     Skin:     General: Skin is warm and dry.      Capillary  "Refill: Capillary refill takes less than 2 seconds.     Neurological:      General: No focal deficit present.      Mental Status: He is alert and oriented to person, place, and time.     Psychiatric:         Mood and Affect: Mood normal.         Behavior: Behavior normal.         Medications:  Current Medications[1]      Lab Results: I have reviewed the following results:  Results from last 7 days   Lab Units 05/15/25  0533 05/14/25  1832   WBC Thousand/uL 7.98 9.69   HEMOGLOBIN g/dL 9.7* 11.4*   HEMATOCRIT % 32.3* 37.8   PLATELETS Thousands/uL 260 326   POTASSIUM mmol/L 3.7 3.5   CHLORIDE mmol/L 98 94*   CO2 mmol/L 32 29   BUN mg/dL 18 11   CREATININE mg/dL 4.32* 3.45*   CALCIUM mg/dL 8.5 8.6   ALBUMIN g/dL  --  3.7       Administrative Statements     Portions of the record may have been created with voice recognition software. Occasional wrong word or \"sound a like\" substitutions may have occurred due to the inherent limitations of voice recognition software. Read the chart carefully and recognize, using context, where substitutions have occurred.If you have any questions, please contact the dictating provider.       [1]   Current Facility-Administered Medications:     acetaminophen (TYLENOL) tablet 650 mg, 650 mg, Oral, Q4H PRN, Karen Parikh PA-C    allopurinol (ZYLOPRIM) tablet 100 mg, 100 mg, Oral, BID, Karen Parikh PA-C, 100 mg at 05/15/25 0830    calcium carbonate (OYSTER SHELL,OSCAL) 500 mg tablet 2 tablet, 2 tablet, Oral, BID With Meals, Karen Parikh PA-C, 2 tablet at 05/15/25 0830    ceFAZolin (ANCEF) IVPB (premix in dextrose) 1,000 mg 50 mL, 1,000 mg, Intravenous, Q24H, Karen Parikh PA-C, Last Rate: 100 mL/hr at 05/14/25 2327, 1,000 mg at 05/14/25 2327    Cholecalciferol (VITAMIN D3) tablet 2,000 Units, 2,000 Units, Oral, Daily, Karen Parikh PA-C, 2,000 Units at 05/15/25 0830    FLUoxetine (PROzac) capsule 20 mg, 20 mg, Oral, Daily, Karen " Chelsea Parikh PA-C, 20 mg at 05/15/25 0830    heparin (porcine) subcutaneous injection 5,000 Units, 5,000 Units, Subcutaneous, Q8H LORENZO, Karen Parikh PA-C, 5,000 Units at 05/15/25 0615    midodrine (PROAMATINE) tablet 2.5 mg, 2.5 mg, Oral, TID AC, Karen Parikh PA-C, 2.5 mg at 05/15/25 1140    [START ON 5/16/2025] midodrine (PROAMATINE) tablet 5 mg, 5 mg, Oral, Once per day on Monday Wednesday Friday, Karen Parikh PA-C    nystatin (MYCOSTATIN) powder, , Topical, BID, BRETT Velasquez, Given at 05/15/25 1145    ondansetron (ZOFRAN) injection 4 mg, 4 mg, Intravenous, Q6H PRN, Karen Parikh PA-C    pantoprazole (PROTONIX) EC tablet 40 mg, 40 mg, Oral, Daily, Karen Parikh PA-C, 40 mg at 05/15/25 0830    [START ON 5/16/2025] vancomycin (VANCOCIN) IVPB (premix in dextrose) 1,000 mg 200 mL, 1,000 mg, Intravenous, Once per day on Monday Wednesday Friday, Karen Parikh PA-C    vit B complex-vit C-folic acid (Renal Caps) capsule 1 capsule, 1 mg, Oral, Daily With Dinner, Karen Parikh PA-C

## 2025-05-15 NOTE — ASSESSMENT & PLAN NOTE
Continue management per hospitalist/poditatry.     Lab Results   Component Value Date    EGFR 12 05/15/2025    EGFR 16 05/14/2025    EGFR 7 04/25/2025    CREATININE 4.32 (H) 05/15/2025    CREATININE 3.45 (H) 05/14/2025    CREATININE 6.26 (H) 04/25/2025   ESRD on HD MWF at [facility] under the care of Dr. Moore  OP orders: EWDW 124.3 kg, tx time 210 mins, Access: Rt permcath  /, 2K/35 HCO3/135 Na  Most recent HD  5/14, for full 210 min tx with 1.6L UF, post wt 124.1 kg.  Next hemodialysis will be tomorrow.

## 2025-05-15 NOTE — PROGRESS NOTES
Ramos Rosenthal is a 74 y.o. male who is currently ordered Vancomycin IV with management by the Pharmacy Consult service.  Relevant clinical data and objective / subjective history reviewed.  Vancomycin Assessment:  Indication and Goal AUC/Trough: Soft tissue (goal -600, trough >10)  Clinical Status: stable  Micro:   Cx pending  Renal Function:  SCr: 3.45 mg/dL  CrCl: 25.6 mL/min  Renal replacement: HD  Days of Therapy: 1  Current Dose: new start  Vancomycin Plan:  New Dosin mg LD then 1000 mg IV M// post-HD  Next Level:  am prior to HD  Renal Function Monitoring: Daily BMP and UOP  Pharmacy will continue to follow closely for s/sx of nephrotoxicity, infusion reactions and appropriateness of therapy.  BMP and CBC will be ordered per protocol. We will continue to follow the patient’s culture results and clinical progress daily.    Ze Claire, Pharmacist

## 2025-05-15 NOTE — H&P
H&P - Hospitalist   Name: Ramos Rosenthal 74 y.o. male I MRN: [id number]  Unit/Bed#: CORNEL I Date of Admission: 5/14/2025   Date of Service: 5/14/2025 I Hospital Day: 0     Assessment & Plan  Ulcer of left heel with cellulitis ([facility])  Patient family noted heel ulcer and sent to ED.   ED reviewed w/ podiatry recommend IV abx, MRI and vascular studies  ESR 87 and CRP 58  Procal 0.38, continue to trend  Depression  Continue home Prozac  Paresthesia of both lower extremities  Patient with spinal stenosis. 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  On prednisone and Bactrim  End-stage renal disease on hemodialysis ([facility])  Lab Results   Component Value Date    EGFR 16 05/14/2025    EGFR 7 04/25/2025    EGFR 5 01/09/2025    CREATININE 3.45 (H) 05/14/2025    CREATININE 6.26 (H) 04/25/2025    CREATININE 7.92 (H) 01/09/2025   Patient on HD MWF  Nephrology consult  Class 2 severe obesity due to excess calories with serious comorbidity and body mass index (BMI) of 35.0 to 35.9 in adult ([facility])  BMI 35  Healthy diet and lifestyle modification      VTE Pharmacologic Prophylaxis: VTE Score: 5 High Risk (Score >/= 5) - Pharmacological DVT Prophylaxis Ordered: heparin. Sequential Compression Devices Ordered.  Code Status: Level 1 - Full Code   Discussion with patient    Anticipated Length of Stay: Patient will be admitted on an inpatient basis with an anticipated length of stay of greater than 2 midnights secondary to IV abx, MRI, specialist input.    History of Present Illness   Chief Complaint: heel ulcer    Ramos Rosenthal is a 74 y.o. male with a PMH of depression, gout, ESRD on HD secondary to rapidly progressive glomerulonephritis with anti-GBM ab who presents with left heel ulcer. Patient reports sore on his left foot, started few weeks ago, notes some drainage. Has neuropathy so no pain. No fevers, chills.     Review of Systems   Constitutional:  Negative for chills, fatigue and fever.    HENT:  Positive for rhinorrhea. Negative for sore throat and trouble swallowing.    Eyes:  Negative for discharge and redness.   Respiratory:  Negative for cough and shortness of breath.    Cardiovascular:  Negative for chest pain and leg swelling.   Gastrointestinal:  Negative for abdominal pain, diarrhea, nausea and vomiting.   Genitourinary:         OnHD, makes minimal urine at night   Musculoskeletal:  Negative for back pain, myalgias and neck pain.   Skin:  Positive for wound. Negative for color change.   Neurological:  Negative for dizziness, weakness and headaches.   Psychiatric/Behavioral:  Negative for agitation and confusion.        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 HERZOG    CATARACT EXTRACTION, BILATERAL      IR BIOPSY KIDNEY RANDOM  2024    IR TEMPORARY DIALYSIS CATHETER PLACEMENT  2024    IR TUNNELED DIALYSIS CATHETER PLACEMENT  2024    IR TUNNELED DIALYSIS CATHETER PLACEMENT  2025    KNEE ARTHROSCOPY Right     x2 , meniscus repair    TONSILECTOMY AND ADNOIDECTOMY       Social History     Tobacco Use    Smoking status: Former     Current packs/day: 0.00     Types: Cigarettes     Quit date:      Years since quittin.3    Smokeless tobacco: Never   Vaping Use    Vaping status: Never Used   Substance and Sexual Activity    Alcohol use: Not Currently     Comment: occasional    Drug use: Never    Sexual activity: Not on file     E-Cigarette/Vaping    E-Cigarette Use Never User      E-Cigarette/Vaping Substances     Nicotine No     THC No     CBD No     Flavoring No     Other No     Unknown No      Family History   Problem Relation Age of Onset    Diabetes Father     Diabetes Brother     Diabetes Sister      Social History:  Marital Status: /Civil Union   Occupation: retired  Patient Pre-hospital Living Situation: With spouse and daughter  Patient Pre-hospital Level of Mobility: manual wheelchair  Patient Pre-hospital Diet Restrictions: none    Meds/Allergies   I have reviewed home medications with patient personally.  Prior to Admission medications    Medication Sig Start Date End Date Taking? Authorizing Provider   allopurinol (ZYLOPRIM) 100 mg tablet TAKE 1 TABLET BY MOUTH TWICE A DAY 2/20/25   Jessika Carrillo MD   B Complex-C-Folic Acid (triphrocaps) 1 MG CAPS Take 1 capsule (1 mg total) by mouth daily with dinner 12/16/24   Lisa Moore DO   calcium carbonate (OYSTER SHELL,OSCAL) 500 mg TAKE 2 TABLETS BY MOUTH 2 TIMES A DAY WITH MEALS. 3/18/25   Lisa Moore DO   cholecalciferol (VITAMIN D3) 1,000 units tablet Take 2,000 Units by mouth daily    Historical Provider, MD   ergocalciferol (VITAMIN D2) 50,000 units Take 1 capsule (50,000 Units total) by mouth once a week 4/21/25   BRETT Arzola   FLUoxetine (PROzac) 20 mg capsule TAKE 1 CAPSULE BY MOUTH EVERY DAY 2/15/25   Jessika Carrillo MD   loratadine (CLARITIN) 10 mg tablet Take 10 mg by mouth as needed for allergies Take 1 tablet once daily as needed for allergies    Historical Provider, MD   Magnesium 100 MG CAPS Take 1 capsule by mouth daily    Historical Provider, MD   midodrine (PROAMATINE) 2.5 mg tablet TAKE 1 TABLET (2.5 MG TOTAL) BY MOUTH 3 TIMES A DAY BEFORE MEALS 2/24/25   BRETT Arzola   midodrine (PROAMATINE) 5 mg tablet TAKE 1 TABLET BY MOUTH 3 TIMES A WEEK. 3/17/25   Lisa Moore DO   pantoprazole (PROTONIX) 40 mg tablet Take 1 tablet (40 mg total) by mouth daily 3/14/25   Lisa Moore DO     Allergies   Allergen  Reactions    Ciprofloxacin Blisters    Other      IVORY SOAP    Penicillins Rash       Objective :  Temp:  [97.2 °F (36.2 °C)] 97.2 °F (36.2 °C)  HR:  [65-73] 71  BP: ()/(51-61) 93/56  Resp:  [17-18] 17  SpO2:  [97 %-100 %] 100 %  O2 Device: None (Room air)    Physical Exam  Vitals and nursing note reviewed.   Constitutional:       Appearance: He is well-developed. He is obese.      Comments: Elderly  male, awake and alert   HENT:      Head: Normocephalic and atraumatic.      Mouth/Throat:      Pharynx: No oropharyngeal exudate.     Eyes:      General:         Right eye: No discharge.         Left eye: No discharge.      Conjunctiva/sclera: Conjunctivae normal.     Neck:      Trachea: No tracheal deviation.     Cardiovascular:      Rate and Rhythm: Normal rate and regular rhythm.      Heart sounds: Normal heart sounds. No murmur heard.     No friction rub. No gallop.   Pulmonary:      Effort: Pulmonary effort is normal. No respiratory distress.      Breath sounds: Normal breath sounds. No wheezing or rales.   Chest:      Chest wall: No tenderness.     Abdominal:      General: Bowel sounds are normal. There is no distension.      Palpations: Abdomen is soft. There is no mass.      Tenderness: There is no abdominal tenderness. There is no guarding or rebound.     Musculoskeletal:         General: No tenderness or deformity. Normal range of motion.      Cervical back: Normal range of motion.      Right lower leg: Edema present.      Left lower leg: Edema present.        Feet:      Skin:     General: Skin is warm and dry.      Coloration: Skin is not pale.      Findings: No erythema or rash.     Neurological:      Mental Status: He is alert and oriented to person, place, and time.      Motor: Weakness present.     Psychiatric:         Behavior: Behavior normal.         Thought Content: Thought content normal.         Judgment: Judgment normal.        Lines/Drains:      Lab Results: I have reviewed the  following results:  Results from last 7 days   Lab Units 05/14/25  1832   WBC Thousand/uL 9.69   HEMOGLOBIN g/dL 11.4*   HEMATOCRIT % 37.8   PLATELETS Thousands/uL 326   SEGS PCT % 76*   LYMPHO PCT % 13*   MONO PCT % 8   EOS PCT % 2     Results from last 7 days   Lab Units 05/14/25  1832   SODIUM mmol/L 136   POTASSIUM mmol/L 3.5   CHLORIDE mmol/L 94*   CO2 mmol/L 29   BUN mg/dL 11   CREATININE mg/dL 3.45*   ANION GAP mmol/L 13   CALCIUM mg/dL 8.6   ALBUMIN g/dL 3.7   TOTAL BILIRUBIN mg/dL 0.56   ALK PHOS U/L 71   ALT U/L 5*   AST U/L 12*   GLUCOSE RANDOM mg/dL 126     Results from last 7 days   Lab Units 05/14/25  1832   INR  1.02         Lab Results   Component Value Date    HGBA1C 5.8 (H) 01/13/2023    HGBA1C 5.6 09/13/2022    HGBA1C 5.8 07/31/2019     Imaging Results Review: I personally reviewed the following image studies in PACS and associated radiology reports: xray(s). My interpretation of the radiology images/reports is: no fracture left foot.  Other Study Results Review: No additional pertinent studies reviewed.    Administrative Statements   I have spent a total time of 75 minutes in caring for this patient on the day of the visit/encounter including Diagnostic results, Counseling / Coordination of care, Documenting in the medical record, Reviewing/placing orders in the medical record (including tests, medications, and/or procedures), and Obtaining or reviewing history  .    ** Please Note: This note has been constructed using a voice recognition system. **

## 2025-05-15 NOTE — ASSESSMENT & PLAN NOTE
Patient family noted heel ulcer and sent to ED.   ED reviewed w/ podiatry recommend IV abx, MRI and vascular studies  ESR 87 and CRP 58  Procal 0.38, continue to trend

## 2025-05-15 NOTE — WOUND OSTOMY CARE
Consult Note - Wound   Ramos Rosenthal 74 y.o. male MRN: [id number]  Unit/Bed#: -01 Encounter: [id number]        History and Present Illness:  Patient is a 73 yo male that was admitted to [facility] for treatment of ulcer of left heel with cellulitis.  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 Patient is an assist x 1 or turning and repositioning. Patient is continent of bowel and bladder. On assessment, patient is lying in bed on standard mattress.     Wound Care was consulted for multiple wounds/buttocks.    Podiatry consulted for left heel ulceration. As per charting podiatry recommend IV abx, MRI and vascular studies.    Assessment Findings:       Left medial heel ulcer: Wound is oval in shape, true depth unknown, wound bed a mixture of slough, yellow/black pink tissue, small amount of serous yellow drainage noted on dressing. Antonette-wound is dry intact blanchable tissue. Recommend adaptic, Melgisorb Ag and DSD.   Left tibial skin tear: Wound is oval in shape, pink partial thickness skin loss, scant serosanguinous drainage noted at exam. Antonette-wound dry intact blanchable tissue.   MASD/ITD bilateral groin, left abdominal fold, bilateral upper medial thighs: Skin is pink, rash like appearance intact blanchable skin. Primary nurse stated he asked for Nystatin for the rash.   MASD/IAD buttocks: Skin is dry intact blanchable pink tissue. No drainage noted. Recommend Hydraguard.      Educated patient on importance of frequent offloading of pressure via turning, repositioning and weight-shifting. Patient verbalized understanding of plan of care.     No induration, fluctuance, odor, warmth/temperature differences, redness, or purulence noted to the above noted wounds and skin areas assessed. New dressings applied per orders listed below. Patient tolerated well- no s/s of non-verbal pain  or discomfort observed during the encounter. Bedside nurse aware of plan of care. See flow sheets for more detailed assessment findings.      Orders listed below and wound care will continue to follow, call or Secure Chat Message with questions.     Skin Care Plan:  1-Bilateral groin, left abdominal fold and bilateral upper thighs: Cleanse with soap and water, pat dry. Apply Nystatin powder BID and PRN if soiled.   2-Turn/reposition q2h or when medically stable for pressure re-distribution on skin, utilize wedges for offloading.  3-Elevate heels to offload pressure, utilize EHOB offloading boots.  4-Moisturize skin daily with skin nourishing cream  5-Ehob cushion in chair when out of bed.  6- Apply preventative Hydraguard to bilateral sacro-buttocks and right heel BID and PRN  7- Left pretibial skin tear: Cleanse with VASHE, apply Dermagran to wound bed, cover with silicone foam dressing (Mepilex), eden with T for treatment, date. Change every other day and PRN if soiled.   8- Left heel: Cleanse with VASHE, apply non adherent strip (adaptic) to wound bed, place Melgisorb Ag (calcium alginate with silver) over the adaptic, cover with ABD and wrap with jacob. Eden with T for treatment, date. Change daily and PRN if soiled/displaced.   9- Recommend patient follow up with out patient wound care referral for left heel wound.      Wounds:  Wound 05/14/25 Heel Left (Active)   Wound Image   05/15/25 1122   Wound Description Slough;Yellow;Pink;Black 05/15/25 1122   Non-staged Wound Description Full thickness 05/15/25 1122   Wound Length (cm) 3 cm 05/15/25 1122   Wound Width (cm) 4.3 cm 05/15/25 1122   Wound Depth (cm) 0.6 cm 05/15/25 1122   Wound Surface Area (cm^2) 10.13 cm^2 05/15/25 1122   Wound Volume (cm^3) 4.053 cm^3 05/15/25 1122   Calculated Wound Volume (cm^3) 7.74 cm^3 05/15/25 1122   Drainage Amount Small 05/15/25 1122   Drainage Description Serous 05/15/25 1122   Antonette-wound Assessment Dry;Intact 05/15/25 6298    Treatments Cleansed;Site care;Elevated 05/15/25 1122   Dressing Non adherent;Calcium Alginate with Silver;ABD;Dry dressing 05/15/25 1122   Wound packed? No 05/15/25 1122   Dressing Changed New 05/15/25 1122   Patient Tolerance Tolerated well 05/15/25 1122   Dressing Status Clean;Dry;Intact 05/15/25 1122       Wound 05/14/25 Tibial Distal;Left;Posterior (Active)   Wound Image   05/15/25 1122   Wound Description Pink 05/15/25 1122   Non-staged Wound Description Partial thickness 05/15/25 1122   Wound Length (cm) 1.2 cm 05/15/25 1122   Wound Width (cm) 0.6 cm 05/15/25 1122   Wound Depth (cm) 0.2 cm 05/15/25 1122   Wound Surface Area (cm^2) 0.57 cm^2 05/15/25 1122   Wound Volume (cm^3) 0.075 cm^3 05/15/25 1122   Calculated Wound Volume (cm^3) 0.14 cm^3 05/15/25 1122   Drainage Amount Scant 05/15/25 1122   Drainage Description Serosanguineous 05/15/25 1122   Antonette-wound Assessment Clean;Dry;Intact 05/15/25 1122   Treatments Cleansed;Site care 05/15/25 1122   Dressing Non adherent;Calcium Alginate with Silver;Dry dressing 05/15/25 1122   Wound packed? No 05/15/25 1122   Dressing Changed New 05/15/25 1122   Patient Tolerance Tolerated well 05/15/25 1122   Dressing Status Clean;Dry;Intact 05/15/25 1122       Wound 05/14/25 Thigh Anterior;Left (Active)   Wound Image   05/15/25 1111   Wound Description Pink 05/15/25 1111   Non-staged Wound Description Not applicable 05/15/25 1111   Wound Length (cm) 0 cm 05/15/25 1111   Wound Width (cm) 0 cm 05/15/25 1111   Wound Depth (cm) 0 cm 05/15/25 1111   Wound Surface Area (cm^2) 0 cm^2 05/15/25 1111   Wound Volume (cm^3) 0 cm^3 05/15/25 1111   Calculated Wound Volume (cm^3) 0 cm^3 05/15/25 1111   Patient Tolerance Tolerated well 05/15/25 1112       Wound 05/14/25 Buttocks (Active)   Wound Image   05/15/25 1112   Wound Description Pink 05/15/25 1112   Non-staged Wound Description Not applicable 05/15/25 1112   Wound Length (cm) 0 cm 05/15/25 1112   Wound Width (cm) 0 cm 05/15/25 1112    Wound Depth (cm) 0 cm 05/15/25 1112   Wound Surface Area (cm^2) 0 cm^2 05/15/25 1112   Wound Volume (cm^3) 0 cm^3 05/15/25 1112   Calculated Wound Volume (cm^3) 0 cm^3 05/15/25 1112   Patient Tolerance Tolerated well 05/15/25 1112       Wound 05/14/25 Abdomen Medial;Lower (Active)   Wound Image   05/15/25 1110   Wound Description Pink 05/15/25 1110   Non-staged Wound Description Not applicable 05/15/25 1110   Wound Length (cm) 0 cm 05/15/25 1110   Wound Width (cm) 0 cm 05/15/25 1110   Wound Depth (cm) 0 cm 05/15/25 1110   Wound Surface Area (cm^2) 0 cm^2 05/15/25 1110   Wound Volume (cm^3) 0 cm^3 05/15/25 1110   Calculated Wound Volume (cm^3) 0 cm^3 05/15/25 1110   Drainage Amount None 05/15/25 1059   Patient Tolerance Tolerated well 05/15/25 1110           Sandra Rosa BSN, RN

## 2025-05-15 NOTE — DISCHARGE INSTR - AVS FIRST PAGE
Discharge Instructions - Podiatry    Weight Bearing Status: Weight bearing instructions not necessary at this time                    Pain: Continue analgesics as directed    Follow-up appointment instructions: Please make an appointment within one week of discharge with  wound care. Contact sooner if any increase in pain, or signs of infection occur    Wound Care: Leave dressings clean, dry, and intact between professional dressing changes    Nursing Instructions: Please apply Betadine soaked adaptic. Then cover with Gauze and secure with Kerlix and tape. Please change dressing every Monday, Wednesday, and Friday .

## 2025-05-15 NOTE — NUTRITION
05/15/25 1437   Biochemical Data,Medical Tests, and Procedures   Biochemical Data/Medical Tests/Procedures Lab values reviewed;Meds reviewed   Labs (Comment) 5/15/2025 creatinine 4.32   Meds (Comment) Calcium carbonate, Ancef, vitamin D3, Prozac, heparin, midodrine, Protonix, vancomycin, renal caps   Nutrition-Focused Physical Exam   Nutrition-Focused Physical Exam Findings RN skin assessment reviewed;Edema;Wound  (+4 bilateral lower extremity edema.  Wound at left heel, left tibial, left thigh, buttocks, lower abdomen.)   Medical-Related Concerns depression, peristasis of both lower extremities, ESRD on HD, obesity, vitamin D deficiency, anemia, gout   Adequacy of Intake   Nutrition Modality PO   Feeding Route   PO Independent   Current PO Intake   Current Diet Order Regular diet, thin liquids   Current Meal Intake %   Estimated calorie intake compared to estimated need Nutrient needs are met however patient would benefit from additional protein in setting of wound healing.   PES Statement   Problem Intake   Nutrient (5) Increased nutrient needs (specify) NI-5.1  (Protein)   Related to Wound (s)   As evidenced by: Wound healing   Recommendations/Interventions   Malnutrition/BMI Present No   Summary Wound care RN consulted; wound; HD.  Presents with wound infection.  Found to have ulcer of left heel with cellulitis.  Past medical history significant for depression, peristasis of both lower extremities, ESRD on HD, obesity, vitamin D deficiency, anemia, gout.  Weight history reviewed.  Weight trending down in past year however no significant changes.  +4 bilateral lower extremity edema.  Wound at left heel, left tibial, left thigh, buttocks, lower abdomen.  Prescribed a regular diet, thin liquids.  Meal completion 100%.  Would benefit from additional protein in setting of wound healing.  Patient reports having an alright appetite.  Usually has 2 meals daily.  Follows no special diet.  Consumes a protein  drink 2-3 times weekly.  Eats out often.  NKFA.  Denies dysphagia.  Reports weight loss with initiation of HD in fall 2024.  RD discussed diet as prescribed.  Discussed Feliz in setting of wound healing.  Patient agreeable.  RD to follow.   Interventions/Recommendations Continue current diet order;Supplement initiate;Monitor I & O's   Education Assessment   Education Education not indicated at this time   Patient Nutrition Goals   Goal Improve skin integrity;Increase kcal/PRO intake   Goal Status Initiated   Timeframe to complete goal by next f/u   Nutrition Complexity Risk   Nutrition complexity level Low risk   Follow up date 05/22/25

## 2025-05-16 ENCOUNTER — APPOINTMENT (INPATIENT)
Dept: DIALYSIS | Facility: HOSPITAL | Age: 75
DRG: 602 | End: 2025-05-16
Payer: MEDICARE

## 2025-05-16 ENCOUNTER — HOME HEALTH ADMISSION (OUTPATIENT)
Dept: HOME HEALTH SERVICES | Facility: HOME HEALTHCARE | Age: 75
End: 2025-05-16
Payer: MEDICARE

## 2025-05-16 PROBLEM — T82.7XXA: Status: ACTIVE | Noted: 2025-05-16

## 2025-05-16 PROBLEM — N01.9 RPGN (RAPIDLY PROGRESSIVE GLOMERULONEPHRITIS): Status: ACTIVE | Noted: 2025-05-16

## 2025-05-16 PROBLEM — N18.6 ANEMIA IN ESRD (END-STAGE RENAL DISEASE)  (HCC): Status: ACTIVE | Noted: 2025-05-16

## 2025-05-16 PROBLEM — D63.1 ANEMIA IN ESRD (END-STAGE RENAL DISEASE)  (HCC): Status: ACTIVE | Noted: 2025-05-16

## 2025-05-16 LAB
ANION GAP SERPL CALCULATED.3IONS-SCNC: 10 MMOL/L (ref 4–13)
BUN SERPL-MCNC: 29 MG/DL (ref 5–25)
CALCIUM SERPL-MCNC: 9 MG/DL (ref 8.4–10.2)
CHLORIDE SERPL-SCNC: 97 MMOL/L (ref 96–108)
CO2 SERPL-SCNC: 29 MMOL/L (ref 21–32)
CREAT SERPL-MCNC: 5.9 MG/DL (ref 0.6–1.3)
ERYTHROCYTE [DISTWIDTH] IN BLOOD BY AUTOMATED COUNT: 16.4 % (ref 11.6–15.1)
GFR SERPL CREATININE-BSD FRML MDRD: 8 ML/MIN/1.73SQ M
GLUCOSE SERPL-MCNC: 98 MG/DL (ref 65–140)
HCT VFR BLD AUTO: 32.4 % (ref 36.5–49.3)
HGB BLD-MCNC: 9.7 G/DL (ref 12–17)
MCH RBC QN AUTO: 28.4 PG (ref 26.8–34.3)
MCHC RBC AUTO-ENTMCNC: 29.9 G/DL (ref 31.4–37.4)
MCV RBC AUTO: 95 FL (ref 82–98)
MRSA NOSE QL CULT: NORMAL
PLATELET # BLD AUTO: 265 THOUSANDS/UL (ref 149–390)
PMV BLD AUTO: 8.9 FL (ref 8.9–12.7)
POTASSIUM SERPL-SCNC: 4.2 MMOL/L (ref 3.5–5.3)
RBC # BLD AUTO: 3.42 MILLION/UL (ref 3.88–5.62)
SODIUM SERPL-SCNC: 136 MMOL/L (ref 135–147)
WBC # BLD AUTO: 8.94 THOUSAND/UL (ref 4.31–10.16)

## 2025-05-16 PROCEDURE — 80048 BASIC METABOLIC PNL TOTAL CA: CPT | Performed by: NURSE PRACTITIONER

## 2025-05-16 PROCEDURE — 90935 HEMODIALYSIS ONE EVALUATION: CPT | Performed by: NURSE PRACTITIONER

## 2025-05-16 PROCEDURE — 99232 SBSQ HOSP IP/OBS MODERATE 35: CPT | Performed by: NURSE PRACTITIONER

## 2025-05-16 PROCEDURE — 85027 COMPLETE CBC AUTOMATED: CPT | Performed by: NURSE PRACTITIONER

## 2025-05-16 RX ORDER — CALCITRIOL 0.25 UG/1
0.25 CAPSULE, LIQUID FILLED ORAL 3 TIMES WEEKLY
Status: DISCONTINUED | OUTPATIENT
Start: 2025-05-16 | End: 2025-05-19 | Stop reason: HOSPADM

## 2025-05-16 RX ORDER — ERGOCALCIFEROL 1.25 MG/1
50000 CAPSULE, LIQUID FILLED ORAL WEEKLY
Status: DISCONTINUED | OUTPATIENT
Start: 2025-05-16 | End: 2025-05-19 | Stop reason: HOSPADM

## 2025-05-16 RX ORDER — DOXYCYCLINE 100 MG/1
100 CAPSULE ORAL EVERY 12 HOURS SCHEDULED
Status: DISCONTINUED | OUTPATIENT
Start: 2025-05-16 | End: 2025-05-17

## 2025-05-16 RX ADMIN — NYSTATIN: 100000 POWDER TOPICAL at 18:19

## 2025-05-16 RX ADMIN — NYSTATIN: 100000 POWDER TOPICAL at 13:26

## 2025-05-16 RX ADMIN — HEPARIN SODIUM 5000 UNITS: 5000 INJECTION INTRAVENOUS; SUBCUTANEOUS at 21:43

## 2025-05-16 RX ADMIN — FLUOXETINE HYDROCHLORIDE 20 MG: 20 CAPSULE ORAL at 08:13

## 2025-05-16 RX ADMIN — HEPARIN SODIUM 5000 UNITS: 5000 INJECTION INTRAVENOUS; SUBCUTANEOUS at 13:26

## 2025-05-16 RX ADMIN — MIDODRINE HYDROCHLORIDE 2.5 MG: 5 TABLET ORAL at 08:13

## 2025-05-16 RX ADMIN — ALLOPURINOL 100 MG: 100 TABLET ORAL at 17:18

## 2025-05-16 RX ADMIN — CALCITRIOL 0.25 MCG: 0.25 CAPSULE, LIQUID FILLED ORAL at 13:29

## 2025-05-16 RX ADMIN — CHOLECALCIFEROL TAB 25 MCG (1000 UNIT) 2000 UNITS: 25 TAB at 08:13

## 2025-05-16 RX ADMIN — CALCIUM 2 TABLET: 500 TABLET ORAL at 15:44

## 2025-05-16 RX ADMIN — Medication 1 CAPSULE: at 15:43

## 2025-05-16 RX ADMIN — HEPARIN SODIUM 5000 UNITS: 5000 INJECTION INTRAVENOUS; SUBCUTANEOUS at 05:09

## 2025-05-16 RX ADMIN — MIDODRINE HYDROCHLORIDE 2.5 MG: 5 TABLET ORAL at 13:26

## 2025-05-16 RX ADMIN — ONDANSETRON 4 MG: 2 INJECTION INTRAMUSCULAR; INTRAVENOUS at 17:18

## 2025-05-16 RX ADMIN — MIDODRINE HYDROCHLORIDE 5 MG: 5 TABLET ORAL at 09:10

## 2025-05-16 RX ADMIN — DOXYCYCLINE 100 MG: 100 CAPSULE ORAL at 13:26

## 2025-05-16 RX ADMIN — CEFAZOLIN SODIUM 1000 MG: 1 SOLUTION INTRAVENOUS at 21:43

## 2025-05-16 RX ADMIN — CALCIUM 2 TABLET: 500 TABLET ORAL at 08:13

## 2025-05-16 RX ADMIN — MIDODRINE HYDROCHLORIDE 2.5 MG: 5 TABLET ORAL at 15:43

## 2025-05-16 RX ADMIN — ACETAMINOPHEN 650 MG: 325 TABLET ORAL at 10:35

## 2025-05-16 RX ADMIN — PANTOPRAZOLE SODIUM 40 MG: 40 TABLET, DELAYED RELEASE ORAL at 08:13

## 2025-05-16 RX ADMIN — DOXYCYCLINE 100 MG: 100 CAPSULE ORAL at 21:43

## 2025-05-16 RX ADMIN — ALLOPURINOL 100 MG: 100 TABLET ORAL at 08:13

## 2025-05-16 RX ADMIN — ERGOCALCIFEROL 50000 UNITS: 1.25 CAPSULE, LIQUID FILLED ORAL at 13:29

## 2025-05-16 NOTE — CONSULTS
Vancomycin IV Pharmacy-to-Dose Consultation     Vancomycin has been discontinued.  Pharmacy will sign off.  Please contact or re-consult with questions.    Dylan Abarca, Pharmacist

## 2025-05-16 NOTE — ASSESSMENT & PLAN NOTE
Lab Results   Component Value Date    EGFR 8 05/16/2025    EGFR 12 05/15/2025    EGFR 16 05/14/2025    CREATININE 5.90 (H) 05/16/2025    CREATININE 4.32 (H) 05/15/2025    CREATININE 3.45 (H) 05/14/2025   Receives mircera as outpatient last dose 50 mcg 5/12/25. Avoid Venofer given infection

## 2025-05-16 NOTE — PLAN OF CARE
Problem: Potential for Falls  Goal: Patient will remain free of falls  Description: INTERVENTIONS:  - Educate patient/family on patient safety including physical limitations  - Instruct patient to call for assistance with activity   - Consider consulting OT/PT to assist with strengthening/mobility based on AM PAC & JH-HLM score  - Consult OT/PT to assist with strengthening/mobility   - Keep Call bell within reach  - Keep bed low and locked with side rails adjusted as appropriate  - Keep care items and personal belongings within reach  - Initiate and maintain comfort rounds  - Make Fall Risk Sign visible to staff  - Offer Toileting every 2 Hours, in advance of need  - Initiate/Maintain alarms  - Obtain necessary fall risk management equipment: non slip socsk  - Apply yellow socks and bracelet for high fall risk patients  - Consider moving patient to room near nurses station  Outcome: Progressing     Problem: PAIN - ADULT  Goal: Verbalizes/displays adequate comfort level or baseline comfort level  Description: Interventions:  - Encourage patient to monitor pain and request assistance  - Assess pain using appropriate pain scale  - Administer analgesics as ordered based on type and severity of pain and evaluate response  - Implement non-pharmacological measures as appropriate and evaluate response  - Consider cultural and social influences on pain and pain management  - Notify physician/advanced practitioner if interventions unsuccessful or patient reports new pain  - Educate patient/family on pain management process including their role and importance of  reporting pain   - Provide non-pharmacologic/complimentary pain relief interventions  Outcome: Progressing

## 2025-05-16 NOTE — PROGRESS NOTES
Progress Note - Hospitalist   Name: Ramos Rosenthal 74 y.o. male I MRN: [id number]  Unit/Bed#: -01 I Date of Admission: 5/14/2025   Date of Service: 5/16/2025 I Hospital Day: 2    Assessment & Plan  Ulcer of left heel with cellulitis ([facility])  Patient's family noted heel ulcer and sent to ED.   ED reviewed w/ podiatry   Procal 0.38, 0.43 - in the setting of ESRD on HD   ESR 87 and CRP 58  MRI - no evidence of osteomyelitis   LEADS- LE 50-75% stenosis. Great toe pressure within healing range    Podiatry input appreciated- no acute surgical intervention at this time.  The patient however will need a close follow-up with wound care outpatient.  Elevate right extremity on green foam wedge when nonambulatory.   Weightbearing as tolerated for transfer and drag heel shoe, wheelchair/bedbound, globipedi shoe and Prevalon boots for offloading  No indication for long term IV antibiotic; will continue cefazolin for cellulitis. Transition to PO keflex upon discharge for total of 7 day treatment.   Recurrent major depressive disorder, in remission ([facility])  Continue home Prozac    Paresthesia of both lower extremities  Patient with spinal stenosis. 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 Adding doxycycline for MRSA coverage.   Will continue to monitor for any sign of infection   Nephrology input appreciated  Patient on HD [facility]  5/16- HD session terminated early due to clotting   Class 2 severe obesity due to excess calories with serious comorbidity and body mass index (BMI) of 35.0 to 35.9 in adult ([facility])  BMI 35  Healthy diet  and lifestyle modification   Infection of exit site of hemodialysis catheter ([facility])  Permacath site with some erythema POA; patient reports that it feels itchy.   Noted low grade fever of 100.6F on 5/16  Continue with antibiotic for his left foot/heel cellulitis- cefazolin. Adding doxycycline for MRSA coverage.   Will continue to monitor for any worsening sign of infection     VTE Pharmacologic Prophylaxis: VTE Score: 5 High Risk (Score >/= 5) - Pharmacological DVT Prophylaxis Ordered: heparin. Sequential Compression Devices Ordered.    Mobility:   Basic Mobility Inpatient Raw Score: 11  -HLM Goal: 4: Move to chair/commode  JH-HLM Achieved: 3: Sit at edge of bed (patient reports beogn bedbound at home)  JH-HLM Goal achieved. Continue to encourage appropriate mobility.    Patient Centered Rounds: I performed bedside rounds with nursing staff today.   Discussions with Specialists or Other Care Team Provider: nephrology, podiatry, CM, PT/OT     Education and Discussions with Family / Patient: patient was updated.     Current Length of Stay: 2 day(s)  Current Patient Status: Inpatient   Certification Statement: The patient will continue to require additional inpatient hospital stay due to ulcer of the left heel with cellulitiswill need to monitor closely and remains fever free for at least 24 hour prior to discharge.   Discharge Plan: Anticipate discharge in 24-48 hrs to home with home services.    Code Status: Level 1 - Full Code    Subjective   Patient was seen and examined.  He is feeling well. He had low grade temp 100.6F at HD today. Noted erythema on the site of permacath which appears to be improving since admission.     Objective :  Temp:  [97.4 °F (36.3 °C)-100.6 °F (38.1 °C)] 99.4 °F (37.4 °C)  HR:  [] 75  BP: ()/(51-76) 78/51  Resp:  [18-20] 18  SpO2:  [97 %-99 %] 97 %  O2 Device: None (Room air)    Body mass index is 37.49 kg/m².     Input and Output Summary (last 24 hours):     Intake/Output  Summary (Last 24 hours) at 5/16/2025 1319  Last data filed at 5/16/2025 1220  Gross per 24 hour   Intake 1100 ml   Output 2234 ml   Net -1134 ml       Physical Exam  Vitals and nursing note reviewed.   Constitutional:       General: He is not in acute distress.     Appearance: Normal appearance.   HENT:      Head: Normocephalic and atraumatic.      Right Ear: External ear normal.      Left Ear: External ear normal.      Nose: Nose normal.      Mouth/Throat:      Mouth: Mucous membranes are moist.      Pharynx: Oropharynx is clear.     Eyes:      General:         Right eye: No discharge.         Left eye: No discharge.      Extraocular Movements: Extraocular movements intact.      Pupils: Pupils are equal, round, and reactive to light.       Cardiovascular:      Rate and Rhythm: Normal rate and regular rhythm.      Pulses: Normal pulses.      Heart sounds: Normal heart sounds. No murmur heard.  Pulmonary:      Effort: Pulmonary effort is normal. No respiratory distress.      Breath sounds: Normal breath sounds. No wheezing or rales.   Abdominal:      General: Bowel sounds are normal. There is no distension.      Palpations: Abdomen is soft. There is no mass.      Tenderness: There is no abdominal tenderness.     Musculoskeletal:         General: No swelling, tenderness or deformity. Normal range of motion.      Cervical back: Normal range of motion and neck supple. No rigidity.     Skin:     General: Skin is warm and dry.      Capillary Refill: Capillary refill takes less than 2 seconds.      Coloration: Skin is not pale.      Findings: Erythema (around the permacath site) present.      Comments: Left heel/ankle dressing intact.  Prevalon boot in place.     Neurological:      General: No focal deficit present.      Mental Status: He is alert and oriented to person, place, and time. Mental status is at baseline.     Psychiatric:         Mood and Affect: Mood normal.         Behavior: Behavior normal.         Thought  Content: Thought content normal.         Judgment: Judgment normal.       Lines/Drains:  Lines/Drains/Airways       Active Status       Name Placement date Placement time Site Days    HD Permanent Double Catheter 01/09/25  1317  Internal jugular  126                            Lab Results: I have reviewed the following results:   Results from last 7 days   Lab Units 05/16/25  0437 05/15/25  0533 05/14/25  1832   WBC Thousand/uL 8.94   < > 9.69   HEMOGLOBIN g/dL 9.7*   < > 11.4*   HEMATOCRIT % 32.4*   < > 37.8   PLATELETS Thousands/uL 265   < > 326   SEGS PCT %  --   --  76*   LYMPHO PCT %  --   --  13*   MONO PCT %  --   --  8   EOS PCT %  --   --  2    < > = values in this interval not displayed.     Results from last 7 days   Lab Units 05/16/25  0437 05/15/25  0533 05/14/25  1832   SODIUM mmol/L 136   < > 136   POTASSIUM mmol/L 4.2   < > 3.5   CHLORIDE mmol/L 97   < > 94*   CO2 mmol/L 29   < > 29   BUN mg/dL 29*   < > 11   CREATININE mg/dL 5.90*   < > 3.45*   ANION GAP mmol/L 10   < > 13   CALCIUM mg/dL 9.0   < > 8.6   ALBUMIN g/dL  --   --  3.7   TOTAL BILIRUBIN mg/dL  --   --  0.56   ALK PHOS U/L  --   --  71   ALT U/L  --   --  5*   AST U/L  --   --  12*   GLUCOSE RANDOM mg/dL 98   < > 126    < > = values in this interval not displayed.     Results from last 7 days   Lab Units 05/14/25  1832   INR  1.02     Results from last 7 days   Lab Units 05/14/25  2053   POC GLUCOSE mg/dl 132         Results from last 7 days   Lab Units 05/15/25  0533 05/14/25  1832   PROCALCITONIN ng/ml 0.43* 0.38*       Recent Cultures (last 7 days):   Results from last 7 days   Lab Units 05/15/25  0623 05/14/25  2341 05/14/25  1901   GRAM STAIN RESULT   --  No polys seen*  2+ Gram positive cocci in pairs*  2+ Gram negative rods* Rare Polys*  4+ Gram negative rods*  3+ Gram positive cocci in pairs*  2+ Gram positive rods*   URINE CULTURE  >100,000 cfu/ml Gram Negative Florentin Enteric Like*  --   --    WOUND CULTURE   --  Culture too  young- will reincubate 4+ Growth of Escherichia coli*  4+ Growth of       Imaging Results Review: I reviewed radiology reports from this admission including: MRI left heel/foor .  Other Study Results Review: No additional pertinent studies reviewed.    Last 24 Hours Medication List:     Current Facility-Administered Medications:     acetaminophen (TYLENOL) tablet 650 mg, Q4H PRN    allopurinol (ZYLOPRIM) tablet 100 mg, BID    calcium carbonate (OYSTER SHELL,OSCAL) 500 mg tablet 2 tablet, BID With Meals    ceFAZolin (ANCEF) IVPB (premix in dextrose) 1,000 mg 50 mL, Q24H, Last Rate: 1,000 mg (05/15/25 2055)    Cholecalciferol (VITAMIN D3) tablet 2,000 Units, Daily    doxycycline hyclate (VIBRAMYCIN) capsule 100 mg, Q12H LORENZO    FLUoxetine (PROzac) capsule 20 mg, Daily    heparin (porcine) subcutaneous injection 5,000 Units, Q8H LORENZO    midodrine (PROAMATINE) tablet 2.5 mg, TID AC    midodrine (PROAMATINE) tablet 5 mg, Once per day on Monday Wednesday Friday    nystatin (MYCOSTATIN) powder, BID    ondansetron (ZOFRAN) injection 4 mg, Q6H PRN    pantoprazole (PROTONIX) EC tablet 40 mg, Daily    vit B complex-vit C-folic acid (Renal Caps) capsule 1 capsule, Daily With Dinner    Administrative Statements   Today, Patient Was Seen By: BRETT Velasquez  I have spent a total time of 37 minutes in caring for this patient on the day of the visit/encounter including Diagnostic results, Prognosis, Risks and benefits of tx options, Instructions for management, Patient and family education, Importance of tx compliance, Risk factor reductions, Impressions, Counseling / Coordination of care, Documenting in the medical record, Reviewing/placing orders in the medical record (including tests, medications, and/or procedures), Obtaining or reviewing history  , and Communicating with other healthcare professionals .    **Please Note: This note may have been constructed using a voice recognition system.**

## 2025-05-16 NOTE — ASSESSMENT & PLAN NOTE
Patient's family noted heel ulcer and sent to ED.   ED reviewed w/ podiatry   Procal 0.38, 0.43 - in the setting of ESRD on HD   ESR 87 and CRP 58  MRI - no evidence of osteomyelitis   LEADS- LE 50-75% stenosis. Great toe pressure within healing range   Podiatry input appreciated- no acute surgical intervention at this time.  The patient however will need a close follow-up with wound care outpatient.  Elevate right extremity on green foam wedge when nonambulatory.   Weightbearing as tolerated for transfer and drag heel shoe, wheelchair/bedbound, globipedi shoe and Prevalon boots for offloading  No indication for long term IV antibiotic; received cefazolin for cellulitis. Transition to PO keflex upon discharge for total of 7 day treatment.

## 2025-05-16 NOTE — PROGRESS NOTES
Progress Note - Nephrology   Name: Ramos Rosenthal 74 y.o. male I MRN: [id number]  Unit/Bed#: -01 I Date of Admission: 5/14/2025   Date of Service: 5/16/2025 I Hospital Day: 2      HEMODIALYSIS PROCEDURE NOTE  The patient was seen and examined on hemodialysis.  Time: 3.5 hours  Sodium: 135 Blood flow: 400   Dialyzer: F160 Potassium: 35 Dialysate flow: 500   Access: permcath Bicarbonate: 35 Ultrafiltration goal: 1.5->2.5   Medications on HD: none       Assessment & Plan  End-stage renal disease on hemodialysis ([facility])    Lab Results   Component Value Date    EGFR 8 05/16/2025    EGFR 12 05/15/2025    EGFR 16 05/14/2025    CREATININE 5.90 (H) 05/16/2025    CREATININE 4.32 (H) 05/15/2025    CREATININE 3.45 (H) 05/14/2025   ESRD on HD MWF at [facility] under the care of Dr. Moore  OP orders: .3 kg, tx time 210 mins, Access: Rt permcath    Etiology RPGN previously trialed cyclophosphamide however lack of response and cost prohibitive. Also s/p steroid taper.     Undergoing hemodialysis today as above. Erythema around PC site however looking improved from earlier in hospitalization. Is noted to be scratching at site and applying creams. Query allergy. Received vancomycin and cefazolin for other infection. Discussed with primary team -add doxycyline for MRSA coverage.    Addendum 1300- pt TMAX 100.6F on dialysis primary team aware. Also, treatment terminated early due to clotting. Will need heparin next treatment if no contraindications.     Continue renal vitamin, renal diet      RPGN (rapidly progressive glomerulonephritis)  As above, now dialysis dependent.   Ulcer of left heel with cellulitis ([facility])  No surgical intervention needed acutely. WBAT. Cefazolin per primary team   Secondary hyperparathyroidism of renal origin ([facility])  Resume calcitriol 0.25 mcg thrice weekly, calcium carbonate 500 mg BID, ergocalciferol 50,000 units weekly  Anemia in ESRD (end-stage renal disease)  ([facility])  Lab Results    Component Value Date    EGFR 8 05/16/2025    EGFR 12 05/15/2025    EGFR 16 05/14/2025    CREATININE 5.90 (H) 05/16/2025    CREATININE 4.32 (H) 05/15/2025    CREATININE 3.45 (H) 05/14/2025   Receives mircera as outpatient last dose 50 mcg 5/12/25. Avoid Venofer given infection  Infection of exit site of hemodialysis catheter (HCC)  Continue Cefazolin and add doxycycline. Continue dressing changes. Further intervention per primary team    I have reviewed the nephrology recommendations including add doxycyline , with Maddy, and we are in agreement with renal plan including the information outlined above.     Subjective     Has itching to PC site. Has been peeling off dressings and applying creams to it.     Objective :  Temp:  [97.4 °F (36.3 °C)-99.7 °F (37.6 °C)] 99.7 °F (37.6 °C)  HR:  [] 80  BP: (101-145)/(66-76) 101/66  Resp:  [18-20] 18  SpO2:  [97 %-99 %] 97 %  O2 Device: None (Room air)    Current Weight: Weight - Scale: 129 kg (284 lb 2.8 oz)  First Weight: Weight - Scale: 129 kg (284 lb 2.8 oz)  I/O         05/14 0701  05/15 0700 05/15 0701  05/16 0700 05/16 0701  05/17 0700    P.O. 480 1080     I.V. (mL/kg)   200 (1.6)    IV Piggyback 50      Total Intake(mL/kg) 530 (4.1) 1080 (8.4) 200 (1.6)    Urine (mL/kg/hr) 20      Total Output 20      Net +510 +1080 +200                 Physical Exam  Vitals and nursing note reviewed.   Constitutional:       General: He is not in acute distress.     Appearance: He is well-developed.   HENT:      Head: Normocephalic and atraumatic.     Eyes:      Conjunctiva/sclera: Conjunctivae normal.       Cardiovascular:      Rate and Rhythm: Normal rate and regular rhythm.      Heart sounds: No murmur heard.  Pulmonary:      Effort: Pulmonary effort is normal. No respiratory distress.      Breath sounds: Normal breath sounds.   Abdominal:      Palpations: Abdomen is soft.      Tenderness: There is no abdominal tenderness.     Musculoskeletal:         General: No swelling.  "     Cervical back: Neck supple.     Skin:     General: Skin is warm and dry.      Capillary Refill: Capillary refill takes less than 2 seconds.      Findings: Rash present.      Comments: Rash at pc site.      Neurological:      Mental Status: He is alert.     Psychiatric:         Mood and Affect: Mood normal.           Lab Results: I have reviewed the following results:  Results from last 7 days   Lab Units 05/16/25  0437 05/15/25  0533 05/14/25  1832   WBC Thousand/uL 8.94 7.98 9.69   HEMOGLOBIN g/dL 9.7* 9.7* 11.4*   HEMATOCRIT % 32.4* 32.3* 37.8   PLATELETS Thousands/uL 265 260 326   POTASSIUM mmol/L 4.2 3.7 3.5   CHLORIDE mmol/L 97 98 94*   CO2 mmol/L 29 32 29   BUN mg/dL 29* 18 11   CREATININE mg/dL 5.90* 4.32* 3.45*   CALCIUM mg/dL 9.0 8.5 8.6   ALBUMIN g/dL  --   --  3.7       Administrative Statements     Portions of the record may have been created with voice recognition software. Occasional wrong word or \"sound a like\" substitutions may have occurred due to the inherent limitations of voice recognition software. Read the chart carefully and recognize, using context, where substitutions have occurred.If you have any questions, please contact the dictating provider.    " Detail Level: Zone Recommendations (Free Text): Pigmented Control Creme twice a day to the entire face Recommendation Preamble: The following recommendations were made during the visit:

## 2025-05-16 NOTE — PLAN OF CARE
Problem: DISCHARGE PLANNING  Goal: Discharge to home or other facility with appropriate resources  Description: INTERVENTIONS:  - Identify barriers to discharge w/patient and caregiver  - Arrange for needed discharge resources and transportation as appropriate  - Identify discharge learning needs (meds, wound care, etc.)  - Arrange for interpretive services to assist at discharge as needed  - Refer to Case Management Department for coordinating discharge planning if the patient needs post-hospital services based on physician/advanced practitioner order or complex needs related to functional status, cognitive ability, or social support system  5/16/2025 0828 by Sabina Myles RN  Outcome: Progressing  5/16/2025 0828 by Sabina Myles RN  Outcome: Progressing  5/16/2025 0827 by Sabina Myles RN  Outcome: Progressing  5/16/2025 0827 by Sabina Myles RN  Outcome: Progressing

## 2025-05-16 NOTE — ASSESSMENT & PLAN NOTE
Continue Cefazolin and add doxycycline. Continue dressing changes. Further intervention per primary team

## 2025-05-16 NOTE — ASSESSMENT & PLAN NOTE
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 Adding doxycycline for MRSA coverage.   Will continue to monitor for any sign of infection   Nephrology input appreciated  Patient on HD MWF

## 2025-05-16 NOTE — ASSESSMENT & PLAN NOTE
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 Adding doxycycline for MRSA coverage.   Will continue to monitor for any sign of infection   Nephrology input appreciated  Patient on HD MWF  5/16- HD session terminated early due to clotting

## 2025-05-16 NOTE — ASSESSMENT & PLAN NOTE
Patient's family noted heel ulcer and sent to ED.   ED reviewed w/ podiatry   Procal 0.38, 0.43 - in the setting of ESRD on HD   ESR 87 and CRP 58  MRI - no evidence of osteomyelitis   LEADS- LE 50-75% stenosis. Great toe pressure within healing range    Podiatry input appreciated- no acute surgical intervention at this time.  The patient however will need a close follow-up with wound care outpatient.  Elevate right extremity on green foam wedge when nonambulatory.   Weightbearing as tolerated for transfer and drag heel shoe, wheelchair/bedbound, globipedi shoe and Prevalon boots for offloading  No indication for long term IV antibiotic; will continue cefazolin for cellulitis. Transition to PO keflex upon discharge for total of 7 day treatment.

## 2025-05-16 NOTE — PLAN OF CARE
"Pre-tx:  UF 1-2 L as tolerated to EDW to challenge EDW.  Pt with significant BL LE edema.  BP stable to start HD.  Pt received midodrine from primary RN prior to HD today.  3K bath per serum K of 4.2 today.  Pt noted with clear window tegaderm dsg and gauze over exit site, present on admission.  Under tegaderm, skin noted dry, red and itchy per pt.  Pt stated that he has been scratching it and has applied dove moisturizer and other unknown lotion to reddened/itchy area since the itching started.  Pt stated he started using these tegaderm dressings approximately 2 weeks ago because a \"friend at dialysis told him they were better and waterproof.\"  Dr. Moore and Nephro NP Leonel notified of situation.  Pt currently on antibiotics, doxycycline added per MD.  To avoid further irritation, exit site cleaned with alcohol and area covered with gauze and paper tape.        Post-Dialysis RN Treatment Note    Blood Pressure:  Pre 114/6 mm/Hg  Post  mmHg   EDW:  124 kg    Weight:  Pre 124.7** kg   Post  123 **kg **weight with LE heel protector boots   Mode of weight measurement: Bed Scale   Volume Removed: 1700  ml    Treatment duration: 3.5 hours    NS given:  No    Treatment shortened No   Medications given during Rx: NA   Access type: Permacath/TDC   Needle Gauge: NA   Access Issues: No    Report called to primary nurse:   Yes         Problem: METABOLIC, FLUID AND ELECTROLYTES - ADULT  Goal: Electrolytes maintained within normal limits  Description: INTERVENTIONS:  - Monitor labs and assess patient for signs and symptoms of electrolyte imbalances  - Administer electrolyte replacement as ordered  - Monitor response to electrolyte replacements, including repeat lab results as appropriate  - Instruct patient on fluid and nutrition as appropriate  Outcome: Progressing  Goal: Fluid balance maintained  Description: INTERVENTIONS:  - Monitor labs   - Monitor I/O and WT  - Instruct patient on fluid and nutrition as appropriate  - " Assess for signs & symptoms of volume excess or deficit  Outcome: Progressing

## 2025-05-16 NOTE — ASSESSMENT & PLAN NOTE
Lab Results   Component Value Date    EGFR 8 05/16/2025    EGFR 12 05/15/2025    EGFR 16 05/14/2025    CREATININE 5.90 (H) 05/16/2025    CREATININE 4.32 (H) 05/15/2025    CREATININE 3.45 (H) 05/14/2025   ESRD on HD MWF at [facility] under the care of Dr. Moore  OP orders: .3 kg, tx time 210 mins, Access: Rt permcath    Etiology RPGN previously trialed cyclophosphamide however lack of response and cost prohibitive. Also s/p steroid taper.     Undergoing hemodialysis today as above. Erythema around PC site however looking improved from earlier in hospitalization. Is noted to be scratching at site and applying creams. Query allergy. Received vancomycin and cefazolin for other infection. Discussed with primary team -add doxycyline for MRSA coverage.    Addendum 1300- pt TMAX 100.6F on dialysis primary team aware. Also, treatment terminated early due to clotting. Will need heparin next treatment if no contraindications.     Continue renal vitamin, renal diet

## 2025-05-16 NOTE — ASSESSMENT & PLAN NOTE
Resume calcitriol 0.25 mcg thrice weekly, calcium carbonate 500 mg BID, ergocalciferol 50,000 units weekly

## 2025-05-16 NOTE — ASSESSMENT & PLAN NOTE
Permacath site with some erythema POA; patient reports that it feels itchy.   Noted low grade fever of 100.6F on 5/16  Continue with antibiotic for his left foot/heel cellulitis- cefazolin. Adding doxycycline for MRSA coverage.   Will continue to monitor for any worsening sign of infection

## 2025-05-17 ENCOUNTER — APPOINTMENT (INPATIENT)
Dept: RADIOLOGY | Facility: HOSPITAL | Age: 75
DRG: 602 | End: 2025-05-17
Payer: MEDICARE

## 2025-05-17 LAB
ANION GAP SERPL CALCULATED.3IONS-SCNC: 7 MMOL/L (ref 4–13)
BACTERIA WND AEROBE CULT: ABNORMAL
BUN SERPL-MCNC: 31 MG/DL (ref 5–25)
CALCIUM SERPL-MCNC: 8.9 MG/DL (ref 8.4–10.2)
CHLORIDE SERPL-SCNC: 97 MMOL/L (ref 96–108)
CO2 SERPL-SCNC: 33 MMOL/L (ref 21–32)
CREAT SERPL-MCNC: 5.32 MG/DL (ref 0.6–1.3)
ERYTHROCYTE [DISTWIDTH] IN BLOOD BY AUTOMATED COUNT: 16.8 % (ref 11.6–15.1)
GFR SERPL CREATININE-BSD FRML MDRD: 9 ML/MIN/1.73SQ M
GLUCOSE SERPL-MCNC: 91 MG/DL (ref 65–140)
GRAM STN SPEC: ABNORMAL
HCT VFR BLD AUTO: 31 % (ref 36.5–49.3)
HGB BLD-MCNC: 9.4 G/DL (ref 12–17)
MCH RBC QN AUTO: 29.2 PG (ref 26.8–34.3)
MCHC RBC AUTO-ENTMCNC: 30.3 G/DL (ref 31.4–37.4)
MCV RBC AUTO: 96 FL (ref 82–98)
PLATELET # BLD AUTO: 278 THOUSANDS/UL (ref 149–390)
PMV BLD AUTO: 9 FL (ref 8.9–12.7)
POTASSIUM SERPL-SCNC: 4.6 MMOL/L (ref 3.5–5.3)
PROCALCITONIN SERPL-MCNC: 0.66 NG/ML
RBC # BLD AUTO: 3.22 MILLION/UL (ref 3.88–5.62)
SODIUM SERPL-SCNC: 137 MMOL/L (ref 135–147)
WBC # BLD AUTO: 7.99 THOUSAND/UL (ref 4.31–10.16)

## 2025-05-17 PROCEDURE — 85027 COMPLETE CBC AUTOMATED: CPT | Performed by: NURSE PRACTITIONER

## 2025-05-17 PROCEDURE — 80048 BASIC METABOLIC PNL TOTAL CA: CPT | Performed by: NURSE PRACTITIONER

## 2025-05-17 PROCEDURE — 84145 PROCALCITONIN (PCT): CPT | Performed by: NURSE PRACTITIONER

## 2025-05-17 PROCEDURE — 87040 BLOOD CULTURE FOR BACTERIA: CPT | Performed by: NURSE PRACTITIONER

## 2025-05-17 PROCEDURE — 99232 SBSQ HOSP IP/OBS MODERATE 35: CPT

## 2025-05-17 PROCEDURE — 99232 SBSQ HOSP IP/OBS MODERATE 35: CPT | Performed by: NURSE PRACTITIONER

## 2025-05-17 PROCEDURE — 71045 X-RAY EXAM CHEST 1 VIEW: CPT

## 2025-05-17 RX ORDER — DOCUSATE SODIUM 100 MG/1
100 CAPSULE, LIQUID FILLED ORAL 2 TIMES DAILY
Status: DISCONTINUED | OUTPATIENT
Start: 2025-05-17 | End: 2025-05-19 | Stop reason: HOSPADM

## 2025-05-17 RX ORDER — POLYETHYLENE GLYCOL 3350 17 G/17G
17 POWDER, FOR SOLUTION ORAL DAILY
Status: DISCONTINUED | OUTPATIENT
Start: 2025-05-17 | End: 2025-05-19 | Stop reason: HOSPADM

## 2025-05-17 RX ORDER — LORATADINE 10 MG/1
10 TABLET ORAL DAILY
Status: DISCONTINUED | OUTPATIENT
Start: 2025-05-17 | End: 2025-05-19 | Stop reason: HOSPADM

## 2025-05-17 RX ORDER — ACETAMINOPHEN 325 MG/1
975 TABLET ORAL EVERY 8 HOURS PRN
Status: DISCONTINUED | OUTPATIENT
Start: 2025-05-17 | End: 2025-05-19 | Stop reason: HOSPADM

## 2025-05-17 RX ORDER — SENNOSIDES 8.6 MG
2 TABLET ORAL
Status: DISCONTINUED | OUTPATIENT
Start: 2025-05-17 | End: 2025-05-19 | Stop reason: HOSPADM

## 2025-05-17 RX ADMIN — MIDODRINE HYDROCHLORIDE 2.5 MG: 5 TABLET ORAL at 07:53

## 2025-05-17 RX ADMIN — HEPARIN SODIUM 5000 UNITS: 5000 INJECTION INTRAVENOUS; SUBCUTANEOUS at 05:54

## 2025-05-17 RX ADMIN — NYSTATIN 1 APPLICATION: 100000 POWDER TOPICAL at 08:24

## 2025-05-17 RX ADMIN — CALCIUM 2 TABLET: 500 TABLET ORAL at 17:04

## 2025-05-17 RX ADMIN — POLYETHYLENE GLYCOL 3350 17 G: 17 POWDER, FOR SOLUTION ORAL at 14:51

## 2025-05-17 RX ADMIN — PANTOPRAZOLE SODIUM 40 MG: 40 TABLET, DELAYED RELEASE ORAL at 08:22

## 2025-05-17 RX ADMIN — ALLOPURINOL 100 MG: 100 TABLET ORAL at 08:22

## 2025-05-17 RX ADMIN — CALCIUM 2 TABLET: 500 TABLET ORAL at 07:53

## 2025-05-17 RX ADMIN — LORATADINE 10 MG: 10 TABLET ORAL at 08:25

## 2025-05-17 RX ADMIN — SODIUM CHLORIDE 100 MG: 9 INJECTION, SOLUTION INTRAVENOUS at 08:22

## 2025-05-17 RX ADMIN — FLUOXETINE HYDROCHLORIDE 20 MG: 20 CAPSULE ORAL at 08:22

## 2025-05-17 RX ADMIN — MIDODRINE HYDROCHLORIDE 2.5 MG: 5 TABLET ORAL at 17:05

## 2025-05-17 RX ADMIN — NYSTATIN: 100000 POWDER TOPICAL at 17:06

## 2025-05-17 RX ADMIN — ALLOPURINOL 100 MG: 100 TABLET ORAL at 17:04

## 2025-05-17 RX ADMIN — SENNOSIDES 17.2 MG: 8.6 TABLET, FILM COATED ORAL at 22:01

## 2025-05-17 RX ADMIN — Medication 1 CAPSULE: at 17:05

## 2025-05-17 RX ADMIN — HEPARIN SODIUM 5000 UNITS: 5000 INJECTION INTRAVENOUS; SUBCUTANEOUS at 13:18

## 2025-05-17 RX ADMIN — HEPARIN SODIUM 5000 UNITS: 5000 INJECTION INTRAVENOUS; SUBCUTANEOUS at 22:01

## 2025-05-17 RX ADMIN — DOCUSATE SODIUM 100 MG: 100 CAPSULE, LIQUID FILLED ORAL at 14:51

## 2025-05-17 RX ADMIN — MIDODRINE HYDROCHLORIDE 2.5 MG: 5 TABLET ORAL at 11:55

## 2025-05-17 RX ADMIN — CHOLECALCIFEROL TAB 25 MCG (1000 UNIT) 2000 UNITS: 25 TAB at 08:22

## 2025-05-17 RX ADMIN — ACETAMINOPHEN 975 MG: 325 TABLET ORAL at 07:50

## 2025-05-17 NOTE — ASSESSMENT & PLAN NOTE
Patient's family noted heel ulcer and sent to ED.   ED reviewed w/ podiatry   Procal 0.38, 0.43 - in the setting of ESRD on HD   ESR 87 and CRP 58  MRI - no evidence of osteomyelitis   LEADS- LE 50-75% stenosis. Great toe pressure within healing range    Wound culture from bedside swab (5/14) Aeromonas hydrophila, E.coli ESBL  Podiatry input appreciated- no acute surgical intervention at this time.  The patient however will need a close follow-up with wound care outpatient.  Elevate right extremity on green foam wedge when nonambulatory.   Weightbearing as tolerated for transfer and drag heel shoe, wheelchair/bedbound, globipedi shoe and Prevalon boots for offloading  The patient received cefazolin, vancomycin and doxycycline. Sensitivities from wound culture shows resistance to cefazolin. Left heel cellulitis appears to be improving as well as right chest wall/permacath site. However, he has been having low grade fevers without leukocytosis or other clinical signs of new or worsening infection. Suspect that this can be related to drug fever.   Will hold off on antibiotics at this time   If fever persists, will consult ID for further guidance   Obtain blood cultures   Obtain cxr

## 2025-05-17 NOTE — PLAN OF CARE
Problem: Potential for Falls  Goal: Patient will remain free of falls  Description: INTERVENTIONS:  - Educate patient/family on patient safety including physical limitations  - Instruct patient to call for assistance with activity   - Consider consulting OT/PT to assist with strengthening/mobility based on AM PAC & JH-HLM score  - Consult OT/PT to assist with strengthening/mobility   - Keep Call bell within reach  - Keep bed low and locked with side rails adjusted as appropriate  - Keep care items and personal belongings within reach  - Initiate and maintain comfort rounds  - Make Fall Risk Sign visible to staff  - Offer Toileting every 4 Hours, in advance of need  - Initiate/Maintain bed alarm  - Obtain necessary fall risk management equipment:   - Apply yellow socks and bracelet for high fall risk patients  - Consider moving patient to room near nurses station  Outcome: Progressing     Problem: PAIN - ADULT  Goal: Verbalizes/displays adequate comfort level or baseline comfort level  Description: Interventions:  - Encourage patient to monitor pain and request assistance  - Assess pain using appropriate pain scale  - Administer analgesics as ordered based on type and severity of pain and evaluate response  - Implement non-pharmacological measures as appropriate and evaluate response  - Consider cultural and social influences on pain and pain management  - Notify physician/advanced practitioner if interventions unsuccessful or patient reports new pain  - Educate patient/family on pain management process including their role and importance of  reporting pain   - Provide non-pharmacologic/complimentary pain relief interventions  Outcome: Progressing     Problem: INFECTION - ADULT  Goal: Absence or prevention of progression during hospitalization  Description: INTERVENTIONS:  - Assess and monitor for signs and symptoms of infection  - Monitor lab/diagnostic results  - Monitor all insertion sites, i.e. indwelling  lines, tubes, and drains  - Monitor endotracheal if appropriate and nasal secretions for changes in amount and color  - [place] appropriate cooling/warming therapies per order  - Administer medications as ordered  - Instruct and encourage patient and family to use good hand hygiene technique  - Identify and instruct in appropriate isolation precautions for identified infection/condition  Outcome: Progressing  Goal: Absence of fever/infection during neutropenic period  Description: INTERVENTIONS:  - Monitor WBC  - Perform strict hand hygiene  - Limit to healthy visitors only  - No plants, dried, fresh or silk flowers with mcduffie in patient room  Outcome: Progressing     Problem: SAFETY ADULT  Goal: Patient will remain free of falls  Description: INTERVENTIONS:  - Educate patient/family on patient safety including physical limitations  - Instruct patient to call for assistance with activity   - Consider consulting OT/PT to assist with strengthening/mobility based on AM PAC & JH-HLM score  - Consult OT/PT to assist with strengthening/mobility   - Keep Call bell within reach  - Keep bed low and locked with side rails adjusted as appropriate  - Keep care items and personal belongings within reach  - Initiate and maintain comfort rounds  - Make Fall Risk Sign visible to staff  - Offer Toileting every 4 Hours, in advance of need  - Initiate/Maintain bedalarm  - Obtain necessary fall risk management equipment:   - Apply yellow socks and bracelet for high fall risk patients  - Consider moving patient to room near nurses station  Outcome: Progressing  Goal: Maintain or return to baseline ADL function  Description: INTERVENTIONS:  -  Assess patient's ability to carry out ADLs; assess patient's baseline for ADL function and identify physical deficits which impact ability to perform ADLs (bathing, care of mouth/teeth, toileting, grooming, dressing, etc.)  - Assess/evaluate cause of self-care deficits   - Assess range of  motion  - Assess patient's mobility; develop plan if impaired  - Assess patient's need for assistive devices and provide as appropriate  - Encourage maximum independence but intervene and supervise when necessary  - Involve family in performance of ADLs  - Assess for home care needs following discharge   - Consider OT consult to assist with ADL evaluation and planning for discharge  - Provide patient education as appropriate  - Monitor functional capacity and physical performance, use of AM PAC & JH-HLM   - Monitor gait, balance and fatigue with ambulation    Outcome: Progressing  Goal: Maintains/Returns to pre admission functional level  Description: INTERVENTIONS:  - Perform AM-PAC 6 Click Basic Mobility/ Daily Activity assessment daily.  - Set and communicate daily mobility goal to care team and patient/family/caregiver.   - Collaborate with rehabilitation services on mobility goals if consulted  - Perform Range of Motion 3 times a day.  - Reposition patient every 2 hours.  - Dangle patient 3 times a day  - Stand patient 3 times a day  - Ambulate patient 3 times a day  - Out of bed to chair 3 times a day   - Out of bed for meals 3 times a day  - Out of bed for toileting  - Record patient progress and toleration of activity level   Outcome: Progressing     Problem: Knowledge Deficit  Goal: Patient/family/caregiver demonstrates understanding of disease process, treatment plan, medications, and discharge instructions  Description: Complete learning assessment and assess knowledge base.  Interventions:  - Provide teaching at level of understanding  - Provide teaching via preferred learning methods  Outcome: Progressing     Problem: Prexisting or High Potential for Compromised Skin Integrity  Goal: Skin integrity is maintained or improved  Description: INTERVENTIONS:  - Identify patients at risk for skin breakdown  - Assess and monitor skin integrity including under and around medical devices   - Assess and monitor  nutrition and hydration status  - Monitor labs  - Assess for incontinence   - Turn and reposition patient  - Assist with mobility/ambulation  - Relieve pressure over sarkis prominences   - Avoid friction and shearing  - Provide appropriate hygiene as needed including keeping skin clean and dry  - Evaluate need for skin moisturizer/barrier cream  - Collaborate with interdisciplinary team  - Patient/family teaching  - Consider wound care consult    Assess:  - Review Devyn scale daily  - Clean and moisturize skin every   - Inspect skin when repositioning, toileting, and assisting with ADLS  - Assess under medical devices such as  every   - Assess extremities for adequate circulation and sensation     Bed Management:  - Have minimal linens on bed & keep smooth, unwrinkled  - Change linens as needed when moist or perspiring  - Avoid sitting or lying in one position for more than  hours while in bed?Keep HOB at degrees   - Toileting:  - Offer bedside commode  - Assess for incontinence every   - Use incontinent care products after each incontinent episode such as     Activity:  - Mobilize patient  times a day  - Encourage activity and walks on unit  - Encourage or provide ROM exercises   - Turn and reposition patient every  Hours  - Use appropriate equipment to lift or move patient in bed  - Instruct/ Assist with weight shifting every  when out of bed in chair  - Consider limitation of chair time  hour intervals    Skin Care:  - Avoid use of baby powder, tape, friction and shearing, hot water or constrictive clothing  - Relieve pressure over bony prominences using   - Do not massage red bony areas    Next Steps:  - Teach patient strategies to minimize risks such as   - Consider consults to  interdisciplinary teams such as  Outcome: Progressing     Problem: METABOLIC, FLUID AND ELECTROLYTES - ADULT  Goal: Electrolytes maintained within normal limits  Description: INTERVENTIONS:  - Monitor labs and assess patient for signs  and symptoms of electrolyte imbalances  - Administer electrolyte replacement as ordered  - Monitor response to electrolyte replacements, including repeat lab results as appropriate  - Instruct patient on fluid and nutrition as appropriate  Outcome: Progressing  Goal: Fluid balance maintained  Description: INTERVENTIONS:  - Monitor labs   - Monitor I/O and WT  - Instruct patient on fluid and nutrition as appropriate  - Assess for signs & symptoms of volume excess or deficit  Outcome: Progressing

## 2025-05-17 NOTE — PROGRESS NOTES
Progress Note - Nephrology   Name: Ramos Rosenthal 74 y.o. male I MRN: [id number]  Unit/Bed#: -01 I Date of Admission: 5/14/2025   Date of Service: 5/17/2025 I Hospital Day: 3    Assessment & Plan  End-stage renal disease on hemodialysis (HCC)    Lab Results   Component Value Date    EGFR 9 05/17/2025    EGFR 8 05/16/2025    EGFR 12 05/15/2025    CREATININE 5.32 (H) 05/17/2025    CREATININE 5.90 (H) 05/16/2025    CREATININE 4.32 (H) 05/15/2025   ESRD on HD MWF at [facility] under the care of Dr. Moore  OP orders: .3 kg, tx time 210 mins, Access: Rt permcath    Etiology RPGN previously trialed cyclophosphamide however lack of response and cost prohibitive. Also s/p steroid taper.     Undergoing hemodialysis today as above. Erythema around PC site however looking improved from earlier in hospitalization. Is noted to be scratching at site and applying creams. Query allergy. Received vancomycin and cefazolin for other infection. Discussed with primary team -add doxycyline for MRSA coverage.    Addendum 1300- pt TMAX 100.6F on dialysis primary team aware. Also, treatment terminated early due to clotting. Will need heparin next treatment if no contraindications.     Continue renal vitamin, renal diet    May 17-most recent dialysis yesterday, 5/16 for full 3.5-hour treatment time, 1.7 L UF with postweight 123 kg with heel protector boots.  Irritation and erythema noted around right PermCath exit site, exit site to be cleaned with alcohol and covered with gauze and paper tape to avoid irritation.  Avoid  Tegaderm at this time.  Next dialysis will be Monday, May 19.  RPGN (rapidly progressive glomerulonephritis)  As above, now dialysis dependent.   Ulcer of left heel with cellulitis (HCC)  No surgical intervention needed acutely. WBAT. Cefazolin per primary team   Secondary hyperparathyroidism of renal origin (HCC)  Resume calcitriol 0.25 mcg thrice weekly, calcium carbonate 500 mg BID, ergocalciferol  50,000 units weekly  Anemia in ESRD (end-stage renal disease)  ([facility])  Lab Results   Component Value Date    EGFR 9 05/17/2025    EGFR 8 05/16/2025    EGFR 12 05/15/2025    CREATININE 5.32 (H) 05/17/2025    CREATININE 5.90 (H) 05/16/2025    CREATININE 4.32 (H) 05/15/2025   Hemoglobin 9.4 g/dL, 5/17.  Receives mircera as outpatient last dose 50 mcg 5/12/25. Avoid Venofer given infection  Infection of exit site of hemodialysis catheter (HCC)  Continue Cefazolin and add doxycycline. Continue dressing changes. Further intervention per primary team.  Slightly febrile overnight 5/16 - 5/17.    I have reviewed the nephrology recommendations including hemodialysis schedule with hospitalist, and we are in agreement with renal plan including the information outlined above.     Subjective   Brief History of Admission -patient is examined resting in bed, denies complaints at this time.  States the itching around right PermCath has improved since dressing was changed to gauze dressing.        Objective :  Temp:  [97.6 °F (36.4 °C)-100 °F (37.8 °C)] 97.6 °F (36.4 °C)  HR:  [] 71  BP: (107-132)/(52-70) 131/70  Resp:  [16-20] 18  SpO2:  [90 %-96 %] 95 %  O2 Device: None (Room air)    Current Weight: Weight - Scale: 129 kg (284 lb 2.8 oz)  First Weight: Weight - Scale: 129 kg (284 lb 2.8 oz)  I/O         05/15 0701  05/16 0700 05/16 0701  05/17 0700 05/17 0701  05/18 0700    P.O. 1080 120 600    I.V. (mL/kg)  500 (3.9)     IV Piggyback       Total Intake(mL/kg) 1080 (8.4) 620 (4.8) 600 (4.7)    Urine (mL/kg/hr)   0 (0)    Other  2234     Total Output  2234 0    Net +1080 -1614 +600                 Physical Exam  Vitals and nursing note reviewed.   Constitutional:       General: He is not in acute distress.     Appearance: He is well-developed. He is obese. He is ill-appearing.   HENT:      Head: Normocephalic and atraumatic.      Right Ear: External ear normal.      Left Ear: External ear normal.      Nose: Nose normal.       Mouth/Throat:      Mouth: Mucous membranes are moist.      Pharynx: Oropharynx is clear.     Eyes:      Extraocular Movements: Extraocular movements intact.      Conjunctiva/sclera: Conjunctivae normal.      Pupils: Pupils are equal, round, and reactive to light.       Cardiovascular:      Rate and Rhythm: Normal rate and regular rhythm.      Heart sounds: Normal heart sounds. No murmur heard.  Pulmonary:      Effort: Pulmonary effort is normal. No respiratory distress.      Breath sounds: Normal breath sounds.   Abdominal:      Palpations: Abdomen is soft.      Tenderness: There is no abdominal tenderness.     Musculoskeletal:         General: No swelling.      Cervical back: Neck supple.      Right lower leg: Edema present.      Left lower leg: Edema present.      Comments: 3+ BLE edema     Skin:     General: Skin is warm and dry.      Capillary Refill: Capillary refill takes less than 2 seconds.      Findings: Rash present.      Comments: Erythema around right chest PermCath, gauze dressing intact.     Neurological:      General: No focal deficit present.      Mental Status: He is alert and oriented to person, place, and time.     Psychiatric:         Mood and Affect: Mood normal.         Behavior: Behavior normal.       Medications:  Current Medications[1]      Lab Results: I have reviewed the following results:  Results from last 7 days   Lab Units 05/17/25  0442 05/16/25  0437 05/15/25  0533 05/14/25  1832   WBC Thousand/uL 7.99 8.94 7.98 9.69   HEMOGLOBIN g/dL 9.4* 9.7* 9.7* 11.4*   HEMATOCRIT % 31.0* 32.4* 32.3* 37.8   PLATELETS Thousands/uL 278 265 260 326   POTASSIUM mmol/L 4.6 4.2 3.7 3.5   CHLORIDE mmol/L 97 97 98 94*   CO2 mmol/L 33* 29 32 29   BUN mg/dL 31* 29* 18 11   CREATININE mg/dL 5.32* 5.90* 4.32* 3.45*   CALCIUM mg/dL 8.9 9.0 8.5 8.6   ALBUMIN g/dL  --   --   --  3.7       Administrative Statements     Portions of the record may have been created with voice recognition software. Occasional wrong  "word or \"sound a like\" substitutions may have occurred due to the inherent limitations of voice recognition software. Read the chart carefully and recognize, using context, where substitutions have occurred.If you have any questions, please contact the dictating provider.         [1]   Current Facility-Administered Medications:     acetaminophen (TYLENOL) tablet 975 mg, 975 mg, Oral, Q8H PRN, BRETT Velasquez, 975 mg at 05/17/25 0750    allopurinol (ZYLOPRIM) tablet 100 mg, 100 mg, Oral, BID, Karen Parikh PA-C, 100 mg at 05/17/25 0822    calcitriol (ROCALTROL) capsule 0.25 mcg, 0.25 mcg, Oral, Once per day on Monday Wednesday Friday, BRETT Arzola, 0.25 mcg at 05/16/25 1329    calcium carbonate (OYSTER SHELL,OSCAL) 500 mg tablet 2 tablet, 2 tablet, Oral, BID With Meals, Karen Parikh PA-C, 2 tablet at 05/17/25 0753    Cholecalciferol (VITAMIN D3) tablet 2,000 Units, 2,000 Units, Oral, Daily, Karen Parikh PA-C, 2,000 Units at 05/17/25 0822    docusate sodium (COLACE) capsule 100 mg, 100 mg, Oral, BID, BRETT Velasquez, 100 mg at 05/17/25 1451    ergocalciferol (VITAMIN D2) capsule 50,000 Units, 50,000 Units, Oral, Weekly, BRETT Arzola, 50,000 Units at 05/16/25 1329    FLUoxetine (PROzac) capsule 20 mg, 20 mg, Oral, Daily, Karen Parikh PA-C, 20 mg at 05/17/25 0822    heparin (porcine) subcutaneous injection 5,000 Units, 5,000 Units, Subcutaneous, Q8H LORENZO, Karen Parikh PA-C, 5,000 Units at 05/17/25 1318    loratadine (CLARITIN) tablet 10 mg, 10 mg, Oral, Daily, BRETT Velasquez, 10 mg at 05/17/25 0825    midodrine (PROAMATINE) tablet 2.5 mg, 2.5 mg, Oral, TID AC, Karen Parikh PA-C, 2.5 mg at 05/17/25 1155    midodrine (PROAMATINE) tablet 5 mg, 5 mg, Oral, Once per day on Monday Wednesday Friday, Karen Parikh PA-C, 5 mg at 05/16/25 0910    nystatin (MYCOSTATIN) powder, , Topical, BID, BRETT Velasquez, 1 " Application at 05/17/25 0824    ondansetron (ZOFRAN) injection 4 mg, 4 mg, Intravenous, Q6H PRN, Karen Parikh PA-C, 4 mg at 05/16/25 1718    pantoprazole (PROTONIX) EC tablet 40 mg, 40 mg, Oral, Daily, Karen Parikh PA-C, 40 mg at 05/17/25 0822    polyethylene glycol (MIRALAX) packet 17 g, 17 g, Oral, Daily, BRETT Velasquez, 17 g at 05/17/25 1451    senna (SENOKOT) tablet 17.2 mg, 2 tablet, Oral, HS, BRETT Velasquez    vit B complex-vit C-folic acid (Renal Caps) capsule 1 capsule, 1 mg, Oral, Daily With Dinner, Karen Parikh PA-C, 1 capsule at 05/16/25 1546

## 2025-05-17 NOTE — ASSESSMENT & PLAN NOTE
Continue Cefazolin and add doxycycline. Continue dressing changes. Further intervention per primary team.  Slightly febrile overnight 5/16 - 5/17.

## 2025-05-17 NOTE — ASSESSMENT & PLAN NOTE
Lab Results   Component Value Date    EGFR 9 05/17/2025    EGFR 8 05/16/2025    EGFR 12 05/15/2025    CREATININE 5.32 (H) 05/17/2025    CREATININE 5.90 (H) 05/16/2025    CREATININE 4.32 (H) 05/15/2025   Hemoglobin 9.4 g/dL, 5/17.  Receives mircera as outpatient last dose 50 mcg 5/12/25. Avoid Venofer given infection

## 2025-05-17 NOTE — ASSESSMENT & PLAN NOTE
Lab Results   Component Value Date    EGFR 9 05/17/2025    EGFR 8 05/16/2025    EGFR 12 05/15/2025    CREATININE 5.32 (H) 05/17/2025    CREATININE 5.90 (H) 05/16/2025    CREATININE 4.32 (H) 05/15/2025   ESRD on HD MWF at [facility] under the care of Dr. Moore  OP orders: .3 kg, tx time 210 mins, Access: Rt permcath    Etiology RPGN previously trialed cyclophosphamide however lack of response and cost prohibitive. Also s/p steroid taper.     Undergoing hemodialysis today as above. Erythema around PC site however looking improved from earlier in hospitalization. Is noted to be scratching at site and applying creams. Query allergy. Received vancomycin and cefazolin for other infection. Discussed with primary team -add doxycyline for MRSA coverage.    Addendum 1300- pt TMAX 100.6F on dialysis primary team aware. Also, treatment terminated early due to clotting. Will need heparin next treatment if no contraindications.     Continue renal vitamin, renal diet    May 17-most recent dialysis yesterday, 5/16 for full 3.5-hour treatment time, 1.7 L UF with postweight 123 kg with heel protector boots.  Irritation and erythema noted around right PermCath exit site, exit site to be cleaned with alcohol and covered with gauze and paper tape to avoid irritation.  Avoid  Tegaderm at this time.  Next dialysis will be Monday, May 19.

## 2025-05-17 NOTE — PROGRESS NOTES
Progress Note - Hospitalist   Name: Ramos Rosenthal 74 y.o. male I MRN: [id number]  Unit/Bed#: -01 I Date of Admission: 5/14/2025   Date of Service: 5/17/2025 I Hospital Day: 3    Assessment & Plan  Ulcer of left heel with cellulitis ([facility])  Patient's family noted heel ulcer and sent to ED.   ED reviewed w/ podiatry   Procal 0.38, 0.43 - in the setting of ESRD on HD   ESR 87 and CRP 58  MRI - no evidence of osteomyelitis   LEADS- LE 50-75% stenosis. Great toe pressure within healing range    Wound culture from bedside swab (5/14) Aeromonas hydrophila, E.coli ESBL  Podiatry input appreciated- no acute surgical intervention at this time.  The patient however will need a close follow-up with wound care outpatient.  Elevate right extremity on green foam wedge when nonambulatory.   Weightbearing as tolerated for transfer and drag heel shoe, wheelchair/bedbound, globipedi shoe and Prevalon boots for offloading  The patient received cefazolin, vancomycin and doxycycline. Sensitivities from wound culture shows resistance to cefazolin. Left heel cellulitis appears to be improving as well as right chest wall/permacath site. However, he has been having low grade fevers without leukocytosis or other clinical signs of new or worsening infection. Suspect that this can be related to drug fever.   Will hold off on antibiotics at this time   If fever persists, will consult ID for further guidance   Obtain blood cultures   Obtain cxr   Recurrent major depressive disorder, in remission ([facility])  Continue home Prozac    Paresthesia of both lower extremities  Patient with spinal stenosis. 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 Adding doxycycline for MRSA coverage.   Will continue to monitor for any sign of infection   Nephrology input appreciated  Patient on HD MWF  5/16- HD session terminated early due to clotting   Class 2 severe obesity due to excess calories with serious comorbidity and body mass index (BMI) of 35.0 to 35.9 in adult (HCC)  BMI 35  Healthy diet and lifestyle modification   Infection of exit site of hemodialysis catheter (HCC)  Permacath site with some erythema POA; patient reports that it feels itchy.   Noted low grade fever of 100.6F on 5/16  Will monitor off antibiotics for now with concern for possible drug fever   Will continue to monitor for any worsening sign of infection     VTE Pharmacologic Prophylaxis: VTE Score: 5 High Risk (Score >/= 5) - Pharmacological DVT Prophylaxis Ordered: heparin. Sequential Compression Devices Ordered.    Mobility:   Basic Mobility Inpatient Raw Score: 11  -[facility] Goal: 4: Move to chair/commode  JH-HLM Achieved: 2: Bed activities/Dependent transfer  JH-HLM Goal achieved. Continue to encourage appropriate mobility.    Patient Centered Rounds: I performed bedside rounds with nursing staff today.   Discussions with Specialists or Other Care Team Provider: nephrology, podiatry, CM, PT/OT     Education and Discussions with Family / Patient: patient was updated.     Current Length of Stay: 3 day(s)  Current Patient Status: Inpatient   Certification Statement: The patient will continue to require additional inpatient hospital stay due to ulcer of the left heel with cellulitiswill need to monitor closely and remains fever free for at least 24 hour prior to discharge.   Discharge Plan: Anticipate discharge in 24-48 hrs to home with home services.    Code Status: Level 1 - Full Code    Subjective   Patient was seen and examined.  He had nausea and vomiting x 1 last night. Feeling some  increase sinus pressure which he thinks it is related to seasonal allergies. Continue with low grade fever. T max 100F. Denies any urinary symptoms.     Objective :  Temp:  [98.3 °F (36.8 °C)-100 °F (37.8 °C)] 99 °F (37.2 °C)  HR:  [] 71  BP: ()/(51-67) 117/61  Resp:  [16-20] 20  SpO2:  [90 %-96 %] 95 %  O2 Device: None (Room air)    Body mass index is 37.49 kg/m².     Input and Output Summary (last 24 hours):     Intake/Output Summary (Last 24 hours) at 5/17/2025 1057  Last data filed at 5/16/2025 1825  Gross per 24 hour   Intake 420 ml   Output 2234 ml   Net -1814 ml       Physical Exam  Vitals and nursing note reviewed.   Constitutional:       General: He is not in acute distress.     Appearance: Normal appearance.   HENT:      Head: Normocephalic and atraumatic.      Right Ear: External ear normal.      Left Ear: External ear normal.      Nose: Nose normal.      Mouth/Throat:      Mouth: Mucous membranes are moist.      Pharynx: Oropharynx is clear.     Eyes:      General:         Right eye: No discharge.         Left eye: No discharge.      Extraocular Movements: Extraocular movements intact.      Pupils: Pupils are equal, round, and reactive to light.       Cardiovascular:      Rate and Rhythm: Normal rate and regular rhythm.      Pulses: Normal pulses.      Heart sounds: Normal heart sounds. No murmur heard.  Pulmonary:      Effort: Pulmonary effort is normal. No respiratory distress.      Breath sounds: Normal breath sounds. No wheezing or rales.   Abdominal:      General: Bowel sounds are normal. There is no distension.      Palpations: Abdomen is soft. There is no mass.      Tenderness: There is no abdominal tenderness.     Musculoskeletal:         General: No swelling, tenderness or deformity. Normal range of motion.      Cervical back: Normal range of motion and neck supple. No rigidity.     Skin:     General: Skin is warm and dry.      Capillary Refill: Capillary refill takes less than 2  seconds.      Coloration: Skin is not pale.      Findings: Erythema (surrounding permacath site with some erythema, this is improving, dressing is on) present.      Comments: Left heel/ankle dressing intact.  Prevalon boot in place.     Neurological:      General: No focal deficit present.      Mental Status: He is alert and oriented to person, place, and time. Mental status is at baseline.     Psychiatric:         Mood and Affect: Mood normal.         Behavior: Behavior normal.         Thought Content: Thought content normal.         Judgment: Judgment normal.       Lines/Drains:  Lines/Drains/Airways       Active Status       Name Placement date Placement time Site Days    HD Permanent Double Catheter 01/09/25  1317  Internal jugular  127                            Lab Results: I have reviewed the following results:   Results from last 7 days   Lab Units 05/17/25  0442 05/15/25  0533 05/14/25  1832   WBC Thousand/uL 7.99   < > 9.69   HEMOGLOBIN g/dL 9.4*   < > 11.4*   HEMATOCRIT % 31.0*   < > 37.8   PLATELETS Thousands/uL 278   < > 326   SEGS PCT %  --   --  76*   LYMPHO PCT %  --   --  13*   MONO PCT %  --   --  8   EOS PCT %  --   --  2    < > = values in this interval not displayed.     Results from last 7 days   Lab Units 05/17/25  0442 05/15/25  0533 05/14/25  1832   SODIUM mmol/L 137   < > 136   POTASSIUM mmol/L 4.6   < > 3.5   CHLORIDE mmol/L 97   < > 94*   CO2 mmol/L 33*   < > 29   BUN mg/dL 31*   < > 11   CREATININE mg/dL 5.32*   < > 3.45*   ANION GAP mmol/L 7   < > 13   CALCIUM mg/dL 8.9   < > 8.6   ALBUMIN g/dL  --   --  3.7   TOTAL BILIRUBIN mg/dL  --   --  0.56   ALK PHOS U/L  --   --  71   ALT U/L  --   --  5*   AST U/L  --   --  12*   GLUCOSE RANDOM mg/dL 91   < > 126    < > = values in this interval not displayed.     Results from last 7 days   Lab Units 05/14/25  1832   INR  1.02     Results from last 7 days   Lab Units 05/14/25  2053   POC GLUCOSE mg/dl 132         Results from last 7 days   Lab  Units 05/17/25  0442 05/15/25  0533 05/14/25  1832   PROCALCITONIN ng/ml 0.66* 0.43* 0.38*       Recent Cultures (last 7 days):   Results from last 7 days   Lab Units 05/15/25  0623 05/14/25  2341 05/14/25  1901   GRAM STAIN RESULT   --  No polys seen*  2+ Gram positive cocci in pairs*  2+ Gram negative rods* Rare Polys*  4+ Gram negative rods*  3+ Gram positive cocci in pairs*  2+ Gram positive rods*   URINE CULTURE  >100,000 cfu/ml Escherichia coli*  --   --    WOUND CULTURE   --  4+ Growth of Aeromonas hydrophila complex*  4+ Growth of Escherichia coli ESBL*  4+ Growth of Aeromonas hydrophila complex* 4+ Growth of Escherichia coli ESBL*  4+ Growth of Escherichia coli ESBL*  4+ Growth of       Imaging Results Review: I reviewed radiology reports from this admission including: MRI left heel/foor .  Other Study Results Review: No additional pertinent studies reviewed.    Last 24 Hours Medication List:     Current Facility-Administered Medications:     acetaminophen (TYLENOL) tablet 975 mg, Q8H PRN    allopurinol (ZYLOPRIM) tablet 100 mg, BID    calcitriol (ROCALTROL) capsule 0.25 mcg, Once per day on Monday Wednesday Friday    calcium carbonate (OYSTER SHELL,OSCAL) 500 mg tablet 2 tablet, BID With Meals    Cholecalciferol (VITAMIN D3) tablet 2,000 Units, Daily    ergocalciferol (VITAMIN D2) capsule 50,000 Units, Weekly    FLUoxetine (PROzac) capsule 20 mg, Daily    heparin (porcine) subcutaneous injection 5,000 Units, Q8H LORENZO    loratadine (CLARITIN) tablet 10 mg, Daily    midodrine (PROAMATINE) tablet 2.5 mg, TID AC    midodrine (PROAMATINE) tablet 5 mg, Once per day on Monday Wednesday Friday    nystatin (MYCOSTATIN) powder, BID    ondansetron (ZOFRAN) injection 4 mg, Q6H PRN    pantoprazole (PROTONIX) EC tablet 40 mg, Daily    vit B complex-vit C-folic acid (Renal Caps) capsule 1 capsule, Daily With Dinner    Administrative Statements   Today, Patient Was Seen By: BRETT Velasquez  I have spent a  total time of 43 minutes in caring for this patient on the day of the visit/encounter including Diagnostic results, Prognosis, Risks and benefits of tx options, Instructions for management, Patient and family education, Importance of tx compliance, Risk factor reductions, Impressions, Counseling / Coordination of care, Documenting in the medical record, Reviewing/placing orders in the medical record (including tests, medications, and/or procedures), Obtaining or reviewing history  , and Communicating with other healthcare professionals .    **Please Note: This note may have been constructed using a voice recognition system.**

## 2025-05-17 NOTE — ASSESSMENT & PLAN NOTE
Permacath site with some erythema POA; patient reports that it feels itchy.   Noted low grade fever of 100.6F on 5/16  Will monitor off antibiotics for now with concern for possible drug fever   Will continue to monitor for any worsening sign of infection

## 2025-05-17 NOTE — ASSESSMENT & PLAN NOTE
Lab Results   Component Value Date    EGFR 9 05/17/2025    EGFR 8 05/16/2025    EGFR 12 05/15/2025    CREATININE 5.32 (H) 05/17/2025    CREATININE 5.90 (H) 05/16/2025    CREATININE 4.32 (H) 05/15/2025   History of 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  Permacath site with some erythema POA  Continue with antibiotic for his left foot/heel cellulitis. Adding doxycycline for MRSA coverage.   Will continue to monitor for any sign of infection   Nephrology input appreciated  Patient on HD MWF  5/16- HD session terminated early due to clotting

## 2025-05-17 NOTE — PLAN OF CARE
Problem: Potential for Falls  Goal: Patient will remain free of falls  Description: INTERVENTIONS:  - Educate patient/family on patient safety including physical limitations  - Instruct patient to call for assistance with activity   - Consider consulting OT/PT to assist with strengthening/mobility based on AM PAC & JH-HLM score  - Consult OT/PT to assist with strengthening/mobility   - Keep Call bell within reach  - Keep bed low and locked with side rails adjusted as appropriate  - Keep care items and personal belongings within reach  - Initiate and maintain comfort rounds  - Make Fall Risk Sign visible to staff  - Offer Toileting every 2 Hours, in advance of need  - Initiate/Maintain bed alarm  - Obtain necessary fall risk management equipment: nonslip socks   - Apply yellow socks and bracelet for high fall risk patients  - Consider moving patient to room near nurses station  Outcome: Progressing     Problem: PAIN - ADULT  Goal: Verbalizes/displays adequate comfort level or baseline comfort level  Description: Interventions:  - Encourage patient to monitor pain and request assistance  - Assess pain using appropriate pain scale  - Administer analgesics as ordered based on type and severity of pain and evaluate response  - Implement non-pharmacological measures as appropriate and evaluate response  - Consider cultural and social influences on pain and pain management  - Notify physician/advanced practitioner if interventions unsuccessful or patient reports new pain  - Educate patient/family on pain management process including their role and importance of  reporting pain   - Provide non-pharmacologic/complimentary pain relief interventions  Outcome: Progressing     Problem: INFECTION - ADULT  Goal: Absence or prevention of progression during hospitalization  Description: INTERVENTIONS:  - Assess and monitor for signs and symptoms of infection  - Monitor lab/diagnostic results  - Monitor all insertion sites, i.e.  OT evaluation completed. OT POC and goals established.     Problem: Occupational Therapy  Goal: Occupational Therapy Goal  Description: Goals to be met by: 10/9/2023     Patient will increase functional independence with ADLs by performing:    UE Dressing with Modified [place].  Grooming while seated at sink with Modified [place].  Toileting from bedside commode with Supervision for hygiene and clothing management.   Step transfer with Supervision  Toilet transfer to bedside commode with Supervision.  Upper extremity exercise program x10 reps per handout, with independence.    Outcome: Ongoing, Progressing      indwelling lines, tubes, and drains  - Monitor endotracheal if appropriate and nasal secretions for changes in amount and color  - [place] appropriate cooling/warming therapies per order  - Administer medications as ordered  - Instruct and encourage patient and family to use good hand hygiene technique  - Identify and instruct in appropriate isolation precautions for identified infection/condition  Outcome: Progressing  Goal: Absence of fever/infection during neutropenic period  Description: INTERVENTIONS:  - Monitor WBC  - Perform strict hand hygiene  - Limit to healthy visitors only  - No plants, dried, fresh or silk flowers with mcduffie in patient room  Outcome: Progressing     Problem: SAFETY ADULT  Goal: Patient will remain free of falls  Description: INTERVENTIONS:  - Educate patient/family on patient safety including physical limitations  - Instruct patient to call for assistance with activity   - Consider consulting OT/PT to assist with strengthening/mobility based on AM PAC & -HLM score  - Consult OT/PT to assist with strengthening/mobility   - Keep Call bell within reach  - Keep bed low and locked with side rails adjusted as appropriate  - Keep care items and personal belongings within reach  - Initiate and maintain comfort rounds  - Make Fall Risk Sign visible to staff  - Offer Toileting every 2 Hours, in advance of need  - Initiate/Maintain bed alarm  - Obtain necessary fall risk management equipment: nonslip socks   - Apply yellow socks and bracelet for high fall risk patients  - Consider moving patient to room near nurses station  Outcome: Progressing  Goal: Maintain or return to baseline ADL function  Description: INTERVENTIONS:  -  Assess patient's ability to carry out ADLs; assess patient's baseline for ADL function and identify physical deficits which impact ability to perform ADLs (bathing, care of mouth/teeth, toileting, grooming, dressing, etc.)  - Assess/evaluate cause of self-care deficits    - Assess range of motion  - Assess patient's mobility; develop plan if impaired  - Assess patient's need for assistive devices and provide as appropriate  - Encourage maximum independence but intervene and supervise when necessary  - Involve family in performance of ADLs  - Assess for home care needs following discharge   - Consider OT consult to assist with ADL evaluation and planning for discharge  - Provide patient education as appropriate  - Monitor functional capacity and physical performance, use of AM PAC & JH-HLM   - Monitor gait, balance and fatigue with ambulation    Outcome: Progressing  Goal: Maintains/Returns to pre admission functional level  Description: INTERVENTIONS:  - Perform AM-PAC 6 Click Basic Mobility/ Daily Activity assessment daily.  - Set and communicate daily mobility goal to care team and patient/family/caregiver.   - Collaborate with rehabilitation services on mobility goals if consulted  - Perform Range of Motion 3 times a day.  - Reposition patient every 2 hours.  - Dangle patient 3 times a day  - Stand patient 3 times a day  - Ambulate patient 3 times a day  - Out of bed to chair 3 times a day   - Out of bed for meals 3 times a day  - Out of bed for toileting  - Record patient progress and toleration of activity level   Outcome: Progressing     Problem: DISCHARGE PLANNING  Goal: Discharge to home or other facility with appropriate resources  Description: INTERVENTIONS:  - Identify barriers to discharge w/patient and caregiver  - Arrange for needed discharge resources and transportation as appropriate  - Identify discharge learning needs (meds, wound care, etc.)  - Arrange for interpretive services to assist at discharge as needed  - Refer to Case Management Department for coordinating discharge planning if the patient needs post-hospital services based on physician/advanced practitioner order or complex needs related to functional status, cognitive ability, or social support  system  Outcome: Progressing     Problem: Knowledge Deficit  Goal: Patient/family/caregiver demonstrates understanding of disease process, treatment plan, medications, and discharge instructions  Description: Complete learning assessment and assess knowledge base.  Interventions:  - Provide teaching at level of understanding  - Provide teaching via preferred learning methods  Outcome: Progressing     Problem: Prexisting or High Potential for Compromised Skin Integrity  Goal: Skin integrity is maintained or improved  Description: INTERVENTIONS:  - Identify patients at risk for skin breakdown  - Assess and monitor skin integrity including under and around medical devices   - Assess and monitor nutrition and hydration status  - Monitor labs  - Assess for incontinence   - Turn and reposition patient  - Assist with mobility/ambulation  - Relieve pressure over sarkis prominences   - Avoid friction and shearing  - Provide appropriate hygiene as needed including keeping skin clean and dry  - Evaluate need for skin moisturizer/barrier cream  - Collaborate with interdisciplinary team  - Patient/family teaching  - Consider wound care consult    Assess:  - Review Devyn scale daily  - Clean and moisturize skin every shift   - Inspect skin when repositioning, toileting, and assisting with ADLS  - Assess under medical devices such as masimo every shift   - Assess extremities for adequate circulation and sensation     Bed Management:  - Have minimal linens on bed & keep smooth, unwrinkled  - Change linens as needed when moist or perspiring  - Avoid sitting or lying in one position for more than 2 hours while in bed?Keep HOB at 30 degrees   - Toileting:  - Offer bedside commode  - Assess for incontinence every shift  - Use incontinent care products after each incontinent episode such as barrier creams    Activity:  - Mobilize patient 3 times a day  - Encourage activity and walks on unit  - Encourage or provide ROM exercises   - Turn  and reposition patient every 2 Hours  - Use appropriate equipment to lift or move patient in bed  - Instruct/ Assist with weight shifting every hour  when out of bed in chair  - Consider limitation of chair time to 3 hour intervals    Skin Care:  - Avoid use of baby powder, tape, friction and shearing, hot water or constrictive clothing  - Relieve pressure over bony prominences using pillows  - Do not massage red bony areas    Next Steps:  - Teach patient strategies to minimize risks such as weight shifting  - Consider consults to  interdisciplinary teams such as wocn  Outcome: Progressing     Problem: METABOLIC, FLUID AND ELECTROLYTES - ADULT  Goal: Electrolytes maintained within normal limits  Description: INTERVENTIONS:  - Monitor labs and assess patient for signs and symptoms of electrolyte imbalances  - Administer electrolyte replacement as ordered  - Monitor response to electrolyte replacements, including repeat lab results as appropriate  - Instruct patient on fluid and nutrition as appropriate  Outcome: Progressing  Goal: Fluid balance maintained  Description: INTERVENTIONS:  - Monitor labs   - Monitor I/O and WT  - Instruct patient on fluid and nutrition as appropriate  - Assess for signs & symptoms of volume excess or deficit  Outcome: Progressing

## 2025-05-18 LAB
ANION GAP SERPL CALCULATED.3IONS-SCNC: 11 MMOL/L (ref 4–13)
BACTERIA UR CULT: ABNORMAL
BASOPHILS # BLD AUTO: 0.1 THOUSANDS/ÂΜL (ref 0–0.1)
BASOPHILS NFR BLD AUTO: 1 % (ref 0–1)
BUN SERPL-MCNC: 54 MG/DL (ref 5–25)
CALCIUM SERPL-MCNC: 9 MG/DL (ref 8.4–10.2)
CHLORIDE SERPL-SCNC: 94 MMOL/L (ref 96–108)
CO2 SERPL-SCNC: 29 MMOL/L (ref 21–32)
CREAT SERPL-MCNC: 6.73 MG/DL (ref 0.6–1.3)
EOSINOPHIL # BLD AUTO: 0.36 THOUSAND/ÂΜL (ref 0–0.61)
EOSINOPHIL NFR BLD AUTO: 3 % (ref 0–6)
ERYTHROCYTE [DISTWIDTH] IN BLOOD BY AUTOMATED COUNT: 16.9 % (ref 11.6–15.1)
GFR SERPL CREATININE-BSD FRML MDRD: 7 ML/MIN/1.73SQ M
GLUCOSE SERPL-MCNC: 82 MG/DL (ref 65–140)
HCT VFR BLD AUTO: 34.3 % (ref 36.5–49.3)
HGB BLD-MCNC: 10.3 G/DL (ref 12–17)
IMM GRANULOCYTES # BLD AUTO: 0.04 THOUSAND/UL (ref 0–0.2)
IMM GRANULOCYTES NFR BLD AUTO: 0 % (ref 0–2)
LYMPHOCYTES # BLD AUTO: 1.07 THOUSANDS/ÂΜL (ref 0.6–4.47)
LYMPHOCYTES NFR BLD AUTO: 9 % (ref 14–44)
MCH RBC QN AUTO: 28.9 PG (ref 26.8–34.3)
MCHC RBC AUTO-ENTMCNC: 30 G/DL (ref 31.4–37.4)
MCV RBC AUTO: 96 FL (ref 82–98)
MONOCYTES # BLD AUTO: 1.09 THOUSAND/ÂΜL (ref 0.17–1.22)
MONOCYTES NFR BLD AUTO: 10 % (ref 4–12)
NEUTROPHILS # BLD AUTO: 8.85 THOUSANDS/ÂΜL (ref 1.85–7.62)
NEUTS SEG NFR BLD AUTO: 77 % (ref 43–75)
NRBC BLD AUTO-RTO: 0 /100 WBCS
PLATELET # BLD AUTO: 305 THOUSANDS/UL (ref 149–390)
PMV BLD AUTO: 9.1 FL (ref 8.9–12.7)
POTASSIUM SERPL-SCNC: 5.2 MMOL/L (ref 3.5–5.3)
PROCALCITONIN SERPL-MCNC: 0.64 NG/ML
RBC # BLD AUTO: 3.57 MILLION/UL (ref 3.88–5.62)
SODIUM SERPL-SCNC: 134 MMOL/L (ref 135–147)
WBC # BLD AUTO: 11.51 THOUSAND/UL (ref 4.31–10.16)

## 2025-05-18 PROCEDURE — 99232 SBSQ HOSP IP/OBS MODERATE 35: CPT | Performed by: NURSE PRACTITIONER

## 2025-05-18 PROCEDURE — 80048 BASIC METABOLIC PNL TOTAL CA: CPT | Performed by: NURSE PRACTITIONER

## 2025-05-18 PROCEDURE — 85025 COMPLETE CBC W/AUTO DIFF WBC: CPT | Performed by: NURSE PRACTITIONER

## 2025-05-18 PROCEDURE — 99232 SBSQ HOSP IP/OBS MODERATE 35: CPT

## 2025-05-18 PROCEDURE — 84145 PROCALCITONIN (PCT): CPT | Performed by: NURSE PRACTITIONER

## 2025-05-18 RX ADMIN — HEPARIN SODIUM 5000 UNITS: 5000 INJECTION INTRAVENOUS; SUBCUTANEOUS at 13:47

## 2025-05-18 RX ADMIN — Medication 1 CAPSULE: at 17:01

## 2025-05-18 RX ADMIN — ALLOPURINOL 100 MG: 100 TABLET ORAL at 17:01

## 2025-05-18 RX ADMIN — CALCIUM 2 TABLET: 500 TABLET ORAL at 17:01

## 2025-05-18 RX ADMIN — CHOLECALCIFEROL TAB 25 MCG (1000 UNIT) 2000 UNITS: 25 TAB at 08:26

## 2025-05-18 RX ADMIN — MIDODRINE HYDROCHLORIDE 2.5 MG: 5 TABLET ORAL at 17:02

## 2025-05-18 RX ADMIN — LORATADINE 10 MG: 10 TABLET ORAL at 08:25

## 2025-05-18 RX ADMIN — FLUOXETINE HYDROCHLORIDE 20 MG: 20 CAPSULE ORAL at 08:26

## 2025-05-18 RX ADMIN — MIDODRINE HYDROCHLORIDE 2.5 MG: 5 TABLET ORAL at 12:10

## 2025-05-18 RX ADMIN — ALLOPURINOL 100 MG: 100 TABLET ORAL at 08:26

## 2025-05-18 RX ADMIN — HEPARIN SODIUM 5000 UNITS: 5000 INJECTION INTRAVENOUS; SUBCUTANEOUS at 23:09

## 2025-05-18 RX ADMIN — NYSTATIN 1 APPLICATION: 100000 POWDER TOPICAL at 08:27

## 2025-05-18 RX ADMIN — PANTOPRAZOLE SODIUM 40 MG: 40 TABLET, DELAYED RELEASE ORAL at 08:25

## 2025-05-18 RX ADMIN — HEPARIN SODIUM 5000 UNITS: 5000 INJECTION INTRAVENOUS; SUBCUTANEOUS at 05:18

## 2025-05-18 RX ADMIN — NYSTATIN 1 APPLICATION: 100000 POWDER TOPICAL at 17:02

## 2025-05-18 RX ADMIN — CALCIUM 2 TABLET: 500 TABLET ORAL at 08:25

## 2025-05-18 RX ADMIN — DOCUSATE SODIUM 100 MG: 100 CAPSULE, LIQUID FILLED ORAL at 17:01

## 2025-05-18 RX ADMIN — MIDODRINE HYDROCHLORIDE 2.5 MG: 5 TABLET ORAL at 08:24

## 2025-05-18 NOTE — ASSESSMENT & PLAN NOTE
Continue Cefazolin and add doxycycline. Continue dressing changes. Further intervention per primary team.  Slightly febrile overnight 5/16 - 5/17. No further fevers overnight 5/18. WBC count 11.5 today. Primary team starting antibiotics.

## 2025-05-18 NOTE — PROGRESS NOTES
Progress Note - Nephrology   Name: Ramos Rosenthal 74 y.o. male I MRN: [id number]  Unit/Bed#: -01 I Date of Admission: 5/14/2025   Date of Service: 5/18/2025 I Hospital Day: 4    Assessment & Plan  End-stage renal disease on hemodialysis (HCC)    Lab Results   Component Value Date    EGFR 7 05/18/2025    EGFR 9 05/17/2025    EGFR 8 05/16/2025    CREATININE 6.73 (H) 05/18/2025    CREATININE 5.32 (H) 05/17/2025    CREATININE 5.90 (H) 05/16/2025   ESRD on HD MWF at [facility] under the care of Dr. Moore  OP orders: .3 kg, tx time 210 mins, Access: Rt permcath    Etiology RPGN previously trialed cyclophosphamide however lack of response and cost prohibitive. Also s/p steroid taper.     Undergoing hemodialysis today as above. Erythema around PC site however looking improved from earlier in hospitalization. Is noted to be scratching at site and applying creams. Query allergy. Received vancomycin and cefazolin for other infection. Discussed with primary team -add doxycyline for MRSA coverage.    Addendum 1300- pt TMAX 100.6F on dialysis primary team aware. Also, treatment terminated early due to clotting. Will need heparin next treatment if no contraindications.     Continue renal vitamin, renal diet    May 17-most recent dialysis yesterday, 5/16 for full 3.5-hour treatment time, 1.7 L UF with postweight 123 kg with heel protector boots.  Irritation and erythema noted around right PermCath exit site, exit site to be cleaned with alcohol and covered with gauze and paper tape to avoid irritation.  Avoid  Tegaderm at this time.  Next dialysis will be Monday, May 19.    May 18 -morning labs stable, creatinine 6.73, sodium 134, potassium 5.2, CO2 29.  Continue to use isopropyl alcohol or Accept to clean exit site area and use gauze dressing due to skin irritation. Next dialysis will be tomorrow, 5/19.    RPGN (rapidly progressive glomerulonephritis)  As above, now dialysis dependent.   Ulcer of left heel  with cellulitis (HCC)  No surgical intervention needed acutely. WBAT. Cefazolin per primary team   Secondary hyperparathyroidism of renal origin ([facility])  Resume calcitriol 0.25 mcg thrice weekly, calcium carbonate 500 mg BID, ergocalciferol 50,000 units weekly  Anemia in ESRD (end-stage renal disease)  ([facility])  Lab Results   Component Value Date    EGFR 7 05/18/2025    EGFR 9 05/17/2025    EGFR 8 05/16/2025    CREATININE 6.73 (H) 05/18/2025    CREATININE 5.32 (H) 05/17/2025    CREATININE 5.90 (H) 05/16/2025   Hemoglobin 10.3 g/dL, 5/18.  Receives mircera as outpatient last dose 50 mcg 5/12/25. Avoid Venofer given infection  Infection of exit site of hemodialysis catheter ([facility])  Continue Cefazolin and add doxycycline. Continue dressing changes. Further intervention per primary team.  Slightly febrile overnight 5/16 - 5/17. No further fevers overnight 5/18. WBC count 11.5 today. Primary team starting antibiotics.     I have reviewed the nephrology recommendations including ESRD schedule with hospitalist and we are in agreement with renal plan including the information outlined above.     Subjective   Brief History of Admission - Patient is examined resting in bed. Permcath dressing loose in one corner, patient states the catheter exit site continues to itch at times and that he had been scratching the area. Strongly encouraged him to not scratch and explained increased risk of infection. As noted above, primary team is starting antibiotics due to elevated WBC.      Objective :  Temp:  [97.6 °F (36.4 °C)-99 °F (37.2 °C)] 97.9 °F (36.6 °C)  HR:  [] 123  BP: ()/(60-84) 97/60  Resp:  [18-20] 18  SpO2:  [95 %-97 %] 95 %  O2 Device: None (Room air)    Current Weight: Weight - Scale: 129 kg (284 lb 2.8 oz)  First Weight: Weight - Scale: 129 kg (284 lb 2.8 oz)  I/O         05/16 0701  05/17 0700 05/17 0701  05/18 0700 05/18 0701 05/19 0700    P.O. 120 600     I.V. (mL/kg) 500 (3.9)      Total Intake(mL/kg) 620 (4.8)  600 (4.7)     Urine (mL/kg/hr)  0 (0)     Other 2234      Total Output 2234 0     Net -1614 +600                  Physical Exam  Vitals and nursing note reviewed.   Constitutional:       General: He is not in acute distress.     Appearance: Normal appearance. He is well-developed. He is obese. He is not ill-appearing.   HENT:      Head: Normocephalic and atraumatic.      Right Ear: External ear normal.      Left Ear: External ear normal.      Nose: Nose normal.      Mouth/Throat:      Mouth: Mucous membranes are moist.      Pharynx: Oropharynx is clear.     Eyes:      Extraocular Movements: Extraocular movements intact.      Conjunctiva/sclera: Conjunctivae normal.      Pupils: Pupils are equal, round, and reactive to light.       Cardiovascular:      Rate and Rhythm: Normal rate and regular rhythm.      Heart sounds: No murmur heard.  Pulmonary:      Effort: Pulmonary effort is normal. No respiratory distress.      Breath sounds: Normal breath sounds.   Abdominal:      Palpations: Abdomen is soft.      Tenderness: There is no abdominal tenderness.     Musculoskeletal:         General: No swelling.      Cervical back: Neck supple.      Right lower leg: Edema present.      Left lower leg: Edema present.      Comments: trace BLE edema     Skin:     General: Skin is warm and dry.      Capillary Refill: Capillary refill takes less than 2 seconds.      Findings: Erythema present.      Comments: Erythema around right permcath insertion. Patient states he has been scratching at the area. Advised to not scratch due to risk of infection with skin irritation and breakdown in skin integrity.     Neurological:      General: No focal deficit present.      Mental Status: He is alert and oriented to person, place, and time.     Psychiatric:         Mood and Affect: Mood normal.         Behavior: Behavior normal.       Medications:  Current Medications[1]      Lab Results: I have reviewed the following results:  Results from last 7  "days   Lab Units 05/18/25  0519 05/17/25  0442 05/16/25  0437 05/15/25  0533 05/14/25  1832   WBC Thousand/uL 11.51* 7.99 8.94 7.98 9.69   HEMOGLOBIN g/dL 10.3* 9.4* 9.7* 9.7* 11.4*   HEMATOCRIT % 34.3* 31.0* 32.4* 32.3* 37.8   PLATELETS Thousands/uL 305 278 265 260 326   POTASSIUM mmol/L 5.2 4.6 4.2 3.7 3.5   CHLORIDE mmol/L 94* 97 97 98 94*   CO2 mmol/L 29 33* 29 32 29   BUN mg/dL 54* 31* 29* 18 11   CREATININE mg/dL 6.73* 5.32* 5.90* 4.32* 3.45*   CALCIUM mg/dL 9.0 8.9 9.0 8.5 8.6   ALBUMIN g/dL  --   --   --   --  3.7       Administrative Statements     Portions of the record may have been created with voice recognition software. Occasional wrong word or \"sound a like\" substitutions may have occurred due to the inherent limitations of voice recognition software. Read the chart carefully and recognize, using context, where substitutions have occurred.If you have any questions, please contact the dictating provider.         [1]   Current Facility-Administered Medications:     acetaminophen (TYLENOL) tablet 975 mg, 975 mg, Oral, Q8H PRN, BRETT Velasquez, 975 mg at 05/17/25 0750    allopurinol (ZYLOPRIM) tablet 100 mg, 100 mg, Oral, BID, Karen Parikh PA-C, 100 mg at 05/17/25 1704    calcitriol (ROCALTROL) capsule 0.25 mcg, 0.25 mcg, Oral, Once per day on Monday Wednesday Friday, BRETT Arzola, 0.25 mcg at 05/16/25 1329    calcium carbonate (OYSTER SHELL,OSCAL) 500 mg tablet 2 tablet, 2 tablet, Oral, BID With Meals, Karen Parikh PA-C, 2 tablet at 05/17/25 1704    Cholecalciferol (VITAMIN D3) tablet 2,000 Units, 2,000 Units, Oral, Daily, Karen Parikh PA-C, 2,000 Units at 05/17/25 0822    docusate sodium (COLACE) capsule 100 mg, 100 mg, Oral, BID, BRETT Velasquez, 100 mg at 05/17/25 1451    ergocalciferol (VITAMIN D2) capsule 50,000 Units, 50,000 Units, Oral, Weekly, BRETT Arzola, 50,000 Units at 05/16/25 1329    FLUoxetine (PROzac) capsule 20 mg, 20 mg, Oral, " Daily, Karen Parikh PA-C, 20 mg at 05/17/25 0822    heparin (porcine) subcutaneous injection 5,000 Units, 5,000 Units, Subcutaneous, Q8H LORENZO, Karen Parikh PA-C, 5,000 Units at 05/18/25 0518    loratadine (CLARITIN) tablet 10 mg, 10 mg, Oral, Daily, BRETT Velasquez, 10 mg at 05/17/25 0825    midodrine (PROAMATINE) tablet 2.5 mg, 2.5 mg, Oral, TID AC, Karen Parikh PA-C, 2.5 mg at 05/17/25 1705    midodrine (PROAMATINE) tablet 5 mg, 5 mg, Oral, Once per day on Monday Wednesday Friday, Karen Parikh PA-C, 5 mg at 05/16/25 0910    nystatin (MYCOSTATIN) powder, , Topical, BID, RBETT Velasquez, Given at 05/17/25 1706    ondansetron (ZOFRAN) injection 4 mg, 4 mg, Intravenous, Q6H PRN, Karen Parikh PA-C, 4 mg at 05/16/25 1718    pantoprazole (PROTONIX) EC tablet 40 mg, 40 mg, Oral, Daily, Karen Parikh PA-C, 40 mg at 05/17/25 0822    polyethylene glycol (MIRALAX) packet 17 g, 17 g, Oral, Daily, BRETT Velasquez, 17 g at 05/17/25 1451    senna (SENOKOT) tablet 17.2 mg, 2 tablet, Oral, HS, BRETT Velasquez, 17.2 mg at 05/17/25 2201    vit B complex-vit C-folic acid (Renal Caps) capsule 1 capsule, 1 mg, Oral, Daily With Dinner, Karen Parikh PA-C, 1 capsule at 05/17/25 1705

## 2025-05-18 NOTE — PLAN OF CARE
Problem: Potential for Falls  Goal: Patient will remain free of falls  Description: INTERVENTIONS:  - Educate patient/family on patient safety including physical limitations  - Instruct patient to call for assistance with activity   - Consider consulting OT/PT to assist with strengthening/mobility based on AM PAC & JH-HLM score  - Consult OT/PT to assist with strengthening/mobility   - Keep Call bell within reach  - Keep bed low and locked with side rails adjusted as appropriate  - Keep care items and personal belongings within reach  - Initiate and maintain comfort rounds  - Make Fall Risk Sign visible to staff  - Offer Toileting every 2 Hours, in advance of need  - Initiate/Maintain bed alarm  - Obtain necessary fall risk management equipment: nonslip socks  - Apply yellow socks and bracelet for high fall risk patients  - Consider moving patient to room near nurses station  Outcome: Progressing     Problem: PAIN - ADULT  Goal: Verbalizes/displays adequate comfort level or baseline comfort level  Description: Interventions:  - Encourage patient to monitor pain and request assistance  - Assess pain using appropriate pain scale  - Administer analgesics as ordered based on type and severity of pain and evaluate response  - Implement non-pharmacological measures as appropriate and evaluate response  - Consider cultural and social influences on pain and pain management  - Notify physician/advanced practitioner if interventions unsuccessful or patient reports new pain  - Educate patient/family on pain management process including their role and importance of  reporting pain   - Provide non-pharmacologic/complimentary pain relief interventions  Outcome: Progressing     Problem: INFECTION - ADULT  Goal: Absence or prevention of progression during hospitalization  Description: INTERVENTIONS:  - Assess and monitor for signs and symptoms of infection  - Monitor lab/diagnostic results  - Monitor all insertion sites, i.e.  indwelling lines, tubes, and drains  - Monitor endotracheal if appropriate and nasal secretions for changes in amount and color  - [place] appropriate cooling/warming therapies per order  - Administer medications as ordered  - Instruct and encourage patient and family to use good hand hygiene technique  - Identify and instruct in appropriate isolation precautions for identified infection/condition  Outcome: Progressing  Goal: Absence of fever/infection during neutropenic period  Description: INTERVENTIONS:  - Monitor WBC  - Perform strict hand hygiene  - Limit to healthy visitors only  - No plants, dried, fresh or silk flowers with mcduffie in patient room  Outcome: Progressing     Problem: SAFETY ADULT  Goal: Patient will remain free of falls  Description: INTERVENTIONS:  - Educate patient/family on patient safety including physical limitations  - Instruct patient to call for assistance with activity   - Consider consulting OT/PT to assist with strengthening/mobility based on AM PAC & -HLM score  - Consult OT/PT to assist with strengthening/mobility   - Keep Call bell within reach  - Keep bed low and locked with side rails adjusted as appropriate  - Keep care items and personal belongings within reach  - Initiate and maintain comfort rounds  - Make Fall Risk Sign visible to staff  - Offer Toileting every 2 Hours, in advance of need  - Initiate/Maintain bed alarm  - Obtain necessary fall risk management equipment: nonslip socks   - Apply yellow socks and bracelet for high fall risk patients  - Consider moving patient to room near nurses station  Outcome: Progressing  Goal: Maintain or return to baseline ADL function  Description: INTERVENTIONS:  -  Assess patient's ability to carry out ADLs; assess patient's baseline for ADL function and identify physical deficits which impact ability to perform ADLs (bathing, care of mouth/teeth, toileting, grooming, dressing, etc.)  - Assess/evaluate cause of self-care deficits    - Assess range of motion  - Assess patient's mobility; develop plan if impaired  - Assess patient's need for assistive devices and provide as appropriate  - Encourage maximum independence but intervene and supervise when necessary  - Involve family in performance of ADLs  - Assess for home care needs following discharge   - Consider OT consult to assist with ADL evaluation and planning for discharge  - Provide patient education as appropriate  - Monitor functional capacity and physical performance, use of AM PAC & JH-HLM   - Monitor gait, balance and fatigue with ambulation    Outcome: Progressing  Goal: Maintains/Returns to pre admission functional level  Description: INTERVENTIONS:  - Perform AM-PAC 6 Click Basic Mobility/ Daily Activity assessment daily.  - Set and communicate daily mobility goal to care team and patient/family/caregiver.   - Collaborate with rehabilitation services on mobility goals if consulted  - Perform Range of Motion 3 times a day.  - Reposition patient every 2 hours.  - Dangle patient 3 times a day  - Stand patient 3 times a day  - Ambulate patient 3 times a day  - Out of bed to chair 3 times a day   - Out of bed for meals 3 times a day  - Out of bed for toileting  - Record patient progress and toleration of activity level   Outcome: Progressing     Problem: DISCHARGE PLANNING  Goal: Discharge to home or other facility with appropriate resources  Description: INTERVENTIONS:  - Identify barriers to discharge w/patient and caregiver  - Arrange for needed discharge resources and transportation as appropriate  - Identify discharge learning needs (meds, wound care, etc.)  - Arrange for interpretive services to assist at discharge as needed  - Refer to Case Management Department for coordinating discharge planning if the patient needs post-hospital services based on physician/advanced practitioner order or complex needs related to functional status, cognitive ability, or social support  system  Outcome: Progressing     Problem: Knowledge Deficit  Goal: Patient/family/caregiver demonstrates understanding of disease process, treatment plan, medications, and discharge instructions  Description: Complete learning assessment and assess knowledge base.  Interventions:  - Provide teaching at level of understanding  - Provide teaching via preferred learning methods  Outcome: Progressing     Problem: Prexisting or High Potential for Compromised Skin Integrity  Goal: Skin integrity is maintained or improved  Description: INTERVENTIONS:  - Identify patients at risk for skin breakdown  - Assess and monitor skin integrity including under and around medical devices   - Assess and monitor nutrition and hydration status  - Monitor labs  - Assess for incontinence   - Turn and reposition patient  - Assist with mobility/ambulation  - Relieve pressure over sarkis prominences   - Avoid friction and shearing  - Provide appropriate hygiene as needed including keeping skin clean and dry  - Evaluate need for skin moisturizer/barrier cream  - Collaborate with interdisciplinary team  - Patient/family teaching  - Consider wound care consult    Assess:  - Review Devyn scale daily  - Clean and moisturize skin every shift  - Inspect skin when repositioning, toileting, and assisting with ADLS  - Assess under medical devices such as masimo every shift  - Assess extremities for adequate circulation and sensation     Bed Management:  - Have minimal linens on bed & keep smooth, unwrinkled  - Change linens as needed when moist or perspiring  - Avoid sitting or lying in one position for more than 2 hours while in bed?Keep HOB at 30 degrees   - Toileting:  - Offer bedside commode  - Assess for incontinence every shift  - Use incontinent care products after each incontinent episode such as barrier creams    Activity:  - Mobilize patient 3 times a day  - Encourage activity and walks on unit  - Encourage or provide ROM exercises   - Turn  and reposition patient every 2 Hours  - Use appropriate equipment to lift or move patient in bed  - Instruct/ Assist with weight shifting every hour when out of bed in chair  - Consider limitation of chair time to 2 hour intervals    Skin Care:  - Avoid use of baby powder, tape, friction and shearing, hot water or constrictive clothing  - Relieve pressure over bony prominences using pillows  - Do not massage red bony areas    Next Steps:  - Teach patient strategies to minimize risks such as weight shifting  - Consider consults to  interdisciplinary teams such as wocn  Outcome: Progressing     Problem: METABOLIC, FLUID AND ELECTROLYTES - ADULT  Goal: Electrolytes maintained within normal limits  Description: INTERVENTIONS:  - Monitor labs and assess patient for signs and symptoms of electrolyte imbalances  - Administer electrolyte replacement as ordered  - Monitor response to electrolyte replacements, including repeat lab results as appropriate  - Instruct patient on fluid and nutrition as appropriate  Outcome: Progressing  Goal: Fluid balance maintained  Description: INTERVENTIONS:  - Monitor labs   - Monitor I/O and WT  - Instruct patient on fluid and nutrition as appropriate  - Assess for signs & symptoms of volume excess or deficit  Outcome: Progressing

## 2025-05-18 NOTE — ASSESSMENT & PLAN NOTE
Lab Results   Component Value Date    EGFR 7 05/18/2025    EGFR 9 05/17/2025    EGFR 8 05/16/2025    CREATININE 6.73 (H) 05/18/2025    CREATININE 5.32 (H) 05/17/2025    CREATININE 5.90 (H) 05/16/2025   ESRD on HD MWF at [facility] under the care of Dr. Moore  OP orders: .3 kg, tx time 210 mins, Access: Rt permcath    Etiology RPGN previously trialed cyclophosphamide however lack of response and cost prohibitive. Also s/p steroid taper.     Undergoing hemodialysis today as above. Erythema around PC site however looking improved from earlier in hospitalization. Is noted to be scratching at site and applying creams. Query allergy. Received vancomycin and cefazolin for other infection. Discussed with primary team -add doxycyline for MRSA coverage.    Addendum 1300- pt TMAX 100.6F on dialysis primary team aware. Also, treatment terminated early due to clotting. Will need heparin next treatment if no contraindications.     Continue renal vitamin, renal diet    May 17-most recent dialysis yesterday, 5/16 for full 3.5-hour treatment time, 1.7 L UF with postweight 123 kg with heel protector boots.  Irritation and erythema noted around right PermCath exit site, exit site to be cleaned with alcohol and covered with gauze and paper tape to avoid irritation.  Avoid  Tegaderm at this time.  Next dialysis will be Monday, May 19.    May 18 -morning labs stable, creatinine 6.73, sodium 134, potassium 5.2, CO2 29.  Continue to use isopropyl alcohol or Accept to clean exit site area and use gauze dressing due to skin irritation. Next dialysis will be tomorrow, 5/19.

## 2025-05-18 NOTE — ASSESSMENT & PLAN NOTE
Patient's family noted heel ulcer and sent to ED.   Procal mildly elevated - in the setting of ESRD on HD   ESR 87 and CRP 58  MRI - no evidence of osteomyelitis   LEADS- LE 50-75% stenosis. Great toe pressure within healing range    Wound culture from bedside swab (5/14) Aeromonas hydrophila, E.coli ESBL  Podiatry input appreciated- no acute surgical intervention at this time.  He will need a close follow-up with wound care outpatient.  Elevate right extremity on green foam wedge when nonambulatory.   Weightbearing as tolerated for transfer, globipedi shoe and Prevalon boots for offloading  The patient received cefazolin, vancomycin and doxycycline. Sensitivities from wound culture shows resistance to cefazolin. Left heel and right chest wall/permacath site do not appear to be infected. Antibiotics discontinued (5/17) for concerning of drug fever.   Blood cultures (5/17) pending   CXR- no acute infiltrates, final read pending   Will hold off on antibiotics at this time   Discussed with ID on-call who recommends to monitor off antibiotic for now. If worsening leukocytosis, recurrent fever or signs of worsening infection/cellulitis, will need to formally consult ID.

## 2025-05-18 NOTE — ASSESSMENT & PLAN NOTE
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 Tapered off prednisone 1/2025.   Permacath site with some erythema POA.   Received cefazolin and doxycycline which were discontinued with concern of drug fever (5/17)    Will continue to monitor for any sign of infection   Nephrology input appreciated  Patient on HD MWF  5/16- HD session terminated early due to clotting

## 2025-05-18 NOTE — ASSESSMENT & PLAN NOTE
Managed by Dr. Tiffanie Gresham No surgical intervention needed acutely. WBAT. Cefazolin per primary team

## 2025-05-18 NOTE — ASSESSMENT & PLAN NOTE
Lab Results   Component Value Date    EGFR 7 05/18/2025    EGFR 9 05/17/2025    EGFR 8 05/16/2025    CREATININE 6.73 (H) 05/18/2025    CREATININE 5.32 (H) 05/17/2025    CREATININE 5.90 (H) 05/16/2025   Hemoglobin 10.3 g/dL, 5/18.  Receives mircera as outpatient last dose 50 mcg 5/12/25. Avoid Venofer given infection

## 2025-05-18 NOTE — PLAN OF CARE
Problem: INFECTION - ADULT  Goal: Absence or prevention of progression during hospitalization  Description: INTERVENTIONS:  - Assess and monitor for signs and symptoms of infection  - Monitor lab/diagnostic results  - Monitor all insertion sites, i.e. indwelling lines, tubes, and drains  - Monitor endotracheal if appropriate and nasal secretions for changes in amount and color  - [place] appropriate cooling/warming therapies per order  - Administer medications as ordered  - Instruct and encourage patient and family to use good hand hygiene technique  - Identify and instruct in appropriate isolation precautions for identified infection/condition  Outcome: Progressing  Goal: Absence of fever/infection during neutropenic period  Description: INTERVENTIONS:  - Monitor WBC  - Perform strict hand hygiene  - Limit to healthy visitors only  - No plants, dried, fresh or silk flowers with mcduffie in patient room  Outcome: Progressing

## 2025-05-18 NOTE — ASSESSMENT & PLAN NOTE
Permacath site with some erythema POA; patient reports that it feels itchy.   Noted low grade fever of 100.6F on 5/16; now with mild leukocytosis (5/18)   Will monitor off antibiotics for now   Will continue to monitor for any worsening sign of infection    ID consult if not improving

## 2025-05-18 NOTE — PROGRESS NOTES
Progress Note - Hospitalist   Name: Ramos Rosenthal 74 y.o. male I MRN: [id number]  Unit/Bed#: -01 I Date of Admission: 5/14/2025   Date of Service: 5/18/2025 I Hospital Day: 4    Assessment & Plan  Ulcer of left heel with cellulitis (Formerly [facility])  Patient's family noted heel ulcer and sent to ED.   Procal mildly elevated - in the setting of ESRD on HD   ESR 87 and CRP 58  MRI - no evidence of osteomyelitis   LEADS- LE 50-75% stenosis. Great toe pressure within healing range    Wound culture from bedside swab (5/14) Aeromonas hydrophila, E.coli ESBL  Podiatry input appreciated- no acute surgical intervention at this time.  He will need a close follow-up with wound care outpatient.  Elevate right extremity on green foam wedge when nonambulatory.   Weightbearing as tolerated for transfer, globipedi shoe and Prevalon boots for offloading  The patient received cefazolin, vancomycin and doxycycline. Sensitivities from wound culture shows resistance to cefazolin. Left heel and right chest wall/permacath site do not appear to be infected. Antibiotics discontinued (5/17) for concerning of drug fever.   Blood cultures (5/17) pending   CXR- no acute infiltrates, final read pending   Will hold off on antibiotics at this time   Discussed with ID on-call who recommends to monitor off antibiotic for now. If worsening leukocytosis, recurrent fever or signs of worsening infection/cellulitis, will need to formally consult ID.     Recurrent major depressive disorder, in remission (Formerly [facility])  Continue home Prozac     Paresthesia of both lower extremities  Patient with spinal stenosis. Wheelchair dependent.   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 Tapered off  prednisone 1/2025.   Permacath site with some erythema POA.   Received cefazolin and doxycycline which were discontinued with concern of drug fever (5/17)    Will continue to monitor for any sign of infection   Nephrology input appreciated  Patient on HD MWF  5/16- HD session terminated early due to clotting   Class 2 severe obesity due to excess calories with serious comorbidity and body mass index (BMI) of 35.0 to 35.9 in adult (HCC)  BMI 35  Healthy diet and lifestyle modification    Infection of exit site of hemodialysis catheter (HCC)  Permacath site with some erythema POA; patient reports that it feels itchy.   Noted low grade fever of 100.6F on 5/16; now with mild leukocytosis (5/18)   Will monitor off antibiotics for now   Will continue to monitor for any worsening sign of infection    ID consult if not improving     VTE Pharmacologic Prophylaxis: VTE Score: 5 High Risk (Score >/= 5) - Pharmacological DVT Prophylaxis Ordered: heparin. Sequential Compression Devices Ordered.    Mobility:   Basic Mobility Inpatient Raw Score: 10  JH-HLM Goal: 4: Move to chair/commode  JH-HLM Achieved: 4: Move to chair/commode  JH-HLM Goal achieved. Continue to encourage appropriate mobility.    Patient Centered Rounds: I performed bedside rounds with nursing staff today.   Discussions with Specialists or Other Care Team Provider: nephrology,  ID     Education and Discussions with Family / Patient: Updated  (wife) via phone.    Current Length of Stay: 4 day(s)  Current Patient Status: Inpatient   Certification Statement: The patient will continue to require additional inpatient hospital stay due to ulcer of the left heel with cellulitis. Given mild leukocytosis, will need to closely monitor for the next 24 to 48 hours to ensure that there are any 2 source of infection.  Discharge Plan: Anticipate discharge in 24-48 hrs to home with home services.    Code Status: Level 1 - Full Code    Subjective   Patient was seen  and examined.  He is feeling well.  He states that his sinus congestion is improving with Claritin.  He denies any fevers or chills.  He denies any nausea, vomiting, abdominal pain, or diarrhea.  He did have a bowel movement this morning.  Low-grade fever has resolved.     Objective :  Temp:  [97.6 °F (36.4 °C)-98.1 °F (36.7 °C)] 97.9 °F (36.6 °C)  HR:  [] 123  BP: ()/(60-84) 97/60  Resp:  [18] 18  SpO2:  [95 %-97 %] 95 %  O2 Device: None (Room air)    Body mass index is 37.49 kg/m².     Input and Output Summary (last 24 hours):     Intake/Output Summary (Last 24 hours) at 5/18/2025 0914  Last data filed at 5/18/2025 0924  Gross per 24 hour   Intake 1020 ml   Output 0 ml   Net 1020 ml       Physical Exam  Vitals and nursing note reviewed.   Constitutional:       General: He is not in acute distress.     Appearance: Normal appearance.   HENT:      Head: Normocephalic and atraumatic.      Right Ear: External ear normal.      Left Ear: External ear normal.      Nose: Nose normal.      Mouth/Throat:      Mouth: Mucous membranes are moist.      Pharynx: Oropharynx is clear.     Eyes:      General:         Right eye: No discharge.         Left eye: No discharge.      Extraocular Movements: Extraocular movements intact.      Pupils: Pupils are equal, round, and reactive to light.       Cardiovascular:      Rate and Rhythm: Normal rate and regular rhythm.      Pulses: Normal pulses.      Heart sounds: Normal heart sounds. No murmur heard.  Pulmonary:      Effort: Pulmonary effort is normal. No respiratory distress.      Breath sounds: Normal breath sounds. No wheezing or rales.   Abdominal:      General: Bowel sounds are normal. There is no distension.      Palpations: Abdomen is soft. There is no mass.      Tenderness: There is no abdominal tenderness.     Musculoskeletal:         General: No swelling, tenderness or deformity. Normal range of motion.      Cervical back: Normal range of motion and neck supple.  No rigidity.     Skin:     General: Skin is warm and dry.      Capillary Refill: Capillary refill takes less than 2 seconds.      Coloration: Skin is not pale.      Findings: Erythema (around the permacath site) present.      Comments: Left heel/ankle dressing intact.  Prevalon boot in place.     Neurological:      General: No focal deficit present.      Mental Status: He is alert and oriented to person, place, and time. Mental status is at baseline.     Psychiatric:         Mood and Affect: Mood normal.         Behavior: Behavior normal.         Thought Content: Thought content normal.         Judgment: Judgment normal.       Lines/Drains:  Lines/Drains/Airways       Active Status       Name Placement date Placement time Site Days    HD Permanent Double Catheter 01/09/25  1317  Internal jugular  128                            Lab Results: I have reviewed the following results:   Results from last 7 days   Lab Units 05/18/25 0519   WBC Thousand/uL 11.51*   HEMOGLOBIN g/dL 10.3*   HEMATOCRIT % 34.3*   PLATELETS Thousands/uL 305   SEGS PCT % 77*   LYMPHO PCT % 9*   MONO PCT % 10   EOS PCT % 3     Results from last 7 days   Lab Units 05/18/25  0519 05/15/25  0533 05/14/25  1832   SODIUM mmol/L 134*   < > 136   POTASSIUM mmol/L 5.2   < > 3.5   CHLORIDE mmol/L 94*   < > 94*   CO2 mmol/L 29   < > 29   BUN mg/dL 54*   < > 11   CREATININE mg/dL 6.73*   < > 3.45*   ANION GAP mmol/L 11   < > 13   CALCIUM mg/dL 9.0   < > 8.6   ALBUMIN g/dL  --   --  3.7   TOTAL BILIRUBIN mg/dL  --   --  0.56   ALK PHOS U/L  --   --  71   ALT U/L  --   --  5*   AST U/L  --   --  12*   GLUCOSE RANDOM mg/dL 82   < > 126    < > = values in this interval not displayed.     Results from last 7 days   Lab Units 05/14/25  1832   INR  1.02     Results from last 7 days   Lab Units 05/14/25  2053   POC GLUCOSE mg/dl 132         Results from last 7 days   Lab Units 05/18/25  0519 05/17/25  0442 05/15/25  0533 05/14/25  1832   PROCALCITONIN ng/ml 0.64*  0.66* 0.43* 0.38*       Recent Cultures (last 7 days):   Results from last 7 days   Lab Units 05/17/25  0841 05/15/25  0623 05/14/25  2341 05/14/25  1901   BLOOD CULTURE  Received in Microbiology Lab. Culture in Progress.  Received in Microbiology Lab. Culture in Progress.  --   --   --    GRAM STAIN RESULT   --   --  No polys seen*  2+ Gram positive cocci in pairs*  2+ Gram negative rods* Rare Polys*  4+ Gram negative rods*  3+ Gram positive cocci in pairs*  2+ Gram positive rods*   URINE CULTURE   --  >100,000 cfu/ml Escherichia coli ESBL*  --   --    WOUND CULTURE   --   --  4+ Growth of Aeromonas hydrophila complex*  4+ Growth of Escherichia coli ESBL*  4+ Growth of Aeromonas hydrophila complex* 4+ Growth of Escherichia coli ESBL*  4+ Growth of Escherichia coli ESBL*  4+ Growth of       Imaging Results Review: I reviewed radiology reports from this admission including: MRI left heel/foor .  Other Study Results Review: No additional pertinent studies reviewed.    Last 24 Hours Medication List:     Current Facility-Administered Medications:     acetaminophen (TYLENOL) tablet 975 mg, Q8H PRN    allopurinol (ZYLOPRIM) tablet 100 mg, BID    calcitriol (ROCALTROL) capsule 0.25 mcg, Once per day on Monday Wednesday Friday    calcium carbonate (OYSTER SHELL,OSCAL) 500 mg tablet 2 tablet, BID With Meals    Cholecalciferol (VITAMIN D3) tablet 2,000 Units, Daily    docusate sodium (COLACE) capsule 100 mg, BID    ergocalciferol (VITAMIN D2) capsule 50,000 Units, Weekly    FLUoxetine (PROzac) capsule 20 mg, Daily    heparin (porcine) subcutaneous injection 5,000 Units, Q8H LORENZO    loratadine (CLARITIN) tablet 10 mg, Daily    midodrine (PROAMATINE) tablet 2.5 mg, TID AC    midodrine (PROAMATINE) tablet 5 mg, Once per day on Monday Wednesday Friday    nystatin (MYCOSTATIN) powder, BID    ondansetron (ZOFRAN) injection 4 mg, Q6H PRN    pantoprazole (PROTONIX) EC tablet 40 mg, Daily    polyethylene glycol (MIRALAX)  packet 17 g, Daily    senna (SENOKOT) tablet 17.2 mg, HS    vit B complex-vit C-folic acid (Renal Caps) capsule 1 capsule, Daily With Dinner    Administrative Statements   Today, Patient Was Seen By: BRETT Velasquez  I have spent a total time of 36 minutes in caring for this patient on the day of the visit/encounter including Diagnostic results, Prognosis, Risks and benefits of tx options, Instructions for management, Patient and family education, Importance of tx compliance, Risk factor reductions, Impressions, Counseling / Coordination of care, Documenting in the medical record, Reviewing/placing orders in the medical record (including tests, medications, and/or procedures), Obtaining or reviewing history  , and Communicating with other healthcare professionals .    **Please Note: This note may have been constructed using a voice recognition system.**

## 2025-05-19 ENCOUNTER — APPOINTMENT (INPATIENT)
Dept: DIALYSIS | Facility: HOSPITAL | Age: 75
DRG: 602 | End: 2025-05-19
Payer: MEDICARE

## 2025-05-19 VITALS
SYSTOLIC BLOOD PRESSURE: 115 MMHG | OXYGEN SATURATION: 95 % | WEIGHT: 284.17 LBS | HEIGHT: 73 IN | HEART RATE: 70 BPM | BODY MASS INDEX: 37.66 KG/M2 | RESPIRATION RATE: 16 BRPM | TEMPERATURE: 97.6 F | DIASTOLIC BLOOD PRESSURE: 60 MMHG

## 2025-05-19 LAB
ANION GAP SERPL CALCULATED.3IONS-SCNC: 11 MMOL/L (ref 4–13)
BASOPHILS # BLD AUTO: 0.09 THOUSANDS/ÂΜL (ref 0–0.1)
BASOPHILS NFR BLD AUTO: 1 % (ref 0–1)
BUN SERPL-MCNC: 66 MG/DL (ref 5–25)
CALCIUM SERPL-MCNC: 9.1 MG/DL (ref 8.4–10.2)
CHLORIDE SERPL-SCNC: 93 MMOL/L (ref 96–108)
CO2 SERPL-SCNC: 28 MMOL/L (ref 21–32)
CREAT SERPL-MCNC: 8.05 MG/DL (ref 0.6–1.3)
EOSINOPHIL # BLD AUTO: 0.43 THOUSAND/ÂΜL (ref 0–0.61)
EOSINOPHIL NFR BLD AUTO: 4 % (ref 0–6)
ERYTHROCYTE [DISTWIDTH] IN BLOOD BY AUTOMATED COUNT: 16.7 % (ref 11.6–15.1)
GFR SERPL CREATININE-BSD FRML MDRD: 5 ML/MIN/1.73SQ M
GLUCOSE SERPL-MCNC: 86 MG/DL (ref 65–140)
HCT VFR BLD AUTO: 34.4 % (ref 36.5–49.3)
HGB BLD-MCNC: 10.4 G/DL (ref 12–17)
IMM GRANULOCYTES # BLD AUTO: 0.07 THOUSAND/UL (ref 0–0.2)
IMM GRANULOCYTES NFR BLD AUTO: 1 % (ref 0–2)
LYMPHOCYTES # BLD AUTO: 0.93 THOUSANDS/ÂΜL (ref 0.6–4.47)
LYMPHOCYTES NFR BLD AUTO: 8 % (ref 14–44)
MCH RBC QN AUTO: 28.6 PG (ref 26.8–34.3)
MCHC RBC AUTO-ENTMCNC: 30.2 G/DL (ref 31.4–37.4)
MCV RBC AUTO: 95 FL (ref 82–98)
MONOCYTES # BLD AUTO: 0.75 THOUSAND/ÂΜL (ref 0.17–1.22)
MONOCYTES NFR BLD AUTO: 6 % (ref 4–12)
NEUTROPHILS # BLD AUTO: 9.38 THOUSANDS/ÂΜL (ref 1.85–7.62)
NEUTS SEG NFR BLD AUTO: 80 % (ref 43–75)
NRBC BLD AUTO-RTO: 0 /100 WBCS
PLATELET # BLD AUTO: 322 THOUSANDS/UL (ref 149–390)
PMV BLD AUTO: 8.9 FL (ref 8.9–12.7)
POTASSIUM SERPL-SCNC: 5.3 MMOL/L (ref 3.5–5.3)
RBC # BLD AUTO: 3.64 MILLION/UL (ref 3.88–5.62)
SODIUM SERPL-SCNC: 132 MMOL/L (ref 135–147)
WBC # BLD AUTO: 11.65 THOUSAND/UL (ref 4.31–10.16)

## 2025-05-19 PROCEDURE — 80048 BASIC METABOLIC PNL TOTAL CA: CPT | Performed by: NURSE PRACTITIONER

## 2025-05-19 PROCEDURE — 90935 HEMODIALYSIS ONE EVALUATION: CPT | Performed by: INTERNAL MEDICINE

## 2025-05-19 PROCEDURE — 85025 COMPLETE CBC W/AUTO DIFF WBC: CPT | Performed by: NURSE PRACTITIONER

## 2025-05-19 PROCEDURE — 99238 HOSP IP/OBS DSCHRG MGMT 30/<: CPT | Performed by: PHYSICIAN ASSISTANT

## 2025-05-19 RX ADMIN — HEPARIN SODIUM 5000 UNITS: 5000 INJECTION INTRAVENOUS; SUBCUTANEOUS at 13:37

## 2025-05-19 RX ADMIN — ALLOPURINOL 100 MG: 100 TABLET ORAL at 13:36

## 2025-05-19 RX ADMIN — NYSTATIN: 100000 POWDER TOPICAL at 13:37

## 2025-05-19 RX ADMIN — MIDODRINE HYDROCHLORIDE 5 MG: 5 TABLET ORAL at 08:28

## 2025-05-19 RX ADMIN — DOCUSATE SODIUM 100 MG: 100 CAPSULE, LIQUID FILLED ORAL at 13:36

## 2025-05-19 RX ADMIN — MIDODRINE HYDROCHLORIDE 2.5 MG: 5 TABLET ORAL at 13:36

## 2025-05-19 RX ADMIN — FLUOXETINE HYDROCHLORIDE 20 MG: 20 CAPSULE ORAL at 13:36

## 2025-05-19 RX ADMIN — CHOLECALCIFEROL TAB 25 MCG (1000 UNIT) 2000 UNITS: 25 TAB at 13:36

## 2025-05-19 RX ADMIN — LORATADINE 10 MG: 10 TABLET ORAL at 13:36

## 2025-05-19 RX ADMIN — CALCIUM 2 TABLET: 500 TABLET ORAL at 07:40

## 2025-05-19 RX ADMIN — PANTOPRAZOLE SODIUM 40 MG: 40 TABLET, DELAYED RELEASE ORAL at 13:36

## 2025-05-19 RX ADMIN — MIDODRINE HYDROCHLORIDE 2.5 MG: 5 TABLET ORAL at 07:40

## 2025-05-19 RX ADMIN — HEPARIN SODIUM 5000 UNITS: 5000 INJECTION INTRAVENOUS; SUBCUTANEOUS at 04:58

## 2025-05-19 RX ADMIN — ONDANSETRON 4 MG: 2 INJECTION INTRAMUSCULAR; INTRAVENOUS at 08:12

## 2025-05-19 RX ADMIN — CALCITRIOL 0.25 MCG: 0.25 CAPSULE, LIQUID FILLED ORAL at 13:36

## 2025-05-19 NOTE — PLAN OF CARE
Problem: PAIN - ADULT  Goal: Verbalizes/displays adequate comfort level or baseline comfort level  Description: Interventions:  - Encourage patient to monitor pain and request assistance  - Assess pain using appropriate pain scale  - Administer analgesics as ordered based on type and severity of pain and evaluate response  - Implement non-pharmacological measures as appropriate and evaluate response  - Consider cultural and social influences on pain and pain management  - Notify physician/advanced practitioner if interventions unsuccessful or patient reports new pain  - Educate patient/family on pain management process including their role and importance of  reporting pain   - Provide non-pharmacologic/complimentary pain relief interventions  Outcome: Progressing     Problem: INFECTION - ADULT  Goal: Absence or prevention of progression during hospitalization  Description: INTERVENTIONS:  - Assess and monitor for signs and symptoms of infection  - Monitor lab/diagnostic results  - Monitor all insertion sites, i.e. indwelling lines, tubes, and drains  - Monitor endotracheal if appropriate and nasal secretions for changes in amount and color  - [place] appropriate cooling/warming therapies per order  - Administer medications as ordered  - Instruct and encourage patient and family to use good hand hygiene technique  - Identify and instruct in appropriate isolation precautions for identified infection/condition  Outcome: Progressing  ation of activity level     Problem: METABOLIC, FLUID AND ELECTROLYTES - ADULT  Goal: Electrolytes maintained within normal limits  Description: INTERVENTIONS:  - Monitor labs and assess patient for signs and symptoms of electrolyte imbalances  - Administer electrolyte replacement as ordered  - Monitor response to electrolyte replacements, including repeat lab results as appropriate  - Instruct patient on fluid and nutrition as appropriate  Outcome: Progressing

## 2025-05-19 NOTE — PLAN OF CARE
Problem: Potential for Falls  Goal: Patient will remain free of falls  Description: INTERVENTIONS:  - Educate patient/family on patient safety including physical limitations  - Instruct patient to call for assistance with activity   - Consider consulting OT/PT to assist with strengthening/mobility based on AM PAC & JH-HLM score  - Consult OT/PT to assist with strengthening/mobility   - Keep Call bell within reach  - Keep bed low and locked with side rails adjusted as appropriate  - Keep care items and personal belongings within reach  - Initiate and maintain comfort rounds  - Make Fall Risk Sign visible to staff  - Offer Toileting every  Hours, in advance of need  - Initiate/Maintain alarm  - Obtain necessary fall risk management equipment:   - Apply yellow socks and bracelet for high fall risk patients  - Consider moving patient to room near nurses station  5/19/2025 1526 by Glory Larkin RN  Outcome: Adequate for Discharge  5/19/2025 1419 by Glory Larkin RN  Outcome: Progressing     Problem: PAIN - ADULT  Goal: Verbalizes/displays adequate comfort level or baseline comfort level  Description: Interventions:  - Encourage patient to monitor pain and request assistance  - Assess pain using appropriate pain scale  - Administer analgesics as ordered based on type and severity of pain and evaluate response  - Implement non-pharmacological measures as appropriate and evaluate response  - Consider cultural and social influences on pain and pain management  - Notify physician/advanced practitioner if interventions unsuccessful or patient reports new pain  - Educate patient/family on pain management process including their role and importance of  reporting pain   - Provide non-pharmacologic/complimentary pain relief interventions  5/19/2025 1526 by Glory Larkin RN  Outcome: Adequate for Discharge  5/19/2025 1419 by Glory Larkin RN  Outcome: Progressing     Problem: INFECTION - ADULT  Goal: Absence or prevention of  progression during hospitalization  Description: INTERVENTIONS:  - Assess and monitor for signs and symptoms of infection  - Monitor lab/diagnostic results  - Monitor all insertion sites, i.e. indwelling lines, tubes, and drains  - Monitor endotracheal if appropriate and nasal secretions for changes in amount and color  - [place] appropriate cooling/warming therapies per order  - Administer medications as ordered  - Instruct and encourage patient and family to use good hand hygiene technique  - Identify and instruct in appropriate isolation precautions for identified infection/condition  5/19/2025 1526 by Glory Larkin RN  Outcome: Adequate for Discharge  5/19/2025 1419 by Glory Larkin RN  Outcome: Progressing  Goal: Absence of fever/infection during neutropenic period  Description: INTERVENTIONS:  - Monitor WBC  - Perform strict hand hygiene  - Limit to healthy visitors only  - No plants, dried, fresh or silk flowers with mcduffie in patient room  5/19/2025 1526 by Glory Larkin RN  Outcome: Adequate for Discharge  5/19/2025 1419 by Glory Larkin RN  Outcome: Progressing     Problem: SAFETY ADULT  Goal: Patient will remain free of falls  Description: INTERVENTIONS:  - Educate patient/family on patient safety including physical limitations  - Instruct patient to call for assistance with activity   - Consider consulting OT/PT to assist with strengthening/mobility based on AM PAC & JH-HLM score  - Consult OT/PT to assist with strengthening/mobility   - Keep Call bell within reach  - Keep bed low and locked with side rails adjusted as appropriate  - Keep care items and personal belongings within reach  - Initiate and maintain comfort rounds  - Make Fall Risk Sign visible to staff  - Offer Toileting every  Hours, in advance of need  - Initiate/Maintain alarm  - Obtain necessary fall risk management equipment:   - Apply yellow socks and bracelet for high fall risk patients  - Consider moving patient to room near nurses  station  5/19/2025 1526 by Glory Larkin RN  Outcome: Adequate for Discharge  5/19/2025 1419 by Glory Larkin RN  Outcome: Progressing  Goal: Maintain or return to baseline ADL function  Description: INTERVENTIONS:  -  Assess patient's ability to carry out ADLs; assess patient's baseline for ADL function and identify physical deficits which impact ability to perform ADLs (bathing, care of mouth/teeth, toileting, grooming, dressing, etc.)  - Assess/evaluate cause of self-care deficits   - Assess range of motion  - Assess patient's mobility; develop plan if impaired  - Assess patient's need for assistive devices and provide as appropriate  - Encourage maximum independence but intervene and supervise when necessary  - Involve family in performance of ADLs  - Assess for home care needs following discharge   - Consider OT consult to assist with ADL evaluation and planning for discharge  - Provide patient education as appropriate  - Monitor functional capacity and physical performance, use of AM PAC & JH-HLM   - Monitor gait, balance and fatigue with ambulation    5/19/2025 1526 by Glory Larkin RN  Outcome: Adequate for Discharge  5/19/2025 1419 by Glory Larkin RN  Outcome: Progressing  Goal: Maintains/Returns to pre admission functional level  Description: INTERVENTIONS:  - Perform AM-PAC 6 Click Basic Mobility/ Daily Activity assessment daily.  - Set and communicate daily mobility goal to care team and patient/family/caregiver.   - Collaborate with rehabilitation services on mobility goals if consulted  - Perform Range of Motion  times a day.  - Reposition patient every  hours.  - Dangle patient  times a day  - Stand patient  times a day  - Ambulate patient  times a day  - Out of bed to chair  times a day   - Out of bed for meals  times a day  - Out of bed for toileting  - Record patient progress and toleration of activity level   5/19/2025 1526 by Glory Larkin RN  Outcome: Adequate for Discharge  5/19/2025 1419 by Glory  SHARONA Larkin  Outcome: Progressing     Problem: DISCHARGE PLANNING  Goal: Discharge to home or other facility with appropriate resources  Description: INTERVENTIONS:  - Identify barriers to discharge w/patient and caregiver  - Arrange for needed discharge resources and transportation as appropriate  - Identify discharge learning needs (meds, wound care, etc.)  -  - Refer to Case Management Department for coordinating discharge planning if the patient needs post-hospital services based on physician/advanced practitioner order or complex needs related to functional status, cognitive ability, or social support system  5/19/2025 1526 by Glory Larkin RN  Outcome: Adequate for Discharge  5/19/2025 1419 by Glory Larkin RN  Outcome: Progressing     Problem: Knowledge Deficit  Goal: Patient/family/caregiver demonstrates understanding of disease process, treatment plan, medications, and discharge instructions  Description: Complete learning assessment and assess knowledge base.  Interventions:  - Provide teaching at level of understanding  - Provide teaching via preferred learning methods  5/19/2025 1526 by Glory Larkin RN  Outcome: Adequate for Discharge  5/19/2025 1419 by Glory Larkin RN  Outcome: Progressing     Problem: Prexisting or High Potential for Compromised Skin Integrity  Goal: Skin integrity is maintained or improved  Description: INTERVENTIONS:  - Identify patients at risk for skin breakdown  - Assess and monitor skin integrity including under and around medical devices   - Assess and monitor nutrition and hydration status  - Monitor labs  - Assess for incontinence   - Turn and reposition patient  - Assist with mobility/ambulation  - Relieve pressure over sarkis prominences   - Avoid friction and shearing  - Provide appropriate hygiene as needed including keeping skin clean and dry  - Evaluate need for skin moisturizer/barrier cream  - Collaborate with interdisciplinary team  - Patient/family teaching  - Consider  wound care consult    Assess:  - Review Devyn scale daily  - Clean and moisturize skin every   - Inspect skin when repositioning, toileting, and assisting with ADLS  - Assess under medical devices such as  every   - Assess extremities for adequate circulation and sensation     Bed Management:  - Have minimal linens on bed & keep smooth, unwrinkled  - Change linens as needed when moist or perspiring  - Avoid sitting or lying in one position for more than  hours while in bed?Keep HOB at degrees   - Toileting:  - Offer bedside commode  - Assess for incontinence every   - Use incontinent care products after each incontinent episode such as     Activity:  - Mobilize patient  times a day  - Encourage activity and walks on unit  - Encourage or provide ROM exercises   - Turn and reposition patient every  Hours  - Use appropriate equipment to lift or move patient in bed  - Instruct/ Assist with weight shifting every  when out of bed in chair  - Consider limitation of chair time  hour intervals    Skin Care:  - Avoid use of baby powder, tape, friction and shearing, hot water or constrictive clothing  - Relieve pressure over bony prominences using  - Do not massage red bony areas    Next Steps:  - Teach patient strategies to minimize risks such as   - Consider consults to  interdisciplinary teams such as   5/19/2025 1526 by Glory Larkin RN  Outcome: Adequate for Discharge  5/19/2025 1419 by Glory Larkin RN  Outcome: Progressing     Problem: METABOLIC, FLUID AND ELECTROLYTES - ADULT  Goal: Electrolytes maintained within normal limits  Description: INTERVENTIONS:  - Monitor labs and assess patient for signs and symptoms of electrolyte imbalances  - Administer electrolyte replacement as ordered  - Monitor response to electrolyte replacements, including repeat lab results as appropriate  - Instruct patient on fluid and nutrition as appropriate  5/19/2025 1526 by Glory Larkin RN  Outcome: Adequate for Discharge  5/19/2025 1419 by  Glory Larkin RN  Outcome: Progressing  Goal: Fluid balance maintained  Description: INTERVENTIONS:  - Monitor labs   - Monitor I/O and WT  - Instruct patient on fluid and nutrition as appropriate  - Assess for signs & symptoms of volume excess or deficit  5/19/2025 1526 by Glory Larkin RN  Outcome: Adequate for Discharge  5/19/2025 1419 by Glory Larkin RN  Outcome: Progressing     Problem: Nutrition/Hydration-ADULT  Goal: Nutrient/Hydration intake appropriate for improving, restoring or maintaining nutritional needs  Description: Monitor and assess patient's nutrition/hydration status for malnutrition. Collaborate with interdisciplinary team and initiate plan and interventions as ordered.  Monitor patient's weight and dietary intake as ordered or per policy. Utilize nutrition screening tool and intervene as necessary. Determine patient's food preferences and provide high-protein, high-caloric foods as appropriate.     INTERVENTIONS:  - Monitor oral intake, urinary output, labs, and treatment plans  - Assess nutrition and hydration status and recommend course of action  - Evaluate amount of meals eaten  - Assist patient with eating if necessary   - Allow adequate time for meals  - Recommend/ encourage appropriate diets, oral nutritional supplements, and vitamin/mineral supplements  - Order, calculate, and assess calorie counts as needed  - Recommend, monitor, and adjust tube feedings and TPN/PPN based on assessed needs  - Assess need for intravenous fluids  - Provide specific nutrition/hydration education as appropriate  - Include patient/family/caregiver in decisions related to nutrition  5/19/2025 1526 by Glory Larkin RN  Outcome: Adequate for Discharge  5/19/2025 1419 by Glory Larkin RN  Outcome: Progressing

## 2025-05-19 NOTE — ASSESSMENT & PLAN NOTE
Patient's family noted heel ulcer and sent to ED.   Procal mildly elevated - in the setting of ESRD on HD   ESR 87 and CRP 58  MRI - no evidence of osteomyelitis   LEADS- LE 50-75% stenosis. Great toe pressure within healing range    Wound culture from bedside swab (5/14) Aeromonas hydrophila, E.coli ESBL  Podiatry input appreciated- no acute surgical intervention at this time.  He will need a close follow-up with wound care outpatient.  Elevate right extremity on green foam wedge when nonambulatory.   Weightbearing as tolerated for transfer, globipedi shoe and Prevalon boots for offloading  The patient received cefazolin, vancomycin and doxycycline. Sensitivities from wound culture shows resistance to cefazolin. Left heel and right chest wall/permacath site do not appear to be infected. Antibiotics discontinued (5/17) for concerning of drug fever.   Blood cultures (5/17) no growth at 24 hours  CXR- no acute infiltrates,  Will hold off on antibiotics at this time   Discussed with ID on-call who recommends to monitor off antibiotic for now. If worsening leukocytosis, recurrent fever or signs of worsening infection/cellulitis, will need to formally consult ID.   Patient has remained afebrile and no signs of worsening infection- no need for antibiotics at discharge  Outpatient follow-up with wound care center  CM set up VNA on discharge  Outpatient follow-up with PCP

## 2025-05-19 NOTE — ASSESSMENT & PLAN NOTE
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 Tapered off prednisone 1/2025.   Permacath site with some erythema POA.   Received cefazolin and doxycycline which were discontinued with concern of drug fever (5/17)    Erythema improved with removal of Tegaderm- likely allergic reaction and not infection    Nephrology input appreciated  Patient on HD MWF  5/16- HD session terminated early due to clotting   5/19- patient completed full HD session

## 2025-05-19 NOTE — NURSING NOTE
Pt DC to home via family in stable condition. All belongings packed by family. IV catheter removed fully intact. AVS reviewed with pt face to face, reviewed follow-up appointments and what to look forward to after discharge. All questions answered.

## 2025-05-19 NOTE — ASSESSMENT & PLAN NOTE
Lab Results   Component Value Date    EGFR 5 05/19/2025    EGFR 7 05/18/2025    EGFR 9 05/17/2025    CREATININE 8.05 (H) 05/19/2025    CREATININE 6.73 (H) 05/18/2025    CREATININE 5.32 (H) 05/17/2025   Continue to monitor hemoglobin, to be placed on long-acting SHREE on the outpatient setting, short acting SHREE currently not indicated.

## 2025-05-19 NOTE — ASSESSMENT & PLAN NOTE
Lab Results   Component Value Date    EGFR 5 05/19/2025    EGFR 7 05/18/2025    EGFR 9 05/17/2025    CREATININE 8.05 (H) 05/19/2025    CREATININE 6.73 (H) 05/18/2025    CREATININE 5.32 (H) 05/17/2025   ESRD on HD MWF at [facility] under the care of Dr. Moore  OP orders: .3 kg, tx time 210 mins, Access: Rt permcath    Etiology RPGN previously trialed cyclophosphamide however lack of response and cost prohibitive. Also s/p steroid taper.     Undergoing hemodialysis today as above. Erythema around PC site however looking improved from earlier in hospitalization. Is noted to be scratching at site and applying creams. Query allergy. Received vancomycin and cefazolin for other infection. Discussed with primary team -add doxycyline for MRSA coverage.    Addendum 1300- pt TMAX 100.6F on dialysis primary team aware. Also, treatment terminated early due to clotting. Will need heparin next treatment if no contraindications.     Continue renal vitamin, renal diet    May 17-most recent dialysis yesterday, 5/16 for full 3.5-hour treatment time, 1.7 L UF with postweight 123 kg with heel protector boots.  Irritation and erythema noted around right PermCath exit site, exit site to be cleaned with alcohol and covered with gauze and paper tape to avoid irritation.  Avoid  Tegaderm at this time.  Next dialysis will be Monday, May 19.    May 18 -morning labs stable, creatinine 6.73, sodium 134, potassium 5.2, CO2 29.  Continue to use isopropyl alcohol or Accept to clean exit site area and use gauze dressing due to skin irritation. Next dialysis will be tomorrow, 5/19.      May 19  Patient on hemodialysis with no specific issues at this time.  He will have a full treatment, ultrafiltration goal is around 4 L today.  Patient to let us know if he becomes hypotensive or feels muscle spasms coming on.

## 2025-05-19 NOTE — PLAN OF CARE
Problem: INFECTION - ADULT  Goal: Absence or prevention of progression during hospitalization  Description: INTERVENTIONS:  - Assess and monitor for signs and symptoms of infection  - Monitor lab/diagnostic results  - Monitor all insertion sites, i.e. indwelling lines, tubes, and drains  - Monitor endotracheal if appropriate and nasal secretions for changes in amount and color  - [place] appropriate cooling/warming therapies per order  - Administer medications as ordered  - Instruct and encourage patient and family to use good hand hygiene technique  - Identify and instruct in appropriate isolation precautions for identified infection/condition  Outcome: Progressing  Goal: Absence of fever/infection during neutropenic period  Description: INTERVENTIONS:  - Monitor WBC  - Perform strict hand hygiene  - Limit to healthy visitors only  - No plants, dried, fresh or silk flowers with mcduffie in patient room  Outcome: Progressing

## 2025-05-19 NOTE — DISCHARGE SUMMARY
Discharge Summary - Hospitalist   Name: Ramos Roesnthal 74 y.o. male I MRN: [id number]  Unit/Bed#: -01 I Date of Admission: 5/14/2025   Date of Service: 5/19/2025 I Hospital Day: 5     Assessment & Plan  Ulcer of left heel with cellulitis (Formerly [facility])  Patient's family noted heel ulcer and sent to ED.   Procal mildly elevated - in the setting of ESRD on HD   ESR 87 and CRP 58  MRI - no evidence of osteomyelitis   LEADS- LE 50-75% stenosis. Great toe pressure within healing range    Wound culture from bedside swab (5/14) Aeromonas hydrophila, E.coli ESBL  Podiatry input appreciated- no acute surgical intervention at this time.  He will need a close follow-up with wound care outpatient.  Elevate right extremity on green foam wedge when nonambulatory.   Weightbearing as tolerated for transfer, globipedi shoe and Prevalon boots for offloading  The patient received cefazolin, vancomycin and doxycycline. Sensitivities from wound culture shows resistance to cefazolin. Left heel and right chest wall/permacath site do not appear to be infected. Antibiotics discontinued (5/17) for concerning of drug fever.   Blood cultures (5/17) no growth at 24 hours  CXR- no acute infiltrates,  Will hold off on antibiotics at this time   Discussed with ID on-call who recommends to monitor off antibiotic for now. If worsening leukocytosis, recurrent fever or signs of worsening infection/cellulitis, will need to formally consult ID.   Patient has remained afebrile and no signs of worsening infection- no need for antibiotics at discharge  Outpatient follow-up with wound care center  CM set up VNA on discharge  Outpatient follow-up with PCP    Recurrent major depressive disorder, in remission ([facility])  Continue home Prozac     Paresthesia of both lower extremities  Patient with spinal stenosis. Wheelchair dependent.   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 Tapered off prednisone 1/2025.   Permacath site with some erythema POA.   Received cefazolin and doxycycline which were discontinued with concern of drug fever (5/17)    Erythema improved with removal of Tegaderm- likely allergic reaction and not infection    Nephrology input appreciated  Patient on HD MWF  5/16- HD session terminated early due to clotting   5/19- patient completed full HD session  Class 2 severe obesity due to excess calories with serious comorbidity and body mass index (BMI) of 35.0 to 35.9 in adult (HCC)  BMI 35  Healthy diet and lifestyle modification    Erythema of exit site of hemodialysis catheter (HCC)  Permacath site with some erythema POA; patient reports that it feels itchy.   Noted low grade fever of 100.6F on 5/16; now with mild leukocytosis (5/18)   Nephrology felt the erythema was likely due to allergy to Tegaderm, this was removed and erythema improved and itching resolved  No need for antibiotics at this time     Medical Problems       Resolved Problems  Date Reviewed: 5/18/2025   None       Discharging Physician / Practitioner: Josias Chacok PA-C  PCP: Wade Arellano DO  Admission Date:   Admission Orders (From admission, onward)       Ordered        05/14/25 1959  INPATIENT ADMISSION  Once                          Discharge Date: 05/19/25    Next Steps for Physician/AP Assuming Care:  Patient needs outpatient follow-up in wound care center    Test Results Pending at Discharge (will require follow up):  none    Medication Changes for Discharge & Rationale:   none  See after visit summary for reconciled discharge medications provided to patient and/or family.     Consultations During Hospital Stay:  Podiatry  Nephrology    Procedures Performed:   none    Significant Findings / Test  Results:   XR chest portable  Result Date: 5/18/2025  Impression: No acute cardiopulmonary disease. Workstation performed: [id number]     MRI ankle/heel left  wo contrast  Result Date: 5/15/2025  Impression: Shallow ulceration of the medial heel. No fluid collection within the limits of an unenhanced exam. No MRI findings of acute osteomyelitis. Workstation performed: [id number]     XR foot 3+ views LEFT  Result Date: 5/15/2025  Impression: No acute osseous abnormality. If there is concern for acute osteomyelitis, MRI is more sensitive. Computerized Assisted Algorithm (CAA) may have been used to analyze all applicable images. Workstation performed: [id number]       Incidental Findings:   N/A    Hospital Course:   Ramos Rosenthal is a 74 y.o. male patient who originally presented to the hospital on 5/14/2025 due to heel ulcer.  Please see H&P for complete details of presentation.  The patient's case was discussed with podiatry by ED provider.  Patient will was started on IV antibiotics, MRI and vascular studies ordered.  MRI was negative for osteomyelitis.  Vascular study showed 50 to 75% stenosis with great toe pressure within healing range.  Podiatry consulted and felt no surgical intervention was needed at this time.  It was recommended for the patient have close outpatient follow-up with wound care center.  The patient's case was discussed with on-call ID who recommended stopping IV antibiotics given heel ulcer did not look actively infected.  There was also concern for possible infection around permacath site.  Nephrology was consulted given patient's history of ESRD on HD.  Nephrology felt the erythema and itchiness at the permacath site was likely due to allergic reaction to the Tegaderm.  The patient's erythema and itchiness resolved.  The patient remained afebrile off of antibiotics.  Patient received a full session of HD on the day of discharge.  Patient was discharged home with instructions to follow-up  "with wound care center and his family doctor.  This is a brief discharge summary, please see notes from throughout the patient's hospital stay for complete details of his hospitalization.    Please see above list of diagnoses and related plan for additional information.     Discharge Day Visit / Exam:   Subjective:    Vitals: Blood Pressure: 115/60 (05/19/25 1408)  Pulse: 70 (05/19/25 1408)  Temperature: 97.6 °F (36.4 °C) (05/19/25 1408)  Temp Source: Oral (05/19/25 1408)  Respirations: 16 (05/19/25 1408)  Height: 6' 1\" (185.4 cm) (05/14/25 2045)  Weight - Scale: 129 kg (284 lb 2.8 oz) (05/14/25 2045)  SpO2: 95 % (05/19/25 1408)  Physical Exam  Vitals and nursing note reviewed.   Constitutional:       Appearance: Normal appearance.   HENT:      Head: Normocephalic and atraumatic.      Nose: Nose normal.      Mouth/Throat:      Mouth: Mucous membranes are moist.      Pharynx: Oropharynx is clear.     Cardiovascular:      Rate and Rhythm: Normal rate and regular rhythm.      Heart sounds: Normal heart sounds.   Pulmonary:      Effort: Pulmonary effort is normal.      Breath sounds: Normal breath sounds.   Abdominal:      General: There is no distension.      Palpations: Abdomen is soft.      Tenderness: There is no abdominal tenderness.     Skin:     Comments: Left heel ulcer     Neurological:      General: No focal deficit present.      Mental Status: He is alert and oriented to person, place, and time.     Psychiatric:         Mood and Affect: Mood normal.         Behavior: Behavior normal.          Discussion with Family: Updated  (wife) via phone.    Discharge instructions/Information to patient and family:   See after visit summary for information provided to patient and family.      Provisions for Follow-Up Care:  See after visit summary for information related to follow-up care and any pertinent home health orders.      Mobility at time of Discharge:   Basic Mobility Inpatient Raw Score: 8  JH-HLM " Goal: 3: Sit at edge of bed  JH-HLM Achieved: 3: Sit at edge of bed  HLM Goal achieved. Continue to encourage appropriate mobility.     Disposition:   Home with VNA Services (Reminder: Complete face to face encounter)    Planned Readmission: no    Administrative Statements   Discharge Statement:  I have spent a total time of 25 minutes in caring for this patient on the day of the visit/encounter. >30 minutes of time was spent on: Counseling / Coordination of care, Documenting in the medical record, and Communicating with other healthcare professionals .    **Please Note: This note may have been constructed using a voice recognition system**

## 2025-05-19 NOTE — ASSESSMENT & PLAN NOTE
Permacath site with some erythema POA; patient reports that it feels itchy.   Noted low grade fever of 100.6F on 5/16; now with mild leukocytosis (5/18)   Nephrology felt the erythema was likely due to allergy to Tegaderm, this was removed and erythema improved and itching resolved  No need for antibiotics at this time

## 2025-05-19 NOTE — PLAN OF CARE
Post-Dialysis RN Treatment Note    Blood Pressure:  Pre 151/77 mm/Hg  Post 123/92 mmHg   EDW:  124.3 kg    Weight:  Pre 125.5 kg   Post 121.6 kg   Mode of weight measurement: Bed Scale   Volume Removed:  4012 ml    Treatment duration: 210 minutes    NS given:  No    Treatment shortened No   Medications given during Rx: Midodrine 5 mg   Estimated Kt/V:  None Reported   Access type: Permacath/TDC   Needle Gauge: NA   Access Issues: Yes, describe: lines reversed for optimal BFR    Report called to primary nurse:   Yes Glory Larkin RN        Current hemodialysis plan of care is to remove a total of 4500 ml of fluid over a 3 1/2 hour treatment for a net of 4 liters as tolerated.  Monitor vital signs every 15 minutes while on treatment for patient safety.  Utilize a 2 K+ bath for serum potassium of 5.3 to maintain electrolyte balance.  Report received from Glory Larkin RN.  Plan reviewed with Dr Ortega at bedside.        Problem: METABOLIC, FLUID AND ELECTROLYTES - ADULT  Goal: Electrolytes maintained within normal limits  Description: INTERVENTIONS:  - Monitor labs and assess patient for signs and symptoms of electrolyte imbalances  - Administer electrolyte replacement as ordered  - Monitor response to electrolyte replacements, including repeat lab results as appropriate  - Instruct patient on fluid and nutrition as appropriate  Outcome: Progressing  Goal: Fluid balance maintained  Description: INTERVENTIONS:  - Monitor labs   - Monitor I/O and WT  - Instruct patient on fluid and nutrition as appropriate  - Assess for signs & symptoms of volume excess or deficit  Outcome: Progressing

## 2025-05-19 NOTE — CASE MANAGEMENT
Case Management Discharge Planning Note    Patient name Ramos Rosenthal  Location /-01 MRN [id number]  : 1950 Date 2025       Current Admission Date: 2025  Current Admission Diagnosis:Ulcer of left heel with cellulitis ([facility])   Patient Active Problem List    Diagnosis Date Noted    RPGN (rapidly progressive glomerulonephritis) 2025    Anemia in ESRD (end-stage renal disease)  ([facility]) 2025    Infection of exit site of hemodialysis catheter ([facility]) 2025    Class 2 severe obesity due to excess calories with serious comorbidity and body mass index (BMI) of 35.0 to 35.9 in adult ([facility]) 2025    Ulcer of left heel with cellulitis ([facility]) 10/31/2024    Immunocompromised patient ([facility]) 10/23/2024    Chronic gout due to renal impairment of multiple sites without tophus 10/21/2024    End-stage renal disease on hemodialysis ([facility]) 2024    Anemia 2024    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 2024    Essential hypertension 2024    Secondary hyperparathyroidism of renal origin ([facility]) 2024    Uremia 2024    Bilateral lower extremity edema 2024    Neurogenic claudication 2023    Paresthesia of both lower extremities 10/01/2022    Recurrent major depressive disorder, in remission ([facility]) 2022    Lumbosacral plexopathy 2019    Gait abnormality 2019    Lumbar stenosis 2019    Polyneuropathy 2019    Prediabetes 2018    Hyperlipidemia 2014    Vitamin D deficiency 2014      LOS (days): 5  Geometric Mean LOS (GMLOS) (days): 1.8  Days to GMLOS:-3     OBJECTIVE:  Risk of Unplanned Readmission Score: 18.93         Current admission status: Inpatient   Preferred Pharmacy:   [id number] - [place] - [address]  [address]  [place] [address]  Phone: [phone] Fax: [phone]    Primary Care Provider: Wade Arellano DO    Primary  Insurance: MEDICARE  Secondary Insurance: [employer]    DISCHARGE DETAILS:    Discharge planning discussed with:: Patient medically ready for discharge home today.  Accepted with [facility], reserved in [place].  ELBA provided update to Trey in [place] of patient's return on wednesday.  Family to transport                                                                                   IMM Given (Date):: 05/19/25  IMM Given to:: Patient                             No Pooling

## 2025-05-19 NOTE — PROGRESS NOTES
Progress Note - Nephrology   Name: Ramos Rosenthal 74 y.o. male I MRN: [id number]  Unit/Bed#: -01 I Date of Admission: 5/14/2025   Date of Service: 5/19/2025 I Hospital Day: 5     Assessment & Plan  End-stage renal disease on hemodialysis (HCC)    Lab Results   Component Value Date    EGFR 5 05/19/2025    EGFR 7 05/18/2025    EGFR 9 05/17/2025    CREATININE 8.05 (H) 05/19/2025    CREATININE 6.73 (H) 05/18/2025    CREATININE 5.32 (H) 05/17/2025   ESRD on HD MWF at [facility] under the care of Dr. Moore  OP orders: .3 kg, tx time 210 mins, Access: Rt permcath    Etiology RPGN previously trialed cyclophosphamide however lack of response and cost prohibitive. Also s/p steroid taper.     Undergoing hemodialysis today as above. Erythema around PC site however looking improved from earlier in hospitalization. Is noted to be scratching at site and applying creams. Query allergy. Received vancomycin and cefazolin for other infection. Discussed with primary team -add doxycyline for MRSA coverage.    Addendum 1300- pt TMAX 100.6F on dialysis primary team aware. Also, treatment terminated early due to clotting. Will need heparin next treatment if no contraindications.     Continue renal vitamin, renal diet    May 17-most recent dialysis yesterday, 5/16 for full 3.5-hour treatment time, 1.7 L UF with postweight 123 kg with heel protector boots.  Irritation and erythema noted around right PermCath exit site, exit site to be cleaned with alcohol and covered with gauze and paper tape to avoid irritation.  Avoid  Tegaderm at this time.  Next dialysis will be Monday, May 19.    May 18 -morning labs stable, creatinine 6.73, sodium 134, potassium 5.2, CO2 29.  Continue to use isopropyl alcohol or Accept to clean exit site area and use gauze dressing due to skin irritation. Next dialysis will be tomorrow, 5/19.      May 19  Patient on hemodialysis with no specific issues at this time.  He will have a full  "treatment, ultrafiltration goal is around 4 L today.  Patient to let us know if he becomes hypotensive or feels muscle spasms coming on.  RPGN (rapidly progressive glomerulonephritis)  As above, now dialysis dependent.   Anemia in ESRD (end-stage renal disease)  (HCC)  Lab Results   Component Value Date    EGFR 5 05/19/2025    EGFR 7 05/18/2025    EGFR 9 05/17/2025    CREATININE 8.05 (H) 05/19/2025    CREATININE 6.73 (H) 05/18/2025    CREATININE 5.32 (H) 05/17/2025   Continue to monitor hemoglobin, to be placed on long-acting SHREE on the outpatient setting, short acting SHREE currently not indicated.  Ulcer of left heel with cellulitis (HCC)  Continue with antibiotics according to hospitalist  Secondary hyperparathyroidism of renal origin (HCC)  Follow PTH in the outpatient setting, continue with current dosing of calcitriol.  Infection of exit site of hemodialysis catheter (HCC)  Appears to be doing well with current antibiotic regimen, continue to monitor for now.    HEMODIALYSIS PROCEDURE NOTE  The patient was seen and examined on hemodialysis.  Time: 3.5 hours  Sodium: 135 Blood flow: 350   Dialyzer: F160 Potassium: 2 Dialysate flow: 600   Access: R Permcath Bicarbonate: 30 Ultrafiltration goal: 4L   Medications on HD: none         Case discussed with hospitalist, patient for dialysis today, monitor clinical progress from the infection standpoint.    Follow up reason for today's visit: End-stage renal disease/anemia due to end-stage renal disease/secondary hyperparathyroidism.    Ulcer of left heel (HCC)    Problem List[1]      Subjective:   No acute issues at this time.  Patient eating and drinking normally    Objective:     Vitals: Blood pressure 151/77, pulse 76, temperature 99.5 °F (37.5 °C), temperature source Tympanic, resp. rate 18, height 6' 1\" (1.854 m), weight 129 kg (284 lb 2.8 oz), SpO2 93%.,Body mass index is 37.49 kg/m².    Weight (last 2 days)       None              Intake/Output Summary (Last 24 " "hours) at 5/19/2025 0911  Last data filed at 5/19/2025 0832  Gross per 24 hour   Intake 1220 ml   Output --   Net 1220 ml     I/O last 3 completed shifts:  In: 1020 [P.O.:1020]  Out: -     HD Permanent Double Catheter (Active)   Reasons to continue HD Cath Treatment Therapy 05/19/25 0832   Goal for Removal N/A- chronic HD catheter 05/19/25 0832   Line Necessity Reviewed Yes, reviewed with provider 05/19/25 0832   Site Assessment Clean;Dry;Intact 05/19/25 0832   Proximal Lumen Status Passive disinfecting cap present;Blood return noted;Flushed & Clamped 05/19/25 0832   Distal Lumen Status Passive disinfecting cap present;Blood return noted;Flushed & Clamped 05/19/25 0832   Dressing Type Gauze 05/19/25 0832   Dressing Status Clean;Dry;Intact 05/19/25 0832   Dressing Intervention Dressing changed 05/19/25 0832   Dressing Change Due 05/21/25 05/19/25 0832       Physical Exam: /77 (BP Location: Right arm)   Pulse 76   Temp 99.5 °F (37.5 °C) (Tympanic)   Resp 18   Ht 6' 1\" (1.854 m)   Wt 129 kg (284 lb 2.8 oz)   SpO2 93%   BMI 37.49 kg/m²     General Appearance:    Alert, cooperative, no distress, appears stated age   Head:    Normocephalic, without obvious abnormality, atraumatic   Eyes:    Conjunctiva/corneas clear   Ears:    Normal external ears   Nose:   Nares normal, septum midline, mucosa normal, no drainage    or sinus tenderness   Throat:   Lips, mucosa, and tongue normal; teeth and gums normal   Neck:   Supple   Back:     Symmetric, no curvature, ROM normal, no CVA tenderness   Lungs:     Clear to auscultation bilaterally, respirations unlabored   Chest wall:    No tenderness or deformity   Heart:    Regular rate and rhythm, S1 and S2 normal, no murmur, rub   or gallop   Abdomen:     Soft, non-tender, bowel sounds active   Extremities:   Extremities normal, atraumatic, no cyanosis or edema   Skin:   Skin color, texture, turgor normal, no rashes or lesions   Lymph nodes:   Cervical normal   Neurologic: " "  CNII-XII intact            Lab, Imaging and other studies: I have personally reviewed pertinent labs.  CBC:   Lab Results   Component Value Date    WBC 11.65 (H) 05/19/2025    HGB 10.4 (L) 05/19/2025    HCT 34.4 (L) 05/19/2025    MCV 95 05/19/2025     05/19/2025    RBC 3.64 (L) 05/19/2025    MCH 28.6 05/19/2025    MCHC 30.2 (L) 05/19/2025    RDW 16.7 (H) 05/19/2025    MPV 8.9 05/19/2025    NRBC 0 05/19/2025     CMP:   Lab Results   Component Value Date    K 5.3 05/19/2025    CL 93 (L) 05/19/2025    CO2 28 05/19/2025    BUN 66 (H) 05/19/2025    CREATININE 8.05 (H) 05/19/2025    CALCIUM 9.1 05/19/2025    EGFR 5 05/19/2025       .  Results from last 7 days   Lab Units 05/19/25  0439 05/18/25  0519 05/17/25  0442 05/15/25  0533 05/14/25  1832   POTASSIUM mmol/L 5.3 5.2 4.6   < > 3.5   CHLORIDE mmol/L 93* 94* 97   < > 94*   CO2 mmol/L 28 29 33*   < > 29   BUN mg/dL 66* 54* 31*   < > 11   CREATININE mg/dL 8.05* 6.73* 5.32*   < > 3.45*   CALCIUM mg/dL 9.1 9.0 8.9   < > 8.6   ALK PHOS U/L  --   --   --   --  71   ALT U/L  --   --   --   --  5*   AST U/L  --   --   --   --  12*    < > = values in this interval not displayed.         Phosphorus: No results found for: \"PHOS\"  Magnesium: No results found for: \"MG\"  Urinalysis: No results found for: \"COLORU\", \"CLARITYU\", \"SPECGRAV\", \"PHUR\", \"LEUKOCYTESUR\", \"NITRITE\", \"PROTEINUA\", \"GLUCOSEU\", \"KETONESU\", \"BILIRUBINUR\", \"BLOODU\"  Ionized Calcium: No results found for: \"CAION\"  Coagulation: No results found for: \"PT\", \"INR\", \"APTT\"  Troponin: No results found for: \"TROPONINI\"  ABG: No results found for: \"PHART\", \"DLY8ETE\", \"PO2ART\", \"RQP1RCI\", \"X0WAFXYJ\", \"BEART\", \"SOURCE\"  Radiology review:     IMAGING  Procedure: XR chest portable  Result Date: 5/18/2025  Narrative: XR CHEST PORTABLE INDICATION: fever. COMPARISON: CXR 10/21/2024, 9/16/2024. FINDINGS: Right central catheter in right atrium. Clear lungs. No pneumothorax or pleural effusion. Mild cardiomegaly. Mild " curvature of the spine. Partially imaged lumbar fusion hardware. Normal upper abdomen.     Impression: No acute cardiopulmonary disease. Workstation performed: [id number]         Current Facility-Administered Medications:     acetaminophen (TYLENOL) tablet 975 mg, Q8H PRN    allopurinol (ZYLOPRIM) tablet 100 mg, BID    calcitriol (ROCALTROL) capsule 0.25 mcg, Once per day on Monday Wednesday Friday    calcium carbonate (OYSTER SHELL,OSCAL) 500 mg tablet 2 tablet, BID With Meals    Cholecalciferol (VITAMIN D3) tablet 2,000 Units, Daily    docusate sodium (COLACE) capsule 100 mg, BID    ergocalciferol (VITAMIN D2) capsule 50,000 Units, Weekly    FLUoxetine (PROzac) capsule 20 mg, Daily    heparin (porcine) subcutaneous injection 5,000 Units, Q8H LORENZO    loratadine (CLARITIN) tablet 10 mg, Daily    midodrine (PROAMATINE) tablet 2.5 mg, TID AC    midodrine (PROAMATINE) tablet 5 mg, Once per day on Monday Wednesday Friday    nystatin (MYCOSTATIN) powder, BID    ondansetron (ZOFRAN) injection 4 mg, Q6H PRN    pantoprazole (PROTONIX) EC tablet 40 mg, Daily    polyethylene glycol (MIRALAX) packet 17 g, Daily    senna (SENOKOT) tablet 17.2 mg, HS    vit B complex-vit C-folic acid (Renal Caps) capsule 1 capsule, Daily With Dinner  Medications Discontinued During This Encounter   Medication Reason    ceFAZolin (ANCEF) IVPB (premix in dextrose) 1,000 mg 50 mL     vancomycin (VANCOCIN) 1500 mg in sodium chloride 0.9% 250 mL IVPB     vancomycin (VANCOCIN) IVPB (premix in dextrose) 1,000 mg 200 mL     doxycycline hyclate (VIBRAMYCIN) capsule 100 mg     acetaminophen (TYLENOL) tablet 650 mg     ceFAZolin (ANCEF) IVPB (premix in dextrose) 1,000 mg 50 mL     doxycycline (VIBRAMYCIN) 100 mg in sodium chloride 0.9 % 100 mL IVPB        Gigi Ortega DO      This progress note was produced in part using a dictation device which may document imprecise wording from author's original intent.           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

## 2025-05-20 ENCOUNTER — TRANSITIONAL CARE MANAGEMENT (OUTPATIENT)
Dept: FAMILY MEDICINE CLINIC | Facility: CLINIC | Age: 75
End: 2025-05-20

## 2025-05-20 ENCOUNTER — TELEPHONE (OUTPATIENT)
Age: 75
End: 2025-05-20

## 2025-05-20 NOTE — TELEPHONE ENCOUNTER
Spoke with [facility] did order C told her to look at discharge summary will have number on that . Dr moreno will be doing a home visit when he has time

## 2025-05-20 NOTE — TELEPHONE ENCOUNTER
Pt's spouse, Louise, called.  Ramos was discharged from the hospital on 5/19 and needs a TCM appt but Louise is requesting that Dr. Arellano go to their home.  Pt cannot walk due to the wound on his heal.  She's also requesting an order for home health care as soon as possible to change his bandage and clean the would.  Ramos has dialysis on Mon, Wed, and Fridays and doesn't return home until after 4pm.  Louise is asking for a call back to let her know when Dr. Arellano can come out to the house and also let her know that the home health care will be ordered.

## 2025-05-21 ENCOUNTER — HOME CARE VISIT (OUTPATIENT)
Dept: HOME HEALTH SERVICES | Facility: HOME HEALTHCARE | Age: 75
End: 2025-05-21

## 2025-05-21 ENCOUNTER — HOME CARE VISIT (OUTPATIENT)
Dept: HOME HEALTH SERVICES | Facility: HOME HEALTHCARE | Age: 75
End: 2025-05-21
Attending: PHYSICIAN ASSISTANT

## 2025-05-22 ENCOUNTER — HOME CARE VISIT (OUTPATIENT)
Dept: HOME HEALTH SERVICES | Facility: HOME HEALTHCARE | Age: 75
End: 2025-05-22
Payer: MEDICARE

## 2025-05-22 ENCOUNTER — OFFICE VISIT (OUTPATIENT)
Dept: FAMILY MEDICINE CLINIC | Facility: CLINIC | Age: 75
End: 2025-05-22

## 2025-05-22 VITALS
DIASTOLIC BLOOD PRESSURE: 58 MMHG | SYSTOLIC BLOOD PRESSURE: 106 MMHG | TEMPERATURE: 97.5 F | OXYGEN SATURATION: 96 % | RESPIRATION RATE: 18 BRPM | HEART RATE: 70 BPM

## 2025-05-22 DIAGNOSIS — E66.01 CLASS 2 SEVERE OBESITY DUE TO EXCESS CALORIES WITH SERIOUS COMORBIDITY AND BODY MASS INDEX (BMI) OF 35.0 TO 35.9 IN ADULT (HCC): Chronic | ICD-10-CM

## 2025-05-22 DIAGNOSIS — D63.1 ANEMIA IN ESRD (END-STAGE RENAL DISEASE)  (HCC): ICD-10-CM

## 2025-05-22 DIAGNOSIS — M48.062 SPINAL STENOSIS OF LUMBAR REGION WITH NEUROGENIC CLAUDICATION: ICD-10-CM

## 2025-05-22 DIAGNOSIS — N18.6 END-STAGE RENAL DISEASE ON HEMODIALYSIS (HCC): Chronic | ICD-10-CM

## 2025-05-22 DIAGNOSIS — E55.9 VITAMIN D DEFICIENCY: ICD-10-CM

## 2025-05-22 DIAGNOSIS — R26.9 GAIT ABNORMALITY: ICD-10-CM

## 2025-05-22 DIAGNOSIS — L97.422 SKIN ULCER OF LEFT HEEL WITH FAT LAYER EXPOSED (HCC): Primary | ICD-10-CM

## 2025-05-22 DIAGNOSIS — R20.2 PARESTHESIA OF BOTH LOWER EXTREMITIES: Chronic | ICD-10-CM

## 2025-05-22 DIAGNOSIS — Z99.2 END-STAGE RENAL DISEASE ON HEMODIALYSIS (HCC): Chronic | ICD-10-CM

## 2025-05-22 DIAGNOSIS — N18.6 ANEMIA IN ESRD (END-STAGE RENAL DISEASE)  (HCC): ICD-10-CM

## 2025-05-22 DIAGNOSIS — E66.812 CLASS 2 SEVERE OBESITY DUE TO EXCESS CALORIES WITH SERIOUS COMORBIDITY AND BODY MASS INDEX (BMI) OF 35.0 TO 35.9 IN ADULT (HCC): Chronic | ICD-10-CM

## 2025-05-22 DIAGNOSIS — I10 ESSENTIAL HYPERTENSION: ICD-10-CM

## 2025-05-22 LAB
BACTERIA BLD CULT: NORMAL
BACTERIA BLD CULT: NORMAL

## 2025-05-22 PROCEDURE — G0299 HHS/HOSPICE OF RN EA 15 MIN: HCPCS

## 2025-05-22 PROCEDURE — 10330081 VN NO-PAY CLAIM PROCEDURE

## 2025-05-23 VITALS — DIASTOLIC BLOOD PRESSURE: 84 MMHG | SYSTOLIC BLOOD PRESSURE: 128 MMHG | HEART RATE: 72 BPM

## 2025-05-23 DIAGNOSIS — L29.9 ITCHING: Primary | ICD-10-CM

## 2025-05-23 PROBLEM — T82.7XXA: Status: RESOLVED | Noted: 2025-05-16 | Resolved: 2025-05-23

## 2025-05-23 RX ORDER — MIDODRINE HYDROCHLORIDE 5 MG/1
5 TABLET ORAL 3 TIMES WEEKLY
Start: 2025-05-23

## 2025-05-23 RX ORDER — ERGOCALCIFEROL 1.25 MG/1
50000 CAPSULE, LIQUID FILLED ORAL WEEKLY
Start: 2025-05-23

## 2025-05-23 RX ORDER — HYDROCORTISONE 25 MG/G
CREAM TOPICAL 2 TIMES DAILY
Qty: 3.5 G | Refills: 0 | Status: SHIPPED | OUTPATIENT
Start: 2025-05-23

## 2025-05-23 NOTE — ASSESSMENT & PLAN NOTE
Follow-up with wound care.  Continues with home health care.  Wound aggressively bandaged today so not opened up to look at.  Continue with current regimen ordered by wound care.

## 2025-05-23 NOTE — ASSESSMENT & PLAN NOTE
Lab Results   Component Value Date    EGFR 5 05/19/2025    EGFR 7 05/18/2025    EGFR 9 05/17/2025    CREATININE 8.05 (H) 05/19/2025    CREATININE 6.73 (H) 05/18/2025    CREATININE 5.32 (H) 05/17/2025   Monitored by nephrology and stable.

## 2025-05-23 NOTE — ASSESSMENT & PLAN NOTE
Prior history of hypertension, now requires midodrine surrounding his dialysis.  Orders:  •  midodrine (PROAMATINE) 5 mg tablet; Take 1 tablet (5 mg total) by mouth 3 (three) times a week ADMINISTERED EVERY MON, WED AND FRI AT DIALYSIS

## 2025-05-23 NOTE — ASSESSMENT & PLAN NOTE
Lab Results   Component Value Date    EGFR 5 05/19/2025    EGFR 7 05/18/2025    EGFR 9 05/17/2025    CREATININE 8.05 (H) 05/19/2025    CREATININE 6.73 (H) 05/18/2025    CREATININE 5.32 (H) 05/17/2025     Continue with dialysis and follow-up with nephrology.

## 2025-05-23 NOTE — ASSESSMENT & PLAN NOTE
Ambulatory dysfunction, with paresthesias of the both lower extremities.  This is how he got his heel ulcer in the first place given the severity of his neuropathy.

## 2025-05-23 NOTE — PROGRESS NOTES
Transition of Care Visit:  Name: Ramos Rosenthal      : 1950      MRN: [id number]  Encounter Provider: Wade Arellano DO  Encounter Date: 2025   Encounter department: [facility] PRIMARY CARE    Assessment & Plan  Skin ulcer of left heel with fat layer exposed ([facility])  Follow-up with wound care.  Continues with home health care.  Wound aggressively bandaged today so not opened up to look at.  Continue with current regimen ordered by wound care.       End-stage renal disease on hemodialysis ([facility])  Lab Results   Component Value Date    EGFR 5 2025    EGFR 7 2025    EGFR 9 2025    CREATININE 8.05 (H) 2025    CREATININE 6.73 (H) 2025    CREATININE 5.32 (H) 2025     Continue with dialysis and follow-up with nephrology.       Paresthesia of both lower extremities  Ambulatory dysfunction, with paresthesias of the both lower extremities.  This is how he got his heel ulcer in the first place given the severity of his neuropathy.       Spinal stenosis of lumbar region with neurogenic claudication  .  Spinal stenosis with inability to ambulate at this time.  Remains dependent on his wife.       Essential hypertension      Prior history of hypertension, now requires midodrine surrounding his dialysis.  Orders:  •  midodrine (PROAMATINE) 5 mg tablet; Take 1 tablet (5 mg total) by mouth 3 (three) times a week ADMINISTERED EVERY MON, WED AND FRI AT DIALYSIS    Vitamin D deficiency  Per renal  Orders:  •  ergocalciferol (VITAMIN D2) 50,000 units; Take 1 capsule (50,000 Units total) by mouth once a week TAKES EVERY SATURDAY    Anemia in ESRD (end-stage renal disease)  ([facility])  Lab Results   Component Value Date    EGFR 5 2025    EGFR 7 2025    EGFR 9 2025    CREATININE 8.05 (H) 2025    CREATININE 6.73 (H) 2025    CREATININE 5.32 (H) 2025   Monitored by nephrology and stable.         Gait abnormality  Nonambulatory.       Class 2 severe  obesity due to excess calories with serious comorbidity and body mass index (BMI) of 35.0 to 35.9 in adult (HCC)  Certainly a comorbidity in his chronic medical conditions.            History of Present Illness     Transitional Care Management Review:   Ramos Rosenthal is a 74 y.o. male here for TCM follow up.     During the TCM phone call patient stated:  TCM Call (since 5/9/2025)     Date and time call was made  5/20/2025  9:15 AM    Hospital care reviewed  Records reviewed    Patient was hospitialized at  [facility]    Date of Admission  05/14/25    Date of discharge  05/19/25    Diagnosis  Ulcer of left heel with cellulitis    Disposition  Home    Were the patients medications reviewed and updated  Yes      TCM Call (since 5/9/2025)     Scheduled for follow up?  Yes    Did you obtain your prescribed medications  Yes    Do you need help managing your prescriptions or medications  No    Is transportation to your appointment needed  No    Living Arrangements  Spouse or Significiant other    Are you recieving home care services  Yes    Types of home care services  Nurse visit        Patient remains homebound with the exception of dialysis and wound care now.  Extensive amount of exertion is required to get him to and from various appointments.  He gets dialysis 3 times a week, he is now going to wound care and receiving home wound care assistance.  Left foot ulcer is noted, and recent associated cellulitis and admission.  Getting wounds cared for.  Totally dependent on his wife.  Has essentially paraparesis.  Able to transfer.      Review of Systems   Constitutional:  Negative for chills and fever.   HENT:  Negative for ear pain, rhinorrhea and sore throat.    Eyes:  Negative for visual disturbance.   Respiratory:  Negative for cough and shortness of breath.    Cardiovascular:  Negative for chest pain and leg swelling.   Gastrointestinal:  Negative for abdominal pain, diarrhea, nausea and vomiting.    Genitourinary:  Negative for dysuria, flank pain, frequency and urgency.   Musculoskeletal:  Positive for back pain, gait problem and myalgias. Negative for neck pain.   Skin:  Positive for wound. Negative for rash.   Neurological:  Positive for weakness and numbness. Negative for dizziness, light-headedness and headaches.   Hematological: Negative.    Psychiatric/Behavioral:  Negative for agitation, confusion and suicidal ideas. The patient is not nervous/anxious.    All other systems reviewed and are negative.    Objective   /84 (Patient Position: Sitting)   Pulse 72     Physical Exam  Vitals and nursing note reviewed.   Constitutional:       General: He is not in acute distress.     Appearance: Normal appearance. He is well-developed. He is obese.      Comments: Chronically ill-appearing   HENT:      Head: Normocephalic and atraumatic.     Eyes:      Conjunctiva/sclera: Conjunctivae normal.       Cardiovascular:      Rate and Rhythm: Normal rate and regular rhythm.      Pulses: Normal pulses.      Heart sounds: Murmur heard.   Pulmonary:      Effort: Pulmonary effort is normal. No respiratory distress.      Breath sounds: Normal breath sounds.   Abdominal:      Palpations: Abdomen is soft.      Tenderness: There is no abdominal tenderness.     Musculoskeletal:         General: No swelling.      Cervical back: Neck supple.      Right lower leg: Edema present.      Left lower leg: Edema present.      Comments: 3+ pitting edema.     Skin:     General: Skin is warm and dry.      Capillary Refill: Capillary refill takes less than 2 seconds.      Comments: Wound is bandaged left heel.     Neurological:      General: No focal deficit present.      Mental Status: He is alert and oriented to person, place, and time.      Comments: Decreased movement and strength in the both lower extremity.   Psychiatric:         Mood and Affect: Mood normal.       Medications have been reviewed by provider in current  encounter

## 2025-05-24 ENCOUNTER — HOME CARE VISIT (OUTPATIENT)
Dept: HOME HEALTH SERVICES | Facility: HOME HEALTHCARE | Age: 75
End: 2025-05-24
Payer: MEDICARE

## 2025-05-24 VITALS — DIASTOLIC BLOOD PRESSURE: 60 MMHG | SYSTOLIC BLOOD PRESSURE: 112 MMHG | RESPIRATION RATE: 18 BRPM | TEMPERATURE: 97.5 F

## 2025-05-24 PROCEDURE — G0299 HHS/HOSPICE OF RN EA 15 MIN: HCPCS

## 2025-05-27 ENCOUNTER — HOME CARE VISIT (OUTPATIENT)
Dept: HOME HEALTH SERVICES | Facility: HOME HEALTHCARE | Age: 75
End: 2025-05-27
Payer: MEDICARE

## 2025-05-27 VITALS
HEART RATE: 66 BPM | OXYGEN SATURATION: 97 % | SYSTOLIC BLOOD PRESSURE: 106 MMHG | TEMPERATURE: 96.8 F | DIASTOLIC BLOOD PRESSURE: 58 MMHG | RESPIRATION RATE: 16 BRPM

## 2025-05-27 DIAGNOSIS — L30.4 INTERTRIGO: Primary | ICD-10-CM

## 2025-05-27 PROCEDURE — G0299 HHS/HOSPICE OF RN EA 15 MIN: HCPCS

## 2025-05-27 RX ORDER — NYSTATIN 100000 [USP'U]/G
POWDER TOPICAL 3 TIMES DAILY
Qty: 60 G | Refills: 3 | Status: SHIPPED | OUTPATIENT
Start: 2025-05-27

## 2025-05-28 PROCEDURE — 10330064 PAD, ABD 5X9 STR LF (1/PK 20PK/BX) MGM1"

## 2025-05-28 PROCEDURE — 10330064 BANDAGE, CNFRM 4X4.1YDS N/S LF (12RL/BG"

## 2025-05-28 PROCEDURE — 10330064 TAPE, ADHSV TRANSPORE WHT 2 (6RL/BX 10B"

## 2025-05-28 PROCEDURE — 10330064 SPONGE, GAUZE 8PLY N/S 4X4" (200/PK 20P"

## 2025-05-28 PROCEDURE — 10330064 DRESSING, ADAPTIC 3X3" (50/BX)"

## 2025-05-29 ENCOUNTER — HOME CARE VISIT (OUTPATIENT)
Dept: HOME HEALTH SERVICES | Facility: HOME HEALTHCARE | Age: 75
End: 2025-05-29
Payer: MEDICARE

## 2025-05-29 VITALS
HEART RATE: 68 BPM | TEMPERATURE: 97.2 F | OXYGEN SATURATION: 96 % | RESPIRATION RATE: 18 BRPM | SYSTOLIC BLOOD PRESSURE: 104 MMHG | DIASTOLIC BLOOD PRESSURE: 58 MMHG

## 2025-05-29 PROCEDURE — G0299 HHS/HOSPICE OF RN EA 15 MIN: HCPCS

## 2025-06-02 ENCOUNTER — HOME CARE VISIT (OUTPATIENT)
Dept: HOME HEALTH SERVICES | Facility: HOME HEALTHCARE | Age: 75
End: 2025-06-02
Payer: MEDICARE

## 2025-06-03 ENCOUNTER — HOME CARE VISIT (OUTPATIENT)
Dept: HOME HEALTH SERVICES | Facility: HOME HEALTHCARE | Age: 75
End: 2025-06-03
Payer: MEDICARE

## 2025-06-03 PROCEDURE — G0299 HHS/HOSPICE OF RN EA 15 MIN: HCPCS

## 2025-06-04 VITALS
DIASTOLIC BLOOD PRESSURE: 62 MMHG | RESPIRATION RATE: 18 BRPM | SYSTOLIC BLOOD PRESSURE: 106 MMHG | HEART RATE: 94 BPM | TEMPERATURE: 96.9 F | OXYGEN SATURATION: 95 %

## 2025-06-05 ENCOUNTER — HOME CARE VISIT (OUTPATIENT)
Dept: HOME HEALTH SERVICES | Facility: HOME HEALTHCARE | Age: 75
End: 2025-06-05
Payer: MEDICARE

## 2025-06-05 VITALS
TEMPERATURE: 97 F | RESPIRATION RATE: 16 BRPM | DIASTOLIC BLOOD PRESSURE: 62 MMHG | SYSTOLIC BLOOD PRESSURE: 106 MMHG | OXYGEN SATURATION: 96 % | HEART RATE: 70 BPM

## 2025-06-05 PROCEDURE — G0299 HHS/HOSPICE OF RN EA 15 MIN: HCPCS

## 2025-06-10 ENCOUNTER — HOME CARE VISIT (OUTPATIENT)
Dept: HOME HEALTH SERVICES | Facility: HOME HEALTHCARE | Age: 75
End: 2025-06-10
Payer: MEDICARE

## 2025-06-10 PROCEDURE — G0299 HHS/HOSPICE OF RN EA 15 MIN: HCPCS

## 2025-06-11 VITALS
TEMPERATURE: 98.1 F | SYSTOLIC BLOOD PRESSURE: 120 MMHG | DIASTOLIC BLOOD PRESSURE: 64 MMHG | RESPIRATION RATE: 18 BRPM | HEART RATE: 91 BPM | OXYGEN SATURATION: 95 %

## 2025-06-12 ENCOUNTER — HOME CARE VISIT (OUTPATIENT)
Dept: HOME HEALTH SERVICES | Facility: HOME HEALTHCARE | Age: 75
End: 2025-06-12
Payer: MEDICARE

## 2025-06-12 ENCOUNTER — OFFICE VISIT (OUTPATIENT)
Facility: HOSPITAL | Age: 75
End: 2025-06-12
Payer: MEDICARE

## 2025-06-12 VITALS
RESPIRATION RATE: 18 BRPM | WEIGHT: 280 LBS | SYSTOLIC BLOOD PRESSURE: 122 MMHG | HEART RATE: 55 BPM | BODY MASS INDEX: 37.11 KG/M2 | HEIGHT: 73 IN | TEMPERATURE: 97.5 F | DIASTOLIC BLOOD PRESSURE: 64 MMHG

## 2025-06-12 DIAGNOSIS — S80.812A ABRASION OF LEFT LEG, INITIAL ENCOUNTER: ICD-10-CM

## 2025-06-12 DIAGNOSIS — L89.620 PRESSURE INJURY OF LEFT HEEL, UNSTAGEABLE (HCC): Primary | ICD-10-CM

## 2025-06-12 PROCEDURE — 99214 OFFICE O/P EST MOD 30 MIN: CPT | Performed by: STUDENT IN AN ORGANIZED HEALTH CARE EDUCATION/TRAINING PROGRAM

## 2025-06-12 PROCEDURE — 97597 DBRDMT OPN WND 1ST 20 CM/<: CPT | Performed by: STUDENT IN AN ORGANIZED HEALTH CARE EDUCATION/TRAINING PROGRAM

## 2025-06-12 PROCEDURE — 99213 OFFICE O/P EST LOW 20 MIN: CPT | Performed by: STUDENT IN AN ORGANIZED HEALTH CARE EDUCATION/TRAINING PROGRAM

## 2025-06-12 NOTE — PATIENT INSTRUCTIONS
Orders Placed This Encounter   Procedures    Wound cleansing and dressings Left Heel     You met Sandy BATISTA today at the [place] wound center.       Sandy will order SANTYL ointment and send it to a specialty [facility] pharmacy located at [place]. This ointment will help eat away the dead tissue without harming the good tissue.  The [facility] pharmacy will call you with the pricing options. If it is affordable to you, please purchase it. If it is not affordable, that is okay, do not buy it. We will come up with a treatment plan for you.        Wound care orders: Left heel    Continue [facility]      (At wound center only: Dakin's soak to wound for 5 minutes)      Cleanse the wound with normal saline prior to applying a clean dressing. Do not use hydrogen peroxide, rubbing alcohol, or Dial soap.    (Awaiting for Santyl price check)  Please apply thin layer of Therahoney to left heel wound (rest of tube sent home with patient.)  Cover with ABD pad, rolled gauze, and tape.     Every other day changes.        Increase your protein intake (within reason based on your kidney function)  with each meal. Try meat, chicken, greek yogurt, nuts, fish, seafood, legumes, lentils, tofu, and protein supplements.        Please call the [facility]aling Center Monday through Friday between the hours of 8:00 AM and 4:30 PM at [phone] if you have any questions, if you experience any major changes in your wound(s) or for any signs or symptoms of infection such as fever; changes in the redness, swelling, drainage, or odor of your wound. After hours, weekends or holidays please contact your primary care physician or go to the hospital emergency room.          Standing Status:   Future     Expiration Date:   6/19/2025    Wound cleansing and dressings Left;Lower;Lateral Leg     You met Sandy BATISTA today at the [place] wound center.         Wound care orders left lower leg:    Continue [facility]      Cleanse the wound with normal saline prior to applying a clean dressing. Do not use hydrogen peroxide, rubbing alcohol, or Dial soap.    Cut to fit Polymem foam and apply to left lower leg (rest sent home with patient.)  Cover with ABD pad, rolled gauze, and tape.     Every other day changes.      Feliz and Ensure brochures given to you today.       Increase your protein intake (within reason based on your kidney function)  with each meal. Try meat, chicken, greek yogurt, nuts, fish, seafood, legumes, lentils, tofu, and protein supplements.        Please call the [facility] Healing Center Monday through Friday between the hours of 8:00 AM and 4:30 PM at [phone] if you have any questions, if you experience any major changes in your wound(s) or for any signs or symptoms of infection such as fever; changes in the redness, swelling, drainage, or odor of your wound. After hours, weekends or holidays please contact your primary care physician or go to the hospital emergency room.          Standing Status:   Future     Expiration Date:   6/19/2025

## 2025-06-12 NOTE — PROGRESS NOTES
Patient ID: Ramos Rosenthal is a 74 y.o. male Date of Birth 1950       Chief Complaint   Patient presents with    New Patient Visit     Pt known to this center, last visit approximately 2 years ago. Presents today with L. Heel and LLE wounds. 2 daughters assisted in patient's transfer from motorized wheelchair to examination chair using wooden slide board. Best to use room 1 moving forward.       Allergies:  Ciprofloxacin, Other, and Penicillins    Diagnosis:   Diagnosis ICD-10-CM Associated Orders   1. Pressure injury of left heel, unstageable ([facility])  L89.620 Wound cleansing and dressings Left Heel     Wound cleansing and dressings Left;Lower;Lateral Leg     collagenase (SANTYL) ointment     Debridement      2. Abrasion of left leg, initial encounter  S80.812A Wound cleansing and dressings Left Heel     Wound cleansing and dressings Left;Lower;Lateral Leg           Assessment  & Plan:    Initial evaluation of L medial heel wound. Pressure injury, presently unstageable. There is granulation tissue present along wound periphery. Central portion of the wound with spongy necrotic tissue. No obvious bone exposure. Drainage is small with slight green hue on dressing. No periwound erythema or lymphangitic streaking.   Dakin soak performed in office today d/t mild green discoloration with drainage.  Selective debridement (cross-hatching) of necrotic tissue performed.   Recommend application of Santyl daily. If unable to obtain santyl continue with therahoney (therhoney applied in office daily). Home health to assist with dressing changes every other day.   Continue with Prevalon boot for offloading.   MRI from 05/15/25 without evidence of osteomyelitis. Given lack of infection on clinical examination will hold off on further imaging at this time.   Recent arterial studies demonstrate stenosis of the anterior tibial artery with non-compressible JAIME and toe pressures within the healing range.   Instructed to monitor for  "any changes including redness or swelling surrounding the wound, increased drainage or pain as well as fevers or chills.    Initial evaluation of small abrasion of the L lateral calf. Partial thickness with exposed dermis. Weeping serous fluid. No periwound erythema.   Polymem to wound. Cleanse with gentle soap and water. Keep site covered. Change every other day.   F/u in 1 week with podiatry. Instructed to call if any questions or concerns arise in meantime.              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 He reports he moves his heels on his sheets at night and thinks this is how the ulcer started. Believes his leg abrasion occurred during a transfer. The ulcer of his heel was discovered by his family members about one month ago and pt was evaluated in the hospital from 0514-05/19/25 following the discovery. Per hospital visit summary, \" Ramos Rosenthal is a 74 y.o. male patient who originally presented to the hospital on 5/14/2025 due to heel ulcer.  Please see H&P for complete details of presentation.  The patient's case was discussed with podiatry by ED provider.  Patient will was started on IV antibiotics, MRI and vascular studies ordered.  MRI was negative for osteomyelitis.  Vascular study showed 50 to 75% stenosis with great toe pressure within healing range.  Podiatry consulted and felt no surgical intervention was needed at this time.  It was recommended for the patient have close outpatient follow-up with wound care center.  The patient's case was discussed with on-call ID who recommended stopping IV antibiotics given heel ulcer did not look actively infected.  There was also concern for possible infection around permacath site.  Nephrology was consulted given patient's history of ESRD on HD.  Nephrology " "felt the erythema and itchiness at the permacath site was likely due to allergic reaction to the Tegaderm.  The patient's erythema and itchiness resolved.  The patient remained afebrile off of antibiotics.  Patient received a full session of HD on the day of discharge.  Patient was discharged home with instructions to follow-up with wound care center and his family doctor.  This is a brief discharge summary, please see notes from throughout the patient's hospital stay for complete details of his hospitalization.\" Home care has been seeing patient for dressing changes. Presently betadine is being used on the wound. He has been using a Prevalon boot for offloading. Eats about two meals daily. Denies fever.           The following portions of the patient's history were reviewed and updated as appropriate:   Problem List[1]  Past Medical History[2]  Past Surgical History[3]  Family History[4]  Social History[5]  Current Medications[6]    Review of Systems      Objective:  /64   Pulse 55   Temp 97.5 °F (36.4 °C)   Resp 18   Ht 6' 1\" (1.854 m)   Wt 127 kg (280 lb)   BMI 36.94 kg/m²         Physical Exam  Vitals reviewed.     Cardiovascular:      Rate and Rhythm: Normal rate.      Pulses:           Dorsalis pedis pulses are detected w/ Doppler on the right side and detected w/ Doppler on the left side.        Posterior tibial pulses are detected w/ Doppler on the right side and detected w/ Doppler on the left side.   Pulmonary:      Effort: Pulmonary effort is normal.     Musculoskeletal:      Right lower leg: Edema present.      Left lower leg: Edema present.        Feet:    Feet:      Left foot:      Skin integrity: Ulcer present. No erythema.      Comments: Unstageble pressure injury of the L medial heel. There is granulation tissue present along wound periphery. Central portion of the wound with spongy necrotic tissue. No obvious bone exposure. Drainage is small with slight green hue on dressing. No " "periwound erythema or lymphangitic streaking.       Skin:     Findings: Wound present. No erythema or rash.          Neurological:      Motor: Weakness present.      Gait: Gait abnormal.                    Wound 05/14/25 Pressure Injury Heel Left (Active)   Wound Image   06/12/25 1320   Wound Description Slough;Yellow;Granulation tissue 06/12/25 1321   Pressure Injury Stage U 06/12/25 1321   Wound Length (cm) 1.9 cm 06/12/25 1321   Wound Width (cm) 2.8 cm 06/12/25 1321   Wound Depth (cm) 0.5 cm 06/12/25 1321   Wound Surface Area (cm^2) 4.18 cm^2 06/12/25 1321   Wound Volume (cm^3) 1.393 cm^3 06/12/25 1321   Calculated Wound Volume (cm^3) 2.66 cm^3 06/12/25 1321   Change in Wound Size % 65.63 06/12/25 1321   Drainage Amount Large 06/12/25 1321   Drainage Description Brown;Krishna 06/12/25 1321   Antonette-wound Assessment Pink;Maceration 06/12/25 1321       Wound 06/12/25 Traumatic Abrasion Leg Left;Lower;Lateral (Active)   Wound Image   06/12/25 1321   Wound Description Granulation tissue;Yellow 06/12/25 1321   Non-staged Wound Description Partial thickness 06/12/25 1321   Wound Length (cm) 1.2 cm 06/12/25 1321   Wound Width (cm) 0.7 cm 06/12/25 1321   Wound Depth (cm) 0.1 cm 06/12/25 1321   Wound Surface Area (cm^2) 0.66 cm^2 06/12/25 1321   Wound Volume (cm^3) 0.044 cm^3 06/12/25 1321   Calculated Wound Volume (cm^3) 0.08 cm^3 06/12/25 1321   Drainage Amount Small 06/12/25 1321   Drainage Description Krishna;Brown 06/12/25 1321   Antonette-wound Assessment Edema;Intact 06/12/25 1321                 Debridement   Wound 05/14/25 Pressure Injury Heel Left     Date/Time: 6/12/2025 1:00 PM    Universal Protocol:  procedure performed by consultantConsent: Verbal consent obtained  Consent given by: patient  Time out: Immediately prior to procedure a \"time out\" was called to verify the correct patient, procedure, equipment, support staff and site/side marked as required.  Patient understanding: patient states understanding of the procedure " being performed  Patient identity confirmed: verbally with patient    Debridement Details  Performed by: PA  Debridement type: selective  Pain control: lidocaine 4%    Post-debridement measurements  Length (cm): 1.9  Width (cm): 2.8  Depth (cm): 0.5  Percent debrided: 70%  Surface Area (cm^2): 4.18  Area Debrided (cm^2): 2.93  Volume (cm^3): 1.39    Devitalized tissue debrided: necrotic debris  Instrument(s) utilized: blade and forceps  Bleeding: small  Hemostasis obtained with: pressure  Response to treatment: procedure was tolerated well  Debridement Comments: Cross hatching was performed to necrotic tissue        Results from last 6 Months   Lab Units 05/14/25  0966   WOUND CULTURE  4+ Growth of Aeromonas hydrophila complex*  4+ Growth of Escherichia coli ESBL*  4+ Growth of Aeromonas hydrophila complex*           Wound Instructions:  Orders Placed This Encounter   Procedures    Wound cleansing and dressings Left Heel     You met Sandy BATISTA today at the [place] wound center.       Sandy will order SANTYL ointment and send it to a specialty [facility] pharmacy located at [place]. This ointment will help eat away the dead tissue without harming the good tissue.  The [facility] pharmacy will call you with the pricing options. If it is affordable to you, please purchase it. If it is not affordable, that is okay, do not buy it. We will come up with a treatment plan for you.        Wound care orders: Left heel    Continue [facility]      (At wound center only: Dakin's soak to wound for 5 minutes)      Cleanse the wound with normal saline prior to applying a clean dressing. Do not use hydrogen peroxide, rubbing alcohol, or Dial soap.    (Awaiting for Santyl price check)  Please apply thin layer of Therahoney to left heel wound (rest of tube sent home with patient.)  Cover with ABD pad, rolled gauze, and tape.     Every other day changes.        Increase your protein intake (within reason based on  your kidney function)  with each meal. Try meat, chicken, greek yogurt, nuts, fish, seafood, legumes, lentils, tofu, and protein supplements.        Please call the Wound Healing Center Monday through Friday between the hours of 8:00 AM and 4:30 PM at [phone] if you have any questions, if you experience any major changes in your wound(s) or for any signs or symptoms of infection such as fever; changes in the redness, swelling, drainage, or odor of your wound. After hours, weekends or holidays please contact your primary care physician or go to the hospital emergency room.          Standing Status:   Future     Expiration Date:   6/19/2025    Wound cleansing and dressings Left;Lower;Lateral Leg     You met Sandy BATISTA today at the [place] wound center.         Wound care orders left lower leg:    Continue [facility]      Cleanse the wound with normal saline prior to applying a clean dressing. Do not use hydrogen peroxide, rubbing alcohol, or Dial soap.    Cut to fit Polymem foam and apply to left lower leg (rest sent home with patient.)  Cover with ABD pad, rolled gauze, and tape.     Every other day changes.      Feliz and Ensure brochures given to you today.       Increase your protein intake (within reason based on your kidney function)  with each meal. Try meat, chicken, greek yogurt, nuts, fish, seafood, legumes, lentils, tofu, and protein supplements.        Please call the Wound Healing Center Monday through Friday between the hours of 8:00 AM and 4:30 PM at [phone] if you have any questions, if you experience any major changes in your wound(s) or for any signs or symptoms of infection such as fever; changes in the redness, swelling, drainage, or odor of your wound. After hours, weekends or holidays please contact your primary care physician or go to the hospital emergency room.          Standing Status:   Future     Expiration Date:   6/19/2025    Debridement     This order was  "created via procedure documentation       Cally Dotson, PA-C        Portions of the record may have been created with voice recognition software. Occasional wrong word or \"sound alike\" substitutions may have occurred due to the inherent limitations of voice recognition software. Read the chart carefully and recognize, using context, where substitutions have occurred.         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    Anemia    End-stage renal disease on hemodialysis ([facility])    Chronic gout due to renal impairment of multiple sites without tophus    Immunocompromised patient ([facility])    Ulcer of left heel with cellulitis ([facility])    Class 2 severe obesity due to excess calories with serious comorbidity and body mass index (BMI) of 35.0 to 35.9 in adult ([facility])    RPGN (rapidly progressive glomerulonephritis)    Anemia in ESRD (end-stage renal disease)  ([facility])   [2]   Past Medical History:  Diagnosis Date    Depression     Gout     Immunocompromised patient ([facility]) 10/23/2024    Multiple open wounds of lower leg 09/30/2022    Pneumonia 10/22/2024    Problem with dialysis access ([facility]) 09/21/2024    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 HERZOG    CATARACT EXTRACTION, BILATERAL      IR BIOPSY KIDNEY RANDOM  9/19/2024    IR TEMPORARY DIALYSIS CATHETER PLACEMENT  " 2024    IR TUNNELED DIALYSIS CATHETER PLACEMENT  2024    IR TUNNELED DIALYSIS CATHETER PLACEMENT  2025    KNEE ARTHROSCOPY Right     x2 , meniscus repair    TONSILECTOMY AND ADNOIDECTOMY     [4]   Family History  Problem Relation Name Age of Onset    Diabetes Father      Diabetes Brother      Diabetes Sister     [5]   Social History  Socioeconomic History    Marital status: /Civil Union     Spouse name: Louise    Number of children: 3    Years of education: 14   Tobacco Use    Smoking status: Former     Current packs/day: 0.00     Types: Cigarettes     Quit date:      Years since quittin.4    Smokeless tobacco: Never   Vaping Use    Vaping status: Never Used   Substance and Sexual Activity    Alcohol use: Not Currently     Comment: occasional    Drug use: Never     Social Drivers of Health     Financial Resource Strain: Low Risk  (2023)    Overall Financial Resource Strain (CARDIA)     Difficulty of Paying Living Expenses: Not very hard   Food Insecurity: No Food Insecurity (2025)    Nursing - Inadequate Food Risk Classification     Worried About Running Out of Food in the Last Year: Never true     Ran Out of Food in the Last Year: Never true     Ran Out of Food in the Last Year: Never true   Transportation Needs: No Transportation Needs (2025)    OASIS : Transportation     Lack of Transportation (Medical): No     Lack of Transportation (Non-Medical): No     Patient Unable or Declines to Respond: No   Intimate Partner Violence: Unknown (2025)    Nursing IPS     Physically Hurt by Someone: No     Hurt or Threatened by Someone: No   Housing Stability: Unknown (2025)    Nursing: Inadequate Housing Risk Classification     Unable to Pay for Housing in the Last Year: No     Has Housin   [6]   Current Outpatient Medications:     collagenase (SANTYL) ointment, Apply topically daily To wound of the L heel, Disp: 30 g, Rfl: 1    allopurinol (ZYLOPRIM) 100 mg  tablet, TAKE 1 TABLET BY MOUTH TWICE A DAY, Disp: 180 tablet, Rfl: 1    B Complex-C-Folic Acid (triphrocaps) 1 MG CAPS, Take 1 capsule (1 mg total) by mouth daily with dinner, Disp: 90 capsule, Rfl: 0    calcium carbonate (OYSTER SHELL,OSCAL) 500 mg, TAKE 2 TABLETS BY MOUTH 2 TIMES A DAY WITH MEALS., Disp: 360 tablet, Rfl: 1    cholecalciferol (VITAMIN D3) 1,000 units tablet, Take 2,000 Units by mouth in the morning., Disp: , Rfl:     ergocalciferol (VITAMIN D2) 50,000 units, Take 1 capsule (50,000 Units total) by mouth once a week TAKES EVERY SATURDAY, Disp: , Rfl:     FLUoxetine (PROzac) 20 mg capsule, TAKE 1 CAPSULE BY MOUTH EVERY DAY, Disp: 90 capsule, Rfl: 1    hydrocortisone 2.5 % cream, Apply topically 2 (two) times a day, Disp: 3.5 g, Rfl: 0    loratadine (CLARITIN) 10 mg tablet, Take 10 mg by mouth as needed for allergies Take 1 tablet once daily as needed for allergies, Disp: , Rfl:     Magnesium 100 MG CAPS, Take 1 capsule by mouth in the morning. Not sure of dose., Disp: , Rfl:     midodrine (PROAMATINE) 2.5 mg tablet, TAKE 1 TABLET (2.5 MG TOTAL) BY MOUTH 3 TIMES A DAY BEFORE MEALS, Disp: 270 tablet, Rfl: 2    midodrine (PROAMATINE) 5 mg tablet, Take 1 tablet (5 mg total) by mouth 3 (three) times a week ADMINISTERED EVERY MON, WED AND FRI AT [facility], Disp: , Rfl:     nystatin (MYCOSTATIN) powder, Apply topically 3 (three) times a day, Disp: 60 g, Rfl: 3    pantoprazole (PROTONIX) 40 mg tablet, Take 1 tablet (40 mg total) by mouth daily, Disp: 90 tablet, Rfl: 1

## 2025-06-15 ENCOUNTER — DOCUMENTATION (OUTPATIENT)
Age: 75
End: 2025-06-15

## 2025-06-16 ENCOUNTER — HOME CARE VISIT (OUTPATIENT)
Dept: HOME HEALTH SERVICES | Facility: HOME HEALTHCARE | Age: 75
End: 2025-06-16
Payer: MEDICARE

## 2025-06-17 ENCOUNTER — OFFICE VISIT (OUTPATIENT)
Facility: HOSPITAL | Age: 75
End: 2025-06-17
Payer: MEDICARE

## 2025-06-17 VITALS
TEMPERATURE: 96.6 F | DIASTOLIC BLOOD PRESSURE: 70 MMHG | RESPIRATION RATE: 18 BRPM | SYSTOLIC BLOOD PRESSURE: 128 MMHG | HEART RATE: 84 BPM

## 2025-06-17 DIAGNOSIS — I87.2 VENOUS STASIS ULCER OF OTHER PART OF LEFT LOWER LEG LIMITED TO BREAKDOWN OF SKIN WITHOUT VARICOSE VEINS (HCC): Primary | ICD-10-CM

## 2025-06-17 DIAGNOSIS — L89.623 PRESSURE INJURY OF LEFT HEEL, STAGE 3 (HCC): ICD-10-CM

## 2025-06-17 DIAGNOSIS — L97.821 VENOUS STASIS ULCER OF OTHER PART OF LEFT LOWER LEG LIMITED TO BREAKDOWN OF SKIN WITHOUT VARICOSE VEINS (HCC): Primary | ICD-10-CM

## 2025-06-17 PROCEDURE — 11042 DBRDMT SUBQ TIS 1ST 20SQCM/<: CPT

## 2025-06-17 PROCEDURE — 99213 OFFICE O/P EST LOW 20 MIN: CPT

## 2025-06-17 NOTE — PROGRESS NOTES
Wound Procedure Treatment    Performed by: Mahnaz Suarez RN  Authorized by: Kanu Tello DPM  Associated wounds:   Wound 06/12/25 Traumatic Abrasion Leg Left;Lower;Lateral  Wound 05/14/25 Pressure Injury Heel Left    Wound cleansed with:  Wound aggrssively cleansed with NSS and gauze   Applied primary dressing:  Polymem foam   Applied secondary dressing:  ABD   Dressing secured with:  Kerlix, Tape and Tubifast   Offloading device appllied:  Heel offloading boot

## 2025-06-17 NOTE — PROGRESS NOTES
Patient ID: Ramos Rosenthal is a 74 y.o. male Date of Birth 1950       No chief complaint on file.      Allergies:  Ciprofloxacin, Other, and Penicillins    Diagnosis:   Diagnosis ICD-10-CM Associated Orders   1. Venous stasis ulcer of other part of left lower leg limited to breakdown of skin without varicose veins (Formerly [facility])  I87.2 Wound cleansing and dressings    L97.821 Wound Procedure Treatment      2. Pressure injury of left heel, stage 3 ([facility])  L89.623 Wound cleansing and dressings     Wound Procedure Treatment             Assessment  & Plan:    F/u evaluation of Left medial heel wound. Pressure injury, presently stage 3. There is granulation tissue present along wound periphery. Central portion of the wound with spongy necrotic and fibrotic tissue. No obvious bone exposure. Drainage is small. No periwound erythema or lymphangitic streaking.   Dakin soak performed in office today.  Surgical debridement of necrotic tissue performed.   Recommend application polymem/DSD. Plan for wound vac application next f/u.  Recommend 5-minute Vashe soaks with every dressing change.  Continue with Prevalon boot for offloading.   MRI from 05/15/25 without evidence of osteomyelitis. Given lack of infection on clinical examination will hold off on further imaging at this time.   Recent arterial studies demonstrate stenosis of the anterior tibial artery with non-compressible JAIME and toe pressures within the healing range.   Instructed to monitor for any changes including redness or swelling surrounding the wound, increased drainage or pain as well as fevers or chills.    F/u evaluation of small abrasion of the L lateral calf. Partial thickness with exposed dermis. Weeping serous fluid. No periwound erythema.   Polymem to wound. Cleanse with gentle soap and water. Keep site covered. Change every other day.           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 He reports he moves his heels on his sheets at night and thinks this is how the ulcer started. Believes his leg abrasion occurred during a transfer. The ulcer of his heel was discovered by his family members about one month ago and pt was evaluated in the hospital from 0514-05/19/25 following the discovery. Per hospital visit summary, \" Ramos Rosenthal is a 74 y.o. male patient who originally presented to the hospital on 5/14/2025 due to heel ulcer.  Please see H&P for complete details of presentation.  The patient's case was discussed with podiatry by ED provider.  Patient will was started on IV antibiotics, MRI and vascular studies ordered.  MRI was negative for osteomyelitis.  Vascular study showed 50 to 75% stenosis with great toe pressure within healing range.  Podiatry consulted and felt no surgical intervention was needed at this time.  It was recommended for the patient have close outpatient follow-up with wound care center.  The patient's case was discussed with on-call ID who recommended stopping IV antibiotics given heel ulcer did not look actively infected.  There was also concern for possible infection around permacath site.  Nephrology was consulted given patient's history of ESRD on HD.  Nephrology felt the erythema and itchiness at the permacath site was likely due to allergic reaction to the Tegaderm.  The patient's erythema and itchiness resolved.  The patient remained afebrile off of antibiotics.  Patient received a full session of HD on the day of discharge.  Patient was discharged home with instructions to follow-up with wound care center and his family doctor.  This is a brief discharge summary, please see notes from throughout the patient's hospital stay for complete details of his hospitalization.\" Home care has been seeing patient for dressing " changes. Presently betadine is being used on the wound. He has been using a Prevalon boot for offloading. Eats about two meals daily. Denies fever.     6/17/2025: Patient presents for follow-up of left medial heel ulcer.  Patient reports he was unable to obtain the Santyl from the pharmacy.  Has been using Medihoney on wound.  Overall feels well today with no recent fever, chills, malaise.  He is accompanied by his wife today.          The following portions of the patient's history were reviewed and updated as appropriate:   Problem List[1]  Past Medical History[2]  Past Surgical History[3]  Family History[4]  Social History[5]  Current Medications[6]    Review of Systems      Objective:  /70   Pulse 84   Temp (!) 96.6 °F (35.9 °C)   Resp 18         Physical Exam  Vitals reviewed.     Cardiovascular:      Rate and Rhythm: Normal rate.      Pulses:           Dorsalis pedis pulses are detected w/ Doppler on the right side and detected w/ Doppler on the left side.        Posterior tibial pulses are detected w/ Doppler on the right side and detected w/ Doppler on the left side.   Pulmonary:      Effort: Pulmonary effort is normal.     Musculoskeletal:      Right lower leg: Edema present.      Left lower leg: Edema present.        Feet:    Feet:      Left foot:      Skin integrity: Ulcer present. No erythema.      Comments: Unstageble pressure injury of the L medial heel. There is granulation tissue present along wound periphery. Central portion of the wound with spongy necrotic tissue. No obvious bone exposure. Drainage is small with slight green hue on dressing. No periwound erythema or lymphangitic streaking.       Skin:     Findings: Wound present. No erythema or rash.          Neurological:      Motor: Weakness present.      Gait: Gait abnormal.           Wound 05/14/25 Pressure Injury Heel Left (Active)   Wound Image   06/17/25 3536   Wound Description Slough;Yellow;Granulation tissue;Pink 06/17/25 7218  "  Pressure Injury Stage U 06/17/25 1528   Non-staged Wound Description Full thickness 06/17/25 1528   Wound Length (cm) 2.2 cm 06/17/25 1528   Wound Width (cm) 2.7 cm 06/17/25 1528   Wound Depth (cm) 0.5 cm 06/17/25 1528   Wound Surface Area (cm^2) 4.67 cm^2 06/17/25 1528   Wound Volume (cm^3) 1.555 cm^3 06/17/25 1528   Calculated Wound Volume (cm^3) 2.97 cm^3 06/17/25 1528   Change in Wound Size % 61.63 06/17/25 1528   Drainage Amount Moderate 06/17/25 1528   Drainage Description Serosanguineous;Green 06/17/25 1528   Antonette-wound Assessment Pink;Maceration;Scaly 06/17/25 1528       Wound 06/12/25 Traumatic Abrasion Leg Left;Lower;Lateral (Active)   Wound Image   06/17/25 1532   Wound Description Granulation tissue;Yellow 06/17/25 1527   Non-staged Wound Description Full thickness 06/17/25 1527   Wound Length (cm) 0.4 cm 06/17/25 1527   Wound Width (cm) 0.3 cm 06/17/25 1527   Wound Depth (cm) 0.1 cm 06/17/25 1527   Wound Surface Area (cm^2) 0.09 cm^2 06/17/25 1527   Wound Volume (cm^3) 0.006 cm^3 06/17/25 1527   Calculated Wound Volume (cm^3) 0.01 cm^3 06/17/25 1527   Change in Wound Size % 87.5 06/17/25 1527   Drainage Amount Small 06/17/25 1527   Drainage Description Yellow;Tan 06/17/25 1527   Antonette-wound Assessment Pink;Edema 06/17/25 1527                               Debridement   Wound 05/14/25 Pressure Injury Heel Left     Date/Time: 6/17/2025 3:15 PM    Universal Protocol:  Consent: Verbal consent obtained  Risks and benefits: risks, benefits and alternatives were discussed  Consent given by: patient  Time out: Immediately prior to procedure a \"time out\" was called to verify the correct patient, procedure, equipment, support staff and site/side marked as required.  Timeout called at: 6/17/2025 3:58 PM.  Patient understanding: patient states understanding of the procedure being performed  Patient identity confirmed: verbally with patient    Debridement Details  Performed by: physician  Debridement type: " surgical  Level of debridement: subcutaneous tissue  Pain control: lidocaine 4%    Post-debridement measurements  Length (cm): 2.2  Width (cm): 2.7  Depth (cm): 0.5  Percent debrided: 50%  Surface Area (cm^2): 4.67  Area Debrided (cm^2): 2.34  Volume (cm^3): 1.56    Tissue and other material debrided: subcutaneous tissue  Devitalized tissue debrided: fibrin and necrotic debris  Instrument(s) utilized: scissors  Bleeding: medium  Hemostasis obtained with: pressure  Response to treatment: procedure was tolerated well           Results from last 6 Months   Lab Units 05/14/25  6829   WOUND CULTURE  4+ Growth of Aeromonas hydrophila complex*  4+ Growth of Escherichia coli ESBL*  4+ Growth of Aeromonas hydrophila complex*           Wound Instructions:  Orders Placed This Encounter   Procedures    Wound cleansing and dressings     **3M/Solventum wound vac paperwork initiated today.  Pt agreeable to try the wound vac to the left medial heel**      **Please continue Polymem foam dressing changes until your wound vac arrives in the home. When it arrives, please bring the wound vacuum and carrying case to the wound center. Open the other boxes and find black sponge kit and also a hard plastic kidney-bean shaped canister. Do not open these plastic kits.  PLEASE BRING ONE OF EACH KIT TO THE WOUND CENTER!         Wound cleansing and dressings Left;Lower;Lateral Leg and left heel         Wound care orders left lower leg:     Continue [facility]       Cleanse the wound with normal saline prior to applying a clean dressing. Do not use hydrogen peroxide, rubbing alcohol, or Dial soap.   Please use Vashe (dark blue bottle) and moisten gauze. Then apply this gauze to the LEFT HEEL and soak for 5 minutes.    Cut to fit Polymem foam and apply to left lower leg (rest sent home with patient.)  Cover with ABD pad, rolled gauze, and tape.      Every other day changes.                Increase your protein intake (within  "reason based on your kidney function)  with each meal. Try meat, chicken, greek yogurt, nuts, fish, seafood, legumes, lentils, tofu, and protein supplements.           Please call the Wound Healing Center Monday through Friday between the hours of 8:00 AM and 4:30 PM at [phone] if you have any questions, if you experience any major changes in your wound(s) or for any signs or symptoms of infection such as fever; changes in the redness, swelling, drainage, or odor of your wound. After hours, weekends or holidays please contact your primary care physician or go to the hospital emergency room.     Standing Status:   Future     Expiration Date:   6/24/2025    Debridement Left Heel     This order was created via procedure documentation    Wound Procedure Treatment     This order was created via procedure documentation       Kanu Tello DPM        Portions of the record may have been created with voice recognition software. Occasional wrong word or \"sound alike\" substitutions may have occurred due to the inherent limitations of voice recognition software. Read the chart carefully and recognize, using context, where substitutions have occurred.         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    Anemia    End-stage renal disease on hemodialysis ([facility])    Chronic gout due to renal impairment of multiple sites without tophus    Immunocompromised patient ([facility])    Ulcer of left heel with cellulitis ([facility])    Class 2 severe obesity due to excess calories with serious " comorbidity and body mass index (BMI) of 35.0 to 35.9 in adult ([facility])    RPGN (rapidly progressive glomerulonephritis)    Anemia in ESRD (end-stage renal disease)  ([facility])   [2]   Past Medical History:  Diagnosis Date    Depression     Gout     Immunocompromised patient ([facility]) 10/23/2024    Multiple open wounds of lower leg 2022    Pneumonia 10/22/2024    Problem with dialysis access ([facility]) 2024    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 HERZOG    CATARACT EXTRACTION, BILATERAL      IR BIOPSY KIDNEY RANDOM  2024    IR TEMPORARY DIALYSIS CATHETER PLACEMENT  2024    IR TUNNELED DIALYSIS CATHETER PLACEMENT  2024    IR TUNNELED DIALYSIS CATHETER PLACEMENT  2025    KNEE ARTHROSCOPY Right     x2 , meniscus repair    TONSILECTOMY AND ADNOIDECTOMY     [4]   Family History  Problem Relation Name Age of Onset    Diabetes Father      Diabetes Brother      Diabetes Sister     [5]   Social History  Socioeconomic History    Marital status: /Civil Union     Spouse name: Louise    Number of children: 3    Years of education: 14   Tobacco Use    Smoking status: Former     Current packs/day: 0.00     Types: Cigarettes     Quit date: 1985     Years since quittin.4    Smokeless tobacco: Never   Vaping Use    Vaping status: Never Used   Substance and Sexual Activity    Alcohol use: Not Currently     Comment: occasional    Drug use: Never     Social Drivers of Health     Financial Resource Strain: Low Risk  (2023)    Overall Financial Resource Strain (CARDIA)     Difficulty of Paying Living Expenses: Not very hard   Food Insecurity: No Food Insecurity (2025)    Nursing - Inadequate Food Risk Classification     Worried About Running Out of Food in the Last Year: Never true     Ran  Out of Food in the Last Year: Never true     Ran Out of Food in the Last Year: Never true   Transportation Needs: No Transportation Needs (5/22/2025)    OASIS : Transportation     Lack of Transportation (Medical): No     Lack of Transportation (Non-Medical): No     Patient Unable or Declines to Respond: No   Intimate Partner Violence: Unknown (5/14/2025)    Nursing IPS     Physically Hurt by Someone: No     Hurt or Threatened by Someone: No   Housing Stability: Unknown (5/14/2025)    Nursing: Inadequate Housing Risk Classification     Unable to Pay for Housing in the Last Year: No     Has Housing: No   [6]   Current Outpatient Medications:     allopurinol (ZYLOPRIM) 100 mg tablet, TAKE 1 TABLET BY MOUTH TWICE A DAY, Disp: 180 tablet, Rfl: 1    B Complex-C-Folic Acid (triphrocaps) 1 MG CAPS, Take 1 capsule (1 mg total) by mouth daily with dinner, Disp: 90 capsule, Rfl: 0    calcium carbonate (OYSTER SHELL,OSCAL) 500 mg, TAKE 2 TABLETS BY MOUTH 2 TIMES A DAY WITH MEALS., Disp: 360 tablet, Rfl: 1    cholecalciferol (VITAMIN D3) 1,000 units tablet, Take 2,000 Units by mouth in the morning., Disp: , Rfl:     collagenase (SANTYL) ointment, Apply topically daily To wound of the L heel, Disp: 30 g, Rfl: 1    ergocalciferol (VITAMIN D2) 50,000 units, Take 1 capsule (50,000 Units total) by mouth once a week TAKES EVERY SATURDAY, Disp: , Rfl:     FLUoxetine (PROzac) 20 mg capsule, TAKE 1 CAPSULE BY MOUTH EVERY DAY, Disp: 90 capsule, Rfl: 1    hydrocortisone 2.5 % cream, Apply topically 2 (two) times a day, Disp: 3.5 g, Rfl: 0    loratadine (CLARITIN) 10 mg tablet, Take 10 mg by mouth as needed for allergies Take 1 tablet once daily as needed for allergies, Disp: , Rfl:     Magnesium 100 MG CAPS, Take 1 capsule by mouth in the morning. Not sure of dose., Disp: , Rfl:     midodrine (PROAMATINE) 2.5 mg tablet, TAKE 1 TABLET (2.5 MG TOTAL) BY MOUTH 3 TIMES A DAY BEFORE MEALS, Disp: 270 tablet, Rfl: 2    midodrine (PROAMATINE)  5 mg tablet, Take 1 tablet (5 mg total) by mouth 3 (three) times a week ADMINISTERED EVERY MON, WED AND FRI AT DIALYSIS, Disp: , Rfl:     nystatin (MYCOSTATIN) powder, Apply topically 3 (three) times a day, Disp: 60 g, Rfl: 3    pantoprazole (PROTONIX) 40 mg tablet, Take 1 tablet (40 mg total) by mouth daily, Disp: 90 tablet, Rfl: 1

## 2025-06-17 NOTE — PATIENT INSTRUCTIONS
Orders Placed This Encounter   Procedures    Wound cleansing and dressings     **3M/Solventum wound vac paperwork initiated today.  Pt agreeable to try the wound vac to the left medial heel**      **Please continue Polymem foam dressing changes until your wound vac arrives in the home. When it arrives, please bring the wound vacuum and carrying case to the wound center. Open the other boxes and find black sponge kit and also a hard plastic kidney-bean shaped canister. Do not open these plastic kits.  PLEASE BRING ONE OF EACH KIT TO THE WOUND CENTER!         Wound cleansing and dressings Left;Lower;Lateral Leg and left heel         Wound care orders left lower leg:     Continue . [facility]       Cleanse the wound with normal saline prior to applying a clean dressing. Do not use hydrogen peroxide, rubbing alcohol, or Dial soap.   Please use Vashe (dark blue bottle) and moisten gauze. Then apply this gauze to the LEFT HEEL and soak for 5 minutes.    Cut to fit Polymem foam and apply to left lower leg (rest sent home with patient.)  Cover with ABD pad, rolled gauze, and tape.      Every other day changes.                Increase your protein intake (within reason based on your kidney function)  with each meal. Try meat, chicken, greek yogurt, nuts, fish, seafood, legumes, lentils, tofu, and protein supplements.           Please call the Wound Healing Center Monday through Friday between the hours of 8:00 AM and 4:30 PM at [phone] if you have any questions, if you experience any major changes in your wound(s) or for any signs or symptoms of infection such as fever; changes in the redness, swelling, drainage, or odor of your wound. After hours, weekends or holidays please contact your primary care physician or go to the hospital emergency room.     Standing Status:   Future     Expiration Date:   6/24/2025    Debridement     This order was created via procedure documentation

## 2025-06-18 ENCOUNTER — HOME CARE VISIT (OUTPATIENT)
Dept: HOME HEALTH SERVICES | Facility: HOME HEALTHCARE | Age: 75
End: 2025-06-18
Payer: MEDICARE

## 2025-06-19 ENCOUNTER — HOME CARE VISIT (OUTPATIENT)
Dept: HOME HEALTH SERVICES | Facility: HOME HEALTHCARE | Age: 75
End: 2025-06-19
Payer: MEDICARE

## 2025-06-19 PROCEDURE — G0299 HHS/HOSPICE OF RN EA 15 MIN: HCPCS

## 2025-06-20 VITALS
TEMPERATURE: 97.6 F | RESPIRATION RATE: 18 BRPM | DIASTOLIC BLOOD PRESSURE: 68 MMHG | HEART RATE: 64 BPM | OXYGEN SATURATION: 98 % | SYSTOLIC BLOOD PRESSURE: 126 MMHG

## 2025-06-21 ENCOUNTER — HOME CARE VISIT (OUTPATIENT)
Dept: HOME HEALTH SERVICES | Facility: HOME HEALTHCARE | Age: 75
End: 2025-06-21
Payer: MEDICARE

## 2025-06-21 ENCOUNTER — TELEPHONE (OUTPATIENT)
Dept: OTHER | Facility: OTHER | Age: 75
End: 2025-06-21

## 2025-06-21 NOTE — TELEPHONE ENCOUNTER
Reason for Call: RN scheduled for wound check visit has not shown up    Caller's Name: Mary Person    Caller's Relationship to Patient: Daughter    Caller's Callback Number:[phone]    Home Health OR Hospice Patient: HERN[facility]N

## 2025-06-22 ENCOUNTER — HOME CARE VISIT (OUTPATIENT)
Dept: HOME HEALTH SERVICES | Facility: HOME HEALTHCARE | Age: 75
End: 2025-06-22
Payer: MEDICARE

## 2025-06-22 PROCEDURE — G0299 HHS/HOSPICE OF RN EA 15 MIN: HCPCS

## 2025-06-23 VITALS — TEMPERATURE: 98 F | OXYGEN SATURATION: 94 % | RESPIRATION RATE: 16 BRPM

## 2025-06-24 ENCOUNTER — APPOINTMENT (EMERGENCY)
Dept: NON INVASIVE DIAGNOSTICS | Facility: HOSPITAL | Age: 75
DRG: 640 | End: 2025-06-24
Payer: MEDICARE

## 2025-06-24 ENCOUNTER — APPOINTMENT (EMERGENCY)
Dept: RADIOLOGY | Facility: HOSPITAL | Age: 75
DRG: 640 | End: 2025-06-24
Payer: MEDICARE

## 2025-06-24 ENCOUNTER — HOSPITAL ENCOUNTER (INPATIENT)
Facility: HOSPITAL | Age: 75
LOS: 5 days | Discharge: HOME HEALTH | DRG: 640 | End: 2025-06-30
Attending: EMERGENCY MEDICINE | Admitting: HOSPITALIST
Payer: MEDICARE

## 2025-06-24 DIAGNOSIS — L03.90 WOUND CELLULITIS: ICD-10-CM

## 2025-06-24 DIAGNOSIS — N25.81 SECONDARY HYPERPARATHYROIDISM OF RENAL ORIGIN (HCC): ICD-10-CM

## 2025-06-24 DIAGNOSIS — Z99.2 HEMODIALYSIS PATIENT (HCC): ICD-10-CM

## 2025-06-24 DIAGNOSIS — R06.02 SOB (SHORTNESS OF BREATH): Primary | ICD-10-CM

## 2025-06-24 DIAGNOSIS — L97.422 SKIN ULCER OF LEFT HEEL WITH FAT LAYER EXPOSED (HCC): ICD-10-CM

## 2025-06-24 DIAGNOSIS — I82.5Y1 CHRONIC DEEP VEIN THROMBOSIS (DVT) OF PROXIMAL VEIN OF RIGHT LOWER EXTREMITY (HCC): ICD-10-CM

## 2025-06-24 DIAGNOSIS — R60.0 LOWER EXTREMITY EDEMA: ICD-10-CM

## 2025-06-24 PROBLEM — I82.501 CHRONIC DEEP VEIN THROMBOSIS (DVT) OF RIGHT LOWER EXTREMITY (HCC): Status: ACTIVE | Noted: 2025-06-24

## 2025-06-24 PROBLEM — I82.502 CHRONIC DEEP VEIN THROMBOSIS (DVT) OF LEFT LOWER EXTREMITY (HCC): Status: ACTIVE | Noted: 2025-06-24

## 2025-06-24 LAB
2HR DELTA HS TROPONIN: 4 NG/L
4HR DELTA HS TROPONIN: 2 NG/L
ALBUMIN SERPL BCG-MCNC: 3.2 G/DL (ref 3.5–5)
ALP SERPL-CCNC: 70 U/L (ref 34–104)
ALT SERPL W P-5'-P-CCNC: 3 U/L (ref 7–52)
ANION GAP SERPL CALCULATED.3IONS-SCNC: 8 MMOL/L (ref 4–13)
APTT PPP: 34 SECONDS (ref 23–34)
AST SERPL W P-5'-P-CCNC: 11 U/L (ref 13–39)
BASOPHILS # BLD AUTO: 0.1 THOUSANDS/ÂΜL (ref 0–0.1)
BASOPHILS NFR BLD AUTO: 1 % (ref 0–1)
BILIRUB SERPL-MCNC: 0.49 MG/DL (ref 0.2–1)
BNP SERPL-MCNC: 2793 PG/ML (ref 0–100)
BUN SERPL-MCNC: 16 MG/DL (ref 5–25)
CALCIUM ALBUM COR SERPL-MCNC: 9.4 MG/DL (ref 8.3–10.1)
CALCIUM SERPL-MCNC: 8.8 MG/DL (ref 8.4–10.2)
CARDIAC TROPONIN I PNL SERPL HS: 25 NG/L (ref ?–50)
CARDIAC TROPONIN I PNL SERPL HS: 27 NG/L (ref ?–50)
CARDIAC TROPONIN I PNL SERPL HS: 29 NG/L (ref ?–50)
CHLORIDE SERPL-SCNC: 95 MMOL/L (ref 96–108)
CO2 SERPL-SCNC: 30 MMOL/L (ref 21–32)
CREAT SERPL-MCNC: 4.92 MG/DL (ref 0.6–1.3)
EOSINOPHIL # BLD AUTO: 0.17 THOUSAND/ÂΜL (ref 0–0.61)
EOSINOPHIL NFR BLD AUTO: 2 % (ref 0–6)
ERYTHROCYTE [DISTWIDTH] IN BLOOD BY AUTOMATED COUNT: 15.6 % (ref 11.6–15.1)
GFR SERPL CREATININE-BSD FRML MDRD: 10 ML/MIN/1.73SQ M
GLUCOSE SERPL-MCNC: 104 MG/DL (ref 65–140)
HCT VFR BLD AUTO: 34.5 % (ref 36.5–49.3)
HGB BLD-MCNC: 10.8 G/DL (ref 12–17)
IMM GRANULOCYTES # BLD AUTO: 0.02 THOUSAND/UL (ref 0–0.2)
IMM GRANULOCYTES NFR BLD AUTO: 0 % (ref 0–2)
INR PPP: 1 (ref 0.85–1.19)
LYMPHOCYTES # BLD AUTO: 0.96 THOUSANDS/ÂΜL (ref 0.6–4.47)
LYMPHOCYTES NFR BLD AUTO: 11 % (ref 14–44)
MCH RBC QN AUTO: 29.5 PG (ref 26.8–34.3)
MCHC RBC AUTO-ENTMCNC: 31.3 G/DL (ref 31.4–37.4)
MCV RBC AUTO: 94 FL (ref 82–98)
MONOCYTES # BLD AUTO: 0.54 THOUSAND/ÂΜL (ref 0.17–1.22)
MONOCYTES NFR BLD AUTO: 6 % (ref 4–12)
NEUTROPHILS # BLD AUTO: 6.91 THOUSANDS/ÂΜL (ref 1.85–7.62)
NEUTS SEG NFR BLD AUTO: 80 % (ref 43–75)
NRBC BLD AUTO-RTO: 0 /100 WBCS
PLATELET # BLD AUTO: 267 THOUSANDS/UL (ref 149–390)
PMV BLD AUTO: 8.8 FL (ref 8.9–12.7)
POTASSIUM SERPL-SCNC: 3.9 MMOL/L (ref 3.5–5.3)
PROT SERPL-MCNC: 5.5 G/DL (ref 6.4–8.4)
PROTHROMBIN TIME: 13.7 SECONDS (ref 12.3–15)
RBC # BLD AUTO: 3.66 MILLION/UL (ref 3.88–5.62)
SODIUM SERPL-SCNC: 133 MMOL/L (ref 135–147)
WBC # BLD AUTO: 8.7 THOUSAND/UL (ref 4.31–10.16)

## 2025-06-24 PROCEDURE — 93005 ELECTROCARDIOGRAM TRACING: CPT

## 2025-06-24 PROCEDURE — 84484 ASSAY OF TROPONIN QUANT: CPT

## 2025-06-24 PROCEDURE — 99215 OFFICE O/P EST HI 40 MIN: CPT | Performed by: INTERNAL MEDICINE

## 2025-06-24 PROCEDURE — 94760 N-INVAS EAR/PLS OXIMETRY 1: CPT

## 2025-06-24 PROCEDURE — 87081 CULTURE SCREEN ONLY: CPT | Performed by: HOSPITALIST

## 2025-06-24 PROCEDURE — 83880 ASSAY OF NATRIURETIC PEPTIDE: CPT

## 2025-06-24 PROCEDURE — 99285 EMERGENCY DEPT VISIT HI MDM: CPT | Performed by: EMERGENCY MEDICINE

## 2025-06-24 PROCEDURE — 85610 PROTHROMBIN TIME: CPT

## 2025-06-24 PROCEDURE — 85730 THROMBOPLASTIN TIME PARTIAL: CPT

## 2025-06-24 PROCEDURE — 36415 COLL VENOUS BLD VENIPUNCTURE: CPT

## 2025-06-24 PROCEDURE — 99285 EMERGENCY DEPT VISIT HI MDM: CPT

## 2025-06-24 PROCEDURE — 71045 X-RAY EXAM CHEST 1 VIEW: CPT

## 2025-06-24 PROCEDURE — 93971 EXTREMITY STUDY: CPT | Performed by: STUDENT IN AN ORGANIZED HEALTH CARE EDUCATION/TRAINING PROGRAM

## 2025-06-24 PROCEDURE — 93971 EXTREMITY STUDY: CPT

## 2025-06-24 PROCEDURE — 99222 1ST HOSP IP/OBS MODERATE 55: CPT | Performed by: HOSPITALIST

## 2025-06-24 PROCEDURE — 80053 COMPREHEN METABOLIC PANEL: CPT

## 2025-06-24 PROCEDURE — 85025 COMPLETE CBC W/AUTO DIFF WBC: CPT

## 2025-06-24 RX ORDER — ALLOPURINOL 100 MG/1
100 TABLET ORAL 2 TIMES DAILY
Status: DISCONTINUED | OUTPATIENT
Start: 2025-06-24 | End: 2025-06-30 | Stop reason: HOSPADM

## 2025-06-24 RX ORDER — GUAIFENESIN/DEXTROMETHORPHAN 100-10MG/5
10 SYRUP ORAL EVERY 4 HOURS PRN
Status: DISCONTINUED | OUTPATIENT
Start: 2025-06-24 | End: 2025-06-30 | Stop reason: HOSPADM

## 2025-06-24 RX ORDER — SENNOSIDES 8.6 MG
1 TABLET ORAL
Status: DISCONTINUED | OUTPATIENT
Start: 2025-06-24 | End: 2025-06-26

## 2025-06-24 RX ORDER — HYDRALAZINE HYDROCHLORIDE 20 MG/ML
5 INJECTION INTRAMUSCULAR; INTRAVENOUS EVERY 6 HOURS PRN
Status: DISCONTINUED | OUTPATIENT
Start: 2025-06-24 | End: 2025-06-30 | Stop reason: HOSPADM

## 2025-06-24 RX ORDER — MIDODRINE HYDROCHLORIDE 5 MG/1
5 TABLET ORAL 3 TIMES WEEKLY
Status: DISCONTINUED | OUTPATIENT
Start: 2025-06-25 | End: 2025-06-30 | Stop reason: HOSPADM

## 2025-06-24 RX ORDER — ASCORBIC ACID, THIAMINE MONONITRATE,RIBOFLAVIN, NIACINAMIDE, PYRIDOXINE HYDROCHLORIDE, FOLIC ACID, CYANOCOBALAMIN, BIOTIN, CALCIUM PANTOTHENATE, 100; 1.5; 1.7; 20; 10; 1; 6000; 150000; 5 MG/1; MG/1; MG/1; MG/1; MG/1; MG/1; UG/1; UG/1; MG/1
1 CAPSULE, LIQUID FILLED ORAL
Status: DISCONTINUED | OUTPATIENT
Start: 2025-06-24 | End: 2025-06-30 | Stop reason: HOSPADM

## 2025-06-24 RX ORDER — ACETAMINOPHEN 325 MG/1
650 TABLET ORAL EVERY 6 HOURS PRN
Status: DISCONTINUED | OUTPATIENT
Start: 2025-06-24 | End: 2025-06-30 | Stop reason: HOSPADM

## 2025-06-24 RX ORDER — CALCIUM CARBONATE 500(1250)
2 TABLET ORAL 2 TIMES DAILY WITH MEALS
Status: DISCONTINUED | OUTPATIENT
Start: 2025-06-25 | End: 2025-06-30 | Stop reason: HOSPADM

## 2025-06-24 RX ORDER — PANTOPRAZOLE SODIUM 40 MG/1
40 TABLET, DELAYED RELEASE ORAL DAILY
Status: DISCONTINUED | OUTPATIENT
Start: 2025-06-25 | End: 2025-06-30 | Stop reason: HOSPADM

## 2025-06-24 RX ORDER — CALCIUM CARBONATE 500 MG/1
500 TABLET, CHEWABLE ORAL DAILY PRN
Status: DISCONTINUED | OUTPATIENT
Start: 2025-06-24 | End: 2025-06-30 | Stop reason: HOSPADM

## 2025-06-24 RX ORDER — ONDANSETRON 2 MG/ML
4 INJECTION INTRAMUSCULAR; INTRAVENOUS EVERY 6 HOURS PRN
Status: DISCONTINUED | OUTPATIENT
Start: 2025-06-24 | End: 2025-06-30 | Stop reason: HOSPADM

## 2025-06-24 RX ORDER — LEVALBUTEROL INHALATION SOLUTION 1.25 MG/3ML
1.25 SOLUTION RESPIRATORY (INHALATION) EVERY 8 HOURS PRN
Status: DISCONTINUED | OUTPATIENT
Start: 2025-06-24 | End: 2025-06-24

## 2025-06-24 RX ORDER — CALCITRIOL 0.25 UG/1
0.25 CAPSULE, LIQUID FILLED ORAL 3 TIMES WEEKLY
Status: DISCONTINUED | OUTPATIENT
Start: 2025-06-25 | End: 2025-06-30 | Stop reason: HOSPADM

## 2025-06-24 RX ORDER — HEPARIN SODIUM 5000 [USP'U]/ML
5000 INJECTION, SOLUTION INTRAVENOUS; SUBCUTANEOUS EVERY 8 HOURS SCHEDULED
Status: DISCONTINUED | OUTPATIENT
Start: 2025-06-24 | End: 2025-06-25

## 2025-06-24 RX ORDER — ECHINACEA PURPUREA EXTRACT 125 MG
1 TABLET ORAL
Status: DISCONTINUED | OUTPATIENT
Start: 2025-06-24 | End: 2025-06-30 | Stop reason: HOSPADM

## 2025-06-24 RX ORDER — NYSTATIN 100000 [USP'U]/G
POWDER TOPICAL 3 TIMES DAILY
Status: DISCONTINUED | OUTPATIENT
Start: 2025-06-24 | End: 2025-06-30 | Stop reason: HOSPADM

## 2025-06-24 RX ORDER — LANOLIN ALCOHOL/MO/W.PET/CERES
400 CREAM (GRAM) TOPICAL DAILY
Status: DISCONTINUED | OUTPATIENT
Start: 2025-06-25 | End: 2025-06-30 | Stop reason: HOSPADM

## 2025-06-24 RX ORDER — POLYETHYLENE GLYCOL 3350 17 G/17G
17 POWDER, FOR SOLUTION ORAL DAILY PRN
Status: DISCONTINUED | OUTPATIENT
Start: 2025-06-24 | End: 2025-06-30 | Stop reason: HOSPADM

## 2025-06-24 RX ORDER — MIDODRINE HYDROCHLORIDE 5 MG/1
2.5 TABLET ORAL
Status: DISCONTINUED | OUTPATIENT
Start: 2025-06-25 | End: 2025-06-30 | Stop reason: HOSPADM

## 2025-06-24 RX ORDER — LORATADINE 10 MG/1
10 TABLET ORAL DAILY
Status: DISCONTINUED | OUTPATIENT
Start: 2025-06-25 | End: 2025-06-30 | Stop reason: HOSPADM

## 2025-06-24 RX ADMIN — HEPARIN SODIUM 5000 UNITS: 5000 INJECTION, SOLUTION INTRAVENOUS; SUBCUTANEOUS at 21:13

## 2025-06-24 RX ADMIN — FUROSEMIDE 160 MG: 10 INJECTION, SOLUTION INTRAMUSCULAR; INTRAVENOUS at 14:33

## 2025-06-24 RX ADMIN — Medication 1 CAPSULE: at 20:11

## 2025-06-24 RX ADMIN — ALLOPURINOL 100 MG: 100 TABLET ORAL at 20:11

## 2025-06-24 NOTE — H&P
H&P - Hospitalist   Name: Ramos Gomez 74 y.o. male I MRN: [id number]  Unit/Bed#: CORNEL I Date of Admission: 6/24/2025   Date of Service: 6/24/2025 I Hospital Day: 0     Assessment & Plan  Volume overload  Pt presents with shortness of breath x 1 day, increased LLE swelling in setting of prematurely discontinued HD session concerning for volume overload  Pt received IV lasix x1 in ER. Pt does not make urine.   Chest XR -pending  Pt will be managed by hemodialysis in the AM  Secondary hyperparathyroidism of renal origin ([facility])    Essential hypertension  Stable  Prn hydralazine.   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 Will cont HD MWF per renal.  Class 2 severe obesity due to excess calories with serious comorbidity and body mass index (BMI) of 35.0 to 35.9 in adult ([facility])  Contributes to all aspects of care  Weight loss encouraged    Anemia in ESRD (end-stage renal disease)  ([facility])  Lab Results   Component Value Date    EGFR 10 06/24/2025    EGFR 5 05/19/2025    EGFR 7 05/18/2025    CREATININE 4.92 (H) 06/24/2025    CREATININE 8.05 (H) 05/19/2025    CREATININE 6.73 (H) 05/18/2025     Chronic deep vein thrombosis (DVT) of right lower extremity ([facility])  US LE:  Result in progress   This result has not been signed. Information might be incomplete.       Result Text   [facility] REPORT  .  RAMOS GOMEZ is a 74 year old M who was referred by KERRY GIBBONS.        Indications:   Patient presents with chronic right lower extremity edema worsening x a few days with chest pain.   Operative History:   No cardiovascular surgeries noted   Risk Factors:   The patient reports hypertension, hyperlipidemia, diabetes and renal disease.           FINDINGS:     Main                                    Venous Thrombus  Right Deep Veins                                     Right Proximal Femoral                Chronic     Right Mid Femoral Vein                Chronic     Right Distal Fem Vein                 Chronic     Right Popliteal Vein                  Chronic     Gastrocnemius Vein        Chronic - Occlusive     CONCLUSION:     RIGHT LOWER LIMB   There is evidence of non-occlusive chronic deep vein thrombosis noted in one of the paired femoral veins and popliteal vein. There is occlusive chronic vs sub-acute deep vein thrombosis noted in the gastrocnemius veins.   There is no evidence of superficial thrombophlebitis.   Doppler evaluation shows a normal response to augmentation maneuvers.   Popliteal, posterior tibial and anterior tibial arterial Doppler waveforms are triphasic/biphasic.       LEFT LOWER LIMB LIMITED   There is no evidence of thrombus in the common femoral vein.    Doppler evaluation shows a normal response to augmentation maneuvers.           Will consult heme onc to see if pt will benefit from ongoing systemic AC.          Chief Complaint   Patient presents with    Shortness of Breath     Shortness of breath starting after dialysis yesterday. MWF dialysis and patient believes he got the full treatment of dialysis however states they needed to stop in the middle of session due to power outage.         HPI:  Ramos Rosenthal is a 74 y.o. male who presents with shortness of breath.Pt did not get a full Rx of HD d/t power outage. Pt family at bedside and states LE are more swollen as well. Pt denies chest pain, denies abd pain, denies focal deficits. States he does not make urine. He is HD dependent d/t rapidly progressive glomerulonephritis.     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 HERZOG    CATARACT EXTRACTION, BILATERAL      IR BIOPSY KIDNEY RANDOM  2024    IR TEMPORARY DIALYSIS CATHETER PLACEMENT  2024    IR TUNNELED DIALYSIS CATHETER PLACEMENT  2024    IR TUNNELED DIALYSIS CATHETER PLACEMENT  2025    KNEE ARTHROSCOPY Right     x2 , meniscus repair    TONSILECTOMY AND ADNOIDECTOMY       Social History   Social History     Substance and Sexual Activity   Alcohol Use Not Currently    Comment: occasional     Social History     Substance and Sexual Activity   Drug Use Never     Social History     Tobacco Use   Smoking Status Former    Current packs/day: 0.00    Types: Cigarettes    Quit date:     Years since quittin.5   Smokeless Tobacco Never     Family History   Problem Relation Name Age of Onset    Diabetes Father      Diabetes Brother      Diabetes Sister         Meds/Allergies   Allergies   Allergen Reactions    Ciprofloxacin Blisters    Other      IVORY SOAP    Penicillins Rash       Meds:    Current Facility-Administered Medications:     acetaminophen (TYLENOL) tablet 650 mg, 650 mg, Oral, Q6H PRN, David Stauffer MD    Artificial Tears Op Soln 1 drop, 1 drop, Both Eyes, Q6H PRN, David Stauffer MD    calcium carbonate (TUMS) chewable tablet 500 mg, 500 mg, Oral, Daily PRN, David Stauffer MD    dextromethorphan-guaiFENesin (ROBITUSSIN DM) oral syrup 10 mL, 10 mL, Oral, Q4H PRN, David Stauffer MD    furosemide (LASIX) 160 mg in dextrose 5 % 50 mL IVPB, 160 mg, Intravenous, Once, Mike Freitas PA-C, 160 mg at 25 1433    hydrALAZINE (APRESOLINE) injection 5 mg, 5 mg, Intravenous, Q6H PRN, David Stauffer MD    levalbuterol (XOPENEX) inhalation  solution 1.25 mg, 1.25 mg, Nebulization, Q8H PRN, David Stauffer MD    melatonin tablet 3 mg, 3 mg, Oral, HS PRN, David Stauffer MD    ondansetron (ZOFRAN) injection 4 mg, 4 mg, Intravenous, Q6H PRN, David Stauffer MD    polyethylene glycol (MIRALAX) packet 17 g, 17 g, Oral, Daily PRN, David Stauffer MD    senna (SENOKOT) tablet 8.6 mg, 1 tablet, Oral, HS PRN, David Stauffer MD    sodium chloride (OCEAN) 0.65 % nasal spray 1 spray, 1 spray, Each Nare, Q1H PRN, David Stauffer MD      Medications Prior to Admission:     allopurinol (ZYLOPRIM) 100 mg tablet    B Complex-C-Folic Acid (triphrocaps) 1 MG CAPS    calcium carbonate (OYSTER SHELL,OSCAL) 500 mg    cholecalciferol (VITAMIN D3) 1,000 units tablet    collagenase (SANTYL) ointment    ergocalciferol (VITAMIN D2) 50,000 units    FLUoxetine (PROzac) 20 mg capsule    hydrocortisone 2.5 % cream    loratadine (CLARITIN) 10 mg tablet    Magnesium 100 MG CAPS    midodrine (PROAMATINE) 2.5 mg tablet    midodrine (PROAMATINE) 5 mg tablet    nystatin (MYCOSTATIN) powder    pantoprazole (PROTONIX) 40 mg tablet      Review of Systems:    A complete and comprehensive 14 point organ system review was performed and all other systems are negative other than stated above in the HPI    Current Vitals:   Blood Pressure: 130/74 (06/24/25 1430)  Pulse: 72 (06/24/25 1430)  Temperature: 98.2 °F (36.8 °C) (06/24/25 1147)  Temp Source: Temporal (06/24/25 1147)  Respirations: 19 (06/24/25 1430)  Weight - Scale: 126 kg (277 lb 9 oz) (06/24/25 1147)  SpO2: 97 % (06/24/25 1430)  SPO2 RA Rest      Flowsheet Row ED to Hosp-Admission (Current) from 6/24/2025 in [facility]   SpO2 97 %   SpO2 Activity At Rest   O2 Device None (Room air)   O2 Flow Rate --          No intake or output data in the 24 hours ending 06/24/25 1501  Body mass index is 36.62 kg/m².     Physical Exam:       General: well appearing, no acute  distress  HEENT: atraumatic, PERRLA, moist mucosa, normal pharynx, normal tonsils and adenoids, normal tongue, no fluid in sinuses  Neck: Trachea midline, no carotid bruit, no masses  Respiratory: normal chest wall expansion, CTA B, no r/r/w, no rubs  Cardiovascular: RRR, no m/r/g, Normal S1 and S2  Abdomen: Soft, non-tender, non-distended, normal bowel sounds in all quadrants, no hepatosplenomegaly, no tympany  Rectal: deferred  Musculoskeletal: LLE edema  Integumentary: warm, dry, and pink, with no rash, purpura, or petechia  Heme/Lymph: no lymphadenopathy, no bruises  Neurological: chronically bed bound  Psychiatric: cooperative with normal mood and affect    Lab Results:   CBC:   Lab Results   Component Value Date    WBC 8.70 06/24/2025    HGB 10.8 (L) 06/24/2025    HCT 34.5 (L) 06/24/2025    MCV 94 06/24/2025     06/24/2025    RBC 3.66 (L) 06/24/2025    MCH 29.5 06/24/2025    MCHC 31.3 (L) 06/24/2025    RDW 15.6 (H) 06/24/2025    MPV 8.8 (L) 06/24/2025    NRBC 0 06/24/2025     CMP:  Lab Results   Component Value Date     06/19/2018    CL 95 (L) 06/24/2025     06/19/2018    CO2 30 06/24/2025    CO2 27 06/19/2018    ANIONGAP 12.6 06/19/2018    BUN 16 06/24/2025    BUN 16 06/19/2018    CREATININE 4.92 (H) 06/24/2025    CREATININE 0.81 06/19/2018    CALCIUM 8.8 06/24/2025    CALCIUM 9.4 06/19/2018    AST 11 (L) 06/24/2025    AST 21 06/19/2018    ALT 3 (L) 06/24/2025    ALT 22 06/19/2018    ALKPHOS 70 06/24/2025    ALKPHOS 72 06/19/2018    PROT 6.6 06/19/2018    BILITOT 0.7 06/19/2018    EGFR 10 06/24/2025     Lab Results   Component Value Date    CKTOTAL 32 (L) 02/10/2023     Coagulation:   Lab Results   Component Value Date    INR 1.00 06/24/2025    Urinalysis:  Lab Results   Component Value Date    COLORU Yellow 05/15/2025    CLARITYU Turbid (A) 05/15/2025    SPECGRAV 1.030 (H) 05/15/2025    PHUR 6.0 05/15/2025    LEUKOCYTESUR 3+ (A) 05/15/2025    NITRITE Negative 05/15/2025    GLUCOSEU  "Negative 05/15/2025    KETONESU 15 (1+) (A) 05/15/2025    BILIRUBINUR Negative 05/15/2025    BLOODU 3+ (A) 05/15/2025      Amylase: No results found for: \"AMYLASE\"  Lipase: No results found for: \"LIPASE\"     Imaging: US bedside procedure  Result Date: 6/24/2025  Narrative: [id number]    EKG, Pathology, and Other Studies: I have personally reviewed the results.    VTE   Prophylaxis: In place    Code Status: Level 1 - Full Code    Anticipated Length of Stay:  Patient will be admitted on an Observation basis with an anticipated length of stay of  less 2 midnights.       \"This note has been constructed using a voice recognition system\"      David Stauffer MD  6/24/2025, 3:01 PM        "

## 2025-06-24 NOTE — RESPIRATORY THERAPY NOTE
RT Protocol Note  Ramos Rosenthal 74 y.o. male MRN: [id number]  Unit/Bed#: -01 Encounter: [id number]    Assessment    Principal Problem:    Volume overload  Active Problems:    Secondary hyperparathyroidism of renal origin ([facility])    Essential hypertension    End-stage renal disease on hemodialysis ([facility])    Class 2 severe obesity due to excess calories with serious comorbidity and body mass index (BMI) of 35.0 to 35.9 in adult ([facility])    Anemia in ESRD (end-stage renal disease)  ([facility])    Chronic deep vein thrombosis (DVT) of right lower extremity ([facility])      Home Pulmonary Medications:  none       Past Medical History[1]  Social History[2]    Subjective         Objective    Physical Exam:   Assessment Type: Assess only  General Appearance: Alert, Awake  Respiratory Pattern: Dyspnea with exertion  Chest Assessment: Chest expansion symmetrical  Bilateral Breath Sounds: Clear, Diminished  Cough: Non-productive  O2 Device: RMA    Vitals:  Blood pressure 116/83, pulse 73, temperature 97.7 °F (36.5 °C), resp. rate 20, weight 126 kg (277 lb 9 oz), SpO2 95%.          Imaging and other studies: Results Review Statement: No pertinent imaging studies reviewed.    O2 Device: RMA     Plan    Respiratory Plan: No distress/Pulmonary history        Resp Comments: Pt admitted with volume overload. Hx of hemodialysis. CXR not read yet. BS are diminished and fairly clear. Pt states his breathing feels better than when he came in. No pulm hx or meds at home. No resp. therapy indicated at this time.        [1]   Past Medical History:  Diagnosis Date    Depression     Gout     Immunocompromised patient ([facility]) 10/23/2024    Multiple open wounds of lower leg 09/30/2022    Pneumonia 10/22/2024    Problem with dialysis access (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 09/20/2024    Rash due to vaculitis  09/30/2022   [2]   Social History  Socioeconomic History    Marital status: /Civil Union      Spouse name: Louise    Number of children: 3    Years of education: 14   Tobacco Use    Smoking status: Former     Current packs/day: 0.00     Types: Cigarettes     Quit date:      Years since quittin.5    Smokeless tobacco: Never   Vaping Use    Vaping status: Never Used   Substance and Sexual Activity    Alcohol use: Not Currently     Comment: occasional    Drug use: Never     Social Drivers of Health     Financial Resource Strain: Low Risk  (2023)    Overall Financial Resource Strain (CARDIA)     Difficulty of Paying Living Expenses: Not very hard   Food Insecurity: No Food Insecurity (2025)    Nursing - Inadequate Food Risk Classification     Worried About Running Out of Food in the Last Year: Never true     Ran Out of Food in the Last Year: Never true     Ran Out of Food in the Last Year: Never true   Transportation Needs: No Transportation Needs (2025)    [place] : Transportation     Lack of Transportation (Medical): No     Lack of Transportation (Non-Medical): No     Patient Unable or Declines to Respond: No   Intimate Partner Violence: Unknown (2025)    Nursing IPS     Physically Hurt by Someone: No     Hurt or Threatened by Someone: No   Housing Stability: Unknown (2025)    Nursing: Inadequate Housing Risk Classification     Unable to Pay for Housing in the Last Year: No     Has Housing: No

## 2025-06-24 NOTE — ASSESSMENT & PLAN NOTE
Complains of shortness of breath starting yesterday after hemodialysis. Daughter and wife at bedside state patient was very short of breath this morning with minimal exertion. Chest x-ray pending official read, appears to have mild pulmonary edema on my read, 6/24.

## 2025-06-24 NOTE — ASSESSMENT & PLAN NOTE
Lab Results   Component Value Date    EGFR 10 06/24/2025    EGFR 5 05/19/2025    EGFR 7 05/18/2025    CREATININE 4.92 (H) 06/24/2025    CREATININE 8.05 (H) 05/19/2025    CREATININE 6.73 (H) 05/18/2025

## 2025-06-24 NOTE — ASSESSMENT & PLAN NOTE
Lab Results   Component Value Date    EGFR 10 06/24/2025    EGFR 5 05/19/2025    EGFR 7 05/18/2025    CREATININE 4.92 (H) 06/24/2025    CREATININE 8.05 (H) 05/19/2025    CREATININE 6.73 (H) 05/18/2025   ESRD on HD at [facility] under the care of Dr. Moore  Most recent dialysis yesterday, 6/23 for 196 minutes 1 L UF with postweight of 123.7 kg.  OP orders: .5 kg, 210-minute tx time, Access right PermCath  /, 3K/2.5 Ca/35 HCO3/135 Na+  Care previously discussed with Dr. Moore, advised to give Lasix 160 mg IV  given patient is not oliguric with small amount of urine at baseline which patient states he urinates small amount when straining for BM. Most recent dialysis yesterday.  Patient reports he makes very small amount of urine. Volume status examines hypervolemic with 3+ RLE edema and decreased lung sounds. LLE in ortho boot, mild edema noted. Plan for HD tomorrow. Challenge EDW by 1-2 L as tolerated. Orders entered.

## 2025-06-24 NOTE — CONSULTS
Consultation - Nephrology   Name: Ramos Rosenthal 74 y.o. male I MRN: [id number]  Unit/Bed#: [facility] I Date of Admission: 6/24/2025   Date of Service: 6/24/2025 I Hospital Day: 0   Inpatient consult to Nephrology  Consult performed by: BRETT Victor  Consult ordered by: Mike Freitas PA-C        Physician Requesting Evaluation: Mike Perez DO   Reason for Evaluation / Principal Problem: ESRD on HD, Shortness of breath    Assessment & Plan  End-stage renal disease on hemodialysis (HCC)  Lab Results   Component Value Date    EGFR 10 06/24/2025    EGFR 5 05/19/2025    EGFR 7 05/18/2025    CREATININE 4.92 (H) 06/24/2025    CREATININE 8.05 (H) 05/19/2025    CREATININE 6.73 (H) 05/18/2025   ESRD on HD at [facility] under the care of Dr. Moore  Most recent dialysis yesterday, 6/23 for 196 minutes 1 L UF with postweight of 123.7 kg.  OP orders: .5 kg, 210-minute tx time, Access right PermCath  /, 3K/2.5 Ca/35 HCO3/135 Na+  Care previously discussed with Dr. Moore, advised to give Lasix 160 mg IV  given patient is not oliguric with small amount of urine at baseline which patient states he urinates small amount when straining for BM. Most recent dialysis yesterday.  Patient reports he makes very small amount of urine. Volume status examines hypervolemic with 3+ RLE edema and decreased lung sounds. LLE in ortho boot, mild edema noted. Plan for HD tomorrow. Challenge EDW by 1-2 L as tolerated. Orders entered.   Shortness of breath  Complains of shortness of breath starting yesterday after hemodialysis. Daughter and wife at bedside state patient was very short of breath this morning with minimal exertion. Chest x-ray pending official read, appears to have mild pulmonary edema on my read, 6/24.    Secondary hyperparathyroidism of renal origin (HCC)  Continue to monitor PTH and phosphorus outpatient. Most recent outpatietn , phosphorus 3.3 mg/dL at dialysis center.  Resume previous outpatient calcitriol 0.25 mcg three times weekly.          I have reviewed the nephrology recommendations including ESRD on HD schedule and volume status with hospitalist and we are in agreement with renal plan including the information outlined above.     History of Present Illness   Ramos Rosenthal is a 74 y.o. 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 A renal consultation is requested today for assistance in the management of ESRD on HD and volume management.    Review of Systems   Constitutional:  Positive for activity change and appetite change. Negative for chills, fatigue and fever.   HENT:  Negative for ear pain and sore throat.    Eyes:  Negative for pain and visual disturbance.   Respiratory:  Positive for cough and shortness of breath.    Cardiovascular:  Positive for leg swelling. Negative for chest pain and palpitations.   Gastrointestinal:  Positive for nausea. Negative for abdominal pain, constipation, diarrhea and vomiting.        Patient complains of intermittent gas pain at baseline, has not tried Gas-X although he has at home   Genitourinary:  Positive for decreased urine volume. Negative for dysuria, flank pain, frequency, hematuria and urgency.        Decreased urine output in ESRD on HD patient at baseline   Musculoskeletal:  Negative for arthralgias and back pain.   Skin:  Negative for color change and rash.   Neurological:  Positive for dizziness. Negative for seizures, syncope, light-headedness and headaches.        Intermittent dizziness this morning     Hematological: Negative.    Psychiatric/Behavioral: Negative.     All other systems reviewed and are negative.    Historical Information   Past Medical History[1]  Past Surgical History[2]  Social History[3]  E-Cigarette/Vaping    E-Cigarette Use Never User       E-Cigarette/Vaping Substances    Nicotine No     THC No     CBD No     Flavoring No     Other No     Unknown No          Objective :  Temp:  [98.2 °F (36.8 °C)] 98.2 °F (36.8 °C)  HR:  [68-95] 72  BP: (122-139)/(65-74) 130/74  Resp:  [16-20] 19  SpO2:  [96 %-97 %] 97 %  O2 Device: None (Room air)    Current Weight: Weight - Scale: 126 kg (277 lb 9 oz)  First Weight: Weight - Scale: 126 kg (277 lb 9 oz)  I/O       None          Physical Exam  Vitals and nursing note reviewed.   Constitutional:       General: He is not in acute distress.     Appearance: Normal appearance. He is well-developed. He is obese. He is ill-appearing.   HENT:      Head: Normocephalic and atraumatic.      Right Ear: External ear normal.      Left Ear: External ear normal.      Nose: Nose normal.      Mouth/Throat:      Mouth: Mucous membranes are moist.      Pharynx: Oropharynx is clear.     Eyes:      Extraocular Movements: Extraocular movements intact.      Conjunctiva/sclera: Conjunctivae normal.      Pupils: Pupils are equal, round, and reactive to light.       Cardiovascular:      Rate and Rhythm: Normal rate and regular rhythm.      Heart sounds: Normal heart sounds. No murmur heard.  Pulmonary:      Effort: Pulmonary effort is normal. No respiratory distress.      Breath sounds: Normal breath sounds.      Comments: Decreased posterior bases  Abdominal:      Palpations: Abdomen is soft.      Tenderness: There is no abdominal tenderness.     Musculoskeletal:         General: No swelling.      Cervical back: Neck supple.      Right lower leg: Edema present.      Left lower leg: Edema present.      Comments: 3+ RLE, trace LLE edema. Increased over baseline     Skin:     General: Skin is warm and dry.      Capillary Refill: Capillary refill takes less than 2 seconds.     Neurological:      General: No focal deficit present.      Mental Status: He is alert and oriented to person, place, and time.     Psychiatric:         Mood and Affect:  "Mood normal.         Behavior: Behavior normal.       Medications:  Current Medications[4]      Lab Results: I have reviewed the following results:  Results from last 7 days   Lab Units 25  1157   WBC Thousand/uL 8.70   HEMOGLOBIN g/dL 10.8*   HEMATOCRIT % 34.5*   PLATELETS Thousands/uL 267   POTASSIUM mmol/L 3.9   CHLORIDE mmol/L 95*   CO2 mmol/L 30   BUN mg/dL 16   CREATININE mg/dL 4.92*   CALCIUM mg/dL 8.8   ALBUMIN g/dL 3.2*       Administrative Statements     Portions of the record may have been created with voice recognition software. Occasional wrong word or \"sound a like\" substitutions may have occurred due to the inherent limitations of voice recognition software. Read the chart carefully and recognize, using context, where substitutions have occurred.If you have any questions, please contact the dictating provider.         [1]   Past Medical History:  Diagnosis Date    Depression     Gout     Immunocompromised patient ([facility]) 10/23/2024    Multiple open wounds of lower leg 2022    Pneumonia 10/22/2024    Problem with dialysis access ([facility]) 2024    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 2024    Rash due to vaculitis  2022   [2]   Past Surgical History:  Procedure Laterality Date    BACK SURGERY      X2 LUMBAR INCLUDING FUSION  ,  WITH OAA, [facility] DR. HERZOG    CATARACT EXTRACTION, BILATERAL      IR BIOPSY KIDNEY RANDOM  2024    IR TEMPORARY DIALYSIS CATHETER PLACEMENT  2024    IR TUNNELED DIALYSIS CATHETER PLACEMENT  2024    IR TUNNELED DIALYSIS CATHETER PLACEMENT  2025    KNEE ARTHROSCOPY Right     x2 , meniscus repair    TONSILECTOMY AND ADNOIDECTOMY     [3]   Social History  Tobacco Use    Smoking status: Former     Current packs/day: 0.00     Types: Cigarettes     Quit date:      Years since quittin.5    Smokeless tobacco: Never   Vaping Use    Vaping status: Never Used   Substance and Sexual Activity    Alcohol use: " Not Currently     Comment: occasional    Drug use: Never   [4]   Current Facility-Administered Medications:     furosemide (LASIX) 160 mg in dextrose 5 % 50 mL IVPB, 160 mg, Intravenous, Once, Mike Freitas PA-C, 160 mg at 06/24/25 1433    Current Outpatient Medications:     allopurinol (ZYLOPRIM) 100 mg tablet, TAKE 1 TABLET BY MOUTH TWICE A DAY, Disp: 180 tablet, Rfl: 1    B Complex-C-Folic Acid (triphrocaps) 1 MG CAPS, Take 1 capsule (1 mg total) by mouth daily with dinner, Disp: 90 capsule, Rfl: 0    calcium carbonate (OYSTER SHELL,OSCAL) 500 mg, TAKE 2 TABLETS BY MOUTH 2 TIMES A DAY WITH MEALS., Disp: 360 tablet, Rfl: 1    cholecalciferol (VITAMIN D3) 1,000 units tablet, Take 2,000 Units by mouth in the morning., Disp: , Rfl:     collagenase (SANTYL) ointment, Apply topically daily To wound of the L heel, Disp: 30 g, Rfl: 1    ergocalciferol (VITAMIN D2) 50,000 units, Take 1 capsule (50,000 Units total) by mouth once a week TAKES EVERY SATURDAY, Disp: , Rfl:     FLUoxetine (PROzac) 20 mg capsule, TAKE 1 CAPSULE BY MOUTH EVERY DAY, Disp: 90 capsule, Rfl: 1    hydrocortisone 2.5 % cream, Apply topically 2 (two) times a day, Disp: 3.5 g, Rfl: 0    loratadine (CLARITIN) 10 mg tablet, Take 10 mg by mouth as needed for allergies Take 1 tablet once daily as needed for allergies, Disp: , Rfl:     Magnesium 100 MG CAPS, Take 1 capsule by mouth in the morning. Not sure of dose., Disp: , Rfl:     midodrine (PROAMATINE) 2.5 mg tablet, TAKE 1 TABLET (2.5 MG TOTAL) BY MOUTH 3 TIMES A DAY BEFORE MEALS, Disp: 270 tablet, Rfl: 2    midodrine (PROAMATINE) 5 mg tablet, Take 1 tablet (5 mg total) by mouth 3 (three) times a week ADMINISTERED EVERY MON, WED AND FRI AT [facility], Disp: , Rfl:     nystatin (MYCOSTATIN) powder, Apply topically 3 (three) times a day, Disp: 60 g, Rfl: 3    pantoprazole (PROTONIX) 40 mg tablet, Take 1 tablet (40 mg total) by mouth daily, Disp: 90 tablet, Rfl: 1

## 2025-06-24 NOTE — ASSESSMENT & PLAN NOTE
Continue to monitor PTH and phosphorus outpatient. Most recent outpatietn , phosphorus 3.3 mg/dL at dialysis center. Resume previous outpatient calcitriol 0.25 mcg three times weekly.

## 2025-06-24 NOTE — ED ATTENDING ATTESTATION
6/24/2025  I, Jun Renner III, DO, saw and evaluated the patient. I have discussed the patient with the resident/non-physician practitioner and agree with the resident's/non-physician practitioner's findings, Plan of Care, and MDM as documented in the resident's/non-physician practitioner's note, except where noted. All available labs and Radiology studies were reviewed.  I was present for key portions of any procedure(s) performed by the resident/non-physician practitioner and I was immediately available to provide assistance.       At this point I agree with the current assessment done in the Emergency Department.  I have conducted an independent evaluation of this patient a history and physical is as follows:    ED Course  ED Course as of 06/24/25 1714   Tue Jun 24, 2025   1430 I was present during the bedside ultrasound performed by the [facility] EM fellow, no pericardial effusion noted.  All questions were answered by family members present in the emergency department about admission.         Critical Care Time  Procedures

## 2025-06-24 NOTE — ASSESSMENT & PLAN NOTE
Pt presents with shortness of breath x 1 day, increased LLE swelling in setting of prematurely discontinued HD session concerning for volume overload  Pt received IV lasix x1 in ER. Pt does not make urine.   Chest XR -pending  Pt will be managed by hemodialysis in the AM

## 2025-06-24 NOTE — ASSESSMENT & PLAN NOTE
Lab Results   Component Value Date    EGFR 10 06/24/2025    EGFR 5 05/19/2025    EGFR 7 05/18/2025    CREATININE 4.92 (H) 06/24/2025    CREATININE 8.05 (H) 05/19/2025    CREATININE 6.73 (H) 05/18/2025   Pt is HD dependent 2/2 glomerulonephritis. Will cont HD MWF per renal.

## 2025-06-24 NOTE — ASSESSMENT & PLAN NOTE
US LE:  Result in progress   This result has not been signed. Information might be incomplete.       Result Text   [facility] REPORT  .  JOSELYN GOMEZ is a 74 year old M who was referred by KERRY GIBBONS.        Indications:   Patient presents with chronic right lower extremity edema worsening x a few days with chest pain.   Operative History:   No cardiovascular surgeries noted   Risk Factors:   The patient reports hypertension, hyperlipidemia, diabetes and renal disease.           FINDINGS:     Main                                   Venous Thrombus  Right Deep Veins                                     Right Proximal Femoral                Chronic     Right Mid Femoral Vein                Chronic     Right Distal Fem Vein                 Chronic     Right Popliteal Vein                  Chronic     Gastrocnemius Vein        Chronic - Occlusive     CONCLUSION:     RIGHT LOWER LIMB   There is evidence of non-occlusive chronic deep vein thrombosis noted in one of the paired femoral veins and popliteal vein. There is occlusive chronic vs sub-acute deep vein thrombosis noted in the gastrocnemius veins.   There is no evidence of superficial thrombophlebitis.   Doppler evaluation shows a normal response to augmentation maneuvers.   Popliteal, posterior tibial and anterior tibial arterial Doppler waveforms are triphasic/biphasic.       LEFT LOWER LIMB LIMITED   There is no evidence of thrombus in the common femoral vein.    Doppler evaluation shows a normal response to augmentation maneuvers.           Will consult heme onc to see if pt will benefit from ongoing systemic AC.

## 2025-06-24 NOTE — ED PROVIDER NOTES
Time reflects when diagnosis was documented in both MDM as applicable and the Disposition within this note       Time User Action Codes Description Comment    6/24/2025 12:41 PM Mike Freitas Add [R06.02] SOB (shortness of breath)     6/24/2025  2:01 PM Mike Freitas Add [Z99.2] Hemodialysis access site with arteriovenous graft (HCC)     6/24/2025  2:01 PM FreitasCarrington yeeon Remove [Z99.2] Hemodialysis access site with arteriovenous graft (HCC)     6/24/2025  2:01 PM FreitasCarrington yeeon Add [Z99.2] Hemodialysis patient (HCC)     6/24/2025  2:01 PM Carrington Freitason Add [R60.0] Lower extremity edema           ED Disposition       ED Disposition   Admit    Condition   Stable    Date/Time   Tue Jun 24, 2025  2:00 PM    Comment   Case was discussed with Dr. Perez  and the patient's admission status was agreed to be Admission Status: observation status to the service of Dr. Perez  .               Assessment & Plan       Medical Decision Making  Patient is a 74-year-old male with a past medical history of pyelonephritis and anti-GBM, lower extremity wounds, hemodialysis presenting for 2-week history of diminished appetite and a 1 day history of shortness of breath.  Patient reports receiving dialysis on Monday Wednesday and Friday.  Reported feeling after receiving dialysis yesterday.  Patient does not produce urine, denies any associated fever, chills, abdominal pain, nausea, vomiting.  Differential this time includes but is not limited to shortness of breath secondary to pulmonary edema. Evaluation includes cbc, cmp, trop, bnp. No interventions at this time.     See ED course for additional details    Amount and/or Complexity of Data Reviewed  Labs:  Decision-making details documented in ED Course.  ECG/medicine tests:  Decision-making details documented in ED Course.    Risk  Decision regarding hospitalization.        ED Course as of 06/24/25 1619   Tue Jun 24, 2025   1230 ECG 12 lead  Generally normal sinus with  "episodic atrial fibrillation, rate of 85 bpm, left axis deviation, no ischemic changes appreciated my interpretation   1242 hs TnI 0hr: 25   1302 B-Type Natriuretic Peptide(BNP)(!)   1346 Discussed patient in tandem with my attending Dr. Renner who recommends \"then lasix could be helpful just to tide us over onto HD tomorrow. I agree with some sort of obs vs admission to address fluid status and other concerns. we will add him to hd list tomorrow\"   1354 Vascular tech findings: Chronic dvt identified in the femoral, popliteal, and gastroc   1354 Discussed case extensively w/ Dr. Moore and nephro Nps who recommends 160mg of lasix at this time      1422 Performed bedside echo with attending present to evaluate for presence of pericardial effusion. Not appreciated at this time. Images available in PACS.    1424 Disposition:  - Patient admitted for obs tele in discussion with Dr. Moore for diuresis and plan for hemodialysis tomorrow           Medications   furosemide (LASIX) 160 mg in dextrose 5 % 50 mL IVPB (160 mg Intravenous New Bag 6/24/25 1433)       ED Risk Strat Scores   HEART Risk Score      Flowsheet Row Most Recent Value   Heart Score Risk Calculator    History 0 Filed at: 06/24/2025 1447   ECG 0 Filed at: 06/24/2025 1447   Age 2 Filed at: 06/24/2025 1447   Risk Factors 1 Filed at: 06/24/2025 1447   Troponin 1 Filed at: 06/24/2025 1447   HEART Score 4 Filed at: 06/24/2025 1447          HEART Risk Score      Flowsheet Row Most Recent Value   Heart Score Risk Calculator    History 0 Filed at: 06/24/2025 1447   ECG 0 Filed at: 06/24/2025 1447   Age 2 Filed at: 06/24/2025 1447   Risk Factors 1 Filed at: 06/24/2025 1447   Troponin 1 Filed at: 06/24/2025 1447   HEART Score 4 Filed at: 06/24/2025 1447                      (ISAR) Identification of Seniors at Risk  Before the illness or injury that brought you to the Emergency, did you need someone to help you on a regular basis?: 1  In the last 24 hours, have you " needed more help than usual?: 0  Have you been hospitalized for one or more nights during the past 6 months?: 1  In general, do you see well?: 0  In general, do you have serious problems with your memory?: 0  Do you take more than three different medications every day?: 1  ISAR Score: 3            SBIRT 22yo+      Flowsheet Row Most Recent Value   Initial Alcohol Screen: US AUDIT-C     1. How often do you have a drink containing alcohol? 0 Filed at: 06/24/2025 1140   2. How many drinks containing alcohol do you have on a typical day you are drinking?  0 Filed at: 06/24/2025 1145   3b. FEMALE Any Age, or MALE 65+: How often do you have 4 or more drinks on one occassion? 0 Filed at: 06/24/2025 114   Audit-C Score 0 Filed at: 06/24/2025 1142   ELSY: How many times in the past year have you...    Used an illegal drug or used a prescription medication for non-medical reasons? Never Filed at: 06/24/2025 1146                            History of Present Illness       Chief Complaint   Patient presents with    Shortness of Breath     Shortness of breath starting after dialysis yesterday. MWF dialysis and patient believes he got the full treatment of dialysis however states they needed to stop in the middle of session due to power outage.        Past Medical History[1]   Past Surgical History[2]   Family History[3]   Social History[4]   E-Cigarette/Vaping    E-Cigarette Use Never User       E-Cigarette/Vaping Substances    Nicotine No     THC No     CBD No     Flavoring No     Other No     Unknown No       I have reviewed and agree with the history as documented.     Patient is a 74-year-old male with past medical history of dialysis, glomerulonephritis, immunocompromise status, presenting for evaluation of chest pain, shortness of breath, nausea, dizziness.  Patient reports having undergone hemodialysis Monday with relief of shortness of breath.  Patient reports recurrence of shortness of breath this morning.  Denies  fever, chills, cough, chest pain. In discussion with family they endorse that his lower extremity is more edematous than usual. Patient is presently without significant complaint.       Shortness of Breath  Associated symptoms: no abdominal pain, no cough, no fever, no headaches, no neck pain and no vomiting        Review of Systems   Constitutional:  Negative for chills, fatigue and fever.   Respiratory:  Positive for chest tightness and shortness of breath. Negative for cough.    Cardiovascular:  Negative for palpitations.   Gastrointestinal:  Negative for abdominal pain, diarrhea, nausea and vomiting.   Musculoskeletal:  Negative for myalgias and neck pain.   Neurological:  Negative for weakness, numbness and headaches.   All other systems reviewed and are negative.          Objective       ED Triage Vitals   Temperature Pulse Blood Pressure Respirations SpO2 Patient Position - Orthostatic VS   06/24/25 1147 06/24/25 1147 06/24/25 1147 06/24/25 1147 06/24/25 1147 06/24/25 1147   98.2 °F (36.8 °C) 75 139/67 18 96 % Lying      Temp Source Heart Rate Source BP Location FiO2 (%) Pain Score    06/24/25 1147 06/24/25 1147 06/24/25 1147 -- 06/24/25 1148    Temporal Monitor Left arm  No Pain      Vitals      Date and Time Temp Pulse SpO2 Resp BP Pain Score FACES Pain Rating User   06/24/25 1559 -- 70 95 % -- -- -- -- [facility]   06/24/25 1502 -- -- -- -- -- No Pain -- ND   06/24/25 1502 97.7 °F (36.5 °C) 73 97 % 20 116/83 -- -- DII   06/24/25 1430 -- 72 97 % 19 130/74 -- -- EM   06/24/25 1400 -- 72 97 % 20 128/71 -- -- EM   06/24/25 1330 -- 68 96 % 16 139/72 -- -- EM   06/24/25 1300 -- 69 96 % 18 122/65 -- -- EM   06/24/25 1200 -- 95 96 % 18 123/71 -- -- EM   06/24/25 1155 -- -- -- -- -- No Pain -- EM   06/24/25 1148 -- -- -- -- -- No Pain -- EM   06/24/25 1147 98.2 °F (36.8 °C) 75 96 % 18 139/67 -- -- SG            Physical Exam  Vitals and nursing note reviewed.   Constitutional:       General: He is not in acute distress.      Appearance: He is not ill-appearing.   HENT:      Head: Normocephalic and atraumatic.      Mouth/Throat:      Mouth: Mucous membranes are moist.     Eyes:      Pupils: Pupils are equal, round, and reactive to light.       Cardiovascular:      Rate and Rhythm: Normal rate and regular rhythm.      Pulses: Normal pulses.      Heart sounds: Normal heart sounds. No murmur heard.  Pulmonary:      Effort: Pulmonary effort is normal. No respiratory distress.      Breath sounds: Normal breath sounds. No stridor. No wheezing, rhonchi or rales.   Abdominal:      General: Abdomen is flat. There is no distension.      Palpations: Abdomen is soft.      Tenderness: There is no abdominal tenderness. There is no guarding or rebound.     Musculoskeletal:         General: No swelling or tenderness.      Right lower leg: Edema present.      Left lower leg: Edema present.     Skin:     General: Skin is warm and dry.     Neurological:      General: No focal deficit present.      Mental Status: Mental status is at baseline.     Psychiatric:         Mood and Affect: Mood normal.         Behavior: Behavior normal.         Results Reviewed       Procedure Component Value Units Date/Time    HS Troponin I 2hr [[id number]]  (Normal) Collected: 06/24/25 1409    Lab Status: Final result Specimen: Blood from Arm, Right Updated: 06/24/25 1439     hs TnI 2hr 29 ng/L      Delta 2hr hsTnI 4 ng/L     HS Troponin I 4hr [[id number]]     Lab Status: No result Specimen: Blood     HS Troponin 0hr (reflex protocol) [[id number]]  (Normal) Collected: 06/24/25 1157    Lab Status: Final result Specimen: Blood from Arm, Right Updated: 06/24/25 1230     hs TnI 0hr 25 ng/L     B-Type Natriuretic Peptide(BNP) [[id number]]  (Abnormal) Collected: 06/24/25 1157    Lab Status: Final result Specimen: Blood from Arm, Right Updated: 06/24/25 1229     BNP 2,793 pg/mL     Comprehensive metabolic panel [[id number]]  (Abnormal) Collected: 06/24/25 1157    Lab Status: Final result  Specimen: Blood from Arm, Right Updated: 06/24/25 1222     Sodium 133 mmol/L      Potassium 3.9 mmol/L      Chloride 95 mmol/L      CO2 30 mmol/L      ANION GAP 8 mmol/L      BUN 16 mg/dL      Creatinine 4.92 mg/dL      Glucose 104 mg/dL      Calcium 8.8 mg/dL      Corrected Calcium 9.4 mg/dL      AST 11 U/L      ALT 3 U/L      Alkaline Phosphatase 70 U/L      Total Protein 5.5 g/dL      Albumin 3.2 g/dL      Total Bilirubin 0.49 mg/dL      eGFR 10 ml/min/1.73sq m     Narrative:      National Kid[employer] guidelines for Chronic Kidney Disease (CKD):     Stage 1 with normal or high GFR (GFR > 90 mL/min/1.73 square meters)    Stage 2 Mild CKD (GFR = 60-89 mL/min/1.73 square meters)    Stage 3A Moderate CKD (GFR = 45-59 mL/min/1.73 square meters)    Stage 3B Moderate CKD (GFR = 30-44 mL/min/1.73 square meters)    Stage 4 Severe CKD (GFR = 15-29 mL/min/1.73 square meters)    Stage 5 End Stage CKD (GFR <15 mL/min/1.73 square meters)  Note: GFR calculation is accurate only with a steady state creatinine    APTT [[id number]]  (Normal) Collected: 06/24/25 1157    Lab Status: Final result Specimen: Blood from Arm, Right Updated: 06/24/25 1220     PTT 34 seconds     Protime-INR [[id number]]  (Normal) Collected: 06/24/25 1157    Lab Status: Final result Specimen: Blood from Arm, Right Updated: 06/24/25 1220     Protime 13.7 seconds      INR 1.00    Narrative:      INR Therapeutic Range    Indication                                             INR Range      Atrial Fibrillation                                               2.0-3.0  Hypercoagulable State                                    2.0.2.3  Left Ventricular Asist Device                            2.0-3.0  Mechanical Heart Valve                                  -    Aortic(with afib, MI, embolism, HF, LA enlargement,    and/or coagulopathy)                                     2.0-3.0 (2.5-3.5)     Mitral                                                              2.5-3.5  Prosthetic/Bioprosthetic Heart Valve               2.0-3.0  Venous thromboembolism (VTE: VT, PE        2.0-3.0    CBC and differential [[id number]]  (Abnormal) Collected: 06/24/25 1157    Lab Status: Final result Specimen: Blood from Arm, Right Updated: 06/24/25 1215     WBC 8.70 Thousand/uL      RBC 3.66 Million/uL      Hemoglobin 10.8 g/dL      Hematocrit 34.5 %      MCV 94 fL      MCH 29.5 pg      MCHC 31.3 g/dL      RDW 15.6 %      MPV 8.8 fL      Platelets 267 Thousands/uL      nRBC 0 /100 WBCs      Segmented % 80 %      Immature Grans % 0 %      Lymphocytes % 11 %      Monocytes % 6 %      Eosinophils Relative 2 %      Basophils Relative 1 %      Absolute Neutrophils 6.91 Thousands/µL      Absolute Immature Grans 0.02 Thousand/uL      Absolute Lymphocytes 0.96 Thousands/µL      Absolute Monocytes 0.54 Thousand/µL      Eosinophils Absolute 0.17 Thousand/µL      Basophils Absolute 0.10 Thousands/µL             XR chest 1 view portable    (Results Pending)   VAS VENOUS DUPLEX -LOWER LIMB UNILATERAL    (Results Pending)       POC Cardiac US    Date/Time: 6/24/2025 3:54 PM    Performed by: Mike Freitas PA-C  Authorized by: Mike Freitas PA-C    Patient location:  ED  Other Assisting Provider: Yes (comment) (Dr. Renner)    Procedure details:     Exam Type:  Transthoracic Echocardiography (TTE), limited    Purpose of Exam: diagnostic    Indications: dyspnea      Assessment / Evaluation for: cardiac function and pericardial effusion      Exam Type: initial exam      Image quality: diagnostic      Image availability:  Images available in PACS  Patient Details:     Cardiac Rhythm:  Regular    Mechanical ventilation: No    Cardiac findings:     Echo modes evaluated: limited 2D      Views obtained: parasternal short axis and apical 4-chamber      Pericardial effusion: absent      Tamponade physiology: absent      Wall motion: normal      LV systolic function: normal      RV dilation: none        ED  Medication and Procedure Management   Prior to Admission Medications   Prescriptions Last Dose Informant Patient Reported? Taking?   B Complex-C-Folic Acid (triphrocaps) 1 MG CAPS   No No   Sig: Take 1 capsule (1 mg total) by mouth daily with dinner   FLUoxetine (PROzac) 20 mg capsule   No No   Sig: TAKE 1 CAPSULE BY MOUTH EVERY DAY   Magnesium 100 MG CAPS  Self Yes No   Sig: Take 1 capsule by mouth in the morning. Not sure of dose.   allopurinol (ZYLOPRIM) 100 mg tablet   No No   Sig: TAKE 1 TABLET BY MOUTH TWICE A DAY   calcium carbonate (OYSTER SHELL,OSCAL) 500 mg   No No   Sig: TAKE 2 TABLETS BY MOUTH 2 TIMES A DAY WITH MEALS.   cholecalciferol (VITAMIN D3) 1,000 units tablet   Yes No   Sig: Take 2,000 Units by mouth in the morning.   collagenase (SANTYL) ointment   No No   Sig: Apply topically daily To wound of the L heel   ergocalciferol (VITAMIN D2) 50,000 units   No No   Sig: Take 1 capsule (50,000 Units total) by mouth once a week TAKES EVERY SATURDAY   hydrocortisone 2.5 % cream   No No   Sig: Apply topically 2 (two) times a day   loratadine (CLARITIN) 10 mg tablet   Yes No   Sig: Take 10 mg by mouth as needed for allergies Take 1 tablet once daily as needed for allergies   midodrine (PROAMATINE) 2.5 mg tablet   No No   Sig: TAKE 1 TABLET (2.5 MG TOTAL) BY MOUTH 3 TIMES A DAY BEFORE MEALS   midodrine (PROAMATINE) 5 mg tablet   No No   Sig: Take 1 tablet (5 mg total) by mouth 3 (three) times a week ADMINISTERED EVERY MON, WED AND FRI AT DIALYSIS   nystatin (MYCOSTATIN) powder   No No   Sig: Apply topically 3 (three) times a day   pantoprazole (PROTONIX) 40 mg tablet   No No   Sig: Take 1 tablet (40 mg total) by mouth daily      Facility-Administered Medications: None     Current Discharge Medication List        CONTINUE these medications which have NOT CHANGED    Details   allopurinol (ZYLOPRIM) 100 mg tablet TAKE 1 TABLET BY MOUTH TWICE A DAY  Qty: 180 tablet, Refills: 1    Associated Diagnoses: Chronic  idiopathic gout involving toe without tophus, unspecified laterality      B Complex-C-Folic Acid (triphrocaps) 1 MG CAPS Take 1 capsule (1 mg total) by mouth daily with dinner  Qty: 90 capsule, Refills: 0    Associated Diagnoses: 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  Qty: 360 tablet, Refills: 1    Associated Diagnoses: Hypocalcemia      cholecalciferol (VITAMIN D3) 1,000 units tablet Take 2,000 Units by mouth in the morning.      collagenase (SANTYL) ointment Apply topically daily To wound of the L heel  Qty: 30 g, Refills: 1    Comments: Quantity sufficient for one month. Wound measures 1.9 cm x 2.8 cm. Please call pt regarding price of medication. Requesting voicemail be left if they do not answer.  Associated Diagnoses: Pressure injury of left heel, unstageable (HCC)      ergocalciferol (VITAMIN D2) 50,000 units Take 1 capsule (50,000 Units total) by mouth once a week TAKES EVERY SATURDAY    Associated Diagnoses: Vitamin D deficiency      FLUoxetine (PROzac) 20 mg capsule TAKE 1 CAPSULE BY MOUTH EVERY DAY  Qty: 90 capsule, Refills: 1    Associated Diagnoses: Persistent depressive disorder      hydrocortisone 2.5 % cream Apply topically 2 (two) times a day  Qty: 3.5 g, Refills: 0    Associated Diagnoses: Itching      loratadine (CLARITIN) 10 mg tablet Take 10 mg by mouth as needed for allergies Take 1 tablet once daily as needed for allergies      Magnesium 100 MG CAPS Take 1 capsule by mouth in the morning. Not sure of dose.      !! midodrine (PROAMATINE) 2.5 mg tablet TAKE 1 TABLET (2.5 MG TOTAL) BY MOUTH 3 TIMES A DAY BEFORE MEALS  Qty: 270 tablet, Refills: 2    Associated Diagnoses: Chronic hypotension      !! 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 - Potential duplicate medications found. Please discuss with provider.        No discharge procedures on file.  ED SEPSIS DOCUMENTATION   Time reflects when diagnosis was documented in both MDM as applicable and the Disposition within this note       Time User Action Codes Description Comment    6/24/2025 12:41 PM Mike Freitas Add [R06.02] SOB (shortness of breath)     6/24/2025  2:01 PM Mike Freitas Add [Z99.2] Hemodialysis access site with arteriovenous graft ([facility])     6/24/2025  2:01 PM Mike Freitas Remove [Z99.2] Hemodialysis access site with arteriovenous graft ([facility])     6/24/2025  2:01 PM Mike Freitas Add [Z99.2] Hemodialysis patient ([facility])     6/24/2025  2:01 PM Mike Freitas Add [R60.0] Lower extremity edema                    Mike Freitas PA-C  06/24/25 1619         [1]   Past Medical History:  Diagnosis Date    Depression     Gout     Immunocompromised patient ([facility]) 10/23/2024    Multiple open wounds of lower leg 09/30/2022    Pneumonia 10/22/2024    Problem with dialysis access ([facility]) 09/21/2024    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 HERZOG    CATARACT EXTRACTION, BILATERAL      IR BIOPSY KIDNEY RANDOM  9/19/2024    IR TEMPORARY DIALYSIS CATHETER PLACEMENT  9/16/2024    IR TUNNELED DIALYSIS CATHETER PLACEMENT  9/27/2024    IR TUNNELED DIALYSIS CATHETER PLACEMENT  1/9/2025    KNEE ARTHROSCOPY Right     x2 , meniscus  repair    TONSILECTOMY AND ADNOIDECTOMY     [3]   Family History  Problem Relation Name Age of Onset    Diabetes Father      Diabetes Brother      Diabetes Sister     [4]   Social History  Tobacco Use    Smoking status: Former     Current packs/day: 0.00     Types: Cigarettes     Quit date:      Years since quittin.5    Smokeless tobacco: Never   Vaping Use    Vaping status: Never Used   Substance Use Topics    Alcohol use: Not Currently     Comment: occasional    Drug use: Never        Mike Freitas PA-C  [phone]

## 2025-06-25 ENCOUNTER — HOME CARE VISIT (OUTPATIENT)
Dept: HOME HEALTH SERVICES | Facility: HOME HEALTHCARE | Age: 75
End: 2025-06-25
Payer: MEDICARE

## 2025-06-25 ENCOUNTER — APPOINTMENT (OUTPATIENT)
Dept: DIALYSIS | Facility: HOSPITAL | Age: 75
DRG: 640 | End: 2025-06-25
Payer: MEDICARE

## 2025-06-25 LAB
ALBUMIN SERPL BCG-MCNC: 3.2 G/DL (ref 3.5–5)
ALP SERPL-CCNC: 71 U/L (ref 34–104)
ALT SERPL W P-5'-P-CCNC: <3 U/L (ref 7–52)
ANION GAP SERPL CALCULATED.3IONS-SCNC: 11 MMOL/L (ref 4–13)
APTT PPP: 36 SECONDS (ref 23–34)
APTT PPP: >210 SECONDS (ref 23–34)
AST SERPL W P-5'-P-CCNC: 19 U/L (ref 13–39)
ATRIAL RATE: 113 BPM
ATRIAL RATE: 141 BPM
BASOPHILS # BLD AUTO: 0.11 THOUSANDS/ÂΜL (ref 0–0.1)
BASOPHILS NFR BLD AUTO: 1 % (ref 0–1)
BILIRUB SERPL-MCNC: 0.55 MG/DL (ref 0.2–1)
BUN SERPL-MCNC: 21 MG/DL (ref 5–25)
CALCIUM ALBUM COR SERPL-MCNC: 9.4 MG/DL (ref 8.3–10.1)
CALCIUM SERPL-MCNC: 8.8 MG/DL (ref 8.4–10.2)
CHLORIDE SERPL-SCNC: 94 MMOL/L (ref 96–108)
CO2 SERPL-SCNC: 26 MMOL/L (ref 21–32)
CREAT SERPL-MCNC: 5.66 MG/DL (ref 0.6–1.3)
EOSINOPHIL # BLD AUTO: 0.34 THOUSAND/ÂΜL (ref 0–0.61)
EOSINOPHIL NFR BLD AUTO: 4 % (ref 0–6)
ERYTHROCYTE [DISTWIDTH] IN BLOOD BY AUTOMATED COUNT: 15.8 % (ref 11.6–15.1)
ERYTHROCYTE [DISTWIDTH] IN BLOOD BY AUTOMATED COUNT: 15.9 % (ref 11.6–15.1)
GFR SERPL CREATININE-BSD FRML MDRD: 8 ML/MIN/1.73SQ M
GLUCOSE SERPL-MCNC: 86 MG/DL (ref 65–140)
HCT VFR BLD AUTO: 35.4 % (ref 36.5–49.3)
HCT VFR BLD AUTO: 36.5 % (ref 36.5–49.3)
HGB BLD-MCNC: 10.8 G/DL (ref 12–17)
HGB BLD-MCNC: 11.4 G/DL (ref 12–17)
IMM GRANULOCYTES # BLD AUTO: 0.02 THOUSAND/UL (ref 0–0.2)
IMM GRANULOCYTES NFR BLD AUTO: 0 % (ref 0–2)
INR PPP: 0.99 (ref 0.85–1.19)
LYMPHOCYTES # BLD AUTO: 1.07 THOUSANDS/ÂΜL (ref 0.6–4.47)
LYMPHOCYTES NFR BLD AUTO: 12 % (ref 14–44)
MCH RBC QN AUTO: 29.4 PG (ref 26.8–34.3)
MCH RBC QN AUTO: 29.8 PG (ref 26.8–34.3)
MCHC RBC AUTO-ENTMCNC: 30.5 G/DL (ref 31.4–37.4)
MCHC RBC AUTO-ENTMCNC: 31.2 G/DL (ref 31.4–37.4)
MCV RBC AUTO: 96 FL (ref 82–98)
MCV RBC AUTO: 97 FL (ref 82–98)
MONOCYTES # BLD AUTO: 0.64 THOUSAND/ÂΜL (ref 0.17–1.22)
MONOCYTES NFR BLD AUTO: 7 % (ref 4–12)
MRSA NOSE QL CULT: NORMAL
NEUTROPHILS # BLD AUTO: 6.93 THOUSANDS/ÂΜL (ref 1.85–7.62)
NEUTS SEG NFR BLD AUTO: 76 % (ref 43–75)
NRBC BLD AUTO-RTO: 0 /100 WBCS
PLATELET # BLD AUTO: 247 THOUSANDS/UL (ref 149–390)
PLATELET # BLD AUTO: 266 THOUSANDS/UL (ref 149–390)
PMV BLD AUTO: 8.7 FL (ref 8.9–12.7)
PMV BLD AUTO: 9 FL (ref 8.9–12.7)
POTASSIUM SERPL-SCNC: 4.4 MMOL/L (ref 3.5–5.3)
PROT SERPL-MCNC: 5.7 G/DL (ref 6.4–8.4)
PROTHROMBIN TIME: 13.6 SECONDS (ref 12.3–15)
QRS AXIS: -40 DEGREES
QRS AXIS: -48 DEGREES
QRSD INTERVAL: 114 MS
QRSD INTERVAL: 98 MS
QT INTERVAL: 414 MS
QT INTERVAL: 424 MS
QTC INTERVAL: 492 MS
QTC INTERVAL: 504 MS
RBC # BLD AUTO: 3.67 MILLION/UL (ref 3.88–5.62)
RBC # BLD AUTO: 3.82 MILLION/UL (ref 3.88–5.62)
SODIUM SERPL-SCNC: 131 MMOL/L (ref 135–147)
T WAVE AXIS: 0 DEGREES
T WAVE AXIS: 80 DEGREES
VENTRICULAR RATE: 85 BPM
VENTRICULAR RATE: 85 BPM
WBC # BLD AUTO: 8.84 THOUSAND/UL (ref 4.31–10.16)
WBC # BLD AUTO: 9.11 THOUSAND/UL (ref 4.31–10.16)

## 2025-06-25 PROCEDURE — 85025 COMPLETE CBC W/AUTO DIFF WBC: CPT | Performed by: HOSPITALIST

## 2025-06-25 PROCEDURE — G0257 UNSCHED DIALYSIS ESRD PT HOS: HCPCS

## 2025-06-25 PROCEDURE — 85027 COMPLETE CBC AUTOMATED: CPT | Performed by: HOSPITALIST

## 2025-06-25 PROCEDURE — NC001 PR NO CHARGE: Performed by: INTERNAL MEDICINE

## 2025-06-25 PROCEDURE — 85730 THROMBOPLASTIN TIME PARTIAL: CPT | Performed by: HOSPITALIST

## 2025-06-25 PROCEDURE — 99232 SBSQ HOSP IP/OBS MODERATE 35: CPT | Performed by: HOSPITALIST

## 2025-06-25 PROCEDURE — 93010 ELECTROCARDIOGRAM REPORT: CPT | Performed by: INTERNAL MEDICINE

## 2025-06-25 PROCEDURE — 80053 COMPREHEN METABOLIC PANEL: CPT | Performed by: HOSPITALIST

## 2025-06-25 PROCEDURE — 90935 HEMODIALYSIS ONE EVALUATION: CPT

## 2025-06-25 PROCEDURE — 5A1D70Z PERFORMANCE OF URINARY FILTRATION, INTERMITTENT, LESS THAN 6 HOURS PER DAY: ICD-10-PCS | Performed by: INTERNAL MEDICINE

## 2025-06-25 PROCEDURE — 85610 PROTHROMBIN TIME: CPT | Performed by: HOSPITALIST

## 2025-06-25 RX ORDER — HEPARIN SODIUM 1000 [USP'U]/ML
10000 INJECTION, SOLUTION INTRAVENOUS; SUBCUTANEOUS ONCE
Status: COMPLETED | OUTPATIENT
Start: 2025-06-25 | End: 2025-06-25

## 2025-06-25 RX ORDER — HEPARIN SODIUM 1000 [USP'U]/ML
5000 INJECTION, SOLUTION INTRAVENOUS; SUBCUTANEOUS EVERY 6 HOURS PRN
Status: DISCONTINUED | OUTPATIENT
Start: 2025-06-25 | End: 2025-06-30

## 2025-06-25 RX ORDER — HEPARIN SODIUM 1000 [USP'U]/ML
10000 INJECTION, SOLUTION INTRAVENOUS; SUBCUTANEOUS EVERY 6 HOURS PRN
Status: DISCONTINUED | OUTPATIENT
Start: 2025-06-25 | End: 2025-06-30

## 2025-06-25 RX ORDER — HEPARIN SODIUM 10000 [USP'U]/100ML
3-30 INJECTION, SOLUTION INTRAVENOUS
Status: DISCONTINUED | OUTPATIENT
Start: 2025-06-25 | End: 2025-06-30

## 2025-06-25 RX ORDER — MIDODRINE HYDROCHLORIDE 5 MG/1
5 TABLET ORAL ONCE AS NEEDED
Status: COMPLETED | OUTPATIENT
Start: 2025-06-25 | End: 2025-06-25

## 2025-06-25 RX ADMIN — CALCIUM 2 TABLET: 500 TABLET ORAL at 07:30

## 2025-06-25 RX ADMIN — LORATADINE 10 MG: 10 TABLET ORAL at 13:28

## 2025-06-25 RX ADMIN — HEPARIN SODIUM 5000 UNITS: 5000 INJECTION, SOLUTION INTRAVENOUS; SUBCUTANEOUS at 05:00

## 2025-06-25 RX ADMIN — NYSTATIN: 100000 POWDER TOPICAL at 13:29

## 2025-06-25 RX ADMIN — FLUOXETINE HYDROCHLORIDE 20 MG: 20 CAPSULE ORAL at 13:28

## 2025-06-25 RX ADMIN — PANTOPRAZOLE SODIUM 40 MG: 40 TABLET, DELAYED RELEASE ORAL at 13:28

## 2025-06-25 RX ADMIN — NYSTATIN: 100000 POWDER TOPICAL at 21:24

## 2025-06-25 RX ADMIN — HEPARIN SODIUM 18 UNITS/KG/HR: 10000 INJECTION, SOLUTION INTRAVENOUS at 15:08

## 2025-06-25 RX ADMIN — CALCITRIOL 0.25 MCG: 0.25 CAPSULE, LIQUID FILLED ORAL at 13:28

## 2025-06-25 RX ADMIN — COLLAGENASE SANTYL: 250 OINTMENT TOPICAL at 13:29

## 2025-06-25 RX ADMIN — Medication 1 CAPSULE: at 17:14

## 2025-06-25 RX ADMIN — HEPARIN SODIUM 10000 UNITS: 1000 INJECTION, SOLUTION INTRAVENOUS; SUBCUTANEOUS at 15:08

## 2025-06-25 RX ADMIN — ALLOPURINOL 100 MG: 100 TABLET ORAL at 17:14

## 2025-06-25 RX ADMIN — HEPARIN SODIUM 5000 UNITS: 5000 INJECTION, SOLUTION INTRAVENOUS; SUBCUTANEOUS at 13:28

## 2025-06-25 RX ADMIN — NYSTATIN: 100000 POWDER TOPICAL at 15:13

## 2025-06-25 RX ADMIN — POLYETHYLENE GLYCOL 3350 17 G: 17 POWDER, FOR SOLUTION ORAL at 13:28

## 2025-06-25 RX ADMIN — ANTACID TABLETS 500 MG: 500 TABLET, CHEWABLE ORAL at 13:28

## 2025-06-25 RX ADMIN — Medication 2000 UNITS: at 13:28

## 2025-06-25 RX ADMIN — MIDODRINE HYDROCHLORIDE 2.5 MG: 5 TABLET ORAL at 07:30

## 2025-06-25 RX ADMIN — MIDODRINE HYDROCHLORIDE 5 MG: 5 TABLET ORAL at 08:36

## 2025-06-25 RX ADMIN — CALCIUM 2 TABLET: 500 TABLET ORAL at 17:14

## 2025-06-25 RX ADMIN — Medication 400 MG: at 13:28

## 2025-06-25 RX ADMIN — MIDODRINE HYDROCHLORIDE 5 MG: 5 TABLET ORAL at 09:13

## 2025-06-25 RX ADMIN — ALLOPURINOL 100 MG: 100 TABLET ORAL at 13:28

## 2025-06-25 RX ADMIN — MIDODRINE HYDROCHLORIDE 2.5 MG: 5 TABLET ORAL at 17:14

## 2025-06-25 NOTE — PROGRESS NOTES
Progress Note - Hospitalist   Name: Ramos Gomez 74 y.o. male I MRN: [id number]  Unit/Bed#: -01 I Date of Admission: 6/24/2025   Date of Service: 6/25/2025 I Hospital Day: 0     Assessment & Plan  Volume overload  Pt presents with shortness of breath x 1 day, increased LLE swelling in setting of prematurely discontinued HD session concerning for volume overload  Pt received IV lasix x1 in ER. Pt makes minimal urine.   Chest XR -pulm edema, small bilateral pleural effusions  Pt will be managed by hemodialysis as per nephrology  Secondary hyperparathyroidism of renal origin ([facility])    Essential hypertension  Stable  Prn hydralazine.   End-stage renal disease on hemodialysis ([facility])  Lab Results   Component Value Date    EGFR 8 06/25/2025    EGFR 10 06/24/2025    EGFR 5 05/19/2025    CREATININE 5.66 (H) 06/25/2025    CREATININE 4.92 (H) 06/24/2025    CREATININE 8.05 (H) 05/19/2025   Pt is HD dependent 2/2 glomerulonephritis. Will cont HD MWF per renal.  Class 2 severe obesity due to excess calories with serious comorbidity and body mass index (BMI) of 35.0 to 35.9 in adult ([facility])  Contributes to all aspects of care  Weight loss encouraged    Anemia in ESRD (end-stage renal disease)  ([facility])  Lab Results   Component Value Date    EGFR 8 06/25/2025    EGFR 10 06/24/2025    EGFR 5 05/19/2025    CREATININE 5.66 (H) 06/25/2025    CREATININE 4.92 (H) 06/24/2025    CREATININE 8.05 (H) 05/19/2025     Chronic deep vein thrombosis (DVT) of right lower extremity ([facility])  US LE:  Result in progress   This result has not been signed. Information might be incomplete.       Result Text   [facility]  .  RAMOS GOMEZ is a 74 year old M who was referred by KERRY GIBBONS.        Indications:   Patient presents with chronic right lower extremity edema worsening x a few days with chest pain.   Operative History:   No cardiovascular surgeries noted   Risk Factors:   The patient reports hypertension, hyperlipidemia,  diabetes and renal disease.           FINDINGS:     Main                                   Venous Thrombus  Right Deep Veins                                     Right Proximal Femoral                Chronic     Right Mid Femoral Vein                Chronic     Right Distal Fem Vein                 Chronic     Right Popliteal Vein                  Chronic     Gastrocnemius Vein        Chronic - Occlusive     CONCLUSION:     RIGHT LOWER LIMB   There is evidence of non-occlusive chronic deep vein thrombosis noted in one of the paired femoral veins and popliteal vein. There is occlusive chronic vs sub-acute deep vein thrombosis noted in the gastrocnemius veins.   There is no evidence of superficial thrombophlebitis.   Doppler evaluation shows a normal response to augmentation maneuvers.   Popliteal, posterior tibial and anterior tibial arterial Doppler waveforms are triphasic/biphasic.       LEFT LOWER LIMB LIMITED   There is no evidence of thrombus in the common femoral vein.    Doppler evaluation shows a normal response to augmentation maneuvers.           Apprec heme   Plan for heparin gtt to eventual coumadin bridge.     VTE  Prophylaxis:   Pharmacologic: in place  Mechanical VTE Prophylaxis in Place: Yes    Patient Centered Rounds: I have performed bedside rounds with nursing staff today.    Discussions with Specialists or Other Care Team Provider: case management    Education and Discussions with Family / Patient: yes - wife     Mobility:   Basic Mobility Inpatient Raw Score: 6  -[facility] Goal: 2: Bed activities/Dependent transfer  -[facility] Achieved: 2: Bed activities/Dependent transfer      Current Length of Stay: 0 day(s)    Current Patient Status: Observation        Code Status: Level 1 - Full Code    Discharge Plan: Pt will require continued inpatient hospitalization.    Subjective:   Pt states his breathing is returning to baseline post HD    Patient is seen and examined at bedside.  All other ROS are  negative.    Objective:     Vitals:   Temp (24hrs), Av.5 °F (36.4 °C), Min:96.9 °F (36.1 °C), Max:98.5 °F (36.9 °C)    Temp:  [96.9 °F (36.1 °C)-98.5 °F (36.9 °C)] 97.2 °F (36.2 °C)  HR:  [60-86] 69  Resp:  [16-28] 28  BP: (100-155)/() 122/101  SpO2:  [95 %-97 %] 96 %  Body mass index is 37.67 kg/m².     Input and Output Summary (last 24 hours):       Intake/Output Summary (Last 24 hours) at 2025 1413  Last data filed at 2025 0905  Gross per 24 hour   Intake 910 ml   Output --   Net 910 ml       Physical Exam:       GEN: No acute distress, comfortable  HEEENT: No JVD, PERRLA, no scleral icterus  RESP: Lungs clear to auscultation bilaterally  CV: RRR, +s1/s2   ABD: SOFT NON TENDER, POSITIVE BOWEL SOUNDS, NO DISTENTION  PSYCH: CALM  NEURO: Mentation baseline, NO FOCAL DEFICITS  SKIN: NO RASH  EXTREM: RLE edema     Additional Data:     Labs:    Results from last 7 days   Lab Units 25  0452   WBC Thousand/uL 9.11   HEMOGLOBIN g/dL 10.8*   HEMATOCRIT % 35.4*   PLATELETS Thousands/uL 247   SEGS PCT % 76*   LYMPHO PCT % 12*   MONO PCT % 7   EOS PCT % 4     Results from last 7 days   Lab Units 25  0452   SODIUM mmol/L 131*   POTASSIUM mmol/L 4.4   CHLORIDE mmol/L 94*   CO2 mmol/L 26   BUN mg/dL 21   CREATININE mg/dL 5.66*   ANION GAP mmol/L 11   CALCIUM mg/dL 8.8   ALBUMIN g/dL 3.2*   TOTAL BILIRUBIN mg/dL 0.55   ALK PHOS U/L 71   ALT U/L <3*   AST U/L 19   GLUCOSE RANDOM mg/dL 86     Results from last 7 days   Lab Units 25  1157   INR  1.00                   Lines/Drains:  Invasive Devices       Peripheral Intravenous Line  Duration             Peripheral IV 25 Proximal;Right;Ventral (anterior) Forearm <1 day              Hemodialysis Catheter  Duration             HD Permanent Double Catheter 166 days                    Telemetry:        * I Have Reviewed All Lab Data Listed Above.           Imaging:     Results for orders placed during the hospital encounter of 25    XR  chest 1 view portable    Narrative  XR CHEST PORTABLE    INDICATION: SOB.    COMPARISON: Chest radiograph 5/17/2025    FINDINGS: Right IJ dialysis catheter with tip in the right atrium.    Mild pulmonary edema. Probable small bilateral pleural effusions and bibasilar atelectasis. No pneumothorax    Unchanged cardiomediastinal silhouette.    Bones are unremarkable for age.    Normal upper abdomen.    Impression  Mild pulmonary edema, probable small bilateral pleural effusions and bibasilar atelectasis.        Workstation performed: [id number]    Results for orders placed during the hospital encounter of 09/14/24    XR chest pa and lateral    Narrative  XR CHEST PA AND LATERAL    INDICATION: Check catheter position.    COMPARISON: 9/30/2022    FINDINGS: Right internal jugular catheter tip projects at the caval atrial junction.    Clear lungs. No pneumothorax or pleural effusion.    Unchanged cardiomediastinal silhouette.    Bones are unremarkable for age.    Normal upper abdomen.    Impression  Right internal jugular dialysis catheter tip projects at the cavoatrial junction.        Workstation performed: [id number]      *I have reviewed all imaging reports listed above      Recent Cultures (last 7 days):           Last 24 Hours Medication List:   Current Facility-Administered Medications   Medication Dose Route Frequency Provider Last Rate    acetaminophen  650 mg Oral Q6H PRN David Stauffer MD      allopurinol  100 mg Oral BID David Stauffer MD      Artificial Tears  1 drop Both Eyes Q6H PRN David Stauffer MD      calcitriol  0.25 mcg Oral Once per day on Monday Wednesday Friday JanBRETT Pearce      calcium carbonate  2 tablet Oral BID With Meals David Stauffer MD      calcium carbonate  500 mg Oral Daily PRN David Stauffer MD      cholecalciferol  2,000 Units Oral Daily David Stauffer MD      collagenase   Topical Daily David Stauffer MD       dextromethorphan-guaiFENesin  10 mL Oral Q4H PRN David Stauffer MD      FLUoxetine  20 mg Oral Daily David Stauffer MD      heparin (porcine)  5,000 Units Subcutaneous Q8H [facility] David Stauffer MD      heparin   Intravenous Once David Stauffer MD      hydrALAZINE  5 mg Intravenous Q6H PRN David Stauffer MD      loratadine  10 mg Oral Daily David Stauffer MD      magnesium Oxide  400 mg Oral Daily David Stauffer MD      melatonin  3 mg Oral HS PRN David Stauffer MD      midodrine  2.5 mg Oral TID AC David Stauffer MD      midodrine  5 mg Oral Once per day on Monday Wednesday Friday Karen Parikh, LESTER      nystatin   Topical TID David Stauffer MD      ondansetron  4 mg Intravenous Q6H PRN David Stauffer MD      pantoprazole  40 mg Oral Daily David Stauffer MD      polyethylene glycol  17 g Oral Daily PRN David Stauffer MD      senna  1 tablet Oral HS PRN David Stauffer MD      sodium chloride  1 spray Each Nare Q1H PRN David Stauffer MD      vit B complex-vit C-folic acid  1 mg Oral Daily With Dinner David Stauffer MD          Today, Patient Was Seen By: David Stauffer MD    ** Please Note: Dictation voice to text software may have been used in the creation of this document. **

## 2025-06-25 NOTE — ASSESSMENT & PLAN NOTE
Blood pressure stable at this time little bit low during dialysis treatments, continue with midodrine during HD as needed

## 2025-06-25 NOTE — ASSESSMENT & PLAN NOTE
US LE:  Result in progress   This result has not been signed. Information might be incomplete.       Result Text   [facility]PORT  .  JOSELYN GOMEZ is a 74 year old M who was referred by KERRY GIBBONS.        Indications:   Patient presents with chronic right lower extremity edema worsening x a few days with chest pain.   Operative History:   No cardiovascular surgeries noted   Risk Factors:   The patient reports hypertension, hyperlipidemia, diabetes and renal disease.           FINDINGS:     Main                                   Venous Thrombus  Right Deep Veins                                     Right Proximal Femoral                Chronic     Right Mid Femoral Vein                Chronic     Right Distal Fem Vein                 Chronic     Right Popliteal Vein                  Chronic     Gastrocnemius Vein        Chronic - Occlusive     CONCLUSION:     RIGHT LOWER LIMB   There is evidence of non-occlusive chronic deep vein thrombosis noted in one of the paired femoral veins and popliteal vein. There is occlusive chronic vs sub-acute deep vein thrombosis noted in the gastrocnemius veins.   There is no evidence of superficial thrombophlebitis.   Doppler evaluation shows a normal response to augmentation maneuvers.   Popliteal, posterior tibial and anterior tibial arterial Doppler waveforms are triphasic/biphasic.       LEFT LOWER LIMB LIMITED   There is no evidence of thrombus in the common femoral vein.    Doppler evaluation shows a normal response to augmentation maneuvers.           Apprec heme   Plan for heparin gtt to eventual coumadin bridge.

## 2025-06-25 NOTE — CONSULTS
e-Consult ([facility]) - Oncology-Medical   Name: Ramos Rosenthal 74 y.o. male I MRN: [id number]  Unit/Bed#: -01 I Date of Admission: 6/24/2025   Date of Service: 6/25/2025 I Hospital Day: 0   Inpatient consult to Hematology  Consult performed by: Sammi Ceballos MD  Consult ordered by: David Stauffer MD        Physician Requesting Evaluation: David Stauffer MD   Reason for Evaluation / Principal Problem: Chronic DVT    ASSESSMENT:  This is a 74-year-old male with multiple complex conditions including end-stage renal disease on hemodialysis who presented with worsening of shortness of breath.  He was found to have swelling of the right lower extremity.  Doppler ultrasound on 6/24/2025 showed evidence of nonocclusive chronic deep vein thrombosis in one of the paired femoral veins and popliteal vein.  There is also occlusive chronic versus subacute deep vein thrombosis noted in the gastrocnemius veins of the right lower extremity.    CBC this morning showed platelet count 267, hemoglobin 10.8 and normal white cells.  Creatinine 4.9 with normal calcium and liver enzymes.        RECOMMENDATIONS:  Incidental finding of nonocclusive chronic deep vein thrombosis in one of the paired femoral veins and popliteal vein.  There is also occlusive chronic versus subacute deep vein thrombosis noted in the gastrocnemius veins of the right lower extremity.  The patient should definitely be considered for anticoagulation if there is no absolute contraindication like active bleeding since he has multiple risk factors including his comorbidities, obesity and male sex.  The best anticoagulation is heparin drip with transition to warfarin for INR between 2 and 3.    DOAC's would be risky since he has end-stage renal disease on dialysis.    31 + minutes, >50% of the total time devoted to medical consultative verbal/EMR discussion between providers. Written report will be generated in the EMR.

## 2025-06-25 NOTE — PROGRESS NOTES
Progress Note - Nephrology   Name: Ramos Rosenthal 74 y.o. male I MRN: [id number]  Unit/Bed#: -01 I Date of Admission: 6/24/2025   Date of Service: 6/25/2025 I Hospital Day: 0    Assessment & Plan  End-stage renal disease on hemodialysis ([facility])  Lab Results   Component Value Date    EGFR 8 06/25/2025    EGFR 10 06/24/2025    EGFR 5 05/19/2025    CREATININE 5.66 (H) 06/25/2025    CREATININE 4.92 (H) 06/24/2025    CREATININE 8.05 (H) 05/19/2025   ESRD on HD at [facility] under the care of Dr. Moore  Most recent dialysis yesterday, 6/23 for 196 minutes 1 L UF with postweight of 123.7 kg.  OP orders: .5 kg, 210-minute tx time, Access right PermCath  /, 3K/2.5 Ca/35 HCO3/135 Na+  Care previously discussed with Dr. Moore, advised to give Lasix 160 mg IV  given patient is not oliguric with small amount of urine at baseline which patient states he urinates small amount when straining for BM. Most recent dialysis yesterday.  Patient reports he makes very small amount of urine. Volume status examines hypervolemic with 3+ RLE edema and decreased lung sounds. LLE in ortho boot, mild edema noted. Plan for HD tomorrow. Challenge EDW by 1-2 L as tolerated. Orders entered.     6/25  Patient next HD treatment here his weight will be challenged, goal UF of 4 L. He was given a dose of midodrine 5 mg during treatment for intra dialytic hypotension and to assist with volume removal. His etiology of shortness of breath is thought to be due to volume overload. CXR showed mild pulmonary edema and probable small bilateral effusions. Will plan for next HD on Friday  Secondary hyperparathyroidism of renal origin (HCC)  Continue to monitor PTH and phosphorus outpatient. Most recent outpatietn , phosphorus 3.3 mg/dL at dialysis center. Resume previous outpatient calcitriol 0.25 mcg three times weekly.  Check phosphorus level tomorrow    Essential hypertension  Blood pressure stable at this time  little bit low during dialysis treatments, continue with midodrine during HD as needed  Volume overload  Continue volume removal with HD treatments. Patient is nonoliguric  Class 2 severe obesity due to excess calories with serious comorbidity and body mass index (BMI) of 35.0 to 35.9 in adult ([facility])    Anemia in ESRD (end-stage renal disease)  ([facility])  Lab Results   Component Value Date    EGFR 8 06/25/2025    EGFR 10 06/24/2025    EGFR 5 05/19/2025    CREATININE 5.66 (H) 06/25/2025    CREATININE 4.92 (H) 06/24/2025    CREATININE 8.05 (H) 05/19/2025   Hb stable at this time, no short acting SHREE indicated  Chronic deep vein thrombosis (DVT) of right lower extremity ([facility])  Heme consult regarding need for long term AC pending         Subjective   Patient seen and examined during dialysis, no complaints noted     Objective :  Temp:  [96.9 °F (36.1 °C)-98.5 °F (36.9 °C)] 97 °F (36.1 °C)  HR:  [65-95] 67  BP: (100-155)/(62-86) 141/86  Resp:  [16-20] 18  SpO2:  [95 %-97 %] 96 %  O2 Device: None (Room air)    Current Weight: Weight - Scale: 129 kg (285 lb 7.9 oz)  First Weight: Weight - Scale: 126 kg (277 lb 9 oz)  I/O         06/23 0701  06/24 0700 06/24 0701  06/25 0700 06/25 0701  06/26 0700    P.O.  480 120    I.V. (mL/kg)  10 (0.1) 300 (2.3)    Total Intake(mL/kg)  490 (3.8) 420 (3.3)    Net  +490 +420                 Physical Exam  Vitals and nursing note reviewed.   Constitutional:       General: He is not in acute distress.     Appearance: He is well-developed.   HENT:      Head: Normocephalic and atraumatic.     Eyes:      Conjunctiva/sclera: Conjunctivae normal.       Cardiovascular:      Rate and Rhythm: Normal rate and regular rhythm.      Heart sounds: No murmur heard.  Pulmonary:      Effort: Pulmonary effort is normal. No respiratory distress.      Breath sounds: Normal breath sounds.   Abdominal:      Palpations: Abdomen is soft.      Tenderness: There is no abdominal tenderness.     Musculoskeletal:          "General: No swelling.      Cervical back: Neck supple.     Skin:     General: Skin is warm and dry.      Capillary Refill: Capillary refill takes less than 2 seconds.     Neurological:      Mental Status: He is alert.     Psychiatric:         Mood and Affect: Mood normal.         Medications:  Current Medications[1]      Lab Results: I have reviewed the following results:  Results from last 7 days   Lab Units 06/25/25  0452 06/24/25  1157   WBC Thousand/uL 9.11 8.70   HEMOGLOBIN g/dL 10.8* 10.8*   HEMATOCRIT % 35.4* 34.5*   PLATELETS Thousands/uL 247 267   POTASSIUM mmol/L 4.4 3.9   CHLORIDE mmol/L 94* 95*   CO2 mmol/L 26 30   BUN mg/dL 21 16   CREATININE mg/dL 5.66* 4.92*   CALCIUM mg/dL 8.8 8.8   ALBUMIN g/dL 3.2* 3.2*       Administrative Statements     Portions of the record may have been created with voice recognition software. Occasional wrong word or \"sound a like\" substitutions may have occurred due to the inherent limitations of voice recognition software. Read the chart carefully and recognize, using context, where substitutions have occurred.If you have any questions, please contact the dictating provider.       [1]   Current Facility-Administered Medications:     acetaminophen (TYLENOL) tablet 650 mg, 650 mg, Oral, Q6H PRN, David Stauffer MD    allopurinol (ZYLOPRIM) tablet 100 mg, 100 mg, Oral, BID, David Stauffer MD, 100 mg at 06/24/25 2011    Artificial Tears Op Soln 1 drop, 1 drop, Both Eyes, Q6H PRN, David Stauffer MD    calcitriol (ROCALTROL) capsule 0.25 mcg, 0.25 mcg, Oral, Once per day on Monday Wednesday Friday, Liz BRETT Yao    calcium carbonate (OYSTER SHELL,OSCAL) 500 mg tablet 2 tablet, 2 tablet, Oral, BID With Meals, David Stauffer MD, 2 tablet at 06/25/25 0730    calcium carbonate (TUMS) chewable tablet 500 mg, 500 mg, Oral, Daily PRN, David Stauffer MD    Cholecalciferol (VITAMIN D3) tablet 2,000 Units, 2,000 Units, Oral, Daily, Vijiar " Alfredo Stauffer MD    collagenase (SANTYL) ointment, , Topical, Daily, David Stauffer MD    dextromethorphan-guaiFENesin (ROBITUSSIN DM) oral syrup 10 mL, 10 mL, Oral, Q4H PRN, David Stauffer MD    FLUoxetine (PROzac) capsule 20 mg, 20 mg, Oral, Daily, David Stauffer MD    heparin (porcine) subcutaneous injection 5,000 Units, 5,000 Units, Subcutaneous, Q8H LORENZO, 5,000 Units at 06/25/25 0500 **AND** [CANCELED] Platelet count, , , Once, David Stauffer MD    hydrALAZINE (APRESOLINE) injection 5 mg, 5 mg, Intravenous, Q6H PRN, David Stauffer MD    loratadine (CLARITIN) tablet 10 mg, 10 mg, Oral, Daily, David Stauffer MD    magnesium Oxide (MAG-OX) tablet 400 mg, 400 mg, Oral, Daily, David Stauffer MD    melatonin tablet 3 mg, 3 mg, Oral, HS PRN, David Stauffer MD    midodrine (PROAMATINE) tablet 2.5 mg, 2.5 mg, Oral, TID AC, David Stauffer MD, 2.5 mg at 06/25/25 0730    midodrine (PROAMATINE) tablet 5 mg, 5 mg, Oral, Once per day on Monday Wednesday Friday, Karen Parikh PA-C, 5 mg at 06/25/25 0836    nystatin (MYCOSTATIN) powder, , Topical, TID, David Stauffer MD    ondansetron (ZOFRAN) injection 4 mg, 4 mg, Intravenous, Q6H PRN, David Stauffer MD    pantoprazole (PROTONIX) EC tablet 40 mg, 40 mg, Oral, Daily, David Stauffer MD    polyethylene glycol (MIRALAX) packet 17 g, 17 g, Oral, Daily PRN, David Stauffer MD    senna (SENOKOT) tablet 8.6 mg, 1 tablet, Oral, HS PRN, David Stauffer MD    sodium chloride (OCEAN) 0.65 % nasal spray 1 spray, 1 spray, Each Nare, Q1H PRN, David Stauffer MD    vit B complex-vit C-folic acid (Renal Caps) capsule 1 capsule, 1 mg, Oral, Daily With Dinner, David Stauffer MD, 1 capsule at 06/24/25 2011

## 2025-06-25 NOTE — ASSESSMENT & PLAN NOTE
Lab Results   Component Value Date    EGFR 8 06/25/2025    EGFR 10 06/24/2025    EGFR 5 05/19/2025    CREATININE 5.66 (H) 06/25/2025    CREATININE 4.92 (H) 06/24/2025    CREATININE 8.05 (H) 05/19/2025   ESRD on HD at [facility] under the care of Dr. Moore  Most recent dialysis yesterday, 6/23 for 196 minutes 1 L UF with postweight of 123.7 kg.  OP orders: .5 kg, 210-minute tx time, Access right PermCath  /, 3K/2.5 Ca/35 HCO3/135 Na+  Care previously discussed with Dr. Moore, advised to give Lasix 160 mg IV  given patient is not oliguric with small amount of urine at baseline which patient states he urinates small amount when straining for BM. Most recent dialysis yesterday.  Patient reports he makes very small amount of urine. Volume status examines hypervolemic with 3+ RLE edema and decreased lung sounds. LLE in ortho boot, mild edema noted. Plan for HD tomorrow. Challenge EDW by 1-2 L as tolerated. Orders entered.     6/25  Patient next HD treatment here his weight will be challenged, goal UF of 4 L. He was given a dose of midodrine 5 mg during treatment for intra dialytic hypotension and to assist with volume removal. His etiology of shortness of breath is thought to be due to volume overload. CXR showed mild pulmonary edema and probable small bilateral effusions. Will plan for next HD on Friday

## 2025-06-25 NOTE — ASSESSMENT & PLAN NOTE
Pt presents with shortness of breath x 1 day, increased LLE swelling in setting of prematurely discontinued HD session concerning for volume overload  Pt received IV lasix x1 in ER. Pt makes minimal urine.   Chest XR -pulm edema, small bilateral pleural effusions  Pt will be managed by hemodialysis as per nephrology

## 2025-06-25 NOTE — DISCHARGE INSTR - OTHER ORDERS
Skin Care Plan:  1-Right flank: Cleanse with soap and water, pat dry. Apply silicone foam dressing 4x4 (mepilex), eden T for treatment, date Change every other day and as needed if soiled/displaced.   2-Turn/reposition every 2 hours or when medically stable for pressure re-distribution on skin.Utilize ATR turning and repositioning system and wedges for offloading.  3-Elevate heels to offload pressure, Utilize EHOB boot for left heel and pillow for right heel.  4-Moisturize skin daily with skin nourishing cream  5-Ehob cushion in chair when out of bed.  6- Apply preventative Hydraguard to bilateral sacro-buttocks 2x/day and as needed if soiled/displaced.   7-Left heel: awaiting podiatry recommendations. Cleanse with VASHE soaked 4x4 gauze for 5 minutes, apply non adherent strip to wound bed, cover with Melgisorb Ag, top with 4x4 gauze, then plain ABD and DSD. Utilize EHOB boot.   8-Recommend patient follow up with out patient wound care referral for left heel wound. Referral already in place.     Schedule Follow-up Outpatient Wound Appointment.  Call Outpatient Wound and Ostomy Care Team @ [phone]   Option 1: [place], [place], [place], [place]  Option 2: [place], [place]

## 2025-06-25 NOTE — ASSESSMENT & PLAN NOTE
Lab Results   Component Value Date    EGFR 8 06/25/2025    EGFR 10 06/24/2025    EGFR 5 05/19/2025    CREATININE 5.66 (H) 06/25/2025    CREATININE 4.92 (H) 06/24/2025    CREATININE 8.05 (H) 05/19/2025   Pt is HD dependent 2/2 glomerulonephritis. Will cont HD MWF per renal.

## 2025-06-25 NOTE — ASSESSMENT & PLAN NOTE
Continue to monitor PTH and phosphorus outpatient. Most recent outpatietn , phosphorus 3.3 mg/dL at dialysis center. Resume previous outpatient calcitriol 0.25 mcg three times weekly.  Check phosphorus level tomorrow

## 2025-06-25 NOTE — PLAN OF CARE
Problem: Activity Intolerance  Goal: Patient is able to perform activities within their limitations  Description: INTERVENTIONS:                       -   Alternate periods of activity with periods of rest                 -   Patients is able to maintain normal vitals heart rhythm during activity                 -   Gradually increase activity and exercise as patient can tolerate                 -   Monitor blood pressure and heart before and after exercise                  -   Monitor blood oxygen saturation during activity and apply oxygen as needed    Outcome: Progressing     Problem: PAIN - ADULT  Goal: Verbalizes/displays adequate comfort level or baseline comfort level  Description: Interventions:  - Encourage patient to monitor pain and request assistance  - Assess pain using appropriate pain scale  - Administer analgesics as ordered based on type and severity of pain and evaluate response  - Implement non-pharmacological measures as appropriate and evaluate response  - Consider cultural and social influences on pain and pain management  - Notify physician/advanced practitioner if interventions unsuccessful or patient reports new pain  - Educate patient/family on pain management process including their role and importance of  reporting pain   - Provide non-pharmacologic/complimentary pain relief interventions  Outcome: Progressing     Problem: INFECTION - ADULT  Goal: Absence or prevention of progression during hospitalization  Description: INTERVENTIONS:  - Assess and monitor for signs and symptoms of infection  - Monitor lab/diagnostic results  - Monitor all insertion sites, i.e. indwelling lines, tubes, and drains  - Monitor endotracheal if appropriate and nasal secretions for changes in amount and color  - [place] appropriate cooling/warming therapies per order  - Administer medications as ordered  - Instruct and encourage patient and family to use good hand hygiene technique  - Identify and instruct  in appropriate isolation precautions for identified infection/condition  Outcome: Progressing  Goal: Absence of fever/infection during neutropenic period  Description: INTERVENTIONS:  - Monitor WBC  - Perform strict hand hygiene  - Limit to healthy visitors only  - No plants, dried, fresh or silk flowers with mcduffie in patient room  Outcome: Progressing

## 2025-06-25 NOTE — CASE MANAGEMENT
Case Management Assessment & Discharge Planning Note    Patient name Ramos Rosenthal  Location /-01 MRN [id number]  : 1950 Date 2025       Current Admission Date: 2025  Current Admission Diagnosis:Volume overload   Patient Active Problem List    Diagnosis Date Noted    Chronic deep vein thrombosis (DVT) of right lower extremity ([facility]) 2025    RPGN (rapidly progressive glomerulonephritis) 2025    Anemia in ESRD (end-stage renal disease)  ([facility]) 2025    Class 2 severe obesity due to excess calories with serious comorbidity and body mass index (BMI) of 35.0 to 35.9 in adult ([facility]) 2025    Ulcer of left heel with cellulitis ([facility]) 10/31/2024    Immunocompromised patient ([facility]) 10/23/2024    Volume overload 10/21/2024    Chronic gout due to renal impairment of multiple sites without tophus 10/21/2024    End-stage renal disease on hemodialysis ([facility]) 2024    Anemia 2024    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 2024    Essential hypertension 2024    Secondary hyperparathyroidism of renal origin ([facility]) 2024    Uremia 2024    Bilateral lower extremity edema 2024    Neurogenic claudication 2023    Paresthesia of both lower extremities 10/01/2022    Shortness of breath 2022    Recurrent major depressive disorder, in remission ([facility]) 2022    Lumbosacral plexopathy 2019    Gait abnormality 2019    Lumbar stenosis 2019    Polyneuropathy 2019    Prediabetes 2018    Hyperlipidemia 2014    Vitamin D deficiency 2014      LOS (days): 0  Geometric Mean LOS (GMLOS) (days):   Days to GMLOS:     OBJECTIVE:              Current admission status: Observation       Preferred Pharmacy:   [id number] [place] [facility] [address] [place] [address]  Phone: [phone] Fax: [phone]    Primary Care Provider: Wade  DAXA Arellano DO    Primary Insurance: MEDICARE  Secondary Insurance: [employer]    ASSESSMENT:  Active Health Care Proxies       Louise Rosenthal Health Care Representative - Spouse   Primary Phone: [phone] (Home)                 Advance Directives  Does patient have a Health Care POA?: Yes  Does patient have Advance Directives?: Yes  Advance Directives: Living will, Power of  for health care, Power of  for finance  Primary Contact: Louise-Spouse-POA         Readmission Root Cause  30 Day Readmission: No    Patient Information  Admitted from:: Home  Primary Caregiver: Family  Caregiver's Name:: Louise Rosenthal  Caregiver's Relationship to Patient:: Family Member  Caregiver's Telephone Number:: [phone]  Support Systems: Spouse/significant other, Daughter  County of Residence: [place]  What city do you live in?: [place]  Home entry access options. Select all that apply.: Ramp  Type of Current Residence: 2 story home  Upon entering residence, is there a bedroom on the main floor (no further steps)?: Yes  Upon entering residence, is there a bathroom on the main floor (no further steps)?: Yes  Living Arrangements: Lives w/ Spouse/significant other, Lives w/ Children    Activities of Daily Living Prior to Admission  Functional Status: Assistance  Completes ADLs independently?: No  Level of ADL dependence: Assistance  Ambulates independently?: No  Level of ambulatory dependence: Assistance  Does patient use assisted devices?: Yes  Assisted Devices (DME) used: Electric wheelchair, Wheelchair, Other (Comment) (Slideboard)  Does patient currently own DME?: Yes  What DME does the patient currently own?: Electric Wheelchair, Wheelchair, Other (Comment) (Slide board)  Does patient have a history of Outpatient Therapy (PT/OT)?: Yes  Does the patient have a history of Short-Term Rehab?: Yes ([facility])  Does patient have a history of HHC?: Yes  Does patient currently have HHC?: Yes    Current Home Health  Care  Type of Current Home Care Services: Nurse visit  Home Health Agency Name:: [facility]  Current Home Health Follow-Up Provider:: PCP    Patient Information Continued  Does patient have prescription coverage?: Yes  Can the patient afford their medications and any related supplies (such as glucometers or test strips)?: Yes  Does patient receive dialysis treatments?: Yes ([facility])  Does patient have a history of substance abuse?: No  Does patient have a history of Mental Health Diagnosis?: Yes  Is patient receiving treatment for mental health?: Yes  Has patient received inpatient treatment related to mental health in the last 2 years?: No         Means of Transportation  Means of Transport to Appts:: Family transport          DISCHARGE DETAILS:       Freedom of Choice: Yes  Comments - Freedom of Choice: Active with [facility]                     Requested Home Health Care         Home Health Agency Name:: [facility]              Would you like to participate in our [facility] service program?  : No - Declined       Expected Discharge Disposition: Home Health Services     Transport at Discharge : Family                                      Additional Comments: CM reviewed chart, due to patient being in HD. Lives with spouse and children in 2  with [facility] up.  Spouse and children assist him with care.  Patient goes to HD [facility]. Active with [facility], return referral placed in aidin.  Cm will continue to follow for needs.

## 2025-06-25 NOTE — WOUND OSTOMY CARE
Consult Note - Wound   Ramos Rosenthal 74 y.o. male MRN: [id number]  Unit/Bed#: -01 Encounter: [id number]        History and Present Illness:  Patient is a 75 yo male that was admitted to [facility] for treatment of volume overload. Patient has a PMH of depression, gout, ESRD on HD secondary to rapidly progressive glomerulonephritis with anti-GBM ab. Patient is an assist x 2 for turning and repositioning. Patient is incontinent of bowel and bladder. On assessment, patient is lying in bed on standard mattress.     Wound Care was consulted for left foot wound.    Podiatry also consulted for left foot wound. Awaiting podiatry to see patient and will defer to podiatry for wound care.     Assessment Findings:   Sacro-buttocks dry intact blanchable pink. Patient stated had previous pressure injury, monitor and turn patient utilizing wedges and ATR.     POA Left heel Unstageable Pressure Injury: Wound is oval in shape, pink and yellow slough full thickness skin loss, true depth unknown. An-wound intact, macerated tissue. Small tan drainage noted on foam dressing. Patient stated dressing has been on since Saturday from home care nurse and was recommended a wound vac. Recommend podiatry consult and wound care adaptic, Melgisorb Ag and DSD until podiatry sees patient.   RESOLVED Left lower leg abrasion: dry intact pink epithelialize tissue, no drainage noted, an wound dry intact blanchable. Can leave open to air.   POA Right Flank: Wound is oval in shape, pink intact non blanchable tissue, no drainage noted. An-wound dry intact blanchable pink tissue. Patient wearing a brief from home that was directly over the wound. Etiology unknown possibly pressure vs friction from brief.    Educated patient on importance of frequent offloading of pressure via turning, repositioning and weight-shifting. Patient verbalized understanding of plan of care.     No induration, fluctuance, odor, warmth/temperature differences,  redness, or purulence noted to the above noted wounds and skin areas assessed. New dressings applied per orders listed below. Patient tolerated well- no s/s of non-verbal pain or discomfort observed during the encounter. Bedside nurse aware of plan of care. See flow sheets for more detailed assessment findings.      Orders listed below and wound care will continue to follow, call or Secure Chat Message with questions.     Skin Care Plan:  1-Right flank: Cleanse with soap and water, pat dry. Apply silicone foam dressing 4x4 (mepilex), eden T for treatment, date Change every other day and as needed if soiled/displaced.   2-Turn/reposition every 2 hours or when medically stable for pressure re-distribution on skin.Utilize ATR turning and repositioning system and wedges for offloading.  3-Elevate heels to offload pressure, Utilize EHOB boot for left heel and pillow for right heel.  4-Moisturize skin daily with skin nourishing cream  5-Ehob cushion in chair when out of bed.  6- Apply preventative Hydraguard to bilateral sacro-buttocks 2x/day and as needed if soiled/displaced.   7-Left heel: awaiting podiatry recommendations. Cleanse with VASHE soaked 4x4 gauze for 5 minutes, apply non adherent strip to wound bed, cover with Melgisorb Ag, top with 4x4 gauze, then plain ABD and DSD. Utilize EHOB boot.   8-Recommend patient follow up with out patient wound care referral for left heel wound. Referral already in place.       Wounds:  Wound 05/14/25 Pressure Injury Heel Left (Active)   Wound Image   06/25/25 1505   Wound Description Pink;Slough;Yellow 06/25/25 1505   Pressure Injury Stage U 06/25/25 1505   Non-staged Wound Description Full thickness 06/25/25 1505   Wound Length (cm) 2.2 cm 06/25/25 1505   Wound Width (cm) 2.7 cm 06/25/25 1505   Wound Depth (cm) 0.5 cm 06/25/25 1505   Wound Surface Area (cm^2) 4.67 cm^2 06/25/25 1505   Wound Volume (cm^3) 1.555 cm^3 06/25/25 1505   Calculated Wound Volume (cm^3) 2.97 cm^3  06/25/25 1505   Change in Wound Size % 61.63 06/25/25 1505       Wound 06/12/25 Traumatic Abrasion Leg Left;Lower;Lateral (Active)   Wound Image    06/25/25 1502   Wound Description Dry;Intact;Epithelialization 06/25/25 1502   Non-staged Wound Description Not applicable 06/25/25 1502   Wound Length (cm) 0 cm 06/25/25 1502   Wound Width (cm) 0 cm 06/25/25 1502   Wound Depth (cm) 0 cm 06/25/25 1502   Wound Surface Area (cm^2) 0 cm^2 06/25/25 1502   Wound Volume (cm^3) 0 cm^3 06/25/25 1502   Calculated Wound Volume (cm^3) 0 cm^3 06/25/25 1502   Change in Wound Size % 100 06/25/25 1502   Drainage Amount None 06/25/25 1502   Antonette-wound Assessment Clean;Dry;Intact 06/25/25 1502   Treatments Cleansed 06/25/25 1502   Dressing Open to air 06/25/25 1502   Wound packed? No 06/25/25 1502   Patient Tolerance Tolerated well 06/25/25 1502       Wound 06/25/25 Right (Active)   Wound Image   06/25/25 1457   Wound Description Dry;Intact;Pink 06/25/25 1457   Non-staged Wound Description Not applicable 06/25/25 1457   Wound Length (cm) 0.3 cm 06/25/25 1457   Wound Width (cm) 0.4 cm 06/25/25 1457   Wound Depth (cm) 0 cm 06/25/25 1457   Wound Surface Area (cm^2) 0.09 cm^2 06/25/25 1457   Wound Volume (cm^3) 0 cm^3 06/25/25 1457   Calculated Wound Volume (cm^3) 0 cm^3 06/25/25 1457   Antonette-wound Assessment Intact 06/25/25 1457   Treatments Cleansed;Site care 06/25/25 1457   Dressing Foam, Silicon (eg. Allevyn, etc) 06/25/25 1457   Wound packed? No 06/25/25 1457   Dressing Changed New 06/25/25 1457   Patient Tolerance Tolerated well 06/25/25 1457   Dressing Status Clean;Dry;Intact 06/25/25 1457           Sandra Rosa BSN, RN

## 2025-06-25 NOTE — PLAN OF CARE
Target UF Goal 3.5  L as tolerated. Patient dialyzing for 3.5 hours on 3 K bath for serum K of 4.4 per protocol. Treatment plan reviewed with Nephrology.       Post-Dialysis RN Treatment Note    Blood Pressure:  Pre 100/62 mm/Hg  Post 113/67 mmHg   EDW  123.5 kg    Weight:  Pre 126.2 kg   Post 122.7 kg   Mode of weight measurement: Bed Scale   Volume Removed  3500 ml    Treatment duration 3.5   NS given  No   Treatment shortened? No   Medications given during Rx Midodrine 5 mg   Estimated Kt/V  0.97   Access type: Permacath/TDC   Access Issues: No   Needle Gauge: NA   Report called to primary nurse   yes Verbal at bedside       Problem: METABOLIC, FLUID AND ELECTROLYTES - ADULT  Goal: Electrolytes maintained within normal limits  Description: INTERVENTIONS:  - Monitor labs and assess patient for signs and symptoms of electrolyte imbalances  - Administer electrolyte replacement as ordered  - Monitor response to electrolyte replacements, including repeat lab results as appropriate  - Instruct patient on fluid and nutrition as appropriate  Outcome: Progressing  Goal: Fluid balance maintained  Description: INTERVENTIONS:  - Monitor labs   - Monitor I/O and WT  - Instruct patient on fluid and nutrition as appropriate  - Assess for signs & symptoms of volume excess or deficit  Outcome: Progressing

## 2025-06-25 NOTE — ASSESSMENT & PLAN NOTE
Lab Results   Component Value Date    EGFR 8 06/25/2025    EGFR 10 06/24/2025    EGFR 5 05/19/2025    CREATININE 5.66 (H) 06/25/2025    CREATININE 4.92 (H) 06/24/2025    CREATININE 8.05 (H) 05/19/2025   Hb stable at this time, no short acting SHREE indicated

## 2025-06-25 NOTE — ASSESSMENT & PLAN NOTE
Lab Results   Component Value Date    EGFR 8 06/25/2025    EGFR 10 06/24/2025    EGFR 5 05/19/2025    CREATININE 5.66 (H) 06/25/2025    CREATININE 4.92 (H) 06/24/2025    CREATININE 8.05 (H) 05/19/2025

## 2025-06-26 PROBLEM — S91.302A NON HEALING LEFT HEEL WOUND: Status: ACTIVE | Noted: 2025-06-26

## 2025-06-26 LAB
APTT PPP: 142 SECONDS (ref 23–34)
APTT PPP: >210 SECONDS (ref 23–34)
APTT PPP: >210 SECONDS (ref 23–34)
INR PPP: 1.1 (ref 0.85–1.19)
PHOSPHATE SERPL-MCNC: 2 MG/DL (ref 2.3–4.1)
PROTHROMBIN TIME: 14.7 SECONDS (ref 12.3–15)

## 2025-06-26 PROCEDURE — 99222 1ST HOSP IP/OBS MODERATE 55: CPT | Performed by: PODIATRIST

## 2025-06-26 PROCEDURE — 99222 1ST HOSP IP/OBS MODERATE 55: CPT | Performed by: SURGERY

## 2025-06-26 PROCEDURE — 85610 PROTHROMBIN TIME: CPT | Performed by: HOSPITALIST

## 2025-06-26 PROCEDURE — 85730 THROMBOPLASTIN TIME PARTIAL: CPT | Performed by: HOSPITALIST

## 2025-06-26 PROCEDURE — 84100 ASSAY OF PHOSPHORUS: CPT

## 2025-06-26 PROCEDURE — 99232 SBSQ HOSP IP/OBS MODERATE 35: CPT | Performed by: HOSPITALIST

## 2025-06-26 PROCEDURE — 99232 SBSQ HOSP IP/OBS MODERATE 35: CPT

## 2025-06-26 RX ORDER — SENNOSIDES 8.6 MG
1 TABLET ORAL
Status: DISCONTINUED | OUTPATIENT
Start: 2025-06-26 | End: 2025-06-30 | Stop reason: HOSPADM

## 2025-06-26 RX ORDER — WARFARIN SODIUM 5 MG/1
5 TABLET ORAL
Status: DISCONTINUED | OUTPATIENT
Start: 2025-06-26 | End: 2025-06-27

## 2025-06-26 RX ADMIN — Medication 1 CAPSULE: at 15:34

## 2025-06-26 RX ADMIN — LORATADINE 10 MG: 10 TABLET ORAL at 08:54

## 2025-06-26 RX ADMIN — COLLAGENASE SANTYL: 250 OINTMENT TOPICAL at 15:28

## 2025-06-26 RX ADMIN — MIDODRINE HYDROCHLORIDE 2.5 MG: 5 TABLET ORAL at 15:34

## 2025-06-26 RX ADMIN — FLUOXETINE HYDROCHLORIDE 20 MG: 20 CAPSULE ORAL at 08:54

## 2025-06-26 RX ADMIN — ALLOPURINOL 100 MG: 100 TABLET ORAL at 18:54

## 2025-06-26 RX ADMIN — Medication 400 MG: at 08:54

## 2025-06-26 RX ADMIN — ANTACID TABLETS 500 MG: 500 TABLET, CHEWABLE ORAL at 08:54

## 2025-06-26 RX ADMIN — Medication 2000 UNITS: at 08:54

## 2025-06-26 RX ADMIN — ALLOPURINOL 100 MG: 100 TABLET ORAL at 08:54

## 2025-06-26 RX ADMIN — HEPARIN SODIUM 15 UNITS/KG/HR: 10000 INJECTION, SOLUTION INTRAVENOUS at 03:54

## 2025-06-26 RX ADMIN — NYSTATIN: 100000 POWDER TOPICAL at 21:58

## 2025-06-26 RX ADMIN — NYSTATIN: 100000 POWDER TOPICAL at 15:35

## 2025-06-26 RX ADMIN — MIDODRINE HYDROCHLORIDE 2.5 MG: 5 TABLET ORAL at 11:26

## 2025-06-26 RX ADMIN — WARFARIN SODIUM 5 MG: 5 TABLET ORAL at 18:54

## 2025-06-26 RX ADMIN — NYSTATIN: 100000 POWDER TOPICAL at 08:56

## 2025-06-26 RX ADMIN — MIDODRINE HYDROCHLORIDE 2.5 MG: 5 TABLET ORAL at 06:16

## 2025-06-26 RX ADMIN — HEPARIN SODIUM 6 UNITS/KG/HR: 10000 INJECTION, SOLUTION INTRAVENOUS at 22:02

## 2025-06-26 RX ADMIN — PANTOPRAZOLE SODIUM 40 MG: 40 TABLET, DELAYED RELEASE ORAL at 08:54

## 2025-06-26 NOTE — ASSESSMENT & PLAN NOTE
US LE:  Result in progress   This result has not been signed. Information might be incomplete.       Result Text   [facility] REPORT  .  JOSELYN GOMEZ is a 74 year old M who was referred by KERRY GIBBONS.        Indications:   Patient presents with chronic right lower extremity edema worsening x a few days with chest pain.   Operative History:   No cardiovascular surgeries noted   Risk Factors:   The patient reports hypertension, hyperlipidemia, diabetes and renal disease.           FINDINGS:     Main                                   Venous Thrombus  Right Deep Veins                                     Right Proximal Femoral                Chronic     Right Mid Femoral Vein                Chronic     Right Distal Fem Vein                 Chronic     Right Popliteal Vein                  Chronic     Gastrocnemius Vein        Chronic - Occlusive     CONCLUSION:     RIGHT LOWER LIMB   There is evidence of non-occlusive chronic deep vein thrombosis noted in one of the paired femoral veins and popliteal vein. There is occlusive chronic vs sub-acute deep vein thrombosis noted in the gastrocnemius veins.   There is no evidence of superficial thrombophlebitis.   Doppler evaluation shows a normal response to augmentation maneuvers.   Popliteal, posterior tibial and anterior tibial arterial Doppler waveforms are triphasic/biphasic.       LEFT LOWER LIMB LIMITED   There is no evidence of thrombus in the common femoral vein.    Doppler evaluation shows a normal response to augmentation maneuvers.           Apprec heme   Plan for heparin gtt with coumadin bridge  Start coumadin 6/26  Daily INR monitoring

## 2025-06-26 NOTE — CONSULTS
"Consultation - Surgery-General   Name: Ramos Rosenthal 74 y.o. male I MRN: [id number]  Unit/Bed#: -01 I Date of Admission: 6/24/2025   Date of Service: 6/26/2025 I Hospital Day: 1   Inpatient consult to Vascular Surgery  Consult performed by: Angela Diaz PA-C  Consult ordered by: Nolan Krishna DPM        Physician Requesting Evaluation: David Stauffer MD   Reason for Evaluation / Principal Problem: left heel wound    Assessment & Plan  Non healing left heel wound  73yo male PMH ESRD on dialysis presents with volume overload. Vascular surgery consulted to optimize blood flow if indicated.    -LEADs 5/15/25: \"RIGHT LOWER LIMB:    Ankle/Brachial index: Non-compressible    Metatarsal pressure of 97 mmHg    Great toe pressure of 103 mmHg, within healing range.    PVR/ PPG tracings are normal.         LEFT LOWER LIMB:    There is a 50-75% stenosis noted in the anterior tibial artery with diffuse disease throughout the remaining femoro-popliteal and tibio-peroneal segments.    Ankle/Brachial index: Non-compressible    Metatarsal pressure of 123 mmHg    Great toe pressure of 62 mmHg, within healing range.    PVR/ PPG tracings are normal.  \"   -venous duplex 6/24 showed chronic nonocclusive DVT RLE, currently on heparin drip   -on exam there is a left heel wound, nonpalpable DP/PT LLE   -afebrile, VSS   -no new labs    Plan:  -possible need for IR intervention for angiogram this admission; vascular surgeon will review and make further recommendations  -if procedure is indicated patient will need to be NPO at midnight  -continue heparin drip  -wound care per podiatry, appreciate recs  -medical management per SLIM  -updated on call vascular surgeon Dr Crabtree; final recs pending attending attestation      History of Present Illness   Ramos Rosenthal is a 74 y.o. male who presents with volume overload. Vascular surgery consulted for left heel nonhealing wound. Patient states that he has been " dealing with this wound for over a month. He has been following with podiatry. They were planning to put a wound vac on in the office but he came to the hospital the day of the appointment. He denies leg pain at rest. He is nonambulatory. No prior vascular intervention. He is not on any blood thinners at home.     Review of Systems   Constitutional: Negative.    HENT: Negative.     Respiratory:  Positive for shortness of breath.    Cardiovascular:  Positive for leg swelling.   Gastrointestinal: Negative.    Genitourinary: Negative.    Musculoskeletal: Negative.    Skin:  Positive for wound.   Neurological: Negative.    Hematological: Negative.    Psychiatric/Behavioral: Negative.       Medical History Review: I have reviewed the patient's PMH, PSH, Social History, Family History, Meds, and Allergies     Objective :  Temp:  [97.3 °F (36.3 °C)-97.7 °F (36.5 °C)] 97.3 °F (36.3 °C)  HR:  [61-74] 74  BP: (123-141)/(68-72) 123/68  Resp:  [16-18] 18  SpO2:  [95 %-98 %] 95 %    Lines/Drains/Airways       Active Status       Name Placement date Placement time Site Days    HD Permanent Double Catheter 01/09/25  1317  Internal jugular  168                  Physical Exam  Constitutional:       Appearance: Normal appearance. He is obese.   HENT:      Head: Normocephalic and atraumatic.      Nose: Nose normal.      Mouth/Throat:      Mouth: Mucous membranes are moist.      Pharynx: Oropharynx is clear.     Eyes:      Conjunctiva/sclera: Conjunctivae normal.      Pupils: Pupils are equal, round, and reactive to light.       Cardiovascular:      Rate and Rhythm: Normal rate.   Pulmonary:      Effort: Pulmonary effort is normal.   Abdominal:      General: Abdomen is flat.     Musculoskeletal:         General: Normal range of motion.      Right lower leg: Edema present.      Left lower leg: Edema present.      Comments: Left heel wound. Nonpalpable DP/PT. Motor and sensation intact.      Skin:     General: Skin is warm and dry.      Neurological:      General: No focal deficit present.      Mental Status: He is alert.         Lab Results: I have reviewed the following results:  Recent Labs     06/24/25  1157 06/24/25  1409 06/25/25  0452 06/25/25  1445 06/25/25  2131 06/26/25  0514 06/26/25  1220   WBC 8.70  --  9.11 8.84  --   --   --    HGB 10.8*  --  10.8* 11.4*  --   --   --    HCT 34.5*  --  35.4* 36.5  --   --   --      --  247 266  --   --   --    SODIUM 133*  --  131*  --   --   --   --    K 3.9  --  4.4  --   --   --   --    CL 95*  --  94*  --   --   --   --    CO2 30  --  26  --   --   --   --    BUN 16  --  21  --   --   --   --    CREATININE 4.92*  --  5.66*  --   --   --   --    GLUC 104  --  86  --   --   --   --    PHOS  --   --   --   --   --  2.0*  --    AST 11*  --  19  --   --   --   --    ALT 3*  --  <3*  --   --   --   --    ALB 3.2*  --  3.2*  --   --   --   --    TBILI 0.49  --  0.55  --   --   --   --    ALKPHOS 70  --  71  --   --   --   --    PTT 34  --   --  36*   < > >210* 142*   INR 1.00  --   --  0.99  --  1.10  --    HSTNI0 25  --   --   --   --   --   --    HSTNI2  --  29  --   --   --   --   --    BNP 2,793*  --   --   --   --   --   --     < > = values in this interval not displayed.         VTE Pharmacologic Prophylaxis: Heparin  VTE Mechanical Prophylaxis: sequential compression device    Angela Diaz PA-C

## 2025-06-26 NOTE — ASSESSMENT & PLAN NOTE
Continue to monitor PTH and phosphorus outpatient. Most recent outpatietn , phosphorus 3.3 mg/dL at dialysis center. Resume previous outpatient calcitriol 0.25 mcg three times weekly.  Check phosphorus level tomorrow    Repeat phosphorus level came back at 2.0, patient's calcium level is appropriate, will hold calcium carbonate for now given low phosphorus level

## 2025-06-26 NOTE — ASSESSMENT & PLAN NOTE
Pt presents with shortness of breath x 1 day, increased LLE swelling in setting of prematurely discontinued HD session concerning for volume overload  Pt received IV lasix x1 in ER. Pt makes minimal urine.   Chest XR -pulm edema, small bilateral pleural effusions  Pt will be managed by hemodialysis as per nephrology  RESOLVING

## 2025-06-26 NOTE — PROGRESS NOTES
Progress Note - Nephrology   Name: Ramos Rosenthal 74 y.o. male I MRN: [id number]  Unit/Bed#: -01 I Date of Admission: 6/24/2025   Date of Service: 6/26/2025 I Hospital Day: 1    Assessment & Plan  End-stage renal disease on hemodialysis (HCC)  Lab Results   Component Value Date    EGFR 8 06/25/2025    EGFR 10 06/24/2025    EGFR 5 05/19/2025    CREATININE 5.66 (H) 06/25/2025    CREATININE 4.92 (H) 06/24/2025    CREATININE 8.05 (H) 05/19/2025   ESRD on HD at [facility] under the care of Dr. Moore  Most recent dialysis yesterday, 6/23 for 196 minutes 1 L UF with postweight of 123.7 kg.  OP orders: .5 kg, 210-minute tx time, Access right PermCath  /, 3K/2.5 Ca/35 HCO3/135 Na+  Care previously discussed with Dr. Moore, advised to give Lasix 160 mg IV  given patient is not oliguric with small amount of urine at baseline which patient states he urinates small amount when straining for BM. Most recent dialysis yesterday.  Patient reports he makes very small amount of urine. Volume status examines hypervolemic with 3+ RLE edema and decreased lung sounds. LLE in ortho boot, mild edema noted. Plan for HD tomorrow. Challenge EDW by 1-2 L as tolerated. Orders entered.     6/25  Patient next HD treatment here his weight will be challenged, goal UF of 4 L. He was given a dose of midodrine 5 mg during treatment for intra dialytic hypotension and to assist with volume removal. His etiology of shortness of breath is thought to be due to volume overload. CXR showed mild pulmonary edema and probable small bilateral effusions. Will plan for next HD on Friday 6/26  Patient will require continued hospital stay for heparin drip with transition to warfarin for occlusive and nonocclusive chronic and subacute DVTs noted in the right lower extremity.  Patient will receive next dialysis treatment tomorrow.  During his treatment yesterday for 3-1/2 hours his weight was challenged and 3.5 L was  removed, he achieved a post weight of 122.7 kg.  He was given midodrine 5 mg during treatment to facilitate with ultrafiltration  Secondary hyperparathyroidism of renal origin ([facility])  Continue to monitor PTH and phosphorus outpatient. Most recent outpatietn , phosphorus 3.3 mg/dL at dialysis center. Resume previous outpatient calcitriol 0.25 mcg three times weekly.  Check phosphorus level tomorrow    Repeat phosphorus level came back at 2.0, patient's calcium level is appropriate, will hold calcium carbonate for now given low phosphorus level    Essential hypertension  Blood pressure stable at this time little bit low during dialysis treatments, continue with midodrine during HD as needed and midodrine 2.5 mg 3 times daily as needed  Volume overload  Continue volume removal with HD treatments. Patient is presumed nonoliguric  Class 2 severe obesity due to excess calories with serious comorbidity and body mass index (BMI) of 35.0 to 35.9 in adult ([facility])    Anemia in ESRD (end-stage renal disease)  ([facility])  Lab Results   Component Value Date    EGFR 8 06/25/2025    EGFR 10 06/24/2025    EGFR 5 05/19/2025    CREATININE 5.66 (H) 06/25/2025    CREATININE 4.92 (H) 06/24/2025    CREATININE 8.05 (H) 05/19/2025   Hb stable at this time, no short acting SHREE indicated  Chronic deep vein thrombosis (DVT) of right lower extremity ([facility])  Heme consult appreciated        Subjective   Patient seen and examined, is resting comfortably in bed, states he is able to take a deep breath now, no concerns noted    Objective :  Temp:  [97.2 °F (36.2 °C)-97.7 °F (36.5 °C)] 97.3 °F (36.3 °C)  HR:  [60-73] 73  BP: (121-141)/() 124/72  Resp:  [16-28] 18  SpO2:  [95 %-98 %] 98 %    Current Weight: Weight - Scale: 129 kg (285 lb 7.9 oz)  First Weight: Weight - Scale: 126 kg (277 lb 9 oz)  I/O         06/24 0701 06/25 0700 06/25 0701  06/26 0700 06/26 0701 06/27 0700    P.O. 480 120     I.V. (mL/kg) 10 (0.1) 300 (2.3)     Total  "Intake(mL/kg) 490 (3.8) 420 (3.3)     Net +490 +420                  Physical Exam  Vitals and nursing note reviewed.   Constitutional:       General: He is not in acute distress.     Appearance: He is well-developed.   HENT:      Head: Normocephalic and atraumatic.     Eyes:      Conjunctiva/sclera: Conjunctivae normal.       Cardiovascular:      Rate and Rhythm: Normal rate and regular rhythm.      Heart sounds: No murmur heard.  Pulmonary:      Effort: Pulmonary effort is normal. No respiratory distress.      Breath sounds: Normal breath sounds.   Abdominal:      Palpations: Abdomen is soft.      Tenderness: There is no abdominal tenderness.     Musculoskeletal:         General: No swelling.      Cervical back: Neck supple.     Skin:     General: Skin is warm and dry.      Capillary Refill: Capillary refill takes less than 2 seconds.     Neurological:      Mental Status: He is alert.     Psychiatric:         Mood and Affect: Mood normal.         Medications:  Current Medications[1]      Lab Results: I have reviewed the following results:  Results from last 7 days   Lab Units 06/26/25  0514 06/25/25  1445 06/25/25  0452 06/24/25  1157   WBC Thousand/uL  --  8.84 9.11 8.70   HEMOGLOBIN g/dL  --  11.4* 10.8* 10.8*   HEMATOCRIT %  --  36.5 35.4* 34.5*   PLATELETS Thousands/uL  --  266 247 267   POTASSIUM mmol/L  --   --  4.4 3.9   CHLORIDE mmol/L  --   --  94* 95*   CO2 mmol/L  --   --  26 30   BUN mg/dL  --   --  21 16   CREATININE mg/dL  --   --  5.66* 4.92*   CALCIUM mg/dL  --   --  8.8 8.8   PHOSPHORUS mg/dL 2.0*  --   --   --    ALBUMIN g/dL  --   --  3.2* 3.2*       Administrative Statements     Portions of the record may have been created with voice recognition software. Occasional wrong word or \"sound a like\" substitutions may have occurred due to the inherent limitations of voice recognition software. Read the chart carefully and recognize, using context, where substitutions have occurred.If you have any " questions, please contact the dictating provider.       [1]   Current Facility-Administered Medications:     acetaminophen (TYLENOL) tablet 650 mg, 650 mg, Oral, Q6H PRN, David Stauffer MD    allopurinol (ZYLOPRIM) tablet 100 mg, 100 mg, Oral, BID, David Stauffer MD, 100 mg at 06/26/25 0854    Artificial Tears Op Soln 1 drop, 1 drop, Both Eyes, Q6H PRN, David Stauffer MD    calcitriol (ROCALTROL) capsule 0.25 mcg, 0.25 mcg, Oral, Once per day on Monday Wednesday Friday, BRETT Victor, 0.25 mcg at 06/25/25 1328    [Held by provider] calcium carbonate (OYSTER SHELL,OSCAL) 500 mg tablet 2 tablet, 2 tablet, Oral, BID With Meals, David tSauffer MD, 2 tablet at 06/25/25 1714    calcium carbonate (TUMS) chewable tablet 500 mg, 500 mg, Oral, Daily PRN, David Stauffer MD, 500 mg at 06/26/25 0854    Cholecalciferol (VITAMIN D3) tablet 2,000 Units, 2,000 Units, Oral, Daily, David Stauffer MD, 2,000 Units at 06/26/25 0854    collagenase (SANTYL) ointment, , Topical, Daily, David Stauffer MD, Given at 06/25/25 1329    dextromethorphan-guaiFENesin (ROBITUSSIN DM) oral syrup 10 mL, 10 mL, Oral, Q4H PRN, David Stauffer MD    FLUoxetine (PROzac) capsule 20 mg, 20 mg, Oral, Daily, David Stauffer MD, 20 mg at 06/26/25 0854    heparin (porcine) 25,000 units in 0.45% NaCl 250 mL infusion (premix), 3-30 Units/kg/hr (Order-Specific), Intravenous, Titrated, David Stauffer MD, Last Rate: 15 mL/hr at 06/26/25 0719, 12 Units/kg/hr at 06/26/25 0719    heparin (porcine) injection 10,000 Units, 10,000 Units, Intravenous, Q6H PRN, David Stauffer MD    heparin (porcine) injection 5,000 Units, 5,000 Units, Intravenous, Q6H PRN, David Stauffer MD    hydrALAZINE (APRESOLINE) injection 5 mg, 5 mg, Intravenous, Q6H PRN, David Stauffer MD    loratadine (CLARITIN) tablet 10 mg, 10 mg, Oral, Daily, David Stauffer MD, 10 mg at 06/26/25 0854    magnesium  Oxide (MAG-OX) tablet 400 mg, 400 mg, Oral, Daily, David Stauffer MD, 400 mg at 06/26/25 0854    melatonin tablet 3 mg, 3 mg, Oral, HS PRN, David Stauffer MD    midodrine (PROAMATINE) tablet 2.5 mg, 2.5 mg, Oral, TID AC, David Stauffer MD, 2.5 mg at 06/26/25 0616    midodrine (PROAMATINE) tablet 5 mg, 5 mg, Oral, Once per day on Monday Wednesday Friday, Karen Parikh PA-C, 5 mg at 06/25/25 0836    nystatin (MYCOSTATIN) powder, , Topical, TID, David Stauffer MD, Given at 06/26/25 0856    ondansetron (ZOFRAN) injection 4 mg, 4 mg, Intravenous, Q6H PRN, David Stauffer MD    pantoprazole (PROTONIX) EC tablet 40 mg, 40 mg, Oral, Daily, David Stauffer MD, 40 mg at 06/26/25 0854    polyethylene glycol (MIRALAX) packet 17 g, 17 g, Oral, Daily PRN, David Stauffer MD, 17 g at 06/25/25 1328    senna (SENOKOT) tablet 8.6 mg, 1 tablet, Oral, HS PRN, David Stauffer MD    sodium chloride (OCEAN) 0.65 % nasal spray 1 spray, 1 spray, Each Nare, Q1H PRN, David Stauffer MD    vit B complex-vit C-folic acid (Renal Caps) capsule 1 capsule, 1 mg, Oral, Daily With Dinner, David Stauffer MD, 1 capsule at 06/25/25 1714

## 2025-06-26 NOTE — ASSESSMENT & PLAN NOTE
Lab Results   Component Value Date    EGFR 8 06/25/2025    EGFR 10 06/24/2025    EGFR 5 05/19/2025    CREATININE 5.66 (H) 06/25/2025    CREATININE 4.92 (H) 06/24/2025    CREATININE 8.05 (H) 05/19/2025   ESRD on HD at [facility] under the care of Dr. Moore  Most recent dialysis yesterday, 6/23 for 196 minutes 1 L UF with postweight of 123.7 kg.  OP orders: .5 kg, 210-minute tx time, Access right PermCath  /, 3K/2.5 Ca/35 HCO3/135 Na+  Care previously discussed with Dr. Moore, advised to give Lasix 160 mg IV  given patient is not oliguric with small amount of urine at baseline which patient states he urinates small amount when straining for BM. Most recent dialysis yesterday.  Patient reports he makes very small amount of urine. Volume status examines hypervolemic with 3+ RLE edema and decreased lung sounds. LLE in ortho boot, mild edema noted. Plan for HD tomorrow. Challenge EDW by 1-2 L as tolerated. Orders entered.     6/25  Patient next HD treatment here his weight will be challenged, goal UF of 4 L. He was given a dose of midodrine 5 mg during treatment for intra dialytic hypotension and to assist with volume removal. His etiology of shortness of breath is thought to be due to volume overload. CXR showed mild pulmonary edema and probable small bilateral effusions. Will plan for next HD on Friday 6/26  Patient will require continued hospital stay for heparin drip with transition to warfarin for occlusive and nonocclusive chronic and subacute DVTs noted in the right lower extremity.  Patient will receive next dialysis treatment tomorrow.  During his treatment yesterday for 3-1/2 hours his weight was challenged and 3.5 L was removed, he achieved a post weight of 122.7 kg.  He was given midodrine 5 mg during treatment to facilitate with ultrafiltration

## 2025-06-26 NOTE — ASSESSMENT & PLAN NOTE
"73yo male PMH ESRD on dialysis presents with volume overload. Vascular surgery consulted to optimize blood flow if indicated.    -LEADs 5/15/25: \"RIGHT LOWER LIMB:    Ankle/Brachial index: Non-compressible    Metatarsal pressure of 97 mmHg    Great toe pressure of 103 mmHg, within healing range.    PVR/ PPG tracings are normal.         LEFT LOWER LIMB:    There is a 50-75% stenosis noted in the anterior tibial artery with diffuse disease throughout the remaining femoro-popliteal and tibio-peroneal segments.    Ankle/Brachial index: Non-compressible    Metatarsal pressure of 123 mmHg    Great toe pressure of 62 mmHg, within healing range.    PVR/ PPG tracings are normal.  \"   -venous duplex 6/24 showed chronic nonocclusive DVT RLE, currently on heparin drip   -on exam there is a left heel wound, nonpalpable DP/PT LLE   -afebrile, VSS   -no new labs    Plan:  -possible need for IR intervention for angiogram this admission; vascular surgeon will review and make further recommendations  -if procedure is indicated patient will need to be NPO at midnight  -continue heparin drip  -wound care per podiatry, appreciate recs  -medical management per [facility]  -updated on call vascular surgeon Dr Crabtree; final recs pending attending attestation  "

## 2025-06-26 NOTE — CASE MANAGEMENT
Case Management Discharge Planning Note    Patient name Ramos Rosenthal  Location /-01 MRN [id number]  : 1950 Date 2025       Current Admission Date: 2025  Current Admission Diagnosis:Volume overload   Patient Active Problem List    Diagnosis Date Noted    Chronic deep vein thrombosis (DVT) of right lower extremity ([facility]) 2025    RPGN (rapidly progressive glomerulonephritis) 2025    Anemia in ESRD (end-stage renal disease)  ([facility]) 2025    Class 2 severe obesity due to excess calories with serious comorbidity and body mass index (BMI) of 35.0 to 35.9 in adult ([facility]) 2025    Ulcer of left heel with cellulitis ([facility]) 10/31/2024    Immunocompromised patient ([facility]) 10/23/2024    Volume overload 10/21/2024    Chronic gout due to renal impairment of multiple sites without tophus 10/21/2024    End-stage renal disease on hemodialysis ([facility]) 2024    Anemia 2024    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 2024    Essential hypertension 2024    Secondary hyperparathyroidism of renal origin ([facility]) 2024    Uremia 2024    Bilateral lower extremity edema 2024    Neurogenic claudication 2023    Paresthesia of both lower extremities 10/01/2022    Shortness of breath 2022    Recurrent major depressive disorder, in remission ([facility]) 2022    Lumbosacral plexopathy 2019    Gait abnormality 2019    Lumbar stenosis 2019    Polyneuropathy 2019    Prediabetes 2018    Hyperlipidemia 2014    Vitamin D deficiency 2014      LOS (days): 1  Geometric Mean LOS (GMLOS) (days): 3.6  Days to GMLOS:2.7     OBJECTIVE:  Risk of Unplanned Readmission Score: 29.64         Current admission status: Inpatient   Preferred Pharmacy:   [id number] [place] - [address] [place] [address]  [address] [place] [address]  Phone: [phone] Fax: [phone]    Primary  Care Provider: Wade Arellano DO    Primary Insurance: MEDICARE  Secondary Insurance: [facility]    DISCHARGE DETAILS:                                                                                                    Additional Comments: Patient not medically ready, on heparin gtt, plan to bridge to coumadin.  Active with [facility].  CM will continue to follow.

## 2025-06-26 NOTE — PLAN OF CARE
Problem: Potential for Falls  Goal: Patient will remain free of falls  Description: INTERVENTIONS:  - Educate patient/family on patient safety including physical limitations  - Instruct patient to call for assistance with activity   - Consider consulting OT/PT to assist with strengthening/mobility based on AM PAC & JH-HLM score  - Consult OT/PT to assist with strengthening/mobility   - Keep Call bell within reach  - Keep bed low and locked with side rails adjusted as appropriate  - Keep care items and personal belongings within reach  - Initiate and maintain comfort rounds  - Make Fall Risk Sign visible to staff  - Offer Toileting every 2 Hours, in advance of need  - Apply yellow socks and bracelet for high fall risk patients  - Consider moving patient to room near nurses station  Outcome: Progressing     Problem: PAIN - ADULT  Goal: Verbalizes/displays adequate comfort level or baseline comfort level  Description: Interventions:  - Encourage patient to monitor pain and request assistance  - Assess pain using appropriate pain scale  - Administer analgesics as ordered based on type and severity of pain and evaluate response  - Implement non-pharmacological measures as appropriate and evaluate response  - Consider cultural and social influences on pain and pain management  - Notify physician/advanced practitioner if interventions unsuccessful or patient reports new pain  - Educate patient/family on pain management process including their role and importance of  reporting pain   - Provide non-pharmacologic/complimentary pain relief interventions  Outcome: Progressing     Problem: INFECTION - ADULT  Goal: Absence or prevention of progression during hospitalization  Description: INTERVENTIONS:  - Assess and monitor for signs and symptoms of infection  - Monitor lab/diagnostic results  - Monitor all insertion sites, i.e. indwelling lines, tubes, and drains  - Monitor endotracheal if appropriate and nasal secretions for  changes in amount and color  - [place] appropriate cooling/warming therapies per order  - Administer medications as ordered  - Instruct and encourage patient and family to use good hand hygiene technique  - Identify and instruct in appropriate isolation precautions for identified infection/condition  Outcome: Progressing     Problem: SAFETY ADULT  Goal: Patient will remain free of falls  Description: INTERVENTIONS:  - Educate patient/family on patient safety including physical limitations  - Instruct patient to call for assistance with activity   - Consider consulting OT/PT to assist with strengthening/mobility based on AM PAC & JH-HLM score  - Consult OT/PT to assist with strengthening/mobility   - Keep Call bell within reach  - Keep bed low and locked with side rails adjusted as appropriate  - Keep care items and personal belongings within reach  - Initiate and maintain comfort rounds  - Make Fall Risk Sign visible to staff  - Offer Toileting every 2 Hours, in advance of need  - Apply yellow socks and bracelet for high fall risk patients  - Consider moving patient to room near nurses station  Outcome: Progressing     Problem: Nutrition/Hydration-ADULT  Goal: Nutrient/Hydration intake appropriate for improving, restoring or maintaining nutritional needs  Description: Monitor and assess patient's nutrition/hydration status for malnutrition. Collaborate with interdisciplinary team and initiate plan and interventions as ordered.  Monitor patient's weight and dietary intake as ordered or per policy. Utilize nutrition screening tool and intervene as necessary. Determine patient's food preferences and provide high-protein, high-caloric foods as appropriate.     INTERVENTIONS:  - Monitor oral intake, urinary output, labs, and treatment plans  - Assess nutrition and hydration status and recommend course of action  - Evaluate amount of meals eaten  - Assist patient with eating if necessary   - Allow adequate time for  meals  - Recommend/ encourage appropriate diets, oral nutritional supplements, and vitamin/mineral supplements  - Order, calculate, and assess calorie counts as needed  - Recommend, monitor, and adjust tube feedings and TPN/PPN based on assessed needs  - Assess need for intravenous fluids  - Provide specific nutrition/hydration education as appropriate  - Include patient/family/caregiver in decisions related to nutrition  Outcome: Progressing     Problem: Decreased Cardiac Output  Goal: Cardiac output adequate for individual needs  Description: INTERVENTIONS: Monitor for signs and symptoms of decreased cardiac output   - Monitor for dyspnea with exertion and at rest  - Monitor for orthopnea  - Monitor for signs of tachycardia. Place patient on telemetry monitoring.  - Assess patient for jugular vein distention  - Assess patient for lower extremity edema and poor peripheral perfusion   - Auscultate lung sound for Fine bibasilar crackles   - Monitor for cardiac arrythmias   - Administer beta blockers, antiarrhythmic, and blood pressure medications as ordered    Outcome: Progressing     Problem: Impaired Gas Exchange  Goal: Optimize oxygenation and ensure adequate ventilation  Description: INTERVENTIONS: Monitor for signs and symptoms of respiratory distress                - Elevate HOB or use high fowlers to promote lung expansion                - Administer oxygen as ordered to maintain adequate oxygenation                - Encourage use of IS to promote lung expansion and prevent PN                - Monitor ABGs to assess oxygenation status                - Monitor blood oxygen level to maintain adequate oxygenation                - Encourage cough and deep breathing exercises to promote lung expansion                - Monitor patient's mental status for increased confusion    Outcome: Progressing     Problem: Excess Fluid Volume  Goal: Patient is able to achieve and maintain homeostasis  Description: INTERVENTIONS:  Monitor for sign and symptoms of fluid overload  - Evaluate LE edema every shift  - Elevate LE to prevent dependent edema  - Apply KELSIE stockings as ordered   - Monitor ankle circumference daily  - Assess for jugular vein distention  - Evaluate provider orders for the CHF diuretic algorithm. Administer diuretics as ordered  - Weigh the patient daily at 0600 and report a weight gain of five pounds or more   - Strict intake and output  - Monitor fluid intake and adhere to fluid restrictions  - Assess lung sounds every shift and as needed  - Monitor vital signs and lab values (CBC, chem, BUN, BNP)  - Measure and document urine output    Outcome: Progressing     Problem: Activity Intolerance  Goal: Patient is able to perform activities within their limitations  Description: INTERVENTIONS:                       -   Alternate periods of activity with periods of rest                 -   Patients is able to maintain normal vitals heart rhythm during activity                 -   Gradually increase activity and exercise as patient can tolerate                 -   Monitor blood pressure and heart before and after exercise                  -   Monitor blood oxygen saturation during activity and apply oxygen as needed    Outcome: Progressing     Problem: Knowledge Deficit  Goal: Patient is able to verbalize understanding of Heart Failure after education  Description: INTERVENTIONS:  - Educate the patient and family on signs and symptoms of HF  - Provide the patient with HF education and HF zone tool  - Educate on the importance of daily weight in the AM and reporting a weight gain               of 3 or more pounds to their primary care physician  - Monitor for SOB  - Maintain and sodium and fluid restriction  - Educate the patient on the importance of medications such as: diuretics, betablockers,               antiarrhythmics and their purpose, dose, route, side effects and labs               if they are needed    Outcome:  Progressing     Problem: DISCHARGE PLANNING  Goal: Discharge to home or other facility with appropriate resources  Description: INTERVENTIONS:  - Identify barriers to discharge w/patient and caregiver  - Arrange for needed discharge resources and transportation as appropriate  - Identify discharge learning needs (meds, wound care, etc.)  - Arrange for interpretive services to assist at discharge as needed  - Refer to Case Management Department for coordinating discharge planning if the patient needs post-hospital services based on physician/advanced practitioner order or complex needs related to functional status, cognitive ability, or social support system  Outcome: Progressing

## 2025-06-26 NOTE — ASSESSMENT & PLAN NOTE
Blood pressure stable at this time little bit low during dialysis treatments, continue with midodrine during HD as needed and midodrine 2.5 mg 3 times daily as needed

## 2025-06-26 NOTE — CONSULTS
[facility] Podiatry - Consultation    Patient Information:   Ramos Rosenthal 74 y.o. male MRN: [id number]  Unit/Bed#: -01 Encounter: [id number]  PCP: Wade Arellano DO  Date of Admission:  6/24/2025  Date of Consultation: 06/26/25  Requesting Physician: David Stauffer MD      ASSESSMENT:    Ramos Rosenthal is a 74 y.o. male with:    Ulceration of left heel fat layer exposed  Peripheral arterial disease with stenosis  DVT of right lower extremity    PLAN:    Medical management of DVT per internal medicine  Due to stenosis visible on left lower extremity from arterial studies on 5/15 will consult vascular for evaluation due to nonhealing of the left lower extremity wound  No debridement today, we will plan for wound VAC application on outpatient basis, he already has his wound VAC at home and he is instructed to bring it to his next wound care appointment on Tuesday   Rx given for Santyl , DSD applications to the left heel  We will offload left heel Multi-Podus boot  Rest of care per primary team.    SUBJECTIVE    History of Present Illness:    Ramos Rosenthal is a 74 y.o. male with past medical history of heel ulceration who presents with shortness of breath which being managed by medicine, history of left heel wound and plans to apply wound VAC this Tuesday..     Review of Systems:    Constitutional: Negative.    HENT: Negative.    Eyes: Negative.    Respiratory: Negative.    Cardiovascular: Negative.    Gastrointestinal: Negative.    Musculoskeletal: neg   Skin:asa josé   Neurological: numbness   Psych: Negative.     Past Medical and Surgical History:     Past Medical History[1]    Past Surgical History[2]    Meds/Allergies:      Medications Prior to Admission:     allopurinol (ZYLOPRIM) 100 mg tablet    B Complex-C-Folic Acid (triphrocaps) 1 MG CAPS    calcium carbonate (OYSTER SHELL,OSCAL) 500 mg    cholecalciferol (VITAMIN D3) 1,000 units tablet    collagenase (SANTYL) ointment     "ergocalciferol (VITAMIN D2) 50,000 units    FLUoxetine (PROzac) 20 mg capsule    hydrocortisone 2.5 % cream    loratadine (CLARITIN) 10 mg tablet    Magnesium 100 MG CAPS    midodrine (PROAMATINE) 2.5 mg tablet    midodrine (PROAMATINE) 5 mg tablet    nystatin (MYCOSTATIN) powder    pantoprazole (PROTONIX) 40 mg tablet    Allergies[3]    Social History:     Marital Status: /Civil Union    Substance Use History:   Social History     Substance and Sexual Activity   Alcohol Use Not Currently    Comment: occasional     Tobacco Use History[4]  Social History     Substance and Sexual Activity   Drug Use Never       Family History:    Family History[5]      OBJECTIVE:    Vitals:   Blood Pressure: 123/68 (06/26/25 1128)  Pulse: 74 (06/26/25 1128)  Temperature: (!) 97.3 °F (36.3 °C) (06/26/25 0733)  Temp Source: Tympanic (06/25/25 1237)  Respirations: 18 (06/26/25 0733)  Height: 6' 1\" (185.4 cm) (06/24/25 1923)  Weight - Scale: 129 kg (285 lb 7.9 oz) (06/24/25 1923)  SpO2: 95 % (06/26/25 1128)    Physical Exam:     General Appearance: Alert, cooperative, no distress.  HEENT: Head normocephalic, atraumatic, without obvious abnormality.    Abdomen: Without distension.  Psychiatric: AAOx3  Lower Extremity:  Vascular:   Central fibrous cord with granular tissue to the periwound, minimal evidence of overload, no malodor    There is a ulceration on the left posterior heel with a additional Data:     Lab Results: I have personally reviewed pertinent labs including:    Results from last 7 days   Lab Units 06/25/25  1445 06/25/25  0452   WBC Thousand/uL 8.84 9.11   HEMOGLOBIN g/dL 11.4* 10.8*   HEMATOCRIT % 36.5 35.4*   PLATELETS Thousands/uL 266 247   SEGS PCT %  --  76*   LYMPHO PCT %  --  12*   MONO PCT %  --  7   EOS PCT %  --  4     Results from last 7 days   Lab Units 06/25/25  0452   POTASSIUM mmol/L 4.4   CHLORIDE mmol/L 94*   CO2 mmol/L 26   BUN mg/dL 21   CREATININE mg/dL 5.66*   CALCIUM mg/dL 8.8   ALK PHOS U/L 71 " "  ALT U/L <3*   AST U/L 19     Results from last 7 days   Lab Units 25  0514   INR  1.10       Cultures: I have personally reviewed pertinent cultures including:              Imaging: I have personally reviewed pertinent films in PACS.  EKG, Pathology, and Other Studies: I have personally reviewed pertinent reports.        ** Please Note: Portions of the record may have been created with voice recognition software. Occasional wrong word or \"sound a like\" substitutions may have occurred due to the inherent limitations of voice recognition software. Read the chart carefully and recognize, using context, where substitutions have occurred. 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 HERZOG    CATARACT EXTRACTION, BILATERAL      IR BIOPSY KIDNEY RANDOM  2024    IR TEMPORARY DIALYSIS CATHETER PLACEMENT  2024    IR TUNNELED DIALYSIS CATHETER PLACEMENT  2024    IR TUNNELED DIALYSIS CATHETER PLACEMENT  2025    KNEE ARTHROSCOPY Right     x2 , meniscus repair    TONSILECTOMY AND ADNOIDECTOMY     [3]   Allergies  Allergen Reactions    Chlorhexidine Dermatitis    Ciprofloxacin Blisters    Other      IVORY SOAP    Penicillins Rash   [4]   Social History  Tobacco Use   Smoking Status Former    Current packs/day: 0.00    Types: Cigarettes    Quit date:     Years since quittin.5   Smokeless Tobacco Never   [5]   Family History  Problem Relation Name Age of Onset    Diabetes Father      Diabetes Brother      Diabetes Sister       "

## 2025-06-27 ENCOUNTER — APPOINTMENT (INPATIENT)
Dept: DIALYSIS | Facility: HOSPITAL | Age: 75
DRG: 640 | End: 2025-06-27
Payer: MEDICARE

## 2025-06-27 LAB
ALBUMIN SERPL BCG-MCNC: 3 G/DL (ref 3.5–5)
ALP SERPL-CCNC: 68 U/L (ref 34–104)
ALT SERPL W P-5'-P-CCNC: 3 U/L (ref 7–52)
ANION GAP SERPL CALCULATED.3IONS-SCNC: 7 MMOL/L (ref 4–13)
APTT PPP: 119 SECONDS (ref 23–34)
APTT PPP: 36 SECONDS (ref 23–34)
APTT PPP: 45 SECONDS (ref 23–34)
AST SERPL W P-5'-P-CCNC: 11 U/L (ref 13–39)
BASOPHILS # BLD AUTO: 0.11 THOUSANDS/ÂΜL (ref 0–0.1)
BASOPHILS NFR BLD AUTO: 1 % (ref 0–1)
BILIRUB SERPL-MCNC: 0.41 MG/DL (ref 0.2–1)
BUN SERPL-MCNC: 34 MG/DL (ref 5–25)
CALCIUM ALBUM COR SERPL-MCNC: 9.7 MG/DL (ref 8.3–10.1)
CALCIUM SERPL-MCNC: 8.9 MG/DL (ref 8.4–10.2)
CHLORIDE SERPL-SCNC: 94 MMOL/L (ref 96–108)
CO2 SERPL-SCNC: 30 MMOL/L (ref 21–32)
CREAT SERPL-MCNC: 5.39 MG/DL (ref 0.6–1.3)
EOSINOPHIL # BLD AUTO: 0.34 THOUSAND/ÂΜL (ref 0–0.61)
EOSINOPHIL NFR BLD AUTO: 4 % (ref 0–6)
ERYTHROCYTE [DISTWIDTH] IN BLOOD BY AUTOMATED COUNT: 15.8 % (ref 11.6–15.1)
GFR SERPL CREATININE-BSD FRML MDRD: 9 ML/MIN/1.73SQ M
GLUCOSE SERPL-MCNC: 85 MG/DL (ref 65–140)
HCT VFR BLD AUTO: 33.7 % (ref 36.5–49.3)
HGB BLD-MCNC: 10.6 G/DL (ref 12–17)
IMM GRANULOCYTES # BLD AUTO: 0.03 THOUSAND/UL (ref 0–0.2)
IMM GRANULOCYTES NFR BLD AUTO: 0 % (ref 0–2)
INR PPP: 1.01 (ref 0.85–1.19)
LYMPHOCYTES # BLD AUTO: 1.33 THOUSANDS/ÂΜL (ref 0.6–4.47)
LYMPHOCYTES NFR BLD AUTO: 14 % (ref 14–44)
MCH RBC QN AUTO: 29.9 PG (ref 26.8–34.3)
MCHC RBC AUTO-ENTMCNC: 31.5 G/DL (ref 31.4–37.4)
MCV RBC AUTO: 95 FL (ref 82–98)
MONOCYTES # BLD AUTO: 0.72 THOUSAND/ÂΜL (ref 0.17–1.22)
MONOCYTES NFR BLD AUTO: 8 % (ref 4–12)
NEUTROPHILS # BLD AUTO: 6.97 THOUSANDS/ÂΜL (ref 1.85–7.62)
NEUTS SEG NFR BLD AUTO: 73 % (ref 43–75)
NRBC BLD AUTO-RTO: 0 /100 WBCS
PLATELET # BLD AUTO: 262 THOUSANDS/UL (ref 149–390)
PMV BLD AUTO: 9 FL (ref 8.9–12.7)
POTASSIUM SERPL-SCNC: 4.4 MMOL/L (ref 3.5–5.3)
PROT SERPL-MCNC: 5 G/DL (ref 6.4–8.4)
PROTHROMBIN TIME: 13.8 SECONDS (ref 12.3–15)
RBC # BLD AUTO: 3.54 MILLION/UL (ref 3.88–5.62)
SODIUM SERPL-SCNC: 131 MMOL/L (ref 135–147)
WBC # BLD AUTO: 9.5 THOUSAND/UL (ref 4.31–10.16)

## 2025-06-27 PROCEDURE — 80053 COMPREHEN METABOLIC PANEL: CPT | Performed by: HOSPITALIST

## 2025-06-27 PROCEDURE — 85610 PROTHROMBIN TIME: CPT | Performed by: HOSPITALIST

## 2025-06-27 PROCEDURE — 90935 HEMODIALYSIS ONE EVALUATION: CPT | Performed by: INTERNAL MEDICINE

## 2025-06-27 PROCEDURE — 85025 COMPLETE CBC W/AUTO DIFF WBC: CPT | Performed by: HOSPITALIST

## 2025-06-27 PROCEDURE — 85730 THROMBOPLASTIN TIME PARTIAL: CPT | Performed by: HOSPITALIST

## 2025-06-27 PROCEDURE — 99232 SBSQ HOSP IP/OBS MODERATE 35: CPT | Performed by: INTERNAL MEDICINE

## 2025-06-27 PROCEDURE — 85730 THROMBOPLASTIN TIME PARTIAL: CPT | Performed by: INTERNAL MEDICINE

## 2025-06-27 RX ORDER — WARFARIN SODIUM 5 MG/1
10 TABLET ORAL
Status: DISCONTINUED | OUTPATIENT
Start: 2025-06-27 | End: 2025-06-29

## 2025-06-27 RX ADMIN — PANTOPRAZOLE SODIUM 40 MG: 40 TABLET, DELAYED RELEASE ORAL at 12:50

## 2025-06-27 RX ADMIN — MIDODRINE HYDROCHLORIDE 2.5 MG: 5 TABLET ORAL at 06:17

## 2025-06-27 RX ADMIN — NYSTATIN: 100000 POWDER TOPICAL at 21:12

## 2025-06-27 RX ADMIN — NYSTATIN: 100000 POWDER TOPICAL at 12:54

## 2025-06-27 RX ADMIN — HEPARIN SODIUM 10000 UNITS: 1000 INJECTION, SOLUTION INTRAVENOUS; SUBCUTANEOUS at 05:13

## 2025-06-27 RX ADMIN — NYSTATIN: 100000 POWDER TOPICAL at 17:10

## 2025-06-27 RX ADMIN — MIDODRINE HYDROCHLORIDE 5 MG: 5 TABLET ORAL at 08:34

## 2025-06-27 RX ADMIN — FLUOXETINE HYDROCHLORIDE 20 MG: 20 CAPSULE ORAL at 12:51

## 2025-06-27 RX ADMIN — Medication 400 MG: at 12:51

## 2025-06-27 RX ADMIN — ALLOPURINOL 100 MG: 100 TABLET ORAL at 12:50

## 2025-06-27 RX ADMIN — Medication 1 CAPSULE: at 15:39

## 2025-06-27 RX ADMIN — WARFARIN SODIUM 10 MG: 5 TABLET ORAL at 17:08

## 2025-06-27 RX ADMIN — CALCITRIOL 0.25 MCG: 0.25 CAPSULE, LIQUID FILLED ORAL at 12:50

## 2025-06-27 RX ADMIN — HEPARIN SODIUM 9 UNITS/KG/HR: 10000 INJECTION, SOLUTION INTRAVENOUS at 23:31

## 2025-06-27 RX ADMIN — HEPARIN SODIUM 5000 UNITS: 1000 INJECTION, SOLUTION INTRAVENOUS; SUBCUTANEOUS at 20:33

## 2025-06-27 RX ADMIN — COLLAGENASE SANTYL: 250 OINTMENT TOPICAL at 15:35

## 2025-06-27 RX ADMIN — LORATADINE 10 MG: 10 TABLET ORAL at 12:51

## 2025-06-27 RX ADMIN — MIDODRINE HYDROCHLORIDE 2.5 MG: 5 TABLET ORAL at 15:39

## 2025-06-27 RX ADMIN — MIDODRINE HYDROCHLORIDE 2.5 MG: 5 TABLET ORAL at 12:50

## 2025-06-27 RX ADMIN — Medication 2000 UNITS: at 12:50

## 2025-06-27 NOTE — ASSESSMENT & PLAN NOTE
Lab Results   Component Value Date    EGFR 9 06/27/2025    EGFR 8 06/25/2025    EGFR 10 06/24/2025    CREATININE 5.39 (H) 06/27/2025    CREATININE 5.66 (H) 06/25/2025    CREATININE 4.92 (H) 06/24/2025   Pt is HD dependent 2/2 glomerulonephritis. Will cont HD MWF per renal.

## 2025-06-27 NOTE — ASSESSMENT & PLAN NOTE
Continue to monitor PTH and phosphorus outpatient. Most recent outpatietn , phosphorus 3.3 mg/dL at dialysis center. Resume previous outpatient calcitriol 0.25 mcg three times weekly.      Recent phosphorus level low and suspected in setting of poor intake, binders on hold.

## 2025-06-27 NOTE — CASE MANAGEMENT
Case Management Discharge Planning Note    Patient name Ramos Rosenthal  Location /-01 MRN [id number]  : 1950 Date 2025       Current Admission Date: 2025  Current Admission Diagnosis:Volume overload   Patient Active Problem List    Diagnosis Date Noted    Non healing left heel wound 2025    Chronic deep vein thrombosis (DVT) of right lower extremity ([facility]) 2025    RPGN (rapidly progressive glomerulonephritis) 2025    Anemia in ESRD (end-stage renal disease)  ([facility]) 2025    Class 2 severe obesity due to excess calories with serious comorbidity and body mass index (BMI) of 35.0 to 35.9 in adult ([facility]) 2025    Ulcer of left heel with cellulitis ([facility]) 10/31/2024    Immunocompromised patient ([facility]) 10/23/2024    Volume overload 10/21/2024    Chronic gout due to renal impairment of multiple sites without tophus 10/21/2024    End-stage renal disease on hemodialysis ([facility]) 2024    Anemia 2024    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 2024    Essential hypertension 2024    Secondary hyperparathyroidism of renal origin ([facility]) 2024    Uremia 2024    Bilateral lower extremity edema 2024    Neurogenic claudication 2023    Paresthesia of both lower extremities 10/01/2022    Shortness of breath 2022    Recurrent major depressive disorder, in remission ([facility]) 2022    Lumbosacral plexopathy 2019    Gait abnormality 2019    Lumbar stenosis 2019    Polyneuropathy 2019    Prediabetes 2018    Hyperlipidemia 2014    Vitamin D deficiency 2014      LOS (days): 2  Geometric Mean LOS (GMLOS) (days): 3.6  Days to GMLOS:1.5     OBJECTIVE:  Risk of Unplanned Readmission Score: 33.37         Current admission status: Inpatient   Preferred Pharmacy:   [id number] - [place] - [address]  [address] [address]  Phone:  [phone] Fax: [phone]    Primary Care Provider: Wade Arellano DO    Primary Insurance: MEDICARE  Secondary Insurance: [facility]    DISCHARGE DETAILS:                                                                                                    Additional Comments: Patient not medically clear, on heparin gtt bridging to coumadin.  Active with [facility], they will complete blood draws for now, unable to only come to home for this reason.  Cm will continue to follow for needs.

## 2025-06-27 NOTE — PROGRESS NOTES
Progress Note - Nephrology   Name: Ramos Rosenthal 74 y.o. male I MRN: [id number]  Unit/Bed#: -01 I Date of Admission: 6/24/2025   Date of Service: 6/27/2025 I Hospital Day: 2     Assessment & Plan  End-stage renal disease on hemodialysis (HCC)  Lab Results   Component Value Date    EGFR 9 06/27/2025    EGFR 8 06/25/2025    EGFR 10 06/24/2025    CREATININE 5.39 (H) 06/27/2025    CREATININE 5.66 (H) 06/25/2025    CREATININE 4.92 (H) 06/24/2025   ESRD on HD at [facility] under the care of Dr. Moore  Most recent dialysis yesterday, 6/23 for 196 minutes 1 L UF with postweight of 123.7 kg.  OP orders: .5 kg, 210-minute tx time, Access right PermCath  /, 3K/2.5 Ca/35 HCO3/135 Na+      6/26  Patient will require continued hospital stay for heparin drip with transition to warfarin for occlusive and nonocclusive chronic and subacute DVTs noted in the right lower extremity.  Patient will receive next dialysis treatment tomorrow.  During his treatment yesterday for 3-1/2 hours his weight was challenged and 3.5 L was removed, he achieved a post weight of 122.7 kg.  He was given midodrine 5 mg during treatment to facilitate with ultrafiltration    6/27  HEMODIALYSIS PROCEDURE NOTE  The patient was seen and examined on hemodialysis.  Time: 3.5 hours  Sodium: 135 Blood flow: 400   Dialyzer: F160 Potassium: 3 Dialysate flow: 800   Access: RIJ PC  Bicarbonate: 35 Ultrafiltration goal: 2.5    Medications on HD: midodrine 5mg pre HD       Patient has been started on coumadin and potentially would need this life long. I am concerned about monitoring, I understand it is difficult for patient to get out of the house for testing and labs due to mobility issues. He would need coumadin monitoring on a routine basis. Primary will check with CM about home monitoring. Otherwise, a NOAC may be a better option, which can be used in ESRD patient, even if there could be a slightly higher risk.     I spoke  with patient by phone that we are evaluating options for ability to monitor levels at home. I placed a call to his daughter, Mary, by phone and left VM. Encourage ongoing discussion with primary and patient.   Secondary hyperparathyroidism of renal origin (HCC)  Continue to monitor PTH and phosphorus outpatient. Most recent outpatietn , phosphorus 3.3 mg/dL at dialysis center. Resume previous outpatient calcitriol 0.25 mcg three times weekly.      Recent phosphorus level low and suspected in setting of poor intake, binders on hold.     Essential hypertension  Blood pressure stable at this time little bit low during dialysis treatments, continue with midodrine during HD as needed and midodrine 2.5 mg 3 times daily as needed  Volume overload  Continue volume removal with HD treatments.  Will need new EDW.   Class 2 severe obesity due to excess calories with serious comorbidity and body mass index (BMI) of 35.0 to 35.9 in adult ([facility])    Anemia in ESRD (end-stage renal disease)  ([facility])  Lab Results   Component Value Date    EGFR 9 06/27/2025    EGFR 8 06/25/2025    EGFR 10 06/24/2025    CREATININE 5.39 (H) 06/27/2025    CREATININE 5.66 (H) 06/25/2025    CREATININE 4.92 (H) 06/24/2025   Hb stable at this time, no short acting SHREE indicated  Chronic deep vein thrombosis (DVT) of right lower extremity ([facility])  Heme consult appreciated. Will need anticoagulation life long, plan to reach out to patient to discuss this and home monitoring vs NOAC.   Non healing left heel wound      I have reviewed the nephrology recommendations including dialysis management, with primary, and we are in agreement with renal plan including the information outlined above.     Subjective   Patient seen and examined today on HD. Denies any shortness of breath from admission. His leg swelling has improved.   A complete 10 point review of systems was performed and is otherwise negative.     Objective :  Temp:  [97.5 °F (36.4 °C)-97.9 °F (36.6  °C)] 97.8 °F (36.6 °C)  HR:  [66-82] 82  BP: ()/(61-79) 118/79  Resp:  [18-20] 18  SpO2:  [95 %-97 %] 96 %  O2 Device: None (Room air)    Current Weight: Weight - Scale: 129 kg (285 lb 7.9 oz)  First Weight: Weight - Scale: 126 kg (277 lb 9 oz)  I/O         06/25 0701 06/26 0700 06/26 0701 06/27 0700 06/27 0701  06/28 0700    P.O. 120 1200 240    I.V. (mL/kg) 300 (2.3)  200 (1.6)    Total Intake(mL/kg) 420 (3.3) 1200 (9.3) 440 (3.4)    Net +420 +1200 +440                 Physical Exam  Vitals reviewed.   Constitutional:       General: He is not in acute distress.     Appearance: He is obese. He is not ill-appearing.   HENT:      Head: Normocephalic and atraumatic.      Nose: Nose normal.      Mouth/Throat:      Mouth: Mucous membranes are moist.      Pharynx: Oropharynx is clear.     Cardiovascular:      Rate and Rhythm: Regular rhythm.      Heart sounds:      No friction rub.   Pulmonary:      Breath sounds: No wheezing, rhonchi or rales.   Abdominal:      General: There is no distension.      Tenderness: There is no abdominal tenderness. There is no guarding.     Musculoskeletal:      Right lower leg: No edema.      Left lower leg: No edema.      Comments: Left foot wrapped      Skin:     General: Skin is warm and dry.      Coloration: Skin is not jaundiced.      Findings: No bruising.     Neurological:      General: No focal deficit present.      Mental Status: He is alert and oriented to person, place, and time.      Cranial Nerves: No cranial nerve deficit.     Psychiatric:         Mood and Affect: Mood normal.         Behavior: Behavior normal.         Medications:  Current Medications[1]      Lab Results: I have reviewed the following results:  Results from last 7 days   Lab Units 06/27/25  0420 06/26/25  0514 06/25/25  1445 06/25/25  0452 06/24/25  1157   WBC Thousand/uL 9.50  --  8.84 9.11 8.70   HEMOGLOBIN g/dL 10.6*  --  11.4* 10.8* 10.8*   HEMATOCRIT % 33.7*  --  36.5 35.4* 34.5*   PLATELETS  "Thousands/uL 262  --  266 247 267   POTASSIUM mmol/L 4.4  --   --  4.4 3.9   CHLORIDE mmol/L 94*  --   --  94* 95*   CO2 mmol/L 30  --   --  26 30   BUN mg/dL 34*  --   --  21 16   CREATININE mg/dL 5.39*  --   --  5.66* 4.92*   CALCIUM mg/dL 8.9  --   --  8.8 8.8   PHOSPHORUS mg/dL  --  2.0*  --   --   --    ALBUMIN g/dL 3.0*  --   --  3.2* 3.2*       Administrative Statements     Portions of the record may have been created with voice recognition software. Occasional wrong word or \"sound a like\" substitutions may have occurred due to the inherent limitations of voice recognition software. Read the chart carefully and recognize, using context, where substitutions have occurred.If you have any questions, please contact the dictating provider.         [1]   Current Facility-Administered Medications:     acetaminophen (TYLENOL) tablet 650 mg, 650 mg, Oral, Q6H PRN, David Stauffer MD    allopurinol (ZYLOPRIM) tablet 100 mg, 100 mg, Oral, BID, David Stauffer MD, 100 mg at 06/26/25 1854    Artificial Tears Op Soln 1 drop, 1 drop, Both Eyes, Q6H PRN, David Stauffer MD    calcitriol (ROCALTROL) capsule 0.25 mcg, 0.25 mcg, Oral, Once per day on Monday Wednesday Friday, BRETT Victor, 0.25 mcg at 06/25/25 1328    [Held by provider] calcium carbonate (OYSTER SHELL,OSCAL) 500 mg tablet 2 tablet, 2 tablet, Oral, BID With Meals, David Stauffer MD, 2 tablet at 06/25/25 1714    calcium carbonate (TUMS) chewable tablet 500 mg, 500 mg, Oral, Daily PRN, David Stauffer MD, 500 mg at 06/26/25 0854    Cholecalciferol (VITAMIN D3) tablet 2,000 Units, 2,000 Units, Oral, Daily, David Stauffer MD, 2,000 Units at 06/26/25 0854    collagenase (SANTYL) ointment, , Topical, Daily, David Stauffer MD, Given at 06/25/25 1329    collagenase (SANTYL) ointment, , Topical, Daily, Nolan Krishna DPM, Given at 06/26/25 1528    dextromethorphan-guaiFENesin (ROBITUSSIN DM) oral syrup 10 " mL, 10 mL, Oral, Q4H PRN, David Stauffer MD    FLUoxetine (PROzac) capsule 20 mg, 20 mg, Oral, Daily, David Stauffer MD, 20 mg at 06/26/25 0854    heparin (porcine) 25,000 units in 0.45% NaCl 250 mL infusion (premix), 3-30 Units/kg/hr (Order-Specific), Intravenous, Titrated, David Stauffer MD, Last Rate: 12.5 mL/hr at 06/27/25 0515, 10 Units/kg/hr at 06/27/25 0515    heparin (porcine) injection 10,000 Units, 10,000 Units, Intravenous, Q6H PRN, David Stauffer MD, 10,000 Units at 06/27/25 0513    heparin (porcine) injection 5,000 Units, 5,000 Units, Intravenous, Q6H PRN, David Stauffer MD    hydrALAZINE (APRESOLINE) injection 5 mg, 5 mg, Intravenous, Q6H PRN, David Stauffer MD    loratadine (CLARITIN) tablet 10 mg, 10 mg, Oral, Daily, David Stauffer MD, 10 mg at 06/26/25 0854    magnesium Oxide (MAG-OX) tablet 400 mg, 400 mg, Oral, Daily, David Stauffer MD, 400 mg at 06/26/25 0854    melatonin tablet 3 mg, 3 mg, Oral, HS PRN, David Stauffer MD    midodrine (PROAMATINE) tablet 2.5 mg, 2.5 mg, Oral, TID AC, David Stauffer MD, 2.5 mg at 06/27/25 0617    midodrine (PROAMATINE) tablet 5 mg, 5 mg, Oral, Once per day on Monday Wednesday Friday, Karen Parikh PA-C, 5 mg at 06/27/25 0834    nystatin (MYCOSTATIN) powder, , Topical, TID, David Stauffer MD, Given at 06/26/25 2158    ondansetron (ZOFRAN) injection 4 mg, 4 mg, Intravenous, Q6H PRN, David Stauffer MD    pantoprazole (PROTONIX) EC tablet 40 mg, 40 mg, Oral, Daily, David Stauffer MD, 40 mg at 06/26/25 0854    polyethylene glycol (MIRALAX) packet 17 g, 17 g, Oral, Daily PRN, David Stauffer MD, 17 g at 06/25/25 1328    senna (SENOKOT) tablet 8.6 mg, 1 tablet, Oral, HS PRN, David Stauffer MD    sodium chloride (OCEAN) 0.65 % nasal spray 1 spray, 1 spray, Each Nare, Q1H PRN, David Stauffer MD    vit B complex-vit C-folic acid (Renal Caps) capsule 1 capsule, 1 mg,  Oral, Daily With Dinner, David Stauffer MD, 1 capsule at 06/26/25 1534    warfarin (COUMADIN) tablet 10 mg, 10 mg, Oral, Daily (warfarin), Gregory Levi DO

## 2025-06-27 NOTE — ASSESSMENT & PLAN NOTE
Pt presents with shortness of breath x 1 day, increased LLE swelling in setting of prematurely discontinued HD session concerning for volume overload    Chest XR -pulm edema, small bilateral pleural effusions on admission  Pt will be managed by hemodialysis as per nephrology  Resolved

## 2025-06-27 NOTE — ASSESSMENT & PLAN NOTE
Lab Results   Component Value Date    EGFR 9 06/27/2025    EGFR 8 06/25/2025    EGFR 10 06/24/2025    CREATININE 5.39 (H) 06/27/2025    CREATININE 5.66 (H) 06/25/2025    CREATININE 4.92 (H) 06/24/2025   ESRD on HD at [facility] under the care of Dr. Moore  Most recent dialysis yesterday, 6/23 for 196 minutes 1 L UF with postweight of 123.7 kg.  OP orders: .5 kg, 210-minute tx time, Access right PermCath  /, 3K/2.5 Ca/35 HCO3/135 Na+      6/26  Patient will require continued hospital stay for heparin drip with transition to warfarin for occlusive and nonocclusive chronic and subacute DVTs noted in the right lower extremity.  Patient will receive next dialysis treatment tomorrow.  During his treatment yesterday for 3-1/2 hours his weight was challenged and 3.5 L was removed, he achieved a post weight of 122.7 kg.  He was given midodrine 5 mg during treatment to facilitate with ultrafiltration    6/27  HEMODIALYSIS PROCEDURE NOTE  The patient was seen and examined on hemodialysis.  Time: 3.5 hours  Sodium: 135 Blood flow: 400   Dialyzer: F160 Potassium: 3 Dialysate flow: 800   Access: RIJ PC  Bicarbonate: 35 Ultrafiltration goal: 2.5    Medications on HD: midodrine 5mg pre HD       Patient has been started on coumadin and potentially would need this life long. I am concerned about monitoring, I understand it is difficult for patient to get out of the house for testing and labs due to mobility issues. He would need coumadin monitoring on a routine basis. Primary will check with CM about home monitoring. Otherwise, a NOAC may be a better option, which can be used in ESRD patient, even if there could be a slightly higher risk.     I spoke with patient by phone that we are evaluating options for ability to monitor levels at home. I placed a call to his daughter, Mary, by phone and left VM. Encourage ongoing discussion with primary and patient.

## 2025-06-27 NOTE — PROGRESS NOTES
Progress Note - Hospitalist   Name: Ramos Gomez 74 y.o. male I MRN: [id number]  Unit/Bed#: -01 I Date of Admission: 6/24/2025   Date of Service: 6/27/2025 I Hospital Day: 2    Assessment & Plan  Volume overload  Pt presents with shortness of breath x 1 day, increased LLE swelling in setting of prematurely discontinued HD session concerning for volume overload    Chest XR -pulm edema, small bilateral pleural effusions on admission  Pt will be managed by hemodialysis as per nephrology  Resolved  Secondary hyperparathyroidism of renal origin ([facility])  Continue medical management  End-stage renal disease on hemodialysis ([facility])  Lab Results   Component Value Date    EGFR 9 06/27/2025    EGFR 8 06/25/2025    EGFR 10 06/24/2025    CREATININE 5.39 (H) 06/27/2025    CREATININE 5.66 (H) 06/25/2025    CREATININE 4.92 (H) 06/24/2025   Pt is HD dependent 2/2 glomerulonephritis. Will cont HD MWF per renal.  Class 2 severe obesity due to excess calories with serious comorbidity and body mass index (BMI) of 35.0 to 35.9 in adult ([facility])  Contributes to all aspects of care  Weight loss encouraged    Anemia in ESRD (end-stage renal disease)  ([facility])  Lab Results   Component Value Date    EGFR 9 06/27/2025    EGFR 8 06/25/2025    EGFR 10 06/24/2025    CREATININE 5.39 (H) 06/27/2025    CREATININE 5.66 (H) 06/25/2025    CREATININE 4.92 (H) 06/24/2025   Hemoglobin stable  Chronic deep vein thrombosis (DVT) of right lower extremity ([facility])  US LE:  Result in progress   This result has not been signed. Information might be incomplete.       Result Text   [facility]  .  RAMOS GOMEZ is a 74 year old M who was referred by KERRY GIBBONS.        Indications:   Patient presents with chronic right lower extremity edema worsening x a few days with chest pain.   Operative History:   No cardiovascular surgeries noted   Risk Factors:   The patient reports hypertension, hyperlipidemia, diabetes and renal disease.            FINDINGS:     Main                                   Venous Thrombus  Right Deep Veins                                     Right Proximal Femoral                Chronic     Right Mid Femoral Vein                Chronic     Right Distal Fem Vein                 Chronic     Right Popliteal Vein                  Chronic     Gastrocnemius Vein        Chronic - Occlusive     CONCLUSION:     RIGHT LOWER LIMB   There is evidence of non-occlusive chronic deep vein thrombosis noted in one of the paired femoral veins and popliteal vein. There is occlusive chronic vs sub-acute deep vein thrombosis noted in the gastrocnemius veins.   There is no evidence of superficial thrombophlebitis.   Doppler evaluation shows a normal response to augmentation maneuvers.   Popliteal, posterior tibial and anterior tibial arterial Doppler waveforms are triphasic/biphasic.       LEFT LOWER LIMB LIMITED   There is no evidence of thrombus in the common femoral vein.    Doppler evaluation shows a normal response to augmentation maneuvers.           Apprec heme   Heparin bridge to Coumadin planned, family agreeable, discussed at length, greater than 20 minutes was spent discussing anticoagulation treatment with the patient at bedside  Start coumadin 6/26, give 10 mg dose tonight  Daily INR monitoring    Non healing left heel wound  Continue outpatient wound care    VTE Pharmacologic Prophylaxis: VTE Score: 6 High Risk (Score >/= 5) - Pharmacological DVT Prophylaxis Ordered: heparin drip. Sequential Compression Devices Ordered.    Mobility:   Basic Mobility Inpatient Raw Score: 7  JH-HLM Goal: 2: Bed activities/Dependent transfer  JH-HLM Achieved: 2: Bed activities/Dependent transfer  JH-HLM Goal achieved. Continue to encourage appropriate mobility.    Patient Centered Rounds: I performed bedside rounds with nursing staff today.   Discussions with Specialists or Other Care Team Provider: Plan of care discussed with nephrology, heme-onc note  reviewed    Education and Discussions with Family / Patient: Updated  (wife and daughter) at bedside.    Current Length of Stay: 2 day(s)  Current Patient Status: Inpatient   Certification Statement: The patient will continue to require additional inpatient hospital stay due to continue IV heparin, transition to p.o. Coumadin  Discharge Plan: Anticipate discharge tomorrow to home.    Code Status: Level 1 - Full Code    Subjective   Patient offers no acute complaints, overall is feeling better    Objective :  Temp:  [97.7 °F (36.5 °C)-97.9 °F (36.6 °C)] 97.7 °F (36.5 °C)  HR:  [60-82] 74  BP: ()/(57-95) 112/57  Resp:  [18-20] 18  SpO2:  [96 %-99 %] 99 %  O2 Device: None (Room air)    Body mass index is 37.67 kg/m².     Input and Output Summary (last 24 hours):     Intake/Output Summary (Last 24 hours) at 6/27/2025 1751  Last data filed at 6/27/2025 1450  Gross per 24 hour   Intake 1380 ml   Output 3007 ml   Net -1627 ml       Physical Exam  Vitals and nursing note reviewed.   Constitutional:       General: He is not in acute distress.     Appearance: He is obese. He is not ill-appearing, toxic-appearing or diaphoretic.   HENT:      Head: Normocephalic and atraumatic.     Cardiovascular:      Rate and Rhythm: Normal rate.   Pulmonary:      Effort: Pulmonary effort is normal.     Skin:     General: Skin is warm.     Neurological:      General: No focal deficit present.      Mental Status: He is alert and oriented to person, place, and time. Mental status is at baseline.      Cranial Nerves: No cranial nerve deficit.     Psychiatric:         Mood and Affect: Mood normal.         Behavior: Behavior normal.         Thought Content: Thought content normal.         Judgment: Judgment normal.           Lines/Drains:  Lines/Drains/Airways       Active Status       Name Placement date Placement time Site Days    HD Permanent Double Catheter 01/09/25  1317  Internal jugular  169                             Lab Results: I have reviewed the following results:   Results from last 7 days   Lab Units 06/27/25  0420   WBC Thousand/uL 9.50   HEMOGLOBIN g/dL 10.6*   HEMATOCRIT % 33.7*   PLATELETS Thousands/uL 262   SEGS PCT % 73   LYMPHO PCT % 14   MONO PCT % 8   EOS PCT % 4     Results from last 7 days   Lab Units 06/27/25  0420   SODIUM mmol/L 131*   POTASSIUM mmol/L 4.4   CHLORIDE mmol/L 94*   CO2 mmol/L 30   BUN mg/dL 34*   CREATININE mg/dL 5.39*   ANION GAP mmol/L 7   CALCIUM mg/dL 8.9   ALBUMIN g/dL 3.0*   TOTAL BILIRUBIN mg/dL 0.41   ALK PHOS U/L 68   ALT U/L 3*   AST U/L 11*   GLUCOSE RANDOM mg/dL 85     Results from last 7 days   Lab Units 06/27/25  0420   INR  1.01                   Recent Cultures (last 7 days):         Imaging Results Review: I reviewed radiology reports from this admission including: Ultrasound(s).  Other Study Results Review: EKG was reviewed.     Last 24 Hours Medication List:     Current Facility-Administered Medications:     acetaminophen (TYLENOL) tablet 650 mg, Q6H PRN    allopurinol (ZYLOPRIM) tablet 100 mg, BID    Artificial Tears Op Soln 1 drop, Q6H PRN    calcitriol (ROCALTROL) capsule 0.25 mcg, Once per day on Monday Wednesday Friday    [Held by provider] calcium carbonate (OYSTER SHELL,OSCAL) 500 mg tablet 2 tablet, BID With Meals    calcium carbonate (TUMS) chewable tablet 500 mg, Daily PRN    Cholecalciferol (VITAMIN D3) tablet 2,000 Units, Daily    collagenase (SANTYL) ointment, Daily    collagenase (SANTYL) ointment, Daily    dextromethorphan-guaiFENesin (ROBITUSSIN DM) oral syrup 10 mL, Q4H PRN    FLUoxetine (PROzac) capsule 20 mg, Daily    heparin (porcine) 25,000 units in 0.45% NaCl 250 mL infusion (premix), Titrated, Last Rate: 7 Units/kg/hr (06/27/25 1300)    heparin (porcine) injection 10,000 Units, Q6H PRN    heparin (porcine) injection 5,000 Units, Q6H PRN    hydrALAZINE (APRESOLINE) injection 5 mg, Q6H PRN    loratadine (CLARITIN) tablet 10 mg, Daily     magnesium Oxide (MAG-OX) tablet 400 mg, Daily    melatonin tablet 3 mg, HS PRN    midodrine (PROAMATINE) tablet 2.5 mg, TID AC    midodrine (PROAMATINE) tablet 5 mg, Once per day on Monday Wednesday Friday    nystatin (MYCOSTATIN) powder, TID    ondansetron (ZOFRAN) injection 4 mg, Q6H PRN    pantoprazole (PROTONIX) EC tablet 40 mg, Daily    polyethylene glycol (MIRALAX) packet 17 g, Daily PRN    senna (SENOKOT) tablet 8.6 mg, HS PRN    sodium chloride (OCEAN) 0.65 % nasal spray 1 spray, Q1H PRN    vit B complex-vit C-folic acid (Renal Caps) capsule 1 capsule, Daily With Dinner    warfarin (COUMADIN) tablet 10 mg, Daily (warfarin)    Administrative Statements   Today, Patient Was Seen By: Gregory Levi DO      **Please Note: This note may have been constructed using a voice recognition system.**

## 2025-06-27 NOTE — ASSESSMENT & PLAN NOTE
Lab Results   Component Value Date    EGFR 9 06/27/2025    EGFR 8 06/25/2025    EGFR 10 06/24/2025    CREATININE 5.39 (H) 06/27/2025    CREATININE 5.66 (H) 06/25/2025    CREATININE 4.92 (H) 06/24/2025   Hb stable at this time, no short acting SHREE indicated

## 2025-06-27 NOTE — ASSESSMENT & PLAN NOTE
Heme consult appreciated. Will need anticoagulation life long, plan to reach out to patient to discuss this and home monitoring vs NOAC.

## 2025-06-27 NOTE — ASSESSMENT & PLAN NOTE
Lab Results   Component Value Date    EGFR 9 06/27/2025    EGFR 8 06/25/2025    EGFR 10 06/24/2025    CREATININE 5.39 (H) 06/27/2025    CREATININE 5.66 (H) 06/25/2025    CREATININE 4.92 (H) 06/24/2025   Hemoglobin stable

## 2025-06-27 NOTE — QUICK NOTE
I contacted daughter to discuss anticoagulation options. I reinforced need for discussion about which options exist and what makes sense for this patient with need for close monitoring on coumadin. Daughter was not aware of medication and need for monitoring, she plans to come in for in person meeting to discuss the options.

## 2025-06-27 NOTE — PLAN OF CARE
Problem: Potential for Falls  Goal: Patient will remain free of falls  Description: INTERVENTIONS:  - Educate patient/family on patient safety including physical limitations  - Instruct patient to call for assistance with activity   - Consider consulting OT/PT to assist with strengthening/mobility based on AM PAC & JH-HLM score  - Consult OT/PT to assist with strengthening/mobility   - Keep Call bell within reach  - Keep bed low and locked with side rails adjusted as appropriate  - Keep care items and personal belongings within reach  - Initiate and maintain comfort rounds  - Make Fall Risk Sign visible to staff  - Offer Toileting every 2 Hours, in advance of need  - Consider moving patient to room near nurses station  Outcome: Progressing     Problem: PAIN - ADULT  Goal: Verbalizes/displays adequate comfort level or baseline comfort level  Description: Interventions:  - Encourage patient to monitor pain and request assistance  - Assess pain using appropriate pain scale  - Administer analgesics as ordered based on type and severity of pain and evaluate response  - Implement non-pharmacological measures as appropriate and evaluate response  - Consider cultural and social influences on pain and pain management  - Notify physician/advanced practitioner if interventions unsuccessful or patient reports new pain  - Educate patient/family on pain management process including their role and importance of  reporting pain   - Provide non-pharmacologic/complimentary pain relief interventions  Outcome: Progressing     Problem: INFECTION - ADULT  Goal: Absence or prevention of progression during hospitalization  Description: INTERVENTIONS:  - Assess and monitor for signs and symptoms of infection  - Monitor lab/diagnostic results  - Monitor all insertion sites, i.e. indwelling lines, tubes, and drains  - Monitor endotracheal if appropriate and nasal secretions for changes in amount and color  - [place] appropriate  cooling/warming therapies per order  - Administer medications as ordered  - Instruct and encourage patient and family to use good hand hygiene technique  - Identify and instruct in appropriate isolation precautions for identified infection/condition  Outcome: Progressing     Problem: SAFETY ADULT  Goal: Patient will remain free of falls  Description: INTERVENTIONS:  - Educate patient/family on patient safety including physical limitations  - Instruct patient to call for assistance with activity   - Consider consulting OT/PT to assist with strengthening/mobility based on AM PAC & JH-HLM score  - Consult OT/PT to assist with strengthening/mobility   - Keep Call bell within reach  - Keep bed low and locked with side rails adjusted as appropriate  - Keep care items and personal belongings within reach  - Initiate and maintain comfort rounds  - Make Fall Risk Sign visible to staff  - Offer Toileting every 2 Hours, in advance of need  - Obtain necessary fall risk management equipment  - Apply yellow socks and bracelet for high fall risk patients  - Consider moving patient to room near nurses station  Outcome: Progressing  Goal: Maintain or return to baseline ADL function  Description: INTERVENTIONS:  -  Assess patient's ability to carry out ADLs; assess patient's baseline for ADL function and identify physical deficits which impact ability to perform ADLs (bathing, care of mouth/teeth, toileting, grooming, dressing, etc.)  - Assess/evaluate cause of self-care deficits   - Assess range of motion  - Assess patient's mobility; develop plan if impaired  - Assess patient's need for assistive devices and provide as appropriate  - Encourage maximum independence but intervene and supervise when necessary  - Involve family in performance of ADLs  - Assess for home care needs following discharge   - Consider OT consult to assist with ADL evaluation and planning for discharge  - Provide patient education as appropriate  - Monitor  functional capacity and physical performance, use of AM PAC & -HLM   - Monitor gait, balance and fatigue with ambulation    Outcome: Progressing  Goal: Maintains/Returns to pre admission functional level  Description: INTERVENTIONS:  - Perform AM-PAC 6 Click Basic Mobility/ Daily Activity assessment daily.  - Set and communicate daily mobility goal to care team and patient/family/caregiver.   - Collaborate with rehabilitation services on mobility goals if consulted  - Perform Range of Motion 2 times a day.  - Record patient progress and toleration of activity level   Outcome: Progressing     Problem: Prexisting or High Potential for Compromised Skin Integrity  Goal: Skin integrity is maintained or improved  Description: INTERVENTIONS:  - Identify patients at risk for skin breakdown  - Assess and monitor skin integrity including under and around medical devices   - Assess and monitor nutrition and hydration status  - Monitor labs  - Assess for incontinence   - Turn and reposition patient  - Assist with mobility/ambulation  - Relieve pressure over sarkis prominences   - Avoid friction and shearing  - Provide appropriate hygiene as needed including keeping skin clean and dry  - Evaluate need for skin moisturizer/barrier cream  - Collaborate with interdisciplinary team  - Patient/family teaching  - Consider wound care consult    Assess:  - Review Devyn scale daily  - Clean and moisturize skin every 2 hours  - Inspect skin when repositioning, toileting, and assisting with ADLS  - Assess under medical devices every 2  - Assess extremities for adequate circulation and sensation     Bed Management:  - Have minimal linens on bed & keep smooth, unwrinkled  - Change linens as needed when moist or perspiring  - Avoid sitting or lying in one position for more than 2 hours while in bed?Keep HOB at 30degrees   - Toileting:  - Offer bedside commode  - Assess for incontinence every 2  - Use incontinent care products after each  incontinent episode    Activity:  - Encourage or provide ROM exercises   - Turn and reposition patient every 2 Hours    Skin Care:  - Avoid use of baby powder, tape, friction and shearing, hot water or constrictive clothing  - Relieve pressure over bony prominences using foam wedges  - Do not massage red bony areas       Problem: Nutrition/Hydration-ADULT  Goal: Nutrient/Hydration intake appropriate for improving, restoring or maintaining nutritional needs  Description: Monitor and assess patient's nutrition/hydration status for malnutrition. Collaborate with interdisciplinary team and initiate plan and interventions as ordered.  Monitor patient's weight and dietary intake as ordered or per policy. Utilize nutrition screening tool and intervene as necessary. Determine patient's food preferences and provide high-protein, high-caloric foods as appropriate.     INTERVENTIONS:  - Monitor oral intake, urinary output, labs, and treatment plans  - Assess nutrition and hydration status and recommend course of action  - Evaluate amount of meals eaten  - Assist patient with eating if necessary   - Allow adequate time for meals  - Recommend/ encourage appropriate diets, oral nutritional supplements, and vitamin/mineral supplements  - Order, calculate, and assess calorie counts as needed  - Recommend, monitor, and adjust tube feedings and TPN/PPN based on assessed needs  - Assess need for intravenous fluids  - Provide specific nutrition/hydration education as appropriate  - Include patient/family/caregiver in decisions related to nutrition  Outcome: Progressing     Problem: Decreased Cardiac Output  Goal: Cardiac output adequate for individual needs  Description: INTERVENTIONS: Monitor for signs and symptoms of decreased cardiac output   - Monitor for dyspnea with exertion and at rest  - Monitor for orthopnea  - Monitor for signs of tachycardia. Place patient on telemetry monitoring.  - Assess patient for jugular vein  distention  - Assess patient for lower extremity edema and poor peripheral perfusion   - Auscultate lung sound for Fine bibasilar crackles   - Monitor for cardiac arrythmias   - Administer beta blockers, antiarrhythmic, and blood pressure medications as ordered    Outcome: Progressing     Problem: Impaired Gas Exchange  Goal: Optimize oxygenation and ensure adequate ventilation  Description: INTERVENTIONS: Monitor for signs and symptoms of respiratory distress                - Elevate HOB or use high fowlers to promote lung expansion                - Administer oxygen as ordered to maintain adequate oxygenation                - Encourage use of IS to promote lung expansion and prevent PN                - Monitor ABGs to assess oxygenation status                - Monitor blood oxygen level to maintain adequate oxygenation                - Encourage cough and deep breathing exercises to promote lung expansion                - Monitor patient's mental status for increased confusion    Outcome: Progressing     Problem: METABOLIC, FLUID AND ELECTROLYTES - ADULT  Goal: Electrolytes maintained within normal limits  Description: INTERVENTIONS:  - Monitor labs and assess patient for signs and symptoms of electrolyte imbalances  - Administer electrolyte replacement as ordered  - Monitor response to electrolyte replacements, including repeat lab results as appropriate  - Instruct patient on fluid and nutrition as appropriate  Outcome: Progressing     Problem: Knowledge Deficit  Goal: Patient is able to verbalize understanding of Heart Failure after education  Description: INTERVENTIONS:  - Educate the patient and family on signs and symptoms of HF  - Provide the patient with HF education and HF zone tool  - Educate on the importance of daily weight in the AM and reporting a weight gain               of 3 or more pounds to their primary care physician  - Monitor for SOB  - Maintain and sodium and fluid restriction  - Educate  the patient on the importance of medications such as: diuretics, betablockers,               antiarrhythmics and their purpose, dose, route, side effects and labs               if they are needed    Outcome: Progressing     Problem: DISCHARGE PLANNING  Goal: Discharge to home or other facility with appropriate resources  Description: INTERVENTIONS:  - Identify barriers to discharge w/patient and caregiver  - Arrange for needed discharge resources and transportation as appropriate  - Identify discharge learning needs (meds, wound care, etc.)  - Arrange for interpretive services to assist at discharge as needed  - Refer to Case Management Department for coordinating discharge planning if the patient needs post-hospital services based on physician/advanced practitioner order or complex needs related to functional status, cognitive ability, or social support system  Outcome: Progressing

## 2025-06-27 NOTE — ASSESSMENT & PLAN NOTE
US LE:  Result in progress   This result has not been signed. Information might be incomplete.       Result Text   [facility]PORT  .  JOSELYN GOMEZ is a 74 year old M who was referred by KERRY GIBBONS.        Indications:   Patient presents with chronic right lower extremity edema worsening x a few days with chest pain.   Operative History:   No cardiovascular surgeries noted   Risk Factors:   The patient reports hypertension, hyperlipidemia, diabetes and renal disease.           FINDINGS:     Main                                   Venous Thrombus  Right Deep Veins                                     Right Proximal Femoral                Chronic     Right Mid Femoral Vein                Chronic     Right Distal Fem Vein                 Chronic     Right Popliteal Vein                  Chronic     Gastrocnemius Vein        Chronic - Occlusive     CONCLUSION:     RIGHT LOWER LIMB   There is evidence of non-occlusive chronic deep vein thrombosis noted in one of the paired femoral veins and popliteal vein. There is occlusive chronic vs sub-acute deep vein thrombosis noted in the gastrocnemius veins.   There is no evidence of superficial thrombophlebitis.   Doppler evaluation shows a normal response to augmentation maneuvers.   Popliteal, posterior tibial and anterior tibial arterial Doppler waveforms are triphasic/biphasic.       LEFT LOWER LIMB LIMITED   There is no evidence of thrombus in the common femoral vein.    Doppler evaluation shows a normal response to augmentation maneuvers.           Apprec heme   Heparin bridge to Coumadin planned, family agreeable, discussed at length, greater than 20 minutes was spent discussing anticoagulation treatment with the patient at bedside  Start coumadin 6/26, give 10 mg dose tonight  Daily INR monitoring

## 2025-06-27 NOTE — PLAN OF CARE
Hemodialysis treatment planned for 210 minutes using a 3 K+ bath for potassium 4.4 this morning.  Fluid goal 3000 ml/2500 ml net as tolerated to challenge .5 kg.  Treatment review with LULU Hope PA-C at bedside.            Post-Dialysis RN Treatment Note    Blood Pressure:  Pre 100/66 mm/Hg  Post 149/78 mmHg   EDW:  123.5 kg    Weight:  Pre 124.3 kg   Post 121 kg   Mode of weight measurement: Bed Scale   Volume Removed:  3007 ml/2500 ml net   Treatment duration: 210 minutes    NS given:  No    Treatment shortened No   Medications given during Rx: None Reported   Estimated Kt/V:  1.00   Access type: Permacath/TDC   Access Issues: No    Report called to primary nurse:   Yes               Problem: METABOLIC, FLUID AND ELECTROLYTES - ADULT  Goal: Electrolytes maintained within normal limits  Description: INTERVENTIONS:  - Monitor labs and assess patient for signs and symptoms of electrolyte imbalances  - Administer electrolyte replacement as ordered  - Monitor response to electrolyte replacements, including repeat lab results as appropriate  - Instruct patient on fluid and nutrition as appropriate  Outcome: Progressing  Goal: Fluid balance maintained  Description: INTERVENTIONS:  - Monitor labs   - Monitor I/O and WT  - Instruct patient on fluid and nutrition as appropriate  - Assess for signs & symptoms of volume excess or deficit  Outcome: Progressing

## 2025-06-28 LAB
ANION GAP SERPL CALCULATED.3IONS-SCNC: 9 MMOL/L (ref 4–13)
APTT PPP: 45 SECONDS (ref 23–34)
APTT PPP: 71 SECONDS (ref 23–34)
APTT PPP: 88 SECONDS (ref 23–34)
APTT PPP: 95 SECONDS (ref 23–34)
BUN SERPL-MCNC: 27 MG/DL (ref 5–25)
CALCIUM SERPL-MCNC: 8.8 MG/DL (ref 8.4–10.2)
CHLORIDE SERPL-SCNC: 92 MMOL/L (ref 96–108)
CO2 SERPL-SCNC: 28 MMOL/L (ref 21–32)
CREAT SERPL-MCNC: 3.92 MG/DL (ref 0.6–1.3)
ERYTHROCYTE [DISTWIDTH] IN BLOOD BY AUTOMATED COUNT: 15.7 % (ref 11.6–15.1)
GFR SERPL CREATININE-BSD FRML MDRD: 13 ML/MIN/1.73SQ M
GLUCOSE SERPL-MCNC: 86 MG/DL (ref 65–140)
HCT VFR BLD AUTO: 32.8 % (ref 36.5–49.3)
HGB BLD-MCNC: 10.5 G/DL (ref 12–17)
INR PPP: 1.04 (ref 0.85–1.19)
MCH RBC QN AUTO: 30.3 PG (ref 26.8–34.3)
MCHC RBC AUTO-ENTMCNC: 32 G/DL (ref 31.4–37.4)
MCV RBC AUTO: 95 FL (ref 82–98)
PLATELET # BLD AUTO: 240 THOUSANDS/UL (ref 149–390)
PMV BLD AUTO: 8.7 FL (ref 8.9–12.7)
POTASSIUM SERPL-SCNC: 4.4 MMOL/L (ref 3.5–5.3)
PROTHROMBIN TIME: 14.1 SECONDS (ref 12.3–15)
RBC # BLD AUTO: 3.47 MILLION/UL (ref 3.88–5.62)
SODIUM SERPL-SCNC: 129 MMOL/L (ref 135–147)
WBC # BLD AUTO: 10.18 THOUSAND/UL (ref 4.31–10.16)

## 2025-06-28 PROCEDURE — 85610 PROTHROMBIN TIME: CPT | Performed by: INTERNAL MEDICINE

## 2025-06-28 PROCEDURE — 85730 THROMBOPLASTIN TIME PARTIAL: CPT | Performed by: NURSE PRACTITIONER

## 2025-06-28 PROCEDURE — 85027 COMPLETE CBC AUTOMATED: CPT | Performed by: INTERNAL MEDICINE

## 2025-06-28 PROCEDURE — 80048 BASIC METABOLIC PNL TOTAL CA: CPT | Performed by: INTERNAL MEDICINE

## 2025-06-28 PROCEDURE — 85730 THROMBOPLASTIN TIME PARTIAL: CPT | Performed by: INTERNAL MEDICINE

## 2025-06-28 PROCEDURE — 99232 SBSQ HOSP IP/OBS MODERATE 35: CPT | Performed by: INTERNAL MEDICINE

## 2025-06-28 RX ADMIN — MIDODRINE HYDROCHLORIDE 2.5 MG: 5 TABLET ORAL at 17:00

## 2025-06-28 RX ADMIN — Medication 400 MG: at 08:41

## 2025-06-28 RX ADMIN — Medication 2000 UNITS: at 08:41

## 2025-06-28 RX ADMIN — ALLOPURINOL 100 MG: 100 TABLET ORAL at 08:41

## 2025-06-28 RX ADMIN — NYSTATIN: 100000 POWDER TOPICAL at 17:00

## 2025-06-28 RX ADMIN — MIDODRINE HYDROCHLORIDE 2.5 MG: 5 TABLET ORAL at 11:31

## 2025-06-28 RX ADMIN — Medication 1 CAPSULE: at 17:11

## 2025-06-28 RX ADMIN — ALLOPURINOL 100 MG: 100 TABLET ORAL at 17:11

## 2025-06-28 RX ADMIN — NYSTATIN: 100000 POWDER TOPICAL at 21:37

## 2025-06-28 RX ADMIN — MIDODRINE HYDROCHLORIDE 2.5 MG: 5 TABLET ORAL at 06:36

## 2025-06-28 RX ADMIN — NYSTATIN: 100000 POWDER TOPICAL at 08:44

## 2025-06-28 RX ADMIN — PANTOPRAZOLE SODIUM 40 MG: 40 TABLET, DELAYED RELEASE ORAL at 08:41

## 2025-06-28 RX ADMIN — COLLAGENASE SANTYL: 250 OINTMENT TOPICAL at 08:44

## 2025-06-28 RX ADMIN — HEPARIN SODIUM 9 UNITS/KG/HR: 10000 INJECTION, SOLUTION INTRAVENOUS at 21:36

## 2025-06-28 RX ADMIN — LORATADINE 10 MG: 10 TABLET ORAL at 08:41

## 2025-06-28 RX ADMIN — FLUOXETINE HYDROCHLORIDE 20 MG: 20 CAPSULE ORAL at 08:41

## 2025-06-28 RX ADMIN — WARFARIN SODIUM 10 MG: 5 TABLET ORAL at 17:11

## 2025-06-28 RX ADMIN — HEPARIN SODIUM 5000 UNITS: 1000 INJECTION, SOLUTION INTRAVENOUS; SUBCUTANEOUS at 09:36

## 2025-06-28 NOTE — PROGRESS NOTES
Progress Note - Hospitalist   Name: Ramos Gomez 74 y.o. male I MRN: [id number]  Unit/Bed#: -01 I Date of Admission: 6/24/2025   Date of Service: 6/28/2025 I Hospital Day: 3    Assessment & Plan  Volume overload  Pt presents with shortness of breath x 1 day, increased LLE swelling in setting of prematurely discontinued HD session concerning for volume overload    Chest XR -pulm edema, small bilateral pleural effusions on admission  Pt will be managed by hemodialysis as per nephrology  Resolved  Chronic deep vein thrombosis (DVT) of right lower extremity (HCC)  US LE:  Result in progress   This result has not been signed. Information might be incomplete.       Result Text   [facility] REPORT  .  RAMOS GOMEZ is a 74 year old M who was referred by KERRY GIBBONS.        Indications:   Patient presents with chronic right lower extremity edema worsening x a few days with chest pain.   Operative History:   No cardiovascular surgeries noted   Risk Factors:   The patient reports hypertension, hyperlipidemia, diabetes and renal disease.           FINDINGS:     Main                                   Venous Thrombus  Right Deep Veins                                     Right Proximal Femoral                Chronic     Right Mid Femoral Vein                Chronic     Right Distal Fem Vein                 Chronic     Right Popliteal Vein                  Chronic     Gastrocnemius Vein        Chronic - Occlusive     CONCLUSION:     RIGHT LOWER LIMB   There is evidence of non-occlusive chronic deep vein thrombosis noted in one of the paired femoral veins and popliteal vein. There is occlusive chronic vs sub-acute deep vein thrombosis noted in the gastrocnemius veins.   There is no evidence of superficial thrombophlebitis.   Doppler evaluation shows a normal response to augmentation maneuvers.   Popliteal, posterior tibial and anterior tibial arterial Doppler waveforms are triphasic/biphasic.       LEFT  LOWER LIMB LIMITED   There is no evidence of thrombus in the common femoral vein.    Doppler evaluation shows a normal response to augmentation maneuvers.           Appreciate hematology oncology recommendations  Heparin bridge to Coumadin planned, family agreeable, discussed at length.    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 Will cont HD MWF per renal.  Class 2 severe obesity due to excess calories with serious comorbidity and body mass index (BMI) of 35.0 to 35.9 in adult ([facility])  Contributes to all aspects of care  Weight loss encouraged    Anemia in ESRD (end-stage renal disease)  ([facility])  Lab Results   Component Value Date    EGFR 13 06/28/2025    EGFR 9 06/27/2025    EGFR 8 06/25/2025    CREATININE 3.92 (H) 06/28/2025    CREATININE 5.39 (H) 06/27/2025    CREATININE 5.66 (H) 06/25/2025   Hemoglobin stable  Non healing left heel wound  Continue outpatient wound care    VTE Pharmacologic Prophylaxis: VTE Score: 6 High Risk (Score >/= 5) - Pharmacological DVT Prophylaxis Ordered: heparin drip. Sequential Compression Devices Ordered.    Mobility:   Basic Mobility Inpatient Raw Score: 7  -[facility] Goal: 2: Bed activities/Dependent transfer  -HL Achieved: 3: Sit at edge of bed  -HLM Goal achieved. Continue to encourage appropriate mobility.    Code Status: Level 1 - Full Code    Subjective   No pain, no acute complaints, no abnormal bleeding    Objective :  Temp:  [97.7 °F (36.5 °C)-98.1 °F (36.7 °C)] 97.8 °F (36.6 °C)  HR:  [60-77] 60  BP: (104-124)/(57-64) 120/64  Resp:  [18-19]  18  SpO2:  [95 %-99 %] 95 %  O2 Device: None (Room air)    Body mass index is 37.67 kg/m².     Input and Output Summary (last 24 hours):     Intake/Output Summary (Last 24 hours) at 6/28/2025 1308  Last data filed at 6/28/2025 1300  Gross per 24 hour   Intake 900 ml   Output --   Net 900 ml       Physical Exam  Vitals and nursing note reviewed.   Constitutional:       General: He is not in acute distress.     Appearance: He is obese. He is not ill-appearing, toxic-appearing or diaphoretic.   HENT:      Head: Normocephalic.     Cardiovascular:      Rate and Rhythm: Normal rate.      Pulses: Normal pulses.   Pulmonary:      Effort: Pulmonary effort is normal.     Musculoskeletal:         General: Normal range of motion.     Skin:     General: Skin is warm.     Neurological:      Mental Status: He is alert and oriented to person, place, and time. Mental status is at baseline.     Psychiatric:         Mood and Affect: Mood normal.         Behavior: Behavior normal.         Thought Content: Thought content normal.         Judgment: Judgment normal.         Lines/Drains:  Lines/Drains/Airways       Active Status       Name Placement date Placement time Site Days    HD Permanent Double Catheter 01/09/25  1317  Internal jugular  169                            Lab Results: I have reviewed the following results:   Results from last 7 days   Lab Units 06/28/25  0234 06/27/25  0420   WBC Thousand/uL 10.18* 9.50   HEMOGLOBIN g/dL 10.5* 10.6*   HEMATOCRIT % 32.8* 33.7*   PLATELETS Thousands/uL 240 262   SEGS PCT %  --  73   LYMPHO PCT %  --  14   MONO PCT %  --  8   EOS PCT %  --  4     Results from last 7 days   Lab Units 06/28/25  0234 06/27/25  0420   SODIUM mmol/L 129* 131*   POTASSIUM mmol/L 4.4 4.4   CHLORIDE mmol/L 92* 94*   CO2 mmol/L 28 30   BUN mg/dL 27* 34*   CREATININE mg/dL 3.92* 5.39*   ANION GAP mmol/L 9 7   CALCIUM mg/dL 8.8 8.9   ALBUMIN g/dL  --  3.0*   TOTAL BILIRUBIN mg/dL  --  0.41   ALK PHOS U/L  --  68    ALT U/L  --  3*   AST U/L  --  11*   GLUCOSE RANDOM mg/dL 86 85     Results from last 7 days   Lab Units 06/28/25  0857   INR  1.04                   Recent Cultures (last 7 days):         Imaging Results Review: No pertinent imaging studies reviewed.  Other Study Results Review: No additional pertinent studies reviewed.    Last 24 Hours Medication List:     Current Facility-Administered Medications:     acetaminophen (TYLENOL) tablet 650 mg, Q6H PRN    allopurinol (ZYLOPRIM) tablet 100 mg, BID    Artificial Tears Op Soln 1 drop, Q6H PRN    calcitriol (ROCALTROL) capsule 0.25 mcg, Once per day on Monday Wednesday Friday    [Held by provider] calcium carbonate (OYSTER SHELL,OSCAL) 500 mg tablet 2 tablet, BID With Meals    calcium carbonate (TUMS) chewable tablet 500 mg, Daily PRN    Cholecalciferol (VITAMIN D3) tablet 2,000 Units, Daily    collagenase (SANTYL) ointment, Daily    collagenase (SANTYL) ointment, Daily    dextromethorphan-guaiFENesin (ROBITUSSIN DM) oral syrup 10 mL, Q4H PRN    FLUoxetine (PROzac) capsule 20 mg, Daily    heparin (porcine) 25,000 units in 0.45% NaCl 250 mL infusion (premix), Titrated, Last Rate: 9 Units/kg/hr (06/28/25 0940)    heparin (porcine) injection 10,000 Units, Q6H PRN    heparin (porcine) injection 5,000 Units, Q6H PRN    hydrALAZINE (APRESOLINE) injection 5 mg, Q6H PRN    loratadine (CLARITIN) tablet 10 mg, Daily    magnesium Oxide (MAG-OX) tablet 400 mg, Daily    melatonin tablet 3 mg, HS PRN    midodrine (PROAMATINE) tablet 2.5 mg, TID AC    midodrine (PROAMATINE) tablet 5 mg, Once per day on Monday Wednesday Friday    nystatin (MYCOSTATIN) powder, TID    ondansetron (ZOFRAN) injection 4 mg, Q6H PRN    pantoprazole (PROTONIX) EC tablet 40 mg, Daily    polyethylene glycol (MIRALAX) packet 17 g, Daily PRN    senna (SENOKOT) tablet 8.6 mg, HS PRN    sodium chloride (OCEAN) 0.65 % nasal spray 1 spray, Q1H PRN    vit B complex-vit C-folic acid (Renal Caps) capsule 1 capsule,  Daily With Dinner    warfarin (COUMADIN) tablet 10 mg, Daily (warfarin)    Administrative Statements   Today, Patient Was Seen By: Gregory Levi DO      **Please Note: This note may have been constructed using a voice recognition system.**

## 2025-06-28 NOTE — ASSESSMENT & PLAN NOTE
Lab Results   Component Value Date    EGFR 13 06/28/2025    EGFR 9 06/27/2025    EGFR 8 06/25/2025    CREATININE 3.92 (H) 06/28/2025    CREATININE 5.39 (H) 06/27/2025    CREATININE 5.66 (H) 06/25/2025   Patient is on long-acting SHREE in the outpatient setting, no Epogen at this time.

## 2025-06-28 NOTE — PROGRESS NOTES
Progress Note - Nephrology   Name: Ramos Rosenthal 74 y.o. male I MRN: [id number]  Unit/Bed#: -01 I Date of Admission: 6/24/2025   Date of Service: 6/28/2025 I Hospital Day: 3     Assessment & Plan  End-stage renal disease on hemodialysis ([facility])  Lab Results   Component Value Date    EGFR 13 06/28/2025    EGFR 9 06/27/2025    EGFR 8 06/25/2025    CREATININE 3.92 (H) 06/28/2025    CREATININE 5.39 (H) 06/27/2025    CREATININE 5.66 (H) 06/25/2025   ESRD on HD at [facility] under the care of Dr. Moore  Most recent dialysis yesterday, 6/23 for 196 minutes 1 L UF with postweight of 123.7 kg.  OP orders: .5 kg, 210-minute tx time, Access right PermCath  /, 3K/2.5 Ca/35 HCO3/135 Na+    -------------------------  Continue hemodialysis sessions Monday Wednesday Friday, no issues with hemodialysis yesterday, Friday, June 29.  Next modality session will be on Monday, June 30.  Secondary hyperparathyroidism of renal origin ([facility])  Follow PTH in the outpatient setting, continue with current dosing of calcitriol 0.25 mcg 3 times weekly    Essential hypertension  All antihypertensive medications have been discontinued, patient currently on midodrine 5 mg prior to dialysis and has also had midodrine 2.5 mg 3 times daily added.  Volume overload  Continues to improve, continue ultrafilter as tolerated with hemodialysis sessions.  Class 2 severe obesity due to excess calories with serious comorbidity and body mass index (BMI) of 35.0 to 35.9 in adult ([facility])    Anemia in ESRD (end-stage renal disease)  ([facility])  Lab Results   Component Value Date    EGFR 13 06/28/2025    EGFR 9 06/27/2025    EGFR 8 06/25/2025    CREATININE 3.92 (H) 06/28/2025    CREATININE 5.39 (H) 06/27/2025    CREATININE 5.66 (H) 06/25/2025   Patient is on long-acting SHREE in the outpatient setting, no Epogen at this time.  Chronic deep vein thrombosis (DVT) of right lower extremity (HCC)  Continue with anticoagulation according to  "hematology/hospitalist recommendations.  Non healing left heel wound          Case discussed with hospitalist.  Stable from the renal standpoint, next hemodialysis session will be on Monday, June 30      Volume overload    Problem List[1]      Subjective:   No acute issues at this time, patient is eating and drinking well.    Objective:     Vitals: Blood pressure 120/64, pulse 60, temperature 97.8 °F (36.6 °C), temperature source Oral, resp. rate 18, height 6' 1\" (1.854 m), weight 129 kg (285 lb 7.9 oz), SpO2 95%.,Body mass index is 37.67 kg/m².    Weight (last 2 days)       None              Intake/Output Summary (Last 24 hours) at 6/28/2025 1100  Last data filed at 6/28/2025 0900  Gross per 24 hour   Intake 960 ml   Output 3007 ml   Net -2047 ml     I/O last 3 completed shifts:  In: 1400 [P.O.:900; I.V.:500]  Out: 3007 [Other:3007]    HD Permanent Double Catheter (Active)   Reasons to continue HD Cath Treatment Therapy 06/26/25 0900   Goal for Removal N/A- chronic HD catheter 06/27/25 0850   Line Necessity Reviewed Yes, reviewed with provider 06/25/25 1237   Site Assessment WDL 06/27/25 1220   Proximal Lumen Status Flushed & Clamped;Passive disinfecting cap applied 06/27/25 1220   Distal Lumen Status Flushed & Clamped;Passive disinfecting cap applied 06/27/25 1220   Dressing Type Chlorhexidine dressing 06/27/25 1220   Dressing Status Clean;Dry;Intact 06/27/25 1220   Dressing Intervention Dressing changed 06/27/25 1220   Dressing Change Due 07/04/25 06/27/25 1220       Physical Exam: /64 (BP Location: Left arm)   Pulse 60   Temp 97.8 °F (36.6 °C) (Oral)   Resp 18   Ht 6' 1\" (1.854 m)   Wt 129 kg (285 lb 7.9 oz)   SpO2 95%   BMI 37.67 kg/m²     General Appearance:    Alert, cooperative, no distress, appears stated age   Head:    Normocephalic, without obvious abnormality, atraumatic   Eyes:    Conjunctiva/corneas clear   Ears:    Normal external ears   Nose:   Nares normal, septum midline, mucosa " "normal, no drainage    or sinus tenderness   Throat:   Lips, mucosa, and tongue normal; teeth and gums normal   Neck:   Supple, symmetrical, trachea midline, no adenopathy;        thyroid:  No enlargement/tenderness/nodules; no carotid    bruit or JVD   Back:     Symmetric, no curvature, ROM normal, no CVA tenderness   Lungs:     Clear to auscultation bilaterally, respirations unlabored   Chest wall:    No tenderness or deformity   Heart:    Regular rate and rhythm, S1 and S2 normal, no murmur, rub   or gallop   Abdomen:     Soft, non-tender, bowel sounds active   Extremities:   Extremities normal, atraumatic, no cyanosis or edema   Skin:   Skin color, texture, turgor normal, no rashes or lesions   Lymph nodes:   Cervical normal   Neurologic:   CNII-XII intact            Lab, Imaging and other studies: I have personally reviewed pertinent labs.  CBC:   Lab Results   Component Value Date    WBC 10.18 (H) 06/28/2025    HGB 10.5 (L) 06/28/2025    HCT 32.8 (L) 06/28/2025    MCV 95 06/28/2025     06/28/2025    RBC 3.47 (L) 06/28/2025    MCH 30.3 06/28/2025    MCHC 32.0 06/28/2025    RDW 15.7 (H) 06/28/2025    MPV 8.7 (L) 06/28/2025     CMP:   Lab Results   Component Value Date    K 4.4 06/28/2025    CL 92 (L) 06/28/2025    CO2 28 06/28/2025    BUN 27 (H) 06/28/2025    CREATININE 3.92 (H) 06/28/2025    CALCIUM 8.8 06/28/2025    EGFR 13 06/28/2025       .  Results from last 7 days   Lab Units 06/28/25  0234 06/27/25  0420 06/25/25  0452 06/24/25  1157   POTASSIUM mmol/L 4.4 4.4 4.4 3.9   CHLORIDE mmol/L 92* 94* 94* 95*   CO2 mmol/L 28 30 26 30   BUN mg/dL 27* 34* 21 16   CREATININE mg/dL 3.92* 5.39* 5.66* 4.92*   CALCIUM mg/dL 8.8 8.9 8.8 8.8   ALK PHOS U/L  --  68 71 70   ALT U/L  --  3* <3* 3*   AST U/L  --  11* 19 11*         Phosphorus: No results found for: \"PHOS\"  Magnesium: No results found for: \"MG\"  Urinalysis: No results found for: \"COLORU\", \"CLARITYU\", \"SPECGRAV\", \"PHUR\", \"LEUKOCYTESUR\", \"NITRITE\", " "\"PROTEINUA\", \"GLUCOSEU\", \"KETONESU\", \"BILIRUBINUR\", \"BLOODU\"  Ionized Calcium: No results found for: \"CAION\"  Coagulation:   Lab Results   Component Value Date    INR 1.04 06/28/2025     Troponin: No results found for: \"TROPONINI\"  ABG: No results found for: \"PHART\", \"ZTI7GCW\", \"PO2ART\", \"FUQ5MEQ\", \"N4BSRTYC\", \"BEART\", \"SOURCE\"  Radiology review:     IMAGING  No results found.      Current Facility-Administered Medications:     acetaminophen (TYLENOL) tablet 650 mg, Q6H PRN    allopurinol (ZYLOPRIM) tablet 100 mg, BID    Artificial Tears Op Soln 1 drop, Q6H PRN    calcitriol (ROCALTROL) capsule 0.25 mcg, Once per day on Monday Wednesday Friday    [Held by provider] calcium carbonate (OYSTER SHELL,OSCAL) 500 mg tablet 2 tablet, BID With Meals    calcium carbonate (TUMS) chewable tablet 500 mg, Daily PRN    Cholecalciferol (VITAMIN D3) tablet 2,000 Units, Daily    collagenase (SANTYL) ointment, Daily    collagenase (SANTYL) ointment, Daily    dextromethorphan-guaiFENesin (ROBITUSSIN DM) oral syrup 10 mL, Q4H PRN    FLUoxetine (PROzac) capsule 20 mg, Daily    heparin (porcine) 25,000 units in 0.45% NaCl 250 mL infusion (premix), Titrated, Last Rate: 9 Units/kg/hr (06/28/25 0940)    heparin (porcine) injection 10,000 Units, Q6H PRN    heparin (porcine) injection 5,000 Units, Q6H PRN    hydrALAZINE (APRESOLINE) injection 5 mg, Q6H PRN    loratadine (CLARITIN) tablet 10 mg, Daily    magnesium Oxide (MAG-OX) tablet 400 mg, Daily    melatonin tablet 3 mg, HS PRN    midodrine (PROAMATINE) tablet 2.5 mg, TID AC    midodrine (PROAMATINE) tablet 5 mg, Once per day on Monday Wednesday Friday    nystatin (MYCOSTATIN) powder, TID    ondansetron (ZOFRAN) injection 4 mg, Q6H PRN    pantoprazole (PROTONIX) EC tablet 40 mg, Daily    polyethylene glycol (MIRALAX) packet 17 g, Daily PRN    senna (SENOKOT) tablet 8.6 mg, HS PRN    sodium chloride (OCEAN) 0.65 % nasal spray 1 spray, Q1H PRN    vit B complex-vit C-folic acid (Renal Caps) " capsule 1 capsule, Daily With Dinner    warfarin (COUMADIN) tablet 10 mg, Daily (warfarin)  Medications Discontinued During This Encounter   Medication Reason    furosemide (LASIX) 160 mg in dextrose 5 % 50 mL IVPB     levalbuterol (XOPENEX) inhalation solution 1.25 mg     heparin (porcine) subcutaneous injection 5,000 Units     heparin (VTE/PE) high     senna (SENOKOT) tablet 8.6 mg     warfarin (COUMADIN) tablet 5 mg        Gigi Ortega,       This progress note was produced in part using a dictation device which may document imprecise wording from author's original intent.            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    Anemia    End-stage renal disease on hemodialysis ([facility])    Volume overload    Chronic gout due to renal impairment of multiple sites without tophus    Immunocompromised patient ([facility])    Ulcer of left heel with cellulitis ([facility])    Class 2 severe obesity due to excess calories with serious comorbidity and body mass index (BMI) of 35.0 to 35.9 in adult (HCC)    RPGN (rapidly progressive glomerulonephritis)    Anemia in ESRD (end-stage renal disease)  ([facility])    Chronic deep vein thrombosis (DVT) of right lower extremity ([facility])    Non healing left heel wound

## 2025-06-28 NOTE — PLAN OF CARE
Problem: PAIN - ADULT  Goal: Verbalizes/displays adequate comfort level or baseline comfort level  Description: Interventions:  - Encourage patient to monitor pain and request assistance  - Assess pain using appropriate pain scale  - Administer analgesics as ordered based on type and severity of pain and evaluate response  - Implement non-pharmacological measures as appropriate and evaluate response  - Consider cultural and social influences on pain and pain management  - Notify physician/advanced practitioner if interventions unsuccessful or patient reports new pain  - Educate patient/family on pain management process including their role and importance of  reporting pain   - Provide non-pharmacologic/complimentary pain relief interventions  Outcome: Progressing     Problem: INFECTION - ADULT  Goal: Absence or prevention of progression during hospitalization  Description: INTERVENTIONS:  - Assess and monitor for signs and symptoms of infection  - Monitor lab/diagnostic results  - Monitor all insertion sites, i.e. indwelling lines, tubes, and drains  - Monitor endotracheal if appropriate and nasal secretions for changes in amount and color  - [place] appropriate cooling/warming therapies per order  - Administer medications as ordered  - Instruct and encourage patient and family to use good hand hygiene technique  - Identify and instruct in appropriate isolation precautions for identified infection/condition  Outcome: Progressing     Problem: SAFETY ADULT  Goal: Patient will remain free of falls  Description: INTERVENTIONS:  - Educate patient/family on patient safety including physical limitations  - Instruct patient to call for assistance with activity   - Consider consulting OT/PT to assist with strengthening/mobility based on AM PAC & JH-HLM score  - Consult OT/PT to assist with strengthening/mobility   - Keep Call bell within reach  - Keep bed low and locked with side rails adjusted as appropriate  - Keep  care items and personal belongings within reach  - Initiate and maintain comfort rounds  - Make Fall Risk Sign visible to staff  - Offer Toileting every 2 Hours, in advance of need  - Initiate/Maintain bed alarm  - Apply yellow socks and bracelet for high fall risk patients  - Consider moving patient to room near nurses station  Outcome: Progressing     Problem: Knowledge Deficit  Goal: Patient/family/caregiver demonstrates understanding of disease process, treatment plan, medications, and discharge instructions  Description: Complete learning assessment and assess knowledge base.  Interventions:  - Provide teaching at level of understanding  - Provide teaching via preferred learning methods  Outcome: Progressing     Problem: DISCHARGE PLANNING  Goal: Discharge to home or other facility with appropriate resources  Description: INTERVENTIONS:  - Identify barriers to discharge w/patient and caregiver  - Arrange for needed discharge resources and transportation as appropriate  - Identify discharge learning needs (meds, wound care, etc.)  - Arrange for interpretive services to assist at discharge as needed  - Refer to Case Management Department for coordinating discharge planning if the patient needs post-hospital services based on physician/advanced practitioner order or complex needs related to functional status, cognitive ability, or social support system  Outcome: Progressing     Problem: Nutrition/Hydration-ADULT  Goal: Nutrient/Hydration intake appropriate for improving, restoring or maintaining nutritional needs  Description: Monitor and assess patient's nutrition/hydration status for malnutrition. Collaborate with interdisciplinary team and initiate plan and interventions as ordered.  Monitor patient's weight and dietary intake as ordered or per policy. Utilize nutrition screening tool and intervene as necessary. Determine patient's food preferences and provide high-protein, high-caloric foods as appropriate.      INTERVENTIONS:  - Monitor oral intake, urinary output, labs, and treatment plans  - Assess nutrition and hydration status and recommend course of action  - Evaluate amount of meals eaten  - Assist patient with eating if necessary   - Allow adequate time for meals  - Recommend/ encourage appropriate diets, oral nutritional supplements, and vitamin/mineral supplements  - Order, calculate, and assess calorie counts as needed  - Recommend, monitor, and adjust tube feedings and TPN/PPN based on assessed needs  - Assess need for intravenous fluids  - Provide specific nutrition/hydration education as appropriate  - Include patient/family/caregiver in decisions related to nutrition  Outcome: Progressing

## 2025-06-28 NOTE — ASSESSMENT & PLAN NOTE
Follow PTH in the outpatient setting, continue with current dosing of calcitriol 0.25 mcg 3 times weekly

## 2025-06-28 NOTE — ASSESSMENT & PLAN NOTE
Lab Results   Component Value Date    EGFR 13 06/28/2025    EGFR 9 06/27/2025    EGFR 8 06/25/2025    CREATININE 3.92 (H) 06/28/2025    CREATININE 5.39 (H) 06/27/2025    CREATININE 5.66 (H) 06/25/2025   [facility]RD on HD at [facility] under the care of Dr. Moore  Most recent dialysis yesterday, 6/23 for 196 minutes 1 L UF with postweight of 123.7 kg.  OP orders: .5 kg, 210-minute tx time, Access right PermCath  /, 3K/2.5 Ca/35 HCO3/135 Na+    -------------------------  Continue hemodialysis sessions Monday Wednesday Friday, no issues with hemodialysis yesterday, Friday, June 29.  Next modality session will be on Monday, June 30.

## 2025-06-28 NOTE — ASSESSMENT & PLAN NOTE
All antihypertensive medications have been discontinued, patient currently on midodrine 5 mg prior to dialysis and has also had midodrine 2.5 mg 3 times daily added.

## 2025-06-28 NOTE — ASSESSMENT & PLAN NOTE
Lab Results   Component Value Date    EGFR 13 06/28/2025    EGFR 9 06/27/2025    EGFR 8 06/25/2025    CREATININE 3.92 (H) 06/28/2025    CREATININE 5.39 (H) 06/27/2025    CREATININE 5.66 (H) 06/25/2025   Hemoglobin stable

## 2025-06-28 NOTE — PLAN OF CARE
Problem: Potential for Falls  Goal: Patient will remain free of falls  Description: INTERVENTIONS:  - Educate patient/family on patient safety including physical limitations  - Instruct patient to call for assistance with activity   - Consider consulting OT/PT to assist with strengthening/mobility based on AM PAC & JH-HLM score  - Consult OT/PT to assist with strengthening/mobility   - Keep Call bell within reach  - Keep bed low and locked with side rails adjusted as appropriate  - Keep care items and personal belongings within reach  - Initiate and maintain comfort rounds  - Make Fall Risk Sign visible to staff  - Offer Toileting every 2 Hours, in advance of need  - Initiate/Maintain bed alarm  - Apply yellow socks and bracelet for high fall risk patients  - Consider moving patient to room near nurses station  Outcome: Progressing     Problem: PAIN - ADULT  Goal: Verbalizes/displays adequate comfort level or baseline comfort level  Description: Interventions:  - Encourage patient to monitor pain and request assistance  - Assess pain using appropriate pain scale  - Administer analgesics as ordered based on type and severity of pain and evaluate response  - Implement non-pharmacological measures as appropriate and evaluate response  - Consider cultural and social influences on pain and pain management  - Notify physician/advanced practitioner if interventions unsuccessful or patient reports new pain  - Educate patient/family on pain management process including their role and importance of  reporting pain   - Provide non-pharmacologic/complimentary pain relief interventions  Outcome: Progressing     Problem: INFECTION - ADULT  Goal: Absence or prevention of progression during hospitalization  Description: INTERVENTIONS:  - Assess and monitor for signs and symptoms of infection  - Monitor lab/diagnostic results  - Monitor all insertion sites, i.e. indwelling lines, tubes, and drains  - Monitor endotracheal if  appropriate and nasal secretions for changes in amount and color  - [place] appropriate cooling/warming therapies per order  - Administer medications as ordered  - Instruct and encourage patient and family to use good hand hygiene technique  - Identify and instruct in appropriate isolation precautions for identified infection/condition  Outcome: Progressing     Problem: SAFETY ADULT  Goal: Patient will remain free of falls  Description: INTERVENTIONS:  - Educate patient/family on patient safety including physical limitations  - Instruct patient to call for assistance with activity   - Consider consulting OT/PT to assist with strengthening/mobility based on AM PAC & -HL score  - Consult OT/PT to assist with strengthening/mobility   - Keep Call bell within reach  - Keep bed low and locked with side rails adjusted as appropriate  - Keep care items and personal belongings within reach  - Initiate and maintain comfort rounds  - Make Fall Risk Sign visible to staff  - Offer Toileting every 2 Hours, in advance of need  - Initiate/Maintain bed alarm  - Apply yellow socks and bracelet for high fall risk patients  - Consider moving patient to room near nurses station  Outcome: Progressing  Goal: Maintain or return to baseline ADL function  Description: INTERVENTIONS:  -  Assess patient's ability to carry out ADLs; assess patient's baseline for ADL function and identify physical deficits which impact ability to perform ADLs (bathing, care of mouth/teeth, toileting, grooming, dressing, etc.)  - Assess/evaluate cause of self-care deficits   - Assess range of motion  - Assess patient's mobility; develop plan if impaired  - Assess patient's need for assistive devices and provide as appropriate  - Encourage maximum independence but intervene and supervise when necessary  - Involve family in performance of ADLs  - Assess for home care needs following discharge   - Consider OT consult to assist with ADL evaluation and planning  for discharge  - Provide patient education as appropriate  - Monitor functional capacity and physical performance, use of AM PAC & -HLM   - Monitor gait, balance and fatigue with ambulation    Outcome: Progressing  Goal: Maintains/Returns to pre admission functional level  Description: INTERVENTIONS:  - Perform AM-PAC 6 Click Basic Mobility/ Daily Activity assessment daily.  - Set and communicate daily mobility goal to care team and patient/family/caregiver.   - Collaborate with rehabilitation services on mobility goals if consulted  - Perform Range of Motion 3 times a day.  - Reposition patient every 2 hours.  - Dangle patient 3 times a day  - Stand patient 3 times a day  - Ambulate patient 3 times a day  - Out of bed to chair 3 times a day   - Out of bed for meals 3 times a day  - Out of bed for toileting  - Record patient progress and toleration of activity level   Outcome: Progressing     Problem: DISCHARGE PLANNING  Goal: Discharge to home or other facility with appropriate resources  Description: INTERVENTIONS:  - Identify barriers to discharge w/patient and caregiver  - Arrange for needed discharge resources and transportation as appropriate  - Identify discharge learning needs (meds, wound care, etc.)  - Arrange for interpretive services to assist at discharge as needed  - Refer to Case Management Department for coordinating discharge planning if the patient needs post-hospital services based on physician/advanced practitioner order or complex needs related to functional status, cognitive ability, or social support system  Outcome: Progressing     Problem: Knowledge Deficit  Goal: Patient/family/caregiver demonstrates understanding of disease process, treatment plan, medications, and discharge instructions  Description: Complete learning assessment and assess knowledge base.  Interventions:  - Provide teaching at level of understanding  - Provide teaching via preferred learning methods  Outcome:  Progressing     Problem: Prexisting or High Potential for Compromised Skin Integrity  Goal: Skin integrity is maintained or improved  Description: INTERVENTIONS:  - Identify patients at risk for skin breakdown  - Assess and monitor skin integrity including under and around medical devices   - Assess and monitor nutrition and hydration status  - Monitor labs  - Assess for incontinence   - Turn and reposition patient  - Assist with mobility/ambulation  - Relieve pressure over sarkis prominences   - Avoid friction and shearing  - Provide appropriate hygiene as needed including keeping skin clean and dry  - Evaluate need for skin moisturizer/barrier cream  - Collaborate with interdisciplinary team  - Patient/family teaching  - Consider wound care consult    Assess:  - Review Devyn scale daily  - Clean and moisturize skin   - Inspect skin when repositioning, toileting, and assisting with ADLS  - Assess under medical devices  - Assess extremities for adequate circulation and sensation     Bed Management:  - Have minimal linens on bed & keep smooth, unwrinkled  - Change linens as needed when moist or perspiring  - Avoid sitting or lying in one position for more than 2 hours while in bed  - Toileting:  - Offer bedside commode  - Assess for incontinence  - Use incontinent care products after each incontinent episode    Activity:  - Mobilize patient 3 times a day  - Encourage activity and walks on unit  - Encourage or provide ROM exercises   - Turn and reposition patient every 2 Hours  - Use appropriate equipment to lift or move patient in bed  - Instruct/ Assist with weight shifting every 2 when out of bed in chair  - Consider limitation of chair time 1 hour intervals    Skin Care:  - Avoid use of baby powder, tape, friction and shearing, hot water or constrictive clothing  - Relieve pressure over bony prominences   - Do not massage red bony areas    Next Steps:  - Teach patient strategies to minimize risks  - Consider  consults to  interdisciplinary teams   Outcome: Progressing     Problem: Nutrition/Hydration-ADULT  Goal: Nutrient/Hydration intake appropriate for improving, restoring or maintaining nutritional needs  Description: Monitor and assess patient's nutrition/hydration status for malnutrition. Collaborate with interdisciplinary team and initiate plan and interventions as ordered.  Monitor patient's weight and dietary intake as ordered or per policy. Utilize nutrition screening tool and intervene as necessary. Determine patient's food preferences and provide high-protein, high-caloric foods as appropriate.     INTERVENTIONS:  - Monitor oral intake, urinary output, labs, and treatment plans  - Assess nutrition and hydration status and recommend course of action  - Evaluate amount of meals eaten  - Assist patient with eating if necessary   - Allow adequate time for meals  - Recommend/ encourage appropriate diets, oral nutritional supplements, and vitamin/mineral supplements  - Order, calculate, and assess calorie counts as needed  - Recommend, monitor, and adjust tube feedings and TPN/PPN based on assessed needs  - Assess need for intravenous fluids  - Provide specific nutrition/hydration education as appropriate  - Include patient/family/caregiver in decisions related to nutrition  Outcome: Progressing     Problem: Decreased Cardiac Output  Goal: Cardiac output adequate for individual needs  Description: INTERVENTIONS: Monitor for signs and symptoms of decreased cardiac output   - Monitor for dyspnea with exertion and at rest  - Monitor for orthopnea  - Monitor for signs of tachycardia. Place patient on telemetry monitoring.  - Assess patient for jugular vein distention  - Assess patient for lower extremity edema and poor peripheral perfusion   - Auscultate lung sound for Fine bibasilar crackles   - Monitor for cardiac arrythmias   - Administer beta blockers, antiarrhythmic, and blood pressure medications as  ordered    Outcome: Progressing     Problem: Impaired Gas Exchange  Goal: Optimize oxygenation and ensure adequate ventilation  Description: INTERVENTIONS: Monitor for signs and symptoms of respiratory distress                - Elevate HOB or use high fowlers to promote lung expansion                - Administer oxygen as ordered to maintain adequate oxygenation                - Encourage use of IS to promote lung expansion and prevent PN                - Monitor ABGs to assess oxygenation status                - Monitor blood oxygen level to maintain adequate oxygenation                - Encourage cough and deep breathing exercises to promote lung expansion                - Monitor patient's mental status for increased confusion    Outcome: Progressing     Problem: Excess Fluid Volume  Goal: Patient is able to achieve and maintain homeostasis  Description: INTERVENTIONS: Monitor for sign and symptoms of fluid overload  - Evaluate LE edema every shift  - Elevate LE to prevent dependent edema  - Apply KELSIE stockings as ordered   - Monitor ankle circumference daily  - Assess for jugular vein distention  - Evaluate provider orders for the CHF diuretic algorithm. Administer diuretics as ordered  - Weigh the patient daily at 0600 and report a weight gain of five pounds or more   - Strict intake and output  - Monitor fluid intake and adhere to fluid restrictions  - Assess lung sounds every shift and as needed  - Monitor vital signs and lab values (CBC, chem, BUN, BNP)  - Measure and document urine output    Outcome: Progressing     Problem: Activity Intolerance  Goal: Patient is able to perform activities within their limitations  Description: INTERVENTIONS:                       -   Alternate periods of activity with periods of rest                 -   Patients is able to maintain normal vitals heart rhythm during activity                 -   Gradually increase activity and exercise as patient can tolerate                  -   Monitor blood pressure and heart before and after exercise                  -   Monitor blood oxygen saturation during activity and apply oxygen as needed    Outcome: Progressing     Problem: Knowledge Deficit  Goal: Patient is able to verbalize understanding of Heart Failure after education  Description: INTERVENTIONS:  - Educate the patient and family on signs and symptoms of HF  - Provide the patient with HF education and HF zone tool  - Educate on the importance of daily weight in the AM and reporting a weight gain               of 3 or more pounds to their primary care physician  - Monitor for SOB  - Maintain and sodium and fluid restriction  - Educate the patient on the importance of medications such as: diuretics, betablockers,               antiarrhythmics and their purpose, dose, route, side effects and labs               if they are needed    Outcome: Progressing     Problem: METABOLIC, FLUID AND ELECTROLYTES - ADULT  Goal: Electrolytes maintained within normal limits  Description: INTERVENTIONS:  - Monitor labs and assess patient for signs and symptoms of electrolyte imbalances  - Administer electrolyte replacement as ordered  - Monitor response to electrolyte replacements, including repeat lab results as appropriate  - Instruct patient on fluid and nutrition as appropriate  Outcome: Progressing  Goal: Fluid balance maintained  Description: INTERVENTIONS:  - Monitor labs   - Monitor I/O and WT  - Instruct patient on fluid and nutrition as appropriate  - Assess for signs & symptoms of volume excess or deficit  Outcome: Progressing

## 2025-06-28 NOTE — ASSESSMENT & PLAN NOTE
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 Will cont HD MWF per renal.

## 2025-06-28 NOTE — CASE MANAGEMENT
Case Management Discharge Planning Note    Patient name Ramos Rosenthal  Location /-01 MRN [id number]  : 1950 Date 2025       Current Admission Date: 2025  Current Admission Diagnosis:Volume overload   Patient Active Problem List    Diagnosis Date Noted    Non healing left heel wound 2025    Chronic deep vein thrombosis (DVT) of right lower extremity ([facility]) 2025    RPGN (rapidly progressive glomerulonephritis) 2025    Anemia in ESRD (end-stage renal disease)  ([facility]) 2025    Class 2 severe obesity due to excess calories with serious comorbidity and body mass index (BMI) of 35.0 to 35.9 in adult ([facility]) 2025    Ulcer of left heel with cellulitis ([facility]) 10/31/2024    Immunocompromised patient ([facility]) 10/23/2024    Volume overload 10/21/2024    Chronic gout due to renal impairment of multiple sites without tophus 10/21/2024    End-stage renal disease on hemodialysis ([facility]) 2024    Anemia 2024    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 2024    Essential hypertension 2024    Secondary hyperparathyroidism of renal origin ([facility]) 2024    Uremia 2024    Bilateral lower extremity edema 2024    Neurogenic claudication 2023    Paresthesia of both lower extremities 10/01/2022    Shortness of breath 2022    Recurrent major depressive disorder, in remission ([facility]) 2022    Lumbosacral plexopathy 2019    Gait abnormality 2019    Lumbar stenosis 2019    Polyneuropathy 2019    Prediabetes 2018    Hyperlipidemia 2014    Vitamin D deficiency 2014      LOS (days): 3  Geometric Mean LOS (GMLOS) (days): 3.6  Days to GMLOS:0.5     OBJECTIVE:  Risk of Unplanned Readmission Score: 33.83         Current admission status: Inpatient   Preferred Pharmacy:   [id number] - [place] - [address]  [address] [address]  Phone:  [phone] Fax: [phone]    Primary Care Provider: Wade Arellano DO    Primary Insurance: MEDICARE  Secondary Insurance: [employer]    DISCHARGE DETAILS:    Discharge planning discussed with:: patient  Freedom of Choice: Yes  Comments - Freedom of Choice: pt is active with [facility]- pt do wish to continue with [facility]                     Requested Home Health Care         Is the patient interested in HHC at discharge?: Yes  Home Health Discipline requested:: Nursing  [facility] Health Agency [facility]:: [facility]  Home Health Follow-Up Provider:: PCP  Home Health Services Needed:: Wound/Ostomy Care, Other (comment) (resumption of care)  Homebound Criteria Met:: Uses an Assist Device (i.e. cane, walker, etc), Requires the Assistance of Another Person for Safe Ambulation or to Leave the Home  Supporting Clincal Findings:: Bed Bound or Wheelchair Bound, Limited Endurance         Other Referral/Resources/Interventions Provided:  Interventions: HHC, Dialysis  Referral Comments: pt will continue with [facility] and pt will coninue with his outpt Dialysis at [facility]    Would you like to participate in our [facility] service program?  : No - Declined    Treatment Team Recommendation: Home with Home Health Care (home with family &[facility] & outpt dialysis & outpt follow up- family)  Expected Discharge Disposition: Home Health Services                                   IMM Given (Date):: 06/28/25  IMM Given to:: Patient

## 2025-06-28 NOTE — ASSESSMENT & PLAN NOTE
US LE:  Result in progress   This result has not been signed. Information might be incomplete.       Result Text   [facility] REPORT  .  JOSELYN GOMEZ is a 74 year old M who was referred by KERRY GIBBONS.        Indications:   Patient presents with chronic right lower extremity edema worsening x a few days with chest pain.   Operative History:   No cardiovascular surgeries noted   Risk Factors:   The patient reports hypertension, hyperlipidemia, diabetes and renal disease.           FINDINGS:     Main                                   Venous Thrombus  Right Deep Veins                                     Right Proximal Femoral                Chronic     Right Mid Femoral Vein                Chronic     Right Distal Fem Vein                 Chronic     Right Popliteal Vein                  Chronic     Gastrocnemius Vein        Chronic - Occlusive     CONCLUSION:     RIGHT LOWER LIMB   There is evidence of non-occlusive chronic deep vein thrombosis noted in one of the paired femoral veins and popliteal vein. There is occlusive chronic vs sub-acute deep vein thrombosis noted in the gastrocnemius veins.   There is no evidence of superficial thrombophlebitis.   Doppler evaluation shows a normal response to augmentation maneuvers.   Popliteal, posterior tibial and anterior tibial arterial Doppler waveforms are triphasic/biphasic.       LEFT LOWER LIMB LIMITED   There is no evidence of thrombus in the common femoral vein.    Doppler evaluation shows a normal response to augmentation maneuvers.           Appreciate hematology oncology recommendations  Heparin bridge to Coumadin planned, family agreeable, discussed at length.    Start coumadin 6/26 5 mg, given 10 mg 6/27, will repeat 10 mg dose tonight 6/28  Daily INR monitoring

## 2025-06-29 LAB
ANION GAP SERPL CALCULATED.3IONS-SCNC: 7 MMOL/L (ref 4–13)
APTT PPP: 75 SECONDS (ref 23–34)
BUN SERPL-MCNC: 43 MG/DL (ref 5–25)
CALCIUM SERPL-MCNC: 8.9 MG/DL (ref 8.4–10.2)
CHLORIDE SERPL-SCNC: 90 MMOL/L (ref 96–108)
CO2 SERPL-SCNC: 30 MMOL/L (ref 21–32)
CREAT SERPL-MCNC: 5.16 MG/DL (ref 0.6–1.3)
ERYTHROCYTE [DISTWIDTH] IN BLOOD BY AUTOMATED COUNT: 16 % (ref 11.6–15.1)
GFR SERPL CREATININE-BSD FRML MDRD: 10 ML/MIN/1.73SQ M
GLUCOSE SERPL-MCNC: 89 MG/DL (ref 65–140)
HCT VFR BLD AUTO: 33.2 % (ref 36.5–49.3)
HGB BLD-MCNC: 10.3 G/DL (ref 12–17)
INR PPP: 1.43 (ref 0.85–1.19)
MCH RBC QN AUTO: 29.3 PG (ref 26.8–34.3)
MCHC RBC AUTO-ENTMCNC: 31 G/DL (ref 31.4–37.4)
MCV RBC AUTO: 95 FL (ref 82–98)
PLATELET # BLD AUTO: 264 THOUSANDS/UL (ref 149–390)
PMV BLD AUTO: 9 FL (ref 8.9–12.7)
POTASSIUM SERPL-SCNC: 4.6 MMOL/L (ref 3.5–5.3)
PROTHROMBIN TIME: 18 SECONDS (ref 12.3–15)
RBC # BLD AUTO: 3.51 MILLION/UL (ref 3.88–5.62)
SODIUM SERPL-SCNC: 127 MMOL/L (ref 135–147)
WBC # BLD AUTO: 9.82 THOUSAND/UL (ref 4.31–10.16)

## 2025-06-29 PROCEDURE — 85730 THROMBOPLASTIN TIME PARTIAL: CPT | Performed by: INTERNAL MEDICINE

## 2025-06-29 PROCEDURE — 99232 SBSQ HOSP IP/OBS MODERATE 35: CPT | Performed by: INTERNAL MEDICINE

## 2025-06-29 PROCEDURE — 5A1D70Z PERFORMANCE OF URINARY FILTRATION, INTERMITTENT, LESS THAN 6 HOURS PER DAY: ICD-10-PCS | Performed by: INTERNAL MEDICINE

## 2025-06-29 PROCEDURE — 80048 BASIC METABOLIC PNL TOTAL CA: CPT | Performed by: INTERNAL MEDICINE

## 2025-06-29 PROCEDURE — 85610 PROTHROMBIN TIME: CPT | Performed by: INTERNAL MEDICINE

## 2025-06-29 PROCEDURE — 85027 COMPLETE CBC AUTOMATED: CPT | Performed by: INTERNAL MEDICINE

## 2025-06-29 RX ORDER — WARFARIN SODIUM 7.5 MG/1
7.5 TABLET ORAL
Status: DISCONTINUED | OUTPATIENT
Start: 2025-06-29 | End: 2025-06-30

## 2025-06-29 RX ADMIN — NYSTATIN: 100000 POWDER TOPICAL at 17:00

## 2025-06-29 RX ADMIN — MIDODRINE HYDROCHLORIDE 2.5 MG: 5 TABLET ORAL at 11:20

## 2025-06-29 RX ADMIN — ALLOPURINOL 100 MG: 100 TABLET ORAL at 17:07

## 2025-06-29 RX ADMIN — Medication 2000 UNITS: at 08:00

## 2025-06-29 RX ADMIN — ALLOPURINOL 100 MG: 100 TABLET ORAL at 08:00

## 2025-06-29 RX ADMIN — PANTOPRAZOLE SODIUM 40 MG: 40 TABLET, DELAYED RELEASE ORAL at 08:00

## 2025-06-29 RX ADMIN — COLLAGENASE SANTYL: 250 OINTMENT TOPICAL at 08:01

## 2025-06-29 RX ADMIN — WARFARIN SODIUM 7.5 MG: 7.5 TABLET ORAL at 17:07

## 2025-06-29 RX ADMIN — MIDODRINE HYDROCHLORIDE 2.5 MG: 5 TABLET ORAL at 07:58

## 2025-06-29 RX ADMIN — MIDODRINE HYDROCHLORIDE 2.5 MG: 5 TABLET ORAL at 17:00

## 2025-06-29 RX ADMIN — NYSTATIN: 100000 POWDER TOPICAL at 08:01

## 2025-06-29 RX ADMIN — LORATADINE 10 MG: 10 TABLET ORAL at 08:00

## 2025-06-29 RX ADMIN — FLUOXETINE HYDROCHLORIDE 20 MG: 20 CAPSULE ORAL at 08:00

## 2025-06-29 RX ADMIN — Medication 1 CAPSULE: at 17:07

## 2025-06-29 RX ADMIN — HEPARIN SODIUM 9 UNITS/KG/HR: 10000 INJECTION, SOLUTION INTRAVENOUS at 19:57

## 2025-06-29 RX ADMIN — Medication 400 MG: at 08:00

## 2025-06-29 NOTE — ASSESSMENT & PLAN NOTE
Lab Results   Component Value Date    EGFR 10 06/29/2025    EGFR 13 06/28/2025    EGFR 9 06/27/2025    CREATININE 5.16 (H) 06/29/2025    CREATININE 3.92 (H) 06/28/2025    CREATININE 5.39 (H) 06/27/2025   Pt is HD dependent 2/2 glomerulonephritis. Will cont HD MWF per renal.

## 2025-06-29 NOTE — PROGRESS NOTES
Progress Note - Nephrology   Name: Ramos Rosenthal 74 y.o. male I MRN: [id number]  Unit/Bed#: -01 I Date of Admission: 6/24/2025   Date of Service: 6/29/2025 I Hospital Day: 4     Assessment & Plan  End-stage renal disease on hemodialysis ([facility])  Lab Results   Component Value Date    EGFR 10 06/29/2025    EGFR 13 06/28/2025    EGFR 9 06/27/2025    CREATININE 5.16 (H) 06/29/2025    CREATININE 3.92 (H) 06/28/2025    CREATININE 5.39 (H) 06/27/2025   ESRD on HD at [facility] under the care of Dr. Moore  Most recent dialysis yesterday, 6/23 for 196 minutes 1 L UF with postweight of 123.7 kg.  OP orders: .5 kg, 210-minute tx time, Access right PermCath  /, 3K/2.5 Ca/35 HCO3/135 Na+    -------------------------  Continue with regular hemodialysis sessions Monday Wednesday and Friday, next treatment will be tomorrow, Monday, June 30.    Patient's diet was transition to a liberal renal diet with a 2 L fluid restriction.  Patient has become hyponatremic as he has been drinking a lot of fluids throughout the day.  Secondary hyperparathyroidism of renal origin ([facility])  Continue with calcitriol 0.25 mcg 3 times weekly, follow PTH in the outpatient setting.    Essential hypertension  Continue with midodrine 2.5 mg 3 times daily, midodrine with dialysis treatments, patient off of all antihypertensives at this time.  Volume overload  Continue to ultrafilter as tolerated with hemodialysis sessions.  Class 2 severe obesity due to excess calories with serious comorbidity and body mass index (BMI) of 35.0 to 35.9 in adult (HCC)    Anemia in ESRD (end-stage renal disease)  ([facility])  Lab Results   Component Value Date    EGFR 10 06/29/2025    EGFR 13 06/28/2025    EGFR 9 06/27/2025    CREATININE 5.16 (H) 06/29/2025    CREATININE 3.92 (H) 06/28/2025    CREATININE 5.39 (H) 06/27/2025   Avoid Epogen at this time as the patient is on a long-acting SHREE.  Chronic deep vein thrombosis (DVT) of right lower  "extremity (HCC)  Continue with anticoagulation recommendations according hospitalist.  Non healing left heel wound          Case discussed with hospitalist.  Continue with Monday Wednesday Friday hemodialysis sessions, patient transition to a renal diet.      Volume overload    Problem List[1]      Subjective:   Patient has no acute complaints at this time.    Objective:     Vitals: Blood pressure 132/84, pulse 60, temperature 97.8 °F (36.6 °C), resp. rate 20, height 6' 1\" (1.854 m), weight 129 kg (285 lb 7.9 oz), SpO2 93%.,Body mass index is 37.67 kg/m².    Weight (last 2 days)       None              Intake/Output Summary (Last 24 hours) at 6/29/2025 1758  Last data filed at 6/29/2025 0802  Gross per 24 hour   Intake 360 ml   Output --   Net 360 ml     I/O last 3 completed shifts:  In: 680 [P.O.:680]  Out: -     HD Permanent Double Catheter (Active)   Reasons to continue HD Cath Treatment Therapy 06/26/25 0900   Goal for Removal N/A- chronic HD catheter 06/27/25 0850   Line Necessity Reviewed Yes, reviewed with provider 06/25/25 1237   Site Assessment WDL;Clean;Dry;Intact 06/29/25 1632   Proximal Lumen Status Flushed & Clamped 06/29/25 1632   Distal Lumen Status Flushed & Clamped 06/29/25 1632   Dressing Type Chlorhexidine dressing 06/29/25 1632   Dressing Status Clean;Dry;Intact 06/29/25 1632   Dressing Intervention Dressing changed 06/27/25 1220   Dressing Change Due 07/04/25 06/27/25 1220       Physical Exam: /84   Pulse 60   Temp 97.8 °F (36.6 °C)   Resp 20   Ht 6' 1\" (1.854 m)   Wt 129 kg (285 lb 7.9 oz)   SpO2 93%   BMI 37.67 kg/m²     General Appearance:    Alert, cooperative, no distress, appears stated age   Head:    Normocephalic, without obvious abnormality, atraumatic   Eyes:    Conjunctiva/corneas clear   Ears:    Normal external ears   Nose:   Nares normal, septum midline, mucosa normal, no drainage    or sinus tenderness   Throat:   Lips, mucosa, and tongue normal; teeth and gums normal " "  Neck:   Supple   Back:     Symmetric, no curvature, ROM normal, no CVA tenderness   Lungs:     Clear to auscultation bilaterally, respirations unlabored   Chest wall:    No tenderness or deformity   Heart:    Regular rate and rhythm, S1 and S2 normal, no murmur, rub   or gallop   Abdomen:     Soft, non-tender, bowel sounds active   Extremities:   Extremities normal, atraumatic, no cyanosis, mild bilateral lower extremity edema   Skin:   Skin color, texture, turgor normal, no rashes or lesions   Lymph nodes:   Cervical normal   Neurologic:   CNII-XII intact            Lab, Imaging and other studies: I have personally reviewed pertinent labs.  CBC:   Lab Results   Component Value Date    WBC 9.82 06/29/2025    HGB 10.3 (L) 06/29/2025    HCT 33.2 (L) 06/29/2025    MCV 95 06/29/2025     06/29/2025    RBC 3.51 (L) 06/29/2025    MCH 29.3 06/29/2025    MCHC 31.0 (L) 06/29/2025    RDW 16.0 (H) 06/29/2025    MPV 9.0 06/29/2025     CMP:   Lab Results   Component Value Date    K 4.6 06/29/2025    CL 90 (L) 06/29/2025    CO2 30 06/29/2025    BUN 43 (H) 06/29/2025    CREATININE 5.16 (H) 06/29/2025    CALCIUM 8.9 06/29/2025    EGFR 10 06/29/2025       .  Results from last 7 days   Lab Units 06/29/25  0434 06/28/25  0234 06/27/25  0420 06/25/25  0452 06/24/25  1157   POTASSIUM mmol/L 4.6 4.4 4.4 4.4 3.9   CHLORIDE mmol/L 90* 92* 94* 94* 95*   CO2 mmol/L 30 28 30 26 30   BUN mg/dL 43* 27* 34* 21 16   CREATININE mg/dL 5.16* 3.92* 5.39* 5.66* 4.92*   CALCIUM mg/dL 8.9 8.8 8.9 8.8 8.8   ALK PHOS U/L  --   --  68 71 70   ALT U/L  --   --  3* <3* 3*   AST U/L  --   --  11* 19 11*         Phosphorus: No results found for: \"PHOS\"  Magnesium: No results found for: \"MG\"  Urinalysis: No results found for: \"COLORU\", \"CLARITYU\", \"SPECGRAV\", \"PHUR\", \"LEUKOCYTESUR\", \"NITRITE\", \"PROTEINUA\", \"GLUCOSEU\", \"KETONESU\", \"BILIRUBINUR\", \"BLOODU\"  Ionized Calcium: No results found for: \"CAION\"  Coagulation:   Lab Results   Component Value Date    " "INR 1.43 (H) 06/29/2025     Troponin: No results found for: \"TROPONINI\"  ABG: No results found for: \"PHART\", \"UZM2RFD\", \"PO2ART\", \"ESW9GTQ\", \"H8LLTZIP\", \"BEART\", \"SOURCE\"  Radiology review:     IMAGING  No results found.      Current Facility-Administered Medications:     acetaminophen (TYLENOL) tablet 650 mg, Q6H PRN    allopurinol (ZYLOPRIM) tablet 100 mg, BID    Artificial Tears Op Soln 1 drop, Q6H PRN    calcitriol (ROCALTROL) capsule 0.25 mcg, Once per day on Monday Wednesday Friday    [Held by provider] calcium carbonate (OYSTER SHELL,OSCAL) 500 mg tablet 2 tablet, BID With Meals    calcium carbonate (TUMS) chewable tablet 500 mg, Daily PRN    Cholecalciferol (VITAMIN D3) tablet 2,000 Units, Daily    collagenase (SANTYL) ointment, Daily    collagenase (SANTYL) ointment, Daily    dextromethorphan-guaiFENesin (ROBITUSSIN DM) oral syrup 10 mL, Q4H PRN    FLUoxetine (PROzac) capsule 20 mg, Daily    heparin (porcine) 25,000 units in 0.45% NaCl 250 mL infusion (premix), Titrated, Last Rate: 9 Units/kg/hr (06/28/25 2136)    heparin (porcine) injection 10,000 Units, Q6H PRN    heparin (porcine) injection 5,000 Units, Q6H PRN    hydrALAZINE (APRESOLINE) injection 5 mg, Q6H PRN    loratadine (CLARITIN) tablet 10 mg, Daily    magnesium Oxide (MAG-OX) tablet 400 mg, Daily    melatonin tablet 3 mg, HS PRN    midodrine (PROAMATINE) tablet 2.5 mg, TID AC    midodrine (PROAMATINE) tablet 5 mg, Once per day on Monday Wednesday Friday    nystatin (MYCOSTATIN) powder, TID    ondansetron (ZOFRAN) injection 4 mg, Q6H PRN    pantoprazole (PROTONIX) EC tablet 40 mg, Daily    polyethylene glycol (MIRALAX) packet 17 g, Daily PRN    senna (SENOKOT) tablet 8.6 mg, HS PRN    sodium chloride (OCEAN) 0.65 % nasal spray 1 spray, Q1H PRN    vit B complex-vit C-folic acid (Renal Caps) capsule 1 capsule, Daily With Dinner    warfarin (COUMADIN) tablet 7.5 mg, Daily (warfarin)  Medications Discontinued During This Encounter   Medication Reason "    furosemide (LASIX) 160 mg in dextrose 5 % 50 mL IVPB     levalbuterol (XOPENEX) inhalation solution 1.25 mg     heparin (porcine) subcutaneous injection 5,000 Units     heparin (VTE/PE) high     senna (SENOKOT) tablet 8.6 mg     warfarin (COUMADIN) tablet 5 mg     warfarin (COUMADIN) tablet 10 mg        Gigi Ortega, DO      This progress note was produced in part using a dictation device which may document imprecise wording from author's original intent.           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    Anemia    End-stage renal disease on hemodialysis ([facility])    Volume overload    Chronic gout due to renal impairment of multiple sites without tophus    Immunocompromised patient ([facility])    Ulcer of left heel with cellulitis ([facility])    Class 2 severe obesity due to excess calories with serious comorbidity and body mass index (BMI) of 35.0 to 35.9 in adult ([facility])    RPGN (rapidly progressive glomerulonephritis)    Anemia in ESRD (end-stage renal disease)  ([facility])    Chronic deep vein thrombosis (DVT) of right lower extremity ([facility])    Non healing left heel wound

## 2025-06-29 NOTE — PLAN OF CARE
Problem: Potential for Falls  Goal: Patient will remain free of falls  Description: INTERVENTIONS:  - Educate patient/family on patient safety including physical limitations  - Instruct patient to call for assistance with activity   - Consider consulting OT/PT to assist with strengthening/mobility based on AM PAC & JH-HLM score  - Consult OT/PT to assist with strengthening/mobility   - Keep Call bell within reach  - Keep bed low and locked with side rails adjusted as appropriate  - Keep care items and personal belongings within reach  - Initiate and maintain comfort rounds  - Make Fall Risk Sign visible to staff  - Offer Toileting every 2 Hours, in advance of need  - Initiate/Maintain bed alarm  - Apply yellow socks and bracelet for high fall risk patients  - Consider moving patient to room near nurses station  Outcome: Progressing     Problem: PAIN - ADULT  Goal: Verbalizes/displays adequate comfort level or baseline comfort level  Description: Interventions:  - Encourage patient to monitor pain and request assistance  - Assess pain using appropriate pain scale  - Administer analgesics as ordered based on type and severity of pain and evaluate response  - Implement non-pharmacological measures as appropriate and evaluate response  - Consider cultural and social influences on pain and pain management  - Notify physician/advanced practitioner if interventions unsuccessful or patient reports new pain  - Educate patient/family on pain management process including their role and importance of  reporting pain   - Provide non-pharmacologic/complimentary pain relief interventions  Outcome: Progressing     Problem: INFECTION - ADULT  Goal: Absence or prevention of progression during hospitalization  Description: INTERVENTIONS:  - Assess and monitor for signs and symptoms of infection  - Monitor lab/diagnostic results  - Monitor all insertion sites, i.e. indwelling lines, tubes, and drains  - Monitor endotracheal if  appropriate and nasal secretions for changes in amount and color  - [place] appropriate cooling/warming therapies per order  - Administer medications as ordered  - Instruct and encourage patient and family to use good hand hygiene technique  - Identify and instruct in appropriate isolation precautions for identified infection/condition  Outcome: Progressing

## 2025-06-29 NOTE — PLAN OF CARE
Problem: PAIN - ADULT  Goal: Verbalizes/displays adequate comfort level or baseline comfort level  Description: Interventions:  - Encourage patient to monitor pain and request assistance  - Assess pain using appropriate pain scale  - Administer analgesics as ordered based on type and severity of pain and evaluate response  - Implement non-pharmacological measures as appropriate and evaluate response  - Consider cultural and social influences on pain and pain management  - Notify physician/advanced practitioner if interventions unsuccessful or patient reports new pain  - Educate patient/family on pain management process including their role and importance of  reporting pain   - Provide non-pharmacologic/complimentary pain relief interventions  Outcome: Progressing     Problem: SAFETY ADULT  Goal: Maintain or return to baseline ADL function  Description: INTERVENTIONS:  -  Assess patient's ability to carry out ADLs; assess patient's baseline for ADL function and identify physical deficits which impact ability to perform ADLs (bathing, care of mouth/teeth, toileting, grooming, dressing, etc.)  - Assess/evaluate cause of self-care deficits   - Assess range of motion  - Assess patient's mobility; develop plan if impaired  - Assess patient's need for assistive devices and provide as appropriate  - Encourage maximum independence but intervene and supervise when necessary  - Involve family in performance of ADLs  - Assess for home care needs following discharge   - Consider OT consult to assist with ADL evaluation and planning for discharge  - Provide patient education as appropriate  - Monitor functional capacity and physical performance, use of AM PAC & JH-HLM   - Monitor gait, balance and fatigue with ambulation    Outcome: Progressing     Problem: Nutrition/Hydration-ADULT  Goal: Nutrient/Hydration intake appropriate for improving, restoring or maintaining nutritional needs  Description: Monitor and assess patient's  nutrition/hydration status for malnutrition. Collaborate with interdisciplinary team and initiate plan and interventions as ordered.  Monitor patient's weight and dietary intake as ordered or per policy. Utilize nutrition screening tool and intervene as necessary. Determine patient's food preferences and provide high-protein, high-caloric foods as appropriate.     INTERVENTIONS:  - Monitor oral intake, urinary output, labs, and treatment plans  - Assess nutrition and hydration status and recommend course of action  - Evaluate amount of meals eaten  - Assist patient with eating if necessary   - Allow adequate time for meals  - Recommend/ encourage appropriate diets, oral nutritional supplements, and vitamin/mineral supplements  - Order, calculate, and assess calorie counts as needed  - Recommend, monitor, and adjust tube feedings and TPN/PPN based on assessed needs  - Assess need for intravenous fluids  - Provide specific nutrition/hydration education as appropriate  - Include patient/family/caregiver in decisions related to nutrition  Outcome: Progressing     Problem: Knowledge Deficit  Goal: Patient/family/caregiver demonstrates understanding of disease process, treatment plan, medications, and discharge instructions  Description: Complete learning assessment and assess knowledge base.  Interventions:  - Provide teaching at level of understanding  - Provide teaching via preferred learning methods  Outcome: Progressing     Problem: DISCHARGE PLANNING  Goal: Discharge to home or other facility with appropriate resources  Description: INTERVENTIONS:  - Identify barriers to discharge w/patient and caregiver  - Arrange for needed discharge resources and transportation as appropriate  - Identify discharge learning needs (meds, wound care, etc.)  - Arrange for interpretive services to assist at discharge as needed  - Refer to Case Management Department for coordinating discharge planning if the patient needs post-hospital  services based on physician/advanced practitioner order or complex needs related to functional status, cognitive ability, or social support system  Outcome: Progressing

## 2025-06-29 NOTE — PLAN OF CARE
Problem: Potential for Falls  Goal: Patient will remain free of falls  Description: INTERVENTIONS:  - Educate patient/family on patient safety including physical limitations  - Instruct patient to call for assistance with activity   - Consider consulting OT/PT to assist with strengthening/mobility based on AM PAC & JH-HLM score  - Consult OT/PT to assist with strengthening/mobility   - Keep Call bell within reach  - Keep bed low and locked with side rails adjusted as appropriate  - Keep care items and personal belongings within reach  - Initiate and maintain comfort rounds  - Make Fall Risk Sign visible to staff  - Offer Toileting every 2 Hours, in advance of need  - Initiate/Maintain bed alarm  - Apply yellow socks and bracelet for high fall risk patients  - Consider moving patient to room near nurses station  Outcome: Progressing     Problem: PAIN - ADULT  Goal: Verbalizes/displays adequate comfort level or baseline comfort level  Description: Interventions:  - Encourage patient to monitor pain and request assistance  - Assess pain using appropriate pain scale  - Administer analgesics as ordered based on type and severity of pain and evaluate response  - Implement non-pharmacological measures as appropriate and evaluate response  - Consider cultural and social influences on pain and pain management  - Notify physician/advanced practitioner if interventions unsuccessful or patient reports new pain  - Educate patient/family on pain management process including their role and importance of  reporting pain   - Provide non-pharmacologic/complimentary pain relief interventions  Outcome: Progressing     Problem: INFECTION - ADULT  Goal: Absence or prevention of progression during hospitalization  Description: INTERVENTIONS:  - Assess and monitor for signs and symptoms of infection  - Monitor lab/diagnostic results  - Monitor all insertion sites, i.e. indwelling lines, tubes, and drains  - Monitor endotracheal if  appropriate and nasal secretions for changes in amount and color  - [place] appropriate cooling/warming therapies per order  - Administer medications as ordered  - Instruct and encourage patient and family to use good hand hygiene technique  - Identify and instruct in appropriate isolation precautions for identified infection/condition  Outcome: Progressing     Problem: SAFETY ADULT  Goal: Patient will remain free of falls  Description: INTERVENTIONS:  - Educate patient/family on patient safety including physical limitations  - Instruct patient to call for assistance with activity   - Consider consulting OT/PT to assist with strengthening/mobility based on AM PAC & -HL score  - Consult OT/PT to assist with strengthening/mobility   - Keep Call bell within reach  - Keep bed low and locked with side rails adjusted as appropriate  - Keep care items and personal belongings within reach  - Initiate and maintain comfort rounds  - Make Fall Risk Sign visible to staff  - Offer Toileting every 2 Hours, in advance of need  - Initiate/Maintain bed alarm  - Apply yellow socks and bracelet for high fall risk patients  - Consider moving patient to room near nurses station  Outcome: Progressing  Goal: Maintain or return to baseline ADL function  Description: INTERVENTIONS:  -  Assess patient's ability to carry out ADLs; assess patient's baseline for ADL function and identify physical deficits which impact ability to perform ADLs (bathing, care of mouth/teeth, toileting, grooming, dressing, etc.)  - Assess/evaluate cause of self-care deficits   - Assess range of motion  - Assess patient's mobility; develop plan if impaired  - Assess patient's need for assistive devices and provide as appropriate  - Encourage maximum independence but intervene and supervise when necessary  - Involve family in performance of ADLs  - Assess for home care needs following discharge   - Consider OT consult to assist with ADL evaluation and planning  for discharge  - Provide patient education as appropriate  - Monitor functional capacity and physical performance, use of AM PAC & -HLM   - Monitor gait, balance and fatigue with ambulation    Outcome: Progressing  Goal: Maintains/Returns to pre admission functional level  Description: INTERVENTIONS:  - Perform AM-PAC 6 Click Basic Mobility/ Daily Activity assessment daily.  - Set and communicate daily mobility goal to care team and patient/family/caregiver.   - Collaborate with rehabilitation services on mobility goals if consulted  - Perform Range of Motion 3 times a day.  - Reposition patient every 2 hours.  - Dangle patient 3 times a day  - Stand patient 3 times a day  - Ambulate patient 3 times a day  - Out of bed to chair 3 times a day   - Out of bed for meals 3 times a day  - Out of bed for toileting  - Record patient progress and toleration of activity level   Outcome: Progressing

## 2025-06-29 NOTE — ASSESSMENT & PLAN NOTE
Lab Results   Component Value Date    EGFR 10 06/29/2025    EGFR 13 06/28/2025    EGFR 9 06/27/2025    CREATININE 5.16 (H) 06/29/2025    CREATININE 3.92 (H) 06/28/2025    CREATININE 5.39 (H) 06/27/2025   Avoid Epogen at this time as the patient is on a long-acting SHREE.

## 2025-06-29 NOTE — PROGRESS NOTES
Progress Note - Hospitalist   Name: Ramos Rosenthal 74 y.o. male I MRN: [id number]  Unit/Bed#: -01 I Date of Admission: 6/24/2025   Date of Service: 6/29/2025 I Hospital Day: 4    Assessment & Plan  Volume overload  Chronic deep vein thrombosis (DVT) of right lower extremity (HCC)  Pt presents with shortness of breath x 1 day, increased LLE swelling in setting of prematurely discontinued HD session concerning for volume overload    Chest XR -pulm edema, small bilateral pleural effusions on admission  Pt will be managed by hemodialysis as per nephrology  Resolved      Appreciate hematology oncology recommendations  Heparin bridge to Coumadin planned, family agreeable, discussed at length.    Start coumadin 6/26 5 mg, given 10 mg 6/27, 10 mg dose tonight 6/28, decrease Coumadin to 7.5 mg p.o. tonight  INR trending up should be therapeutic tomorrow, patient can be discharged on 5 mg/day maintenance dose.  Will need close outpatient monitoring with PCP, home care nursing to perform home blood draws    Secondary hyperparathyroidism of renal origin ([facility])  Continue medical management  End-stage renal disease on hemodialysis ([facility])  Lab Results   Component Value Date    EGFR 10 06/29/2025    EGFR 13 06/28/2025    EGFR 9 06/27/2025    CREATININE 5.16 (H) 06/29/2025    CREATININE 3.92 (H) 06/28/2025    CREATININE 5.39 (H) 06/27/2025   Pt is HD dependent 2/2 glomerulonephritis. Will cont HD MWF per renal.  Class 2 severe obesity due to excess calories with serious comorbidity and body mass index (BMI) of 35.0 to 35.9 in adult ([facility])  Contributes to all aspects of care  Weight loss encouraged    Anemia in ESRD (end-stage renal disease)  ([facility])  Lab Results   Component Value Date    EGFR 10 06/29/2025    EGFR 13 06/28/2025    EGFR 9 06/27/2025    CREATININE 5.16 (H) 06/29/2025    CREATININE 3.92 (H) 06/28/2025    CREATININE 5.39 (H) 06/27/2025   Hemoglobin stable  Non healing left heel wound  Continue outpatient wound  care    VTE Pharmacologic Prophylaxis: VTE Score: 6 High Risk (Score >/= 5) - Pharmacological DVT Prophylaxis Ordered: heparin drip. Sequential Compression Devices Ordered.    Mobility:   Basic Mobility Inpatient Raw Score: 7  -[facility] Goal: 2: Bed activities/Dependent transfer  JH-HLM Achieved: 3: Sit at edge of bed  JH-HLM Goal achieved. Continue to encourage appropriate mobility.    Patient Centered Rounds: I performed bedside rounds with nursing staff today.   Discussions with Specialists or Other Care Team Provider: None    Education and Discussions with Family / Patient: Family updated in detail with plan of care on 6/27/2025, no new updates today, family aware that patient needs therapeutic INR can likely be discharged home Monday or Tuesday.     Current Length of Stay: 4 day(s)  Current Patient Status: Inpatient   Certification Statement: The patient will continue to require additional inpatient hospital stay due to requiring IV heparin drip  Discharge Plan: Anticipate discharge tomorrow to home.  When INR therapeutic, arrange home health care    Code Status: Level 1 - Full Code    Subjective   No pain, patient is in good spirits.  No abnormal bleeding no shortness of breath.    Objective :  Temp:  [97.4 °F (36.3 °C)-97.8 °F (36.6 °C)] 97.4 °F (36.3 °C)  HR:  [60-91] 76  BP: (119-132)/(56-74) 130/67  Resp:  [19-20] 20  SpO2:  [95 %-99 %] 97 %  O2 Device: None (Room air)    Body mass index is 37.67 kg/m².     Input and Output Summary (last 24 hours):     Intake/Output Summary (Last 24 hours) at 6/29/2025 0905  Last data filed at 6/29/2025 0802  Gross per 24 hour   Intake 820 ml   Output --   Net 820 ml       Physical Exam  Vitals and nursing note reviewed.   HENT:      Head: Normocephalic and atraumatic.     Cardiovascular:      Rate and Rhythm: Normal rate.      Pulses: Normal pulses.   Pulmonary:      Effort: Pulmonary effort is normal.     Neurological:      Mental Status: He is alert and oriented to person,  place, and time. Mental status is at baseline.      Cranial Nerves: No cranial nerve deficit.      Motor: No weakness.     Psychiatric:         Mood and Affect: Mood normal.         Behavior: Behavior normal.         Thought Content: Thought content normal.         Judgment: Judgment normal.           Lines/Drains:  Lines/Drains/Airways       Active Status       Name Placement date Placement time Site Days    HD Permanent Double Catheter 01/09/25  1317  Internal jugular  170                            Lab Results: I have reviewed the following results:   Results from last 7 days   Lab Units 06/29/25 0434 06/28/25 0234 06/27/25  0420   WBC Thousand/uL 9.82   < > 9.50   HEMOGLOBIN g/dL 10.3*   < > 10.6*   HEMATOCRIT % 33.2*   < > 33.7*   PLATELETS Thousands/uL 264   < > 262   SEGS PCT %  --   --  73   LYMPHO PCT %  --   --  14   MONO PCT %  --   --  8   EOS PCT %  --   --  4    < > = values in this interval not displayed.     Results from last 7 days   Lab Units 06/29/25 0434 06/28/25 0234 06/27/25  0420   SODIUM mmol/L 127*   < > 131*   POTASSIUM mmol/L 4.6   < > 4.4   CHLORIDE mmol/L 90*   < > 94*   CO2 mmol/L 30   < > 30   BUN mg/dL 43*   < > 34*   CREATININE mg/dL 5.16*   < > 5.39*   ANION GAP mmol/L 7   < > 7   CALCIUM mg/dL 8.9   < > 8.9   ALBUMIN g/dL  --   --  3.0*   TOTAL BILIRUBIN mg/dL  --   --  0.41   ALK PHOS U/L  --   --  68   ALT U/L  --   --  3*   AST U/L  --   --  11*   GLUCOSE RANDOM mg/dL 89   < > 85    < > = values in this interval not displayed.     Results from last 7 days   Lab Units 06/29/25 0434   INR  1.43*                   Recent Cultures (last 7 days):         Imaging Results Review: No pertinent imaging studies reviewed.  Other Study Results Review: No additional pertinent studies reviewed.    Last 24 Hours Medication List:     Current Facility-Administered Medications:     acetaminophen (TYLENOL) tablet 650 mg, Q6H PRN    allopurinol (ZYLOPRIM) tablet 100 mg, BID    Artificial Tears  Op Soln 1 drop, Q6H PRN    calcitriol (ROCALTROL) capsule 0.25 mcg, Once per day on Monday Wednesday Friday    [Held by provider] calcium carbonate (OYSTER SHELL,OSCAL) 500 mg tablet 2 tablet, BID With Meals    calcium carbonate (TUMS) chewable tablet 500 mg, Daily PRN    Cholecalciferol (VITAMIN D3) tablet 2,000 Units, Daily    collagenase (SANTYL) ointment, Daily    collagenase (SANTYL) ointment, Daily    dextromethorphan-guaiFENesin (ROBITUSSIN DM) oral syrup 10 mL, Q4H PRN    FLUoxetine (PROzac) capsule 20 mg, Daily    heparin (porcine) 25,000 units in 0.45% NaCl 250 mL infusion (premix), Titrated, Last Rate: 9 Units/kg/hr (06/28/25 2136)    heparin (porcine) injection 10,000 Units, Q6H PRN    heparin (porcine) injection 5,000 Units, Q6H PRN    hydrALAZINE (APRESOLINE) injection 5 mg, Q6H PRN    loratadine (CLARITIN) tablet 10 mg, Daily    magnesium Oxide (MAG-OX) tablet 400 mg, Daily    melatonin tablet 3 mg, HS PRN    midodrine (PROAMATINE) tablet 2.5 mg, TID AC    midodrine (PROAMATINE) tablet 5 mg, Once per day on Monday Wednesday Friday    nystatin (MYCOSTATIN) powder, TID    ondansetron (ZOFRAN) injection 4 mg, Q6H PRN    pantoprazole (PROTONIX) EC tablet 40 mg, Daily    polyethylene glycol (MIRALAX) packet 17 g, Daily PRN    senna (SENOKOT) tablet 8.6 mg, HS PRN    sodium chloride (OCEAN) 0.65 % nasal spray 1 spray, Q1H PRN    vit B complex-vit C-folic acid (Renal Caps) capsule 1 capsule, Daily With Dinner    warfarin (COUMADIN) tablet 7.5 mg, Daily (warfarin)    Administrative Statements   Today, Patient Was Seen By: Gregory Levi DO      **Please Note: This note may have been constructed using a voice recognition system.**

## 2025-06-29 NOTE — ASSESSMENT & PLAN NOTE
Lab Results   Component Value Date    EGFR 10 06/29/2025    EGFR 13 06/28/2025    EGFR 9 06/27/2025    CREATININE 5.16 (H) 06/29/2025    CREATININE 3.92 (H) 06/28/2025    CREATININE 5.39 (H) 06/27/2025   Hemoglobin stable

## 2025-06-29 NOTE — ASSESSMENT & PLAN NOTE
Continue with midodrine 2.5 mg 3 times daily, midodrine with dialysis treatments, patient off of all antihypertensives at this time.

## 2025-06-29 NOTE — ASSESSMENT & PLAN NOTE
Pt presents with shortness of breath x 1 day, increased LLE swelling in setting of prematurely discontinued HD session concerning for volume overload    Chest XR -pulm edema, small bilateral pleural effusions on admission  Pt will be managed by hemodialysis as per nephrology  Resolved      Appreciate hematology oncology recommendations  Heparin bridge to Coumadin planned, family agreeable, discussed at length.    Start coumadin 6/26 5 mg, given 10 mg 6/27, 10 mg dose tonight 6/28, decrease Coumadin to 7.5 mg p.o. tonight  INR trending up should be therapeutic tomorrow, patient can be discharged on 5 mg/day maintenance dose.  Will need close outpatient monitoring with PCP, home care nursing to perform home blood draws

## 2025-06-29 NOTE — ASSESSMENT & PLAN NOTE
Lab Results   Component Value Date    EGFR 10 06/29/2025    EGFR 13 06/28/2025    EGFR 9 06/27/2025    CREATININE 5.16 (H) 06/29/2025    CREATININE 3.92 (H) 06/28/2025    CREATININE 5.39 (H) 06/27/2025   ESRD on HD at [facility] under the care of Dr. Moore  Most recent dialysis yesterday, 6/23 for 196 minutes 1 L UF with postweight of 123.7 kg.  OP orders: .5 kg, 210-minute tx time, Access right PermCath  /, 3K/2.5 Ca/35 HCO3/135 Na+    -------------------------  Continue with regular hemodialysis sessions Monday Wednesday and Friday, next treatment will be tomorrow, Monday, June 30.    Patient's diet was transition to a liberal renal diet with a 2 L fluid restriction.  Patient has become hyponatremic as he has been drinking a lot of fluids throughout the day.

## 2025-06-30 ENCOUNTER — APPOINTMENT (INPATIENT)
Dept: DIALYSIS | Facility: HOSPITAL | Age: 75
DRG: 640 | End: 2025-06-30
Payer: MEDICARE

## 2025-06-30 VITALS
SYSTOLIC BLOOD PRESSURE: 122 MMHG | HEIGHT: 73 IN | DIASTOLIC BLOOD PRESSURE: 73 MMHG | OXYGEN SATURATION: 98 % | TEMPERATURE: 95.6 F | HEART RATE: 103 BPM | RESPIRATION RATE: 19 BRPM | BODY MASS INDEX: 37.84 KG/M2 | WEIGHT: 285.5 LBS

## 2025-06-30 LAB
ANION GAP SERPL CALCULATED.3IONS-SCNC: 7 MMOL/L (ref 4–13)
APTT PPP: 104 SECONDS (ref 23–34)
BUN SERPL-MCNC: 58 MG/DL (ref 5–25)
CALCIUM SERPL-MCNC: 9.4 MG/DL (ref 8.4–10.2)
CHLORIDE SERPL-SCNC: 89 MMOL/L (ref 96–108)
CO2 SERPL-SCNC: 29 MMOL/L (ref 21–32)
CREAT SERPL-MCNC: 6.14 MG/DL (ref 0.6–1.3)
ERYTHROCYTE [DISTWIDTH] IN BLOOD BY AUTOMATED COUNT: 16.1 % (ref 11.6–15.1)
GFR SERPL CREATININE-BSD FRML MDRD: 8 ML/MIN/1.73SQ M
GLUCOSE SERPL-MCNC: 90 MG/DL (ref 65–140)
HCT VFR BLD AUTO: 35.3 % (ref 36.5–49.3)
HGB BLD-MCNC: 11.1 G/DL (ref 12–17)
INR PPP: 2.02 (ref 0.85–1.19)
MCH RBC QN AUTO: 29.4 PG (ref 26.8–34.3)
MCHC RBC AUTO-ENTMCNC: 31.4 G/DL (ref 31.4–37.4)
MCV RBC AUTO: 94 FL (ref 82–98)
PLATELET # BLD AUTO: 286 THOUSANDS/UL (ref 149–390)
PMV BLD AUTO: 9.4 FL (ref 8.9–12.7)
POTASSIUM SERPL-SCNC: 4.8 MMOL/L (ref 3.5–5.3)
PROTHROMBIN TIME: 23.3 SECONDS (ref 12.3–15)
RBC # BLD AUTO: 3.77 MILLION/UL (ref 3.88–5.62)
SODIUM SERPL-SCNC: 125 MMOL/L (ref 135–147)
WBC # BLD AUTO: 9.51 THOUSAND/UL (ref 4.31–10.16)

## 2025-06-30 PROCEDURE — 80048 BASIC METABOLIC PNL TOTAL CA: CPT | Performed by: INTERNAL MEDICINE

## 2025-06-30 PROCEDURE — 85730 THROMBOPLASTIN TIME PARTIAL: CPT | Performed by: INTERNAL MEDICINE

## 2025-06-30 PROCEDURE — 85027 COMPLETE CBC AUTOMATED: CPT | Performed by: INTERNAL MEDICINE

## 2025-06-30 PROCEDURE — 99239 HOSP IP/OBS DSCHRG MGMT >30: CPT | Performed by: PHYSICIAN ASSISTANT

## 2025-06-30 PROCEDURE — 85610 PROTHROMBIN TIME: CPT | Performed by: INTERNAL MEDICINE

## 2025-06-30 RX ORDER — WARFARIN SODIUM 5 MG/1
5 TABLET ORAL
Status: DISCONTINUED | OUTPATIENT
Start: 2025-06-30 | End: 2025-06-30 | Stop reason: HOSPADM

## 2025-06-30 RX ORDER — WARFARIN SODIUM 5 MG/1
TABLET ORAL
Qty: 30 TABLET | Refills: 0 | Status: SHIPPED | OUTPATIENT
Start: 2025-06-30

## 2025-06-30 RX ORDER — CALCITRIOL 0.25 UG/1
0.25 CAPSULE, LIQUID FILLED ORAL 3 TIMES WEEKLY
Qty: 12 CAPSULE | Refills: 0 | Status: SHIPPED | OUTPATIENT
Start: 2025-06-30

## 2025-06-30 RX ADMIN — FLUOXETINE HYDROCHLORIDE 20 MG: 20 CAPSULE ORAL at 12:48

## 2025-06-30 RX ADMIN — CALCITRIOL 0.25 MCG: 0.25 CAPSULE, LIQUID FILLED ORAL at 12:47

## 2025-06-30 RX ADMIN — PANTOPRAZOLE SODIUM 40 MG: 40 TABLET, DELAYED RELEASE ORAL at 12:48

## 2025-06-30 RX ADMIN — MIDODRINE HYDROCHLORIDE 2.5 MG: 5 TABLET ORAL at 12:48

## 2025-06-30 RX ADMIN — Medication 2000 UNITS: at 12:47

## 2025-06-30 RX ADMIN — LORATADINE 10 MG: 10 TABLET ORAL at 12:47

## 2025-06-30 RX ADMIN — Medication 400 MG: at 12:48

## 2025-06-30 RX ADMIN — MIDODRINE HYDROCHLORIDE 5 MG: 5 TABLET ORAL at 08:14

## 2025-06-30 RX ADMIN — ALLOPURINOL 100 MG: 100 TABLET ORAL at 12:48

## 2025-06-30 NOTE — PLAN OF CARE
Hemodialysis treatment planned for 210 minutes using a 2 K+ bath for potassium 4.8 this morning.  Fluid goal 4500 ml/4000 ml net as tolerated.        Post-Dialysis RN Treatment Note    Blood Pressure:  Pre 113/63 mm/Hg  Post 144/82 mmHg   EDW:  123.5 kg    Weight:  Pre 125.7 kg   Post 119.7 kg   Mode of weight measurement: Bed Scale   Volume Removed:  4502 ml/4000 ml net   Treatment duration: 210 minutes    NS given:  No    Treatment shortened No   Medications given during Rx: None Reported   Estimated Kt/V:  0.92   Access type: Permacath/TDC   Access Issues: Yes, describe: Lines reversed.  Permcath site pink and c/o itching.  Redressed with sterile guaze and covered with clear dressing.    Report called to primary nurse:   Yes.  Verbal at bedside to Kelli Peck RN              Problem: METABOLIC, FLUID AND ELECTROLYTES - ADULT  Goal: Electrolytes maintained within normal limits  Description: INTERVENTIONS:  - Monitor labs and assess patient for signs and symptoms of electrolyte imbalances  - Administer electrolyte replacement as ordered  - Monitor response to electrolyte replacements, including repeat lab results as appropriate  - Instruct patient on fluid and nutrition as appropriate  Outcome: Progressing  Goal: Fluid balance maintained  Description: INTERVENTIONS:  - Monitor labs   - Monitor I/O and WT  - Instruct patient on fluid and nutrition as appropriate  - Assess for signs & symptoms of volume excess or deficit  Outcome: Progressing

## 2025-06-30 NOTE — ASSESSMENT & PLAN NOTE
Recent Labs     06/28/25  0234 06/29/25  0434 06/30/25  0430   HGB 10.5* 10.3* 11.1*     Hgb stable

## 2025-06-30 NOTE — ASSESSMENT & PLAN NOTE
Lab Results   Component Value Date    EGFR 8 06/30/2025    EGFR 10 06/29/2025    EGFR 13 06/28/2025    CREATININE 6.14 (H) 06/30/2025    CREATININE 5.16 (H) 06/29/2025    CREATININE 3.92 (H) 06/28/2025     Pt is HD dependent 2/2 glomerulonephritis  HD MWF per renal

## 2025-06-30 NOTE — PLAN OF CARE
Problem: Potential for Falls  Goal: Patient will remain free of falls  Description: INTERVENTIONS:  - Educate patient/family on patient safety including physical limitations  - Instruct patient to call for assistance with activity   - Consider consulting OT/PT to assist with strengthening/mobility based on AM PAC & JH-HLM score  - Consult OT/PT to assist with strengthening/mobility   - Keep Call bell within reach  - Keep bed low and locked with side rails adjusted as appropriate  - Keep care items and personal belongings within reach  - Initiate and maintain comfort rounds  - Make Fall Risk Sign visible to staff  - Offer Toileting every 2 Hours, in advance of need  - Initiate/Maintain bed alarm  - Apply yellow socks and bracelet for high fall risk patients  - Consider moving patient to room near nurses station  Outcome: Progressing     Problem: PAIN - ADULT  Goal: Verbalizes/displays adequate comfort level or baseline comfort level  Description: Interventions:  - Encourage patient to monitor pain and request assistance  - Assess pain using appropriate pain scale  - Administer analgesics as ordered based on type and severity of pain and evaluate response  - Implement non-pharmacological measures as appropriate and evaluate response  - Consider cultural and social influences on pain and pain management  - Notify physician/advanced practitioner if interventions unsuccessful or patient reports new pain  - Educate patient/family on pain management process including their role and importance of  reporting pain   - Provide non-pharmacologic/complimentary pain relief interventions  Outcome: Progressing     Problem: INFECTION - ADULT  Goal: Absence or prevention of progression during hospitalization  Description: INTERVENTIONS:  - Assess and monitor for signs and symptoms of infection  - Monitor lab/diagnostic results  - Monitor all insertion sites, i.e. indwelling lines, tubes, and drains  - Monitor endotracheal if  appropriate and nasal secretions for changes in amount and color  - Landis appropriate cooling/warming therapies per order  - Administer medications as ordered  - Instruct and encourage patient and family to use good hand hygiene technique  - Identify and instruct in appropriate isolation precautions for identified infection/condition  Outcome: Progressing     Problem: SAFETY ADULT  Goal: Patient will remain free of falls  Description: INTERVENTIONS:  - Educate patient/family on patient safety including physical limitations  - Instruct patient to call for assistance with activity   - Consider consulting OT/PT to assist with strengthening/mobility based on AM PAC & -HLM score  - Consult OT/PT to assist with strengthening/mobility   - Keep Call bell within reach  - Keep bed low and locked with side rails adjusted as appropriate  - Keep care items and personal belongings within reach  - Initiate and maintain comfort rounds  - Make Fall Risk Sign visible to staff  - Offer Toileting every 2 Hours, in advance of need  - Initiate/Maintain bed alarm  - Apply yellow socks and bracelet for high fall risk patients  - Consider moving patient to room near nurses station  Outcome: Progressing     Problem: Prexisting or High Potential for Compromised Skin Integrity  Goal: Skin integrity is maintained or improved  Description: INTERVENTIONS:  - Identify patients at risk for skin breakdown  - Assess and monitor skin integrity including under and around medical devices   - Assess and monitor nutrition and hydration status  - Monitor labs  - Assess for incontinence   - Turn and reposition patient  - Assist with mobility/ambulation  - Relieve pressure over sarkis prominences   - Avoid friction and shearing  - Provide appropriate hygiene as needed including keeping skin clean and dry  - Evaluate need for skin moisturizer/barrier cream  - Collaborate with interdisciplinary team  - Patient/family teaching  - Consider wound care  consult    Assess:  - Review Devyn scale daily  - Clean and moisturize skin   - Inspect skin when repositioning, toileting, and assisting with ADLS  - Assess under medical devices  - Assess extremities for adequate circulation and sensation     Bed Management:  - Have minimal linens on bed & keep smooth, unwrinkled  - Change linens as needed when moist or perspiring  - Avoid sitting or lying in one position for more than 2 hours while in bed  - Toileting:  - Offer bedside commode  - Assess for incontinence  - Use incontinent care products after each incontinent episode    Activity:  - Mobilize patient 3 times a day  - Encourage activity and walks on unit  - Encourage or provide ROM exercises   - Turn and reposition patient every 2 Hours  - Use appropriate equipment to lift or move patient in bed  - Instruct/ Assist with weight shifting every 2 when out of bed in chair  - Consider limitation of chair time 1 hour intervals    Skin Care:  - Avoid use of baby powder, tape, friction and shearing, hot water or constrictive clothing  - Relieve pressure over bony prominences   - Do not massage red bony areas    Next Steps:  - Teach patient strategies to minimize risks  - Consider consults to  interdisciplinary teams   Outcome: Progressing     Problem: Nutrition/Hydration-ADULT  Goal: Nutrient/Hydration intake appropriate for improving, restoring or maintaining nutritional needs  Description: Monitor and assess patient's nutrition/hydration status for malnutrition. Collaborate with interdisciplinary team and initiate plan and interventions as ordered.  Monitor patient's weight and dietary intake as ordered or per policy. Utilize nutrition screening tool and intervene as necessary. Determine patient's food preferences and provide high-protein, high-caloric foods as appropriate.     INTERVENTIONS:  - Monitor oral intake, urinary output, labs, and treatment plans  - Assess nutrition and hydration status and recommend course of  action  - Evaluate amount of meals eaten  - Assist patient with eating if necessary   - Allow adequate time for meals  - Recommend/ encourage appropriate diets, oral nutritional supplements, and vitamin/mineral supplements  - Order, calculate, and assess calorie counts as needed  - Recommend, monitor, and adjust tube feedings and TPN/PPN based on assessed needs  - Assess need for intravenous fluids  - Provide specific nutrition/hydration education as appropriate  - Include patient/family/caregiver in decisions related to nutrition  Outcome: Progressing     Problem: Decreased Cardiac Output  Goal: Cardiac output adequate for individual needs  Description: INTERVENTIONS: Monitor for signs and symptoms of decreased cardiac output   - Monitor for dyspnea with exertion and at rest  - Monitor for orthopnea  - Monitor for signs of tachycardia. Place patient on telemetry monitoring.  - Assess patient for jugular vein distention  - Assess patient for lower extremity edema and poor peripheral perfusion   - Auscultate lung sound for Fine bibasilar crackles   - Monitor for cardiac arrythmias   - Administer beta blockers, antiarrhythmic, and blood pressure medications as ordered    Outcome: Progressing     Problem: Impaired Gas Exchange  Goal: Optimize oxygenation and ensure adequate ventilation  Description: INTERVENTIONS: Monitor for signs and symptoms of respiratory distress                - Elevate HOB or use high fowlers to promote lung expansion                - Administer oxygen as ordered to maintain adequate oxygenation                - Encourage use of IS to promote lung expansion and prevent PN                - Monitor ABGs to assess oxygenation status                - Monitor blood oxygen level to maintain adequate oxygenation                - Encourage cough and deep breathing exercises to promote lung expansion                - Monitor patient's mental status for increased confusion    Outcome: Progressing      Problem: Activity Intolerance  Goal: Patient is able to perform activities within their limitations  Description: INTERVENTIONS:                       -   Alternate periods of activity with periods of rest                 -   Patients is able to maintain normal vitals heart rhythm during activity                 -   Gradually increase activity and exercise as patient can tolerate                 -   Monitor blood pressure and heart before and after exercise                  -   Monitor blood oxygen saturation during activity and apply oxygen as needed    Outcome: Progressing     Problem: Knowledge Deficit  Goal: Patient is able to verbalize understanding of Heart Failure after education  Description: INTERVENTIONS:  - Educate the patient and family on signs and symptoms of HF  - Provide the patient with HF education and HF zone tool  - Educate on the importance of daily weight in the AM and reporting a weight gain               of 3 or more pounds to their primary care physician  - Monitor for SOB  - Maintain and sodium and fluid restriction  - Educate the patient on the importance of medications such as: diuretics, betablockers,               antiarrhythmics and their purpose, dose, route, side effects and labs               if they are needed    Outcome: Progressing     Problem: METABOLIC, FLUID AND ELECTROLYTES - ADULT  Goal: Electrolytes maintained within normal limits  Description: INTERVENTIONS:  - Monitor labs and assess patient for signs and symptoms of electrolyte imbalances  - Administer electrolyte replacement as ordered  - Monitor response to electrolyte replacements, including repeat lab results as appropriate  - Instruct patient on fluid and nutrition as appropriate  Outcome: Progressing     Problem: Knowledge Deficit  Goal: Patient/family/caregiver demonstrates understanding of disease process, treatment plan, medications, and discharge instructions  Description: Complete learning assessment and  assess knowledge base.  Interventions:  - Provide teaching at level of understanding  - Provide teaching via preferred learning methods  Outcome: Progressing     Problem: DISCHARGE PLANNING  Goal: Discharge to home or other facility with appropriate resources  Description: INTERVENTIONS:  - Identify barriers to discharge w/patient and caregiver  - Arrange for needed discharge resources and transportation as appropriate  - Identify discharge learning needs (meds, wound care, etc.)  - Arrange for interpretive services to assist at discharge as needed  - Refer to Case Management Department for coordinating discharge planning if the patient needs post-hospital services based on physician/advanced practitioner order or complex needs related to functional status, cognitive ability, or social support system  Outcome: Progressing

## 2025-06-30 NOTE — ASSESSMENT & PLAN NOTE
Evaluated by podiatry and vascular surgery during hospital course  Outpatient follow-up with wound care; next appointment scheduled for 07/01  Wound care appointment to determine whether VAC will be placed

## 2025-06-30 NOTE — ASSESSMENT & PLAN NOTE
"U/S w/ \"There is evidence of non-occlusive chronic deep vein thrombosis noted in one of the paired femoral veins and popliteal vein. There is occlusive chronic vs sub-acute deep vein thrombosis noted in the gastrocnemius veins.\"  Hematology/oncology consulted during hospital course  S/p heparin drip w/ bridge to Coumadin  INR therapeutic  Discharge home on maintenance Coumadin 5 mg daily  [facility]NA/PCP to monitor INR    Recent Labs     06/28/25  0857 06/29/25  0434 06/30/25  0430   INR 1.04 1.43* 2.02*     "

## 2025-06-30 NOTE — DISCHARGE INSTR - AVS FIRST PAGE
Please take Coumadin 5 mg daily.    Please return to the emergency department for any concerning symptoms.    The visiting nursing staff and your family doctor should manage her Coumadin level.  You should have a PT/INR drawn by the end of this week.    Please follow-up with podiatry for care of your left foot ulceration.

## 2025-06-30 NOTE — CASE MANAGEMENT
Case Management Discharge Planning Note    Patient name Ramos Rosenthal  Location /-01 MRN [id number]  : 1950 Date 2025       Current Admission Date: 2025  Current Admission Diagnosis:Volume overload   Patient Active Problem List    Diagnosis Date Noted    Non healing left heel wound 2025    Chronic deep vein thrombosis (DVT) of right lower extremity ([facility]) 2025    RPGN (rapidly progressive glomerulonephritis) 2025    Anemia in ESRD (end-stage renal disease)  ([facility]) 2025    Class 2 severe obesity due to excess calories with serious comorbidity and body mass index (BMI) of 35.0 to 35.9 in adult ([facility]) 2025    Ulcer of left heel with cellulitis ([facility]) 10/31/2024    Immunocompromised patient ([facility]) 10/23/2024    Volume overload 10/21/2024    Chronic gout due to renal impairment of multiple sites without tophus 10/21/2024    End-stage renal disease on hemodialysis ([facility]) 2024    Anemia 2024    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 2024    Essential hypertension 2024    Secondary hyperparathyroidism of renal origin ([facility]) 2024    Uremia 2024    Bilateral lower extremity edema 2024    Neurogenic claudication 2023    Paresthesia of both lower extremities 10/01/2022    Shortness of breath 2022    Recurrent major depressive disorder, in remission ([facility]) 2022    Lumbosacral plexopathy 2019    Gait abnormality 2019    Lumbar stenosis 2019    Polyneuropathy 2019    Prediabetes 2018    Hyperlipidemia 2014    Vitamin D deficiency 2014      LOS (days): 5  Geometric Mean LOS (GMLOS) (days): 3.6  Days to GMLOS:-1.4     OBJECTIVE:  Risk of Unplanned Readmission Score: 34.8         Current admission status: Inpatient   Preferred Pharmacy:   [id number] - [place] - [address]  [address]  [place] [address]  Phone:  [phone] Fax: [phone]    Primary Care Provider: Wade Arellano DO    Primary Insurance: MEDICARE  Secondary Insurance: [employer]    DISCHARGE DETAILS:    Discharge planning discussed with:: patient  Freedom of Choice: Yes  Comments - Freedom of Choice: Patient is active with [facility]  CM contacted family/caregiver?: Yes                  Requested Home Health Care         Is the patient interested in HHC at discharge?: Yes  Home Health Discipline requested:: Nursing  Home Health Agency Name:: [facility]  HHA External Referral Reason (only applicable if external HHA name selected): Patient has established relationship with provider  Home Health Follow-Up Provider:: PCP  Home Health Services Needed:: Wound/Ostomy Care, Other (comment) (Lab draws)  Homebound Criteria Met:: Requires the Assistance of Another Person for Safe Ambulation or to Leave the Home, Uses an Assist Device (i.e. cane, walker, etc)  Supporting Clincal Findings:: Bed Bound or Wheelchair Bound                   Treatment Team Recommendation: Home with Home Health Care                                      IMM Given (Date):: 06/30/25  IMM Given to:: Patient  IMM reviewed with patient, patient agrees with discharge determination.       Additional Comments: Patient medically clear for discharge home today.  Patient is active with [facility], reserved in [place].  Patient updated at bedside, Vm left for spouse.

## 2025-06-30 NOTE — ASSESSMENT & PLAN NOTE
Pt presents with shortness of breath x 1 day, increased LE swelling in setting of prematurely discontinued HD session concerning for volume overload  Chest XR -pulm edema, small bilateral pleural effusions on admission  Hypervolemia managed with HD per nephrology  At baseline volume status upon discharge

## 2025-06-30 NOTE — NURSING NOTE
Patient adequate for discharge. Belongings with patient. AVS and education given patient. All questions answered.

## 2025-06-30 NOTE — DISCHARGE SUMMARY
"Discharge Summary - Hospitalist   Name: Ramos Rosenthal 74 y.o. male I MRN: [id number]  Unit/Bed#: -01 I Date of Admission: 6/24/2025   Date of Service: 6/30/2025 I Hospital Day: 5     Assessment & Plan  Volume overload  Pt presents with shortness of breath x 1 day, increased LE swelling in setting of prematurely discontinued HD session concerning for volume overload  Chest XR -pulm edema, small bilateral pleural effusions on admission  Hypervolemia managed with HD per nephrology  At baseline volume status upon discharge  End-stage renal disease on hemodialysis ([facility])  Lab Results   Component Value Date    EGFR 8 06/30/2025    EGFR 10 06/29/2025    EGFR 13 06/28/2025    CREATININE 6.14 (H) 06/30/2025    CREATININE 5.16 (H) 06/29/2025    CREATININE 3.92 (H) 06/28/2025     Pt is HD dependent 2/2 glomerulonephritis  HD MWF per renal  Chronic deep vein thrombosis (DVT) of right lower extremity (HCC)  U/S w/ \"There is evidence of non-occlusive chronic deep vein thrombosis noted in one of the paired femoral veins and popliteal vein. There is occlusive chronic vs sub-acute deep vein thrombosis noted in the gastrocnemius veins.\"  Hematology/oncology consulted during hospital course  S/p heparin drip w/ bridge to Coumadin  INR therapeutic  Discharge home on maintenance Coumadin 5 mg daily  VNA/PCP to monitor INR    Recent Labs     06/28/25  0857 06/29/25  0434 06/30/25  0430   INR 1.04 1.43* 2.02*     Non healing left heel wound  Evaluated by podiatry and vascular surgery during hospital course  Outpatient follow-up with wound care; next appointment scheduled for 07/01  Wound care appointment to determine whether VAC will be placed  Anemia in ESRD (end-stage renal disease)  ([facility])  Recent Labs     06/28/25  0234 06/29/25  0434 06/30/25  0430   HGB 10.5* 10.3* 11.1*     Hgb stable   Secondary hyperparathyroidism of renal origin ([facility])  Continue medical management  Class 2 severe obesity due to excess calories with serious " "comorbidity and body mass index (BMI) of 35.0 to 35.9 in adult (HCC)  Contributes to all aspects of care  Weight loss encouraged     Medical Problems       Resolved Problems  Date Reviewed: 5/23/2025   None       Discharging Physician / Practitioner: Angela Connell PA-C  PCP: Wade Arellano DO  Admission Date:   Admission Orders (From admission, onward)       Ordered        06/25/25 1413  INPATIENT ADMISSION  Once            06/24/25 1402  Place in Observation  Once                          Discharge Date: 06/30/25    Next Steps for Physician/AP Assuming Care:  INR monitoring   WC f/u    Test Results Pending at Discharge (will require follow up):  N/a    Medication Changes for Discharge & Rationale:   Coumadin 5 mg q day   See after visit summary for reconciled discharge medications provided to patient and/or family.     Consultations During Hospital Stay:  Case management  Hematology/oncology  Podiatry  Vascular surgery    Procedures Performed:   N/A    Significant Findings / Test Results:   XR chest 1 view portable  Result Date: 6/25/2025  Impression: Mild pulmonary edema, probable small bilateral pleural effusions and bibasilar atelectasis. Workstation performed: [id number]      Incidental Findings:   N/A     Hospital Course:   Ramos Rosenthal is a 74 y.o. male patient who originally presented to the hospital on 6/24/2025 due to hypervolemia.  Patient presented to the emergency department following ongoing shortness of breath and increased lower extremity swelling in setting of prematurely discontinued hemodialysis session.  Patient was noted to have pulmonary edema and pleural effusions on admission.  Patient's volume status was managed with hemodialysis via nephrology's recommendations.  Patient's volume status is stable at discharge.    Patient had a venous duplex ultrasound completed noting \"There is evidence of non-occlusive chronic deep vein thrombosis noted in one of the paired femoral veins and " "popliteal vein. There is occlusive chronic vs sub-acute deep vein thrombosis noted in the gastrocnemius veins.\"  Hematology/oncology were consulted during hospital course.  Patient received a heparin drip.  Patient was later transition to Coumadin upon discharge.  Patient's maintenance Coumadin dose is 5 mg daily.  Patient will have VNA and PCP follow his INR.    Patient has a chronic nonhealing ulcer on his left foot.  Podiatry and vascular surgery consults during hospital course.  Patient was recommended to follow-up as an outpatient.  Next appoint with wound care is on 07/01.  Podiatry will determine if a VAC needs to be placed at wound care appointments in outpatient setting.    Please see above list of diagnoses and related plan for additional information.     Discharge Day Visit / Exam:   Subjective: Patient is doing well.  He expresses no acute complaints.  He had a recent bowel movement.  He no longer makes urine.  He has no current respiratory issues.  Vitals: Blood Pressure: 121/76 (06/30/25 0900)  Pulse: 62 (06/30/25 0900)  Temperature: (!) 95.6 °F (35.3 °C) (06/30/25 0900)  Temp Source: Tympanic (06/30/25 0900)  Respirations: 18 (06/30/25 0900)  Height: 6' 1\" (185.4 cm) (06/24/25 1923)  Weight - Scale: 129 kg (285 lb 7.9 oz) (06/24/25 1923)  SpO2: 96 % (06/30/25 0702)  Physical Exam  Constitutional:       General: He is not in acute distress.     Appearance: He is obese. He is not toxic-appearing.   HENT:      Head: Normocephalic.      Right Ear: External ear normal.      Left Ear: External ear normal.      Nose: Nose normal.      Mouth/Throat:      Mouth: Mucous membranes are moist.      Pharynx: Oropharynx is clear.     Eyes:      Extraocular Movements: Extraocular movements intact.      Conjunctiva/sclera: Conjunctivae normal.       Cardiovascular:      Rate and Rhythm: Normal rate and regular rhythm.      Pulses: Normal pulses.      Heart sounds: Normal heart sounds.   Pulmonary:      Effort: " Pulmonary effort is normal.      Breath sounds: Normal breath sounds.      Comments: Auscultated anteriorly as patient was laying flat for HD treatment  Abdominal:      General: There is no distension.      Palpations: Abdomen is soft.      Tenderness: There is no abdominal tenderness. There is no guarding or rebound.      Comments: Obese abdomen     Musculoskeletal:         General: Swelling (1-2+ lower extremity edema) present.      Cervical back: Normal range of motion.     Skin:     General: Skin is warm and dry.      Comments: Dialysis access right chest wall; left foot in offloading boot with clean/dry/intact dressing     Neurological:      General: No focal deficit present.      Mental Status: He is alert and oriented to person, place, and time. Mental status is at baseline.     Psychiatric:         Mood and Affect: Mood normal.         Behavior: Behavior normal.        Discussion with Family: Updated  (wife) via phone.    Discharge instructions/Information to patient and family:   See after visit summary for information provided to patient and family.      Provisions for Follow-Up Care:  See after visit summary for information related to follow-up care and any pertinent home health orders.      Mobility at time of Discharge:   Basic Mobility Inpatient Raw Score: 7  JH-HLM Goal: 2: Bed activities/Dependent transfer  JH-HLM Achieved: 2: Bed activities/Dependent transfer  HLM Goal NOT achieved. Continue to encourage mobility in post discharge setting.     Disposition:   Home with VNA Services (Reminder: Complete face to face encounter)    Planned Readmission: none    Administrative Statements   Discharge Statement:  I have spent a total time of 65 minutes in caring for this patient on the day of the visit/encounter. >30 minutes of time was spent on: Diagnostic results, Prognosis, Risks and benefits of tx options, Instructions for management, Patient and family education, Importance of tx  compliance, Counseling / Coordination of care, Documenting in the medical record, Reviewing / ordering tests, medicine, procedures  , and Communicating with other healthcare professionals .    **Please Note: This note may have been constructed using a voice recognition system**

## 2025-07-01 ENCOUNTER — TRANSITIONAL CARE MANAGEMENT (OUTPATIENT)
Dept: FAMILY MEDICINE CLINIC | Facility: CLINIC | Age: 75
End: 2025-07-01

## 2025-07-01 ENCOUNTER — OFFICE VISIT (OUTPATIENT)
Facility: HOSPITAL | Age: 75
End: 2025-07-01
Payer: MEDICARE

## 2025-07-01 VITALS — DIASTOLIC BLOOD PRESSURE: 58 MMHG | HEART RATE: 81 BPM | SYSTOLIC BLOOD PRESSURE: 115 MMHG | TEMPERATURE: 98.4 F

## 2025-07-01 DIAGNOSIS — L89.623 PRESSURE INJURY OF LEFT HEEL, STAGE 3 (HCC): Primary | ICD-10-CM

## 2025-07-01 PROCEDURE — 97605 NEG PRS WND THER DME<=50SQCM: CPT

## 2025-07-01 PROCEDURE — 99214 OFFICE O/P EST MOD 30 MIN: CPT

## 2025-07-01 RX ORDER — LIDOCAINE 40 MG/G
CREAM TOPICAL ONCE
Status: COMPLETED | OUTPATIENT
Start: 2025-07-01 | End: 2025-07-01

## 2025-07-01 RX ADMIN — LIDOCAINE: 40 CREAM TOPICAL at 14:51

## 2025-07-01 NOTE — PROGRESS NOTES
Patient ID: Ramos Rosenthal is a 74 y.o. male Date of Birth 1950     Diagnosis:  1. Pressure injury of left heel, stage 3 (HCC)  -     lidocaine (LMX) 4 % cream  -     Wound cleansing and dressings Left Heel; Future  -     Wound Procedure Treatment Left Heel  -     Negative Pressure Wound Therapy     Diagnosis ICD-10-CM Associated Orders   1. Pressure injury of left heel, stage 3 (HCC)  L89.623 lidocaine (LMX) 4 % cream     Wound cleansing and dressings Left Heel     Wound Procedure Treatment Left Heel     Negative Pressure Wound Therapy           Assessment & Plan:  F/u evaluation of Left medial heel wound. Pressure injury, presently stage 3. There is granulation tissue present along wound periphery. Central portion of the wound with spongy necrotic and fibrotic tissue. No obvious bone exposure. Drainage is small. No periwound erythema or lymphangitic streaking.   No debridement performed today.  Wound VAC dressing applied today.  Continue with Prevalon boot and pillow for strict offloading.  MRI from 5/15/2025 without evidence of osteomyelitis.  Given lack of clinical infection on exam we will hold off on further imaging at this time  Recent arterial studies show diffuse femoral-popliteal disease with 50 to 75% stenosis of the anterior tibial artery and great toe pressure of 62 mmHg.  Patient was seen by vascular on 6/26/2025 in the hospital setting.  Per vascular note: given that the patient's toe pressures are above healing, vascular did not believe intervention was needed at that time.  Due to patient's nonambulatory status he is not an appropriate candidate for any open revascularization.  If there is continued nonhealing or worsening of the wound patient can follow-up with vascular and discuss potentially an angiogram.    If the patient notices any systemic signs of infection including but not limited to fever, chills, nausea, vomiting or significant worsening of wound with increased drainage, redness  or streaking up the foot or leg the patient should notify the office or present to the emergency room.      If wound debridement was completed today this was done in an attempt to promote healing, decrease risk of infection and for limb salvage efforts.     Goal of treatment: wound healing     Efforts to decrease pressure on the wound(s), evaluation and discussion of nutritional status, peripheral vascular status, and infection control have all been addressed with this patient.    Return in about 1 week (around 7/8/2025).      Chief Complaint   Patient presents with    Follow Up Wound Care Visit     Left heel            Subjective:   Patient presents for follow-up of left medial heel pressure ulcer, stage III.  Patient was recently hospitalized last week for volume overload.  Patient is currently on Coumadin for acute right lower extremity DVT.     The following portions of the patient's history were reviewed and updated as appropriate:   Problem List[1]  Past Medical History[2]  Past Surgical History[3]  Social History[4]   Current Medications[5]  Family History[6]     Review of Systems    Constitutional: Negative for fever or chills.   Respiratory: Negative for shortness of breath.    Cardiovascular: Negative for chest pain.  Gastrointestinal: Negative for nausea and vomiting.   Musculoskeletal: No calf pain    Allergies:  Chlorhexidine, Ciprofloxacin, Other, and Penicillins      Objective:  /58   Pulse 81   Temp 98.4 °F (36.9 °C)     Physical Exam      Wound 05/14/25 Pressure Injury Heel Left (Active)   Wound Image -- (unable to take photo of vac dsg, phone not working) 07/01/25 1511   Wound Description Slough;Yellow;Granulation tissue;Pink 07/01/25 1447   Non-staged Wound Description Full thickness 07/01/25 1447   Wound Length (cm) 2.1 cm 07/01/25 1447   Wound Width (cm) 2.2 cm 07/01/25 1447   Wound Depth (cm) 0.6 cm 07/01/25 1447   Wound Surface Area (cm^2) 3.63 cm^2 07/01/25 1447   Wound Volume (cm^3)  1.451 cm^3 07/01/25 1447   Calculated Wound Volume (cm^3) 2.77 cm^3 07/01/25 1447   Change in Wound Size % 64.21 07/01/25 1447   Drainage Amount Moderate 07/01/25 1447   Drainage Description Serosanguineous 07/01/25 1447   Antonette-wound Assessment Pink;Maceration;Scaly 07/01/25 1447   Treatments Cleansed 07/01/25 1447   Dressing Wound V.A.C. 07/01/25 1511   Packing- # inserted 2 (x1 to wund, x1 bridge) 07/01/25 1511       Wound 06/12/25 Traumatic Abrasion Leg Left;Lower;Lateral (Active)   Wound Description Epithelialization (purple discoloration) 07/01/25 1449   Wound Length (cm) 0 cm 07/01/25 1449   Wound Width (cm) 0 cm 07/01/25 1449   Wound Depth (cm) 0 cm 07/01/25 1449   Wound Surface Area (cm^2) 0 cm^2 07/01/25 1449   Wound Volume (cm^3) 0 cm^3 07/01/25 1449   Calculated Wound Volume (cm^3) 0 cm^3 07/01/25 1449   Change in Wound Size % 100 07/01/25 1449   Drainage Amount None 07/01/25 1449                     Results from last 6 Months   Lab Units 05/14/25  2341   WOUND CULTURE  4+ Growth of Aeromonas hydrophila complex*  4+ Growth of Escherichia coli ESBL*  4+ Growth of Aeromonas hydrophila complex*         Wound Instructions:  Orders Placed This Encounter   Procedures    Wound cleansing and dressings Left Heel     Wash your hands with soap and water.  Remove old dressing, discard into plastic bag and place in trash.  Cleanse the wound with normal saline prior to applying a clean dressing. Do not use tissue or cotton balls. Do not scrub the wound. Pat dry using gauze.      Apply skin prep and calcium alginate to skin surrounding wound  Apply black foam x1 piece to the left heel wound.  Cover with drape. Bridge medial leg off pressure points.   Secure with tubifast yellow.  Change dressing tues/thurs/ sat    Continous -125 mmhg.     Please try to consume 3-4 servings of protein (30g) each day.   - Each serving of protein should contain about 30 mg protein.   - Good sources of protein include, lean meats (fish,  "chicken, etc), eggs, dairy products (yogurt, cheese), tofu, legumes (chickpeas, lentils, peas, black beans), nuts (cashew, walnut, peanuts, etc), & quinoa.     If you develop fever, chills, nausea or vomiting, increase in drainage or foul smelling drainage go to the closest emergency department for evaluation.     Standing Status:   Future     Expiration Date:   7/8/2025    Wound Procedure Treatment Left Heel     This order was created via procedure documentation    Negative Pressure Wound Therapy     This order was created via procedure documentation         Kanu Tello DPM      Portions of the record may have been created with voice recognition software. Occasional wrong word or \"sound a like\" substitutions may have occurred due to the inherent limitations of voice recognition software. Read the chart carefully and recognize, using context, where substitutions have occurred.           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    Anemia    End-stage renal disease on hemodialysis ([facility])    Volume overload    Chronic gout due to renal impairment of multiple sites without tophus    Immunocompromised patient ([facility])    Ulcer of left heel with cellulitis ([facility])    Class 2 severe obesity due to excess calories with serious comorbidity and body mass index (BMI) of 35.0 to 35.9 in adult ([facility])    RPGN (rapidly progressive glomerulonephritis)    Anemia in ESRD (end-stage renal disease)  ([facility])    Chronic deep vein thrombosis (DVT) of right lower " extremity (HCC)    Non healing left heel wound   [2]   Past Medical History:  Diagnosis Date    Depression     Gout     Immunocompromised patient (HCC) 10/23/2024    Multiple open wounds of lower leg 2022    Pneumonia 10/22/2024    Problem with dialysis access (HCC) 2024    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 HERZOG    CATARACT EXTRACTION, BILATERAL      IR BIOPSY KIDNEY RANDOM  2024    IR TEMPORARY DIALYSIS CATHETER PLACEMENT  2024    IR TUNNELED DIALYSIS CATHETER PLACEMENT  2024    IR TUNNELED DIALYSIS CATHETER PLACEMENT  2025    KNEE ARTHROSCOPY Right     x2 , meniscus repair    TONSILECTOMY AND ADNOIDECTOMY     [4]   Social History  Socioeconomic History    Marital status: /Civil Union     Spouse name: Louise    Number of children: 3    Years of education: 14   Tobacco Use    Smoking status: Former     Current packs/day: 0.00     Types: Cigarettes     Quit date:      Years since quittin.5    Smokeless tobacco: Never   Vaping Use    Vaping status: Never Used   Substance and Sexual Activity    Alcohol use: Not Currently     Comment: occasional    Drug use: Never     Social Drivers of Health     Financial Resource Strain: Low Risk  (2023)    Overall Financial Resource Strain (CARDIA)     Difficulty of Paying Living Expenses: Not very hard   Food Insecurity: No Food Insecurity (2025)    Nursing - Inadequate Food Risk Classification     Worried About Running Out of Food in the Last Year: Never true     Ran Out of Food in the Last Year: Never true     Ran Out of Food in the Last Year: Never true   Transportation Needs: No Transportation Needs (2025)    Nursing - Transportation Risk Classification     Lack of Transportation: No   Intimate  Partner Violence: Unknown (6/24/2025)    Nursing IPS     Physically Hurt by Someone: No     Hurt or Threatened by Someone: No   Housing Stability: Unknown (6/24/2025)    Nursing: Inadequate Housing Risk Classification     Unable to Pay for Housing in the Last Year: No     Has Housing: No   [5]   Current Outpatient Medications:     allopurinol (ZYLOPRIM) 100 mg tablet, TAKE 1 TABLET BY MOUTH TWICE A DAY, Disp: 180 tablet, Rfl: 1    B Complex-C-Folic Acid (triphrocaps) 1 MG CAPS, Take 1 capsule (1 mg total) by mouth daily with dinner, Disp: 90 capsule, Rfl: 0    calcitriol (ROCALTROL) 0.25 mcg capsule, Take 1 capsule (0.25 mcg total) by mouth 3 (three) times a week, Disp: 12 capsule, Rfl: 0    calcium carbonate (OYSTER SHELL,OSCAL) 500 mg, TAKE 2 TABLETS BY MOUTH 2 TIMES A DAY WITH MEALS., Disp: 360 tablet, Rfl: 1    cholecalciferol (VITAMIN D3) 1,000 units tablet, Take 2,000 Units by mouth in the morning., Disp: , Rfl:     collagenase (SANTYL) ointment, Apply topically daily To wound of the L heel, Disp: 30 g, Rfl: 1    FLUoxetine (PROzac) 20 mg capsule, TAKE 1 CAPSULE BY MOUTH EVERY DAY, Disp: 90 capsule, Rfl: 1    hydrocortisone 2.5 % cream, Apply topically 2 (two) times a day, Disp: 3.5 g, Rfl: 0    loratadine (CLARITIN) 10 mg tablet, Take 10 mg by mouth as needed for allergies Take 1 tablet once daily as needed for allergies, Disp: , Rfl:     Magnesium 100 MG CAPS, Take 1 capsule by mouth in the morning. Not sure of dose., Disp: , Rfl:     midodrine (PROAMATINE) 2.5 mg tablet, TAKE 1 TABLET (2.5 MG TOTAL) BY MOUTH 3 TIMES A DAY BEFORE MEALS, Disp: 270 tablet, Rfl: 2    midodrine (PROAMATINE) 5 mg tablet, Take 1 tablet (5 mg total) by mouth 3 (three) times a week ADMINISTERED EVERY MON, WED AND FRI AT DIALYSIS, Disp: , Rfl:     nystatin (MYCOSTATIN) powder, Apply topically 3 (three) times a day, Disp: 60 g, Rfl: 3    pantoprazole (PROTONIX) 40 mg tablet, Take 1 tablet (40 mg total) by mouth daily, Disp: 90 tablet,  Rfl: 1    warfarin (COUMADIN) 5 mg tablet, Take 1 tab q day at 6 pm, Disp: 30 tablet, Rfl: 0  No current facility-administered medications for this visit.  [6]   Family History  Problem Relation Name Age of Onset    Diabetes Father      Diabetes Brother      Diabetes Sister        [phone]

## 2025-07-01 NOTE — PROGRESS NOTES
Negative Pressure Wound Therapy    Performed by: Pinky Alaniz RN  Authorized by: Kanu Tello DPM    Universal Protocol:  Consent given by: patient  Timeout called at: 7/1/2025 3:11 PM.  Patient understanding: patient states understanding of the procedure being performed  Patient identity confirmed: verbally with patient    Performed by::  Clinician  Type::  KCI  Coverage Size (sq cm)::  3.6  Pressure Type::  Constant  Pressure Setting::  125 mmHG  Sponge Size:  Medium  Total Number Of Sponges Used For This Application::  2 (1 black in wound, x1 for bridge)

## 2025-07-01 NOTE — PROGRESS NOTES
Wound Procedure Treatment Left Heel    Performed by: Pinky Alaniz RN  Authorized by: Kanu Tello DPM  Associated wounds:   Wound 05/14/25 Pressure Injury Heel Left    Wound cleansed with:  Dakin's 0.25%   Applied to periwound:  Skin prep   Applied primary dressing:  Calcium alginate   Dressing secured with:  Tubifast   Comments:  To periwound.    X1 black foam to wound, x1 black foam to bridge.    Pre-procedure details:       Sensation:  Normal   Procedure details:       Laterality:  Left      Location: heel.

## 2025-07-03 ENCOUNTER — TELEPHONE (OUTPATIENT)
Dept: FAMILY MEDICINE CLINIC | Facility: CLINIC | Age: 75
End: 2025-07-03

## 2025-07-03 ENCOUNTER — HOME CARE VISIT (OUTPATIENT)
Dept: HOME HEALTH SERVICES | Facility: HOME HEALTHCARE | Age: 75
End: 2025-07-03
Attending: PHYSICIAN ASSISTANT
Payer: MEDICARE

## 2025-07-03 DIAGNOSIS — I82.401 ACUTE DEEP VEIN THROMBOSIS (DVT) OF RIGHT LOWER EXTREMITY, UNSPECIFIED VEIN (HCC): Primary | ICD-10-CM

## 2025-07-03 PROCEDURE — G0299 HHS/HOSPICE OF RN EA 15 MIN: HCPCS

## 2025-07-03 NOTE — TELEPHONE ENCOUNTER
An order for pro time placed in epic.  I will try to get down there next week.  Please make sure they do not get to try to do a TCM next week

## 2025-07-03 NOTE — TELEPHONE ENCOUNTER
Daughter of pt called, pt was d/c from hospital on warfarin. [facility] is asking for an order for a PT /INR. Also requesting a TCM home visit

## 2025-07-04 ENCOUNTER — HOME CARE VISIT (OUTPATIENT)
Dept: HOME HEALTH SERVICES | Facility: HOME HEALTHCARE | Age: 75
End: 2025-07-04
Payer: MEDICARE

## 2025-07-04 VITALS
HEIGHT: 73 IN | OXYGEN SATURATION: 97 % | BODY MASS INDEX: 37.77 KG/M2 | DIASTOLIC BLOOD PRESSURE: 54 MMHG | RESPIRATION RATE: 16 BRPM | WEIGHT: 285 LBS | SYSTOLIC BLOOD PRESSURE: 104 MMHG | HEART RATE: 68 BPM | TEMPERATURE: 97.4 F

## 2025-07-05 ENCOUNTER — HOME CARE VISIT (OUTPATIENT)
Dept: HOME HEALTH SERVICES | Facility: HOME HEALTHCARE | Age: 75
End: 2025-07-05
Payer: MEDICARE

## 2025-07-05 VITALS
SYSTOLIC BLOOD PRESSURE: 96 MMHG | RESPIRATION RATE: 18 BRPM | HEART RATE: 74 BPM | TEMPERATURE: 97.5 F | OXYGEN SATURATION: 96 % | DIASTOLIC BLOOD PRESSURE: 42 MMHG

## 2025-07-05 PROCEDURE — G0299 HHS/HOSPICE OF RN EA 15 MIN: HCPCS

## 2025-07-06 ENCOUNTER — NURSE TRIAGE (OUTPATIENT)
Dept: OTHER | Facility: OTHER | Age: 75
End: 2025-07-06

## 2025-07-06 ENCOUNTER — HOME CARE VISIT (OUTPATIENT)
Dept: HOME HEALTH SERVICES | Facility: HOME HEALTHCARE | Age: 75
End: 2025-07-06
Payer: MEDICARE

## 2025-07-06 VITALS
TEMPERATURE: 97.6 F | RESPIRATION RATE: 18 BRPM | SYSTOLIC BLOOD PRESSURE: 130 MMHG | DIASTOLIC BLOOD PRESSURE: 80 MMHG | HEART RATE: 89 BPM | OXYGEN SATURATION: 94 %

## 2025-07-06 PROCEDURE — G0299 HHS/HOSPICE OF RN EA 15 MIN: HCPCS

## 2025-07-06 NOTE — CASE COMMUNICATION
1100...Patients daughter called into office regarding patients wound vac. States it's beeping and alarming. At 0 mmhg. She attempted to better seal as well as attempted to replace some tape. No success. SN visit to be made for further assist.

## 2025-07-06 NOTE — TELEPHONE ENCOUNTER
Reason for Call: Wound vac on the left foot; leaking and alarming. Tape is rolled up on the inside of his left heal    Caller's Name: Mary    Caller's Relationship to Patient: Daughter    Caller's Callback Number: [phone]    Home Health OR Hospice Patient:  Home Health    Reason for Disposition   Affirmative: Did you page the on call provider?    Protocols used: SHABNAM NO TRIAGE REQUIRED

## 2025-07-08 ENCOUNTER — HOME CARE VISIT (OUTPATIENT)
Dept: HOME HEALTH SERVICES | Facility: HOME HEALTHCARE | Age: 75
End: 2025-07-08
Payer: MEDICARE

## 2025-07-08 ENCOUNTER — OFFICE VISIT (OUTPATIENT)
Facility: HOSPITAL | Age: 75
End: 2025-07-08
Payer: MEDICARE

## 2025-07-08 VITALS — TEMPERATURE: 97.9 F

## 2025-07-08 DIAGNOSIS — L89.623 PRESSURE INJURY OF LEFT HEEL, STAGE 3 (HCC): Primary | ICD-10-CM

## 2025-07-08 DIAGNOSIS — I73.9 PAD (PERIPHERAL ARTERY DISEASE) (HCC): ICD-10-CM

## 2025-07-08 PROCEDURE — 11042 DBRDMT SUBQ TIS 1ST 20SQCM/<: CPT

## 2025-07-08 PROCEDURE — 97605 NEG PRS WND THER DME<=50SQCM: CPT

## 2025-07-08 NOTE — PROGRESS NOTES
Wound Procedure Treatment Left Heel    Performed by: Grace Maxwell RN  Authorized by: Kanu Tello DPM  Associated wounds:   Wound 05/14/25 Pressure Injury Heel Left    Wound cleansed with:  Dakin's 0.25%   Applied to periwound:  Skin prep   Applied primary dressing:  Calcium alginate   Comments:  5 minute Dakins soak prior to applying wound vac , calcium alginate windowed around the wound , cavilon no sting barrier film to periwound , KCI drape applied to periwound, 1 piece of black granufoam to wound bed,  bridge to medial lower leg , suction pad applied, continuous at 125 mmHg

## 2025-07-08 NOTE — PATIENT INSTRUCTIONS
Orders Placed This Encounter   Procedures    Wound cleansing and dressings Left Heel     Left Heel     Wash your hands with soap and water.  Remove old dressing, discard into plastic bag and place in trash.  Cleanse the wound with normal saline prior to applying a clean dressing. Do not use tissue or cotton balls. Do not scrub the wound. Pat dry using gauze.        Apply skin prep and calcium alginate to skin surrounding wound  Apply black foam x1 piece to the left heel wound.  Cover with drape. Bridge to medial leg off  all pressure points. Apply suction pad   Secure with tubifast yellow.  Change dressing tues/thurs/ sat     Continous -125 mmhg.      Please try to consume 3-4 servings of protein (30g) each day.   - Each serving of protein should contain about 30 mg protein.   - Good sources of protein include, lean meats (fish, chicken, etc), eggs, dairy products (yogurt, cheese), tofu, legumes (chickpeas, lentils, peas, black beans), nuts (cashew, walnut, peanuts, etc), & quinoa.      If you develop fever, chills, nausea or vomiting, increase in drainage or foul smelling drainage go to the closest emergency department for evaluation.    C[place]ister was not changed this visit at [facility] please change by Sunday.     Standing Status:   Future     Expiration Date:   7/15/2025

## 2025-07-08 NOTE — PROGRESS NOTES
Negative Pressure Wound Therapy    Performed by: Grace Maxwell RN  Authorized by: Kanu Tello DPM    Universal Protocol:  Consent: Verbal consent obtained  Consent given by: patient  Patient understanding: patient states understanding of the procedure being performed  Patient identity confirmed: verbally with patient    Performed by::  Clinician  Type::  KCI  Coverage Size (sq cm)::  2.8  Pressure Type::  Constant  Pressure Setting::  125 mmHG  Sponge Type::  Black  Sponge Size:  Medium  Total Number Of Sponges Removed Prior To Application::  2  Total Number Of Sponges Used For This Application::  1

## 2025-07-08 NOTE — PROGRESS NOTES
Patient ID: Ramos Rosenthal is a 74 y.o. male Date of Birth 1950     Diagnosis:  1. Pressure injury of left heel, stage 3 (Formerly [facility])  -     Wound cleansing and dressings Left Heel; Future  2. PAD (peripheral artery disease) (Formerly [facility])       Diagnosis ICD-10-CM Associated Orders   1. Pressure injury of left heel, stage 3 (HCC)  L89.623 Wound cleansing and dressings Left Heel      2. PAD (peripheral artery disease) (Formerly [facility])  I73.9              Assessment & Plan:  F/u of Left medial heel wound. Pressure injury, presently stage 3, measuring smaller in size today. There is granulation tissue present along wound periphery. Central portion of the wound with spongy necrotic and fibrotic tissue. No obvious bone exposure. Drainage is small. No periwound erythema or lymphangitic streaking.   Surgical debridement performed today, see procedure note.  Wound VAC dressing applied today.  Continue with Prevalon boot and pillow for strict offloading.  MRI from 5/15/2025 without evidence of osteomyelitis.  Given lack of clinical infection on exam we will hold off on further imaging at this time  Recent arterial studies show diffuse femoral-popliteal disease with 50 to 75% stenosis of the anterior tibial artery and great toe pressure of 62 mmHg.  Patient was seen by vascular on 6/26/2025 in the hospital setting.  Per vascular note: given that the patient's toe pressures are above healing, vascular did not believe intervention was needed at that time.  Due to patient's nonambulatory status he is not an appropriate candidate for any open revascularization.  If there is continued nonhealing or worsening of the wound patient can follow-up with vascular and discuss potentially an angiogram.    If the patient notices any systemic signs of infection including but not limited to fever, chills, nausea, vomiting or significant worsening of wound with increased drainage, redness or streaking up the foot or leg the patient should notify the office or  present to the emergency room.      If wound debridement was completed today this was done in an attempt to promote healing, decrease risk of infection and for limb salvage efforts.     Goal of treatment: wound healing     Efforts to decrease pressure on the wound(s), evaluation and discussion of nutritional status, peripheral vascular status, and infection control have all been addressed with this patient.    Return in 1 week (on 7/15/2025) for Recheck, Arrive 15 minutes before you appointment time.      Chief Complaint   Patient presents with    Follow Up Wound Care Visit     Left heel            Subjective:   Patient presents for follow-up of left medial heel pressure ulcer, stage III.  Patient has had wound VAC for 1 week now.  Currently on Coumadin for acute right lower extremity DVT.        The following portions of the patient's history were reviewed and updated as appropriate:   Problem List[1]  Past Medical History[2]  Past Surgical History[3]  Social History[4]   Current Medications[5]  Family History[6]     Review of Systems    Constitutional: Negative for fever or chills.   Respiratory: Negative for shortness of breath.    Cardiovascular: Negative for chest pain.  Gastrointestinal: Negative for nausea and vomiting.   Musculoskeletal: No calf pain    Allergies:  Chlorhexidine, Ciprofloxacin, Other, and Penicillins      Objective:  Temp 97.9 °F (36.6 °C) (Temporal)     Physical Exam    Musculoskeletal:        Feet:              Wound 05/14/25 Pressure Injury Heel Left (Active)   Wound Image   07/08/25 1340   Wound Description Tan;Granulation tissue;Slough 07/08/25 1340   Pressure Injury Stage 3 07/08/25 1340   Non-staged Wound Description Full thickness 07/08/25 1340   Wound Length (cm) 1.8 cm 07/08/25 1340   Wound Width (cm) 2 cm 07/08/25 1340   Wound Depth (cm) 1 cm 07/08/25 1340   Wound Surface Area (cm^2) 2.83 cm^2 07/08/25 1340   Wound Volume (cm^3) 1.885 cm^3 07/08/25 1340   Calculated Wound Volume  "(cm^3) 3.6 cm^3 07/08/25 1340   Change in Wound Size % 53.49 07/08/25 1340   Drainage Amount Scant 07/08/25 1340   Drainage Description Bloody 07/08/25 1340   Antonette-wound Assessment Pink;Maceration;White 07/08/25 1340   Treatments Cleansed 07/08/25 1340   Dressing Wound V.A.C. 07/08/25 1340   Packing- # inserted 2 07/08/25 1340   Dressing Status Intact;Old drainage 07/08/25 1340             Debridement     Date/Time: 7/8/2025 1:30 PM    Universal Protocol:  Consent: Verbal consent obtained  Risks and benefits: risks, benefits and alternatives were discussed  Consent given by: patient  Time out: Immediately prior to procedure a \"time out\" was called to verify the correct patient, procedure, equipment, support staff and site/side marked as required.  Timeout called at: 7/8/2025 2:00 PM.  Patient understanding: patient states understanding of the procedure being performed  Patient identity confirmed: verbally with patient    Debridement Details  Performed by: physician  Debridement type: surgical  Level of debridement: subcutaneous tissue  Pain control: lidocaine 4%    Post-debridement measurements  Length (cm): 1.8  Width (cm): 2  Depth (cm): 1  Percent debrided: 80%  Surface Area (cm^2): 2.83  Area Debrided (cm^2): 2.26  Volume (cm^3): 1.88    Tissue and other material debrided: subcutaneous tissue  Instrument(s) utilized: blade and forceps  Bleeding: medium  Hemostasis obtained with: pressure  Procedural pain (0-10): insensate  Post-procedural pain: insensate   Response to treatment: procedure was tolerated well                        Results from last 6 Months   Lab Units 05/14/25  2341   WOUND CULTURE  4+ Growth of Aeromonas hydrophila complex*  4+ Growth of Escherichia coli ESBL*  4+ Growth of Aeromonas hydrophila complex*         Wound Instructions:  Orders Placed This Encounter   Procedures    Wound cleansing and dressings Left Heel     Left Heel     Wash your hands with soap and water.  Remove old dressing, " "discard into plastic bag and place in trash.  Cleanse the wound with normal saline prior to applying a clean dressing. Do not use tissue or cotton balls. Do not scrub the wound. Pat dry using gauze.        Apply skin prep and calcium alginate to skin surrounding wound  Apply black foam x1 piece to the left heel wound.  Cover with drape. Bridge to medial leg off  all pressure points. Apply suction pad   Secure with tubifast yellow.  Change dressing tues/thurs/ sat     Continous -125 mmhg.      Please try to consume 3-4 servings of protein (30g) each day.   - Each serving of protein should contain about 30 mg protein.   - Good sources of protein include, lean meats (fish, chicken, etc), eggs, dairy products (yogurt, cheese), tofu, legumes (chickpeas, lentils, peas, black beans), nuts (cashew, walnut, peanuts, etc), & quinoa.      If you develop fever, chills, nausea or vomiting, increase in drainage or foul smelling drainage go to the closest emergency department for evaluation.    C[place]ister was not changed this visit at [facility] please change by Sunday.     Standing Status:   Future     Expiration Date:   7/15/2025    Wound Procedure Treatment     This order was created via procedure documentation    Debridement     This order was created via procedure documentation         Kanu Tello DPM      Portions of the record may have been created with voice recognition software. Occasional wrong word or \"sound a like\" substitutions may have occurred due to the inherent limitations of voice recognition software. Read the chart carefully and recognize, using context, where substitutions have occurred.           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    Anemia    End-stage renal disease on hemodialysis ([facility])    Volume overload    Chronic gout due to renal impairment of multiple sites without tophus    Immunocompromised patient ([facility])    Ulcer of left heel with cellulitis ([facility])    Class 2 severe obesity due to excess calories with serious comorbidity and body mass index (BMI) of 35.0 to 35.9 in adult ([facility])    RPGN (rapidly progressive glomerulonephritis)    Anemia in ESRD (end-stage renal disease)  ([facility])    Chronic deep vein thrombosis (DVT) of right lower extremity ([facility])    Non healing left heel wound   [2]   Past Medical History:  Diagnosis Date    Depression     Gout     Immunocompromised patient ([facility]) 10/23/2024    Multiple open wounds of lower leg 2022    Pneumonia 10/22/2024    Problem with dialysis access ([facility]) 2024    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 HERZOG    CATARACT EXTRACTION, BILATERAL      IR BIOPSY KIDNEY RANDOM  2024    IR TEMPORARY DIALYSIS CATHETER PLACEMENT  2024    IR TUNNELED DIALYSIS CATHETER PLACEMENT  2024    IR TUNNELED DIALYSIS CATHETER PLACEMENT  2025    KNEE ARTHROSCOPY Right     x2 , meniscus repair    TONSILECTOMY AND ADNOIDECTOMY     [4]   Social History  Socioeconomic History    Marital status: /Civil Union     Spouse name: Louise    Number of children: 3    Years of education: 14   Tobacco Use    Smoking status: Former     Current packs/day: 0.00     Types: Cigarettes     Quit date:      Years since quittin.5    Smokeless tobacco: Never   Vaping Use    Vaping status:  Never Used   Substance and Sexual Activity    Alcohol use: Not Currently     Comment: occasional    Drug use: Never     Social Drivers of Health     Financial Resource Strain: Low Risk  (12/27/2023)    Overall Financial Resource Strain (CARDIA)     Difficulty of Paying Living Expenses: Not very hard   Food Insecurity: No Food Insecurity (6/24/2025)    Nursing - Inadequate Food Risk Classification     Worried About Running Out of Food in the Last Year: Never true     Ran Out of Food in the Last Year: Never true     Ran Out of Food in the Last Year: Never true   Transportation Needs: No Transportation Needs (7/3/2025)    OASIS : Transportation     Lack of Transportation (Medical): No     Lack of Transportation (Non-Medical): No     Patient Unable or Declines to Respond: No   Intimate Partner Violence: Unknown (6/24/2025)    Nursing IPS     Physically Hurt by Someone: No     Hurt or Threatened by Someone: No   Housing Stability: Unknown (6/24/2025)    Nursing: Inadequate Housing Risk Classification     Unable to Pay for Housing in the Last Year: No     Has Housing: No   [5]   Current Outpatient Medications:     allopurinol (ZYLOPRIM) 100 mg tablet, TAKE 1 TABLET BY MOUTH TWICE A DAY, Disp: 180 tablet, Rfl: 1    B Complex-C-Folic Acid (triphrocaps) 1 MG CAPS, Take 1 capsule (1 mg total) by mouth daily with dinner, Disp: 90 capsule, Rfl: 0    calcitriol (ROCALTROL) 0.25 mcg capsule, Take 1 capsule (0.25 mcg total) by mouth 3 (three) times a week, Disp: 12 capsule, Rfl: 0    calcium carbonate (OYSTER SHELL,OSCAL) 500 mg, TAKE 2 TABLETS BY MOUTH 2 TIMES A DAY WITH MEALS., Disp: 360 tablet, Rfl: 1    cholecalciferol (VITAMIN D3) 1,000 units tablet, Take 2,000 Units by mouth in the morning., Disp: , Rfl:     collagenase (SANTYL) ointment, Apply topically daily To wound of the L heel, Disp: 30 g, Rfl: 1    FLUoxetine (PROzac) 20 mg capsule, TAKE 1 CAPSULE BY MOUTH EVERY DAY, Disp: 90 capsule, Rfl: 1    hydrocortisone 2.5  % cream, Apply topically 2 (two) times a day, Disp: 3.5 g, Rfl: 0    loratadine (CLARITIN) 10 mg tablet, Take 10 mg by mouth as needed for allergies Take 1 tablet once daily as needed for allergies, Disp: , Rfl:     Magnesium 100 MG CAPS, Take 1 capsule by mouth in the morning. Not sure of dose., Disp: , Rfl:     midodrine (PROAMATINE) 2.5 mg tablet, TAKE 1 TABLET (2.5 MG TOTAL) BY MOUTH 3 TIMES A DAY BEFORE MEALS, Disp: 270 tablet, Rfl: 2    midodrine (PROAMATINE) 5 mg tablet, Take 1 tablet (5 mg total) by mouth 3 (three) times a week ADMINISTERED EVERY MON, WED AND FRI AT DIALYSIS, Disp: , Rfl:     nystatin (MYCOSTATIN) powder, Apply topically 3 (three) times a day, Disp: 60 g, Rfl: 3    pantoprazole (PROTONIX) 40 mg tablet, Take 1 tablet (40 mg total) by mouth daily, Disp: 90 tablet, Rfl: 1    warfarin (COUMADIN) 5 mg tablet, Take 1 tab q day at 6 pm, Disp: 30 tablet, Rfl: 0  [6]   Family History  Problem Relation Name Age of Onset    Diabetes Father      Diabetes Brother      Diabetes Sister

## 2025-07-10 ENCOUNTER — HOME CARE VISIT (OUTPATIENT)
Dept: HOME HEALTH SERVICES | Facility: HOME HEALTHCARE | Age: 75
End: 2025-07-10
Payer: MEDICARE

## 2025-07-10 ENCOUNTER — TELEPHONE (OUTPATIENT)
Age: 75
End: 2025-07-10

## 2025-07-10 VITALS
SYSTOLIC BLOOD PRESSURE: 106 MMHG | HEART RATE: 94 BPM | RESPIRATION RATE: 16 BRPM | TEMPERATURE: 96.9 F | DIASTOLIC BLOOD PRESSURE: 52 MMHG | OXYGEN SATURATION: 97 %

## 2025-07-10 DIAGNOSIS — F34.1 PERSISTENT DEPRESSIVE DISORDER: ICD-10-CM

## 2025-07-10 PROCEDURE — G0299 HHS/HOSPICE OF RN EA 15 MIN: HCPCS

## 2025-07-10 NOTE — TELEPHONE ENCOUNTER
Reason for call:   [x] Refill   [] Prior Auth  [] Other:     Office:   [x] PCP/Provider -   [] Specialty/Provider -     Medication: FLUoxetine (PROzac) 20 mg capsule     Dose/Frequency: TAKE 1 CAPSULE BY MOUTH EVERY DAY     Quantity: 90    Pharmacy: [facility]    Local Pharmacy   Does the patient have enough for 3 days?   [x] Yes   [] No - Send as HP to POD    Mail Away Pharmacy   Does the patient have enough for 10 days?   [] Yes   [] No - Send as HP to POD

## 2025-07-10 NOTE — TELEPHONE ENCOUNTER
Home health tested patient's INR at home and received result of 3.3    She is looking to see if any coumadin dose adjustment is needed. Patient currently takes 5mg daily in the evening. Last dose yesterday.     Please call patient/wife directly with recommendation.

## 2025-07-11 ENCOUNTER — HOME CARE VISIT (OUTPATIENT)
Dept: HOME HEALTH SERVICES | Facility: HOME HEALTHCARE | Age: 75
End: 2025-07-11
Payer: MEDICARE

## 2025-07-11 ENCOUNTER — ANTICOAG VISIT (OUTPATIENT)
Dept: FAMILY MEDICINE CLINIC | Facility: CLINIC | Age: 75
End: 2025-07-11

## 2025-07-11 ENCOUNTER — TELEPHONE (OUTPATIENT)
Dept: FAMILY MEDICINE CLINIC | Facility: CLINIC | Age: 75
End: 2025-07-11

## 2025-07-11 DIAGNOSIS — I82.401 ACUTE DEEP VEIN THROMBOSIS (DVT) OF RIGHT LOWER EXTREMITY, UNSPECIFIED VEIN (HCC): Primary | ICD-10-CM

## 2025-07-11 DIAGNOSIS — M1A.0790 CHRONIC IDIOPATHIC GOUT INVOLVING TOE WITHOUT TOPHUS, UNSPECIFIED LATERALITY: ICD-10-CM

## 2025-07-11 LAB — INR PPP: 3.3 (ref 0.85–1.19)

## 2025-07-11 NOTE — PROGRESS NOTES
Called wife and made aware to hold coumadin today and then tomorrow continue coumadin 5mg daily. Will have [facility] draw INR next Thursday. Message sent to [facility]

## 2025-07-11 NOTE — TELEPHONE ENCOUNTER
Called patient's wife and advised to hold coumadin today and then continue coumadin 5mg daily. Will have [facility] recheck INR next Thursday.

## 2025-07-12 ENCOUNTER — HOME CARE VISIT (OUTPATIENT)
Dept: HOME HEALTH SERVICES | Facility: HOME HEALTHCARE | Age: 75
End: 2025-07-12
Payer: MEDICARE

## 2025-07-12 VITALS
OXYGEN SATURATION: 94 % | RESPIRATION RATE: 18 BRPM | TEMPERATURE: 97.3 F | SYSTOLIC BLOOD PRESSURE: 118 MMHG | DIASTOLIC BLOOD PRESSURE: 58 MMHG | HEART RATE: 64 BPM

## 2025-07-12 PROCEDURE — G0299 HHS/HOSPICE OF RN EA 15 MIN: HCPCS

## 2025-07-14 ENCOUNTER — HOME CARE VISIT (OUTPATIENT)
Dept: HOME HEALTH SERVICES | Facility: HOME HEALTHCARE | Age: 75
End: 2025-07-14
Payer: MEDICARE

## 2025-07-14 RX ORDER — ALLOPURINOL 100 MG/1
100 TABLET ORAL 2 TIMES DAILY
Qty: 180 TABLET | Refills: 1 | Status: SHIPPED | OUTPATIENT
Start: 2025-07-14

## 2025-07-15 ENCOUNTER — OFFICE VISIT (OUTPATIENT)
Dept: FAMILY MEDICINE CLINIC | Facility: CLINIC | Age: 75
End: 2025-07-15
Payer: MEDICARE

## 2025-07-15 ENCOUNTER — HOME CARE VISIT (OUTPATIENT)
Dept: HOME HEALTH SERVICES | Facility: HOME HEALTHCARE | Age: 75
End: 2025-07-15
Payer: MEDICARE

## 2025-07-15 ENCOUNTER — OFFICE VISIT (OUTPATIENT)
Facility: HOSPITAL | Age: 75
End: 2025-07-15
Payer: MEDICARE

## 2025-07-15 VITALS
HEART RATE: 68 BPM | DIASTOLIC BLOOD PRESSURE: 66 MMHG | RESPIRATION RATE: 20 BRPM | SYSTOLIC BLOOD PRESSURE: 110 MMHG | TEMPERATURE: 97.8 F

## 2025-07-15 VITALS
HEART RATE: 90 BPM | TEMPERATURE: 97.8 F | OXYGEN SATURATION: 96 % | DIASTOLIC BLOOD PRESSURE: 58 MMHG | SYSTOLIC BLOOD PRESSURE: 108 MMHG

## 2025-07-15 DIAGNOSIS — Z12.5 SCREENING FOR PROSTATE CANCER: ICD-10-CM

## 2025-07-15 DIAGNOSIS — I82.401 ACUTE DEEP VEIN THROMBOSIS (DVT) OF RIGHT LOWER EXTREMITY, UNSPECIFIED VEIN (HCC): Primary | ICD-10-CM

## 2025-07-15 DIAGNOSIS — N18.6 ANEMIA IN ESRD (END-STAGE RENAL DISEASE)  (HCC): ICD-10-CM

## 2025-07-15 DIAGNOSIS — E66.812 CLASS 2 DRUG-INDUCED OBESITY WITH SERIOUS COMORBIDITY AND BODY MASS INDEX (BMI) OF 37.0 TO 37.9 IN ADULT: ICD-10-CM

## 2025-07-15 DIAGNOSIS — E78.2 MIXED HYPERLIPIDEMIA: ICD-10-CM

## 2025-07-15 DIAGNOSIS — D63.1 ANEMIA IN ESRD (END-STAGE RENAL DISEASE)  (HCC): ICD-10-CM

## 2025-07-15 DIAGNOSIS — E66.1 CLASS 2 DRUG-INDUCED OBESITY WITH SERIOUS COMORBIDITY AND BODY MASS INDEX (BMI) OF 37.0 TO 37.9 IN ADULT: ICD-10-CM

## 2025-07-15 DIAGNOSIS — N01.9 RAPIDLY PROGRESSIVE GLOMERULONEPHRITIS WITH ANTI-GBM ANTIBODIES: Chronic | ICD-10-CM

## 2025-07-15 DIAGNOSIS — N40.0 BPH WITHOUT OBSTRUCTION/LOWER URINARY TRACT SYMPTOMS: ICD-10-CM

## 2025-07-15 DIAGNOSIS — L89.623 PRESSURE INJURY OF LEFT HEEL, STAGE 3 (HCC): Primary | ICD-10-CM

## 2025-07-15 DIAGNOSIS — S91.302A NON HEALING LEFT HEEL WOUND: ICD-10-CM

## 2025-07-15 DIAGNOSIS — I10 ESSENTIAL HYPERTENSION: ICD-10-CM

## 2025-07-15 PROBLEM — E66.01 CLASS 2 SEVERE OBESITY DUE TO EXCESS CALORIES WITH SERIOUS COMORBIDITY AND BODY MASS INDEX (BMI) OF 35.0 TO 35.9 IN ADULT (HCC): Chronic | Status: RESOLVED | Noted: 2025-05-14 | Resolved: 2025-07-15

## 2025-07-15 PROCEDURE — 11042 DBRDMT SUBQ TIS 1ST 20SQCM/<: CPT

## 2025-07-15 PROCEDURE — 97605 NEG PRS WND THER DME<=50SQCM: CPT

## 2025-07-15 PROCEDURE — G2211 COMPLEX E/M VISIT ADD ON: HCPCS | Performed by: INTERNAL MEDICINE

## 2025-07-15 PROCEDURE — 99215 OFFICE O/P EST HI 40 MIN: CPT | Performed by: INTERNAL MEDICINE

## 2025-07-15 RX ORDER — LIDOCAINE 40 MG/G
CREAM TOPICAL ONCE
Status: COMPLETED | OUTPATIENT
Start: 2025-07-15 | End: 2025-07-15

## 2025-07-15 RX ADMIN — LIDOCAINE 1 APPLICATION: 40 CREAM TOPICAL at 14:00

## 2025-07-15 NOTE — PROGRESS NOTES
Wound Procedure Treatment Left Heel    Performed by: Danyelle hWiteside LPN  Authorized by: Kanu Tello DPM  Associated wounds:   Wound 05/14/25 Pressure Injury Heel Left    Wound cleansed with:  NSS   Applied to periwound:  Skin prep   Applied primary dressing:  Calcium alginate and Other   Applied secondary dressing:  ABD   Dressing secured with:  Josse and Tape   Comments:  Wound vac- one black foam, drape to periwound, Calcium alginate to periwound maceration. 125mmHg continuous suction

## 2025-07-15 NOTE — PROGRESS NOTES
Negative Pressure Wound Therapy    Performed by: Danyelle Whiteside LPN  Authorized by: Kanu Tello DPM    Universal Protocol:  Consent: Verbal consent obtained. Written consent obtained  Consent given by: patient  Patient understanding: patient states understanding of the procedure being performed  Patient identity confirmed: verbally with patient    Performed by::  Clinician  Type::  KCI  Coverage Size (sq cm)::  2.1  Pressure Type::  Constant  Pressure Setting::  125 mmHG  Sponge Type::  Black  Sponge Size:  Medium  Total Number Of Sponges Removed Prior To Application::  1  Total Number Of Sponges Used For This Application::  1

## 2025-07-15 NOTE — PROGRESS NOTES
Patient ID: Ramos Rosenthal is a 74 y.o. male Date of Birth 1950     Diagnosis:  1. Pressure injury of left heel, stage 3 (HCC)  -     lidocaine (LMX) 4 % cream  -     Wound cleansing and dressings Left Heel; Future  -     Debridement  -     Negative Pressure Wound Therapy  -     Wound Procedure Treatment Left Heel       Diagnosis ICD-10-CM Associated Orders   1. Pressure injury of left heel, stage 3 (HCC)  L89.623 lidocaine (LMX) 4 % cream     Wound cleansing and dressings Left Heel     Debridement     Negative Pressure Wound Therapy     Wound Procedure Treatment Left Heel             Assessment & Plan:  F/u of Left medial heel wound. Pressure injury, presently stage 3, measuring smaller in size today. There is granulation tissue present along wound periphery. Central portion of the wound with spongy fibrotic tissue. No obvious bone exposure. Drainage is small. No periwound erythema or lymphangitic streaking.   Surgical debridement performed today, see procedure note.  Wound VAC dressing applied today.  Continue with Prevalon boot and pillow for strict offloading.  MRI from 5/15/2025 without evidence of osteomyelitis.  Given lack of clinical infection on exam we will hold off on further imaging at this time  Recent arterial studies show diffuse femoral-popliteal disease with 50 to 75% stenosis of the anterior tibial artery and great toe pressure of 62 mmHg.  Patient was seen by vascular on 6/26/2025 in the hospital setting.  Per vascular note: given that the patient's toe pressures are above healing, vascular did not believe intervention was needed at that time.  Due to patient's nonambulatory status he is not an appropriate candidate for any open revascularization.  If there is continued nonhealing or worsening of the wound patient can follow-up with vascular and discuss potentially an angiogram.    If the patient notices any systemic signs of infection including but not limited to fever, chills, nausea,  vomiting or significant worsening of wound with increased drainage, redness or streaking up the foot or leg the patient should notify the office or present to the emergency room.      If wound debridement was completed today this was done in an attempt to promote healing, decrease risk of infection and for limb salvage efforts.    Goal of treatment: wound healing     Efforts to decrease pressure on the wound(s), evaluation and discussion of nutritional status, peripheral vascular status, and infection control have all been addressed with this patient.    Return in about 1 week (around 7/22/2025) for Wound Assessment.      Chief Complaint   Patient presents with    Follow Up Wound Care Visit     Left Heel wound--Wound Vac intact upon arrival today           Subjective:   Patient presents for follow-up of left medial heel pressure ulcer, stage III.  Patient has had wound VAC for 2 weeks now.  Currently on Coumadin for acute right lower extremity DVT.        The following portions of the patient's history were reviewed and updated as appropriate:   Problem List[1]  Past Medical History[2]  Past Surgical History[3]  Social History[4]   Current Medications[5]  Family History[6]     Review of Systems    Constitutional: Negative for fever or chills.   Respiratory: Negative for shortness of breath.    Cardiovascular: Negative for chest pain.  Gastrointestinal: Negative for nausea and vomiting.   Musculoskeletal: No calf pain    Allergies:  Chlorhexidine, Ciprofloxacin, Gabapentin, Other, and Penicillins      Objective:  /66   Pulse 68   Temp 97.8 °F (36.6 °C)   Resp 20     Physical Exam    Musculoskeletal:        Feet:                  Wound 05/14/25 Pressure Injury Heel Left (Active)   Wound Image   07/15/25 1449   Wound Description Granulation tissue;Slough;Yellow 07/15/25 1358   Pressure Injury Stage 3 07/15/25 1358   Non-staged Wound Description Full thickness 07/08/25 1340   Wound Length (cm) 1.8 cm 07/15/25  "1358   Wound Width (cm) 1.5 cm 07/15/25 1358   Wound Depth (cm) 1 cm 07/15/25 1358   Wound Surface Area (cm^2) 2.12 cm^2 07/15/25 1358   Wound Volume (cm^3) 1.414 cm^3 07/15/25 1358   Calculated Wound Volume (cm^3) 2.7 cm^3 07/15/25 1358   Change in Wound Size % 65.12 07/15/25 1358   Drainage Amount Small 07/15/25 1358   Drainage Description Foul smelling;Brown 07/15/25 1358   Antonette-wound Assessment Maceration;Pink 07/15/25 1358   Treatments Irrigation with NSS 07/15/25 1358   Dressing Wound V.A.C. 07/15/25 1358   Wound packed? Yes 07/15/25 1358   Packing- # inserted 2 07/08/25 1340   Packing- # removed 1 07/15/25 1358   Dressing Status Intact;New drainage 07/15/25 1358           Debridement     Date/Time: 7/15/2025 1:45 PM    Universal Protocol:  Consent: Verbal consent obtained  Risks and benefits: risks, benefits and alternatives were discussed  Consent given by: patient  Time out: Immediately prior to procedure a \"time out\" was called to verify the correct patient, procedure, equipment, support staff and site/side marked as required.  Timeout called at: 7/15/2025 2:53 PM.  Patient understanding: patient states understanding of the procedure being performed  Patient identity confirmed: verbally with patient    Debridement Details  Performed by: physician  Debridement type: surgical  Level of debridement: subcutaneous tissue  Pain control: lidocaine 4%    Post-debridement measurements  Length (cm): 1.8  Width (cm): 1.5  Depth (cm): 1  Percent debrided: 80%  Surface Area (cm^2): 2.12  Area Debrided (cm^2): 1.7  Volume (cm^3): 1.41    Tissue and other material debrided: subcutaneous tissue  Instrument(s) utilized: curette  Bleeding: medium  Hemostasis obtained with: pressure and silver nitrate  Response to treatment: procedure was tolerated well        Results from last 6 Months   Lab Units 05/14/25  6132   WOUND CULTURE  4+ Growth of Aeromonas hydrophila complex*  4+ Growth of Escherichia coli ESBL*  4+ Growth of " Aeromonas hydrophila complex*         Wound Instructions:  Orders Placed This Encounter   Procedures    Wound cleansing and dressings Left Heel     Left heel  Wash your hands with soap and water.  Remove old dressing, discard into plastic bag and place in trash.  Cleanse the wound with Vashe prior to applying a clean dressing. Do not use tissue or cotton balls. Do not scrub the wound. Pat dry using gauze.        -----**Apply skin prep and calcium alginate to skin surrounding wound-----    Apply black foam x1 piece to the left heel wound.  Cover with drape. Bridge to medial leg off  all pressure points. Apply suction pad   Secure with tubifast yellow.  Change dressing tues/thurs/ sat     Continous -125 mmhg.      Please try to consume 3-4 servings of protein (30g) each day.   - Each serving of protein should contain about 30 mg protein.   - Good sources of protein include, lean meats (fish, chicken, etc), eggs, dairy products (yogurt, cheese), tofu, legumes (chickpeas, lentils, peas, black beans), nuts (cashew, walnut, peanuts, etc), & quinoa.      If you develop fever, chills, nausea or vomiting, increase in drainage or foul smelling drainage go to the closest emergency department for evaluation.          Off-loading Instructions:    Keep weight and pressure off wound at all times. Wear off-loading device as directed by your physician. Put on immediately when rising in the morning and remove when going to bed., and Turn every 2 hours. Avoid position directing pressure to Wound site.     Standing Status:   Future     Expiration Date:   7/22/2025    Debridement     This order was created via procedure documentation    Negative Pressure Wound Therapy     This order was created via procedure documentation    Wound Procedure Treatment Left Heel     This order was created via procedure documentation         Kanu Tello DPM      Portions of the record may have been created with voice recognition software. Occasional  "wrong word or \"sound a like\" substitutions may have occurred due to the inherent limitations of voice recognition software. Read the chart carefully and recognize, using context, where substitutions have occurred.           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    Anemia    End-stage renal disease on hemodialysis ([facility])    Volume overload    Chronic gout due to renal impairment of multiple sites without tophus    Immunocompromised patient ([facility])    Ulcer of left heel with cellulitis ([facility])    RPGN (rapidly progressive glomerulonephritis)    Anemia in ESRD (end-stage renal disease)  ([facility])    Chronic deep vein thrombosis (DVT) of right lower extremity ([facility])    Non healing left heel wound    Acute deep vein thrombosis (DVT) of right lower extremity, unspecified vein ([facility])    BPH without obstruction/lower urinary tract symptoms    Class 2 drug-induced obesity with serious comorbidity and body mass index (BMI) of 37.0 to 37.9 in adult   [2]   Past Medical History:  Diagnosis Date    Depression     Gout     Immunocompromised patient ([facility]) 10/23/2024    Multiple open wounds of lower leg 09/30/2022    Pneumonia 10/22/2024    Problem with dialysis access ([facility]) 09/21/2024    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 HERZOG    CATARACT " EXTRACTION, BILATERAL      IR BIOPSY KIDNEY RANDOM  2024    IR TEMPORARY DIALYSIS CATHETER PLACEMENT  2024    IR TUNNELED DIALYSIS CATHETER PLACEMENT  2024    IR TUNNELED DIALYSIS CATHETER PLACEMENT  2025    KNEE ARTHROSCOPY Right     x2 , meniscus repair    TONSILECTOMY AND ADNOIDECTOMY     [4]   Social History  Socioeconomic History    Marital status: /Civil Union     Spouse name: Louise    Number of children: 3    Years of education: 14   Tobacco Use    Smoking status: Former     Current packs/day: 0.00     Types: Cigarettes     Quit date:      Years since quittin.5    Smokeless tobacco: Never   Vaping Use    Vaping status: Never Used   Substance and Sexual Activity    Alcohol use: Not Currently     Comment: occasional    Drug use: Never     Social Drivers of Health     Financial Resource Strain: Low Risk  (2023)    Overall Financial Resource Strain (CARDIA)     Difficulty of Paying Living Expenses: Not very hard   Food Insecurity: No Food Insecurity (2025)    Nursing - Inadequate Food Risk Classification     Worried About Running Out of Food in the Last Year: Never true     Ran Out of Food in the Last Year: Never true     Ran Out of Food in the Last Year: Never true   Transportation Needs: No Transportation Needs (7/3/2025)    [place]AS[place] : Transportation     Lack of Transportation (Medical): No     Lack of Transportation (Non-Medical): No     Patient Unable or Declines to Respond: No   Intimate Partner Violence: Unknown (2025)    Nursing IPS     Physically Hurt by Someone: No     Hurt or Threatened by Someone: No   Housing Stability: Unknown (2025)    Nursing: Inadequate Housing Risk Classification     Unable to Pay for Housing in the Last Year: No     Has Housing: No   [5]   Current Outpatient Medications:     allopurinol (ZYLOPRIM) 100 mg tablet, TAKE 1 TABLET BY MOUTH TWICE A DAY, Disp: 180 tablet, Rfl: 1    B Complex-C-Folic Acid (triphrocaps) 1 MG  CAPS, Take 1 capsule (1 mg total) by mouth daily with dinner, Disp: 90 capsule, Rfl: 0    calcitriol (ROCALTROL) 0.25 mcg capsule, Take 1 capsule (0.25 mcg total) by mouth 3 (three) times a week (Patient not taking: Reported on 7/15/2025), Disp: 12 capsule, Rfl: 0    calcium carbonate (OYSTER SHELL,OSCAL) 500 mg, TAKE 2 TABLETS BY MOUTH 2 TIMES A DAY WITH MEALS., Disp: 360 tablet, Rfl: 1    cholecalciferol (VITAMIN D3) 1,000 units tablet, Take 2,000 Units by mouth in the morning., Disp: , Rfl:     collagenase (SANTYL) ointment, Apply topically daily To wound of the L heel, Disp: 30 g, Rfl: 1    FLUoxetine (PROzac) 20 mg capsule, Take 1 capsule (20 mg total) by mouth daily, Disp: 90 capsule, Rfl: 3    hydrocortisone 2.5 % cream, Apply topically 2 (two) times a day, Disp: 3.5 g, Rfl: 0    loratadine (CLARITIN) 10 mg tablet, Take 10 mg by mouth as needed for allergies Take 1 tablet once daily as needed for allergies, Disp: , Rfl:     Magnesium 100 MG CAPS, Take 1 capsule by mouth in the morning. Not sure of dose., Disp: , Rfl:     midodrine (PROAMATINE) 2.5 mg tablet, TAKE 1 TABLET (2.5 MG TOTAL) BY MOUTH 3 TIMES A DAY BEFORE MEALS, Disp: 270 tablet, Rfl: 2    midodrine (PROAMATINE) 5 mg tablet, Take 1 tablet (5 mg total) by mouth 3 (three) times a week ADMINISTERED EVERY MON, WED AND FRI AT DIALYSIS, Disp: , Rfl:     nystatin (MYCOSTATIN) powder, Apply topically 3 (three) times a day, Disp: 60 g, Rfl: 3    pantoprazole (PROTONIX) 40 mg tablet, Take 1 tablet (40 mg total) by mouth daily, Disp: 90 tablet, Rfl: 1    warfarin (COUMADIN) 5 mg tablet, Take 1 tab q day at 6 pm, Disp: 30 tablet, Rfl: 0  No current facility-administered medications for this visit.  [6]   Family History  Problem Relation Name Age of Onset    Diabetes Father      Diabetes Brother      Diabetes Sister

## 2025-07-17 ENCOUNTER — APPOINTMENT (OUTPATIENT)
Age: 75
End: 2025-07-17
Attending: INTERNAL MEDICINE
Payer: MEDICARE

## 2025-07-17 ENCOUNTER — HOME CARE VISIT (OUTPATIENT)
Dept: HOME HEALTH SERVICES | Facility: HOME HEALTHCARE | Age: 75
End: 2025-07-17
Payer: MEDICARE

## 2025-07-17 VITALS
SYSTOLIC BLOOD PRESSURE: 120 MMHG | OXYGEN SATURATION: 98 % | DIASTOLIC BLOOD PRESSURE: 72 MMHG | RESPIRATION RATE: 16 BRPM | HEART RATE: 76 BPM | TEMPERATURE: 97.3 F

## 2025-07-17 DIAGNOSIS — I82.401 ACUTE DEEP VEIN THROMBOSIS (DVT) OF RIGHT LOWER EXTREMITY, UNSPECIFIED VEIN (HCC): ICD-10-CM

## 2025-07-17 LAB
INR PPP: 3.52 (ref 0.85–1.19)
PROTHROMBIN TIME: 34.8 SECONDS (ref 12.3–15)

## 2025-07-17 PROCEDURE — 85610 PROTHROMBIN TIME: CPT

## 2025-07-17 PROCEDURE — G0299 HHS/HOSPICE OF RN EA 15 MIN: HCPCS

## 2025-07-17 PROCEDURE — 36415 COLL VENOUS BLD VENIPUNCTURE: CPT

## 2025-07-19 ENCOUNTER — HOME CARE VISIT (OUTPATIENT)
Dept: HOME HEALTH SERVICES | Facility: HOME HEALTHCARE | Age: 75
End: 2025-07-19
Payer: MEDICARE

## 2025-07-19 VITALS
HEART RATE: 76 BPM | SYSTOLIC BLOOD PRESSURE: 124 MMHG | TEMPERATURE: 97.8 F | OXYGEN SATURATION: 96 % | DIASTOLIC BLOOD PRESSURE: 58 MMHG | RESPIRATION RATE: 18 BRPM

## 2025-07-19 PROCEDURE — G0299 HHS/HOSPICE OF RN EA 15 MIN: HCPCS

## 2025-07-21 ENCOUNTER — HOME CARE VISIT (OUTPATIENT)
Dept: HOME HEALTH SERVICES | Facility: HOME HEALTHCARE | Age: 75
End: 2025-07-21
Payer: MEDICARE

## 2025-07-21 ENCOUNTER — ANTICOAG VISIT (OUTPATIENT)
Dept: FAMILY MEDICINE CLINIC | Facility: CLINIC | Age: 75
End: 2025-07-21

## 2025-07-21 DIAGNOSIS — I82.501 CHRONIC DEEP VEIN THROMBOSIS (DVT) OF RIGHT LOWER EXTREMITY, UNSPECIFIED VEIN (HCC): Primary | ICD-10-CM

## 2025-07-22 ENCOUNTER — OFFICE VISIT (OUTPATIENT)
Facility: HOSPITAL | Age: 75
End: 2025-07-22
Payer: MEDICARE

## 2025-07-22 VITALS
TEMPERATURE: 97.3 F | DIASTOLIC BLOOD PRESSURE: 51 MMHG | RESPIRATION RATE: 18 BRPM | SYSTOLIC BLOOD PRESSURE: 85 MMHG | HEART RATE: 70 BPM

## 2025-07-22 DIAGNOSIS — L89.623 PRESSURE INJURY OF LEFT HEEL, STAGE 3 (HCC): Primary | ICD-10-CM

## 2025-07-22 PROCEDURE — 11042 DBRDMT SUBQ TIS 1ST 20SQCM/<: CPT

## 2025-07-22 PROCEDURE — 97605 NEG PRS WND THER DME<=50SQCM: CPT

## 2025-07-22 NOTE — PROGRESS NOTES
Patient ID: Ramos Rosenthal is a 74 y.o. male Date of Birth 1950     Diagnosis:  1. Pressure injury of left heel, stage 3 (HCC)  -     Wound cleansing and dressings Left Heel; Future  -     Wound Procedure Treatment Left Heel  -     Negative Pressure Wound Therapy       Diagnosis ICD-10-CM Associated Orders   1. Pressure injury of left heel, stage 3 (HCC)  L89.623 Wound cleansing and dressings Left Heel     Wound Procedure Treatment Left Heel     Negative Pressure Wound Therapy             Assessment & Plan:  F/u of Left medial heel wound. Pressure injury, presently stage 3, measuring smaller in size today. There is granulation tissue present along wound periphery. Central portion of the wound with spongy fibrotic tissue. No obvious bone exposure. Drainage is small. No periwound erythema or lymphangitic streaking.   Surgical debridement performed today, see procedure note.  Wound VAC dressing applied today.  Continue with Prevalon boot and pillow for strict offloading.  MRI from 5/15/2025 without evidence of osteomyelitis.  Given lack of clinical infection on exam we will hold off on further imaging at this time  Recent arterial studies show diffuse femoral-popliteal disease with 50 to 75% stenosis of the anterior tibial artery and great toe pressure of 62 mmHg.  Patient was seen by vascular on 6/26/2025 in the hospital setting.  Per vascular note: given that the patient's toe pressures are above healing, vascular did not believe intervention was needed at that time.  Due to patient's nonambulatory status he is not an appropriate candidate for any open revascularization.  If there is continued nonhealing or worsening of the wound patient can follow-up with vascular and discuss potentially an angiogram.    If the patient notices any systemic signs of infection including but not limited to fever, chills, nausea, vomiting or significant worsening of wound with increased drainage, redness or streaking up the foot  or leg the patient should notify the office or present to the emergency room.      If wound debridement was completed today this was done in an attempt to promote healing, decrease risk of infection and for limb salvage efforts.    Goal of treatment: wound healing     Efforts to decrease pressure on the wound(s), evaluation and discussion of nutritional status, peripheral vascular status, and infection control have all been addressed with this patient.    Return in about 1 week (around 7/29/2025).      Chief Complaint   Patient presents with    Follow Up Wound Care Visit     Left heel wound vac           Subjective:   Patient presents for follow-up of left medial heel pressure ulcer, stage III.  Patient has had wound VAC for 3 weeks now.  Currently on Coumadin for acute right lower extremity DVT.  Overall feels well today.  He is accompanied by his wife.        The following portions of the patient's history were reviewed and updated as appropriate:   Problem List[1]  Past Medical History[2]  Past Surgical History[3]  Social History[4]   Current Medications[5]  Family History[6]     Review of Systems    Constitutional: Negative for fever or chills.   Respiratory: Negative for shortness of breath.    Cardiovascular: Negative for chest pain.  Gastrointestinal: Negative for nausea and vomiting.   Musculoskeletal: No calf pain    Allergies:  Chlorhexidine, Ciprofloxacin, Gabapentin, Other, and Penicillins      Objective:  BP (!) 85/51   Pulse 70   Temp (!) 97.3 °F (36.3 °C)   Resp 18     Physical Exam    Musculoskeletal:        Feet:              Wound 05/14/25 Pressure Injury Heel Left (Active)   Wound Image   07/22/25 1350   Wound Description Granulation tissue;Slough;Yellow;Gray 07/22/25 1341   Pressure Injury Stage 3 07/22/25 1341   Non-staged Wound Description Full thickness 07/08/25 1340   Wound Length (cm) 1.4 cm 07/22/25 1341   Wound Width (cm) 1.5 cm 07/22/25 1341   Wound Depth (cm) 0.6 cm 07/22/25 1341  "  Wound Surface Area (cm^2) 1.65 cm^2 07/22/25 1341   Wound Volume (cm^3) 0.66 cm^3 07/22/25 1341   Calculated Wound Volume (cm^3) 1.26 cm^3 07/22/25 1341   Change in Wound Size % 83.72 07/22/25 1341   Drainage Amount Small 07/22/25 1341   Drainage Description Foul smelling;Brown 07/22/25 1341   Antonette-wound Assessment Maceration;Pink 07/22/25 1341   Treatments Irrigation with NSS 07/15/25 1358   Dressing Wound V.A.C. 07/22/25 1341   Wound packed? Yes 07/22/25 1341   Packing- # inserted 2 07/08/25 1340   Packing- # removed 2 07/22/25 1341   Dressing Status Intact;New drainage 07/22/25 1341         Debridement     Date/Time: 7/22/2025 1:30 PM    Universal Protocol:  Consent: Verbal consent obtained  Risks and benefits: risks, benefits and alternatives were discussed  Consent given by: patient  Time out: Immediately prior to procedure a \"time out\" was called to verify the correct patient, procedure, equipment, support staff and site/side marked as required.  Timeout called at: 7/22/2025 3:36 PM.  Patient understanding: patient states understanding of the procedure being performed  Patient identity confirmed: verbally with patient    Debridement Details  Performed by: physician  Debridement type: surgical  Level of debridement: subcutaneous tissue  Pain control: lidocaine 4%    Post-debridement measurements  Length (cm): 1.5  Width (cm): 1.8  Depth (cm): 0.7  Percent debrided: 100%  Surface Area (cm^2): 2.12  Area Debrided (cm^2): 2.12  Volume (cm^3): 0.99    Tissue and other material debrided: subcutaneous tissue  Instrument(s) utilized: curette  Bleeding: medium  Hemostasis obtained with: pressure and silver nitrate  Response to treatment: procedure was tolerated well        Results from last 6 Months   Lab Units 05/14/25  2341   WOUND CULTURE  4+ Growth of Aeromonas hydrophila complex*  4+ Growth of Escherichia coli ESBL*  4+ Growth of Aeromonas hydrophila complex*         Wound Instructions:  Orders Placed This " "Encounter   Procedures    Wound cleansing and dressings Left Heel     Left heel  Wash your hands with soap and water.  Remove old dressing, discard into plastic bag and place in trash.  Cleanse the wound with Vashe prior to applying a clean dressing. Do not use tissue or cotton balls. Do not scrub the wound. Pat dry using gauze.        -----**Apply skin prep and calcium alginate to skin surrounding wound-----     Apply black foam x1 piece to the left heel wound.  Cover with drape. Bridge to medial leg off  all pressure points. Apply suction pad   Secure with tubifast yellow.  Change dressing tues/thurs/ sat     Continous -125 mmhg.      Please try to consume 3-4 servings of protein (30g) each day.   - Each serving of protein should contain about 30 mg protein.   - Good sources of protein include, lean meats (fish, chicken, etc), eggs, dairy products (yogurt, cheese), tofu, legumes (chickpeas, lentils, peas, black beans), nuts (cashew, walnut, peanuts, etc), & quinoa.      If you develop fever, chills, nausea or vomiting, increase in drainage or foul smelling drainage go to the closest emergency department for evaluation.              Off-loading Instructions:     Keep weight and pressure off wound at all times. Wear off-loading device as directed by your physician. Put on immediately when rising in the morning and remove when going to bed., and Turn every 2 hours. Avoid position directing pressure to Wound site     Standing Status:   Future     Expiration Date:   7/29/2025    Wound Procedure Treatment Left Heel     This order was created via procedure documentation    Negative Pressure Wound Therapy     This order was created via procedure documentation         Kanu Tello DPM      Portions of the record may have been created with voice recognition software. Occasional wrong word or \"sound a like\" substitutions may have occurred due to the inherent limitations of voice recognition software. Read the chart " carefully and recognize, using context, where substitutions have occurred.             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    Anemia    End-stage renal disease on hemodialysis ([facility])    Volume overload    Chronic gout due to renal impairment of multiple sites without tophus    Immunocompromised patient ([facility])    Ulcer of left heel with cellulitis ([facility])    RPGN (rapidly progressive glomerulonephritis)    Anemia in ESRD (end-stage renal disease)  ([facility])    Chronic deep vein thrombosis (DVT) of right lower extremity ([facility])    Non healing left heel wound    Acute deep vein thrombosis (DVT) of right lower extremity, unspecified vein ([facility])    BPH without obstruction/lower urinary tract symptoms    Class 2 drug-induced obesity with serious comorbidity and body mass index (BMI) of 37.0 to 37.9 in adult    Chronic deep vein thrombosis (DVT) of right lower extremity, unspecified vein ([facility])   [2]   Past Medical History:  Diagnosis Date    Depression     Gout     Immunocompromised patient ([facility]) 10/23/2024    Multiple open wounds of lower leg 09/30/2022    Pneumonia 10/22/2024    Problem with dialysis access ([facility]) 09/21/2024    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 HERZOG    CATARACT EXTRACTION, BILATERAL      IR BIOPSY KIDNEY RANDOM   2024    IR TEMPORARY DIALYSIS CATHETER PLACEMENT  2024    IR TUNNELED DIALYSIS CATHETER PLACEMENT  2024    IR TUNNELED DIALYSIS CATHETER PLACEMENT  2025    KNEE ARTHROSCOPY Right     x2 , meniscus repair    TONSILECTOMY AND ADNOIDECTOMY     [4]   Social History  Socioeconomic History    Marital status: /Civil Union     Spouse name: Louise    Number of children: 3    Years of education: 14   Tobacco Use    Smoking status: Former     Current packs/day: 0.00     Types: Cigarettes     Quit date:      Years since quittin.5    Smokeless tobacco: Never   Vaping Use    Vaping status: Never Used   Substance and Sexual Activity    Alcohol use: Not Currently     Comment: occasional    Drug use: Never     Social Drivers of Health     Financial Resource Strain: Low Risk  (2023)    Overall Financial Resource Strain (CARDIA)     Difficulty of Paying Living Expenses: Not very hard   Food Insecurity: No Food Insecurity (2025)    Nursing - Inadequate Food Risk Classification     Worried About Running Out of Food in the Last Year: Never true     Ran Out of Food in the Last Year: Never true     Ran Out of Food in the Last Year: Never true   Transportation Needs: No Transportation Needs (7/3/2025)    OASIS : Transportation     Lack of Transportation (Medical): No     Lack of Transportation (Non-Medical): No     Patient Unable or Declines to Respond: No   Intimate Partner Violence: Unknown (2025)    Nursing IPS     Physically Hurt by Someone: No     Hurt or Threatened by Someone: No   Housing Stability: Unknown (2025)    Nursing: Inadequate Housing Risk Classification     Unable to Pay for Housing in the Last Year: No     Has Housing: No   [5]   Current Outpatient Medications:     allopurinol (ZYLOPRIM) 100 mg tablet, TAKE 1 TABLET BY MOUTH TWICE A DAY, Disp: 180 tablet, Rfl: 1    B Complex-C-Folic Acid (triphrocaps) 1 MG CAPS, Take 1 capsule (1 mg total) by mouth daily with  dinner, Disp: 90 capsule, Rfl: 0    calcitriol (ROCALTROL) 0.25 mcg capsule, Take 1 capsule (0.25 mcg total) by mouth 3 (three) times a week (Patient not taking: Reported on 7/15/2025), Disp: 12 capsule, Rfl: 0    calcium carbonate (OYSTER SHELL,OSCAL) 500 mg, TAKE 2 TABLETS BY MOUTH 2 TIMES A DAY WITH MEALS., Disp: 360 tablet, Rfl: 1    cholecalciferol (VITAMIN D3) 1,000 units tablet, Take 2,000 Units by mouth in the morning., Disp: , Rfl:     collagenase (SANTYL) ointment, Apply topically daily To wound of the L heel, Disp: 30 g, Rfl: 1    FLUoxetine (PROzac) 20 mg capsule, Take 1 capsule (20 mg total) by mouth daily, Disp: 90 capsule, Rfl: 3    hydrocortisone 2.5 % cream, Apply topically 2 (two) times a day (Patient taking differently: Apply 1 Application topically 2 (two) times a day. Applied to port 3 x week at dialysis), Disp: 3.5 g, Rfl: 0    loratadine (CLARITIN) 10 mg tablet, Take 10 mg by mouth as needed for allergies Take 1 tablet once daily as needed for allergies, Disp: , Rfl:     Magnesium 100 MG CAPS, Take 1 capsule by mouth in the morning. Not sure of dose., Disp: , Rfl:     midodrine (PROAMATINE) 2.5 mg tablet, TAKE 1 TABLET (2.5 MG TOTAL) BY MOUTH 3 TIMES A DAY BEFORE MEALS, Disp: 270 tablet, Rfl: 2    midodrine (PROAMATINE) 5 mg tablet, Take 1 tablet (5 mg total) by mouth 3 (three) times a week ADMINISTERED EVERY MON, WED AND FRI AT DIALYSIS, Disp: , Rfl:     nystatin (MYCOSTATIN) powder, Apply topically 3 (three) times a day (Patient taking differently: Apply 1 Application topically 3 (three) times a day. Apply under abdominal folds tid.), Disp: 60 g, Rfl: 3    pantoprazole (PROTONIX) 40 mg tablet, Take 1 tablet (40 mg total) by mouth daily, Disp: 90 tablet, Rfl: 1    warfarin (COUMADIN) 5 mg tablet, Take 1 tab q day at 6 pm (Patient taking differently: As per Epic on 7/17/25; hold coumadin tonight, then start 5 mg daily  except on M,W,F he is to take 2.5 mg and recheck INR in 1 week   Indications: Disease involving a Thrombosis or an Embolism), Disp: 30 tablet, Rfl: 0  [6]   Family History  Problem Relation Name Age of Onset    Diabetes Father      Diabetes Brother      Diabetes Sister

## 2025-07-22 NOTE — PATIENT INSTRUCTIONS
Orders Placed This Encounter   Procedures    Wound cleansing and dressings Left Heel     Left heel  Wash your hands with soap and water.  Remove old dressing, discard into plastic bag and place in trash.  Cleanse the wound with Vashe prior to applying a clean dressing. Do not use tissue or cotton balls. Do not scrub the wound. Pat dry using gauze.        -----**Apply skin prep and calcium alginate to skin surrounding wound-----     Apply black foam x1 piece to the left heel wound.  Cover with drape. Bridge to medial leg off  all pressure points. Apply suction pad   Secure with tubifast yellow.  Change dressing tues/thurs/ sat     Continous -125 mmhg.      Please try to consume 3-4 servings of protein (30g) each day.   - Each serving of protein should contain about 30 mg protein.   - Good sources of protein include, lean meats (fish, chicken, etc), eggs, dairy products (yogurt, cheese), tofu, legumes (chickpeas, lentils, peas, black beans), nuts (cashew, walnut, peanuts, etc), & quinoa.      If you develop fever, chills, nausea or vomiting, increase in drainage or foul smelling drainage go to the closest emergency department for evaluation.              Off-loading Instructions:     Keep weight and pressure off wound at all times. Wear off-loading device as directed by your physician. Put on immediately when rising in the morning and remove when going to bed., and Turn every 2 hours. Avoid position directing pressure to Wound site     Standing Status:   Future     Expiration Date:   7/29/2025

## 2025-07-22 NOTE — PROGRESS NOTES
"Wound Procedure Treatment Left Heel    Performed by: Mahnaz Suarez RN  Authorized by: Kanu Tello DPM  Associated wounds:   Wound 05/14/25 Pressure Injury Heel Left    Wound cleansed with:  Wound aggrssively cleansed with NSS and gauze   Applied to periwound:  Skin prep   Comments:  See wound vac section   Negative Pressure Wound Therapy    Performed by: Mahnaz Suarez RN  Authorized by: Kanu Tello DPM    Universal Protocol:  procedure performed by consultantRisks and benefits: risks, benefits and alternatives were discussed  Time out: Immediately prior to procedure a \"time out\" was called to verify the correct patient, procedure, equipment, support staff and site/side marked as required.  Timeout called at: 7/22/2025 1:52 PM.  Patient understanding: patient states understanding of the procedure being performed  Patient consent: the patient's understanding of the procedure matches consent given  Patient identity confirmed: verbally with patient    Performed by::  Clinician  Type::  [facility]CI  Coverage Size (sq cm)::  10  Pressure Type::  Constant  Pressure Setting::  125 mmHG  Sponge Size:  Small  Total Number Of Sponges Removed Prior To Application::  2  Total Number Of Sponges Used For This Application::  1  Comments::  Skin prep to periwound wound. clear drap to frame. black fom into wound bed and bridged to medial left leg      "

## 2025-07-23 DIAGNOSIS — I82.5Y1 CHRONIC DEEP VEIN THROMBOSIS (DVT) OF PROXIMAL VEIN OF RIGHT LOWER EXTREMITY (HCC): ICD-10-CM

## 2025-07-24 ENCOUNTER — HOME CARE VISIT (OUTPATIENT)
Dept: HOME HEALTH SERVICES | Facility: HOME HEALTHCARE | Age: 75
End: 2025-07-24
Payer: MEDICARE

## 2025-07-24 ENCOUNTER — APPOINTMENT (OUTPATIENT)
Age: 75
End: 2025-07-24
Payer: MEDICARE

## 2025-07-24 ENCOUNTER — VBI (OUTPATIENT)
Dept: ADMINISTRATIVE | Facility: OTHER | Age: 75
End: 2025-07-24

## 2025-07-24 ENCOUNTER — TRANSCRIBE ORDERS (OUTPATIENT)
Age: 75
End: 2025-07-24

## 2025-07-24 VITALS
TEMPERATURE: 97 F | DIASTOLIC BLOOD PRESSURE: 68 MMHG | HEART RATE: 72 BPM | SYSTOLIC BLOOD PRESSURE: 102 MMHG | RESPIRATION RATE: 18 BRPM | OXYGEN SATURATION: 98 %

## 2025-07-24 DIAGNOSIS — Z12.5 SPECIAL SCREENING FOR MALIGNANT NEOPLASM OF PROSTATE: ICD-10-CM

## 2025-07-24 DIAGNOSIS — Z12.5 SCREENING FOR PROSTATE CANCER: ICD-10-CM

## 2025-07-24 DIAGNOSIS — E78.2 MIXED HYPERLIPIDEMIA: ICD-10-CM

## 2025-07-24 DIAGNOSIS — I82.4Y1 DEEP VEIN THROMBOSIS (DVT) OF PROXIMAL VEIN OF RIGHT LOWER EXTREMITY, UNSPECIFIED CHRONICITY (HCC): Primary | ICD-10-CM

## 2025-07-24 LAB
CHOLEST SERPL-MCNC: 164 MG/DL (ref ?–200)
HDLC SERPL-MCNC: 47 MG/DL
INR PPP: 2.73 (ref 0.85–1.19)
LDLC SERPL CALC-MCNC: 102 MG/DL (ref 0–100)
PROTHROMBIN TIME: 28.8 SECONDS (ref 12.3–15)
PSA SERPL-MCNC: 1.79 NG/ML (ref 0–4)
TRIGL SERPL-MCNC: 74 MG/DL (ref ?–150)

## 2025-07-24 PROCEDURE — 80061 LIPID PANEL: CPT

## 2025-07-24 PROCEDURE — 85610 PROTHROMBIN TIME: CPT

## 2025-07-24 PROCEDURE — 400014 VN F/U

## 2025-07-24 PROCEDURE — G0103 PSA SCREENING: HCPCS

## 2025-07-24 PROCEDURE — 36415 COLL VENOUS BLD VENIPUNCTURE: CPT

## 2025-07-24 PROCEDURE — G0299 HHS/HOSPICE OF RN EA 15 MIN: HCPCS

## 2025-07-24 NOTE — TELEPHONE ENCOUNTER
Patient contacted to schedule Annual Wellness Visit.   Patient declined to schedule appointment.     Thank you.  Chiara Lr MA  [facility]

## 2025-07-25 ENCOUNTER — ANTICOAG VISIT (OUTPATIENT)
Dept: FAMILY MEDICINE CLINIC | Facility: CLINIC | Age: 75
End: 2025-07-25

## 2025-07-25 DIAGNOSIS — I82.501 CHRONIC DEEP VEIN THROMBOSIS (DVT) OF RIGHT LOWER EXTREMITY, UNSPECIFIED VEIN (HCC): Primary | ICD-10-CM

## 2025-07-25 RX ORDER — WARFARIN SODIUM 5 MG/1
TABLET ORAL
Qty: 30 TABLET | Refills: 0 | Status: SHIPPED | OUTPATIENT
Start: 2025-07-25

## 2025-07-25 NOTE — PROGRESS NOTES
Called patient's wife and advised to continue coumadin 5 mg daily except M-W-F coumadin 2.5mg. Will have [facility] recheck INR in one week

## 2025-07-26 ENCOUNTER — HOME CARE VISIT (OUTPATIENT)
Dept: HOME HEALTH SERVICES | Facility: HOME HEALTHCARE | Age: 75
End: 2025-07-26
Payer: MEDICARE

## 2025-07-26 VITALS
OXYGEN SATURATION: 95 % | SYSTOLIC BLOOD PRESSURE: 100 MMHG | TEMPERATURE: 97.5 F | HEART RATE: 64 BPM | RESPIRATION RATE: 16 BRPM | DIASTOLIC BLOOD PRESSURE: 50 MMHG

## 2025-07-26 PROCEDURE — G0299 HHS/HOSPICE OF RN EA 15 MIN: HCPCS

## 2025-07-28 DIAGNOSIS — I82.5Y1 CHRONIC DEEP VEIN THROMBOSIS (DVT) OF PROXIMAL VEIN OF RIGHT LOWER EXTREMITY (HCC): ICD-10-CM

## 2025-07-29 ENCOUNTER — HOME CARE VISIT (OUTPATIENT)
Dept: HOME HEALTH SERVICES | Facility: HOME HEALTHCARE | Age: 75
End: 2025-07-29
Payer: MEDICARE

## 2025-07-29 ENCOUNTER — OFFICE VISIT (OUTPATIENT)
Facility: HOSPITAL | Age: 75
End: 2025-07-29
Payer: MEDICARE

## 2025-07-29 VITALS
HEART RATE: 77 BPM | TEMPERATURE: 96.4 F | DIASTOLIC BLOOD PRESSURE: 62 MMHG | SYSTOLIC BLOOD PRESSURE: 133 MMHG | RESPIRATION RATE: 20 BRPM

## 2025-07-29 DIAGNOSIS — L89.623 PRESSURE INJURY OF LEFT HEEL, STAGE 3 (HCC): Primary | ICD-10-CM

## 2025-07-29 PROCEDURE — 11042 DBRDMT SUBQ TIS 1ST 20SQCM/<: CPT

## 2025-07-29 PROCEDURE — 97605 NEG PRS WND THER DME<=50SQCM: CPT

## 2025-07-29 RX ORDER — WARFARIN SODIUM 5 MG/1
TABLET ORAL
Qty: 90 TABLET | Refills: 0 | Status: ON HOLD | OUTPATIENT
Start: 2025-07-29

## 2025-07-31 ENCOUNTER — HOME CARE VISIT (OUTPATIENT)
Dept: HOME HEALTH SERVICES | Facility: HOME HEALTHCARE | Age: 75
End: 2025-07-31
Payer: MEDICARE

## 2025-07-31 ENCOUNTER — APPOINTMENT (OUTPATIENT)
Age: 75
End: 2025-07-31
Payer: MEDICARE

## 2025-07-31 VITALS
DIASTOLIC BLOOD PRESSURE: 60 MMHG | HEART RATE: 64 BPM | OXYGEN SATURATION: 97 % | TEMPERATURE: 97 F | RESPIRATION RATE: 18 BRPM | SYSTOLIC BLOOD PRESSURE: 104 MMHG

## 2025-07-31 DIAGNOSIS — I82.401 ACUTE DEEP VEIN THROMBOSIS (DVT) OF RIGHT LOWER EXTREMITY, UNSPECIFIED VEIN (HCC): ICD-10-CM

## 2025-07-31 DIAGNOSIS — I82.401 ACUTE DEEP VEIN THROMBOSIS (DVT) OF RIGHT LOWER EXTREMITY, UNSPECIFIED VEIN (HCC): Primary | ICD-10-CM

## 2025-07-31 LAB
INR PPP: 2.75 (ref 0.85–1.19)
PROTHROMBIN TIME: 28.9 SECONDS (ref 12.3–15)

## 2025-07-31 PROCEDURE — G0299 HHS/HOSPICE OF RN EA 15 MIN: HCPCS

## 2025-07-31 PROCEDURE — 85610 PROTHROMBIN TIME: CPT

## 2025-07-31 PROCEDURE — 36415 COLL VENOUS BLD VENIPUNCTURE: CPT

## 2025-08-01 ENCOUNTER — ANTICOAG VISIT (OUTPATIENT)
Dept: FAMILY MEDICINE CLINIC | Facility: CLINIC | Age: 75
End: 2025-08-01

## 2025-08-02 ENCOUNTER — HOME CARE VISIT (OUTPATIENT)
Dept: HOME HEALTH SERVICES | Facility: HOME HEALTHCARE | Age: 75
End: 2025-08-02
Payer: MEDICARE

## 2025-08-02 VITALS
TEMPERATURE: 97.6 F | DIASTOLIC BLOOD PRESSURE: 56 MMHG | RESPIRATION RATE: 18 BRPM | HEART RATE: 78 BPM | SYSTOLIC BLOOD PRESSURE: 112 MMHG | OXYGEN SATURATION: 98 %

## 2025-08-02 PROCEDURE — G0299 HHS/HOSPICE OF RN EA 15 MIN: HCPCS

## 2025-08-03 ENCOUNTER — HOME CARE VISIT (OUTPATIENT)
Dept: HOME HEALTH SERVICES | Facility: HOME HEALTHCARE | Age: 75
End: 2025-08-03
Payer: MEDICARE

## 2025-08-05 ENCOUNTER — OFFICE VISIT (OUTPATIENT)
Facility: HOSPITAL | Age: 75
End: 2025-08-05
Payer: MEDICARE

## 2025-08-05 VITALS
HEART RATE: 71 BPM | RESPIRATION RATE: 19 BRPM | TEMPERATURE: 97.9 F | SYSTOLIC BLOOD PRESSURE: 99 MMHG | DIASTOLIC BLOOD PRESSURE: 54 MMHG

## 2025-08-05 DIAGNOSIS — L89.623 PRESSURE INJURY OF LEFT HEEL, STAGE 3 (HCC): Primary | ICD-10-CM

## 2025-08-05 DIAGNOSIS — I73.9 PAD (PERIPHERAL ARTERY DISEASE) (HCC): ICD-10-CM

## 2025-08-05 PROCEDURE — 99214 OFFICE O/P EST MOD 30 MIN: CPT | Performed by: STUDENT IN AN ORGANIZED HEALTH CARE EDUCATION/TRAINING PROGRAM

## 2025-08-05 PROCEDURE — 99213 OFFICE O/P EST LOW 20 MIN: CPT | Performed by: STUDENT IN AN ORGANIZED HEALTH CARE EDUCATION/TRAINING PROGRAM

## 2025-08-05 RX ORDER — SODIUM HYPOCHLORITE 2.5 MG/ML
1 SOLUTION TOPICAL ONCE
Qty: 473 ML | Refills: 0 | Status: SHIPPED | OUTPATIENT
Start: 2025-08-05 | End: 2025-08-05

## 2025-08-06 ENCOUNTER — HOME CARE VISIT (OUTPATIENT)
Dept: HOME HEALTH SERVICES | Facility: HOME HEALTHCARE | Age: 75
End: 2025-08-06
Payer: MEDICARE

## 2025-08-07 ENCOUNTER — HOME CARE VISIT (OUTPATIENT)
Dept: HOME HEALTH SERVICES | Facility: HOME HEALTHCARE | Age: 75
End: 2025-08-07
Payer: MEDICARE

## 2025-08-07 ENCOUNTER — APPOINTMENT (OUTPATIENT)
Age: 75
End: 2025-08-07
Payer: MEDICARE

## 2025-08-07 VITALS
TEMPERATURE: 97.2 F | SYSTOLIC BLOOD PRESSURE: 106 MMHG | RESPIRATION RATE: 18 BRPM | HEART RATE: 74 BPM | DIASTOLIC BLOOD PRESSURE: 58 MMHG | OXYGEN SATURATION: 96 %

## 2025-08-07 DIAGNOSIS — L89.623 PRESSURE INJURY OF LEFT HEEL, STAGE 3 (HCC): Primary | ICD-10-CM

## 2025-08-07 DIAGNOSIS — I82.401 ACUTE DEEP VEIN THROMBOSIS (DVT) OF RIGHT LOWER EXTREMITY, UNSPECIFIED VEIN (HCC): ICD-10-CM

## 2025-08-07 DIAGNOSIS — L03.116 CELLULITIS OF LEFT HEEL: ICD-10-CM

## 2025-08-07 PROCEDURE — 85610 PROTHROMBIN TIME: CPT

## 2025-08-07 PROCEDURE — 36415 COLL VENOUS BLD VENIPUNCTURE: CPT

## 2025-08-07 PROCEDURE — G0299 HHS/HOSPICE OF RN EA 15 MIN: HCPCS

## 2025-08-07 RX ORDER — DOXYCYCLINE 100 MG/1
100 CAPSULE ORAL EVERY 12 HOURS SCHEDULED
Qty: 20 CAPSULE | Refills: 0 | Status: SHIPPED | OUTPATIENT
Start: 2025-08-07 | End: 2025-08-08

## 2025-08-08 ENCOUNTER — APPOINTMENT (INPATIENT)
Dept: DIALYSIS | Facility: HOSPITAL | Age: 75
DRG: 252 | End: 2025-08-08
Payer: MEDICARE

## 2025-08-08 ENCOUNTER — HOSPITAL ENCOUNTER (INPATIENT)
Facility: HOSPITAL | Age: 75
LOS: 11 days | Discharge: HOME HEALTH | DRG: 252 | End: 2025-08-19
Attending: EMERGENCY MEDICINE | Admitting: INTERNAL MEDICINE
Payer: MEDICARE

## 2025-08-08 ENCOUNTER — APPOINTMENT (INPATIENT)
Dept: MRI IMAGING | Facility: HOSPITAL | Age: 75
DRG: 252 | End: 2025-08-08
Payer: MEDICARE

## 2025-08-08 ENCOUNTER — APPOINTMENT (EMERGENCY)
Dept: RADIOLOGY | Facility: HOSPITAL | Age: 75
DRG: 252 | End: 2025-08-08
Payer: MEDICARE

## 2025-08-08 ENCOUNTER — ANTICOAG VISIT (OUTPATIENT)
Dept: FAMILY MEDICINE CLINIC | Facility: CLINIC | Age: 75
End: 2025-08-08

## 2025-08-08 ENCOUNTER — TELEPHONE (OUTPATIENT)
Facility: HOSPITAL | Age: 75
End: 2025-08-08

## 2025-08-08 ENCOUNTER — TELEPHONE (OUTPATIENT)
Age: 75
End: 2025-08-08

## 2025-08-08 DIAGNOSIS — E11.621 DIABETIC FOOT ULCER (HCC): Primary | ICD-10-CM

## 2025-08-08 DIAGNOSIS — L97.509 DIABETIC FOOT ULCER (HCC): Primary | ICD-10-CM

## 2025-08-08 DIAGNOSIS — Z99.2 END-STAGE RENAL DISEASE ON HEMODIALYSIS (HCC): ICD-10-CM

## 2025-08-08 DIAGNOSIS — I73.9 PAD (PERIPHERAL ARTERY DISEASE) (HCC): ICD-10-CM

## 2025-08-08 DIAGNOSIS — S91.302A NON HEALING LEFT HEEL WOUND: ICD-10-CM

## 2025-08-08 DIAGNOSIS — N18.6 END-STAGE RENAL DISEASE ON HEMODIALYSIS (HCC): ICD-10-CM

## 2025-08-08 DIAGNOSIS — L29.9 ITCHING: ICD-10-CM

## 2025-08-08 DIAGNOSIS — L08.9 WOUND INFECTION: ICD-10-CM

## 2025-08-08 DIAGNOSIS — L97.422 SKIN ULCER OF LEFT HEEL WITH FAT LAYER EXPOSED (HCC): ICD-10-CM

## 2025-08-08 DIAGNOSIS — T14.8XXA WOUND INFECTION: ICD-10-CM

## 2025-08-08 LAB
ALBUMIN SERPL BCG-MCNC: 3.6 G/DL (ref 3.5–5)
ALP SERPL-CCNC: 83 U/L (ref 34–104)
ALT SERPL W P-5'-P-CCNC: 5 U/L (ref 7–52)
ANION GAP SERPL CALCULATED.3IONS-SCNC: 10 MMOL/L (ref 4–13)
APTT PPP: 70 SECONDS (ref 23–34)
AST SERPL W P-5'-P-CCNC: 11 U/L (ref 13–39)
BASOPHILS # BLD AUTO: 0.09 THOUSANDS/ÂΜL (ref 0–0.1)
BASOPHILS NFR BLD AUTO: 1 % (ref 0–1)
BILIRUB SERPL-MCNC: 0.5 MG/DL (ref 0.2–1)
BUN SERPL-MCNC: 32 MG/DL (ref 5–25)
CALCIUM SERPL-MCNC: 9 MG/DL (ref 8.4–10.2)
CHLORIDE SERPL-SCNC: 93 MMOL/L (ref 96–108)
CO2 SERPL-SCNC: 31 MMOL/L (ref 21–32)
CREAT SERPL-MCNC: 5.53 MG/DL (ref 0.6–1.3)
EOSINOPHIL # BLD AUTO: 0.32 THOUSAND/ÂΜL (ref 0–0.61)
EOSINOPHIL NFR BLD AUTO: 4 % (ref 0–6)
ERYTHROCYTE [DISTWIDTH] IN BLOOD BY AUTOMATED COUNT: 16 % (ref 11.6–15.1)
GFR SERPL CREATININE-BSD FRML MDRD: 9 ML/MIN/1.73SQ M
GLUCOSE SERPL-MCNC: 132 MG/DL (ref 65–140)
HCT VFR BLD AUTO: 37.7 % (ref 36.5–49.3)
HGB BLD-MCNC: 11.8 G/DL (ref 12–17)
IMM GRANULOCYTES # BLD AUTO: 0.04 THOUSAND/UL (ref 0–0.2)
IMM GRANULOCYTES NFR BLD AUTO: 1 % (ref 0–2)
INR PPP: 2.69 (ref 0.85–1.19)
INR PPP: 2.71 (ref 0.85–1.19)
LACTATE SERPL-SCNC: 1.2 MMOL/L (ref 0.5–2)
LACTATE SERPL-SCNC: 2.2 MMOL/L (ref 0.5–2)
LYMPHOCYTES # BLD AUTO: 0.73 THOUSANDS/ÂΜL (ref 0.6–4.47)
LYMPHOCYTES NFR BLD AUTO: 8 % (ref 14–44)
MCH RBC QN AUTO: 29.5 PG (ref 26.8–34.3)
MCHC RBC AUTO-ENTMCNC: 31.3 G/DL (ref 31.4–37.4)
MCV RBC AUTO: 94 FL (ref 82–98)
MONOCYTES # BLD AUTO: 0.45 THOUSAND/ÂΜL (ref 0.17–1.22)
MONOCYTES NFR BLD AUTO: 5 % (ref 4–12)
NEUTROPHILS # BLD AUTO: 7.03 THOUSANDS/ÂΜL (ref 1.85–7.62)
NEUTS SEG NFR BLD AUTO: 81 % (ref 43–75)
NRBC BLD AUTO-RTO: 0 /100 WBCS
PLATELET # BLD AUTO: 321 THOUSANDS/UL (ref 149–390)
PMV BLD AUTO: 8.9 FL (ref 8.9–12.7)
POTASSIUM SERPL-SCNC: 3.9 MMOL/L (ref 3.5–5.3)
PROCALCITONIN SERPL-MCNC: 0.32 NG/ML
PROT SERPL-MCNC: 6.2 G/DL (ref 6.4–8.4)
PROTHROMBIN TIME: 28.6 SECONDS (ref 12.3–15)
PROTHROMBIN TIME: 29.1 SECONDS (ref 12.3–15)
RBC # BLD AUTO: 4 MILLION/UL (ref 3.88–5.62)
SODIUM SERPL-SCNC: 134 MMOL/L (ref 135–147)
WBC # BLD AUTO: 8.66 THOUSAND/UL (ref 4.31–10.16)

## 2025-08-08 PROCEDURE — 96365 THER/PROPH/DIAG IV INF INIT: CPT

## 2025-08-08 PROCEDURE — 99285 EMERGENCY DEPT VISIT HI MDM: CPT | Performed by: EMERGENCY MEDICINE

## 2025-08-08 PROCEDURE — NC001 PR NO CHARGE: Performed by: STUDENT IN AN ORGANIZED HEALTH CARE EDUCATION/TRAINING PROGRAM

## 2025-08-08 PROCEDURE — 85025 COMPLETE CBC W/AUTO DIFF WBC: CPT | Performed by: EMERGENCY MEDICINE

## 2025-08-08 PROCEDURE — 99222 1ST HOSP IP/OBS MODERATE 55: CPT

## 2025-08-08 PROCEDURE — 73630 X-RAY EXAM OF FOOT: CPT

## 2025-08-08 PROCEDURE — 73721 MRI JNT OF LWR EXTRE W/O DYE: CPT

## 2025-08-08 PROCEDURE — 5A1D70Z PERFORMANCE OF URINARY FILTRATION, INTERMITTENT, LESS THAN 6 HOURS PER DAY: ICD-10-PCS | Performed by: INTERNAL MEDICINE

## 2025-08-08 PROCEDURE — 87040 BLOOD CULTURE FOR BACTERIA: CPT | Performed by: EMERGENCY MEDICINE

## 2025-08-08 PROCEDURE — 83605 ASSAY OF LACTIC ACID: CPT | Performed by: EMERGENCY MEDICINE

## 2025-08-08 PROCEDURE — 84145 PROCALCITONIN (PCT): CPT | Performed by: EMERGENCY MEDICINE

## 2025-08-08 PROCEDURE — 85610 PROTHROMBIN TIME: CPT | Performed by: EMERGENCY MEDICINE

## 2025-08-08 PROCEDURE — 36415 COLL VENOUS BLD VENIPUNCTURE: CPT | Performed by: EMERGENCY MEDICINE

## 2025-08-08 PROCEDURE — 99283 EMERGENCY DEPT VISIT LOW MDM: CPT

## 2025-08-08 PROCEDURE — 80053 COMPREHEN METABOLIC PANEL: CPT | Performed by: EMERGENCY MEDICINE

## 2025-08-08 PROCEDURE — 85730 THROMBOPLASTIN TIME PARTIAL: CPT | Performed by: EMERGENCY MEDICINE

## 2025-08-08 PROCEDURE — 87081 CULTURE SCREEN ONLY: CPT | Performed by: INTERNAL MEDICINE

## 2025-08-08 PROCEDURE — 99223 1ST HOSP IP/OBS HIGH 75: CPT

## 2025-08-08 RX ORDER — WARFARIN SODIUM 5 MG/1
5 TABLET ORAL
Status: DISCONTINUED | OUTPATIENT
Start: 2025-08-08 | End: 2025-08-09

## 2025-08-08 RX ORDER — ALLOPURINOL 100 MG/1
100 TABLET ORAL 3 TIMES WEEKLY
Status: DISCONTINUED | OUTPATIENT
Start: 2025-08-08 | End: 2025-08-19 | Stop reason: HOSPADM

## 2025-08-08 RX ORDER — CALCIUM CARBONATE 500(1250)
1 TABLET ORAL 2 TIMES DAILY WITH MEALS
Status: DISCONTINUED | OUTPATIENT
Start: 2025-08-08 | End: 2025-08-19 | Stop reason: HOSPADM

## 2025-08-08 RX ORDER — LORATADINE 10 MG/1
10 TABLET ORAL DAILY
Status: DISCONTINUED | OUTPATIENT
Start: 2025-08-08 | End: 2025-08-19 | Stop reason: HOSPADM

## 2025-08-08 RX ORDER — PANTOPRAZOLE SODIUM 40 MG/1
40 TABLET, DELAYED RELEASE ORAL DAILY
Status: DISCONTINUED | OUTPATIENT
Start: 2025-08-08 | End: 2025-08-19 | Stop reason: HOSPADM

## 2025-08-08 RX ORDER — ALLOPURINOL 100 MG/1
100 TABLET ORAL 2 TIMES DAILY
Status: DISCONTINUED | OUTPATIENT
Start: 2025-08-08 | End: 2025-08-08

## 2025-08-08 RX ORDER — ACETAMINOPHEN 325 MG/1
650 TABLET ORAL EVERY 6 HOURS PRN
Status: DISCONTINUED | OUTPATIENT
Start: 2025-08-08 | End: 2025-08-19 | Stop reason: HOSPADM

## 2025-08-08 RX ORDER — ASCORBIC ACID, THIAMINE MONONITRATE,RIBOFLAVIN, NIACINAMIDE, PYRIDOXINE HYDROCHLORIDE, FOLIC ACID, CYANOCOBALAMIN, BIOTIN, CALCIUM PANTOTHENATE, 100; 1.5; 1.7; 20; 10; 1; 6000; 150000; 5 MG/1; MG/1; MG/1; MG/1; MG/1; MG/1; UG/1; UG/1; MG/1
1 CAPSULE, LIQUID FILLED ORAL
Status: DISCONTINUED | OUTPATIENT
Start: 2025-08-08 | End: 2025-08-19 | Stop reason: HOSPADM

## 2025-08-08 RX ORDER — VANCOMYCIN HYDROCHLORIDE 1 G/200ML
10 INJECTION, SOLUTION INTRAVENOUS
Status: DISCONTINUED | OUTPATIENT
Start: 2025-08-08 | End: 2025-08-11

## 2025-08-08 RX ORDER — MIDODRINE HYDROCHLORIDE 5 MG/1
5 TABLET ORAL ONCE
Status: COMPLETED | OUTPATIENT
Start: 2025-08-08 | End: 2025-08-08

## 2025-08-08 RX ORDER — ONDANSETRON 2 MG/ML
4 INJECTION INTRAMUSCULAR; INTRAVENOUS EVERY 6 HOURS PRN
Status: DISCONTINUED | OUTPATIENT
Start: 2025-08-08 | End: 2025-08-08

## 2025-08-08 RX ORDER — MIDODRINE HYDROCHLORIDE 5 MG/1
5 TABLET ORAL 3 TIMES WEEKLY
Status: DISCONTINUED | OUTPATIENT
Start: 2025-08-11 | End: 2025-08-19 | Stop reason: HOSPADM

## 2025-08-08 RX ORDER — METOCLOPRAMIDE HYDROCHLORIDE 5 MG/ML
10 INJECTION INTRAMUSCULAR; INTRAVENOUS EVERY 6 HOURS PRN
Status: DISCONTINUED | OUTPATIENT
Start: 2025-08-08 | End: 2025-08-19 | Stop reason: HOSPADM

## 2025-08-08 RX ADMIN — CEFEPIME 2000 MG: 2 INJECTION, POWDER, FOR SOLUTION INTRAVENOUS at 11:01

## 2025-08-08 RX ADMIN — MIDODRINE HYDROCHLORIDE 5 MG: 5 TABLET ORAL at 13:16

## 2025-08-08 RX ADMIN — ALLOPURINOL 100 MG: 100 TABLET ORAL at 18:14

## 2025-08-08 RX ADMIN — CALCIUM 1 TABLET: 500 TABLET ORAL at 18:14

## 2025-08-08 RX ADMIN — WARFARIN SODIUM 5 MG: 5 TABLET ORAL at 18:14

## 2025-08-08 RX ADMIN — PANTOPRAZOLE SODIUM 40 MG: 40 TABLET, DELAYED RELEASE ORAL at 18:22

## 2025-08-08 RX ADMIN — CEFEPIME 1000 MG: 1 INJECTION, POWDER, FOR SOLUTION INTRAMUSCULAR; INTRAVENOUS at 22:14

## 2025-08-09 LAB
ANION GAP SERPL CALCULATED.3IONS-SCNC: 7 MMOL/L (ref 4–13)
BASOPHILS # BLD AUTO: 0.08 THOUSANDS/ÂΜL (ref 0–0.1)
BASOPHILS NFR BLD AUTO: 1 % (ref 0–1)
BUN SERPL-MCNC: 19 MG/DL (ref 5–25)
CALCIUM SERPL-MCNC: 8.5 MG/DL (ref 8.4–10.2)
CHLORIDE SERPL-SCNC: 96 MMOL/L (ref 96–108)
CO2 SERPL-SCNC: 30 MMOL/L (ref 21–32)
CREAT SERPL-MCNC: 3.93 MG/DL (ref 0.6–1.3)
EOSINOPHIL # BLD AUTO: 0.34 THOUSAND/ÂΜL (ref 0–0.61)
EOSINOPHIL NFR BLD AUTO: 4 % (ref 0–6)
ERYTHROCYTE [DISTWIDTH] IN BLOOD BY AUTOMATED COUNT: 15.9 % (ref 11.6–15.1)
GFR SERPL CREATININE-BSD FRML MDRD: 13 ML/MIN/1.73SQ M
GLUCOSE SERPL-MCNC: 98 MG/DL (ref 65–140)
HCT VFR BLD AUTO: 33.2 % (ref 36.5–49.3)
HGB BLD-MCNC: 10.6 G/DL (ref 12–17)
IMM GRANULOCYTES # BLD AUTO: 0.02 THOUSAND/UL (ref 0–0.2)
IMM GRANULOCYTES NFR BLD AUTO: 0 % (ref 0–2)
INR PPP: 2.96 (ref 0.85–1.19)
LYMPHOCYTES # BLD AUTO: 0.57 THOUSANDS/ÂΜL (ref 0.6–4.47)
LYMPHOCYTES NFR BLD AUTO: 7 % (ref 14–44)
MCH RBC QN AUTO: 29.5 PG (ref 26.8–34.3)
MCHC RBC AUTO-ENTMCNC: 31.9 G/DL (ref 31.4–37.4)
MCV RBC AUTO: 93 FL (ref 82–98)
MONOCYTES # BLD AUTO: 0.49 THOUSAND/ÂΜL (ref 0.17–1.22)
MONOCYTES NFR BLD AUTO: 6 % (ref 4–12)
NEUTROPHILS # BLD AUTO: 7.19 THOUSANDS/ÂΜL (ref 1.85–7.62)
NEUTS SEG NFR BLD AUTO: 82 % (ref 43–75)
NRBC BLD AUTO-RTO: 0 /100 WBCS
PLATELET # BLD AUTO: 248 THOUSANDS/UL (ref 149–390)
PMV BLD AUTO: 8.6 FL (ref 8.9–12.7)
POTASSIUM SERPL-SCNC: 3.9 MMOL/L (ref 3.5–5.3)
PROTHROMBIN TIME: 31.3 SECONDS (ref 12.3–15)
RBC # BLD AUTO: 3.59 MILLION/UL (ref 3.88–5.62)
SODIUM SERPL-SCNC: 133 MMOL/L (ref 135–147)
WBC # BLD AUTO: 8.69 THOUSAND/UL (ref 4.31–10.16)

## 2025-08-09 PROCEDURE — 80048 BASIC METABOLIC PNL TOTAL CA: CPT

## 2025-08-09 PROCEDURE — 85025 COMPLETE CBC W/AUTO DIFF WBC: CPT

## 2025-08-09 PROCEDURE — 87070 CULTURE OTHR SPECIMN AEROBIC: CPT

## 2025-08-09 PROCEDURE — 85610 PROTHROMBIN TIME: CPT

## 2025-08-09 PROCEDURE — 87205 SMEAR GRAM STAIN: CPT

## 2025-08-09 PROCEDURE — 99232 SBSQ HOSP IP/OBS MODERATE 35: CPT

## 2025-08-09 PROCEDURE — 87147 CULTURE TYPE IMMUNOLOGIC: CPT

## 2025-08-09 PROCEDURE — 87077 CULTURE AEROBIC IDENTIFY: CPT

## 2025-08-09 PROCEDURE — 87186 SC STD MICRODIL/AGAR DIL: CPT

## 2025-08-09 RX ORDER — METRONIDAZOLE 500 MG/100ML
500 INJECTION, SOLUTION INTRAVENOUS EVERY 12 HOURS
Status: DISCONTINUED | OUTPATIENT
Start: 2025-08-09 | End: 2025-08-10

## 2025-08-09 RX ORDER — WARFARIN SODIUM 3 MG/1
3 TABLET ORAL
Status: DISCONTINUED | OUTPATIENT
Start: 2025-08-09 | End: 2025-08-14

## 2025-08-09 RX ORDER — HEPARIN SODIUM 5000 [USP'U]/ML
5000 INJECTION, SOLUTION INTRAVENOUS; SUBCUTANEOUS EVERY 8 HOURS SCHEDULED
Status: DISCONTINUED | OUTPATIENT
Start: 2025-08-09 | End: 2025-08-14

## 2025-08-09 RX ADMIN — FLUOXETINE HYDROCHLORIDE 20 MG: 20 CAPSULE ORAL at 08:24

## 2025-08-09 RX ADMIN — METRONIDAZOLE 500 MG: 500 INJECTION, SOLUTION INTRAVENOUS at 22:08

## 2025-08-09 RX ADMIN — Medication 1 CAPSULE: at 16:26

## 2025-08-09 RX ADMIN — METRONIDAZOLE 500 MG: 500 INJECTION, SOLUTION INTRAVENOUS at 11:49

## 2025-08-09 RX ADMIN — CALCIUM 1 TABLET: 500 TABLET ORAL at 08:24

## 2025-08-09 RX ADMIN — CEFEPIME 1000 MG: 1 INJECTION, POWDER, FOR SOLUTION INTRAMUSCULAR; INTRAVENOUS at 10:19

## 2025-08-09 RX ADMIN — LORATADINE 10 MG: 10 TABLET ORAL at 08:24

## 2025-08-09 RX ADMIN — HEPARIN SODIUM 5000 UNITS: 5000 INJECTION INTRAVENOUS; SUBCUTANEOUS at 22:09

## 2025-08-09 RX ADMIN — HEPARIN SODIUM 5000 UNITS: 5000 INJECTION INTRAVENOUS; SUBCUTANEOUS at 16:26

## 2025-08-09 RX ADMIN — Medication 2000 UNITS: at 08:24

## 2025-08-09 RX ADMIN — CALCIUM 1 TABLET: 500 TABLET ORAL at 16:26

## 2025-08-09 RX ADMIN — PANTOPRAZOLE SODIUM 40 MG: 40 TABLET, DELAYED RELEASE ORAL at 05:21

## 2025-08-10 ENCOUNTER — HOME CARE VISIT (OUTPATIENT)
Dept: HOME HEALTH SERVICES | Facility: HOME HEALTHCARE | Age: 75
End: 2025-08-10
Payer: MEDICARE

## 2025-08-10 LAB
ANION GAP SERPL CALCULATED.3IONS-SCNC: 6 MMOL/L (ref 4–13)
BASOPHILS # BLD AUTO: 0.1 THOUSANDS/ÂΜL (ref 0–0.1)
BASOPHILS NFR BLD AUTO: 1 % (ref 0–1)
BUN SERPL-MCNC: 26 MG/DL (ref 5–25)
CALCIUM SERPL-MCNC: 8.7 MG/DL (ref 8.4–10.2)
CHLORIDE SERPL-SCNC: 96 MMOL/L (ref 96–108)
CO2 SERPL-SCNC: 31 MMOL/L (ref 21–32)
CREAT SERPL-MCNC: 5.27 MG/DL (ref 0.6–1.3)
EOSINOPHIL # BLD AUTO: 0.47 THOUSAND/ÂΜL (ref 0–0.61)
EOSINOPHIL NFR BLD AUTO: 6 % (ref 0–6)
ERYTHROCYTE [DISTWIDTH] IN BLOOD BY AUTOMATED COUNT: 16 % (ref 11.6–15.1)
GFR SERPL CREATININE-BSD FRML MDRD: 9 ML/MIN/1.73SQ M
GLUCOSE SERPL-MCNC: 92 MG/DL (ref 65–140)
HCT VFR BLD AUTO: 32.1 % (ref 36.5–49.3)
HGB BLD-MCNC: 10.1 G/DL (ref 12–17)
IMM GRANULOCYTES # BLD AUTO: 0.02 THOUSAND/UL (ref 0–0.2)
IMM GRANULOCYTES NFR BLD AUTO: 0 % (ref 0–2)
INR PPP: 2.97 (ref 0.85–1.19)
LYMPHOCYTES # BLD AUTO: 0.78 THOUSANDS/ÂΜL (ref 0.6–4.47)
LYMPHOCYTES NFR BLD AUTO: 10 % (ref 14–44)
MCH RBC QN AUTO: 29.2 PG (ref 26.8–34.3)
MCHC RBC AUTO-ENTMCNC: 31.5 G/DL (ref 31.4–37.4)
MCV RBC AUTO: 93 FL (ref 82–98)
MONOCYTES # BLD AUTO: 0.73 THOUSAND/ÂΜL (ref 0.17–1.22)
MONOCYTES NFR BLD AUTO: 10 % (ref 4–12)
MRSA NOSE QL CULT: NORMAL
NEUTROPHILS # BLD AUTO: 5.53 THOUSANDS/ÂΜL (ref 1.85–7.62)
NEUTS SEG NFR BLD AUTO: 73 % (ref 43–75)
NRBC BLD AUTO-RTO: 0 /100 WBCS
PLATELET # BLD AUTO: 229 THOUSANDS/UL (ref 149–390)
PMV BLD AUTO: 8.5 FL (ref 8.9–12.7)
POTASSIUM SERPL-SCNC: 4 MMOL/L (ref 3.5–5.3)
PROTHROMBIN TIME: 31.4 SECONDS (ref 12.3–15)
RBC # BLD AUTO: 3.46 MILLION/UL (ref 3.88–5.62)
SODIUM SERPL-SCNC: 133 MMOL/L (ref 135–147)
WBC # BLD AUTO: 7.63 THOUSAND/UL (ref 4.31–10.16)

## 2025-08-10 PROCEDURE — 85610 PROTHROMBIN TIME: CPT

## 2025-08-10 PROCEDURE — 85025 COMPLETE CBC W/AUTO DIFF WBC: CPT

## 2025-08-10 PROCEDURE — 99232 SBSQ HOSP IP/OBS MODERATE 35: CPT

## 2025-08-10 PROCEDURE — 99223 1ST HOSP IP/OBS HIGH 75: CPT

## 2025-08-10 PROCEDURE — 80048 BASIC METABOLIC PNL TOTAL CA: CPT

## 2025-08-10 PROCEDURE — 99222 1ST HOSP IP/OBS MODERATE 55: CPT | Performed by: PODIATRIST

## 2025-08-10 RX ORDER — METRONIDAZOLE 500 MG/1
500 TABLET ORAL EVERY 12 HOURS SCHEDULED
Status: DISCONTINUED | OUTPATIENT
Start: 2025-08-10 | End: 2025-08-12

## 2025-08-10 RX ADMIN — CEFEPIME 1000 MG: 1 INJECTION, POWDER, FOR SOLUTION INTRAMUSCULAR; INTRAVENOUS at 08:43

## 2025-08-10 RX ADMIN — Medication 2000 UNITS: at 08:43

## 2025-08-10 RX ADMIN — LORATADINE 10 MG: 10 TABLET ORAL at 08:43

## 2025-08-10 RX ADMIN — PANTOPRAZOLE SODIUM 40 MG: 40 TABLET, DELAYED RELEASE ORAL at 05:33

## 2025-08-10 RX ADMIN — HEPARIN SODIUM 5000 UNITS: 5000 INJECTION INTRAVENOUS; SUBCUTANEOUS at 13:51

## 2025-08-10 RX ADMIN — CALCIUM 1 TABLET: 500 TABLET ORAL at 17:14

## 2025-08-10 RX ADMIN — CALCIUM 1 TABLET: 500 TABLET ORAL at 08:43

## 2025-08-10 RX ADMIN — FLUOXETINE HYDROCHLORIDE 20 MG: 20 CAPSULE ORAL at 08:43

## 2025-08-10 RX ADMIN — HEPARIN SODIUM 5000 UNITS: 5000 INJECTION INTRAVENOUS; SUBCUTANEOUS at 21:57

## 2025-08-10 RX ADMIN — HEPARIN SODIUM 5000 UNITS: 5000 INJECTION INTRAVENOUS; SUBCUTANEOUS at 05:34

## 2025-08-10 RX ADMIN — METRONIDAZOLE 500 MG: 500 TABLET ORAL at 08:43

## 2025-08-10 RX ADMIN — Medication 1 CAPSULE: at 17:14

## 2025-08-10 RX ADMIN — METRONIDAZOLE 500 MG: 500 TABLET ORAL at 21:57

## 2025-08-11 ENCOUNTER — APPOINTMENT (INPATIENT)
Dept: DIALYSIS | Facility: HOSPITAL | Age: 75
DRG: 252 | End: 2025-08-11
Payer: MEDICARE

## 2025-08-11 LAB
ANION GAP SERPL CALCULATED.3IONS-SCNC: 7 MMOL/L (ref 4–13)
BASOPHILS # BLD AUTO: 0.09 THOUSANDS/ÂΜL (ref 0–0.1)
BASOPHILS NFR BLD AUTO: 1 % (ref 0–1)
BUN SERPL-MCNC: 34 MG/DL (ref 5–25)
CALCIUM SERPL-MCNC: 8.7 MG/DL (ref 8.4–10.2)
CHLORIDE SERPL-SCNC: 96 MMOL/L (ref 96–108)
CO2 SERPL-SCNC: 30 MMOL/L (ref 21–32)
CREAT SERPL-MCNC: 6.55 MG/DL (ref 0.6–1.3)
EOSINOPHIL # BLD AUTO: 0.44 THOUSAND/ÂΜL (ref 0–0.61)
EOSINOPHIL NFR BLD AUTO: 6 % (ref 0–6)
ERYTHROCYTE [DISTWIDTH] IN BLOOD BY AUTOMATED COUNT: 15.9 % (ref 11.6–15.1)
GFR SERPL CREATININE-BSD FRML MDRD: 7 ML/MIN/1.73SQ M
GLUCOSE SERPL-MCNC: 90 MG/DL (ref 65–140)
HCT VFR BLD AUTO: 30.6 % (ref 36.5–49.3)
HGB BLD-MCNC: 9.7 G/DL (ref 12–17)
IMM GRANULOCYTES # BLD AUTO: 0.03 THOUSAND/UL (ref 0–0.2)
IMM GRANULOCYTES NFR BLD AUTO: 0 % (ref 0–2)
INR PPP: 2.53 (ref 0.85–1.19)
LYMPHOCYTES # BLD AUTO: 1.04 THOUSANDS/ÂΜL (ref 0.6–4.47)
LYMPHOCYTES NFR BLD AUTO: 14 % (ref 14–44)
MCH RBC QN AUTO: 29 PG (ref 26.8–34.3)
MCHC RBC AUTO-ENTMCNC: 31.7 G/DL (ref 31.4–37.4)
MCV RBC AUTO: 92 FL (ref 82–98)
MONOCYTES # BLD AUTO: 0.69 THOUSAND/ÂΜL (ref 0.17–1.22)
MONOCYTES NFR BLD AUTO: 9 % (ref 4–12)
NEUTROPHILS # BLD AUTO: 5.43 THOUSANDS/ÂΜL (ref 1.85–7.62)
NEUTS SEG NFR BLD AUTO: 70 % (ref 43–75)
NRBC BLD AUTO-RTO: 0 /100 WBCS
PLATELET # BLD AUTO: 245 THOUSANDS/UL (ref 149–390)
PMV BLD AUTO: 8.8 FL (ref 8.9–12.7)
POTASSIUM SERPL-SCNC: 4.2 MMOL/L (ref 3.5–5.3)
PROTHROMBIN TIME: 27.6 SECONDS (ref 12.3–15)
RBC # BLD AUTO: 3.34 MILLION/UL (ref 3.88–5.62)
SODIUM SERPL-SCNC: 133 MMOL/L (ref 135–147)
WBC # BLD AUTO: 7.72 THOUSAND/UL (ref 4.31–10.16)

## 2025-08-11 PROCEDURE — 80048 BASIC METABOLIC PNL TOTAL CA: CPT

## 2025-08-11 PROCEDURE — NC001 PR NO CHARGE: Performed by: PHYSICIAN ASSISTANT

## 2025-08-11 PROCEDURE — 5A1D70Z PERFORMANCE OF URINARY FILTRATION, INTERMITTENT, LESS THAN 6 HOURS PER DAY: ICD-10-PCS | Performed by: INTERNAL MEDICINE

## 2025-08-11 PROCEDURE — 85025 COMPLETE CBC W/AUTO DIFF WBC: CPT

## 2025-08-11 PROCEDURE — 90935 HEMODIALYSIS ONE EVALUATION: CPT | Performed by: INTERNAL MEDICINE

## 2025-08-11 PROCEDURE — 99233 SBSQ HOSP IP/OBS HIGH 50: CPT

## 2025-08-11 PROCEDURE — 85610 PROTHROMBIN TIME: CPT

## 2025-08-11 RX ORDER — PHYTONADIONE 5 MG/1
5 TABLET ORAL ONCE
Status: COMPLETED | OUTPATIENT
Start: 2025-08-11 | End: 2025-08-11

## 2025-08-11 RX ADMIN — CALCIUM 1 TABLET: 500 TABLET ORAL at 07:59

## 2025-08-11 RX ADMIN — PHYTONADIONE 5 MG: 5 TABLET ORAL at 13:00

## 2025-08-11 RX ADMIN — HEPARIN SODIUM 5000 UNITS: 5000 INJECTION INTRAVENOUS; SUBCUTANEOUS at 06:04

## 2025-08-11 RX ADMIN — ALLOPURINOL 100 MG: 100 TABLET ORAL at 15:54

## 2025-08-11 RX ADMIN — PANTOPRAZOLE SODIUM 40 MG: 40 TABLET, DELAYED RELEASE ORAL at 06:04

## 2025-08-11 RX ADMIN — CEFEPIME 1000 MG: 1 INJECTION, POWDER, FOR SOLUTION INTRAMUSCULAR; INTRAVENOUS at 08:00

## 2025-08-11 RX ADMIN — CALCIUM 1 TABLET: 500 TABLET ORAL at 15:54

## 2025-08-11 RX ADMIN — MIDODRINE HYDROCHLORIDE 5 MG: 5 TABLET ORAL at 08:00

## 2025-08-11 RX ADMIN — METRONIDAZOLE 500 MG: 500 TABLET ORAL at 21:43

## 2025-08-11 RX ADMIN — HEPARIN SODIUM 5000 UNITS: 5000 INJECTION INTRAVENOUS; SUBCUTANEOUS at 13:02

## 2025-08-11 RX ADMIN — FLUOXETINE HYDROCHLORIDE 20 MG: 20 CAPSULE ORAL at 08:00

## 2025-08-11 RX ADMIN — Medication 1 CAPSULE: at 15:54

## 2025-08-11 RX ADMIN — METRONIDAZOLE 500 MG: 500 TABLET ORAL at 08:00

## 2025-08-11 RX ADMIN — HEPARIN SODIUM 5000 UNITS: 5000 INJECTION INTRAVENOUS; SUBCUTANEOUS at 21:43

## 2025-08-12 ENCOUNTER — APPOINTMENT (INPATIENT)
Dept: INTERVENTIONAL RADIOLOGY/VASCULAR | Facility: HOSPITAL | Age: 75
DRG: 252 | End: 2025-08-12
Attending: PHYSICIAN ASSISTANT
Payer: MEDICARE

## 2025-08-12 PROBLEM — M86.9 OSTEOMYELITIS OF LEFT FOOT (HCC): Status: ACTIVE | Noted: 2025-08-12

## 2025-08-12 PROBLEM — E66.01 MORBID OBESITY (HCC): Status: ACTIVE | Noted: 2025-08-12

## 2025-08-12 PROBLEM — I73.9 PAD (PERIPHERAL ARTERY DISEASE) (HCC): Status: ACTIVE | Noted: 2025-08-12

## 2025-08-12 PROBLEM — Z88.1 ALLERGY TO ANTIBIOTIC: Status: ACTIVE | Noted: 2025-08-12

## 2025-08-12 LAB
ANION GAP SERPL CALCULATED.3IONS-SCNC: 8 MMOL/L (ref 4–13)
BACTERIA WND AEROBE CULT: ABNORMAL
BASOPHILS # BLD AUTO: 0.09 THOUSANDS/ÂΜL (ref 0–0.1)
BASOPHILS NFR BLD AUTO: 1 % (ref 0–1)
BUN SERPL-MCNC: 25 MG/DL (ref 5–25)
CALCIUM SERPL-MCNC: 8.7 MG/DL (ref 8.4–10.2)
CHLORIDE SERPL-SCNC: 94 MMOL/L (ref 96–108)
CO2 SERPL-SCNC: 29 MMOL/L (ref 21–32)
CREAT SERPL-MCNC: 4.67 MG/DL (ref 0.6–1.3)
EOSINOPHIL # BLD AUTO: 0.41 THOUSAND/ÂΜL (ref 0–0.61)
EOSINOPHIL NFR BLD AUTO: 5 % (ref 0–6)
ERYTHROCYTE [DISTWIDTH] IN BLOOD BY AUTOMATED COUNT: 15.7 % (ref 11.6–15.1)
GFR SERPL CREATININE-BSD FRML MDRD: 11 ML/MIN/1.73SQ M
GLUCOSE SERPL-MCNC: 84 MG/DL (ref 65–140)
GRAM STN SPEC: ABNORMAL
HCT VFR BLD AUTO: 33.3 % (ref 36.5–49.3)
HGB BLD-MCNC: 10.5 G/DL (ref 12–17)
IMM GRANULOCYTES # BLD AUTO: 0.02 THOUSAND/UL (ref 0–0.2)
IMM GRANULOCYTES NFR BLD AUTO: 0 % (ref 0–2)
INR PPP: 1.65 (ref 0.85–1.19)
LYMPHOCYTES # BLD AUTO: 1.15 THOUSANDS/ÂΜL (ref 0.6–4.47)
LYMPHOCYTES NFR BLD AUTO: 15 % (ref 14–44)
MCH RBC QN AUTO: 29.3 PG (ref 26.8–34.3)
MCHC RBC AUTO-ENTMCNC: 31.5 G/DL (ref 31.4–37.4)
MCV RBC AUTO: 93 FL (ref 82–98)
MONOCYTES # BLD AUTO: 0.77 THOUSAND/ÂΜL (ref 0.17–1.22)
MONOCYTES NFR BLD AUTO: 10 % (ref 4–12)
NEUTROPHILS # BLD AUTO: 5.25 THOUSANDS/ÂΜL (ref 1.85–7.62)
NEUTS SEG NFR BLD AUTO: 69 % (ref 43–75)
NRBC BLD AUTO-RTO: 0 /100 WBCS
PLATELET # BLD AUTO: 271 THOUSANDS/UL (ref 149–390)
PMV BLD AUTO: 9.1 FL (ref 8.9–12.7)
POTASSIUM SERPL-SCNC: 4.5 MMOL/L (ref 3.5–5.3)
PROTHROMBIN TIME: 19.9 SECONDS (ref 12.3–15)
RBC # BLD AUTO: 3.58 MILLION/UL (ref 3.88–5.62)
SODIUM SERPL-SCNC: 131 MMOL/L (ref 135–147)
WBC # BLD AUTO: 7.69 THOUSAND/UL (ref 4.31–10.16)

## 2025-08-12 PROCEDURE — 85610 PROTHROMBIN TIME: CPT | Performed by: INTERNAL MEDICINE

## 2025-08-12 PROCEDURE — 75625 CONTRAST EXAM ABDOMINL AORTA: CPT | Performed by: RADIOLOGY

## 2025-08-12 PROCEDURE — 75625 CONTRAST EXAM ABDOMINL AORTA: CPT

## 2025-08-12 PROCEDURE — C1894 INTRO/SHEATH, NON-LASER: HCPCS

## 2025-08-12 PROCEDURE — C1769 GUIDE WIRE: HCPCS

## 2025-08-12 PROCEDURE — 37228 PR REVSC OPN/PRQ TIB/PERO W/ANGIOPLASTY UNI: CPT | Performed by: RADIOLOGY

## 2025-08-12 PROCEDURE — 80048 BASIC METABOLIC PNL TOTAL CA: CPT | Performed by: INTERNAL MEDICINE

## 2025-08-12 PROCEDURE — 99152 MOD SED SAME PHYS/QHP 5/>YRS: CPT | Performed by: RADIOLOGY

## 2025-08-12 PROCEDURE — C1725 CATH, TRANSLUMIN NON-LASER: HCPCS

## 2025-08-12 PROCEDURE — 75710 ARTERY X-RAYS ARM/LEG: CPT | Performed by: RADIOLOGY

## 2025-08-12 PROCEDURE — 99152 MOD SED SAME PHYS/QHP 5/>YRS: CPT

## 2025-08-12 PROCEDURE — C1887 CATHETER, GUIDING: HCPCS

## 2025-08-12 PROCEDURE — 37228 HB TIB/PER REVASC W/TLA: CPT

## 2025-08-12 PROCEDURE — 047N3ZZ DILATION OF LEFT POPLITEAL ARTERY, PERCUTANEOUS APPROACH: ICD-10-PCS | Performed by: RADIOLOGY

## 2025-08-12 PROCEDURE — 37232 HB TIB/PER REVASC ADD-ON: CPT

## 2025-08-12 PROCEDURE — C1760 CLOSURE DEV, VASC: HCPCS

## 2025-08-12 PROCEDURE — 99232 SBSQ HOSP IP/OBS MODERATE 35: CPT | Performed by: INTERNAL MEDICINE

## 2025-08-12 PROCEDURE — 99223 1ST HOSP IP/OBS HIGH 75: CPT | Performed by: NURSE PRACTITIONER

## 2025-08-12 PROCEDURE — 36248 INS CATH ABD/L-EXT ART ADDL: CPT

## 2025-08-12 PROCEDURE — 99232 SBSQ HOSP IP/OBS MODERATE 35: CPT

## 2025-08-12 PROCEDURE — 37232 PR REVSC OPN/PRQ TIB/PERO W/ANGIOPLASTY UNI EA VSL: CPT | Performed by: RADIOLOGY

## 2025-08-12 PROCEDURE — 99153 MOD SED SAME PHYS/QHP EA: CPT

## 2025-08-12 PROCEDURE — 85025 COMPLETE CBC W/AUTO DIFF WBC: CPT | Performed by: INTERNAL MEDICINE

## 2025-08-12 PROCEDURE — 047Q3ZZ DILATION OF LEFT ANTERIOR TIBIAL ARTERY, PERCUTANEOUS APPROACH: ICD-10-PCS | Performed by: RADIOLOGY

## 2025-08-12 PROCEDURE — 75710 ARTERY X-RAYS ARM/LEG: CPT

## 2025-08-12 PROCEDURE — 047S3ZZ DILATION OF LEFT POSTERIOR TIBIAL ARTERY, PERCUTANEOUS APPROACH: ICD-10-PCS | Performed by: RADIOLOGY

## 2025-08-12 RX ORDER — LIDOCAINE WITH 8.4% SOD BICARB 0.9%(10ML)
SYRINGE (ML) INJECTION AS NEEDED
Status: COMPLETED | OUTPATIENT
Start: 2025-08-12 | End: 2025-08-12

## 2025-08-12 RX ORDER — FENTANYL CITRATE 50 UG/ML
INJECTION, SOLUTION INTRAMUSCULAR; INTRAVENOUS AS NEEDED
Status: COMPLETED | OUTPATIENT
Start: 2025-08-12 | End: 2025-08-12

## 2025-08-12 RX ORDER — MIDAZOLAM HYDROCHLORIDE 2 MG/2ML
INJECTION, SOLUTION INTRAMUSCULAR; INTRAVENOUS AS NEEDED
Status: COMPLETED | OUTPATIENT
Start: 2025-08-12 | End: 2025-08-12

## 2025-08-12 RX ORDER — IODIXANOL 320 MG/ML
200 INJECTION, SOLUTION INTRAVASCULAR
Status: COMPLETED | OUTPATIENT
Start: 2025-08-12 | End: 2025-08-12

## 2025-08-12 RX ORDER — HEPARIN SODIUM 1000 [USP'U]/ML
INJECTION, SOLUTION INTRAVENOUS; SUBCUTANEOUS AS NEEDED
Status: COMPLETED | OUTPATIENT
Start: 2025-08-12 | End: 2025-08-12

## 2025-08-12 RX ADMIN — MIDAZOLAM 0.5 MG: 1 INJECTION INTRAMUSCULAR; INTRAVENOUS at 14:23

## 2025-08-12 RX ADMIN — HEPARIN SODIUM 5000 UNITS: 5000 INJECTION INTRAVENOUS; SUBCUTANEOUS at 05:07

## 2025-08-12 RX ADMIN — HEPARIN SODIUM 5000 UNITS: 5000 INJECTION INTRAVENOUS; SUBCUTANEOUS at 22:30

## 2025-08-12 RX ADMIN — Medication 500 MG: at 15:00

## 2025-08-12 RX ADMIN — FENTANYL CITRATE 50 MCG: 50 INJECTION, SOLUTION INTRAMUSCULAR; INTRAVENOUS at 14:36

## 2025-08-12 RX ADMIN — CALCIUM 1 TABLET: 500 TABLET ORAL at 17:00

## 2025-08-12 RX ADMIN — MIDAZOLAM 0.5 MG: 1 INJECTION INTRAMUSCULAR; INTRAVENOUS at 12:57

## 2025-08-12 RX ADMIN — MIDAZOLAM 1 MG: 1 INJECTION INTRAMUSCULAR; INTRAVENOUS at 13:41

## 2025-08-12 RX ADMIN — WARFARIN SODIUM 3 MG: 3 TABLET ORAL at 17:00

## 2025-08-12 RX ADMIN — CALCIUM 1 TABLET: 500 TABLET ORAL at 08:24

## 2025-08-12 RX ADMIN — Medication 1 CAPSULE: at 17:00

## 2025-08-12 RX ADMIN — MIDAZOLAM 0.5 MG: 1 INJECTION INTRAMUSCULAR; INTRAVENOUS at 13:09

## 2025-08-12 RX ADMIN — CEFEPIME 1000 MG: 1 INJECTION, POWDER, FOR SOLUTION INTRAMUSCULAR; INTRAVENOUS at 08:25

## 2025-08-12 RX ADMIN — FLUOXETINE HYDROCHLORIDE 20 MG: 20 CAPSULE ORAL at 08:24

## 2025-08-12 RX ADMIN — IODIXANOL 200 ML: 320 INJECTION, SOLUTION INTRAVASCULAR at 13:30

## 2025-08-12 RX ADMIN — PANTOPRAZOLE SODIUM 40 MG: 40 TABLET, DELAYED RELEASE ORAL at 05:07

## 2025-08-12 RX ADMIN — FENTANYL CITRATE 50 MCG: 50 INJECTION, SOLUTION INTRAMUSCULAR; INTRAVENOUS at 13:41

## 2025-08-12 RX ADMIN — FENTANYL CITRATE 25 MCG: 50 INJECTION, SOLUTION INTRAMUSCULAR; INTRAVENOUS at 14:23

## 2025-08-12 RX ADMIN — Medication 20 ML: at 13:10

## 2025-08-12 RX ADMIN — MIDAZOLAM 0.5 MG: 1 INJECTION INTRAMUSCULAR; INTRAVENOUS at 14:07

## 2025-08-12 RX ADMIN — FENTANYL CITRATE 25 MCG: 50 INJECTION, SOLUTION INTRAMUSCULAR; INTRAVENOUS at 14:07

## 2025-08-12 RX ADMIN — FENTANYL CITRATE 25 MCG: 50 INJECTION, SOLUTION INTRAMUSCULAR; INTRAVENOUS at 13:09

## 2025-08-12 RX ADMIN — LORATADINE 10 MG: 10 TABLET ORAL at 08:25

## 2025-08-12 RX ADMIN — HEPARIN SODIUM 7000 UNITS: 1000 INJECTION INTRAVENOUS; SUBCUTANEOUS at 13:38

## 2025-08-12 RX ADMIN — Medication 2000 UNITS: at 08:25

## 2025-08-12 RX ADMIN — FENTANYL CITRATE 25 MCG: 50 INJECTION, SOLUTION INTRAMUSCULAR; INTRAVENOUS at 12:57

## 2025-08-12 RX ADMIN — METRONIDAZOLE 500 MG: 500 TABLET ORAL at 08:24

## 2025-08-13 ENCOUNTER — APPOINTMENT (INPATIENT)
Dept: DIALYSIS | Facility: HOSPITAL | Age: 75
DRG: 252 | End: 2025-08-13
Attending: INTERNAL MEDICINE
Payer: MEDICARE

## 2025-08-13 ENCOUNTER — ANESTHESIA EVENT (INPATIENT)
Dept: PERIOP | Facility: HOSPITAL | Age: 75
DRG: 252 | End: 2025-08-13
Payer: MEDICARE

## 2025-08-13 ENCOUNTER — ANESTHESIA (INPATIENT)
Dept: PERIOP | Facility: HOSPITAL | Age: 75
DRG: 252 | End: 2025-08-13
Payer: MEDICARE

## 2025-08-13 LAB
ANION GAP SERPL CALCULATED.3IONS-SCNC: 8 MMOL/L (ref 4–13)
BACTERIA BLD CULT: NORMAL
BACTERIA BLD CULT: NORMAL
BUN SERPL-MCNC: 31 MG/DL (ref 5–25)
CALCIUM SERPL-MCNC: 8.8 MG/DL (ref 8.4–10.2)
CHLORIDE SERPL-SCNC: 95 MMOL/L (ref 96–108)
CO2 SERPL-SCNC: 28 MMOL/L (ref 21–32)
CREAT SERPL-MCNC: 5.7 MG/DL (ref 0.6–1.3)
ERYTHROCYTE [DISTWIDTH] IN BLOOD BY AUTOMATED COUNT: 15.9 % (ref 11.6–15.1)
GFR SERPL CREATININE-BSD FRML MDRD: 8 ML/MIN/1.73SQ M
GLUCOSE SERPL-MCNC: 89 MG/DL (ref 65–140)
HCT VFR BLD AUTO: 32.1 % (ref 36.5–49.3)
HGB BLD-MCNC: 10 G/DL (ref 12–17)
INR PPP: 1.31 (ref 0.85–1.19)
MCH RBC QN AUTO: 28.8 PG (ref 26.8–34.3)
MCHC RBC AUTO-ENTMCNC: 31.2 G/DL (ref 31.4–37.4)
MCV RBC AUTO: 93 FL (ref 82–98)
PLATELET # BLD AUTO: 245 THOUSANDS/UL (ref 149–390)
PMV BLD AUTO: 9 FL (ref 8.9–12.7)
POTASSIUM SERPL-SCNC: 4.3 MMOL/L (ref 3.5–5.3)
PROTHROMBIN TIME: 16.5 SECONDS (ref 12.3–15)
RBC # BLD AUTO: 3.47 MILLION/UL (ref 3.88–5.62)
SODIUM SERPL-SCNC: 131 MMOL/L (ref 135–147)
VANCOMYCIN TROUGH SERPL-MCNC: 4.6 UG/ML (ref 10–20)
WBC # BLD AUTO: 8.51 THOUSAND/UL (ref 4.31–10.16)

## 2025-08-13 PROCEDURE — 0QBM0ZZ EXCISION OF LEFT TARSAL, OPEN APPROACH: ICD-10-PCS | Performed by: PODIATRIST

## 2025-08-13 PROCEDURE — 90935 HEMODIALYSIS ONE EVALUATION: CPT

## 2025-08-13 PROCEDURE — 87176 TISSUE HOMOGENIZATION CULTR: CPT | Performed by: PODIATRIST

## 2025-08-13 PROCEDURE — 87075 CULTR BACTERIA EXCEPT BLOOD: CPT | Performed by: PODIATRIST

## 2025-08-13 PROCEDURE — 87186 SC STD MICRODIL/AGAR DIL: CPT | Performed by: PODIATRIST

## 2025-08-13 PROCEDURE — 80202 ASSAY OF VANCOMYCIN: CPT | Performed by: INTERNAL MEDICINE

## 2025-08-13 PROCEDURE — 99232 SBSQ HOSP IP/OBS MODERATE 35: CPT

## 2025-08-13 PROCEDURE — 11044 DBRDMT BONE 1ST 20 SQ CM/<: CPT | Performed by: PODIATRIST

## 2025-08-13 PROCEDURE — 87070 CULTURE OTHR SPECIMN AEROBIC: CPT | Performed by: PODIATRIST

## 2025-08-13 PROCEDURE — 87077 CULTURE AEROBIC IDENTIFY: CPT | Performed by: PODIATRIST

## 2025-08-13 PROCEDURE — 20700 MNL PREP&INSJ DP RX DLVR DEV: CPT | Performed by: PODIATRIST

## 2025-08-13 PROCEDURE — 99232 SBSQ HOSP IP/OBS MODERATE 35: CPT | Performed by: SURGERY

## 2025-08-13 PROCEDURE — 97605 NEG PRS WND THER DME<=50SQCM: CPT | Performed by: PODIATRIST

## 2025-08-13 PROCEDURE — 0QUM0JZ SUPPLEMENT LEFT TARSAL WITH SYNTHETIC SUBSTITUTE, OPEN APPROACH: ICD-10-PCS | Performed by: PODIATRIST

## 2025-08-13 PROCEDURE — 80048 BASIC METABOLIC PNL TOTAL CA: CPT

## 2025-08-13 PROCEDURE — 0HRNXK3 REPLACEMENT OF LEFT FOOT SKIN WITH NONAUTOLOGOUS TISSUE SUBSTITUTE, FULL THICKNESS, EXTERNAL APPROACH: ICD-10-PCS | Performed by: PODIATRIST

## 2025-08-13 PROCEDURE — 85610 PROTHROMBIN TIME: CPT

## 2025-08-13 PROCEDURE — C1713 ANCHOR/SCREW BN/BN,TIS/BN: HCPCS | Performed by: PODIATRIST

## 2025-08-13 PROCEDURE — 85027 COMPLETE CBC AUTOMATED: CPT

## 2025-08-13 PROCEDURE — 87205 SMEAR GRAM STAIN: CPT | Performed by: PODIATRIST

## 2025-08-13 DEVICE — IMPLANTABLE DEVICE: Type: IMPLANTABLE DEVICE | Site: HEEL | Status: FUNCTIONAL

## 2025-08-13 RX ORDER — ASPIRIN 81 MG/1
81 TABLET ORAL DAILY
Status: DISCONTINUED | OUTPATIENT
Start: 2025-08-14 | End: 2025-08-19 | Stop reason: HOSPADM

## 2025-08-13 RX ORDER — PROPOFOL 10 MG/ML
INJECTION, EMULSION INTRAVENOUS CONTINUOUS PRN
Status: DISCONTINUED | OUTPATIENT
Start: 2025-08-13 | End: 2025-08-13

## 2025-08-13 RX ORDER — FENTANYL CITRATE/PF 50 MCG/ML
25 SYRINGE (ML) INJECTION
Refills: 0 | Status: DISCONTINUED | OUTPATIENT
Start: 2025-08-13 | End: 2025-08-13 | Stop reason: HOSPADM

## 2025-08-13 RX ORDER — MIDAZOLAM HYDROCHLORIDE 2 MG/2ML
INJECTION, SOLUTION INTRAMUSCULAR; INTRAVENOUS AS NEEDED
Status: DISCONTINUED | OUTPATIENT
Start: 2025-08-13 | End: 2025-08-13

## 2025-08-13 RX ORDER — HYDROMORPHONE HCL IN WATER/PF 6 MG/30 ML
0.2 PATIENT CONTROLLED ANALGESIA SYRINGE INTRAVENOUS
Status: DISCONTINUED | OUTPATIENT
Start: 2025-08-13 | End: 2025-08-13 | Stop reason: HOSPADM

## 2025-08-13 RX ORDER — PROPOFOL 10 MG/ML
INJECTION, EMULSION INTRAVENOUS AS NEEDED
Status: DISCONTINUED | OUTPATIENT
Start: 2025-08-13 | End: 2025-08-13

## 2025-08-13 RX ORDER — SODIUM CHLORIDE, SODIUM LACTATE, POTASSIUM CHLORIDE, CALCIUM CHLORIDE 600; 310; 30; 20 MG/100ML; MG/100ML; MG/100ML; MG/100ML
INJECTION, SOLUTION INTRAVENOUS CONTINUOUS PRN
Status: DISCONTINUED | OUTPATIENT
Start: 2025-08-13 | End: 2025-08-13

## 2025-08-13 RX ORDER — VANCOMYCIN HYDROCHLORIDE 1 G/20ML
INJECTION, POWDER, LYOPHILIZED, FOR SOLUTION INTRAVENOUS AS NEEDED
Status: DISCONTINUED | OUTPATIENT
Start: 2025-08-13 | End: 2025-08-13 | Stop reason: HOSPADM

## 2025-08-13 RX ORDER — MAGNESIUM HYDROXIDE 1200 MG/15ML
LIQUID ORAL AS NEEDED
Status: DISCONTINUED | OUTPATIENT
Start: 2025-08-13 | End: 2025-08-13 | Stop reason: HOSPADM

## 2025-08-13 RX ORDER — LIDOCAINE HYDROCHLORIDE 10 MG/ML
INJECTION, SOLUTION EPIDURAL; INFILTRATION; INTRACAUDAL; PERINEURAL AS NEEDED
Status: DISCONTINUED | OUTPATIENT
Start: 2025-08-13 | End: 2025-08-13

## 2025-08-13 RX ORDER — ATORVASTATIN CALCIUM 10 MG/1
10 TABLET, FILM COATED ORAL
Status: DISCONTINUED | OUTPATIENT
Start: 2025-08-13 | End: 2025-08-19 | Stop reason: HOSPADM

## 2025-08-13 RX ORDER — ONDANSETRON 2 MG/ML
4 INJECTION INTRAMUSCULAR; INTRAVENOUS ONCE AS NEEDED
Status: DISCONTINUED | OUTPATIENT
Start: 2025-08-13 | End: 2025-08-13 | Stop reason: HOSPADM

## 2025-08-13 RX ORDER — METOCLOPRAMIDE HYDROCHLORIDE 5 MG/ML
10 INJECTION INTRAMUSCULAR; INTRAVENOUS ONCE AS NEEDED
Status: DISCONTINUED | OUTPATIENT
Start: 2025-08-13 | End: 2025-08-13 | Stop reason: HOSPADM

## 2025-08-13 RX ADMIN — Medication 2000 UNITS: at 14:54

## 2025-08-13 RX ADMIN — HEPARIN SODIUM 5000 UNITS: 5000 INJECTION INTRAVENOUS; SUBCUTANEOUS at 22:24

## 2025-08-13 RX ADMIN — Medication 1 CAPSULE: at 16:25

## 2025-08-13 RX ADMIN — LORATADINE 10 MG: 10 TABLET ORAL at 14:53

## 2025-08-13 RX ADMIN — MIDAZOLAM 2 MG: 1 INJECTION INTRAMUSCULAR; INTRAVENOUS at 13:04

## 2025-08-13 RX ADMIN — ALLOPURINOL 100 MG: 100 TABLET ORAL at 14:53

## 2025-08-13 RX ADMIN — MIDODRINE HYDROCHLORIDE 5 MG: 5 TABLET ORAL at 08:37

## 2025-08-13 RX ADMIN — PROPOFOL 50 MCG/KG/MIN: 10 INJECTION, EMULSION INTRAVENOUS at 13:09

## 2025-08-13 RX ADMIN — Medication 500 MG: at 13:04

## 2025-08-13 RX ADMIN — PROPOFOL 40 MG: 10 INJECTION, EMULSION INTRAVENOUS at 13:09

## 2025-08-13 RX ADMIN — ATORVASTATIN CALCIUM 10 MG: 10 TABLET, FILM COATED ORAL at 17:19

## 2025-08-13 RX ADMIN — CALCIUM 1 TABLET: 500 TABLET ORAL at 16:25

## 2025-08-13 RX ADMIN — FLUOXETINE HYDROCHLORIDE 20 MG: 20 CAPSULE ORAL at 14:54

## 2025-08-13 RX ADMIN — HEPARIN SODIUM 5000 UNITS: 5000 INJECTION INTRAVENOUS; SUBCUTANEOUS at 14:53

## 2025-08-13 RX ADMIN — LIDOCAINE HYDROCHLORIDE 100 MG: 10 INJECTION, SOLUTION EPIDURAL; INFILTRATION; INTRACAUDAL; PERINEURAL at 13:09

## 2025-08-13 RX ADMIN — SODIUM CHLORIDE, SODIUM LACTATE, POTASSIUM CHLORIDE, AND CALCIUM CHLORIDE: .6; .31; .03; .02 INJECTION, SOLUTION INTRAVENOUS at 13:04

## 2025-08-14 ENCOUNTER — ANTICOAG VISIT (OUTPATIENT)
Dept: FAMILY MEDICINE CLINIC | Facility: CLINIC | Age: 75
End: 2025-08-14

## 2025-08-14 LAB
ANION GAP SERPL CALCULATED.3IONS-SCNC: 6 MMOL/L (ref 4–13)
BUN SERPL-MCNC: 18 MG/DL (ref 5–25)
CALCIUM SERPL-MCNC: 8.9 MG/DL (ref 8.4–10.2)
CHLORIDE SERPL-SCNC: 96 MMOL/L (ref 96–108)
CO2 SERPL-SCNC: 27 MMOL/L (ref 21–32)
CREAT SERPL-MCNC: 4.28 MG/DL (ref 0.6–1.3)
ERYTHROCYTE [DISTWIDTH] IN BLOOD BY AUTOMATED COUNT: 15.9 % (ref 11.6–15.1)
GFR SERPL CREATININE-BSD FRML MDRD: 12 ML/MIN/1.73SQ M
GLUCOSE SERPL-MCNC: 90 MG/DL (ref 65–140)
HCT VFR BLD AUTO: 32.4 % (ref 36.5–49.3)
HGB BLD-MCNC: 10.3 G/DL (ref 12–17)
INR PPP: 1.33 (ref 0.85–1.19)
MCH RBC QN AUTO: 29.2 PG (ref 26.8–34.3)
MCHC RBC AUTO-ENTMCNC: 31.8 G/DL (ref 31.4–37.4)
MCV RBC AUTO: 92 FL (ref 82–98)
PLATELET # BLD AUTO: 241 THOUSANDS/UL (ref 149–390)
PMV BLD AUTO: 9 FL (ref 8.9–12.7)
POTASSIUM SERPL-SCNC: 4.1 MMOL/L (ref 3.5–5.3)
PROTHROMBIN TIME: 16.7 SECONDS (ref 12.3–15)
RBC # BLD AUTO: 3.53 MILLION/UL (ref 3.88–5.62)
SODIUM SERPL-SCNC: 129 MMOL/L (ref 135–147)
WBC # BLD AUTO: 7.08 THOUSAND/UL (ref 4.31–10.16)

## 2025-08-14 PROCEDURE — 99232 SBSQ HOSP IP/OBS MODERATE 35: CPT

## 2025-08-14 PROCEDURE — 85027 COMPLETE CBC AUTOMATED: CPT

## 2025-08-14 PROCEDURE — 99232 SBSQ HOSP IP/OBS MODERATE 35: CPT | Performed by: INTERNAL MEDICINE

## 2025-08-14 PROCEDURE — 85610 PROTHROMBIN TIME: CPT

## 2025-08-14 PROCEDURE — 80048 BASIC METABOLIC PNL TOTAL CA: CPT

## 2025-08-14 RX ORDER — WARFARIN SODIUM 5 MG/1
5 TABLET ORAL
Status: DISCONTINUED | OUTPATIENT
Start: 2025-08-14 | End: 2025-08-19 | Stop reason: HOSPADM

## 2025-08-14 RX ADMIN — WARFARIN SODIUM 5 MG: 5 TABLET ORAL at 17:09

## 2025-08-14 RX ADMIN — ASPIRIN 81 MG: 81 TABLET, DELAYED RELEASE ORAL at 08:58

## 2025-08-14 RX ADMIN — ATORVASTATIN CALCIUM 10 MG: 10 TABLET, FILM COATED ORAL at 17:09

## 2025-08-14 RX ADMIN — CALCIUM 1 TABLET: 500 TABLET ORAL at 17:09

## 2025-08-14 RX ADMIN — Medication 1 CAPSULE: at 17:09

## 2025-08-14 RX ADMIN — Medication 2000 UNITS: at 08:59

## 2025-08-14 RX ADMIN — Medication 500 MG: at 14:37

## 2025-08-14 RX ADMIN — PANTOPRAZOLE SODIUM 40 MG: 40 TABLET, DELAYED RELEASE ORAL at 05:34

## 2025-08-14 RX ADMIN — HEPARIN SODIUM 5000 UNITS: 5000 INJECTION INTRAVENOUS; SUBCUTANEOUS at 05:34

## 2025-08-14 RX ADMIN — LORATADINE 10 MG: 10 TABLET ORAL at 08:59

## 2025-08-14 RX ADMIN — CALCIUM 1 TABLET: 500 TABLET ORAL at 08:30

## 2025-08-14 RX ADMIN — FLUOXETINE HYDROCHLORIDE 20 MG: 20 CAPSULE ORAL at 08:59

## 2025-08-15 ENCOUNTER — APPOINTMENT (INPATIENT)
Dept: DIALYSIS | Facility: HOSPITAL | Age: 75
DRG: 252 | End: 2025-08-15
Attending: INTERNAL MEDICINE
Payer: MEDICARE

## 2025-08-15 ENCOUNTER — ANTICOAG VISIT (OUTPATIENT)
Dept: FAMILY MEDICINE CLINIC | Facility: CLINIC | Age: 75
End: 2025-08-15

## 2025-08-15 LAB
ANION GAP SERPL CALCULATED.3IONS-SCNC: 9 MMOL/L (ref 4–13)
BACTERIA SPEC ANAEROBE CULT: NORMAL
BASOPHILS # BLD AUTO: 0.09 THOUSANDS/ÂΜL (ref 0–0.1)
BASOPHILS NFR BLD AUTO: 1 % (ref 0–1)
BUN SERPL-MCNC: 25 MG/DL (ref 5–25)
CALCIUM SERPL-MCNC: 8.8 MG/DL (ref 8.4–10.2)
CHLORIDE SERPL-SCNC: 96 MMOL/L (ref 96–108)
CO2 SERPL-SCNC: 25 MMOL/L (ref 21–32)
CREAT SERPL-MCNC: 5.42 MG/DL (ref 0.6–1.3)
EOSINOPHIL # BLD AUTO: 0.38 THOUSAND/ÂΜL (ref 0–0.61)
EOSINOPHIL NFR BLD AUTO: 6 % (ref 0–6)
ERYTHROCYTE [DISTWIDTH] IN BLOOD BY AUTOMATED COUNT: 16 % (ref 11.6–15.1)
GFR SERPL CREATININE-BSD FRML MDRD: 9 ML/MIN/1.73SQ M
GLUCOSE SERPL-MCNC: 92 MG/DL (ref 65–140)
HCT VFR BLD AUTO: 32.3 % (ref 36.5–49.3)
HGB BLD-MCNC: 10 G/DL (ref 12–17)
IMM GRANULOCYTES # BLD AUTO: 0.01 THOUSAND/UL (ref 0–0.2)
IMM GRANULOCYTES NFR BLD AUTO: 0 % (ref 0–2)
INR PPP: 1.42 (ref 0.85–1.19)
LYMPHOCYTES # BLD AUTO: 1.03 THOUSANDS/ÂΜL (ref 0.6–4.47)
LYMPHOCYTES NFR BLD AUTO: 17 % (ref 14–44)
MCH RBC QN AUTO: 28.7 PG (ref 26.8–34.3)
MCHC RBC AUTO-ENTMCNC: 31 G/DL (ref 31.4–37.4)
MCV RBC AUTO: 93 FL (ref 82–98)
MONOCYTES # BLD AUTO: 0.65 THOUSAND/ÂΜL (ref 0.17–1.22)
MONOCYTES NFR BLD AUTO: 10 % (ref 4–12)
NEUTROPHILS # BLD AUTO: 4.08 THOUSANDS/ÂΜL (ref 1.85–7.62)
NEUTS SEG NFR BLD AUTO: 66 % (ref 43–75)
NRBC BLD AUTO-RTO: 0 /100 WBCS
PLATELET # BLD AUTO: 240 THOUSANDS/UL (ref 149–390)
PMV BLD AUTO: 9.3 FL (ref 8.9–12.7)
POTASSIUM SERPL-SCNC: 4.2 MMOL/L (ref 3.5–5.3)
PROTHROMBIN TIME: 17.6 SECONDS (ref 12.3–15)
RBC # BLD AUTO: 3.49 MILLION/UL (ref 3.88–5.62)
SODIUM SERPL-SCNC: 130 MMOL/L (ref 135–147)
WBC # BLD AUTO: 6.24 THOUSAND/UL (ref 4.31–10.16)

## 2025-08-15 PROCEDURE — 99232 SBSQ HOSP IP/OBS MODERATE 35: CPT

## 2025-08-15 PROCEDURE — 99232 SBSQ HOSP IP/OBS MODERATE 35: CPT | Performed by: INTERNAL MEDICINE

## 2025-08-15 PROCEDURE — 85025 COMPLETE CBC W/AUTO DIFF WBC: CPT

## 2025-08-15 PROCEDURE — 99231 SBSQ HOSP IP/OBS SF/LOW 25: CPT | Performed by: PODIATRIST

## 2025-08-15 PROCEDURE — 80048 BASIC METABOLIC PNL TOTAL CA: CPT

## 2025-08-15 PROCEDURE — 85610 PROTHROMBIN TIME: CPT

## 2025-08-15 RX ADMIN — Medication 1 CAPSULE: at 15:45

## 2025-08-15 RX ADMIN — PANTOPRAZOLE SODIUM 40 MG: 40 TABLET, DELAYED RELEASE ORAL at 05:12

## 2025-08-15 RX ADMIN — Medication 2000 UNITS: at 13:18

## 2025-08-15 RX ADMIN — LORATADINE 10 MG: 10 TABLET ORAL at 13:18

## 2025-08-15 RX ADMIN — MIDODRINE HYDROCHLORIDE 5 MG: 5 TABLET ORAL at 09:42

## 2025-08-15 RX ADMIN — ATORVASTATIN CALCIUM 10 MG: 10 TABLET, FILM COATED ORAL at 15:45

## 2025-08-15 RX ADMIN — CALCIUM 1 TABLET: 500 TABLET ORAL at 08:02

## 2025-08-15 RX ADMIN — ALLOPURINOL 100 MG: 100 TABLET ORAL at 15:45

## 2025-08-15 RX ADMIN — Medication 500 MG: at 13:24

## 2025-08-15 RX ADMIN — FLUOXETINE HYDROCHLORIDE 20 MG: 20 CAPSULE ORAL at 13:18

## 2025-08-15 RX ADMIN — CALCIUM 1 TABLET: 500 TABLET ORAL at 15:45

## 2025-08-15 RX ADMIN — ASPIRIN 81 MG: 81 TABLET, DELAYED RELEASE ORAL at 13:18

## 2025-08-15 RX ADMIN — WARFARIN SODIUM 5 MG: 5 TABLET ORAL at 17:03

## 2025-08-16 LAB
ANION GAP SERPL CALCULATED.3IONS-SCNC: 7 MMOL/L (ref 4–13)
BACTERIA SPEC ANAEROBE CULT: NO GROWTH
BUN SERPL-MCNC: 16 MG/DL (ref 5–25)
CALCIUM SERPL-MCNC: 8.7 MG/DL (ref 8.4–10.2)
CHLORIDE SERPL-SCNC: 95 MMOL/L (ref 96–108)
CO2 SERPL-SCNC: 27 MMOL/L (ref 21–32)
CREAT SERPL-MCNC: 4.01 MG/DL (ref 0.6–1.3)
ERYTHROCYTE [DISTWIDTH] IN BLOOD BY AUTOMATED COUNT: 15.9 % (ref 11.6–15.1)
GFR SERPL CREATININE-BSD FRML MDRD: 13 ML/MIN/1.73SQ M
GLUCOSE SERPL-MCNC: 89 MG/DL (ref 65–140)
HCT VFR BLD AUTO: 32.6 % (ref 36.5–49.3)
HGB BLD-MCNC: 10.6 G/DL (ref 12–17)
INR PPP: 1.47 (ref 0.85–1.19)
MCH RBC QN AUTO: 29.9 PG (ref 26.8–34.3)
MCHC RBC AUTO-ENTMCNC: 32.5 G/DL (ref 31.4–37.4)
MCV RBC AUTO: 92 FL (ref 82–98)
PLATELET # BLD AUTO: 223 THOUSANDS/UL (ref 149–390)
PMV BLD AUTO: 8.9 FL (ref 8.9–12.7)
POTASSIUM SERPL-SCNC: 4.2 MMOL/L (ref 3.5–5.3)
PROTHROMBIN TIME: 18.1 SECONDS (ref 12.3–15)
RBC # BLD AUTO: 3.55 MILLION/UL (ref 3.88–5.62)
SODIUM SERPL-SCNC: 129 MMOL/L (ref 135–147)
WBC # BLD AUTO: 6.19 THOUSAND/UL (ref 4.31–10.16)

## 2025-08-16 PROCEDURE — 85027 COMPLETE CBC AUTOMATED: CPT

## 2025-08-16 PROCEDURE — 85610 PROTHROMBIN TIME: CPT

## 2025-08-16 PROCEDURE — 80048 BASIC METABOLIC PNL TOTAL CA: CPT

## 2025-08-16 PROCEDURE — 99232 SBSQ HOSP IP/OBS MODERATE 35: CPT | Performed by: INTERNAL MEDICINE

## 2025-08-16 PROCEDURE — 99232 SBSQ HOSP IP/OBS MODERATE 35: CPT

## 2025-08-16 RX ORDER — NYSTATIN 100000 [USP'U]/G
POWDER TOPICAL 2 TIMES DAILY
Status: DISCONTINUED | OUTPATIENT
Start: 2025-08-16 | End: 2025-08-19 | Stop reason: HOSPADM

## 2025-08-16 RX ADMIN — PANTOPRAZOLE SODIUM 40 MG: 40 TABLET, DELAYED RELEASE ORAL at 05:21

## 2025-08-16 RX ADMIN — WARFARIN SODIUM 5 MG: 5 TABLET ORAL at 17:37

## 2025-08-16 RX ADMIN — ASPIRIN 81 MG: 81 TABLET, DELAYED RELEASE ORAL at 09:45

## 2025-08-16 RX ADMIN — Medication 2000 UNITS: at 09:45

## 2025-08-16 RX ADMIN — CALCIUM 1 TABLET: 500 TABLET ORAL at 16:25

## 2025-08-16 RX ADMIN — Medication 1 CAPSULE: at 16:25

## 2025-08-16 RX ADMIN — CALCIUM 1 TABLET: 500 TABLET ORAL at 07:44

## 2025-08-16 RX ADMIN — Medication 500 MG: at 14:39

## 2025-08-16 RX ADMIN — NYSTATIN: 100000 POWDER TOPICAL at 17:37

## 2025-08-16 RX ADMIN — FLUOXETINE HYDROCHLORIDE 20 MG: 20 CAPSULE ORAL at 09:45

## 2025-08-16 RX ADMIN — LORATADINE 10 MG: 10 TABLET ORAL at 09:45

## 2025-08-16 RX ADMIN — ATORVASTATIN CALCIUM 10 MG: 10 TABLET, FILM COATED ORAL at 16:25

## 2025-08-17 LAB
ANION GAP SERPL CALCULATED.3IONS-SCNC: 6 MMOL/L (ref 4–13)
BACTERIA WND AEROBE CULT: ABNORMAL
BUN SERPL-MCNC: 22 MG/DL (ref 5–25)
CALCIUM SERPL-MCNC: 8.9 MG/DL (ref 8.4–10.2)
CHLORIDE SERPL-SCNC: 95 MMOL/L (ref 96–108)
CO2 SERPL-SCNC: 28 MMOL/L (ref 21–32)
CREAT SERPL-MCNC: 5.28 MG/DL (ref 0.6–1.3)
ERYTHROCYTE [DISTWIDTH] IN BLOOD BY AUTOMATED COUNT: 16.1 % (ref 11.6–15.1)
GFR SERPL CREATININE-BSD FRML MDRD: 9 ML/MIN/1.73SQ M
GLUCOSE SERPL-MCNC: 86 MG/DL (ref 65–140)
GRAM STN SPEC: ABNORMAL
HCT VFR BLD AUTO: 31.9 % (ref 36.5–49.3)
HGB BLD-MCNC: 10.2 G/DL (ref 12–17)
MCH RBC QN AUTO: 29.3 PG (ref 26.8–34.3)
MCHC RBC AUTO-ENTMCNC: 32 G/DL (ref 31.4–37.4)
MCV RBC AUTO: 92 FL (ref 82–98)
PLATELET # BLD AUTO: 249 THOUSANDS/UL (ref 149–390)
PMV BLD AUTO: 9.2 FL (ref 8.9–12.7)
POTASSIUM SERPL-SCNC: 4.7 MMOL/L (ref 3.5–5.3)
RBC # BLD AUTO: 3.48 MILLION/UL (ref 3.88–5.62)
SODIUM SERPL-SCNC: 129 MMOL/L (ref 135–147)
WBC # BLD AUTO: 7.96 THOUSAND/UL (ref 4.31–10.16)

## 2025-08-17 PROCEDURE — 99232 SBSQ HOSP IP/OBS MODERATE 35: CPT | Performed by: PHYSICIAN ASSISTANT

## 2025-08-17 PROCEDURE — 99232 SBSQ HOSP IP/OBS MODERATE 35: CPT | Performed by: INTERNAL MEDICINE

## 2025-08-17 PROCEDURE — 85027 COMPLETE CBC AUTOMATED: CPT

## 2025-08-17 PROCEDURE — 80048 BASIC METABOLIC PNL TOTAL CA: CPT

## 2025-08-17 RX ADMIN — CALCIUM 1 TABLET: 500 TABLET ORAL at 16:14

## 2025-08-17 RX ADMIN — NYSTATIN: 100000 POWDER TOPICAL at 09:36

## 2025-08-17 RX ADMIN — LORATADINE 10 MG: 10 TABLET ORAL at 09:35

## 2025-08-17 RX ADMIN — PANTOPRAZOLE SODIUM 40 MG: 40 TABLET, DELAYED RELEASE ORAL at 05:13

## 2025-08-17 RX ADMIN — ASPIRIN 81 MG: 81 TABLET, DELAYED RELEASE ORAL at 09:35

## 2025-08-17 RX ADMIN — CALCIUM 1 TABLET: 500 TABLET ORAL at 09:35

## 2025-08-17 RX ADMIN — Medication 1 CAPSULE: at 16:14

## 2025-08-17 RX ADMIN — ATORVASTATIN CALCIUM 10 MG: 10 TABLET, FILM COATED ORAL at 16:14

## 2025-08-17 RX ADMIN — FLUOXETINE HYDROCHLORIDE 20 MG: 20 CAPSULE ORAL at 09:35

## 2025-08-17 RX ADMIN — Medication 2000 UNITS: at 09:35

## 2025-08-17 RX ADMIN — Medication 500 MG: at 16:13

## 2025-08-17 RX ADMIN — WARFARIN SODIUM 5 MG: 5 TABLET ORAL at 17:53

## 2025-08-17 RX ADMIN — NYSTATIN: 100000 POWDER TOPICAL at 17:53

## 2025-08-18 ENCOUNTER — APPOINTMENT (INPATIENT)
Dept: DIALYSIS | Facility: HOSPITAL | Age: 75
DRG: 252 | End: 2025-08-18
Payer: MEDICARE

## 2025-08-18 LAB
ANION GAP SERPL CALCULATED.3IONS-SCNC: 8 MMOL/L (ref 4–13)
BUN SERPL-MCNC: 30 MG/DL (ref 5–25)
CALCIUM SERPL-MCNC: 8.9 MG/DL (ref 8.4–10.2)
CHLORIDE SERPL-SCNC: 95 MMOL/L (ref 96–108)
CO2 SERPL-SCNC: 26 MMOL/L (ref 21–32)
CREAT SERPL-MCNC: 6.38 MG/DL (ref 0.6–1.3)
ERYTHROCYTE [DISTWIDTH] IN BLOOD BY AUTOMATED COUNT: 16.2 % (ref 11.6–15.1)
GFR SERPL CREATININE-BSD FRML MDRD: 7 ML/MIN/1.73SQ M
GLUCOSE SERPL-MCNC: 84 MG/DL (ref 65–140)
HCT VFR BLD AUTO: 33 % (ref 36.5–49.3)
HGB BLD-MCNC: 10.5 G/DL (ref 12–17)
INR PPP: 1.73 (ref 0.85–1.19)
MCH RBC QN AUTO: 28.8 PG (ref 26.8–34.3)
MCHC RBC AUTO-ENTMCNC: 31.8 G/DL (ref 31.4–37.4)
MCV RBC AUTO: 91 FL (ref 82–98)
PLATELET # BLD AUTO: 264 THOUSANDS/UL (ref 149–390)
PMV BLD AUTO: 9.2 FL (ref 8.9–12.7)
POTASSIUM SERPL-SCNC: 5.1 MMOL/L (ref 3.5–5.3)
PROTHROMBIN TIME: 20.6 SECONDS (ref 12.3–15)
RBC # BLD AUTO: 3.64 MILLION/UL (ref 3.88–5.62)
SODIUM SERPL-SCNC: 129 MMOL/L (ref 135–147)
WBC # BLD AUTO: 6.9 THOUSAND/UL (ref 4.31–10.16)

## 2025-08-18 PROCEDURE — 99232 SBSQ HOSP IP/OBS MODERATE 35: CPT | Performed by: INTERNAL MEDICINE

## 2025-08-18 PROCEDURE — 80048 BASIC METABOLIC PNL TOTAL CA: CPT | Performed by: PHYSICIAN ASSISTANT

## 2025-08-18 PROCEDURE — 99232 SBSQ HOSP IP/OBS MODERATE 35: CPT | Performed by: PHYSICIAN ASSISTANT

## 2025-08-18 PROCEDURE — 85610 PROTHROMBIN TIME: CPT | Performed by: PHYSICIAN ASSISTANT

## 2025-08-18 PROCEDURE — 85027 COMPLETE CBC AUTOMATED: CPT | Performed by: PHYSICIAN ASSISTANT

## 2025-08-18 RX ADMIN — Medication 2000 UNITS: at 12:48

## 2025-08-18 RX ADMIN — CALCIUM 1 TABLET: 500 TABLET ORAL at 17:34

## 2025-08-18 RX ADMIN — ASPIRIN 81 MG: 81 TABLET, DELAYED RELEASE ORAL at 12:48

## 2025-08-18 RX ADMIN — MIDODRINE HYDROCHLORIDE 5 MG: 5 TABLET ORAL at 08:13

## 2025-08-18 RX ADMIN — ATORVASTATIN CALCIUM 10 MG: 10 TABLET, FILM COATED ORAL at 17:34

## 2025-08-18 RX ADMIN — NYSTATIN 1 APPLICATION: 100000 POWDER TOPICAL at 17:34

## 2025-08-18 RX ADMIN — WARFARIN SODIUM 5 MG: 5 TABLET ORAL at 17:34

## 2025-08-18 RX ADMIN — Medication 1 CAPSULE: at 17:34

## 2025-08-18 RX ADMIN — FLUOXETINE HYDROCHLORIDE 20 MG: 20 CAPSULE ORAL at 12:48

## 2025-08-18 RX ADMIN — CALCIUM 1 TABLET: 500 TABLET ORAL at 08:13

## 2025-08-18 RX ADMIN — VANCOMYCIN HYDROCHLORIDE 2000 MG: 1 INJECTION, POWDER, LYOPHILIZED, FOR SOLUTION INTRAVENOUS at 15:20

## 2025-08-18 RX ADMIN — NYSTATIN: 100000 POWDER TOPICAL at 12:52

## 2025-08-18 RX ADMIN — ALLOPURINOL 100 MG: 100 TABLET ORAL at 15:19

## 2025-08-18 RX ADMIN — PANTOPRAZOLE SODIUM 40 MG: 40 TABLET, DELAYED RELEASE ORAL at 05:02

## 2025-08-18 RX ADMIN — LORATADINE 10 MG: 10 TABLET ORAL at 12:48

## 2025-08-19 ENCOUNTER — TRANSITIONAL CARE MANAGEMENT (OUTPATIENT)
Dept: FAMILY MEDICINE CLINIC | Facility: CLINIC | Age: 75
End: 2025-08-19

## 2025-08-19 VITALS
TEMPERATURE: 97.8 F | BODY MASS INDEX: 34.74 KG/M2 | WEIGHT: 262.13 LBS | RESPIRATION RATE: 18 BRPM | OXYGEN SATURATION: 96 % | SYSTOLIC BLOOD PRESSURE: 120 MMHG | DIASTOLIC BLOOD PRESSURE: 71 MMHG | HEIGHT: 73 IN | HEART RATE: 89 BPM

## 2025-08-19 LAB
ANION GAP SERPL CALCULATED.3IONS-SCNC: 7 MMOL/L (ref 4–13)
BUN SERPL-MCNC: 19 MG/DL (ref 5–25)
CALCIUM SERPL-MCNC: 8.6 MG/DL (ref 8.4–10.2)
CHLORIDE SERPL-SCNC: 96 MMOL/L (ref 96–108)
CO2 SERPL-SCNC: 26 MMOL/L (ref 21–32)
CREAT SERPL-MCNC: 4.75 MG/DL (ref 0.6–1.3)
ERYTHROCYTE [DISTWIDTH] IN BLOOD BY AUTOMATED COUNT: 16.1 % (ref 11.6–15.1)
GFR SERPL CREATININE-BSD FRML MDRD: 11 ML/MIN/1.73SQ M
GLUCOSE SERPL-MCNC: 85 MG/DL (ref 65–140)
HCT VFR BLD AUTO: 32.2 % (ref 36.5–49.3)
HGB BLD-MCNC: 10.4 G/DL (ref 12–17)
INR PPP: 1.91 (ref 0.85–1.19)
MCH RBC QN AUTO: 29.5 PG (ref 26.8–34.3)
MCHC RBC AUTO-ENTMCNC: 32.3 G/DL (ref 31.4–37.4)
MCV RBC AUTO: 91 FL (ref 82–98)
PLATELET # BLD AUTO: 238 THOUSANDS/UL (ref 149–390)
PMV BLD AUTO: 8.5 FL (ref 8.9–12.7)
POTASSIUM SERPL-SCNC: 4.4 MMOL/L (ref 3.5–5.3)
PROTHROMBIN TIME: 22.2 SECONDS (ref 12.3–15)
RBC # BLD AUTO: 3.53 MILLION/UL (ref 3.88–5.62)
SODIUM SERPL-SCNC: 129 MMOL/L (ref 135–147)
WBC # BLD AUTO: 7.38 THOUSAND/UL (ref 4.31–10.16)

## 2025-08-19 PROCEDURE — 80048 BASIC METABOLIC PNL TOTAL CA: CPT | Performed by: PHYSICIAN ASSISTANT

## 2025-08-19 PROCEDURE — 85610 PROTHROMBIN TIME: CPT | Performed by: PHYSICIAN ASSISTANT

## 2025-08-19 PROCEDURE — 99238 HOSP IP/OBS DSCHRG MGMT 30/<: CPT | Performed by: PHYSICIAN ASSISTANT

## 2025-08-19 PROCEDURE — 99232 SBSQ HOSP IP/OBS MODERATE 35: CPT | Performed by: INTERNAL MEDICINE

## 2025-08-19 PROCEDURE — 85027 COMPLETE CBC AUTOMATED: CPT | Performed by: PHYSICIAN ASSISTANT

## 2025-08-19 RX ORDER — ASPIRIN 81 MG/1
81 TABLET ORAL DAILY
Qty: 30 TABLET | Refills: 0 | Status: SHIPPED | OUTPATIENT
Start: 2025-08-20

## 2025-08-19 RX ORDER — ATORVASTATIN CALCIUM 10 MG/1
10 TABLET, FILM COATED ORAL
Qty: 30 TABLET | Refills: 0 | Status: SHIPPED | OUTPATIENT
Start: 2025-08-19

## 2025-08-19 RX ADMIN — LORATADINE 10 MG: 10 TABLET ORAL at 08:19

## 2025-08-19 RX ADMIN — PANTOPRAZOLE SODIUM 40 MG: 40 TABLET, DELAYED RELEASE ORAL at 05:08

## 2025-08-19 RX ADMIN — CALCIUM 1 TABLET: 500 TABLET ORAL at 08:19

## 2025-08-19 RX ADMIN — ASPIRIN 81 MG: 81 TABLET, DELAYED RELEASE ORAL at 08:19

## 2025-08-19 RX ADMIN — Medication 2000 UNITS: at 08:19

## 2025-08-19 RX ADMIN — NYSTATIN: 100000 POWDER TOPICAL at 08:20

## 2025-08-19 RX ADMIN — FLUOXETINE HYDROCHLORIDE 20 MG: 20 CAPSULE ORAL at 08:19

## 2025-08-20 DIAGNOSIS — M86.9 OSTEOMYELITIS OF LEFT FOOT, UNSPECIFIED TYPE (HCC): Primary | ICD-10-CM

## 2025-08-21 ENCOUNTER — HOME CARE VISIT (OUTPATIENT)
Dept: HOME HEALTH SERVICES | Facility: HOME HEALTHCARE | Age: 75
End: 2025-08-21

## 2025-08-21 ENCOUNTER — TELEPHONE (OUTPATIENT)
Age: 75
End: 2025-08-21

## 2025-08-22 ENCOUNTER — DOCUMENTATION (OUTPATIENT)
Dept: INFECTIOUS DISEASES | Facility: CLINIC | Age: 75
End: 2025-08-22

## 2025-08-22 ENCOUNTER — TELEPHONE (OUTPATIENT)
Dept: FAMILY MEDICINE CLINIC | Facility: CLINIC | Age: 75
End: 2025-08-22

## 2025-08-22 ENCOUNTER — ANTICOAG VISIT (OUTPATIENT)
Dept: FAMILY MEDICINE CLINIC | Facility: CLINIC | Age: 75
End: 2025-08-22

## 2025-08-23 ENCOUNTER — HOME CARE VISIT (OUTPATIENT)
Dept: HOME HEALTH SERVICES | Facility: HOME HEALTHCARE | Age: 75
End: 2025-08-23

## (undated) DEVICE — Device

## (undated) DEVICE — SPONGE GAUZE 4 X 4 16 PLY STRL PLASTIC TRAY LF

## (undated) DEVICE — BANDAGE KLING 4 IN X 2.1 Y STRL LF

## (undated) DEVICE — PACK PBDS UNIVERSAL ARTHROSCOPY RF